# Patient Record
Sex: MALE | Race: WHITE | NOT HISPANIC OR LATINO | URBAN - METROPOLITAN AREA
[De-identification: names, ages, dates, MRNs, and addresses within clinical notes are randomized per-mention and may not be internally consistent; named-entity substitution may affect disease eponyms.]

---

## 2017-03-21 ENCOUNTER — EMERGENCY (EMERGENCY)
Facility: HOSPITAL | Age: 82
LOS: 1 days | Discharge: HOME | End: 2017-03-21
Attending: EMERGENCY MEDICINE | Admitting: EMERGENCY MEDICINE
Payer: MEDICARE

## 2017-03-21 VITALS
HEART RATE: 60 BPM | TEMPERATURE: 98 F | DIASTOLIC BLOOD PRESSURE: 68 MMHG | SYSTOLIC BLOOD PRESSURE: 134 MMHG | OXYGEN SATURATION: 98 % | RESPIRATION RATE: 16 BRPM

## 2017-03-21 VITALS
DIASTOLIC BLOOD PRESSURE: 72 MMHG | SYSTOLIC BLOOD PRESSURE: 140 MMHG | HEIGHT: 71 IN | RESPIRATION RATE: 16 BRPM | TEMPERATURE: 99 F | WEIGHT: 214.95 LBS | OXYGEN SATURATION: 96 % | HEART RATE: 61 BPM

## 2017-03-21 DIAGNOSIS — S91.311A LACERATION WITHOUT FOREIGN BODY, RIGHT FOOT, INITIAL ENCOUNTER: ICD-10-CM

## 2017-03-21 PROCEDURE — 12001 RPR S/N/AX/GEN/TRNK 2.5CM/<: CPT

## 2017-03-21 PROCEDURE — 99283 EMERGENCY DEPT VISIT LOW MDM: CPT | Mod: 25

## 2017-03-21 PROCEDURE — 90715 TDAP VACCINE 7 YRS/> IM: CPT

## 2017-03-21 PROCEDURE — 90471 IMMUNIZATION ADMIN: CPT

## 2017-03-21 PROCEDURE — 99283 EMERGENCY DEPT VISIT LOW MDM: CPT

## 2017-03-21 RX ORDER — TETANUS TOXOID, REDUCED DIPHTHERIA TOXOID AND ACELLULAR PERTUSSIS VACCINE, ADSORBED 5; 2.5; 8; 8; 2.5 [IU]/.5ML; [IU]/.5ML; UG/.5ML; UG/.5ML; UG/.5ML
0.5 SUSPENSION INTRAMUSCULAR ONCE
Qty: 0 | Refills: 0 | Status: COMPLETED | OUTPATIENT
Start: 2017-03-21 | End: 2017-03-21

## 2017-03-21 RX ADMIN — TETANUS TOXOID, REDUCED DIPHTHERIA TOXOID AND ACELLULAR PERTUSSIS VACCINE, ADSORBED 0.5 MILLILITER(S): 5; 2.5; 8; 8; 2.5 SUSPENSION INTRAMUSCULAR at 13:40

## 2017-03-21 NOTE — ED PROVIDER NOTE - MEDICAL DECISION MAKING DETAILS
Foot lac after stepping on shower door track. Plan suture and dress. Suture removal in 7 days. Tdap given.

## 2017-03-21 NOTE — ED PROVIDER NOTE - NS ED MD SCRIBE ATTENDING SCRIBE SECTIONS
VITAL SIGNS( Pullset)/PAST MEDICAL/SURGICAL/SOCIAL HISTORY/REVIEW OF SYSTEMS/DISPOSITION/PHYSICAL EXAM/HISTORY OF PRESENT ILLNESS

## 2017-03-21 NOTE — ED PROVIDER NOTE - DETAILS:
Fer Ramirez MD - The scribe's documentation has been prepared under my direction and personally reviewed by me in its entirety. I confirm that the note above accurately reflects all work, treatment, procedures, and medical decision making performed by me.

## 2017-03-21 NOTE — ED PROVIDER NOTE - OBJECTIVE STATEMENT
84 y/o M pt presents to the ED c/o laceration on right foot s/p slip and fall in shower. Pt states he was stepping out of the shower and his foot got caught on the shower. Pt denies LOC, nausea, vomiting, or dizziness. Per wife, pt is not on blood thinners and took no aspirin PTA. Unknown last Tetanus. No further injuries at this time. No further complaints at this time. 84 y/o M pt presents to the ED by ambulance c/o laceration on right foot s/p slip and fall in shower. Pt states he was stepping out of the shower and his foot got caught on the shower. Per wife, pt is not on blood thinners and took no aspirin. Unknown last Tetanus. Pt denies LOC, nausea, vomiting, or dizziness. No other injuries. No further complaints at this time. 84 y/o M pt presents to the ED by ambulance c/o laceration on right foot s/p slip and fall in shower. Pt states he was stepping out of the shower and his foot got caught on the shower. Per wife, pt is not on blood thinners and took no aspirin. Unknown last Tetanus. Pt denies head injury, LOC, neck or back pain, nausea, vomiting, visual disturbance or dizziness. No other injuries. No further complaints at this time.

## 2017-03-29 ENCOUNTER — EMERGENCY (EMERGENCY)
Facility: HOSPITAL | Age: 82
LOS: 1 days | End: 2017-03-29
Attending: EMERGENCY MEDICINE | Admitting: INTERNAL MEDICINE
Payer: MEDICARE

## 2017-03-29 VITALS
OXYGEN SATURATION: 98 % | WEIGHT: 214.95 LBS | HEART RATE: 60 BPM | DIASTOLIC BLOOD PRESSURE: 84 MMHG | RESPIRATION RATE: 18 BRPM | HEIGHT: 71 IN | TEMPERATURE: 98 F | SYSTOLIC BLOOD PRESSURE: 137 MMHG

## 2017-03-29 PROCEDURE — G0463: CPT

## 2017-03-29 RX ADMIN — Medication 300 MILLIGRAM(S): at 17:22

## 2017-03-29 NOTE — ED ADULT NURSE NOTE - DISCHARGE TEACHING
Patient d/c home with instruction given to wife. Aide and wife instructed on wound cleaning. Basin and Kristy provided to patient.  Verbalized understanding of discharge. VSS.

## 2017-03-29 NOTE — ED PROVIDER NOTE - OBJECTIVE STATEMENT
84 y/o M pt presents to the ED to have sutures removed. As per pt, he had sutures placed in his second toe of right foot, where he had a laceration from stepping on his shower door. Pt is also c/o redness and swelling to the sutured area. Denies fever. No further complaints at this time. 84 y/o M pt presents to the ED to have sutures removed. As per pt, he had sutures placed in his 2nd, 3rd, and 4th toes of right foot, where he had a laceration from stepping on his shower door. Pt is also c/o redness and swelling to the sutured area. Denies fever. No further complaints at this time.

## 2017-03-29 NOTE — ED ADULT NURSE NOTE - OBJECTIVE STATEMENT
Patient here for suture removal of  Right 2,3 and 4th toe. Mild erythema and swelling noted to 2nd right toe with some mild swelling also noted to foot.

## 2017-03-29 NOTE — ED PROCEDURE NOTE - PROCEDURE ADDITIONAL DETAILS
Sutures taken from the 2rd, 3rd, and 4th toes of the right foot. Mild erythema and swelling in the 2nd toe. Likely infection. Will give antibiotics.

## 2017-03-29 NOTE — ED PROVIDER NOTE - NS ED MD SCRIBE ATTENDING SCRIBE SECTIONS
HISTORY OF PRESENT ILLNESS/DISPOSITION/REVIEW OF SYSTEMS/PAST MEDICAL/SURGICAL/SOCIAL HISTORY/VITAL SIGNS( Pullset)/PHYSICAL EXAM

## 2017-03-29 NOTE — ED PROVIDER NOTE - MEDICAL DECISION MAKING DETAILS
Comes for suture removal 1 week after 3 toes sutured. Redness and swelling of 2nd toe cw wound infection. Plan remove sutures. Bacitracin dressing. Rx Clindamycin. wound check in 2 days.

## 2017-03-29 NOTE — ED PROVIDER NOTE - SKIN, MLM
Sutures in the 2nd, 3rd, and 4th toes of right foot. Mild erythema and swelling of 2nd toe extending into the foot.

## 2018-04-14 ENCOUNTER — INPATIENT (INPATIENT)
Facility: HOSPITAL | Age: 83
LOS: 9 days | Discharge: OTHER INSTITUTION | DRG: 689 | End: 2018-04-24
Attending: INTERNAL MEDICINE | Admitting: INTERNAL MEDICINE
Payer: MEDICARE

## 2018-04-14 VITALS
DIASTOLIC BLOOD PRESSURE: 90 MMHG | OXYGEN SATURATION: 97 % | TEMPERATURE: 97 F | RESPIRATION RATE: 18 BRPM | WEIGHT: 214.95 LBS | SYSTOLIC BLOOD PRESSURE: 162 MMHG | HEART RATE: 60 BPM

## 2018-04-14 DIAGNOSIS — M25.562 PAIN IN LEFT KNEE: ICD-10-CM

## 2018-04-14 DIAGNOSIS — N39.0 URINARY TRACT INFECTION, SITE NOT SPECIFIED: ICD-10-CM

## 2018-04-14 DIAGNOSIS — R26.81 UNSTEADINESS ON FEET: ICD-10-CM

## 2018-04-14 DIAGNOSIS — N17.9 ACUTE KIDNEY FAILURE, UNSPECIFIED: ICD-10-CM

## 2018-04-14 DIAGNOSIS — I10 ESSENTIAL (PRIMARY) HYPERTENSION: ICD-10-CM

## 2018-04-14 LAB
ANION GAP SERPL CALC-SCNC: 11 MMOL/L — SIGNIFICANT CHANGE UP (ref 5–17)
APPEARANCE UR: ABNORMAL
BACTERIA # UR AUTO: ABNORMAL /HPF
BASOPHILS # BLD AUTO: 0 K/UL — SIGNIFICANT CHANGE UP (ref 0–0.2)
BASOPHILS NFR BLD AUTO: 0.6 % — SIGNIFICANT CHANGE UP (ref 0–2)
BILIRUB UR-MCNC: NEGATIVE — SIGNIFICANT CHANGE UP
BUN SERPL-MCNC: 21 MG/DL — SIGNIFICANT CHANGE UP (ref 7–23)
CALCIUM SERPL-MCNC: 9.7 MG/DL — SIGNIFICANT CHANGE UP (ref 8.4–10.5)
CHLORIDE SERPL-SCNC: 108 MMOL/L — SIGNIFICANT CHANGE UP (ref 96–108)
CO2 SERPL-SCNC: 24 MMOL/L — SIGNIFICANT CHANGE UP (ref 22–31)
COLOR SPEC: YELLOW — SIGNIFICANT CHANGE UP
CREAT SERPL-MCNC: 1.53 MG/DL — HIGH (ref 0.5–1.3)
DIFF PNL FLD: ABNORMAL
EOSINOPHIL # BLD AUTO: 0.1 K/UL — SIGNIFICANT CHANGE UP (ref 0–0.5)
EOSINOPHIL NFR BLD AUTO: 2 % — SIGNIFICANT CHANGE UP (ref 0–6)
EPI CELLS # UR: SIGNIFICANT CHANGE UP /HPF
GLUCOSE SERPL-MCNC: 83 MG/DL — SIGNIFICANT CHANGE UP (ref 70–99)
GLUCOSE UR QL: NEGATIVE — SIGNIFICANT CHANGE UP
HCT VFR BLD CALC: 36.6 % — LOW (ref 39–50)
HGB BLD-MCNC: 12.2 G/DL — LOW (ref 13–17)
KETONES UR-MCNC: NEGATIVE — SIGNIFICANT CHANGE UP
LEUKOCYTE ESTERASE UR-ACNC: ABNORMAL
LYMPHOCYTES # BLD AUTO: 1.9 K/UL — SIGNIFICANT CHANGE UP (ref 1–3.3)
LYMPHOCYTES # BLD AUTO: 27.3 % — SIGNIFICANT CHANGE UP (ref 13–44)
MCHC RBC-ENTMCNC: 30.3 PG — SIGNIFICANT CHANGE UP (ref 27–34)
MCHC RBC-ENTMCNC: 33.3 GM/DL — SIGNIFICANT CHANGE UP (ref 32–36)
MCV RBC AUTO: 90.9 FL — SIGNIFICANT CHANGE UP (ref 80–100)
MONOCYTES # BLD AUTO: 0.4 K/UL — SIGNIFICANT CHANGE UP (ref 0–0.9)
MONOCYTES NFR BLD AUTO: 5.5 % — SIGNIFICANT CHANGE UP (ref 2–14)
NEUTROPHILS # BLD AUTO: 4.4 K/UL — SIGNIFICANT CHANGE UP (ref 1.8–7.4)
NEUTROPHILS NFR BLD AUTO: 64.6 % — SIGNIFICANT CHANGE UP (ref 43–77)
NITRITE UR-MCNC: POSITIVE
PH UR: 6 — SIGNIFICANT CHANGE UP (ref 5–8)
PLATELET # BLD AUTO: 150 K/UL — SIGNIFICANT CHANGE UP (ref 150–400)
POTASSIUM SERPL-MCNC: 4.7 MMOL/L — SIGNIFICANT CHANGE UP (ref 3.5–5.3)
POTASSIUM SERPL-SCNC: 4.7 MMOL/L — SIGNIFICANT CHANGE UP (ref 3.5–5.3)
PROT UR-MCNC: 30 MG/DL
RBC # BLD: 4.03 M/UL — LOW (ref 4.2–5.8)
RBC # FLD: 15.9 % — HIGH (ref 10.3–14.5)
RBC CASTS # UR COMP ASSIST: ABNORMAL /HPF (ref 0–2)
SODIUM SERPL-SCNC: 143 MMOL/L — SIGNIFICANT CHANGE UP (ref 135–145)
SP GR SPEC: 1.01 — SIGNIFICANT CHANGE UP (ref 1.01–1.02)
UROBILINOGEN FLD QL: NEGATIVE — SIGNIFICANT CHANGE UP
WBC # BLD: 6.9 K/UL — SIGNIFICANT CHANGE UP (ref 3.8–10.5)
WBC # FLD AUTO: 6.9 K/UL — SIGNIFICANT CHANGE UP (ref 3.8–10.5)
WBC UR QL: >50 /HPF (ref 0–5)

## 2018-04-14 PROCEDURE — 93010 ELECTROCARDIOGRAM REPORT: CPT

## 2018-04-14 PROCEDURE — 73522 X-RAY EXAM HIPS BI 3-4 VIEWS: CPT | Mod: 26

## 2018-04-14 PROCEDURE — 73560 X-RAY EXAM OF KNEE 1 OR 2: CPT | Mod: 26,LT

## 2018-04-14 PROCEDURE — 71045 X-RAY EXAM CHEST 1 VIEW: CPT | Mod: 26

## 2018-04-14 PROCEDURE — 99285 EMERGENCY DEPT VISIT HI MDM: CPT | Mod: 25

## 2018-04-14 RX ORDER — DOCUSATE SODIUM 100 MG
100 CAPSULE ORAL
Qty: 0 | Refills: 0 | Status: DISCONTINUED | OUTPATIENT
Start: 2018-04-14 | End: 2018-04-24

## 2018-04-14 RX ORDER — CEFTRIAXONE 500 MG/1
1 INJECTION, POWDER, FOR SOLUTION INTRAMUSCULAR; INTRAVENOUS ONCE
Qty: 0 | Refills: 0 | Status: COMPLETED | OUTPATIENT
Start: 2018-04-14 | End: 2018-04-14

## 2018-04-14 RX ORDER — HYDROCORTISONE 1 %
1 OINTMENT (GRAM) TOPICAL DAILY
Qty: 0 | Refills: 0 | Status: DISCONTINUED | OUTPATIENT
Start: 2018-04-14 | End: 2018-04-24

## 2018-04-14 RX ORDER — CEFTRIAXONE 500 MG/1
1 INJECTION, POWDER, FOR SOLUTION INTRAMUSCULAR; INTRAVENOUS EVERY 24 HOURS
Qty: 0 | Refills: 0 | Status: DISCONTINUED | OUTPATIENT
Start: 2018-04-14 | End: 2018-04-14

## 2018-04-14 RX ORDER — CEFTRIAXONE 500 MG/1
1 INJECTION, POWDER, FOR SOLUTION INTRAMUSCULAR; INTRAVENOUS EVERY 24 HOURS
Qty: 0 | Refills: 0 | Status: DISCONTINUED | OUTPATIENT
Start: 2018-04-14 | End: 2018-04-16

## 2018-04-14 RX ORDER — LATANOPROST 0.05 MG/ML
1 SOLUTION/ DROPS OPHTHALMIC; TOPICAL AT BEDTIME
Qty: 0 | Refills: 0 | Status: DISCONTINUED | OUTPATIENT
Start: 2018-04-14 | End: 2018-04-24

## 2018-04-14 RX ORDER — CEFTRIAXONE 500 MG/1
1 INJECTION, POWDER, FOR SOLUTION INTRAMUSCULAR; INTRAVENOUS ONCE
Qty: 0 | Refills: 0 | Status: DISCONTINUED | OUTPATIENT
Start: 2018-04-14 | End: 2018-04-14

## 2018-04-14 RX ORDER — ACETAMINOPHEN 500 MG
650 TABLET ORAL EVERY 6 HOURS
Qty: 0 | Refills: 0 | Status: DISCONTINUED | OUTPATIENT
Start: 2018-04-14 | End: 2018-04-24

## 2018-04-14 RX ORDER — SODIUM CHLORIDE 9 MG/ML
1000 INJECTION INTRAMUSCULAR; INTRAVENOUS; SUBCUTANEOUS
Qty: 0 | Refills: 0 | Status: DISCONTINUED | OUTPATIENT
Start: 2018-04-14 | End: 2018-04-17

## 2018-04-14 RX ORDER — ENOXAPARIN SODIUM 100 MG/ML
40 INJECTION SUBCUTANEOUS DAILY
Qty: 0 | Refills: 0 | Status: DISCONTINUED | OUTPATIENT
Start: 2018-04-14 | End: 2018-04-24

## 2018-04-14 RX ORDER — HYDRALAZINE HCL 50 MG
10 TABLET ORAL EVERY 8 HOURS
Qty: 0 | Refills: 0 | Status: DISCONTINUED | OUTPATIENT
Start: 2018-04-14 | End: 2018-04-15

## 2018-04-14 RX ADMIN — Medication 10 MILLIGRAM(S): at 23:08

## 2018-04-14 RX ADMIN — CEFTRIAXONE 100 GRAM(S): 500 INJECTION, POWDER, FOR SOLUTION INTRAMUSCULAR; INTRAVENOUS at 19:15

## 2018-04-14 RX ADMIN — Medication 100 MILLIGRAM(S): at 23:08

## 2018-04-14 RX ADMIN — SODIUM CHLORIDE 60 MILLILITER(S): 9 INJECTION INTRAMUSCULAR; INTRAVENOUS; SUBCUTANEOUS at 23:08

## 2018-04-14 RX ADMIN — SODIUM CHLORIDE 60 MILLILITER(S): 9 INJECTION INTRAMUSCULAR; INTRAVENOUS; SUBCUTANEOUS at 19:15

## 2018-04-14 NOTE — PATIENT PROFILE ADULT. - VISION (WITH CORRECTIVE LENSES IF THE PATIENT USUALLY WEARS THEM):
Partially impaired: cannot see medication labels or newsprint, but can see obstacles in path, and the surrounding layout; can count fingers at arm's length/R eye blindness

## 2018-04-14 NOTE — ED PROVIDER NOTE - ATTENDING CONTRIBUTION TO CARE
I have examined and evaluated this patient with the above resident or PA, and agree with the documented clinical history, exam and plan.   Briefly: 85yo M, h/o CAD, CHF, PPM, prior CVA presenting with being unable to ambulate since twisting left knee 3 days ago, no swelling, no redness.  Usually uses walker, but has been unable to walk with walker as usual.      Left knee is not swollen, no overlying redness, no warmth; no instability.  No palpable effusion.  Lungs CTABL, Cardiac nrl s1s2, abd soft nt/nd.    Labs obtained, notable for UA c/w infection.  To be admitted for further management, kyra given inability to walk.    Knee xray as documented above.

## 2018-04-14 NOTE — ED PROVIDER NOTE - OBJECTIVE STATEMENT
83 y/o M w/ hx of pacemaker and stroke p/w generalized weakness x 1-2 wks and inability to ambulate since twisting his knee 3 days ago. Did not fall or hit head. Denies CP, SOB, cough, fever, chills, n/v/d.   PMHx: Pacemaker, Stroke  primary care physician: Kwabena Gamino

## 2018-04-14 NOTE — CONSULT NOTE ADULT - SUBJECTIVE AND OBJECTIVE BOX
CHIEF COMPLAINT:Patient is a 84y old  Male who presents with a chief complaint of weakness and difficulty ambulating X 1-2 weeks (14 Apr 2018 19:07)      HISTORY OF PRESENT ILLNESS:HPI:  85 yo M from home with PMHx CVA, PPM, Psoriasis, who p/w c/o 1-2 weeks progressive b/l lower extremity weakness, unstable gait, malaise, and lethargy.  In addition, pt s/p twisting motion of left knee with resulting surrounding swelling and pain.   Denies trauma or direct blow to leg. (14 Apr 2018 19:07)      PAST MEDICAL & SURGICAL HISTORY:  CVA,   PPM,   Psoriasis,        MEDICATIONS:  enoxaparin Injectable 40 milliGRAM(s) SubCutaneous daily  hydrALAZINE 10 milliGRAM(s) Oral every 8 hours    cefTRIAXone   IVPB 1 Gram(s) IV Intermittent every 24 hours      acetaminophen   Tablet. 650 milliGRAM(s) Oral every 6 hours PRN    bisacodyl 5 milliGRAM(s) Oral every 12 hours PRN  docusate sodium 100 milliGRAM(s) Oral two times a day      hydrocortisone 2.5% Ointment 1 Application(s) Topical daily  latanoprost 0.005% Ophthalmic Solution 1 Drop(s) Left EYE at bedtime  sodium chloride 0.9%. 1000 milliLiter(s) IV Continuous <Continuous>      FAMILY HISTORY:  no premature CAD    Non-contributory    SOCIAL HISTORY:    not an active smoker    Allergies    No Known Allergies    Intolerances    	     Review of Systems:  Review of Systems: REVIEW OF SYSTEMS:  CONSTITUTIONAL: (+) Malaise  EYES: No acute change in vision.  ENT:  No difficulty hearing, tinnitus, vertigo  NECK: No pain or stiffness  RESPIRATORY: No cough, wheezing, hemoptysis, or shortness of breath  CARDIOVASCULAR: No chest pain, palpitations, syncope, or leg swelling  GASTROINTESTINAL: No abdominal pain, N/V, hematemesis, diarrhea, melena, or hematochezia.  GENITOURINARY: No dysuria, frequency, hematuria  NEUROLOGICAL: unstable gait  SKIN: No rashes or lesions   LYMPH NODES: No enlarged glands  ENDOCRINE: No heat or cold intolerance  MUSCULOSKELETAL: See HPI   PSYCHIATRIC: No suicidal or homicidal ideations  HEME/LYMPH: No easy bruising or bleeding  ALLERGY AND IMMUNOLOGIC: No hives or eczema	      All other ROS negative    PHYSICAL EXAM:  T(C): 36.4 (04-14-18 @ 18:45), Max: 36.4 (04-14-18 @ 18:45)  HR: 59 (04-14-18 @ 19:19) (59 - 60)  BP: 172/88 (04-14-18 @ 19:19) (162/90 - 172/97)  RR: 18 (04-14-18 @ 19:19) (18 - 18)  SpO2: 97% (04-14-18 @ 19:19) (96% - 97%)  Wt(kg): --  I&O's Summary      Appearance: Normal	  HEENT:   Normal oral mucosa, EOMI	  Lymphatic: No lymphadenopathy  Cardiovascular: Normal S1 S2, No JVD, No murmurs, No edema  Respiratory: Lungs clear to auscultation	  Psychiatry: Alert, Mood & affect appropriate  Gastrointestinal:  Soft, Non-tender, + BS	  Skin: No rashes, No ecchymoses, No cyanosis	  Neurologic: Non-focal  Extremities:  No clubbing, cyanosis or edema  Vascular: Peripheral pulses palpable 2+ bilaterally  	    	  	  CARDIAC MARKERS:                                  12.2   6.9   )-----------( 150      ( 14 Apr 2018 17:29 )             36.6     04-14    143  |  108  |  21  ----------------------------<  83  4.7   |  24  |  1.53<H>    Ca    9.7      14 Apr 2018 17:29      HgA1c:   TSH:       Radiology:  cxr - no consolidation    Assessment and Plan:   Assessment:  · Assessment		  85 yo M from home with PMHx CVA, PPM, Psoriasis, who p/w c/o 1-2 weeks progressive b/l lower extremity weakness, unstable gait, malaise, and lethargy.  In addition, pt s/p twisting motion of left knee with resulting surrounding swelling and pain.         Problem/Plan - 1:  ·  Problem: Uncontrolled hypertension.  Plan: agree with hydralazine and uptitrate for goal BP of 140/90  monitor vitals      Problem/Plan - 2:  ·  Problem: ARF (acute renal failure).  Plan: concern for intrinsic vs post renal  tx underlying uti  c/w ivf  f/u renal u/s.     Problem/Plan - 3:  ·  Problem: UTI (urinary tract infection).  Plan: rocephin  f/u ucx.       Problem/Plan - 4:  ·  Problem: Unstable gait.  Plan: tx underlying uti   optimize BP  EP consult for PPM interrogation     Problem/Plan - 5:  ·  Problem: Knee pain, left.  Plan: f/u mri left knee.           Arpit Brown DO [facility]  [facility]  [phone]

## 2018-04-14 NOTE — H&P ADULT - NSHPLABSRESULTS_GEN_ALL_CORE
LABS:                        12.2   6.9   )-----------( 150      ( 2018 17:29 )             36.6     04-14    143  |  108  |  21  ----------------------------<  83  4.7   |  24  |  1.53<H>    Ca    9.7      2018 17:29            Urinalysis Basic - ( 2018 18:02 )    Color: Yellow / Appearance: SL Turbid / S.015 / pH: x  Gluc: x / Ketone: Negative  / Bili: Negative / Urobili: Negative   Blood: x / Protein: 30 mg/dL / Nitrite: Positive   Leuk Esterase: Large / RBC: 10-25 /HPF / WBC >50 /HPF   Sq Epi: x / Non Sq Epi: OCC /HPF / Bacteria: Few /HPF      CAPILLARY BLOOD GLUCOSE            RADIOLOGY & ADDITIONAL STUDIES:  < from: Xray Knee 1 or 2 Views, Left (18 @ 17:34) >    EXAM:  KNEE; 1 OR 2 VIEWS - LEFT                            PROCEDURE DATE:  2018      ******PRELIMINARY REPORT******    ******PRELIMINARY REPORT******              INTERPRETATION:  Linear lucency through the lateral patella is 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 Correlate clinically with point tenderness.   Otherwise, no acute fracture.    < end of copied text >

## 2018-04-14 NOTE — H&P ADULT - NSHPPHYSICALEXAM_GEN_ALL_CORE
Vital Signs Last 24 Hrs  T(C): 36.4 (14 Apr 2018 18:45), Max: 36.4 (14 Apr 2018 18:45)  T(F): 97.5 (14 Apr 2018 18:45), Max: 97.5 (14 Apr 2018 18:45)  HR: 59 (14 Apr 2018 19:19) (59 - 60)  BP: 172/88 (14 Apr 2018 19:19) (162/90 - 172/97)  BP(mean): --  RR: 18 (14 Apr 2018 19:19) (18 - 18)  SpO2: 97% (14 Apr 2018 19:19) (96% - 97%)    Constitutional: WD, WN, NAD  HEENT: NC, AT  Neck:  Supple. No JVD, bruits or thyromegaly  Respiratory:  CTA b/l. No rhonchi, or rales. Not using accessory muscles of respiration.   Cardiovascular:  RRR. No R/G. 2+ radial pulses b/l.   Gastrointestinal: Soft, obese, NT, ND, normoactive bowel sounds.  No organomegaly, rigidity, or RT.  Extremities: psoriatic changes b/l l.e., WWP without cyanosis, clubbing   Neurological:  Alert and awake.  Crude sensation intact.  strength 5/5 l.e.

## 2018-04-14 NOTE — ED PROVIDER NOTE - PHYSICAL EXAMINATION
Gen: NAD  Eyes:  sclerae white, no icterus  ENT: Moist mucous membranes. No exudates  Neck: supple, no LAD, mass or goiter, trachea midline  CV: RRR. Audible S1 and S2. No murmurs, rubs, gallops, S3, nor S4  Pulm: Clear to auscultation bilaterally. No wheezes, rales, or rhonchi  Abd: BS+, nondistended, No tenderness to palpation  Musculoskeletal:  L. knee mildly TTP, not markedly swollen, skin intact, not warm or w/ signs of infxn  Skin: no lesions or scars noted  Psych: mood good, affect full range and congruent with mood.  Neurologic: AAOx3

## 2018-04-14 NOTE — H&P ADULT - NSHPREVIEWOFSYSTEMS_GEN_ALL_CORE
REVIEW OF SYSTEMS:  CONSTITUTIONAL: (+) Malaise  EYES: No acute change in vision.  ENT:  No difficulty hearing, tinnitus, vertigo  NECK: No pain or stiffness  RESPIRATORY: No cough, wheezing, hemoptysis, or shortness of breath  CARDIOVASCULAR: No chest pain, palpitations, syncope, or leg swelling  GASTROINTESTINAL: No abdominal pain, N/V, hematemesis, diarrhea, melena, or hematochezia.  GENITOURINARY: No dysuria, frequency, hematuria  NEUROLOGICAL: No headaches, acute loss of strength, numbness, or tremors  SKIN: No rashes or lesions   LYMPH NODES: No enlarged glands  ENDOCRINE: No heat or cold intolerance  MUSCULOSKELETAL: No arthralgia, myalgia   PSYCHIATRIC: No suicidal or homicidal ideations  HEME/LYMPH: No easy bruising or bleeding   ALLERGY AND IMMUNOLOGIC: No hives or eczema

## 2018-04-14 NOTE — ED ADULT NURSE NOTE - OBJECTIVE STATEMENT
83 y/o male a+ox3, pmhx pacemaker, CVA, coming from home c/o bilateral leg weakness x 3 days. Pt reports progressively worsening leg weakness bilaterally; states he is no longer able to walk with his walker or get out of bed without assistance; pt denies recent falls or injuries. Pt denies any pain to legs bilaterally, denies dizziness, lightheadedness, difficulty breathing, chest pain or discomfort, headache, abdominal pain, n/v/d, fever or chills, use of blood thinners, recent falls or trauma. VS documented, IV established, labs obtained. Pt left in position of comfort, will reassess

## 2018-04-14 NOTE — H&P ADULT - HISTORY OF PRESENT ILLNESS
83 yo M from home with PMHx CVA, PPM, Psoriasis, who p/w c/o 1-2 weeks progressive b/l lower extremity weakness, unstable gait, malaise, and lethargy.  In addition, pt s/p twisting motion of left knee with resulting surrounding swelling and pain.   Denies trauma or direct blow to leg.

## 2018-04-14 NOTE — CONSULT NOTE ADULT - SUBJECTIVE AND OBJECTIVE BOX
Patient is a 84y old  Male who presents with a chief complaint of weakness and difficulty ambulating X 1-2 weeks (2018 19:07)        REVIEW OF SYSTEMS: Total of twelve systems have been reviewed with patient and found to be negative unless mentioned in HPI            PAST MEDICAL & SURGICAL HISTORY:      SOCIAL HISTORY  Alcohol:  Tobacco:  Illicit substance use:          FAMILY HISTORY: Non contributory to the present illness        ALLERGIES: NKDA          T(C): 36.4 (18 @ 18:45), Max: 36.4 (18 @ 18:45)  HR: 59 (18 @ 19:19) (59 - 60)  BP: 172/88 (18 @ 19:19) (162/90 - 172/97)  RR: 18 (18 @ 19:19) (18 - 18)  SpO2: 97% (18 @ 19:19) (96% - 97%)  Wt(kg): --  I&O's Summary          PHYSICAL EXAM:  GENERAL: N  CVS:  RESP:  GI:  EXT:  CNS:            LABS:                        12.2   6.9   )-----------( 150      ( 2018 17:29 )             36.6     04-14    143  |  108  |  21  ----------------------------<  83  4.7   |  24  |  1.53<H>    Ca    9.7      2018 17:29        Urinalysis Basic - ( 2018 18:02 )    Color: Yellow / Appearance: SL Turbid / S.015 / pH: x  Gluc: x / Ketone: Negative  / Bili: Negative / Urobili: Negative   Blood: x / Protein: 30 mg/dL / Nitrite: Positive   Leuk Esterase: Large / RBC: 10-25 /HPF / WBC >50 /HPF   Sq Epi: x / Non Sq Epi: OCC /HPF / Bacteria: Few /HPF            MEDICATIONS  (STANDING):  cefTRIAXone   IVPB 1 Gram(s) IV Intermittent every 24 hours  docusate sodium 100 milliGRAM(s) Oral two times a day  enoxaparin Injectable 40 milliGRAM(s) SubCutaneous daily  hydrALAZINE 10 milliGRAM(s) Oral every 8 hours  hydrocortisone 2.5% Ointment 1 Application(s) Topical daily  latanoprost 0.005% Ophthalmic Solution 1 Drop(s) Left EYE at bedtime  sodium chloride 0.9%. 1000 milliLiter(s) (60 mL/Hr) IV Continuous <Continuous>    MEDICATIONS  (PRN):  acetaminophen   Tablet. 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 Correlate clinically with point tenderness.   Otherwise, no acute fracture.      < end of copied text >      < from: Xray Chest 1 View AP/PA (18 @ 17:32) >  No Urgent/Emergent findings.    Follow up official report.      < end of copied text > Patient is a 84y old  Male who presents with a chief complaint of weakness and difficulty ambulating X 1-2 weeks (2018 19:07), progressive b/l lower extremity weakness, unstable gait, malaise, and lethargy. He has no fever or chills but found to have positive Urine analysis. He has started on Ceftriaxone, cultures Pending. The ID consult requested  to assist with further evaluation and antibiotic management.           REVIEW OF SYSTEMS: Total of twelve systems have been reviewed with patient and found to be negative unless mentioned in HPI            PAST MEDICAL & SURGICAL HISTORY:    Psoriasis  HTN        SOCIAL HISTORY  Alcohol: Does not drink  Tobacco: Does not smoke  Illicit substance use: None          FAMILY HISTORY: Non contributory to the present illness        ALLERGIES: NKDA          T(C): 36.4 (18 @ 18:45), Max: 36.4 (18 @ 18:45)  HR: 59 (18 @ 19:19) (59 - 60)  BP: 172/88 (18 @ 19:19) (162/90 - 172/97)  RR: 18 (18 @ 19:19) (18 - 18)  SpO2: 97% (18 @ 19:19) (96% - 97%)  Wt(kg): --  I&O's Summary          PHYSICAL EXAM:  GENERAL: Not in distress  CVS: s1 and s2 present  RESP: Air entry B/L  GI: Abdomen soft and nontender  EXT: No pedal edema  CNS: Awake and alert              LABS:                        12.2   6.9   )-----------( 150      ( 2018 17:29 )             36.6     -    143  |  108  |  21  ----------------------------<  83  4.7   |  24  |  1.53<H>    Ca    9.7      2018 17:29        Urinalysis Basic - ( 2018 18:02 )    Color: Yellow / Appearance: SL Turbid / S.015 / pH: x  Gluc: x / Ketone: Negative  / Bili: Negative / Urobili: Negative   Blood: x / Protein: 30 mg/dL / Nitrite: Positive   Leuk Esterase: Large / RBC: 10-25 /HPF / WBC >50 /HPF   Sq Epi: x / Non Sq Epi: OCC /HPF / Bacteria: Few /HPF            MEDICATIONS  (STANDING):  cefTRIAXone   IVPB 1 Gram(s) IV Intermittent every 24 hours  docusate sodium 100 milliGRAM(s) Oral two times a day  enoxaparin Injectable 40 milliGRAM(s) SubCutaneous daily  hydrALAZINE 10 milliGRAM(s) Oral every 8 hours  hydrocortisone 2.5% Ointment 1 Application(s) Topical daily  latanoprost 0.005% Ophthalmic Solution 1 Drop(s) Left EYE at bedtime  sodium chloride 0.9%. 1000 milliLiter(s) (60 mL/Hr) IV Continuous <Continuous>    MEDICATIONS  (PRN):  acetaminophen   Tablet. 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 Correlate clinically with point tenderness.   Otherwise, no acute fracture.      < end of copied text >      < from: Xray Chest 1 View AP/PA (18 @ 17:32) >  No Urgent/Emergent findings.    Follow up official report.      < end of copied text >

## 2018-04-14 NOTE — ED PROVIDER NOTE - PROGRESS NOTE DETAILS
xray obtained of left knee.  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  -Fabrice

## 2018-04-14 NOTE — H&P ADULT - ASSESSMENT
83 yo M from home with PMHx CVA, PPM, Psoriasis, who p/w c/o 1-2 weeks progressive b/l lower extremity weakness, unstable gait, malaise, and lethargy.  In addition, pt s/p twisting motion of left knee with resulting surrounding swelling and pain.

## 2018-04-15 DIAGNOSIS — N18.3 CHRONIC KIDNEY DISEASE, STAGE 3 (MODERATE): ICD-10-CM

## 2018-04-15 DIAGNOSIS — N17.9 ACUTE KIDNEY FAILURE, UNSPECIFIED: ICD-10-CM

## 2018-04-15 DIAGNOSIS — N39.0 URINARY TRACT INFECTION, SITE NOT SPECIFIED: ICD-10-CM

## 2018-04-15 DIAGNOSIS — I12.9 HYPERTENSIVE CHRONIC KIDNEY DISEASE WITH STAGE 1 THROUGH STAGE 4 CHRONIC KIDNEY DISEASE, OR UNSPECIFIED CHRONIC KIDNEY DISEASE: ICD-10-CM

## 2018-04-15 LAB
ALBUMIN SERPL ELPH-MCNC: 3.6 G/DL — SIGNIFICANT CHANGE UP (ref 3.3–5)
ALP SERPL-CCNC: 116 U/L — SIGNIFICANT CHANGE UP (ref 40–120)
ALT FLD-CCNC: 14 U/L RC — SIGNIFICANT CHANGE UP (ref 10–45)
ANION GAP SERPL CALC-SCNC: 12 MMOL/L — SIGNIFICANT CHANGE UP (ref 5–17)
AST SERPL-CCNC: 21 U/L — SIGNIFICANT CHANGE UP (ref 10–40)
BILIRUB DIRECT SERPL-MCNC: <0.1 MG/DL — SIGNIFICANT CHANGE UP (ref 0–0.2)
BILIRUB INDIRECT FLD-MCNC: >0.2 MG/DL — SIGNIFICANT CHANGE UP (ref 0.2–1)
BILIRUB SERPL-MCNC: 0.3 MG/DL — SIGNIFICANT CHANGE UP (ref 0.2–1.2)
BUN SERPL-MCNC: 20 MG/DL — SIGNIFICANT CHANGE UP (ref 7–23)
CALCIUM SERPL-MCNC: 9.6 MG/DL — SIGNIFICANT CHANGE UP (ref 8.4–10.5)
CHLORIDE SERPL-SCNC: 109 MMOL/L — HIGH (ref 96–108)
CHOLEST SERPL-MCNC: 174 MG/DL — SIGNIFICANT CHANGE UP (ref 10–199)
CO2 SERPL-SCNC: 23 MMOL/L — SIGNIFICANT CHANGE UP (ref 22–31)
CREAT SERPL-MCNC: 1.43 MG/DL — HIGH (ref 0.5–1.3)
GLUCOSE SERPL-MCNC: 72 MG/DL — SIGNIFICANT CHANGE UP (ref 70–99)
HBA1C BLD-MCNC: 4.9 % — SIGNIFICANT CHANGE UP (ref 4–5.6)
HCT VFR BLD CALC: 35.6 % — LOW (ref 39–50)
HDLC SERPL-MCNC: 46 MG/DL — SIGNIFICANT CHANGE UP (ref 40–125)
HGB BLD-MCNC: 11.9 G/DL — LOW (ref 13–17)
LIPID PNL WITH DIRECT LDL SERPL: 103 MG/DL — SIGNIFICANT CHANGE UP
MAGNESIUM SERPL-MCNC: 2 MG/DL — SIGNIFICANT CHANGE UP (ref 1.6–2.6)
MCHC RBC-ENTMCNC: 30.1 PG — SIGNIFICANT CHANGE UP (ref 27–34)
MCHC RBC-ENTMCNC: 33.3 GM/DL — SIGNIFICANT CHANGE UP (ref 32–36)
MCV RBC AUTO: 90.5 FL — SIGNIFICANT CHANGE UP (ref 80–100)
PHOSPHATE SERPL-MCNC: 3.2 MG/DL — SIGNIFICANT CHANGE UP (ref 2.5–4.5)
PLATELET # BLD AUTO: 137 K/UL — LOW (ref 150–400)
POTASSIUM SERPL-MCNC: 4.6 MMOL/L — SIGNIFICANT CHANGE UP (ref 3.5–5.3)
POTASSIUM SERPL-SCNC: 4.6 MMOL/L — SIGNIFICANT CHANGE UP (ref 3.5–5.3)
PROT SERPL-MCNC: 7.6 G/DL — SIGNIFICANT CHANGE UP (ref 6–8.3)
RBC # BLD: 3.94 M/UL — LOW (ref 4.2–5.8)
RBC # FLD: 15.9 % — HIGH (ref 10.3–14.5)
SODIUM SERPL-SCNC: 144 MMOL/L — SIGNIFICANT CHANGE UP (ref 135–145)
TOTAL CHOLESTEROL/HDL RATIO MEASUREMENT: 3.8 RATIO — SIGNIFICANT CHANGE UP (ref 3.4–9.6)
TRIGL SERPL-MCNC: 126 MG/DL — SIGNIFICANT CHANGE UP (ref 10–149)
VIT B12 SERPL-MCNC: 1181 PG/ML — SIGNIFICANT CHANGE UP (ref 232–1245)
WBC # BLD: 6.2 K/UL — SIGNIFICANT CHANGE UP (ref 3.8–10.5)
WBC # FLD AUTO: 6.2 K/UL — SIGNIFICANT CHANGE UP (ref 3.8–10.5)

## 2018-04-15 RX ORDER — LANOLIN ALCOHOL/MO/W.PET/CERES
1 CREAM (GRAM) TOPICAL AT BEDTIME
Qty: 0 | Refills: 0 | Status: DISCONTINUED | OUTPATIENT
Start: 2018-04-15 | End: 2018-04-15

## 2018-04-15 RX ORDER — AMLODIPINE BESYLATE 2.5 MG/1
5 TABLET ORAL DAILY
Qty: 0 | Refills: 0 | Status: DISCONTINUED | OUTPATIENT
Start: 2018-04-15 | End: 2018-04-24

## 2018-04-15 RX ORDER — LANOLIN ALCOHOL/MO/W.PET/CERES
1 CREAM (GRAM) TOPICAL AT BEDTIME
Qty: 0 | Refills: 0 | Status: COMPLETED | OUTPATIENT
Start: 2018-04-15 | End: 2018-04-15

## 2018-04-15 RX ORDER — HYDRALAZINE HCL 50 MG
25 TABLET ORAL EVERY 8 HOURS
Qty: 0 | Refills: 0 | Status: DISCONTINUED | OUTPATIENT
Start: 2018-04-16 | End: 2018-04-20

## 2018-04-15 RX ORDER — AMLODIPINE BESYLATE 2.5 MG/1
5 TABLET ORAL ONCE
Qty: 0 | Refills: 0 | Status: COMPLETED | OUTPATIENT
Start: 2018-04-15 | End: 2018-04-15

## 2018-04-15 RX ADMIN — Medication 100 MILLIGRAM(S): at 07:05

## 2018-04-15 RX ADMIN — Medication 1 APPLICATION(S): at 20:41

## 2018-04-15 RX ADMIN — AMLODIPINE BESYLATE 5 MILLIGRAM(S): 2.5 TABLET ORAL at 12:39

## 2018-04-15 RX ADMIN — CEFTRIAXONE 100 GRAM(S): 500 INJECTION, POWDER, FOR SOLUTION INTRAMUSCULAR; INTRAVENOUS at 19:39

## 2018-04-15 RX ADMIN — ENOXAPARIN SODIUM 40 MILLIGRAM(S): 100 INJECTION SUBCUTANEOUS at 12:39

## 2018-04-15 RX ADMIN — Medication 1 MILLIGRAM(S): at 22:27

## 2018-04-15 RX ADMIN — Medication 10 MILLIGRAM(S): at 07:05

## 2018-04-15 RX ADMIN — Medication 650 MILLIGRAM(S): at 21:12

## 2018-04-15 RX ADMIN — Medication 650 MILLIGRAM(S): at 20:42

## 2018-04-15 RX ADMIN — AMLODIPINE BESYLATE 5 MILLIGRAM(S): 2.5 TABLET ORAL at 14:51

## 2018-04-15 RX ADMIN — LATANOPROST 1 DROP(S): 0.05 SOLUTION/ DROPS OPHTHALMIC; TOPICAL at 20:41

## 2018-04-15 NOTE — CONSULT NOTE ADULT - SUBJECTIVE AND OBJECTIVE BOX
[facility] Neurological [facility] [place]([facility]), [facility]        Patient is a 84y old  Male who presents with a chief complaint of weakness and difficulty ambulating X 1-2 weeks (2018 19:07)      HPI:  83 yo M from home with PMHx CVA, PPM, Psoriasis, who p/w c/o 1-2 weeks progressive b/l lower extremity weakness, unstable gait, malaise, and lethargy.  In addition, pt s/p twisting motion of left knee with resulting surrounding swelling and pain.   Denies trauma or direct blow to leg. (2018 19:07)           *****PAST MEDICAL / Surgical  HISTORY:  PAST MEDICAL & SURGICAL HISTORY:           *****FAMILY HISTORY:  FAMILY HISTORY:           *****SOCIAL HISTORY:  Alcohol: None  Smoking: None         *****ALLERGIES:   Allergies    No Known Allergies    Intolerances             *****MEDICATIONS: current medication reviewed and documented.   MEDICATIONS  (STANDING):  amLODIPine   Tablet 5 milliGRAM(s) Oral daily  cefTRIAXone   IVPB 1 Gram(s) IV Intermittent every 24 hours  docusate sodium 100 milliGRAM(s) Oral two times a day  enoxaparin Injectable 40 milliGRAM(s) SubCutaneous daily  hydrocortisone 2.5% Ointment 1 Application(s) Topical daily  latanoprost 0.005% Ophthalmic Solution 1 Drop(s) Left EYE at bedtime  sodium chloride 0.9%. 1000 milliLiter(s) (60 mL/Hr) IV Continuous <Continuous>    MEDICATIONS  (PRN):  acetaminophen   Tablet. 650 milliGRAM(s) Oral every 6 hours PRN Mild Pain (1 - 3)  bisacodyl 5 milliGRAM(s) Oral every 12 hours PRN Constipation           *****REVIEW OF SYSTEM:  GEN: no fever, no chills, no pain  RESP: no SOB, no cough, no sputum  CVS: no chest pain, no palpitations, no edema  GI: no abdominal pain, no nausea, no vomiting, no constipation, no diarrhea  : no dysurea, no frequency, no hematurea  Neuro: no headache, no dizziness  PSYCH: no anxiety, no depression  Derm : no itching, no rash         *****VITAL SIGNS:  T(C): 36.3 (04-15-18 @ 06:40), Max: 36.6 (04-15-18 @ 00:51)  HR: 80 (04-15-18 @ 06:40) (59 - 80)  BP: 161/84 (04-15-18 @ 06:40) (161/84 - 174/96)  RR: 18 (04-15-18 @ 06:40) (18 - 18)  SpO2: 94% (04-15-18 @ 06:40) (94% - 99%)  Wt(kg): --     @ 07:01  -  04-15 @ 07:00  --------------------------------------------------------  IN: 1080 mL / OUT: 0 mL / NET: 1080 mL            >>> <<<         *****LAB AND IMAGIN.9   6.2   )-----------( 137      ( 15 Apr 2018 07:13 )             35.6               -15    144  |  109<H>  |  20  ----------------------------<  72  4.6   |  23  |  1.43<H>    Ca    9.6      15 Apr 2018 07:11  Phos  3.2     -15  Mg     2.0     04-15    TPro  7.6  /  Alb  3.6  /  TBili  0.3  /  DBili  <0.1  /  AST  21  /  ALT  14  /  AlkPhos  116  -15                            Urinalysis Basic - ( 2018 18:02 )    Color: Yellow / Appearance: SL Turbid / S.015 / pH: x  Gluc: x / Ketone: Negative  / Bili: Negative / Urobili: Negative   Blood: x / Protein: 30 mg/dL / Nitrite: Positive   Leuk Esterase: Large / RBC: 10-25 /HPF / WBC >50 /HPF   Sq Epi: x / Non Sq Epi: OCC /HPF / Bacteria: Few /HPF        [All pertinent recent Imaging reports reviewed]         *****A S S E S S M E N T   A N D   P L A N :            80 minutes spent on the total encounter;  more than 50 % of the visit was spent on counseling  and or coordinating care by the attending physician.    Thank you for allowing me to participate in the care of this lauren patient. Please do not hesitate to call me if you have any questions.   ___________________________  Will follow with you.  Thank you,  Mary Draper MD  Diplomate of the American Board of Neurology and Psychiatry.  Diplomate of the American Board of Vascular Neurology.   [facility] ([facility]), [facility]   Ph: [phone]      This and subsequent notes were partially created using voice recognition software and will  inherently be subject to errors including those of syntax and sound alike substitutions which may escape proofreading. In such instances original meaning may be extrapolated by contextual derivation. [facility]rological [facility]        Patient is a 84y old  Male who presents with a chief complaint of weakness and difficulty ambulating X 1-2 weeks (2018 19:07)      HPI:  85 yo M from home with PMHx CVA, PPM, Psoriasis, who p/w c/o 1-2 weeks progressive b/l lower extremity weakness, unstable gait, malaise, and lethargy.  In addition, pt s/p twisting motion of left knee with resulting surrounding swelling and pain.   Denies trauma or direct blow to leg.  He does report having had unsteady gait after his cva, he generally walks with a walker after that. He reports that he developed significant weakness which limits his ability to walk.     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   MEDICATIONS  (STANDING):  amLODIPine   Tablet 5 milliGRAM(s) Oral daily  cefTRIAXone   IVPB 1 Gram(s) IV Intermittent every 24 hours  docusate sodium 100 milliGRAM(s) Oral two times a day  enoxaparin Injectable 40 milliGRAM(s) SubCutaneous daily  hydrocortisone 2.5% Ointment 1 Application(s) Topical daily  latanoprost 0.005% Ophthalmic Solution 1 Drop(s) Left EYE at bedtime  sodium chloride 0.9%. 1000 milliLiter(s) (60 mL/Hr) IV Continuous <Continuous>    MEDICATIONS  (PRN):  acetaminophen   Tablet. 650 milliGRAM(s) Oral every 6 hours PRN Mild Pain (1 - 3)  bisacodyl 5 milliGRAM(s) Oral every 12 hours PRN Constipation           *****REVIEW OF SYSTEM:  GEN: no fever, no chills, no pain  RESP: no SOB, no cough, no sputum  CVS: no chest pain, no palpitations, no edema  GI: no abdominal pain, no nausea, no vomiting, no constipation, no diarrhea  : no dysurea, no frequency, no hematurea  Neuro: no headache, no dizziness  PSYCH: no anxiety, no depression  Derm : no itching, no rash         *****VITAL SIGNS:  T(C): 36.3 (04-15-18 @ 06:40), Max: 36.6 (04-15-18 @ 00:51)  HR: 80 (04-15-18 @ 06:40) (59 - 80)  BP: 161/84 (04-15-18 @ 06:40) (161/84 - 174/96)  RR: 18 (04-15-18 @ 06:40) (18 - 18)  SpO2: 94% (04-15-18 @ 06:40) (94% - 99%)  Wt(kg): --     @ 07:01  -  04-15 @ 07:00  --------------------------------------------------------  IN: 1080 mL / OUT: 0 mL / NET: 1080 mL                 *****PHYSICAL EXAM:   alert oriented x 3 attention comprehension are fair.  Able to name, repeat.   EOmi fundi not visualized   no nystagmus VFF to confrontation  Tongue is midline  Palate elevates symmetrically   Moving all ext except left lower ext due to pain/swelling of the left knee.   pt has trouble bending his knee due to the swelling. Also appears to have some ecchymosis of the left knee.     Gait not assessed.             >>> <<<         *****LAB AND IMAGIN.9   6.2   )-----------( 137      ( 15 Apr 2018 07:13 )             35.6               -15    144  |  109<H>  |  20  ----------------------------<  72  4.6   |  23  |  1.43<H>    Ca    9.6      15 Apr 2018 07:11  Phos  3.2     -15  Mg     2.0     -15    TPro  7.6  /  Alb  3.6  /  TBili  0.3  /  DBili  <0.1  /  AST  21  /  ALT  14  /  AlkPhos  116  04-15                            Urinalysis Basic - ( 2018 18:02 )    Color: Yellow / Appearance: SL Turbid / S.015 / pH: x  Gluc: x / Ketone: Negative  / Bili: Negative / Urobili: Negative   Blood: x / Protein: 30 mg/dL / Nitrite: Positive   Leuk Esterase: Large / RBC: 10-25 /HPF / WBC >50 /HPF   Sq Epi: x / Non Sq Epi: OCC /HPF / Bacteria: Few /HPF    < from: Xray Knee 1 or 2 Views, Left (18 @ 17:34) >  Linear lucency through the lateral patella may be related to a bipartite   patella versus nondisplaced fracture. Correlate for site of point   tenderness. Correlate with pending knee MRI.     There may be a left knee joint effusion, however, evaluation is limited   byobliquity on the lateral view.    Otherwise, no acute fracture.  Medial knee joint space narrowing.    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 Lower lumbar   degenerative disc disease and facet arthrosis present. Mild pubic   symphysis arthrosis is present. There is no fracture identified.          < end of copied text >        [All pertinent recent Imaging reports reviewed]         *****A S S E S S M E N T   A N D   P L A N :    85 yo M from home with PMHx CVA, PPM, Psoriasis, who p/w c/o 1-2 weeks progressive b/l lower extremity weakness, unstable gait, malaise, and lethargy.  In addition, pt s/p twisting motion of left knee with resulting surrounding swelling and pain.      Xray of the knee, as above, unclear if there is a fracture, mri of knee pending.   possible left knee sprain vs. inflammatory process  consider ortho consult/rheum consult.  Still with localized swelling of the left knee and limited range of motion of the knee.   Exam is limited due to pain, but does demonstrate some resistance to motion.  Elevated, rest, ice and avoid weight bearing for now until further imaging.  Consider nsaids for pain relief.    Elevated bp likely due to pain.  will continue to monitor closely.     ON dvt prophylaxis with lovenox.          80 minutes spent on the total encounter;  more than 50 % of the visit was spent on counseling  and or coordinating care by the attending physician.    Thank you for allowing me to participate in the care of this lauren patient. Please do not hesitate to call me if you have any questions.   ___________________________  Will follow with you.  Thank you,  Mary Draper MD  Diplomate of the American Board of Neurology and Psychiatry.  Diplomate of the American Board of Vascular Neurology.   [facility] ([place]), [facility]   Ph: [phone]      This and subsequent notes were partially created using voice recognition software and will  inherently be subject to errors including those of syntax and sound alike substitutions which may escape proofreading. In such instances original meaning may be extrapolated by contextual derivation.

## 2018-04-15 NOTE — CONSULT NOTE ADULT - PROBLEM SELECTOR RECOMMENDATION 2
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 Will contact PMD to determine baseline renal function if no improvement with IVF. Monitor electrolytes.

## 2018-04-15 NOTE — CONSULT NOTE ADULT - PROBLEM SELECTOR RECOMMENDATION 9
Patient with likely MELISSA in setting of infection likely hemodynamically mediated. Scr improving with IVF. Continue with IVF for now. Given history of stones and prostate CA, agree with checking renal US. Avoid nephrotoxins.

## 2018-04-15 NOTE — PROGRESS NOTE ADULT - SUBJECTIVE AND OBJECTIVE BOX
Patient seen and examined at bedside  No new acute events noted overnight  Case discussed with medical team    HPI:  85 yo M from home with PMHx CVA, PPM, Psoriasis, who p/w c/o 1-2 weeks progressive b/l lower extremity weakness, unstable gait, malaise, and lethargy.  In addition, pt s/p twisting motion of left knee with resulting surrounding swelling and pain.   Denies trauma or direct blow to leg. (2018 19:07)      PAST MEDICAL & SURGICAL HISTORY:      No Known Allergies       MEDICATIONS  (STANDING):  amLODIPine   Tablet 5 milliGRAM(s) Oral daily  cefTRIAXone   IVPB 1 Gram(s) IV Intermittent every 24 hours  docusate sodium 100 milliGRAM(s) Oral two times a day  enoxaparin Injectable 40 milliGRAM(s) SubCutaneous daily  hydrocortisone 2.5% Ointment 1 Application(s) Topical daily  latanoprost 0.005% Ophthalmic Solution 1 Drop(s) Left EYE at bedtime  sodium chloride 0.9%. 1000 milliLiter(s) (60 mL/Hr) IV Continuous <Continuous>    MEDICATIONS  (PRN):  acetaminophen   Tablet. 650 milliGRAM(s) Oral every 6 hours PRN Mild Pain (1 - 3)  bisacodyl 5 milliGRAM(s) Oral every 12 hours PRN Constipation      REVIEW OF SYSTEMS:  CONSTITUTIONAL: (+) malaise.   EYES: No acute change in vision   ENT:  No tinnitus  NECK: No stiffness  RESPIRATORY: No hemoptysis  CARDIOVASCULAR: No chest pain, palpitations, syncope  GASTROINTESTINAL: No hematemesis, diarrhea, melena, or hematochezia.  GENITOURINARY: No hematuria  NEUROLOGICAL: No headaches  LYMPH Nodes: No enlarged glands  ENDOCRINE: No heat or cold intolerance	    T(C): 36.3 (04-15-18 @ 06:40), Max: 36.6 (04-15-18 @ 00:51)  HR: 80 (04-15-18 @ 06:40) (59 - 80)  BP: 161/84 (04-15-18 @ 06:40) (161/84 - 174/96)  RR: 18 (04-15-18 @ 06:40) (18 - 18)  SpO2: 94% (04-15-18 @ 06:40) (94% - 99%)    PHYSICAL EXAMINATION:   Constitutional: WD, NAD  HEENT: NC, AT  Neck:  Supple  Respiratory:  Adequate airflow b/l. Not using accessory muscles of respiration.  Cardiovascular:  S1 & S2 intact, no R/G, 2+ radial pulses b/l  Gastrointestinal: Soft, NT, ND, normoactive b.s., no organomegaly/RT/rigidity  Extremities: WWP  Neurological:  Alert and awake. Crude sensation intact.     Labs and imaging reviewed    LABS:                        11.9   6.2   )-----------( 137      ( 15 Apr 2018 07:13 )             35.6     -15    144  |  109<H>  |  20  ----------------------------<  72  4.6   |  23  |  1.43<H>    Ca    9.6      15 Apr 2018 07:11  Phos  3.2     04-15  Mg     2.0     04-15    TPro  7.6  /  Alb  3.6  /  TBili  0.3  /  DBili  <0.1  /  AST  21  /  ALT  14  /  AlkPhos  116  04-15          Urinalysis Basic - ( 2018 18:02 )    Color: Yellow / Appearance: SL Turbid / S.015 / pH: x  Gluc: x / Ketone: Negative  / Bili: Negative / Urobili: Negative   Blood: x / Protein: 30 mg/dL / Nitrite: Positive   Leuk Esterase: Large / RBC: 10-25 /HPF / WBC >50 /HPF   Sq Epi: x / Non Sq Epi: OCC /HPF / Bacteria: Few /HPF      CAPILLARY BLOOD GLUCOSE            LIVER FUNCTIONS - ( 15 Apr 2018 07:11 )  Alb: 3.6 g/dL / Pro: 7.6 g/dL / ALK PHOS: 116 U/L / ALT: 14 U/L RC / AST: 21 U/L / GGT: x               RADIOLOGY & ADDITIONAL STUDIES:

## 2018-04-15 NOTE — PROGRESS NOTE ADULT - SUBJECTIVE AND OBJECTIVE BOX
Patient is a 84y old  Male who presents with a chief complaint of weakness and difficulty ambulating X 1-2 weeks (14 Apr 2018 19:07)      INTERVAL HISTORY: hypertensive    	  MEDICATIONS:  amLODIPine   Tablet 5 milliGRAM(s) Oral daily        PHYSICAL EXAM:  T(C): 36.3 (04-15-18 @ 21:19), Max: 36.6 (04-15-18 @ 00:51)  HR: 60 (04-15-18 @ 21:19) (59 - 80)  BP: 157/88 (04-15-18 @ 21:19) (157/88 - 178/98)  RR: 18 (04-15-18 @ 21:19) (17 - 18)  SpO2: 97% (04-15-18 @ 21:19) (94% - 97%)  Wt(kg): --  I&O's Summary    14 Apr 2018 07:01  -  15 Apr 2018 07:00  --------------------------------------------------------  IN: 1080 mL / OUT: 0 mL / NET: 1080 mL    15 Apr 2018 07:01  -  15 Apr 2018 22:51  --------------------------------------------------------  IN: 240 mL / OUT: 0 mL / NET: 240 mL          Appearance: Normal	  HEENT:    PERRL, EOMI	  Cardiovascular:  S1 S2, No JVD  Respiratory: Lungs clear to auscultation	  Psychiatry: Alert  Gastrointestinal:  Soft, Non-tender, + BS	  Skin: No rashes, No cyanosis  Extremities:  No edema of LE                                11.9   6.2   )-----------( 137      ( 15 Apr 2018 07:13 )             35.6     04-15    144  |  109<H>  |  20  ----------------------------<  72  4.6   |  23  |  1.43<H>    Ca    9.6      15 Apr 2018 07:11  Phos  3.2     04-15  Mg     2.0     04-15    TPro  7.6  /  Alb  3.6  /  TBili  0.3  /  DBili  <0.1  /  AST  21  /  ALT  14  /  AlkPhos  116  04-15        Labs, imaging and telemetry personally reviewed      ASSESSMENT/PLAN: 	  Assessment and Plan:   Assessment:  · Assessment		  85 yo M from home with PMHx CVA, PPM, Psoriasis, who p/w c/o 1-2 weeks progressive b/l lower extremity weakness, unstable gait, malaise, and lethargy.  In addition, pt s/p twisting motion of left knee with resulting surrounding swelling and pain.         Problem/Plan - 1:  ·  Problem: Uncontrolled hypertension.  Plan: Retsart hydralazine and uptitrate for goal BP of 140/90  Norvasc 5mg added but takes 3-5 days to achieve efficacy  monitor vitals      Problem/Plan - 2:  ·  Problem: ARF (acute renal failure).  Plan: concern for intrinsic vs post renal  tx underlying uti  c/w ivf   renal consult noted     Problem/Plan - 3:  ·  Problem: UTI (urinary tract infection).  Plan: rocephin  f/u ucx.       Problem/Plan - 4:  ·  Problem: Unstable gait.  Plan: tx underlying uti   optimize BP  EP consult for PPM interrogation               Arpit Brown DO [facility]  Cardiovascular Medicine  [phone]

## 2018-04-15 NOTE — CONSULT NOTE ADULT - SUBJECTIVE AND OBJECTIVE BOX
[facility] NEPHROLOGY- CONSULTATION NOTE    84 year old male with history of below presents with UTI. Nephrology consulted for elevated Scr.    Patient denies any prior history of kidney disease and states he follows with his PMD regularly with most recent labs from 2 months ago where he was not told of any abnormalities. Patient denies any h/o HTN or DM. Patient denies any GI symptoms PTA or diuretic, ACE-I or ARB use at home. Patient denies any NSAID use. Patient admits to history of nephrolithiasis (last 20 years ago - unknown stone type) and prostate CA s/p XRT (no surgery).    Patient started on IVF on admission with Scr improved to 1.43 this morning.    REVIEW OF SYSTEMS:  Gen: no changes in weight  HEENT: no rhinorrhea  Neck: no sore throat  Cards: no chest pain  Resp: no dyspnea  GI: no nausea or vomiting or diarrhea  : no dysuria or hematuria  Vascular: no LE edema  Derm: no rashes  Neuro: no numbness/tingling    No Known Allergies      Home Medications Reviewed  Hospital Medications:   MEDICATIONS  (STANDING):  cefTRIAXone   IVPB 1 Gram(s) IV Intermittent every 24 hours  docusate sodium 100 milliGRAM(s) Oral two times a day  enoxaparin Injectable 40 milliGRAM(s) SubCutaneous daily  hydrALAZINE 10 milliGRAM(s) Oral every 8 hours  hydrocortisone 2.5% Ointment 1 Application(s) Topical daily  latanoprost 0.005% Ophthalmic Solution 1 Drop(s) Left EYE at bedtime  sodium chloride 0.9%. 1000 milliLiter(s) (60 mL/Hr) IV Continuous <Continuous>      PAST MEDICAL & SURGICAL HISTORY:      FAMILY HISTORY:      SOCIAL HISTORY:  Denies toxic substance use     VITALS:  T(F): 97.4 (04-15-18 @ 06:40), Max: 97.8 (04-15-18 @ 00:51)  HR: 80 (04-15-18 @ 06:40)  BP: 161/84 (04-15-18 @ 06:40)  RR: 18 (04-15-18 @ 06:40)  SpO2: 94% (04-15-18 @ 06:40)  Wt(kg): --     @ 07:01  -  04-15 @ 07:00  --------------------------------------------------------  IN: 1080 mL / OUT: 0 mL / NET: 1080 mL      Height (cm): 180.34 ( @ 22:30)  Weight (kg): 97.5 ( @ 22:30)  BMI (kg/m2): 30 ( @ 22:30)  BSA (m2): 2.17 ( @ 22:30)    PHYSICAL EXAM:  Gen: NAD, calm  HEENT: MMM  Neck: no JVD  Cards: RRR, +S1/S2, no M/G/R  Resp: CTA B/L  GI: soft, NT/ND, NABS  : no CVA tenderness  Vascular: no LE edema B/L  Derm: no rashes  Neuro: non-focal    LABS:  04-15    144  |  109<H>  |  20  ----------------------------<  72  4.6   |  23  |  1.43<H>    Ca    9.6      15 Apr 2018 07:11  Phos  3.2     04-15  Mg     2.0     04-15    TPro  7.6  /  Alb  3.6  /  TBili  0.3  /  DBili  <0.1  /  AST  21  /  ALT  14  /  AlkPhos  116  04-15    Creatinine Trend: 1.43 <--, 1.53 <--                        11.9   6.2   )-----------( 137      ( 15 Apr 2018 07:13 )             35.6     Urine Studies:  Urinalysis Basic - ( 2018 18:02 )    Color: Yellow / Appearance: SL Turbid / S.015 / pH:   Gluc:  / Ketone: Negative  / Bili: Negative / Urobili: Negative   Blood:  / Protein: 30 mg/dL / Nitrite: Positive   Leuk Esterase: Large / RBC: 10-25 /HPF / WBC >50 /HPF   Sq Epi:  / Non Sq Epi: OCC /HPF / Bacteria: Few /HPF          RADIOLOGY & ADDITIONAL STUDIES:  < from: Xray Chest 1 View AP/PA (18 @ 17:32) >    INTERPRETATION:  No Urgent/Emergent findings.    Follow up official report.    < end of copied text >

## 2018-04-15 NOTE — CONSULT NOTE ADULT - ATTENDING COMMENTS
[facility] NEPHROLOGY  Zacarias Orellana M.D.  Pa Burgess D.O.  Yessica Valencia M.D.  Gavi Kern, MSN, ANP-C    Telephone: [phone]  Facsimile: [phone]    [address] [place] [address]

## 2018-04-16 DIAGNOSIS — R32 UNSPECIFIED URINARY INCONTINENCE: ICD-10-CM

## 2018-04-16 LAB
ANION GAP SERPL CALC-SCNC: 13 MMOL/L — SIGNIFICANT CHANGE UP (ref 5–17)
BUN SERPL-MCNC: 21 MG/DL — SIGNIFICANT CHANGE UP (ref 7–23)
CALCIUM SERPL-MCNC: 9.2 MG/DL — SIGNIFICANT CHANGE UP (ref 8.4–10.5)
CHLORIDE SERPL-SCNC: 109 MMOL/L — HIGH (ref 96–108)
CO2 SERPL-SCNC: 21 MMOL/L — LOW (ref 22–31)
CREAT SERPL-MCNC: 1.53 MG/DL — HIGH (ref 0.5–1.3)
ERYTHROCYTE [SEDIMENTATION RATE] IN BLOOD: 94 MM/HR — HIGH (ref 0–20)
GLUCOSE SERPL-MCNC: 80 MG/DL — SIGNIFICANT CHANGE UP (ref 70–99)
HCT VFR BLD CALC: 35 % — LOW (ref 39–50)
HGB BLD-MCNC: 11.6 G/DL — LOW (ref 13–17)
MCHC RBC-ENTMCNC: 30.2 PG — SIGNIFICANT CHANGE UP (ref 27–34)
MCHC RBC-ENTMCNC: 33.2 GM/DL — SIGNIFICANT CHANGE UP (ref 32–36)
MCV RBC AUTO: 91 FL — SIGNIFICANT CHANGE UP (ref 80–100)
PLATELET # BLD AUTO: 131 K/UL — LOW (ref 150–400)
POTASSIUM SERPL-MCNC: 4.8 MMOL/L — SIGNIFICANT CHANGE UP (ref 3.5–5.3)
POTASSIUM SERPL-SCNC: 4.8 MMOL/L — SIGNIFICANT CHANGE UP (ref 3.5–5.3)
RBC # BLD: 3.85 M/UL — LOW (ref 4.2–5.8)
RBC # FLD: 15.9 % — HIGH (ref 10.3–14.5)
SODIUM SERPL-SCNC: 143 MMOL/L — SIGNIFICANT CHANGE UP (ref 135–145)
WBC # BLD: 6.8 K/UL — SIGNIFICANT CHANGE UP (ref 3.8–10.5)
WBC # FLD AUTO: 6.8 K/UL — SIGNIFICANT CHANGE UP (ref 3.8–10.5)

## 2018-04-16 RX ORDER — OXYBUTYNIN CHLORIDE 5 MG
5 TABLET ORAL
Qty: 0 | Refills: 0 | Status: DISCONTINUED | OUTPATIENT
Start: 2018-04-16 | End: 2018-04-21

## 2018-04-16 RX ORDER — VANCOMYCIN HCL 1 G
1000 VIAL (EA) INTRAVENOUS ONCE
Qty: 0 | Refills: 0 | Status: COMPLETED | OUTPATIENT
Start: 2018-04-16 | End: 2018-04-16

## 2018-04-16 RX ORDER — LANOLIN ALCOHOL/MO/W.PET/CERES
1 CREAM (GRAM) TOPICAL ONCE
Qty: 0 | Refills: 0 | Status: COMPLETED | OUTPATIENT
Start: 2018-04-16 | End: 2018-04-16

## 2018-04-16 RX ORDER — VANCOMYCIN HCL 1 G
750 VIAL (EA) INTRAVENOUS EVERY 12 HOURS
Qty: 0 | Refills: 0 | Status: DISCONTINUED | OUTPATIENT
Start: 2018-04-16 | End: 2018-04-17

## 2018-04-16 RX ADMIN — ENOXAPARIN SODIUM 40 MILLIGRAM(S): 100 INJECTION SUBCUTANEOUS at 11:11

## 2018-04-16 RX ADMIN — Medication 1 MILLIGRAM(S): at 23:29

## 2018-04-16 RX ADMIN — Medication 5 MILLIGRAM(S): at 18:01

## 2018-04-16 RX ADMIN — Medication 5 MILLIGRAM(S): at 11:11

## 2018-04-16 RX ADMIN — CEFTRIAXONE 100 GRAM(S): 500 INJECTION, POWDER, FOR SOLUTION INTRAMUSCULAR; INTRAVENOUS at 18:01

## 2018-04-16 RX ADMIN — Medication 25 MILLIGRAM(S): at 14:31

## 2018-04-16 RX ADMIN — Medication 25 MILLIGRAM(S): at 22:40

## 2018-04-16 RX ADMIN — Medication 250 MILLIGRAM(S): at 22:40

## 2018-04-16 RX ADMIN — Medication 25 MILLIGRAM(S): at 05:00

## 2018-04-16 RX ADMIN — LATANOPROST 1 DROP(S): 0.05 SOLUTION/ DROPS OPHTHALMIC; TOPICAL at 22:40

## 2018-04-16 RX ADMIN — AMLODIPINE BESYLATE 5 MILLIGRAM(S): 2.5 TABLET ORAL at 05:00

## 2018-04-16 NOTE — PHYSICAL THERAPY INITIAL EVALUATION ADULT - BALANCE TRAINING, PT EVAL
GOAL: Pt will increase static/ dynamic standing balance to Fair+ with RW to improve safety with functional activities in 4 weeks.

## 2018-04-16 NOTE — PHYSICAL THERAPY INITIAL EVALUATION ADULT - ADDITIONAL COMMENTS
Pt lives in private home with spouse, 2STE +2 HRs. Pt has a HHA 9:30-5:30pm, assists to get him out of bed, bathing, dressing. Pt was ambulatory with RW, HHA was always "nearby" per pt.

## 2018-04-16 NOTE — PHYSICAL THERAPY INITIAL EVALUATION ADULT - PERTINENT HX OF CURRENT PROBLEM, REHAB EVAL
84 y.o. M, PMH pacemaker and CVA, p/w progressive LE weakness and lethargy x1-2 weeks and inability to ambulate since twisting his knee 3 days ago. XRay L knee 4/14: Linear lucency through the lateral patella may be related to a bipartite patella versus nondisplaced fracture.  Correlate for site of point tenderness. Correlate with pending knee MRI. here may be a L knee joint effusion, however, evaluation limited by obliquity on the lateral view. SEE BELOW:

## 2018-04-16 NOTE — PHYSICAL THERAPY INITIAL EVALUATION ADULT - ACTIVE RANGE OF MOTION EXAMINATION, REHAB EVAL
no Active ROM deficits were identified/R shoulder flexion limited to ~110 degrees 2/2 pain, passive WFL

## 2018-04-16 NOTE — PROGRESS NOTE ADULT - SUBJECTIVE AND OBJECTIVE BOX
Patient is a 84y old  Male who presents with a chief complaint of weakness and difficulty ambulating X 1-2 weeks (14 Apr 2018 19:07)      INTERVAL HISTORY: no events    	  MEDICATIONS:  amLODIPine   Tablet 5 milliGRAM(s) Oral daily  hydrALAZINE 25 milliGRAM(s) Oral every 8 hours        PHYSICAL EXAM:  T(C): 36.5 (04-16-18 @ 17:20), Max: 36.8 (04-16-18 @ 14:04)  HR: 60 (04-16-18 @ 17:20) (60 - 62)  BP: 156/93 (04-16-18 @ 17:20) (127/75 - 156/93)  RR: 17 (04-16-18 @ 17:20) (17 - 18)  SpO2: 95% (04-16-18 @ 17:20) (94% - 97%)  Wt(kg): --  I&O's Summary    15 Apr 2018 07:01  -  16 Apr 2018 07:00  --------------------------------------------------------  IN: 480 mL / OUT: 400 mL / NET: 80 mL    16 Apr 2018 07:01  -  16 Apr 2018 22:18  --------------------------------------------------------  IN: 660 mL / OUT: 0 mL / NET: 660 mL          Appearance: Normal	  HEENT:    PERRL, EOMI	  Cardiovascular:  S1 S2, No JVD  Respiratory: Lungs clear to auscultation	  Psychiatry: Alert  Gastrointestinal:  Soft, Non-tender, + BS	  Skin: No rashes, No cyanosis  Extremities:  No edema of LE                                11.6   6.8   )-----------( 131      ( 16 Apr 2018 05:31 )             35.0     04-16    143  |  109<H>  |  21  ----------------------------<  80  4.8   |  21<L>  |  1.53<H>    Ca    9.2      16 Apr 2018 05:30  Phos  3.2     04-15  Mg     2.0     04-15    TPro  7.6  /  Alb  3.6  /  TBili  0.3  /  DBili  <0.1  /  AST  21  /  ALT  14  /  AlkPhos  116  04-15        Labs, imaging and telemetry personally reviewed      ASSESSMENT/PLAN: 	  Assessment and Plan:   Assessment:  · Assessment		  83 yo M from home with PMHx CVA, PPM, Psoriasis, who p/w c/o 1-2 weeks progressive b/l lower extremity weakness, unstable gait, malaise, and lethargy.  In addition, pt s/p twisting motion of left knee with resulting surrounding swelling and pain.         Problem/Plan - 1:  ·  Problem: Uncontrolled hypertension.  Plan: agree with hydralazine and uptitrate for goal BP of 140/90, better controlled  monitor vitals      Problem/Plan - 2:  ·  Problem: ARF (acute renal failure).  Plan: concern for intrinsic vs post renal  tx underlying uti  c/w ivf  f/u renal u/s.     Problem/Plan - 3:  ·  Problem: UTI (urinary tract infection).  Plan: rocephin  f/u ucx.       Problem/Plan - 4:  ·  Problem: Unstable gait.  Plan: tx underlying uti   optimize BP  EP consult for PPM interrogation     Problem/Plan - 5:  ·  Problem: Knee pain, left.  Plan: f/u mri left knee.             Arpit Brown DO [facility]  Cardiovascular Medicine  [phone]

## 2018-04-16 NOTE — PROGRESS NOTE ADULT - SUBJECTIVE AND OBJECTIVE BOX
Patient seen and examined at bedside  c/o bothersome urinary incontinence - chronic  Case discussed with medical team    HPI:  85 yo M from home with PMHx CVA, PPM, Psoriasis, who p/w c/o 1-2 weeks progressive b/l lower extremity weakness, unstable gait, malaise, and lethargy.  In addition, pt s/p twisting motion of left knee with resulting surrounding swelling and pain.   Denies trauma or direct blow to leg. (2018 19:07)      PAST MEDICAL & SURGICAL HISTORY:      No Known Allergies       MEDICATIONS  (STANDING):  amLODIPine   Tablet 5 milliGRAM(s) Oral daily  cefTRIAXone   IVPB 1 Gram(s) IV Intermittent every 24 hours  docusate sodium 100 milliGRAM(s) Oral two times a day  enoxaparin Injectable 40 milliGRAM(s) SubCutaneous daily  hydrALAZINE 25 milliGRAM(s) Oral every 8 hours  hydrocortisone 2.5% Ointment 1 Application(s) Topical daily  latanoprost 0.005% Ophthalmic Solution 1 Drop(s) Left EYE at bedtime  sodium chloride 0.9%. 1000 milliLiter(s) (60 mL/Hr) IV Continuous <Continuous>    MEDICATIONS  (PRN):  acetaminophen   Tablet. 650 milliGRAM(s) Oral every 6 hours PRN Mild Pain (1 - 3)  bisacodyl 5 milliGRAM(s) Oral every 12 hours PRN Constipation      REVIEW OF SYSTEMS:  CONSTITUTIONAL: (+) malaise.   EYES: No acute change in vision   ENT:  No tinnitus  NECK: No stiffness  RESPIRATORY: No hemoptysis  CARDIOVASCULAR: No chest pain, palpitations, syncope  GASTROINTESTINAL: No hematemesis, diarrhea, melena, or hematochezia.  GENITOURINARY: urinary incontinence. No hematuria  NEUROLOGICAL: No headaches  LYMPH Nodes: No enlarged glands  ENDOCRINE: No heat or cold intolerance	    T(C): 36.4 (18 @ 08:31), Max: 36.6 (04-15-18 @ 13:45)  HR: 62 (18 @ 10:02) (59 - 72)  BP: 138/86 (18 @ 10:02) (127/75 - 178/98)  RR: 18 (18 @ 08:31) (17 - 18)  SpO2: 95% (18 @ 10:02) (94% - 97%)    PHYSICAL EXAMINATION:   Constitutional: WD, NAD  HEENT: NC, AT  Neck:  Supple  Respiratory:  Adequate airflow b/l. Not using accessory muscles of respiration.  Cardiovascular:  S1 & S2 intact, no R/G, 2+ radial pulses b/l  Gastrointestinal: Soft, NT, ND, normoactive b.s., no organomegaly/RT/rigidity  Extremities: WWP  Neurological:  Alert and awake.  No acute focal motor deficits. Crude sensation intact.     Labs and imaging reviewed    LABS:                        11.6   6.8   )-----------( 131      ( 2018 05:31 )             35.0     04-16    143  |  109<H>  |  21  ----------------------------<  80  4.8   |  21<L>  |  1.53<H>    Ca    9.2      2018 05:30  Phos  3.2     04-15  Mg     2.0     04-15    TPro  7.6  /  Alb  3.6  /  TBili  0.3  /  DBili  <0.1  /  AST  21  /  ALT  14  /  AlkPhos  116  04-15          Urinalysis Basic - ( 2018 18:02 )    Color: Yellow / Appearance: SL Turbid / S.015 / pH: x  Gluc: x / Ketone: Negative  / Bili: Negative / Urobili: Negative   Blood: x / Protein: 30 mg/dL / Nitrite: Positive   Leuk Esterase: Large / RBC: 10-25 /HPF / WBC >50 /HPF   Sq Epi: x / Non Sq Epi: OCC /HPF / Bacteria: Few /HPF      CAPILLARY BLOOD GLUCOSE            LIVER FUNCTIONS - ( 15 Apr 2018 07:11 )  Alb: 3.6 g/dL / Pro: 7.6 g/dL / ALK PHOS: 116 U/L / ALT: 14 U/L RC / AST: 21 U/L / GGT: x               RADIOLOGY & ADDITIONAL STUDIES:

## 2018-04-16 NOTE — PHYSICAL THERAPY INITIAL EVALUATION ADULT - PRECAUTIONS/LIMITATIONS, REHAB EVAL
CONT: Otherwise, no acute fracture.Medial knee joint space narrowing. B/l hip XRay 4/14: There is mild bilateral hip osteoarthrosis. Lower lumbar degenerative disc disease and facet arthrosis present. Mild pubic symphysis arthrosis is present. There is no fracture identified.

## 2018-04-16 NOTE — PROGRESS NOTE ADULT - SUBJECTIVE AND OBJECTIVE BOX
[facility]        - Patient seen and examined.  - Today, patient is without complaints.         *****MEDICATIONS: Current medication reviewed and documented.    MEDICATIONS  (STANDING):  amLODIPine   Tablet 5 milliGRAM(s) Oral daily  docusate sodium 100 milliGRAM(s) Oral two times a day  enoxaparin Injectable 40 milliGRAM(s) SubCutaneous daily  hydrALAZINE 25 milliGRAM(s) Oral every 8 hours  hydrocortisone 2.5% Ointment 1 Application(s) Topical daily  latanoprost 0.005% Ophthalmic Solution 1 Drop(s) Left EYE at bedtime  oxybutynin 5 milliGRAM(s) Oral two times a day  sodium chloride 0.9%. 1000 milliLiter(s) (60 mL/Hr) IV Continuous <Continuous>  vancomycin  IVPB 750 milliGRAM(s) IV Intermittent every 12 hours    MEDICATIONS  (PRN):  acetaminophen   Tablet. 650 milliGRAM(s) Oral every 6 hours PRN Mild Pain (1 - 3)  bisacodyl 5 milliGRAM(s) Oral every 12 hours PRN Constipation           ***** REVIEW OF SYSTEM:  GEN: no fever, no chills, no pain  RESP: no SOB, no cough, no sputum  CVS: no chest pain, no palpitations, no edema  GI: no abdominal pain, no nausea, no vomiting, no constipation, no diarrhea  : no dysurea, no frequency  NEURO: no headache, no diziness  PSYCH: no depression, not anxious  Derm : no itching, no rash         ***** VITAL SIGNS:  T(F): 97.8 (18 @ 22:12), Max: 98.2 (18 @ 14:04)  HR: 60 (18 @ 22:12) (60 - 62)  BP: 140/82 (18 @ 22:12) (127/75 - 156/93)  RR: 17 (18 @ 22:12) (17 - 18)  SpO2: 96% (18 @ 22:12) (94% - 97%)  Wt(kg): --  ,   I&O's Summary    15 Apr 2018 07:01  -  2018 07:00  --------------------------------------------------------  IN: 480 mL / OUT: 400 mL / NET: 80 mL    2018 07:01  -  2018 23:04  --------------------------------------------------------  IN: 840 mL / OUT: 0 mL / NET: 840 mL             *****PHYSICAL EXAM:   alert oriented x 3 attention comprehension are fair.  Able to name, repeat.   EOmi fundi not visualized   no nystagmus VFF to confrontation  Tongue is midline  Palate elevates symmetrically   Moving all 4 ext spontaneously no drift appreciated  straight leg raising has improved.   Gait not assessed.            *****LAB AND IMAGIN.6   6.8   )-----------( 131      ( 2018 05:31 )             35.0               04-16    143  |  109<H>  |  21  ----------------------------<  80  4.8   |  21<L>  |  1.53<H>    Ca    9.2      2018 05:30  Phos  3.2     04-15  Mg     2.0     -15    TPro  7.6  /  Alb  3.6  /  TBili  0.3  /  DBili  <0.1  /  AST  21  /  ALT  14  /  AlkPhos  116  -15                         [All pertinent recent Imaging/Reports reviewed]           *****A S S E S S M E N T   A N D   P L A N :      85 yo M from home with PMHx CVA, PPM, Psoriasis, who p/w c/o 1-2 weeks progressive b/l lower extremity weakness, unstable gait, malaise, and lethargy.  In addition, pt s/p twisting motion of left knee with resulting surrounding swelling and pain.      Xray of the knee, as above, unclear if there is a fracture, mri of knee pending.   possible left knee sprain vs. inflammatory process     Still mild localized swelling of the left knee and limited range of motion of the knee.   Exam is limited due to pain, but improved rom ,  Elevated, rest, ice and avoid weight bearing for now until further imaging.  good pain relief.    Elevated bp likely due to pain.  will continue to monitor closely.     ON dvt prophylaxis with lovenox.    will sign off no further recs at this time.   Thank you for allowing me to participate in the care of this patient. Please do not hesitate to call me if you have any  questions.        ________________  Mary Draper MD  [facility]rological [facility] ([facility]  [phone]     30 minutes spent on total encounter; more than 50 % of the visit was  spent counseling and or  coordinating care by the attending physician.   At the present time, [facility] does not  provide outpatient followup, best to call the your insurance to find a participating provider.  This was explained to you at the time of the visit. Alternatively, if your insurance allows it, you can follow up with a neurologist  Dr. Alexander Santiago [phone].

## 2018-04-16 NOTE — PROGRESS NOTE ADULT - SUBJECTIVE AND OBJECTIVE BOX
[facility] NEPHROLOGY- PROGRESS NOTE    84 year old male with history of nephrolithiasis presents with UTI. Nephrology consulted for elevated Scr.    REVIEW OF SYSTEMS:  Gen: no changes in weight  Cards: no chest pain  Resp: no dyspnea  GI: no nausea or vomiting or diarrhea  Vascular: no LE edema    No Known Allergies      Hospital Medications: Medications reviewed    VITALS:  T(F): 97.6 (18 @ 08:31), Max: 97.9 (04-15-18 @ 13:45)  HR: 62 (18 @ 10:02)  BP: 138/86 (18 @ 10:02)  RR: 18 (18 @ 08:31)  SpO2: 95% (18 @ 10:02)  Wt(kg): --  Height (cm): 180.34 ( @ 22:30)  Weight (kg): 97.5 ( 22:30)  BMI (kg/m2): 30 ( 22:30)  BSA (m2): 2.17 ( 22:30)    04-15 @ 07:01  -   @ 07:00  --------------------------------------------------------  IN: 480 mL / OUT: 400 mL / NET: 80 mL     @ 07:01  -   @ 13:08  --------------------------------------------------------  IN: 300 mL / OUT: 0 mL / NET: 300 mL        PHYSICAL EXAM:    Gen: NAD, calm  Cards: RRR, +S1/S2, no M/G/R  Resp: CTA B/L  GI: soft, NT/ND, NABS  Vascular: no LE edema B/L    LABS:      143  |  109<H>  |  21  ----------------------------<  80  4.8   |  21<L>  |  1.53<H>    Ca    9.2      2018 05:30  Phos  3.2     04-15  Mg     2.0     04-15    TPro  7.6  /  Alb  3.6  /  TBili  0.3  /  DBili  <0.1  /  AST  21  /  ALT  14  /  AlkPhos  116  04-15    Creatinine Trend: 1.53 <--, 1.43 <--, 1.53 <--                        11.6   6.8   )-----------( 131      ( 2018 05:31 )             35.0     Urine Studies:  Urinalysis Basic - ( 2018 18:02 )    Color: Yellow / Appearance: SL Turbid / S.015 / pH:   Gluc:  / Ketone: Negative  / Bili: Negative / Urobili: Negative   Blood:  / Protein: 30 mg/dL / Nitrite: Positive   Leuk Esterase: Large / RBC: 10-25 /HPF / WBC >50 /HPF   Sq Epi:  / Non Sq Epi: OCC /HPF / Bacteria: Few /HPF

## 2018-04-17 DIAGNOSIS — N39.0 URINARY TRACT INFECTION, SITE NOT SPECIFIED: ICD-10-CM

## 2018-04-17 LAB
-  AMPICILLIN/SULBACTAM: SIGNIFICANT CHANGE UP
-  CEFAZOLIN: SIGNIFICANT CHANGE UP
-  CIPROFLOXACIN: SIGNIFICANT CHANGE UP
-  GENTAMICIN: SIGNIFICANT CHANGE UP
-  LEVOFLOXACIN: SIGNIFICANT CHANGE UP
-  NITROFURANTOIN: SIGNIFICANT CHANGE UP
-  OXACILLIN: SIGNIFICANT CHANGE UP
-  PENICILLIN: SIGNIFICANT CHANGE UP
-  RIFAMPIN: SIGNIFICANT CHANGE UP
-  TETRACYCLINE: SIGNIFICANT CHANGE UP
-  TRIMETHOPRIM/SULFAMETHOXAZOLE: SIGNIFICANT CHANGE UP
-  VANCOMYCIN: SIGNIFICANT CHANGE UP
ANION GAP SERPL CALC-SCNC: 15 MMOL/L — SIGNIFICANT CHANGE UP (ref 5–17)
BUN SERPL-MCNC: 21 MG/DL — SIGNIFICANT CHANGE UP (ref 7–23)
CALCIUM SERPL-MCNC: 9.3 MG/DL — SIGNIFICANT CHANGE UP (ref 8.4–10.5)
CHLORIDE SERPL-SCNC: 105 MMOL/L — SIGNIFICANT CHANGE UP (ref 96–108)
CO2 SERPL-SCNC: 22 MMOL/L — SIGNIFICANT CHANGE UP (ref 22–31)
CREAT SERPL-MCNC: 1.53 MG/DL — HIGH (ref 0.5–1.3)
CULTURE RESULTS: SIGNIFICANT CHANGE UP
GLUCOSE SERPL-MCNC: 94 MG/DL — SIGNIFICANT CHANGE UP (ref 70–99)
HCT VFR BLD CALC: 32.6 % — LOW (ref 39–50)
HGB BLD-MCNC: 11.1 G/DL — LOW (ref 13–17)
MCHC RBC-ENTMCNC: 30.7 PG — SIGNIFICANT CHANGE UP (ref 27–34)
MCHC RBC-ENTMCNC: 34.1 GM/DL — SIGNIFICANT CHANGE UP (ref 32–36)
MCV RBC AUTO: 90 FL — SIGNIFICANT CHANGE UP (ref 80–100)
METHOD TYPE: SIGNIFICANT CHANGE UP
ORGANISM # SPEC MICROSCOPIC CNT: SIGNIFICANT CHANGE UP
ORGANISM # SPEC MICROSCOPIC CNT: SIGNIFICANT CHANGE UP
PLATELET # BLD AUTO: 133 K/UL — LOW (ref 150–400)
POTASSIUM SERPL-MCNC: 4.2 MMOL/L — SIGNIFICANT CHANGE UP (ref 3.5–5.3)
POTASSIUM SERPL-SCNC: 4.2 MMOL/L — SIGNIFICANT CHANGE UP (ref 3.5–5.3)
RBC # BLD: 3.62 M/UL — LOW (ref 4.2–5.8)
RBC # FLD: 15.9 % — HIGH (ref 10.3–14.5)
SODIUM SERPL-SCNC: 142 MMOL/L — SIGNIFICANT CHANGE UP (ref 135–145)
SPECIMEN SOURCE: SIGNIFICANT CHANGE UP
WBC # BLD: 7.4 K/UL — SIGNIFICANT CHANGE UP (ref 3.8–10.5)
WBC # FLD AUTO: 7.4 K/UL — SIGNIFICANT CHANGE UP (ref 3.8–10.5)

## 2018-04-17 PROCEDURE — 76770 US EXAM ABDO BACK WALL COMP: CPT | Mod: 26

## 2018-04-17 RX ORDER — CEFAZOLIN SODIUM 1 G
1000 VIAL (EA) INJECTION ONCE
Qty: 0 | Refills: 0 | Status: COMPLETED | OUTPATIENT
Start: 2018-04-17 | End: 2018-04-17

## 2018-04-17 RX ORDER — LANOLIN ALCOHOL/MO/W.PET/CERES
3 CREAM (GRAM) TOPICAL ONCE
Qty: 0 | Refills: 0 | Status: COMPLETED | OUTPATIENT
Start: 2018-04-17 | End: 2018-04-17

## 2018-04-17 RX ORDER — CEFAZOLIN SODIUM 1 G
VIAL (EA) INJECTION
Qty: 0 | Refills: 0 | Status: DISCONTINUED | OUTPATIENT
Start: 2018-04-17 | End: 2018-04-24

## 2018-04-17 RX ORDER — CEFAZOLIN SODIUM 1 G
1000 VIAL (EA) INJECTION EVERY 8 HOURS
Qty: 0 | Refills: 0 | Status: DISCONTINUED | OUTPATIENT
Start: 2018-04-18 | End: 2018-04-24

## 2018-04-17 RX ORDER — THIAMINE MONONITRATE (VIT B1) 100 MG
500 TABLET ORAL ONCE
Qty: 0 | Refills: 0 | Status: COMPLETED | OUTPATIENT
Start: 2018-04-17 | End: 2018-04-18

## 2018-04-17 RX ADMIN — Medication 25 MILLIGRAM(S): at 14:44

## 2018-04-17 RX ADMIN — Medication 25 MILLIGRAM(S): at 05:41

## 2018-04-17 RX ADMIN — Medication 5 MILLIGRAM(S): at 18:20

## 2018-04-17 RX ADMIN — Medication 100 MILLIGRAM(S): at 22:21

## 2018-04-17 RX ADMIN — Medication 150 MILLIGRAM(S): at 10:23

## 2018-04-17 RX ADMIN — Medication 25 MILLIGRAM(S): at 22:20

## 2018-04-17 RX ADMIN — AMLODIPINE BESYLATE 5 MILLIGRAM(S): 2.5 TABLET ORAL at 05:41

## 2018-04-17 RX ADMIN — ENOXAPARIN SODIUM 40 MILLIGRAM(S): 100 INJECTION SUBCUTANEOUS at 11:30

## 2018-04-17 RX ADMIN — Medication 5 MILLIGRAM(S): at 05:41

## 2018-04-17 RX ADMIN — LATANOPROST 1 DROP(S): 0.05 SOLUTION/ DROPS OPHTHALMIC; TOPICAL at 22:20

## 2018-04-17 NOTE — PROGRESS NOTE ADULT - SUBJECTIVE AND OBJECTIVE BOX
Patient is seen and examined at the bed side, is afebrile. He is doing better. The urine culture grew MSSA. The Renal US shows Moderate right hydroureteronephrosis.        REVIEW OF SYSTEMS: All other review systems are negative        Vital Signs Last 24 Hrs  T(C): 36.5 (2018 18:51), Max: 36.7 (2018 10:29)  T(F): 97.7 (2018 18:51), Max: 98 (2018 10:29)  HR: 66 (2018 18:51) (59 - 66)  BP: 138/77 (2018 18:51) (112/71 - 140/82)  BP(mean): --  RR: 18 (2018 18:51) (17 - 18)  SpO2: 96% (2018 18:51) (94% - 96%)        ALLERGIES: NKDA          PHYSICAL EXAM:  GENERAL: Not in distress  CVS: s1 and s2 present  RESP: Air entry B/L  GI: Abdomen soft and nontender  EXT: No pedal edema  CNS: Awake and alert  SKIN: psoriatic lesions              LABS:                        11.1   7.4   )-----------( 133      ( 2018 06:33 )             32.6                           11.6   6.8   )-----------( 131      ( 2018 05:31 )             35.0             04-17    142  |  105  |  21  ----------------------------<  94  4.2   |  22  |  1.53<H>    Ca    9.3      2018 06:33      04-16    143  |  109<H>  |  21  ----------------------------<  80  4.8   |  21<L>  |  1.53<H>    Ca    9.2      2018 05:30  Phos  3.2     04-15  Mg     2.0     04-15    TPro  7.6  /  Alb  3.6  /  TBili  0.3  /  DBili  <0.1  /  AST  21  /  ALT  14  /  AlkPhos  116  04-15      Urinalysis Basic - ( 2018 18:02 )    Color: Yellow / Appearance: SL Turbid / S.015 / pH: x  Gluc: x / Ketone: Negative  / Bili: Negative / Urobili: Negative   Blood: x / Protein: 30 mg/dL / Nitrite: Positive   Leuk Esterase: Large / RBC: 10-25 /HPF / WBC >50 /HPF   Sq Epi: x / Non Sq Epi: OCC /HPF / Bacteria: Few /HPF          MEDICATIONS  (STANDING):  amLODIPine   Tablet 5 milliGRAM(s) Oral daily  docusate sodium 100 milliGRAM(s) Oral two times a day  enoxaparin Injectable 40 milliGRAM(s) SubCutaneous daily  hydrALAZINE 25 milliGRAM(s) Oral every 8 hours  hydrocortisone 2.5% Ointment 1 Application(s) Topical daily  latanoprost 0.005% Ophthalmic Solution 1 Drop(s) Left EYE at bedtime  oxybutynin 5 milliGRAM(s) Oral two times a day  thiamine IVPB 500 milliGRAM(s) IV Intermittent once  vancomycin  IVPB 750 milliGRAM(s) IV Intermittent every 12 hours    MEDICATIONS  (PRN):  acetaminophen   Tablet. 650 milliGRAM(s) Oral every 6 hours PRN Mild Pain (1 - 3)  bisacodyl 5 milliGRAM(s) Oral every 12 hours PRN Constipation                RADIOLOGY & ADDITIONAL TESTS:      < from: US Kidney and Bladder (18 @ 17:05) >    Moderate right hydroureteronephrosis to the level of the bladder. The   right ureter appears to insert on a bladder diverticulum. Right renal   cortical thinning is suggestive of chronic hydronephrosis.     Urinary bladder trabeculations and diverticula with debris.      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 Correlate clinically with point tenderness.   Otherwise, no acute fracture.      < end of copied text >      < from: Xray Chest 1 View AP/PA (18 @ 17:32) >  No Urgent/Emergent findings.    Follow up official report.      < end of copied text >          MICROBIOLOGY DATA:    Culture - Urine (18 @ 20:29)    -  Ampicillin/Sulbactam: S <=8/4    -  Cefazolin: S <=4    -  Ciprofloxacin: R >2    -  Gentamicin: S <=1    -  Levofloxacin: R >4    -  Nitrofurantoin: S <=32    -  Oxacillin: S <=0.25    -  Penicillin: R 8    -  RIF- Rifampin: S <=1    -  Tetra/Doxy: S <=1    -  Trimethoprim/Sulfamethoxazole: S <=0.5/9.5    -  Vancomycin: S 2    Specimen Source: .Urine Catheterized    Culture Results:   >100,000 CFU/ml Coag Negative Staphylococcus  >100,000 CFU/ml Staphylococcus aureus    Organism Identification: Staphylococcus aureus    Organism: Staphylococcus aureus    Method Type: KALYAN        Culture - Blood (04.15.18 @ 05:13)    Specimen Source: .Blood Blood-Peripheral    Culture Results:   No growth to date.    Culture - Blood (04.15.18 @ 05:13)    Specimen Source: .Blood Blood-Peripheral    Culture Results:   No growth to date.

## 2018-04-17 NOTE — PROGRESS NOTE ADULT - SUBJECTIVE AND OBJECTIVE BOX
[facility] NEPHROLOGY- PROGRESS NOTE    84 year old male with history of nephrolithiasis presents with UTI. Nephrology consulted for elevated Scr.    REVIEW OF SYSTEMS:  Gen: no changes in weight  Cards: no chest pain  Resp: no dyspnea  GI: no nausea or vomiting or diarrhea  Vascular: no LE edema    No Known Allergies      Hospital Medications: Medications reviewed      VITALS:  T(F): 98 (04-17-18 @ 10:29), Max: 98.2 (04-16-18 @ 14:04)  HR: 60 (04-17-18 @ 10:29)  BP: 135/77 (04-17-18 @ 10:29)  RR: 17 (04-17-18 @ 10:29)  SpO2: 94% (04-17-18 @ 10:29)  Wt(kg): --    04-16 @ 07:01  -  04-17 @ 07:00  --------------------------------------------------------  IN: 960 mL / OUT: 0 mL / NET: 960 mL    04-17 @ 07:01  -  04-17 @ 12:49  --------------------------------------------------------  IN: 470 mL / OUT: 0 mL / NET: 470 mL      PHYSICAL EXAM:    Gen: NAD, calm  Cards: RRR, +S1/S2, no M/G/R  Resp: CTA B/L  GI: soft, NT/ND, NABS  Vascular: no LE edema B/L      LABS:  04-17    142  |  105  |  21  ----------------------------<  94  4.2   |  22  |  1.53<H>    Ca    9.3      17 Apr 2018 06:33      Creatinine Trend: 1.53 <--, 1.53 <--, 1.43 <--, 1.53 <--                        11.1   7.4   )-----------( 133      ( 17 Apr 2018 06:33 )             32.6

## 2018-04-17 NOTE — CHART NOTE - NSCHARTNOTEFT_GEN_A_CORE
pt requesting ice cream. currently on a DASH diet. he is being treated for hypertensive emergency, therefore low sodium diet is indicated. discussed liberalizing diet with NP to low sodium and removing low fat so that pt may receive ice cream.

## 2018-04-17 NOTE — PROGRESS NOTE ADULT - SUBJECTIVE AND OBJECTIVE BOX
Patient seen and examined at bedside  No acute events noted overnight  Case discussed with medical team    HPI:  85 yo M from home with PMHx CVA, PPM, Psoriasis, who p/w c/o 1-2 weeks progressive b/l lower extremity weakness, unstable gait, malaise, and lethargy.  In addition, pt s/p twisting motion of left knee with resulting surrounding swelling and pain.   Denies trauma or direct blow to leg. (14 Apr 2018 19:07)      PAST MEDICAL & SURGICAL HISTORY:      No Known Allergies       MEDICATIONS  (STANDING):  amLODIPine   Tablet 5 milliGRAM(s) Oral daily  docusate sodium 100 milliGRAM(s) Oral two times a day  enoxaparin Injectable 40 milliGRAM(s) SubCutaneous daily  hydrALAZINE 25 milliGRAM(s) Oral every 8 hours  hydrocortisone 2.5% Ointment 1 Application(s) Topical daily  latanoprost 0.005% Ophthalmic Solution 1 Drop(s) Left EYE at bedtime  oxybutynin 5 milliGRAM(s) Oral two times a day  sodium chloride 0.9%. 1000 milliLiter(s) (60 mL/Hr) IV Continuous <Continuous>  vancomycin  IVPB 750 milliGRAM(s) IV Intermittent every 12 hours    MEDICATIONS  (PRN):  acetaminophen   Tablet. 650 milliGRAM(s) Oral every 6 hours PRN Mild Pain (1 - 3)  bisacodyl 5 milliGRAM(s) Oral every 12 hours PRN Constipation      REVIEW OF SYSTEMS:  CONSTITUTIONAL: (+) malaise.   EYES: No acute change in vision   ENT:  No tinnitus  NECK: No stiffness  RESPIRATORY: No hemoptysis  CARDIOVASCULAR: No chest pain, palpitations, syncope  GASTROINTESTINAL: No hematemesis, diarrhea, melena, or hematochezia.  GENITOURINARY: No hematuria  NEUROLOGICAL: No headaches  LYMPH Nodes: No enlarged glands  ENDOCRINE: No heat or cold intolerance	    T(C): 36.4 (04-17-18 @ 05:41), Max: 36.8 (04-16-18 @ 14:04)  HR: 59 (04-17-18 @ 05:41) (59 - 62)  BP: 139/76 (04-17-18 @ 05:41) (134/75 - 156/93)  RR: 18 (04-17-18 @ 05:41) (17 - 18)  SpO2: 95% (04-17-18 @ 05:41) (94% - 96%)    PHYSICAL EXAMINATION:   Constitutional: WD, NAD  HEENT: NC, AT  Neck:  Supple  Respiratory:  Adequate airflow b/l. Not using accessory muscles of respiration.  Cardiovascular:  S1 & S2 intact, no R/G, 2+ radial pulses b/l  Gastrointestinal: Soft, NT, ND, normoactive b.s., no organomegaly/RT/rigidity  Extremities: WWP  Neurological:  Alert and awake.  No acute focal motor deficits. Crude sensation intact.     Labs and imaging reviewed    LABS:                        11.1   7.4   )-----------( 133      ( 17 Apr 2018 06:33 )             32.6     04-17    142  |  105  |  21  ----------------------------<  94  4.2   |  22  |  1.53<H>    Ca    9.3      17 Apr 2018 06:33              CAPILLARY BLOOD GLUCOSE                  RADIOLOGY & ADDITIONAL STUDIES:

## 2018-04-17 NOTE — PROGRESS NOTE ADULT - SUBJECTIVE AND OBJECTIVE BOX
Patient is a 84y old  Male who presents with a chief complaint of weakness and difficulty ambulating X 1-2 weeks (14 Apr 2018 19:07)      INTERVAL HISTORY:  none  	  MEDICATIONS:  amLODIPine   Tablet 5 milliGRAM(s) Oral daily  hydrALAZINE 25 milliGRAM(s) Oral every 8 hours        PHYSICAL EXAM:  T(C): 36.6 (04-17-18 @ 22:11), Max: 36.7 (04-17-18 @ 10:29)  HR: 57 (04-17-18 @ 22:11) (57 - 66)  BP: 146/83 (04-17-18 @ 22:11) (112/71 - 146/83)  RR: 18 (04-17-18 @ 22:11) (17 - 18)  SpO2: 95% (04-17-18 @ 22:11) (94% - 96%)  Wt(kg): --  I&O's Summary    16 Apr 2018 07:01  -  17 Apr 2018 07:00  --------------------------------------------------------  IN: 960 mL / OUT: 0 mL / NET: 960 mL    17 Apr 2018 07:01  -  17 Apr 2018 22:16  --------------------------------------------------------  IN: 1010 mL / OUT: 0 mL / NET: 1010 mL          Appearance: Normal	  HEENT:    PERRL, EOMI	  Cardiovascular:  S1 S2, No JVD  Respiratory: Lungs clear to auscultation	  Psychiatry: Alert  Gastrointestinal:  Soft, Non-tender, + BS	  Skin: No rashes, No cyanosis  Extremities:  No edema of LE                                11.1   7.4   )-----------( 133      ( 17 Apr 2018 06:33 )             32.6     04-17    142  |  105  |  21  ----------------------------<  94  4.2   |  22  |  1.53<H>    Ca    9.3      17 Apr 2018 06:33          Labs, imaging and telemetry personally reviewed      Assessment and Plan:   Assessment:  · Assessment		  83 yo M from home with PMHx CVA, PPM, Psoriasis, who p/w c/o 1-2 weeks progressive b/l lower extremity weakness, unstable gait, malaise, and lethargy.  In addition, pt s/p twisting motion of left knee with resulting surrounding swelling and pain.         Problem/Plan - 1:  ·  Problem: Uncontrolled hypertension.  Plan: agree with hydralazine and uptitrate for goal BP of 140/90, better controlled  monitor vitals      Problem/Plan - 2:  ·  Problem: ARF (acute renal failure).  Plan: concern for intrinsic vs post renal  tx underlying uti  Cr stable     Problem/Plan - 3:  ·  Problem: UTI (urinary tract infection).  Plan: rocephin      Problem/Plan - 4:  ·  Problem: Unstable gait.  Plan: tx underlying uti   optimize BP  EP consult for PPM interrogation             Arpit Brown DO [facility]  Cardiovascular Medicine  [phone]

## 2018-04-18 DIAGNOSIS — N39.490 OVERFLOW INCONTINENCE: ICD-10-CM

## 2018-04-18 DIAGNOSIS — N13.30 UNSPECIFIED HYDRONEPHROSIS: ICD-10-CM

## 2018-04-18 DIAGNOSIS — H91.90 UNSPECIFIED HEARING LOSS, UNSPECIFIED EAR: ICD-10-CM

## 2018-04-18 RX ORDER — LANOLIN ALCOHOL/MO/W.PET/CERES
3 CREAM (GRAM) TOPICAL ONCE
Qty: 0 | Refills: 0 | Status: COMPLETED | OUTPATIENT
Start: 2018-04-18 | End: 2018-04-18

## 2018-04-18 RX ADMIN — LATANOPROST 1 DROP(S): 0.05 SOLUTION/ DROPS OPHTHALMIC; TOPICAL at 21:59

## 2018-04-18 RX ADMIN — Medication 5 MILLIGRAM(S): at 17:06

## 2018-04-18 RX ADMIN — Medication 5 MILLIGRAM(S): at 05:51

## 2018-04-18 RX ADMIN — Medication 3 MILLIGRAM(S): at 00:08

## 2018-04-18 RX ADMIN — Medication 3 MILLIGRAM(S): at 23:59

## 2018-04-18 RX ADMIN — Medication 100 MILLIGRAM(S): at 21:59

## 2018-04-18 RX ADMIN — Medication 1 APPLICATION(S): at 12:36

## 2018-04-18 RX ADMIN — Medication 100 MILLIGRAM(S): at 05:51

## 2018-04-18 RX ADMIN — ENOXAPARIN SODIUM 40 MILLIGRAM(S): 100 INJECTION SUBCUTANEOUS at 12:36

## 2018-04-18 RX ADMIN — Medication 100 MILLIGRAM(S): at 14:03

## 2018-04-18 RX ADMIN — AMLODIPINE BESYLATE 5 MILLIGRAM(S): 2.5 TABLET ORAL at 05:51

## 2018-04-18 RX ADMIN — Medication 25 MILLIGRAM(S): at 21:59

## 2018-04-18 RX ADMIN — Medication 25 MILLIGRAM(S): at 05:51

## 2018-04-18 RX ADMIN — Medication 105 MILLIGRAM(S): at 00:10

## 2018-04-18 RX ADMIN — Medication 25 MILLIGRAM(S): at 14:02

## 2018-04-18 RX ADMIN — Medication 100 MILLIGRAM(S): at 17:05

## 2018-04-18 NOTE — CONSULT NOTE ADULT - ASSESSMENT
84 year old male with history of nephrolithiasis presents with UTI. Nephrology consulted for elevated Scr.
Patient is a 84y old  Male who presents with a chief complaint of weakness and difficulty ambulating X 1-2 weeks (14 Apr 2018 19:07), progressive b/l lower extremity weakness, unstable gait, malaise, and lethargy. He has no fever or chills but found to have positive Urine analysis. He has started on Ceftriaxone, cultures Pending. The ID consult requested  to assist with further evaluation and antibiotic management.       # UTI    Would recommend:    1. Follow up Blood cultures   2. Follow up Urine culture  3. Continue Ceftriaxone until cultures are finalized    d/w Patient and Nursing staff    will follow the patient with you and make further recommendation based on the clinical course and Lab results  Thank you for the opportunity to participate in Mr. Juarez's care
patient with prostate cancer  with new vs old right hydro and cre 1.5  co incontinence  check psa  can manage  as outpatient    monitor pvr

## 2018-04-18 NOTE — PROGRESS NOTE ADULT - SUBJECTIVE AND OBJECTIVE BOX
Patient is a 84y old  Male who presents with a chief complaint of weakness and difficulty ambulating X 1-2 weeks (14 Apr 2018 19:07)      INTERVAL HISTORY: feels ok  	  MEDICATIONS:  amLODIPine   Tablet 5 milliGRAM(s) Oral daily  hydrALAZINE 25 milliGRAM(s) Oral every 8 hours        PHYSICAL EXAM:  T(C): 36.7 (04-18-18 @ 17:06), Max: 36.7 (04-18-18 @ 17:06)  HR: 62 (04-18-18 @ 21:35) (60 - 64)  BP: 154/89 (04-18-18 @ 21:35) (127/83 - 154/89)  RR: 18 (04-18-18 @ 21:35) (18 - 18)  SpO2: 95% (04-18-18 @ 21:35) (95% - 97%)  Wt(kg): --  I&O's Summary    17 Apr 2018 07:01  -  18 Apr 2018 07:00  --------------------------------------------------------  IN: 1450 mL / OUT: 0 mL / NET: 1450 mL    18 Apr 2018 07:01  -  18 Apr 2018 22:31  --------------------------------------------------------  IN: 760 mL / OUT: 0 mL / NET: 760 mL          Appearance: Normal	  HEENT:    PERRL, EOMI	  Cardiovascular:  S1 S2, No JVD  Respiratory: Lungs clear to auscultation	  Psychiatry: Alert  Gastrointestinal:  Soft, Non-tender, + BS	  Skin: No rashes, No cyanosis  Extremities:  No edema of LE                                11.1   7.4   )-----------( 133      ( 17 Apr 2018 06:33 )             32.6     04-17    142  |  105  |  21  ----------------------------<  94  4.2   |  22  |  1.53<H>    Ca    9.3      17 Apr 2018 06:33          Labs, imaging and telemetry personally reviewed      Assessment and Plan:   Assessment:  · Assessment		  85 yo M from home with PMHx CVA, PPM, Psoriasis, who p/w c/o 1-2 weeks progressive b/l lower extremity weakness, unstable gait, malaise, and lethargy.  In addition, pt s/p twisting motion of left knee with resulting surrounding swelling and pain.         Problem/Plan - 1:  ·  Problem: Uncontrolled hypertension.  Plan: agree with hydralazine and uptitrate for goal BP of 140/90, better controlled  monitor vitals      Problem/Plan - 2:  ·  Problem: ARF (acute renal failure).  Plan: concern for intrinsic vs post renal  tx underlying uti  Cr stable     Problem/Plan - 3:  ·  Problem: UTI (urinary tract infection).  Plan: rocephin      Problem/Plan - 4:  ·  Problem: Unstable gait.  Plan: tx underlying uti   optimize BP  EP consult for PPM interrogation  - Biotronik device        Arpit Borwn DO [facility]  Cardiovascular Medicine  [phone]

## 2018-04-18 NOTE — CONSULT NOTE ADULT - SUBJECTIVE AND OBJECTIVE BOX
HPI  patient is followed by Dr Gu for management of prostate cancer now admitted with weakness co incontinence.  he denies flank or abdominal pain and no flank pain but us with right hydro and ? bladder diverticulum.  He is a 85 yo M from home with PMHx CVA, PPM, Psoriasis, who p/w c/o 1-2 weeks progressive b/l lower extremity weakness, unstable gait, malaise, and lethargy.  In addition, pt s/p twisting motion of left knee with resulting surrounding swelling and pain.   Denies trauma or direct blow to leg.        PAST MEDICAL & SURGICAL HISTORY:      MEDICATIONS  (STANDING):  amLODIPine   Tablet 5 milliGRAM(s) Oral daily  ceFAZolin   IVPB 1000 milliGRAM(s) IV Intermittent every 8 hours  ceFAZolin   IVPB      docusate sodium 100 milliGRAM(s) Oral two times a day  enoxaparin Injectable 40 milliGRAM(s) SubCutaneous daily  hydrALAZINE 25 milliGRAM(s) Oral every 8 hours  hydrocortisone 2.5% Ointment 1 Application(s) Topical daily  latanoprost 0.005% Ophthalmic Solution 1 Drop(s) Left EYE at bedtime  oxybutynin 5 milliGRAM(s) Oral two times a day    MEDICATIONS  (PRN):  acetaminophen   Tablet. 650 milliGRAM(s) Oral every 6 hours PRN Mild Pain (1 - 3)  bisacodyl 5 milliGRAM(s) Oral every 12 hours PRN Constipation      FAMILY HISTORY:  na       Allergies    No Known Allergies    SOCIAL HISTORY:  no tobacco etoh    REVIEW OF SYSTEMS: gen weakness  heent neg chest neg neck neg  abd neg  gu per hpi   neuro neg endo neg all neg cor neg skin neg psych neg    Physical Exam  Vital signs  T(C): 36.6 (04-18-18 @ 13:45), Max: 36.6 (04-17-18 @ 22:11)  HR: 64 (04-18-18 @ 13:45)  BP: 127/83 (04-18-18 @ 13:45)  SpO2: 96% (04-18-18 @ 13:45)  Wt(kg): --    Gen:  WDWN nad   heent ncat neck neck   chest clear cor reg  abd soft nd nt no cvat  no winter  gu irreg no cvat  neuor non focal    LABS:      04-17 @ 06:33    WBC 7.4   / Hct 32.6  / SCr 1.53     04-17    142  |  105  |  21  ----------------------------<  94  4.2   |  22  |  1.53<H>    Ca    9.3      17 Apr 2018 06:33

## 2018-04-18 NOTE — PROGRESS NOTE ADULT - SUBJECTIVE AND OBJECTIVE BOX
Patient is seen and examined at the bed side, is afebrile. He is tolerating Cefazolin.  The Urology recommendation  noted.          REVIEW OF SYSTEMS: All other review systems are negative          Vital Signs Last 24 Hrs  T(C): 36.7 (2018 17:06), Max: 36.7 (2018 17:06)  T(F): 98 (2018 17:06), Max: 98 (2018 17:06)  HR: 62 (2018 21:35) (57 - 64)  BP: 154/89 (2018 21:35) (127/83 - 154/89)  BP(mean): --  RR: 18 (2018 21:35) (18 - 18)  SpO2: 95% (2018 21:35) (95% - 97%)          ALLERGIES: NKDA          PHYSICAL EXAM:  GENERAL: Not in distress  CVS: s1 and s2 present  RESP: Air entry B/L  GI: Abdomen soft and nontender  EXT: No pedal edema  CNS: Awake and alert  SKIN: psoriatic lesions              LABS: No new Labs                        11.1   7.4   )-----------( 133      ( 2018 06:33 )             32.6                          11.6   6.8   )-----------( 131      ( 2018 05:31 )             35.0           -17    142  |  105  |  21  ----------------------------<  94  4.2   |  22  |  1.53<H>    Ca    9.3      2018 06:33      04-17    142  |  105  |  21  ----------------------------<  94  4.2   |  22  |  1.53<H>    Ca    9.3      2018 06:33        TPro  7.6  /  Alb  3.6  /  TBili  0.3  /  DBili  <0.1  /  AST  21  /  ALT  14  /  AlkPhos  116  04-15      Urinalysis Basic - ( 2018 18:02 )    Color: Yellow / Appearance: SL Turbid / S.015 / pH: x  Gluc: x / Ketone: Negative  / Bili: Negative / Urobili: Negative   Blood: x / Protein: 30 mg/dL / Nitrite: Positive   Leuk Esterase: Large / RBC: 10-25 /HPF / WBC >50 /HPF   Sq Epi: x / Non Sq Epi: OCC /HPF / Bacteria: Few /HPF        MEDICATIONS  (STANDING):  amLODIPine   Tablet 5 milliGRAM(s) Oral daily  ceFAZolin   IVPB 1000 milliGRAM(s) IV Intermittent every 8 hours  ceFAZolin   IVPB      docusate sodium 100 milliGRAM(s) Oral two times a day  enoxaparin Injectable 40 milliGRAM(s) SubCutaneous daily  hydrALAZINE 25 milliGRAM(s) Oral every 8 hours  hydrocortisone 2.5% Ointment 1 Application(s) Topical daily  latanoprost 0.005% Ophthalmic Solution 1 Drop(s) Left EYE at bedtime  oxybutynin 5 milliGRAM(s) Oral two times a day    MEDICATIONS  (PRN):  acetaminophen   Tablet. 650 milliGRAM(s) Oral every 6 hours PRN Mild Pain (1 - 3)  bisacodyl 5 milliGRAM(s) Oral every 12 hours PRN Constipation                RADIOLOGY & ADDITIONAL TESTS:      < from: US Kidney and Bladder (18 @ 17:05) >    Moderate right hydroureteronephrosis to the level of the bladder. The   right ureter appears to insert on a bladder diverticulum. Right renal   cortical thinning is suggestive of chronic hydronephrosis.     Urinary bladder trabeculations and diverticula with debris.      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 Correlate clinically with point tenderness.   Otherwise, no acute fracture.      < end of copied text >      < from: Xray Chest 1 View AP/PA (18 @ 17:32) >  No Urgent/Emergent findings.    Follow up official report.      < end of copied text >          MICROBIOLOGY DATA:    Culture - Urine (18 @ 20:29)    -  Ampicillin/Sulbactam: S <=8/4    -  Cefazolin: S <=4    -  Ciprofloxacin: R >2    -  Gentamicin: S <=1    -  Levofloxacin: R >4    -  Nitrofurantoin: S <=32    -  Oxacillin: S <=0.25    -  Penicillin: R 8    -  RIF- Rifampin: S <=1    -  Tetra/Doxy: S <=1    -  Trimethoprim/Sulfamethoxazole: S <=0.5/9.5    -  Vancomycin: S 2    Specimen Source: .Urine Catheterized    Culture Results:   >100,000 CFU/ml Coag Negative Staphylococcus  >100,000 CFU/ml Staphylococcus aureus    Organism Identification: Staphylococcus aureus    Organism: Staphylococcus aureus    Method Type: KALYAN        Culture - Blood (04.15.18 @ 05:13)    Specimen Source: .Blood Blood-Peripheral    Culture Results:   No growth to date.    Culture - Blood (04.15.18 @ 05:13)    Specimen Source: .Blood Blood-Peripheral    Culture Results:   No growth to date.

## 2018-04-18 NOTE — PROGRESS NOTE ADULT - SUBJECTIVE AND OBJECTIVE BOX
Patient seen and examined at bedside  No acute events noted overnight  Case discussed with medical team    HPI:  83 yo M from home with PMHx CVA, PPM, Psoriasis, who p/w c/o 1-2 weeks progressive b/l lower extremity weakness, unstable gait, malaise, and lethargy.  In addition, pt s/p twisting motion of left knee with resulting surrounding swelling and pain.   Denies trauma or direct blow to leg. (14 Apr 2018 19:07)      PAST MEDICAL & SURGICAL HISTORY:      No Known Allergies       MEDICATIONS  (STANDING):  amLODIPine   Tablet 5 milliGRAM(s) Oral daily  ceFAZolin   IVPB 1000 milliGRAM(s) IV Intermittent every 8 hours  ceFAZolin   IVPB      docusate sodium 100 milliGRAM(s) Oral two times a day  enoxaparin Injectable 40 milliGRAM(s) SubCutaneous daily  hydrALAZINE 25 milliGRAM(s) Oral every 8 hours  hydrocortisone 2.5% Ointment 1 Application(s) Topical daily  latanoprost 0.005% Ophthalmic Solution 1 Drop(s) Left EYE at bedtime  oxybutynin 5 milliGRAM(s) Oral two times a day    MEDICATIONS  (PRN):  acetaminophen   Tablet. 650 milliGRAM(s) Oral every 6 hours PRN Mild Pain (1 - 3)  bisacodyl 5 milliGRAM(s) Oral every 12 hours PRN Constipation      REVIEW OF SYSTEMS:  CONSTITUTIONAL: (+) malaise.   EYES: No acute change in vision   ENT:  No tinnitus  NECK: No stiffness  RESPIRATORY: No hemoptysis  CARDIOVASCULAR: No chest pain, palpitations, syncope  GASTROINTESTINAL: No hematemesis, diarrhea, melena, or hematochezia.  GENITOURINARY: No hematuria  NEUROLOGICAL: No headaches  LYMPH Nodes: No enlarged glands  ENDOCRINE: No heat or cold intolerance	    T(C): 36.3 (04-18-18 @ 05:49), Max: 36.7 (04-17-18 @ 10:29)  HR: 60 (04-18-18 @ 05:49) (57 - 66)  BP: 147/89 (04-18-18 @ 05:49) (112/71 - 147/89)  RR: 18 (04-18-18 @ 05:49) (17 - 18)  SpO2: 97% (04-18-18 @ 05:49) (94% - 97%)    PHYSICAL EXAMINATION:   Constitutional: WD, NAD  HEENT: NC, AT  Neck:  Supple  Respiratory:  Adequate airflow b/l. Not using accessory muscles of respiration.  Cardiovascular:  S1 & S2 intact, no R/G, 2+ radial pulses b/l  Gastrointestinal: Soft, NT, ND, normoactive b.s., no organomegaly/RT/rigidity  Extremities: WWP  Neurological:  Alert and awake.  No acute focal motor deficits. Crude sensation intact.     Labs and imaging reviewed    LABS:                        11.1   7.4   )-----------( 133      ( 17 Apr 2018 06:33 )             32.6     04-17    142  |  105  |  21  ----------------------------<  94  4.2   |  22  |  1.53<H>    Ca    9.3      17 Apr 2018 06:33              CAPILLARY BLOOD GLUCOSE                  RADIOLOGY & ADDITIONAL STUDIES:

## 2018-04-18 NOTE — PROGRESS NOTE ADULT - SUBJECTIVE AND OBJECTIVE BOX
[facility] NEPHROLOGY- PROGRESS NOTE    84 year old male with history of nephrolithiasis presents with UTI. Nephrology consulted for elevated Scr.    REVIEW OF SYSTEMS:  Gen: no changes in weight  Cards: no chest pain  Resp: no dyspnea  GI: no nausea or vomiting or diarrhea  Vascular: no LE edema    No Known Allergies      Hospital Medications: Medications reviewed      VITALS:  T(F): 97.9 (04-18-18 @ 10:18), Max: 98 (04-17-18 @ 13:24)  HR: 60 (04-18-18 @ 10:18)  BP: 133/74 (04-18-18 @ 10:18)  RR: 18 (04-18-18 @ 10:18)  SpO2: 97% (04-18-18 @ 10:18)  Wt(kg): --    04-17 @ 07:01  -  04-18 @ 07:00  --------------------------------------------------------  IN: 1450 mL / OUT: 0 mL / NET: 1450 mL    04-18 @ 07:01  -  04-18 @ 11:55  --------------------------------------------------------  IN: 240 mL / OUT: 0 mL / NET: 240 mL        PHYSICAL EXAM:    Gen: NAD, calm  Cards: RRR, +S1/S2, no M/G/R  Resp: CTA B/L  GI: soft, NT/ND, NABS  Vascular: no LE edema B/L      LABS:  04-17    142  |  105  |  21  ----------------------------<  94  4.2   |  22  |  1.53<H>    Ca    9.3      17 Apr 2018 06:33      Creatinine Trend: 1.53 <--, 1.53 <--, 1.43 <--, 1.53 <--                        11.1   7.4   )-----------( 133      ( 17 Apr 2018 06:33 )             32.6         < from: US Kidney and Bladder (04.17.18 @ 17:05) >  IMPRESSION:     Moderate right hydroureteronephrosis to the level of the bladder. The   right ureter appears to insert on a bladder diverticulum. Right renal   cortical thinning is suggestive of chronic hydronephrosis.     Urinary bladder trabeculations and diverticula with debris.    < end of copied text >

## 2018-04-18 NOTE — PROCEDURE NOTE - ADDITIONAL PROCEDURE DETAILS
Indication for interrogation: c/o generalized weakness  Measured data WNL. Sensing and pacing thresholds WNL. Lead impedence stable.   Pt is  not PM dependent  No stored data review  Changes made: none  Normal  pacemaker function

## 2018-04-19 ENCOUNTER — TRANSCRIPTION ENCOUNTER (OUTPATIENT)
Age: 83
End: 2018-04-19

## 2018-04-19 LAB
ANION GAP SERPL CALC-SCNC: 13 MMOL/L — SIGNIFICANT CHANGE UP (ref 5–17)
BUN SERPL-MCNC: 27 MG/DL — HIGH (ref 7–23)
CALCIUM SERPL-MCNC: 9.6 MG/DL — SIGNIFICANT CHANGE UP (ref 8.4–10.5)
CHLORIDE SERPL-SCNC: 104 MMOL/L — SIGNIFICANT CHANGE UP (ref 96–108)
CO2 SERPL-SCNC: 23 MMOL/L — SIGNIFICANT CHANGE UP (ref 22–31)
CREAT SERPL-MCNC: 1.41 MG/DL — HIGH (ref 0.5–1.3)
GLUCOSE SERPL-MCNC: 84 MG/DL — SIGNIFICANT CHANGE UP (ref 70–99)
HCT VFR BLD CALC: 34.6 % — LOW (ref 39–50)
HGB BLD-MCNC: 11.6 G/DL — LOW (ref 13–17)
MCHC RBC-ENTMCNC: 30.7 PG — SIGNIFICANT CHANGE UP (ref 27–34)
MCHC RBC-ENTMCNC: 33.6 GM/DL — SIGNIFICANT CHANGE UP (ref 32–36)
MCV RBC AUTO: 91.3 FL — SIGNIFICANT CHANGE UP (ref 80–100)
PLATELET # BLD AUTO: 141 K/UL — LOW (ref 150–400)
POTASSIUM SERPL-MCNC: 3.9 MMOL/L — SIGNIFICANT CHANGE UP (ref 3.5–5.3)
POTASSIUM SERPL-SCNC: 3.9 MMOL/L — SIGNIFICANT CHANGE UP (ref 3.5–5.3)
PSA FLD-MCNC: 0.29 NG/ML — SIGNIFICANT CHANGE UP (ref 0–4)
RBC # BLD: 3.79 M/UL — LOW (ref 4.2–5.8)
RBC # FLD: 15.8 % — HIGH (ref 10.3–14.5)
SODIUM SERPL-SCNC: 140 MMOL/L — SIGNIFICANT CHANGE UP (ref 135–145)
WBC # BLD: 5.1 K/UL — SIGNIFICANT CHANGE UP (ref 3.8–10.5)
WBC # FLD AUTO: 5.1 K/UL — SIGNIFICANT CHANGE UP (ref 3.8–10.5)

## 2018-04-19 RX ORDER — NYSTATIN CREAM 100000 [USP'U]/G
1 CREAM TOPICAL THREE TIMES A DAY
Qty: 0 | Refills: 0 | Status: DISCONTINUED | OUTPATIENT
Start: 2018-04-19 | End: 2018-04-24

## 2018-04-19 RX ORDER — SENNA PLUS 8.6 MG/1
2 TABLET ORAL AT BEDTIME
Qty: 0 | Refills: 0 | Status: DISCONTINUED | OUTPATIENT
Start: 2018-04-19 | End: 2018-04-24

## 2018-04-19 RX ADMIN — Medication 100 MILLIGRAM(S): at 06:07

## 2018-04-19 RX ADMIN — Medication 1 APPLICATION(S): at 11:50

## 2018-04-19 RX ADMIN — Medication 5 MILLIGRAM(S): at 06:07

## 2018-04-19 RX ADMIN — NYSTATIN CREAM 1 APPLICATION(S): 100000 CREAM TOPICAL at 11:24

## 2018-04-19 RX ADMIN — LATANOPROST 1 DROP(S): 0.05 SOLUTION/ DROPS OPHTHALMIC; TOPICAL at 22:40

## 2018-04-19 RX ADMIN — Medication 5 MILLIGRAM(S): at 18:03

## 2018-04-19 RX ADMIN — NYSTATIN CREAM 1 APPLICATION(S): 100000 CREAM TOPICAL at 22:40

## 2018-04-19 RX ADMIN — Medication 100 MILLIGRAM(S): at 22:40

## 2018-04-19 RX ADMIN — SENNA PLUS 2 TABLET(S): 8.6 TABLET ORAL at 22:40

## 2018-04-19 RX ADMIN — ENOXAPARIN SODIUM 40 MILLIGRAM(S): 100 INJECTION SUBCUTANEOUS at 11:49

## 2018-04-19 RX ADMIN — Medication 25 MILLIGRAM(S): at 06:07

## 2018-04-19 RX ADMIN — Medication 25 MILLIGRAM(S): at 14:14

## 2018-04-19 RX ADMIN — AMLODIPINE BESYLATE 5 MILLIGRAM(S): 2.5 TABLET ORAL at 06:07

## 2018-04-19 RX ADMIN — Medication 25 MILLIGRAM(S): at 22:40

## 2018-04-19 RX ADMIN — Medication 100 MILLIGRAM(S): at 14:16

## 2018-04-19 NOTE — PROGRESS NOTE ADULT - SUBJECTIVE AND OBJECTIVE BOX
Patient seen and examined at bedside  No acute events noted overnight  Case discussed with medical team    HPI:  83 yo M from home with PMHx CVA, PPM, Psoriasis, who p/w c/o 1-2 weeks progressive b/l lower extremity weakness, unstable gait, malaise, and lethargy.  In addition, pt s/p twisting motion of left knee with resulting surrounding swelling and pain.   Denies trauma or direct blow to leg. (14 Apr 2018 19:07)      PAST MEDICAL & SURGICAL HISTORY:      No Known Allergies       MEDICATIONS  (STANDING):  amLODIPine   Tablet 5 milliGRAM(s) Oral daily  ceFAZolin   IVPB 1000 milliGRAM(s) IV Intermittent every 8 hours  ceFAZolin   IVPB      docusate sodium 100 milliGRAM(s) Oral two times a day  enoxaparin Injectable 40 milliGRAM(s) SubCutaneous daily  hydrALAZINE 25 milliGRAM(s) Oral every 8 hours  hydrocortisone 2.5% Ointment 1 Application(s) Topical daily  latanoprost 0.005% Ophthalmic Solution 1 Drop(s) Left EYE at bedtime  oxybutynin 5 milliGRAM(s) Oral two times a day    MEDICATIONS  (PRN):  acetaminophen   Tablet. 650 milliGRAM(s) Oral every 6 hours PRN Mild Pain (1 - 3)  bisacodyl 5 milliGRAM(s) Oral every 12 hours PRN Constipation      REVIEW OF SYSTEMS:  CONSTITUTIONAL: (+) malaise.   EYES: No acute change in vision   ENT:  No tinnitus  NECK: No stiffness  RESPIRATORY: No hemoptysis  CARDIOVASCULAR: No chest pain, palpitations, syncope  GASTROINTESTINAL: No hematemesis, diarrhea, melena, or hematochezia.  GENITOURINARY: No hematuria  NEUROLOGICAL: No headaches  LYMPH Nodes: No enlarged glands  ENDOCRINE: No heat or cold intolerance	    T(C): 36.7 (04-18-18 @ 17:06), Max: 36.7 (04-18-18 @ 17:06)  HR: 75 (04-19-18 @ 06:03) (60 - 75)  BP: 157/95 (04-19-18 @ 06:03) (127/83 - 157/95)  RR: 18 (04-19-18 @ 06:03) (18 - 18)  SpO2: 100% (04-19-18 @ 06:03) (95% - 100%)    PHYSICAL EXAMINATION:   Constitutional: WD, NAD  HEENT: NC, AT  Neck:  Supple  Respiratory:  Adequate airflow b/l. Not using accessory muscles of respiration.  Cardiovascular:  S1 & S2 intact, no R/G, 2+ radial pulses b/l  Gastrointestinal: Soft, NT, ND, normoactive b.s., no organomegaly/RT/rigidity  Extremities: WWP  Neurological:  Alert and awake.  No acute focal motor deficits. Crude sensation intact.     Labs and imaging reviewed    LABS:                        11.6   5.1   )-----------( 141      ( 19 Apr 2018 06:53 )             34.6     04-19    140  |  104  |  27<H>  ----------------------------<  84  3.9   |  23  |  1.41<H>    Ca    9.6      19 Apr 2018 06:53              CAPILLARY BLOOD GLUCOSE                  RADIOLOGY & ADDITIONAL STUDIES:

## 2018-04-19 NOTE — DISCHARGE NOTE ADULT - VISION (WITH CORRECTIVE LENSES IF THE PATIENT USUALLY WEARS THEM):
R eye blindness/Partially impaired: cannot see medication labels or newsprint, but can see obstacles in path, and the surrounding layout; can count fingers at arm's length

## 2018-04-19 NOTE — DISCHARGE NOTE ADULT - PATIENT PORTAL LINK FT
You can access the [facility] Patient Portal, offered by [facility], by registering with the following website: [email or web address]

## 2018-04-19 NOTE — PROGRESS NOTE ADULT - SUBJECTIVE AND OBJECTIVE BOX
Patient is seen and examined at the bed side, is afebrile. He is c/o being constipated, no bowel movements in last 3 days.          REVIEW OF SYSTEMS: All other review systems are negative          Vital Signs Last 24 Hrs  T(C): 36.7 (2018 16:28), Max: 36.9 (2018 10:36)  T(F): 98 (2018 16:28), Max: 98.5 (2018 13:31)  HR: 62 (2018 21:38) (60 - 75)  BP: 132/75 (2018 21:38) (116/71 - 157/95)  BP(mean): --  RR: 17 (2018 21:38) (17 - 18)  SpO2: 98% (2018 21:38) (97% - 100%)          ALLERGIES: NKDA          PHYSICAL EXAM:  GENERAL: Not in distress  CVS: s1 and s2 present  RESP: Air entry B/L  GI: Abdomen nondistended  EXT: No pedal edema  CNS: Awake and alert  SKIN: psoriatic lesions              LABS:                                   11.6   5.1   )-----------( 141      ( 2018 06:53 )             34.6                11.1   7.4   )-----------( 133      ( 2018 06:33 )             32.6                   04-19    140  |  104  |  27<H>  ----------------------------<  84  3.9   |  23  |  1.41<H>    Ca    9.6      2018 06:53      04-17    142  |  105  |  21  ----------------------------<  94  4.2   |  22  |  1.53<H>    Ca    9.3      2018 06:33      TPro  7.6  /  Alb  3.6  /  TBili  0.3  /  DBili  <0.1  /  AST  21  /  ALT  14  /  AlkPhos  116  04-15      Urinalysis Basic - ( 2018 18:02 )    Color: Yellow / Appearance: SL Turbid / S.015 / pH: x  Gluc: x / Ketone: Negative  / Bili: Negative / Urobili: Negative   Blood: x / Protein: 30 mg/dL / Nitrite: Positive   Leuk Esterase: Large / RBC: 10-25 /HPF / WBC >50 /HPF   Sq Epi: x / Non Sq Epi: OCC /HPF / Bacteria: Few /HPF          MEDICATIONS  (STANDING):  amLODIPine   Tablet 5 milliGRAM(s) Oral daily  ceFAZolin   IVPB 1000 milliGRAM(s) IV Intermittent every 8 hours  ceFAZolin   IVPB      docusate sodium 100 milliGRAM(s) Oral two times a day  enoxaparin Injectable 40 milliGRAM(s) SubCutaneous daily  hydrALAZINE 25 milliGRAM(s) Oral every 8 hours  hydrocortisone 2.5% Ointment 1 Application(s) Topical daily  latanoprost 0.005% Ophthalmic Solution 1 Drop(s) Left EYE at bedtime  nystatin Powder 1 Application(s) Topical three times a day  oxybutynin 5 milliGRAM(s) Oral two times a day  senna 2 Tablet(s) Oral at bedtime  sodium biphosphate Rectal Enema 1 Enema Rectal once    MEDICATIONS  (PRN):  acetaminophen   Tablet. 650 milliGRAM(s) Oral every 6 hours PRN Mild Pain (1 - 3)  bisacodyl 5 milliGRAM(s) Oral every 12 hours PRN Constipation                RADIOLOGY & ADDITIONAL TESTS:      < from: US Kidney and Bladder (18 @ 17:05) >    Moderate right hydroureteronephrosis to the level of the bladder. The   right ureter appears to insert on a bladder diverticulum. Right renal   cortical thinning is suggestive of chronic hydronephrosis.     Urinary bladder trabeculations and diverticula with debris.      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 Correlate clinically with point tenderness.   Otherwise, no acute fracture.      < end of copied text >      < from: Xray Chest 1 View AP/PA (18 @ 17:32) >  No Urgent/Emergent findings.    Follow up official report.      < end of copied text >          MICROBIOLOGY DATA:    Culture - Urine (18 @ 20:29)    -  Ampicillin/Sulbactam: S <=8/4    -  Cefazolin: S <=4    -  Ciprofloxacin: R >2    -  Gentamicin: S <=1    -  Levofloxacin: R >4    -  Nitrofurantoin: S <=32    -  Oxacillin: S <=0.25    -  Penicillin: R 8    -  RIF- Rifampin: S <=1    -  Tetra/Doxy: S <=1    -  Trimethoprim/Sulfamethoxazole: S <=0.5/9.5    -  Vancomycin: S 2    Specimen Source: .Urine Catheterized    Culture Results:   >100,000 CFU/ml Coag Negative Staphylococcus  >100,000 CFU/ml Staphylococcus aureus    Organism Identification: Staphylococcus aureus    Organism: Staphylococcus aureus    Method Type: KALYAN        Culture - Blood (04.15.18 @ 05:13)    Specimen Source: .Blood Blood-Peripheral    Culture Results:   No growth to date.    Culture - Blood (04.15.18 @ 05:13)    Specimen Source: .Blood Blood-Peripheral    Culture Results:   No growth to date.

## 2018-04-19 NOTE — DISCHARGE NOTE ADULT - CARE PROVIDER_API CALL
Daniel Reed), Medicine  [address] [place] [address]  Phone: [phone]  Fax: [phone] Daniel Reed), Medicine  [address] [place] [address]  Phone: [phone]  Fax: [phone]    Goldberg, Gary D (MD), Urology  [address] [place] [address]  Phone: [phone]  Fax: [phone]    Pa Burgess (DO), Internal Medicine  [address] [place] [address]  Phone: [phone]  Fax: [phone]

## 2018-04-19 NOTE — DISCHARGE NOTE ADULT - SECONDARY DIAGNOSIS.
Hydronephrosis of right kidney Unstable gait CKD (chronic kidney disease), stage III HTN (hypertension)

## 2018-04-19 NOTE — DISCHARGE NOTE ADULT - MEDICATION SUMMARY - MEDICATIONS TO STOP TAKING
I will STOP taking the medications listed below when I get home from the hospital:    Myrbetriq 50 mg oral tablet, extended release  -- 1 tab(s) by mouth once a day    blue Emu  -- Apply on skin to affected area 2 times a day, As Needed

## 2018-04-19 NOTE — DISCHARGE NOTE ADULT - CARE PROVIDERS DIRECT ADDRESSES
,DirectAddress_Unknown ,DirectAddress_Unknown,[email or web address],DirectAddress_Unknown

## 2018-04-19 NOTE — DISCHARGE NOTE ADULT - HOSPITAL COURSE
85 yo M from home with PMHx CVA, PPM, Psoriasis, who p/w c/o 1-2 weeks progressive b/l lower extremity weakness, unstable gait, malaise, and lethargy.  In addition, pt s/p twisting motion of left knee with resulting surrounding swelling and pain.   Denies trauma or direct blow to leg. (14 Apr 2018 19:07)   Problem/Plan - 1:  ·  Problem: Complicated UTI (urinary tract infection).  Plan: abx po til 5/2/18  d/c.      Problem/Plan - 2:  ·  Problem: Hydronephrosis, right.  Plan: urology recs appreciated  tx uti, outpt f/u.      Problem/Plan - 3:  ·  Problem: Uncontrolled hypertension.  Plan: improved  c/w hydralazine and norvasc  .      Problem/Plan - 4:  ·  Problem: Unstable gait.  Plan: improved.      Problem/Plan - 5:  ·  Problem: Urinary incontinence.  Plan: tolerodine.      Problem/Plan - 6:  Problem: Hard of hearing. Plan: audiologist outpt. 85 yo M from home with PMHx CVA, PPM, Psoriasis, who p/w c/o 1-2 weeks progressive b/l lower extremity weakness, unstable gait, malaise, and lethargy.  In addition, pt s/p twisting motion of left knee with resulting surrounding swelling and pain.   Denies trauma or direct blow to leg. (14 Apr 2018 19:07).  Treated with iv ABs, blood and urine culture done and repeat urine culture is pending. Seen by card, nephro, urol, ID, neurol, EP, PT. He is waiting for repeat urine culture report , if negative will be transferred   to rehab with po AB to complete the course.   Problem/Plan - 1:  ·  Problem: Complicated UTI (urinary tract infection).  Plan: abx po til 5/2/18  d/c.      Problem/Plan - 2:  ·  Problem: Hydronephrosis, right.  Plan: urology recs appreciated  tx uti, outpt f/u.      Problem/Plan - 3:  ·  Problem: Uncontrolled hypertension.  Plan: improved  c/w hydralazine and norvasc  .      Problem/Plan - 4:  ·  Problem: Unstable gait.  Plan: improved.      Problem/Plan - 5:  ·  Problem: Urinary incontinence.  Plan: tolerodine.      Problem/Plan - 6:  Problem: Hard of hearing. Plan: audiologist outpt.

## 2018-04-19 NOTE — DISCHARGE NOTE ADULT - CARE PLAN
Principal Discharge DX:	Complicated UTI (urinary tract infection)  Goal:	improved  Assessment and plan of treatment:	You had a bladder &/or kidney infection  Call your doctor with pain or burning with urination, frequent urination, feeling to urinate suddenly, blood in urine, fever, back pain, nausea or vomiting  You need to take and finish your antibiotic as prescribed so that the infection does not reoccur  Please continue antibiotics as prescribed  Secondary Diagnosis:	Hydronephrosis of right kidney  Assessment and plan of treatment:	follow up with Dr. Goldberg  Secondary Diagnosis:	Unstable gait  Assessment and plan of treatment:	Physical therapy and fall precautions Principal Discharge DX:	Complicated UTI (urinary tract infection)  Goal:	improved  Assessment and plan of treatment:	You had a bladder &/or kidney infection  Call your doctor with pain or burning with urination, frequent urination, feeling to urinate suddenly, blood in urine, fever, back pain, nausea or vomiting  You need to take and finish your antibiotic as prescribed so that the infection does not reoccur  Please continue antibiotics as prescribed  Secondary Diagnosis:	Hydronephrosis of right kidney  Assessment and plan of treatment:	follow up with Dr. Goldberg for possible stent placement  Secondary Diagnosis:	Unstable gait  Assessment and plan of treatment:	Physical therapy and fall precautions, cont PT in the Rehab  Secondary Diagnosis:	CKD (chronic kidney disease), stage III  Goal:	stable  Assessment and plan of treatment:	avoid nephrotoxins, monitor creatinine, follow up with nephrologist  Secondary Diagnosis:	HTN (hypertension)  Goal:	controlled  Assessment and plan of treatment:	cont hydralazine, norvasc, ACEI held

## 2018-04-19 NOTE — DISCHARGE NOTE ADULT - MEDICATION SUMMARY - MEDICATIONS TO TAKE
I will START or STAY ON the medications listed below when I get home from the hospital:    acetaminophen 325 mg oral tablet  -- 2 tab(s) by mouth every 6 hours, As needed, Mild Pain (1 - 3)  -- Indication: For pain    amLODIPine 5 mg oral tablet  -- 1 tab(s) by mouth once a day  -- Indication: For Hypertension    nystatin 100,000 units/g topical powder  -- 1 application on skin 3 times a day  -- Indication: For dermatitis    hydrocortisone 2.5% topical ointment  -- Apply on skin to affected area , As Needed  -- Indication: For dermatitis    Zantac 150 oral tablet  -- 1 tab(s) by mouth , As Needed  -- Indication: For dyspepsia    docusate sodium 100 mg oral capsule  -- 1 cap(s) by mouth , As Needed  -- Indication: For Constipation    bisacodyl 5 mg oral delayed release tablet  -- 1 tab(s) by mouth every 12 hours, As needed, Constipation  -- Indication: For Constipation    senna oral tablet  -- 2 tab(s) by mouth once a day (at bedtime)  -- Indication: For Constipation    Lumigan 0.01% ophthalmic solution  -- 1 drop(s) in the left eye once a day (in the evening)  -- Indication: For glaucoma    dicloxacillin 500 mg oral capsule  -- 1 cap(s) by mouth every 6 hours   -- Finish all this medication unless otherwise directed by prescriber.  Take medication on an empty stomach 1 hour before or 2 to 3 hours after a meal unless otherwise directed by your doctor.    -- Indication: For Urinary tract infection

## 2018-04-19 NOTE — DISCHARGE NOTE ADULT - PLAN OF CARE
improved You had a bladder &/or kidney infection  Call your doctor with pain or burning with urination, frequent urination, feeling to urinate suddenly, blood in urine, fever, back pain, nausea or vomiting  You need to take and finish your antibiotic as prescribed so that the infection does not reoccur  Please continue antibiotics as prescribed follow up with Dr. Goldberg Physical therapy and fall precautions follow up with Dr. Goldberg for possible stent placement Physical therapy and fall precautions, cont PT in the Rehab stable avoid nephrotoxins, monitor creatinine, follow up with nephrologist controlled cont hydralazine, norvasc, ACEI held cont hydralazine, Norvasc, ACEI held

## 2018-04-20 DIAGNOSIS — T68.XXXA HYPOTHERMIA, INITIAL ENCOUNTER: ICD-10-CM

## 2018-04-20 LAB
ANION GAP SERPL CALC-SCNC: 11 MMOL/L — SIGNIFICANT CHANGE UP (ref 5–17)
APPEARANCE UR: ABNORMAL
BILIRUB UR-MCNC: NEGATIVE — SIGNIFICANT CHANGE UP
BUN SERPL-MCNC: 26 MG/DL — HIGH (ref 7–23)
CALCIUM SERPL-MCNC: 9.7 MG/DL — SIGNIFICANT CHANGE UP (ref 8.4–10.5)
CHLORIDE SERPL-SCNC: 107 MMOL/L — SIGNIFICANT CHANGE UP (ref 96–108)
CO2 SERPL-SCNC: 24 MMOL/L — SIGNIFICANT CHANGE UP (ref 22–31)
COLOR SPEC: YELLOW — SIGNIFICANT CHANGE UP
CREAT SERPL-MCNC: 1.43 MG/DL — HIGH (ref 0.5–1.3)
CRP SERPL-MCNC: 2.8 MG/DL — HIGH (ref 0–0.4)
CULTURE RESULTS: SIGNIFICANT CHANGE UP
CULTURE RESULTS: SIGNIFICANT CHANGE UP
DIFF PNL FLD: ABNORMAL
ERYTHROCYTE [SEDIMENTATION RATE] IN BLOOD: 108 MM/HR — HIGH (ref 0–20)
GLUCOSE SERPL-MCNC: 81 MG/DL — SIGNIFICANT CHANGE UP (ref 70–99)
GLUCOSE UR QL: NEGATIVE — SIGNIFICANT CHANGE UP
HCT VFR BLD CALC: 32.2 % — LOW (ref 39–50)
HGB BLD-MCNC: 11 G/DL — LOW (ref 13–17)
KETONES UR-MCNC: NEGATIVE — SIGNIFICANT CHANGE UP
LACTATE SERPL-SCNC: 0.6 MMOL/L — LOW (ref 0.7–2)
LEUKOCYTE ESTERASE UR-ACNC: ABNORMAL
MAGNESIUM SERPL-MCNC: 2 MG/DL — SIGNIFICANT CHANGE UP (ref 1.6–2.6)
MCHC RBC-ENTMCNC: 31 PG — SIGNIFICANT CHANGE UP (ref 27–34)
MCHC RBC-ENTMCNC: 34.3 GM/DL — SIGNIFICANT CHANGE UP (ref 32–36)
MCV RBC AUTO: 90.5 FL — SIGNIFICANT CHANGE UP (ref 80–100)
NITRITE UR-MCNC: NEGATIVE — SIGNIFICANT CHANGE UP
PH UR: 6 — SIGNIFICANT CHANGE UP (ref 5–8)
PHOSPHATE SERPL-MCNC: 3.4 MG/DL — SIGNIFICANT CHANGE UP (ref 2.5–4.5)
PLATELET # BLD AUTO: 144 K/UL — LOW (ref 150–400)
POTASSIUM SERPL-MCNC: 4.2 MMOL/L — SIGNIFICANT CHANGE UP (ref 3.5–5.3)
POTASSIUM SERPL-SCNC: 4.2 MMOL/L — SIGNIFICANT CHANGE UP (ref 3.5–5.3)
PROT UR-MCNC: SIGNIFICANT CHANGE UP
RBC # BLD: 3.56 M/UL — LOW (ref 4.2–5.8)
RBC # FLD: 15.6 % — HIGH (ref 10.3–14.5)
SODIUM SERPL-SCNC: 142 MMOL/L — SIGNIFICANT CHANGE UP (ref 135–145)
SP GR SPEC: 1.01 — SIGNIFICANT CHANGE UP (ref 1.01–1.02)
SPECIMEN SOURCE: SIGNIFICANT CHANGE UP
SPECIMEN SOURCE: SIGNIFICANT CHANGE UP
UROBILINOGEN FLD QL: NEGATIVE — SIGNIFICANT CHANGE UP
WBC # BLD: 4.6 K/UL — SIGNIFICANT CHANGE UP (ref 3.8–10.5)
WBC # FLD AUTO: 4.6 K/UL — SIGNIFICANT CHANGE UP (ref 3.8–10.5)

## 2018-04-20 PROCEDURE — 71045 X-RAY EXAM CHEST 1 VIEW: CPT | Mod: 26

## 2018-04-20 RX ORDER — LANOLIN ALCOHOL/MO/W.PET/CERES
3 CREAM (GRAM) TOPICAL ONCE
Qty: 0 | Refills: 0 | Status: COMPLETED | OUTPATIENT
Start: 2018-04-20 | End: 2018-04-20

## 2018-04-20 RX ADMIN — Medication 1 APPLICATION(S): at 12:59

## 2018-04-20 RX ADMIN — Medication 5 MILLIGRAM(S): at 18:20

## 2018-04-20 RX ADMIN — Medication 100 MILLIGRAM(S): at 21:12

## 2018-04-20 RX ADMIN — ENOXAPARIN SODIUM 40 MILLIGRAM(S): 100 INJECTION SUBCUTANEOUS at 12:59

## 2018-04-20 RX ADMIN — Medication 100 MILLIGRAM(S): at 14:20

## 2018-04-20 RX ADMIN — NYSTATIN CREAM 1 APPLICATION(S): 100000 CREAM TOPICAL at 14:20

## 2018-04-20 RX ADMIN — Medication 100 MILLIGRAM(S): at 06:35

## 2018-04-20 RX ADMIN — AMLODIPINE BESYLATE 5 MILLIGRAM(S): 2.5 TABLET ORAL at 06:35

## 2018-04-20 RX ADMIN — SENNA PLUS 2 TABLET(S): 8.6 TABLET ORAL at 21:12

## 2018-04-20 RX ADMIN — Medication 100 MILLIGRAM(S): at 18:20

## 2018-04-20 RX ADMIN — Medication 5 MILLIGRAM(S): at 06:35

## 2018-04-20 RX ADMIN — Medication 1 ENEMA: at 06:35

## 2018-04-20 RX ADMIN — NYSTATIN CREAM 1 APPLICATION(S): 100000 CREAM TOPICAL at 21:12

## 2018-04-20 RX ADMIN — LATANOPROST 1 DROP(S): 0.05 SOLUTION/ DROPS OPHTHALMIC; TOPICAL at 21:12

## 2018-04-20 RX ADMIN — Medication 3 MILLIGRAM(S): at 00:18

## 2018-04-20 RX ADMIN — NYSTATIN CREAM 1 APPLICATION(S): 100000 CREAM TOPICAL at 06:36

## 2018-04-20 RX ADMIN — Medication 25 MILLIGRAM(S): at 06:35

## 2018-04-20 NOTE — PROGRESS NOTE ADULT - SUBJECTIVE AND OBJECTIVE BOX
Patient is seen and examined at the bed side, is normothermic now, was hypothermic earlier.  He still c/o being constipated, had a BM last night  after a fleet enema.          REVIEW OF SYSTEMS: All other review systems are negative        Vital Signs Last 24 Hrs  T(C): 36.8 (2018 20:57), Max: 36.9 (2018 16:06)  T(F): 98.3 (2018 20:57), Max: 98.5 (2018 16:06)  HR: 60 (2018 20:57) (58 - 60)  BP: 131/77 (2018 20:57) (102/61 - 160/90)  BP(mean): --  RR: 16 (2018 20:57) (16 - 18)  SpO2: 98% (2018 20:57) (93% - 98%)        ALLERGIES: NKDA          PHYSICAL EXAM:  GENERAL: Not in distress  CVS: s1 and s2 present  RESP: Air entry B/L  GI: Abdomen nondistended  EXT: No pedal edema  CNS: Awake and alert  SKIN: psoriatic lesions              LABS:                         11.0   4.6   )-----------( 144      ( 2018 09:54 )             32.2                                     11.6   5.1   )-----------( 141      ( 2018 06:53 )             34.6                   04-20    142  |  107  |  26<H>  ----------------------------<  81  4.2   |  24  |  1.43<H>    Ca    9.7      2018 09:54  Phos  3.4     04-20  Mg     2.0     04-20      04-19    140  |  104  |  27<H>  ----------------------------<  84  3.9   |  23  |  1.41<H>    Ca    9.6      2018 06:53        TPro  7.6  /  Alb  3.6  /  TBili  0.3  /  DBili  <0.1  /  AST  21  /  ALT  14  /  AlkPhos  116  04-15      Urinalysis Basic - ( 2018 18:02 )    Color: Yellow / Appearance: SL Turbid / S.015 / pH: x  Gluc: x / Ketone: Negative  / Bili: Negative / Urobili: Negative   Blood: x / Protein: 30 mg/dL / Nitrite: Positive   Leuk Esterase: Large / RBC: 10-25 /HPF / WBC >50 /HPF   Sq Epi: x / Non Sq Epi: OCC /HPF / Bacteria: Few /HPF          MEDICATIONS  (STANDING):  amLODIPine   Tablet 5 milliGRAM(s) Oral daily  ceFAZolin   IVPB 1000 milliGRAM(s) IV Intermittent every 8 hours  ceFAZolin   IVPB      docusate sodium 100 milliGRAM(s) Oral two times a day  enoxaparin Injectable 40 milliGRAM(s) SubCutaneous daily  hydrocortisone 2.5% Ointment 1 Application(s) Topical daily  latanoprost 0.005% Ophthalmic Solution 1 Drop(s) Left EYE at bedtime  nystatin Powder 1 Application(s) Topical three times a day  oxybutynin 5 milliGRAM(s) Oral two times a day  senna 2 Tablet(s) Oral at bedtime    MEDICATIONS  (PRN):  acetaminophen   Tablet. 650 milliGRAM(s) Oral every 6 hours PRN Mild Pain (1 - 3)  bisacodyl 5 milliGRAM(s) Oral every 12 hours PRN Constipation              RADIOLOGY & ADDITIONAL TESTS:      18: US Kidney and Bladder (18 @ 17:05) Moderate right hydroureteronephrosis to the level of the bladder. The right ureter appears to insert on a bladder diverticulum. Right renal cortical thinning is suggestive of chronic hydronephrosis.   Urinary bladder trabeculations and diverticula with debris.        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 Correlate clinically with point tenderness.  Otherwise, no acute fracture.        18: Xray Chest 1 View AP/PA (18 @ 17:32) >No Urgent/Emergent findings.    Follow up official report.      < end of copied text >          MICROBIOLOGY DATA:    Culture - Urine (18 @ 20:29)    -  Ampicillin/Sulbactam: S <=8/4    -  Cefazolin: S <=4    -  Ciprofloxacin: R >2    -  Gentamicin: S <=1    -  Levofloxacin: R >4    -  Nitrofurantoin: S <=32    -  Oxacillin: S <=0.25    -  Penicillin: R 8    -  RIF- Rifampin: S <=1    -  Tetra/Doxy: S <=1    -  Trimethoprim/Sulfamethoxazole: S <=0.5/9.5    -  Vancomycin: S 2    Specimen Source: .Urine Catheterized    Culture Results:   >100,000 CFU/ml Coag Negative Staphylococcus  >100,000 CFU/ml Staphylococcus aureus    Organism Identification: Staphylococcus aureus    Organism: Staphylococcus aureus    Method Type: KALYAN        Culture - Blood (04.15.18 @ 05:13)    Specimen Source: .Blood Blood-Peripheral    Culture Results:   No growth to date.    Culture - Blood (04.15.18 @ 05:13)    Specimen Source: .Blood Blood-Peripheral    Culture Results:   No growth to date.

## 2018-04-20 NOTE — PROGRESS NOTE ADULT - SUBJECTIVE AND OBJECTIVE BOX
[facility] NEPHROLOGY- PROGRESS NOTE    84 year old male with history of nephrolithiasis presents with UTI. Nephrology consulted for elevated Scr.    Patient hypothermic this morning.    REVIEW OF SYSTEMS:  Gen: no changes in weight  Cards: no chest pain  Resp: no dyspnea  GI: no nausea or vomiting or diarrhea  Vascular: no LE edema    No Known Allergies      Hospital Medications: Medications reviewed      VITALS:  T(F): 97.8 (04-20-18 @ 13:32), Max: 98 (04-19-18 @ 16:28)  HR: 60 (04-20-18 @ 13:32)  BP: 102/61 (04-20-18 @ 13:32)  RR: 18 (04-20-18 @ 13:32)  SpO2: 96% (04-20-18 @ 13:32)  Wt(kg): --    04-19 @ 07:01  -  04-20 @ 07:00  --------------------------------------------------------  IN: 1520 mL / OUT: 0 mL / NET: 1520 mL    04-20 @ 07:01  -  04-20 @ 15:55  --------------------------------------------------------  IN: 500 mL / OUT: 0 mL / NET: 500 mL        PHYSICAL EXAM:    Gen: NAD, calm  Cards: RRR, +S1/S2, no M/G/R  Resp: CTA B/L  GI: soft, NT/ND, NABS  Vascular: no LE edema B/L      LABS:  04-20    142  |  107  |  26<H>  ----------------------------<  81  4.2   |  24  |  1.43<H>    Ca    9.7      20 Apr 2018 09:54  Phos  3.4     04-20  Mg     2.0     04-20      Creatinine Trend: 1.43 <--, 1.41 <--, 1.53 <--, 1.53 <--, 1.43 <--, 1.53 <--                        11.0   4.6   )-----------( 144      ( 20 Apr 2018 09:54 )             32.2

## 2018-04-20 NOTE — PROGRESS NOTE ADULT - SUBJECTIVE AND OBJECTIVE BOX
Patient seen and examined at bedside  hypothermic in am  Case discussed with medical team    HPI:  85 yo M from home with PMHx CVA, PPM, Psoriasis, who p/w c/o 1-2 weeks progressive b/l lower extremity weakness, unstable gait, malaise, and lethargy.  In addition, pt s/p twisting motion of left knee with resulting surrounding swelling and pain.   Denies trauma or direct blow to leg. (14 Apr 2018 19:07)      PAST MEDICAL & SURGICAL HISTORY:      No Known Allergies       MEDICATIONS  (STANDING):  amLODIPine   Tablet 5 milliGRAM(s) Oral daily  ceFAZolin   IVPB 1000 milliGRAM(s) IV Intermittent every 8 hours  ceFAZolin   IVPB      docusate sodium 100 milliGRAM(s) Oral two times a day  enoxaparin Injectable 40 milliGRAM(s) SubCutaneous daily  hydrALAZINE 25 milliGRAM(s) Oral every 8 hours  hydrocortisone 2.5% Ointment 1 Application(s) Topical daily  latanoprost 0.005% Ophthalmic Solution 1 Drop(s) Left EYE at bedtime  nystatin Powder 1 Application(s) Topical three times a day  oxybutynin 5 milliGRAM(s) Oral two times a day  senna 2 Tablet(s) Oral at bedtime    MEDICATIONS  (PRN):  acetaminophen   Tablet. 650 milliGRAM(s) Oral every 6 hours PRN Mild Pain (1 - 3)  bisacodyl 5 milliGRAM(s) Oral every 12 hours PRN Constipation      REVIEW OF SYSTEMS:  CONSTITUTIONAL: (+) malaise.   EYES: No acute change in vision   ENT:  No tinnitus  NECK: No stiffness  RESPIRATORY: No hemoptysis  CARDIOVASCULAR: No chest pain, palpitations, syncope  GASTROINTESTINAL: No hematemesis, diarrhea, melena, or hematochezia.  GENITOURINARY: No hematuria  NEUROLOGICAL: No headaches  LYMPH Nodes: No enlarged glands  ENDOCRINE: No heat or cold intolerance	    T(C): 34.1 (04-20-18 @ 07:08), Max: 36.9 (04-19-18 @ 10:36)  HR: 58 (04-20-18 @ 04:44) (58 - 72)  BP: 146/92 (04-20-18 @ 04:44) (116/71 - 160/90)  RR: 17 (04-20-18 @ 04:44) (17 - 18)  SpO2: 93% (04-20-18 @ 04:44) (93% - 99%)    PHYSICAL EXAMINATION:   Constitutional: WD, NAD  HEENT: NC, AT  Neck:  Supple  Respiratory:  Adequate airflow b/l. Not using accessory muscles of respiration.  Cardiovascular:  S1 & S2 intact, no R/G, 2+ radial pulses b/l  Gastrointestinal: Soft, NT, ND, normoactive b.s., no organomegaly/RT/rigidity  Extremities: WWP  Neurological:  Alert and awake.  No acute focal motor deficits. Crude sensation intact.     Labs and imaging reviewed    LABS:                        11.6   5.1   )-----------( 141      ( 19 Apr 2018 06:53 )             34.6     04-19    140  |  104  |  27<H>  ----------------------------<  84  3.9   |  23  |  1.41<H>    Ca    9.6      19 Apr 2018 06:53              CAPILLARY BLOOD GLUCOSE                  RADIOLOGY & ADDITIONAL STUDIES:

## 2018-04-21 LAB
ANION GAP SERPL CALC-SCNC: 11 MMOL/L — SIGNIFICANT CHANGE UP (ref 5–17)
BUN SERPL-MCNC: 28 MG/DL — HIGH (ref 7–23)
CALCIUM SERPL-MCNC: 9.7 MG/DL — SIGNIFICANT CHANGE UP (ref 8.4–10.5)
CHLORIDE SERPL-SCNC: 107 MMOL/L — SIGNIFICANT CHANGE UP (ref 96–108)
CO2 SERPL-SCNC: 25 MMOL/L — SIGNIFICANT CHANGE UP (ref 22–31)
CREAT SERPL-MCNC: 1.59 MG/DL — HIGH (ref 0.5–1.3)
GLUCOSE SERPL-MCNC: 104 MG/DL — HIGH (ref 70–99)
HCT VFR BLD CALC: 31.1 % — LOW (ref 39–50)
HGB BLD-MCNC: 10.5 G/DL — LOW (ref 13–17)
MCHC RBC-ENTMCNC: 30.5 PG — SIGNIFICANT CHANGE UP (ref 27–34)
MCHC RBC-ENTMCNC: 33.7 GM/DL — SIGNIFICANT CHANGE UP (ref 32–36)
MCV RBC AUTO: 90.6 FL — SIGNIFICANT CHANGE UP (ref 80–100)
PLATELET # BLD AUTO: 172 K/UL — SIGNIFICANT CHANGE UP (ref 150–400)
POTASSIUM SERPL-MCNC: 4.1 MMOL/L — SIGNIFICANT CHANGE UP (ref 3.5–5.3)
POTASSIUM SERPL-SCNC: 4.1 MMOL/L — SIGNIFICANT CHANGE UP (ref 3.5–5.3)
RBC # BLD: 3.44 M/UL — LOW (ref 4.2–5.8)
RBC # FLD: 15.6 % — HIGH (ref 10.3–14.5)
SODIUM SERPL-SCNC: 143 MMOL/L — SIGNIFICANT CHANGE UP (ref 135–145)
WBC # BLD: 7.3 K/UL — SIGNIFICANT CHANGE UP (ref 3.8–10.5)
WBC # FLD AUTO: 7.3 K/UL — SIGNIFICANT CHANGE UP (ref 3.8–10.5)

## 2018-04-21 RX ORDER — PANTOPRAZOLE SODIUM 20 MG/1
40 TABLET, DELAYED RELEASE ORAL
Qty: 0 | Refills: 0 | Status: DISCONTINUED | OUTPATIENT
Start: 2018-04-21 | End: 2018-04-24

## 2018-04-21 RX ORDER — LANOLIN ALCOHOL/MO/W.PET/CERES
3 CREAM (GRAM) TOPICAL ONCE
Qty: 0 | Refills: 0 | Status: COMPLETED | OUTPATIENT
Start: 2018-04-21 | End: 2018-04-21

## 2018-04-21 RX ADMIN — ENOXAPARIN SODIUM 40 MILLIGRAM(S): 100 INJECTION SUBCUTANEOUS at 11:23

## 2018-04-21 RX ADMIN — Medication 100 MILLIGRAM(S): at 21:04

## 2018-04-21 RX ADMIN — NYSTATIN CREAM 1 APPLICATION(S): 100000 CREAM TOPICAL at 06:35

## 2018-04-21 RX ADMIN — LATANOPROST 1 DROP(S): 0.05 SOLUTION/ DROPS OPHTHALMIC; TOPICAL at 21:04

## 2018-04-21 RX ADMIN — AMLODIPINE BESYLATE 5 MILLIGRAM(S): 2.5 TABLET ORAL at 06:35

## 2018-04-21 RX ADMIN — PANTOPRAZOLE SODIUM 40 MILLIGRAM(S): 20 TABLET, DELAYED RELEASE ORAL at 23:43

## 2018-04-21 RX ADMIN — Medication 5 MILLIGRAM(S): at 06:35

## 2018-04-21 RX ADMIN — NYSTATIN CREAM 1 APPLICATION(S): 100000 CREAM TOPICAL at 21:05

## 2018-04-21 RX ADMIN — Medication 100 MILLIGRAM(S): at 06:35

## 2018-04-21 RX ADMIN — Medication 100 MILLIGRAM(S): at 15:39

## 2018-04-21 RX ADMIN — NYSTATIN CREAM 1 APPLICATION(S): 100000 CREAM TOPICAL at 15:39

## 2018-04-21 RX ADMIN — Medication 3 MILLIGRAM(S): at 23:42

## 2018-04-21 NOTE — PROGRESS NOTE ADULT - SUBJECTIVE AND OBJECTIVE BOX
Patient is seen and examined at the bed side, is normothermic.  He had a BM today and feels much better. The WBC count stay normal.           REVIEW OF SYSTEMS: All other review systems are negative          Vital Signs Last 24 Hrs  T(C): 36.5 (2018 20:56), Max: 36.9 (2018 06:33)  T(F): 97.7 (2018 20:56), Max: 98.4 (2018 06:33)  HR: 60 (2018 20:56) (60 - 61)  BP: 153/- (2018 20:56) (118/72 - 153/-)  BP(mean): 89 (2018 20:56) (89 - 89)  RR: 18 (2018 20:56) (16 - 18)  SpO2: 96% (2018 20:56) (93% - 98%)          ALLERGIES: NKDA          PHYSICAL EXAM:  GENERAL: Not in distress  CVS: s1 and s2 present  RESP: Air entry B/L  GI: Abdomen nondistended  EXT: No pedal edema  CNS: Awake and alert  SKIN: psoriatic lesions              LABS:                         10.5   7.3   )-----------( 172      ( 2018 09:39 )             31.1                           11.0   4.6   )-----------( 144      ( 2018 09:54 )             32.2                           04-21    143  |  107  |  28<H>  ----------------------------<  104<H>  4.1   |  25  |  1.59<H>    Ca    9.7      2018 09:39  Phos  3.4     04-20  Mg     2.0     04-20      04-20    142  |  107  |  26<H>  ----------------------------<  81  4.2   |  24  |  1.43<H>    Ca    9.7      2018 09:54  Phos  3.4     04-20  Mg     2.0     04-20      04-19    140  |  104  |  27<H>  ----------------------------<  84  3.9   |  23  |  1.41<H>    Ca    9.6      2018 06:53        TPro  7.6  /  Alb  3.6  /  TBili  0.3  /  DBili  <0.1  /  AST  21  /  ALT  14  /  AlkPhos  116  04-15      Urinalysis Basic - ( 2018 18:02 )    Color: Yellow / Appearance: SL Turbid / S.015 / pH: x  Gluc: x / Ketone: Negative  / Bili: Negative / Urobili: Negative   Blood: x / Protein: 30 mg/dL / Nitrite: Positive   Leuk Esterase: Large / RBC: 10-25 /HPF / WBC >50 /HPF   Sq Epi: x / Non Sq Epi: OCC /HPF / Bacteria: Few /HPF            MEDICATIONS  (STANDING):  amLODIPine   Tablet 5 milliGRAM(s) Oral daily  ceFAZolin   IVPB 1000 milliGRAM(s) IV Intermittent every 8 hours  ceFAZolin   IVPB      docusate sodium 100 milliGRAM(s) Oral two times a day  enoxaparin Injectable 40 milliGRAM(s) SubCutaneous daily  hydrocortisone 2.5% Ointment 1 Application(s) Topical daily  latanoprost 0.005% Ophthalmic Solution 1 Drop(s) Left EYE at bedtime  nystatin Powder 1 Application(s) Topical three times a day  senna 2 Tablet(s) Oral at bedtime    MEDICATIONS  (PRN):  acetaminophen   Tablet. 650 milliGRAM(s) Oral every 6 hours PRN Mild Pain (1 - 3)  bisacodyl 5 milliGRAM(s) Oral every 12 hours PRN Constipation          RADIOLOGY & ADDITIONAL TESTS:      18: US Kidney and Bladder (18 @ 17:05) Moderate right hydroureteronephrosis to the level of the bladder. The right ureter appears to insert on a bladder diverticulum. Right renal cortical thinning is suggestive of chronic hydronephrosis.   Urinary bladder trabeculations and diverticula with debris.        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 Correlate clinically with point tenderness.  Otherwise, no acute fracture.        18: Xray Chest 1 View AP/PA (18 @ 17:32) >No Urgent/Emergent findings.    Follow up official report.      < end of copied text >          MICROBIOLOGY DATA:      Urine Microscopic-Add On (NC) (18 @ 15:49)    Red Blood Cell - Urine: 2-5 /HPF    White Blood Cell - Urine: >50 /HPF    Comment - Urine: FEW WBC CLUMPS PRESENT      Culture - Urine (18 @ 20:29)    -  Ampicillin/Sulbactam: S <=8/4    -  Cefazolin: S <=4    -  Ciprofloxacin: R >2    -  Gentamicin: S <=1    -  Levofloxacin: R >4    -  Nitrofurantoin: S <=32    -  Oxacillin: S <=0.25    -  Penicillin: R 8    -  RIF- Rifampin: S <=1    -  Tetra/Doxy: S <=1    -  Trimethoprim/Sulfamethoxazole: S <=0.5/9.5    -  Vancomycin: S 2    Specimen Source: .Urine Catheterized    Culture Results:   >100,000 CFU/ml Coag Negative Staphylococcus  >100,000 CFU/ml Staphylococcus aureus    Organism Identification: Staphylococcus aureus    Organism: Staphylococcus aureus    Method Type: KALYAN        Culture - Blood (04.15.18 @ 05:13)    Specimen Source: .Blood Blood-Peripheral    Culture Results:   No growth to date.    Culture - Blood (04.15.18 @ 05:13)    Specimen Source: .Blood Blood-Peripheral    Culture Results:   No growth to date.

## 2018-04-21 NOTE — PROGRESS NOTE ADULT - SUBJECTIVE AND OBJECTIVE BOX
[facility] NEPHROLOGY- PROGRESS NOTE, Follow up     Patient is a 84y Male with history of nephrolithiasis presents with UTI. Nephrology consulted for elevated Scr.  Allergy:  No Known Allergies    Hospital Medications:   MEDICATIONS  (STANDING):  amLODIPine   Tablet 5 milliGRAM(s) Oral daily  ceFAZolin   IVPB 1000 milliGRAM(s) IV Intermittent every 8 hours  ceFAZolin   IVPB      docusate sodium 100 milliGRAM(s) Oral two times a day  enoxaparin Injectable 40 milliGRAM(s) SubCutaneous daily  hydrocortisone 2.5% Ointment 1 Application(s) Topical daily  latanoprost 0.005% Ophthalmic Solution 1 Drop(s) Left EYE at bedtime  nystatin Powder 1 Application(s) Topical three times a day  senna 2 Tablet(s) Oral at bedtime    REVIEW OF SYSTEMS:  Gen: no changes in weight  Cards: no chest pain  Resp: no dyspnea  GI: no nausea or vomiting or diarrhea  Vascular: no LE edema    VITALS:  T(F): 97.4 (18 @ 13:13), Max: 98.5 (18 @ 16:06)  HR: 60 (18 @ 13:13)  BP: 135/69 (18 @ 13:13)  RR: 17 (18 @ 13:13)  SpO2: 93% (18 @ 13:13)  Wt(kg): --     @ 07:01  -   @ 07:00  --------------------------------------------------------  IN: 1080 mL / OUT: 0 mL / NET: 1080 mL     @ 07:01  -   @ 14:46  --------------------------------------------------------  IN: 500 mL / OUT: 0 mL / NET: 500 mL        PHYSICAL EXAM:    Gen: NAD, calm  Cards: RRR, +S1/S2, no M/G/R  Resp: CTA B/L  GI: soft, NT/ND, NABS  Vascular: no LE edema B/L    LABS:      143  |  107  |  28<H>  ----------------------------<  104<H>  4.1   |  25  |  1.59<H>    Ca    9.7      2018 09:39  Mg     2.0     04-20      Creatinine Trend: 1.59 <--, 1.43 <--, 1.41 <--, 1.53 <--, 1.53 <--, 1.43 <--, 1.53 <--                        10.5   7.3   )-----------( 172      ( 2018 09:39 )             31.1     Urine Studies:  Urinalysis Basic - ( 2018 15:49 )    Color: Yellow / Appearance: SL Turbid / S.013 / pH:   Gluc:  / Ketone: Negative  / Bili: Negative / Urobili: Negative   Blood:  / Protein: Trace / Nitrite: Negative   Leuk Esterase: Large / RBC: 2-5 /HPF / WBC >50 /HPF   Sq Epi:  / Non Sq Epi:  / Bacteria:         RADIOLOGY & ADDITIONAL STUDIES: [facility] NEPHROLOGY- PROGRESS NOTE, Follow up     Patient is a 84y Male with history of nephrolithiasis presents with UTI. Nephrology consulted for elevated Scr.      Allergy:  No Known Allergies    Hospital Medications:   MEDICATIONS  (STANDING):  amLODIPine   Tablet 5 milliGRAM(s) Oral daily  ceFAZolin   IVPB 1000 milliGRAM(s) IV Intermittent every 8 hours  ceFAZolin   IVPB      docusate sodium 100 milliGRAM(s) Oral two times a day  enoxaparin Injectable 40 milliGRAM(s) SubCutaneous daily  hydrocortisone 2.5% Ointment 1 Application(s) Topical daily  latanoprost 0.005% Ophthalmic Solution 1 Drop(s) Left EYE at bedtime  nystatin Powder 1 Application(s) Topical three times a day  senna 2 Tablet(s) Oral at bedtime    REVIEW OF SYSTEMS:  Gen: no changes in weight  Cards: no chest pain  Resp: no dyspnea  GI: no nausea or vomiting or diarrhea  Vascular: no LE edema    VITALS:  T(F): 97.4 (18 @ 13:13), Max: 98.5 (18 @ 16:06)  HR: 60 (18 @ 13:13)  BP: 135/69 (18 @ 13:13)  RR: 17 (18 @ 13:13)  SpO2: 93% (18 @ 13:13)  Wt(kg): --     @ 07:01  -   @ 07:00  --------------------------------------------------------  IN: 1080 mL / OUT: 0 mL / NET: 1080 mL     @ 07:01  -   @ 14:46  --------------------------------------------------------  IN: 500 mL / OUT: 0 mL / NET: 500 mL        PHYSICAL EXAM:    Gen: NAD, calm  Cards: RRR, +S1/S2, no M/G/R  Resp: CTA B/L  GI: soft, NT/ND, NABS  Vascular: no LE edema B/L    LABS:      143  |  107  |  28<H>  ----------------------------<  104<H>  4.1   |  25  |  1.59<H>    Ca    9.7      2018 09:39  Mg     2.0     04-20      Creatinine Trend: 1.59 <--, 1.43 <--, 1.41 <--, 1.53 <--, 1.53 <--, 1.43 <--, 1.53 <--                        10.5   7.3   )-----------( 172      ( 2018 09:39 )             31.1     Urine Studies:  Urinalysis Basic - ( 2018 15:49 )    Color: Yellow / Appearance: SL Turbid / S.013 / pH:   Gluc:  / Ketone: Negative  / Bili: Negative / Urobili: Negative   Blood:  / Protein: Trace / Nitrite: Negative   Leuk Esterase: Large / RBC: 2-5 /HPF / WBC >50 /HPF   Sq Epi:  / Non Sq Epi:  / Bacteria:         RADIOLOGY & ADDITIONAL STUDIES:

## 2018-04-21 NOTE — PROGRESS NOTE ADULT - SUBJECTIVE AND OBJECTIVE BOX
Patient seen and examined at bedside  No acute events noted overnight  Case discussed with medical team    HPI:  85 yo M from home with PMHx CVA, PPM, Psoriasis, who p/w c/o 1-2 weeks progressive b/l lower extremity weakness, unstable gait, malaise, and lethargy.  In addition, pt s/p twisting motion of left knee with resulting surrounding swelling and pain.   Denies trauma or direct blow to leg. (2018 19:07)      PAST MEDICAL & SURGICAL HISTORY:      No Known Allergies       MEDICATIONS  (STANDING):  amLODIPine   Tablet 5 milliGRAM(s) Oral daily  ceFAZolin   IVPB 1000 milliGRAM(s) IV Intermittent every 8 hours  ceFAZolin   IVPB      docusate sodium 100 milliGRAM(s) Oral two times a day  enoxaparin Injectable 40 milliGRAM(s) SubCutaneous daily  hydrocortisone 2.5% Ointment 1 Application(s) Topical daily  latanoprost 0.005% Ophthalmic Solution 1 Drop(s) Left EYE at bedtime  nystatin Powder 1 Application(s) Topical three times a day  oxybutynin 5 milliGRAM(s) Oral two times a day  senna 2 Tablet(s) Oral at bedtime    MEDICATIONS  (PRN):  acetaminophen   Tablet. 650 milliGRAM(s) Oral every 6 hours PRN Mild Pain (1 - 3)  bisacodyl 5 milliGRAM(s) Oral every 12 hours PRN Constipation      REVIEW OF SYSTEMS:  CONSTITUTIONAL: (+) malaise.   EYES: No acute change in vision   ENT:  No tinnitus  NECK: No stiffness  RESPIRATORY: No hemoptysis  CARDIOVASCULAR: No chest pain, palpitations, syncope  GASTROINTESTINAL: No hematemesis, diarrhea, melena, or hematochezia.  GENITOURINARY: No hematuria  NEUROLOGICAL: No headaches  LYMPH Nodes: No enlarged glands  ENDOCRINE: No heat or cold intolerance	    T(C): 36.9 (18 @ 06:33), Max: 36.9 (18 @ 16:06)  HR: 60 (18 @ 06:33) (60 - 61)  BP: 146/85 (18 @ 06:33) (102/61 - 146/85)  RR: 16 (18 @ 06:33) (16 - 18)  SpO2: 94% (18 @ 06:33) (94% - 98%)    PHYSICAL EXAMINATION:   Constitutional: WD, NAD  HEENT: NC, AT  Neck:  Supple  Respiratory:  Adequate airflow b/l. Not using accessory muscles of respiration.  Cardiovascular:  S1 & S2 intact, no R/G, 2+ radial pulses b/l  Gastrointestinal: Soft, NT, ND, normoactive b.s., no organomegaly/RT/rigidity  Extremities: WWP  Neurological:  Alert and awake.  No acute focal motor deficits. Crude sensation intact.     Labs and imaging reviewed    LABS:                        11.0   4.6   )-----------( 144      ( 2018 09:54 )             32.2     04-20    142  |  107  |  26<H>  ----------------------------<  81  4.2   |  24  |  1.43<H>    Ca    9.7      2018 09:54  Phos  3.4     04-20  Mg     2.0     04-20            Urinalysis Basic - ( 2018 15:49 )    Color: Yellow / Appearance: SL Turbid / S.013 / pH: x  Gluc: x / Ketone: Negative  / Bili: Negative / Urobili: Negative   Blood: x / Protein: Trace / Nitrite: Negative   Leuk Esterase: Large / RBC: 2-5 /HPF / WBC >50 /HPF   Sq Epi: x / Non Sq Epi: x / Bacteria: x      CAPILLARY BLOOD GLUCOSE                  RADIOLOGY & ADDITIONAL STUDIES:

## 2018-04-22 DIAGNOSIS — E66.9 OBESITY, UNSPECIFIED: ICD-10-CM

## 2018-04-22 LAB
ANION GAP SERPL CALC-SCNC: 13 MMOL/L — SIGNIFICANT CHANGE UP (ref 5–17)
BUN SERPL-MCNC: 29 MG/DL — HIGH (ref 7–23)
CALCIUM SERPL-MCNC: 9.6 MG/DL — SIGNIFICANT CHANGE UP (ref 8.4–10.5)
CHLORIDE SERPL-SCNC: 104 MMOL/L — SIGNIFICANT CHANGE UP (ref 96–108)
CO2 SERPL-SCNC: 24 MMOL/L — SIGNIFICANT CHANGE UP (ref 22–31)
CREAT SERPL-MCNC: 1.47 MG/DL — HIGH (ref 0.5–1.3)
GLUCOSE SERPL-MCNC: 79 MG/DL — SIGNIFICANT CHANGE UP (ref 70–99)
HCT VFR BLD CALC: 34.1 % — LOW (ref 39–50)
HGB BLD-MCNC: 11.5 G/DL — LOW (ref 13–17)
MCHC RBC-ENTMCNC: 30.6 PG — SIGNIFICANT CHANGE UP (ref 27–34)
MCHC RBC-ENTMCNC: 33.7 GM/DL — SIGNIFICANT CHANGE UP (ref 32–36)
MCV RBC AUTO: 90.6 FL — SIGNIFICANT CHANGE UP (ref 80–100)
PLATELET # BLD AUTO: 177 K/UL — SIGNIFICANT CHANGE UP (ref 150–400)
POTASSIUM SERPL-MCNC: 4.3 MMOL/L — SIGNIFICANT CHANGE UP (ref 3.5–5.3)
POTASSIUM SERPL-SCNC: 4.3 MMOL/L — SIGNIFICANT CHANGE UP (ref 3.5–5.3)
RBC # BLD: 3.77 M/UL — LOW (ref 4.2–5.8)
RBC # FLD: 15.4 % — HIGH (ref 10.3–14.5)
SODIUM SERPL-SCNC: 141 MMOL/L — SIGNIFICANT CHANGE UP (ref 135–145)
WBC # BLD: 7.8 K/UL — SIGNIFICANT CHANGE UP (ref 3.8–10.5)
WBC # FLD AUTO: 7.8 K/UL — SIGNIFICANT CHANGE UP (ref 3.8–10.5)

## 2018-04-22 RX ORDER — HYDRALAZINE HCL 50 MG
25 TABLET ORAL ONCE
Qty: 0 | Refills: 0 | Status: COMPLETED | OUTPATIENT
Start: 2018-04-22 | End: 2018-04-22

## 2018-04-22 RX ORDER — LANOLIN ALCOHOL/MO/W.PET/CERES
3 CREAM (GRAM) TOPICAL ONCE
Qty: 0 | Refills: 0 | Status: COMPLETED | OUTPATIENT
Start: 2018-04-22 | End: 2018-04-22

## 2018-04-22 RX ADMIN — Medication 3 MILLIGRAM(S): at 22:38

## 2018-04-22 RX ADMIN — Medication 25 MILLIGRAM(S): at 00:28

## 2018-04-22 RX ADMIN — AMLODIPINE BESYLATE 5 MILLIGRAM(S): 2.5 TABLET ORAL at 06:02

## 2018-04-22 RX ADMIN — Medication 100 MILLIGRAM(S): at 21:54

## 2018-04-22 RX ADMIN — Medication 100 MILLIGRAM(S): at 17:31

## 2018-04-22 RX ADMIN — NYSTATIN CREAM 1 APPLICATION(S): 100000 CREAM TOPICAL at 12:50

## 2018-04-22 RX ADMIN — NYSTATIN CREAM 1 APPLICATION(S): 100000 CREAM TOPICAL at 06:02

## 2018-04-22 RX ADMIN — Medication 1 APPLICATION(S): at 12:50

## 2018-04-22 RX ADMIN — Medication 650 MILLIGRAM(S): at 06:02

## 2018-04-22 RX ADMIN — Medication 100 MILLIGRAM(S): at 06:02

## 2018-04-22 RX ADMIN — Medication 100 MILLIGRAM(S): at 12:49

## 2018-04-22 RX ADMIN — LATANOPROST 1 DROP(S): 0.05 SOLUTION/ DROPS OPHTHALMIC; TOPICAL at 21:55

## 2018-04-22 RX ADMIN — NYSTATIN CREAM 1 APPLICATION(S): 100000 CREAM TOPICAL at 21:55

## 2018-04-22 RX ADMIN — Medication 650 MILLIGRAM(S): at 06:32

## 2018-04-22 RX ADMIN — ENOXAPARIN SODIUM 40 MILLIGRAM(S): 100 INJECTION SUBCUTANEOUS at 12:50

## 2018-04-22 RX ADMIN — PANTOPRAZOLE SODIUM 40 MILLIGRAM(S): 20 TABLET, DELAYED RELEASE ORAL at 06:02

## 2018-04-22 NOTE — PROVIDER CONTACT NOTE (OTHER) - ASSESSMENT
Pt is incontinent of urine and was educated that we need a urine sample. Pt refused straight cath and using urinal, pt states he does not know when he needs to go and it is too difficult

## 2018-04-22 NOTE — PROGRESS NOTE ADULT - SUBJECTIVE AND OBJECTIVE BOX
Patient is seen and examined at the bed side, remains Normothermic.  He has no new complaints. The repeat urine culture is pending. The Urology follow up noted regarding right hydronephrosis.           REVIEW OF SYSTEMS: All other review systems are negative          Vital Signs Last 24 Hrs  T(C): 36.4 (2018 17:13), Max: 36.8 (2018 00:20)  T(F): 97.5 (2018 17:13), Max: 98.2 (2018 00:20)  HR: 60 (2018 17:13) (59 - 61)  BP: 150/84 (2018 17:13) (122/73 - 162/89)  BP(mean): 89 (2018 20:56) (89 - 89)  RR: 18 (2018 17:13) (18 - 20)  SpO2: 96% (2018 17:13) (96% - 99%)          ALLERGIES: NKDA          PHYSICAL EXAM:  GENERAL: Not in distress  CVS: s1 and s2 present  RESP: Air entry B/L  GI: Abdomen nondistended  EXT: No pedal edema  CNS: Awake and alert  SKIN: psoriatic lesions              LABS:                         11.5   7.8   )-----------( 177      ( 2018 07:04 )             34.1                           10.5   7.3   )-----------( 172      ( 2018 09:39 )             31.1                               04-22    141  |  104  |  29<H>  ----------------------------<  79  4.3   |  24  |  1.47<H>    Ca    9.6      2018 07:04             04-21    143  |  107  |  28<H>  ----------------------------<  104<H>  4.1   |  25  |  1.59<H>    Ca    9.7      2018 09:39  Phos  3.4     04-20  Mg     2.0     04-20          TPro  7.6  /  Alb  3.6  /  TBili  0.3  /  DBili  <0.1  /  AST  21  /  ALT  14  /  AlkPhos  116  04-15      Urinalysis Basic - ( 2018 18:02 )    Color: Yellow / Appearance: SL Turbid / S.015 / pH: x  Gluc: x / Ketone: Negative  / Bili: Negative / Urobili: Negative   Blood: x / Protein: 30 mg/dL / Nitrite: Positive   Leuk Esterase: Large / RBC: 10-25 /HPF / WBC >50 /HPF   Sq Epi: x / Non Sq Epi: OCC /HPF / Bacteria: Few /HPF          MEDICATIONS  (STANDING):  amLODIPine   Tablet 5 milliGRAM(s) Oral daily  ceFAZolin   IVPB 1000 milliGRAM(s) IV Intermittent every 8 hours  ceFAZolin   IVPB      docusate sodium 100 milliGRAM(s) Oral two times a day  enoxaparin Injectable 40 milliGRAM(s) SubCutaneous daily  hydrocortisone 2.5% Ointment 1 Application(s) Topical daily  latanoprost 0.005% Ophthalmic Solution 1 Drop(s) Left EYE at bedtime  nystatin Powder 1 Application(s) Topical three times a day  pantoprazole    Tablet 40 milliGRAM(s) Oral before breakfast  senna 2 Tablet(s) Oral at bedtime    MEDICATIONS  (PRN):  acetaminophen   Tablet. 650 milliGRAM(s) Oral every 6 hours PRN Mild Pain (1 - 3)  bisacodyl 5 milliGRAM(s) Oral every 12 hours PRN Constipation            RADIOLOGY & ADDITIONAL TESTS:      18: US Kidney and Bladder (18 @ 17:05) Moderate right hydroureteronephrosis to the level of the bladder. The right ureter appears to insert on a bladder diverticulum. Right renal cortical thinning is suggestive of chronic hydronephrosis.   Urinary bladder trabeculations and diverticula with debris.        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 Correlate clinically with point tenderness.  Otherwise, no acute fracture.        18: Xray Chest 1 View AP/PA (18 @ 17:32) >No Urgent/Emergent findings.  Follow up official report.            MICROBIOLOGY DATA:          Urine Microscopic-Add On (NC) (18 @ 15:49)    Red Blood Cell - Urine: 2-5 /HPF    White Blood Cell - Urine: >50 /HPF    Comment - Urine: FEW WBC CLUMPS PRESENT      Culture - Urine (18 @ 20:29)    -  Ampicillin/Sulbactam: S <=8/4    -  Cefazolin: S <=4    -  Ciprofloxacin: R >2    -  Gentamicin: S <=1    -  Levofloxacin: R >4    -  Nitrofurantoin: S <=32    -  Oxacillin: S <=0.25    -  Penicillin: R 8    -  RIF- Rifampin: S <=1    -  Tetra/Doxy: S <=1    -  Trimethoprim/Sulfamethoxazole: S <=0.5/9.5    -  Vancomycin: S 2    Specimen Source: .Urine Catheterized    Culture Results:   >100,000 CFU/ml Coag Negative Staphylococcus  >100,000 CFU/ml Staphylococcus aureus    Organism Identification: Staphylococcus aureus    Organism: Staphylococcus aureus    Method Type: KALYAN        Culture - Blood (04.15.18 @ 05:13)    Specimen Source: .Blood Blood-Peripheral    Culture Results:   No growth to date.    Culture - Blood (04.15.18 @ 05:13)    Specimen Source: .Blood Blood-Peripheral    Culture Results:   No growth to date.

## 2018-04-22 NOTE — PROVIDER CONTACT NOTE (OTHER) - ACTION/TREATMENT ORDERED:
CLIVE Porter made aware. Continue to monitor
Administer hydralazine and reassess BP 1 hr after administration
Will order warming blanket. Apply extra blankets and heat packs until warming blanket.

## 2018-04-22 NOTE — PROGRESS NOTE ADULT - SUBJECTIVE AND OBJECTIVE BOX
[facility] NEPHROLOGY- PROGRESS NOTE, Follow up     Patient is a 84y Male with history of nephrolithiasis presents with UTI. Nephrology consulted for elevated Scr.  Allergy:  No Known Allergies    Hospital Medications:   MEDICATIONS  (STANDING):  amLODIPine   Tablet 5 milliGRAM(s) Oral daily  ceFAZolin   IVPB 1000 milliGRAM(s) IV Intermittent every 8 hours  ceFAZolin   IVPB      docusate sodium 100 milliGRAM(s) Oral two times a day  enoxaparin Injectable 40 milliGRAM(s) SubCutaneous daily  hydrocortisone 2.5% Ointment 1 Application(s) Topical daily  latanoprost 0.005% Ophthalmic Solution 1 Drop(s) Left EYE at bedtime  nystatin Powder 1 Application(s) Topical three times a day  pantoprazole    Tablet 40 milliGRAM(s) Oral before breakfast  senna 2 Tablet(s) Oral at bedtime    REVIEW OF SYSTEMS:P  Gen: no changes in weight  Cards: no chest pain  Resp: no dyspnea  GI: no nausea or vomiting or diarrhea   + hestitancy with urination   Vascular: no LE edema    VITALS:  T(F): 97.4 (18 @ 09:22), Max: 98.2 (18 @ 00:20)  HR: 60 (18 @ 09:22)  BP: 122/73 (18 @ 09:22)  RR: 18 (18 @ 09:22)  SpO2: 98% (18 @ 09:22)  Wt(kg): --     @ 07:  -   @ 07:00  --------------------------------------------------------  IN: 1150 mL / OUT: 0 mL / NET: 1150 mL     @ 07:01  -   @ 13:24  --------------------------------------------------------  IN: 260 mL / OUT: 0 mL / NET: 260 mL        PHYSICAL EXAM:  Gen: NAD, calm  Cards: RRR, +S1/S2, no M/G/R  Resp: CTA B/L  GI: soft, NT/ND, NABS  Vascular: no LE edema B/L    LABS:      141  |  104  |  29<H>  ----------------------------<  79  4.3   |  24  |  1.47<H>    Ca    9.6      2018 07:04      Creatinine Trend: 1.47 <--, 1.59 <--, 1.43 <--, 1.41 <--, 1.53 <--, 1.53 <--                        11.5   7.8   )-----------( 177      ( 2018 07:04 )             34.1     Urine Studies:  Urinalysis Basic - ( 2018 15:49 )    Color: Yellow / Appearance: SL Turbid / S.013 / pH:   Gluc:  / Ketone: Negative  / Bili: Negative / Urobili: Negative   Blood:  / Protein: Trace / Nitrite: Negative   Leuk Esterase: Large / RBC: 2-5 /HPF / WBC >50 /HPF   Sq Epi:  / Non Sq Epi:  / Bacteria:         RADIOLOGY & ADDITIONAL STUDIES:

## 2018-04-22 NOTE — PROGRESS NOTE ADULT - SUBJECTIVE AND OBJECTIVE BOX
Patient seen and examined at bedside  No acute events noted overnight  Case discussed with medical team    HPI:  83 yo M from home with PMHx CVA, PPM, Psoriasis, who p/w c/o 1-2 weeks progressive b/l lower extremity weakness, unstable gait, malaise, and lethargy.  In addition, pt s/p twisting motion of left knee with resulting surrounding swelling and pain.   Denies trauma or direct blow to leg. (2018 19:07)      PAST MEDICAL & SURGICAL HISTORY:      No Known Allergies       MEDICATIONS  (STANDING):  amLODIPine   Tablet 5 milliGRAM(s) Oral daily  ceFAZolin   IVPB 1000 milliGRAM(s) IV Intermittent every 8 hours  ceFAZolin   IVPB      docusate sodium 100 milliGRAM(s) Oral two times a day  enoxaparin Injectable 40 milliGRAM(s) SubCutaneous daily  hydrocortisone 2.5% Ointment 1 Application(s) Topical daily  latanoprost 0.005% Ophthalmic Solution 1 Drop(s) Left EYE at bedtime  nystatin Powder 1 Application(s) Topical three times a day  pantoprazole    Tablet 40 milliGRAM(s) Oral before breakfast  senna 2 Tablet(s) Oral at bedtime    MEDICATIONS  (PRN):  acetaminophen   Tablet. 650 milliGRAM(s) Oral every 6 hours PRN Mild Pain (1 - 3)  bisacodyl 5 milliGRAM(s) Oral every 12 hours PRN Constipation      REVIEW OF SYSTEMS:  CONSTITUTIONAL: (+) malaise.   EYES: No acute change in vision   ENT:  No tinnitus  NECK: No stiffness  RESPIRATORY: No hemoptysis  CARDIOVASCULAR: No chest pain, palpitations, syncope  GASTROINTESTINAL: No hematemesis, diarrhea, melena, or hematochezia.  GENITOURINARY: No hematuria  NEUROLOGICAL: No headaches  LYMPH Nodes: No enlarged glands  ENDOCRINE: No heat or cold intolerance	    T(C): 36.3 (18 @ 09:22), Max: 36.8 (18 @ 00:20)  HR: 60 (18 @ 09:22) (59 - 61)  BP: 122/73 (18 @ 09:22) (122/73 - 162/89)  RR: 18 (18 @ 09:22) (17 - 18)  SpO2: 98% (18 @ 09:22) (93% - 99%)    PHYSICAL EXAMINATION:   Constitutional: WD, NAD  HEENT: NC, AT  Neck:  Supple  Respiratory:  Adequate airflow b/l. Not using accessory muscles of respiration.  Cardiovascular:  S1 & S2 intact, no R/G, 2+ radial pulses b/l  Gastrointestinal: Soft, NT, ND, normoactive b.s., no organomegaly/RT/rigidity  Extremities: WWP  Neurological:  Alert and awake.  No acute focal motor deficits. Crude sensation intact.     Labs and imaging reviewed    LABS:                        11.5   7.8   )-----------( 177      ( 2018 07:04 )             34.1     04-    141  |  104  |  29<H>  ----------------------------<  79  4.3   |  24  |  1.47<H>    Ca    9.6      2018 07:04            Urinalysis Basic - ( 2018 15:49 )    Color: Yellow / Appearance: SL Turbid / S.013 / pH: x  Gluc: x / Ketone: Negative  / Bili: Negative / Urobili: Negative   Blood: x / Protein: Trace / Nitrite: Negative   Leuk Esterase: Large / RBC: 2-5 /HPF / WBC >50 /HPF   Sq Epi: x / Non Sq Epi: x / Bacteria: x      CAPILLARY BLOOD GLUCOSE                  RADIOLOGY & ADDITIONAL STUDIES:

## 2018-04-23 LAB
ANION GAP SERPL CALC-SCNC: 11 MMOL/L — SIGNIFICANT CHANGE UP (ref 5–17)
BUN SERPL-MCNC: 28 MG/DL — HIGH (ref 7–23)
CALCIUM SERPL-MCNC: 9.7 MG/DL — SIGNIFICANT CHANGE UP (ref 8.4–10.5)
CHLORIDE SERPL-SCNC: 102 MMOL/L — SIGNIFICANT CHANGE UP (ref 96–108)
CO2 SERPL-SCNC: 27 MMOL/L — SIGNIFICANT CHANGE UP (ref 22–31)
CREAT SERPL-MCNC: 1.5 MG/DL — HIGH (ref 0.5–1.3)
CULTURE RESULTS: NO GROWTH — SIGNIFICANT CHANGE UP
GLUCOSE SERPL-MCNC: 79 MG/DL — SIGNIFICANT CHANGE UP (ref 70–99)
HCT VFR BLD CALC: 33 % — LOW (ref 39–50)
HGB BLD-MCNC: 11.1 G/DL — LOW (ref 13–17)
MCHC RBC-ENTMCNC: 30.7 PG — SIGNIFICANT CHANGE UP (ref 27–34)
MCHC RBC-ENTMCNC: 33.6 GM/DL — SIGNIFICANT CHANGE UP (ref 32–36)
MCV RBC AUTO: 91.3 FL — SIGNIFICANT CHANGE UP (ref 80–100)
PLATELET # BLD AUTO: 190 K/UL — SIGNIFICANT CHANGE UP (ref 150–400)
POTASSIUM SERPL-MCNC: 4.5 MMOL/L — SIGNIFICANT CHANGE UP (ref 3.5–5.3)
POTASSIUM SERPL-SCNC: 4.5 MMOL/L — SIGNIFICANT CHANGE UP (ref 3.5–5.3)
RBC # BLD: 3.61 M/UL — LOW (ref 4.2–5.8)
RBC # FLD: 14.9 % — HIGH (ref 10.3–14.5)
SODIUM SERPL-SCNC: 140 MMOL/L — SIGNIFICANT CHANGE UP (ref 135–145)
SPECIMEN SOURCE: SIGNIFICANT CHANGE UP
WBC # BLD: 7 K/UL — SIGNIFICANT CHANGE UP (ref 3.8–10.5)
WBC # FLD AUTO: 7 K/UL — SIGNIFICANT CHANGE UP (ref 3.8–10.5)

## 2018-04-23 RX ORDER — LANOLIN ALCOHOL/MO/W.PET/CERES
3 CREAM (GRAM) TOPICAL ONCE
Qty: 0 | Refills: 0 | Status: COMPLETED | OUTPATIENT
Start: 2018-04-23 | End: 2018-04-23

## 2018-04-23 RX ADMIN — AMLODIPINE BESYLATE 5 MILLIGRAM(S): 2.5 TABLET ORAL at 06:27

## 2018-04-23 RX ADMIN — NYSTATIN CREAM 1 APPLICATION(S): 100000 CREAM TOPICAL at 06:27

## 2018-04-23 RX ADMIN — ENOXAPARIN SODIUM 40 MILLIGRAM(S): 100 INJECTION SUBCUTANEOUS at 12:37

## 2018-04-23 RX ADMIN — Medication 100 MILLIGRAM(S): at 17:11

## 2018-04-23 RX ADMIN — SENNA PLUS 2 TABLET(S): 8.6 TABLET ORAL at 22:33

## 2018-04-23 RX ADMIN — Medication 100 MILLIGRAM(S): at 06:27

## 2018-04-23 RX ADMIN — Medication 1 APPLICATION(S): at 12:37

## 2018-04-23 RX ADMIN — LATANOPROST 1 DROP(S): 0.05 SOLUTION/ DROPS OPHTHALMIC; TOPICAL at 22:33

## 2018-04-23 RX ADMIN — NYSTATIN CREAM 1 APPLICATION(S): 100000 CREAM TOPICAL at 12:38

## 2018-04-23 RX ADMIN — NYSTATIN CREAM 1 APPLICATION(S): 100000 CREAM TOPICAL at 22:33

## 2018-04-23 RX ADMIN — Medication 100 MILLIGRAM(S): at 12:37

## 2018-04-23 RX ADMIN — Medication 100 MILLIGRAM(S): at 22:33

## 2018-04-23 RX ADMIN — Medication 3 MILLIGRAM(S): at 22:33

## 2018-04-23 RX ADMIN — PANTOPRAZOLE SODIUM 40 MILLIGRAM(S): 20 TABLET, DELAYED RELEASE ORAL at 06:27

## 2018-04-23 NOTE — DIETITIAN INITIAL EVALUATION ADULT. - OTHER INFO
Patient seen for routine LOS assessment.  Patient found resting in bed.  Wrapped in blankets and comfortable except for knee pain.  Reports a good appetite and intake.  Aware of low salt diet and follows low salt at home.  Reports that wife cooks fresh foods and home made soups.  No use of canned and processed foods.  Weight stable at 215 pounds.    LIPID levels WDL and A1c low at 44.9.  Patient encouraged to eat regular and consistent meals for optimal glucose levels.  complains of some constipation but no other GI issues.  Well nourished and nutritionally stable.

## 2018-04-23 NOTE — DIETITIAN INITIAL EVALUATION ADULT. - ENERGY NEEDS
HT 71 inches,  pounds,  pounds, BMI 30.   pounds  Dxd with UTI, ARF, HTN, weakness, malaise and lethargy.  BUN and Creat mostly stable  Skin intact.  General good muscle in older man.

## 2018-04-23 NOTE — PROGRESS NOTE ADULT - ATTENDING COMMENTS
[facility] NEPHROLOGY  Zacarias Orellana M.D.  Pa Burgess D.O.  Yessica Valencia M.D.  Gavi Kern, MSN, ANP-C    Telephone: [phone]  Facsimile: [phone]    [address] [place] [address]
[facility] NEPHROLOGY  Zacarias Orellana M.D.  Pa Burgess D.O.  Yessica Valencia M.D.  Gavi Kern, MSN, ANP-C    Telephone: [phone]  Facsimile: [phone]    [address] [place] [address]
[facility] NEPHROLOGY  Zacarias Orellana M.D.  Pa Burgess D.O.  Yessica Valencia M.D.  Gavi Kern, MSN, ANP-C    Telephone: [phone]  Facsimile: [phone]    [address] [place] [address]
[facility] NEPHROLOGY  Zacarias Orellana M.D.  Pa Burgess D.O.  Yessica Valencia M.D.  Gavi Kern, MSN, ANP-C    Telephone: [phone]  Facsimile: [phone]    [address] [place] [address]
[facility] NEPHROLOGY  Zacarias Orellana M.D.  Pa Burgess D.O.  Yessica Valencia M.D.  Gavi Kern, MSN, ANP-C    Telephone: [phone]  Facsimile: [phone]    [address] [place] [address]
[facility] NEPHROLOGY  Zacarias Orellana M.D.  Pa Burgess D.O.  Yessica Valencia M.D.  Gavi Kern, MSN, ANP-C    Telephone: [phone]  Facsimile: [phone]    [address] [place] [address]
[facility] NEPHROLOGY  Zacarias Orellana M.D.  Pa Burgess D.O.  Yessica Valencia M.D.  Gavi Kern, MSN, ANP-C    Telephone: [phone]  Facsimile: [phone]    [address] [place] [address]

## 2018-04-23 NOTE — PROGRESS NOTE ADULT - SUBJECTIVE AND OBJECTIVE BOX
[facility] NEPHROLOGY- PROGRESS NOTE    84 year old male with history of nephrolithiasis presents with UTI. Nephrology consulted for elevated Scr.      REVIEW OF SYSTEMS:  Gen: no changes in weight  Cards: no chest pain  Resp: no dyspnea  GI: no nausea or vomiting or diarrhea  Vascular: no LE edema    No Known Allergies      Hospital Medications: Medications reviewed      VITALS:  T(F): 97.5 (04-23-18 @ 11:25), Max: 98 (04-23-18 @ 01:35)  HR: 59 (04-23-18 @ 11:25)  BP: 125/73 (04-23-18 @ 11:25)  RR: 17 (04-23-18 @ 11:25)  SpO2: 96% (04-23-18 @ 11:25)  Wt(kg): --    04-22 @ 07:01  -  04-23 @ 07:00  --------------------------------------------------------  IN: 1180 mL / OUT: 100 mL / NET: 1080 mL    04-23 @ 07:01  -  04-23 @ 12:29  --------------------------------------------------------  IN: 340 mL / OUT: 0 mL / NET: 340 mL        PHYSICAL EXAM:    Gen: NAD, calm  Cards: RRR, +S1/S2, no M/G/R  Resp: CTA B/L  GI: soft, NT/ND, NABS  Vascular: no LE edema B/L      LABS:  04-23    140  |  102  |  28<H>  ----------------------------<  79  4.5   |  27  |  1.50<H>    Ca    9.7      23 Apr 2018 06:39      Creatinine Trend: 1.50 <--, 1.47 <--, 1.59 <--, 1.43 <--, 1.41 <--, 1.53 <--                        11.1   7.0   )-----------( 190      ( 23 Apr 2018 06:39 )             33.0

## 2018-04-23 NOTE — PROGRESS NOTE ADULT - SUBJECTIVE AND OBJECTIVE BOX
Patient is seen and examined at the bed side, remains afebrile.  He is c/o being constipated again. The repeat urine culture from 18 is negative.        REVIEW OF SYSTEMS: All other review systems are negative          Vital Signs Last 24 Hrs  T(C): 36.4 (2018 13:11), Max: 36.7 (2018 01:35)  T(F): 97.5 (2018 13:11), Max: 98 (2018 01:35)  HR: 59 (2018 13:11) (58 - 60)  BP: 143/84 (2018 13:11) (125/73 - 143/84)  BP(mean): --  RR: 17 (2018 13:11) (16 - 17)  SpO2: 96% (2018 13:11) (96% - 97%)          ALLERGIES: NKDA          PHYSICAL EXAM:  GENERAL: Not in distress  CVS: s1 and s2 present  RESP: Air entry B/L  GI: Abdomen nondistended  EXT: No pedal edema  CNS: Awake and alert  SKIN: psoriatic lesions              LABS:                         11.1   7.0   )-----------( 190      ( 2018 06:39 )             33.0                           11.5   7.8   )-----------( 177      ( 2018 07:04 )             34.1                    -    140  |  102  |  28<H>  ----------------------------<  79  4.5   |  27  |  1.50<H>    Ca    9.7      2018 06:39      04-22    141  |  104  |  29<H>  ----------------------------<  79  4.3   |  24  |  1.47<H>    Ca    9.6      2018 07:04              TPro  7.6  /  Alb  3.6  /  TBili  0.3  /  DBili  <0.1  /  AST  21  /  ALT  14  /  AlkPhos  116  04-15      Urinalysis Basic - ( 2018 18:02 )    Color: Yellow / Appearance: SL Turbid / S.015 / pH: x  Gluc: x / Ketone: Negative  / Bili: Negative / Urobili: Negative   Blood: x / Protein: 30 mg/dL / Nitrite: Positive   Leuk Esterase: Large / RBC: 10-25 /HPF / WBC >50 /HPF   Sq Epi: x / Non Sq Epi: OCC /HPF / Bacteria: Few /HPF          MEDICATIONS  (STANDING):  amLODIPine   Tablet 5 milliGRAM(s) Oral daily  ceFAZolin   IVPB 1000 milliGRAM(s) IV Intermittent every 8 hours  ceFAZolin   IVPB      docusate sodium 100 milliGRAM(s) Oral two times a day  enoxaparin Injectable 40 milliGRAM(s) SubCutaneous daily  hydrocortisone 2.5% Ointment 1 Application(s) Topical daily  latanoprost 0.005% Ophthalmic Solution 1 Drop(s) Left EYE at bedtime  nystatin Powder 1 Application(s) Topical three times a day  pantoprazole    Tablet 40 milliGRAM(s) Oral before breakfast  senna 2 Tablet(s) Oral at bedtime    MEDICATIONS  (PRN):  acetaminophen   Tablet. 650 milliGRAM(s) Oral every 6 hours PRN Mild Pain (1 - 3)  bisacodyl 5 milliGRAM(s) Oral every 12 hours PRN Constipation            RADIOLOGY & ADDITIONAL TESTS:      18: US Kidney and Bladder (18 @ 17:05) Moderate right hydroureteronephrosis to the level of the bladder. The right ureter appears to insert on a bladder diverticulum. Right renal cortical thinning is suggestive of chronic hydronephrosis.   Urinary bladder trabeculations and diverticula with debris.        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 Correlate clinically with point tenderness.  Otherwise, no acute fracture.        18: Xray Chest 1 View AP/PA (18 @ 17:32) >No Urgent/Emergent findings.  Follow up official report.            MICROBIOLOGY DATA:    Culture - Urine (18 @ 18:18)    Specimen Source: .Urine Clean Catch (Midstream)    Culture Results:   No growth        Urine Microscopic-Add On (NC) (18 @ 15:49)    Red Blood Cell - Urine: 2-5 /HPF    White Blood Cell - Urine: >50 /HPF    Comment - Urine: FEW WBC CLUMPS PRESENT      Culture - Urine (18 @ 20:29)    -  Ampicillin/Sulbactam: S <=8/4    -  Cefazolin: S <=4    -  Ciprofloxacin: R >2    -  Gentamicin: S <=1    -  Levofloxacin: R >4    -  Nitrofurantoin: S <=32    -  Oxacillin: S <=0.25    -  Penicillin: R 8    -  RIF- Rifampin: S <=1    -  Tetra/Doxy: S <=1    -  Trimethoprim/Sulfamethoxazole: S <=0.5/9.5    -  Vancomycin: S 2    Specimen Source: .Urine Catheterized    Culture Results:   >100,000 CFU/ml Coag Negative Staphylococcus  >100,000 CFU/ml Staphylococcus aureus    Organism Identification: Staphylococcus aureus    Organism: Staphylococcus aureus    Method Type: KALYAN        Culture - Blood (04.15.18 @ 05:13)    Specimen Source: .Blood Blood-Peripheral    Culture Results:   No growth to date.    Culture - Blood (04.15.18 @ 05:13)    Specimen Source: .Blood Blood-Peripheral    Culture Results:   No growth to date.

## 2018-04-23 NOTE — PROGRESS NOTE ADULT - SUBJECTIVE AND OBJECTIVE BOX
Patient is a 84y old  Male who presents with a chief complaint of weakness and difficulty ambulating X 1-2 weeks (19 Apr 2018 18:46)      INTERVAL HISTORY: feels ok    	  MEDICATIONS:  amLODIPine   Tablet 5 milliGRAM(s) Oral daily        PHYSICAL EXAM:  T(C): 36.7 (04-23-18 @ 17:26), Max: 36.7 (04-23-18 @ 01:35)  HR: 62 (04-23-18 @ 17:26) (58 - 62)  BP: 138/76 (04-23-18 @ 17:26) (125/73 - 143/84)  RR: 16 (04-23-18 @ 17:26) (16 - 17)  SpO2: 95% (04-23-18 @ 17:26) (95% - 97%)  Wt(kg): --  I&O's Summary    22 Apr 2018 07:01  -  23 Apr 2018 07:00  --------------------------------------------------------  IN: 1180 mL / OUT: 100 mL / NET: 1080 mL    23 Apr 2018 07:01  -  23 Apr 2018 22:04  --------------------------------------------------------  IN: 1390 mL / OUT: 0 mL / NET: 1390 mL          Appearance: Normal	  HEENT:    PERRL, EOMI	  Cardiovascular:  S1 S2, No JVD  Respiratory: Lungs clear to auscultation	  Psychiatry: Alert  Gastrointestinal:  Soft, Non-tender, + BS	  Skin: No rashes, No cyanosis  Extremities:  No edema of LE                                11.1   7.0   )-----------( 190      ( 23 Apr 2018 06:39 )             33.0     04-23    140  |  102  |  28<H>  ----------------------------<  79  4.5   |  27  |  1.50<H>    Ca    9.7      23 Apr 2018 06:39          Labs, imaging and telemetry personally reviewed      ASSESSMENT/PLAN: 	  Assessment and Plan:   Assessment:  · Assessment		  85 yo M from home with PMHx CVA, PPM, Psoriasis, who p/w c/o 1-2 weeks progressive b/l lower extremity weakness, unstable gait, malaise, and lethargy.  In addition, pt s/p twisting motion of left knee with resulting surrounding swelling and pain.         Problem/Plan - 1:  ·  Problem: Uncontrolled hypertension.  Plan: Well controlled now      Problem/Plan - 2:  ·  Problem: ARF (acute renal failure).  Plan: concern for intrinsic vs post renal  tx underlying uti  Cr stable     Problem/Plan - 3:  ·  Problem: UTI (urinary tract infection).  Plan: improved      Problem/Plan - 4:  ·  Problem: Unstable gait.  Plan: tx underlying uti   KEO placement  EP  PPM interrogation with no events      Arpit Brown DO [facility]  Cardiovascular Medicine  [phone]

## 2018-04-23 NOTE — PROGRESS NOTE ADULT - SUBJECTIVE AND OBJECTIVE BOX
Patient seen and examined at bedside  No acute events noted overnight  Case discussed with medical team    HPI:  83 yo M from home with PMHx CVA, PPM, Psoriasis, who p/w c/o 1-2 weeks progressive b/l lower extremity weakness, unstable gait, malaise, and lethargy.  In addition, pt s/p twisting motion of left knee with resulting surrounding swelling and pain.   Denies trauma or direct blow to leg. (14 Apr 2018 19:07)      PAST MEDICAL & SURGICAL HISTORY:      No Known Allergies       MEDICATIONS  (STANDING):  amLODIPine   Tablet 5 milliGRAM(s) Oral daily  ceFAZolin   IVPB 1000 milliGRAM(s) IV Intermittent every 8 hours  ceFAZolin   IVPB      docusate sodium 100 milliGRAM(s) Oral two times a day  enoxaparin Injectable 40 milliGRAM(s) SubCutaneous daily  hydrocortisone 2.5% Ointment 1 Application(s) Topical daily  latanoprost 0.005% Ophthalmic Solution 1 Drop(s) Left EYE at bedtime  nystatin Powder 1 Application(s) Topical three times a day  pantoprazole    Tablet 40 milliGRAM(s) Oral before breakfast  senna 2 Tablet(s) Oral at bedtime    MEDICATIONS  (PRN):  acetaminophen   Tablet. 650 milliGRAM(s) Oral every 6 hours PRN Mild Pain (1 - 3)  bisacodyl 5 milliGRAM(s) Oral every 12 hours PRN Constipation      REVIEW OF SYSTEMS:  CONSTITUTIONAL: (+) malaise.   EYES: No acute change in vision   ENT:  No tinnitus  NECK: No stiffness  RESPIRATORY: No hemoptysis  CARDIOVASCULAR: No chest pain, palpitations, syncope  GASTROINTESTINAL: No hematemesis, diarrhea, melena, or hematochezia.  GENITOURINARY: No hematuria  NEUROLOGICAL: No headaches  LYMPH Nodes: No enlarged glands  ENDOCRINE: No heat or cold intolerance	    T(C): 36.5 (04-23-18 @ 05:51), Max: 36.7 (04-23-18 @ 01:35)  HR: 58 (04-23-18 @ 05:51) (58 - 60)  BP: 142/85 (04-23-18 @ 05:51) (122/73 - 150/84)  RR: 17 (04-23-18 @ 05:51) (16 - 20)  SpO2: 96% (04-23-18 @ 05:51) (96% - 98%)    PHYSICAL EXAMINATION:   Constitutional: WD, NAD  HEENT: NC, AT  Neck:  Supple  Respiratory:  Adequate airflow b/l. Not using accessory muscles of respiration.  Cardiovascular:  S1 & S2 intact, no R/G, 2+ radial pulses b/l  Gastrointestinal: Soft, NT, ND, normoactive b.s., no organomegaly/RT/rigidity  Extremities: WWP  Neurological:  Alert and awake.  No acute focal motor deficits. Crude sensation intact.     Labs and imaging reviewed    LABS:                        11.1   7.0   )-----------( 190      ( 23 Apr 2018 06:39 )             33.0     04-23    140  |  102  |  28<H>  ----------------------------<  79  4.5   |  27  |  1.50<H>    Ca    9.7      23 Apr 2018 06:39              CAPILLARY BLOOD GLUCOSE                  RADIOLOGY & ADDITIONAL STUDIES:

## 2018-04-24 VITALS
RESPIRATION RATE: 17 BRPM | OXYGEN SATURATION: 100 % | SYSTOLIC BLOOD PRESSURE: 122 MMHG | TEMPERATURE: 98 F | DIASTOLIC BLOOD PRESSURE: 76 MMHG

## 2018-04-24 LAB
ANION GAP SERPL CALC-SCNC: 13 MMOL/L — SIGNIFICANT CHANGE UP (ref 5–17)
BUN SERPL-MCNC: 30 MG/DL — HIGH (ref 7–23)
CALCIUM SERPL-MCNC: 9.7 MG/DL — SIGNIFICANT CHANGE UP (ref 8.4–10.5)
CHLORIDE SERPL-SCNC: 101 MMOL/L — SIGNIFICANT CHANGE UP (ref 96–108)
CO2 SERPL-SCNC: 25 MMOL/L — SIGNIFICANT CHANGE UP (ref 22–31)
CREAT SERPL-MCNC: 1.4 MG/DL — HIGH (ref 0.5–1.3)
GLUCOSE SERPL-MCNC: 95 MG/DL — SIGNIFICANT CHANGE UP (ref 70–99)
HCT VFR BLD CALC: 32.3 % — LOW (ref 39–50)
HGB BLD-MCNC: 10.9 G/DL — LOW (ref 13–17)
MCHC RBC-ENTMCNC: 30.2 PG — SIGNIFICANT CHANGE UP (ref 27–34)
MCHC RBC-ENTMCNC: 33.6 GM/DL — SIGNIFICANT CHANGE UP (ref 32–36)
MCV RBC AUTO: 90.1 FL — SIGNIFICANT CHANGE UP (ref 80–100)
PLATELET # BLD AUTO: 192 K/UL — SIGNIFICANT CHANGE UP (ref 150–400)
POTASSIUM SERPL-MCNC: 3.8 MMOL/L — SIGNIFICANT CHANGE UP (ref 3.5–5.3)
POTASSIUM SERPL-SCNC: 3.8 MMOL/L — SIGNIFICANT CHANGE UP (ref 3.5–5.3)
RBC # BLD: 3.59 M/UL — LOW (ref 4.2–5.8)
RBC # FLD: 15.1 % — HIGH (ref 10.3–14.5)
SODIUM SERPL-SCNC: 139 MMOL/L — SIGNIFICANT CHANGE UP (ref 135–145)
WBC # BLD: 5.5 K/UL — SIGNIFICANT CHANGE UP (ref 3.8–10.5)
WBC # FLD AUTO: 5.5 K/UL — SIGNIFICANT CHANGE UP (ref 3.8–10.5)

## 2018-04-24 PROCEDURE — 80061 LIPID PANEL: CPT

## 2018-04-24 PROCEDURE — G0103: CPT

## 2018-04-24 PROCEDURE — 93005 ELECTROCARDIOGRAM TRACING: CPT

## 2018-04-24 PROCEDURE — 85027 COMPLETE CBC AUTOMATED: CPT

## 2018-04-24 PROCEDURE — 83036 HEMOGLOBIN GLYCOSYLATED A1C: CPT

## 2018-04-24 PROCEDURE — 80076 HEPATIC FUNCTION PANEL: CPT

## 2018-04-24 PROCEDURE — 97116 GAIT TRAINING THERAPY: CPT

## 2018-04-24 PROCEDURE — 73560 X-RAY EXAM OF KNEE 1 OR 2: CPT

## 2018-04-24 PROCEDURE — 76770 US EXAM ABDO BACK WALL COMP: CPT

## 2018-04-24 PROCEDURE — 97112 NEUROMUSCULAR REEDUCATION: CPT

## 2018-04-24 PROCEDURE — 83605 ASSAY OF LACTIC ACID: CPT

## 2018-04-24 PROCEDURE — 99285 EMERGENCY DEPT VISIT HI MDM: CPT

## 2018-04-24 PROCEDURE — 73522 X-RAY EXAM HIPS BI 3-4 VIEWS: CPT

## 2018-04-24 PROCEDURE — 80048 BASIC METABOLIC PNL TOTAL CA: CPT

## 2018-04-24 PROCEDURE — 87086 URINE CULTURE/COLONY COUNT: CPT

## 2018-04-24 PROCEDURE — 81001 URINALYSIS AUTO W/SCOPE: CPT

## 2018-04-24 PROCEDURE — 85652 RBC SED RATE AUTOMATED: CPT

## 2018-04-24 PROCEDURE — 97530 THERAPEUTIC ACTIVITIES: CPT

## 2018-04-24 PROCEDURE — 71045 X-RAY EXAM CHEST 1 VIEW: CPT

## 2018-04-24 PROCEDURE — 87186 SC STD MICRODIL/AGAR DIL: CPT

## 2018-04-24 PROCEDURE — 97162 PT EVAL MOD COMPLEX 30 MIN: CPT

## 2018-04-24 PROCEDURE — 82607 VITAMIN B-12: CPT

## 2018-04-24 PROCEDURE — 83735 ASSAY OF MAGNESIUM: CPT

## 2018-04-24 PROCEDURE — 87040 BLOOD CULTURE FOR BACTERIA: CPT

## 2018-04-24 PROCEDURE — 86140 C-REACTIVE PROTEIN: CPT

## 2018-04-24 PROCEDURE — 84100 ASSAY OF PHOSPHORUS: CPT

## 2018-04-24 RX ORDER — POLYETHYLENE GLYCOL 3350 17 G/17G
17 POWDER, FOR SOLUTION ORAL ONCE
Qty: 0 | Refills: 0 | Status: COMPLETED | OUTPATIENT
Start: 2018-04-24 | End: 2018-04-24

## 2018-04-24 RX ORDER — SENNA PLUS 8.6 MG/1
2 TABLET ORAL
Qty: 0 | Refills: 0 | COMMUNITY
Start: 2018-04-24

## 2018-04-24 RX ORDER — NYSTATIN CREAM 100000 [USP'U]/G
1 CREAM TOPICAL
Qty: 0 | Refills: 0 | COMMUNITY
Start: 2018-04-24

## 2018-04-24 RX ORDER — ACETAMINOPHEN 500 MG
2 TABLET ORAL
Qty: 0 | Refills: 0 | COMMUNITY
Start: 2018-04-24

## 2018-04-24 RX ORDER — AMLODIPINE BESYLATE 2.5 MG/1
1 TABLET ORAL
Qty: 0 | Refills: 0 | COMMUNITY
Start: 2018-04-24

## 2018-04-24 RX ADMIN — AMLODIPINE BESYLATE 5 MILLIGRAM(S): 2.5 TABLET ORAL at 05:31

## 2018-04-24 RX ADMIN — POLYETHYLENE GLYCOL 3350 17 GRAM(S): 17 POWDER, FOR SOLUTION ORAL at 04:26

## 2018-04-24 RX ADMIN — NYSTATIN CREAM 1 APPLICATION(S): 100000 CREAM TOPICAL at 05:30

## 2018-04-24 RX ADMIN — ENOXAPARIN SODIUM 40 MILLIGRAM(S): 100 INJECTION SUBCUTANEOUS at 12:47

## 2018-04-24 RX ADMIN — Medication 1 ENEMA: at 10:14

## 2018-04-24 RX ADMIN — Medication 1 APPLICATION(S): at 12:46

## 2018-04-24 RX ADMIN — Medication 100 MILLIGRAM(S): at 12:46

## 2018-04-24 RX ADMIN — Medication 100 MILLIGRAM(S): at 05:31

## 2018-04-24 RX ADMIN — NYSTATIN CREAM 1 APPLICATION(S): 100000 CREAM TOPICAL at 12:47

## 2018-04-24 RX ADMIN — Medication 100 MILLIGRAM(S): at 17:01

## 2018-04-24 NOTE — PROGRESS NOTE ADULT - PROVIDER SPECIALTY LIST ADULT
Cardiology
Infectious Disease
Internal Medicine
Nephrology
Neurology
Urology
Infectious Disease
Infectious Disease
Nephrology
Internal Medicine
Nephrology
Internal Medicine

## 2018-04-24 NOTE — PROGRESS NOTE ADULT - PROBLEM SELECTOR PROBLEM 2
UTI (urinary tract infection)
Hydronephrosis of right kidney
Hypertensive kidney disease with chronic kidney disease, stage 1 through stage 4 or unspecified 
ARF (acute renal failure)
ARF (acute renal failure)
Complicated UTI (urinary tract infection)
Hydronephrosis, right
Hypertensive kidney disease with chronic kidney disease, stage 1 through stage 4 or unspecified 
Complicated UTI (urinary tract infection)

## 2018-04-24 NOTE — PROGRESS NOTE ADULT - SUBJECTIVE AND OBJECTIVE BOX
Patient seen and examined at bedside  No acute events noted overnight  Case discussed with medical team    HPI:  83 yo M from home with PMHx CVA, PPM, Psoriasis, who p/w c/o 1-2 weeks progressive b/l lower extremity weakness, unstable gait, malaise, and lethargy.  In addition, pt s/p twisting motion of left knee with resulting surrounding swelling and pain.   Denies trauma or direct blow to leg. (14 Apr 2018 19:07)      PAST MEDICAL & SURGICAL HISTORY:      No Known Allergies       MEDICATIONS  (STANDING):  amLODIPine   Tablet 5 milliGRAM(s) Oral daily  ceFAZolin   IVPB 1000 milliGRAM(s) IV Intermittent every 8 hours  ceFAZolin   IVPB      docusate sodium 100 milliGRAM(s) Oral two times a day  enoxaparin Injectable 40 milliGRAM(s) SubCutaneous daily  hydrocortisone 2.5% Ointment 1 Application(s) Topical daily  latanoprost 0.005% Ophthalmic Solution 1 Drop(s) Left EYE at bedtime  nystatin Powder 1 Application(s) Topical three times a day  pantoprazole    Tablet 40 milliGRAM(s) Oral before breakfast  senna 2 Tablet(s) Oral at bedtime    MEDICATIONS  (PRN):  acetaminophen   Tablet. 650 milliGRAM(s) Oral every 6 hours PRN Mild Pain (1 - 3)  bisacodyl 5 milliGRAM(s) Oral every 12 hours PRN Constipation      REVIEW OF SYSTEMS:  CONSTITUTIONAL: (+) malaise.   EYES: No acute change in vision   ENT:  No tinnitus  NECK: No stiffness  RESPIRATORY: No hemoptysis  CARDIOVASCULAR: No chest pain, palpitations, syncope  GASTROINTESTINAL: No hematemesis, diarrhea, melena, or hematochezia.  GENITOURINARY: No hematuria  NEUROLOGICAL: No headaches  LYMPH Nodes: No enlarged glands  ENDOCRINE: No heat or cold intolerance	    T(C): 36.1 (04-24-18 @ 10:59), Max: 37.2 (04-24-18 @ 00:48)  HR: 59 (04-24-18 @ 10:59) (59 - 62)  BP: 127/82 (04-24-18 @ 10:59) (127/82 - 151/75)  RR: 17 (04-24-18 @ 10:59) (16 - 18)  SpO2: 95% (04-24-18 @ 10:59) (95% - 98%)    PHYSICAL EXAMINATION:   Constitutional: WD, NAD  HEENT: NC, AT  Neck:  Supple  Respiratory:  Adequate airflow b/l. Not using accessory muscles of respiration.  Cardiovascular:  S1 & S2 intact, no R/G, 2+ radial pulses b/l  Gastrointestinal: Soft, NT, ND, normoactive b.s., no organomegaly/RT/rigidity  Extremities: WWP  Neurological:  Alert and awake.  No acute focal motor deficits. Crude sensation intact.     Labs and imaging reviewed    LABS:                        10.9   5.5   )-----------( 192      ( 24 Apr 2018 06:06 )             32.3     04-24    139  |  101  |  30<H>  ----------------------------<  95  3.8   |  25  |  1.40<H>    Ca    9.7      24 Apr 2018 06:06              CAPILLARY BLOOD GLUCOSE                  RADIOLOGY & ADDITIONAL STUDIES:

## 2018-04-24 NOTE — PROGRESS NOTE ADULT - PROBLEM SELECTOR PLAN 1
f/u mri left knee  -> Obtain Ortho Consult
Patient with history of CKD-3 (baseline Scr 1.4 as per labs from PMD on 10/2017). Renal function near baseline. Defer inpatient serological work up. Avoid nephrotoxins. Monitor electrolytes.
->Awaiting repeat UCx results  ID and Urology recs appreciated  abx po til 5/2/18  ->d/c planning after getting UCx results (?4/24/18 to [facility])-f/u case management
Patient with history of CKD-3 (baseline Scr 1.4 as per labs from PMD on 10/2017). Renal function near baseline. Defer inpatient serological work up. Avoid nephrotoxins. Monitor electrolytes.
Patient with history of CKD-3 (baseline Scr 1.4 as per labs from PMD on 10/2017). Renal function near baseline. Defer inpatient serological work up. Given history of stones and prostate CA, agree with checking renal US. Avoid nephrotoxins. Monitor electrolytes.
abx  transition to oral abx upon d/c
abx po til 5/2/18  d/c
abx po til 5/2/18  pt is medically optimized for safe d/c to [facility]
improved temp, w/u (+) abnormal u/a concerning for persistent uti, previous ucx (+) mssa, on appropriate abx, cxr clear, moderate hydroureteronephrosis on right side  ->F/u Urology for Inpt w/u including ?stent  f/u cultures
recent abnormal u/a  ->F/u UCx  ID and Urology recs appreciated  abx po til 5/2/18  d/c planning
rocephin  f/u ucx
vancomycin ivpb
Patient with history of CKD-3 (baseline Scr 1.4 as per labs from PMD on 10/2017). Renal function near baseline. Given history of stones and prostate CA, agree with checking renal US. Avoid nephrotoxins. Monitor electrolytes.
f/u labs, cultures, cxr, lactate, inflammatory markers  pt denies acute symptoms  warming blanket  monitor vitals and clinical status closely

## 2018-04-24 NOTE — PROGRESS NOTE ADULT - PROBLEM SELECTOR PROBLEM 3
Hydronephrosis, right
ARF (acute renal failure)
Overflow incontinence of urine
Urinary tract infection with hematuria, site unspecified
Hydronephrosis, right
Uncontrolled hypertension
Urinary tract infection with hematuria, site unspecified

## 2018-04-24 NOTE — PROGRESS NOTE ADULT - PROBLEM SELECTOR PROBLEM 5
Unstable gait
Unstable gait
Knee pain, left
Unstable gait
Urinary incontinence

## 2018-04-24 NOTE — PROGRESS NOTE ADULT - PROBLEM SELECTOR PROBLEM 6
Urinary incontinence
Urinary incontinence
Hard of hearing
Hard of hearing
Obesity (BMI 30-39.9)
Urinary incontinence

## 2018-04-24 NOTE — PROGRESS NOTE ADULT - ASSESSMENT
Patient is a 84y old  Male who presents with a chief complaint of weakness and difficulty ambulating X 1-2 weeks (14 Apr 2018 19:07), progressive b/l lower extremity weakness, unstable gait, malaise, and lethargy. He has no fever or chills but found to have positive Urine analysis. He has started on Ceftriaxone, cultures Pending. The ID consult requested  to assist with further evaluation and antibiotic management.       # UTI - Staph. aureus - MSSA    # Moderate right hydroureteronephrosis.      Would recommend:    1. Continue Bowel regimen  2. Continue  cefazolin since its MSSA  3.  May change to oral Dicloxacillin  500 mg Q 6hours  on discharge to continue until 5/2/18  4. OOB to chair  5. Monitor Temp. and c/w supportive care and follow up culture    d/w Patient and Nursing staff    will follow the patient with you
84 year old male with history of nephrolithiasis presents with UTI. Nephrology consulted for elevated Scr.
Patient is a 84y old  Male who presents with a chief complaint of weakness and difficulty ambulating X 1-2 weeks (14 Apr 2018 19:07), progressive b/l lower extremity weakness, unstable gait, malaise, and lethargy. He has no fever or chills but found to have positive Urine analysis. He has started on Ceftriaxone, cultures Pending. The ID consult requested  to assist with further evaluation and antibiotic management.       # UTI - Staph. aureus - MSSA    # Moderate right hydroureteronephrosis.      Would recommend:  1. Continue  cefazolin since its MSSA  2.  May change to oral Dicloxacillin  500 mg Q 6hours  on discharge to continue until 5/2/18  3. OOB to chair    d/w Patient     will follow the patient with you
83 yo M from home with PMHx CVA, PPM, Psoriasis, who p/w c/o 1-2 weeks progressive b/l lower extremity weakness, unstable gait, malaise, and lethargy.  In addition, pt s/p twisting motion of left knee with resulting surrounding swelling and pain.
84 year old male with history of nephrolithiasis presents with UTI. Nephrology consulted for elevated Scr.
85 yo M from home with PMHx CVA, PPM, Psoriasis, who p/w c/o 1-2 weeks progressive b/l lower extremity weakness, unstable gait, malaise, and lethargy.  In addition, pt s/p twisting motion of left knee with resulting surrounding swelling and pain.
Patient is a 84y old  Male who presents with a chief complaint of weakness and difficulty ambulating X 1-2 weeks (14 Apr 2018 19:07), progressive b/l lower extremity weakness, unstable gait, malaise, and lethargy. He has no fever or chills but found to have positive Urine analysis. He has started on Ceftriaxone, cultures Pending. The ID consult requested  to assist with further evaluation and antibiotic management.       # Complicated UTI - Staph. aureus - MSSA  # Moderate right hydroureteronephrosis.      Would recommend:    1. Continue Bowel regimen  2. Continue  cefazolin inpatient   3.  May change to oral Dicloxacillin  500 mg Q 6hours  on discharge to continue until 5/2/18 and follow up with Urology as a outpatient  4. OOB to chair      d/w Patient and Nursing staff    will follow the patient with you
Patient is a 84y old  Male who presents with a chief complaint of weakness and difficulty ambulating X 1-2 weeks (14 Apr 2018 19:07), progressive b/l lower extremity weakness, unstable gait, malaise, and lethargy. He has no fever or chills but found to have positive Urine analysis. He has started on Ceftriaxone, cultures Pending. The ID consult requested  to assist with further evaluation and antibiotic management.       # Complicated UTI - Staph. aureus - MSSA  # Moderate right hydroureteronephrosis.      Would recommend:    1. Follow up repeat  Urine culture since repeat UA from 4/20/18 is positive  2. Continue Bowel regimen  3. Continue  cefazolin inpatient   4.  May change to oral Dicloxacillin  500 mg Q 6hours  on discharge to continue until 5/2/18 if repeat Urine cx is negative   5. OOB to chair      d/w Patient and Nursing staff    will follow the patient with you
Patient is a 84y old  Male who presents with a chief complaint of weakness and difficulty ambulating X 1-2 weeks (14 Apr 2018 19:07), progressive b/l lower extremity weakness, unstable gait, malaise, and lethargy. He has no fever or chills but found to have positive Urine analysis. He has started on Ceftriaxone, cultures Pending. The ID consult requested  to assist with further evaluation and antibiotic management.       # UTI - Staph. aureus    Would recommend:    1. Add Vancomycin until sensitivity of Staph aureus is available  2. Follow up final Urine culture  3. Discontinue Ceftriaxone   4. OOB to chair    d/w Patient and Nursing staff    will follow the patient with you
Patient is a 84y old  Male who presents with a chief complaint of weakness and difficulty ambulating X 1-2 weeks (14 Apr 2018 19:07), progressive b/l lower extremity weakness, unstable gait, malaise, and lethargy. He has no fever or chills but found to have positive Urine analysis. He has started on Ceftriaxone, cultures Pending. The ID consult requested  to assist with further evaluation and antibiotic management.       # UTI - Staph. aureus - MSSA    # Moderate right hydroureteronephrosis.      Would recommend:    1. Fleet Enema  X 1 dose  2. Continue  cefazolin since its MSSA  3.  May change to oral Dicloxacillin  500 mg Q 6hours  on discharge to continue until 5/2/18  4. OOB to chair    d/w Patient and Nursing staff    will follow the patient with you
Patient is a 84y old  Male who presents with a chief complaint of weakness and difficulty ambulating X 1-2 weeks (14 Apr 2018 19:07), progressive b/l lower extremity weakness, unstable gait, malaise, and lethargy. He has no fever or chills but found to have positive Urine analysis. He has started on Ceftriaxone, cultures Pending. The ID consult requested  to assist with further evaluation and antibiotic management.       # UTI - Staph. aureus - MSSA    # Moderate right hydroureteronephrosis.      Would recommend:    1. Obtain Urine culture since repeat UA from 4/20/18 is positive  2. Continue Bowel regimen  3. Continue  cefazolin since its MSSA  4.  May change to oral Dicloxacillin  500 mg Q 6hours  on discharge to continue until 5/2/18  5. OOB to chair  6. Monitor Temp. and c/w supportive care and follow up culture    d/w Patient and Nursing staff    will follow the patient with you
Patient is a 84y old  Male who presents with a chief complaint of weakness and difficulty ambulating X 1-2 weeks (14 Apr 2018 19:07), progressive b/l lower extremity weakness, unstable gait, malaise, and lethargy. He has no fever or chills but found to have positive Urine analysis. He has started on Ceftriaxone, cultures Pending. The ID consult requested  to assist with further evaluation and antibiotic management.       # UTI - Staph. aureus - MSSA    # Moderate right hydroureteronephrosis.      Would recommend:  1. Change antibiotic to cefazolin since its MSSA  2. Urology evaluation for Moderate right hydroureteronephrosis.  3. May change to oral Dicloxacillin on discharge for 2 weeks, starting  from the resolution of hydronephrosis  4. OOB to chair    d/w Patient     will follow the patient with you
psa 0.5 stable  ne hydro assx    outpatient gu management and fu re ? stent vs observation  incontinence stable
right hydro  check pvr with us    cap with incontinence  no inpatient gu management   fu with Dr Gu re possible stent/ nt  gu fu prn
stable with cap and incontinence  outpatient gu management
stable with resolved uti  stable cap with psa 0.5 and assymptomatic right hydro    for outpatient fu and management of hydro with Dr Gu
84 year old male with history of nephrolithiasis presents with UTI. Nephrology consulted for elevated Scr.
85 yo M from home with PMHx CVA, PPM, Psoriasis, who p/w c/o 1-2 weeks progressive b/l lower extremity weakness, unstable gait, malaise, and lethargy.  In addition, pt s/p twisting motion of left knee with resulting surrounding swelling and pain.
83 yo M from home with PMHx CVA, PPM, Psoriasis, who p/w c/o 1-2 weeks progressive b/l lower extremity weakness, unstable gait, malaise, and lethargy.  In addition, pt s/p twisting motion of left knee with resulting surrounding swelling and pain.

## 2018-04-24 NOTE — PROGRESS NOTE ADULT - PROBLEM SELECTOR PROBLEM 1
CKD (chronic kidney disease), stage III
Complicated UTI (urinary tract infection)
Hypothermia
Overflow incontinence of urine
UTI (urinary tract infection)
CKD (chronic kidney disease), stage III
Hypothermia
Knee pain, left

## 2018-04-24 NOTE — PROGRESS NOTE ADULT - PROBLEM SELECTOR PROBLEM 4
Uncontrolled hypertension
Uncontrolled hypertension
Hydronephrosis of right kidney
Uncontrolled hypertension
Unstable gait

## 2018-04-24 NOTE — PROGRESS NOTE ADULT - PROBLEM SELECTOR PLAN 5
improved
tx underlying uti   optimize BP  optimize electrolytes, constipation  f/u hip/pelvis xray  PT  fall precautions
f/u mri left knee
improved
stable  outpt urology f/u
tolerodine
tolerodine
tolerodine  outpt urology f/u

## 2018-04-24 NOTE — PROGRESS NOTE ADULT - PROBLEM SELECTOR PLAN 4
improved  c/w hydralazine and norvasc  monitor vitals, adjust rx prn
improving with adjustment in Rx  c/w hydralazine and norvasc  monitor vitals, adjust rx prn
As per urology.
Likely chronic as per US report for which no intervention would be required however would consult urology for further recommendations.
improved
improved  c/w hydralazine and norvasc  monitor vitals, adjust rx prn
improving, c/w tx underlying uti   optimize electrolytes, constipation  fall precautions
improving, c/w tx underlying uti   optimize electrolytes, constipation  fall precautions
tx underlying uti   optimize BP  optimize electrolytes, constipation  f/u hip/pelvis xray  PT  fall precautions

## 2018-04-24 NOTE — PROGRESS NOTE ADULT - PROBLEM SELECTOR PLAN 2
abx po til 5/2/18
rocephin  ucx (+) gram positive cocci, -> f/u sensitivities and adjust abx accordingly
BP acceptable. Continue off Hydralazine. Monitor BP.
BP acceptable. Continue off Hydralazine. Monitor BP.
BP controlled. Continue with current medications and low sodium diet. Monitor BP.
BP controlled. Continue with current medications and low sodium diet. Monitor BP.
BP improving. Continue with current medications and low sodium diet. Monitor BP.
BP low this morning in setting of sepsis. Discontinue hydralazine. Monitor BP.
abx po til 5/2/18
c/w tx underlying uti
concern for intrinsic vs post renal  tx underlying uti  c/w ivf  f/u renal u/s
urology consult  tx uti
urology recs appreciated  ->F/u PVR on US  ->Outpt urology f/u for additional management/intervention which may possibly include stent
urology recs appreciated  -F/u PVR on US  -Outpt urology f/u for additional management/intervention which may possibly include stent
urology recs appreciated  -Outpt urology f/u for additional management/intervention which may possibly include stent
urology recs appreciated  tx uti, outpt f/u
BP improving. Continue with current medications and low sodium diet. Monitor BP.

## 2018-04-24 NOTE — PROGRESS NOTE ADULT - PROBLEM SELECTOR PLAN 3
On CTX. As per ID.
urology on case  tx uti
c/w tx underlying uti  f/u renal u/s
On IV abx as per ID.
On vancomycin. As per ID.
f/u urology, hypothermia resolved, ?stent  tx uti
improved  c/w hydralazine and norvasc  monitor vitals, adjust rx prn
improving  c/w hydralazine and norvasc  monitor vitals, adjust rx prn
monitor vitals  adjust rx prn

## 2018-04-25 LAB
CULTURE RESULTS: SIGNIFICANT CHANGE UP
CULTURE RESULTS: SIGNIFICANT CHANGE UP
SPECIMEN SOURCE: SIGNIFICANT CHANGE UP
SPECIMEN SOURCE: SIGNIFICANT CHANGE UP

## 2018-04-25 RX ORDER — DICLOXACILLIN SODIUM 500 MG/1
1 CAPSULE ORAL
Qty: 32 | Refills: 0 | OUTPATIENT
Start: 2018-04-25 | End: 2018-05-02

## 2018-04-25 RX ORDER — MIRABEGRON 50 MG/1
1 TABLET, EXTENDED RELEASE ORAL
Qty: 0 | Refills: 0 | COMMUNITY

## 2018-05-06 ENCOUNTER — INPATIENT (INPATIENT)
Facility: HOSPITAL | Age: 83
LOS: 14 days | Discharge: NURSING, REHAB OR LONG-TERM CARE | DRG: 659 | End: 2018-05-21
Attending: INTERNAL MEDICINE | Admitting: INTERNAL MEDICINE
Payer: MEDICARE

## 2018-05-06 VITALS
OXYGEN SATURATION: 96 % | RESPIRATION RATE: 18 BRPM | TEMPERATURE: 98 F | SYSTOLIC BLOOD PRESSURE: 119 MMHG | HEART RATE: 61 BPM | DIASTOLIC BLOOD PRESSURE: 78 MMHG

## 2018-05-06 DIAGNOSIS — R53.1 WEAKNESS: ICD-10-CM

## 2018-05-06 DIAGNOSIS — N39.0 URINARY TRACT INFECTION, SITE NOT SPECIFIED: ICD-10-CM

## 2018-05-06 DIAGNOSIS — L40.9 PSORIASIS, UNSPECIFIED: ICD-10-CM

## 2018-05-06 DIAGNOSIS — Z29.9 ENCOUNTER FOR PROPHYLACTIC MEASURES, UNSPECIFIED: ICD-10-CM

## 2018-05-06 DIAGNOSIS — K21.9 GASTRO-ESOPHAGEAL REFLUX DISEASE WITHOUT ESOPHAGITIS: ICD-10-CM

## 2018-05-06 DIAGNOSIS — N18.9 CHRONIC KIDNEY DISEASE, UNSPECIFIED: ICD-10-CM

## 2018-05-06 DIAGNOSIS — E78.5 HYPERLIPIDEMIA, UNSPECIFIED: ICD-10-CM

## 2018-05-06 LAB
ALBUMIN SERPL ELPH-MCNC: 3.8 G/DL — SIGNIFICANT CHANGE UP (ref 3.3–5)
ALP SERPL-CCNC: 84 U/L — SIGNIFICANT CHANGE UP (ref 40–120)
ALT FLD-CCNC: 8 U/L — LOW (ref 10–45)
ANION GAP SERPL CALC-SCNC: 13 MMOL/L — SIGNIFICANT CHANGE UP (ref 5–17)
APPEARANCE UR: ABNORMAL
APTT BLD: 39.9 SEC — HIGH (ref 27.5–37.4)
AST SERPL-CCNC: 23 U/L — SIGNIFICANT CHANGE UP (ref 10–40)
BACTERIA # UR AUTO: ABNORMAL /HPF
BASOPHILS # BLD AUTO: 0.1 K/UL — SIGNIFICANT CHANGE UP (ref 0–0.2)
BASOPHILS NFR BLD AUTO: 0.7 % — SIGNIFICANT CHANGE UP (ref 0–2)
BILIRUB SERPL-MCNC: 0.3 MG/DL — SIGNIFICANT CHANGE UP (ref 0.2–1.2)
BILIRUB UR-MCNC: NEGATIVE — SIGNIFICANT CHANGE UP
BUN SERPL-MCNC: 30 MG/DL — HIGH (ref 7–23)
CALCIUM SERPL-MCNC: 9.7 MG/DL — SIGNIFICANT CHANGE UP (ref 8.4–10.5)
CHLORIDE SERPL-SCNC: 105 MMOL/L — SIGNIFICANT CHANGE UP (ref 96–108)
CO2 SERPL-SCNC: 23 MMOL/L — SIGNIFICANT CHANGE UP (ref 22–31)
COLOR SPEC: YELLOW — SIGNIFICANT CHANGE UP
CREAT SERPL-MCNC: 1.55 MG/DL — HIGH (ref 0.5–1.3)
DIFF PNL FLD: ABNORMAL
EOSINOPHIL # BLD AUTO: 0.3 K/UL — SIGNIFICANT CHANGE UP (ref 0–0.5)
EOSINOPHIL NFR BLD AUTO: 3.3 % — SIGNIFICANT CHANGE UP (ref 0–6)
EPI CELLS # UR: SIGNIFICANT CHANGE UP /HPF
GLUCOSE SERPL-MCNC: 88 MG/DL — SIGNIFICANT CHANGE UP (ref 70–99)
GLUCOSE UR QL: NEGATIVE — SIGNIFICANT CHANGE UP
HCT VFR BLD CALC: 34.9 % — LOW (ref 39–50)
HGB BLD-MCNC: 12 G/DL — LOW (ref 13–17)
HYALINE CASTS # UR AUTO: ABNORMAL
INR BLD: 1.24 RATIO — HIGH (ref 0.88–1.16)
KETONES UR-MCNC: NEGATIVE — SIGNIFICANT CHANGE UP
LEUKOCYTE ESTERASE UR-ACNC: ABNORMAL
LYMPHOCYTES # BLD AUTO: 2.6 K/UL — SIGNIFICANT CHANGE UP (ref 1–3.3)
LYMPHOCYTES # BLD AUTO: 32.6 % — SIGNIFICANT CHANGE UP (ref 13–44)
MAGNESIUM SERPL-MCNC: 2.1 MG/DL — SIGNIFICANT CHANGE UP (ref 1.6–2.6)
MCHC RBC-ENTMCNC: 31.2 PG — SIGNIFICANT CHANGE UP (ref 27–34)
MCHC RBC-ENTMCNC: 34.5 GM/DL — SIGNIFICANT CHANGE UP (ref 32–36)
MCV RBC AUTO: 90.6 FL — SIGNIFICANT CHANGE UP (ref 80–100)
MONOCYTES # BLD AUTO: 0.6 K/UL — SIGNIFICANT CHANGE UP (ref 0–0.9)
MONOCYTES NFR BLD AUTO: 7.6 % — SIGNIFICANT CHANGE UP (ref 2–14)
NEUTROPHILS # BLD AUTO: 4.4 K/UL — SIGNIFICANT CHANGE UP (ref 1.8–7.4)
NEUTROPHILS NFR BLD AUTO: 55.9 % — SIGNIFICANT CHANGE UP (ref 43–77)
NITRITE UR-MCNC: NEGATIVE — SIGNIFICANT CHANGE UP
PH UR: 5.5 — SIGNIFICANT CHANGE UP (ref 5–8)
PHOSPHATE SERPL-MCNC: 3.4 MG/DL — SIGNIFICANT CHANGE UP (ref 2.5–4.5)
PLATELET # BLD AUTO: 262 K/UL — SIGNIFICANT CHANGE UP (ref 150–400)
POTASSIUM SERPL-MCNC: 4.6 MMOL/L — SIGNIFICANT CHANGE UP (ref 3.5–5.3)
POTASSIUM SERPL-SCNC: 4.6 MMOL/L — SIGNIFICANT CHANGE UP (ref 3.5–5.3)
PROT SERPL-MCNC: 8.1 G/DL — SIGNIFICANT CHANGE UP (ref 6–8.3)
PROT UR-MCNC: SIGNIFICANT CHANGE UP
PROTHROM AB SERPL-ACNC: 13.6 SEC — HIGH (ref 9.8–12.7)
RBC # BLD: 3.86 M/UL — LOW (ref 4.2–5.8)
RBC # FLD: 14 % — SIGNIFICANT CHANGE UP (ref 10.3–14.5)
RBC CASTS # UR COMP ASSIST: >50 /HPF (ref 0–2)
SODIUM SERPL-SCNC: 141 MMOL/L — SIGNIFICANT CHANGE UP (ref 135–145)
SP GR SPEC: 1.02 — SIGNIFICANT CHANGE UP (ref 1.01–1.02)
TROPONIN T SERPL-MCNC: <0.01 NG/ML — SIGNIFICANT CHANGE UP (ref 0–0.06)
UROBILINOGEN FLD QL: NEGATIVE — SIGNIFICANT CHANGE UP
WBC # BLD: 7.9 K/UL — SIGNIFICANT CHANGE UP (ref 3.8–10.5)
WBC # FLD AUTO: 7.9 K/UL — SIGNIFICANT CHANGE UP (ref 3.8–10.5)
WBC UR QL: >50 /HPF (ref 0–5)

## 2018-05-06 PROCEDURE — 99285 EMERGENCY DEPT VISIT HI MDM: CPT | Mod: GC

## 2018-05-06 PROCEDURE — 71045 X-RAY EXAM CHEST 1 VIEW: CPT | Mod: 26

## 2018-05-06 PROCEDURE — 99223 1ST HOSP IP/OBS HIGH 75: CPT

## 2018-05-06 RX ORDER — ACETAMINOPHEN 500 MG
650 TABLET ORAL EVERY 6 HOURS
Qty: 0 | Refills: 0 | Status: DISCONTINUED | OUTPATIENT
Start: 2018-05-06 | End: 2018-05-15

## 2018-05-06 RX ORDER — SODIUM CHLORIDE 9 MG/ML
1000 INJECTION INTRAMUSCULAR; INTRAVENOUS; SUBCUTANEOUS ONCE
Qty: 0 | Refills: 0 | Status: COMPLETED | OUTPATIENT
Start: 2018-05-06 | End: 2018-05-06

## 2018-05-06 RX ORDER — HYDROCORTISONE 1 %
1 OINTMENT (GRAM) TOPICAL DAILY
Qty: 0 | Refills: 0 | Status: DISCONTINUED | OUTPATIENT
Start: 2018-05-06 | End: 2018-05-15

## 2018-05-06 RX ORDER — PREGABALIN 225 MG/1
1000 CAPSULE ORAL DAILY
Qty: 0 | Refills: 0 | Status: DISCONTINUED | OUTPATIENT
Start: 2018-05-06 | End: 2018-05-15

## 2018-05-06 RX ORDER — NYSTATIN CREAM 100000 [USP'U]/G
1 CREAM TOPICAL THREE TIMES A DAY
Qty: 0 | Refills: 0 | Status: DISCONTINUED | OUTPATIENT
Start: 2018-05-06 | End: 2018-05-15

## 2018-05-06 RX ORDER — CEFTRIAXONE 500 MG/1
1 INJECTION, POWDER, FOR SOLUTION INTRAMUSCULAR; INTRAVENOUS ONCE
Qty: 0 | Refills: 0 | Status: DISCONTINUED | OUTPATIENT
Start: 2018-05-06 | End: 2018-05-06

## 2018-05-06 RX ORDER — CEFEPIME 1 G/1
1000 INJECTION, POWDER, FOR SOLUTION INTRAMUSCULAR; INTRAVENOUS ONCE
Qty: 0 | Refills: 0 | Status: COMPLETED | OUTPATIENT
Start: 2018-05-06 | End: 2018-05-06

## 2018-05-06 RX ORDER — AMLODIPINE BESYLATE 2.5 MG/1
5 TABLET ORAL DAILY
Qty: 0 | Refills: 0 | Status: DISCONTINUED | OUTPATIENT
Start: 2018-05-06 | End: 2018-05-07

## 2018-05-06 RX ORDER — CEFTRIAXONE 500 MG/1
1 INJECTION, POWDER, FOR SOLUTION INTRAMUSCULAR; INTRAVENOUS ONCE
Qty: 0 | Refills: 0 | Status: COMPLETED | OUTPATIENT
Start: 2018-05-06 | End: 2018-05-06

## 2018-05-06 RX ORDER — LATANOPROST 0.05 MG/ML
1 SOLUTION/ DROPS OPHTHALMIC; TOPICAL AT BEDTIME
Qty: 0 | Refills: 0 | Status: DISCONTINUED | OUTPATIENT
Start: 2018-05-06 | End: 2018-05-15

## 2018-05-06 RX ORDER — ENOXAPARIN SODIUM 100 MG/ML
40 INJECTION SUBCUTANEOUS EVERY 24 HOURS
Qty: 0 | Refills: 0 | Status: DISCONTINUED | OUTPATIENT
Start: 2018-05-06 | End: 2018-05-15

## 2018-05-06 RX ORDER — SIMVASTATIN 20 MG/1
20 TABLET, FILM COATED ORAL AT BEDTIME
Qty: 0 | Refills: 0 | Status: DISCONTINUED | OUTPATIENT
Start: 2018-05-06 | End: 2018-05-15

## 2018-05-06 RX ORDER — SENNA PLUS 8.6 MG/1
2 TABLET ORAL AT BEDTIME
Qty: 0 | Refills: 0 | Status: DISCONTINUED | OUTPATIENT
Start: 2018-05-06 | End: 2018-05-15

## 2018-05-06 RX ORDER — CEFEPIME 1 G/1
INJECTION, POWDER, FOR SOLUTION INTRAMUSCULAR; INTRAVENOUS
Qty: 0 | Refills: 0 | Status: DISCONTINUED | OUTPATIENT
Start: 2018-05-06 | End: 2018-05-15

## 2018-05-06 RX ORDER — FAMOTIDINE 10 MG/ML
20 INJECTION INTRAVENOUS DAILY
Qty: 0 | Refills: 0 | Status: DISCONTINUED | OUTPATIENT
Start: 2018-05-06 | End: 2018-05-15

## 2018-05-06 RX ORDER — LANOLIN ALCOHOL/MO/W.PET/CERES
3 CREAM (GRAM) TOPICAL AT BEDTIME
Qty: 0 | Refills: 0 | Status: DISCONTINUED | OUTPATIENT
Start: 2018-05-06 | End: 2018-05-15

## 2018-05-06 RX ORDER — DOCUSATE SODIUM 100 MG
100 CAPSULE ORAL DAILY
Qty: 0 | Refills: 0 | Status: DISCONTINUED | OUTPATIENT
Start: 2018-05-06 | End: 2018-05-15

## 2018-05-06 RX ORDER — CHOLECALCIFEROL (VITAMIN D3) 125 MCG
1000 CAPSULE ORAL DAILY
Qty: 0 | Refills: 0 | Status: DISCONTINUED | OUTPATIENT
Start: 2018-05-06 | End: 2018-05-15

## 2018-05-06 RX ORDER — DOXAZOSIN MESYLATE 4 MG
4 TABLET ORAL AT BEDTIME
Qty: 0 | Refills: 0 | Status: DISCONTINUED | OUTPATIENT
Start: 2018-05-06 | End: 2018-05-15

## 2018-05-06 RX ORDER — CEFEPIME 1 G/1
1000 INJECTION, POWDER, FOR SOLUTION INTRAMUSCULAR; INTRAVENOUS EVERY 12 HOURS
Qty: 0 | Refills: 0 | Status: DISCONTINUED | OUTPATIENT
Start: 2018-05-07 | End: 2018-05-15

## 2018-05-06 RX ADMIN — Medication 3 MILLIGRAM(S): at 23:30

## 2018-05-06 RX ADMIN — NYSTATIN CREAM 1 APPLICATION(S): 100000 CREAM TOPICAL at 22:09

## 2018-05-06 RX ADMIN — SIMVASTATIN 20 MILLIGRAM(S): 20 TABLET, FILM COATED ORAL at 22:08

## 2018-05-06 RX ADMIN — CEFTRIAXONE 100 GRAM(S): 500 INJECTION, POWDER, FOR SOLUTION INTRAMUSCULAR; INTRAVENOUS at 17:26

## 2018-05-06 RX ADMIN — Medication 4 MILLIGRAM(S): at 22:08

## 2018-05-06 RX ADMIN — LATANOPROST 1 DROP(S): 0.05 SOLUTION/ DROPS OPHTHALMIC; TOPICAL at 22:08

## 2018-05-06 RX ADMIN — CEFEPIME 100 MILLIGRAM(S): 1 INJECTION, POWDER, FOR SOLUTION INTRAMUSCULAR; INTRAVENOUS at 22:09

## 2018-05-06 RX ADMIN — SENNA PLUS 2 TABLET(S): 8.6 TABLET ORAL at 22:08

## 2018-05-06 RX ADMIN — SODIUM CHLORIDE 500 MILLILITER(S): 9 INJECTION INTRAMUSCULAR; INTRAVENOUS; SUBCUTANEOUS at 14:20

## 2018-05-06 NOTE — ED ADULT NURSE NOTE - OBJECTIVE STATEMENT
83 yo male brought to ED by EMS from [facility] for weakness. Patient states that he has been in rehab x2 weeks following a hospital admission for UTI. Patient reports that he was dissatisfied with the care at [facility] because he wasn't getting as many physical therapy sessions as he had intended and the aide staff was inattentive. He states that he feels he is becoming weaker. He denies new chest pain, SOB, n/v/d, abdominal pain, or urinary symptoms. He reports incontinence at baseline. He denies straight catheterization for urine

## 2018-05-06 NOTE — CONSULT NOTE ADULT - SUBJECTIVE AND OBJECTIVE BOX
Patient is a 84y old  Male who presents with a chief complaint of weakness (06 May 2018 18:20)      INTERVAL HPI/OVERNIGHT EVENTS:  T(C): 36.4 (18 @ 20:57), Max: 36.8 (18 @ 13:23)  HR: 60 (18 @ 20:57) (60 - 61)  BP: 140/82 (18 @ 20:57) (119/78 - 140/86)  RR: 16 (18 @ 20:57) (16 - 18)  SpO2: 98% (18 @ 20:57) (96% - 98%)  Wt(kg): --  I&O's Summary      PAST MEDICAL & SURGICAL HISTORY:  Psoriasis  Prostate cancer  GERD (gastroesophageal reflux disease)  Dyslipidemia  No significant past surgical history      SOCIAL HISTORY  Alcohol:  Tobacco:  Illicit substance use:      FAMILY HISTORY:      LABS:                        12.0   7.9   )-----------( 262      ( 06 May 2018 14:47 )             34.9     -    141  |  105  |  30<H>  ----------------------------<  88  4.6   |  23  |  1.55<H>    Ca    9.7      06 May 2018 14:47  Phos  3.4     05-  Mg     2.1     -    TPro  8.1  /  Alb  3.8  /  TBili  0.3  /  DBili  x   /  AST  23  /  ALT  8<L>  /  AlkPhos  84  05-    PT/INR - ( 06 May 2018 14:47 )   PT: 13.6 sec;   INR: 1.24 ratio         PTT - ( 06 May 2018 14:47 )  PTT:39.9 sec  Urinalysis Basic - ( 06 May 2018 16:09 )    Color: Yellow / Appearance: SL Turbid / S.016 / pH: x  Gluc: x / Ketone: Negative  / Bili: Negative / Urobili: Negative   Blood: x / Protein: Trace / Nitrite: Negative   Leuk Esterase: Large / RBC: >50 /HPF / WBC >50 /HPF   Sq Epi: x / Non Sq Epi: OCC /HPF / Bacteria: Few /HPF      CAPILLARY BLOOD GLUCOSE            Urinalysis Basic - ( 06 May 2018 16:09 )    Color: Yellow / Appearance: SL Turbid / S.016 / pH: x  Gluc: x / Ketone: Negative  / Bili: Negative / Urobili: Negative   Blood: x / Protein: Trace / Nitrite: Negative   Leuk Esterase: Large / RBC: >50 /HPF / WBC >50 /HPF   Sq Epi: x / Non Sq Epi: OCC /HPF / Bacteria: Few /HPF        MEDICATIONS  (STANDING):  amLODIPine   Tablet 5 milliGRAM(s) Oral daily  cholecalciferol 1000 Unit(s) Oral daily  cyanocobalamin 1000 MICROGram(s) Oral daily  doxazosin 4 milliGRAM(s) Oral at bedtime  enoxaparin Injectable 40 milliGRAM(s) SubCutaneous every 24 hours  famotidine    Tablet 20 milliGRAM(s) Oral daily  latanoprost 0.005% Ophthalmic Solution 1 Drop(s) Left EYE at bedtime  nystatin Powder 1 Application(s) Topical three times a day  senna 2 Tablet(s) Oral at bedtime  simvastatin 20 milliGRAM(s) Oral at bedtime    MEDICATIONS  (PRN):  acetaminophen   Tablet. 650 milliGRAM(s) Oral every 6 hours PRN Moderate Pain (4 - 6)  bisacodyl 5 milliGRAM(s) Oral every 12 hours PRN Constipation  docusate sodium 100 milliGRAM(s) Oral daily PRN Constipation  hydrocortisone 2.5% Ointment 1 Application(s) Topical daily PRN Rash      REVIEW OF SYSTEMS:  CONSTITUTIONAL: No fever, weight loss, or fatigue  EYES: No eye pain, visual disturbances, or discharge  ENMT:  No difficulty hearing, tinnitus, vertigo; No sinus or throat pain  NECK: No pain or stiffness  RESPIRATORY: No cough, wheezing, chills or hemoptysis; No shortness of breath  CARDIOVASCULAR: No chest pain, palpitations, dizziness, or leg swelling  GASTROINTESTINAL: No abdominal or epigastric pain. No nausea, vomiting, or hematemesis; No diarrhea or constipation. No melena or hematochezia.  GENITOURINARY: No dysuria, frequency, hematuria, or incontinence  NEUROLOGICAL: No headaches, memory loss, loss of strength, numbness, or tremors  SKIN: No itching, burning, rashes, or lesions   LYMPH NODES: No enlarged glands  ENDOCRINE: No heat or cold intolerance; No hair loss  MUSCULOSKELETAL: No joint pain or swelling; No muscle, back, or extremity pain  PSYCHIATRIC: No depression, anxiety, mood swings, or difficulty sleeping  HEME/LYMPH: No easy bruising, or bleeding gums  ALLERY AND IMMUNOLOGIC: No hives or eczema    RADIOLOGY & ADDITIONAL TESTS:    PHYSICAL EXAM:  GENERAL: NAD, well-groomed, well-developed  HEAD:  Atraumatic, Normocephalic  EYES: EOMI, PERRLA, conjunctiva and sclera clear  ENMT: No tonsillar erythema, exudates, or enlargement; Moist mucous membranes, Good dentition, No lesions  NECK: Supple, No JVD, Normal thyroid  NERVOUS SYSTEM:  Alert & Oriented X3, Good concentration; Motor Strength 5/5 B/L upper and lower extremities; DTRs 2+ intact and symmetric  CHEST/LUNG: Clear to percussion bilaterally; No rales, rhonchi, wheezing, or rubs  HEART: Regular rate and rhythm; No murmurs, rubs, or gallops  ABDOMEN: Soft, Nontender, Nondistended; Bowel sounds present  EXTREMITIES:  2+ Peripheral Pulses, No clubbing, cyanosis, or edema  LYMPH: No lymphadenopathy noted  SKIN: No rashes or lesions    Care Discussed with Consultants/Other Providers [ ] YES  [ ] NO Patient is a 84y old  Male who was recently discharged after treated  for MSSA UTI and hydronephrosis, and now presenting from rehab to the ER for evaluation of weakness (06 May 2018 18:20) and difficulty ambulating. He attributes his present weakness to this lack of working with PT. He has no fevers/chills, and on admission found to have positive Urine analysis and dose of Ceftriaxone is given, cultures pending. The ID consult requested to assist with further evaluation and antibiotic  management.         REVIEW OF SYSTEMS: Total of twelve systems have been reviewed with patient and found to be negative unless mentioned in HPI        PAST MEDICAL & SURGICAL HISTORY:  Psoriasis  Prostate cancer  GERD (gastroesophageal reflux disease)  Dyslipidemia  No significant past surgical history            SOCIAL HISTORY  Alcohol: Does not drink  Tobacco: Does not smoke  Illicit substance use: None          FAMILY HISTORY: Non contributory to the present illness        ALLERGIES: NKDA        T(C): 36.4 (18 @ 20:57), Max: 36.8 (18 @ 13:23)  HR: 60 (18 @ 20:57) (60 - 61)  BP: 140/82 (18 @ 20:57) (119/78 - 140/86)  RR: 16 (18 @ 20:57) (16 - 18)  SpO2: 98% (18 @ 20:57) (96% - 98%)  Wt(kg): --  I&O's Summary          PHYSICAL EXAM:  GENERAL: Not in acute distress  CVS: s1 and s2 present  RESP: Air entry B/L  GI: abdomen soft and nontender  EXT: No pedal edema  CNS: Awake and alert  SKIN: psoriasis lesions        LABS:                        12.0   7.9   )-----------( 262      ( 06 May 2018 14:47 )             34.9           -    141  |  105  |  30<H>  ----------------------------<  88  4.6   |  23  |  1.55<H>    Ca    9.7      06 May 2018 14:47  Phos  3.4     05-  Mg     2.1     -    TPro  8.1  /  Alb  3.8  /  TBili  0.3  /  DBili  x   /  AST  23  /  ALT  8<L>  /  AlkPhos  84  05-06    PT/INR - ( 06 May 2018 14:47 )   PT: 13.6 sec;   INR: 1.24 ratio         PTT - ( 06 May 2018 14:47 )  PTT:39.9 sec  Urinalysis Basic - ( 06 May 2018 16:09 )    Color: Yellow / Appearance: SL Turbid / S.016 / pH: x  Gluc: x / Ketone: Negative  / Bili: Negative / Urobili: Negative   Blood: x / Protein: Trace / Nitrite: Negative   Leuk Esterase: Large / RBC: >50 /HPF / WBC >50 /HPF   Sq Epi: x / Non Sq Epi: OCC /HPF / Bacteria: Few /HPF            MEDICATIONS  (STANDING):  amLODIPine   Tablet 5 milliGRAM(s) Oral daily  cholecalciferol 1000 Unit(s) Oral daily  cyanocobalamin 1000 MICROGram(s) Oral daily  doxazosin 4 milliGRAM(s) Oral at bedtime  enoxaparin Injectable 40 milliGRAM(s) SubCutaneous every 24 hours  famotidine    Tablet 20 milliGRAM(s) Oral daily  latanoprost 0.005% Ophthalmic Solution 1 Drop(s) Left EYE at bedtime  nystatin Powder 1 Application(s) Topical three times a day  senna 2 Tablet(s) Oral at bedtime  simvastatin 20 milliGRAM(s) Oral at bedtime    MEDICATIONS  (PRN):  acetaminophen   Tablet. 650 milliGRAM(s) Oral every 6 hours PRN Moderate Pain (4 - 6)  bisacodyl 5 milliGRAM(s) Oral every 12 hours PRN Constipation  docusate sodium 100 milliGRAM(s) Oral daily PRN Constipation  hydrocortisone 2.5% Ointment 1 Application(s) Topical daily PRN Rash          RADIOLOGY & ADDITIONAL TESTS:    < from: Xray Chest 1 View- PORTABLE-Urgent (18 @ 15:07) >      INTERPRETATION:  no urgent/emergent findings.    < end of copied text >      < from: US Kidney and Bladder (18 @ 17:05) >    Moderate right hydroureteronephrosis to the level of the bladder. The   right ureter appears to insert on a bladder diverticulum. Right renal   cortical thinning is suggestive of chronic hydronephrosis.     Urinary bladder trabeculations and diverticula with debris.      < end of copied text >

## 2018-05-06 NOTE — ED PROVIDER NOTE - OBJECTIVE STATEMENT
Dick Suarez MD (resident): 84 M w/ Hx of HLD, recent UTI s/p admission, who was BIBEMS from [facility] rehab for generalized weakness for last 2 weeks. no atbx at rehab. pt has been unable to perform activities w/ PT and OT. No fall, no trauma. No F/C.

## 2018-05-06 NOTE — H&P ADULT - NSHPSOCIALHISTORY_GEN_ALL_CORE
nonsmoker, nondrinker, no drugs- came from rehab, but originally from home where he lives with his wife

## 2018-05-06 NOTE — H&P ADULT - PROBLEM SELECTOR PLAN 1
suspect developing functional quadriplegia from ongoing debility and lack of PT at rehab vs. doubt dehydration or UTI vs. 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

## 2018-05-06 NOTE — H&P ADULT - NSHPLABSRESULTS_GEN_ALL_CORE
.  LABS:                         12.0   7.9   )-----------( 262      ( 06 May 2018 14:47 )             34.9     05-06    141  |  105  |  30<H>  ----------------------------<  88  4.6   |  23  |  1.55<H>    Ca    9.7      06 May 2018 14:47  Phos  3.4     -  Mg     2.1     -    TPro  8.1  /  Alb  3.8  /  TBili  0.3  /  DBili  x   /  AST  23  /  ALT  8<L>  /  AlkPhos  84  -    PT/INR - ( 06 May 2018 14:47 )   PT: 13.6 sec;   INR: 1.24 ratio         PTT - ( 06 May 2018 14:47 )  PTT:39.9 sec  Urinalysis Basic - ( 06 May 2018 16:09 )    Color: Yellow / Appearance: SL Turbid / S.016 / pH: x  Gluc: x / Ketone: Negative  / Bili: Negative / Urobili: Negative   Blood: x / Protein: Trace / Nitrite: Negative   Leuk Esterase: Large / RBC: >50 /HPF / WBC >50 /HPF   Sq Epi: x / Non Sq Epi: OCC /HPF / Bacteria: Few /HPF            EKG personally reviewed: ruben mace at 60bpm  CXR personally reviewed: clear lungs

## 2018-05-06 NOTE — H&P ADULT - HISTORY OF PRESENT ILLNESS
84m hx tia, s/p PPM, psoriasis, presenting from rehab with weakness, difficulty ambulating. Pt's complaints cluster primarily around poor care at rehab and lack of actual PT time, and pt attributes his present weakness to this lack of working with PT. Reports completing a course of abx yesterday for a UTI. Reports no fevers/chills, no new aches/pains, no cp/sob, no cough/HA. Would like to go to a different rehab if possible.    In ED: 97.9, 61, 119/78, 18, 96RA. Given ceftriaxone (1700), NS 1 L bolux x 1. 84m hx tia, s/p PPM, psoriasis, CKD, presenting from rehab with weakness, difficulty ambulating. Pt's complaints cluster primarily around poor care at rehab and lack of actual PT time, and pt attributes his present weakness to this lack of working with PT. Reports completing a course of abx yesterday for a UTI. Reports no fevers/chills, no new aches/pains, no cp/sob, no cough/HA. Would like to go to a different rehab if possible.    In ED: 97.9, 61, 119/78, 18, 96RA. Given ceftriaxone (1700), NS 1 L bolux x 1.

## 2018-05-06 NOTE — H&P ADULT - PROBLEM SELECTOR PLAN 2
positive UA however pt just finished abx course yesterday and has no symptoms, no white count, no fever  - will observe off antibiotics Pt at his baseline Cr 1.5- continue to monitor

## 2018-05-06 NOTE — ED PROVIDER NOTE - NS ED ROS FT
No fever, no chills, no change in vision, no change in hearing, no chest pain, no shortness of breath, no abdominal pain, no vomiting, no dysuria, no muscle pain, no rashes, + generalized weakness, no focal weakness, no loss of consciousness. ~ Dick Suarze MD

## 2018-05-06 NOTE — ED PROVIDER NOTE - MEDICAL DECISION MAKING DETAILS
Dick Suarez MD (resident): generalized weakness, recent UTI. will get labs, EKG and CXR. No signs of sepsis. no trauma, no change in mental status. Dick Suarez MD (resident): generalized weakness, recent UTI. will get labs, EKG and CXR. No signs of sepsis. no trauma, no change in mental status.    DESHAUN Veronica MD: Pt is an 84 M w/ Hx of HLD, recent UTI s/p admission, who was BIBEMS from [facility] rehab for generalized weakness for last 2 weeks and being unhappy with his care at [facility]. Pt has been unable to perform activities w/ PT and OT. No fall, no trauma. No F/C. Denies n/v/d, abd pain, urinary sx, CP, SOB, HA, dizziness, falls. DDx: uti, pna, deconditioning, dehydration, metabolic d/o. Plan: basic labs, cxr, likely admission

## 2018-05-06 NOTE — ED PROVIDER NOTE - PHYSICAL EXAMINATION
Physical Exam: elderly M who is in NAD, AAOx3, NCAT, MMM, neck is supple, PERRL, CTAB, normal rate and regular rhythm, abdomen is soft and NTND, No deformity of extremities, No rashes, CN grossly intact, No focal motor or sensory deficits. ~ Dick Suarez MD

## 2018-05-06 NOTE — H&P ADULT - PROBLEM SELECTOR PLAN 3
continue topical hydrocortisone as needed positive UA however pt just finished abx course yesterday and has no symptoms, no white count, no fever  - will observe off antibiotics

## 2018-05-06 NOTE — ED PROVIDER NOTE - NS ED ATTENDING STATEMENT MOD
NEUROSURGICAL POST-OPERATIVE PROGRESS NOTE    DATE OF SERVICE:  03/05/2018      ATTENDING PHYSICIAN:  Adelso Hernandez MD    SUBJECTIVE:    INTERIM HISTORY:    This is a very pleasant 52 y.o. y.o. male, who is status 6 weeks right L5-S1 microdiscectomy. Really satisfied with his outcome. Resolution of right leg pain. Minimal low back pain. Bowel movements are more regular. Has been complaint with his activity restrictions.      Low Back Pain Scale  R Low Back-Pain Score: 2  R Low Back-Pain Intensity: Pain killers give moderate relief from pain  R Low Back-Pain Score: It is painful to look after myself and I am slow and careful  Low Back-Lifting: I can only lift very light weights   Low Back-Walking: Pain does not prevent me from walking any distance   Low Back-Sitting: Pain prevents me from sitting more than 30 minutes   Low Back-Standing: I have some pain on standing but it does not increase with time   Low Back-Sleeping: I have pain in bed but it does not prevent me from sleeping well   Low Back-Social Life: Pain has restricted my social life and I do not go out very often   Low Back-Traveling: I have extra pain while traveling but it does not compel me to seek alternate forms of travel   Low Back-Changing Degree of Pain: My pain fluctuates but is definitely getting better         OBJECTIVE:    PHYSICAL EXAMINATION:     Neurosurgery Physical Exam    Ortho Exam    Neurologic Exam     Motor Exam     Strength   Strength 5/5 throughout.       Wound has healed.    DIAGNOSTIC DATA:    na    ASSESMENT:    This is a 52 y.o. male who is s/p 6 weeks s/p right L5-S1 microdiscectomy. Excellent outcome.    PLAN:    Continue activity restriction and light duty at work.  Lumbar PT  Reassessment in 6 weeks.           Adelso Hernandez MD  Pager: [phone]       I have personally seen and examined this patient.  I have fully participated in the care of this patient. I have reviewed all pertinent clinical information, including history, physical exam, plan and the Resident’s note and agree except as noted.

## 2018-05-06 NOTE — ED PROVIDER NOTE - TOBACCO USE
PT WITHOUT IV ACCESS. UNABLE TO OBTAINIV ACCESS. RN TO COME ESTABLISH ACCESS
WITH ULTRASOUND. VANCO INFUSION ON [facility]. Unknown if ever smoked

## 2018-05-07 DIAGNOSIS — N39.0 URINARY TRACT INFECTION, SITE NOT SPECIFIED: ICD-10-CM

## 2018-05-07 DIAGNOSIS — N13.30 UNSPECIFIED HYDRONEPHROSIS: ICD-10-CM

## 2018-05-07 DIAGNOSIS — N30.00 ACUTE CYSTITIS WITHOUT HEMATURIA: ICD-10-CM

## 2018-05-07 DIAGNOSIS — N18.3 CHRONIC KIDNEY DISEASE, STAGE 3 (MODERATE): ICD-10-CM

## 2018-05-07 DIAGNOSIS — N13.39 OTHER HYDRONEPHROSIS: ICD-10-CM

## 2018-05-07 DIAGNOSIS — R53.1 WEAKNESS: ICD-10-CM

## 2018-05-07 DIAGNOSIS — I12.9 HYPERTENSIVE CHRONIC KIDNEY DISEASE WITH STAGE 1 THROUGH STAGE 4 CHRONIC KIDNEY DISEASE, OR UNSPECIFIED CHRONIC KIDNEY DISEASE: ICD-10-CM

## 2018-05-07 LAB
ANION GAP SERPL CALC-SCNC: 12 MMOL/L — SIGNIFICANT CHANGE UP (ref 5–17)
BASOPHILS # BLD AUTO: 0.02 K/UL — SIGNIFICANT CHANGE UP (ref 0–0.2)
BASOPHILS NFR BLD AUTO: 0.4 % — SIGNIFICANT CHANGE UP (ref 0–2)
BUN SERPL-MCNC: 28 MG/DL — HIGH (ref 7–23)
CALCIUM SERPL-MCNC: 9.4 MG/DL — SIGNIFICANT CHANGE UP (ref 8.4–10.5)
CHLORIDE SERPL-SCNC: 108 MMOL/L — SIGNIFICANT CHANGE UP (ref 96–108)
CO2 SERPL-SCNC: 22 MMOL/L — SIGNIFICANT CHANGE UP (ref 22–31)
CREAT SERPL-MCNC: 1.4 MG/DL — HIGH (ref 0.5–1.3)
CULTURE RESULTS: NO GROWTH — SIGNIFICANT CHANGE UP
EOSINOPHIL # BLD AUTO: 0.23 K/UL — SIGNIFICANT CHANGE UP (ref 0–0.5)
EOSINOPHIL NFR BLD AUTO: 4.3 % — SIGNIFICANT CHANGE UP (ref 0–6)
GLUCOSE SERPL-MCNC: 88 MG/DL — SIGNIFICANT CHANGE UP (ref 70–99)
HCT VFR BLD CALC: 33 % — LOW (ref 39–50)
HGB BLD-MCNC: 10.5 G/DL — LOW (ref 13–17)
IMM GRANULOCYTES NFR BLD AUTO: 0 % — SIGNIFICANT CHANGE UP (ref 0–1.5)
LYMPHOCYTES # BLD AUTO: 2.17 K/UL — SIGNIFICANT CHANGE UP (ref 1–3.3)
LYMPHOCYTES # BLD AUTO: 40.1 % — SIGNIFICANT CHANGE UP (ref 13–44)
MCHC RBC-ENTMCNC: 29.1 PG — SIGNIFICANT CHANGE UP (ref 27–34)
MCHC RBC-ENTMCNC: 31.8 GM/DL — LOW (ref 32–36)
MCV RBC AUTO: 91.4 FL — SIGNIFICANT CHANGE UP (ref 80–100)
MONOCYTES # BLD AUTO: 0.5 K/UL — SIGNIFICANT CHANGE UP (ref 0–0.9)
MONOCYTES NFR BLD AUTO: 9.2 % — SIGNIFICANT CHANGE UP (ref 2–14)
NEUTROPHILS # BLD AUTO: 2.49 K/UL — SIGNIFICANT CHANGE UP (ref 1.8–7.4)
NEUTROPHILS NFR BLD AUTO: 46 % — SIGNIFICANT CHANGE UP (ref 43–77)
PLATELET # BLD AUTO: 228 K/UL — SIGNIFICANT CHANGE UP (ref 150–400)
POTASSIUM SERPL-MCNC: 4.1 MMOL/L — SIGNIFICANT CHANGE UP (ref 3.5–5.3)
POTASSIUM SERPL-SCNC: 4.1 MMOL/L — SIGNIFICANT CHANGE UP (ref 3.5–5.3)
RBC # BLD: 3.61 M/UL — LOW (ref 4.2–5.8)
RBC # FLD: 15.1 % — HIGH (ref 10.3–14.5)
SODIUM SERPL-SCNC: 142 MMOL/L — SIGNIFICANT CHANGE UP (ref 135–145)
SPECIMEN SOURCE: SIGNIFICANT CHANGE UP
WBC # BLD: 5.41 K/UL — SIGNIFICANT CHANGE UP (ref 3.8–10.5)
WBC # FLD AUTO: 5.41 K/UL — SIGNIFICANT CHANGE UP (ref 3.8–10.5)

## 2018-05-07 PROCEDURE — 76770 US EXAM ABDO BACK WALL COMP: CPT | Mod: 26

## 2018-05-07 RX ORDER — AMLODIPINE BESYLATE 2.5 MG/1
2.5 TABLET ORAL DAILY
Qty: 0 | Refills: 0 | Status: DISCONTINUED | OUTPATIENT
Start: 2018-05-08 | End: 2018-05-08

## 2018-05-07 RX ADMIN — SIMVASTATIN 20 MILLIGRAM(S): 20 TABLET, FILM COATED ORAL at 23:17

## 2018-05-07 RX ADMIN — ENOXAPARIN SODIUM 40 MILLIGRAM(S): 100 INJECTION SUBCUTANEOUS at 06:06

## 2018-05-07 RX ADMIN — NYSTATIN CREAM 1 APPLICATION(S): 100000 CREAM TOPICAL at 15:00

## 2018-05-07 RX ADMIN — NYSTATIN CREAM 1 APPLICATION(S): 100000 CREAM TOPICAL at 06:07

## 2018-05-07 RX ADMIN — PREGABALIN 1000 MICROGRAM(S): 225 CAPSULE ORAL at 12:50

## 2018-05-07 RX ADMIN — AMLODIPINE BESYLATE 5 MILLIGRAM(S): 2.5 TABLET ORAL at 06:07

## 2018-05-07 RX ADMIN — Medication 4 MILLIGRAM(S): at 23:17

## 2018-05-07 RX ADMIN — Medication 1000 UNIT(S): at 12:50

## 2018-05-07 RX ADMIN — Medication 3 MILLIGRAM(S): at 23:17

## 2018-05-07 RX ADMIN — NYSTATIN CREAM 1 APPLICATION(S): 100000 CREAM TOPICAL at 23:18

## 2018-05-07 RX ADMIN — LATANOPROST 1 DROP(S): 0.05 SOLUTION/ DROPS OPHTHALMIC; TOPICAL at 23:18

## 2018-05-07 RX ADMIN — CEFEPIME 100 MILLIGRAM(S): 1 INJECTION, POWDER, FOR SOLUTION INTRAMUSCULAR; INTRAVENOUS at 17:45

## 2018-05-07 RX ADMIN — FAMOTIDINE 20 MILLIGRAM(S): 10 INJECTION INTRAVENOUS at 12:50

## 2018-05-07 RX ADMIN — CEFEPIME 100 MILLIGRAM(S): 1 INJECTION, POWDER, FOR SOLUTION INTRAMUSCULAR; INTRAVENOUS at 06:07

## 2018-05-07 NOTE — PROGRESS NOTE ADULT - ASSESSMENT
Patient is a 84y old  Male who was recently discharged after treated  for MSSA UTI and hydronephrosis, and now presenting from rehab to the ER for evaluation of weakness (06 May 2018 18:20) and difficulty ambulating. He attributes his present weakness to this lack of working with PT. He has no fevers/chills, and on admission found to have positive Urine analysis and dose of Ceftriaxone is given, cultures pending. The ID consult requested to assist with further evaluation and antibiotic  management.     # Complicated UTI  # Moderate Right Hydronephrosis    Would recommend:  1. Urology evaluation  2. Follow up Urine and blood cultures  3. Continue Cefepime until cultures are finalized  4. Aspiration precaution    d/w patient    will follow the patient with you Patient is a 84y old  Male who was recently discharged after treated  for MSSA UTI and hydronephrosis, and now presenting from rehab to the ER for evaluation of weakness (06 May 2018 18:20) and difficulty ambulating. He attributes his present weakness to this lack of working with PT. He has no fevers/chills, and on admission found to have positive Urine analysis and dose of Ceftriaxone is given, cultures pending. The ID consult requested to assist with further evaluation and antibiotic  management.     # Complicated UTI  # Moderate Right Hydronephrosis    Would recommend:  1. Urology evaluation for Right Hydronephrosis  2. Follow up Urine and blood cultures  3. Continue Cefepime until cultures are finalized  4. Aspiration precaution    d/w patient    will follow the patient with you

## 2018-05-07 NOTE — PROGRESS NOTE ADULT - ASSESSMENT
84m hx tia, s/p PPM, psoriasis, CKD 3, hydroureteronephrosis, presenting from rehab with weakness, difficulty ambulating, poor oral intake.

## 2018-05-07 NOTE — PROGRESS NOTE ADULT - SUBJECTIVE AND OBJECTIVE BOX
Patient is seen and examined at the bed side, is afebrile. He is doing okay, the cultures are still pending. The renal US shows Moderate right Hydronephrosis.          REVIEW OF SYSTEMS: All other review systems are negative        Vital Signs Last 24 Hrs  T(C): 36.6 (07 May 2018 20:33), Max: 36.6 (07 May 2018 20:33)  T(F): 97.9 (07 May 2018 20:33), Max: 97.9 (07 May 2018 20:33)  HR: 60 (07 May 2018 20:33) (60 - 60)  BP: 105/68 (07 May 2018 20:33) (90/58 - 118/75)  BP(mean): --  RR: 18 (07 May 2018 20:33) (18 - 18)  SpO2: 95% (07 May 2018 20:33) (95% - 99%)        ALLERGIES: NKDA          PHYSICAL EXAM:  GENERAL: Not in acute distress  CVS: s1 and s2 present  RESP: Air entry B/L  GI: abdomen soft and nontender  EXT: No pedal edema  CNS: Awake and alert  SKIN: psoriasis lesions          LABS:                                   10.5   5.41  )-----------( 228      ( 07 May 2018 07:36 )             33.0                12.0   7.9   )-----------( 262      ( 06 May 2018 14:47 )             34.9         05    142  |  108  |  28<H>  ----------------------------<  88  4.1   |  22  |  1.40<H>    Ca    9.4      07 May 2018 06:44  Phos  3.4     05-06  Mg     2.1     05-06    TPro  8.1  /  Alb  3.8  /  TBili  0.3  /  DBili  x   /  AST  23  /  ALT  8<L>  /  AlkPhos  84  --    141  |  105  |  30<H>  ----------------------------<  88  4.6   |  23  |  1.55<H>    Ca    9.7      06 May 2018 14:47  Phos  3.4     05-06  Mg     2.1     05-06    TPro  8.1  /  Alb  3.8  /  TBili  0.3  /  DBili  x   /  AST  23  /  ALT  8<L>  /  AlkPhos  84      PT/INR - ( 06 May 2018 14:47 )   PT: 13.6 sec;   INR: 1.24 ratio         PTT - ( 06 May 2018 14:47 )  PTT:39.9 sec  Urinalysis Basic - ( 06 May 2018 16:09 )    Color: Yellow / Appearance: SL Turbid / S.016 / pH: x  Gluc: x / Ketone: Negative  / Bili: Negative / Urobili: Negative   Blood: x / Protein: Trace / Nitrite: Negative   Leuk Esterase: Large / RBC: >50 /HPF / WBC >50 /HPF   Sq Epi: x / Non Sq Epi: OCC /HPF / Bacteria: Few /HPF      MEDICATIONS  (STANDING):  cefepime   IVPB 1000 milliGRAM(s) IV Intermittent every 12 hours  cefepime   IVPB      cholecalciferol 1000 Unit(s) Oral daily  cyanocobalamin 1000 MICROGram(s) Oral daily  doxazosin 4 milliGRAM(s) Oral at bedtime  enoxaparin Injectable 40 milliGRAM(s) SubCutaneous every 24 hours  famotidine    Tablet 20 milliGRAM(s) Oral daily  latanoprost 0.005% Ophthalmic Solution 1 Drop(s) Left EYE at bedtime  melatonin 3 milliGRAM(s) Oral at bedtime  nystatin Powder 1 Application(s) Topical three times a day  senna 2 Tablet(s) Oral at bedtime  simvastatin 20 milliGRAM(s) Oral at bedtime    MEDICATIONS  (PRN):  acetaminophen   Tablet. 650 milliGRAM(s) Oral every 6 hours PRN Moderate Pain (4 - 6)  bisacodyl 5 milliGRAM(s) Oral every   12 hours PRN Constipation  docusate sodium 100 milliGRAM(s) Oral daily PRN Constipation  hydrocortisone 2.5% Ointment 1 Application(s) Topical daily PRN Rash        RADIOLOGY & ADDITIONAL TESTS:    18: US Kidney and Bladder (18 @ 16:42) Again seen is moderate hydroureteronephrosis as described above. Fine debris within the distal dilated right ureter is indicative of infection.   No left-sided hydronephrosis. Urinary bladder wall small contains fine debris also indicative of  infection. Thickening of the bladder wall may also reflect infection.  Thickening/trabeculation and bladder diverticulum suggests some degree of chronic outlet obstruction.      18: Xray Chest 1 View- PORTABLE-Urgent (18 @ 15:07) INTERPRETATION:  no urgent/emergent findings.      18: US Kidney and Bladder (18 @ 17:05) Moderate right hydroureteronephrosis to the level of the bladder. The right ureter appears to insert on a bladder diverticulum. Right renal   cortical thinning is suggestive of chronic hydronephrosis. Urinary bladder trabeculations and diverticula with debris.        MICROBIOLOGY DATA:    Urinalysis + Microscopic Examination (18 @ 16:09)    Urine Appearance: SL Turbid    Urobilinogen: Negative    Leukocyte Esterase Concentration: Large    Nitrite: Negative    Specific Gravity: 1.016    Protein, Urine: Trace    pH Urine: 5.5    Ketone - Urine: Negative    Bilirubin: Negative    Color: Yellow    Glucose Qualitative, Urine: Negative    Blood, Urine: Moderate    Red Blood Cell - Urine: >50 /HPF    White Blood Cell - Urine: >50 /HPF    Epithelial Cells: OCC /HPF    Hyaline Casts: 2-5    Bacteria: Few /HPF Patient is seen and examined at the bed side, is afebrile. He is feeling little better, the cultures are still pending. The renal US shows Moderate right Hydronephrosis.          REVIEW OF SYSTEMS: All other review systems are negative        Vital Signs Last 24 Hrs  T(C): 36.6 (07 May 2018 20:33), Max: 36.6 (07 May 2018 20:33)  T(F): 97.9 (07 May 2018 20:33), Max: 97.9 (07 May 2018 20:33)  HR: 60 (07 May 2018 20:33) (60 - 60)  BP: 105/68 (07 May 2018 20:33) (90/58 - 118/75)  BP(mean): --  RR: 18 (07 May 2018 20:33) (18 - 18)  SpO2: 95% (07 May 2018 20:33) (95% - 99%)        ALLERGIES: NKDA          PHYSICAL EXAM:  GENERAL: Not in acute distress  CVS: s1 and s2 present  RESP: Air entry B/L  GI: abdomen soft and nontender  EXT: No pedal edema  CNS: Awake and alert  SKIN: psoriasis lesions          LABS:                                   10.5   5.41  )-----------( 228      ( 07 May 2018 07:36 )             33.0                12.0   7.9   )-----------( 262      ( 06 May 2018 14:47 )             34.9         05    142  |  108  |  28<H>  ----------------------------<  88  4.1   |  22  |  1.40<H>    Ca    9.4      07 May 2018 06:44  Phos  3.4     05-06  Mg     2.1     05-06    TPro  8.1  /  Alb  3.8  /  TBili  0.3  /  DBili  x   /  AST  23  /  ALT  8<L>  /  AlkPhos  84  --    141  |  105  |  30<H>  ----------------------------<  88  4.6   |  23  |  1.55<H>    Ca    9.7      06 May 2018 14:47  Phos  3.4     05-06  Mg     2.1     05-06    TPro  8.1  /  Alb  3.8  /  TBili  0.3  /  DBili  x   /  AST  23  /  ALT  8<L>  /  AlkPhos  84      PT/INR - ( 06 May 2018 14:47 )   PT: 13.6 sec;   INR: 1.24 ratio         PTT - ( 06 May 2018 14:47 )  PTT:39.9 sec  Urinalysis Basic - ( 06 May 2018 16:09 )    Color: Yellow / Appearance: SL Turbid / S.016 / pH: x  Gluc: x / Ketone: Negative  / Bili: Negative / Urobili: Negative   Blood: x / Protein: Trace / Nitrite: Negative   Leuk Esterase: Large / RBC: >50 /HPF / WBC >50 /HPF   Sq Epi: x / Non Sq Epi: OCC /HPF / Bacteria: Few /HPF      MEDICATIONS  (STANDING):  cefepime   IVPB 1000 milliGRAM(s) IV Intermittent every 12 hours  cefepime   IVPB      cholecalciferol 1000 Unit(s) Oral daily  cyanocobalamin 1000 MICROGram(s) Oral daily  doxazosin 4 milliGRAM(s) Oral at bedtime  enoxaparin Injectable 40 milliGRAM(s) SubCutaneous every 24 hours  famotidine    Tablet 20 milliGRAM(s) Oral daily  latanoprost 0.005% Ophthalmic Solution 1 Drop(s) Left EYE at bedtime  melatonin 3 milliGRAM(s) Oral at bedtime  nystatin Powder 1 Application(s) Topical three times a day  senna 2 Tablet(s) Oral at bedtime  simvastatin 20 milliGRAM(s) Oral at bedtime    MEDICATIONS  (PRN):  acetaminophen   Tablet. 650 milliGRAM(s) Oral every 6 hours PRN Moderate Pain (4 - 6)  bisacodyl 5 milliGRAM(s) Oral every   12 hours PRN Constipation  docusate sodium 100 milliGRAM(s) Oral daily PRN Constipation  hydrocortisone 2.5% Ointment 1 Application(s) Topical daily PRN Rash        RADIOLOGY & ADDITIONAL TESTS:    18: US Kidney and Bladder (18 @ 16:42) Again seen is moderate hydroureteronephrosis as described above. Fine debris within the distal dilated right ureter is indicative of infection.   No left-sided hydronephrosis. Urinary bladder wall small contains fine debris also indicative of  infection. Thickening of the bladder wall may also reflect infection.  Thickening/trabeculation and bladder diverticulum suggests some degree of chronic outlet obstruction.      18: Xray Chest 1 View- PORTABLE-Urgent (18 @ 15:07) INTERPRETATION:  no urgent/emergent findings.      18: US Kidney and Bladder (18 @ 17:05) Moderate right hydroureteronephrosis to the level of the bladder. The right ureter appears to insert on a bladder diverticulum. Right renal   cortical thinning is suggestive of chronic hydronephrosis. Urinary bladder trabeculations and diverticula with debris.        MICROBIOLOGY DATA:    Urinalysis + Microscopic Examination (18 @ 16:09)    Urine Appearance: SL Turbid    Urobilinogen: Negative    Leukocyte Esterase Concentration: Large    Nitrite: Negative    Specific Gravity: 1.016    Protein, Urine: Trace    pH Urine: 5.5    Ketone - Urine: Negative    Bilirubin: Negative    Color: Yellow    Glucose Qualitative, Urine: Negative    Blood, Urine: Moderate    Red Blood Cell - Urine: >50 /HPF    White Blood Cell - Urine: >50 /HPF    Epithelial Cells: OCC /HPF    Hyaline Casts: 2-5    Bacteria: Few /HPF

## 2018-05-07 NOTE — CONSULT NOTE ADULT - SUBJECTIVE AND OBJECTIVE BOX
[facility] NEPHROLOGY- CONSULTATION NOTE    Patient is a 84y Male with recurrent UTIs with chronic R hydronephrosis who presented to the hospital with another UTI.  Pt is at [facility]) and unable to go to urology office for further intervention.  He feels ill now, with general malaise, some dysuria, and mild suprapubic pain.    PAST MEDICAL & SURGICAL HISTORY:  Psoriasis  Prostate cancer  GERD (gastroesophageal reflux disease)  Dyslipidemia  No significant past surgical history    No Known Allergies    Home Medications Reviewed  Hospital Medications:   MEDICATIONS  (STANDING):  cefepime   IVPB 1000 milliGRAM(s) IV Intermittent every 12 hours  cefepime   IVPB      cholecalciferol 1000 Unit(s) Oral daily  cyanocobalamin 1000 MICROGram(s) Oral daily  doxazosin 4 milliGRAM(s) Oral at bedtime  enoxaparin Injectable 40 milliGRAM(s) SubCutaneous every 24 hours  famotidine    Tablet 20 milliGRAM(s) Oral daily  latanoprost 0.005% Ophthalmic Solution 1 Drop(s) Left EYE at bedtime  melatonin 3 milliGRAM(s) Oral at bedtime  nystatin Powder 1 Application(s) Topical three times a day  senna 2 Tablet(s) Oral at bedtime  simvastatin 20 milliGRAM(s) Oral at bedtime    SOCIAL HISTORY:  Denies ETOh,Smoking,   FAMILY HISTORY:  No pertinent family history in first degree relatives    REVIEW OF SYSTEMS:  CONSTITUTIONAL: + malaise, + weakness, +- fevers, no chills  EYES/ENT: No visual changes;  No vertigo or throat pain   NECK: No pain or stiffness  RESPIRATORY: No cough, wheezing, hemoptysis; No shortness of breath  CARDIOVASCULAR: No chest pain or palpitations.  GASTROINTESTINAL: No abdominal or epigastric pain. No nausea, vomiting, or hematemesis; No diarrhea or constipation. No melena or hematochezia.  GENITOURINARY: + dysuria, +suprapubic pain, +frequency, +urinary urgency, no hematuria  NEUROLOGICAL: No numbness or weakness  SKIN: No itching, burning, rashes, or lesions   VASCULAR: No bilateral lower extremity edema.   All other review of systems is negative unless indicated above.    VITALS:  T(F): 97.5 (18 @ 08:31), Max: 98.3 (18 @ 13:23)  HR: 60 (05-07-18 @ 08:31)  BP: 90/58 (18 @ 08:31)  RR: 18 (18 @ 08:31)  SpO2: 99% (18 @ 08:31)  Wt(kg): --     @ 07:01  -   @ 12:00  --------------------------------------------------------  IN: 240 mL / OUT: 0 mL / NET: 240 mL        PHYSICAL EXAM:  Constitutional: NAD  HEENT: anicteric sclera, oropharynx clear, MMM  Neck: No JVD  Respiratory: CTAB, no wheezes, rales or rhonchi  Cardiovascular: S1, S2, RRR  Gastrointestinal: BS+, soft, NT/ND  Extremities: No cyanosis or clubbing. No peripheral edema  Neurological: A/O x 3, no focal deficits  Psychiatric: Normal mood, normal affect  : No CVA tenderness. No hansen.   Skin: No rashes  Vascular Access:    LABS:      142  |  108  |  28<H>  ----------------------------<  88  4.1   |  22  |  1.40<H>    Ca    9.4      07 May 2018 06:44  Phos  3.4     05-  Mg     2.1     -    TPro  8.1  /  Alb  3.8  /  TBili  0.3  /  DBili      /  AST  23  /  ALT  8<L>  /  AlkPhos  84  05-    Creatinine Trend: 1.40 <--, 1.55 <--, 1.54 <--, 1.56 <--                        10.5   5.41  )-----------( 228      ( 07 May 2018 07:36 )             33.0     Urine Studies:  Urinalysis Basic - ( 06 May 2018 16:09 )    Color: Yellow / Appearance: SL Turbid / S.016 / pH:   Gluc:  / Ketone: Negative  / Bili: Negative / Urobili: Negative   Blood:  / Protein: Trace / Nitrite: Negative   Leuk Esterase: Large / RBC: >50 /HPF / WBC >50 /HPF   Sq Epi:  / Non Sq Epi: OCC /HPF / Bacteria: Few /HPF        RADIOLOGY & ADDITIONAL STUDIES:      < from: US Kidney and Bladder (18 @ 17:05) >    EXAM:  US KIDNEYS AND BLADDER                            PROCEDURE DATE:  2018            INTERPRETATION:  CLINICAL INFORMATION: Acute renal failure. Urinary tract   infection.    COMPARISON: None available.    TECHNIQUE: Sonography of the kidneys and bladder.     FINDINGS:    Right kidney:  10.5 cm. Increased cortical echogenicity and cortical   thinning. Moderate hydroureteronephrosis to the level of the bladder. The   ureter appears to insert on a right bladder diverticulum. No renal mass   or calculi.    Left kidney:  13.4 cm. Increased cortical echogenicity. Lower pole simple   cyst measures up to 1.8 cm. No hydronephrosis or calculi.    Urinary bladder: Urinary bladder wall is trabeculated and contains   diverticula. There is debris within the urinary bladder. The prevoid   bladder volume is 305 cc. Patient was unable to void.    IMPRESSION:     Moderate right hydroureteronephrosis to the level of the bladder. The   right ureter appears to insert on a bladder diverticulum. Right renal   cortical thinning is suggestive of chronic hydronephrosis.     Urinary bladder trabeculations and diverticula with debris.                    ALFRED FERNANDEZ M.D., RADIOLOGY FELLOW  This document has been electronically signed.  PRAVEENA JAUREGUI M.D., ATTENDING RADIOLOGIST  This document has been electronically signed. 2018  5:24PM                < end of copied text >

## 2018-05-07 NOTE — CONSULT NOTE ADULT - PROBLEM SELECTOR RECOMMENDATION 9
Abx per primary team  Would recommend urologic intervention this admission as this is a recurrent problem.

## 2018-05-07 NOTE — PROGRESS NOTE ADULT - SUBJECTIVE AND OBJECTIVE BOX
Patient seen and examined at bedside  No new acute events noted  Case discussed with medical team    HPI:  84m hx tia, s/p PPM, psoriasis, CKD 3, hydroureteronephrosis, presenting from rehab with weakness, difficulty ambulating, poor oral intake.       PAST MEDICAL & SURGICAL HISTORY:  Psoriasis  Prostate cancer  GERD (gastroesophageal reflux disease)  Dyslipidemia  No significant past surgical history      No Known Allergies       MEDICATIONS  (STANDING):  amLODIPine   Tablet 5 milliGRAM(s) Oral daily  cefepime   IVPB 1000 milliGRAM(s) IV Intermittent every 12 hours  cefepime   IVPB      cholecalciferol 1000 Unit(s) Oral daily  cyanocobalamin 1000 MICROGram(s) Oral daily  doxazosin 4 milliGRAM(s) Oral at bedtime  enoxaparin Injectable 40 milliGRAM(s) SubCutaneous every 24 hours  famotidine    Tablet 20 milliGRAM(s) Oral daily  latanoprost 0.005% Ophthalmic Solution 1 Drop(s) Left EYE at bedtime  melatonin 3 milliGRAM(s) Oral at bedtime  nystatin Powder 1 Application(s) Topical three times a day  senna 2 Tablet(s) Oral at bedtime  simvastatin 20 milliGRAM(s) Oral at bedtime    MEDICATIONS  (PRN):  acetaminophen   Tablet. 650 milliGRAM(s) Oral every 6 hours PRN Moderate Pain (4 - 6)  bisacodyl 5 milliGRAM(s) Oral every 12 hours PRN Constipation  docusate sodium 100 milliGRAM(s) Oral daily PRN Constipation  hydrocortisone 2.5% Ointment 1 Application(s) Topical daily PRN Rash      REVIEW OF SYSTEMS:  CONSTITUTIONAL: (+) malaise. weakness.  EYES: No acute change in vision   ENT:  No tinnitus  NECK: No stiffness  RESPIRATORY: No hemoptysis  CARDIOVASCULAR: No chest pain, palpitations, syncope  GASTROINTESTINAL: No hematemesis, diarrhea, melena, or hematochezia.  GENITOURINARY: No hematuria  NEUROLOGICAL: No headaches  LYMPH Nodes: No enlarged glands  ENDOCRINE: No heat or cold intolerance	    T(C): 36.4 (18 @ 08:31), Max: 36.8 (18 @ 13:23)  HR: 60 (18 @ 08:31) (60 - 61)  BP: 90/58 (18 @ 08:31) (90/58 - 140/86)  RR: 18 (18 @ 08:31) (16 - 18)  SpO2: 99% (18 @ 08:31) (96% - 99%)    PHYSICAL EXAMINATION:   Constitutional: WD, NAD  HEENT: NC, AT  Neck:  Supple  Respiratory:  Adequate airflow b/l. Not using accessory muscles of respiration.  Cardiovascular:  S1 & S2 intact, no R/G, 2+ radial pulses b/l  Gastrointestinal: Soft, NT, ND, normoactive b.s., no organomegaly/RT/rigidity  Extremities: WWP  Neurological:  Alert and awake.  No acute focal motor deficits. Crude sensation intact.     Labs and imaging reviewed    LABS:                        10.5   5.41  )-----------( 228      ( 07 May 2018 07:36 )             33.0     05-    142  |  108  |  28<H>  ----------------------------<  88  4.1   |  22  |  1.40<H>    Ca    9.4      07 May 2018 06:44  Phos  3.4     05-06  Mg     2.1     05-06    TPro  8.1  /  Alb  3.8  /  TBili  0.3  /  DBili  x   /  AST  23  /  ALT  8<L>  /  AlkPhos  84  05-06    CARDIAC MARKERS ( 06 May 2018 14:47 )  x     / <0.01 ng/mL / x     / x     / x          PT/INR - ( 06 May 2018 14:47 )   PT: 13.6 sec;   INR: 1.24 ratio         PTT - ( 06 May 2018 14:47 )  PTT:39.9 sec  Urinalysis Basic - ( 06 May 2018 16:09 )    Color: Yellow / Appearance: SL Turbid / S.016 / pH: x  Gluc: x / Ketone: Negative  / Bili: Negative / Urobili: Negative   Blood: x / Protein: Trace / Nitrite: Negative   Leuk Esterase: Large / RBC: >50 /HPF / WBC >50 /HPF   Sq Epi: x / Non Sq Epi: OCC /HPF / Bacteria: Few /HPF      CAPILLARY BLOOD GLUCOSE            LIVER FUNCTIONS - ( 06 May 2018 14:47 )  Alb: 3.8 g/dL / Pro: 8.1 g/dL / ALK PHOS: 84 U/L / ALT: 8 U/L / AST: 23 U/L / GGT: x               RADIOLOGY & ADDITIONAL STUDIES:

## 2018-05-08 LAB
ANION GAP SERPL CALC-SCNC: 13 MMOL/L — SIGNIFICANT CHANGE UP (ref 5–17)
BUN SERPL-MCNC: 28 MG/DL — HIGH (ref 7–23)
CALCIUM SERPL-MCNC: 9.3 MG/DL — SIGNIFICANT CHANGE UP (ref 8.4–10.5)
CHLORIDE SERPL-SCNC: 110 MMOL/L — HIGH (ref 96–108)
CO2 SERPL-SCNC: 21 MMOL/L — LOW (ref 22–31)
CREAT SERPL-MCNC: 1.6 MG/DL — HIGH (ref 0.5–1.3)
GLUCOSE SERPL-MCNC: 77 MG/DL — SIGNIFICANT CHANGE UP (ref 70–99)
HCT VFR BLD CALC: 31.3 % — LOW (ref 39–50)
HGB BLD-MCNC: 10.2 G/DL — LOW (ref 13–17)
MAGNESIUM SERPL-MCNC: 2 MG/DL — SIGNIFICANT CHANGE UP (ref 1.6–2.6)
MCHC RBC-ENTMCNC: 28.8 PG — SIGNIFICANT CHANGE UP (ref 27–34)
MCHC RBC-ENTMCNC: 32.6 GM/DL — SIGNIFICANT CHANGE UP (ref 32–36)
MCV RBC AUTO: 88.4 FL — SIGNIFICANT CHANGE UP (ref 80–100)
PHOSPHATE SERPL-MCNC: 3.5 MG/DL — SIGNIFICANT CHANGE UP (ref 2.5–4.5)
PLATELET # BLD AUTO: 224 K/UL — SIGNIFICANT CHANGE UP (ref 150–400)
POTASSIUM SERPL-MCNC: 4.4 MMOL/L — SIGNIFICANT CHANGE UP (ref 3.5–5.3)
POTASSIUM SERPL-SCNC: 4.4 MMOL/L — SIGNIFICANT CHANGE UP (ref 3.5–5.3)
RBC # BLD: 3.54 M/UL — LOW (ref 4.2–5.8)
RBC # FLD: 15.4 % — HIGH (ref 10.3–14.5)
SODIUM SERPL-SCNC: 144 MMOL/L — SIGNIFICANT CHANGE UP (ref 135–145)
WBC # BLD: 6.39 K/UL — SIGNIFICANT CHANGE UP (ref 3.8–10.5)
WBC # FLD AUTO: 6.39 K/UL — SIGNIFICANT CHANGE UP (ref 3.8–10.5)

## 2018-05-08 RX ADMIN — FAMOTIDINE 20 MILLIGRAM(S): 10 INJECTION INTRAVENOUS at 12:09

## 2018-05-08 RX ADMIN — AMLODIPINE BESYLATE 2.5 MILLIGRAM(S): 2.5 TABLET ORAL at 06:32

## 2018-05-08 RX ADMIN — Medication 1000 UNIT(S): at 12:09

## 2018-05-08 RX ADMIN — CEFEPIME 100 MILLIGRAM(S): 1 INJECTION, POWDER, FOR SOLUTION INTRAMUSCULAR; INTRAVENOUS at 06:24

## 2018-05-08 RX ADMIN — ENOXAPARIN SODIUM 40 MILLIGRAM(S): 100 INJECTION SUBCUTANEOUS at 06:24

## 2018-05-08 RX ADMIN — LATANOPROST 1 DROP(S): 0.05 SOLUTION/ DROPS OPHTHALMIC; TOPICAL at 21:56

## 2018-05-08 RX ADMIN — NYSTATIN CREAM 1 APPLICATION(S): 100000 CREAM TOPICAL at 21:56

## 2018-05-08 RX ADMIN — NYSTATIN CREAM 1 APPLICATION(S): 100000 CREAM TOPICAL at 15:30

## 2018-05-08 RX ADMIN — SIMVASTATIN 20 MILLIGRAM(S): 20 TABLET, FILM COATED ORAL at 21:55

## 2018-05-08 RX ADMIN — PREGABALIN 1000 MICROGRAM(S): 225 CAPSULE ORAL at 12:09

## 2018-05-08 RX ADMIN — Medication 4 MILLIGRAM(S): at 21:55

## 2018-05-08 RX ADMIN — CEFEPIME 100 MILLIGRAM(S): 1 INJECTION, POWDER, FOR SOLUTION INTRAMUSCULAR; INTRAVENOUS at 17:32

## 2018-05-08 RX ADMIN — NYSTATIN CREAM 1 APPLICATION(S): 100000 CREAM TOPICAL at 06:24

## 2018-05-08 RX ADMIN — Medication 3 MILLIGRAM(S): at 21:55

## 2018-05-08 NOTE — PHYSICAL THERAPY INITIAL EVALUATION ADULT - IMPAIRMENTS FOUND, PT EVAL
muscle strength/posture/poor safety awareness/aerobic capacity/endurance/gait, locomotion, and balance

## 2018-05-08 NOTE — PHYSICAL THERAPY INITIAL EVALUATION ADULT - LIVES WITH, PROFILE
as per pt, pt lives with spouse in 1 level PH, 2 steps to enter B HR, no steps inside. PTA pt was Independent ambulating around his home with a RW, had a home held aide [phone] daily to help with ADL's. pt owns a RW.

## 2018-05-08 NOTE — PROGRESS NOTE ADULT - SUBJECTIVE AND OBJECTIVE BOX
Patient seen and examined at bedside  Case discussed with medical team    HPI:  84m hx tia, s/p PPM, psoriasis, CKD, presenting from rehab with weakness, difficulty ambulating. Pt's complaints cluster primarily around poor care at rehab and lack of actual PT time, and pt attributes his present weakness to this lack of working with PT. Reports completing a course of abx yesterday for a UTI. Reports no fevers/chills, no new aches/pains, no cp/sob, no cough/HA. Would like to go to a different rehab if possible.    PAST MEDICAL & SURGICAL HISTORY:  Psoriasis  Prostate cancer  GERD (gastroesophageal reflux disease)  Dyslipidemia  No significant past surgical history      No Known Allergies       MEDICATIONS  (STANDING):  cefepime   IVPB 1000 milliGRAM(s) IV Intermittent every 12 hours  cefepime   IVPB      cholecalciferol 1000 Unit(s) Oral daily  cyanocobalamin 1000 MICROGram(s) Oral daily  doxazosin 4 milliGRAM(s) Oral at bedtime  enoxaparin Injectable 40 milliGRAM(s) SubCutaneous every 24 hours  famotidine    Tablet 20 milliGRAM(s) Oral daily  latanoprost 0.005% Ophthalmic Solution 1 Drop(s) Left EYE at bedtime  melatonin 3 milliGRAM(s) Oral at bedtime  nystatin Powder 1 Application(s) Topical three times a day  senna 2 Tablet(s) Oral at bedtime  simvastatin 20 milliGRAM(s) Oral at bedtime    MEDICATIONS  (PRN):  acetaminophen   Tablet. 650 milliGRAM(s) Oral every 6 hours PRN Moderate Pain (4 - 6)  bisacodyl 5 milliGRAM(s) Oral every 12 hours PRN Constipation  docusate sodium 100 milliGRAM(s) Oral daily PRN Constipation  hydrocortisone 2.5% Ointment 1 Application(s) Topical daily PRN Rash      REVIEW OF SYSTEMS:  CONSTITUTIONAL: (+) malaise.   EYES: No acute change in vision   ENT:  No tinnitus  NECK: No stiffness  RESPIRATORY: No hemoptysis  CARDIOVASCULAR: No chest pain, palpitations, syncope  GASTROINTESTINAL: No hematemesis, diarrhea, melena, or hematochezia.  GENITOURINARY: No hematuria  NEUROLOGICAL: No headaches  LYMPH Nodes: No enlarged glands  ENDOCRINE: No heat or cold intolerance	    T(C): 36.3 (18 @ 07:35), Max: 36.6 (18 @ 20:33)  HR: 60 (18 @ 07:35) (60 - 60)  BP: 115/72 (18 @ 07:35) (105/68 - 115/72)  RR: 18 (18 @ 07:35) (18 - 18)  SpO2: 96% (18 @ 07:35) (92% - 96%)    PHYSICAL EXAMINATION:   Constitutional: WD, NAD  HEENT: NC, AT  Neck:  Supple  Respiratory:  Adequate airflow b/l. Not using accessory muscles of respiration.  Cardiovascular:  S1 & S2 intact, no R/G, 2+ radial pulses b/l  Gastrointestinal: Soft, NT, ND, normoactive b.s., no organomegaly/RT/rigidity  Extremities: WWP  Neurological:  Alert and awake.  No acute focal motor deficits. Crude sensation intact.     Labs and imaging reviewed    LABS:                        10.2   6.39  )-----------( 224      ( 08 May 2018 07:37 )             31.3     05-    144  |  110<H>  |  28<H>  ----------------------------<  77  4.4   |  21<L>  |  1.60<H>    Ca    9.3      08 May 2018 06:03  Phos  3.5     05-  Mg     2.0     -    TPro  8.1  /  Alb  3.8  /  TBili  0.3  /  DBili  x   /  AST  23  /  ALT  8<L>  /  AlkPhos  84  05-06    CARDIAC MARKERS ( 06 May 2018 14:47 )  x     / <0.01 ng/mL / x     / x     / x          PT/INR - ( 06 May 2018 14:47 )   PT: 13.6 sec;   INR: 1.24 ratio         PTT - ( 06 May 2018 14:47 )  PTT:39.9 sec  Urinalysis Basic - ( 06 May 2018 16:09 )    Color: Yellow / Appearance: SL Turbid / S.016 / pH: x  Gluc: x / Ketone: Negative  / Bili: Negative / Urobili: Negative   Blood: x / Protein: Trace / Nitrite: Negative   Leuk Esterase: Large / RBC: >50 /HPF / WBC >50 /HPF   Sq Epi: x / Non Sq Epi: OCC /HPF / Bacteria: Few /HPF      CAPILLARY BLOOD GLUCOSE            LIVER FUNCTIONS - ( 06 May 2018 14:47 )  Alb: 3.8 g/dL / Pro: 8.1 g/dL / ALK PHOS: 84 U/L / ALT: 8 U/L / AST: 23 U/L / GGT: x               RADIOLOGY & ADDITIONAL STUDIES:

## 2018-05-08 NOTE — PHYSICAL THERAPY INITIAL EVALUATION ADULT - GAIT TRAINING, PT EVAL
GOAL: Pt will ambulate 50ft with Hussein in 4 weeks GOAL: Pt will ambulate 25ft with ModA with RW in 4 weeks

## 2018-05-08 NOTE — CONSULT NOTE ADULT - SUBJECTIVE AND OBJECTIVE BOX
EP ATTENDING      HISTORY OF PRESENT ILLNESS: He is a 83 y/o male PMH persistent AF who had a 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 His PPM interrogation here in April 2018 showed excellent PPM function with about 3.5 years remaining on PG longevity. He was admitted last month with coag neg staph UTI. Thankfully his blood cultures were negative. He is now readmitted with weakness. He denies palpitations, syncope, nor angina. He remains off A/C due to his advanced age and advanced dementia.    PMH: as above, persistent AF with severe underlying bradycardia, CKD, hyperlipidemia    PSHx: Medtronic single chamber PPM    MEDICATIONS  (STANDING):  cefepime   IVPB 1000 milliGRAM(s) IV Intermittent every 12 hours  cefepime   IVPB      cholecalciferol 1000 Unit(s) Oral daily  cyanocobalamin 1000 MICROGram(s) Oral daily  doxazosin 4 milliGRAM(s) Oral at bedtime  enoxaparin Injectable 40 milliGRAM(s) SubCutaneous every 24 hours  famotidine    Tablet 20 milliGRAM(s) Oral daily  latanoprost 0.005% Ophthalmic Solution 1 Drop(s) Left EYE at bedtime  melatonin 3 milliGRAM(s) Oral at bedtime  nystatin Powder 1 Application(s) Topical three times a day  senna 2 Tablet(s) Oral at bedtime  simvastatin 20 milliGRAM(s) Oral at bedtime    No Known Allergies    FAMILY HISTORY:  No pertinent family history in first degree relatives    Non-contributary for premature coronary disease or sudden cardiac death    SOCIAL HISTORY:    [x ] Non-smoker  [ ] Smoker  [ ] Alcohol      REVIEW OF SYSTEMS:  [ ]chest pain  [  ]shortness of breath  [  ]palpitations  [  ]syncope  [ ]near syncope [ ]upper extremity weakness   [ ] lower extremity weakness  [  ]diplopia  [  ]altered mental status   [  ]fevers  [ ]chills [ ]nausea  [ ]vomitting  [  ]dysphagia    [ ]abdominal pain  [ ]melena  [ ]BRBPR    [  ]epistaxis  [  ]rash    [ ]lower extremity edema        [x ] All others negative	  [ ] Unable to obtain    PHYSICAL EXAM:  T(C): 36.3 (05-08-18 @ 07:35), Max: 36.6 (05-07-18 @ 20:33)  HR: 60 (05-08-18 @ 07:35) (60 - 60)  BP: 115/72 (05-08-18 @ 07:35) (105/68 - 115/72)  RR: 18 (05-08-18 @ 07:35) (18 - 18)  SpO2: 96% (05-08-18 @ 07:35) (92% - 96%)  Wt(kg): --    no JVD  RRR, no murmurs  CTAB  soft nt/nd  no c/c/e    TELEMETRY: 	  none  ECG:  	AF, VVI pacing at 60 bpm    Echo: n/a  NST: n/a  Cath: n/a  	  	  LABS:	 	                          10.2   6.39  )-----------( 224      ( 08 May 2018 07:37 )             31.3     05-08    144  |  110<H>  |  28<H>  ----------------------------<  77  4.4   |  21<L>  |  1.60<H>    Ca    9.3      08 May 2018 06:03  Phos  3.5     05-08  Mg     2.0     05-08    TPro  8.1  /  Alb  3.8  /  TBili  0.3  /  DBili  x   /  AST  23  /  ALT  8<L>  /  AlkPhos  84  05-06    proBNP:   Lipid Profile:   HgA1c:   TSH:     A/P) He is a 83 y/o male PMH persistent AF who had a 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 His PPM interrogation here in April 2018 showed excellent PPM function with about 3.5 years remaining on PG longevity. He was admitted last month with coag neg staph UTI. Thankfully his blood cultures were negative. He is now readmitted with weakness. He denies palpitations, syncope, nor angina. He remains off A/C due to his advanced age and advanced dementia.    -no need for repeat PPM interrogation  -as long as BCx remain negative and there is no suspicion for endocarditis then no further inpatient EP workup needed  -f/u with his electrophysiologist Dr. Jose Stapleton after discharge for routine PPM care  -poor candidate for systemic anticoagulation given advanced age and advanced dementia      Nikki Duarte M.D., [facility]  Cardiac Electrophysiology  [facility]  [address] [place] [address], [address] [place] [address]  [email or web address]    office [phone]  pager [phone]

## 2018-05-08 NOTE — PROGRESS NOTE ADULT - ASSESSMENT
84 year old male with history of nephrolithiasis, chronic R hydronephrosis, recurrent UTIs presents with recurrent UTI. Nephrology consulted for elevated Scr.  Chronic R hydronephrosis.  CKD3.  HTN with BP controlled.

## 2018-05-08 NOTE — PROGRESS NOTE ADULT - ASSESSMENT
Patient is a 84y old  Male who was recently discharged after treated  for MSSA UTI and hydronephrosis, and now presenting from rehab to the ER for evaluation of weakness (06 May 2018 18:20) and difficulty ambulating. He attributes his present weakness to this lack of working with PT. He has no fevers/chills, and on admission found to have positive Urine analysis and dose of Ceftriaxone is given, cultures pending. The ID consult requested to assist with further evaluation and antibiotic  management.     # Complicated UTI  # Moderate Right Hydronephrosis    Would recommend:  1. Urology evaluation for Right Hydronephrosis  2. Follow up Urine and blood cultures  3. Continue Cefepime until cultures are finalized  4. Aspiration precaution    d/w patient    will follow the patient with you Patient is a 84y old  Male who was recently discharged after treated  for MSSA UTI and hydronephrosis, and now presenting from rehab to the ER for evaluation of weakness (06 May 2018 18:20) and difficulty ambulating. He attributes his present weakness to this lack of working with PT. He has no fevers/chills, and on admission found to have positive Urine analysis and dose of Ceftriaxone is given, cultures pending. The ID consult requested to assist with further evaluation and antibiotic  management.     # Complicated UTI  - cultures are negative  # Moderate Right Hydronephrosis    Would recommend:  1. Urology evaluation for Right Hydronephrosis still pending  2. Continue Cefepime   3. Hold bowel regimen  4. Aspiration precaution    d/w patient and Nursing staff    will follow the patient with you

## 2018-05-08 NOTE — PHYSICAL THERAPY INITIAL EVALUATION ADULT - IMPAIRMENTS CONTRIBUTING IMPAIRED BED MOBILITY, REHAB EVAL
impaired postural control/decreased strength/impaired sensory feedback/impaired balance/narrow base of support/pain

## 2018-05-08 NOTE — PHYSICAL THERAPY INITIAL EVALUATION ADULT - STRENGTHENING, PT EVAL
GOAL: Pt will improve strength to at least a 4/5 in BLE/BUE in 4 weeks in order to to improve safety with functional mobility and ADL's

## 2018-05-08 NOTE — PROGRESS NOTE ADULT - SUBJECTIVE AND OBJECTIVE BOX
Patient is seen and examined at the bed side, is afebrile. He is feeling little better, the cultures are still pending. The renal US shows Moderate right Hydronephrosis.          REVIEW OF SYSTEMS: All other review systems are negative        Vital Signs Last 24 Hrs  T(C): 36.9 (08 May 2018 16:16), Max: 36.9 (08 May 2018 16:16)  T(F): 98.4 (08 May 2018 16:16), Max: 98.4 (08 May 2018 16:16)  HR: 59 (08 May 2018 16:16) (59 - 60)  BP: 100/63 (08 May 2018 16:16) (100/63 - 115/72)  BP(mean): --  RR: 18 (08 May 2018 16:16) (18 - 18)  SpO2: 97% (08 May 2018 16:16) (92% - 97%)          ALLERGIES: NKDA          PHYSICAL EXAM:  GENERAL: Not in acute distress  CVS: s1 and s2 present  RESP: Air entry B/L  GI: abdomen soft and nontender  EXT: No pedal edema  CNS: Awake and alert  SKIN: psoriasis lesions          LABS:                        10.2   6.39  )-----------( 224      ( 08 May 2018 07:37 )             31.3                                    10.5   5.41  )-----------( 228      ( 07 May 2018 07:36 )             33.0           05-08    144  |  110<H>  |  28<H>  ----------------------------<  77  4.4   |  21<L>  |  1.60<H>    Ca    9.3      08 May 2018 06:03  Phos  3.5     05-08  Mg     2.0     05-08      05-07    142  |  108  |  28<H>  ----------------------------<  88  4.1   |  22  |  1.40<H>    Ca    9.4      07 May 2018 06:44  Phos  3.4     05-06  Mg     2.1     05-06    TPro  8.1  /  Alb  3.8  /  TBili  0.3  /  DBili  x   /  AST  23  /  ALT  8<L>  /  AlkPhos  84  05-06        PTT - ( 06 May 2018 14:47 )  PTT:39.9 sec  Urinalysis Basic - ( 06 May 2018 16:09 )    Color: Yellow / Appearance: SL Turbid / S.016 / pH: x  Gluc: x / Ketone: Negative  / Bili: Negative / Urobili: Negative   Blood: x / Protein: Trace / Nitrite: Negative   Leuk Esterase: Large / RBC: >50 /HPF / WBC >50 /HPF   Sq Epi: x / Non Sq Epi: OCC /HPF / Bacteria: Few /HPF          MEDICATIONS  (STANDING):  cefepime   IVPB 1000 milliGRAM(s) IV Intermittent every 12 hours  cefepime   IVPB      cholecalciferol 1000 Unit(s) Oral daily  cyanocobalamin 1000 MICROGram(s) Oral daily  doxazosin 4 milliGRAM(s) Oral at bedtime  enoxaparin Injectable 40 milliGRAM(s) SubCutaneous every 24 hours  famotidine    Tablet 20 milliGRAM(s) Oral daily  latanoprost 0.005% Ophthalmic Solution 1 Drop(s) Left EYE at bedtime  melatonin 3 milliGRAM(s) Oral at bedtime  nystatin Powder 1 Application(s) Topical three times a day  senna 2 Tablet(s) Oral at bedtime  simvastatin 20 milliGRAM(s) Oral at bedtime    MEDICATIONS  (PRN):  acetaminophen   Tablet. 650 milliGRAM(s) Oral every 6 hours PRN Moderate Pain (4 - 6)  bisacodyl 5 milliGRAM(s) Oral every 12 hours PRN Constipation  docusate sodium 100 milliGRAM(s) Oral daily PRN Constipation  hydrocortisone 2.5% Ointment 1 Application(s) Topical daily PRN Rash        RADIOLOGY & ADDITIONAL TESTS:    18: US Kidney and Bladder (18 @ 16:42) Again seen is moderate hydroureteronephrosis as described above. Fine debris within the distal dilated right ureter is indicative of infection.   No left-sided hydronephrosis. Urinary bladder wall small contains fine debris also indicative of  infection. Thickening of the bladder wall may also reflect infection.  Thickening/trabeculation and bladder diverticulum suggests some degree of chronic outlet obstruction.      18: Xray Chest 1 View- PORTABLE-Urgent (18 @ 15:07) INTERPRETATION:  no urgent/emergent findings.      18: US Kidney and Bladder (18 @ 17:05) Moderate right hydroureteronephrosis to the level of the bladder. The right ureter appears to insert on a bladder diverticulum. Right renal   cortical thinning is suggestive of chronic hydronephrosis. Urinary bladder trabeculations and diverticula with debris.        MICROBIOLOGY DATA:    Culture - Blood (18 @ 00:47)    Specimen Source: .Blood Blood-Peripheral    Culture Results:   No growth to date.    Culture - Blood (18 @ 00:47)    Specimen Source: .Blood Blood-Peripheral    Culture Results:   No growth to date.    Culture - Urine (18 @ 21:36)    Specimen Source: .Urine Clean Catch (Midstream)    Culture Results:   No growth        Urinalysis + Microscopic Examination (18 @ 16:09)    Urine Appearance: SL Turbid    Urobilinogen: Negative    Leukocyte Esterase Concentration: Large    Nitrite: Negative    Specific Gravity: 1.016    Protein, Urine: Trace    pH Urine: 5.5    Ketone - Urine: Negative    Bilirubin: Negative    Color: Yellow    Glucose Qualitative, Urine: Negative    Blood, Urine: Moderate    Red Blood Cell - Urine: >50 /HPF    White Blood Cell - Urine: >50 /HPF    Epithelial Cells: OCC /HPF    Hyaline Casts: 2-5    Bacteria: Few /HPF Patient is seen and examined at the bed side, is afebrile. He is feeling much better today after few episodes of BM.           REVIEW OF SYSTEMS: All other review systems are negative        Vital Signs Last 24 Hrs  T(C): 36.9 (08 May 2018 16:16), Max: 36.9 (08 May 2018 16:16)  T(F): 98.4 (08 May 2018 16:16), Max: 98.4 (08 May 2018 16:16)  HR: 59 (08 May 2018 16:16) (59 - 60)  BP: 100/63 (08 May 2018 16:16) (100/63 - 115/72)  BP(mean): --  RR: 18 (08 May 2018 16:16) (18 - 18)  SpO2: 97% (08 May 2018 16:16) (92% - 97%)          ALLERGIES: NKDA          PHYSICAL EXAM:  GENERAL: Not in acute distress  CVS: s1 and s2 present  RESP: Air entry B/L  GI: abdomen soft and nontender  EXT: No pedal edema  CNS: Awake and alert  SKIN: psoriasis lesions          LABS:                        10.2   6.39  )-----------( 224      ( 08 May 2018 07:37 )             31.3                                    10.5   5.41  )-----------( 228      ( 07 May 2018 07:36 )             33.0           05-08    144  |  110<H>  |  28<H>  ----------------------------<  77  4.4   |  21<L>  |  1.60<H>    Ca    9.3      08 May 2018 06:03  Phos  3.5     05-08  Mg     2.0     05-08      05-07    142  |  108  |  28<H>  ----------------------------<  88  4.1   |  22  |  1.40<H>    Ca    9.4      07 May 2018 06:44  Phos  3.4     05-06  Mg     2.1     05-06    TPro  8.1  /  Alb  3.8  /  TBili  0.3  /  DBili  x   /  AST  23  /  ALT  8<L>  /  AlkPhos  84  05-06        PTT - ( 06 May 2018 14:47 )  PTT:39.9 sec  Urinalysis Basic - ( 06 May 2018 16:09 )    Color: Yellow / Appearance: SL Turbid / S.016 / pH: x  Gluc: x / Ketone: Negative  / Bili: Negative / Urobili: Negative   Blood: x / Protein: Trace / Nitrite: Negative   Leuk Esterase: Large / RBC: >50 /HPF / WBC >50 /HPF   Sq Epi: x / Non Sq Epi: OCC /HPF / Bacteria: Few /HPF          MEDICATIONS  (STANDING):  cefepime   IVPB 1000 milliGRAM(s) IV Intermittent every 12 hours  cefepime   IVPB      cholecalciferol 1000 Unit(s) Oral daily  cyanocobalamin 1000 MICROGram(s) Oral daily  doxazosin 4 milliGRAM(s) Oral at bedtime  enoxaparin Injectable 40 milliGRAM(s) SubCutaneous every 24 hours  famotidine    Tablet 20 milliGRAM(s) Oral daily  latanoprost 0.005% Ophthalmic Solution 1 Drop(s) Left EYE at bedtime  melatonin 3 milliGRAM(s) Oral at bedtime  nystatin Powder 1 Application(s) Topical three times a day  senna 2 Tablet(s) Oral at bedtime  simvastatin 20 milliGRAM(s) Oral at bedtime    MEDICATIONS  (PRN):  acetaminophen   Tablet. 650 milliGRAM(s) Oral every 6 hours PRN Moderate Pain (4 - 6)  bisacodyl 5 milliGRAM(s) Oral every 12 hours PRN Constipation  docusate sodium 100 milliGRAM(s) Oral daily PRN Constipation  hydrocortisone 2.5% Ointment 1 Application(s) Topical daily PRN Rash        RADIOLOGY & ADDITIONAL TESTS:    18: US Kidney and Bladder (18 @ 16:42) Again seen is moderate hydroureteronephrosis as described above. Fine debris within the distal dilated right ureter is indicative of infection.   No left-sided hydronephrosis. Urinary bladder wall small contains fine debris also indicative of  infection. Thickening of the bladder wall may also reflect infection.  Thickening/trabeculation and bladder diverticulum suggests some degree of chronic outlet obstruction.      18: Xray Chest 1 View- PORTABLE-Urgent (18 @ 15:07) INTERPRETATION:  no urgent/emergent findings.      18: US Kidney and Bladder (18 @ 17:05) Moderate right hydroureteronephrosis to the level of the bladder. The right ureter appears to insert on a bladder diverticulum. Right renal   cortical thinning is suggestive of chronic hydronephrosis. Urinary bladder trabeculations and diverticula with debris.        MICROBIOLOGY DATA:    Culture - Blood (18 @ 00:47)    Specimen Source: .Blood Blood-Peripheral    Culture Results:   No growth to date.    Culture - Blood (18 @ 00:47)    Specimen Source: .Blood Blood-Peripheral    Culture Results:   No growth to date.    Culture - Urine (18 @ 21:36)    Specimen Source: .Urine Clean Catch (Midstream)    Culture Results:   No growth        Urinalysis + Microscopic Examination (18 @ 16:09)    Urine Appearance: SL Turbid    Urobilinogen: Negative    Leukocyte Esterase Concentration: Large    Nitrite: Negative    Specific Gravity: 1.016    Protein, Urine: Trace    pH Urine: 5.5    Ketone - Urine: Negative    Bilirubin: Negative    Color: Yellow    Glucose Qualitative, Urine: Negative    Blood, Urine: Moderate    Red Blood Cell - Urine: >50 /HPF    White Blood Cell - Urine: >50 /HPF    Epithelial Cells: OCC /HPF    Hyaline Casts: 2-5    Bacteria: Few /HPF

## 2018-05-08 NOTE — PHYSICAL THERAPY INITIAL EVALUATION ADULT - TRANSFER TRAINING, PT EVAL
GOAL: Pt will complete transfers with ModA with a RW in 4 weeks GOAL: Pt will complete transfers with MaxA with a RW in 4 weeks

## 2018-05-08 NOTE — PROGRESS NOTE ADULT - SUBJECTIVE AND OBJECTIVE BOX
[facility] NEPHROLOGY- CONSULTATION NOTE    Patient is a 84y Male with recurrent UTIs with chronic R hydronephrosis who presented to the hospital with another UTI.  Pt is at [facility] ([facility]) and unable to go to urology office for further intervention.  He feels ill now, with general malaise, some dysuria, and mild suprapubic pain.    Pt feels okay today    PAST MEDICAL & SURGICAL HISTORY:  Psoriasis  Prostate cancer  GERD (gastroesophageal reflux disease)  Dyslipidemia  No significant past surgical history    No Known Allergies    Home Medications Reviewed  Hospital Medications:   MEDICATIONS  (STANDING):  cefepime   IVPB 1000 milliGRAM(s) IV Intermittent every 12 hours  cefepime   IVPB      cholecalciferol 1000 Unit(s) Oral daily  cyanocobalamin 1000 MICROGram(s) Oral daily  doxazosin 4 milliGRAM(s) Oral at bedtime  enoxaparin Injectable 40 milliGRAM(s) SubCutaneous every 24 hours  famotidine    Tablet 20 milliGRAM(s) Oral daily  latanoprost 0.005% Ophthalmic Solution 1 Drop(s) Left EYE at bedtime  melatonin 3 milliGRAM(s) Oral at bedtime  nystatin Powder 1 Application(s) Topical three times a day  senna 2 Tablet(s) Oral at bedtime  simvastatin 20 milliGRAM(s) Oral at bedtime    SOCIAL HISTORY:  Denies ETOh,Smoking,   FAMILY HISTORY:  No pertinent family history in first degree relatives    REVIEW OF SYSTEMS:  CONSTITUTIONAL: + malaise, + weakness, +- fevers, no chills  EYES/ENT: No visual changes;  No vertigo or throat pain   NECK: No pain or stiffness  RESPIRATORY: No cough, wheezing, hemoptysis; No shortness of breath  CARDIOVASCULAR: No chest pain or palpitations.  GASTROINTESTINAL: No abdominal or epigastric pain. No nausea, vomiting, or hematemesis; No diarrhea or constipation. No melena or hematochezia.  GENITOURINARY: + dysuria, +suprapubic pain, +frequency, +urinary urgency, no hematuria  NEUROLOGICAL: No numbness or weakness  SKIN: No itching, burning, rashes, or lesions   VASCULAR: No bilateral lower extremity edema.   All other review of systems is negative unless indicated above.    VITALS:  T(F): 97.3 (18 @ 07:35), Max: 97.9 (18 @ 20:33)  HR: 60 (18 @ 10:46)  BP: 111/71 (18 @ 10:46)  RR: 18 (18 @ 07:35)  SpO2: 95% (18 @ 10:46)  Wt(kg): --     @ 07:01  -   @ 07:00  --------------------------------------------------------  IN: 290 mL / OUT: 2 mL / NET: 288 mL        PHYSICAL EXAM:  Constitutional: NAD  HEENT: anicteric sclera, oropharynx clear, MMM  Neck: No JVD  Respiratory: CTAB, no wheezes, rales or rhonchi  Cardiovascular: S1, S2, RRR  Gastrointestinal: BS+, soft, NT/ND  Extremities: No cyanosis or clubbing. No peripheral edema  Neurological: A/O x 3, no focal deficits  Psychiatric: Normal mood, normal affect  : No CVA tenderness. No hansen.   Skin: No rashes  Vascular Access:    LABS:      144  |  110<H>  |  28<H>  ----------------------------<  77  4.4   |  21<L>  |  1.60<H>    Ca    9.3      08 May 2018 06:03  Phos  3.5       Mg     2.0           Creatinine Trend: 1.60 <--, 1.40 <--, 1.55 <--, 1.54 <--                        10.2   6.39  )-----------( 224      ( 08 May 2018 07:37 )             31.3     Urine Studies:  Urinalysis Basic - ( 06 May 2018 16:09 )    Color: Yellow / Appearance: SL Turbid / S.016 / pH:   Gluc:  / Ketone: Negative  / Bili: Negative / Urobili: Negative   Blood:  / Protein: Trace / Nitrite: Negative   Leuk Esterase: Large / RBC: >50 /HPF / WBC >50 /HPF   Sq Epi:  / Non Sq Epi: OCC /HPF / Bacteria: Few /HPF                    < end of copied text >

## 2018-05-08 NOTE — PHYSICAL THERAPY INITIAL EVALUATION ADULT - IMPAIRED TRANSFERS: SIT/STAND, REHAB EVAL
impaired postural control/decreased strength/impaired balance/impaired sensory feedback/decreased sensation

## 2018-05-08 NOTE — PHYSICAL THERAPY INITIAL EVALUATION ADULT - PERTINENT HX OF CURRENT PROBLEM, REHAB EVAL
pt is a 84yoM hx tia, s/p PPM, psoriasis, CKD, presenting from rehab with weakness, difficulty ambulating. Pt's complaints cluster primarily around poor care at rehab and lack of actual PT time, and pt attributes his present weakness to this lack of working with PT. Reports completing a course of abx yesterday for a UTI

## 2018-05-08 NOTE — PHYSICAL THERAPY INITIAL EVALUATION ADULT - BALANCE TRAINING, PT EVAL
GOAL: pt will improve sitting static/dynamic balance to good minus, standing static dynamic balance to fair in 4 weeks in order to improve safety with functional mobility and ADL's

## 2018-05-08 NOTE — PHYSICAL THERAPY INITIAL EVALUATION ADULT - PRECAUTIONS/LIMITATIONS, REHAB EVAL
fall precautions/He has no fevers/chills, and on admission found to have positive Urine analysis and dose of Ceftriaxone is given, cultures pending. Would like to go to a different rehab if possible. Kidney US 5/7 Again seen is moderate hydroureteronephrosis as described above. Fine debris within the distal dilated right ureter is indicative of infection. No left-sided hydronephrosis. Urinary bladder wall small contains fine debris also indicative of infection. Thickening of the bladder wall may also reflect infection. Thickening/trabeculation and bladder diverticulum suggests some degree of chronic outlet obstruction. Possible PPM interrogation pending.

## 2018-05-08 NOTE — PHYSICAL THERAPY INITIAL EVALUATION ADULT - TRANSFER SAFETY CONCERNS NOTED: SIT/STAND, REHAB EVAL
decreased balance during turns/decreased proprioception/losing balance/decreased sequencing ability/decreased step length/decreased weight-shifting ability

## 2018-05-09 DIAGNOSIS — C61 MALIGNANT NEOPLASM OF PROSTATE: ICD-10-CM

## 2018-05-09 LAB
ANION GAP SERPL CALC-SCNC: 13 MMOL/L — SIGNIFICANT CHANGE UP (ref 5–17)
BUN SERPL-MCNC: 26 MG/DL — HIGH (ref 7–23)
CALCIUM SERPL-MCNC: 9.2 MG/DL — SIGNIFICANT CHANGE UP (ref 8.4–10.5)
CHLORIDE SERPL-SCNC: 107 MMOL/L — SIGNIFICANT CHANGE UP (ref 96–108)
CO2 SERPL-SCNC: 22 MMOL/L — SIGNIFICANT CHANGE UP (ref 22–31)
CREAT SERPL-MCNC: 1.5 MG/DL — HIGH (ref 0.5–1.3)
GLUCOSE SERPL-MCNC: 140 MG/DL — HIGH (ref 70–99)
POTASSIUM SERPL-MCNC: 4.2 MMOL/L — SIGNIFICANT CHANGE UP (ref 3.5–5.3)
POTASSIUM SERPL-SCNC: 4.2 MMOL/L — SIGNIFICANT CHANGE UP (ref 3.5–5.3)
SODIUM SERPL-SCNC: 142 MMOL/L — SIGNIFICANT CHANGE UP (ref 135–145)

## 2018-05-09 RX ADMIN — Medication 100 MILLIGRAM(S): at 21:55

## 2018-05-09 RX ADMIN — NYSTATIN CREAM 1 APPLICATION(S): 100000 CREAM TOPICAL at 14:04

## 2018-05-09 RX ADMIN — SIMVASTATIN 20 MILLIGRAM(S): 20 TABLET, FILM COATED ORAL at 21:47

## 2018-05-09 RX ADMIN — NYSTATIN CREAM 1 APPLICATION(S): 100000 CREAM TOPICAL at 21:47

## 2018-05-09 RX ADMIN — CEFEPIME 100 MILLIGRAM(S): 1 INJECTION, POWDER, FOR SOLUTION INTRAMUSCULAR; INTRAVENOUS at 17:13

## 2018-05-09 RX ADMIN — PREGABALIN 1000 MICROGRAM(S): 225 CAPSULE ORAL at 11:50

## 2018-05-09 RX ADMIN — Medication 1000 UNIT(S): at 11:50

## 2018-05-09 RX ADMIN — CEFEPIME 100 MILLIGRAM(S): 1 INJECTION, POWDER, FOR SOLUTION INTRAMUSCULAR; INTRAVENOUS at 05:53

## 2018-05-09 RX ADMIN — FAMOTIDINE 20 MILLIGRAM(S): 10 INJECTION INTRAVENOUS at 11:50

## 2018-05-09 RX ADMIN — NYSTATIN CREAM 1 APPLICATION(S): 100000 CREAM TOPICAL at 05:55

## 2018-05-09 RX ADMIN — LATANOPROST 1 DROP(S): 0.05 SOLUTION/ DROPS OPHTHALMIC; TOPICAL at 21:49

## 2018-05-09 RX ADMIN — ENOXAPARIN SODIUM 40 MILLIGRAM(S): 100 INJECTION SUBCUTANEOUS at 05:53

## 2018-05-09 RX ADMIN — Medication 3 MILLIGRAM(S): at 21:47

## 2018-05-09 RX ADMIN — Medication 4 MILLIGRAM(S): at 21:47

## 2018-05-09 NOTE — PROGRESS NOTE ADULT - SUBJECTIVE AND OBJECTIVE BOX
Patient seen and examined at bedside  No new acute events noted overnight  Case discussed with medical team    HPI:  84m hx tia, s/p PPM, psoriasis, CKD, presenting from rehab with weakness, difficulty ambulating. Pt's complaints cluster primarily around poor care at rehab and lack of actual PT time, and pt attributes his present weakness to this lack of working with PT. Reports completing a course of abx yesterday for a UTI. Reports no fevers/chills, no new aches/pains, no cp/sob, no cough/HA. Would like to go to a different rehab if possible.    In ED: 97.9, 61, 119/78, 18, 96RA. Given ceftriaxone (1700), NS 1 L bolux x 1. (06 May 2018 18:20)      PAST MEDICAL & SURGICAL HISTORY:  Psoriasis  Prostate cancer  GERD (gastroesophageal reflux disease)  Dyslipidemia  No significant past surgical history      No Known Allergies       MEDICATIONS  (STANDING):  cefepime   IVPB 1000 milliGRAM(s) IV Intermittent every 12 hours  cefepime   IVPB      cholecalciferol 1000 Unit(s) Oral daily  cyanocobalamin 1000 MICROGram(s) Oral daily  doxazosin 4 milliGRAM(s) Oral at bedtime  enoxaparin Injectable 40 milliGRAM(s) SubCutaneous every 24 hours  famotidine    Tablet 20 milliGRAM(s) Oral daily  latanoprost 0.005% Ophthalmic Solution 1 Drop(s) Left EYE at bedtime  melatonin 3 milliGRAM(s) Oral at bedtime  nystatin Powder 1 Application(s) Topical three times a day  senna 2 Tablet(s) Oral at bedtime  simvastatin 20 milliGRAM(s) Oral at bedtime    MEDICATIONS  (PRN):  acetaminophen   Tablet. 650 milliGRAM(s) Oral every 6 hours PRN Moderate Pain (4 - 6)  bisacodyl 5 milliGRAM(s) Oral every 12 hours PRN Constipation  docusate sodium 100 milliGRAM(s) Oral daily PRN Constipation  hydrocortisone 2.5% Ointment 1 Application(s) Topical daily PRN Rash      REVIEW OF SYSTEMS:  CONSTITUTIONAL: (+) malaise.   EYES: No acute change in vision   ENT:  No tinnitus  NECK: No stiffness  RESPIRATORY: No hemoptysis  CARDIOVASCULAR: No chest pain, palpitations, syncope  GASTROINTESTINAL: No hematemesis, diarrhea, melena, or hematochezia.  GENITOURINARY: No hematuria  NEUROLOGICAL: generalized weakness. No headaches  LYMPH Nodes: No enlarged glands  ENDOCRINE: No heat or cold intolerance	    T(C): 36.6 (05-09-18 @ 08:40), Max: 36.9 (05-08-18 @ 16:16)  HR: 60 (05-09-18 @ 08:40) (59 - 60)  BP: 115/71 (05-09-18 @ 08:40) (100/63 - 116/70)  RR: 18 (05-09-18 @ 08:40) (18 - 18)  SpO2: 99% (05-09-18 @ 08:40) (95% - 99%)    PHYSICAL EXAMINATION:   Constitutional: WD, NAD  HEENT: NC, AT  Neck:  Supple  Respiratory:  Adequate airflow b/l. Not using accessory muscles of respiration.  Cardiovascular:  S1 & S2 intact, no R/G, 2+ radial pulses b/l  Gastrointestinal: Soft, NT, ND, normoactive b.s., no organomegaly/RT/rigidity  Extremities: WWP  Neurological:  deconditioned with generalized weakness. Alert and awake.  No acute focal motor deficits. Crude sensation intact.     Labs and imaging reviewed    LABS:                        10.2   6.39  )-----------( 224      ( 08 May 2018 07:37 )             31.3     05-08    144  |  110<H>  |  28<H>  ----------------------------<  77  4.4   |  21<L>  |  1.60<H>    Ca    9.3      08 May 2018 06:03  Phos  3.5     05-08  Mg     2.0     05-08              CAPILLARY BLOOD GLUCOSE                  RADIOLOGY & ADDITIONAL STUDIES:

## 2018-05-09 NOTE — CONSULT NOTE ADULT - CONSULT REASON
Elevated creatinine, hydronephrosis
bradycardia
persistent, single chamber PPM follow up
right hydro prostate cancer ?uti
Complicated UTI

## 2018-05-09 NOTE — CONSULT NOTE ADULT - ASSESSMENT
84 year old male with history of nephrolithiasis, chronic R hydronephrosis, recurrent UTIs presents with recurrent UTI. Nephrology consulted for elevated Scr.  Chronic R hydronephrosis.  CKD3.  HTN with BP controlled.
patient with recurrent prostate cancer and new right hydro with ?uti but no signs of sepsis    consider nt for ? obstruction / infection   may require cysto when culture is negative  repeat psa
Patient is a 84y old  Male who was recently discharged after treated  for MSSA UTI and hydronephrosis, and now presenting from rehab to the ER for evaluation of weakness (06 May 2018 18:20) and difficulty ambulating. He attributes his present weakness to this lack of working with PT. He has no fevers/chills, and on admission found to have positive Urine analysis and dose of Ceftriaxone is given, cultures pending. The ID consult requested to assist with further evaluation and antibiotic  management.     # Complicated UTI  # Recent Right Hydronephrosis    Would recommend:  1. Follow up Urine and blood cultures  2. Start on Cefepime until work up is done  3. Obtain Renal sonogram  4. Aspiration precaution    d/w patient    will follow the patient with you and make further recommendation based on the clinical course and Lab results  Thank you for the opportunity to participate in Mr. GUERRA's care

## 2018-05-09 NOTE — CONSULT NOTE ADULT - SUBJECTIVE AND OBJECTIVE BOX
HPI  Patient with history of recurrent prostate cancer managed by Dr Gu recently admitted andnoted to have new onset right hydro and worsening incontinence  He is a 84y old  Male who was recently discharged after treated  for MSSA UTI and hydronephrosis, and now presenting from rehab to the ER for evaluation of weakness (06 May 2018 18:20) and difficulty ambulating. He attributes his present weakness to this lack of working with PT. He has no fevers/chills, and on admission found to have positive Urine analysis and dose of Ceftriaxone is given, cultures pending. he denies flank or abdominal pain and no n/v He has persistent urinary incontincence    PAST MEDICAL & SURGICAL HISTORY:  Psoriasis  Prostate cancer  GERD (gastroesophageal reflux disease)  Dyslipidemia  No significant past surgical history      MEDICATIONS  (STANDING):  cefepime   IVPB 1000 milliGRAM(s) IV Intermittent every 12 hours  cefepime   IVPB      cholecalciferol 1000 Unit(s) Oral daily  cyanocobalamin 1000 MICROGram(s) Oral daily  doxazosin 4 milliGRAM(s) Oral at bedtime  enoxaparin Injectable 40 milliGRAM(s) SubCutaneous every 24 hours  famotidine    Tablet 20 milliGRAM(s) Oral daily  latanoprost 0.005% Ophthalmic Solution 1 Drop(s) Left EYE at bedtime  melatonin 3 milliGRAM(s) Oral at bedtime  nystatin Powder 1 Application(s) Topical three times a day  senna 2 Tablet(s) Oral at bedtime  simvastatin 20 milliGRAM(s) Oral at bedtime    MEDICATIONS  (PRN):  acetaminophen   Tablet. 650 milliGRAM(s) Oral every 6 hours PRN Moderate Pain (4 - 6)  bisacodyl 5 milliGRAM(s) Oral every 12 hours PRN Constipation  docusate sodium 100 milliGRAM(s) Oral daily PRN Constipation  hydrocortisone 2.5% Ointment 1 Application(s) Topical daily PRN Rash      FAMILY HISTORY:  No pertinent family history in first degree relatives      Allergies    No Known Allergies    SOCIAL HISTORY: no tobacco etoh    REVIEW OF SYSTEMS: gen fever chills  heent neg neck neg  pulm neg cv neg  gi neg  gu per hpi  msk neg neuro neg  skin neg psych neg all neg endo neg    Physical Exam  Vital signs  T(C): 36.6 (05-09-18 @ 08:40), Max: 36.9 (05-08-18 @ 16:16)  HR: 60 (05-09-18 @ 08:40)  BP: 115/71 (05-09-18 @ 08:40)  SpO2: 99% (05-09-18 @ 08:40)  Wt(kg): --  Gen: wlderly male nad   heent ncat neck symm supp  cor reg chest clear  abd soft nd nt  no cvat no winter guarding   gu noo cvat 1+ rt changes  ext no c.c.e    LABS:      05-08 @ 07:37    WBC 6.39  / Hct 31.3  / SCr --       05-08 @ 06:03    WBC --    / Hct --    / SCr 1.60     05-08    144  |  110<H>  |  28<H>  ----------------------------<  77  4.4   |  21<L>  |  1.60<H>    Ca    9.3      08 May 2018 06:03  Phos  3.5     05-08  Mg     2.0     05-08        RADIOLOGY:  moderate right hydro

## 2018-05-09 NOTE — PROGRESS NOTE ADULT - SUBJECTIVE AND OBJECTIVE BOX
EP ATTENDING    no tele    no palpitations, no syncope, no angina    acetaminophen   Tablet. 650 milliGRAM(s) Oral every 6 hours PRN  bisacodyl 5 milliGRAM(s) Oral every 12 hours PRN  cefepime   IVPB 1000 milliGRAM(s) IV Intermittent every 12 hours  cefepime   IVPB      cholecalciferol 1000 Unit(s) Oral daily  cyanocobalamin 1000 MICROGram(s) Oral daily  docusate sodium 100 milliGRAM(s) Oral daily PRN  doxazosin 4 milliGRAM(s) Oral at bedtime  enoxaparin Injectable 40 milliGRAM(s) SubCutaneous every 24 hours  famotidine    Tablet 20 milliGRAM(s) Oral daily  hydrocortisone 2.5% Ointment 1 Application(s) Topical daily PRN  latanoprost 0.005% Ophthalmic Solution 1 Drop(s) Left EYE at bedtime  melatonin 3 milliGRAM(s) Oral at bedtime  nystatin Powder 1 Application(s) Topical three times a day  senna 2 Tablet(s) Oral at bedtime  simvastatin 20 milliGRAM(s) Oral at bedtime                            10.2   6.39  )-----------( 224      ( 08 May 2018 07:37 )             31.3       05-08    144  |  110<H>  |  28<H>  ----------------------------<  77  4.4   |  21<L>  |  1.60<H>    Ca    9.3      08 May 2018 06:03  Phos  3.5     05-08  Mg     2.0     05-08        CARDIAC MARKERS ( 06 May 2018 14:47 )  x     / <0.01 ng/mL / x     / x     / x            T(C): 36.6 (05-09-18 @ 00:08), Max: 36.9 (05-08-18 @ 16:16)  HR: 60 (05-09-18 @ 00:08) (59 - 60)  BP: 116/70 (05-09-18 @ 00:08) (100/63 - 116/70)  RR: 18 (05-09-18 @ 00:08) (18 - 18)  SpO2: 95% (05-09-18 @ 00:08) (95% - 97%)  Wt(kg): --    no JVD  RRR, no murmurs  CTAB  soft nt/nd  no c/c/e      A/P) He is a 83 y/o male PMH persistent AF who had a 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 His PPM interrogation here in April 2018 showed excellent PPM function with about 3.5 years remaining on PG longevity. He was admitted last month with coag neg staph UTI. Thankfully his blood cultures were and continue to be negative. He is now readmitted with weakness. He denies palpitations, syncope, nor angina. He remains off A/C due to his advanced age and advanced dementia.    -no need for repeat PPM interrogation  -as long as BCx remain negative and there is no suspicion for endocarditis then no further inpatient EP workup needed  -f/u with his electrophysiologist Dr. Jose Stapleton after discharge for routine PPM care  -poor candidate for systemic anticoagulation given advanced age and advanced dementia  -no further inpatient EP workup needed      Nikki Duarte M.D., [facility]  Cardiac Electrophysiology  [facility]  [address] [place] [address], [address] [place] [address]  [email or web address]    office [phone]  pager [phone]

## 2018-05-09 NOTE — PROGRESS NOTE ADULT - SUBJECTIVE AND OBJECTIVE BOX
[facility] NEPHROLOGY- PROGRESS NOTE    Patient is a 84y Male with recurrent UTIs with chronic R hydronephrosis who presented to the hospital with another UTI.  Pt is at [facility] ([facility]) and unable to go to urology office for further intervention.  Pt c/o general malaise, some dysuria, and mild suprapubic pain.    Pt feels okay today    PAST MEDICAL & SURGICAL HISTORY:  Psoriasis  Prostate cancer  GERD (gastroesophageal reflux disease)  Dyslipidemia  No significant past surgical history    No Known Allergies    Home Medications Reviewed  Hospital Medications: Reviewed      REVIEW OF SYSTEMS:  CONSTITUTIONAL: + malaise, + weakness, No fevers or chills  EYES/ENT: No visual changes;  No vertigo or throat pain   NECK: No pain or stiffness  RESPIRATORY: No cough, wheezing, hemoptysis; No shortness of breath  CARDIOVASCULAR: No chest pain or palpitations.  GASTROINTESTINAL: No abdominal or epigastric pain. No nausea, vomiting, or hematemesis; No diarrhea or constipation. No melena or hematochezia.  GENITOURINARY: + dysuria, +suprapubic pain, +frequency, +urinary urgency, no hematuria  NEUROLOGICAL: No numbness or weakness  SKIN: No itching, burning, rashes, or lesions   VASCULAR: No bilateral lower extremity edema.   All other review of systems is negative unless indicated above.    VITALS:  T(F): 97.9 (18 @ 08:40), Max: 98.4 (18 @ 16:16)  HR: 60 (18 @ 08:40)  BP: 115/71 (18 @ 08:40)  RR: 18 (18 @ 08:40)  SpO2: 99% (18 @ 08:40)  Wt(kg): --        PHYSICAL EXAM:  Constitutional: NAD  HEENT: anicteric sclera, oropharynx clear, MMM  Neck: No JVD  Respiratory: CTAB, no wheezes, rales or rhonchi  Cardiovascular: S1, S2, RRR  Gastrointestinal: BS+, soft, NT/ND  Extremities: No cyanosis or clubbing. No peripheral edema  Neurological: A/O x 3, no focal deficits  Psychiatric: Normal mood, normal affect  : No hansen.       LABS: No labs     144  |  110<H>  |  28<H>  ----------------------------<  77  4.4   |  21<L>  |  1.60<H>    Ca    9.3      08 May 2018 06:03  Phos  3.5     05-08  Mg     2.0     05-08      Creatinine Trend: 1.60 <--, 1.40 <--, 1.55 <--, 1.54 <--                        10.2   6.39  )-----------( 224      ( 08 May 2018 07:37 )             31.3     Urine Studies:  Urinalysis Basic - ( 06 May 2018 16:09 )    Color: Yellow / Appearance: SL Turbid / S.016 / pH:   Gluc:  / Ketone: Negative  / Bili: Negative / Urobili: Negative   Blood:  / Protein: Trace / Nitrite: Negative   Leuk Esterase: Large / RBC: >50 /HPF / WBC >50 /HPF   Sq Epi:  / Non Sq Epi: OCC /HPF / Bacteria: Few /HPF

## 2018-05-09 NOTE — CONSULT NOTE ADULT - SUBJECTIVE AND OBJECTIVE BOX
[facility]        Patient is a 84y old  Male who presents with a chief complaint of weakness (06 May 2018 18:20)      HPI:  84m hx tia, s/p PPM, psoriasis, CKD, presenting from rehab with weakness, difficulty ambulating. Pt's complaints cluster primarily around poor care at rehab and lack of actual PT time, and pt attributes his present weakness to this lack of working with PT. Reports completing a course of abx yesterday for a UTI. Reports no fevers/chills, no new aches/pains, no cp/sob, no cough/HA. Would like to go to a different rehab if possible.    In ED: 97.9, 61, 119/78, 18, 96RA. Given ceftriaxone (1700), NS 1 L bolux x 1. (06 May 2018 18:20)           *****PAST MEDICAL / Surgical  HISTORY:  PAST MEDICAL & SURGICAL HISTORY:  Psoriasis  Prostate cancer  GERD (gastroesophageal reflux disease)  Dyslipidemia  No significant past surgical history           *****FAMILY HISTORY:  FAMILY HISTORY:  No pertinent family history in first degree relatives           *****SOCIAL HISTORY:  Alcohol: None  Smoking: None         *****ALLERGIES:   Allergies    No Known Allergies    Intolerances             *****MEDICATIONS: current medication reviewed and documented.   MEDICATIONS  (STANDING):  cefepime   IVPB 1000 milliGRAM(s) IV Intermittent every 12 hours  cefepime   IVPB      cholecalciferol 1000 Unit(s) Oral daily  cyanocobalamin 1000 MICROGram(s) Oral daily  doxazosin 4 milliGRAM(s) Oral at bedtime  enoxaparin Injectable 40 milliGRAM(s) SubCutaneous every 24 hours  famotidine    Tablet 20 milliGRAM(s) Oral daily  latanoprost 0.005% Ophthalmic Solution 1 Drop(s) Left EYE at bedtime  melatonin 3 milliGRAM(s) Oral at bedtime  nystatin Powder 1 Application(s) Topical three times a day  senna 2 Tablet(s) Oral at bedtime  simvastatin 20 milliGRAM(s) Oral at bedtime    MEDICATIONS  (PRN):  acetaminophen   Tablet. 650 milliGRAM(s) Oral every 6 hours PRN Moderate Pain (4 - 6)  bisacodyl 5 milliGRAM(s) Oral every 12 hours PRN Constipation  docusate sodium 100 milliGRAM(s) Oral daily PRN Constipation  hydrocortisone 2.5% Ointment 1 Application(s) Topical daily PRN Rash           *****REVIEW OF SYSTEM:  GEN: no fever, no chills, no pain  RESP: no SOB, no cough, no sputum  CVS: no chest pain, no palpitations, no edema  GI: no abdominal pain, no nausea, no vomiting, no constipation, no diarrhea  : no dysurea, no frequency, no hematurea  Neuro: no headache, no dizziness  PSYCH: no anxiety, no depression  Derm : no itching, no rash         *****VITAL SIGNS:  T(C): 36.6 (05-09-18 @ 08:40), Max: 36.9 (05-08-18 @ 16:16)  HR: 60 (05-09-18 @ 08:40) (59 - 60)  BP: 115/71 (05-09-18 @ 08:40) (100/63 - 116/70)  RR: 18 (05-09-18 @ 08:40) (18 - 18)  SpO2: 99% (05-09-18 @ 08:40) (95% - 99%)  Wt(kg): --           *****PHYSICAL EXAM:   Alert oriented x 3   Attention comprehension are fair. Able to name, repeat, read without any difficulty.   Able to follow 3 step commands.     EOMI fundi not visualized,  VFF to confrontration  No facial asymmetry   Tongue is midline   Palate elevates symmetrically   Moving all 4 ext symmetrically no pronator drift. difficult exam as pt is not very cooperative.     Gait :    B/L down going toes        >>> <<<         *****LAB AND IMAGING:                          10.2   6.39  )-----------( 224      ( 08 May 2018 07:37 )             31.3               05-08    144  |  110<H>  |  28<H>  ----------------------------<  77  4.4   |  21<L>  |  1.60<H>    Ca    9.3      08 May 2018 06:03  Phos  3.5     05-08  Mg     2.0     05-08      Alanine Aminotransferase (ALT/SGPT): 8 U/L (05.06.18 @ 14:47)    Aspartate Aminotransferase (AST/SGOT): 23 U/L (05.06.18 @ 14:47)                            Direct LDL: 103: LDL Cholesterol --- Interpretive Comment (for adults 18 and over)  Optimal LDL Level may vary based on clinical situation  Below 70                  Ideal for people at very high risk of heart  disease  Below 100                Ideal for people at risk of heart disease  100 - 129                   [place]  130 - 159                   Borderline high  160 - 189                   High  190 and Above          Very high mg/dL (04.15.18 @ 09:18)      HDL Cholesterol, Serum: 46 mg/dL (04.15.18 @ 09:18)      [All pertinent recent Imaging reports reviewed]         *****A S S E S S M E N T   A N D   P L A N :  84m hx tia, s/p PPM, psoriasis, CKD, presenting from rehab with weakness, difficulty ambulating. Pt's complaints cluster primarily around poor care at rehab and lack of actual PT time, and pt attributes his present weakness to this lack of working with PT. Reports completing a course of abx yesterday for a UTI. Reports no fevers/chills, no new aches/pains, no cp/sob, no cough/HA. Would like to go to a different rehab if possible.    no focality on exam.   proximal weakness, likely due to disuse atrophy   check cpk, given statin ? statin related myopathy without abn lfts less likely.   lipid profile wnl.  PT consult for gait and balance training.               80 minutes spent on the total encounter;  more than 50 % of the visit was spent on counseling  and or coordinating care by the attending physician.    Thank you for allowing me to participate in the care of this lauren patient. Please do not hesitate to call me if you have any questions.   ___________________________  Will follow with you.  Thank you,  Mary Draper MD  Diplomate of the American Board of Neurology and Psychiatry.  Diplomate of the American Board of Vascular Neurology.   [facility]), [facility]   Ph: [phone]      This and subsequent notes were partially created using voice recognition software and will  inherently be subject to errors including those of syntax and sound alike substitutions which may escape proofreading. In such instances original meaning may be extrapolated by contextual derivation.

## 2018-05-09 NOTE — PROGRESS NOTE ADULT - ASSESSMENT
Patient is a 84y old  Male who was recently discharged after treated  for MSSA UTI and hydronephrosis, and now presenting from rehab to the ER for evaluation of weakness (06 May 2018 18:20) and difficulty ambulating. He attributes his present weakness to this lack of working with PT. He has no fevers/chills, and on admission found to have positive Urine analysis and dose of Ceftriaxone is given, cultures pending. The ID consult requested to assist with further evaluation and antibiotic  management.     # Complicated UTI  - cultures are negative  # Moderate Right Hydronephrosis    Would recommend:  1. Agree with Cystoscopy as per Urology   2. Continue Cefepime   3. Hold bowel regimen  4. Aspiration precaution    d/w patient and Nursing staff    will follow the patient with you Patient is a 84y old  Male who was recently discharged after treated  for MSSA UTI and hydronephrosis, and now presenting from rehab to the ER for evaluation of weakness (06 May 2018 18:20) and difficulty ambulating. He attributes his present weakness to this lack of working with PT. He has no fevers/chills, and on admission found to have positive Urine analysis and dose of Ceftriaxone is given, cultures pending. The ID consult requested to assist with further evaluation and antibiotic  management.     # Complicated UTI  - cultures are negative  # Moderate Right Hydronephrosis    Would recommend:  1. Agree with Cystoscopy as per Urology   2. Continue Cefepime   3. Aspiration precaution    d/w patient and Nursing staff    will follow the patient with you

## 2018-05-09 NOTE — CONSULT NOTE ADULT - SUBJECTIVE AND OBJECTIVE BOX
HISTORY OF PRESENT ILLNESS: He is a 85 y/o male PMH persistent AF who had a 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 His PPM interrogation here in April 2018 showed excellent PPM function with about 3.5 years remaining on PG longevity. He was admitted last month with coag neg staph UTI. Thankfully his blood cultures were negative. He is now readmitted with weakness. He denies palpitations, syncope, nor angina. He remains off A/C due to his advanced age and advanced dementia.    PAST MEDICAL & SURGICAL HISTORY:  Psoriasis  Prostate cancer  GERD (gastroesophageal reflux disease)  Dyslipidemia  No significant past surgical history    	    MEDICATIONS:  doxazosin 4 milliGRAM(s) Oral at bedtime  enoxaparin Injectable 40 milliGRAM(s) SubCutaneous every 24 hours  cefepime   IVPB 1000 milliGRAM(s) IV Intermittent every 12 hours  cefepime   IVPB      acetaminophen   Tablet. 650 milliGRAM(s) Oral every 6 hours PRN  melatonin 3 milliGRAM(s) Oral at bedtime  bisacodyl 5 milliGRAM(s) Oral every 12 hours PRN  docusate sodium 100 milliGRAM(s) Oral daily PRN  famotidine    Tablet 20 milliGRAM(s) Oral daily  senna 2 Tablet(s) Oral at bedtime  simvastatin 20 milliGRAM(s) Oral at bedtime  cholecalciferol 1000 Unit(s) Oral daily  cyanocobalamin 1000 MICROGram(s) Oral daily  hydrocortisone 2.5% Ointment 1 Application(s) Topical daily PRN  latanoprost 0.005% Ophthalmic Solution 1 Drop(s) Left EYE at bedtime  nystatin Powder 1 Application(s) Topical three times a day      Allergies  No Known Allergies      FAMILY HISTORY:  No pertinent family history in first degree relatives      SOCIAL HISTORY:    [+ ] Non-smoker  [ ] Smoker  [ -] Alcohol      REVIEW OF SYSTEMS:  all others negative     PHYSICAL EXAM:  T(C): 36.6 (05-09-18 @ 08:40), Max: 36.9 (05-08-18 @ 16:16)  HR: 60 (05-09-18 @ 08:40) (59 - 60)  BP: 115/71 (05-09-18 @ 08:40) (100/63 - 116/70)  RR: 18 (05-09-18 @ 08:40) (18 - 18)  SpO2: 99% (05-09-18 @ 08:40) (95% - 99%)  Wt(kg): --  I&O's Summary      Appearance: Normal	  HEENT:   Normal oral mucosa, PERRL, EOMI	  Lymphatic: No lymphadenopathy  Cardiovascular: Normal S1 S2, No JVD, No murmurs, No edema  Respiratory: Lungs clear to auscultation	  Psychiatry: A & O x 3, Mood & affect appropriate  Gastrointestinal:  Soft, Non-tender, + BS	  Skin: No rashes, No ecchymoses, No cyanosis	  Neurologic: Non-focal  Extremities: Normal range of motion, No clubbing, cyanosis or edema  Vascular: Peripheral pulses palpable 2+ bilaterally    TELEMETRY:  n/a  	    ECG:   	AF, VVI pacing at 60 bpm    RADIOLOGY:   < from: Xray Chest 1 View- PORTABLE-Urgent (05.06.18 @ 15:07) >    IMPRESSION:   Clear lungs.    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 His PPM interrogation here in April 2018 showed excellent PPM function with about 3.5 years remaining on PG longevity. He was admitted last month with coag neg staph UTI. Thankfully his blood cultures were negative. He is now readmitted with weakness. He denies palpitations, syncope, nor angina. He remains off A/C due to his advanced age and advanced dementia.    -- no need for repeat PPM interrogation  -- as long as BCx remain negative and there is no suspicion for endocarditis then no need for further cardiac testing  -- HD stable no evidence CHF  -- urology noted - patient may need cystoscopy when urine cx negative - could check baseline TTE to assess LV function   -- care per primary team     Elaine Mckeon [facility]

## 2018-05-09 NOTE — PROGRESS NOTE ADULT - SUBJECTIVE AND OBJECTIVE BOX
Patient is seen and examined at the bed side, is afebrile. He is feeling much better. The Urology recommendation noted.          REVIEW OF SYSTEMS: All other review systems are negative        Vital Signs Last 24 Hrs  T(C): 36.4 (09 May 2018 16:19), Max: 36.6 (09 May 2018 00:08)  T(F): 97.6 (09 May 2018 16:19), Max: 97.9 (09 May 2018 00:08)  HR: 60 (09 May 2018 16:19) (60 - 60)  BP: 124/73 (09 May 2018 16:19) (115/71 - 124/73)  BP(mean): --  RR: 18 (09 May 2018 16:19) (18 - 18)  SpO2: 97% (09 May 2018 16:19) (95% - 99%)        ALLERGIES: NKDA          PHYSICAL EXAM:  GENERAL: Not in acute distress  CVS: s1 and s2 present  RESP: Air entry B/L  GI: abdomen soft and nontender  EXT: No pedal edema  CNS: Awake and alert  SKIN: psoriasis lesions          LABS:                        10.2   6.39  )-----------( 224      ( 08 May 2018 07:37 )             31.3                           10.2   6.39  )-----------( 224      ( 08 May 2018 07:37 )             31.3               142  |  107  |  26<H>  ----------------------------<  140<H>  4.2   |  22  |  1.50<H>    Ca    9.2      09 May 2018 12:35  Phos  3.5     05-08  Mg     2.0     05-08      05-08    144  |  110<H>  |  28<H>  ----------------------------<  77  4.4   |  21<L>  |  1.60<H>    Ca    9.3      08 May 2018 06:03  Phos  3.5     05-08  Mg     2.0     05-08        TPro  8.1  /  Alb  3.8  /  TBili  0.3  /  DBili  x   /  AST  23  /  ALT  8<L>  /  AlkPhos  84  05-06        PTT - ( 06 May 2018 14:47 )  PTT:39.9 sec  Urinalysis Basic - ( 06 May 2018 16:09 )    Color: Yellow / Appearance: SL Turbid / S.016 / pH: x  Gluc: x / Ketone: Negative  / Bili: Negative / Urobili: Negative   Blood: x / Protein: Trace / Nitrite: Negative   Leuk Esterase: Large / RBC: >50 /HPF / WBC >50 /HPF   Sq Epi: x / Non Sq Epi: OCC /HPF / Bacteria: Few /HPF      MEDICATIONS  (STANDING):  cefepime   IVPB 1000 milliGRAM(s) IV Intermittent every 12 hours  cefepime   IVPB      cholecalciferol 1000 Unit(s) Oral daily  cyanocobalamin 1000 MICROGram(s) Oral daily  doxazosin 4 milliGRAM(s) Oral at bedtime  enoxaparin Injectable 40 milliGRAM(s) SubCutaneous every 24 hours  famotidine    Tablet 20 milliGRAM(s) Oral daily  latanoprost 0.005% Ophthalmic Solution 1 Drop(s) Left EYE at bedtime  melatonin 3 milliGRAM(s) Oral at bedtime  nystatin Powder 1 Application(s) Topical three times a day  senna 2 Tablet(s) Oral at bedtime  simvastatin 20 milliGRAM(s) Oral at bedtime    MEDICATIONS  (PRN):  acetaminophen   Tablet. 650 milliGRAM(s) Oral every 6 hours PRN Moderate Pain (4 - 6)  bisacodyl 5 milliGRAM(s) Oral every 12 hours PRN Constipation  docusate sodium 100 milliGRAM(s) Oral daily PRN Constipation  hydrocortisone 2.5% Ointment 1 Application(s) Topical daily PRN Rash        RADIOLOGY & ADDITIONAL TESTS:    18: US Kidney and Bladder (18 @ 16:42) Again seen is moderate hydroureteronephrosis as described above. Fine debris within the distal dilated right ureter is indicative of infection.   No left-sided hydronephrosis. Urinary bladder wall small contains fine debris also indicative of  infection. Thickening of the bladder wall may also reflect infection.  Thickening/trabeculation and bladder diverticulum suggests some degree of chronic outlet obstruction.      18: Xray Chest 1 View- PORTABLE-Urgent (18 @ 15:07) INTERPRETATION:  no urgent/emergent findings.      18: US Kidney and Bladder (18 @ 17:05) Moderate right hydroureteronephrosis to the level of the bladder. The right ureter appears to insert on a bladder diverticulum. Right renal   cortical thinning is suggestive of chronic hydronephrosis. Urinary bladder trabeculations and diverticula with debris.        MICROBIOLOGY DATA:    Culture - Blood (18 @ 00:47)    Specimen Source: .Blood Blood-Peripheral    Culture Results:   No growth to date.    Culture - Blood (18 @ 00:47)    Specimen Source: .Blood Blood-Peripheral    Culture Results:   No growth to date.    Culture - Urine (18 @ 21:36)    Specimen Source: .Urine Clean Catch (Midstream)    Culture Results:   No growth        Urinalysis + Microscopic Examination (18 @ 16:09)    Urine Appearance: SL Turbid    Urobilinogen: Negative    Leukocyte Esterase Concentration: Large    Nitrite: Negative    Specific Gravity: 1.016    Protein, Urine: Trace    pH Urine: 5.5    Ketone - Urine: Negative    Bilirubin: Negative    Color: Yellow    Glucose Qualitative, Urine: Negative    Blood, Urine: Moderate    Red Blood Cell - Urine: >50 /HPF    White Blood Cell - Urine: >50 /HPF    Epithelial Cells: OCC /HPF    Hyaline Casts: 2-5    Bacteria: Few /HPF Patient is seen and examined at the bed side, is afebrile. He is feeling better. The Urology recommendation noted.          REVIEW OF SYSTEMS: All other review systems are negative        Vital Signs Last 24 Hrs  T(C): 36.4 (09 May 2018 16:19), Max: 36.6 (09 May 2018 00:08)  T(F): 97.6 (09 May 2018 16:19), Max: 97.9 (09 May 2018 00:08)  HR: 60 (09 May 2018 16:19) (60 - 60)  BP: 124/73 (09 May 2018 16:19) (115/71 - 124/73)  BP(mean): --  RR: 18 (09 May 2018 16:19) (18 - 18)  SpO2: 97% (09 May 2018 16:19) (95% - 99%)        ALLERGIES: NKDA        PHYSICAL EXAM:  GENERAL: Not in acute distress  CVS: s1 and s2 present  RESP: Air entry B/L  GI: abdomen soft and nontender  EXT: No pedal edema  CNS: Awake and alert  SKIN: psoriasis lesions          LABS:                        10.2   6.39  )-----------( 224      ( 08 May 2018 07:37 )             31.3                  05-    142  |  107  |  26<H>  ----------------------------<  140<H>  4.2   |  22  |  1.50<H>    Ca    9.2      09 May 2018 12:35  Phos  3.5     05-08  Mg     2.0     05-08      05-08    144  |  110<H>  |  28<H>  ----------------------------<  77  4.4   |  21<L>  |  1.60<H>    Ca    9.3      08 May 2018 06:03  Phos  3.5     05-08  Mg     2.0     05-08        TPro  8.1  /  Alb  3.8  /  TBili  0.3  /  DBili  x   /  AST  23  /  ALT  8<L>  /  AlkPhos  84  05-06        PTT - ( 06 May 2018 14:47 )  PTT:39.9 sec  Urinalysis Basic - ( 06 May 2018 16:09 )    Color: Yellow / Appearance: SL Turbid / S.016 / pH: x  Gluc: x / Ketone: Negative  / Bili: Negative / Urobili: Negative   Blood: x / Protein: Trace / Nitrite: Negative   Leuk Esterase: Large / RBC: >50 /HPF / WBC >50 /HPF   Sq Epi: x / Non Sq Epi: OCC /HPF / Bacteria: Few /HPF      MEDICATIONS  (STANDING):  cefepime   IVPB 1000 milliGRAM(s) IV Intermittent every 12 hours  cefepime   IVPB      cholecalciferol 1000 Unit(s) Oral daily  cyanocobalamin 1000 MICROGram(s) Oral daily  doxazosin 4 milliGRAM(s) Oral at bedtime  enoxaparin Injectable 40 milliGRAM(s) SubCutaneous every 24 hours  famotidine    Tablet 20 milliGRAM(s) Oral daily  latanoprost 0.005% Ophthalmic Solution 1 Drop(s) Left EYE at bedtime  melatonin 3 milliGRAM(s) Oral at bedtime  nystatin Powder 1 Application(s) Topical three times a day  senna 2 Tablet(s) Oral at bedtime  simvastatin 20 milliGRAM(s) Oral at bedtime    MEDICATIONS  (PRN):  acetaminophen   Tablet. 650 milliGRAM(s) Oral every 6 hours PRN Moderate Pain (4 - 6)  bisacodyl 5 milliGRAM(s) Oral every 12 hours PRN Constipation  docusate sodium 100 milliGRAM(s) Oral daily PRN Constipation  hydrocortisone 2.5% Ointment 1 Application(s) Topical daily PRN Rash        RADIOLOGY & ADDITIONAL TESTS:    18: US Kidney and Bladder (18 @ 16:42) Again seen is moderate hydroureteronephrosis as described above. Fine debris within the distal dilated right ureter is indicative of infection.   No left-sided hydronephrosis. Urinary bladder wall small contains fine debris also indicative of  infection. Thickening of the bladder wall may also reflect infection.  Thickening/trabeculation and bladder diverticulum suggests some degree of chronic outlet obstruction.      18: Xray Chest 1 View- PORTABLE-Urgent (18 @ 15:07) INTERPRETATION:  no urgent/emergent findings.      18: US Kidney and Bladder (18 @ 17:05) Moderate right hydroureteronephrosis to the level of the bladder. The right ureter appears to insert on a bladder diverticulum. Right renal   cortical thinning is suggestive of chronic hydronephrosis. Urinary bladder trabeculations and diverticula with debris.        MICROBIOLOGY DATA:    Culture - Blood (18 @ 00:47)    Specimen Source: .Blood Blood-Peripheral    Culture Results:   No growth to date.    Culture - Blood (18 @ 00:47)    Specimen Source: .Blood Blood-Peripheral    Culture Results:   No growth to date.    Culture - Urine (18 @ 21:36)    Specimen Source: .Urine Clean Catch (Midstream)    Culture Results:   No growth        Urinalysis + Microscopic Examination (18 @ 16:09)    Urine Appearance: SL Turbid    Urobilinogen: Negative    Leukocyte Esterase Concentration: Large    Nitrite: Negative    Specific Gravity: 1.016    Protein, Urine: Trace    pH Urine: 5.5    Ketone - Urine: Negative    Bilirubin: Negative    Color: Yellow    Glucose Qualitative, Urine: Negative    Blood, Urine: Moderate    Red Blood Cell - Urine: >50 /HPF    White Blood Cell - Urine: >50 /HPF    Epithelial Cells: OCC /HPF    Hyaline Casts: 2-5    Bacteria: Few /HPF

## 2018-05-09 NOTE — CONSULT NOTE ADULT - ATTENDING COMMENTS
Patient seen and examined.  Agree with above.   -no clinical heart failure or anginal symptoms  -further cardiac workup can be performed as outpatient    Eduardo Stuart MD
[facility] NEPHROLOGY  Zacarias Orellana M.D.  Pa Burgess D.O.  Yessica Valencia M.D.  Gavi Kern, MSN, ANP-C    Telephone: [phone]  Facsimile: [phone]    [address] [place] [address]

## 2018-05-10 DIAGNOSIS — N32.0 BLADDER-NECK OBSTRUCTION: ICD-10-CM

## 2018-05-10 LAB
ANION GAP SERPL CALC-SCNC: 12 MMOL/L — SIGNIFICANT CHANGE UP (ref 5–17)
BUN SERPL-MCNC: 23 MG/DL — SIGNIFICANT CHANGE UP (ref 7–23)
CALCIUM SERPL-MCNC: 9.4 MG/DL — SIGNIFICANT CHANGE UP (ref 8.4–10.5)
CHLORIDE SERPL-SCNC: 107 MMOL/L — SIGNIFICANT CHANGE UP (ref 96–108)
CK SERPL-CCNC: 28 U/L — LOW (ref 30–200)
CO2 SERPL-SCNC: 22 MMOL/L — SIGNIFICANT CHANGE UP (ref 22–31)
CREAT SERPL-MCNC: 1.48 MG/DL — HIGH (ref 0.5–1.3)
GLUCOSE SERPL-MCNC: 79 MG/DL — SIGNIFICANT CHANGE UP (ref 70–99)
POTASSIUM SERPL-MCNC: 4.2 MMOL/L — SIGNIFICANT CHANGE UP (ref 3.5–5.3)
POTASSIUM SERPL-SCNC: 4.2 MMOL/L — SIGNIFICANT CHANGE UP (ref 3.5–5.3)
PSA FLD-MCNC: 0.35 NG/ML — SIGNIFICANT CHANGE UP (ref 0–4)
SODIUM SERPL-SCNC: 141 MMOL/L — SIGNIFICANT CHANGE UP (ref 135–145)

## 2018-05-10 RX ADMIN — CEFEPIME 100 MILLIGRAM(S): 1 INJECTION, POWDER, FOR SOLUTION INTRAMUSCULAR; INTRAVENOUS at 18:14

## 2018-05-10 RX ADMIN — ENOXAPARIN SODIUM 40 MILLIGRAM(S): 100 INJECTION SUBCUTANEOUS at 05:35

## 2018-05-10 RX ADMIN — NYSTATIN CREAM 1 APPLICATION(S): 100000 CREAM TOPICAL at 14:01

## 2018-05-10 RX ADMIN — PREGABALIN 1000 MICROGRAM(S): 225 CAPSULE ORAL at 12:27

## 2018-05-10 RX ADMIN — CEFEPIME 100 MILLIGRAM(S): 1 INJECTION, POWDER, FOR SOLUTION INTRAMUSCULAR; INTRAVENOUS at 05:35

## 2018-05-10 RX ADMIN — NYSTATIN CREAM 1 APPLICATION(S): 100000 CREAM TOPICAL at 05:35

## 2018-05-10 RX ADMIN — Medication 1000 UNIT(S): at 12:27

## 2018-05-10 RX ADMIN — LATANOPROST 1 DROP(S): 0.05 SOLUTION/ DROPS OPHTHALMIC; TOPICAL at 22:52

## 2018-05-10 RX ADMIN — NYSTATIN CREAM 1 APPLICATION(S): 100000 CREAM TOPICAL at 22:52

## 2018-05-10 RX ADMIN — Medication 4 MILLIGRAM(S): at 22:52

## 2018-05-10 RX ADMIN — SENNA PLUS 2 TABLET(S): 8.6 TABLET ORAL at 22:52

## 2018-05-10 RX ADMIN — SIMVASTATIN 20 MILLIGRAM(S): 20 TABLET, FILM COATED ORAL at 22:52

## 2018-05-10 RX ADMIN — FAMOTIDINE 20 MILLIGRAM(S): 10 INJECTION INTRAVENOUS at 12:28

## 2018-05-10 NOTE — PROGRESS NOTE ADULT - SUBJECTIVE AND OBJECTIVE BOX
Patient denies CP, SOB Review of systems otherwise (-)    MEDICATIONS  (STANDING):  cefepime   IVPB 1000 milliGRAM(s) IV Intermittent every 12 hours  cholecalciferol 1000 Unit(s) Oral daily  cyanocobalamin 1000 MICROGram(s) Oral daily  doxazosin 4 milliGRAM(s) Oral at bedtime  enoxaparin Injectable 40 milliGRAM(s) SubCutaneous every 24 hours  famotidine    Tablet 20 milliGRAM(s) Oral daily  latanoprost 0.005% Ophthalmic Solution 1 Drop(s) Left EYE at bedtime  melatonin 3 milliGRAM(s) Oral at bedtime  nystatin Powder 1 Application(s) Topical three times a day  senna 2 Tablet(s) Oral at bedtime  simvastatin 20 milliGRAM(s) Oral at bedtime    MEDICATIONS  (PRN):  acetaminophen   Tablet. 650 milliGRAM(s) Oral every 6 hours PRN Moderate Pain (4 - 6)  bisacodyl 5 milliGRAM(s) Oral every 12 hours PRN Constipation  docusate sodium 100 milliGRAM(s) Oral daily PRN Constipation  hydrocortisone 2.5% Ointment 1 Application(s) Topical daily PRN Rash      LABS:  Hemoglobin: 10.2 g/dL (05-08 @ 07:37)  Hemoglobin: 10.5 g/dL (05-07 @ 07:36)  Hemoglobin: 12.0 g/dL (05-06 @ 14:47)    05-10    141  |  107  |  23  ----------------------------<  79  4.2   |  22  |  1.48<H>    Ca    9.4      10 May 2018 06:15      Creatinine Trend: 1.48<--, 1.50<--, 1.60<--, 1.40<--, 1.55<--, 1.54<--     CARDIAC MARKERS ( 10 May 2018 06:15 )  x     / x     / 28 U/L / x     / x            PHYSICAL EXAM  Vital Signs Last 24 Hrs  T(C): 36.8 (10 May 2018 08:50), Max: 36.8 (10 May 2018 08:50)  T(F): 98.2 (10 May 2018 08:50), Max: 98.2 (10 May 2018 08:50)  HR: 60 (10 May 2018 08:50) (60 - 60)  BP: 114/70 (10 May 2018 08:50) (114/70 - 124/73)  BP(mean): --  RR: 18 (10 May 2018 08:50) (18 - 18)  SpO2: 95% (10 May 2018 08:50) (95% - 97%)    Gen: Appears well in NAD  HEENT:  (-)icterus (-)pallor  CV: N S1 S2 1/6 ANA (+)2 Pulses B/l  Resp:  Clear to ausculatation B/L, normal effort  GI: (+) BS Soft, NT, ND  Lymph:  (-)Edema, (-)obvious lymphadenopathy  Skin: Warm to touch, Normal turgor  Psych: Appropriate mood and affect    Culture - Blood (05.07.18 @ 00:47)    Specimen Source: .Blood Blood-Peripheral    Culture Results:   No growth to date.      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 His PPM interrogation here in April 2018 showed excellent PPM function with about 3.5 years remaining on PG longevity. He was admitted last month with coag neg staph UTI. Thankfully his blood cultures were and continue to be negative. He is now readmitted with weakness. He denies palpitations, syncope, nor angina. He remains off A/C due to his advanced age and advanced dementia.      -f/u with his electrophysiologist Dr. Jose Stapleton after discharge for routine PPM care  - not in clinical CHF  - cont Abx per med  - possible cysto as outpatient     Dick Ba MD, FACC

## 2018-05-10 NOTE — PROGRESS NOTE ADULT - SUBJECTIVE AND OBJECTIVE BOX
Patient seen and examined at bedside  No acute events noted overnight  Case discussed with medical team    HPI:  84m hx tia, s/p PPM, psoriasis, CKD, presenting from rehab with weakness, difficulty ambulating. Pt's complaints cluster primarily around poor care at rehab and lack of actual PT time, and pt attributes his present weakness to this lack of working with PT. Reports completing a course of abx yesterday for a UTI. Reports no fevers/chills, no new aches/pains, no cp/sob, no cough/HA. Would like to go to a different rehab if possible.    PAST MEDICAL & SURGICAL HISTORY:  Psoriasis  Prostate cancer  GERD (gastroesophageal reflux disease)  Dyslipidemia  No significant past surgical history      No Known Allergies       MEDICATIONS  (STANDING):  cefepime   IVPB 1000 milliGRAM(s) IV Intermittent every 12 hours  cefepime   IVPB      cholecalciferol 1000 Unit(s) Oral daily  cyanocobalamin 1000 MICROGram(s) Oral daily  doxazosin 4 milliGRAM(s) Oral at bedtime  enoxaparin Injectable 40 milliGRAM(s) SubCutaneous every 24 hours  famotidine    Tablet 20 milliGRAM(s) Oral daily  latanoprost 0.005% Ophthalmic Solution 1 Drop(s) Left EYE at bedtime  melatonin 3 milliGRAM(s) Oral at bedtime  nystatin Powder 1 Application(s) Topical three times a day  senna 2 Tablet(s) Oral at bedtime  simvastatin 20 milliGRAM(s) Oral at bedtime    MEDICATIONS  (PRN):  acetaminophen   Tablet. 650 milliGRAM(s) Oral every 6 hours PRN Moderate Pain (4 - 6)  bisacodyl 5 milliGRAM(s) Oral every 12 hours PRN Constipation  docusate sodium 100 milliGRAM(s) Oral daily PRN Constipation  hydrocortisone 2.5% Ointment 1 Application(s) Topical daily PRN Rash      REVIEW OF SYSTEMS:  CONSTITUTIONAL: (+) generalized weakness, improving malaise.   EYES: No acute change in vision   ENT:  No tinnitus  NECK: No stiffness  RESPIRATORY: No hemoptysis  CARDIOVASCULAR: No chest pain, palpitations, syncope  GASTROINTESTINAL: No hematemesis, diarrhea, melena, or hematochezia.  GENITOURINARY: No hematuria  NEUROLOGICAL: unstable gait. No headaches  LYMPH Nodes: No enlarged glands  ENDOCRINE: No heat or cold intolerance	    T(C): 36.8 (05-10-18 @ 08:50), Max: 36.8 (05-10-18 @ 08:50)  HR: 60 (05-10-18 @ 08:50) (60 - 60)  BP: 114/70 (05-10-18 @ 08:50) (114/70 - 124/73)  RR: 18 (05-10-18 @ 08:50) (18 - 18)  SpO2: 95% (05-10-18 @ 08:50) (95% - 97%)    PHYSICAL EXAMINATION:   Constitutional: WD, NAD  HEENT: NC, AT  Neck:  Supple  Respiratory:  Adequate airflow b/l. Not using accessory muscles of respiration.  Cardiovascular:  S1 & S2 intact, no R/G, 2+ radial pulses b/l  Gastrointestinal: Soft, NT, ND, normoactive b.s., no organomegaly/RT/rigidity  Extremities: distal chronic venous stasis changes lower extremities. WWP  Neurological:  Alert and awake.  No acute focal motor deficits. Crude sensation intact.     Labs and imaging reviewed    LABS:    05-10    141  |  107  |  23  ----------------------------<  79  4.2   |  22  |  1.48<H>    Ca    9.4      10 May 2018 06:15      CARDIAC MARKERS ( 10 May 2018 06:15 )  x     / x     / 28 U/L / x     / x              CAPILLARY BLOOD GLUCOSE                  RADIOLOGY & ADDITIONAL STUDIES:    < from: US Kidney and Bladder (05.07.18 @ 16:42) >  IMPRESSION:     Again seen is moderate hydroureteronephrosis as described above. Fine   debris within the distal dilated right ureter is indicative of infection.   No left-sided hydronephrosis.    Urinary bladder wall small contains fine debris also indicative of   infection. Thickening of the bladder wall may also reflect infection.    Thickening/trabeculation and bladder diverticulum suggests some degree of   chronic outlet obstruction.    < end of copied text >

## 2018-05-10 NOTE — OCCUPATIONAL THERAPY INITIAL EVALUATION ADULT - ADL RETRAINING, OT EVAL
Goal: Pt will complete grooming while standing at sink independently in 4 weeks. Pt will complete toileting and LB dressing with Dominic in 4 weeks.

## 2018-05-10 NOTE — PROGRESS NOTE ADULT - SUBJECTIVE AND OBJECTIVE BOX
EP     no tele    no palpitations, no syncope, no angina        MEDICATIONS  (STANDING):  cefepime   IVPB 1000 milliGRAM(s) IV Intermittent every 12 hours  cholecalciferol 1000 Unit(s) Oral daily  cyanocobalamin 1000 MICROGram(s) Oral daily  doxazosin 4 milliGRAM(s) Oral at bedtime  enoxaparin Injectable 40 milliGRAM(s) SubCutaneous every 24 hours  famotidine    Tablet 20 milliGRAM(s) Oral daily  latanoprost 0.005% Ophthalmic Solution 1 Drop(s) Left EYE at bedtime  melatonin 3 milliGRAM(s) Oral at bedtime  nystatin Powder 1 Application(s) Topical three times a day  senna 2 Tablet(s) Oral at bedtime  simvastatin 20 milliGRAM(s) Oral at bedtime    MEDICATIONS  (PRN):  acetaminophen   Tablet. 650 milliGRAM(s) Oral every 6 hours PRN Moderate Pain (4 - 6)  bisacodyl 5 milliGRAM(s) Oral every 12 hours PRN Constipation  docusate sodium 100 milliGRAM(s) Oral daily PRN Constipation  hydrocortisone 2.5% Ointment 1 Application(s) Topical daily PRN Rash      LABS:  Hemoglobin: 10.2 g/dL (05-08 @ 07:37)  Hemoglobin: 10.5 g/dL (05-07 @ 07:36)  Hemoglobin: 12.0 g/dL (05-06 @ 14:47)    05-10    141  |  107  |  23  ----------------------------<  79  4.2   |  22  |  1.48<H>    Ca    9.4      10 May 2018 06:15      Creatinine Trend: 1.48<--, 1.50<--, 1.60<--, 1.40<--, 1.55<--, 1.54<--     CARDIAC MARKERS ( 10 May 2018 06:15 )  x     / x     / 28 U/L / x     / x            PHYSICAL EXAM  Vital Signs Last 24 Hrs  T(C): 36.8 (10 May 2018 08:50), Max: 36.8 (10 May 2018 08:50)  T(F): 98.2 (10 May 2018 08:50), Max: 98.2 (10 May 2018 08:50)  HR: 60 (10 May 2018 08:50) (60 - 60)  BP: 114/70 (10 May 2018 08:50) (114/70 - 124/73)  BP(mean): --  RR: 18 (10 May 2018 08:50) (18 - 18)  SpO2: 95% (10 May 2018 08:50) (95% - 97%)    no JVD  RRR, no murmurs  CTAB  soft nt/nd  no c/c/e    Culture - Blood (05.07.18 @ 00:47)    Specimen Source: .Blood Blood-Peripheral    Culture Results:   No growth to date.      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 His PPM interrogation here in April 2018 showed excellent PPM function with about 3.5 years remaining on PG longevity. He was admitted last month with coag neg staph UTI. Thankfully his blood cultures were and continue to be negative. He is now readmitted with weakness. He denies palpitations, syncope, nor angina. He remains off A/C due to his advanced age and advanced dementia.    -no need for repeat PPM interrogation  -as long as BCx remain negative and there is no suspicion for endocarditis then no further inpatient EP workup needed  -f/u with his electrophysiologist Dr. Jose Stapleton after discharge for routine PPM care  -poor candidate for systemic anticoagulation given advanced age and advanced dementia  -no further inpatient EP workup needed      Elaine Mckeon RPA-JOHNIE

## 2018-05-10 NOTE — PROGRESS NOTE ADULT - ASSESSMENT
Patient is a 84y old  Male who was recently discharged after treated  for MSSA UTI and hydronephrosis, and now presenting from rehab to the ER for evaluation of weakness (06 May 2018 18:20) and difficulty ambulating. He attributes his present weakness to this lack of working with PT. He has no fevers/chills, and on admission found to have positive Urine analysis and dose of Ceftriaxone is given, cultures pending. The ID consult requested to assist with further evaluation and antibiotic  management.     # Complicated UTI  - cultures are negative  # Moderate Right Hydronephrosis    Would recommend:  1. Agree with Cystoscopy as per Urology   2. Continue Cefepime   3. Aspiration precaution    d/w patient and Nursing staff    will follow the patient with you Patient is a 84y old  Male who was recently discharged after treated  for MSSA UTI and hydronephrosis, and now presenting from rehab to the ER for evaluation of weakness (06 May 2018 18:20) and difficulty ambulating. He attributes his present weakness to this lack of working with PT. He has no fevers/chills, and on admission found to have positive Urine analysis and dose of Ceftriaxone is given, cultures pending. The ID consult requested to assist with further evaluation and antibiotic  management.     # Complicated UTI  - cultures are negative  # Moderate Right Hydronephrosis    Would recommend:  1. PT  2. Cystoscopy not scheduled yet  3. Continue Cefepime for now  4. Aspiration precaution    d/w patient and Nursing staff    will follow the patient with you

## 2018-05-10 NOTE — OCCUPATIONAL THERAPY INITIAL EVALUATION ADULT - LIVES WITH, PROFILE
spouse/Pt lives with his wife in a private home, 2 steps to enter with b/l handrail. Pt has a HHA 9:30-5:30 who assists with ADLs, was independent with functional mobility using a RW.

## 2018-05-10 NOTE — OCCUPATIONAL THERAPY INITIAL EVALUATION ADULT - PERTINENT HX OF CURRENT PROBLEM, REHAB EVAL
84m hx tia, s/p PPM, psoriasis, CKD, presenting from rehab with weakness, difficulty ambulating. Pt's complaints cluster primarily around poor care at rehab & lack of actual PT time, pt attributes his weakness to lack of working with PT. Reports completing a course of abx yesterday for a UTI. Would like to go to a different rehab if possible.

## 2018-05-10 NOTE — PROGRESS NOTE ADULT - SUBJECTIVE AND OBJECTIVE BOX
Pt interviewed and examined. Full note to follow. [facility] NEPHROLOGY- PROGRESS NOTE    Patient is a 84y Male with recurrent UTIs with chronic R hydronephrosis who presented to the hospital with another UTI.  Pt is at [facility] ([facility] [place]) and unable to go to urology office for further intervention.  Pt c/o general malaise, some dysuria, and mild suprapubic pain.    Pt reports less pain today.    No Known Allergies    Home Medications Reviewed  Hospital Medications: Reviewed      REVIEW OF SYSTEMS:  CONSTITUTIONAL: + malaise, + weakness, No fevers or chills  EYES/ENT: No visual changes;  No vertigo or throat pain   NECK: No pain or stiffness  RESPIRATORY: No cough, wheezing, hemoptysis; No shortness of breath  CARDIOVASCULAR: No chest pain or palpitations.  GASTROINTESTINAL: No abdominal or epigastric pain. No nausea, vomiting, or hematemesis; No diarrhea or constipation. No melena or hematochezia.  GENITOURINARY: + dysuria, +suprapubic pain, +frequency, +urinary urgency, no hematuria  NEUROLOGICAL: No numbness or weakness  SKIN: No itching, burning, rashes, or lesions   VASCULAR: No bilateral lower extremity edema.   All other review of systems is negative unless indicated above.    VITALS: /70 HR60 RR18    PHYSICAL EXAM:  Constitutional: NAD  HEENT: anicteric sclera, oropharynx clear, MMM  Neck: No JVD  Respiratory: CTAB, no wheezes, rales or rhonchi  Cardiovascular: S1, S2, RRR  Gastrointestinal: BS+, soft, NT/ND  Extremities: No cyanosis or clubbing. No peripheral edema  Neurological: A/O x 3, no focal deficits  Psychiatric: Normal mood, normal affect    LABS:  05-10    141  |  107  |  23  ----------------------------<  79  4.2   |  22  |  1.48<H>    Ca    9.4      10 May 2018 06:15      Creatinine Trend: 1.48 <--, 1.50 <--, 1.60 <--, 1.40 <--, 1.55 <--, 1.54 <--    Urine Studies:  Urinalysis Basic - ( 06 May 2018 16:09 )    Color: Yellow / Appearance: SL Turbid / S.016 / pH:   Gluc:  / Ketone: Negative  / Bili: Negative / Urobili: Negative   Blood:  / Protein: Trace / Nitrite: Negative   Leuk Esterase: Large / RBC: >50 /HPF / WBC >50 /HPF   Sq Epi:  / Non Sq Epi: OCC /HPF / Bacteria: Few /HPF

## 2018-05-10 NOTE — OCCUPATIONAL THERAPY INITIAL EVALUATION ADULT - BALANCE TRAINING, PT EVAL
Goal: Pt will demonstrate G dynamic sitting balance, F+ dynamic standing balance in 4 weeks to increase safety/independence for ADL completion and functional mobility.

## 2018-05-10 NOTE — PROGRESS NOTE ADULT - SUBJECTIVE AND OBJECTIVE BOX
Patient is seen and examined at the bed side, is afebrile. He is feeling better. The Urology recommendation noted.          REVIEW OF SYSTEMS: All other review systems are negative        Vital Signs Last 24 Hrs  T(C): 36.6 (10 May 2018 16:21), Max: 36.8 (10 May 2018 08:50)  T(F): 97.9 (10 May 2018 16:21), Max: 98.2 (10 May 2018 08:50)  HR: 60 (10 May 2018 16:21) (60 - 60)  BP: 132/84 (10 May 2018 16:21) (114/70 - 132/84)  BP(mean): --  RR: 18 (10 May 2018 16:21) (18 - 18)  SpO2: 99% (10 May 2018 16:21) (95% - 99%)        ALLERGIES: NKDA        PHYSICAL EXAM:  GENERAL: Not in acute distress  CVS: s1 and s2 present  RESP: Air entry B/L  GI: abdomen soft and nontender  EXT: No pedal edema  CNS: Awake and alert  SKIN: psoriasis lesions          LABS:                          10.2   6.39  )-----------( 224      ( 08 May 2018 07:37 )             31.3                05-10    141  |  107  |  23  ----------------------------<  79  4.2   |  22  |  1.48<H>    Ca    9.4      10 May 2018 06:15      05-09    142  |  107  |  26<H>  ----------------------------<  140<H>  4.2   |  22  |  1.50<H>    Ca    9.2      09 May 2018 12:35  Phos  3.5     05-08  Mg     2.0     05-08      TPro  8.1  /  Alb  3.8  /  TBili  0.3  /  DBili  x   /  AST  23  /  ALT  8<L>  /  AlkPhos  84  05-06        PTT - ( 06 May 2018 14:47 )  PTT:39.9 sec  Urinalysis Basic - ( 06 May 2018 16:09 )    Color: Yellow / Appearance: SL Turbid / S.016 / pH: x  Gluc: x / Ketone: Negative  / Bili: Negative / Urobili: Negative   Blood: x / Protein: Trace / Nitrite: Negative   Leuk Esterase: Large / RBC: >50 /HPF / WBC >50 /HPF   Sq Epi: x / Non Sq Epi: OCC /HPF / Bacteria: Few /HPF        MEDICATIONS  (STANDING):  cefepime   IVPB 1000 milliGRAM(s) IV Intermittent every 12 hours  cefepime   IVPB      cholecalciferol 1000 Unit(s) Oral daily  cyanocobalamin 1000 MICROGram(s) Oral daily  doxazosin 4 milliGRAM(s) Oral at bedtime  enoxaparin Injectable 40 milliGRAM(s) SubCutaneous every 24 hours  famotidine    Tablet 20 milliGRAM(s) Oral daily  latanoprost 0.005% Ophthalmic Solution 1 Drop(s) Left EYE at bedtime  melatonin 3 milliGRAM(s) Oral at bedtime  nystatin Powder 1 Application(s) Topical three times a day  senna 2 Tablet(s) Oral at bedtime  simvastatin 20 milliGRAM(s) Oral at bedtime    MEDICATIONS  (PRN):  acetaminophen   Tablet. 650 milliGRAM(s) Oral every 6 hours PRN Moderate Pain (4 - 6)  bisacodyl 5 milliGRAM(s) Oral every 12 hours PRN Constipation  docusate sodium 100 milliGRAM(s) Oral daily PRN Constipation  hydrocortisone 2.5% Ointment 1 Application(s) Topical daily PRN Rash          RADIOLOGY & ADDITIONAL TESTS:    18: US Kidney and Bladder (18 @ 16:42) Again seen is moderate hydroureteronephrosis as described above. Fine debris within the distal dilated right ureter is indicative of infection.   No left-sided hydronephrosis. Urinary bladder wall small contains fine debris also indicative of  infection. Thickening of the bladder wall may also reflect infection.  Thickening/trabeculation and bladder diverticulum suggests some degree of chronic outlet obstruction.      18: Xray Chest 1 View- PORTABLE-Urgent (18 @ 15:07) INTERPRETATION:  no urgent/emergent findings.      18: US Kidney and Bladder (18 @ 17:05) Moderate right hydroureteronephrosis to the level of the bladder. The right ureter appears to insert on a bladder diverticulum. Right renal   cortical thinning is suggestive of chronic hydronephrosis. Urinary bladder trabeculations and diverticula with debris.        MICROBIOLOGY DATA:    Culture - Blood (18 @ 00:47)    Specimen Source: .Blood Blood-Peripheral    Culture Results:   No growth to date.    Culture - Blood (18 @ 00:47)    Specimen Source: .Blood Blood-Peripheral    Culture Results:   No growth to date.    Culture - Urine (18 @ 21:36)    Specimen Source: .Urine Clean Catch (Midstream)    Culture Results:   No growth        Urinalysis + Microscopic Examination (18 @ 16:09)    Urine Appearance: SL Turbid    Urobilinogen: Negative    Leukocyte Esterase Concentration: Large    Nitrite: Negative    Specific Gravity: 1.016    Protein, Urine: Trace    pH Urine: 5.5    Ketone - Urine: Negative    Bilirubin: Negative    Color: Yellow    Glucose Qualitative, Urine: Negative    Blood, Urine: Moderate    Red Blood Cell - Urine: >50 /HPF    White Blood Cell - Urine: >50 /HPF    Epithelial Cells: OCC /HPF    Hyaline Casts: 2-5    Bacteria: Few /HPF Patient is seen and examined at the bed side, is afebrile. He is feeling tired today.          REVIEW OF SYSTEMS: All other review systems are negative        Vital Signs Last 24 Hrs  T(C): 36.6 (10 May 2018 16:21), Max: 36.8 (10 May 2018 08:50)  T(F): 97.9 (10 May 2018 16:21), Max: 98.2 (10 May 2018 08:50)  HR: 60 (10 May 2018 16:21) (60 - 60)  BP: 132/84 (10 May 2018 16:21) (114/70 - 132/84)  BP(mean): --  RR: 18 (10 May 2018 16:21) (18 - 18)  SpO2: 99% (10 May 2018 16:21) (95% - 99%)        ALLERGIES: NKDA        PHYSICAL EXAM:  GENERAL: Not in acute distress  CVS: s1 and s2 present  RESP: Air entry B/L  GI: abdomen soft and nontender  EXT: No pedal edema  CNS: Awake and alert  SKIN: psoriasis lesions          LABS:                          10.2   6.39  )-----------( 224      ( 08 May 2018 07:37 )             31.3                05-10    141  |  107  |  23  ----------------------------<  79  4.2   |  22  |  1.48<H>    Ca    9.4      10 May 2018 06:15      05-09    142  |  107  |  26<H>  ----------------------------<  140<H>  4.2   |  22  |  1.50<H>    Ca    9.2      09 May 2018 12:35  Phos  3.5     05-08  Mg     2.0     05-08      TPro  8.1  /  Alb  3.8  /  TBili  0.3  /  DBili  x   /  AST  23  /  ALT  8<L>  /  AlkPhos  84  05-06        PTT - ( 06 May 2018 14:47 )  PTT:39.9 sec  Urinalysis Basic - ( 06 May 2018 16:09 )    Color: Yellow / Appearance: SL Turbid / S.016 / pH: x  Gluc: x / Ketone: Negative  / Bili: Negative / Urobili: Negative   Blood: x / Protein: Trace / Nitrite: Negative   Leuk Esterase: Large / RBC: >50 /HPF / WBC >50 /HPF   Sq Epi: x / Non Sq Epi: OCC /HPF / Bacteria: Few /HPF        MEDICATIONS  (STANDING):  cefepime   IVPB 1000 milliGRAM(s) IV Intermittent every 12 hours  cefepime   IVPB      cholecalciferol 1000 Unit(s) Oral daily  cyanocobalamin 1000 MICROGram(s) Oral daily  doxazosin 4 milliGRAM(s) Oral at bedtime  enoxaparin Injectable 40 milliGRAM(s) SubCutaneous every 24 hours  famotidine    Tablet 20 milliGRAM(s) Oral daily  latanoprost 0.005% Ophthalmic Solution 1 Drop(s) Left EYE at bedtime  melatonin 3 milliGRAM(s) Oral at bedtime  nystatin Powder 1 Application(s) Topical three times a day  senna 2 Tablet(s) Oral at bedtime  simvastatin 20 milliGRAM(s) Oral at bedtime    MEDICATIONS  (PRN):  acetaminophen   Tablet. 650 milliGRAM(s) Oral every 6 hours PRN Moderate Pain (4 - 6)  bisacodyl 5 milliGRAM(s) Oral every 12 hours PRN Constipation  docusate sodium 100 milliGRAM(s) Oral daily PRN Constipation  hydrocortisone 2.5% Ointment 1 Application(s) Topical daily PRN Rash          RADIOLOGY & ADDITIONAL TESTS:    18: US Kidney and Bladder (18 @ 16:42) Again seen is moderate hydroureteronephrosis as described above. Fine debris within the distal dilated right ureter is indicative of infection.   No left-sided hydronephrosis. Urinary bladder wall small contains fine debris also indicative of  infection. Thickening of the bladder wall may also reflect infection.  Thickening/trabeculation and bladder diverticulum suggests some degree of chronic outlet obstruction.      18: Xray Chest 1 View- PORTABLE-Urgent (18 @ 15:07) INTERPRETATION:  no urgent/emergent findings.      18: US Kidney and Bladder (18 @ 17:05) Moderate right hydroureteronephrosis to the level of the bladder. The right ureter appears to insert on a bladder diverticulum. Right renal   cortical thinning is suggestive of chronic hydronephrosis. Urinary bladder trabeculations and diverticula with debris.        MICROBIOLOGY DATA:    Culture - Blood (18 @ 00:47)    Specimen Source: .Blood Blood-Peripheral    Culture Results:   No growth to date.    Culture - Blood (18 @ 00:47)    Specimen Source: .Blood Blood-Peripheral    Culture Results:   No growth to date.    Culture - Urine (18 @ 21:36)    Specimen Source: .Urine Clean Catch (Midstream)    Culture Results:   No growth        Urinalysis + Microscopic Examination (18 @ 16:09)    Urine Appearance: SL Turbid    Urobilinogen: Negative    Leukocyte Esterase Concentration: Large    Nitrite: Negative    Specific Gravity: 1.016    Protein, Urine: Trace    pH Urine: 5.5    Ketone - Urine: Negative    Bilirubin: Negative    Color: Yellow    Glucose Qualitative, Urine: Negative    Blood, Urine: Moderate    Red Blood Cell - Urine: >50 /HPF    White Blood Cell - Urine: >50 /HPF    Epithelial Cells: OCC /HPF    Hyaline Casts: 2-5    Bacteria: Few /HPF

## 2018-05-10 NOTE — OCCUPATIONAL THERAPY INITIAL EVALUATION ADULT - PLANNED THERAPY INTERVENTIONS, OT EVAL
transfer training/strengthening/ADL retraining/balance training/bed mobility training/motor coordination training

## 2018-05-10 NOTE — PROGRESS NOTE ADULT - SUBJECTIVE AND OBJECTIVE BOX
[facility]        - Patient seen and examined.  - Today, patient is without complaints.         *****MEDICATIONS: Current medication reviewed and documented.    MEDICATIONS  (STANDING):  cefepime   IVPB 1000 milliGRAM(s) IV Intermittent every 12 hours  cefepime   IVPB      cholecalciferol 1000 Unit(s) Oral daily  cyanocobalamin 1000 MICROGram(s) Oral daily  doxazosin 4 milliGRAM(s) Oral at bedtime  enoxaparin Injectable 40 milliGRAM(s) SubCutaneous every 24 hours  famotidine    Tablet 20 milliGRAM(s) Oral daily  latanoprost 0.005% Ophthalmic Solution 1 Drop(s) Left EYE at bedtime  melatonin 3 milliGRAM(s) Oral at bedtime  nystatin Powder 1 Application(s) Topical three times a day  senna 2 Tablet(s) Oral at bedtime  simvastatin 20 milliGRAM(s) Oral at bedtime    MEDICATIONS  (PRN):  acetaminophen   Tablet. 650 milliGRAM(s) Oral every 6 hours PRN Moderate Pain (4 - 6)  bisacodyl 5 milliGRAM(s) Oral every 12 hours PRN Constipation  docusate sodium 100 milliGRAM(s) Oral daily PRN Constipation  hydrocortisone 2.5% Ointment 1 Application(s) Topical daily PRN Rash           ***** REVIEW OF SYSTEM:  GEN: no fever, no chills, no pain  RESP: no SOB, no cough, no sputum  CVS: no chest pain, no palpitations, no edema  GI: no abdominal pain, no nausea, no vomiting, no constipation, no diarrhea  : no dysurea, no frequency  NEURO: no headache, no diziness  PSYCH: no depression, not anxious  Derm : no itching, no rash         ***** VITAL SIGNS:  T(F): 98.2 (05-10-18 @ 08:50), Max: 98.2 (05-10-18 @ 08:50)  HR: 60 (05-10-18 @ 08:50) (60 - 60)  BP: 114/70 (05-10-18 @ 08:50) (114/70 - 124/73)  RR: 18 (05-10-18 @ 08:50) (18 - 18)  SpO2: 95% (05-10-18 @ 08:50) (95% - 97%)  Wt(kg): --  ,   I&O's Summary           *****PHYSICAL EXAM: Alert oriented x 3   Attention comprehension are fair. Able to name, repeat, read without any difficulty.   Able to follow 3 step commands.     EOMI fundi not visualized,  VFF to confrontration  No facial asymmetry   Tongue is midline   Palate elevates symmetrically   Moving all 4 ext symmetrically no pronator drift. difficult exam as pt is not very cooperative.     Gait :    B/L down going toes          *****LAB AND IMAGIN-10    141  |  107  |  23  ----------------------------<  79  4.2   |  22  |  1.48<H>    Ca    9.4      10 May 2018 06:15             CARDIAC MARKERS ( 10 May 2018 06:15 )  x     / x     / 28 U/L / x     / x                Creatinine, Serum: 1.48 mg/dL (05.10.18 @ 06:15)    Creatine Kinase, Serum: 28 U/L (05.10.18 @ 06:15)        [All pertinent recent Imaging/Reports reviewed]           *****A S S E S S M E N T   A N D   P L A N :    84m hx tia, s/p PPM, psoriasis, CKD, presenting from rehab with weakness, difficulty ambulating. Pt's complaints cluster primarily around poor care at rehab and lack of actual PT time, and pt attributes his present weakness to this lack of working with PT. Reports completing a course of abx yesterday for a UTI. Reports no fevers/chills, no new aches/pains, no cp/sob, no cough/HA. Would like to go to a different rehab if possible.    no focality on exam.   proximal weakness, likely due to disuse atrophy     PT consult for gait and balance training.     continue supportive care.       Thank you for allowing me to participate in the care of this patient. Please do not hesitate to call me if you have any  questions.        ________________  Mary Draper MD  [facility] ([facility]  [phone]     30 minutes spent on total encounter; more than 50 % of the visit was  spent counseling and or  coordinating care by the attending physician.   At the present time, [facility] does not  provide outpatient followup, best to call the your insurance to find a participating provider.  This was explained to you at the time of the visit. Alternatively, if your insurance allows it, you can follow up with a neurologist  Dr. Alexander Santiago([place]) [phone] or Dr. Moises Van ( [place]) [phone]

## 2018-05-10 NOTE — OCCUPATIONAL THERAPY INITIAL EVALUATION ADULT - DIAGNOSIS, OT EVAL
Pt with impaired strength, balance, coordination, endurance, fear of falling impacting pt's ability to complete ADLs, functional mobility.

## 2018-05-10 NOTE — PROGRESS NOTE ADULT - PROBLEM SELECTOR PLAN 1
Blader Outlet Obstruction, Moderate Right Hydroureteronephrosis  UCx (-)  ->F/u Urology For Intervention (cystoscopy,...)

## 2018-05-11 LAB
ANION GAP SERPL CALC-SCNC: 12 MMOL/L — SIGNIFICANT CHANGE UP (ref 5–17)
BUN SERPL-MCNC: 22 MG/DL — SIGNIFICANT CHANGE UP (ref 7–23)
CALCIUM SERPL-MCNC: 9.8 MG/DL — SIGNIFICANT CHANGE UP (ref 8.4–10.5)
CHLORIDE SERPL-SCNC: 108 MMOL/L — SIGNIFICANT CHANGE UP (ref 96–108)
CO2 SERPL-SCNC: 22 MMOL/L — SIGNIFICANT CHANGE UP (ref 22–31)
CREAT SERPL-MCNC: 1.52 MG/DL — HIGH (ref 0.5–1.3)
GLUCOSE SERPL-MCNC: 73 MG/DL — SIGNIFICANT CHANGE UP (ref 70–99)
HCT VFR BLD CALC: 33.1 % — LOW (ref 39–50)
HGB BLD-MCNC: 10.5 G/DL — LOW (ref 13–17)
MCHC RBC-ENTMCNC: 28.6 PG — SIGNIFICANT CHANGE UP (ref 27–34)
MCHC RBC-ENTMCNC: 31.7 GM/DL — LOW (ref 32–36)
MCV RBC AUTO: 90.2 FL — SIGNIFICANT CHANGE UP (ref 80–100)
PLATELET # BLD AUTO: 204 K/UL — SIGNIFICANT CHANGE UP (ref 150–400)
POTASSIUM SERPL-MCNC: 4.4 MMOL/L — SIGNIFICANT CHANGE UP (ref 3.5–5.3)
POTASSIUM SERPL-SCNC: 4.4 MMOL/L — SIGNIFICANT CHANGE UP (ref 3.5–5.3)
RBC # BLD: 3.67 M/UL — LOW (ref 4.2–5.8)
RBC # FLD: 15 % — HIGH (ref 10.3–14.5)
SODIUM SERPL-SCNC: 142 MMOL/L — SIGNIFICANT CHANGE UP (ref 135–145)
WBC # BLD: 6.46 K/UL — SIGNIFICANT CHANGE UP (ref 3.8–10.5)
WBC # FLD AUTO: 6.46 K/UL — SIGNIFICANT CHANGE UP (ref 3.8–10.5)

## 2018-05-11 RX ORDER — POLYETHYLENE GLYCOL 3350 17 G/17G
17 POWDER, FOR SOLUTION ORAL DAILY
Qty: 0 | Refills: 0 | Status: DISCONTINUED | OUTPATIENT
Start: 2018-05-11 | End: 2018-05-15

## 2018-05-11 RX ADMIN — NYSTATIN CREAM 1 APPLICATION(S): 100000 CREAM TOPICAL at 05:36

## 2018-05-11 RX ADMIN — CEFEPIME 100 MILLIGRAM(S): 1 INJECTION, POWDER, FOR SOLUTION INTRAMUSCULAR; INTRAVENOUS at 05:36

## 2018-05-11 RX ADMIN — NYSTATIN CREAM 1 APPLICATION(S): 100000 CREAM TOPICAL at 14:05

## 2018-05-11 RX ADMIN — Medication 4 MILLIGRAM(S): at 23:07

## 2018-05-11 RX ADMIN — ENOXAPARIN SODIUM 40 MILLIGRAM(S): 100 INJECTION SUBCUTANEOUS at 05:36

## 2018-05-11 RX ADMIN — Medication 10 MILLIGRAM(S): at 14:37

## 2018-05-11 RX ADMIN — Medication 100 MILLIGRAM(S): at 23:07

## 2018-05-11 RX ADMIN — CEFEPIME 100 MILLIGRAM(S): 1 INJECTION, POWDER, FOR SOLUTION INTRAMUSCULAR; INTRAVENOUS at 19:44

## 2018-05-11 RX ADMIN — SENNA PLUS 2 TABLET(S): 8.6 TABLET ORAL at 23:06

## 2018-05-11 RX ADMIN — LATANOPROST 1 DROP(S): 0.05 SOLUTION/ DROPS OPHTHALMIC; TOPICAL at 23:07

## 2018-05-11 RX ADMIN — POLYETHYLENE GLYCOL 3350 17 GRAM(S): 17 POWDER, FOR SOLUTION ORAL at 10:00

## 2018-05-11 RX ADMIN — Medication 3 MILLIGRAM(S): at 23:08

## 2018-05-11 RX ADMIN — SIMVASTATIN 20 MILLIGRAM(S): 20 TABLET, FILM COATED ORAL at 23:08

## 2018-05-11 NOTE — PROGRESS NOTE ADULT - SUBJECTIVE AND OBJECTIVE BOX
Patient denies CP, SOB Review of systems otherwise (-)      MEDICATIONS  (STANDING):  cefepime   IVPB 1000 milliGRAM(s) IV Intermittent every 12 hours  cefepime   IVPB      cholecalciferol 1000 Unit(s) Oral daily  cyanocobalamin 1000 MICROGram(s) Oral daily  doxazosin 4 milliGRAM(s) Oral at bedtime  enoxaparin Injectable 40 milliGRAM(s) SubCutaneous every 24 hours  famotidine    Tablet 20 milliGRAM(s) Oral daily  latanoprost 0.005% Ophthalmic Solution 1 Drop(s) Left EYE at bedtime  melatonin 3 milliGRAM(s) Oral at bedtime  nystatin Powder 1 Application(s) Topical three times a day  senna 2 Tablet(s) Oral at bedtime  simvastatin 20 milliGRAM(s) Oral at bedtime    MEDICATIONS  (PRN):  acetaminophen   Tablet. 650 milliGRAM(s) Oral every 6 hours PRN Moderate Pain (4 - 6)  bisacodyl 5 milliGRAM(s) Oral every 12 hours PRN Constipation  docusate sodium 100 milliGRAM(s) Oral daily PRN Constipation  hydrocortisone 2.5% Ointment 1 Application(s) Topical daily PRN Rash  polyethylene glycol 3350 17 Gram(s) Oral daily PRN Constipation      LABS:                        10.5   6.46  )-----------( 204      ( 11 May 2018 07:57 )             33.1     Hemoglobin: 10.5 g/dL (05-11 @ 07:57)  Hemoglobin: 10.2 g/dL (05-08 @ 07:37)  Hemoglobin: 10.5 g/dL (05-07 @ 07:36)  Hemoglobin: 12.0 g/dL (05-06 @ 14:47)    05-11    142  |  108  |  22  ----------------------------<  73  4.4   |  22  |  1.52<H>    Ca    9.8      11 May 2018 06:41      Creatinine Trend: 1.52<--, 1.48<--, 1.50<--, 1.60<--, 1.40<--, 1.55<--     CARDIAC MARKERS ( 10 May 2018 06:15 )  x     / x     / 28 U/L / x     / x            PHYSICAL EXAM  Vital Signs Last 24 Hrs  T(C): 36.6 (11 May 2018 07:48), Max: 37.1 (11 May 2018 00:17)  T(F): 97.8 (11 May 2018 07:48), Max: 98.7 (11 May 2018 00:17)  HR: 60 (11 May 2018 07:48) (60 - 60)  BP: 122/77 (11 May 2018 07:48) (122/77 - 138/79)  BP(mean): --  RR: 18 (11 May 2018 07:48) (18 - 18)  SpO2: 96% (11 May 2018 07:48) (96% - 99%)    Gen: Appears well in NAD  HEENT:  (-)icterus (-)pallor  CV: N S1 S2 1/6 ANA (+)2 Pulses B/l  Resp:  Clear to ausculatation B/L, normal effort  GI: (+) BS Soft, NT, ND  Lymph:  (-)Edema, (-)obvious lymphadenopathy  Skin: Warm to touch, Normal turgor  Psych: Appropriate mood and affect    Culture - Blood (05.07.18 @ 00:47)    Specimen Source: .Blood Blood-Peripheral    Culture Results:   No growth to date.      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 His PPM interrogation here in April 2018 showed excellent PPM function with about 3.5 years remaining on PG longevity. He was admitted last month with coag neg staph UTI. Thankfully his blood cultures were and continue to be negative. He is now readmitted with weakness. He denies palpitations, syncope, nor angina. He remains off A/C due to his advanced age and advanced dementia.      -f/u with his electrophysiologist Dr. Jose Stapleton after discharge for routine PPM care  - not in clinical CHF  - abx per med  - possible cysto as outpatient   - no need for inpatient cardiac work up  - dc planning to [facility] per primary team     Elaine Mckeon RPA-C

## 2018-05-11 NOTE — PROGRESS NOTE ADULT - ASSESSMENT
Patient is a 84y old  Male who was recently discharged after treated  for MSSA UTI and hydronephrosis, and now presenting from rehab to the ER for evaluation of weakness (06 May 2018 18:20) and difficulty ambulating. He attributes his present weakness to this lack of working with PT. He has no fevers/chills, and on admission found to have positive Urine analysis and dose of Ceftriaxone is given, cultures pending. The ID consult requested to assist with further evaluation and antibiotic  management.     # Complicated UTI  - cultures are negative  # Moderate Right Hydronephrosis    Would recommend:  1. Continue Cefepime X 10 days  2. OOB to chair/PT  3. Monitor  kidney function  4. Aspiration precaution    d/w patient and Nursing staff    will follow the patient with you

## 2018-05-11 NOTE — PROGRESS NOTE ADULT - SUBJECTIVE AND OBJECTIVE BOX
Patient is seen and examined at the bed side, is afebrile. He is feeling better. The Urology follow up noted regarding no further inpatient work up.          REVIEW OF SYSTEMS: All other review systems are negative        Vital Signs Last 24 Hrs  T(C): 36.7 (11 May 2018 16:23), Max: 37.1 (11 May 2018 00:17)  T(F): 98.1 (11 May 2018 16:23), Max: 98.7 (11 May 2018 00:17)  HR: 60 (11 May 2018 16:23) (60 - 60)  BP: 126/77 (11 May 2018 16:23) (122/77 - 138/79)  BP(mean): --  RR: 18 (11 May 2018 16:23) (18 - 18)  SpO2: 98% (11 May 2018 16:23) (96% - 99%)        ALLERGIES: NKDA        PHYSICAL EXAM:  GENERAL: Not in acute distress  CVS: s1 and s2 present  RESP: Air entry B/L  GI: abdomen soft and nontender  EXT: No pedal edema  CNS: Awake and alert  SKIN: psoriasis lesions          LABS:                        10.5   6.46  )-----------( 204      ( 11 May 2018 07:57 )             33.1                             10.2   6.39  )-----------( 224      ( 08 May 2018 07:37 )             31.3                    05-11    142  |  108  |  22  ----------------------------<  73  4.4   |  22  |  1.52<H>    Ca    9.8      11 May 2018 06:41      05-10    141  |  107  |  23  ----------------------------<  79  4.2   |  22  |  1.48<H>    Ca    9.4      10 May 2018 06:15      TPro  8.1  /  Alb  3.8  /  TBili  0.3  /  DBili  x   /  AST  23  /  ALT  8<L>  /  AlkPhos  84  05-06        PTT - ( 06 May 2018 14:47 )  PTT:39.9 sec  Urinalysis Basic - ( 06 May 2018 16:09 )    Color: Yellow / Appearance: SL Turbid / S.016 / pH: x  Gluc: x / Ketone: Negative  / Bili: Negative / Urobili: Negative   Blood: x / Protein: Trace / Nitrite: Negative   Leuk Esterase: Large / RBC: >50 /HPF / WBC >50 /HPF   Sq Epi: x / Non Sq Epi: OCC /HPF / Bacteria: Few /HPF          MEDICATIONS  (STANDING):  cefepime   IVPB 1000 milliGRAM(s) IV Intermittent every 12 hours  cefepime   IVPB      cholecalciferol 1000 Unit(s) Oral daily  cyanocobalamin 1000 MICROGram(s) Oral daily  doxazosin 4 milliGRAM(s) Oral at bedtime  enoxaparin Injectable 40 milliGRAM(s) SubCutaneous every 24 hours  famotidine    Tablet 20 milliGRAM(s) Oral daily  latanoprost 0.005% Ophthalmic Solution 1 Drop(s) Left EYE at bedtime  melatonin 3 milliGRAM(s) Oral at bedtime  nystatin Powder 1 Application(s) Topical three times a day  senna 2 Tablet(s) Oral at bedtime  simvastatin 20 milliGRAM(s) Oral at bedtime    MEDICATIONS  (PRN):  acetaminophen   Tablet. 650 milliGRAM(s) Oral every 6 hours PRN Moderate Pain (4 - 6)  bisacodyl Suppository 10 milliGRAM(s) Rectal daily PRN Constipation  docusate sodium 100 milliGRAM(s) Oral daily PRN Constipation  hydrocortisone 2.5% Ointment 1 Application(s) Topical daily PRN Rash  polyethylene glycol 3350 17 Gram(s) Oral daily PRN Constipation          RADIOLOGY & ADDITIONAL TESTS:    18: US Kidney and Bladder (18 @ 16:42) Again seen is moderate hydroureteronephrosis as described above. Fine debris within the distal dilated right ureter is indicative of infection.   No left-sided hydronephrosis. Urinary bladder wall small contains fine debris also indicative of  infection. Thickening of the bladder wall may also reflect infection.  Thickening/trabeculation and bladder diverticulum suggests some degree of chronic outlet obstruction.      18: Xray Chest 1 View- PORTABLE-Urgent (18 @ 15:07) INTERPRETATION:  no urgent/emergent findings.      18: US Kidney and Bladder (18 @ 17:05) Moderate right hydroureteronephrosis to the level of the bladder. The right ureter appears to insert on a bladder diverticulum. Right renal   cortical thinning is suggestive of chronic hydronephrosis. Urinary bladder trabeculations and diverticula with debris.        MICROBIOLOGY DATA:    Culture - Blood (18 @ 00:47)    Specimen Source: .Blood Blood-Peripheral    Culture Results:   No growth to date.    Culture - Blood (18 @ 00:47)    Specimen Source: .Blood Blood-Peripheral    Culture Results:   No growth to date.    Culture - Urine (18 @ 21:36)    Specimen Source: .Urine Clean Catch (Midstream)    Culture Results:   No growth        Urinalysis + Microscopic Examination (18 @ 16:09)    Urine Appearance: SL Turbid    Urobilinogen: Negative    Leukocyte Esterase Concentration: Large    Nitrite: Negative    Specific Gravity: 1.016    Protein, Urine: Trace    pH Urine: 5.5    Ketone - Urine: Negative    Bilirubin: Negative    Color: Yellow    Glucose Qualitative, Urine: Negative    Blood, Urine: Moderate    Red Blood Cell - Urine: >50 /HPF    White Blood Cell - Urine: >50 /HPF    Epithelial Cells: OCC /HPF    Hyaline Casts: 2-5    Bacteria: Few /HPF

## 2018-05-11 NOTE — PROGRESS NOTE ADULT - SUBJECTIVE AND OBJECTIVE BOX
Patient seen and examined at bedside  No new acute events noted overnight  Case discussed with medical team    HPI:  84m hx tia, s/p PPM, psoriasis, CKD, presenting from rehab with weakness, difficulty ambulating. Pt's complaints cluster primarily around poor care at rehab and lack of actual PT time, and pt attributes his present weakness to this lack of working with PT. Reports completing a course of abx yesterday for a UTI. Reports no fevers/chills, no new aches/pains, no cp/sob, no cough/HA. Would like to go to a different rehab if possible.    In ED: 97.9, 61, 119/78, 18, 96RA. Given ceftriaxone (1700), NS 1 L bolux x 1. (06 May 2018 18:20)      PAST MEDICAL & SURGICAL HISTORY:  Psoriasis  Prostate cancer  GERD (gastroesophageal reflux disease)  Dyslipidemia  No significant past surgical history      No Known Allergies       MEDICATIONS  (STANDING):  cefepime   IVPB 1000 milliGRAM(s) IV Intermittent every 12 hours  cefepime   IVPB      cholecalciferol 1000 Unit(s) Oral daily  cyanocobalamin 1000 MICROGram(s) Oral daily  doxazosin 4 milliGRAM(s) Oral at bedtime  enoxaparin Injectable 40 milliGRAM(s) SubCutaneous every 24 hours  famotidine    Tablet 20 milliGRAM(s) Oral daily  latanoprost 0.005% Ophthalmic Solution 1 Drop(s) Left EYE at bedtime  melatonin 3 milliGRAM(s) Oral at bedtime  nystatin Powder 1 Application(s) Topical three times a day  senna 2 Tablet(s) Oral at bedtime  simvastatin 20 milliGRAM(s) Oral at bedtime    MEDICATIONS  (PRN):  acetaminophen   Tablet. 650 milliGRAM(s) Oral every 6 hours PRN Moderate Pain (4 - 6)  bisacodyl 5 milliGRAM(s) Oral every 12 hours PRN Constipation  docusate sodium 100 milliGRAM(s) Oral daily PRN Constipation  hydrocortisone 2.5% Ointment 1 Application(s) Topical daily PRN Rash      REVIEW OF SYSTEMS:  CONSTITUTIONAL: (+) malaise.   EYES: No acute change in vision   ENT:  No tinnitus  NECK: No stiffness  RESPIRATORY: No hemoptysis  CARDIOVASCULAR: No chest pain, palpitations, syncope  GASTROINTESTINAL: No hematemesis, diarrhea, melena, or hematochezia.  GENITOURINARY: No hematuria  NEUROLOGICAL: No headaches  LYMPH Nodes: No enlarged glands  ENDOCRINE: No heat or cold intolerance	    T(C): 36.6 (05-11-18 @ 07:48), Max: 37.1 (05-11-18 @ 00:17)  HR: 60 (05-11-18 @ 07:48) (60 - 60)  BP: 122/77 (05-11-18 @ 07:48) (122/77 - 138/79)  RR: 18 (05-11-18 @ 07:48) (18 - 18)  SpO2: 96% (05-11-18 @ 07:48) (96% - 99%)    PHYSICAL EXAMINATION:   Constitutional: WD, NAD  HEENT: NC, AT  Neck:  Supple  Respiratory:  Adequate airflow b/l. Not using accessory muscles of respiration.  Cardiovascular:  S1 & S2 intact, no R/G, 2+ radial pulses b/l  Gastrointestinal: Soft, NT, ND, normoactive b.s., no organomegaly/RT/rigidity  Extremities: WWP  Neurological:  Alert and awake.  No acute focal motor deficits. Crude sensation intact.     Labs and imaging reviewed    LABS:                        10.5   6.46  )-----------( 204      ( 11 May 2018 07:57 )             33.1     05-11    142  |  108  |  22  ----------------------------<  73  4.4   |  22  |  1.52<H>    Ca    9.8      11 May 2018 06:41      CARDIAC MARKERS ( 10 May 2018 06:15 )  x     / x     / 28 U/L / x     / x              CAPILLARY BLOOD GLUCOSE                  RADIOLOGY & ADDITIONAL STUDIES:

## 2018-05-11 NOTE — PROGRESS NOTE ADULT - SUBJECTIVE AND OBJECTIVE BOX
EP ATTENDING    no tele    no palpitations, no syncope, no angina    acetaminophen   Tablet. 650 milliGRAM(s) Oral every 6 hours PRN  bisacodyl 5 milliGRAM(s) Oral every 12 hours PRN  cefepime   IVPB 1000 milliGRAM(s) IV Intermittent every 12 hours  cefepime   IVPB      cholecalciferol 1000 Unit(s) Oral daily  cyanocobalamin 1000 MICROGram(s) Oral daily  docusate sodium 100 milliGRAM(s) Oral daily PRN  doxazosin 4 milliGRAM(s) Oral at bedtime  enoxaparin Injectable 40 milliGRAM(s) SubCutaneous every 24 hours  famotidine    Tablet 20 milliGRAM(s) Oral daily  hydrocortisone 2.5% Ointment 1 Application(s) Topical daily PRN  latanoprost 0.005% Ophthalmic Solution 1 Drop(s) Left EYE at bedtime  melatonin 3 milliGRAM(s) Oral at bedtime  nystatin Powder 1 Application(s) Topical three times a day  polyethylene glycol 3350 17 Gram(s) Oral daily PRN  senna 2 Tablet(s) Oral at bedtime  simvastatin 20 milliGRAM(s) Oral at bedtime                            10.5   6.46  )-----------( 204      ( 11 May 2018 07:57 )             33.1       05-11    142  |  108  |  22  ----------------------------<  73  4.4   |  22  |  1.52<H>    Ca    9.8      11 May 2018 06:41        CARDIAC MARKERS ( 10 May 2018 06:15 )  x     / x     / 28 U/L / x     / x            T(C): 36.6 (05-11-18 @ 07:48), Max: 37.1 (05-11-18 @ 00:17)  HR: 60 (05-11-18 @ 07:48) (60 - 60)  BP: 122/77 (05-11-18 @ 07:48) (122/77 - 138/79)  RR: 18 (05-11-18 @ 07:48) (18 - 18)  SpO2: 96% (05-11-18 @ 07:48) (96% - 99%)  Wt(kg): --    no JVD  RRR, no murmurs  CTAB  soft nt/nd  no c/c/e    serial Blood cultures all negative    A/P) He is a 85 y/o male PMH persistent AF who had a 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 His PPM interrogation here in April 2018 showed excellent PPM function with about 3.5 years remaining on PG longevity. He was admitted last month with coag neg staph UTI. Thankfully his blood cultures were and continue to be negative. He is now readmitted with weakness. He denies palpitations, syncope, nor angina. He remains off A/C due to his advanced age and advanced dementia.    -no need for repeat PPM interrogation  -as long as BCx remain negative and there is no suspicion for endocarditis then no further inpatient EP workup needed  -f/u with his electrophysiologist Dr. Jose Stapleton after discharge for routine PPM care  -poor candidate for systemic anticoagulation given advanced age and advanced dementia  -no further inpatient EP workup needed      Nikki Duarte M.D., [facility]  Cardiac Electrophysiology  [facility]  [address] [place] [address]  [email or web address]    office [phone]  pager [phone]

## 2018-05-11 NOTE — PROGRESS NOTE ADULT - SUBJECTIVE AND OBJECTIVE BOX
[facility] NEPHROLOGY- PROGRESS NOTE    Patient is a 84y Male with recurrent UTIs with chronic R hydronephrosis who presented to the hospital with another UTI.  Pt is at [facility] ([facility]) and unable to go to urology office for further intervention.  Pt c/o general malaise, some dysuria, and mild suprapubic pain.      No Known Allergies    Home Medications Reviewed  Hospital Medications: Reviewed      REVIEW OF SYSTEMS:  CONSTITUTIONAL: + malaise, + weakness, No fevers or chills  RESPIRATORY:  No shortness of breath  CARDIOVASCULAR: No chest pain or palpitations.  GASTROINTESTINAL: No abdominal or epigastric pain. No nausea, vomiting, or diarrhea   GENITOURINARY: + dysuria, +suprapubic pain, +frequency, +urinary urgency, no hematuria- improving   VASCULAR: No bilateral lower extremity edema.   All other review of systems is negative unless indicated above.    VITALS:  T(F): 97.8 (18 @ 07:48), Max: 98.7 (18 @ 00:17)  HR: 60 (18 @ 07:48)  BP: 122/77 (18 @ 07:48)  RR: 18 (18 @ 07:48)  SpO2: 96% (18 @ 07:48)  Wt(kg): --    05-10 @ 07:01  -   @ 07:00  --------------------------------------------------------  IN: 0 mL / OUT: 0 mL / NET: 0 mL      PHYSICAL EXAM:  Constitutional: NAD  HEENT: anicteric sclera,   Neck: No JVD  Respiratory: CTAB, no wheezes, rales or rhonchi  Cardiovascular: S1, S2, RRR  Gastrointestinal: BS+, soft, NT/ND  Extremities: No peripheral edema    LABS:      142  |  108  |  22  ----------------------------<  73  4.4   |  22  |  1.52<H>    Ca    9.8      11 May 2018 06:41      Creatinine Trend: 1.52 <--, 1.48 <--, 1.50 <--, 1.60 <--, 1.40 <--, 1.55 <--                        10.5   6.46  )-----------( 204      ( 11 May 2018 07:57 )             33.1     Urine Studies:  Urinalysis Basic - ( 06 May 2018 16:09 )    Color: Yellow / Appearance: SL Turbid / S.016 / pH:   Gluc:  / Ketone: Negative  / Bili: Negative / Urobili: Negative   Blood:  / Protein: Trace / Nitrite: Negative   Leuk Esterase: Large / RBC: >50 /HPF / WBC >50 /HPF   Sq Epi:  / Non Sq Epi: OCC /HPF / Bacteria: Few /HPF

## 2018-05-12 LAB
ANION GAP SERPL CALC-SCNC: 12 MMOL/L — SIGNIFICANT CHANGE UP (ref 5–17)
BUN SERPL-MCNC: 25 MG/DL — HIGH (ref 7–23)
CALCIUM SERPL-MCNC: 9.8 MG/DL — SIGNIFICANT CHANGE UP (ref 8.4–10.5)
CHLORIDE SERPL-SCNC: 107 MMOL/L — SIGNIFICANT CHANGE UP (ref 96–108)
CO2 SERPL-SCNC: 22 MMOL/L — SIGNIFICANT CHANGE UP (ref 22–31)
CREAT SERPL-MCNC: 1.5 MG/DL — HIGH (ref 0.5–1.3)
CULTURE RESULTS: SIGNIFICANT CHANGE UP
CULTURE RESULTS: SIGNIFICANT CHANGE UP
GLUCOSE SERPL-MCNC: 71 MG/DL — SIGNIFICANT CHANGE UP (ref 70–99)
HCT VFR BLD CALC: 33.7 % — LOW (ref 39–50)
HGB BLD-MCNC: 10.9 G/DL — LOW (ref 13–17)
MCHC RBC-ENTMCNC: 29.1 PG — SIGNIFICANT CHANGE UP (ref 27–34)
MCHC RBC-ENTMCNC: 32.3 GM/DL — SIGNIFICANT CHANGE UP (ref 32–36)
MCV RBC AUTO: 90.1 FL — SIGNIFICANT CHANGE UP (ref 80–100)
PLATELET # BLD AUTO: 203 K/UL — SIGNIFICANT CHANGE UP (ref 150–400)
POTASSIUM SERPL-MCNC: 4.9 MMOL/L — SIGNIFICANT CHANGE UP (ref 3.5–5.3)
POTASSIUM SERPL-SCNC: 4.9 MMOL/L — SIGNIFICANT CHANGE UP (ref 3.5–5.3)
RBC # BLD: 3.74 M/UL — LOW (ref 4.2–5.8)
RBC # FLD: 14.8 % — HIGH (ref 10.3–14.5)
SODIUM SERPL-SCNC: 141 MMOL/L — SIGNIFICANT CHANGE UP (ref 135–145)
SPECIMEN SOURCE: SIGNIFICANT CHANGE UP
SPECIMEN SOURCE: SIGNIFICANT CHANGE UP
WBC # BLD: 5.91 K/UL — SIGNIFICANT CHANGE UP (ref 3.8–10.5)
WBC # FLD AUTO: 5.91 K/UL — SIGNIFICANT CHANGE UP (ref 3.8–10.5)

## 2018-05-12 RX ADMIN — CEFEPIME 100 MILLIGRAM(S): 1 INJECTION, POWDER, FOR SOLUTION INTRAMUSCULAR; INTRAVENOUS at 06:34

## 2018-05-12 RX ADMIN — NYSTATIN CREAM 1 APPLICATION(S): 100000 CREAM TOPICAL at 06:33

## 2018-05-12 RX ADMIN — Medication 1000 UNIT(S): at 12:01

## 2018-05-12 RX ADMIN — CEFEPIME 100 MILLIGRAM(S): 1 INJECTION, POWDER, FOR SOLUTION INTRAMUSCULAR; INTRAVENOUS at 17:42

## 2018-05-12 RX ADMIN — ENOXAPARIN SODIUM 40 MILLIGRAM(S): 100 INJECTION SUBCUTANEOUS at 06:34

## 2018-05-12 RX ADMIN — SIMVASTATIN 20 MILLIGRAM(S): 20 TABLET, FILM COATED ORAL at 21:33

## 2018-05-12 RX ADMIN — Medication 3 MILLIGRAM(S): at 21:31

## 2018-05-12 RX ADMIN — LATANOPROST 1 DROP(S): 0.05 SOLUTION/ DROPS OPHTHALMIC; TOPICAL at 21:31

## 2018-05-12 RX ADMIN — NYSTATIN CREAM 1 APPLICATION(S): 100000 CREAM TOPICAL at 11:16

## 2018-05-12 RX ADMIN — NYSTATIN CREAM 1 APPLICATION(S): 100000 CREAM TOPICAL at 21:38

## 2018-05-12 RX ADMIN — FAMOTIDINE 20 MILLIGRAM(S): 10 INJECTION INTRAVENOUS at 12:01

## 2018-05-12 RX ADMIN — Medication 4 MILLIGRAM(S): at 21:31

## 2018-05-12 RX ADMIN — SENNA PLUS 2 TABLET(S): 8.6 TABLET ORAL at 21:33

## 2018-05-12 NOTE — PROGRESS NOTE ADULT - SUBJECTIVE AND OBJECTIVE BOX
[facility] NEPHROLOGY- PROGRESS NOTE    Patient is a 84y Male with recurrent UTIs with chronic R hydronephrosis who presented to the hospital with another UTI.  Pt is at [facility] ([facility]) and unable to go to urology office for further intervention.      No Known Allergies    Home Medications Reviewed  Hospital Medications: Reviewed      REVIEW OF SYSTEMS:  CONSTITUTIONAL: + malaise, + weakness, No fevers or chills  RESPIRATORY:  No shortness of breath  CARDIOVASCULAR: No chest pain or palpitations.  GASTROINTESTINAL: No abdominal or epigastric pain. No nausea, vomiting, or diarrhea   GENITOURINARY: + dysuria, + suprapubic pain, + frequency, + urinary urgency, no hematuria  VASCULAR: No bilateral lower extremity edema.   All other review of systems is negative unless indicated above.      VITALS:  T(F): 97.5 (18 @ 06:11), Max: 98.1 (18 @ 16:23)  HR: 60 (18 @ 06:11)  BP: 130/79 (18 @ 06:11)  RR: 18 (18 @ 06:11)  SpO2: 98% (18 @ 06:11)  Wt(kg): --     @ 07:01  -   @ 07:00  --------------------------------------------------------  IN: 0 mL / OUT: 1 mL / NET: -1 mL      PHYSICAL EXAM:  Constitutional: NAD  Respiratory: CTAB, no wheezes, rales or rhonchi  Cardiovascular: S1, S2, RRR  Gastrointestinal: BS+, soft, NT/ND  Extremities: No peripheral edema      LABS:      141  |  107  |  25<H>  ----------------------------<  71  4.9   |  22  |  1.50<H>    Ca    9.8      12 May 2018 07:10      Creatinine Trend: 1.50 <--, 1.52 <--, 1.48 <--, 1.50 <--, 1.60 <--, 1.40 <--, 1.55 <--                        10.5   6.46  )-----------( 204      ( 11 May 2018 07:57 )             33.1     Urine Studies:  Urinalysis Basic - ( 06 May 2018 16:09 )    Color: Yellow / Appearance: SL Turbid / S.016 / pH:   Gluc:  / Ketone: Negative  / Bili: Negative / Urobili: Negative   Blood:  / Protein: Trace / Nitrite: Negative   Leuk Esterase: Large / RBC: >50 /HPF / WBC >50 /HPF   Sq Epi:  / Non Sq Epi: OCC /HPF / Bacteria: Few /HPF

## 2018-05-12 NOTE — PROGRESS NOTE ADULT - SUBJECTIVE AND OBJECTIVE BOX
pt seen and examined, no complaints on exam.     no events overnight       acetaminophen   Tablet. 650 milliGRAM(s) Oral every 6 hours PRN  bisacodyl Suppository 10 milliGRAM(s) Rectal daily PRN  cefepime   IVPB 1000 milliGRAM(s) IV Intermittent every 12 hours  cefepime   IVPB      cholecalciferol 1000 Unit(s) Oral daily  cyanocobalamin 1000 MICROGram(s) Oral daily  docusate sodium 100 milliGRAM(s) Oral daily PRN  doxazosin 4 milliGRAM(s) Oral at bedtime  enoxaparin Injectable 40 milliGRAM(s) SubCutaneous every 24 hours  famotidine    Tablet 20 milliGRAM(s) Oral daily  hydrocortisone 2.5% Ointment 1 Application(s) Topical daily PRN  latanoprost 0.005% Ophthalmic Solution 1 Drop(s) Left EYE at bedtime  melatonin 3 milliGRAM(s) Oral at bedtime  nystatin Powder 1 Application(s) Topical three times a day  polyethylene glycol 3350 17 Gram(s) Oral daily PRN  senna 2 Tablet(s) Oral at bedtime  simvastatin 20 milliGRAM(s) Oral at bedtime                            10.5   6.46  )-----------( 204      ( 11 May 2018 07:57 )             33.1       Hemoglobin: 10.5 g/dL (05-11 @ 07:57)  Hemoglobin: 10.2 g/dL (05-08 @ 07:37)  Hemoglobin: 10.5 g/dL (05-07 @ 07:36)      05-11    142  |  108  |  22  ----------------------------<  73  4.4   |  22  |  1.52<H>    Ca    9.8      11 May 2018 06:41      Creatinine Trend: 1.52<--, 1.48<--, 1.50<--, 1.60<--, 1.40<--, 1.55<--    COAGS:     CARDIAC MARKERS ( 10 May 2018 06:15 )  x     / x     / 28 U/L / x     / x            T(C): 36.4 (05-12-18 @ 06:11), Max: 36.7 (05-11-18 @ 16:23)  HR: 60 (05-12-18 @ 06:11) (60 - 61)  BP: 130/79 (05-12-18 @ 06:11) (106/64 - 132/80)  RR: 18 (05-12-18 @ 06:11) (16 - 18)  SpO2: 98% (05-12-18 @ 06:11) (95% - 98%)  Wt(kg): --    I&O's Summary    10 May 2018 07:01  -  11 May 2018 07:00  --------------------------------------------------------  IN: 0 mL / OUT: 0 mL / NET: 0 mL    11 May 2018 07:01  -  12 May 2018 06:17  --------------------------------------------------------  IN: 0 mL / OUT: 1 mL / NET: -1 mL      Gen: Appears well in NAD  HEENT:  (-)icterus (-)pallor  CV: N S1 S2 1/6 ANA (+)2 Pulses B/l  Resp:  Clear to ausculatation B/L, normal effort  GI: (+) BS Soft, NT, ND  Lymph:  (-)Edema, (-)obvious lymphadenopathy  Skin: Warm to touch, Normal turgor  Psych: Appropriate mood and affect    Culture - Blood (05.07.18 @ 00:47)    Specimen Source: .Blood Blood-Peripheral    Culture Results:   No growth to date.      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 His PPM interrogation here in April 2018 showed excellent PPM function with about 3.5 years remaining on PG longevity. He was admitted last month with coag neg staph UTI. Thankfully his blood cultures were and continue to be negative. He is now readmitted with weakness. He denies palpitations, syncope, nor angina. He remains off A/C due to his advanced age and advanced dementia.      -f/u with his electrophysiologist Dr. Jose Stapleton after discharge for routine PPM care  - not in clinical CHF  - abx per med  - possible cysto as outpatient   - no need for inpatient cardiac work up  - dc planning to [facility] per primary team   D/W Dr Ba

## 2018-05-12 NOTE — PROGRESS NOTE ADULT - PROBLEM SELECTOR PLAN 1
->Scheduled for Cystoscopy/Intervention Tuesday 5/15/18  Blader Outlet Obstruction, Moderate Right Hydroureteronephrosis  UCx (-)

## 2018-05-12 NOTE — PROGRESS NOTE ADULT - SUBJECTIVE AND OBJECTIVE BOX
EP ATTENDING    no tele    no palpitations, no syncope, no angina    acetaminophen   Tablet. 650 milliGRAM(s) Oral every 6 hours PRN  bisacodyl Suppository 10 milliGRAM(s) Rectal daily PRN  cefepime   IVPB 1000 milliGRAM(s) IV Intermittent every 12 hours  cefepime   IVPB      cholecalciferol 1000 Unit(s) Oral daily  cyanocobalamin 1000 MICROGram(s) Oral daily  docusate sodium 100 milliGRAM(s) Oral daily PRN  doxazosin 4 milliGRAM(s) Oral at bedtime  enoxaparin Injectable 40 milliGRAM(s) SubCutaneous every 24 hours  famotidine    Tablet 20 milliGRAM(s) Oral daily  hydrocortisone 2.5% Ointment 1 Application(s) Topical daily PRN  latanoprost 0.005% Ophthalmic Solution 1 Drop(s) Left EYE at bedtime  melatonin 3 milliGRAM(s) Oral at bedtime  nystatin Powder 1 Application(s) Topical three times a day  polyethylene glycol 3350 17 Gram(s) Oral daily PRN  senna 2 Tablet(s) Oral at bedtime  simvastatin 20 milliGRAM(s) Oral at bedtime                            10.9   5.91  )-----------( 203      ( 12 May 2018 09:05 )             33.7       05-12    141  |  107  |  25<H>  ----------------------------<  71  4.9   |  22  |  1.50<H>    Ca    9.8      12 May 2018 07:10        CARDIAC MARKERS ( 10 May 2018 06:15 )  x     / x     / 28 U/L / x     / x            T(C): 36.3 (05-12-18 @ 09:46), Max: 36.7 (05-11-18 @ 16:23)  HR: 60 (05-12-18 @ 09:46) (60 - 61)  BP: 127/77 (05-12-18 @ 09:46) (106/64 - 132/80)  RR: 18 (05-12-18 @ 09:46) (16 - 18)  SpO2: 98% (05-12-18 @ 09:46) (95% - 98%)  Wt(kg): --    no JVD  RRR, no murmurs  CTAB  soft nt/nd  no c/c/e    serial Blood cultures all negative    A/P) He is a 85 y/o male PMH persistent AF who had a 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 His PPM interrogation here in April 2018 showed excellent PPM function with about 3.5 years remaining on PG longevity. He was admitted last month with coag neg staph UTI. Thankfully his blood cultures were and continue to be negative. He is now readmitted with weakness. He denies palpitations, syncope, nor angina. He remains off A/C due to his advanced age and advanced dementia.    -no need for repeat PPM interrogation  -as long as BCx remain negative and there is no suspicion for endocarditis then no further inpatient EP workup needed  -f/u with his electrophysiologist Dr. Jose Stapleton after discharge for routine PPM care  -poor candidate for systemic anticoagulation given advanced age and advanced dementia  -no further inpatient EP workup needed      Nikki Duarte M.D., [facility]  Cardiac Electrophysiology  [facility]  [address] [place] [address]  [email or web address]    office [phone]  pager [phone]

## 2018-05-12 NOTE — PROGRESS NOTE ADULT - SUBJECTIVE AND OBJECTIVE BOX
Patient seen and examined at bedside  No acute events noted overnight  Case discussed with medical team    HPI:  84m hx tia, s/p PPM, psoriasis, CKD, presenting from rehab with weakness, difficulty ambulating. Pt's complaints cluster primarily around poor care at rehab and lack of actual PT time, and pt attributes his present weakness to this lack of working with PT. Reports completing a course of abx yesterday for a UTI. Reports no fevers/chills, no new aches/pains, no cp/sob, no cough/HA. Would like to go to a different rehab if possible.    In ED: 97.9, 61, 119/78, 18, 96RA. Given ceftriaxone (1700), NS 1 L bolux x 1. (06 May 2018 18:20)      PAST MEDICAL & SURGICAL HISTORY:  Psoriasis  Prostate cancer  GERD (gastroesophageal reflux disease)  Dyslipidemia  No significant past surgical history      No Known Allergies       MEDICATIONS  (STANDING):  cefepime   IVPB 1000 milliGRAM(s) IV Intermittent every 12 hours  cefepime   IVPB      cholecalciferol 1000 Unit(s) Oral daily  cyanocobalamin 1000 MICROGram(s) Oral daily  doxazosin 4 milliGRAM(s) Oral at bedtime  enoxaparin Injectable 40 milliGRAM(s) SubCutaneous every 24 hours  famotidine    Tablet 20 milliGRAM(s) Oral daily  latanoprost 0.005% Ophthalmic Solution 1 Drop(s) Left EYE at bedtime  melatonin 3 milliGRAM(s) Oral at bedtime  nystatin Powder 1 Application(s) Topical three times a day  senna 2 Tablet(s) Oral at bedtime  simvastatin 20 milliGRAM(s) Oral at bedtime    MEDICATIONS  (PRN):  acetaminophen   Tablet. 650 milliGRAM(s) Oral every 6 hours PRN Moderate Pain (4 - 6)  bisacodyl Suppository 10 milliGRAM(s) Rectal daily PRN Constipation  docusate sodium 100 milliGRAM(s) Oral daily PRN Constipation  hydrocortisone 2.5% Ointment 1 Application(s) Topical daily PRN Rash  polyethylene glycol 3350 17 Gram(s) Oral daily PRN Constipation      REVIEW OF SYSTEMS:  CONSTITUTIONAL: (+) malaise.   EYES: No acute change in vision   ENT:  No tinnitus  NECK: No stiffness  RESPIRATORY: No hemoptysis  CARDIOVASCULAR: No chest pain, palpitations, syncope  GASTROINTESTINAL: No hematemesis, diarrhea, melena, or hematochezia.  GENITOURINARY: No hematuria  NEUROLOGICAL: No headaches  LYMPH Nodes: No enlarged glands  ENDOCRINE: No heat or cold intolerance	    T(C): 36.4 (05-12-18 @ 14:17), Max: 36.7 (05-12-18 @ 01:02)  HR: 59 (05-12-18 @ 14:17) (59 - 61)  BP: 123/77 (05-12-18 @ 14:17) (106/64 - 132/80)  RR: 18 (05-12-18 @ 14:17) (16 - 18)  SpO2: 98% (05-12-18 @ 14:17) (95% - 98%)    PHYSICAL EXAMINATION:   Constitutional: WD, NAD  HEENT: NC, AT  Neck:  Supple  Respiratory:  Adequate airflow b/l. Not using accessory muscles of respiration.  Cardiovascular:  S1 & S2 intact, no R/G, 2+ radial pulses b/l  Gastrointestinal: Soft, NT, ND, normoactive b.s., no organomegaly/RT/rigidity  Extremities: WWP  Neurological:  Alert and awake. Crude sensation intact.     Labs and imaging reviewed    LABS:                        10.9   5.91  )-----------( 203      ( 12 May 2018 09:05 )             33.7     05-12    141  |  107  |  25<H>  ----------------------------<  71  4.9   |  22  |  1.50<H>    Ca    9.8      12 May 2018 07:10              CAPILLARY BLOOD GLUCOSE                  RADIOLOGY & ADDITIONAL STUDIES:

## 2018-05-13 LAB
ANION GAP SERPL CALC-SCNC: 15 MMOL/L — SIGNIFICANT CHANGE UP (ref 5–17)
BUN SERPL-MCNC: 24 MG/DL — HIGH (ref 7–23)
CALCIUM SERPL-MCNC: 9.5 MG/DL — SIGNIFICANT CHANGE UP (ref 8.4–10.5)
CHLORIDE SERPL-SCNC: 105 MMOL/L — SIGNIFICANT CHANGE UP (ref 96–108)
CO2 SERPL-SCNC: 22 MMOL/L — SIGNIFICANT CHANGE UP (ref 22–31)
CREAT SERPL-MCNC: 1.38 MG/DL — HIGH (ref 0.5–1.3)
GLUCOSE SERPL-MCNC: 80 MG/DL — SIGNIFICANT CHANGE UP (ref 70–99)
HCT VFR BLD CALC: 34.9 % — LOW (ref 39–50)
HGB BLD-MCNC: 11.2 G/DL — LOW (ref 13–17)
MCHC RBC-ENTMCNC: 28.7 PG — SIGNIFICANT CHANGE UP (ref 27–34)
MCHC RBC-ENTMCNC: 32.1 GM/DL — SIGNIFICANT CHANGE UP (ref 32–36)
MCV RBC AUTO: 89.5 FL — SIGNIFICANT CHANGE UP (ref 80–100)
PLATELET # BLD AUTO: 206 K/UL — SIGNIFICANT CHANGE UP (ref 150–400)
POTASSIUM SERPL-MCNC: 4.7 MMOL/L — SIGNIFICANT CHANGE UP (ref 3.5–5.3)
POTASSIUM SERPL-SCNC: 4.7 MMOL/L — SIGNIFICANT CHANGE UP (ref 3.5–5.3)
RBC # BLD: 3.9 M/UL — LOW (ref 4.2–5.8)
RBC # FLD: 14.5 % — SIGNIFICANT CHANGE UP (ref 10.3–14.5)
SODIUM SERPL-SCNC: 142 MMOL/L — SIGNIFICANT CHANGE UP (ref 135–145)
WBC # BLD: 5.93 K/UL — SIGNIFICANT CHANGE UP (ref 3.8–10.5)
WBC # FLD AUTO: 5.93 K/UL — SIGNIFICANT CHANGE UP (ref 3.8–10.5)

## 2018-05-13 RX ADMIN — ENOXAPARIN SODIUM 40 MILLIGRAM(S): 100 INJECTION SUBCUTANEOUS at 05:28

## 2018-05-13 RX ADMIN — NYSTATIN CREAM 1 APPLICATION(S): 100000 CREAM TOPICAL at 19:24

## 2018-05-13 RX ADMIN — Medication 1000 UNIT(S): at 16:53

## 2018-05-13 RX ADMIN — Medication 4 MILLIGRAM(S): at 22:56

## 2018-05-13 RX ADMIN — CEFEPIME 100 MILLIGRAM(S): 1 INJECTION, POWDER, FOR SOLUTION INTRAMUSCULAR; INTRAVENOUS at 05:28

## 2018-05-13 RX ADMIN — NYSTATIN CREAM 1 APPLICATION(S): 100000 CREAM TOPICAL at 22:55

## 2018-05-13 RX ADMIN — Medication 100 MILLIGRAM(S): at 20:26

## 2018-05-13 RX ADMIN — FAMOTIDINE 20 MILLIGRAM(S): 10 INJECTION INTRAVENOUS at 16:52

## 2018-05-13 RX ADMIN — Medication 10 MILLIGRAM(S): at 20:26

## 2018-05-13 RX ADMIN — SIMVASTATIN 20 MILLIGRAM(S): 20 TABLET, FILM COATED ORAL at 22:56

## 2018-05-13 RX ADMIN — PREGABALIN 1000 MICROGRAM(S): 225 CAPSULE ORAL at 16:51

## 2018-05-13 RX ADMIN — CEFEPIME 100 MILLIGRAM(S): 1 INJECTION, POWDER, FOR SOLUTION INTRAMUSCULAR; INTRAVENOUS at 16:51

## 2018-05-13 RX ADMIN — LATANOPROST 1 DROP(S): 0.05 SOLUTION/ DROPS OPHTHALMIC; TOPICAL at 22:55

## 2018-05-13 RX ADMIN — NYSTATIN CREAM 1 APPLICATION(S): 100000 CREAM TOPICAL at 05:42

## 2018-05-13 RX ADMIN — Medication 3 MILLIGRAM(S): at 22:56

## 2018-05-13 RX ADMIN — SENNA PLUS 2 TABLET(S): 8.6 TABLET ORAL at 23:00

## 2018-05-13 NOTE — PROGRESS NOTE ADULT - SUBJECTIVE AND OBJECTIVE BOX
pt seen and examined, no complaints on exam.    no events overnight.  no chest pain or sob on exam    acetaminophen   Tablet. 650 milliGRAM(s) Oral every 6 hours PRN  bisacodyl Suppository 10 milliGRAM(s) Rectal daily PRN  cefepime   IVPB 1000 milliGRAM(s) IV Intermittent every 12 hours  cefepime   IVPB      cholecalciferol 1000 Unit(s) Oral daily  cyanocobalamin 1000 MICROGram(s) Oral daily  docusate sodium 100 milliGRAM(s) Oral daily PRN  doxazosin 4 milliGRAM(s) Oral at bedtime  enoxaparin Injectable 40 milliGRAM(s) SubCutaneous every 24 hours  famotidine    Tablet 20 milliGRAM(s) Oral daily  hydrocortisone 2.5% Ointment 1 Application(s) Topical daily PRN  latanoprost 0.005% Ophthalmic Solution 1 Drop(s) Left EYE at bedtime  melatonin 3 milliGRAM(s) Oral at bedtime  nystatin Powder 1 Application(s) Topical three times a day  polyethylene glycol 3350 17 Gram(s) Oral daily PRN  senna 2 Tablet(s) Oral at bedtime  simvastatin 20 milliGRAM(s) Oral at bedtime                            10.9   5.91  )-----------( 203      ( 12 May 2018 09:05 )             33.7       Hemoglobin: 10.9 g/dL (05-12 @ 09:05)  Hemoglobin: 10.5 g/dL (05-11 @ 07:57)  Hemoglobin: 10.2 g/dL (05-08 @ 07:37)      05-12    141  |  107  |  25<H>  ----------------------------<  71  4.9   |  22  |  1.50<H>    Ca    9.8      12 May 2018 07:10      Creatinine Trend: 1.50<--, 1.52<--, 1.48<--, 1.50<--, 1.60<--, 1.40<--    COAGS:           T(C): 36.4 (05-13-18 @ 05:00), Max: 36.7 (05-12-18 @ 22:45)  HR: 60 (05-13-18 @ 05:00) (58 - 60)  BP: 126/71 (05-13-18 @ 05:00) (123/77 - 166/96)  RR: 18 (05-13-18 @ 05:00) (16 - 18)  SpO2: 96% (05-13-18 @ 05:00) (96% - 98%)  Wt(kg): --    I&O's Summary    11 May 2018 07:01  -  12 May 2018 07:00  --------------------------------------------------------  IN: 0 mL / OUT: 1 mL / NET: -1 mL    12 May 2018 07:01  -  13 May 2018 06:51  --------------------------------------------------------  IN: 880 mL / OUT: 3 mL / NET: 877 mL        Gen: Appears well in NAD  HEENT:  (-)icterus (-)pallor  CV: N S1 S2 1/6 ANA (+)2 Pulses B/l  Resp:  Clear to ausculatation B/L, normal effort  GI: (+) BS Soft, NT, ND  Lymph:  (-)Edema, (-)obvious lymphadenopathy  Skin: Warm to touch, Normal turgor  Psych: Appropriate mood and affect    Culture - Blood (05.07.18 @ 00:47)    Specimen Source: .Blood Blood-Peripheral    Culture Results:   No growth to date.      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 His PPM interrogation here in April 2018 showed excellent PPM function with about 3.5 years remaining on PG longevity. He was admitted last month with coag neg staph UTI. Thankfully his blood cultures were and continue to be negative. He is now readmitted with weakness. He denies palpitations, syncope, nor angina. He remains off A/C due to his advanced age and advanced dementia.      -f/u with his electrophysiologist Dr. Jose Stapleton after discharge for routine PPM care  - not in clinical CHF  - abx per med  - urology follow up - cysto 5/15 cystoscopy   - no need for inpatient cardiac work up  - dc planning to [facility] per primary team   D/W Dr Ba

## 2018-05-13 NOTE — PROGRESS NOTE ADULT - SUBJECTIVE AND OBJECTIVE BOX
EP ATTENDING    no tele    no palpitations, no syncope, no angina    acetaminophen   Tablet. 650 milliGRAM(s) Oral every 6 hours PRN  bisacodyl Suppository 10 milliGRAM(s) Rectal daily PRN  cefepime   IVPB 1000 milliGRAM(s) IV Intermittent every 12 hours  cefepime   IVPB      cholecalciferol 1000 Unit(s) Oral daily  cyanocobalamin 1000 MICROGram(s) Oral daily  docusate sodium 100 milliGRAM(s) Oral daily PRN  doxazosin 4 milliGRAM(s) Oral at bedtime  enoxaparin Injectable 40 milliGRAM(s) SubCutaneous every 24 hours  famotidine    Tablet 20 milliGRAM(s) Oral daily  hydrocortisone 2.5% Ointment 1 Application(s) Topical daily PRN  latanoprost 0.005% Ophthalmic Solution 1 Drop(s) Left EYE at bedtime  melatonin 3 milliGRAM(s) Oral at bedtime  nystatin Powder 1 Application(s) Topical three times a day  polyethylene glycol 3350 17 Gram(s) Oral daily PRN  senna 2 Tablet(s) Oral at bedtime  simvastatin 20 milliGRAM(s) Oral at bedtime                            11.2   5.93  )-----------( 206      ( 13 May 2018 09:20 )             34.9       05-13    142  |  105  |  24<H>  ----------------------------<  80  4.7   |  22  |  1.38<H>    Ca    9.5      13 May 2018 07:29              T(C): 36.3 (05-13-18 @ 09:43), Max: 36.7 (05-12-18 @ 22:45)  HR: 59 (05-13-18 @ 09:43) (58 - 60)  BP: 117/75 (05-13-18 @ 09:43) (117/75 - 166/96)  RR: 18 (05-13-18 @ 09:43) (16 - 18)  SpO2: 98% (05-13-18 @ 09:43) (96% - 98%)  Wt(kg): --    no JVD  RRR, no murmurs  CTAB  soft nt/nd  no c/c/e    serial Blood cultures all negative    A/P) He is a 85 y/o male PM persistent AF who had a 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 His PPM interrogation here in April 2018 showed excellent PPM function with about 3.5 years remaining on PG longevity. He was admitted last month with coag erma mccall UTI. Thankfully his blood cultures were and continue to be negative. He is now readmitted with weakness. He denies palpitations, syncope, nor angina. He remains off A/C due to his advanced age and advanced dementia.    -no need for repeat PPM interrogation  -as long as BCx remain negative and there is no suspicion for endocarditis then no further inpatient EP workup needed  -f/u with his electrophysiologist Dr. Jose Stapleton after discharge for routine PPM care  -poor candidate for systemic anticoagulation given advanced age and advanced dementia  -no further inpatient EP workup needed      Nikki Duarte M.D., [facility]  Cardiac Electrophysiology  [facility]  [address] [place] [address]  [email or web address]    office [phone]  pager [phone]

## 2018-05-13 NOTE — PROGRESS NOTE ADULT - SUBJECTIVE AND OBJECTIVE BOX
[facility] NEPHROLOGY- PROGRESS NOTE    Patient is a 84y Male with recurrent UTIs with chronic R hydronephrosis who presented to the hospital with another UTI.  Pt is at [facility] ([facility]) and unable to go to urology office for further intervention.      No Known Allergies    Home Medications Reviewed  Hospital Medications: Reviewed      REVIEW OF SYSTEMS:    CONSTITUTIONAL: + malaise, + weakness, No fevers or chills  RESPIRATORY:  No shortness of breath  CARDIOVASCULAR: No chest pain or palpitations.  GASTROINTESTINAL: No abdominal or epigastric pain. No nausea, vomiting, or diarrhea   VASCULAR: No bilateral lower extremity edema.   All other review of systems is negative unless indicated above.      VITALS:  T(F): 97.4 (05-13-18 @ 09:43), Max: 98 (05-12-18 @ 22:45)  HR: 59 (05-13-18 @ 09:43)  BP: 117/75 (05-13-18 @ 09:43)  RR: 18 (05-13-18 @ 09:43)  SpO2: 98% (05-13-18 @ 09:43)  Wt(kg): --    05-12 @ 07:01  -  05-13 @ 07:00  --------------------------------------------------------  IN: 930 mL / OUT: 3 mL / NET: 927 mL    05-13 @ 07:01  -  05-13 @ 09:48  --------------------------------------------------------  IN: 120 mL / OUT: 1 mL / NET: 119 mL      PHYSICAL EXAM:  Constitutional: NAD  Respiratory: CTAB, no wheezes, rales or rhonchi  Cardiovascular: S1, S2, RRR  Gastrointestinal: BS+, soft, NT/ND  Extremities: No peripheral edema      LABS:  05-13    142  |  105  |  24<H>  ----------------------------<  80  4.7   |  22  |  1.38<H>    Ca    9.5      13 May 2018 07:29      Creatinine Trend: 1.38 <--, 1.50 <--, 1.52 <--, 1.48 <--, 1.50 <--, 1.60 <--, 1.40 <--, 1.55 <--                        11.2   5.93  )-----------( 206      ( 13 May 2018 09:20 )             34.9

## 2018-05-13 NOTE — PROGRESS NOTE ADULT - SUBJECTIVE AND OBJECTIVE BOX
Patient seen and examined at bedside  c/o generalized weakness   Case discussed with medical team    HPI:  84m hx tia, s/p PPM, psoriasis, CKD, presenting from rehab with weakness, difficulty ambulating. Pt's complaints cluster primarily around poor care at rehab and lack of actual PT time, and pt attributes his present weakness to this lack of working with PT. Reports completing a course of abx yesterday for a UTI. Reports no fevers/chills, no new aches/pains, no cp/sob, no cough/HA. Would like to go to a different rehab if possible.    In ED: 97.9, 61, 119/78, 18, 96RA. Given ceftriaxone (1700), NS 1 L bolux x 1. (06 May 2018 18:20)      PAST MEDICAL & SURGICAL HISTORY:  Psoriasis  Prostate cancer  GERD (gastroesophageal reflux disease)  Dyslipidemia  No significant past surgical history      No Known Allergies       MEDICATIONS  (STANDING):  cefepime   IVPB 1000 milliGRAM(s) IV Intermittent every 12 hours  cefepime   IVPB      cholecalciferol 1000 Unit(s) Oral daily  cyanocobalamin 1000 MICROGram(s) Oral daily  doxazosin 4 milliGRAM(s) Oral at bedtime  enoxaparin Injectable 40 milliGRAM(s) SubCutaneous every 24 hours  famotidine    Tablet 20 milliGRAM(s) Oral daily  latanoprost 0.005% Ophthalmic Solution 1 Drop(s) Left EYE at bedtime  melatonin 3 milliGRAM(s) Oral at bedtime  nystatin Powder 1 Application(s) Topical three times a day  senna 2 Tablet(s) Oral at bedtime  simvastatin 20 milliGRAM(s) Oral at bedtime    MEDICATIONS  (PRN):  acetaminophen   Tablet. 650 milliGRAM(s) Oral every 6 hours PRN Moderate Pain (4 - 6)  bisacodyl Suppository 10 milliGRAM(s) Rectal daily PRN Constipation  docusate sodium 100 milliGRAM(s) Oral daily PRN Constipation  hydrocortisone 2.5% Ointment 1 Application(s) Topical daily PRN Rash  polyethylene glycol 3350 17 Gram(s) Oral daily PRN Constipation      REVIEW OF SYSTEMS:  CONSTITUTIONAL: (+) malaise.   EYES: No acute change in vision   ENT:  No tinnitus  NECK: No stiffness  RESPIRATORY: No hemoptysis  CARDIOVASCULAR: No chest pain, palpitations, syncope  GASTROINTESTINAL: No hematemesis, diarrhea, melena, or hematochezia.  GENITOURINARY: No hematuria  NEUROLOGICAL: No headaches  LYMPH Nodes: No enlarged glands  ENDOCRINE: No heat or cold intolerance	    T(C): 36.3 (05-13-18 @ 09:43), Max: 36.7 (05-12-18 @ 22:45)  HR: 59 (05-13-18 @ 09:43) (58 - 60)  BP: 117/75 (05-13-18 @ 09:43) (117/75 - 166/96)  RR: 18 (05-13-18 @ 09:43) (16 - 18)  SpO2: 98% (05-13-18 @ 09:43) (96% - 98%)    PHYSICAL EXAMINATION:   Constitutional: WD, NAD  HEENT: NC, AT  Neck:  Supple  Respiratory:  Adequate airflow b/l. Not using accessory muscles of respiration.  Cardiovascular:  S1 & S2 intact, no R/G, 2+ radial pulses b/l  Gastrointestinal: Soft, NT, ND, normoactive b.s., no organomegaly/RT/rigidity  Extremities: WWP  Neurological:  Alert and awake. weakness. Crude sensation intact.     Labs and imaging reviewed    LABS:                        11.2   5.93  )-----------( 206      ( 13 May 2018 09:20 )             34.9     05-13    142  |  105  |  24<H>  ----------------------------<  80  4.7   |  22  |  1.38<H>    Ca    9.5      13 May 2018 07:29              CAPILLARY BLOOD GLUCOSE                  RADIOLOGY & ADDITIONAL STUDIES:

## 2018-05-14 ENCOUNTER — TRANSCRIPTION ENCOUNTER (OUTPATIENT)
Age: 83
End: 2018-05-14

## 2018-05-14 LAB
ANION GAP SERPL CALC-SCNC: 13 MMOL/L — SIGNIFICANT CHANGE UP (ref 5–17)
BUN SERPL-MCNC: 24 MG/DL — HIGH (ref 7–23)
CALCIUM SERPL-MCNC: 9.6 MG/DL — SIGNIFICANT CHANGE UP (ref 8.4–10.5)
CHLORIDE SERPL-SCNC: 104 MMOL/L — SIGNIFICANT CHANGE UP (ref 96–108)
CO2 SERPL-SCNC: 23 MMOL/L — SIGNIFICANT CHANGE UP (ref 22–31)
CREAT SERPL-MCNC: 1.34 MG/DL — HIGH (ref 0.5–1.3)
GLUCOSE SERPL-MCNC: 76 MG/DL — SIGNIFICANT CHANGE UP (ref 70–99)
HCT VFR BLD CALC: 32.7 % — LOW (ref 39–50)
HGB BLD-MCNC: 10.8 G/DL — LOW (ref 13–17)
MCHC RBC-ENTMCNC: 28.7 PG — SIGNIFICANT CHANGE UP (ref 27–34)
MCHC RBC-ENTMCNC: 33 GM/DL — SIGNIFICANT CHANGE UP (ref 32–36)
MCV RBC AUTO: 87 FL — SIGNIFICANT CHANGE UP (ref 80–100)
PLATELET # BLD AUTO: 209 K/UL — SIGNIFICANT CHANGE UP (ref 150–400)
POTASSIUM SERPL-MCNC: 4.7 MMOL/L — SIGNIFICANT CHANGE UP (ref 3.5–5.3)
POTASSIUM SERPL-SCNC: 4.7 MMOL/L — SIGNIFICANT CHANGE UP (ref 3.5–5.3)
RBC # BLD: 3.76 M/UL — LOW (ref 4.2–5.8)
RBC # FLD: 14.5 % — SIGNIFICANT CHANGE UP (ref 10.3–14.5)
SODIUM SERPL-SCNC: 140 MMOL/L — SIGNIFICANT CHANGE UP (ref 135–145)
WBC # BLD: 6.1 K/UL — SIGNIFICANT CHANGE UP (ref 3.8–10.5)
WBC # FLD AUTO: 6.1 K/UL — SIGNIFICANT CHANGE UP (ref 3.8–10.5)

## 2018-05-14 RX ORDER — SODIUM CHLORIDE 9 MG/ML
1000 INJECTION, SOLUTION INTRAVENOUS
Qty: 0 | Refills: 0 | Status: DISCONTINUED | OUTPATIENT
Start: 2018-05-14 | End: 2018-05-15

## 2018-05-14 RX ADMIN — Medication 3 MILLIGRAM(S): at 21:51

## 2018-05-14 RX ADMIN — CEFEPIME 100 MILLIGRAM(S): 1 INJECTION, POWDER, FOR SOLUTION INTRAMUSCULAR; INTRAVENOUS at 17:42

## 2018-05-14 RX ADMIN — LATANOPROST 1 DROP(S): 0.05 SOLUTION/ DROPS OPHTHALMIC; TOPICAL at 21:52

## 2018-05-14 RX ADMIN — SENNA PLUS 2 TABLET(S): 8.6 TABLET ORAL at 21:51

## 2018-05-14 RX ADMIN — NYSTATIN CREAM 1 APPLICATION(S): 100000 CREAM TOPICAL at 13:00

## 2018-05-14 RX ADMIN — FAMOTIDINE 20 MILLIGRAM(S): 10 INJECTION INTRAVENOUS at 11:52

## 2018-05-14 RX ADMIN — NYSTATIN CREAM 1 APPLICATION(S): 100000 CREAM TOPICAL at 23:11

## 2018-05-14 RX ADMIN — CEFEPIME 100 MILLIGRAM(S): 1 INJECTION, POWDER, FOR SOLUTION INTRAMUSCULAR; INTRAVENOUS at 06:04

## 2018-05-14 RX ADMIN — SIMVASTATIN 20 MILLIGRAM(S): 20 TABLET, FILM COATED ORAL at 21:52

## 2018-05-14 RX ADMIN — ENOXAPARIN SODIUM 40 MILLIGRAM(S): 100 INJECTION SUBCUTANEOUS at 06:02

## 2018-05-14 RX ADMIN — NYSTATIN CREAM 1 APPLICATION(S): 100000 CREAM TOPICAL at 06:02

## 2018-05-14 RX ADMIN — Medication 4 MILLIGRAM(S): at 21:51

## 2018-05-14 NOTE — PROGRESS NOTE ADULT - SUBJECTIVE AND OBJECTIVE BOX
Patient is seen and examined at the bed side, is afebrile. He is feeling better. The Urology follow up noted regarding no further inpatient work up.          REVIEW OF SYSTEMS: All other review systems are negative        Vital Signs Last 24 Hrs  T(C): 36.9 (14 May 2018 21:14), Max: 36.9 (14 May 2018 17:41)  T(F): 98.5 (14 May 2018 21:14), Max: 98.5 (14 May 2018 17:41)  HR: 70 (14 May 2018 21:14) (61 - 72)  BP: 128/77 (14 May 2018 21:14) (120/75 - 149/78)  BP(mean): --  RR: 19 (14 May 2018 21:14) (18 - 19)  SpO2: 96% (14 May 2018 21:14) (93% - 96%)        ALLERGIES: NKDA        PHYSICAL EXAM:  GENERAL: Not in acute distress  CVS: s1 and s2 present  RESP: Air entry B/L  GI: abdomen soft and nontender  EXT: No pedal edema  CNS: Awake and alert  SKIN: psoriasis lesions          LABS:                        10.8   6.10  )-----------( 209      ( 14 May 2018 09:45 )             32.7                           10.5   6.46  )-----------( 204      ( 11 May 2018 07:57 )             33.1           14    140  |  104  |  24<H>  ----------------------------<  76  4.7   |  23  |  1.34<H>    Ca    9.6      14 May 2018 07:14      05-11    142  |  108  |  22  ----------------------------<  73  4.4   |  22  |  1.52<H>    Ca    9.8      11 May 2018 06:41      05-10    141  |  107  |  23  ----------------------------<  79  4.2   |  22  |  1.48<H>    Ca    9.4      10 May 2018 06:15      TPro  8.1  /  Alb  3.8  /  TBili  0.3  /  DBili  x   /  AST  23  /  ALT  8<L>  /  AlkPhos  84  -        PTT - ( 06 May 2018 14:47 )  PTT:39.9 sec  Urinalysis Basic - ( 06 May 2018 16:09 )    Color: Yellow / Appearance: SL Turbid / S.016 / pH: x  Gluc: x / Ketone: Negative  / Bili: Negative / Urobili: Negative   Blood: x / Protein: Trace / Nitrite: Negative   Leuk Esterase: Large / RBC: >50 /HPF / WBC >50 /HPF   Sq Epi: x / Non Sq Epi: OCC /HPF / Bacteria: Few /HPF          MEDICATIONS  (STANDING):  cefepime   IVPB 1000 milliGRAM(s) IV Intermittent every 12 hours  cefepime   IVPB      cholecalciferol 1000 Unit(s) Oral daily  cyanocobalamin 1000 MICROGram(s) Oral daily  doxazosin 4 milliGRAM(s) Oral at bedtime  enoxaparin Injectable 40 milliGRAM(s) SubCutaneous every 24 hours  famotidine    Tablet 20 milliGRAM(s) Oral daily  latanoprost 0.005% Ophthalmic Solution 1 Drop(s) Left EYE at bedtime  melatonin 3 milliGRAM(s) Oral at bedtime  nystatin Powder 1 Application(s) Topical three times a day  senna 2 Tablet(s) Oral at bedtime  simvastatin 20 milliGRAM(s) Oral at bedtime    MEDICATIONS  (PRN):  acetaminophen   Tablet. 650 milliGRAM(s) Oral every 6 hours PRN Moderate Pain (4 - 6)  bisacodyl Suppository 10 milliGRAM(s) Rectal daily PRN Constipation  docusate sodium 100 milliGRAM(s) Oral daily PRN Constipation  hydrocortisone 2.5% Ointment 1 Application(s) Topical daily PRN Rash  polyethylene glycol 3350 17 Gram(s) Oral daily PRN Constipation          RADIOLOGY & ADDITIONAL TESTS:    18: US Kidney and Bladder (18 @ 16:42) Again seen is moderate hydroureteronephrosis as described above. Fine debris within the distal dilated right ureter is indicative of infection.   No left-sided hydronephrosis. Urinary bladder wall small contains fine debris also indicative of  infection. Thickening of the bladder wall may also reflect infection.  Thickening/trabeculation and bladder diverticulum suggests some degree of chronic outlet obstruction.      18: Xray Chest 1 View- PORTABLE-Urgent (18 @ 15:07) INTERPRETATION:  no urgent/emergent findings.      18: US Kidney and Bladder (18 @ 17:05) Moderate right hydroureteronephrosis to the level of the bladder. The right ureter appears to insert on a bladder diverticulum. Right renal   cortical thinning is suggestive of chronic hydronephrosis. Urinary bladder trabeculations and diverticula with debris.        MICROBIOLOGY DATA:    Culture - Blood (18 @ 00:47)    Specimen Source: .Blood Blood-Peripheral    Culture Results:   No growth to date.    Culture - Blood (18 @ 00:47)    Specimen Source: .Blood Blood-Peripheral    Culture Results:   No growth to date.    Culture - Urine (18 @ 21:36)    Specimen Source: .Urine Clean Catch (Midstream)    Culture Results:   No growth        Urinalysis + Microscopic Examination (18 @ 16:09)    Urine Appearance: SL Turbid    Urobilinogen: Negative    Leukocyte Esterase Concentration: Large    Nitrite: Negative    Specific Gravity: 1.016    Protein, Urine: Trace    pH Urine: 5.5    Ketone - Urine: Negative    Bilirubin: Negative    Color: Yellow    Glucose Qualitative, Urine: Negative    Blood, Urine: Moderate    Red Blood Cell - Urine: >50 /HPF    White Blood Cell - Urine: >50 /HPF    Epithelial Cells: OCC /HPF    Hyaline Casts: 2-5    Bacteria: Few /HPF Patient is seen and examined at the bed side, is afebrile. He is feeling better and scheduled for Cystoscopy with stent placement.          REVIEW OF SYSTEMS: All other review systems are negative        Vital Signs Last 24 Hrs  T(C): 36.9 (14 May 2018 21:14), Max: 36.9 (14 May 2018 17:41)  T(F): 98.5 (14 May 2018 21:14), Max: 98.5 (14 May 2018 17:41)  HR: 70 (14 May 2018 21:14) (61 - 72)  BP: 128/77 (14 May 2018 21:14) (120/75 - 149/78)  BP(mean): --  RR: 19 (14 May 2018 21:14) (18 - 19)  SpO2: 96% (14 May 2018 21:14) (93% - 96%)        ALLERGIES: NKDA        PHYSICAL EXAM:  GENERAL: Not in acute distress  CVS: s1 and s2 present  RESP: Air entry B/L  GI: abdomen soft and nontender  EXT: No pedal edema  CNS: Awake and alert  SKIN: psoriasis lesions          LABS:                        10.8   6.10  )-----------( 209      ( 14 May 2018 09:45 )             32.7                           10.5   6.46  )-----------( 204      ( 11 May 2018 07:57 )             33.1           14    140  |  104  |  24<H>  ----------------------------<  76  4.7   |  23  |  1.34<H>    Ca    9.6      14 May 2018 07:14      05-11    142  |  108  |  22  ----------------------------<  73  4.4   |  22  |  1.52<H>    Ca    9.8      11 May 2018 06:41      05-10    141  |  107  |  23  ----------------------------<  79  4.2   |  22  |  1.48<H>    Ca    9.4      10 May 2018 06:15      TPro  8.1  /  Alb  3.8  /  TBili  0.3  /  DBili  x   /  AST  23  /  ALT  8<L>  /  AlkPhos  84          PTT - ( 06 May 2018 14:47 )  PTT:39.9 sec  Urinalysis Basic - ( 06 May 2018 16:09 )    Color: Yellow / Appearance: SL Turbid / S.016 / pH: x  Gluc: x / Ketone: Negative  / Bili: Negative / Urobili: Negative   Blood: x / Protein: Trace / Nitrite: Negative   Leuk Esterase: Large / RBC: >50 /HPF / WBC >50 /HPF   Sq Epi: x / Non Sq Epi: OCC /HPF / Bacteria: Few /HPF          MEDICATIONS  (STANDING):  cefepime   IVPB 1000 milliGRAM(s) IV Intermittent every 12 hours  cefepime   IVPB      cholecalciferol 1000 Unit(s) Oral daily  cyanocobalamin 1000 MICROGram(s) Oral daily  doxazosin 4 milliGRAM(s) Oral at bedtime  enoxaparin Injectable 40 milliGRAM(s) SubCutaneous every 24 hours  famotidine    Tablet 20 milliGRAM(s) Oral daily  latanoprost 0.005% Ophthalmic Solution 1 Drop(s) Left EYE at bedtime  melatonin 3 milliGRAM(s) Oral at bedtime  nystatin Powder 1 Application(s) Topical three times a day  senna 2 Tablet(s) Oral at bedtime  simvastatin 20 milliGRAM(s) Oral at bedtime    MEDICATIONS  (PRN):  acetaminophen   Tablet. 650 milliGRAM(s) Oral every 6 hours PRN Moderate Pain (4 - 6)  bisacodyl Suppository 10 milliGRAM(s) Rectal daily PRN Constipation  docusate sodium 100 milliGRAM(s) Oral daily PRN Constipation  hydrocortisone 2.5% Ointment 1 Application(s) Topical daily PRN Rash  polyethylene glycol 3350 17 Gram(s) Oral daily PRN Constipation          RADIOLOGY & ADDITIONAL TESTS:    18: US Kidney and Bladder (18 @ 16:42) Again seen is moderate hydroureteronephrosis as described above. Fine debris within the distal dilated right ureter is indicative of infection.   No left-sided hydronephrosis. Urinary bladder wall small contains fine debris also indicative of  infection. Thickening of the bladder wall may also reflect infection.  Thickening/trabeculation and bladder diverticulum suggests some degree of chronic outlet obstruction.      18: Xray Chest 1 View- PORTABLE-Urgent (18 @ 15:07) INTERPRETATION:  no urgent/emergent findings.      18: US Kidney and Bladder (18 @ 17:05) Moderate right hydroureteronephrosis to the level of the bladder. The right ureter appears to insert on a bladder diverticulum. Right renal   cortical thinning is suggestive of chronic hydronephrosis. Urinary bladder trabeculations and diverticula with debris.        MICROBIOLOGY DATA:    Culture - Blood (18 @ 00:47)    Specimen Source: .Blood Blood-Peripheral    Culture Results:   No growth to date.    Culture - Blood (18 @ 00:47)    Specimen Source: .Blood Blood-Peripheral    Culture Results:   No growth to date.    Culture - Urine (18 @ 21:36)    Specimen Source: .Urine Clean Catch (Midstream)    Culture Results:   No growth        Urinalysis + Microscopic Examination (18 @ 16:09)    Urine Appearance: SL Turbid    Urobilinogen: Negative    Leukocyte Esterase Concentration: Large    Nitrite: Negative    Specific Gravity: 1.016    Protein, Urine: Trace    pH Urine: 5.5    Ketone - Urine: Negative    Bilirubin: Negative    Color: Yellow    Glucose Qualitative, Urine: Negative    Blood, Urine: Moderate    Red Blood Cell - Urine: >50 /HPF    White Blood Cell - Urine: >50 /HPF    Epithelial Cells: OCC /HPF    Hyaline Casts: 2-5    Bacteria: Few /HPF

## 2018-05-14 NOTE — PROGRESS NOTE ADULT - SUBJECTIVE AND OBJECTIVE BOX
pt seen and examined, no complaints on exam.     no chest pain or sob on exam    acetaminophen   Tablet. 650 milliGRAM(s) Oral every 6 hours PRN  bisacodyl Suppository 10 milliGRAM(s) Rectal daily PRN  cefepime   IVPB 1000 milliGRAM(s) IV Intermittent every 12 hours  cefepime   IVPB      cholecalciferol 1000 Unit(s) Oral daily  cyanocobalamin 1000 MICROGram(s) Oral daily  docusate sodium 100 milliGRAM(s) Oral daily PRN  doxazosin 4 milliGRAM(s) Oral at bedtime  enoxaparin Injectable 40 milliGRAM(s) SubCutaneous every 24 hours  famotidine    Tablet 20 milliGRAM(s) Oral daily  hydrocortisone 2.5% Ointment 1 Application(s) Topical daily PRN  latanoprost 0.005% Ophthalmic Solution 1 Drop(s) Left EYE at bedtime  melatonin 3 milliGRAM(s) Oral at bedtime  nystatin Powder 1 Application(s) Topical three times a day  polyethylene glycol 3350 17 Gram(s) Oral daily PRN  senna 2 Tablet(s) Oral at bedtime  simvastatin 20 milliGRAM(s) Oral at bedtime                            11.2   5.93  )-----------( 206      ( 13 May 2018 09:20 )             34.9       Hemoglobin: 11.2 g/dL (05-13 @ 09:20)  Hemoglobin: 10.9 g/dL (05-12 @ 09:05)  Hemoglobin: 10.5 g/dL (05-11 @ 07:57)      05-14    140  |  104  |  24<H>  ----------------------------<  76  4.7   |  23  |  1.34<H>    Ca    9.6      14 May 2018 07:14      Creatinine Trend: 1.34<--, 1.38<--, 1.50<--, 1.52<--, 1.48<--, 1.50<--    COAGS:           T(C): 36.4 (05-14-18 @ 05:23), Max: 36.7 (05-13-18 @ 13:40)  HR: 61 (05-14-18 @ 05:23) (59 - 61)  BP: 146/87 (05-14-18 @ 05:23) (116/83 - 146/87)  RR: 18 (05-14-18 @ 05:23) (18 - 18)  SpO2: 93% (05-14-18 @ 05:23) (93% - 99%)  Wt(kg): --    I&O's Summary    13 May 2018 07:01  -  14 May 2018 07:00  --------------------------------------------------------  IN: 600 mL / OUT: 1 mL / NET: 599 mL        Gen: Appears well in NAD  HEENT:  (-)icterus (-)pallor  CV: N S1 S2 1/6 ANA (+)2 Pulses B/l  Resp:  Clear to ausculatation B/L, normal effort  GI: (+) BS Soft, NT, ND  Lymph:  (-)Edema, (-)obvious lymphadenopathy  Skin: Warm to touch, Normal turgor  Psych: Appropriate mood and affect    Culture - Blood (05.07.18 @ 00:47)    Specimen Source: .Blood Blood-Peripheral    Culture Results:   No growth to date.      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 His PPM interrogation here in April 2018 showed excellent PPM function with about 3.5 years remaining on PG longevity. He was admitted last month with coag neg staph UTI. Thankfully his blood cultures were and continue to be negative. He is now readmitted with weakness. He denies palpitations, syncope, nor angina. He remains off A/C due to his advanced age and advanced dementia.      -f/u with his electrophysiologist Dr. Jose Stapleton after discharge for routine PPM care  - not in clinical CHF  - abx per med  - urology follow up - cysto 5/15 cystoscopy - npo p mn   - no need for inpatient cardiac work up  - dc planning to [facility] per primary team   D/W Dr Ba

## 2018-05-14 NOTE — PROGRESS NOTE ADULT - SUBJECTIVE AND OBJECTIVE BOX
[facility] NEPHROLOGY- PROGRESS NOTE    84y Male with history of HTN presents with UTI. Nephrology consulted for elevated Scr.    REVIEW OF SYSTEMS:  Gen: no changes in weight  Cards: no chest pain  Resp: no dyspnea  GI: no nausea or vomiting or diarrhea  Vascular: no LE edema    No Known Allergies      Hospital Medications: Medications reviewed    VITALS:  T(F): 98.3 (05-14-18 @ 12:48), Max: 98.3 (05-14-18 @ 10:25)  HR: 72 (05-14-18 @ 12:48)  BP: 138/86 (05-14-18 @ 12:48)  RR: 18 (05-14-18 @ 10:25)  SpO2: 94% (05-14-18 @ 12:48)  Wt(kg): --  Height (cm): 180.34 (05-10 @ 08:50), 180.34 (04-14 @ 22:30)  Weight (kg): 96 (05-10 @ 08:50), 97.5 (04-14 @ 22:30)  BMI (kg/m2): 29.5 (05-10 @ 08:50), 30 (04-14 @ 22:30)  BSA (m2): 2.16 (05-10 @ 08:50), 2.17 (04-14 @ 22:30)    05-13 @ 07:01  -  05-14 @ 07:00  --------------------------------------------------------  IN: 600 mL / OUT: 1 mL / NET: 599 mL    05-14 @ 07:01  -  05-14 @ 14:45  --------------------------------------------------------  IN: 340 mL / OUT: 0 mL / NET: 340 mL        PHYSICAL EXAM:    Gen: NAD, calm  Cards: RRR, +S1/S2, no M/G/R  Resp: CTA B/L  GI: soft, NT/ND, NABS  Vascular: no LE edema B/L    LABS:  05-14    140  |  104  |  24<H>  ----------------------------<  76  4.7   |  23  |  1.34<H>    Ca    9.6      14 May 2018 07:14      Creatinine Trend: 1.34 <--, 1.38 <--, 1.50 <--, 1.52 <--, 1.48 <--, 1.50 <--, 1.60 <--                        10.8   6.10  )-----------( 209      ( 14 May 2018 09:45 )             32.7

## 2018-05-14 NOTE — PROGRESS NOTE ADULT - ASSESSMENT
Patient is a 84y old  Male who was recently discharged after treated  for MSSA UTI and hydronephrosis, and now presenting from rehab to the ER for evaluation of weakness (06 May 2018 18:20) and difficulty ambulating. He attributes his present weakness to this lack of working with PT. He has no fevers/chills, and on admission found to have positive Urine analysis and dose of Ceftriaxone is given, cultures pending. The ID consult requested to assist with further evaluation and antibiotic  management.     # Complicated UTI  - cultures are negative  # Moderate Right Hydronephrosis    Would recommend:  1. Continue Cefepime X 10 days  2. OOB to chair/PT  3. Monitor  kidney function  4. Aspiration precaution    d/w patient and Nursing staff    will follow the patient with you Patient is a 84y old  Male who was recently discharged after treated  for MSSA UTI and hydronephrosis, and now presenting from rehab to the ER for evaluation of weakness (06 May 2018 18:20) and difficulty ambulating. He attributes his present weakness to this lack of working with PT. He has no fevers/chills, and on admission found to have positive Urine analysis and dose of Ceftriaxone is given, cultures pending. The ID consult requested to assist with further evaluation and antibiotic  management.     # Complicated UTI  - cultures are negative  # Moderate Right Hydronephrosis    Would recommend:  1. Agree with Cystoscopy and stent placement  2. Continue Cefepime  3. Monitor  kidney function  4. Aspiration precaution    d/w patient and Nursing staff    will follow the patient with you

## 2018-05-14 NOTE — PROGRESS NOTE ADULT - PROBLEM SELECTOR PLAN 1
->Scheduled for Cystoscopy/Stent Tuesday 5/15/18  ->npo after midnight with ivf, then d/c planning to [facility]  Blader Outlet Obstruction, Moderate Right Hydroureteronephrosis  UCx (-)

## 2018-05-14 NOTE — PROGRESS NOTE ADULT - SUBJECTIVE AND OBJECTIVE BOX
EP ATTENDING    no tele    no palpitations, no syncope, no angina    acetaminophen   Tablet. 650 milliGRAM(s) Oral every 6 hours PRN  bisacodyl Suppository 10 milliGRAM(s) Rectal daily PRN  cefepime   IVPB 1000 milliGRAM(s) IV Intermittent every 12 hours  cefepime   IVPB      cholecalciferol 1000 Unit(s) Oral daily  cyanocobalamin 1000 MICROGram(s) Oral daily  docusate sodium 100 milliGRAM(s) Oral daily PRN  doxazosin 4 milliGRAM(s) Oral at bedtime  enoxaparin Injectable 40 milliGRAM(s) SubCutaneous every 24 hours  famotidine    Tablet 20 milliGRAM(s) Oral daily  hydrocortisone 2.5% Ointment 1 Application(s) Topical daily PRN  latanoprost 0.005% Ophthalmic Solution 1 Drop(s) Left EYE at bedtime  melatonin 3 milliGRAM(s) Oral at bedtime  nystatin Powder 1 Application(s) Topical three times a day  polyethylene glycol 3350 17 Gram(s) Oral daily PRN  senna 2 Tablet(s) Oral at bedtime  simvastatin 20 milliGRAM(s) Oral at bedtime                            10.8   6.10  )-----------( 209      ( 14 May 2018 09:45 )             32.7       05-14    140  |  104  |  24<H>  ----------------------------<  76  4.7   |  23  |  1.34<H>    Ca    9.6      14 May 2018 07:14        T(C): 36.8 (05-14-18 @ 10:25), Max: 36.8 (05-14-18 @ 10:25)  HR: 67 (05-14-18 @ 10:25) (60 - 67)  BP: 149/78 (05-14-18 @ 10:25) (116/83 - 149/78)  RR: 18 (05-14-18 @ 10:25) (18 - 18)  SpO2: 94% (05-14-18 @ 10:25) (93% - 99%)  Wt(kg): --    no JVD  RRR, no murmurs  CTAB  soft nt/nd  no c/c/e    serial Blood cultures all negative    A/P) He is a 83 y/o male PMH persistent AF who had a 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 His PPM interrogation here in April 2018 showed excellent PPM function with about 3.5 years remaining on PG longevity. He was admitted last month with coag erma mccall UTI. Thankfully his blood cultures were and continue to be negative. He is now readmitted with weakness. He denies palpitations, syncope, nor angina. He remains off A/C due to his advanced age and advanced dementia.    -no need for repeat PPM interrogation  -as long as BCx remain negative and there is no suspicion for endocarditis then no further inpatient EP workup needed  -f/u with his electrophysiologist Dr. Jose Stapleton after discharge for routine PPM care  -poor candidate for systemic anticoagulation given advanced age and advanced dementia  -no further inpatient EP workup needed      Nikki Duarte M.D., [facility]  Cardiac Electrophysiology  [facility]  [address] [place] [address]  [email or web address]    office [phone]  pager [phone]

## 2018-05-14 NOTE — PROGRESS NOTE ADULT - SUBJECTIVE AND OBJECTIVE BOX
Patient seen and examined at bedside  persistent weakness, otherwise no acute events noted overnight  Case discussed with medical team    HPI:  84m hx tia, s/p PPM, psoriasis, CKD, presenting from rehab with weakness, difficulty ambulating. Pt's complaints cluster primarily around poor care at rehab and lack of actual PT time, and pt attributes his present weakness to this lack of working with PT. Reports completing a course of abx yesterday for a UTI. Reports no fevers/chills, no new aches/pains, no cp/sob, no cough/HA. Would like to go to a different rehab if possible.    In ED: 97.9, 61, 119/78, 18, 96RA. Given ceftriaxone (1700), NS 1 L bolux x 1. (06 May 2018 18:20)      PAST MEDICAL & SURGICAL HISTORY:  Psoriasis  Prostate cancer  GERD (gastroesophageal reflux disease)  Dyslipidemia  No significant past surgical history      No Known Allergies       MEDICATIONS  (STANDING):  cefepime   IVPB 1000 milliGRAM(s) IV Intermittent every 12 hours  cefepime   IVPB      cholecalciferol 1000 Unit(s) Oral daily  cyanocobalamin 1000 MICROGram(s) Oral daily  dextrose 5% + sodium chloride 0.9%. 1000 milliLiter(s) (60 mL/Hr) IV Continuous <Continuous>  doxazosin 4 milliGRAM(s) Oral at bedtime  enoxaparin Injectable 40 milliGRAM(s) SubCutaneous every 24 hours  famotidine    Tablet 20 milliGRAM(s) Oral daily  latanoprost 0.005% Ophthalmic Solution 1 Drop(s) Left EYE at bedtime  melatonin 3 milliGRAM(s) Oral at bedtime  nystatin Powder 1 Application(s) Topical three times a day  senna 2 Tablet(s) Oral at bedtime  simvastatin 20 milliGRAM(s) Oral at bedtime    MEDICATIONS  (PRN):  acetaminophen   Tablet. 650 milliGRAM(s) Oral every 6 hours PRN Moderate Pain (4 - 6)  bisacodyl Suppository 10 milliGRAM(s) Rectal daily PRN Constipation  docusate sodium 100 milliGRAM(s) Oral daily PRN Constipation  hydrocortisone 2.5% Ointment 1 Application(s) Topical daily PRN Rash  polyethylene glycol 3350 17 Gram(s) Oral daily PRN Constipation      REVIEW OF SYSTEMS:  CONSTITUTIONAL: (+) malaise.   EYES: No acute change in vision   ENT:  No tinnitus  NECK: No stiffness  RESPIRATORY: No hemoptysis  CARDIOVASCULAR: No chest pain, palpitations, syncope  GASTROINTESTINAL: No hematemesis, diarrhea, melena, or hematochezia.  GENITOURINARY: No hematuria  NEUROLOGICAL: No headaches  LYMPH Nodes: No enlarged glands  ENDOCRINE: No heat or cold intolerance	    T(C): 36.8 (05-14-18 @ 12:48), Max: 36.8 (05-14-18 @ 10:25)  HR: 72 (05-14-18 @ 12:48) (60 - 72)  BP: 138/86 (05-14-18 @ 12:48) (116/83 - 149/78)  RR: 18 (05-14-18 @ 10:25) (18 - 18)  SpO2: 94% (05-14-18 @ 12:48) (93% - 99%)    PHYSICAL EXAMINATION:   Constitutional: WD, NAD  HEENT: NC, AT  Neck:  Supple  Respiratory:  Adequate airflow b/l. Not using accessory muscles of respiration.  Cardiovascular:  S1 & S2 intact, no R/G, 2+ radial pulses b/l  Gastrointestinal: Soft, NT, ND, normoactive b.s., no organomegaly/RT/rigidity  Extremities: WWP  Neurological:  Alert and awake.  No acute focal motor deficits. Crude sensation intact.     Labs and imaging reviewed    LABS:                        10.8   6.10  )-----------( 209      ( 14 May 2018 09:45 )             32.7     05-14    140  |  104  |  24<H>  ----------------------------<  76  4.7   |  23  |  1.34<H>    Ca    9.6      14 May 2018 07:14              CAPILLARY BLOOD GLUCOSE                  RADIOLOGY & ADDITIONAL STUDIES:

## 2018-05-15 LAB
ANION GAP SERPL CALC-SCNC: 13 MMOL/L — SIGNIFICANT CHANGE UP (ref 5–17)
BUN SERPL-MCNC: 26 MG/DL — HIGH (ref 7–23)
CALCIUM SERPL-MCNC: 9.7 MG/DL — SIGNIFICANT CHANGE UP (ref 8.4–10.5)
CHLORIDE SERPL-SCNC: 105 MMOL/L — SIGNIFICANT CHANGE UP (ref 96–108)
CO2 SERPL-SCNC: 23 MMOL/L — SIGNIFICANT CHANGE UP (ref 22–31)
CREAT SERPL-MCNC: 1.36 MG/DL — HIGH (ref 0.5–1.3)
GLUCOSE SERPL-MCNC: 83 MG/DL — SIGNIFICANT CHANGE UP (ref 70–99)
HCT VFR BLD CALC: 33 % — LOW (ref 39–50)
HGB BLD-MCNC: 10.6 G/DL — LOW (ref 13–17)
MCHC RBC-ENTMCNC: 28.3 PG — SIGNIFICANT CHANGE UP (ref 27–34)
MCHC RBC-ENTMCNC: 32.1 GM/DL — SIGNIFICANT CHANGE UP (ref 32–36)
MCV RBC AUTO: 88.2 FL — SIGNIFICANT CHANGE UP (ref 80–100)
PLATELET # BLD AUTO: 192 K/UL — SIGNIFICANT CHANGE UP (ref 150–400)
POTASSIUM SERPL-MCNC: 4.2 MMOL/L — SIGNIFICANT CHANGE UP (ref 3.5–5.3)
POTASSIUM SERPL-SCNC: 4.2 MMOL/L — SIGNIFICANT CHANGE UP (ref 3.5–5.3)
RBC # BLD: 3.74 M/UL — LOW (ref 4.2–5.8)
RBC # FLD: 14.3 % — SIGNIFICANT CHANGE UP (ref 10.3–14.5)
SODIUM SERPL-SCNC: 141 MMOL/L — SIGNIFICANT CHANGE UP (ref 135–145)
WBC # BLD: 5.77 K/UL — SIGNIFICANT CHANGE UP (ref 3.8–10.5)
WBC # FLD AUTO: 5.77 K/UL — SIGNIFICANT CHANGE UP (ref 3.8–10.5)

## 2018-05-15 PROCEDURE — 71045 X-RAY EXAM CHEST 1 VIEW: CPT | Mod: 26

## 2018-05-15 PROCEDURE — 93010 ELECTROCARDIOGRAM REPORT: CPT | Mod: 76

## 2018-05-15 RX ORDER — PREGABALIN 225 MG/1
1000 CAPSULE ORAL DAILY
Qty: 0 | Refills: 0 | Status: DISCONTINUED | OUTPATIENT
Start: 2018-05-15 | End: 2018-05-21

## 2018-05-15 RX ORDER — ENOXAPARIN SODIUM 100 MG/ML
40 INJECTION SUBCUTANEOUS EVERY 24 HOURS
Qty: 0 | Refills: 0 | Status: DISCONTINUED | OUTPATIENT
Start: 2018-05-15 | End: 2018-05-21

## 2018-05-15 RX ORDER — HYDROMORPHONE HYDROCHLORIDE 2 MG/ML
0.25 INJECTION INTRAMUSCULAR; INTRAVENOUS; SUBCUTANEOUS
Qty: 0 | Refills: 0 | Status: DISCONTINUED | OUTPATIENT
Start: 2018-05-15 | End: 2018-05-15

## 2018-05-15 RX ORDER — SENNA PLUS 8.6 MG/1
2 TABLET ORAL AT BEDTIME
Qty: 0 | Refills: 0 | Status: DISCONTINUED | OUTPATIENT
Start: 2018-05-15 | End: 2018-05-21

## 2018-05-15 RX ORDER — HYDROCORTISONE 1 %
1 OINTMENT (GRAM) TOPICAL DAILY
Qty: 0 | Refills: 0 | Status: DISCONTINUED | OUTPATIENT
Start: 2018-05-15 | End: 2018-05-21

## 2018-05-15 RX ORDER — ACETAMINOPHEN 500 MG
650 TABLET ORAL EVERY 6 HOURS
Qty: 0 | Refills: 0 | Status: DISCONTINUED | OUTPATIENT
Start: 2018-05-15 | End: 2018-05-21

## 2018-05-15 RX ORDER — DOCUSATE SODIUM 100 MG
100 CAPSULE ORAL DAILY
Qty: 0 | Refills: 0 | Status: DISCONTINUED | OUTPATIENT
Start: 2018-05-15 | End: 2018-05-21

## 2018-05-15 RX ORDER — POLYETHYLENE GLYCOL 3350 17 G/17G
17 POWDER, FOR SOLUTION ORAL DAILY
Qty: 0 | Refills: 0 | Status: DISCONTINUED | OUTPATIENT
Start: 2018-05-15 | End: 2018-05-21

## 2018-05-15 RX ORDER — CEFEPIME 1 G/1
1000 INJECTION, POWDER, FOR SOLUTION INTRAMUSCULAR; INTRAVENOUS EVERY 12 HOURS
Qty: 0 | Refills: 0 | Status: DISCONTINUED | OUTPATIENT
Start: 2018-05-15 | End: 2018-05-21

## 2018-05-15 RX ORDER — SIMVASTATIN 20 MG/1
20 TABLET, FILM COATED ORAL AT BEDTIME
Qty: 0 | Refills: 0 | Status: DISCONTINUED | OUTPATIENT
Start: 2018-05-15 | End: 2018-05-21

## 2018-05-15 RX ORDER — ONDANSETRON 8 MG/1
4 TABLET, FILM COATED ORAL ONCE
Qty: 0 | Refills: 0 | Status: DISCONTINUED | OUTPATIENT
Start: 2018-05-15 | End: 2018-05-15

## 2018-05-15 RX ORDER — FAMOTIDINE 10 MG/ML
20 INJECTION INTRAVENOUS DAILY
Qty: 0 | Refills: 0 | Status: DISCONTINUED | OUTPATIENT
Start: 2018-05-15 | End: 2018-05-21

## 2018-05-15 RX ORDER — NYSTATIN CREAM 100000 [USP'U]/G
1 CREAM TOPICAL THREE TIMES A DAY
Qty: 0 | Refills: 0 | Status: DISCONTINUED | OUTPATIENT
Start: 2018-05-15 | End: 2018-05-21

## 2018-05-15 RX ORDER — LANOLIN ALCOHOL/MO/W.PET/CERES
3 CREAM (GRAM) TOPICAL AT BEDTIME
Qty: 0 | Refills: 0 | Status: DISCONTINUED | OUTPATIENT
Start: 2018-05-15 | End: 2018-05-21

## 2018-05-15 RX ORDER — LATANOPROST 0.05 MG/ML
1 SOLUTION/ DROPS OPHTHALMIC; TOPICAL AT BEDTIME
Qty: 0 | Refills: 0 | Status: DISCONTINUED | OUTPATIENT
Start: 2018-05-15 | End: 2018-05-21

## 2018-05-15 RX ORDER — CHOLECALCIFEROL (VITAMIN D3) 125 MCG
1000 CAPSULE ORAL DAILY
Qty: 0 | Refills: 0 | Status: DISCONTINUED | OUTPATIENT
Start: 2018-05-15 | End: 2018-05-21

## 2018-05-15 RX ORDER — DOXAZOSIN MESYLATE 4 MG
4 TABLET ORAL AT BEDTIME
Qty: 0 | Refills: 0 | Status: DISCONTINUED | OUTPATIENT
Start: 2018-05-15 | End: 2018-05-21

## 2018-05-15 RX ADMIN — ENOXAPARIN SODIUM 40 MILLIGRAM(S): 100 INJECTION SUBCUTANEOUS at 13:22

## 2018-05-15 RX ADMIN — Medication 4 MILLIGRAM(S): at 22:11

## 2018-05-15 RX ADMIN — NYSTATIN CREAM 1 APPLICATION(S): 100000 CREAM TOPICAL at 05:59

## 2018-05-15 RX ADMIN — CEFEPIME 100 MILLIGRAM(S): 1 INJECTION, POWDER, FOR SOLUTION INTRAMUSCULAR; INTRAVENOUS at 05:59

## 2018-05-15 RX ADMIN — Medication 3 MILLIGRAM(S): at 22:11

## 2018-05-15 RX ADMIN — SENNA PLUS 2 TABLET(S): 8.6 TABLET ORAL at 22:11

## 2018-05-15 RX ADMIN — SIMVASTATIN 20 MILLIGRAM(S): 20 TABLET, FILM COATED ORAL at 22:11

## 2018-05-15 RX ADMIN — LATANOPROST 1 DROP(S): 0.05 SOLUTION/ DROPS OPHTHALMIC; TOPICAL at 22:11

## 2018-05-15 RX ADMIN — NYSTATIN CREAM 1 APPLICATION(S): 100000 CREAM TOPICAL at 22:11

## 2018-05-15 NOTE — PROGRESS NOTE ADULT - SUBJECTIVE AND OBJECTIVE BOX
Urology Preop Note    Patient is a 84y old  Male who presents with a chief complaint of weakness (06 May 2018 18:20)      Diagnosis: right hydronephrosis  Procedure: cystoscopy, right ureteral stent placement  Surgeon: Dr Gary Goldberg                              10.8   6.10  )-----------( 209      ( 14 May 2018 09:45 )             32.7       05-14    140  |  104  |  24<H>  ----------------------------<  76  4.7   |  23  |  1.34<H>    Ca    9.6      14 May 2018 07:14                Urine Culture: negative (5/6/18)    Chest X-ray: pending but ordered    EKG: pending but ordered    Orders: NPO/IVF    Consent: pending

## 2018-05-15 NOTE — PROGRESS NOTE ADULT - SUBJECTIVE AND OBJECTIVE BOX
Patient seen and examined at bedside  s/p cystoscopy today, 6 x 24 cm jj stent, 16 fr hansen   persistent weakness, otherwise no acute events noted       HPI:  84m hx tia, s/p PPM, psoriasis, CKD, presenting from rehab with weakness, difficulty ambulating. Pt's complaints cluster primarily around poor care at rehab and lack of actual PT time, and pt attributes his present weakness to this lack of working with PT. Reports completing a course of abx yesterday for a UTI. Reports no fevers/chills, no new aches/pains, no cp/sob, no cough/HA. Would like to go to a different rehab if possible.    PAST MEDICAL & SURGICAL HISTORY:  Psoriasis  Prostate cancer  GERD (gastroesophageal reflux disease)  Dyslipidemia  No significant past surgical history      No Known Allergies       MEDICATIONS  (STANDING):  cefepime   IVPB 1000 milliGRAM(s) IV Intermittent every 12 hours  cefepime   IVPB      cholecalciferol 1000 Unit(s) Oral daily  cyanocobalamin 1000 MICROGram(s) Oral daily  dextrose 5% + sodium chloride 0.9%. 1000 milliLiter(s) (60 mL/Hr) IV Continuous <Continuous>  doxazosin 4 milliGRAM(s) Oral at bedtime  enoxaparin Injectable 40 milliGRAM(s) SubCutaneous every 24 hours  famotidine    Tablet 20 milliGRAM(s) Oral daily  latanoprost 0.005% Ophthalmic Solution 1 Drop(s) Left EYE at bedtime  melatonin 3 milliGRAM(s) Oral at bedtime  nystatin Powder 1 Application(s) Topical three times a day  senna 2 Tablet(s) Oral at bedtime  simvastatin 20 milliGRAM(s) Oral at bedtime    MEDICATIONS  (PRN):  acetaminophen   Tablet. 650 milliGRAM(s) Oral every 6 hours PRN Moderate Pain (4 - 6)  bisacodyl Suppository 10 milliGRAM(s) Rectal daily PRN Constipation  docusate sodium 100 milliGRAM(s) Oral daily PRN Constipation  hydrocortisone 2.5% Ointment 1 Application(s) Topical daily PRN Rash  polyethylene glycol 3350 17 Gram(s) Oral daily PRN Constipation      REVIEW OF SYSTEMS: as above and...  CONSTITUTIONAL: (+) malaise. weakness  EYES: No acute change in vision   ENT:  No tinnitus  NECK: No stiffness  RESPIRATORY: No hemoptysis  CARDIOVASCULAR: No chest pain, palpitations, syncope  GASTROINTESTINAL: No hematemesis, diarrhea, melena, or hematochezia.  GENITOURINARY: hansen intact  NEUROLOGICAL: No headaches  LYMPH Nodes: No enlarged glands  ENDOCRINE: No heat or cold intolerance	    Vital Signs Last 24 Hrs  T(C): 36.3 (15 May 2018 14:15), Max: 36.9 (14 May 2018 17:41)  T(F): 97.3 (15 May 2018 14:15), Max: 98.5 (14 May 2018 17:41)  HR: 60 (15 May 2018 14:15) (60 - 70)  BP: 112/75 (15 May 2018 14:15) (110/73 - 154/84)  BP(mean): --  RR: 18 (15 May 2018 14:15) (16 - 19)  SpO2: 98% (15 May 2018 14:15) (95% - 100%)    PHYSICAL EXAMINATION:   Constitutional: WD, NAD  HEENT: NC, AT  Neck:  Supple  Respiratory:  Adequate airflow b/l. Not using accessory muscles of respiration.  Cardiovascular:  S1 & S2 intact, no R/G, 2+ radial pulses b/l  Gastrointestinal: Soft, NT, ND, normoactive b.s., no organomegaly/RT/rigidity  : hansen intact with gross hematuria  Extremities: WWP  Neurological:  Alert and awake.  No acute focal motor deficits. Crude sensation intact.     Labs and imaging reviewed    Complete Blood Count in AM (05.15.18 @ 11:19)    WBC Count: 5.77 K/uL    RBC Count: 3.74 M/uL    Hemoglobin: 10.6 g/dL    Hematocrit: 33.0 %    Mean Cell Volume: 88.2 fl    Mean Cell Hemoglobin: 28.3 pg    Mean Cell Hemoglobin Conc: 32.1 gm/dL    Red Cell Distrib Width: 14.3 %    Platelet Count - Automated: 192 K/uL    Basic Metabolic Panel in AM (05.15.18 @ 07:57)    Sodium, Serum: 141 mmol/L    Potassium, Serum: 4.2 mmol/L    Chloride, Serum: 105 mmol/L    Carbon Dioxide, Serum: 23 mmol/L    Anion Gap, Serum: 13 mmol/L    Blood Urea Nitrogen, Serum: 26 mg/dL    Creatinine, Serum: 1.36 mg/dL    Glucose, Serum: 83 mg/dL    Calcium, Total Serum: 9.7 mg/dL    eGFR if Non : 47: Interpretative comment  The units for eGFR are ml/min/1.73m2 (normalized body surface area). The  eGFR is calculated from a serum creatinine using the CKD-EPI equation.  Other variables required for calculation are race, age and sex. Among  patients with chronic kidney disease (CKD), the eGFR is useful in  determining the stage of disease according to KDOQI CKD classification.  All eGFR results are reported numerically with the following  interpretation.          GFR                    With                 Without     (ml/min/1.73 m2)    Kidney Damage       Kidney Damage        >= 90                    Stage 1                     Normal        60-89                    Stage 2                     Decreased GFR        30-59     Stage 3                     Stage 3        15-29                    Stage 4                     Stage 4        < 15                      Stage 5                     Stage 5  Each stage of CKD assumes that the associated GFR level has been in  effect for at least 3 months. Determination of stages one and two (with  eGFR > 59 ml/min/m2) requires estimation of kidney damage for at least 3  months as defined by structural or functional abnormalities.  Limitations: All estimates of GFR will be less accurate for patients at  extremes of muscle mass (including but not limited to frail elderly,  critically ill, or cancer patients), those with unusual diets, and those  with conditions associated with reduced secretion or extrarenal  elimination of creatinine. The eGFR equation is not recommended for use  in patients with unstable creatinine levels. mL/min/1.73M2    eGFR if African American: 55 mL/min/1.73M2

## 2018-05-15 NOTE — BRIEF OPERATIVE NOTE - PROCEDURE
<<-----Click on this checkbox to enter Procedure Ureteral stent placement, intraoperatively (not at tx)  05/15/2018    Active  CHARLOTTE

## 2018-05-15 NOTE — PROGRESS NOTE ADULT - SUBJECTIVE AND OBJECTIVE BOX
Patient is seen and examined at the bed side, is afebrile. He is feeling better and scheduled for Cystoscopy with stent placement.          REVIEW OF SYSTEMS: All other review systems are negative        Vital Signs Last 24 Hrs  T(C): 36.3 (15 May 2018 21:55), Max: 36.7 (15 May 2018 09:20)  T(F): 97.3 (15 May 2018 21:55), Max: 98.1 (15 May 2018 09:20)  HR: 60 (15 May 2018 21:55) (60 - 60)  BP: 118/76 (15 May 2018 21:55) (110/73 - 154/84)  BP(mean): --  RR: 18 (15 May 2018 21:55) (16 - 18)  SpO2: 97% (15 May 2018 21:55) (95% - 100%)      ALLERGIES: NKDA        PHYSICAL EXAM:  GENERAL: Not in acute distress  CVS: s1 and s2 present  RESP: Air entry B/L  GI: abdomen soft and nontender  EXT: No pedal edema  CNS: Awake and alert  SKIN: psoriasis lesions          LABS:                        10.6   5.77  )-----------( 192      ( 15 May 2018 11:19 )             33.0                           10.8   6.10  )-----------( 209      ( 14 May 2018 09:45 )             32.7           15    141  |  105  |  26<H>  ----------------------------<  83  4.2   |  23  |  1.36<H>    Ca    9.7      15 May 2018 07:57      05-14    140  |  104  |  24<H>  ----------------------------<  76  4.7   |  23  |  1.34<H>    Ca    9.6      14 May 2018 07:14      05-11    142  |  108  |  22  ----------------------------<  73  4.4   |  22  |  1.52<H>    Ca    9.8      11 May 2018 06:4    TPro  8.1  /  Alb  3.8  /  TBili  0.3  /  DBili  x   /  AST  23  /  ALT  8<L>  /  AlkPhos  84  -        PTT - ( 06 May 2018 14:47 )  PTT:39.9 sec  Urinalysis Basic - ( 06 May 2018 16:09 )    Color: Yellow / Appearance: SL Turbid / S.016 / pH: x  Gluc: x / Ketone: Negative  / Bili: Negative / Urobili: Negative   Blood: x / Protein: Trace / Nitrite: Negative   Leuk Esterase: Large / RBC: >50 /HPF / WBC >50 /HPF   Sq Epi: x / Non Sq Epi: OCC /HPF / Bacteria: Few /HPF        MEDICATIONS  (STANDING):  cefepime   IVPB 1000 milliGRAM(s) IV Intermittent every 12 hours  cholecalciferol 1000 Unit(s) Oral daily  cyanocobalamin 1000 MICROGram(s) Oral daily  doxazosin 4 milliGRAM(s) Oral at bedtime  enoxaparin Injectable 40 milliGRAM(s) SubCutaneous every 24 hours  famotidine    Tablet 20 milliGRAM(s) Oral daily  latanoprost 0.005% Ophthalmic Solution 1 Drop(s) Left EYE at bedtime  melatonin 3 milliGRAM(s) Oral at bedtime  nystatin Powder 1 Application(s) Topical three times a day  senna 2 Tablet(s) Oral at bedtime  simvastatin 20 milliGRAM(s) Oral at bedtime    MEDICATIONS  (PRN):  acetaminophen   Tablet. 650 milliGRAM(s) Oral every 6 hours PRN Moderate Pain (4 - 6)  bisacodyl Suppository 10 milliGRAM(s) Rectal daily PRN Constipation  docusate sodium 100 milliGRAM(s) Oral daily PRN Constipation  hydrocortisone 2.5% Ointment 1 Application(s) Topical daily PRN Rash  polyethylene glycol 3350 17 Gram(s) Oral daily PRN Constipation        RADIOLOGY & ADDITIONAL TESTS:    18: US Kidney and Bladder (18 @ 16:42) Again seen is moderate hydroureteronephrosis as described above. Fine debris within the distal dilated right ureter is indicative of infection.   No left-sided hydronephrosis. Urinary bladder wall small contains fine debris also indicative of  infection. Thickening of the bladder wall may also reflect infection.  Thickening/trabeculation and bladder diverticulum suggests some degree of chronic outlet obstruction.      18: Xray Chest 1 View- PORTABLE-Urgent (05.06.18 @ 15:07) INTERPRETATION:  no urgent/emergent findings.      18: US Kidney and Bladder (18 @ 17:05) Moderate right hydroureteronephrosis to the level of the bladder. The right ureter appears to insert on a bladder diverticulum. Right renal   cortical thinning is suggestive of chronic hydronephrosis. Urinary bladder trabeculations and diverticula with debris.        MICROBIOLOGY DATA:    Culture - Blood (18 @ 00:47)    Specimen Source: .Blood Blood-Peripheral    Culture Results:   No growth to date.    Culture - Blood (18 @ 00:47)    Specimen Source: .Blood Blood-Peripheral    Culture Results:   No growth to date.    Culture - Urine (18 @ 21:36)    Specimen Source: .Urine Clean Catch (Midstream)    Culture Results:   No growth        Urinalysis + Microscopic Examination (18 @ 16:09)    Urine Appearance: SL Turbid    Urobilinogen: Negative    Leukocyte Esterase Concentration: Large    Nitrite: Negative    Specific Gravity: 1.016    Protein, Urine: Trace    pH Urine: 5.5    Ketone - Urine: Negative    Bilirubin: Negative    Color: Yellow    Glucose Qualitative, Urine: Negative    Blood, Urine: Moderate    Red Blood Cell - Urine: >50 /HPF    White Blood Cell - Urine: >50 /HPF    Epithelial Cells: OCC /HPF    Hyaline Casts: 2-5    Bacteria: Few /HPF Patient is seen and examined at the bed side, is afebrile. He is s/p Cystoscopy with stent placement today, tolerated the procedure well. No post-op fever. The urine is blood tinged.          REVIEW OF SYSTEMS: All other review systems are negative        Vital Signs Last 24 Hrs  T(C): 36.3 (15 May 2018 21:55), Max: 36.7 (15 May 2018 09:20)  T(F): 97.3 (15 May 2018 21:55), Max: 98.1 (15 May 2018 09:20)  HR: 60 (15 May 2018 21:55) (60 - 60)  BP: 118/76 (15 May 2018 21:55) (110/73 - 154/84)  BP(mean): --  RR: 18 (15 May 2018 21:55) (16 - 18)  SpO2: 97% (15 May 2018 21:55) (95% - 100%)          ALLERGIES: NKDA        PHYSICAL EXAM:  GENERAL: Not in acute distress  CVS: s1 and s2 present  RESP: Air entry B/L  GI: abdomen soft and nontender  : hansen draining blood tinged urine  EXT: No pedal edema  CNS: Awake and alert  SKIN: psoriasis lesions          LABS:                        10.6   5.77  )-----------( 192      ( 15 May 2018 11:19 )             33.0                           10.8   6.10  )-----------( 209      ( 14 May 2018 09:45 )             32.7           0515    141  |  105  |  26<H>  ----------------------------<  83  4.2   |  23  |  1.36<H>    Ca    9.7      15 May 2018 07:57      05-14    140  |  104  |  24<H>  ----------------------------<  76  4.7   |  23  |  1.34<H>    Ca    9.6      14 May 2018 07:14      05-11    142  |  108  |  22  ----------------------------<  73  4.4   |  22  |  1.52<H>    Ca    9.8      11 May 2018 06:4    TPro  8.1  /  Alb  3.8  /  TBili  0.3  /  DBili  x   /  AST  23  /  ALT  8<L>  /  AlkPhos  84  05-06        PTT - ( 06 May 2018 14:47 )  PTT:39.9 sec  Urinalysis Basic - ( 06 May 2018 16:09 )    Color: Yellow / Appearance: SL Turbid / S.016 / pH: x  Gluc: x / Ketone: Negative  / Bili: Negative / Urobili: Negative   Blood: x / Protein: Trace / Nitrite: Negative   Leuk Esterase: Large / RBC: >50 /HPF / WBC >50 /HPF   Sq Epi: x / Non Sq Epi: OCC /HPF / Bacteria: Few /HPF        MEDICATIONS  (STANDING):  cefepime   IVPB 1000 milliGRAM(s) IV Intermittent every 12 hours  cholecalciferol 1000 Unit(s) Oral daily  cyanocobalamin 1000 MICROGram(s) Oral daily  doxazosin 4 milliGRAM(s) Oral at bedtime  enoxaparin Injectable 40 milliGRAM(s) SubCutaneous every 24 hours  famotidine    Tablet 20 milliGRAM(s) Oral daily  latanoprost 0.005% Ophthalmic Solution 1 Drop(s) Left EYE at bedtime  melatonin 3 milliGRAM(s) Oral at bedtime  nystatin Powder 1 Application(s) Topical three times a day  senna 2 Tablet(s) Oral at bedtime  simvastatin 20 milliGRAM(s) Oral at bedtime    MEDICATIONS  (PRN):  acetaminophen   Tablet. 650 milliGRAM(s) Oral every 6 hours PRN Moderate Pain (4 - 6)  bisacodyl Suppository 10 milliGRAM(s) Rectal daily PRN Constipation  docusate sodium 100 milliGRAM(s) Oral daily PRN Constipation  hydrocortisone 2.5% Ointment 1 Application(s) Topical daily PRN Rash  polyethylene glycol 3350 17 Gram(s) Oral daily PRN Constipation        RADIOLOGY & ADDITIONAL TESTS:    18: US Kidney and Bladder (18 @ 16:42) Again seen is moderate hydroureteronephrosis as described above. Fine debris within the distal dilated right ureter is indicative of infection.   No left-sided hydronephrosis. Urinary bladder wall small contains fine debris also indicative of  infection. Thickening of the bladder wall may also reflect infection.  Thickening/trabeculation and bladder diverticulum suggests some degree of chronic outlet obstruction.      18: Xray Chest 1 View- PORTABLE-Urgent (18 @ 15:07) INTERPRETATION:  no urgent/emergent findings.      18: US Kidney and Bladder (18 @ 17:05) Moderate right hydroureteronephrosis to the level of the bladder. The right ureter appears to insert on a bladder diverticulum. Right renal   cortical thinning is suggestive of chronic hydronephrosis. Urinary bladder trabeculations and diverticula with debris.        MICROBIOLOGY DATA:    Culture - Blood (18 @ 00:47)    Specimen Source: .Blood Blood-Peripheral    Culture Results:   No growth to date.    Culture - Blood (18 @ 00:47)    Specimen Source: .Blood Blood-Peripheral    Culture Results:   No growth to date.    Culture - Urine (18 @ 21:36)    Specimen Source: .Urine Clean Catch (Midstream)    Culture Results:   No growth        Urinalysis + Microscopic Examination (18 @ 16:09)    Urine Appearance: SL Turbid    Urobilinogen: Negative    Leukocyte Esterase Concentration: Large    Nitrite: Negative    Specific Gravity: 1.016    Protein, Urine: Trace    pH Urine: 5.5    Ketone - Urine: Negative    Bilirubin: Negative    Color: Yellow    Glucose Qualitative, Urine: Negative    Blood, Urine: Moderate    Red Blood Cell - Urine: >50 /HPF    White Blood Cell - Urine: >50 /HPF    Epithelial Cells: OCC /HPF    Hyaline Casts: 2-5    Bacteria: Few /HPF

## 2018-05-15 NOTE — DIETITIAN INITIAL EVALUATION ADULT. - PROBLEM SELECTOR PLAN 3
positive UA however pt just finished abx course yesterday and has no symptoms, no white count, no fever  - will observe off antibiotics

## 2018-05-15 NOTE — PROGRESS NOTE ADULT - SUBJECTIVE AND OBJECTIVE BOX
pt seen and examined,  no events overnight  no complaints.     acetaminophen   Tablet. 650 milliGRAM(s) Oral every 6 hours PRN  bisacodyl Suppository 10 milliGRAM(s) Rectal daily PRN  cefepime   IVPB 1000 milliGRAM(s) IV Intermittent every 12 hours  cefepime   IVPB      cholecalciferol 1000 Unit(s) Oral daily  cyanocobalamin 1000 MICROGram(s) Oral daily  dextrose 5% + sodium chloride 0.9%. 1000 milliLiter(s) IV Continuous <Continuous>  docusate sodium 100 milliGRAM(s) Oral daily PRN  doxazosin 4 milliGRAM(s) Oral at bedtime  enoxaparin Injectable 40 milliGRAM(s) SubCutaneous every 24 hours  famotidine    Tablet 20 milliGRAM(s) Oral daily  hydrocortisone 2.5% Ointment 1 Application(s) Topical daily PRN  latanoprost 0.005% Ophthalmic Solution 1 Drop(s) Left EYE at bedtime  melatonin 3 milliGRAM(s) Oral at bedtime  nystatin Powder 1 Application(s) Topical three times a day  polyethylene glycol 3350 17 Gram(s) Oral daily PRN  senna 2 Tablet(s) Oral at bedtime  simvastatin 20 milliGRAM(s) Oral at bedtime                            10.8   6.10  )-----------( 209      ( 14 May 2018 09:45 )             32.7       Hemoglobin: 10.8 g/dL (05-14 @ 09:45)  Hemoglobin: 11.2 g/dL (05-13 @ 09:20)  Hemoglobin: 10.9 g/dL (05-12 @ 09:05)  Hemoglobin: 10.5 g/dL (05-11 @ 07:57)      05-14    140  |  104  |  24<H>  ----------------------------<  76  4.7   |  23  |  1.34<H>    Ca    9.6      14 May 2018 07:14      Creatinine Trend: 1.34<--, 1.38<--, 1.50<--, 1.52<--, 1.48<--, 1.50<--    COAGS:           T(C): 36.4 (05-15-18 @ 01:30), Max: 36.9 (05-14-18 @ 17:41)  HR: 60 (05-15-18 @ 01:30) (60 - 72)  BP: 136/73 (05-15-18 @ 01:30) (120/75 - 149/78)  RR: 18 (05-15-18 @ 01:30) (18 - 19)  SpO2: 97% (05-15-18 @ 01:30) (93% - 97%)  Wt(kg): --    I&O's Summary    13 May 2018 07:01  -  14 May 2018 07:00  --------------------------------------------------------  IN: 600 mL / OUT: 1 mL / NET: 599 mL    14 May 2018 07:01  -  15 May 2018 05:16  --------------------------------------------------------  IN: 870 mL / OUT: 0 mL / NET: 870 mL        Gen: Appears well in NAD  HEENT:  (-)icterus (-)pallor  CV: N S1 S2 1/6 ANA (+)2 Pulses B/l  Resp:  Clear to ausculatation B/L, normal effort  GI: (+) BS Soft, NT, ND  Lymph:  (-)Edema, (-)obvious lymphadenopathy  Skin: Warm to touch, Normal turgor  Psych: Appropriate mood and affect    Culture - Blood (05.07.18 @ 00:47)    Specimen Source: .Blood Blood-Peripheral    Culture Results:   No growth to date.      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 His PPM interrogation here in April 2018 showed excellent PPM function with about 3.5 years remaining on PG longevity. He was admitted last month with coag neg staph UTI. Thankfully his blood cultures were and continue to be negative. He is now readmitted with weakness. He denies palpitations, syncope, nor angina. He remains off A/C due to his advanced age and advanced dementia.      -f/u with his electrophysiologist Dr. Jose Stapleton after discharge for routine PPM care  - not in clinical CHF  - abx per med  - urology follow up - cystoscopy TODAY    - no need for inpatient cardiac work up  - dc planning to [facility] per primary team   D/W Dr Ba

## 2018-05-15 NOTE — PROGRESS NOTE ADULT - SUBJECTIVE AND OBJECTIVE BOX
[facility] NEPHROLOGY- PROGRESS NOTE    84y Male with history of HTN presents with UTI. Nephrology consulted for elevated Scr.    REVIEW OF SYSTEMS:  Gen: no changes in weight  Cards: no chest pain  Resp: no dyspnea  GI: no nausea or vomiting or diarrhea  Vascular: no LE edema    No Known Allergies      Hospital Medications: Medications reviewed      VITALS:  T(F): 95 (05-15-18 @ 13:09), Max: 98.5 (05-14-18 @ 17:41)  HR: 60 (05-15-18 @ 13:09)  BP: 110/73 (05-15-18 @ 13:09)  RR: 16 (05-15-18 @ 13:09)  SpO2: 95% (05-15-18 @ 13:09)  Wt(kg): --    05-14 @ 07:01  -  05-15 @ 07:00  --------------------------------------------------------  IN: 1340 mL / OUT: 0 mL / NET: 1340 mL    05-15 @ 07:01  -  05-15 @ 13:20  --------------------------------------------------------  IN: 0 mL / OUT: 100 mL / NET: -100 mL      PHYSICAL EXAM:    Gen: NAD, calm  Cards: RRR, +S1/S2, no M/G/R  Resp: CTA B/L  GI: soft, NT/ND, NABS  Vascular: no LE edema B/L      LABS:  05-15    141  |  105  |  26<H>  ----------------------------<  83  4.2   |  23  |  1.36<H>    Ca    9.7      15 May 2018 07:57      Creatinine Trend: 1.36 <--, 1.34 <--, 1.38 <--, 1.50 <--, 1.52 <--, 1.48 <--, 1.50 <--                        10.6   5.77  )-----------( 192      ( 15 May 2018 11:19 )             33.0

## 2018-05-15 NOTE — DIETITIAN INITIAL EVALUATION ADULT. - ENERGY NEEDS
Ht: 71 Wt: 211.5 pounds BMI: 29.5 kg/m2 IBW:  172 pounds(+/-10%) %%  +1 generalized edema. No pressure ulcers documented.

## 2018-05-15 NOTE — DIETITIAN INITIAL EVALUATION ADULT. - ORAL INTAKE PTA
good/Pt reports good appetite and intake PTA. Pt typically consumes small frequent meals. Pt takes vitamin D3, vitamin C and B-complex PTA. NKFA.

## 2018-05-15 NOTE — PROGRESS NOTE ADULT - ASSESSMENT
Patient is a 84y old  Male who was recently discharged after treated  for MSSA UTI and hydronephrosis, and now presenting from rehab to the ER for evaluation of weakness (06 May 2018 18:20) and difficulty ambulating. He attributes his present weakness to this lack of working with PT. He has no fevers/chills, and on admission found to have positive Urine analysis and dose of Ceftriaxone is given, cultures pending. The ID consult requested to assist with further evaluation and antibiotic  management.     # Complicated UTI  - cultures are negative  # Moderate Right Hydronephrosis    Would recommend:  1. Agree with Cystoscopy and stent placement  2. Continue Cefepime  3. Monitor  kidney function  4. Aspiration precaution    d/w patient and Nursing staff Patient is a 84y old  Male who was recently discharged after treated  for MSSA UTI and hydronephrosis, and now presenting from rehab to the ER for evaluation of weakness (06 May 2018 18:20) and difficulty ambulating. He attributes his present weakness to this lack of working with PT. He has no fevers/chills, and on admission found to have positive Urine analysis and dose of Ceftriaxone is given, cultures pending. The ID consult requested to assist with further evaluation and antibiotic  management.     # Complicated UTI  - cultures are negative  # Moderate Right Hydronephrosis-   Cystoscopy with stent placement    Would recommend:  1. Monitor for post-op fever    2. Continue Cefepime inpatient and may change to oral Augmentin  875 mg q 12hours on discharge to continue until 5/22/18  3. Monitor  kidney function  4. Aspiration precaution    d/w patient and Nursing staff

## 2018-05-15 NOTE — PROGRESS NOTE ADULT - PROBLEM SELECTOR PLAN 1
s/p Cystoscopy with stent placed  hansen intact with mild gross hematuria   tolerating diet    care per urology  abx per ID   case management for d/c to [facility]

## 2018-05-16 DIAGNOSIS — R31.0 GROSS HEMATURIA: ICD-10-CM

## 2018-05-16 LAB
ANION GAP SERPL CALC-SCNC: 13 MMOL/L — SIGNIFICANT CHANGE UP (ref 5–17)
BUN SERPL-MCNC: 26 MG/DL — HIGH (ref 7–23)
CALCIUM SERPL-MCNC: 9.5 MG/DL — SIGNIFICANT CHANGE UP (ref 8.4–10.5)
CHLORIDE SERPL-SCNC: 107 MMOL/L — SIGNIFICANT CHANGE UP (ref 96–108)
CO2 SERPL-SCNC: 21 MMOL/L — LOW (ref 22–31)
CREAT SERPL-MCNC: 1.28 MG/DL — SIGNIFICANT CHANGE UP (ref 0.5–1.3)
GLUCOSE SERPL-MCNC: 84 MG/DL — SIGNIFICANT CHANGE UP (ref 70–99)
HCT VFR BLD CALC: 33.3 % — LOW (ref 39–50)
HGB BLD-MCNC: 10.9 G/DL — LOW (ref 13–17)
MCHC RBC-ENTMCNC: 29 PG — SIGNIFICANT CHANGE UP (ref 27–34)
MCHC RBC-ENTMCNC: 32.7 GM/DL — SIGNIFICANT CHANGE UP (ref 32–36)
MCV RBC AUTO: 88.6 FL — SIGNIFICANT CHANGE UP (ref 80–100)
PLATELET # BLD AUTO: 170 K/UL — SIGNIFICANT CHANGE UP (ref 150–400)
POTASSIUM SERPL-MCNC: 4.3 MMOL/L — SIGNIFICANT CHANGE UP (ref 3.5–5.3)
POTASSIUM SERPL-SCNC: 4.3 MMOL/L — SIGNIFICANT CHANGE UP (ref 3.5–5.3)
RBC # BLD: 3.76 M/UL — LOW (ref 4.2–5.8)
RBC # FLD: 14.1 % — SIGNIFICANT CHANGE UP (ref 10.3–14.5)
SODIUM SERPL-SCNC: 141 MMOL/L — SIGNIFICANT CHANGE UP (ref 135–145)
WBC # BLD: 5.79 K/UL — SIGNIFICANT CHANGE UP (ref 3.8–10.5)
WBC # FLD AUTO: 5.79 K/UL — SIGNIFICANT CHANGE UP (ref 3.8–10.5)

## 2018-05-16 RX ADMIN — ENOXAPARIN SODIUM 40 MILLIGRAM(S): 100 INJECTION SUBCUTANEOUS at 13:50

## 2018-05-16 RX ADMIN — SIMVASTATIN 20 MILLIGRAM(S): 20 TABLET, FILM COATED ORAL at 21:28

## 2018-05-16 RX ADMIN — NYSTATIN CREAM 1 APPLICATION(S): 100000 CREAM TOPICAL at 05:02

## 2018-05-16 RX ADMIN — PREGABALIN 1000 MICROGRAM(S): 225 CAPSULE ORAL at 13:50

## 2018-05-16 RX ADMIN — LATANOPROST 1 DROP(S): 0.05 SOLUTION/ DROPS OPHTHALMIC; TOPICAL at 21:28

## 2018-05-16 RX ADMIN — NYSTATIN CREAM 1 APPLICATION(S): 100000 CREAM TOPICAL at 17:47

## 2018-05-16 RX ADMIN — Medication 4 MILLIGRAM(S): at 21:28

## 2018-05-16 RX ADMIN — CEFEPIME 100 MILLIGRAM(S): 1 INJECTION, POWDER, FOR SOLUTION INTRAMUSCULAR; INTRAVENOUS at 18:01

## 2018-05-16 RX ADMIN — Medication 1000 UNIT(S): at 13:50

## 2018-05-16 RX ADMIN — FAMOTIDINE 20 MILLIGRAM(S): 10 INJECTION INTRAVENOUS at 13:50

## 2018-05-16 RX ADMIN — CEFEPIME 100 MILLIGRAM(S): 1 INJECTION, POWDER, FOR SOLUTION INTRAMUSCULAR; INTRAVENOUS at 05:02

## 2018-05-16 RX ADMIN — NYSTATIN CREAM 1 APPLICATION(S): 100000 CREAM TOPICAL at 21:29

## 2018-05-16 RX ADMIN — SENNA PLUS 2 TABLET(S): 8.6 TABLET ORAL at 21:28

## 2018-05-16 NOTE — PROGRESS NOTE ADULT - SUBJECTIVE AND OBJECTIVE BOX
EP ATTENDING    no tele    no palpitations, no syncope, no angina    acetaminophen   Tablet. 650 milliGRAM(s) Oral every 6 hours PRN  bisacodyl Suppository 10 milliGRAM(s) Rectal daily PRN  cefepime   IVPB 1000 milliGRAM(s) IV Intermittent every 12 hours  cholecalciferol 1000 Unit(s) Oral daily  cyanocobalamin 1000 MICROGram(s) Oral daily  docusate sodium 100 milliGRAM(s) Oral daily PRN  doxazosin 4 milliGRAM(s) Oral at bedtime  enoxaparin Injectable 40 milliGRAM(s) SubCutaneous every 24 hours  famotidine    Tablet 20 milliGRAM(s) Oral daily  hydrocortisone 2.5% Ointment 1 Application(s) Topical daily PRN  latanoprost 0.005% Ophthalmic Solution 1 Drop(s) Left EYE at bedtime  melatonin 3 milliGRAM(s) Oral at bedtime  nystatin Powder 1 Application(s) Topical three times a day  polyethylene glycol 3350 17 Gram(s) Oral daily PRN  senna 2 Tablet(s) Oral at bedtime  simvastatin 20 milliGRAM(s) Oral at bedtime                            10.9   5.79  )-----------( 170      ( 16 May 2018 07:48 )             33.3       05-16    141  |  107  |  26<H>  ----------------------------<  84  4.3   |  21<L>  |  1.28    Ca    9.5      16 May 2018 07:09        T(C): 36.3 (05-16-18 @ 12:59), Max: 36.4 (05-16-18 @ 00:30)  HR: 59 (05-16-18 @ 12:59) (59 - 60)  BP: 133/84 (05-16-18 @ 12:59) (110/73 - 137/87)  RR: 18 (05-16-18 @ 12:59) (16 - 18)  SpO2: 98% (05-16-18 @ 12:59) (95% - 98%)  Wt(kg): --    no JVD  RRR, no murmurs  CTAB  soft nt/nd  no c/c/e    serial Blood cultures all negative    A/P) He is a 85 y/o male PMH persistent AF who had a 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 His PPM interrogation here in April 2018 showed excellent PPM function with about 3.5 years remaining on PG longevity. He was admitted last month with coag neg cal UTI. Thankfully his blood cultures were and continue to be negative. He is now readmitted with weakness. He denies palpitations, syncope, nor angina. He remains off A/C due to his advanced age and advanced dementia.    -no need for repeat PPM interrogation  -as long as BCx remain negative and there is no suspicion for endocarditis then no further inpatient EP workup needed  -f/u with his electrophysiologist Dr. Jose Stapleton after discharge for routine PPM care  -poor candidate for systemic anticoagulation given advanced age and advanced dementia  -no further inpatient EP workup needed      Nikki Duarte M.D., [facility]  Cardiac Electrophysiology  [facility]  [address] [place] [address]  [email or web address]    office [phone]  pager [phone]

## 2018-05-16 NOTE — PROGRESS NOTE ADULT - SUBJECTIVE AND OBJECTIVE BOX
[facility] NEPHROLOGY- PROGRESS NOTE    84y Male with history of HTN presents with UTI. Nephrology consulted for elevated Scr.    REVIEW OF SYSTEMS:  Gen: no changes in weight  Cards: no chest pain  Resp: no dyspnea  GI: no nausea or vomiting or diarrhea  Vascular: no LE edema    No Known Allergies      Hospital Medications: Medications reviewed      VITALS:  T(F): 97.2 (05-16-18 @ 09:14), Max: 97.5 (05-16-18 @ 00:30)  HR: 60 (05-16-18 @ 09:14)  BP: 131/80 (05-16-18 @ 09:14)  RR: 18 (05-16-18 @ 09:14)  SpO2: 98% (05-16-18 @ 09:14)  Wt(kg): --    05-15 @ 07:01  -  05-16 @ 07:00  --------------------------------------------------------  IN: 650 mL / OUT: 700 mL / NET: -50 mL    05-16 @ 07:01  -  05-16 @ 11:13  --------------------------------------------------------  IN: 140 mL / OUT: 250 mL / NET: -110 mL      PHYSICAL EXAM:    Gen: NAD, calm  Cards: RRR, +S1/S2, no M/G/R  Resp: CTA B/L  GI: soft, NT/ND, NABS  : + hansen with dark urine  Vascular: no LE edema B/L      LABS:  05-16    141  |  107  |  26<H>  ----------------------------<  84  4.3   |  21<L>  |  1.28    Ca    9.5      16 May 2018 07:09      Creatinine Trend: 1.28 <--, 1.36 <--, 1.34 <--, 1.38 <--, 1.50 <--, 1.52 <--, 1.48 <--, 1.50 <--                        10.9   5.79  )-----------( 170      ( 16 May 2018 07:48 )             33.3

## 2018-05-16 NOTE — PROGRESS NOTE ADULT - SUBJECTIVE AND OBJECTIVE BOX
Patient seen and examined at bedside  No acute events/complaints noted overnight  Case discussed with medical team    HPI:  84m hx tia, s/p PPM, psoriasis, CKD, presenting from rehab with weakness, difficulty ambulating. Pt's complaints cluster primarily around poor care at rehab and lack of actual PT time, and pt attributes his present weakness to this lack of working with PT. Reports completing a course of abx yesterday for a UTI. Reports no fevers/chills, no new aches/pains, no cp/sob, no cough/HA. Would like to go to a different rehab if possible.    PAST MEDICAL & SURGICAL HISTORY:  Psoriasis  Prostate cancer  GERD (gastroesophageal reflux disease)  Dyslipidemia  No significant past surgical history      No Known Allergies       MEDICATIONS  (STANDING):  cefepime   IVPB 1000 milliGRAM(s) IV Intermittent every 12 hours  cholecalciferol 1000 Unit(s) Oral daily  cyanocobalamin 1000 MICROGram(s) Oral daily  doxazosin 4 milliGRAM(s) Oral at bedtime  enoxaparin Injectable 40 milliGRAM(s) SubCutaneous every 24 hours  famotidine    Tablet 20 milliGRAM(s) Oral daily  latanoprost 0.005% Ophthalmic Solution 1 Drop(s) Left EYE at bedtime  melatonin 3 milliGRAM(s) Oral at bedtime  nystatin Powder 1 Application(s) Topical three times a day  senna 2 Tablet(s) Oral at bedtime  simvastatin 20 milliGRAM(s) Oral at bedtime    MEDICATIONS  (PRN):  acetaminophen   Tablet. 650 milliGRAM(s) Oral every 6 hours PRN Moderate Pain (4 - 6)  bisacodyl Suppository 10 milliGRAM(s) Rectal daily PRN Constipation  docusate sodium 100 milliGRAM(s) Oral daily PRN Constipation  hydrocortisone 2.5% Ointment 1 Application(s) Topical daily PRN Rash  polyethylene glycol 3350 17 Gram(s) Oral daily PRN Constipation      REVIEW OF SYSTEMS:  CONSTITUTIONAL: no fevers  EYES: No acute change in vision   ENT:  No tinnitus  NECK: No stiffness  RESPIRATORY: No hemoptysis  CARDIOVASCULAR: No chest pain, palpitations, syncope  GASTROINTESTINAL: No hematemesis, diarrhea, melena, or hematochezia.  GENITOURINARY: No hematuria  NEUROLOGICAL: unstable gait. No headaches  LYMPH Nodes: No enlarged glands  ENDOCRINE: No heat or cold intolerance	    T(C): 36.3 (05-16-18 @ 12:59), Max: 36.4 (05-16-18 @ 00:30)  HR: 59 (05-16-18 @ 12:59) (59 - 60)  BP: 133/84 (05-16-18 @ 12:59) (110/73 - 137/87)  RR: 18 (05-16-18 @ 12:59) (16 - 18)  SpO2: 98% (05-16-18 @ 12:59) (95% - 98%)    PHYSICAL EXAMINATION:   Constitutional: WD, NAD  HEENT: NC, AT  Neck:  Supple  Respiratory:  Adequate airflow b/l. Not using accessory muscles of respiration.  Cardiovascular:  S1 & S2 intact, no R/G, 2+ radial pulses b/l  Gastrointestinal: Soft, NT, ND, normoactive b.s., no organomegaly/RT/rigidity  Extremities: stable distal venous stasis changes b/l extremities, unstable gait. WWP  Neurological:  Alert and awake.  No acute focal motor deficits. Crude sensation intact.     Labs and imaging reviewed    LABS:                        10.9   5.79  )-----------( 170      ( 16 May 2018 07:48 )             33.3     05-16    141  |  107  |  26<H>  ----------------------------<  84  4.3   |  21<L>  |  1.28    Ca    9.5      16 May 2018 07:09              CAPILLARY BLOOD GLUCOSE                  RADIOLOGY & ADDITIONAL STUDIES:

## 2018-05-16 NOTE — PROGRESS NOTE ADULT - PROBLEM SELECTOR PLAN 1
->Case management for d/c to [facility] asap  hansen d/c with tov   outpatient  management of stent ->Case management for d/c to [facility] asap  hansen d/c with tov   cefepime inpatient, switch to oral augment 875 bid to 5/22/18 when discharged  outpatient  management of stent

## 2018-05-16 NOTE — PROGRESS NOTE ADULT - SUBJECTIVE AND OBJECTIVE BOX
S: No CP, SOB, Palps    MEDICATIONS  (STANDING):  cefepime   IVPB 1000 milliGRAM(s) IV Intermittent every 12 hours  cholecalciferol 1000 Unit(s) Oral daily  cyanocobalamin 1000 MICROGram(s) Oral daily  doxazosin 4 milliGRAM(s) Oral at bedtime  enoxaparin Injectable 40 milliGRAM(s) SubCutaneous every 24 hours  famotidine    Tablet 20 milliGRAM(s) Oral daily  latanoprost 0.005% Ophthalmic Solution 1 Drop(s) Left EYE at bedtime  melatonin 3 milliGRAM(s) Oral at bedtime  nystatin Powder 1 Application(s) Topical three times a day  senna 2 Tablet(s) Oral at bedtime  simvastatin 20 milliGRAM(s) Oral at bedtime    MEDICATIONS  (PRN):  acetaminophen   Tablet. 650 milliGRAM(s) Oral every 6 hours PRN Moderate Pain (4 - 6)  bisacodyl Suppository 10 milliGRAM(s) Rectal daily PRN Constipation  docusate sodium 100 milliGRAM(s) Oral daily PRN Constipation  hydrocortisone 2.5% Ointment 1 Application(s) Topical daily PRN Rash  polyethylene glycol 3350 17 Gram(s) Oral daily PRN Constipation    LABS:                        10.9   5.79  )-----------( 170      ( 16 May 2018 07:48 )             33.3     141  |  107  |  26<H>  ----------------------------<  84  4.3   |  21<L>  |  1.28    Ca    9.5      16 May 2018 07:09    Creatinine Trend: 1.28<--, 1.36<--, 1.34<--, 1.38<--, 1.50<--, 1.52<--     PHYSICAL EXAM  Vital Signs Last 24 Hrs  T(C): 36.3 (16 May 2018 12:59), Max: 36.4 (16 May 2018 00:30)  T(F): 97.4 (16 May 2018 12:59), Max: 97.5 (16 May 2018 00:30)  HR: 59 (16 May 2018 12:59) (59 - 60)  BP: 133/84 (16 May 2018 12:59) (112/75 - 137/87)  RR: 18 (16 May 2018 12:59) (18 - 18)  SpO2: 98% (16 May 2018 12:59) (97% - 98%)      S1S2 RRR  CTA B/L  SOFT ND NT +BS  NO C/C/E B/L LE      A/P) He is a 83 y/o male PMH persistent AF who had a 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 His PPM interrogation here in April 2018 showed excellent PPM function with about 3.5 years remaining on PG longevity. He was admitted last month with coag neg staph UTI. Thankfully his blood cultures were and continue to be negative. He is now readmitted with weakness. He denies palpitations, syncope, nor angina. He remains off A/C due to his advanced age and advanced dementia.      - f/u with his electrophysiologist Dr. Jose Stapleton after discharge for routine PPM care  - not in clinical CHF  - abx per med  - s/p cysto and R ureteral stent placement 5/15  - no need for inpatient cardiac work up  - dc planning to [facility] per primary team     Erika Cano PA-C

## 2018-05-17 ENCOUNTER — TRANSCRIPTION ENCOUNTER (OUTPATIENT)
Age: 83
End: 2018-05-17

## 2018-05-17 DIAGNOSIS — R26.81 UNSTEADINESS ON FEET: ICD-10-CM

## 2018-05-17 DIAGNOSIS — R29.898 OTHER SYMPTOMS AND SIGNS INVOLVING THE MUSCULOSKELETAL SYSTEM: ICD-10-CM

## 2018-05-17 LAB
ANION GAP SERPL CALC-SCNC: 10 MMOL/L — SIGNIFICANT CHANGE UP (ref 5–17)
BUN SERPL-MCNC: 25 MG/DL — HIGH (ref 7–23)
CALCIUM SERPL-MCNC: 9.5 MG/DL — SIGNIFICANT CHANGE UP (ref 8.4–10.5)
CHLORIDE SERPL-SCNC: 108 MMOL/L — SIGNIFICANT CHANGE UP (ref 96–108)
CO2 SERPL-SCNC: 23 MMOL/L — SIGNIFICANT CHANGE UP (ref 22–31)
CREAT SERPL-MCNC: 1.32 MG/DL — HIGH (ref 0.5–1.3)
GLUCOSE SERPL-MCNC: 76 MG/DL — SIGNIFICANT CHANGE UP (ref 70–99)
HCT VFR BLD CALC: 33.2 % — LOW (ref 39–50)
HGB BLD-MCNC: 10.6 G/DL — LOW (ref 13–17)
MCHC RBC-ENTMCNC: 28.4 PG — SIGNIFICANT CHANGE UP (ref 27–34)
MCHC RBC-ENTMCNC: 31.9 GM/DL — LOW (ref 32–36)
MCV RBC AUTO: 89 FL — SIGNIFICANT CHANGE UP (ref 80–100)
PLATELET # BLD AUTO: 171 K/UL — SIGNIFICANT CHANGE UP (ref 150–400)
POTASSIUM SERPL-MCNC: 4.4 MMOL/L — SIGNIFICANT CHANGE UP (ref 3.5–5.3)
POTASSIUM SERPL-SCNC: 4.4 MMOL/L — SIGNIFICANT CHANGE UP (ref 3.5–5.3)
RBC # BLD: 3.73 M/UL — LOW (ref 4.2–5.8)
RBC # FLD: 14.2 % — SIGNIFICANT CHANGE UP (ref 10.3–14.5)
SODIUM SERPL-SCNC: 141 MMOL/L — SIGNIFICANT CHANGE UP (ref 135–145)
WBC # BLD: 5.56 K/UL — SIGNIFICANT CHANGE UP (ref 3.8–10.5)
WBC # FLD AUTO: 5.56 K/UL — SIGNIFICANT CHANGE UP (ref 3.8–10.5)

## 2018-05-17 RX ADMIN — FAMOTIDINE 20 MILLIGRAM(S): 10 INJECTION INTRAVENOUS at 14:12

## 2018-05-17 RX ADMIN — LATANOPROST 1 DROP(S): 0.05 SOLUTION/ DROPS OPHTHALMIC; TOPICAL at 21:26

## 2018-05-17 RX ADMIN — SIMVASTATIN 20 MILLIGRAM(S): 20 TABLET, FILM COATED ORAL at 21:26

## 2018-05-17 RX ADMIN — CEFEPIME 100 MILLIGRAM(S): 1 INJECTION, POWDER, FOR SOLUTION INTRAMUSCULAR; INTRAVENOUS at 17:49

## 2018-05-17 RX ADMIN — NYSTATIN CREAM 1 APPLICATION(S): 100000 CREAM TOPICAL at 05:03

## 2018-05-17 RX ADMIN — Medication 1000 UNIT(S): at 14:12

## 2018-05-17 RX ADMIN — CEFEPIME 100 MILLIGRAM(S): 1 INJECTION, POWDER, FOR SOLUTION INTRAMUSCULAR; INTRAVENOUS at 05:03

## 2018-05-17 RX ADMIN — Medication 4 MILLIGRAM(S): at 21:26

## 2018-05-17 RX ADMIN — NYSTATIN CREAM 1 APPLICATION(S): 100000 CREAM TOPICAL at 14:12

## 2018-05-17 RX ADMIN — ENOXAPARIN SODIUM 40 MILLIGRAM(S): 100 INJECTION SUBCUTANEOUS at 14:11

## 2018-05-17 RX ADMIN — SENNA PLUS 2 TABLET(S): 8.6 TABLET ORAL at 21:26

## 2018-05-17 RX ADMIN — NYSTATIN CREAM 1 APPLICATION(S): 100000 CREAM TOPICAL at 21:27

## 2018-05-17 RX ADMIN — PREGABALIN 1000 MICROGRAM(S): 225 CAPSULE ORAL at 14:11

## 2018-05-17 NOTE — PROGRESS NOTE ADULT - SUBJECTIVE AND OBJECTIVE BOX
Patient is seen and examined at the bed side, is afebrile. He is s/p removal of hansen catheter and voiding okay. He is happy that he had PT today and was able to walk with walker.        REVIEW OF SYSTEMS: All other review systems are negative        Vital Signs Last 24 Hrs  T(C): 36.4 (17 May 2018 18:45), Max: 36.4 (17 May 2018 18:45)  T(F): 97.5 (17 May 2018 18:45), Max: 97.5 (17 May 2018 18:45)  HR: 62 (17 May 2018 18:45) (60 - 62)  BP: 143/76 (17 May 2018 18:45) (123/77 - 143/76)  BP(mean): --  RR: 18 (17 May 2018 18:45) (18 - 18)  SpO2: 99% (17 May 2018 18:45) (97% - 100%)        ALLERGIES: NKDA        PHYSICAL EXAM:  GENERAL: Not in acute distress  CVS: s1 and s2 present  RESP: Air entry B/L  GI: abdomen soft and nontender  : hansen draining blood tinged urine  EXT: No pedal edema  CNS: Awake and alert  SKIN: psoriasis lesions          LABS:                         10.6   5.56  )-----------( 171      ( 17 May 2018 07:50 )             33.2                           10.6   5.77  )-----------( 192      ( 15 May 2018 11:19 )             33.0               141  |  108  |  25<H>  ----------------------------<  76  4.4   |  23  |  1.32<H>    Ca    9.5      17 May 2018 07:00      05-15    141  |  105  |  26<H>  ----------------------------<  83  4.2   |  23  |  1.36<H>    Ca    9.7      15 May 2018 07:57      TPro  8.1  /  Alb  3.8  /  TBili  0.3  /  DBili  x   /  AST  23  /  ALT  8<L>  /  AlkPhos  84  05-06        PTT - ( 06 May 2018 14:47 )  PTT:39.9 sec  Urinalysis Basic - ( 06 May 2018 16:09 )    Color: Yellow / Appearance: SL Turbid / S.016 / pH: x  Gluc: x / Ketone: Negative  / Bili: Negative / Urobili: Negative   Blood: x / Protein: Trace / Nitrite: Negative   Leuk Esterase: Large / RBC: >50 /HPF / WBC >50 /HPF   Sq Epi: x / Non Sq Epi: OCC /HPF / Bacteria: Few /HPF          MEDICATIONS  (STANDING):  cefepime   IVPB 1000 milliGRAM(s) IV Intermittent every 12 hours  cholecalciferol 1000 Unit(s) Oral daily  cyanocobalamin 1000 MICROGram(s) Oral daily  doxazosin 4 milliGRAM(s) Oral at bedtime  enoxaparin Injectable 40 milliGRAM(s) SubCutaneous every 24 hours  famotidine    Tablet 20 milliGRAM(s) Oral daily  latanoprost 0.005% Ophthalmic Solution 1 Drop(s) Left EYE at bedtime  melatonin 3 milliGRAM(s) Oral at bedtime  nystatin Powder 1 Application(s) Topical three times a day  senna 2 Tablet(s) Oral at bedtime  simvastatin 20 milliGRAM(s) Oral at bedtime    MEDICATIONS  (PRN):  acetaminophen   Tablet. 650 milliGRAM(s) Oral every 6 hours PRN Moderate Pain (4 - 6)  bisacodyl Suppository 10 milliGRAM(s) Rectal daily PRN Constipation  docusate sodium 100 milliGRAM(s) Oral daily PRN Constipation  hydrocortisone 2.5% Ointment 1 Application(s) Topical daily PRN Rash  polyethylene glycol 3350 17 Gram(s) Oral daily PRN Constipation          RADIOLOGY & ADDITIONAL TESTS:    18: US Kidney and Bladder (18 @ 16:42) Again seen is moderate hydroureteronephrosis as described above. Fine debris within the distal dilated right ureter is indicative of infection.   No left-sided hydronephrosis. Urinary bladder wall small contains fine debris also indicative of  infection. Thickening of the bladder wall may also reflect infection.  Thickening/trabeculation and bladder diverticulum suggests some degree of chronic outlet obstruction.      18: Xray Chest 1 View- PORTABLE-Urgent (18 @ 15:07) INTERPRETATION:  no urgent/emergent findings.      18: US Kidney and Bladder (18 @ 17:05) Moderate right hydroureteronephrosis to the level of the bladder. The right ureter appears to insert on a bladder diverticulum. Right renal   cortical thinning is suggestive of chronic hydronephrosis. Urinary bladder trabeculations and diverticula with debris.        MICROBIOLOGY DATA:    Culture - Blood (18 @ 00:47)    Specimen Source: .Blood Blood-Peripheral    Culture Results:   No growth to date.    Culture - Blood (18 @ 00:47)    Specimen Source: .Blood Blood-Peripheral    Culture Results:   No growth to date.    Culture - Urine (18 @ 21:36)    Specimen Source: .Urine Clean Catch (Midstream)    Culture Results:   No growth        Urinalysis + Microscopic Examination (18 @ 16:09)    Urine Appearance: SL Turbid    Urobilinogen: Negative    Leukocyte Esterase Concentration: Large    Nitrite: Negative    Specific Gravity: 1.016    Protein, Urine: Trace    pH Urine: 5.5    Ketone - Urine: Negative    Bilirubin: Negative    Color: Yellow    Glucose Qualitative, Urine: Negative    Blood, Urine: Moderate    Red Blood Cell - Urine: >50 /HPF    White Blood Cell - Urine: >50 /HPF    Epithelial Cells: OCC /HPF    Hyaline Casts: 2-5    Bacteria: Few /HPF

## 2018-05-17 NOTE — DISCHARGE NOTE ADULT - MEDICATION SUMMARY - MEDICATIONS TO CHANGE
I will SWITCH the dose or number of times a day I take the medications listed below when I get home from the hospital:    Vitamin D3 2000 intl units oral tablet  -- 1 tab(s) by mouth once a day

## 2018-05-17 NOTE — DISCHARGE NOTE ADULT - MEDICATION SUMMARY - MEDICATIONS TO STOP TAKING
I will STOP taking the medications listed below when I get home from the hospital:    Zantac 150 oral tablet  -- 1 tab(s) by mouth , As Needed    Lumigan 0.01% ophthalmic solution  -- 1 drop(s) in the left eye once a day (in the evening)    amLODIPine 5 mg oral tablet  -- 1 tab(s) by mouth once a day

## 2018-05-17 NOTE — DISCHARGE NOTE ADULT - PLAN OF CARE
-s/p Cefepime  -Begin Augmentin 875 mg BID until 5/22/18  -Follow up with outpatient  management of stent   -return if worsening dysuria, fever, chills, nausea/vomiting resolved -s/p Nix removal   -Follow up with outpatient  for Stent Care/Management improve strength -to be discharged with Physical Therapy at [facility]  -OOB to chair  -ambulate with assistance -Stable  -Monitor BP, continue with Cardura 4 mg PO at bedtime  -Monitor kidney function by your primary care physician -stable  -continue with Simvastatin 20 mg PO at bedtime -resolved  -S/P R ureteral stent  -Outpatient Urology follow up   -Follow up outpatient  -resolved  -follow up with Outpatient  Stable

## 2018-05-17 NOTE — PROGRESS NOTE ADULT - SUBJECTIVE AND OBJECTIVE BOX
Patient denies CP, SOB Review of systems otherwise (-)    acetaminophen   Tablet. 650 milliGRAM(s) Oral every 6 hours PRN  bisacodyl Suppository 10 milliGRAM(s) Rectal daily PRN  cefepime   IVPB 1000 milliGRAM(s) IV Intermittent every 12 hours  cholecalciferol 1000 Unit(s) Oral daily  cyanocobalamin 1000 MICROGram(s) Oral daily  docusate sodium 100 milliGRAM(s) Oral daily PRN  doxazosin 4 milliGRAM(s) Oral at bedtime  enoxaparin Injectable 40 milliGRAM(s) SubCutaneous every 24 hours  famotidine    Tablet 20 milliGRAM(s) Oral daily  hydrocortisone 2.5% Ointment 1 Application(s) Topical daily PRN  latanoprost 0.005% Ophthalmic Solution 1 Drop(s) Left EYE at bedtime  melatonin 3 milliGRAM(s) Oral at bedtime  nystatin Powder 1 Application(s) Topical three times a day  polyethylene glycol 3350 17 Gram(s) Oral daily PRN  senna 2 Tablet(s) Oral at bedtime  simvastatin 20 milliGRAM(s) Oral at bedtime                            10.6   5.56  )-----------( 171      ( 17 May 2018 07:50 )             33.2       Hemoglobin: 10.6 g/dL (05-17 @ 07:50)  Hemoglobin: 10.9 g/dL (05-16 @ 07:48)  Hemoglobin: 10.6 g/dL (05-15 @ 11:19)  Hemoglobin: 10.8 g/dL (05-14 @ 09:45)  Hemoglobin: 11.2 g/dL (05-13 @ 09:20)      05-17    141  |  108  |  25<H>  ----------------------------<  76  4.4   |  23  |  1.32<H>    Ca    9.5      17 May 2018 07:00      Creatinine Trend: 1.32<--, 1.28<--, 1.36<--, 1.34<--, 1.38<--, 1.50<--    COAGS:           T(C): 36.3 (05-17-18 @ 14:10), Max: 36.7 (05-16-18 @ 22:14)  HR: 60 (05-17-18 @ 14:10) (60 - 61)  BP: 123/77 (05-17-18 @ 14:10) (123/77 - 158/87)  RR: 18 (05-17-18 @ 14:10) (18 - 18)  SpO2: 99% (05-17-18 @ 14:10) (97% - 100%)  Wt(kg): --    I&O's Summary    16 May 2018 07:01  -  17 May 2018 07:00  --------------------------------------------------------  IN: 550 mL / OUT: 250 mL / NET: 300 mL    17 May 2018 07:01  -  17 May 2018 18:22  --------------------------------------------------------  IN: 420 mL / OUT: 0 mL / NET: 420 mL      S1S2 RRR  CTA B/L  SOFT ND NT +BS  NO C/C/E B/L LE      A/P) He is a 83 y/o male PMH persistent AF who had a 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 His PPM interrogation here in April 2018 showed excellent PPM function with about 3.5 years remaining on PG longevity. He was admitted last month with coag neg staph UTI. Thankfully his blood cultures were and continue to be negative. He is now readmitted with weakness. He denies palpitations, syncope, nor angina. He remains off A/C due to his advanced age and advanced dementia.      - f/u with his electrophysiologist Dr. Jose Stapleton after discharge for routine PPM care  - not in clinical CHF  - abx per med  - s/p cysto and R ureteral stent placement 5/15  - no need for inpatient cardiac work up  - dc planning to [facility] per primary team     Dick Ba MD, FACC

## 2018-05-17 NOTE — PROGRESS NOTE ADULT - SUBJECTIVE AND OBJECTIVE BOX
[facility]        - Patient seen and examined.  - Today, patient is without complaints.         *****MEDICATIONS: Current medication reviewed and documented.    MEDICATIONS  (STANDING):  cefepime   IVPB 1000 milliGRAM(s) IV Intermittent every 12 hours  cholecalciferol 1000 Unit(s) Oral daily  cyanocobalamin 1000 MICROGram(s) Oral daily  doxazosin 4 milliGRAM(s) Oral at bedtime  enoxaparin Injectable 40 milliGRAM(s) SubCutaneous every 24 hours  famotidine    Tablet 20 milliGRAM(s) Oral daily  latanoprost 0.005% Ophthalmic Solution 1 Drop(s) Left EYE at bedtime  melatonin 3 milliGRAM(s) Oral at bedtime  nystatin Powder 1 Application(s) Topical three times a day  senna 2 Tablet(s) Oral at bedtime  simvastatin 20 milliGRAM(s) Oral at bedtime    MEDICATIONS  (PRN):  acetaminophen   Tablet. 650 milliGRAM(s) Oral every 6 hours PRN Moderate Pain (4 - 6)  bisacodyl Suppository 10 milliGRAM(s) Rectal daily PRN Constipation  docusate sodium 100 milliGRAM(s) Oral daily PRN Constipation  hydrocortisone 2.5% Ointment 1 Application(s) Topical daily PRN Rash  polyethylene glycol 3350 17 Gram(s) Oral daily PRN Constipation           ***** REVIEW OF SYSTEM:  GEN: no fever, no chills, no pain  RESP: no SOB, no cough, no sputum  CVS: no chest pain, no palpitations, no edema  GI: no abdominal pain, no nausea, no vomiting, no constipation, no diarrhea  : no dysurea, no frequency  NEURO: no headache, no diziness  PSYCH: no depression, not anxious  Derm : no itching, no rash         ***** VITAL SIGNS:  T(F): 97.2 (05-17-18 @ 09:54), Max: 98.1 (05-16-18 @ 22:14)  HR: 60 (05-17-18 @ 09:54) (59 - 61)  BP: 141/88 (05-17-18 @ 09:54) (133/84 - 158/87)  RR: 18 (05-17-18 @ 09:54) (18 - 18)  SpO2: 100% (05-17-18 @ 09:54) (97% - 100%)  Wt(kg): --  ,   I&O's Summary    16 May 2018 07:01  -  17 May 2018 07:00  --------------------------------------------------------  IN: 550 mL / OUT: 250 mL / NET: 300 mL             *****PHYSICAL EXAM: Alert oriented x 3   Attention comprehension are fair. Able to name, repeat, read without any difficulty.   Able to follow 3 step commands.     EOMI fundi not visualized,  VFF to confrontration  No facial asymmetry   Tongue is midline   Palate elevates symmetrically   Moving all 4 ext symmetrically no pronator drift. difficult exam as pt is not very cooperative.     Gait :    B/L down going toes            *****LAB AND IMAGING:                        10.6   5.56  )-----------( 171      ( 17 May 2018 07:50 )             33.2               05-17    141  |  108  |  25<H>  ----------------------------<  76  4.4   |  23  |  1.32<H>    Ca    9.5      17 May 2018 07:00                           [All pertinent recent Imaging/Reports reviewed]           *****A S S E S S M E N T   A N D   P L A N :  84m hx tia, s/p PPM, psoriasis, CKD, presenting from rehab with weakness, difficulty ambulating. Pt's complaints cluster primarily around poor care at rehab and lack of actual PT time, and pt attributes his present weakness to this lack of working with PT. Reports completing a course of abx yesterday for a UTI. Reports no fevers/chills, no new aches/pains, no cp/sob, no cough/HA. Would like to go to a different rehab if possible.    no focality on exam.   proximal weakness, likely due to disuse atrophy     PT consult for gait and balance training.   oob to chair as tolerated   continue supportive care.           Thank you for allowing me to participate in the care of this patient. Please do not hesitate to call me if you have any  questions.        ________________  Mary Draper MD  [facility] [place] ([facility]  [phone]     30 minutes spent on total encounter; more than 50 % of the visit was  spent counseling and or  coordinating care by the attending physician.   At the present time, [facility] does not  provide outpatient followup, best to call the your insurance to find a participating provider.  This was explained to you at the time of the visit. Alternatively, if your insurance allows it, you can follow up with a neurologist  Dr. Alexander Santiago([place]) [phone] or Dr. Moises Van ( [place]) [phone]

## 2018-05-17 NOTE — DISCHARGE NOTE ADULT - SECONDARY DIAGNOSIS.
Bladder outlet obstruction Weakness CKD (chronic kidney disease) stage 3, GFR 30-59 ml/min Dyslipidemia Hydroureteronephrosis Gross hematuria

## 2018-05-17 NOTE — DISCHARGE NOTE ADULT - MEDICATION SUMMARY - MEDICATIONS TO TAKE
I will START or STAY ON the medications listed below when I get home from the hospital:    acetaminophen 325 mg oral tablet  -- 2 tab(s) by mouth every 6 hours, As needed, Moderate Pain (4 - 6)  -- Indication: For Analgesia    doxazosin 4 mg oral tablet  -- 1 tab(s) by mouth once a day (at bedtime)  -- Indication: For Bladder outlet obstruction    simvastatin 20 mg oral tablet  -- 1 tab(s) by mouth once a day (at bedtime)  -- Indication: For Dyslipidemia    hydrocortisone 2.5% topical ointment  -- 1 application on skin once a day, As needed, Rash  -- Indication: For Dermatological agent    nystatin 100,000 units/g topical powder  -- 1 application on skin 3 times a day  -- Indication: For Topical agent    famotidine 20 mg oral tablet  -- 1 tab(s) by mouth once a day  -- Indication: For GERD (gastroesophageal reflux disease)    senna oral tablet  -- 2 tab(s) by mouth once a day (at bedtime)  -- Indication: For Constipation    bisacodyl 10 mg rectal suppository  -- 1 suppository(ies) rectally once a day, As needed, Constipation  -- Indication: For Constipation    docusate sodium 100 mg oral capsule  -- 1 cap(s) by mouth once a day, As needed, Constipation  -- Indication: For Constipation    polyethylene glycol 3350 oral powder for reconstitution  -- 17 gram(s) by mouth once a day, As needed, Constipation  -- Indication: For Constipation    melatonin 3 mg oral tablet  -- 1 tab(s) by mouth once a day (at bedtime)  -- Indication: For Sleeping     latanoprost 0.005% ophthalmic solution  -- 1 drop(s) to each affected eye once a day (at bedtime)  -- Indication: For Ophthalmic drops    Augmentin 875 mg-125 mg oral tablet  -- 1 tab(s) by mouth every 12 hours    Until 5/22/18  -- Indication: For Acute cystitis without hematuria    cyanocobalamin 1000 mcg oral tablet  -- 1 tab(s) by mouth once a day  -- Indication: For Viatmins supplement    cholecalciferol oral tablet  -- 1000 unit(s) by mouth once a day  -- Indication: For Vitamin supplement

## 2018-05-17 NOTE — DISCHARGE NOTE ADULT - HOSPITAL COURSE
84 year old male with PMHx of tia, s/p PPM, HTN, psoriasis and CKD presented from rehab with weakness and difficulty ambulating. Patient's initial complaint was focused on poor care at his current rehab and lack of PT time, to which he attributed his weakness to. Patient had a positive UA at time of admission, and was treated with Cefepime. CXR was clear and blood cultures were negative for infectious pathology. Ultrasound was performed which found moderate hydroureteronephrosis of R kidney, no left-sided hydronephrosis. Cystoscopy was performed on May 15th, with placement of Right 6x24 cc jj stent, 16 fr hansen. Patient instructed to follow up with outpatient  for stent management and to be switched to oral Augmentin 875 mg BID until 5/22/18 upon discharge. Seen by Physical Therapy and recommended for [facility] for continuation of Rehabilitation and Strengthening.  Pt observed and noted to be clinically improving. Pt deemed 84 year old male with PMHx of tia, s/p PPM, HTN, psoriasis and CKD presented from rehab with weakness and difficulty ambulating. Patient's initial complaint was focused on poor care at his current rehab and lack of PT time, to which he attributed his weakness to. Patient had a positive UA at time of admission, and was treated with Cefepime. CXR was clear and blood cultures were negative for infectious pathology. Ultrasound was performed which found moderate hydroureteronephrosis of R kidney, no left-sided hydronephrosis. Cystoscopy was performed on May 15th, with placement of Right 6x24 cc jj stent, 16 fr hansen. Patient instructed to follow up with outpatient  for stent management and to be switched to oral Augmentin 875 mg BID until 5/22/18 upon discharge. Seen by Physical Therapy and recommended for [facility] for continuation of Rehabilitation and Strengthening.  Pt observed and noted to be clinically improving. Pt deemed safe for dishcarge 84 year old male with PMHx of tia, s/p PPM, HTN, psoriasis and CKD presented from rehab with weakness and difficulty ambulating. Patient's initial complaint was focused on poor care at his current rehab and lack of PT time, to which he attributed his weakness to. Patient had a positive UA at time of admission, and was treated with Cefepime. CXR was clear and blood cultures were negative for infectious pathology. Ultrasound was performed which found moderate hydroureteronephrosis of R kidney, no left-sided hydronephrosis. Cystoscopy was performed on May 15th, with placement of Right 6x24 cc jj stent, 16 fr hansen. Patient instructed to follow up with outpatient  for stent management and to be switched to oral Augmentin 875 mg BID until 5/22/18 upon discharge. Seen by Physical Therapy and recommended for [facility] for continuation of Rehabilitation and Strengthening.  Pt observed and noted to be clinically improving. Pt deemed safe for discharge     Pt stable for discharge to [facility] with PCP, urologist, and electrophysiology

## 2018-05-17 NOTE — PROGRESS NOTE ADULT - SUBJECTIVE AND OBJECTIVE BOX
[facility] NEPHROLOGY- PROGRESS NOTE    84y Male with history of HTN presents with UTI. Nephrology consulted for elevated Scr.    REVIEW OF SYSTEMS:  Gen: no changes in weight  Cards: no chest pain  Resp: no dyspnea  GI: no nausea or vomiting or diarrhea  Vascular: no LE edema    No Known Allergies      Hospital Medications: Medications reviewed      VITALS:  T(F): 97.3 (05-17-18 @ 14:10), Max: 98.1 (05-16-18 @ 22:14)  HR: 60 (05-17-18 @ 14:10)  BP: 123/77 (05-17-18 @ 14:10)  RR: 18 (05-17-18 @ 14:10)  SpO2: 99% (05-17-18 @ 14:10)  Wt(kg): --    05-16 @ 07:01  -  05-17 @ 07:00  --------------------------------------------------------  IN: 550 mL / OUT: 250 mL / NET: 300 mL    05-17 @ 07:01  -  05-17 @ 15:34  --------------------------------------------------------  IN: 420 mL / OUT: 0 mL / NET: 420 mL      PHYSICAL EXAM:    Gen: NAD, calm  Cards: RRR, +S1/S2, no M/G/R  Resp: CTA B/L  GI: soft, NT/ND, NABS  : hansen removed  Vascular: no LE edema B/L      LABS:  05-17    141  |  108  |  25<H>  ----------------------------<  76  4.4   |  23  |  1.32<H>    Ca    9.5      17 May 2018 07:00      Creatinine Trend: 1.32 <--, 1.28 <--, 1.36 <--, 1.34 <--, 1.38 <--, 1.50 <--, 1.52 <--                        10.6   5.56  )-----------( 171      ( 17 May 2018 07:50 )             33.2

## 2018-05-17 NOTE — DISCHARGE NOTE ADULT - CARE PLAN
Principal Discharge DX:	Complicated UTI (urinary tract infection)  Assessment and plan of treatment:	-s/p Cefepime  -Begin Augmentin 875 mg BID until 5/22/18  -Follow up with outpatient  management of stent   -return if worsening dysuria, fever, chills, nausea/vomiting  Secondary Diagnosis:	Bladder outlet obstruction  Goal:	resolved  Assessment and plan of treatment:	-s/p Nix removal   -Follow up with outpatient  for Stent Care/Management  Secondary Diagnosis:	Weakness  Goal:	improve strength  Assessment and plan of treatment:	-to be discharged with Physical Therapy at [facility]  -OOB to chair  -ambulate with assistance  Secondary Diagnosis:	CKD (chronic kidney disease) stage 3, GFR 30-59 ml/min  Assessment and plan of treatment:	-Stable  -Monitor BP, continue with Cardura 4 mg PO at bedtime  -Monitor kidney function by your primary care physician  Secondary Diagnosis:	Dyslipidemia  Assessment and plan of treatment:	-stable  -continue with Simvastatin 20 mg PO at bedtime  Secondary Diagnosis:	Hydroureteronephrosis  Assessment and plan of treatment:	-resolved  -S/P R ureteral stent  -Outpatient Urology follow up   -Follow up outpatient   Secondary Diagnosis:	Gross hematuria  Assessment and plan of treatment:	-resolved  -follow up with Outpatient  Principal Discharge DX:	Complicated UTI (urinary tract infection)  Goal:	Stable  Assessment and plan of treatment:	-s/p Cefepime  -Begin Augmentin 875 mg BID until 5/22/18  -Follow up with outpatient  management of stent   -return if worsening dysuria, fever, chills, nausea/vomiting  Secondary Diagnosis:	Bladder outlet obstruction  Goal:	resolved  Assessment and plan of treatment:	-s/p Nix removal   -Follow up with outpatient  for Stent Care/Management  Secondary Diagnosis:	Weakness  Goal:	improve strength  Assessment and plan of treatment:	-to be discharged with Physical Therapy at [facility]  -OOB to chair  -ambulate with assistance  Secondary Diagnosis:	CKD (chronic kidney disease) stage 3, GFR 30-59 ml/min  Assessment and plan of treatment:	-Stable  -Monitor BP, continue with Cardura 4 mg PO at bedtime  -Monitor kidney function by your primary care physician  Secondary Diagnosis:	Dyslipidemia  Assessment and plan of treatment:	-stable  -continue with Simvastatin 20 mg PO at bedtime  Secondary Diagnosis:	Hydroureteronephrosis  Assessment and plan of treatment:	-resolved  -S/P R ureteral stent  -Outpatient Urology follow up   -Follow up outpatient   Secondary Diagnosis:	Gross hematuria  Assessment and plan of treatment:	-resolved  -follow up with Outpatient

## 2018-05-17 NOTE — PROGRESS NOTE ADULT - ASSESSMENT
Patient is a 84y old  Male who was recently discharged after treated  for MSSA UTI and hydronephrosis, and now presenting from rehab to the ER for evaluation of weakness (06 May 2018 18:20) and difficulty ambulating. He attributes his present weakness to this lack of working with PT. He has no fevers/chills, and on admission found to have positive Urine analysis and dose of Ceftriaxone is given, cultures pending. The ID consult requested to assist with further evaluation and antibiotic  management.     # Complicated UTI  - cultures are negative  # Moderate Right Hydronephrosis-   Cystoscopy with stent placement    Would recommend:  1. Continue Cefepime inpatient and may change to oral Augmentin  875 mg q 12hours on discharge to continue until 5/22/18  2. OOB to chair/PT  3. Aspiration precaution    d/w patient and Nursing staff

## 2018-05-17 NOTE — PROGRESS NOTE ADULT - SUBJECTIVE AND OBJECTIVE BOX
EP ATTENDING    no tele    no palpitations, no syncope, no angina      MEDICATIONS  (STANDING):  cefepime   IVPB 1000 milliGRAM(s) IV Intermittent every 12 hours  cholecalciferol 1000 Unit(s) Oral daily  cyanocobalamin 1000 MICROGram(s) Oral daily  doxazosin 4 milliGRAM(s) Oral at bedtime  enoxaparin Injectable 40 milliGRAM(s) SubCutaneous every 24 hours  famotidine    Tablet 20 milliGRAM(s) Oral daily  latanoprost 0.005% Ophthalmic Solution 1 Drop(s) Left EYE at bedtime  melatonin 3 milliGRAM(s) Oral at bedtime  nystatin Powder 1 Application(s) Topical three times a day  senna 2 Tablet(s) Oral at bedtime  simvastatin 20 milliGRAM(s) Oral at bedtime    MEDICATIONS  (PRN):  acetaminophen   Tablet. 650 milliGRAM(s) Oral every 6 hours PRN Moderate Pain (4 - 6)  bisacodyl Suppository 10 milliGRAM(s) Rectal daily PRN Constipation  docusate sodium 100 milliGRAM(s) Oral daily PRN Constipation  hydrocortisone 2.5% Ointment 1 Application(s) Topical daily PRN Rash  polyethylene glycol 3350 17 Gram(s) Oral daily PRN Constipation    LABS:                        10.6   5.56  )-----------( 171      ( 17 May 2018 07:50 )             33.2     141  |  108  |  25<H>  ----------------------------<  76  4.4   |  23  |  1.32<H>    Ca    9.5      17 May 2018 07:00    Creatinine Trend: 1.32<--, 1.28<--, 1.36<--, 1.34<--, 1.38<--, 1.50<--     PHYSICAL EXAM  Vital Signs Last 24 Hrs  T(C): 36.2 (17 May 2018 09:54), Max: 36.7 (16 May 2018 22:14)  T(F): 97.2 (17 May 2018 09:54), Max: 98.1 (16 May 2018 22:14)  HR: 60 (17 May 2018 09:54) (60 - 61)  BP: 141/88 (17 May 2018 09:54) (140/84 - 158/87)-  RR: 18 (17 May 2018 09:54) (18 - 18)  SpO2: 100% (17 May 2018 09:54) (97% - 100%)    no JVD  RRR, no murmurs  CTAB  soft nt/nd  no c/c/e    serial Blood cultures all negative    A/P) He is a 85 y/o male PMH persistent AF who had a 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 His PPM interrogation here in April 2018 showed excellent PPM function with about 3.5 years remaining on PG longevity. He was admitted last month with coag neg staph UTI. Thankfully his blood cultures were and continue to be negative. He is now readmitted with weakness. He denies palpitations, syncope, nor angina. He remains off A/C due to his advanced age and advanced dementia.    -no need for repeat PPM interrogation  -as long as BCx remain negative and there is no suspicion for endocarditis then no further inpatient EP workup needed  -f/u with his electrophysiologist Dr. Jose Stapleton after discharge for routine PPM care  -poor candidate for systemic anticoagulation given advanced age and advanced dementia  -no further inpatient EP workup needed    Erika Cano PA-C

## 2018-05-17 NOTE — PROGRESS NOTE ADULT - PROBLEM SELECTOR PLAN 1
cefepime inpatient, switch to oral augment 875 bid to 5/22/18 when discharged  outpatient  management of stent

## 2018-05-17 NOTE — DISCHARGE NOTE ADULT - PATIENT PORTAL LINK FT
You can access the [facility], offered by [facility], by registering with the following website: [email or web address]

## 2018-05-17 NOTE — DISCHARGE NOTE ADULT - ADDITIONAL INSTRUCTIONS
Follow up with your PCP within 1 week  -Follow up with outpatient  doctor for management of stent   -Follow up with electrophysiologist Dr. Jose Stapleton after discharge for routine PPM care  -Return if worsening pain with urination, inability to void, fever, chills, myalgia, flank pain or worsening weakness and malaise  -Follow with your Primary care Physician after Rehab discharge to review your hospital course  -complete antibiotic until finished on 5/22/18   -Bring all discharge documents and prescriptions with you at the time of your appointments   -You may return to the Emergency department for any worsening or return of your symptoms

## 2018-05-17 NOTE — DISCHARGE NOTE ADULT - CARE PROVIDER_API CALL
Jose Stapleton (MD), Cardiac Electrophysiology; Cardiovascular Disease  [address]  [place] [address]  Phone: [phone]  Fax: [phone]    Yared Gu), Urology  [address] [facility] [address] [place] [id number]  Phone: [phone]  Fax: [phone]    Kwabena Gamino), Internal Medicine  [address] [facility] [address]   [address]  [place] [address]  Phone: [phone]  Fax: [phone]

## 2018-05-18 LAB
ANION GAP SERPL CALC-SCNC: 12 MMOL/L — SIGNIFICANT CHANGE UP (ref 5–17)
BUN SERPL-MCNC: 24 MG/DL — HIGH (ref 7–23)
CALCIUM SERPL-MCNC: 9.6 MG/DL — SIGNIFICANT CHANGE UP (ref 8.4–10.5)
CHLORIDE SERPL-SCNC: 107 MMOL/L — SIGNIFICANT CHANGE UP (ref 96–108)
CO2 SERPL-SCNC: 22 MMOL/L — SIGNIFICANT CHANGE UP (ref 22–31)
CREAT SERPL-MCNC: 1.37 MG/DL — HIGH (ref 0.5–1.3)
GLUCOSE SERPL-MCNC: 77 MG/DL — SIGNIFICANT CHANGE UP (ref 70–99)
HCT VFR BLD CALC: 31.1 % — LOW (ref 39–50)
HGB BLD-MCNC: 10.2 G/DL — LOW (ref 13–17)
MCHC RBC-ENTMCNC: 28.8 PG — SIGNIFICANT CHANGE UP (ref 27–34)
MCHC RBC-ENTMCNC: 32.8 GM/DL — SIGNIFICANT CHANGE UP (ref 32–36)
MCV RBC AUTO: 87.9 FL — SIGNIFICANT CHANGE UP (ref 80–100)
PLATELET # BLD AUTO: 164 K/UL — SIGNIFICANT CHANGE UP (ref 150–400)
POTASSIUM SERPL-MCNC: 4.3 MMOL/L — SIGNIFICANT CHANGE UP (ref 3.5–5.3)
POTASSIUM SERPL-SCNC: 4.3 MMOL/L — SIGNIFICANT CHANGE UP (ref 3.5–5.3)
RBC # BLD: 3.54 M/UL — LOW (ref 4.2–5.8)
RBC # FLD: 14.5 % — SIGNIFICANT CHANGE UP (ref 10.3–14.5)
SODIUM SERPL-SCNC: 141 MMOL/L — SIGNIFICANT CHANGE UP (ref 135–145)
WBC # BLD: 5.26 K/UL — SIGNIFICANT CHANGE UP (ref 3.8–10.5)
WBC # FLD AUTO: 5.26 K/UL — SIGNIFICANT CHANGE UP (ref 3.8–10.5)

## 2018-05-18 RX ADMIN — CEFEPIME 100 MILLIGRAM(S): 1 INJECTION, POWDER, FOR SOLUTION INTRAMUSCULAR; INTRAVENOUS at 18:26

## 2018-05-18 RX ADMIN — Medication 1000 UNIT(S): at 13:35

## 2018-05-18 RX ADMIN — ENOXAPARIN SODIUM 40 MILLIGRAM(S): 100 INJECTION SUBCUTANEOUS at 13:35

## 2018-05-18 RX ADMIN — PREGABALIN 1000 MICROGRAM(S): 225 CAPSULE ORAL at 13:35

## 2018-05-18 RX ADMIN — NYSTATIN CREAM 1 APPLICATION(S): 100000 CREAM TOPICAL at 21:01

## 2018-05-18 RX ADMIN — CEFEPIME 100 MILLIGRAM(S): 1 INJECTION, POWDER, FOR SOLUTION INTRAMUSCULAR; INTRAVENOUS at 05:12

## 2018-05-18 RX ADMIN — SIMVASTATIN 20 MILLIGRAM(S): 20 TABLET, FILM COATED ORAL at 21:00

## 2018-05-18 RX ADMIN — Medication 3 MILLIGRAM(S): at 00:00

## 2018-05-18 RX ADMIN — SENNA PLUS 2 TABLET(S): 8.6 TABLET ORAL at 21:00

## 2018-05-18 RX ADMIN — NYSTATIN CREAM 1 APPLICATION(S): 100000 CREAM TOPICAL at 05:12

## 2018-05-18 RX ADMIN — NYSTATIN CREAM 1 APPLICATION(S): 100000 CREAM TOPICAL at 13:35

## 2018-05-18 RX ADMIN — LATANOPROST 1 DROP(S): 0.05 SOLUTION/ DROPS OPHTHALMIC; TOPICAL at 21:00

## 2018-05-18 RX ADMIN — Medication 4 MILLIGRAM(S): at 21:00

## 2018-05-18 NOTE — PROGRESS NOTE ADULT - SUBJECTIVE AND OBJECTIVE BOX
Patient denies CP, SOB Review of systems otherwise (-)    acetaminophen   Tablet. 650 milliGRAM(s) Oral every 6 hours PRN  bisacodyl Suppository 10 milliGRAM(s) Rectal daily PRN  cefepime   IVPB 1000 milliGRAM(s) IV Intermittent every 12 hours  cholecalciferol 1000 Unit(s) Oral daily  cyanocobalamin 1000 MICROGram(s) Oral daily  docusate sodium 100 milliGRAM(s) Oral daily PRN  doxazosin 4 milliGRAM(s) Oral at bedtime  enoxaparin Injectable 40 milliGRAM(s) SubCutaneous every 24 hours  famotidine    Tablet 20 milliGRAM(s) Oral daily  hydrocortisone 2.5% Ointment 1 Application(s) Topical daily PRN  latanoprost 0.005% Ophthalmic Solution 1 Drop(s) Left EYE at bedtime  melatonin 3 milliGRAM(s) Oral at bedtime  nystatin Powder 1 Application(s) Topical three times a day  polyethylene glycol 3350 17 Gram(s) Oral daily PRN  senna 2 Tablet(s) Oral at bedtime  simvastatin 20 milliGRAM(s) Oral at bedtime                            10.2   5.26  )-----------( 164      ( 18 May 2018 09:58 )             31.1       Hemoglobin: 10.2 g/dL (05-18 @ 09:58)  Hemoglobin: 10.6 g/dL (05-17 @ 07:50)  Hemoglobin: 10.9 g/dL (05-16 @ 07:48)  Hemoglobin: 10.6 g/dL (05-15 @ 11:19)  Hemoglobin: 10.8 g/dL (05-14 @ 09:45)      05-18    141  |  107  |  24<H>  ----------------------------<  77  4.3   |  22  |  1.37<H>    Ca    9.6      18 May 2018 07:49      Creatinine Trend: 1.37<--, 1.32<--, 1.28<--, 1.36<--, 1.34<--, 1.38<--    COAGS:           T(C): 36.3 (05-18-18 @ 09:05), Max: 36.4 (05-17-18 @ 18:45)  HR: 60 (05-18-18 @ 09:05) (59 - 68)  BP: 135/82 (05-18-18 @ 09:05) (119/79 - 143/76)  RR: 18 (05-18-18 @ 09:05) (18 - 18)  SpO2: 99% (05-18-18 @ 09:05) (95% - 99%)  Wt(kg): --    I&O's Summary    17 May 2018 07:01  -  18 May 2018 07:00  --------------------------------------------------------  IN: 1050 mL / OUT: 0 mL / NET: 1050 mL    18 May 2018 07:01  -  18 May 2018 12:49  --------------------------------------------------------  IN: 360 mL / OUT: 0 mL / NET: 360 mL        S1S2 RRR  CTA B/L  SOFT ND NT +BS  NO C/C/E B/L LE      A/P) He is a 85 y/o male PMH persistent AF who had a 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 His PPM interrogation here in April 2018 showed excellent PPM function with about 3.5 years remaining on PG longevity. He was admitted last month with coag neg staph UTI. Thankfully his blood cultures were and continue to be negative. He is now readmitted with weakness. He denies palpitations, syncope, nor angina. He remains off A/C due to his advanced age and advanced dementia.      - f/u with his electrophysiologist Dr. Jose Stapleton after discharge for routine PPM care  - not in clinical CHF  - abx per med  - Off AC for advanced age, dementia and fall risk  - s/p cysto and R ureteral stent placement 5/15  - no need for inpatient cardiac work up  - dc planning to [facility] per primary team     Dick Ba MD, FACC

## 2018-05-18 NOTE — PROGRESS NOTE ADULT - PROBLEM SELECTOR PLAN 1
-pt is medically optimized for safe d/c to [facility], f/u case management   -cefepime inpatient, switch to oral augmentin 875 bid to 5/22/18 upon discharge  -outpatient  management of stent

## 2018-05-18 NOTE — PROGRESS NOTE ADULT - SUBJECTIVE AND OBJECTIVE BOX
Patient seen and examined at bedside  No acute events noted overnight  Case discussed with medical team    HPI:  84m hx tia, s/p PPM, psoriasis, CKD, presenting from rehab with weakness, difficulty ambulating. Pt's complaints cluster primarily around poor care at rehab and lack of actual PT time, and pt attributes his present weakness to this lack of working with PT. Reports completing a course of abx yesterday for a UTI. Reports no fevers/chills, no new aches/pains, no cp/sob, no cough/HA. Would like to go to a different rehab if possible.    In ED: 97.9, 61, 119/78, 18, 96RA. Given ceftriaxone (1700), NS 1 L bolux x 1. (06 May 2018 18:20)      PAST MEDICAL & SURGICAL HISTORY:  Psoriasis  Prostate cancer  GERD (gastroesophageal reflux disease)  Dyslipidemia  No significant past surgical history      No Known Allergies       MEDICATIONS  (STANDING):  cefepime   IVPB 1000 milliGRAM(s) IV Intermittent every 12 hours  cholecalciferol 1000 Unit(s) Oral daily  cyanocobalamin 1000 MICROGram(s) Oral daily  doxazosin 4 milliGRAM(s) Oral at bedtime  enoxaparin Injectable 40 milliGRAM(s) SubCutaneous every 24 hours  famotidine    Tablet 20 milliGRAM(s) Oral daily  latanoprost 0.005% Ophthalmic Solution 1 Drop(s) Left EYE at bedtime  melatonin 3 milliGRAM(s) Oral at bedtime  nystatin Powder 1 Application(s) Topical three times a day  senna 2 Tablet(s) Oral at bedtime  simvastatin 20 milliGRAM(s) Oral at bedtime    MEDICATIONS  (PRN):  acetaminophen   Tablet. 650 milliGRAM(s) Oral every 6 hours PRN Moderate Pain (4 - 6)  bisacodyl Suppository 10 milliGRAM(s) Rectal daily PRN Constipation  docusate sodium 100 milliGRAM(s) Oral daily PRN Constipation  hydrocortisone 2.5% Ointment 1 Application(s) Topical daily PRN Rash  polyethylene glycol 3350 17 Gram(s) Oral daily PRN Constipation      REVIEW OF SYSTEMS:  CONSTITUTIONAL: (+) malaise.   EYES: No acute change in vision   ENT:  No tinnitus  NECK: No stiffness  RESPIRATORY: No hemoptysis  CARDIOVASCULAR: No chest pain, palpitations, syncope  GASTROINTESTINAL: No hematemesis, diarrhea, melena, or hematochezia.  GENITOURINARY: No hematuria  NEUROLOGICAL: No headaches  LYMPH Nodes: No enlarged glands  ENDOCRINE: No heat or cold intolerance	    T(C): 36.3 (05-18-18 @ 09:05), Max: 36.4 (05-17-18 @ 18:45)  HR: 60 (05-18-18 @ 09:05) (59 - 68)  BP: 135/82 (05-18-18 @ 09:05) (119/79 - 143/76)  RR: 18 (05-18-18 @ 09:05) (18 - 18)  SpO2: 99% (05-18-18 @ 09:05) (95% - 99%)    PHYSICAL EXAMINATION:   Constitutional: WD, NAD  HEENT: NC, AT  Neck:  Supple  Respiratory:  Adequate airflow b/l. Not using accessory muscles of respiration.  Cardiovascular:  S1 & S2 intact, no R/G, 2+ radial pulses b/l  Gastrointestinal: Soft, NT, ND, normoactive b.s., no organomegaly/RT/rigidity  Extremities: WWP  Neurological:  Alert and awake.  No acute focal motor deficits. Crude sensation intact.     Labs and imaging reviewed

## 2018-05-18 NOTE — PROGRESS NOTE ADULT - SUBJECTIVE AND OBJECTIVE BOX
no palpitations, no syncope, no angina    MEDICATIONS  (STANDING):  cefepime   IVPB 1000 milliGRAM(s) IV Intermittent every 12 hours  cholecalciferol 1000 Unit(s) Oral daily  cyanocobalamin 1000 MICROGram(s) Oral daily  doxazosin 4 milliGRAM(s) Oral at bedtime  enoxaparin Injectable 40 milliGRAM(s) SubCutaneous every 24 hours  famotidine    Tablet 20 milliGRAM(s) Oral daily  latanoprost 0.005% Ophthalmic Solution 1 Drop(s) Left EYE at bedtime  melatonin 3 milliGRAM(s) Oral at bedtime  nystatin Powder 1 Application(s) Topical three times a day  senna 2 Tablet(s) Oral at bedtime  simvastatin 20 milliGRAM(s) Oral at bedtime    MEDICATIONS  (PRN):  acetaminophen   Tablet. 650 milliGRAM(s) Oral every 6 hours PRN Moderate Pain (4 - 6)  bisacodyl Suppository 10 milliGRAM(s) Rectal daily PRN Constipation  docusate sodium 100 milliGRAM(s) Oral daily PRN Constipation  hydrocortisone 2.5% Ointment 1 Application(s) Topical daily PRN Rash  polyethylene glycol 3350 17 Gram(s) Oral daily PRN Constipation      LABS:                        10.2   5.26  )-----------( 164      ( 18 May 2018 09:58 )             31.1     141  |  107  |  24<H>  ----------------------------<  77  4.3   |  22  |  1.37<H>    Ca    9.6      18 May 2018 07:49    Creatinine Trend: 1.37<--, 1.32<--, 1.28<--, 1.36<--, 1.34<--, 1.38<--     PHYSICAL EXAM  Vital Signs Last 24 Hrs  T(C): 36.3 (18 May 2018 09:05), Max: 36.4 (17 May 2018 18:45)  T(F): 97.4 (18 May 2018 09:05), Max: 97.5 (17 May 2018 18:45)  HR: 60 (18 May 2018 09:05) (59 - 68)  BP: 135/82 (18 May 2018 09:05) (119/79 - 143/76)  RR: 18 (18 May 2018 09:05) (18 - 18)  SpO2: 99% (18 May 2018 09:05) (95% - 99%)    no JVD  RRR, no murmurs  CTAB  soft nt/nd  no c/c/e    No tele    serial Blood cultures all negative    A/P) He is a 85 y/o male PMH persistent AF who had a 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 His PPM interrogation here in April 2018 showed excellent PPM function with about 3.5 years remaining on PG longevity. He was admitted last month with coag neg staph UTI. Thankfully his blood cultures were and continue to be negative. He is now readmitted with weakness. He denies palpitations, syncope, nor angina. He remains off A/C due to his advanced age and advanced dementia.    -no need for repeat PPM interrogation  -as long as BCx remain negative and there is no suspicion for endocarditis then no further inpatient EP workup needed  -f/u with his electrophysiologist Dr. Jose Stapleton after discharge for routine PPM care  -poor candidate for systemic anticoagulation given advanced age and advanced dementia  -no further inpatient EP workup needed  -DC planning to [facility]

## 2018-05-19 LAB
ANION GAP SERPL CALC-SCNC: 10 MMOL/L — SIGNIFICANT CHANGE UP (ref 5–17)
BUN SERPL-MCNC: 25 MG/DL — HIGH (ref 7–23)
CALCIUM SERPL-MCNC: 9.5 MG/DL — SIGNIFICANT CHANGE UP (ref 8.4–10.5)
CHLORIDE SERPL-SCNC: 106 MMOL/L — SIGNIFICANT CHANGE UP (ref 96–108)
CO2 SERPL-SCNC: 21 MMOL/L — LOW (ref 22–31)
CREAT SERPL-MCNC: 1.39 MG/DL — HIGH (ref 0.5–1.3)
GLUCOSE SERPL-MCNC: 79 MG/DL — SIGNIFICANT CHANGE UP (ref 70–99)
HCT VFR BLD CALC: 32 % — LOW (ref 39–50)
HGB BLD-MCNC: 10.5 G/DL — LOW (ref 13–17)
MCHC RBC-ENTMCNC: 28.5 PG — SIGNIFICANT CHANGE UP (ref 27–34)
MCHC RBC-ENTMCNC: 32.8 GM/DL — SIGNIFICANT CHANGE UP (ref 32–36)
MCV RBC AUTO: 87 FL — SIGNIFICANT CHANGE UP (ref 80–100)
PLATELET # BLD AUTO: 161 K/UL — SIGNIFICANT CHANGE UP (ref 150–400)
POTASSIUM SERPL-MCNC: 4.5 MMOL/L — SIGNIFICANT CHANGE UP (ref 3.5–5.3)
POTASSIUM SERPL-SCNC: 4.5 MMOL/L — SIGNIFICANT CHANGE UP (ref 3.5–5.3)
RBC # BLD: 3.68 M/UL — LOW (ref 4.2–5.8)
RBC # FLD: 14.3 % — SIGNIFICANT CHANGE UP (ref 10.3–14.5)
SODIUM SERPL-SCNC: 137 MMOL/L — SIGNIFICANT CHANGE UP (ref 135–145)
WBC # BLD: 5.8 K/UL — SIGNIFICANT CHANGE UP (ref 3.8–10.5)
WBC # FLD AUTO: 5.8 K/UL — SIGNIFICANT CHANGE UP (ref 3.8–10.5)

## 2018-05-19 RX ADMIN — SIMVASTATIN 20 MILLIGRAM(S): 20 TABLET, FILM COATED ORAL at 22:21

## 2018-05-19 RX ADMIN — ENOXAPARIN SODIUM 40 MILLIGRAM(S): 100 INJECTION SUBCUTANEOUS at 12:46

## 2018-05-19 RX ADMIN — NYSTATIN CREAM 1 APPLICATION(S): 100000 CREAM TOPICAL at 05:39

## 2018-05-19 RX ADMIN — Medication 4 MILLIGRAM(S): at 22:21

## 2018-05-19 RX ADMIN — CEFEPIME 100 MILLIGRAM(S): 1 INJECTION, POWDER, FOR SOLUTION INTRAMUSCULAR; INTRAVENOUS at 05:39

## 2018-05-19 RX ADMIN — FAMOTIDINE 20 MILLIGRAM(S): 10 INJECTION INTRAVENOUS at 08:31

## 2018-05-19 RX ADMIN — CEFEPIME 100 MILLIGRAM(S): 1 INJECTION, POWDER, FOR SOLUTION INTRAMUSCULAR; INTRAVENOUS at 18:04

## 2018-05-19 RX ADMIN — LATANOPROST 1 DROP(S): 0.05 SOLUTION/ DROPS OPHTHALMIC; TOPICAL at 22:21

## 2018-05-19 RX ADMIN — PREGABALIN 1000 MICROGRAM(S): 225 CAPSULE ORAL at 08:31

## 2018-05-19 RX ADMIN — Medication 100 MILLIGRAM(S): at 08:31

## 2018-05-19 RX ADMIN — NYSTATIN CREAM 1 APPLICATION(S): 100000 CREAM TOPICAL at 18:04

## 2018-05-19 RX ADMIN — Medication 3 MILLIGRAM(S): at 00:21

## 2018-05-19 RX ADMIN — Medication 1000 UNIT(S): at 08:31

## 2018-05-19 RX ADMIN — NYSTATIN CREAM 1 APPLICATION(S): 100000 CREAM TOPICAL at 22:21

## 2018-05-19 NOTE — PROGRESS NOTE ADULT - PROBLEM SELECTOR PLAN 1
-cefepime inpatient, switch to oral augmentin 875 bid to 5/22/18 upon discharge  -pt is medically optimized for safe d/c to [facility], f/u case management   -outpatient  management of stent

## 2018-05-19 NOTE — PROGRESS NOTE ADULT - ASSESSMENT
Patient is a 84y old  Male who was recently discharged after treated  for MSSA UTI and hydronephrosis, and now presenting from rehab to the ER for evaluation of weakness (06 May 2018 18:20) and difficulty ambulating. He attributes his present weakness to this lack of working with PT. He has no fevers/chills, and on admission found to have positive Urine analysis and dose of Ceftriaxone is given, cultures pending. The ID consult requested to assist with further evaluation and antibiotic  management.     # Complicated UTI  - cultures are negative  # Moderate Right Hydronephrosis-   Cystoscopy with stent placement    Would recommend:  1. Continue Cefepime inpatient and may change to oral Augmentin  875 mg q 12hours on discharge to continue until 5/22/18  2. OOB to chair/PT  3. Aspiration precaution    d/w patient and Nursing staff Patient is a 84y old  Male who was recently discharged after treated  for MSSA UTI and hydronephrosis, and now presenting from rehab to the ER for evaluation of weakness (06 May 2018 18:20) and difficulty ambulating. He attributes his present weakness to this lack of working with PT. He has no fevers/chills, and on admission found to have positive Urine analysis and dose of Ceftriaxone is given, cultures pending. The ID consult requested to assist with further evaluation and antibiotic  management.     # Complicated UTI  - cultures are negative  # Moderate Right Hydronephrosis-   Cystoscopy with stent placement    Would recommend:  1. OOB to chair/PT  2. Continue Cefepime inpatient and may change to oral Augmentin  875 mg q 12hours on discharge to continue until 5/22/18  3. Aspiration precaution    d/w patient and Nursing staff

## 2018-05-19 NOTE — PROGRESS NOTE ADULT - SUBJECTIVE AND OBJECTIVE BOX
Patient is seen and examined at the bed side, is afebrile. He is s/p removal of hansen catheter and voiding okay. He is happy that he had PT today and was able to walk with walker.        REVIEW OF SYSTEMS: All other review systems are negative        Vital Signs Last 24 Hrs  T(C): 36.8 (19 May 2018 17:03), Max: 36.8 (19 May 2018 14:13)  T(F): 98.2 (19 May 2018 17:03), Max: 98.3 (19 May 2018 14:13)  HR: 66 (19 May 2018 17:03) (60 - 66)  BP: 130/78 (19 May 2018 17:03) (107/69 - 158/99)  BP(mean): --  RR: 18 (19 May 2018 17:03) (18 - 18)  SpO2: 95% (19 May 2018 17:03) (94% - 99%)      ALLERGIES: NKDA        PHYSICAL EXAM:  GENERAL: Not in acute distress  CVS: s1 and s2 present  RESP: Air entry B/L  GI: abdomen soft and nontender  : hansen draining blood tinged urine  EXT: No pedal edema  CNS: Awake and alert  SKIN: psoriasis lesions          LABS:                         10.5   5.80  )-----------( 161      ( 19 May 2018 08:46 )             32.0                10.6   5.56  )-----------( 171      ( 17 May 2018 07:50 )             33.2                    05    137  |  106  |  25<H>  ----------------------------<  79  4.5   |  21<L>  |  1.39<H>    Ca    9.5      19 May 2018 06:37      05-17    141  |  108  |  25<H>  ----------------------------<  76  4.4   |  23  |  1.32<H>    Ca    9.5      17 May 2018 07:00    TPro  8.1  /  Alb  3.8  /  TBili  0.3  /  DBili  x   /  AST  23  /  ALT  8<L>  /  AlkPhos  84  05-06        PTT - ( 06 May 2018 14:47 )  PTT:39.9 sec  Urinalysis Basic - ( 06 May 2018 16:09 )    Color: Yellow / Appearance: SL Turbid / S.016 / pH: x  Gluc: x / Ketone: Negative  / Bili: Negative / Urobili: Negative   Blood: x / Protein: Trace / Nitrite: Negative   Leuk Esterase: Large / RBC: >50 /HPF / WBC >50 /HPF   Sq Epi: x / Non Sq Epi: OCC /HPF / Bacteria: Few /HPF          MEDICATIONS  (STANDING):  cefepime   IVPB 1000 milliGRAM(s) IV Intermittent every 12 hours  cholecalciferol 1000 Unit(s) Oral daily  cyanocobalamin 1000 MICROGram(s) Oral daily  doxazosin 4 milliGRAM(s) Oral at bedtime  enoxaparin Injectable 40 milliGRAM(s) SubCutaneous every 24 hours  famotidine    Tablet 20 milliGRAM(s) Oral daily  latanoprost 0.005% Ophthalmic Solution 1 Drop(s) Left EYE at bedtime  melatonin 3 milliGRAM(s) Oral at bedtime  nystatin Powder 1 Application(s) Topical three times a day  senna 2 Tablet(s) Oral at bedtime  simvastatin 20 milliGRAM(s) Oral at bedtime    MEDICATIONS  (PRN):  acetaminophen   Tablet. 650 milliGRAM(s) Oral every 6 hours PRN Moderate Pain (4 - 6)  bisacodyl Suppository 10 milliGRAM(s) Rectal daily PRN Constipation  docusate sodium 100 milliGRAM(s) Oral daily PRN Constipation  hydrocortisone 2.5% Ointment 1 Application(s) Topical daily PRN Rash  polyethylene glycol 3350 17 Gram(s) Oral daily PRN Constipation          RADIOLOGY & ADDITIONAL TESTS:    18: US Kidney and Bladder (18 @ 16:42) Again seen is moderate hydroureteronephrosis as described above. Fine debris within the distal dilated right ureter is indicative of infection.   No left-sided hydronephrosis. Urinary bladder wall small contains fine debris also indicative of  infection. Thickening of the bladder wall may also reflect infection.  Thickening/trabeculation and bladder diverticulum suggests some degree of chronic outlet obstruction.      18: Xray Chest 1 View- PORTABLE-Urgent (18 @ 15:07) INTERPRETATION:  no urgent/emergent findings.      18: US Kidney and Bladder (18 @ 17:05) Moderate right hydroureteronephrosis to the level of the bladder. The right ureter appears to insert on a bladder diverticulum. Right renal   cortical thinning is suggestive of chronic hydronephrosis. Urinary bladder trabeculations and diverticula with debris.        MICROBIOLOGY DATA:    Culture - Blood (18 @ 00:47)    Specimen Source: .Blood Blood-Peripheral    Culture Results:   No growth to date.    Culture - Blood (18 @ 00:47)    Specimen Source: .Blood Blood-Peripheral    Culture Results:   No growth to date.    Culture - Urine (18 @ 21:36)    Specimen Source: .Urine Clean Catch (Midstream)    Culture Results:   No growth        Urinalysis + Microscopic Examination (18 @ 16:09)    Urine Appearance: SL Turbid    Urobilinogen: Negative    Leukocyte Esterase Concentration: Large    Nitrite: Negative    Specific Gravity: 1.016    Protein, Urine: Trace    pH Urine: 5.5    Ketone - Urine: Negative    Bilirubin: Negative    Color: Yellow    Glucose Qualitative, Urine: Negative    Blood, Urine: Moderate    Red Blood Cell - Urine: >50 /HPF    White Blood Cell - Urine: >50 /HPF    Epithelial Cells: OCC /HPF    Hyaline Casts: 2-5    Bacteria: Few /HPF Patient is seen and examined at the bed side, is afebrile. He is doing better and no new complaints.         REVIEW OF SYSTEMS: All other review systems are negative        Vital Signs Last 24 Hrs  T(C): 36.8 (19 May 2018 17:03), Max: 36.8 (19 May 2018 14:13)  T(F): 98.2 (19 May 2018 17:03), Max: 98.3 (19 May 2018 14:13)  HR: 66 (19 May 2018 17:03) (60 - 66)  BP: 130/78 (19 May 2018 17:03) (107/69 - 158/99)  BP(mean): --  RR: 18 (19 May 2018 17:03) (18 - 18)  SpO2: 95% (19 May 2018 17:03) (94% - 99%)        ALLERGIES: NKDA        PHYSICAL EXAM:  GENERAL: Not in acute distress  CVS: s1 and s2 present  RESP: Air entry B/L  GI: abdomen soft and nontender  EXT: No pedal edema  CNS: Awake and alert  SKIN: psoriasis lesions          LABS:                         10.5   5.80  )-----------( 161      ( 19 May 2018 08:46 )             32.0                10.6   5.56  )-----------( 171      ( 17 May 2018 07:50 )             33.2                        137  |  106  |  25<H>  ----------------------------<  79  4.5   |  21<L>  |  1.39<H>    Ca    9.5      19 May 2018 06:37      05-17    141  |  108  |  25<H>  ----------------------------<  76  4.4   |  23  |  1.32<H>    Ca    9.5      17 May 2018 07:00    TPro  8.1  /  Alb  3.8  /  TBili  0.3  /  DBili  x   /  AST  23  /  ALT  8<L>  /  AlkPhos  84  05-06        PTT - ( 06 May 2018 14:47 )  PTT:39.9 sec  Urinalysis Basic - ( 06 May 2018 16:09 )    Color: Yellow / Appearance: SL Turbid / S.016 / pH: x  Gluc: x / Ketone: Negative  / Bili: Negative / Urobili: Negative   Blood: x / Protein: Trace / Nitrite: Negative   Leuk Esterase: Large / RBC: >50 /HPF / WBC >50 /HPF   Sq Epi: x / Non Sq Epi: OCC /HPF / Bacteria: Few /HPF          MEDICATIONS  (STANDING):  cefepime   IVPB 1000 milliGRAM(s) IV Intermittent every 12 hours  cholecalciferol 1000 Unit(s) Oral daily  cyanocobalamin 1000 MICROGram(s) Oral daily  doxazosin 4 milliGRAM(s) Oral at bedtime  enoxaparin Injectable 40 milliGRAM(s) SubCutaneous every 24 hours  famotidine    Tablet 20 milliGRAM(s) Oral daily  latanoprost 0.005% Ophthalmic Solution 1 Drop(s) Left EYE at bedtime  melatonin 3 milliGRAM(s) Oral at bedtime  nystatin Powder 1 Application(s) Topical three times a day  senna 2 Tablet(s) Oral at bedtime  simvastatin 20 milliGRAM(s) Oral at bedtime    MEDICATIONS  (PRN):  acetaminophen   Tablet. 650 milliGRAM(s) Oral every 6 hours PRN Moderate Pain (4 - 6)  bisacodyl Suppository 10 milliGRAM(s) Rectal daily PRN Constipation  docusate sodium 100 milliGRAM(s) Oral daily PRN Constipation  hydrocortisone 2.5% Ointment 1 Application(s) Topical daily PRN Rash  polyethylene glycol 3350 17 Gram(s) Oral daily PRN Constipation          RADIOLOGY & ADDITIONAL TESTS:    18: US Kidney and Bladder (18 @ 16:42) Again seen is moderate hydroureteronephrosis as described above. Fine debris within the distal dilated right ureter is indicative of infection.   No left-sided hydronephrosis. Urinary bladder wall small contains fine debris also indicative of  infection. Thickening of the bladder wall may also reflect infection.  Thickening/trabeculation and bladder diverticulum suggests some degree of chronic outlet obstruction.      18: Xray Chest 1 View- PORTABLE-Urgent (18 @ 15:07) INTERPRETATION:  no urgent/emergent findings.      18: US Kidney and Bladder (18 @ 17:05) Moderate right hydroureteronephrosis to the level of the bladder. The right ureter appears to insert on a bladder diverticulum. Right renal   cortical thinning is suggestive of chronic hydronephrosis. Urinary bladder trabeculations and diverticula with debris.        MICROBIOLOGY DATA:    Culture - Blood (18 @ 00:47)    Specimen Source: .Blood Blood-Peripheral    Culture Results:   No growth to date.    Culture - Blood (18 @ 00:47)    Specimen Source: .Blood Blood-Peripheral    Culture Results:   No growth to date.    Culture - Urine (18 @ 21:36)    Specimen Source: .Urine Clean Catch (Midstream)    Culture Results:   No growth        Urinalysis + Microscopic Examination (18 @ 16:09)    Urine Appearance: SL Turbid    Urobilinogen: Negative    Leukocyte Esterase Concentration: Large    Nitrite: Negative    Specific Gravity: 1.016    Protein, Urine: Trace    pH Urine: 5.5    Ketone - Urine: Negative    Bilirubin: Negative    Color: Yellow    Glucose Qualitative, Urine: Negative    Blood, Urine: Moderate    Red Blood Cell - Urine: >50 /HPF    White Blood Cell - Urine: >50 /HPF    Epithelial Cells: OCC /HPF    Hyaline Casts: 2-5    Bacteria: Few /HPF

## 2018-05-19 NOTE — PROGRESS NOTE ADULT - SUBJECTIVE AND OBJECTIVE BOX
Patient seen and examined at bedside  no new acute events noted  Case discussed with medical team    HPI:  84m hx tia, s/p PPM, psoriasis, CKD, presenting from rehab with weakness, difficulty ambulating. Pt's complaints cluster primarily around poor care at rehab and lack of actual PT time, and pt attributes his present weakness to this lack of working with PT. Reports completing a course of abx yesterday for a UTI. Reports no fevers/chills, no new aches/pains, no cp/sob, no cough/HA. Would like to go to a different rehab if possible.    PAST MEDICAL & SURGICAL HISTORY:  Psoriasis  Prostate cancer  GERD (gastroesophageal reflux disease)  Dyslipidemia  No significant past surgical history      No Known Allergies       MEDICATIONS  (STANDING):  cefepime   IVPB 1000 milliGRAM(s) IV Intermittent every 12 hours  cholecalciferol 1000 Unit(s) Oral daily  cyanocobalamin 1000 MICROGram(s) Oral daily  doxazosin 4 milliGRAM(s) Oral at bedtime  enoxaparin Injectable 40 milliGRAM(s) SubCutaneous every 24 hours  famotidine    Tablet 20 milliGRAM(s) Oral daily  latanoprost 0.005% Ophthalmic Solution 1 Drop(s) Left EYE at bedtime  melatonin 3 milliGRAM(s) Oral at bedtime  nystatin Powder 1 Application(s) Topical three times a day  senna 2 Tablet(s) Oral at bedtime  simvastatin 20 milliGRAM(s) Oral at bedtime    MEDICATIONS  (PRN):  acetaminophen   Tablet. 650 milliGRAM(s) Oral every 6 hours PRN Moderate Pain (4 - 6)  bisacodyl Suppository 10 milliGRAM(s) Rectal daily PRN Constipation  docusate sodium 100 milliGRAM(s) Oral daily PRN Constipation  hydrocortisone 2.5% Ointment 1 Application(s) Topical daily PRN Rash  polyethylene glycol 3350 17 Gram(s) Oral daily PRN Constipation      REVIEW OF SYSTEMS: limited 2/2 sleep, obtained from charts and medical staff and colleagues  CONSTITUTIONAL: no fevers  RESPIRATORY: No hemoptysis  CARDIOVASCULAR: No chest pain  GASTROINTESTINAL: No hematemesis, diarrhea, melena, or hematochezia.  GENITOURINARY: No hematuria  NEUROLOGICAL: persistent unstable gait and generalized weakness    T(C): 36.4 (05-19-18 @ 09:30), Max: 36.4 (05-18-18 @ 17:41)  HR: 61 (05-19-18 @ 09:30) (60 - 61)  BP: 138/87 (05-19-18 @ 09:30) (107/69 - 158/99)  RR: 18 (05-19-18 @ 09:30) (18 - 18)  SpO2: 94% (05-19-18 @ 09:30) (94% - 99%)    PHYSICAL EXAMINATION:   Constitutional: sleeping in NAD  HEENT: poor dentition. NC, AT  Neck:  Supple  Respiratory:  Adequate airflow b/l. Not using accessory muscles of respiration.  Cardiovascular:  S1 & S2 intact, no R/G, 2+ radial pulses b/l  Gastrointestinal: Soft, ND, normoactive b.s., no organomegaly/RT/rigidity  Extremities: chronic distal venous stasis changes of lower extremities. [facility]    Labs and imaging reviewed    LABS:                        10.5   5.80  )-----------( 161      ( 19 May 2018 08:46 )             32.0     05-19    137  |  106  |  25<H>  ----------------------------<  79  4.5   |  21<L>  |  1.39<H>    Ca    9.5      19 May 2018 06:37              CAPILLARY BLOOD GLUCOSE                  RADIOLOGY & ADDITIONAL STUDIES:

## 2018-05-19 NOTE — PROGRESS NOTE ADULT - SUBJECTIVE AND OBJECTIVE BOX
pt seen and examined, no complaints on exam.   no events overnight     acetaminophen   Tablet. 650 milliGRAM(s) Oral every 6 hours PRN  bisacodyl Suppository 10 milliGRAM(s) Rectal daily PRN  cefepime   IVPB 1000 milliGRAM(s) IV Intermittent every 12 hours  cholecalciferol 1000 Unit(s) Oral daily  cyanocobalamin 1000 MICROGram(s) Oral daily  docusate sodium 100 milliGRAM(s) Oral daily PRN  doxazosin 4 milliGRAM(s) Oral at bedtime  enoxaparin Injectable 40 milliGRAM(s) SubCutaneous every 24 hours  famotidine    Tablet 20 milliGRAM(s) Oral daily  hydrocortisone 2.5% Ointment 1 Application(s) Topical daily PRN  latanoprost 0.005% Ophthalmic Solution 1 Drop(s) Left EYE at bedtime  melatonin 3 milliGRAM(s) Oral at bedtime  nystatin Powder 1 Application(s) Topical three times a day  polyethylene glycol 3350 17 Gram(s) Oral daily PRN  senna 2 Tablet(s) Oral at bedtime  simvastatin 20 milliGRAM(s) Oral at bedtime                            10.2   5.26  )-----------( 164      ( 18 May 2018 09:58 )             31.1       Hemoglobin: 10.2 g/dL (05-18 @ 09:58)  Hemoglobin: 10.6 g/dL (05-17 @ 07:50)  Hemoglobin: 10.9 g/dL (05-16 @ 07:48)  Hemoglobin: 10.6 g/dL (05-15 @ 11:19)  Hemoglobin: 10.8 g/dL (05-14 @ 09:45)      05-18    141  |  107  |  24<H>  ----------------------------<  77  4.3   |  22  |  1.37<H>    Ca    9.6      18 May 2018 07:49      Creatinine Trend: 1.37<--, 1.32<--, 1.28<--, 1.36<--, 1.34<--, 1.38<--    COAGS:           T(C): 36.4 (05-19-18 @ 00:02), Max: 36.4 (05-18-18 @ 17:41)  HR: 60 (05-19-18 @ 00:02) (60 - 61)  BP: 155/89 (05-19-18 @ 00:02) (107/69 - 155/89)  RR: 18 (05-19-18 @ 00:02) (18 - 18)  SpO2: 96% (05-19-18 @ 00:02) (94% - 99%)  Wt(kg): --    I&O's Summary    17 May 2018 07:01  -  18 May 2018 07:00  --------------------------------------------------------  IN: 1050 mL / OUT: 0 mL / NET: 1050 mL    18 May 2018 07:01  -  19 May 2018 04:10  --------------------------------------------------------  IN: 890 mL / OUT: 0 mL / NET: 890 mL        no JVD  RRR, no murmurs  CTAB  soft nt/nd  no c/c/e    No tele    serial Blood cultures all negative    A/P) He is a 83 y/o male PMH persistent AF who had a 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 His PPM interrogation here in April 2018 showed excellent PPM function with about 3.5 years remaining on PG longevity. He was admitted last month with coag neg staph UTI. Thankfully his blood cultures were and continue to be negative. He is now readmitted with weakness. He denies palpitations, syncope, nor angina. He remains off A/C due to his advanced age and advanced dementia.    -no need for repeat PPM interrogation  - negative blood culture to date ,   -f/u with his electrophysiologist Dr. Jose Stapleton after discharge for routine PPM care  -poor candidate for systemic anticoagulation given advanced age and advanced dementia  -no further inpatient EP workup needed  -DC planning to [facility]  D/W Dr Duarte

## 2018-05-19 NOTE — PROGRESS NOTE ADULT - SUBJECTIVE AND OBJECTIVE BOX
[facility] NEPHROLOGY- PROGRESS NOTE    84y Male with history of HTN presents with UTI. Nephrology consulted for elevated Scr.    REVIEW OF SYSTEMS:  Gen: no changes in weight  Cards: no chest pain  Resp: no dyspnea  GI: no nausea or vomiting or diarrhea. +constipated  Vascular: no LE edema    No Known Allergies      Hospital Medications: Medications reviewed    VITALS:  T(F): 97.5 (05-19-18 @ 09:30), Max: 97.6 (05-18-18 @ 21:45)  HR: 61 (05-19-18 @ 09:30)  BP: 138/87 (05-19-18 @ 09:30)  RR: 18 (05-19-18 @ 09:30)  SpO2: 94% (05-19-18 @ 09:30)  Wt(kg): --    05-18 @ 07:01  -  05-19 @ 07:00  --------------------------------------------------------  IN: 890 mL / OUT: 0 mL / NET: 890 mL    05-19 @ 07:01  -  05-19 @ 12:49  --------------------------------------------------------  IN: 240 mL / OUT: 0 mL / NET: 240 mL      PHYSICAL EXAM:  Gen: NAD, calm  Cards: RRR, +S1/S2, no M/G/R  Resp: CTA B/L  GI: soft, NT/ND, NABS  Vascular: no LE edema B/L    LABS:  05-19    137  |  106  |  25<H>  ----------------------------<  79  4.5   |  21<L>  |  1.39<H>    Ca    9.5      19 May 2018 06:37      Creatinine Trend: 1.39 <--, 1.37 <--, 1.32 <--, 1.28 <--, 1.36 <--, 1.34 <--, 1.38 <--                        10.5   5.80  )-----------( 161      ( 19 May 2018 08:46 )             32.0     Urine Studies:

## 2018-05-19 NOTE — PROGRESS NOTE ADULT - SUBJECTIVE AND OBJECTIVE BOX
S: no CP or SOB Review of systems otherwise (-)      acetaminophen   Tablet. 650 milliGRAM(s) Oral every 6 hours PRN  bisacodyl Suppository 10 milliGRAM(s) Rectal daily PRN  cefepime   IVPB 1000 milliGRAM(s) IV Intermittent every 12 hours  cholecalciferol 1000 Unit(s) Oral daily  cyanocobalamin 1000 MICROGram(s) Oral daily  docusate sodium 100 milliGRAM(s) Oral daily PRN  doxazosin 4 milliGRAM(s) Oral at bedtime  enoxaparin Injectable 40 milliGRAM(s) SubCutaneous every 24 hours  famotidine    Tablet 20 milliGRAM(s) Oral daily  hydrocortisone 2.5% Ointment 1 Application(s) Topical daily PRN  latanoprost 0.005% Ophthalmic Solution 1 Drop(s) Left EYE at bedtime  melatonin 3 milliGRAM(s) Oral at bedtime  nystatin Powder 1 Application(s) Topical three times a day  polyethylene glycol 3350 17 Gram(s) Oral daily PRN  senna 2 Tablet(s) Oral at bedtime  simvastatin 20 milliGRAM(s) Oral at bedtime                            10.5   5.80  )-----------( 161      ( 19 May 2018 08:46 )             32.0       05-19    137  |  106  |  25<H>  ----------------------------<  79  4.5   |  21<L>  |  1.39<H>    Ca    9.5      19 May 2018 06:37              T(C): 36.8 (05-19-18 @ 14:13), Max: 36.8 (05-19-18 @ 14:13)  HR: 64 (05-19-18 @ 14:13) (60 - 64)  BP: 107/69 (05-19-18 @ 14:13) (107/69 - 158/99)  RR: 18 (05-19-18 @ 14:13) (18 - 18)  SpO2: 94% (05-19-18 @ 14:13) (94% - 99%)  Wt(kg): --    I&O's Summary    18 May 2018 07:01  -  19 May 2018 07:00  --------------------------------------------------------  IN: 890 mL / OUT: 0 mL / NET: 890 mL    19 May 2018 07:01  -  19 May 2018 14:25  --------------------------------------------------------  IN: 440 mL / OUT: 0 mL / NET: 440 mL            S1S2 RRR  CTA B/L  SOFT ND NT +BS  NO C/C/E B/L LE      A/P) He is a 85 y/o male PMH persistent AF who had a 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 His PPM interrogation here in April 2018 showed excellent PPM function with about 3.5 years remaining on PG longevity. He was admitted last month with coag neg staph UTI. Thankfully his blood cultures were and continue to be negative. He is now readmitted with weakness. He denies palpitations, syncope, nor angina. He remains off A/C due to his advanced age and advanced dementia.    - no clinical heart failure or anginal symptoms   - Off AC for advanced age, dementia and fall risk  - s/p cysto and R ureteral stent placement 5/15  - no further inpatient cardiac workup needed at this time.   - dc planning to [facility] per primary team       Eduardo Stuart MD

## 2018-05-20 LAB
ANION GAP SERPL CALC-SCNC: 10 MMOL/L — SIGNIFICANT CHANGE UP (ref 5–17)
BUN SERPL-MCNC: 23 MG/DL — SIGNIFICANT CHANGE UP (ref 7–23)
CALCIUM SERPL-MCNC: 9.6 MG/DL — SIGNIFICANT CHANGE UP (ref 8.4–10.5)
CHLORIDE SERPL-SCNC: 107 MMOL/L — SIGNIFICANT CHANGE UP (ref 96–108)
CO2 SERPL-SCNC: 22 MMOL/L — SIGNIFICANT CHANGE UP (ref 22–31)
CREAT SERPL-MCNC: 1.36 MG/DL — HIGH (ref 0.5–1.3)
GLUCOSE SERPL-MCNC: 88 MG/DL — SIGNIFICANT CHANGE UP (ref 70–99)
HCT VFR BLD CALC: 33.4 % — LOW (ref 39–50)
HGB BLD-MCNC: 11.1 G/DL — LOW (ref 13–17)
MCHC RBC-ENTMCNC: 29.1 PG — SIGNIFICANT CHANGE UP (ref 27–34)
MCHC RBC-ENTMCNC: 33.2 GM/DL — SIGNIFICANT CHANGE UP (ref 32–36)
MCV RBC AUTO: 87.7 FL — SIGNIFICANT CHANGE UP (ref 80–100)
PLATELET # BLD AUTO: 151 K/UL — SIGNIFICANT CHANGE UP (ref 150–400)
POTASSIUM SERPL-MCNC: 4.4 MMOL/L — SIGNIFICANT CHANGE UP (ref 3.5–5.3)
POTASSIUM SERPL-SCNC: 4.4 MMOL/L — SIGNIFICANT CHANGE UP (ref 3.5–5.3)
RBC # BLD: 3.81 M/UL — LOW (ref 4.2–5.8)
RBC # FLD: 14.2 % — SIGNIFICANT CHANGE UP (ref 10.3–14.5)
SODIUM SERPL-SCNC: 140 MMOL/L — SIGNIFICANT CHANGE UP (ref 135–145)
WBC # BLD: 6.03 K/UL — SIGNIFICANT CHANGE UP (ref 3.8–10.5)
WBC # FLD AUTO: 6.03 K/UL — SIGNIFICANT CHANGE UP (ref 3.8–10.5)

## 2018-05-20 RX ADMIN — CEFEPIME 100 MILLIGRAM(S): 1 INJECTION, POWDER, FOR SOLUTION INTRAMUSCULAR; INTRAVENOUS at 18:12

## 2018-05-20 RX ADMIN — SIMVASTATIN 20 MILLIGRAM(S): 20 TABLET, FILM COATED ORAL at 21:27

## 2018-05-20 RX ADMIN — Medication 4 MILLIGRAM(S): at 21:27

## 2018-05-20 RX ADMIN — LATANOPROST 1 DROP(S): 0.05 SOLUTION/ DROPS OPHTHALMIC; TOPICAL at 21:24

## 2018-05-20 RX ADMIN — NYSTATIN CREAM 1 APPLICATION(S): 100000 CREAM TOPICAL at 14:23

## 2018-05-20 RX ADMIN — ENOXAPARIN SODIUM 40 MILLIGRAM(S): 100 INJECTION SUBCUTANEOUS at 14:23

## 2018-05-20 RX ADMIN — NYSTATIN CREAM 1 APPLICATION(S): 100000 CREAM TOPICAL at 05:04

## 2018-05-20 RX ADMIN — NYSTATIN CREAM 1 APPLICATION(S): 100000 CREAM TOPICAL at 21:27

## 2018-05-20 RX ADMIN — Medication 3 MILLIGRAM(S): at 22:48

## 2018-05-20 RX ADMIN — CEFEPIME 100 MILLIGRAM(S): 1 INJECTION, POWDER, FOR SOLUTION INTRAMUSCULAR; INTRAVENOUS at 05:04

## 2018-05-20 NOTE — PROGRESS NOTE ADULT - ASSESSMENT
Patient is a 84y old  Male who was recently discharged after treated  for MSSA UTI and hydronephrosis, and now presenting from rehab to the ER for evaluation of weakness (06 May 2018 18:20) and difficulty ambulating. He attributes his present weakness to this lack of working with PT. He has no fevers/chills, and on admission found to have positive Urine analysis and dose of Ceftriaxone is given, cultures pending. The ID consult requested to assist with further evaluation and antibiotic  management.     # Complicated UTI  - cultures are negative  # Moderate Right Hydronephrosis-   Cystoscopy with stent placement    Would recommend:  1. OOB to chair/PT  2. Continue Cefepime inpatient and may change to oral Augmentin  875 mg q 12hours on discharge to continue until 5/22/18  3. Aspiration precaution    d/w patient and Nursing staff

## 2018-05-20 NOTE — PROGRESS NOTE ADULT - SUBJECTIVE AND OBJECTIVE BOX
pt seen and examined, no complaints on exam.     ROS neg , no events overnight    acetaminophen   Tablet. 650 milliGRAM(s) Oral every 6 hours PRN  bisacodyl Suppository 10 milliGRAM(s) Rectal daily PRN  cefepime   IVPB 1000 milliGRAM(s) IV Intermittent every 12 hours  cholecalciferol 1000 Unit(s) Oral daily  cyanocobalamin 1000 MICROGram(s) Oral daily  docusate sodium 100 milliGRAM(s) Oral daily PRN  doxazosin 4 milliGRAM(s) Oral at bedtime  enoxaparin Injectable 40 milliGRAM(s) SubCutaneous every 24 hours  famotidine    Tablet 20 milliGRAM(s) Oral daily  hydrocortisone 2.5% Ointment 1 Application(s) Topical daily PRN  latanoprost 0.005% Ophthalmic Solution 1 Drop(s) Left EYE at bedtime  melatonin 3 milliGRAM(s) Oral at bedtime  nystatin Powder 1 Application(s) Topical three times a day  polyethylene glycol 3350 17 Gram(s) Oral daily PRN  senna 2 Tablet(s) Oral at bedtime  simvastatin 20 milliGRAM(s) Oral at bedtime                            10.5   5.80  )-----------( 161      ( 19 May 2018 08:46 )             32.0       Hemoglobin: 10.5 g/dL (05-19 @ 08:46)  Hemoglobin: 10.2 g/dL (05-18 @ 09:58)  Hemoglobin: 10.6 g/dL (05-17 @ 07:50)  Hemoglobin: 10.9 g/dL (05-16 @ 07:48)  Hemoglobin: 10.6 g/dL (05-15 @ 11:19)      05-19    137  |  106  |  25<H>  ----------------------------<  79  4.5   |  21<L>  |  1.39<H>    Ca    9.5      19 May 2018 06:37      Creatinine Trend: 1.39<--, 1.37<--, 1.32<--, 1.28<--, 1.36<--, 1.34<--    COAGS:           T(C): 36.6 (05-20-18 @ 00:22), Max: 36.8 (05-19-18 @ 14:13)  HR: 59 (05-20-18 @ 00:22) (59 - 66)  BP: 145/80 (05-20-18 @ 00:22) (107/69 - 145/80)  RR: 18 (05-20-18 @ 00:22) (18 - 18)  SpO2: 97% (05-20-18 @ 00:22) (94% - 97%)  Wt(kg): --    I&O's Summary    18 May 2018 07:01  -  19 May 2018 07:00  --------------------------------------------------------  IN: 890 mL / OUT: 0 mL / NET: 890 mL    19 May 2018 07:01  -  20 May 2018 06:16  --------------------------------------------------------  IN: 760 mL / OUT: 0 mL / NET: 760 mL            no JVD  RRR, no murmurs  CTAB  soft nt/nd  no c/c/e    No tele    serial Blood cultures all negative    A/P) He is a 85 y/o male PMH persistent AF who had a 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 His PPM interrogation here in April 2018 showed excellent PPM function with about 3.5 years remaining on PG longevity. He was admitted last month with coag neg staph UTI. Thankfully his blood cultures were and continue to be negative. He is now readmitted with weakness. He denies palpitations, syncope, nor angina. He remains off A/C due to his advanced age and advanced dementia.    -no need for repeat PPM interrogation  - Off AC for advanced age, dementia and fall risk  -f/u with his electrophysiologist Dr. Jose Stapleton after discharge for routine PPM care  -poor candidate for systemic anticoagulation given advanced age and advanced dementia  -no further inpatient EP workup needed  -DC planning to [facility]  D/W Dr Duarte

## 2018-05-20 NOTE — PROGRESS NOTE ADULT - SUBJECTIVE AND OBJECTIVE BOX
[facility] NEPHROLOGY- PROGRESS NOTE    84y Male with history of HTN presents with UTI. Nephrology consulted for elevated Scr.    REVIEW OF SYSTEMS:  Gen: no changes in weight  Cards: no chest pain  Resp: no dyspnea  GI: no nausea or vomiting or diarrhea.  Vascular: no LE edema    No Known Allergies      Hospital Medications: Medications reviewed    VITALS:  T(F): 97.3 (05-20-18 @ 13:00), Max: 98.3 (05-19-18 @ 20:57)  HR: 60 (05-20-18 @ 13:00)  BP: 163/93 (05-20-18 @ 13:00)  RR: 18 (05-20-18 @ 13:00)  SpO2: 96% (05-20-18 @ 13:00)  Wt(kg): --    05-19 @ 07:01  -  05-20 @ 07:00  --------------------------------------------------------  IN: 810 mL / OUT: 1 mL / NET: 809 mL    05-20 @ 07:01  -  05-20 @ 14:59  --------------------------------------------------------  IN: 340 mL / OUT: 0 mL / NET: 340 mL      PHYSICAL EXAM:  Gen: NAD, calm  Cards: RRR, +S1/S2, no M/G/R  Resp: CTA B/L  GI: soft, NT/ND, NABS  Vascular: no LE edema B/L    LABS:  05-20    140  |  107  |  23  ----------------------------<  88  4.4   |  22  |  1.36<H>    Ca    9.6      20 May 2018 07:07      Creatinine Trend: 1.36 <--, 1.39 <--, 1.37 <--, 1.32 <--, 1.28 <--, 1.36 <--, 1.34 <--                        11.1   6.03  )-----------( 151      ( 20 May 2018 07:49 )             33.4     Urine Studies:

## 2018-05-20 NOTE — PROGRESS NOTE ADULT - SUBJECTIVE AND OBJECTIVE BOX
S: no CP or SOB Review of systems otherwise (-)        acetaminophen   Tablet. 650 milliGRAM(s) Oral every 6 hours PRN  bisacodyl Suppository 10 milliGRAM(s) Rectal daily PRN  cefepime   IVPB 1000 milliGRAM(s) IV Intermittent every 12 hours  cholecalciferol 1000 Unit(s) Oral daily  cyanocobalamin 1000 MICROGram(s) Oral daily  docusate sodium 100 milliGRAM(s) Oral daily PRN  doxazosin 4 milliGRAM(s) Oral at bedtime  enoxaparin Injectable 40 milliGRAM(s) SubCutaneous every 24 hours  famotidine    Tablet 20 milliGRAM(s) Oral daily  hydrocortisone 2.5% Ointment 1 Application(s) Topical daily PRN  latanoprost 0.005% Ophthalmic Solution 1 Drop(s) Left EYE at bedtime  melatonin 3 milliGRAM(s) Oral at bedtime  nystatin Powder 1 Application(s) Topical three times a day  polyethylene glycol 3350 17 Gram(s) Oral daily PRN  senna 2 Tablet(s) Oral at bedtime  simvastatin 20 milliGRAM(s) Oral at bedtime                            11.1   6.03  )-----------( 151      ( 20 May 2018 07:49 )             33.4       05-20    140  |  107  |  23  ----------------------------<  88  4.4   |  22  |  1.36<H>    Ca    9.6      20 May 2018 07:07              T(C): 36.4 (05-20-18 @ 09:58), Max: 36.8 (05-19-18 @ 14:13)  HR: 60 (05-20-18 @ 09:58) (58 - 66)  BP: 121/78 (05-20-18 @ 09:58) (107/69 - 149/85)  RR: 18 (05-20-18 @ 09:58) (18 - 18)  SpO2: 98% (05-20-18 @ 09:58) (94% - 98%)  Wt(kg): --    I&O's Summary    19 May 2018 07:01  -  20 May 2018 07:00  --------------------------------------------------------  IN: 810 mL / OUT: 1 mL / NET: 809 mL    20 May 2018 07:01  -  20 May 2018 12:42  --------------------------------------------------------  IN: 180 mL / OUT: 0 mL / NET: 180 mL            S1S2 RRR  CTA B/L  SOFT ND NT +BS  NO C/C/E B/L LE      A/P) He is a 85 y/o male PMH persistent AF who had a 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 His PPM interrogation here in April 2018 showed excellent PPM function with about 3.5 years remaining on PG longevity. He was admitted last month with coag neg staph UTI. Thankfully his blood cultures were and continue to be negative. He is now readmitted with weakness. He denies palpitations, syncope, nor angina. He remains off A/C due to his advanced age and advanced dementia.    - no clinical heart failure  - Off AC for advanced age, dementia, fall risk   - no further inpatient cardiac workup needed at this time.   - dc planning per primary team       Eduardo Stuart MD

## 2018-05-20 NOTE — PROGRESS NOTE ADULT - SUBJECTIVE AND OBJECTIVE BOX
Patient is seen and examined at the bed side, is afebrile. He is doing better and no new complaints.         REVIEW OF SYSTEMS: All other review systems are negative        Vital Signs Last 24 Hrs  T(C): 36.9 (20 May 2018 17:02), Max: 36.9 (20 May 2018 17:02)  T(F): 98.5 (20 May 2018 17:02), Max: 98.5 (20 May 2018 17:02)  HR: 60 (20 May 2018 17:02) (58 - 62)  BP: 130/80 (20 May 2018 17:02) (121/78 - 163/93)  BP(mean): --  RR: 18 (20 May 2018 17:02) (18 - 18)  SpO2: 99% (20 May 2018 17:02) (94% - 99%)        ALLERGIES: NKDA        PHYSICAL EXAM:  GENERAL: Not in acute distress  CVS: s1 and s2 present  RESP: Air entry B/L  GI: abdomen soft and nontender  EXT: No pedal edema  CNS: Awake and alert  SKIN: psoriasis lesions          LABS:                         11.1   6.03  )-----------( 151      ( 20 May 2018 07:49 )             33.4                           10.5   5.80  )-----------( 161      ( 19 May 2018 08:46 )             32.0                    05-20    140  |  107  |  23  ----------------------------<  88  4.4   |  22  |  1.36<H>    Ca    9.6      20 May 2018 07:07      05-19    137  |  106  |  25<H>  ----------------------------<  79  4.5   |  21<L>  |  1.39<H>    Ca    9.5      19 May 2018 06:37    TPro  8.1  /  Alb  3.8  /  TBili  0.3  /  DBili  x   /  AST  23  /  ALT  8<L>  /  AlkPhos  84  05-06        PTT - ( 06 May 2018 14:47 )  PTT:39.9 sec  Urinalysis Basic - ( 06 May 2018 16:09 )    Color: Yellow / Appearance: SL Turbid / S.016 / pH: x  Gluc: x / Ketone: Negative  / Bili: Negative / Urobili: Negative   Blood: x / Protein: Trace / Nitrite: Negative   Leuk Esterase: Large / RBC: >50 /HPF / WBC >50 /HPF   Sq Epi: x / Non Sq Epi: OCC /HPF / Bacteria: Few /HPF            MEDICATIONS  (STANDING):  cefepime   IVPB 1000 milliGRAM(s) IV Intermittent every 12 hours  cholecalciferol 1000 Unit(s) Oral daily  cyanocobalamin 1000 MICROGram(s) Oral daily  doxazosin 4 milliGRAM(s) Oral at bedtime  enoxaparin Injectable 40 milliGRAM(s) SubCutaneous every 24 hours  famotidine    Tablet 20 milliGRAM(s) Oral daily  latanoprost 0.005% Ophthalmic Solution 1 Drop(s) Left EYE at bedtime  melatonin 3 milliGRAM(s) Oral at bedtime  nystatin Powder 1 Application(s) Topical three times a day  senna 2 Tablet(s) Oral at bedtime  simvastatin 20 milliGRAM(s) Oral at bedtime    MEDICATIONS  (PRN):  acetaminophen   Tablet. 650 milliGRAM(s) Oral every 6 hours PRN Moderate Pain (4 - 6)  bisacodyl Suppository 10 milliGRAM(s) Rectal daily PRN Constipation  docusate sodium 100 milliGRAM(s) Oral daily PRN Constipation  hydrocortisone 2.5% Ointment 1 Application(s) Topical daily PRN Rash  polyethylene glycol 3350 17 Gram(s) Oral daily PRN Constipation        RADIOLOGY & ADDITIONAL TESTS:    5/15/18: Xray Chest 1 View- PORTABLE-Urgent (05.15.18 @ 06:19) 1.  Single lead pacemaker in place.  2.  The lungs are clear.      18: US Kidney and Bladder (18 @ 16:42) Again seen is moderate hydroureteronephrosis as described above. Fine debris within the distal dilated right ureter is indicative of infection.   No left-sided hydronephrosis. Urinary bladder wall small contains fine debris also indicative of  infection. Thickening of the bladder wall may also reflect infection.  Thickening/trabeculation and bladder diverticulum suggests some degree of chronic outlet obstruction.      18: Xray Chest 1 View- PORTABLE-Urgent (18 @ 15:07) INTERPRETATION:  no urgent/emergent findings.      18: US Kidney and Bladder (18 @ 17:05) Moderate right hydroureteronephrosis to the level of the bladder. The right ureter appears to insert on a bladder diverticulum. Right renal   cortical thinning is suggestive of chronic hydronephrosis. Urinary bladder trabeculations and diverticula with debris.        MICROBIOLOGY DATA:      Culture - Blood (18 @ 00:47)    Specimen Source: .Blood Blood-Peripheral    Culture Results:   No growth to date.    Culture - Blood (18 @ 00:47)    Specimen Source: .Blood Blood-Peripheral    Culture Results:   No growth to date.    Culture - Urine (18 @ 21:36)    Specimen Source: .Urine Clean Catch (Midstream)    Culture Results:   No growth        Urinalysis + Microscopic Examination (18 @ 16:09)    Urine Appearance: SL Turbid    Urobilinogen: Negative    Leukocyte Esterase Concentration: Large    Nitrite: Negative    Specific Gravity: 1.016    Protein, Urine: Trace    pH Urine: 5.5    Ketone - Urine: Negative    Bilirubin: Negative    Color: Yellow    Glucose Qualitative, Urine: Negative    Blood, Urine: Moderate    Red Blood Cell - Urine: >50 /HPF    White Blood Cell - Urine: >50 /HPF    Epithelial Cells: OCC /HPF    Hyaline Casts: 2-5    Bacteria: Few /HPF Patient is seen and examined at the bed side, is afebrile. He is doing better, having normal bowel movements.         REVIEW OF SYSTEMS: All other review systems are negative        Vital Signs Last 24 Hrs  T(C): 36.9 (20 May 2018 17:02), Max: 36.9 (20 May 2018 17:02)  T(F): 98.5 (20 May 2018 17:02), Max: 98.5 (20 May 2018 17:02)  HR: 60 (20 May 2018 17:02) (58 - 62)  BP: 130/80 (20 May 2018 17:02) (121/78 - 163/93)  BP(mean): --  RR: 18 (20 May 2018 17:02) (18 - 18)  SpO2: 99% (20 May 2018 17:02) (94% - 99%)        ALLERGIES: NKDA        PHYSICAL EXAM:  GENERAL: Not in acute distress  CVS: s1 and s2 present  RESP: Air entry B/L  GI: abdomen soft and nontender  EXT: No pedal edema  CNS: Awake and alert  SKIN: psoriasis lesions          LABS:                         11.1   6.03  )-----------( 151      ( 20 May 2018 07:49 )             33.4                           10.5   5.80  )-----------( 161      ( 19 May 2018 08:46 )             32.0                    05-20    140  |  107  |  23  ----------------------------<  88  4.4   |  22  |  1.36<H>    Ca    9.6      20 May 2018 07:07      05-19    137  |  106  |  25<H>  ----------------------------<  79  4.5   |  21<L>  |  1.39<H>    Ca    9.5      19 May 2018 06:37    TPro  8.1  /  Alb  3.8  /  TBili  0.3  /  DBili  x   /  AST  23  /  ALT  8<L>  /  AlkPhos  84  05-06        PTT - ( 06 May 2018 14:47 )  PTT:39.9 sec  Urinalysis Basic - ( 06 May 2018 16:09 )    Color: Yellow / Appearance: SL Turbid / S.016 / pH: x  Gluc: x / Ketone: Negative  / Bili: Negative / Urobili: Negative   Blood: x / Protein: Trace / Nitrite: Negative   Leuk Esterase: Large / RBC: >50 /HPF / WBC >50 /HPF   Sq Epi: x / Non Sq Epi: OCC /HPF / Bacteria: Few /HPF            MEDICATIONS  (STANDING):  cefepime   IVPB 1000 milliGRAM(s) IV Intermittent every 12 hours  cholecalciferol 1000 Unit(s) Oral daily  cyanocobalamin 1000 MICROGram(s) Oral daily  doxazosin 4 milliGRAM(s) Oral at bedtime  enoxaparin Injectable 40 milliGRAM(s) SubCutaneous every 24 hours  famotidine    Tablet 20 milliGRAM(s) Oral daily  latanoprost 0.005% Ophthalmic Solution 1 Drop(s) Left EYE at bedtime  melatonin 3 milliGRAM(s) Oral at bedtime  nystatin Powder 1 Application(s) Topical three times a day  senna 2 Tablet(s) Oral at bedtime  simvastatin 20 milliGRAM(s) Oral at bedtime    MEDICATIONS  (PRN):  acetaminophen   Tablet. 650 milliGRAM(s) Oral every 6 hours PRN Moderate Pain (4 - 6)  bisacodyl Suppository 10 milliGRAM(s) Rectal daily PRN Constipation  docusate sodium 100 milliGRAM(s) Oral daily PRN Constipation  hydrocortisone 2.5% Ointment 1 Application(s) Topical daily PRN Rash  polyethylene glycol 3350 17 Gram(s) Oral daily PRN Constipation            RADIOLOGY & ADDITIONAL TESTS:    5/15/18: Xray Chest 1 View- PORTABLE-Urgent (05.15.18 @ 06:19) 1.  Single lead pacemaker in place.  2.  The lungs are clear.      18: US Kidney and Bladder (18 @ 16:42) Again seen is moderate hydroureteronephrosis as described above. Fine debris within the distal dilated right ureter is indicative of infection.   No left-sided hydronephrosis. Urinary bladder wall small contains fine debris also indicative of  infection. Thickening of the bladder wall may also reflect infection.  Thickening/trabeculation and bladder diverticulum suggests some degree of chronic outlet obstruction.      18: Xray Chest 1 View- PORTABLE-Urgent (18 @ 15:07) INTERPRETATION:  no urgent/emergent findings.      18: US Kidney and Bladder (18 @ 17:05) Moderate right hydroureteronephrosis to the level of the bladder. The right ureter appears to insert on a bladder diverticulum. Right renal   cortical thinning is suggestive of chronic hydronephrosis. Urinary bladder trabeculations and diverticula with debris.        MICROBIOLOGY DATA:      Culture - Blood (18 @ 00:47)    Specimen Source: .Blood Blood-Peripheral    Culture Results:   No growth to date.    Culture - Blood (18 @ 00:47)    Specimen Source: .Blood Blood-Peripheral    Culture Results:   No growth to date.    Culture - Urine (18 @ 21:36)    Specimen Source: .Urine Clean Catch (Midstream)    Culture Results:   No growth        Urinalysis + Microscopic Examination (18 @ 16:09)    Urine Appearance: SL Turbid    Urobilinogen: Negative    Leukocyte Esterase Concentration: Large    Nitrite: Negative    Specific Gravity: 1.016    Protein, Urine: Trace    pH Urine: 5.5    Ketone - Urine: Negative    Bilirubin: Negative    Color: Yellow    Glucose Qualitative, Urine: Negative    Blood, Urine: Moderate    Red Blood Cell - Urine: >50 /HPF    White Blood Cell - Urine: >50 /HPF    Epithelial Cells: OCC /HPF    Hyaline Casts: 2-5    Bacteria: Few /HPF

## 2018-05-21 VITALS
TEMPERATURE: 98 F | RESPIRATION RATE: 18 BRPM | DIASTOLIC BLOOD PRESSURE: 86 MMHG | OXYGEN SATURATION: 97 % | SYSTOLIC BLOOD PRESSURE: 149 MMHG | HEART RATE: 62 BPM

## 2018-05-21 LAB
ANION GAP SERPL CALC-SCNC: 10 MMOL/L — SIGNIFICANT CHANGE UP (ref 5–17)
BUN SERPL-MCNC: 23 MG/DL — SIGNIFICANT CHANGE UP (ref 7–23)
CALCIUM SERPL-MCNC: 9.5 MG/DL — SIGNIFICANT CHANGE UP (ref 8.4–10.5)
CHLORIDE SERPL-SCNC: 107 MMOL/L — SIGNIFICANT CHANGE UP (ref 96–108)
CO2 SERPL-SCNC: 23 MMOL/L — SIGNIFICANT CHANGE UP (ref 22–31)
CREAT SERPL-MCNC: 1.33 MG/DL — HIGH (ref 0.5–1.3)
GLUCOSE SERPL-MCNC: 75 MG/DL — SIGNIFICANT CHANGE UP (ref 70–99)
HCT VFR BLD CALC: 31.7 % — LOW (ref 39–50)
HGB BLD-MCNC: 10.6 G/DL — LOW (ref 13–17)
MCHC RBC-ENTMCNC: 29.4 PG — SIGNIFICANT CHANGE UP (ref 27–34)
MCHC RBC-ENTMCNC: 33.4 GM/DL — SIGNIFICANT CHANGE UP (ref 32–36)
MCV RBC AUTO: 87.8 FL — SIGNIFICANT CHANGE UP (ref 80–100)
PLATELET # BLD AUTO: 152 K/UL — SIGNIFICANT CHANGE UP (ref 150–400)
POTASSIUM SERPL-MCNC: 4.6 MMOL/L — SIGNIFICANT CHANGE UP (ref 3.5–5.3)
POTASSIUM SERPL-SCNC: 4.6 MMOL/L — SIGNIFICANT CHANGE UP (ref 3.5–5.3)
RBC # BLD: 3.61 M/UL — LOW (ref 4.2–5.8)
RBC # FLD: 14.2 % — SIGNIFICANT CHANGE UP (ref 10.3–14.5)
SODIUM SERPL-SCNC: 140 MMOL/L — SIGNIFICANT CHANGE UP (ref 135–145)
WBC # BLD: 5.59 K/UL — SIGNIFICANT CHANGE UP (ref 3.8–10.5)
WBC # FLD AUTO: 5.59 K/UL — SIGNIFICANT CHANGE UP (ref 3.8–10.5)

## 2018-05-21 PROCEDURE — C2617: CPT

## 2018-05-21 PROCEDURE — 97165 OT EVAL LOW COMPLEX 30 MIN: CPT

## 2018-05-21 PROCEDURE — 97116 GAIT TRAINING THERAPY: CPT

## 2018-05-21 PROCEDURE — 85610 PROTHROMBIN TIME: CPT

## 2018-05-21 PROCEDURE — 80053 COMPREHEN METABOLIC PANEL: CPT

## 2018-05-21 PROCEDURE — C1769: CPT

## 2018-05-21 PROCEDURE — 80048 BASIC METABOLIC PNL TOTAL CA: CPT

## 2018-05-21 PROCEDURE — G0103: CPT

## 2018-05-21 PROCEDURE — 83735 ASSAY OF MAGNESIUM: CPT

## 2018-05-21 PROCEDURE — 76000 FLUOROSCOPY <1 HR PHYS/QHP: CPT

## 2018-05-21 PROCEDURE — 85730 THROMBOPLASTIN TIME PARTIAL: CPT

## 2018-05-21 PROCEDURE — 87086 URINE CULTURE/COLONY COUNT: CPT

## 2018-05-21 PROCEDURE — 97161 PT EVAL LOW COMPLEX 20 MIN: CPT

## 2018-05-21 PROCEDURE — 99285 EMERGENCY DEPT VISIT HI MDM: CPT | Mod: 25

## 2018-05-21 PROCEDURE — 85027 COMPLETE CBC AUTOMATED: CPT

## 2018-05-21 PROCEDURE — 87040 BLOOD CULTURE FOR BACTERIA: CPT

## 2018-05-21 PROCEDURE — 82550 ASSAY OF CK (CPK): CPT

## 2018-05-21 PROCEDURE — 97110 THERAPEUTIC EXERCISES: CPT

## 2018-05-21 PROCEDURE — C1758: CPT

## 2018-05-21 PROCEDURE — 97530 THERAPEUTIC ACTIVITIES: CPT

## 2018-05-21 PROCEDURE — 84100 ASSAY OF PHOSPHORUS: CPT

## 2018-05-21 PROCEDURE — 81001 URINALYSIS AUTO W/SCOPE: CPT

## 2018-05-21 PROCEDURE — 96374 THER/PROPH/DIAG INJ IV PUSH: CPT

## 2018-05-21 PROCEDURE — 93005 ELECTROCARDIOGRAM TRACING: CPT

## 2018-05-21 PROCEDURE — 84484 ASSAY OF TROPONIN QUANT: CPT

## 2018-05-21 PROCEDURE — 76770 US EXAM ABDO BACK WALL COMP: CPT

## 2018-05-21 PROCEDURE — 71045 X-RAY EXAM CHEST 1 VIEW: CPT

## 2018-05-21 RX ORDER — CHOLECALCIFEROL (VITAMIN D3) 125 MCG
1000 CAPSULE ORAL
Qty: 0 | Refills: 0 | COMMUNITY
Start: 2018-05-21

## 2018-05-21 RX ORDER — LANOLIN ALCOHOL/MO/W.PET/CERES
1 CREAM (GRAM) TOPICAL
Qty: 0 | Refills: 0 | COMMUNITY
Start: 2018-05-21

## 2018-05-21 RX ORDER — PREGABALIN 225 MG/1
1 CAPSULE ORAL
Qty: 0 | Refills: 0 | COMMUNITY

## 2018-05-21 RX ORDER — SIMVASTATIN 20 MG/1
1 TABLET, FILM COATED ORAL
Qty: 0 | Refills: 0 | COMMUNITY
Start: 2018-05-21

## 2018-05-21 RX ORDER — ACETAMINOPHEN 500 MG
2 TABLET ORAL
Qty: 0 | Refills: 0 | COMMUNITY
Start: 2018-05-21

## 2018-05-21 RX ORDER — PREGABALIN 225 MG/1
1 CAPSULE ORAL
Qty: 0 | Refills: 0 | COMMUNITY
Start: 2018-05-21

## 2018-05-21 RX ORDER — FAMOTIDINE 10 MG/ML
1 INJECTION INTRAVENOUS
Qty: 0 | Refills: 0 | COMMUNITY
Start: 2018-05-21

## 2018-05-21 RX ORDER — BIMATOPROST 0.3 MG/ML
1 SOLUTION/ DROPS OPHTHALMIC
Qty: 0 | Refills: 0 | COMMUNITY

## 2018-05-21 RX ORDER — DOXAZOSIN MESYLATE 4 MG
1 TABLET ORAL
Qty: 0 | Refills: 0 | COMMUNITY
Start: 2018-05-21

## 2018-05-21 RX ORDER — POLYETHYLENE GLYCOL 3350 17 G/17G
17 POWDER, FOR SOLUTION ORAL
Qty: 0 | Refills: 0 | DISCHARGE
Start: 2018-05-21

## 2018-05-21 RX ORDER — NYSTATIN CREAM 100000 [USP'U]/G
1 CREAM TOPICAL
Qty: 0 | Refills: 0 | COMMUNITY
Start: 2018-05-21

## 2018-05-21 RX ORDER — DOCUSATE SODIUM 100 MG
1 CAPSULE ORAL
Qty: 0 | Refills: 0 | COMMUNITY

## 2018-05-21 RX ORDER — HYDROCORTISONE 1 %
1 OINTMENT (GRAM) TOPICAL
Qty: 0 | Refills: 0 | COMMUNITY

## 2018-05-21 RX ORDER — HYDROCORTISONE 1 %
1 OINTMENT (GRAM) TOPICAL
Qty: 0 | Refills: 0 | COMMUNITY
Start: 2018-05-21

## 2018-05-21 RX ORDER — CHOLECALCIFEROL (VITAMIN D3) 125 MCG
1 CAPSULE ORAL
Qty: 0 | Refills: 0 | COMMUNITY

## 2018-05-21 RX ORDER — RANITIDINE HYDROCHLORIDE 150 MG/1
1 TABLET, FILM COATED ORAL
Qty: 0 | Refills: 0 | COMMUNITY

## 2018-05-21 RX ORDER — SENNA PLUS 8.6 MG/1
2 TABLET ORAL
Qty: 0 | Refills: 0 | COMMUNITY
Start: 2018-05-21

## 2018-05-21 RX ORDER — LATANOPROST 0.05 MG/ML
1 SOLUTION/ DROPS OPHTHALMIC; TOPICAL
Qty: 0 | Refills: 0 | COMMUNITY
Start: 2018-05-21

## 2018-05-21 RX ORDER — DOCUSATE SODIUM 100 MG
1 CAPSULE ORAL
Qty: 0 | Refills: 0 | COMMUNITY
Start: 2018-05-21

## 2018-05-21 RX ADMIN — ENOXAPARIN SODIUM 40 MILLIGRAM(S): 100 INJECTION SUBCUTANEOUS at 13:17

## 2018-05-21 RX ADMIN — NYSTATIN CREAM 1 APPLICATION(S): 100000 CREAM TOPICAL at 05:16

## 2018-05-21 RX ADMIN — CEFEPIME 100 MILLIGRAM(S): 1 INJECTION, POWDER, FOR SOLUTION INTRAMUSCULAR; INTRAVENOUS at 05:16

## 2018-05-21 RX ADMIN — NYSTATIN CREAM 1 APPLICATION(S): 100000 CREAM TOPICAL at 13:17

## 2018-05-21 RX ADMIN — Medication 1000 UNIT(S): at 11:53

## 2018-05-21 RX ADMIN — PREGABALIN 1000 MICROGRAM(S): 225 CAPSULE ORAL at 11:53

## 2018-05-21 RX ADMIN — FAMOTIDINE 20 MILLIGRAM(S): 10 INJECTION INTRAVENOUS at 11:53

## 2018-05-21 NOTE — PROGRESS NOTE ADULT - ATTENDING COMMENTS
Agree with above  No further inpatient cardiac workup needed at this time.
Agree with above  No further inpatient cardiac workup needed at this time.     Eduardo Stuart MD
Agree with above  No further inpatient cardiac workup needed at this time.     Eduardo Stuart MD
Agree with above assessment and plan as outlined above.    - possible outpatient cysto  - D/C per arminda Ba MD, FACC
Agree with above.   -tolerated procedure well in terms of cv perspective  -no further cardiac workup needed at this time    Eduardo Stuart MD
EP ATTENDING    Agree with above. No further inpatient EP workup needed.
Patient seen and examined.  Agree with above.   No further inpatient cardiac workup needed at this time.     Eduardo Stuart MD
Patient seen and examined, agree with above assessment and plan as transcribed above.    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
[facility] NEPHROLOGY  Zacarias Orellana M.D.  Pa Burgess D.O.  Yessica Valencia M.D.  Gavi Kern, MSN, ANP-C    Telephone: [phone]  Facsimile: [phone]    [address] [place] [address]
[facility] NEPHROLOGY  Zacarias Orellana M.D.  Pa Burgess D.O.  Yessica Valencia M.D.  Gavi Kern, MSN, ANP-C    Telephone: [phone]  Facsimile: [phone]    [address] [place] [address]
[facility] NEPHROLOGY  Zacarias Orellana M.D.  Pa Burgess D.O.  Yessica Valencia M.D.  Gavi Kern, MSN, ANP-C    Telephone: [phone]  Facsimile: [phone]    [address] [place] [address]
[facility] NEPHROLOGY  Zacarias Orellana M.D.  Pa Burgess D.O.  Yessica Valencia M.D.  Gavi Kern, MSN, ANP-C    Telephone: [phone]  Facsimile: [phone]    [address] [place] [address]
[facility] NEPHROLOGY  Zacarias Orellana M.D.  Pa Burgess D.O.  Yessica Valencia M.D.  Gavi Kern, MSN, ANP-C    Telephone: [phone]  Facsimile: [phone]    [address] [place] [address]
[facility] NEPHROLOGY  Zacarias Orellana M.D.  Pa Burgess D.O.  Yessica Valencia M.D.  Gavi Kern, MSN, ANP-C    Telephone: [phone]  Facsimile: [phone]    [address] [place] [address]
[facility] NEPHROLOGY  Zacarias Orellana M.D.  Pa Burgess D.O.  Yessica Valencia M.D.  Gavi Kern, MSN, ANP-C    Telephone: [phone]  Facsimile: [phone]    [address] [place] [address]
[facility] NEPHROLOGY  Zacarias Orlelana M.D.  Pa Burgess D.O.  Yessica Valencia M.D.  Gavi Kern, MSN, ANP-C    Telephone: [phone]  Facsimile: [phone]    [address] [place] [address]
[facility] NEPHROLOGY  Zacarias Orellana M.D.  Pa Burgess D.O.  Yessica Valencia M.D.  Gavi Kern, MSN, ANP-C    Telephone: [phone]  Facsimile: [phone]    [address] [place] [address]
[facility] NEPHROLOGY  Zacarias Orellana M.D.  Pa Burgess D.O.  Yessica Valencia M.D.  Gavi Kern, MSN, ANP-C    Telephone: [phone]  Facsimile: [phone]    [address] [place] [address]
[facility] NEPHROLOGY  Zacarias Orellana M.D.  Pa Burgess D.O.  Yessica Valencia M.D.  Gavi Kern, MSN, ANP-C    Telephone: [phone]  Facsimile: [phone]    [address] [place] [address]
[facility] NEPHROLOGY  Zacarias Orellana M.D.  Pa Burgess D.O.  Yessica Valencia M.D.  Gavi Kern, MSN, ANP-C    Telephone: [phone]  Facsimile: [phone]    [address] [place] [address]
[facility] NEPHROLOGY  Zacarias Orellana M.D.  Pa Burgess D.O.  Yessica Valencia M.D.  Gavi Kern, MSN, ANP-C    Telephone: [phone]  Facsimile: [phone]    [address] [place] [address]
[facility] NEPHROLOGY  Zacarias Orellana M.D.  Pa Burgess D.O.  Yessica Valencia M.D.  Gavi Kern, MSN, ANP-C    Telephone: [phone]  Facsimile: [phone]    [address] [place] [address]

## 2018-05-21 NOTE — PROGRESS NOTE ADULT - PROBLEM SELECTOR PLAN 2
severe deconditioning  stable lower back pain  *Requires PT  oob to chair and ambulate with assistance  case management for d/c to [facility]
Plan for urological intervention on Tuesday. Follow up urology.
->F/u Urology consult for inpatient intervention (?stent) as pt has recurrent UTI's  hx of hydroureteronephrosis, repeat u/s, monitor u/o
Would recommend urologic intervention this admission as this is a recurrent problem.
Would recommend urologic intervention this admission as this is a recurrent problem. Urology following- recc inpt rather than outpt cystoscopy.
->F/u Urology consult for inpatient intervention (?stent) as pt has recurrent UTI's  hx of hydroureteronephrosis
->F/u Urology for intervention, cystoscopy
Intervention by urology Tuesday 5/15/18
Plan for urological intervention on Tuesday. Follow up urology.
S/P R ureteral stent. Follow up urology.
S/P R ureteral stent. For TOV today. Follow up urology.
Would recommend urologic intervention this admission as this is a recurrent problem. Follow up urology.
Would recommend urologic intervention this admission as this is a recurrent problem. Urology following.
as above
improved
outpatient gu fu
severe deconditioning  stable lower back pain  requires daily PT  oob to chair  ambulate with assistance  case management for d/c to [facility]
Would recommend urologic intervention this admission as this is a recurrent problem. Urology following and further evaluation and intervenion is planned.
stable, around baseline  avoid nephrotoxic rx  renally dose rx prn   hx of hydroureteronephrosis, consider repeat u/s, monitor u/o
Intervention by urology Tuesday 5/15/18
severe deconditioning  stable lower back pain  requires daily PT  oob to chair  ambulate with assistance  case management for d/c to [facility]
->F/u Urology for intervention, cystoscopy
severe deconditioning  Requires PT/OT  oob to chair and ambulate with assistance
Intervention by urology Tuesday 5/15/18

## 2018-05-21 NOTE — PROGRESS NOTE ADULT - PROBLEM SELECTOR PLAN 6
c/w statin

## 2018-05-21 NOTE — PROGRESS NOTE ADULT - PROBLEM SELECTOR PLAN 5
stable
stable  treat underlying outlet obstruction and hydro
stable  treat underlying outlet obstruction and hydro
stable
hx of hydroureteronephrosis, consider repeat u/s, monitor u/o
stable  treat underlying outlet obstruction and hydro
stable
c/w statin
deconditioned state, greatest weakness of lower extremities b/l  OOB to Chair and Ambulate w/ Assistance  case management for arranging eventual safe d/c to [facility]
stable
stable
stable  treat underlying outlet obstruction and hydro

## 2018-05-21 NOTE — PROGRESS NOTE ADULT - SUBJECTIVE AND OBJECTIVE BOX
EP ATTENDING    no tele    no palpitations, no syncope, no angina    acetaminophen   Tablet. 650 milliGRAM(s) Oral every 6 hours PRN  bisacodyl Suppository 10 milliGRAM(s) Rectal daily PRN  cefepime   IVPB 1000 milliGRAM(s) IV Intermittent every 12 hours  cholecalciferol 1000 Unit(s) Oral daily  cyanocobalamin 1000 MICROGram(s) Oral daily  docusate sodium 100 milliGRAM(s) Oral daily PRN  doxazosin 4 milliGRAM(s) Oral at bedtime  enoxaparin Injectable 40 milliGRAM(s) SubCutaneous every 24 hours  famotidine    Tablet 20 milliGRAM(s) Oral daily  hydrocortisone 2.5% Ointment 1 Application(s) Topical daily PRN  latanoprost 0.005% Ophthalmic Solution 1 Drop(s) Left EYE at bedtime  melatonin 3 milliGRAM(s) Oral at bedtime  nystatin Powder 1 Application(s) Topical three times a day  polyethylene glycol 3350 17 Gram(s) Oral daily PRN  senna 2 Tablet(s) Oral at bedtime  simvastatin 20 milliGRAM(s) Oral at bedtime                            11.1   6.03  )-----------( 151      ( 20 May 2018 07:49 )             33.4       05-21    140  |  107  |  23  ----------------------------<  75  4.6   |  23  |  1.33<H>    Ca    9.5      21 May 2018 07:22        T(C): 36.4 (05-21-18 @ 06:08), Max: 36.9 (05-20-18 @ 17:02)  HR: 60 (05-21-18 @ 06:08) (57 - 60)  BP: 144/85 (05-21-18 @ 06:08) (121/78 - 163/93)  RR: 18 (05-21-18 @ 06:08) (18 - 18)  SpO2: 95% (05-21-18 @ 06:08) (95% - 99%)  Wt(kg): --    no JVD  RRR, no murmurs  CTAB  soft nt/nd  no c/c/e    serial Blood cultures all negative    A/P) He is a 83 y/o male PMH persistent AF who had a 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 His PPM interrogation here in April 2018 showed excellent PPM function with about 3.5 years remaining on PG longevity. He was admitted last month with coag erma mccall UTI. Thankfully his blood cultures were and continue to be negative. He is now readmitted with weakness. He denies palpitations, syncope, nor angina. He remains off A/C due to his advanced age and advanced dementia.    -no need for repeat PPM interrogation  -as long as BCx remain negative and there is no suspicion for endocarditis then no further inpatient EP workup needed  -f/u with his electrophysiologist Dr. Jose Stapleton after discharge for routine PPM care  -poor candidate for systemic anticoagulation given advanced age and advanced dementia  -no further inpatient EP workup needed      Nikki Duarte M.D., [facility]  Cardiac Electrophysiology  [facility]  [address] [place] [address]  [email or web address]    office [phone]  pager [phone]

## 2018-05-21 NOTE — CHART NOTE - NSCHARTNOTEFT_GEN_A_CORE
spoke to patient's son Gurpreet on the phone  all questions/concerns appropriately answered and addressed

## 2018-05-21 NOTE — PROGRESS NOTE ADULT - SUBJECTIVE AND OBJECTIVE BOX
[facility] NEPHROLOGY- PROGRESS NOTE    84y Male with history of HTN presents with UTI. Nephrology consulted for elevated Scr.    REVIEW OF SYSTEMS:  Gen: no changes in weight  Cards: no chest pain  Resp: no dyspnea  GI: no nausea or vomiting or diarrhea.  Vascular: no LE edema    No Known Allergies      Hospital Medications: Medications reviewed      VITALS:  T(F): 97.4 (05-21-18 @ 09:25), Max: 98.5 (05-20-18 @ 17:02)  HR: 60 (05-21-18 @ 09:25)  BP: 148/84 (05-21-18 @ 09:25)  RR: 18 (05-21-18 @ 09:25)  SpO2: 97% (05-21-18 @ 09:25)  Wt(kg): --    05-20 @ 07:01  -  05-21 @ 07:00  --------------------------------------------------------  IN: 680 mL / OUT: 0 mL / NET: 680 mL    05-21 @ 07:01  -  05-21 @ 12:33  --------------------------------------------------------  IN: 340 mL / OUT: 0 mL / NET: 340 mL      PHYSICAL EXAM:  Gen: NAD, calm  Cards: RRR, +S1/S2, no M/G/R  Resp: CTA B/L  GI: soft, NT/ND, NABS  Vascular: no LE edema B/L      LABS:  05-21    140  |  107  |  23  ----------------------------<  75  4.6   |  23  |  1.33<H>    Ca    9.5      21 May 2018 07:22      Creatinine Trend: 1.33 <--, 1.36 <--, 1.39 <--, 1.37 <--, 1.32 <--, 1.28 <--, 1.36 <--                        10.6   5.59  )-----------( 152      ( 21 May 2018 09:47 )             31.7

## 2018-05-21 NOTE — PROGRESS NOTE ADULT - PROBLEM SELECTOR PROBLEM 4
Generalized weakness
Unstable gait
Benign hypertensive CKD, stage 1-4 or unspecified chronic kidney disease
Generalized weakness
Acute cystitis without hematuria
Benign hypertensive CKD, stage 1-4 or unspecified chronic kidney disease
CKD (chronic kidney disease) stage 3, GFR 30-59 ml/min
CKD (chronic kidney disease) stage 3, GFR 30-59 ml/min
Generalized weakness
Unstable gait
Benign hypertensive CKD, stage 1-4 or unspecified chronic kidney disease
Dyslipidemia
Generalized weakness
Unstable gait
Generalized weakness
Unstable gait

## 2018-05-21 NOTE — PROGRESS NOTE ADULT - PROBLEM SELECTOR PROBLEM 1
Acute cystitis without hematuria
Acute cystitis without hematuria
Complicated UTI (urinary tract infection)
Acute cystitis without hematuria
Bladder outlet obstruction
CKD (chronic kidney disease) stage 3, GFR 30-59 ml/min
Complicated UTI (urinary tract infection)
Hydroureteronephrosis
Acute cystitis without hematuria
Complicated UTI (urinary tract infection)
Acute cystitis without hematuria
Complicated UTI (urinary tract infection)
Complicated UTI (urinary tract infection)

## 2018-05-21 NOTE — PROGRESS NOTE ADULT - PROBLEM SELECTOR PROBLEM 3
Bladder outlet obstruction
Generalized weakness
CKD (chronic kidney disease) stage 3, GFR 30-59 ml/min
Hydroureteronephrosis
Hydroureteronephrosis
Other hydronephrosis
Other hydronephrosis
CKD (chronic kidney disease) stage 3, GFR 30-59 ml/min
Benign hypertensive CKD, stage 1-4 or unspecified chronic kidney disease
Bladder outlet obstruction
CKD (chronic kidney disease) stage 3, GFR 30-59 ml/min
Generalized weakness
Generalized weakness
CKD (chronic kidney disease) stage 3, GFR 30-59 ml/min
Generalized weakness
Bladder outlet obstruction
Generalized weakness
Bladder outlet obstruction
Generalized weakness

## 2018-05-21 NOTE — PROGRESS NOTE ADULT - SUBJECTIVE AND OBJECTIVE BOX
pt seen and examined,   ROS neg , no events overnight    acetaminophen   Tablet. 650 milliGRAM(s) Oral every 6 hours PRN  bisacodyl Suppository 10 milliGRAM(s) Rectal daily PRN  cefepime   IVPB 1000 milliGRAM(s) IV Intermittent every 12 hours  cholecalciferol 1000 Unit(s) Oral daily  cyanocobalamin 1000 MICROGram(s) Oral daily  docusate sodium 100 milliGRAM(s) Oral daily PRN  doxazosin 4 milliGRAM(s) Oral at bedtime  enoxaparin Injectable 40 milliGRAM(s) SubCutaneous every 24 hours  famotidine    Tablet 20 milliGRAM(s) Oral daily  hydrocortisone 2.5% Ointment 1 Application(s) Topical daily PRN  latanoprost 0.005% Ophthalmic Solution 1 Drop(s) Left EYE at bedtime  melatonin 3 milliGRAM(s) Oral at bedtime  nystatin Powder 1 Application(s) Topical three times a day  polyethylene glycol 3350 17 Gram(s) Oral daily PRN  senna 2 Tablet(s) Oral at bedtime  simvastatin 20 milliGRAM(s) Oral at bedtime                            11.1   6.03  )-----------( 151      ( 20 May 2018 07:49 )             33.4       Hemoglobin: 11.1 g/dL (05-20 @ 07:49)  Hemoglobin: 10.5 g/dL (05-19 @ 08:46)  Hemoglobin: 10.2 g/dL (05-18 @ 09:58)  Hemoglobin: 10.6 g/dL (05-17 @ 07:50)  Hemoglobin: 10.9 g/dL (05-16 @ 07:48)      05-20    140  |  107  |  23  ----------------------------<  88  4.4   |  22  |  1.36<H>    Ca    9.6      20 May 2018 07:07      Creatinine Trend: 1.36<--, 1.39<--, 1.37<--, 1.32<--, 1.28<--, 1.36<--    COAGS:           T(C): 36.7 (05-21-18 @ 00:42), Max: 36.9 (05-20-18 @ 17:02)  HR: 57 (05-21-18 @ 00:42) (57 - 60)  BP: 141/82 (05-21-18 @ 00:42) (121/78 - 163/93)  RR: 18 (05-21-18 @ 00:42) (18 - 18)  SpO2: 99% (05-21-18 @ 00:42) (96% - 99%)  Wt(kg): --    I&O's Summary    19 May 2018 07:01  -  20 May 2018 07:00  --------------------------------------------------------  IN: 810 mL / OUT: 1 mL / NET: 809 mL    20 May 2018 07:01  -  21 May 2018 05:56  --------------------------------------------------------  IN: 630 mL / OUT: 0 mL / NET: 630 mL        no JVD  RRR, no murmurs  CTAB  soft nt/nd  no c/c/e    No tele    serial Blood cultures all negative    A/P) He is a 85 y/o male PMH persistent AF who had a 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 His PPM interrogation here in April 2018 showed excellent PPM function with about 3.5 years remaining on PG longevity. He was admitted last month with coag neg staph UTI. Thankfully his blood cultures were and continue to be negative. He is now readmitted with weakness. He denies palpitations, syncope, nor angina. He remains off A/C due to his advanced age and advanced dementia.    -no need for repeat PPM interrogation  - Off AC for advanced age, dementia and fall risk  -f/u with his electrophysiologist Dr. Jose Stapleton after discharge for routine PPM care  -poor candidate for systemic anticoagulation given advanced age and advanced dementia  -no further inpatient EP workup needed  -DC planning to [facility]  D/W Dr Duarte pt seen and examined,   ROS neg , no events overnight    acetaminophen   Tablet. 650 milliGRAM(s) Oral every 6 hours PRN  bisacodyl Suppository 10 milliGRAM(s) Rectal daily PRN  cefepime   IVPB 1000 milliGRAM(s) IV Intermittent every 12 hours  cholecalciferol 1000 Unit(s) Oral daily  cyanocobalamin 1000 MICROGram(s) Oral daily  docusate sodium 100 milliGRAM(s) Oral daily PRN  doxazosin 4 milliGRAM(s) Oral at bedtime  enoxaparin Injectable 40 milliGRAM(s) SubCutaneous every 24 hours  famotidine    Tablet 20 milliGRAM(s) Oral daily  hydrocortisone 2.5% Ointment 1 Application(s) Topical daily PRN  latanoprost 0.005% Ophthalmic Solution 1 Drop(s) Left EYE at bedtime  melatonin 3 milliGRAM(s) Oral at bedtime  nystatin Powder 1 Application(s) Topical three times a day  polyethylene glycol 3350 17 Gram(s) Oral daily PRN  senna 2 Tablet(s) Oral at bedtime  simvastatin 20 milliGRAM(s) Oral at bedtime                            11.1   6.03  )-----------( 151      ( 20 May 2018 07:49 )             33.4       Hemoglobin: 11.1 g/dL (05-20 @ 07:49)  Hemoglobin: 10.5 g/dL (05-19 @ 08:46)  Hemoglobin: 10.2 g/dL (05-18 @ 09:58)  Hemoglobin: 10.6 g/dL (05-17 @ 07:50)  Hemoglobin: 10.9 g/dL (05-16 @ 07:48)      05-20    140  |  107  |  23  ----------------------------<  88  4.4   |  22  |  1.36<H>    Ca    9.6      20 May 2018 07:07      Creatinine Trend: 1.36<--, 1.39<--, 1.37<--, 1.32<--, 1.28<--, 1.36<--    COAGS:           T(C): 36.7 (05-21-18 @ 00:42), Max: 36.9 (05-20-18 @ 17:02)  HR: 57 (05-21-18 @ 00:42) (57 - 60)  BP: 141/82 (05-21-18 @ 00:42) (121/78 - 163/93)  RR: 18 (05-21-18 @ 00:42) (18 - 18)  SpO2: 99% (05-21-18 @ 00:42) (96% - 99%)  Wt(kg): --    I&O's Summary    19 May 2018 07:01  -  20 May 2018 07:00  --------------------------------------------------------  IN: 810 mL / OUT: 1 mL / NET: 809 mL    20 May 2018 07:01  -  21 May 2018 05:56  --------------------------------------------------------  IN: 630 mL / OUT: 0 mL / NET: 630 mL        no JVD  RRR, no murmurs  CTAB  soft nt/nd  no c/c/e    No tele    serial Blood cultures all negative    A/P) He is a 83 y/o male PMH persistent AF who had a 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 His PPM interrogation here in April 2018 showed excellent PPM function with about 3.5 years remaining on PG longevity. He was admitted last month with coag neg staph UTI. Thankfully his blood cultures were and continue to be negative. He is now readmitted with weakness. He denies palpitations, syncope, nor angina. He remains off A/C due to his advanced age and advanced dementia.    -no need for repeat PPM interrogation  - Off AC for advanced age, dementia and fall risk  -f/u with his electrophysiologist Dr. Jose Stapleton after discharge for routine PPM care  -poor candidate for systemic anticoagulation given advanced age and advanced dementia  -no further inpatient EP workup needed  -DC planning to [facility]  D/W Dr Ba

## 2018-05-21 NOTE — PROGRESS NOTE ADULT - PROBLEM SELECTOR PROBLEM 5
Hydroureteronephrosis
CKD (chronic kidney disease) stage 3, GFR 30-59 ml/min
CKD (chronic kidney disease) stage 3, GFR 30-59 ml/min
Dyslipidemia
CKD (chronic kidney disease) stage 3, GFR 30-59 ml/min
Dyslipidemia
Dyslipidemia
Generalized weakness
CKD (chronic kidney disease) stage 3, GFR 30-59 ml/min

## 2018-05-21 NOTE — PROGRESS NOTE ADULT - PROBLEM SELECTOR PROBLEM 2
Other hydronephrosis
Hydroureteronephrosis
Other hydronephrosis
Other hydronephrosis
Prostate cancer
Deconditioned low back
Gross hematuria
Gross hematuria
Hydroureteronephrosis
Other hydronephrosis
CKD (chronic kidney disease) stage 3, GFR 30-59 ml/min
Deconditioned low back
Hydroureteronephrosis
Deconditioned low back
Hydroureteronephrosis
Hydroureteronephrosis
Deconditioned low back

## 2018-05-21 NOTE — PROGRESS NOTE ADULT - SUBJECTIVE AND OBJECTIVE BOX
Patient seen and examined at bedside. Spoke to pt's son on the phone in AM.   c/o persistent generalized weakness and inability to ambulate w/o assistance   Case discussed with medical team    HPI:  84m hx tia, s/p PPM, psoriasis, CKD, presenting from rehab with weakness, difficulty ambulating. Pt's complaints cluster primarily around poor care at rehab and lack of actual PT time, and pt attributes his present weakness to this lack of working with PT. Reports completing a course of abx yesterday for a UTI. Reports no fevers/chills, no new aches/pains, no cp/sob, no cough/HA. Would like to go to a different rehab if possible.    In ED: 97.9, 61, 119/78, 18, 96RA. Given ceftriaxone (1700), NS 1 L bolux x 1. (06 May 2018 18:20)      PAST MEDICAL & SURGICAL HISTORY:  Psoriasis  Prostate cancer  GERD (gastroesophageal reflux disease)  Dyslipidemia  No significant past surgical history      No Known Allergies       MEDICATIONS  (STANDING):  cefepime   IVPB 1000 milliGRAM(s) IV Intermittent every 12 hours  cholecalciferol 1000 Unit(s) Oral daily  cyanocobalamin 1000 MICROGram(s) Oral daily  doxazosin 4 milliGRAM(s) Oral at bedtime  enoxaparin Injectable 40 milliGRAM(s) SubCutaneous every 24 hours  famotidine    Tablet 20 milliGRAM(s) Oral daily  latanoprost 0.005% Ophthalmic Solution 1 Drop(s) Left EYE at bedtime  melatonin 3 milliGRAM(s) Oral at bedtime  nystatin Powder 1 Application(s) Topical three times a day  senna 2 Tablet(s) Oral at bedtime  simvastatin 20 milliGRAM(s) Oral at bedtime    MEDICATIONS  (PRN):  acetaminophen   Tablet. 650 milliGRAM(s) Oral every 6 hours PRN Moderate Pain (4 - 6)  bisacodyl Suppository 10 milliGRAM(s) Rectal daily PRN Constipation  docusate sodium 100 milliGRAM(s) Oral daily PRN Constipation  hydrocortisone 2.5% Ointment 1 Application(s) Topical daily PRN Rash  polyethylene glycol 3350 17 Gram(s) Oral daily PRN Constipation      REVIEW OF SYSTEMS:  CONSTITUTIONAL: (+) malaise. generalized weakness.  EYES: No acute change in vision   ENT:  No tinnitus  NECK: No stiffness  RESPIRATORY: No hemoptysis  CARDIOVASCULAR: No chest pain, palpitations, syncope  GASTROINTESTINAL: No hematemesis, diarrhea, melena, or hematochezia.  GENITOURINARY: No hematuria  NEUROLOGICAL: No headaches  LYMPH Nodes: No enlarged glands  ENDOCRINE: No heat or cold intolerance	    T(C): 36.3 (05-21-18 @ 09:25), Max: 36.9 (05-20-18 @ 17:02)  HR: 60 (05-21-18 @ 09:25) (57 - 60)  BP: 148/84 (05-21-18 @ 09:25) (130/80 - 163/93)  RR: 18 (05-21-18 @ 09:25) (18 - 18)  SpO2: 97% (05-21-18 @ 09:25) (95% - 99%)    PHYSICAL EXAMINATION:   Constitutional: WD, NAD  HEENT: NC, AT  Neck:  Supple  Respiratory:  Adequate airflow b/l. Not using accessory muscles of respiration.  Cardiovascular:  S1 & S2 intact, no R/G, 2+ radial pulses b/l  Gastrointestinal: Soft, NT, ND, normoactive b.s., no organomegaly/RT/rigidity  Extremities: stable distal venous stasis changes. WWP  Neurological:  severe generalized weakness causing unstable gait. Alert and awake.  No acute focal motor deficits. Crude sensation intact.     Labs and imaging reviewed    LABS:                        10.6   5.59  )-----------( 152      ( 21 May 2018 09:47 )             31.7     05-21    140  |  107  |  23  ----------------------------<  75  4.6   |  23  |  1.33<H>    Ca    9.5      21 May 2018 07:22              CAPILLARY BLOOD GLUCOSE                  RADIOLOGY & ADDITIONAL STUDIES:

## 2018-05-21 NOTE — PROGRESS NOTE ADULT - PROBLEM SELECTOR PLAN 1
-cefepime inpatient, switch to oral augmentin 875 bid til 5/22/18 upon discharge  -pt is medically optimized for safe d/c to [facility], pending prior authorization at rehab - f/u case management   -outpatient  management of stent

## 2018-05-21 NOTE — PROGRESS NOTE ADULT - SUBJECTIVE AND OBJECTIVE BOX
[facility]        - Patient seen and examined.  - Today, patient is without complaints. pt reports hes going to rehab today.          *****MEDICATIONS: Current medication reviewed and documented.    MEDICATIONS  (STANDING):  cefepime   IVPB 1000 milliGRAM(s) IV Intermittent every 12 hours  cholecalciferol 1000 Unit(s) Oral daily  cyanocobalamin 1000 MICROGram(s) Oral daily  doxazosin 4 milliGRAM(s) Oral at bedtime  enoxaparin Injectable 40 milliGRAM(s) SubCutaneous every 24 hours  famotidine    Tablet 20 milliGRAM(s) Oral daily  latanoprost 0.005% Ophthalmic Solution 1 Drop(s) Left EYE at bedtime  melatonin 3 milliGRAM(s) Oral at bedtime  nystatin Powder 1 Application(s) Topical three times a day  senna 2 Tablet(s) Oral at bedtime  simvastatin 20 milliGRAM(s) Oral at bedtime    MEDICATIONS  (PRN):  acetaminophen   Tablet. 650 milliGRAM(s) Oral every 6 hours PRN Moderate Pain (4 - 6)  bisacodyl Suppository 10 milliGRAM(s) Rectal daily PRN Constipation  docusate sodium 100 milliGRAM(s) Oral daily PRN Constipation  hydrocortisone 2.5% Ointment 1 Application(s) Topical daily PRN Rash  polyethylene glycol 3350 17 Gram(s) Oral daily PRN Constipation           ***** REVIEW OF SYSTEM:  GEN: no fever, no chills, no pain  RESP: no SOB, no cough, no sputum  CVS: no chest pain, no palpitations, no edema  GI: no abdominal pain, no nausea, no vomiting, no constipation, no diarrhea  : no dysurea, no frequency  NEURO: no headache, no diziness  PSYCH: no depression, not anxious  Derm : no itching, no rash         ***** VITAL SIGNS:  T(F): 97.4 (05-21-18 @ 09:25), Max: 98.5 (05-20-18 @ 17:02)  HR: 60 (05-21-18 @ 09:25) (57 - 60)  BP: 148/84 (05-21-18 @ 09:25) (130/80 - 163/93)  RR: 18 (05-21-18 @ 09:25) (18 - 18)  SpO2: 97% (05-21-18 @ 09:25) (95% - 99%)  Wt(kg): --  ,   I&O's Summary    20 May 2018 07:01  -  21 May 2018 07:00  --------------------------------------------------------  IN: 680 mL / OUT: 0 mL / NET: 680 mL    21 May 2018 07:01  -  21 May 2018 12:57  --------------------------------------------------------  IN: 340 mL / OUT: 0 mL / NET: 340 mL             *****PHYSICAL EXAM:  Alert oriented x 3   Attention comprehension are fair. Able to name, repeat, read without any difficulty.   Able to follow 3 step commands.     EOMI fundi not visualized,  VFF to confrontration  No facial asymmetry   Tongue is midline   Palate elevates symmetrically   Moving all 4 ext symmetrically no pronator drift. difficult exam as pt is not very cooperative.     Gait :    B/L down going toes              *****LAB AND IMAGING:                        10.6   5.59  )-----------( 152      ( 21 May 2018 09:47 )             31.7               05-21    140  |  107  |  23  ----------------------------<  75  4.6   |  23  |  1.33<H>    Ca    9.5      21 May 2018 07:22                           [All pertinent recent Imaging/Reports reviewed]           *****A S S E S S M E N T   A N D   P L A N :  84m hx tia, s/p PPM, psoriasis, CKD, presenting from rehab with weakness, difficulty ambulating. Pt's complaints cluster primarily around poor care at rehab and lack of actual PT time, and pt attributes his present weakness to this lack of working with PT. Reports completing a course of abx yesterday for a UTI. Reports no fevers/chills, no new aches/pains, no cp/sob, no cough/HA. Would like to go to a different rehab if possible.    no focality on exam.   proximal weakness, likely due to disuse atrophy     PT consult for gait and balance training.   oob to chair as tolerated   continue supportive care.             Thank you for allowing me to participate in the care of this patient. Please do not hesitate to call me if you have any  questions.        ________________  Mary Draper MD  [facility] ([facility]  [phone]     30 minutes spent on total encounter; more than 50 % of the visit was  spent counseling and or  coordinating care by the attending physician.   At the present time, [facility] does not  provide outpatient followup, best to call the your insurance to find a participating provider.  This was explained to you at the time of the visit. Alternatively, if your insurance allows it, you can follow up with a neurologist  Dr. Alexander Santiago([place]) [phone] or Dr. Moises Van ( [place]) [phone]

## 2018-05-21 NOTE — PROGRESS NOTE ADULT - PROBLEM SELECTOR PLAN 4
OOB to Chair and Ambulate w/ Assistance  -Case management for arranging safe d/c to [facility]
as above
BP controlled. Continue with cardura. Monitor BP
Was hypotensive on admission.  BP improved off antihypertensives.
Was hypotensive on admission.  BP improved off antihypertensives. Continue to monitor off anti-hypertensive medications. Monitor BP
deconditioning   physical therapy   case management for arranging eventual safe d/c to a different [facility]
Abx per primary team. Urology and ID following.
BP controlled. Continue with cardura. Monitor BP
OOB to Chair and Ambulate w/ Assistance  -Case management for arranging safe d/c to [facility]
OOB to Chair and Ambulate w/ Assistance  -Case management for safe d/c to [facility]
Was hypotensive on admission.  BP improved off antihypertensives. Continue to monitor off anti-hypertensive medications. Monitor BP
Was hypotensive on admission.  BP improved off antihypertensives. Ok to continue with cardura as needed by urology. Monitor BP
Was hypotensive on admission.  BP improved off antihypertensives. Ok to continue with cardura as needed by urology. Monitor BP
as above
deconditioned state, greatest weakness of lower extremities b/l  ->OOB to Chair and Ambulate w/ Assistance  -?cannot get mri 2/2 ppm  physical therapy   case management for arranging eventual safe d/c to a different [facility]
stable
stable  treat underlying outlet obstruction and hydro
Was hypotensive on admission.  BP improved off antihypertensives. Continue to monitor off anti-hypertensive medications. Monitor BP
c/w statin
OOB to Chair and Ambulate w/ Assistance  -Case management for arranging safe d/c to [facility] post cysto/stent
as above
OOB to Chair and Ambulate w/ Assistance  ->Case management for arranging safe d/c to [facility]
as above

## 2018-05-21 NOTE — PROGRESS NOTE ADULT - PROBLEM SELECTOR PLAN 3
as above
2/2 severe deconditioning  as above
Renal function at baseline. Avoid nephrotoxins.
Avoid nephrotoxins/ RCA/ NSAIDs.   Monitor BMP.
Renal function at baseline. Avoid nephrotoxins/ RCA/ NSAIDs.   Monitor BMP.
stable, around baseline  avoid nephrotoxic rx  renally dose rx prn   hx of hydroureteronephrosis, consider repeat u/s, monitor u/o
2/2 severe deconditioning  as above
Avoid nephrotoxins/ RCA/ NSAIDs.   Monitor BMP.
BP controlled. Continue with cardura. Change diet to low sodium. Monitor BP.
BP controlled. Continue with cardura. Monitor BP.
BP controlled. Continue with cardura. Would change diet to low sodium. Monitor BP.
BP improved. Continue with cardura. Recc to low sodium diet.  Monitor BP.
OOB to Chair and Ambulate w/ Assistance  ->Case management for safe d/c to [facility]
Renal function at baseline. Avoid nephrotoxins.
Renal function at baseline. Avoid nephrotoxins/ RCA/ NSAIDs.   Monitor BMP.
Renal function at baseline. Avoid nephrotoxins/ RCA/ NSAIDs.   Monitor BMP.
as above
stable, around baseline  avoid nephrotoxic rx  renally dose rx prn   hx of hydroureteronephrosis, consider repeat u/s, monitor u/o
Avoid nephrotoxins/ RCA/ NSAIDs.   Monitor BMP.
deconditioning   physical therapy   case management for arranging eventual safe d/c to a different [facility]
2/2 severe deconditioning  as above
as above
2/2 severe deconditioning  as above
as above

## 2018-05-21 NOTE — PROGRESS NOTE ADULT - PROBLEM SELECTOR PROBLEM 6
Dyslipidemia

## 2018-05-23 ENCOUNTER — APPOINTMENT (OUTPATIENT)
Dept: SURGERY | Facility: ASSISTED LIVING FACILITY | Age: 83
End: 2018-05-23
Payer: MEDICARE

## 2018-05-23 PROCEDURE — 99304 1ST NF CARE SF/LOW MDM 25: CPT

## 2018-05-30 ENCOUNTER — APPOINTMENT (OUTPATIENT)
Dept: SURGERY | Facility: ASSISTED LIVING FACILITY | Age: 83
End: 2018-05-30
Payer: MEDICARE

## 2018-05-30 PROCEDURE — 99307 SBSQ NF CARE SF MDM 10: CPT

## 2018-06-06 ENCOUNTER — APPOINTMENT (OUTPATIENT)
Dept: SURGERY | Facility: ASSISTED LIVING FACILITY | Age: 83
End: 2018-06-06
Payer: MEDICARE

## 2018-06-06 PROCEDURE — 99307 SBSQ NF CARE SF MDM 10: CPT

## 2018-06-22 NOTE — ED ADULT TRIAGE NOTE - BMI (KG/M2)
Post-Anesthesia Evaluation and Assessment    Patient: Kennedi Brown MRN: [id number]  SSN: [id number]    YOB: 1955  Age: 58 y.o. Sex: male       Cardiovascular Function/Vital Signs  Visit Vitals    /60    Pulse 80    Temp 36.9 °C (98.4 °F)    Resp (P) 16    Ht 5' 8\" (1.727 m)    Wt 101.5 kg (223 lb 11.2 oz)    SpO2 99%    BMI 34.01 kg/m2       Patient is status post general anesthesia for Procedure(s):  INCISION AND DRAINAGE OF LUMBAR WOUND. Nausea/Vomiting: None    Postoperative hydration reviewed and adequate. Pain:  Pain Scale 1: (P) Numeric (0 - 10) (06/22/18 1630)  Pain Intensity 1: (P) 0 (06/22/18 1630)   Managed    Neurological Status:   Neuro (WDL): Exceptions to WDL (Hx of CVA with residual right sided weakness) (06/22/18 1139)   At baseline    Mental Status and Level of Consciousness: Arousable    Pulmonary Status:   O2 Device: (P) Room air (06/22/18 1651)   Adequate oxygenation and airway patent    Complications related to anesthesia: None    Post-anesthesia assessment completed.  No concerns    Signed By: Maxx Plata MD     June 22, 2018 30

## 2018-07-02 ENCOUNTER — INPATIENT (INPATIENT)
Facility: HOSPITAL | Age: 83
LOS: 16 days | Discharge: NURSING, REHAB OR LONG-TERM CARE | DRG: 682 | End: 2018-07-19
Attending: HOSPITALIST | Admitting: STUDENT IN AN ORGANIZED HEALTH CARE EDUCATION/TRAINING PROGRAM
Payer: MEDICARE

## 2018-07-02 ENCOUNTER — TRANSCRIPTION ENCOUNTER (OUTPATIENT)
Age: 83
End: 2018-07-02

## 2018-07-02 VITALS
WEIGHT: 220.02 LBS | SYSTOLIC BLOOD PRESSURE: 117 MMHG | DIASTOLIC BLOOD PRESSURE: 76 MMHG | HEIGHT: 70 IN | RESPIRATION RATE: 18 BRPM | OXYGEN SATURATION: 96 % | TEMPERATURE: 98 F

## 2018-07-02 DIAGNOSIS — R13.10 DYSPHAGIA, UNSPECIFIED: ICD-10-CM

## 2018-07-02 LAB
ALBUMIN SERPL ELPH-MCNC: 3.2 G/DL — LOW (ref 3.3–5)
ALP SERPL-CCNC: 111 U/L — SIGNIFICANT CHANGE UP (ref 40–120)
ALT FLD-CCNC: 31 U/L DA — SIGNIFICANT CHANGE UP (ref 10–45)
ANION GAP SERPL CALC-SCNC: 9 MMOL/L — SIGNIFICANT CHANGE UP (ref 5–17)
ANION GAP SERPL CALC-SCNC: 9 MMOL/L — SIGNIFICANT CHANGE UP (ref 5–17)
AST SERPL-CCNC: 30 U/L — SIGNIFICANT CHANGE UP (ref 10–40)
BASOPHILS # BLD AUTO: 0 K/UL — SIGNIFICANT CHANGE UP (ref 0–0.2)
BASOPHILS NFR BLD AUTO: 0.4 % — SIGNIFICANT CHANGE UP (ref 0–2)
BILIRUB SERPL-MCNC: 0.1 MG/DL — LOW (ref 0.2–1.2)
BUN SERPL-MCNC: 38 MG/DL — HIGH (ref 7–23)
BUN SERPL-MCNC: 40 MG/DL — HIGH (ref 7–23)
CALCIUM SERPL-MCNC: 9.1 MG/DL — SIGNIFICANT CHANGE UP (ref 8.4–10.5)
CALCIUM SERPL-MCNC: 9.1 MG/DL — SIGNIFICANT CHANGE UP (ref 8.4–10.5)
CHLORIDE SERPL-SCNC: 117 MMOL/L — HIGH (ref 96–108)
CHLORIDE SERPL-SCNC: 117 MMOL/L — HIGH (ref 96–108)
CO2 SERPL-SCNC: 18 MMOL/L — LOW (ref 22–31)
CO2 SERPL-SCNC: 18 MMOL/L — LOW (ref 22–31)
CREAT SERPL-MCNC: 1.37 MG/DL — HIGH (ref 0.5–1.3)
CREAT SERPL-MCNC: 1.4 MG/DL — HIGH (ref 0.5–1.3)
EOSINOPHIL # BLD AUTO: 0 K/UL — SIGNIFICANT CHANGE UP (ref 0–0.5)
EOSINOPHIL NFR BLD AUTO: 0.7 % — SIGNIFICANT CHANGE UP (ref 0–6)
GLUCOSE SERPL-MCNC: 75 MG/DL — SIGNIFICANT CHANGE UP (ref 70–99)
GLUCOSE SERPL-MCNC: 76 MG/DL — SIGNIFICANT CHANGE UP (ref 70–99)
HCT VFR BLD CALC: 30.4 % — LOW (ref 39–50)
HGB BLD-MCNC: 10.4 G/DL — LOW (ref 13–17)
LYMPHOCYTES # BLD AUTO: 1.4 K/UL — SIGNIFICANT CHANGE UP (ref 1–3.3)
LYMPHOCYTES # BLD AUTO: 20.1 % — SIGNIFICANT CHANGE UP (ref 13–44)
MCHC RBC-ENTMCNC: 30.4 PG — SIGNIFICANT CHANGE UP (ref 27–34)
MCHC RBC-ENTMCNC: 34.2 GM/DL — SIGNIFICANT CHANGE UP (ref 32–36)
MCV RBC AUTO: 88.8 FL — SIGNIFICANT CHANGE UP (ref 80–100)
MONOCYTES # BLD AUTO: 0.3 K/UL — SIGNIFICANT CHANGE UP (ref 0–0.9)
MONOCYTES NFR BLD AUTO: 4.6 % — SIGNIFICANT CHANGE UP (ref 1–9)
NEUTROPHILS # BLD AUTO: 5.1 K/UL — SIGNIFICANT CHANGE UP (ref 1.8–7.4)
NEUTROPHILS NFR BLD AUTO: 74.3 % — SIGNIFICANT CHANGE UP (ref 43–77)
NT-PROBNP SERPL-SCNC: 1324 PG/ML — HIGH (ref 0–300)
PLATELET # BLD AUTO: 83 K/UL — LOW (ref 150–400)
POTASSIUM SERPL-MCNC: 6.1 MMOL/L — HIGH (ref 3.5–5.3)
POTASSIUM SERPL-MCNC: 6.2 MMOL/L — CRITICAL HIGH (ref 3.5–5.3)
POTASSIUM SERPL-SCNC: 6.1 MMOL/L — HIGH (ref 3.5–5.3)
POTASSIUM SERPL-SCNC: 6.2 MMOL/L — CRITICAL HIGH (ref 3.5–5.3)
PROT SERPL-MCNC: 7.6 G/DL — SIGNIFICANT CHANGE UP (ref 6–8.3)
RBC # BLD: 3.42 M/UL — LOW (ref 4.2–5.8)
RBC # FLD: 16.6 % — HIGH (ref 10.3–14.5)
SODIUM SERPL-SCNC: 144 MMOL/L — SIGNIFICANT CHANGE UP (ref 135–145)
SODIUM SERPL-SCNC: 144 MMOL/L — SIGNIFICANT CHANGE UP (ref 135–145)
WBC # BLD: 6.8 K/UL — SIGNIFICANT CHANGE UP (ref 3.8–10.5)
WBC # FLD AUTO: 6.8 K/UL — SIGNIFICANT CHANGE UP (ref 3.8–10.5)

## 2018-07-02 PROCEDURE — 71250 CT THORAX DX C-: CPT | Mod: 26

## 2018-07-02 PROCEDURE — 93010 ELECTROCARDIOGRAM REPORT: CPT

## 2018-07-02 PROCEDURE — 70490 CT SOFT TISSUE NECK W/O DYE: CPT | Mod: 26

## 2018-07-02 PROCEDURE — 99223 1ST HOSP IP/OBS HIGH 75: CPT

## 2018-07-02 PROCEDURE — 99285 EMERGENCY DEPT VISIT HI MDM: CPT

## 2018-07-02 PROCEDURE — 71046 X-RAY EXAM CHEST 2 VIEWS: CPT | Mod: 26

## 2018-07-02 RX ORDER — LATANOPROST 0.05 MG/ML
1 SOLUTION/ DROPS OPHTHALMIC; TOPICAL AT BEDTIME
Qty: 0 | Refills: 0 | Status: DISCONTINUED | OUTPATIENT
Start: 2018-07-02 | End: 2018-07-19

## 2018-07-02 RX ORDER — SODIUM CHLORIDE 9 MG/ML
1000 INJECTION INTRAMUSCULAR; INTRAVENOUS; SUBCUTANEOUS
Qty: 0 | Refills: 0 | Status: DISCONTINUED | OUTPATIENT
Start: 2018-07-02 | End: 2018-07-03

## 2018-07-02 RX ORDER — FUROSEMIDE 40 MG
20 TABLET ORAL DAILY
Qty: 0 | Refills: 0 | Status: DISCONTINUED | OUTPATIENT
Start: 2018-07-02 | End: 2018-07-03

## 2018-07-02 RX ORDER — SODIUM CHLORIDE 9 MG/ML
3 INJECTION INTRAMUSCULAR; INTRAVENOUS; SUBCUTANEOUS ONCE
Qty: 0 | Refills: 0 | Status: COMPLETED | OUTPATIENT
Start: 2018-07-02 | End: 2018-07-02

## 2018-07-02 RX ORDER — INSULIN HUMAN 100 [IU]/ML
10 INJECTION, SOLUTION SUBCUTANEOUS ONCE
Qty: 0 | Refills: 0 | Status: COMPLETED | OUTPATIENT
Start: 2018-07-02 | End: 2018-07-02

## 2018-07-02 RX ORDER — DOCUSATE SODIUM 100 MG
100 CAPSULE ORAL DAILY
Qty: 0 | Refills: 0 | Status: DISCONTINUED | OUTPATIENT
Start: 2018-07-02 | End: 2018-07-03

## 2018-07-02 RX ORDER — CALCIUM GLUCONATE 100 MG/ML
1 VIAL (ML) INTRAVENOUS ONCE
Qty: 0 | Refills: 0 | Status: COMPLETED | OUTPATIENT
Start: 2018-07-02 | End: 2018-07-02

## 2018-07-02 RX ORDER — SIMVASTATIN 20 MG/1
20 TABLET, FILM COATED ORAL AT BEDTIME
Qty: 0 | Refills: 0 | Status: DISCONTINUED | OUTPATIENT
Start: 2018-07-02 | End: 2018-07-03

## 2018-07-02 RX ORDER — DEXTROSE 50 % IN WATER 50 %
50 SYRINGE (ML) INTRAVENOUS ONCE
Qty: 0 | Refills: 0 | Status: COMPLETED | OUTPATIENT
Start: 2018-07-02 | End: 2018-07-02

## 2018-07-02 RX ORDER — FAMOTIDINE 10 MG/ML
20 INJECTION INTRAVENOUS DAILY
Qty: 0 | Refills: 0 | Status: DISCONTINUED | OUTPATIENT
Start: 2018-07-02 | End: 2018-07-03

## 2018-07-02 RX ORDER — SODIUM CHLORIDE 9 MG/ML
1000 INJECTION INTRAMUSCULAR; INTRAVENOUS; SUBCUTANEOUS ONCE
Qty: 0 | Refills: 0 | Status: DISCONTINUED | OUTPATIENT
Start: 2018-07-02 | End: 2018-07-02

## 2018-07-02 RX ORDER — SCOPALAMINE 1 MG/3D
1 PATCH, EXTENDED RELEASE TRANSDERMAL
Qty: 0 | Refills: 0 | Status: DISCONTINUED | OUTPATIENT
Start: 2018-07-02 | End: 2018-07-03

## 2018-07-02 RX ORDER — HEPARIN SODIUM 5000 [USP'U]/ML
5000 INJECTION INTRAVENOUS; SUBCUTANEOUS EVERY 8 HOURS
Qty: 0 | Refills: 0 | Status: DISCONTINUED | OUTPATIENT
Start: 2018-07-02 | End: 2018-07-07

## 2018-07-02 RX ORDER — DOXAZOSIN MESYLATE 4 MG
4 TABLET ORAL AT BEDTIME
Qty: 0 | Refills: 0 | Status: DISCONTINUED | OUTPATIENT
Start: 2018-07-02 | End: 2018-07-03

## 2018-07-02 RX ORDER — SODIUM CHLORIDE 9 MG/ML
500 INJECTION INTRAMUSCULAR; INTRAVENOUS; SUBCUTANEOUS ONCE
Qty: 0 | Refills: 0 | Status: COMPLETED | OUTPATIENT
Start: 2018-07-02 | End: 2018-07-02

## 2018-07-02 RX ORDER — POLYETHYLENE GLYCOL 3350 17 G/17G
17 POWDER, FOR SOLUTION ORAL DAILY
Qty: 0 | Refills: 0 | Status: DISCONTINUED | OUTPATIENT
Start: 2018-07-02 | End: 2018-07-03

## 2018-07-02 RX ORDER — SENNA PLUS 8.6 MG/1
2 TABLET ORAL AT BEDTIME
Qty: 0 | Refills: 0 | Status: DISCONTINUED | OUTPATIENT
Start: 2018-07-02 | End: 2018-07-03

## 2018-07-02 RX ADMIN — SODIUM CHLORIDE 125 MILLILITER(S): 9 INJECTION INTRAMUSCULAR; INTRAVENOUS; SUBCUTANEOUS at 14:22

## 2018-07-02 RX ADMIN — SODIUM CHLORIDE 3 MILLILITER(S): 9 INJECTION INTRAMUSCULAR; INTRAVENOUS; SUBCUTANEOUS at 14:22

## 2018-07-02 RX ADMIN — Medication 200 GRAM(S): at 15:53

## 2018-07-02 RX ADMIN — Medication 50 MILLILITER(S): at 18:32

## 2018-07-02 RX ADMIN — INSULIN HUMAN 10 UNIT(S): 100 INJECTION, SOLUTION SUBCUTANEOUS at 18:29

## 2018-07-02 RX ADMIN — SODIUM CHLORIDE 2000 MILLILITER(S): 9 INJECTION INTRAMUSCULAR; INTRAVENOUS; SUBCUTANEOUS at 15:53

## 2018-07-02 NOTE — H&P ADULT - NSHPPHYSICALEXAM_GEN_ALL_CORE
T(C): 36.7 (07-02-18 @ 13:16), Max: 36.7 (07-02-18 @ 13:16)  HR: 90 (07-02-18 @ 16:15) (90 - 90)  BP: 115/78 (07-02-18 @ 16:15) (115/78 - 117/76)  RR: 18 (07-02-18 @ 16:15) (18 - 18)  SpO2: 96% (07-02-18 @ 16:15) (96% - 96%)    PHYSICAL EXAM:  GENERAL: NAD, well-groomed, well-developed  HEAD:  Atraumatic, Normocephalic  EYES: EOMI, PERRLA, conjunctiva and sclera clear  ENMT: Moist mucous membranes, Good dentition  NECK: Supple, No JVD  NERVOUS SYSTEM:  A/O x3, Good concentration; CN 2-12 intact, No focal deficits  CHEST/LUNG: Clear to auscultation bilaterally; No rales, rhonchi, wheezing, or rubs  HEART: Regular rate and rhythm; S1/S2, No murmurs, rubs, or gallops  ABDOMEN: Soft, Nontender, Nondistended; Bowel sounds present  VASCULAR: Normal pulses, Normal capillary refill  EXTREMITIES:  2+ Peripheral Pulses, No clubbing, cyanosis, or edema  SKIN: Warm, Intact  PSYCH: Normal mood and affect T(C): 36.7 (07-02-18 @ 13:16), Max: 36.7 (07-02-18 @ 13:16)  HR: 90 (07-02-18 @ 16:15) (90 - 90)  BP: 115/78 (07-02-18 @ 16:15) (115/78 - 117/76)  RR: 18 (07-02-18 @ 16:15) (18 - 18)  SpO2: 96% (07-02-18 @ 16:15) (96% - 96%)    PHYSICAL EXAM:  GENERAL: A little disheveled, overall weak, coughing  HEAD:  Atraumatic, Normocephalic  EYES: EOMI, PERRLA, conjunctiva and sclera clear  ENMT: Moist mucous membranes, Good dentition  NECK: Supple, No JVD  NERVOUS SYSTEM:  A/O x3, Good concentration; CN 2-12 intact, No focal deficits but strength is decreased overall  CHEST/LUNG: Clear to auscultation bilaterally; No rales, rhonchi, wheezing, or rubs  HEART: Regular rate and rhythm; S1/S2, No murmurs, rubs, or gallops  ABDOMEN: Soft, Nontender, Nondistended; Bowel sounds present  VASCULAR: Normal pulses, Normal capillary refill  EXTREMITIES:  2+ Peripheral Pulses, No clubbing, cyanosis, or edema  SKIN: Warm, Intact  PSYCH: Normal mood and affect

## 2018-07-02 NOTE — ED PROVIDER NOTE - PROGRESS NOTE DETAILS
d/w dr jenkins, will f/u up with pt, d/w wife recommend f/u with ent and pulmonary as per wife pt eats pureed food for past several months pt not able to tolerate po, chokes on water  pureed food trouble swallowig  d/w dr sosa will admit for hyperkalemia and swallow eval

## 2018-07-02 NOTE — ED PROVIDER NOTE - OBJECTIVE STATEMENT
Pt with 7-10 day hx gurgling in throat, states cannot get rid of phlegm. No cough. No fever no chills. No pain. Pt does have generalized weakness for which he is in rehab.

## 2018-07-02 NOTE — ED PROVIDER NOTE - PHYSICAL EXAMINATION
Gen:  alert, awake, no acute distress  Head:  atraumatic, normocephalic  HEENT: PERRLA, EOMI, normal nose, normal oropharynx; no fb in throat, no pooling of secretions; trying to clear throat  CV:  rrr, nl S1, S2, no m/r/g  Pulm:  lungs CTA b/l  Abd: s/nt/nd, +BS  MSK:  moving all extremities, no back midline ttp, no stepoffs, no cva TTP  Neuro:  grossly intact, no focal deficits  Skin:  clear, dry, intact  Psych: AOx3, normal affect, no apparent risk to self or others

## 2018-07-02 NOTE — H&P ADULT - NSHPREVIEWOFSYSTEMS_GEN_ALL_CORE
REVIEW OF SYSTEMS:  CONSTITUTIONAL: No fever, weight loss, or fatigue  EYES: No eye pain, visual disturbances, or discharge  ENMT:  No difficulty hearing, tinnitus, vertigo; No sinus or throat pain; Positive difficulty swallowing  NECK: No neck pain or neck stiffness  RESPIRATORY: Positive cough, no wheezing, chills or hemoptysis; No shortness of breath  CARDIOVASCULAR: No chest pain, palpitations, dizziness, or leg swelling  GASTROINTESTINAL: No abdominal pain, No nausea, vomiting, or hematemesis; No diarrhea or constipation  GENITOURINARY: No dysuria, frequency, hematuria, or incontinence  NEUROLOGICAL: No headaches, memory loss, loss of strength, numbness, or tremors  SKIN: No itching, burning, rashes, or lesions   ENDOCRINE: No heat or cold intolerance; No hair loss  MUSCULOSKELETAL: No joint pain or swelling; No muscle, back, or extremity pain  PSYCHIATRIC: No depression, anxiety, mood swings, or difficulty sleeping  HEME/LYMPH: No easy bruising or bleeding  ALLERY AND IMMUNOLOGIC: No hives or eczema    All other ROS reviewed and negative except as otherwise stated REVIEW OF SYSTEMS:  CONSTITUTIONAL: No fever, weight loss, or fatigue  EYES: No eye pain, visual disturbances, or discharge  ENMT:  No difficulty hearing, tinnitus, vertigo; Positive difficulty swallowing, feels some blockage in throat  NECK: No neck pain or neck stiffness  RESPIRATORY: Positive cough, no wheezing, chills or hemoptysis; No shortness of breath  CARDIOVASCULAR: No chest pain, palpitations, dizziness, or leg swelling  GASTROINTESTINAL: No abdominal pain, No nausea, vomiting, or hematemesis; No diarrhea or constipation  GENITOURINARY: No dysuria, frequency, hematuria, or incontinence  NEUROLOGICAL: No headaches, memory loss, loss of strength, numbness, or tremors  SKIN: No itching, burning, rashes, or lesions   ENDOCRINE: No heat or cold intolerance; No hair loss  MUSCULOSKELETAL: No joint pain or swelling; No muscle, back, or extremity pain  PSYCHIATRIC: No depression, anxiety, mood swings, or difficulty sleeping  HEME/LYMPH: No easy bruising or bleeding  ALLERY AND IMMUNOLOGIC: No hives or eczema    All other ROS reviewed and negative except as otherwise stated

## 2018-07-02 NOTE — H&P ADULT - ASSESSMENT
#Dysphagia/ Cough: unclear etiology, per ER MD she tried for 4 hours to get pt to swallow thinks it is dysphagia, needs Speech and Swallow evaluation, states that in the past pt had a feeding tube, will get echo, proBNP #Dysphagia/ Cough: unclear etiology, per ER MD she tried for 4 hours to get pt to swallow thinks it is dysphagia, needs Speech and Swallow evaluation, GI evaluation, states that in the past pt had a feeding tube, will get echo, proBNP    #Hyperkalemia: given insulin and D50 in ER, f/u BMP in AM, no acute issues    #CKD III: monitor Cr    #Updated wife at bedside

## 2018-07-02 NOTE — H&P ADULT - NSHPLABSRESULTS_GEN_ALL_CORE
10.4   6.8   )-----------( 83       ( 02 Jul 2018 14:20 )             30.4       07-02    144  |  117<H>  |  38<H>  ----------------------------<  75  6.2<HH>   |  18<L>  |  1.37<H>    Ca    9.1      02 Jul 2018 17:20    TPro  7.6  /  Alb  3.2<L>  /  TBili  0.1<L>  /  DBili  x   /  AST  30  /  ALT  31  /  AlkPhos  111  07-02        EXAM:  XR CHEST PA LAT 2V      PROCEDURE DATE:  07/02/2018        INTERPRETATION:  Frontal and lateral chest on July 2, 2018 at 2:02 PM.   Patient has cough.    2 lateral views obtained.    Heart is borderline enlarged. The aorta is dilated and uncoiled.    Left-sided pacemaker is noted.    The lung fields and pleural surfaces are unremarkable.    The chest is similar to May 15 of this year.    IMPRESSION: Borderline heart enlargement and pacemaker again noted.

## 2018-07-02 NOTE — H&P ADULT - HISTORY OF PRESENT ILLNESS
Pt is a 84 y o M sent from [facility] for cough, unable to swallow, wife requested evaluation. Pt is a 84 y o M sent from [facility] for cough, unable to swallow, wife requested evaluation. Wife states that pt has had feeding tubes in the past and then advanced to pureed diet but now is unable to swallow, pt states it gets stuck in throat, thinks he needs a scope, was seen by GI in [place]. Denies f/c, has some nausea.

## 2018-07-02 NOTE — ED ADULT NURSE NOTE - OBJECTIVE STATEMENT
As per [facility], pt. requesting pt. be transferred for evaluation. AS per wife, states pt has increased mucus production.

## 2018-07-02 NOTE — ED PROVIDER NOTE - MEDICAL DECISION MAKING DETAILS
83 yo male states unable to clear phlegm from throat. No fever nochills. States same few years ago, treated with atropine. will check bloodwork, xray for possible infection

## 2018-07-02 NOTE — ED PROVIDER NOTE - NS ED ROS FT
Except as otherwise indicated in HPI:  CONSTITUTIONAL: Neg  HEENT: no sore throat, +phlegm in throat  CV: neg  Resp: neg  GI: Neg  : Neg  MSK: Neg  SKIN: Neg  NEURO: Neg  PSYCHIATRIC: Neg  Heme/Onc: Neg

## 2018-07-03 ENCOUNTER — RESULT REVIEW (OUTPATIENT)
Age: 83
End: 2018-07-03

## 2018-07-03 DIAGNOSIS — K21.9 GASTRO-ESOPHAGEAL REFLUX DISEASE WITHOUT ESOPHAGITIS: ICD-10-CM

## 2018-07-03 DIAGNOSIS — N18.3 CHRONIC KIDNEY DISEASE, STAGE 3 (MODERATE): ICD-10-CM

## 2018-07-03 DIAGNOSIS — R13.10 DYSPHAGIA, UNSPECIFIED: ICD-10-CM

## 2018-07-03 DIAGNOSIS — E87.5 HYPERKALEMIA: ICD-10-CM

## 2018-07-03 DIAGNOSIS — E78.5 HYPERLIPIDEMIA, UNSPECIFIED: ICD-10-CM

## 2018-07-03 LAB
ACANTHOCYTES BLD QL SMEAR: SLIGHT — SIGNIFICANT CHANGE UP
ALBUMIN SERPL ELPH-MCNC: 2.9 G/DL — LOW (ref 3.3–5)
ALBUMIN SERPL ELPH-MCNC: 2.9 G/DL — LOW (ref 3.3–5)
ALP SERPL-CCNC: 108 U/L — SIGNIFICANT CHANGE UP (ref 40–120)
ALP SERPL-CCNC: 113 U/L — SIGNIFICANT CHANGE UP (ref 40–120)
ALT FLD-CCNC: 26 U/L DA — SIGNIFICANT CHANGE UP (ref 10–45)
ALT FLD-CCNC: 29 U/L DA — SIGNIFICANT CHANGE UP (ref 10–45)
ANION GAP SERPL CALC-SCNC: 10 MMOL/L — SIGNIFICANT CHANGE UP (ref 5–17)
ANISOCYTOSIS BLD QL: SLIGHT — SIGNIFICANT CHANGE UP
AST SERPL-CCNC: 24 U/L — SIGNIFICANT CHANGE UP (ref 10–40)
AST SERPL-CCNC: 35 U/L — SIGNIFICANT CHANGE UP (ref 10–40)
BASOPHILS # BLD AUTO: 0 K/UL — SIGNIFICANT CHANGE UP (ref 0–0.2)
BASOPHILS NFR BLD AUTO: 0.5 % — SIGNIFICANT CHANGE UP (ref 0–2)
BILIRUB SERPL-MCNC: 0.3 MG/DL — SIGNIFICANT CHANGE UP (ref 0.2–1.2)
BILIRUB SERPL-MCNC: 0.3 MG/DL — SIGNIFICANT CHANGE UP (ref 0.2–1.2)
BUN SERPL-MCNC: 39 MG/DL — HIGH (ref 7–23)
BUN SERPL-MCNC: 40 MG/DL — HIGH (ref 7–23)
BUN SERPL-MCNC: 40 MG/DL — HIGH (ref 7–23)
CALCIUM SERPL-MCNC: 8.7 MG/DL — SIGNIFICANT CHANGE UP (ref 8.4–10.5)
CALCIUM SERPL-MCNC: 8.7 MG/DL — SIGNIFICANT CHANGE UP (ref 8.4–10.5)
CALCIUM SERPL-MCNC: 9 MG/DL — SIGNIFICANT CHANGE UP (ref 8.4–10.5)
CHLORIDE SERPL-SCNC: 116 MMOL/L — HIGH (ref 96–108)
CHLORIDE SERPL-SCNC: 116 MMOL/L — HIGH (ref 96–108)
CHLORIDE SERPL-SCNC: 117 MMOL/L — HIGH (ref 96–108)
CO2 BLDA-SCNC: 16 MMOL/L — LOW (ref 21–29)
CO2 SERPL-SCNC: 15 MMOL/L — LOW (ref 22–31)
CO2 SERPL-SCNC: 17 MMOL/L — LOW (ref 22–31)
CO2 SERPL-SCNC: 18 MMOL/L — LOW (ref 22–31)
CREAT SERPL-MCNC: 1.35 MG/DL — HIGH (ref 0.5–1.3)
CREAT SERPL-MCNC: 1.36 MG/DL — HIGH (ref 0.5–1.3)
CREAT SERPL-MCNC: 1.42 MG/DL — HIGH (ref 0.5–1.3)
DACRYOCYTES BLD QL SMEAR: SLIGHT — SIGNIFICANT CHANGE UP
EOSINOPHIL # BLD AUTO: 0 K/UL — SIGNIFICANT CHANGE UP (ref 0–0.5)
EOSINOPHIL NFR BLD AUTO: 0.9 % — SIGNIFICANT CHANGE UP (ref 0–6)
GAS PNL BLDA: SIGNIFICANT CHANGE UP
GLUCOSE BLDC GLUCOMTR-MCNC: 68 MG/DL — LOW (ref 70–99)
GLUCOSE BLDC GLUCOMTR-MCNC: 69 MG/DL — LOW (ref 70–99)
GLUCOSE BLDC GLUCOMTR-MCNC: 73 MG/DL — SIGNIFICANT CHANGE UP (ref 70–99)
GLUCOSE BLDC GLUCOMTR-MCNC: 80 MG/DL — SIGNIFICANT CHANGE UP (ref 70–99)
GLUCOSE SERPL-MCNC: 61 MG/DL — LOW (ref 70–99)
GLUCOSE SERPL-MCNC: 67 MG/DL — LOW (ref 70–99)
GLUCOSE SERPL-MCNC: 70 MG/DL — SIGNIFICANT CHANGE UP (ref 70–99)
HCT VFR BLD CALC: 28.1 % — LOW (ref 39–50)
HCT VFR BLD CALC: 28.7 % — LOW (ref 39–50)
HGB BLD-MCNC: 10 G/DL — LOW (ref 13–17)
HGB BLD-MCNC: 9.6 G/DL — LOW (ref 13–17)
HOROWITZ INDEX BLDA+IHG-RTO: SIGNIFICANT CHANGE UP
HYPOCHROMIA BLD QL: SLIGHT — SIGNIFICANT CHANGE UP
INR BLD: 1.2 RATIO — HIGH (ref 0.88–1.16)
LACTATE SERPL-SCNC: 0.8 MMOL/L — SIGNIFICANT CHANGE UP (ref 0.7–2)
LG PLATELETS BLD QL AUTO: SLIGHT — SIGNIFICANT CHANGE UP
LYMPHOCYTES # BLD AUTO: 1.1 K/UL — SIGNIFICANT CHANGE UP (ref 1–3.3)
LYMPHOCYTES # BLD AUTO: 20.4 % — SIGNIFICANT CHANGE UP (ref 13–44)
MAGNESIUM SERPL-MCNC: 1.8 MG/DL — SIGNIFICANT CHANGE UP (ref 1.6–2.6)
MANUAL SMEAR VERIFICATION: SIGNIFICANT CHANGE UP
MCHC RBC-ENTMCNC: 30 PG — SIGNIFICANT CHANGE UP (ref 27–34)
MCHC RBC-ENTMCNC: 31.7 PG — SIGNIFICANT CHANGE UP (ref 27–34)
MCHC RBC-ENTMCNC: 33.4 GM/DL — SIGNIFICANT CHANGE UP (ref 32–36)
MCHC RBC-ENTMCNC: 35.5 GM/DL — SIGNIFICANT CHANGE UP (ref 32–36)
MCV RBC AUTO: 89.2 FL — SIGNIFICANT CHANGE UP (ref 80–100)
MCV RBC AUTO: 89.7 FL — SIGNIFICANT CHANGE UP (ref 80–100)
MONOCYTES # BLD AUTO: 0.3 K/UL — SIGNIFICANT CHANGE UP (ref 0–0.9)
MONOCYTES NFR BLD AUTO: 5.6 % — SIGNIFICANT CHANGE UP (ref 1–9)
NEUTROPHILS # BLD AUTO: 3.9 K/UL — SIGNIFICANT CHANGE UP (ref 1.8–7.4)
NEUTROPHILS NFR BLD AUTO: 72.6 % — SIGNIFICANT CHANGE UP (ref 43–77)
OVALOCYTES BLD QL SMEAR: SLIGHT — SIGNIFICANT CHANGE UP
PCO2 BLDA: 37 MMHG — SIGNIFICANT CHANGE UP (ref 32–46)
PH BLDA: 7.22 — LOW (ref 7.35–7.45)
PLAT MORPH BLD: NORMAL — SIGNIFICANT CHANGE UP
PLATELET # BLD AUTO: 73 K/UL — LOW (ref 150–400)
PLATELET # BLD AUTO: 80 K/UL — LOW (ref 150–400)
PO2 BLDA: 143 MMHG — HIGH (ref 74–108)
POIKILOCYTOSIS BLD QL AUTO: SLIGHT — SIGNIFICANT CHANGE UP
POTASSIUM SERPL-MCNC: 5.9 MMOL/L — HIGH (ref 3.5–5.3)
POTASSIUM SERPL-MCNC: 6.2 MMOL/L — CRITICAL HIGH (ref 3.5–5.3)
POTASSIUM SERPL-MCNC: 6.8 MMOL/L — CRITICAL HIGH (ref 3.5–5.3)
POTASSIUM SERPL-SCNC: 5.9 MMOL/L — HIGH (ref 3.5–5.3)
POTASSIUM SERPL-SCNC: 6.2 MMOL/L — CRITICAL HIGH (ref 3.5–5.3)
POTASSIUM SERPL-SCNC: 6.8 MMOL/L — CRITICAL HIGH (ref 3.5–5.3)
PROT SERPL-MCNC: 7 G/DL — SIGNIFICANT CHANGE UP (ref 6–8.3)
PROT SERPL-MCNC: 7.4 G/DL — SIGNIFICANT CHANGE UP (ref 6–8.3)
PROTHROM AB SERPL-ACNC: 13.4 SEC — HIGH (ref 9.8–12.7)
RBC # BLD: 3.15 M/UL — LOW (ref 4.2–5.8)
RBC # BLD: 3.2 M/UL — LOW (ref 4.2–5.8)
RBC # FLD: 16.5 % — HIGH (ref 10.3–14.5)
RBC # FLD: 17 % — HIGH (ref 10.3–14.5)
RBC BLD AUTO: ABNORMAL
SAO2 % BLDA: 99 % — HIGH (ref 92–96)
SODIUM SERPL-SCNC: 141 MMOL/L — SIGNIFICANT CHANGE UP (ref 135–145)
SODIUM SERPL-SCNC: 143 MMOL/L — SIGNIFICANT CHANGE UP (ref 135–145)
SODIUM SERPL-SCNC: 145 MMOL/L — SIGNIFICANT CHANGE UP (ref 135–145)
WBC # BLD: 5.4 K/UL — SIGNIFICANT CHANGE UP (ref 3.8–10.5)
WBC # BLD: 7.1 K/UL — SIGNIFICANT CHANGE UP (ref 3.8–10.5)
WBC # FLD AUTO: 5.4 K/UL — SIGNIFICANT CHANGE UP (ref 3.8–10.5)
WBC # FLD AUTO: 7.1 K/UL — SIGNIFICANT CHANGE UP (ref 3.8–10.5)

## 2018-07-03 PROCEDURE — 88305 TISSUE EXAM BY PATHOLOGIST: CPT | Mod: 26

## 2018-07-03 PROCEDURE — 88312 SPECIAL STAINS GROUP 1: CPT | Mod: 26

## 2018-07-03 PROCEDURE — 71045 X-RAY EXAM CHEST 1 VIEW: CPT | Mod: 26

## 2018-07-03 PROCEDURE — 99233 SBSQ HOSP IP/OBS HIGH 50: CPT

## 2018-07-03 PROCEDURE — 93306 TTE W/DOPPLER COMPLETE: CPT | Mod: 26

## 2018-07-03 RX ORDER — DEXTROSE 50 % IN WATER 50 %
25 SYRINGE (ML) INTRAVENOUS ONCE
Qty: 0 | Refills: 0 | Status: DISCONTINUED | OUTPATIENT
Start: 2018-07-03 | End: 2018-07-05

## 2018-07-03 RX ORDER — SODIUM CHLORIDE 9 MG/ML
1000 INJECTION, SOLUTION INTRAVENOUS
Qty: 0 | Refills: 0 | Status: DISCONTINUED | OUTPATIENT
Start: 2018-07-03 | End: 2018-07-04

## 2018-07-03 RX ORDER — CALCIUM GLUCONATE 100 MG/ML
2 VIAL (ML) INTRAVENOUS ONCE
Qty: 0 | Refills: 0 | Status: COMPLETED | OUTPATIENT
Start: 2018-07-03 | End: 2018-07-03

## 2018-07-03 RX ORDER — DEXTROSE 50 % IN WATER 50 %
12.5 SYRINGE (ML) INTRAVENOUS ONCE
Qty: 0 | Refills: 0 | Status: DISCONTINUED | OUTPATIENT
Start: 2018-07-03 | End: 2018-07-05

## 2018-07-03 RX ORDER — PIPERACILLIN AND TAZOBACTAM 4; .5 G/20ML; G/20ML
3.38 INJECTION, POWDER, LYOPHILIZED, FOR SOLUTION INTRAVENOUS EVERY 8 HOURS
Qty: 0 | Refills: 0 | Status: DISCONTINUED | OUTPATIENT
Start: 2018-07-03 | End: 2018-07-04

## 2018-07-03 RX ORDER — MORPHINE SULFATE 50 MG/1
2 CAPSULE, EXTENDED RELEASE ORAL EVERY 4 HOURS
Qty: 0 | Refills: 0 | Status: DISCONTINUED | OUTPATIENT
Start: 2018-07-03 | End: 2018-07-03

## 2018-07-03 RX ORDER — DEXTROSE 50 % IN WATER 50 %
15 SYRINGE (ML) INTRAVENOUS ONCE
Qty: 0 | Refills: 0 | Status: DISCONTINUED | OUTPATIENT
Start: 2018-07-03 | End: 2018-07-05

## 2018-07-03 RX ORDER — ROBINUL 0.2 MG/ML
1 INJECTION INTRAMUSCULAR; INTRAVENOUS
Qty: 0 | Refills: 0 | Status: DISCONTINUED | OUTPATIENT
Start: 2018-07-03 | End: 2018-07-03

## 2018-07-03 RX ORDER — INSULIN HUMAN 100 [IU]/ML
10 INJECTION, SOLUTION SUBCUTANEOUS ONCE
Qty: 0 | Refills: 0 | Status: COMPLETED | OUTPATIENT
Start: 2018-07-03 | End: 2018-07-03

## 2018-07-03 RX ORDER — DEXTROSE 50 % IN WATER 50 %
50 SYRINGE (ML) INTRAVENOUS ONCE
Qty: 0 | Refills: 0 | Status: COMPLETED | OUTPATIENT
Start: 2018-07-03 | End: 2018-07-03

## 2018-07-03 RX ORDER — PROPOFOL 10 MG/ML
10 INJECTION, EMULSION INTRAVENOUS
Qty: 1000 | Refills: 0 | Status: DISCONTINUED | OUTPATIENT
Start: 2018-07-03 | End: 2018-07-05

## 2018-07-03 RX ORDER — SODIUM CHLORIDE 9 MG/ML
1000 INJECTION, SOLUTION INTRAVENOUS
Qty: 0 | Refills: 0 | Status: DISCONTINUED | OUTPATIENT
Start: 2018-07-03 | End: 2018-07-05

## 2018-07-03 RX ORDER — CHLORHEXIDINE GLUCONATE 213 G/1000ML
15 SOLUTION TOPICAL
Qty: 0 | Refills: 0 | Status: DISCONTINUED | OUTPATIENT
Start: 2018-07-03 | End: 2018-07-19

## 2018-07-03 RX ORDER — SODIUM CHLORIDE 9 MG/ML
1000 INJECTION, SOLUTION INTRAVENOUS
Qty: 0 | Refills: 0 | Status: DISCONTINUED | OUTPATIENT
Start: 2018-07-03 | End: 2018-07-03

## 2018-07-03 RX ORDER — PANTOPRAZOLE SODIUM 20 MG/1
40 TABLET, DELAYED RELEASE ORAL DAILY
Qty: 0 | Refills: 0 | Status: DISCONTINUED | OUTPATIENT
Start: 2018-07-03 | End: 2018-07-06

## 2018-07-03 RX ORDER — GLUCAGON INJECTION, SOLUTION 0.5 MG/.1ML
1 INJECTION, SOLUTION SUBCUTANEOUS ONCE
Qty: 0 | Refills: 0 | Status: DISCONTINUED | OUTPATIENT
Start: 2018-07-03 | End: 2018-07-05

## 2018-07-03 RX ORDER — ALBUTEROL 90 UG/1
2.5 AEROSOL, METERED ORAL ONCE
Qty: 0 | Refills: 0 | Status: COMPLETED | OUTPATIENT
Start: 2018-07-03 | End: 2018-07-03

## 2018-07-03 RX ADMIN — PIPERACILLIN AND TAZOBACTAM 25 GRAM(S): 4; .5 INJECTION, POWDER, LYOPHILIZED, FOR SOLUTION INTRAVENOUS at 23:33

## 2018-07-03 RX ADMIN — PANTOPRAZOLE SODIUM 40 MILLIGRAM(S): 20 TABLET, DELAYED RELEASE ORAL at 22:41

## 2018-07-03 RX ADMIN — PROPOFOL 5.99 MICROGRAM(S)/KG/MIN: 10 INJECTION, EMULSION INTRAVENOUS at 20:00

## 2018-07-03 RX ADMIN — LATANOPROST 1 DROP(S): 0.05 SOLUTION/ DROPS OPHTHALMIC; TOPICAL at 22:42

## 2018-07-03 RX ADMIN — ALBUTEROL 2.5 MILLIGRAM(S): 90 AEROSOL, METERED ORAL at 22:43

## 2018-07-03 RX ADMIN — SENNA PLUS 2 TABLET(S): 8.6 TABLET ORAL at 00:50

## 2018-07-03 RX ADMIN — HEPARIN SODIUM 5000 UNIT(S): 5000 INJECTION INTRAVENOUS; SUBCUTANEOUS at 07:09

## 2018-07-03 RX ADMIN — HEPARIN SODIUM 5000 UNIT(S): 5000 INJECTION INTRAVENOUS; SUBCUTANEOUS at 13:21

## 2018-07-03 RX ADMIN — Medication 200 GRAM(S): at 22:38

## 2018-07-03 RX ADMIN — Medication 20 MILLIGRAM(S): at 07:10

## 2018-07-03 RX ADMIN — SIMVASTATIN 20 MILLIGRAM(S): 20 TABLET, FILM COATED ORAL at 00:52

## 2018-07-03 RX ADMIN — Medication 50 MILLILITER(S): at 23:45

## 2018-07-03 RX ADMIN — CHLORHEXIDINE GLUCONATE 15 MILLILITER(S): 213 SOLUTION TOPICAL at 23:33

## 2018-07-03 RX ADMIN — HEPARIN SODIUM 5000 UNIT(S): 5000 INJECTION INTRAVENOUS; SUBCUTANEOUS at 22:41

## 2018-07-03 RX ADMIN — Medication 4 MILLIGRAM(S): at 01:00

## 2018-07-03 RX ADMIN — FAMOTIDINE 20 MILLIGRAM(S): 10 INJECTION INTRAVENOUS at 12:06

## 2018-07-03 RX ADMIN — LATANOPROST 1 DROP(S): 0.05 SOLUTION/ DROPS OPHTHALMIC; TOPICAL at 01:00

## 2018-07-03 RX ADMIN — INSULIN HUMAN 10 UNIT(S): 100 INJECTION, SOLUTION SUBCUTANEOUS at 23:44

## 2018-07-03 RX ADMIN — SCOPALAMINE 1 PATCH: 1 PATCH, EXTENDED RELEASE TRANSDERMAL at 12:07

## 2018-07-03 RX ADMIN — HEPARIN SODIUM 5000 UNIT(S): 5000 INJECTION INTRAVENOUS; SUBCUTANEOUS at 00:49

## 2018-07-03 NOTE — CONSULT NOTE ADULT - ASSESSMENT
84 yo male pmhx TIA, PPM, CKD stage 3, psoriasis, DLD with recent hospitalization at [facility] for UTI, right hydronephrosis for which he had a ureteral stent placed admitted with hyperkalemia, MELISSA and dysphagia. Case discussed with eICU attending.    NEURO: Sedated with propofol.   CV: BP stable. Metabolic acidosis, repeating ABG.  Lactate negative, likely hyperchloremic.  See Renal Plan.   RESP: Acute hypoxic respiratory failure due to aspiration AC/VC, received patient with tidal volume of 600, changed to 450 cc tidal volume with 6cc/kg lung protective tidal volume strategy.  Weaned FiO2 from 50% to 30% with sPO2 98%.  Wean FiO2 for spO2 >92%.  Keep HOB >30 degrees to prevent aspiration.  Peridex for VAP ppx, Pantoprazole for stress ulcer ppx, SCD for mechanical DVT ppx, heparin for chemical VTE ppx.      RENAL: MELISSA on CKD avoid nephrotoxic medications, renally dose medications, trend urine output, BUN/Cr, electrolytes, replace electrolytes as needed.  Hyperkalemia 6.2, insulin, Dextrose 50, calcium gluconate, albuterol.  Will trend.  Nix in place. Hyperchloremia on D5 1/2NS, trend.    GI: NPO, EGD note pending.   ENDO: No active issues.  ID: Concern for aspiration during EGD today, remains intubated, blood, urine, sputum cultures sent.  Zosyn started.    HEME: Thrombocytopenia platelets 73, started DVT ppx.  Will monitor.   DISPO: Full Code    Critical Care time: 60 minutes assessing presenting problems of acute illness that poses high probability of life threatening deterioration or end organ damage/dysfunction.  Medical decision making including Initiating plan of care, reviewing data, reviewing radiology, discussing with multidisciplinary team, non inclusive of procedures, discussing goals of care with patient/family.

## 2018-07-03 NOTE — PROGRESS NOTE ADULT - ASSESSMENT
#Dysphagia/ Cough: unclear etiology, per ER MD she tried for 4 hours to get pt to swallow thinks it is dysphagia, needs Speech and Swallow evaluation, GI evaluation, states that in the past pt had a feeding tube, will get echo, proBNP    #Hyperkalemia: given insulin and D50 in ER, f/u BMP in AM, no acute issues    #CKD III: monitor Cr    #Updated wife at bedside Dysphagia  GI consult  Speech consult    Treat Hyperkalemia

## 2018-07-03 NOTE — CONSULT NOTE ADULT - SUBJECTIVE AND OBJECTIVE BOX
Patient is a Patient is a 85y old  Male who presents with a chief complaint of Cough, unable to swallow (02 Jul 2018 18:17)      BRIEF HOSPITAL COURSE:   Patient is an 84 yo male pmhx TIA, PPM, CKD stage 3, psoriasis, DLD with recent hospitalization at [facility] for UTI, right hydronephrosis for which he had a ureteral stent placed    Events last 24 hours: ***    PAST MEDICAL & SURGICAL HISTORY:  Psoriasis  Prostate cancer  GERD (gastroesophageal reflux disease)  Dyslipidemia  No significant past surgical history    Allergies    No Known Allergies    Intolerances      FAMILY HISTORY:  No pertinent family history in first degree relatives      Social History:     Review of Systems:  CONSTITUTIONAL: No fever, chills, or fatigue  EYES: No eye pain, visual disturbances, or discharge  ENMT:  No difficulty hearing, tinnitus, vertigo; No sinus or throat pain  NECK: No pain or stiffness  RESPIRATORY: No cough, wheezing, chills or hemoptysis; No shortness of breath  CARDIOVASCULAR: No chest pain, palpitations, dizziness, or leg swelling  GASTROINTESTINAL: No abdominal or epigastric pain. No nausea, vomiting, or hematemesis; No diarrhea or constipation. No melena or hematochezia.  GENITOURINARY: No dysuria, frequency, hematuria, or incontinence  NEUROLOGICAL: No headaches, memory loss, loss of strength, numbness, or tremors  SKIN: No itching, burning, rashes, or lesions   MUSCULOSKELETAL: No joint pain or swelling; No muscle, back, or extremity pain  PSYCHIATRIC: No depression, anxiety, mood swings, or difficulty sleeping      Vitals During Exam:   HR:   BP:   RR:  sPO2:     Physical Examination:    General: No acute distress.      HEENT: Pupils equal, reactive to light.  Symmetric.    PULM: Clear to auscultation bilaterally, no significant sputum production    CVS: Regular rate and rhythm, no murmurs, rubs, or gallops    ABD: Soft, nondistended, nontender, normoactive bowel sounds, no masses    EXT: No edema, nontender    SKIN: Warm and well perfused, no rashes noted.    NEURO: Alert, oriented, interactive, nonfocal      Medications:        morphine  - Injectable 2 milliGRAM(s) IV Push every 4 hours PRN  propofol Infusion 10 MICROgram(s)/kG/Min IV Continuous <Continuous>      heparin  Injectable 5000 Unit(s) SubCutaneous every 8 hours    bisacodyl Suppository 10 milliGRAM(s) Rectal daily PRN  pantoprazole  Injectable 40 milliGRAM(s) IV Push daily      dextrose 40% Gel 15 Gram(s) Oral once PRN  dextrose 50% Injectable 12.5 Gram(s) IV Push once  dextrose 50% Injectable 25 Gram(s) IV Push once  dextrose 50% Injectable 25 Gram(s) IV Push once  glucagon  Injectable 1 milliGRAM(s) IntraMuscular once PRN    dextrose 5% + sodium chloride 0.45%. 1000 milliLiter(s) IV Continuous <Continuous>  dextrose 5%. 1000 milliLiter(s) IV Continuous <Continuous>      latanoprost 0.005% Ophthalmic Solution 1 Drop(s) Both EYES at bedtime            ICU Vital Signs Last 24 Hrs  T(C): 33.9 (03 Jul 2018 09:43), Max: 33.9 (03 Jul 2018 09:43)  T(F): 93.1 (03 Jul 2018 09:43), Max: 93.1 (03 Jul 2018 09:43)  HR: 60 (03 Jul 2018 20:00) (60 - 97)  BP: 140/50 (03 Jul 2018 20:00) (114/67 - 140/50)  BP(mean): 73 (03 Jul 2018 20:00) (73 - 80)  ABP: --  ABP(mean): --  RR: 17 (03 Jul 2018 20:00) (12 - 17)  SpO2: 100% (03 Jul 2018 20:00) (96% - 100%)    Vital Signs Last 24 Hrs  T(C): 33.9 (03 Jul 2018 09:43), Max: 33.9 (03 Jul 2018 09:43)  T(F): 93.1 (03 Jul 2018 09:43), Max: 93.1 (03 Jul 2018 09:43)  HR: 60 (03 Jul 2018 20:00) (60 - 97)  BP: 140/50 (03 Jul 2018 20:00) (114/67 - 140/50)  BP(mean): 73 (03 Jul 2018 20:00) (73 - 80)  RR: 17 (03 Jul 2018 20:00) (12 - 17)  SpO2: 100% (03 Jul 2018 20:00) (96% - 100%)    ABG - ( 03 Jul 2018 18:37 )  pH, Arterial: 7.22  pH, Blood: x     /  pCO2: 37    /  pO2: 143   / HCO3: x     / Base Excess: x     /  SaO2: 99                  I&O's Detail    03 Jul 2018 07:01  -  03 Jul 2018 21:17  --------------------------------------------------------  IN:  Total IN: 0 mL    OUT:    Voided: 2 mL  Total OUT: 2 mL    Total NET: -2 mL            LABS:                        10.0   7.1   )-----------( 73       ( 03 Jul 2018 07:15 )             28.1     07-03    141  |  116<H>  |  40<H>  ----------------------------<  67<L>  6.8<HH>   |  15<L>  |  1.36<H>    Ca    8.7      03 Jul 2018 07:15  Mg     1.8     07-03    TPro  7.4  /  Alb  2.9<L>  /  TBili  0.3  /  DBili  x   /  AST  35  /  ALT  29  /  AlkPhos  113  07-03          CAPILLARY BLOOD GLUCOSE      POCT Blood Glucose.: 73 mg/dL (03 Jul 2018 18:49)    PT/INR - ( 03 Jul 2018 20:45 )   PT: 13.4 sec;   INR: 1.20 ratio             CULTURES:        RADIOLOGY: ***      SUPPLEMENTAL O2:   LINES:  BRITTANY:   LINOx:   CONTACT: Patient is a 85y old  Male who presents with a chief complaint of Cough, unable to swallow (02 Jul 2018 18:17)      BRIEF HOSPITAL COURSE:   Patient is an 84 yo male pmhx TIA, PPM, CKD stage 3, psoriasis, DLD with recent hospitalization at [facility] for UTI, right hydronephrosis for which he had a ureteral stent placed presented to ED from [facility] for medical evaluation as per request by patient's wife.  Patient admitted with hyperkalemia, MELISSA and dysphagia.  Patient went for EGD today     Events last 24 hours: ***    PAST MEDICAL & SURGICAL HISTORY:  Psoriasis  Prostate cancer  GERD (gastroesophageal reflux disease)  Dyslipidemia  No significant past surgical history    Allergies    No Known Allergies    Intolerances      FAMILY HISTORY:  No pertinent family history in first degree relatives      Social History:     Review of Systems:  CONSTITUTIONAL: No fever, chills, or fatigue  EYES: No eye pain, visual disturbances, or discharge  ENMT:  No difficulty hearing, tinnitus, vertigo; No sinus or throat pain  NECK: No pain or stiffness  RESPIRATORY: No cough, wheezing, chills or hemoptysis; No shortness of breath  CARDIOVASCULAR: No chest pain, palpitations, dizziness, or leg swelling  GASTROINTESTINAL: No abdominal or epigastric pain. No nausea, vomiting, or hematemesis; No diarrhea or constipation. No melena or hematochezia.  GENITOURINARY: No dysuria, frequency, hematuria, or incontinence  NEUROLOGICAL: No headaches, memory loss, loss of strength, numbness, or tremors  SKIN: No itching, burning, rashes, or lesions   MUSCULOSKELETAL: No joint pain or swelling; No muscle, back, or extremity pain  PSYCHIATRIC: No depression, anxiety, mood swings, or difficulty sleeping      Vitals During Exam:   HR:   BP:   RR:  sPO2:     Physical Examination:    General: No acute distress.      HEENT: Pupils equal, reactive to light.  Symmetric.    PULM: Clear to auscultation bilaterally, no significant sputum production    CVS: Regular rate and rhythm, no murmurs, rubs, or gallops    ABD: Soft, nondistended, nontender, normoactive bowel sounds, no masses    EXT: No edema, nontender    SKIN: Warm and well perfused, no rashes noted.    NEURO: Alert, oriented, interactive, nonfocal      Medications:        morphine  - Injectable 2 milliGRAM(s) IV Push every 4 hours PRN  propofol Infusion 10 MICROgram(s)/kG/Min IV Continuous <Continuous>      heparin  Injectable 5000 Unit(s) SubCutaneous every 8 hours    bisacodyl Suppository 10 milliGRAM(s) Rectal daily PRN  pantoprazole  Injectable 40 milliGRAM(s) IV Push daily      dextrose 40% Gel 15 Gram(s) Oral once PRN  dextrose 50% Injectable 12.5 Gram(s) IV Push once  dextrose 50% Injectable 25 Gram(s) IV Push once  dextrose 50% Injectable 25 Gram(s) IV Push once  glucagon  Injectable 1 milliGRAM(s) IntraMuscular once PRN    dextrose 5% + sodium chloride 0.45%. 1000 milliLiter(s) IV Continuous <Continuous>  dextrose 5%. 1000 milliLiter(s) IV Continuous <Continuous>      latanoprost 0.005% Ophthalmic Solution 1 Drop(s) Both EYES at bedtime            ICU Vital Signs Last 24 Hrs  T(C): 33.9 (03 Jul 2018 09:43), Max: 33.9 (03 Jul 2018 09:43)  T(F): 93.1 (03 Jul 2018 09:43), Max: 93.1 (03 Jul 2018 09:43)  HR: 60 (03 Jul 2018 20:00) (60 - 97)  BP: 140/50 (03 Jul 2018 20:00) (114/67 - 140/50)  BP(mean): 73 (03 Jul 2018 20:00) (73 - 80)  ABP: --  ABP(mean): --  RR: 17 (03 Jul 2018 20:00) (12 - 17)  SpO2: 100% (03 Jul 2018 20:00) (96% - 100%)    Vital Signs Last 24 Hrs  T(C): 33.9 (03 Jul 2018 09:43), Max: 33.9 (03 Jul 2018 09:43)  T(F): 93.1 (03 Jul 2018 09:43), Max: 93.1 (03 Jul 2018 09:43)  HR: 60 (03 Jul 2018 20:00) (60 - 97)  BP: 140/50 (03 Jul 2018 20:00) (114/67 - 140/50)  BP(mean): 73 (03 Jul 2018 20:00) (73 - 80)  RR: 17 (03 Jul 2018 20:00) (12 - 17)  SpO2: 100% (03 Jul 2018 20:00) (96% - 100%)    ABG - ( 03 Jul 2018 18:37 )  pH, Arterial: 7.22  pH, Blood: x     /  pCO2: 37    /  pO2: 143   / HCO3: x     / Base Excess: x     /  SaO2: 99                  I&O's Detail    03 Jul 2018 07:01  -  03 Jul 2018 21:17  --------------------------------------------------------  IN:  Total IN: 0 mL    OUT:    Voided: 2 mL  Total OUT: 2 mL    Total NET: -2 mL            LABS:                        10.0   7.1   )-----------( 73       ( 03 Jul 2018 07:15 )             28.1     07-03    141  |  116<H>  |  40<H>  ----------------------------<  67<L>  6.8<HH>   |  15<L>  |  1.36<H>    Ca    8.7      03 Jul 2018 07:15  Mg     1.8     07-03    TPro  7.4  /  Alb  2.9<L>  /  TBili  0.3  /  DBili  x   /  AST  35  /  ALT  29  /  AlkPhos  113  07-03          CAPILLARY BLOOD GLUCOSE      POCT Blood Glucose.: 73 mg/dL (03 Jul 2018 18:49)    PT/INR - ( 03 Jul 2018 20:45 )   PT: 13.4 sec;   INR: 1.20 ratio             CULTURES:        RADIOLOGY: ***      SUPPLEMENTAL O2:   LINES:  BRITTANY:   Jay:   CONTACT: Patient is a 85y old  Male who presents with a chief complaint of Cough, unable to swallow (02 Jul 2018 18:17)      BRIEF HOSPITAL COURSE:   Patient is an 84 yo male pmhx TIA, PPM, CKD stage 3, psoriasis, DLD with recent hospitalization at [facility] for UTI, right hydronephrosis for which he had a ureteral stent placed [facility] as per request by patient's wife.  Patient admitted with hyperkalemia, MELISSA and dysphagia.     Events last 24 hours:   Patient went for EGD today remained intubated s/p procedure.  ABG ph 7.22, pCO2 37, pO2 143, HCO3 15, serum pCO2 17.        PAST MEDICAL & SURGICAL HISTORY:  Psoriasis  Prostate cancer  GERD (gastroesophageal reflux disease)  Dyslipidemia  No significant past surgical history    Allergies  No Known Allergies      FAMILY HISTORY:  No pertinent family history in first degree relatives      Social History:   Patient from rehab.     Review of Systems:  Unable to obtain due to patient being intubated and sedated.       Vitals During Exam:   HR: 60  BP: 140/50 mmHg  RR:15  sPO2: 98% on 50% FiO2, Peep 5      Physical Examination:    General: Patient resting in bed comfortably, sedated with propofol    HEENT: NC/AT, Pupils equal, reactive to light.  Symmetric.    PULM: Symmetrical thorax expansion upon respiration.  Coarse to auscultation bilaterally, moderate thick yellowish brown sputum production upon ET tube suctioning and oral suctioning.     CVS: Paced rhythm, no murmurs, rubs, or gallops appreciated.     ABD: Soft, nondistended, nontender, normoactive bowel sounds, no masses appreciated.     EXT: Nonpitting edema, nontender, chronic venous stasis changes of bilateral lower extremities.  DP pulses symmetrical.     SKIN: Warm and well perfused. See EXT exam.     NEURO: Sedated with propofol.       Medications:  morphine  - Injectable 2 milliGRAM(s) IV Push every 4 hours PRN  propofol Infusion 10 MICROgram(s)/kG/Min IV Continuous <Continuous>  heparin  Injectable 5000 Unit(s) SubCutaneous every 8 hours  bisacodyl Suppository 10 milliGRAM(s) Rectal daily PRN  pantoprazole  Injectable 40 milliGRAM(s) IV Push daily  dextrose 40% Gel 15 Gram(s) Oral once PRN  dextrose 50% Injectable 12.5 Gram(s) IV Push once  dextrose 50% Injectable 25 Gram(s) IV Push once  dextrose 50% Injectable 25 Gram(s) IV Push once  glucagon  Injectable 1 milliGRAM(s) IntraMuscular once PRN  dextrose 5% + sodium chloride 0.45%. 1000 milliLiter(s) IV Continuous <Continuous>  dextrose 5%. 1000 milliLiter(s) IV Continuous <Continuous>  latanoprost 0.005% Ophthalmic Solution 1 Drop(s) Both EYES at bedtime      ICU Vital Signs Last 24 Hrs  T(C): 33.9 (03 Jul 2018 09:43), Max: 33.9 (03 Jul 2018 09:43)  T(F): 93.1 (03 Jul 2018 09:43), Max: 93.1 (03 Jul 2018 09:43)  HR: 60 (03 Jul 2018 20:00) (60 - 97)  BP: 140/50 (03 Jul 2018 20:00) (114/67 - 140/50)  BP(mean): 73 (03 Jul 2018 20:00) (73 - 80)  ABP: --  ABP(mean): --  RR: 17 (03 Jul 2018 20:00) (12 - 17)  SpO2: 100% (03 Jul 2018 20:00) (96% - 100%)    Vital Signs Last 24 Hrs  T(C): 33.9 (03 Jul 2018 09:43), Max: 33.9 (03 Jul 2018 09:43)  T(F): 93.1 (03 Jul 2018 09:43), Max: 93.1 (03 Jul 2018 09:43)  HR: 60 (03 Jul 2018 20:00) (60 - 97)  BP: 140/50 (03 Jul 2018 20:00) (114/67 - 140/50)  BP(mean): 73 (03 Jul 2018 20:00) (73 - 80)  RR: 17 (03 Jul 2018 20:00) (12 - 17)  SpO2: 100% (03 Jul 2018 20:00) (96% - 100%)    ABG - ( 03 Jul 2018 18:37 )  pH, Arterial: 7.22  pH, Blood: x     /  pCO2: 37    /  pO2: 143   / HCO3: x     / Base Excess: x     /  SaO2: 99          I&O's Detail    03 Jul 2018 07:01  -  03 Jul 2018 21:17  --------------------------------------------------------  IN:  Total IN: 0 mL    OUT:    Voided: 2 mL  Total OUT: 2 mL  Total NET: -2 mL      LABS:                        10.0   7.1   )-----------( 73       ( 03 Jul 2018 07:15 )             28.1     07-03    141  |  116<H>  |  40<H>  ----------------------------<  67<L>  6.8<HH>   |  15<L>  |  1.36<H>    Ca    8.7      03 Jul 2018 07:15  Mg     1.8     07-03    TPro  7.4  /  Alb  2.9<L>  /  TBili  0.3  /  DBili  x   /  AST  35  /  ALT  29  /  AlkPhos  113  07-03      CAPILLARY BLOOD GLUCOSE  POCT Blood Glucose.: 73 mg/dL (03 Jul 2018 18:49)      PT/INR - ( 03 Jul 2018 20:45 )   PT: 13.4 sec;   INR: 1.20 ratio        CULTURES: Blood, urine and sputum cultures ordered.        RADIOLOGY:   < from: Xray Chest 1 View- PORTABLE-Urgent (07.03.18 @ 19:08) >  INTERPRETATION:  AP supine chest on July 3, 2018 at 6:51 PM. Patient was   intubated.    Poor inspiration crowds the chest. The heart is magnified by technique.   Left-sidedpacemaker again noted.    Present examination there is a poorly marginated density in the left mid   lateral lung field measuring approximately 2 cm. This appears essentially   new since July 2 which would speak for acute process.    Also noted is endotracheal tube in place.    IMPRESSION: Left midlung field density is seen to relatively new   suggesting acute process. Intubation.    < end of copied text >    < from: CT Chest No Cont (07.02.18 @ 19:20) >  INTERPRETATION:  CLINICAL INFORMATION: Cough    COMPARISON: 4/3/2016    PROCEDURE:   CT of the Chest was performed without intravenous contrast.  Sagittal and coronal reformats were performed.  MIP reformats were performed.    FINDINGS:    CHEST:     LUNGS AND LARGE AIRWAYS: Patent central airways.  There is minimal   bibasilar bronchiectasis and atelectasis. There are small nodular   opacities in the right upper lobe.  PLEURA: No pleural effusion.  VESSELS: Marked atherosclerotic changes of the aorta and branching   vessels.  HEART: Heart size is normal. Pacer lead within the right ventricle.   Coronary artery calcifications and stent is present. No pericardial   effusion.  MEDIASTINUM AND ARTIS: No lymphadenopathy.  CHEST WALL AND LOWER NECK: 2.2 cm peripherally calcified left thyroid   lobe nodule.  VISUALIZED UPPER ABDOMEN: Kidneys are atrophic. Questionable partially   visualized hydronephrosis versus parapelvic cyst in the upper pole of the   right kidney Small layering gallstones.  BONES: Degenerative changes of the spine.    IMPRESSION:     Small nodular opacities in the right upper lobe which may be infectious   in etiology. Consider 3 month follow-up chest CT to assureresolution.    Small bibasilar atelectasis.    Additional findings as above.    < end of copied text >    < from: CT Neck Soft Tissue No Cont (07.02.18 @ 19:19) >  INTERPRETATION:  CLINICAL STATEMENT: Cough    TECHNIQUE: CT of the neck was performed without IV contrast.     COMPARISON: None.    FINDINGS:  The airway is patent. Nonspecific small droplets of soft tissue air noted   predominantly in the right infrahilar temporal fossa, right parasellar   region, at the torcula of the venous sinuses. These could be iatrogenic   in origin within the the venous systems noted    There is no abnormally enlarged lymph nodes by CT size criteria.     The parotid, submandibular glands are within normal limits.    2.4 cm left thyroid nodule with peripheral calcification noted.    Nonspecific mild groundglass opacities noted. A sending aorta dilated  measuring 4.1 cm in width. Cardiac device noted    Degenerative changes noted in the cervical spine.    IMPRESSION:  Patent airway    < end of copied text >      SUPPLEMENTAL O2:  AC/VC  LINES: Peripheral  SOTO: Y  PPx: Heparin, Peridex, SCD, Pantoprazole  CONTACT: N

## 2018-07-03 NOTE — CHART NOTE - NSCHARTNOTEFT_GEN_A_CORE
Labs ordered for about 1800 entered by lab as 0715.  Spoke to lab, unable to be re-recorded for appropriate time until they speak with a manager.

## 2018-07-03 NOTE — PROGRESS NOTE ADULT - SUBJECTIVE AND OBJECTIVE BOX
Patient is a 85y old  Male who presents with a chief complaint of Cough, unable to swallow, decreased PO intake.      Patient seen and examined at bedside.    ALLERGIES:  No Known Allergies    MEDICATIONS  (STANDING):  doxazosin 4 milliGRAM(s) Oral at bedtime  famotidine    Tablet 20 milliGRAM(s) Oral daily  furosemide    Tablet 20 milliGRAM(s) Oral daily  heparin  Injectable 5000 Unit(s) SubCutaneous every 8 hours  latanoprost 0.005% Ophthalmic Solution 1 Drop(s) Both EYES at bedtime  scopolamine   Patch 1 Patch Transdermal every 3 days  senna 2 Tablet(s) Oral at bedtime  simvastatin 20 milliGRAM(s) Oral at bedtime  sodium chloride 0.9%. 1000 milliLiter(s) (125 mL/Hr) IV Continuous <Continuous>    MEDICATIONS  (PRN):  bisacodyl Suppository 10 milliGRAM(s) Rectal daily PRN Constipation  docusate sodium 100 milliGRAM(s) Oral daily PRN Constipation  polyethylene glycol 3350 17 Gram(s) Oral daily PRN Constipation    Vital Signs Last 24 Hrs  T(F): 98 (02 Jul 2018 13:16), Max: 98 (02 Jul 2018 13:16)  HR: 60 (03 Jul 2018 01:47) (60 - 90)  BP: 122/84 (03 Jul 2018 01:47) (115/78 - 122/84)  RR: 12 (03 Jul 2018 01:47) (12 - 18)  SpO2: 96% (03 Jul 2018 01:47) (96% - 96%)  I&O's Summary    PHYSICAL EXAM:  General: NAD, A/O x 3  ENT: MMM  Neck: Supple, No JVD  Lungs: Clear to auscultation bilaterally  Cardio: RRR, S1/S2, No murmurs  Abdomen: Soft, Nontender, Nondistended; Bowel sounds present  Extremities: No calf tenderness, No pitting edema    LABS:                        9.6    5.4   )-----------( x        ( 03 Jul 2018 07:00 )             28.7     07-03    145  |  117  |  39  ----------------------------<  61  5.9   |  18  |  1.35    Ca    9.0      03 Jul 2018 07:00    TPro  7.6  /  Alb  3.2  /  TBili  0.1  /  DBili  x   /  AST  30  /  ALT  31  /  AlkPhos  111  07-02    eGFR if Non African American: 48 mL/min/1.73M2 (07-03-18 @ 07:00)  eGFR if : 55 mL/min/1.73M2 (07-03-18 @ 07:00)    04-15 Chol 174 mg/dL  mg/dL HDL 46 mg/dL Trig 126 mg/dL  CAPILLARY BLOOD GLUCOSE    04-15 [id number] 4.9    RADIOLOGY & ADDITIONAL TESTS:    Care Discussed with Consultants/Other Providers: YES

## 2018-07-04 DIAGNOSIS — E87.2 ACIDOSIS: ICD-10-CM

## 2018-07-04 DIAGNOSIS — J96.01 ACUTE RESPIRATORY FAILURE WITH HYPOXIA: ICD-10-CM

## 2018-07-04 LAB
ALBUMIN SERPL ELPH-MCNC: 2.5 G/DL — LOW (ref 3.3–5)
ALP SERPL-CCNC: 96 U/L — SIGNIFICANT CHANGE UP (ref 40–120)
ALT FLD-CCNC: 22 U/L DA — SIGNIFICANT CHANGE UP (ref 10–45)
ANION GAP SERPL CALC-SCNC: 8 MMOL/L — SIGNIFICANT CHANGE UP (ref 5–17)
ANION GAP SERPL CALC-SCNC: 8 MMOL/L — SIGNIFICANT CHANGE UP (ref 5–17)
ANION GAP SERPL CALC-SCNC: 9 MMOL/L — SIGNIFICANT CHANGE UP (ref 5–17)
AST SERPL-CCNC: 21 U/L — SIGNIFICANT CHANGE UP (ref 10–40)
BASOPHILS # BLD AUTO: 0 K/UL — SIGNIFICANT CHANGE UP (ref 0–0.2)
BASOPHILS NFR BLD AUTO: 0.6 % — SIGNIFICANT CHANGE UP (ref 0–2)
BILIRUB SERPL-MCNC: 0.3 MG/DL — SIGNIFICANT CHANGE UP (ref 0.2–1.2)
BLOOD GAS COMMENTS ARTERIAL: SIGNIFICANT CHANGE UP
BUN SERPL-MCNC: 35 MG/DL — HIGH (ref 7–23)
BUN SERPL-MCNC: 37 MG/DL — HIGH (ref 7–23)
BUN SERPL-MCNC: 38 MG/DL — HIGH (ref 7–23)
CALCIUM SERPL-MCNC: 8.5 MG/DL — SIGNIFICANT CHANGE UP (ref 8.4–10.5)
CALCIUM SERPL-MCNC: 8.8 MG/DL — SIGNIFICANT CHANGE UP (ref 8.4–10.5)
CALCIUM SERPL-MCNC: 8.8 MG/DL — SIGNIFICANT CHANGE UP (ref 8.4–10.5)
CHLORIDE SERPL-SCNC: 117 MMOL/L — HIGH (ref 96–108)
CHLORIDE SERPL-SCNC: 118 MMOL/L — HIGH (ref 96–108)
CHLORIDE SERPL-SCNC: 119 MMOL/L — HIGH (ref 96–108)
CO2 BLDA-SCNC: 17 MMOL/L — LOW (ref 21–29)
CO2 BLDA-SCNC: 17 MMOL/L — LOW (ref 21–29)
CO2 SERPL-SCNC: 19 MMOL/L — LOW (ref 22–31)
CO2 SERPL-SCNC: 19 MMOL/L — LOW (ref 22–31)
CO2 SERPL-SCNC: 20 MMOL/L — LOW (ref 22–31)
CREAT SERPL-MCNC: 1.73 MG/DL — HIGH (ref 0.5–1.3)
CREAT SERPL-MCNC: 1.88 MG/DL — HIGH (ref 0.5–1.3)
CREAT SERPL-MCNC: 1.97 MG/DL — HIGH (ref 0.5–1.3)
EOSINOPHIL # BLD AUTO: 0 K/UL — SIGNIFICANT CHANGE UP (ref 0–0.5)
EOSINOPHIL NFR BLD AUTO: 0.2 % — SIGNIFICANT CHANGE UP (ref 0–6)
GAS PNL BLDA: SIGNIFICANT CHANGE UP
GAS PNL BLDA: SIGNIFICANT CHANGE UP
GLUCOSE SERPL-MCNC: 65 MG/DL — LOW (ref 70–99)
GLUCOSE SERPL-MCNC: 87 MG/DL — SIGNIFICANT CHANGE UP (ref 70–99)
GLUCOSE SERPL-MCNC: 97 MG/DL — SIGNIFICANT CHANGE UP (ref 70–99)
GRAM STN FLD: SIGNIFICANT CHANGE UP
HCT VFR BLD CALC: 26.5 % — LOW (ref 39–50)
HGB BLD-MCNC: 9.1 G/DL — LOW (ref 13–17)
HOROWITZ INDEX BLDA+IHG-RTO: SIGNIFICANT CHANGE UP
HOROWITZ INDEX BLDA+IHG-RTO: SIGNIFICANT CHANGE UP
LYMPHOCYTES # BLD AUTO: 0.8 K/UL — LOW (ref 1–3.3)
LYMPHOCYTES # BLD AUTO: 13.1 % — SIGNIFICANT CHANGE UP (ref 13–44)
MAGNESIUM SERPL-MCNC: 1.7 MG/DL — SIGNIFICANT CHANGE UP (ref 1.6–2.6)
MAGNESIUM SERPL-MCNC: 1.7 MG/DL — SIGNIFICANT CHANGE UP (ref 1.6–2.6)
MCHC RBC-ENTMCNC: 30.6 PG — SIGNIFICANT CHANGE UP (ref 27–34)
MCHC RBC-ENTMCNC: 34.3 GM/DL — SIGNIFICANT CHANGE UP (ref 32–36)
MCV RBC AUTO: 89.2 FL — SIGNIFICANT CHANGE UP (ref 80–100)
MONOCYTES # BLD AUTO: 0.5 K/UL — SIGNIFICANT CHANGE UP (ref 0–0.9)
MONOCYTES NFR BLD AUTO: 8.1 % — SIGNIFICANT CHANGE UP (ref 1–9)
NEUTROPHILS # BLD AUTO: 4.5 K/UL — SIGNIFICANT CHANGE UP (ref 1.8–7.4)
NEUTROPHILS NFR BLD AUTO: 78 % — HIGH (ref 43–77)
PCO2 BLDA: 32 MMHG — SIGNIFICANT CHANGE UP (ref 32–46)
PCO2 BLDA: 38 MMHG — SIGNIFICANT CHANGE UP (ref 32–46)
PH BLDA: 7.24 — LOW (ref 7.35–7.45)
PH BLDA: 7.33 — LOW (ref 7.35–7.45)
PHOSPHATE SERPL-MCNC: 2.8 MG/DL — SIGNIFICANT CHANGE UP (ref 2.5–4.5)
PLATELET # BLD AUTO: 77 K/UL — LOW (ref 150–400)
PO2 BLDA: 102 MMHG — SIGNIFICANT CHANGE UP (ref 74–108)
PO2 BLDA: 69 MMHG — LOW (ref 74–108)
POTASSIUM SERPL-MCNC: 5.1 MMOL/L — SIGNIFICANT CHANGE UP (ref 3.5–5.3)
POTASSIUM SERPL-MCNC: 6.1 MMOL/L — HIGH (ref 3.5–5.3)
POTASSIUM SERPL-MCNC: 6.2 MMOL/L — CRITICAL HIGH (ref 3.5–5.3)
POTASSIUM SERPL-SCNC: 5.1 MMOL/L — SIGNIFICANT CHANGE UP (ref 3.5–5.3)
POTASSIUM SERPL-SCNC: 6.1 MMOL/L — HIGH (ref 3.5–5.3)
POTASSIUM SERPL-SCNC: 6.2 MMOL/L — CRITICAL HIGH (ref 3.5–5.3)
PROT SERPL-MCNC: 6.5 G/DL — SIGNIFICANT CHANGE UP (ref 6–8.3)
RBC # BLD: 2.97 M/UL — LOW (ref 4.2–5.8)
RBC # FLD: 16 % — HIGH (ref 10.3–14.5)
SAO2 % BLDA: 92 % — SIGNIFICANT CHANGE UP (ref 92–96)
SAO2 % BLDA: 98 % — HIGH (ref 92–96)
SODIUM SERPL-SCNC: 145 MMOL/L — SIGNIFICANT CHANGE UP (ref 135–145)
SODIUM SERPL-SCNC: 146 MMOL/L — HIGH (ref 135–145)
SODIUM SERPL-SCNC: 146 MMOL/L — HIGH (ref 135–145)
SPECIMEN SOURCE: SIGNIFICANT CHANGE UP
WBC # BLD: 5.8 K/UL — SIGNIFICANT CHANGE UP (ref 3.8–10.5)
WBC # FLD AUTO: 5.8 K/UL — SIGNIFICANT CHANGE UP (ref 3.8–10.5)

## 2018-07-04 PROCEDURE — 71045 X-RAY EXAM CHEST 1 VIEW: CPT | Mod: 26

## 2018-07-04 RX ORDER — SODIUM POLYSTYRENE SULFONATE 4.1 MEQ/G
30 POWDER, FOR SUSPENSION ORAL ONCE
Qty: 0 | Refills: 0 | Status: COMPLETED | OUTPATIENT
Start: 2018-07-04 | End: 2018-07-04

## 2018-07-04 RX ORDER — SODIUM CHLORIDE 9 MG/ML
1000 INJECTION, SOLUTION INTRAVENOUS
Qty: 0 | Refills: 0 | Status: DISCONTINUED | OUTPATIENT
Start: 2018-07-04 | End: 2018-07-05

## 2018-07-04 RX ORDER — MAGNESIUM SULFATE 500 MG/ML
1 VIAL (ML) INJECTION ONCE
Qty: 0 | Refills: 0 | Status: COMPLETED | OUTPATIENT
Start: 2018-07-04 | End: 2018-07-05

## 2018-07-04 RX ORDER — PIPERACILLIN AND TAZOBACTAM 4; .5 G/20ML; G/20ML
3.38 INJECTION, POWDER, LYOPHILIZED, FOR SOLUTION INTRAVENOUS EVERY 12 HOURS
Qty: 0 | Refills: 0 | Status: DISCONTINUED | OUTPATIENT
Start: 2018-07-04 | End: 2018-07-05

## 2018-07-04 RX ORDER — ROBINUL 0.2 MG/ML
1 INJECTION INTRAMUSCULAR; INTRAVENOUS
Qty: 0 | Refills: 0 | Status: DISCONTINUED | OUTPATIENT
Start: 2018-07-04 | End: 2018-07-09

## 2018-07-04 RX ORDER — IPRATROPIUM BROMIDE 0.2 MG/ML
1 SOLUTION, NON-ORAL INHALATION EVERY 6 HOURS
Qty: 0 | Refills: 0 | Status: DISCONTINUED | OUTPATIENT
Start: 2018-07-04 | End: 2018-07-05

## 2018-07-04 RX ORDER — SODIUM BICARBONATE 1 MEQ/ML
50 SYRINGE (ML) INTRAVENOUS ONCE
Qty: 0 | Refills: 0 | Status: COMPLETED | OUTPATIENT
Start: 2018-07-04 | End: 2018-07-04

## 2018-07-04 RX ORDER — SODIUM CHLORIDE 9 MG/ML
1000 INJECTION, SOLUTION INTRAVENOUS ONCE
Qty: 0 | Refills: 0 | Status: COMPLETED | OUTPATIENT
Start: 2018-07-04 | End: 2018-07-04

## 2018-07-04 RX ORDER — ALBUTEROL 90 UG/1
2 AEROSOL, METERED ORAL EVERY 6 HOURS
Qty: 0 | Refills: 0 | Status: DISCONTINUED | OUTPATIENT
Start: 2018-07-04 | End: 2018-07-05

## 2018-07-04 RX ORDER — IPRATROPIUM/ALBUTEROL SULFATE 18-103MCG
3 AEROSOL WITH ADAPTER (GRAM) INHALATION EVERY 6 HOURS
Qty: 0 | Refills: 0 | Status: DISCONTINUED | OUTPATIENT
Start: 2018-07-04 | End: 2018-07-04

## 2018-07-04 RX ORDER — SODIUM CHLORIDE 9 MG/ML
500 INJECTION, SOLUTION INTRAVENOUS ONCE
Qty: 0 | Refills: 0 | Status: COMPLETED | OUTPATIENT
Start: 2018-07-04 | End: 2018-07-04

## 2018-07-04 RX ADMIN — ALBUTEROL 2 PUFF(S): 90 AEROSOL, METERED ORAL at 22:25

## 2018-07-04 RX ADMIN — SODIUM POLYSTYRENE SULFONATE 30 GRAM(S): 4.1 POWDER, FOR SUSPENSION ORAL at 07:39

## 2018-07-04 RX ADMIN — CHLORHEXIDINE GLUCONATE 15 MILLILITER(S): 213 SOLUTION TOPICAL at 05:19

## 2018-07-04 RX ADMIN — LATANOPROST 1 DROP(S): 0.05 SOLUTION/ DROPS OPHTHALMIC; TOPICAL at 21:39

## 2018-07-04 RX ADMIN — SODIUM CHLORIDE 75 MILLILITER(S): 9 INJECTION, SOLUTION INTRAVENOUS at 07:40

## 2018-07-04 RX ADMIN — PIPERACILLIN AND TAZOBACTAM 25 GRAM(S): 4; .5 INJECTION, POWDER, LYOPHILIZED, FOR SOLUTION INTRAVENOUS at 14:23

## 2018-07-04 RX ADMIN — Medication 50 MILLIEQUIVALENT(S): at 02:22

## 2018-07-04 RX ADMIN — HEPARIN SODIUM 5000 UNIT(S): 5000 INJECTION INTRAVENOUS; SUBCUTANEOUS at 05:19

## 2018-07-04 RX ADMIN — PIPERACILLIN AND TAZOBACTAM 25 GRAM(S): 4; .5 INJECTION, POWDER, LYOPHILIZED, FOR SOLUTION INTRAVENOUS at 05:19

## 2018-07-04 RX ADMIN — ALBUTEROL 2 PUFF(S): 90 AEROSOL, METERED ORAL at 16:16

## 2018-07-04 RX ADMIN — SODIUM CHLORIDE 75 MILLILITER(S): 9 INJECTION, SOLUTION INTRAVENOUS at 19:37

## 2018-07-04 RX ADMIN — PANTOPRAZOLE SODIUM 40 MILLIGRAM(S): 20 TABLET, DELAYED RELEASE ORAL at 12:40

## 2018-07-04 RX ADMIN — CHLORHEXIDINE GLUCONATE 15 MILLILITER(S): 213 SOLUTION TOPICAL at 17:16

## 2018-07-04 RX ADMIN — ROBINUL 1 MILLIGRAM(S): 0.2 INJECTION INTRAMUSCULAR; INTRAVENOUS at 17:16

## 2018-07-04 RX ADMIN — SODIUM POLYSTYRENE SULFONATE 30 GRAM(S): 4.1 POWDER, FOR SUSPENSION ORAL at 14:38

## 2018-07-04 RX ADMIN — SODIUM CHLORIDE 2000 MILLILITER(S): 9 INJECTION, SOLUTION INTRAVENOUS at 06:00

## 2018-07-04 RX ADMIN — SODIUM CHLORIDE 2000 MILLILITER(S): 9 INJECTION, SOLUTION INTRAVENOUS at 01:30

## 2018-07-04 RX ADMIN — Medication 1 PUFF(S): at 16:16

## 2018-07-04 RX ADMIN — HEPARIN SODIUM 5000 UNIT(S): 5000 INJECTION INTRAVENOUS; SUBCUTANEOUS at 14:22

## 2018-07-04 RX ADMIN — HEPARIN SODIUM 5000 UNIT(S): 5000 INJECTION INTRAVENOUS; SUBCUTANEOUS at 21:36

## 2018-07-04 RX ADMIN — PROPOFOL 5.99 MICROGRAM(S)/KG/MIN: 10 INJECTION, EMULSION INTRAVENOUS at 19:33

## 2018-07-04 RX ADMIN — Medication 1 PUFF(S): at 22:25

## 2018-07-04 NOTE — PROVIDER CONTACT NOTE (EICU) - BACKGROUND
86yo admitted with dysphagia s/p EGD complicated by concern for aspiration event.  Pt left intubated post procedure, in ICU for vent management.   Labs with persistent acidosis and

## 2018-07-04 NOTE — PROGRESS NOTE ADULT - ASSESSMENT
84 yo male pmhx TIA, PPM, CKD stage 3, psoriasis, DLD with recent hospitalization at [facility] for UTI, right hydronephrosis for which he had a ureteral stent placed admitted with hyperkalemia, MELISSA and dysphagia. Case discussed with eICU attending.    NEURO: Sedated with propofol.   CV: BP stable. Metabolic acidosis, repeating ABG.  Lactate negative, likely hyperchloremic.  See Renal Plan.   RESP: Acute hypoxic respiratory failure due to aspiration AC/VC, received patient with tidal volume of 600, changed to 450 cc tidal volume with 6cc/kg lung protective tidal volume strategy.  Wean FiO2 for spO2 >92%.  Keep HOB >30 degrees to prevent aspiration.  Peridex for VAP ppx, Pantoprazole for stress ulcer ppx, SCD for mechanical DVT ppx, heparin for chemical VTE ppx.      RENAL: MELISSA on CKD avoid nephrotoxic medications, renally dose medications, trend urine output, BUN/Cr, electrolytes, replace electrolytes as needed.  Will trend K.  Nix in place. Hyperchloremia on D5 1/2NS, trend.    GI: start feeds, unlikely to extubate today  ENDO: No active issues.  ID: ? aspiration, cont zosyn for now   HEME: Thrombocytopenia platelets 73, started DVT ppx.  Will monitor.   DISPO: Full Code    Critical Care time: 60 minutes assessing presenting problems of acute illness that poses high probability of life threatening deterioration or end organ damage/dysfunction.  Medical decision making including Initiating plan of care, reviewing data, reviewing radiology, discussing with multidisciplinary team, non inclusive of procedures, discussing goals of care with patient/family.

## 2018-07-04 NOTE — CONSULT NOTE ADULT - SUBJECTIVE AND OBJECTIVE BOX
Patient is a 85y old  Male admitted on 7/2/18 from [facility] with complaint of cough and dysphagia. Was intubated due to upper airways compromise.  Renal consult was called for MELISSA on CKD and hyperkalemia. He had recent right ureteral stent placed for hydronephrosis. Baseline Creatinine around 1.4. Chest CT scan on 7/2 appears to suggest bilateral hydronephrosis with right ureteral stent in Place. Nix was placed yesterday. Reported only 150 cc urine upon Nix insertion.    PAST MEDICAL & SURGICAL HISTORY:  Psoriasis  Prostate cancer  GERD (gastroesophageal reflux disease)  Dyslipidemia  No significant past surgical history      No Known Allergies    MEDICATIONS  (STANDING):  ALBUTerol    90 MICROgram(s) HFA Inhaler 2 Puff(s) Inhalation every 6 hours  chlorhexidine 0.12% Liquid 15 milliLiter(s) Swish and Spit two times a day  dextrose 5%. 1000 milliLiter(s) (50 mL/Hr) IV Continuous <Continuous>  dextrose 5%. 1000 milliLiter(s) (75 mL/Hr) IV Continuous <Continuous>  dextrose 50% Injectable 12.5 Gram(s) IV Push once  dextrose 50% Injectable 25 Gram(s) IV Push once  dextrose 50% Injectable 25 Gram(s) IV Push once  glycopyrrolate 1 milliGRAM(s) Oral two times a day  heparin  Injectable 5000 Unit(s) SubCutaneous every 8 hours  ipratropium 17 MICROgram(s) HFA Inhaler 1 Puff(s) Inhalation every 6 hours  latanoprost 0.005% Ophthalmic Solution 1 Drop(s) Both EYES at bedtime  pantoprazole  Injectable 40 milliGRAM(s) IV Push daily  piperacillin/tazobactam IVPB. 3.375 Gram(s) IV Intermittent every 8 hours  propofol Infusion 10 MICROgram(s)/kG/Min (5.988 mL/Hr) IV Continuous <Continuous>    MEDICATIONS  (PRN):  bisacodyl Suppository 10 milliGRAM(s) Rectal daily PRN Constipation  dextrose 40% Gel 15 Gram(s) Oral once PRN Blood Glucose LESS THAN 70 milliGRAM(s)/deciliter  glucagon  Injectable 1 milliGRAM(s) IntraMuscular once PRN Glucose LESS THAN 70 milligrams/deciliter  morphine  - Injectable 2 milliGRAM(s) IV Push every 4 hours PRN Mild Pain (1 - 3)    I&O's Detail    03 Jul 2018 07:01  -  04 Jul 2018 07:00  --------------------------------------------------------  IN:    dextrose 5% + sodium chloride 0.45%.: 825 mL    propofol Infusion: 36 mL    Solution: 200 mL  Total IN: 1061 mL    OUT:    Indwelling Catheter - Urethral: 510 mL    Voided: 2 mL  Total OUT: 512 mL    Total NET: 549 mL      04 Jul 2018 07:01  -  04 Jul 2018 17:22  --------------------------------------------------------  IN:    dextrose 5%.: 750 mL    propofol Infusion: 29 mL    Solution: 150 mL  Total IN: 929 mL    OUT:    Indwelling Catheter - Urethral: 535 mL  Total OUT: 535 mL    Total NET: 394 mL        Vital Signs Last 24 Hrs  T(C): 37.3 (04 Jul 2018 13:00), Max: 38.7 (04 Jul 2018 08:00)  T(F): 99.2 (04 Jul 2018 13:00), Max: 101.6 (04 Jul 2018 08:00)  HR: 60 (04 Jul 2018 17:00) (60 - 62)  BP: 110/57 (04 Jul 2018 17:00) (83/42 - 140/50)  BP(mean): 69 (04 Jul 2018 17:00) (51 - 85)  RR: 15 (04 Jul 2018 17:00) (14 - 20)  SpO2: 97% (04 Jul 2018 17:00) (95% - 100%)    PHYSICAL EXAM:  General: NAD, sedated, responsive  No JVD  Respiratory: b/l air entry, clear  Cardiovascular: S1 S2 reg  Gastrointestinal: soft, not tender, BS present  Extremities:  mild anasarca    Mode: AC/ CMV (Assist Control/ Continuous Mandatory Ventilation)  RR (machine): 10  TV (machine): 450  FiO2: 30  PEEP: 5  PIP: 21    CAPILLARY BLOOD GLUCOSE      POCT Blood Glucose.: 73 mg/dL (03 Jul 2018 18:49)  POCT Blood Glucose.: 69 mg/dL (03 Jul 2018 18:48)    07-04    145  |  118<H>  |  37<H>  ----------------------------<  87  6.2<HH>   |  19<L>  |  1.88<H>    Ca    8.5      04 Jul 2018 12:45  Phos  2.8     07-04  Mg     1.7     07-04    TPro  6.5  /  Alb  2.5<L>  /  TBili  0.3  /  DBili  x   /  AST  21  /  ALT  22  /  AlkPhos  96  07-04    Potassium, Serum: 6.2 mmol/L (07-04 @ 12:45)  Blood Urea Nitrogen, Serum: 37 mg/dL (07-04 @ 12:45)  Calcium, Total Serum: 8.5 mg/dL (07-04 @ 12:45)  Hemoglobin: 9.1 g/dL (07-04 @ 04:40)  Hematocrit: 26.5 % (07-04 @ 04:40)  Potassium, Serum: 6.1 mmol/L (07-04 @ 04:40)  Blood Urea Nitrogen, Serum: 38 mg/dL (07-04 @ 04:40)  Calcium, Total Serum: 8.8 mg/dL (07-04 @ 04:40)  Potassium, Serum: 6.2 mmol/L (07-03 @ 20:45)  Blood Urea Nitrogen, Serum: 40 mg/dL (07-03 @ 20:45)  Calcium, Total Serum: 8.7 mg/dL (07-03 @ 20:45)  Potassium, Serum: 6.8 mmol/L (07-03 @ 07:15)  Blood Urea Nitrogen, Serum: 40 mg/dL (07-03 @ 07:15)  Calcium, Total Serum: 8.7 mg/dL (07-03 @ 07:15)  Hemoglobin: 10.0 g/dL (07-03 @ 07:15)  Hematocrit: 28.1 % (07-03 @ 07:15)  Potassium, Serum: 5.9 mmol/L (07-03 @ 07:00)  Blood Urea Nitrogen, Serum: 39 mg/dL (07-03 @ 07:00)  Calcium, Total Serum: 9.0 mg/dL (07-03 @ 07:00)  Hemoglobin: 9.6 g/dL (07-03 @ 07:00)  Hematocrit: 28.7 % (07-03 @ 07:00)  Potassium, Serum: 6.2 mmol/L (07-02 @ 17:20)  Blood Urea Nitrogen, Serum: 38 mg/dL (07-02 @ 17:20)  Calcium, Total Serum: 9.1 mg/dL (07-02 @ 17:20)  Potassium, Serum: 6.1 mmol/L (07-02 @ 14:20)  Blood Urea Nitrogen, Serum: 40 mg/dL (07-02 @ 14:20)  Calcium, Total Serum: 9.1 mg/dL (07-02 @ 14:20)  Hemoglobin: 10.4 g/dL (07-02 @ 14:20)  Hematocrit: 30.4 % (07-02 @ 14:20)      Creatinine, Serum: 1.88 (07-04 @ 12:45)  Creatinine, Serum: 1.73 (07-04 @ 04:40)  Creatinine, Serum: 1.42 (07-03 @ 20:45)  Creatinine, Serum: 1.36 (07-03 @ 07:15)  Creatinine, Serum: 1.35 (07-03 @ 07:00)  Creatinine, Serum: 1.37 (07-02 @ 17:20)  Creatinine, Serum: 1.40 (07-02 @ 14:20)    CBC Full  -  ( 04 Jul 2018 04:40 )  WBC Count : 5.8 K/uL  Hemoglobin : 9.1 g/dL  Hematocrit : 26.5 %  Platelet Count - Automated : 77 K/uL  Mean Cell Volume : 89.2 fl  Mean Cell Hemoglobin : 30.6 pg  Mean Cell Hemoglobin Concentration : 34.3 gm/dL  Auto Neutrophil # : 4.5 K/uL  Auto Lymphocyte # : 0.8 K/uL  Auto Monocyte # : 0.5 K/uL  Auto Eosinophil # : 0.0 K/uL  Auto Basophil # : 0.0 K/uL  Auto Neutrophil % : 78.0 %  Auto Lymphocyte % : 13.1 %  Auto Monocyte % : 8.1 %  Auto Eosinophil % : 0.2 %  Auto Basophil % : 0.6 %      ABG - ( 04 Jul 2018 09:00 )  pH, Arterial: 7.33  pH, Blood: x     /  pCO2: 32    /  pO2: 102   / HCO3: x     / Base Excess: x     /  SaO2: 98

## 2018-07-04 NOTE — CONSULT NOTE ADULT - ASSESSMENT
MELISSA on CKD. Nonoligouric. History of prostate CA. May have bilateral ureteral obstruction with hyperkalemia.  Mild metabolic acidosis. Agree with Kayexalate for hyperkalemia. Obtain bedside renal ultrasound to confirm bilateral hydronephrosis.  F/u cultures, continue antibiotics, dose all meds for eGFR of 30cc/min. Lower Zosyn to q 12 hours.  Avoid hypotension and nephrotoxins.  Suggest urology evaluation.  Will follow.  Thank you.

## 2018-07-04 NOTE — PROGRESS NOTE ADULT - SUBJECTIVE AND OBJECTIVE BOX
Follow-up ICU Progress Note    Events noted. Patient's pH improved.     Medications:  MEDICATIONS  (STANDING):  chlorhexidine 0.12% Liquid 15 milliLiter(s) Swish and Spit two times a day  dextrose 5%. 1000 milliLiter(s) (50 mL/Hr) IV Continuous <Continuous>  dextrose 5%. 1000 milliLiter(s) (75 mL/Hr) IV Continuous <Continuous>  dextrose 50% Injectable 12.5 Gram(s) IV Push once  dextrose 50% Injectable 25 Gram(s) IV Push once  dextrose 50% Injectable 25 Gram(s) IV Push once  heparin  Injectable 5000 Unit(s) SubCutaneous every 8 hours  latanoprost 0.005% Ophthalmic Solution 1 Drop(s) Both EYES at bedtime  pantoprazole  Injectable 40 milliGRAM(s) IV Push daily  piperacillin/tazobactam IVPB. 3.375 Gram(s) IV Intermittent every 8 hours  propofol Infusion 10 MICROgram(s)/kG/Min (5.988 mL/Hr) IV Continuous <Continuous>    MEDICATIONS  (PRN):  bisacodyl Suppository 10 milliGRAM(s) Rectal daily PRN Constipation  dextrose 40% Gel 15 Gram(s) Oral once PRN Blood Glucose LESS THAN 70 milliGRAM(s)/deciliter  glucagon  Injectable 1 milliGRAM(s) IntraMuscular once PRN Glucose LESS THAN 70 milligrams/deciliter  morphine  - Injectable 2 milliGRAM(s) IV Push every 4 hours PRN Mild Pain (1 - 3)    Mode: AC/ CMV (Assist Control/ Continuous Mandatory Ventilation)  RR (machine): 10  TV (machine): 450  FiO2: 30  PEEP: 5  PIP: 19      Vital Signs Last 24 Hrs  T(C): 38.7 (04 Jul 2018 08:00), Max: 38.7 (04 Jul 2018 08:00)  T(F): 101.6 (04 Jul 2018 08:00), Max: 101.6 (04 Jul 2018 08:00)  HR: 60 (04 Jul 2018 09:00) (60 - 62)  BP: 102/50 (04 Jul 2018 09:00) (83/42 - 140/50)  BP(mean): 60 (04 Jul 2018 09:00) (51 - 80)  RR: 19 (04 Jul 2018 09:00) (14 - 20)  SpO2: 98% (04 Jul 2018 09:00) (97% - 100%)    ABG - ( 04 Jul 2018 09:00 )  pH, Arterial: 7.33  pH, Blood: x     /  pCO2: 32    /  pO2: 102   / HCO3: x     / Base Excess: x     /  SaO2: 98        07-03 @ 07:01  -  07-04 @ 07:00  --------------------------------------------------------  IN: 1061 mL / OUT: 512 mL / NET: 549 mL    LABS:                        9.1    5.8   )-----------( 77       ( 04 Jul 2018 04:40 )             26.5     07-04    146<H>  |  119<H>  |  38<H>  ----------------------------<  65<L>  6.1<H>   |  19<L>  |  1.73<H>    Ca    8.8      04 Jul 2018 04:40  Phos  2.8     07-04  Mg     1.7     07-04    TPro  6.5  /  Alb  2.5<L>  /  TBili  0.3  /  DBili  x   /  AST  21  /  ALT  22  /  AlkPhos  96  07-04    CAPILLARY BLOOD GLUCOSE      POCT Blood Glucose.: 73 mg/dL (03 Jul 2018 18:49)    PT/INR - ( 03 Jul 2018 20:45 )   PT: 13.4 sec;   INR: 1.20 ratio      Serum Pro-Brain Natriuretic Peptide: 1324 pg/mL (07-02-18 @ 17:20)    CULTURES: (if applicable)    Physical Examination:  PULM: Decreased BS at bases  CVS: S1, S2 heard    RADIOLOGY REVIEWED  CXR:     CT chest:    TTE:

## 2018-07-04 NOTE — PROVIDER CONTACT NOTE (EICU) - RECOMMENDATIONS
would give bicarb 1-2 amps  consider changing IVF to minimize hyperchloremia if acidosis persists consider Bicarb drip   follow hyperkalemia  vent weaning in AM

## 2018-07-04 NOTE — PROGRESS NOTE ADULT - SUBJECTIVE AND OBJECTIVE BOX
Patient is a 85y old  Male who presents with a chief complaint of Cough, unable to swallow (02 Jul 2018 18:17)    PAST MEDICAL & SURGICAL HISTORY:  Psoriasis  Prostate cancer  GERD (gastroesophageal reflux disease)  Dyslipidemia  No significant past surgical history    LICHA GUERRA   85y    Male    BRIEF HOSPITAL COURSE:     85M hx  TIA, PPM, CKD stage 3, psoriasis, DLD with recent hospitalization at [facility] for UTI, right hydronephrosis for which he had a ureteral stent placed [facility] request by patient's wife.  Patient admitted with hyperkalemia, MELISSA and dysphagia.     Events last 24 hours:   Patient went for EGD yesterday remained intubated s/p procedure.  as there was concern for aspiration event  ABG ph 7.22, pCO2 37, pO2 143, HCO3 15, serum pCO2 17.        Review of Systems:   UAT vented                  Allergies    No Known Allergies    Intolerances    ICU Vital Signs Last 24 Hrs  T(C): 36.4 (04 Jul 2018 21:00), Max: 38.7 (04 Jul 2018 08:00)  T(F): 97.6 (04 Jul 2018 21:00), Max: 101.6 (04 Jul 2018 08:00)  HR: 60 (04 Jul 2018 23:00) (60 - 62)  BP: 120/68 (04 Jul 2018 23:00) (83/42 - 123/61)  BP(mean): 80 (04 Jul 2018 23:00) (51 - 90)  ABP: --  ABP(mean): --  RR: 13 (04 Jul 2018 23:00) (11 - 20)  SpO2: 100% (04 Jul 2018 23:00) (95% - 100%)    Physical Examination:    General:  sedate on vent     HEENT: no JVD    PULM: bilateral BS     CVS:  rrr     ABD: soft    EXT: trace edema     SKIN:  warm    Neuro: sedate but arousable     ABG - ( 04 Jul 2018 09:00 )  pH, Arterial: 7.33  pH, Blood: x     /  pCO2: 32    /  pO2: 102   / HCO3: x     / Base Excess: x     /  SaO2: 98          Mode: AC/ CMV (Assist Control/ Continuous Mandatory Ventilation)  RR (machine): 10  TV (machine): 450  FiO2: 30  PEEP: 5  PIP: 20    Mode: AC/ CMV (Assist Control/ Continuous Mandatory Ventilation), RR (machine): 10, TV (machine): 450, FiO2: 30, PEEP: 5, PIP: 20  LABS:                        9.1    5.8   )-----------( 77       ( 04 Jul 2018 04:40 )             26.5     07-04    146<H>  |  117<H>  |  35<H>  ----------------------------<  97  5.1   |  20<L>  |  1.97<H>    Ca    8.8      04 Jul 2018 20:52  Phos  2.8     07-04  Mg     1.7     07-04    TPro  6.5  /  Alb  2.5<L>  /  TBili  0.3  /  DBili  x   /  AST  21  /  ALT  22  /  AlkPhos  96  07        PT/INR - ( 03 Jul 2018 20:45 )   PT: 13.4 sec;   INR: 1.20 ratio        Medications:  MEDICATIONS  (STANDING):  ALBUTerol    90 MICROgram(s) HFA Inhaler 2 Puff(s) Inhalation every 6 hours  chlorhexidine 0.12% Liquid 15 milliLiter(s) Swish and Spit two times a day  dextrose 5%. 1000 milliLiter(s) (50 mL/Hr) IV Continuous <Continuous>  dextrose 5%. 1000 milliLiter(s) (75 mL/Hr) IV Continuous <Continuous>  dextrose 50% Injectable 12.5 Gram(s) IV Push once  dextrose 50% Injectable 25 Gram(s) IV Push once  dextrose 50% Injectable 25 Gram(s) IV Push once  glycopyrrolate 1 milliGRAM(s) Oral two times a day  heparin  Injectable 5000 Unit(s) SubCutaneous every 8 hours  ipratropium 17 MICROgram(s) HFA Inhaler 1 Puff(s) Inhalation every 6 hours  latanoprost 0.005% Ophthalmic Solution 1 Drop(s) Both EYES at bedtime  pantoprazole  Injectable 40 milliGRAM(s) IV Push daily  piperacillin/tazobactam IVPB. 3.375 Gram(s) IV Intermittent every 12 hours  propofol Infusion 10 MICROgram(s)/kG/Min (5.988 mL/Hr) IV Continuous <Continuous>    MEDICATIONS  (PRN):  bisacodyl Suppository 10 milliGRAM(s) Rectal daily PRN Constipation  dextrose 40% Gel 15 Gram(s) Oral once PRN Blood Glucose LESS THAN 70 milliGRAM(s)/deciliter  glucagon  Injectable 1 milliGRAM(s) IntraMuscular once PRN Glucose LESS THAN 70 milligrams/deciliter  morphine  - Injectable 2 milliGRAM(s) IV Push every 4 hours PRN Mild Pain (1 - 3)        07-03 @ 07:01  -  07-04 @ 07:00  --------------------------------------------------------  IN: 1061 mL / OUT: 512 mL / NET: 549 mL    07-04 @ 07:01  -  07-04 @ 23:26  --------------------------------------------------------  IN: 1411 mL / OUT: 1050 mL / NET: 361 mL        RADIOLOGY/IMAGING/ECHO  < from: Xray Chest 1 View- PORTABLE-Routine (07.04.18 @ 14:17) >  Overlying chin obscures lung apices.    Left cardiac device. No new consolidation in the visualized lungs.    No pleural effusion.    Heart size cannot be accurately assessed in this projection, but appear   enlarged.      < from: CT Chest No Cont (07.02.18 @ 19:20) >  IMPRESSION:     Small nodular opacities in the right upper lobe which may be infectious   in etiology. Consider 3 month follow-up chest CT to assureresolution.    Small bibasilar atelectasis.    Additional findings as above.      Assessment/Plan:      85M  post EGD for eval of dysphagia.  ? of aspiration during EGD intubated was hypoxemic now improved.  Left intubated due to worsening renal fx MELISSA on CKD with hyperkalemia and NAGMA also now improved       Neuro Propofol to coordinate with vent   Cor   HD stable  Pulm  SBT as tolerated now that acidosis nearly resolved with CPAP SBT   GI   PPI   Renal   D5w for hypernatremia/hyperchloremai   Non oliguric MELISSA awaiting imaging of kidneys replace Mg  Heme/DVT   hep sq  thrombocytopenia stable  ID  zosyn for aspiration PNA  Endo  glucose Ok.   Lines/tubes PIV        CRITICAL CARE TIME SPENT: 33 minutes assessing presenting problems of acute illness, which pose high probability of life threatening deterioration or end organ damage/dysfunction, as well as medical decision making including initiating plan of care, reviewing data, reviewing radiologic exams, discussing with multidisciplinary team,  discussing goals of care with patient/family, and writing this note.  Non-inclusive of procedures performed, Patient is a 85y old  Male who presents with a chief complaint of Cough, unable to swallow (02 Jul 2018 18:17)    PAST MEDICAL & SURGICAL HISTORY:  Psoriasis  Prostate cancer  GERD (gastroesophageal reflux disease)  Dyslipidemia  No significant past surgical history    LICHA GUERRA   85y    Male    BRIEF HOSPITAL COURSE:     85M hx  TIA, PPM, CKD stage 3, psoriasis, DLD with recent hospitalization at [facility] for UTI, right hydronephrosis for which he had a ureteral stent placed [facility] request by patient's wife.  Patient admitted with hyperkalemia, MELISSA and dysphagia.     Events last 24 hours:   Patient went for EGD yesterday remained intubated s/p procedure.  as there was concern for aspiration event  ABG ph 7.22, pCO2 37, pO2 143, HCO3 15, serum pCO2 17.    Improved      Review of Systems:   UAT vented                  Allergies    No Known Allergies    Intolerances    ICU Vital Signs Last 24 Hrs  T(C): 36.4 (04 Jul 2018 21:00), Max: 38.7 (04 Jul 2018 08:00)  T(F): 97.6 (04 Jul 2018 21:00), Max: 101.6 (04 Jul 2018 08:00)  HR: 60 (04 Jul 2018 23:00) (60 - 62)  BP: 120/68 (04 Jul 2018 23:00) (83/42 - 123/61)  BP(mean): 80 (04 Jul 2018 23:00) (51 - 90)  ABP: --  ABP(mean): --  RR: 13 (04 Jul 2018 23:00) (11 - 20)  SpO2: 100% (04 Jul 2018 23:00) (95% - 100%)    Physical Examination:    General:  sedate on vent     HEENT: no JVD    PULM: bilateral BS     CVS:  rrr     ABD: soft    EXT: trace edema     SKIN:  warm    Neuro: sedate but arousable     ABG - ( 04 Jul 2018 09:00 )  pH, Arterial: 7.33  pH, Blood: x     /  pCO2: 32    /  pO2: 102   / HCO3: x     / Base Excess: x     /  SaO2: 98          Mode: AC/ CMV (Assist Control/ Continuous Mandatory Ventilation)  RR (machine): 10  TV (machine): 450  FiO2: 30  PEEP: 5  PIP: 20    Mode: AC/ CMV (Assist Control/ Continuous Mandatory Ventilation), RR (machine): 10, TV (machine): 450, FiO2: 30, PEEP: 5, PIP: 20  LABS:                        9.1    5.8   )-----------( 77       ( 04 Jul 2018 04:40 )             26.5     07-04    146<H>  |  117<H>  |  35<H>  ----------------------------<  97  5.1   |  20<L>  |  1.97<H>    Ca    8.8      04 Jul 2018 20:52  Phos  2.8     07-04  Mg     1.7     07-04    TPro  6.5  /  Alb  2.5<L>  /  TBili  0.3  /  DBili  x   /  AST  21  /  ALT  22  /  AlkPhos  96  07        PT/INR - ( 03 Jul 2018 20:45 )   PT: 13.4 sec;   INR: 1.20 ratio        Medications:  MEDICATIONS  (STANDING):  ALBUTerol    90 MICROgram(s) HFA Inhaler 2 Puff(s) Inhalation every 6 hours  chlorhexidine 0.12% Liquid 15 milliLiter(s) Swish and Spit two times a day  dextrose 5%. 1000 milliLiter(s) (50 mL/Hr) IV Continuous <Continuous>  dextrose 5%. 1000 milliLiter(s) (75 mL/Hr) IV Continuous <Continuous>  dextrose 50% Injectable 12.5 Gram(s) IV Push once  dextrose 50% Injectable 25 Gram(s) IV Push once  dextrose 50% Injectable 25 Gram(s) IV Push once  glycopyrrolate 1 milliGRAM(s) Oral two times a day  heparin  Injectable 5000 Unit(s) SubCutaneous every 8 hours  ipratropium 17 MICROgram(s) HFA Inhaler 1 Puff(s) Inhalation every 6 hours  latanoprost 0.005% Ophthalmic Solution 1 Drop(s) Both EYES at bedtime  pantoprazole  Injectable 40 milliGRAM(s) IV Push daily  piperacillin/tazobactam IVPB. 3.375 Gram(s) IV Intermittent every 12 hours  propofol Infusion 10 MICROgram(s)/kG/Min (5.988 mL/Hr) IV Continuous <Continuous>    MEDICATIONS  (PRN):  bisacodyl Suppository 10 milliGRAM(s) Rectal daily PRN Constipation  dextrose 40% Gel 15 Gram(s) Oral once PRN Blood Glucose LESS THAN 70 milliGRAM(s)/deciliter  glucagon  Injectable 1 milliGRAM(s) IntraMuscular once PRN Glucose LESS THAN 70 milligrams/deciliter  morphine  - Injectable 2 milliGRAM(s) IV Push every 4 hours PRN Mild Pain (1 - 3)        07-03 @ 07:01  -  07-04 @ 07:00  --------------------------------------------------------  IN: 1061 mL / OUT: 512 mL / NET: 549 mL    07-04 @ 07:01  -  07-04 @ 23:26  --------------------------------------------------------  IN: 1411 mL / OUT: 1050 mL / NET: 361 mL        RADIOLOGY/IMAGING/ECHO  < from: Xray Chest 1 View- PORTABLE-Routine (07.04.18 @ 14:17) >  Overlying chin obscures lung apices.    Left cardiac device. No new consolidation in the visualized lungs.    No pleural effusion.    Heart size cannot be accurately assessed in this projection, but appear   enlarged.      < from: CT Chest No Cont (07.02.18 @ 19:20) >  IMPRESSION:     Small nodular opacities in the right upper lobe which may be infectious   in etiology. Consider 3 month follow-up chest CT to assureresolution.    Small bibasilar atelectasis.    Additional findings as above.      Assessment/Plan:      85M  post EGD for eval of dysphagia.  ? of aspiration during EGD intubated was hypoxemic now improved.  Left intubated due to worsening renal fx MELISSA on CKD with hyperkalemia and NAGMA also now improved       Neuro Propofol to coordinate with vent   Cor   HD stable  Pulm  SBT as tolerated now that acidosis nearly resolved with CPAP SBT   GI   PPI   Renal   D5w for hypernatremia/hyperchloremai   Non oliguric MELISSA awaiting imaging of kidneys replace Mg  hyperkalemia improved  Heme/DVT   hep sq  thrombocytopenia stable  ID  zosyn for aspiration PNA  Endo  glucose Ok.   Lines/tubes PIV        CRITICAL CARE TIME SPENT: 33 minutes assessing presenting problems of acute illness, which pose high probability of life threatening deterioration or end organ damage/dysfunction, as well as medical decision making including initiating plan of care, reviewing data, reviewing radiologic exams, discussing with multidisciplinary team,  discussing goals of care with patient/family, and writing this note.  Non-inclusive of procedures performed,

## 2018-07-05 ENCOUNTER — RESULT REVIEW (OUTPATIENT)
Age: 83
End: 2018-07-05

## 2018-07-05 DIAGNOSIS — Z29.9 ENCOUNTER FOR PROPHYLACTIC MEASURES, UNSPECIFIED: ICD-10-CM

## 2018-07-05 DIAGNOSIS — R13.10 DYSPHAGIA, UNSPECIFIED: ICD-10-CM

## 2018-07-05 LAB
ANION GAP SERPL CALC-SCNC: 4 MMOL/L — LOW (ref 5–17)
BUN SERPL-MCNC: 31 MG/DL — HIGH (ref 7–23)
CALCIUM SERPL-MCNC: 8.6 MG/DL — SIGNIFICANT CHANGE UP (ref 8.4–10.5)
CHLORIDE SERPL-SCNC: 115 MMOL/L — HIGH (ref 96–108)
CO2 SERPL-SCNC: 21 MMOL/L — LOW (ref 22–31)
CREAT SERPL-MCNC: 1.89 MG/DL — HIGH (ref 0.5–1.3)
CULTURE RESULTS: NO GROWTH — SIGNIFICANT CHANGE UP
GLUCOSE BLDC GLUCOMTR-MCNC: 101 MG/DL — HIGH (ref 70–99)
GLUCOSE BLDC GLUCOMTR-MCNC: 99 MG/DL — SIGNIFICANT CHANGE UP (ref 70–99)
GLUCOSE SERPL-MCNC: 100 MG/DL — HIGH (ref 70–99)
HCT VFR BLD CALC: 26.5 % — LOW (ref 39–50)
HGB BLD-MCNC: 9 G/DL — LOW (ref 13–17)
MCHC RBC-ENTMCNC: 30.3 PG — SIGNIFICANT CHANGE UP (ref 27–34)
MCHC RBC-ENTMCNC: 33.9 GM/DL — SIGNIFICANT CHANGE UP (ref 32–36)
MCV RBC AUTO: 89.2 FL — SIGNIFICANT CHANGE UP (ref 80–100)
PLATELET # BLD AUTO: 69 K/UL — LOW (ref 150–400)
POTASSIUM SERPL-MCNC: 4.4 MMOL/L — SIGNIFICANT CHANGE UP (ref 3.5–5.3)
POTASSIUM SERPL-SCNC: 4.4 MMOL/L — SIGNIFICANT CHANGE UP (ref 3.5–5.3)
RBC # BLD: 2.97 M/UL — LOW (ref 4.2–5.8)
RBC # FLD: 16.3 % — HIGH (ref 10.3–14.5)
SODIUM SERPL-SCNC: 140 MMOL/L — SIGNIFICANT CHANGE UP (ref 135–145)
SPECIMEN SOURCE: SIGNIFICANT CHANGE UP
WBC # BLD: 5.3 K/UL — SIGNIFICANT CHANGE UP (ref 3.8–10.5)
WBC # FLD AUTO: 5.3 K/UL — SIGNIFICANT CHANGE UP (ref 3.8–10.5)

## 2018-07-05 PROCEDURE — 88104 CYTOPATH FL NONGYN SMEARS: CPT | Mod: 26

## 2018-07-05 PROCEDURE — 76775 US EXAM ABDO BACK WALL LIM: CPT | Mod: 26

## 2018-07-05 PROCEDURE — 88305 TISSUE EXAM BY PATHOLOGIST: CPT | Mod: 26

## 2018-07-05 RX ORDER — IPRATROPIUM/ALBUTEROL SULFATE 18-103MCG
3 AEROSOL WITH ADAPTER (GRAM) INHALATION EVERY 6 HOURS
Qty: 0 | Refills: 0 | Status: DISCONTINUED | OUTPATIENT
Start: 2018-07-05 | End: 2018-07-19

## 2018-07-05 RX ADMIN — Medication 100 GRAM(S): at 00:27

## 2018-07-05 RX ADMIN — CHLORHEXIDINE GLUCONATE 15 MILLILITER(S): 213 SOLUTION TOPICAL at 17:31

## 2018-07-05 RX ADMIN — Medication 3 MILLILITER(S): at 15:54

## 2018-07-05 RX ADMIN — HEPARIN SODIUM 5000 UNIT(S): 5000 INJECTION INTRAVENOUS; SUBCUTANEOUS at 05:24

## 2018-07-05 RX ADMIN — ROBINUL 1 MILLIGRAM(S): 0.2 INJECTION INTRAMUSCULAR; INTRAVENOUS at 05:24

## 2018-07-05 RX ADMIN — LATANOPROST 1 DROP(S): 0.05 SOLUTION/ DROPS OPHTHALMIC; TOPICAL at 22:37

## 2018-07-05 RX ADMIN — Medication 1 PUFF(S): at 04:14

## 2018-07-05 RX ADMIN — CHLORHEXIDINE GLUCONATE 15 MILLILITER(S): 213 SOLUTION TOPICAL at 05:24

## 2018-07-05 RX ADMIN — PANTOPRAZOLE SODIUM 40 MILLIGRAM(S): 20 TABLET, DELAYED RELEASE ORAL at 11:45

## 2018-07-05 RX ADMIN — PIPERACILLIN AND TAZOBACTAM 25 GRAM(S): 4; .5 INJECTION, POWDER, LYOPHILIZED, FOR SOLUTION INTRAVENOUS at 05:24

## 2018-07-05 RX ADMIN — Medication 3 MILLILITER(S): at 21:46

## 2018-07-05 RX ADMIN — ALBUTEROL 2 PUFF(S): 90 AEROSOL, METERED ORAL at 04:14

## 2018-07-05 RX ADMIN — HEPARIN SODIUM 5000 UNIT(S): 5000 INJECTION INTRAVENOUS; SUBCUTANEOUS at 22:35

## 2018-07-05 RX ADMIN — ROBINUL 1 MILLIGRAM(S): 0.2 INJECTION INTRAMUSCULAR; INTRAVENOUS at 17:31

## 2018-07-05 RX ADMIN — HEPARIN SODIUM 5000 UNIT(S): 5000 INJECTION INTRAVENOUS; SUBCUTANEOUS at 13:42

## 2018-07-05 NOTE — PROGRESS NOTE ADULT - SUBJECTIVE AND OBJECTIVE BOX
Patient extubated now.  Has nasogastric tube, receiving Nepro continuous feeds at 30 cc/hr which he is tolerated.  Communicates; denies any acute complaints at this time.  No abdominal pain.    MEDICATIONS  (STANDING):  ALBUTerol/ipratropium for Nebulization 3 milliLiter(s) Nebulizer every 6 hours  chlorhexidine 0.12% Liquid 15 milliLiter(s) Swish and Spit two times a day  glycopyrrolate 1 milliGRAM(s) Oral two times a day  heparin  Injectable 5000 Unit(s) SubCutaneous every 8 hours  latanoprost 0.005% Ophthalmic Solution 1 Drop(s) Both EYES at bedtime  pantoprazole  Injectable 40 milliGRAM(s) IV Push daily    MEDICATIONS  (PRN):  bisacodyl Suppository 10 milliGRAM(s) Rectal daily PRN Constipation  morphine  - Injectable 2 milliGRAM(s) IV Push every 4 hours PRN Mild Pain (1 - 3)    No Known Allergies    PHYSICAL EXAM:   Vital Signs:  Vital Signs Last 24 Hrs  T(C): 37.3 (2018 09:00), Max: 37.3 (2018 13:00)  T(F): 99.1 (2018 09:00), Max: 99.2 (2018 13:00)  HR: 60 (2018 11:00) (60 - 60)  BP: 131/70 (2018 11:00) (101/52 - 143/83)  BP(mean): 83 (2018 11:00) (64 - 97)  RR: 14 (2018 11:) (11 - 19)  SpO2: 100% (2018 11:00) (95% - 100%)  Daily     Daily Weight in k.5 (2018 05:00)I&O's Summary    2018 07:01  -  2018 07:00  --------------------------------------------------------  IN: 2200 mL / OUT: 1515 mL / NET: 685 mL    2018 07:01  -  2018 12:14  --------------------------------------------------------  IN: 185 mL / OUT: 385 mL / NET: -200 mL        GENERAL:  Appears stated age, no distress  HEENT:  NC/AT,  conjunctivae clear and pink, no thyromegaly, nodules, adenopathy, no JVD, sclera -anicteric +nasogastric tube  CHEST:  Full & symmetric excursion, no increased effort, breath sounds clear  HEART:  Regular rhythm, S1, S2, no murmur/rub/S3/S4, no abdominal bruit, no edema  ABDOMEN:  Soft, non-tender, non-distended, normoactive bowel sounds,  no masses ,no hepato-splenomegaly, no signs of chronic liver disease  EXTEREMITIES:  no cyanosis,clubbing or edema  SKIN:  No rash/erythema/ecchymoses/petechiae/wounds/abscess/warm/dry  NEURO:  Alert, oriented, no asterixis, no tremor, no encephalopathy      LABS:                        9.0    5.3   )-----------( 69       ( 2018 05:30 )             26.5     07-05    140  |  115<H>  |  31<H>  ----------------------------<  100<H>  4.4   |  21<L>  |  1.89<H>    Ca    8.6      2018 05:30  Phos  2.8     07-04  Mg     1.7     07-04    TPro  6.5  /  Alb  2.5<L>  /  TBili  0.3  /  DBili  x   /  AST  21  /  ALT  22  /  AlkPhos  96  07-04    PT/INR - ( 2018 20:45 )   PT: 13.4 sec;   INR: 1.20 ratio

## 2018-07-05 NOTE — PROGRESS NOTE ADULT - PROBLEM SELECTOR PLAN 1
1. Recommend Speech and Swallow evaluation prior to placing on oral feeds   2. Follow-up biopsies/brushings from EGD

## 2018-07-05 NOTE — PROGRESS NOTE ADULT - ASSESSMENT
84 yo male pmhx TIA, PPM, CKD stage 3, psoriasis, DLD with recent hospitalization at [facility] for UTI, right hydronephrosis for which he had a ureteral stent placed admitted with hyperkalemia, MELISSA and dysphagia. Intubated s/p EGD- unable to extubate.     NEURO: off propofol  CV: BP stable. Metabolic acidosis, improved  Lactate negative, likely hyperchloremic.  See Renal Plan.   RESP: s/p extubation.    RENAL: MELISSA on CKD avoid nephrotoxic medications, renally dose medications, trend urine output, BUN/Cr, electrolytes, replace electrolytes as needed.  Will trend K.  Nix in place. Nephrology/ consults   GI: start feeds  ENDO: No active issues.  ID: d/c zosyn  HEME: Thrombocytopenia   DISPO: Full Code    Critical Care time: 60 minutes assessing presenting problems of acute illness that poses high probability of life threatening deterioration or end organ damage/dysfunction.  Medical decision making including Initiating plan of care, reviewing data, reviewing radiology, discussing with multidisciplinary team, non inclusive of procedures, discussing goals of care with patient/family.

## 2018-07-05 NOTE — PROGRESS NOTE ADULT - SUBJECTIVE AND OBJECTIVE BOX
85y  Male  No Known Allergies    CC: Patient is a 85y old  Male who presented with a chief complaint of Cough and difficulty swallowing     HPI:   Pt is a 84 y o M sent from [facility] for cough, unable to swallow, wife requested evaluation. Wife states that pt has had feeding tubes in the past and then advanced to pureed diet but had been having difficulty swallowing. He was admitted for evaluation of dysphagia and MELISSA on CKD with hyperkalemia  He Underwent EGD on Tuesday no esophageal obstruction, ulcerations or mass lesions noted and GI suspects oropharyngeal dysphagia. He was left intubated post procedure due to concern for aspiration but extubated today and tonight no showing signs of aspiration of secretions will monitor over night.          PAST MEDICAL & SURGICAL HISTORY:  Psoriasis  Prostate cancer  GERD (gastroesophageal reflux disease)  Dyslipidemia  No significant past surgical history    FAMILY HISTORY:  No pertinent family history in first degree relatives      Vitals   Vital Signs Last 24 Hrs  T(C): 36.8 (05 Jul 2018 17:00), Max: 37.3 (05 Jul 2018 09:00)  T(F): 98.2 (05 Jul 2018 17:00), Max: 99.1 (05 Jul 2018 09:00)  HR: 60 (05 Jul 2018 20:00) (60 - 60)  BP: 122/69 (05 Jul 2018 20:00) (104/86 - 143/83)  BP(mean): 83 (05 Jul 2018 20:00) (74 - 97)  RR: 9 (05 Jul 2018 20:00) (9 - 17)  SpO2: 100% (05 Jul 2018 20:00) (96% - 100%)  ABG - ( 04 Jul 2018 09:00 )  pH, Arterial: 7.33  pH, Blood: x     /  pCO2: 32    /  pO2: 102   / HCO3: x     / Base Excess: x     /  SaO2: 98                I&O's Summary    04 Jul 2018 07:01  -  05 Jul 2018 07:00  --------------------------------------------------------  IN: 2200 mL / OUT: 1515 mL / NET: 685 mL    05 Jul 2018 07:01  -  05 Jul 2018 21:02  --------------------------------------------------------  IN: 595 mL / OUT: 1020 mL / NET: -425 mL        LABS  07-05    140  |  115<H>  |  31<H>  ----------------------------<  100<H>  4.4   |  21<L>  |  1.89<H>    Ca    8.6      05 Jul 2018 05:30  Phos  2.8     07-04  Mg     1.7     07-04    TPro  6.5  /  Alb  2.5<L>  /  TBili  0.3  /  DBili  x   /  AST  21  /  ALT  22  /  AlkPhos  96  07-04                          9.0    5.3   )-----------( 69       ( 05 Jul 2018 05:30 )             26.5           LIVER FUNCTIONS - ( 04 Jul 2018 04:40 )  Alb: 2.5 g/dL / Pro: 6.5 g/dL / ALK PHOS: 96 U/L / ALT: 22 U/L DA / AST: 21 U/L / GGT: x           CAPILLARY BLOOD GLUCOSE      POCT Blood Glucose.: 101 mg/dL (05 Jul 2018 06:36)        VENT SETTINGS   Mode: Spontaneous/ CPAP (Continuous Positive Airway Pressure)  RR (machine): 33  TV (machine): 569  PEEP: 5  PS: 5      Meds  MEDICATIONS  (STANDING):  ALBUTerol/ipratropium for Nebulization 3 milliLiter(s) Nebulizer every 6 hours  chlorhexidine 0.12% Liquid 15 milliLiter(s) Swish and Spit two times a day  glycopyrrolate 1 milliGRAM(s) Oral two times a day  heparin  Injectable 5000 Unit(s) SubCutaneous every 8 hours  latanoprost 0.005% Ophthalmic Solution 1 Drop(s) Both EYES at bedtime  pantoprazole  Injectable 40 milliGRAM(s) IV Push daily      REVIEW OF SYSTEMS:    CONSTITUTIONAL: No fever, weight loss, or fatigue  EYES: No eye pain, visual disturbances, or discharge  ENMT:  No difficulty hearing, tinnitus, vertigo; No sinus or throat pain  NECK: No pain or stiffness  BREASTS: No pain, masses, or nipple discharge  RESPIRATORY: No cough, wheezing, chills or hemoptysis; No shortness of breath  CARDIOVASCULAR: No chest pain, palpitations, dizziness, or leg swelling  GASTROINTESTINAL: No abdominal or epigastric pain. No nausea, vomiting, or hematemesis; No diarrhea or constipation. No melena or hematochezia.  GENITOURINARY: No dysuria, frequency, hematuria, or incontinence  NEUROLOGICAL: No headaches, memory loss, loss of strength, numbness, or tremors  SKIN: No itching, burning, rashes, or lesions   LYMPH NODES: No enlarged glands  ENDOCRINE: No heat or cold intolerance; No hair loss  MUSCULOSKELETAL: No joint pain or swelling; No muscle, back, or extremity pain  PSYCHIATRIC: No depression, anxiety, mood swings, or difficulty sleeping  HEME/LYMPH: No easy bruising, or bleeding gums  ALLERY AND IMMUNOLOGIC: No hives or eczema      Physicial Exam:     Constitutional: NAD, well-groomed, well-developed  HEENT: PERRLA, EOMI, no drainage or redness  Neck: No bruits; no thyromegaly or nodules,  No JVD  Back: Normal spine flexure, No CVA tenderness, No deformity or limitation of movement  Respiratory: Breath Sounds equal & clear to percussion & auscultation, no accessory muscle use  Cardiovascular: Regular rate & rhythm, normal S1, S2; no murmurs, gallops or rubs; no S3, S4  Gastrointestinal: Soft, non-tender, non distended no hepatosplenomegaly, normal bowel sounds  Extremities: No peripheral edema, No cyanosis, clubbing   Vascular: Equal and normal pulses: 2+ peripheral pulses throughout  Neurological: GCS:    A&O x 3; no sensory, motor  deficits, normal reflexes  Psychiatric: Normal mood, normal affect  Musculoskeletal: No joint pain, swelling or deformity; no limitation of movement  Skin: No rashes

## 2018-07-05 NOTE — PROGRESS NOTE ADULT - ASSESSMENT
85 year old male admitted with MELISSA on CKD , hyperkalemia resolved and dysphagia now oropharyngeal dysphagia post EGD    Time spent: 30 mins assessing presenting problems of acute illness that poses high probability  end organ damage/dysfunction.  Medical decision making inculding plan of daily care, reviewing data, reviewing radiology, discussing with multidisciplinary team, non inclusive of procedures, discussing goals of care with patient/family

## 2018-07-05 NOTE — PROGRESS NOTE ADULT - PROBLEM SELECTOR PLAN 1
- Speech and Swallow evaluation follow up    - Follow up biopsies EGD, appreciate GI   - monitor for airway compromise / oral secretions and aspiration

## 2018-07-05 NOTE — PROGRESS NOTE ADULT - ASSESSMENT
Elderly male admitted with dysphagia for last several weeks.  Underwent EGD on Tuesday that did not show any esophageal obstruction, ulcerations or mass lesions.  Suspect oropharyngeal dysphagia.

## 2018-07-05 NOTE — PROGRESS NOTE ADULT - SUBJECTIVE AND OBJECTIVE BOX
Seen in ICU    Vital Signs Last 24 Hrs  T(C): 36.8 (07-05-18 @ 17:00), Max: 37.3 (07-05-18 @ 09:00)  T(F): 98.2 (07-05-18 @ 17:00), Max: 99.1 (07-05-18 @ 09:00)  HR: 60 (07-05-18 @ 18:00) (60 - 60)  BP: 109/64 (07-05-18 @ 18:00) (104/86 - 143/83)  BP(mean): 74 (07-05-18 @ 18:00) (72 - 97)  RR: 11 (07-05-18 @ 18:00) (11 - 19)  SpO2: 99% (07-05-18 @ 18:00) (96% - 100%)    PHYSICAL EXAM:    Respiratory: b/l air entry, clear  Cardiovascular: S1 S2 reg  Gastrointestinal: soft, not tender, BS present  Extremities:  mild anasarca    ALBUTerol/ipratropium for Nebulization 3 milliLiter(s) Nebulizer every 6 hours  bisacodyl Suppository 10 milliGRAM(s) Rectal daily PRN  chlorhexidine 0.12% Liquid 15 milliLiter(s) Swish and Spit two times a day  glycopyrrolate 1 milliGRAM(s) Oral two times a day  heparin  Injectable 5000 Unit(s) SubCutaneous every 8 hours  latanoprost 0.005% Ophthalmic Solution 1 Drop(s) Both EYES at bedtime  morphine  - Injectable 2 milliGRAM(s) IV Push every 4 hours PRN  pantoprazole  Injectable 40 milliGRAM(s) IV Push daily                        9.0    5.3   )-----------( 69       ( 05 Jul 2018 05:30 )             26.5     05 Jul 2018 05:30    140    |  115    |  31     ----------------------------<  100    4.4     |  21     |  1.89     Ca    8.6        05 Jul 2018 05:30  Phos  2.8       04 Jul 2018 04:40  Mg     1.7       04 Jul 2018 20:52    TPro  6.5    /  Alb  2.5    /  TBili  0.3    /  DBili  x      /  AST  21     /  ALT  22     /  AlkPhos  96     04 Jul 2018 04:40    LIVER FUNCTIONS - ( 04 Jul 2018 04:40 )  Alb: 2.5 g/dL / Pro: 6.5 g/dL / ALK PHOS: 96 U/L / ALT: 22 U/L DA / AST: 21 U/L / GGT: x           PT/INR - ( 03 Jul 2018 20:45 )   PT: 13.4 sec;   INR: 1.20 ratio        Assessment and Recommendation:   		  MELISSA on CKD III  Good UO  History of prostate CA  Await renal SONO results, r/o hydro  Avoid hypotension and nephrotoxins  BMP in am  Will f/u

## 2018-07-05 NOTE — PROGRESS NOTE ADULT - SUBJECTIVE AND OBJECTIVE BOX
Follow-up ICU Progress Note    Just extubated. No major events overnight.     Medications:  MEDICATIONS  (STANDING):  ALBUTerol/ipratropium for Nebulization 3 milliLiter(s) Nebulizer every 6 hours  chlorhexidine 0.12% Liquid 15 milliLiter(s) Swish and Spit two times a day  glycopyrrolate 1 milliGRAM(s) Oral two times a day  heparin  Injectable 5000 Unit(s) SubCutaneous every 8 hours  ipratropium 17 MICROgram(s) HFA Inhaler 1 Puff(s) Inhalation every 6 hours  latanoprost 0.005% Ophthalmic Solution 1 Drop(s) Both EYES at bedtime  pantoprazole  Injectable 40 milliGRAM(s) IV Push daily    MEDICATIONS  (PRN):  bisacodyl Suppository 10 milliGRAM(s) Rectal daily PRN Constipation  morphine  - Injectable 2 milliGRAM(s) IV Push every 4 hours PRN Mild Pain (1 - 3)      Mode: Spontaneous/ CPAP (Continuous Positive Airway Pressure)  RR (machine): 33  TV (machine): 569  PEEP: 5  PS: 5    Vital Signs Last 24 Hrs  T(C): 37.3 (05 Jul 2018 09:00), Max: 37.6 (04 Jul 2018 10:00)  T(F): 99.1 (05 Jul 2018 09:00), Max: 99.6 (04 Jul 2018 10:00)  HR: 60 (05 Jul 2018 09:00) (60 - 60)  BP: 139/81 (05 Jul 2018 09:00) (100/81 - 143/83)  BP(mean): 93 (05 Jul 2018 09:00) (62 - 97)  RR: 17 (05 Jul 2018 09:00) (11 - 19)  SpO2: 98% (05 Jul 2018 09:00) (95% - 100%)    ABG - ( 04 Jul 2018 09:00 )  pH, Arterial: 7.33  pH, Blood: x     /  pCO2: 32    /  pO2: 102   / HCO3: x     / Base Excess: x     /  SaO2: 98        07-04 @ 07:01  -  07-05 @ 07:00  --------------------------------------------------------  IN: 2200 mL / OUT: 1515 mL / NET: 685 mL    LABS:                        9.0    5.3   )-----------( 69       ( 05 Jul 2018 05:30 )             26.5     07-05    140  |  115<H>  |  31<H>  ----------------------------<  100<H>  4.4   |  21<L>  |  1.89<H>    Ca    8.6      05 Jul 2018 05:30  Phos  2.8     07-04  Mg     1.7     07-04    TPro  6.5  /  Alb  2.5<L>  /  TBili  0.3  /  DBili  x   /  AST  21  /  ALT  22  /  AlkPhos  96  07-04    CAPILLARY BLOOD GLUCOSE      POCT Blood Glucose.: 101 mg/dL (05 Jul 2018 06:36)    PT/INR - ( 03 Jul 2018 20:45 )   PT: 13.4 sec;   INR: 1.20 ratio      Serum Pro-Brain Natriuretic Peptide: 1324 pg/mL (07-02-18 @ 17:20)    CULTURES: (if applicable)    Culture - Blood (collected 07-03-18 @ 23:20)  Source: .Blood Blood-Peripheral  Preliminary Report (07-05-18 @ 09:01):    No growth to date.    Culture - Blood (collected 07-03-18 @ 23:20)  Source: .Blood Blood-Peripheral  Preliminary Report (07-05-18 @ 09:01):    No growth to date.    Physical Examination:  PULM: Clear to auscultation bilaterally, no significant sputum production  CVS: S1, S2 heard    RADIOLOGY REVIEWED  CXR:     CT chest:    TTE:

## 2018-07-05 NOTE — PROGRESS NOTE ADULT - PROBLEM SELECTOR PLAN 2
MELISSA on CKD   Hold nephrotoxic meds  -Continue hydration  -Trend urine output  -Follow up BUN/Creatinine  -follow up electrolytes

## 2018-07-05 NOTE — PROGRESS NOTE ADULT - PROBLEM SELECTOR PLAN 4
continue heparin sub Q for DVT  continue Protonix 40 Q day for GI  continue morphine for pain  aspiration precautions with frequent Yankauer on hand and HOB at 30 degrees

## 2018-07-06 DIAGNOSIS — N13.5 CROSSING VESSEL AND STRICTURE OF URETER WITHOUT HYDRONEPHROSIS: ICD-10-CM

## 2018-07-06 DIAGNOSIS — C61 MALIGNANT NEOPLASM OF PROSTATE: ICD-10-CM

## 2018-07-06 DIAGNOSIS — N39.0 URINARY TRACT INFECTION, SITE NOT SPECIFIED: ICD-10-CM

## 2018-07-06 LAB
ANION GAP SERPL CALC-SCNC: 9 MMOL/L — SIGNIFICANT CHANGE UP (ref 5–17)
BUN SERPL-MCNC: 30 MG/DL — HIGH (ref 7–23)
CALCIUM SERPL-MCNC: 9.1 MG/DL — SIGNIFICANT CHANGE UP (ref 8.4–10.5)
CHLORIDE SERPL-SCNC: 116 MMOL/L — HIGH (ref 96–108)
CO2 SERPL-SCNC: 21 MMOL/L — LOW (ref 22–31)
CREAT SERPL-MCNC: 1.78 MG/DL — HIGH (ref 0.5–1.3)
CULTURE RESULTS: SIGNIFICANT CHANGE UP
GLUCOSE SERPL-MCNC: 100 MG/DL — HIGH (ref 70–99)
HCT VFR BLD CALC: 28.2 % — LOW (ref 39–50)
HGB BLD-MCNC: 9.1 G/DL — LOW (ref 13–17)
MAGNESIUM SERPL-MCNC: 2 MG/DL — SIGNIFICANT CHANGE UP (ref 1.6–2.6)
MCHC RBC-ENTMCNC: 29.8 PG — SIGNIFICANT CHANGE UP (ref 27–34)
MCHC RBC-ENTMCNC: 32.4 GM/DL — SIGNIFICANT CHANGE UP (ref 32–36)
MCV RBC AUTO: 91.8 FL — SIGNIFICANT CHANGE UP (ref 80–100)
PHOSPHATE SERPL-MCNC: 4.3 MG/DL — SIGNIFICANT CHANGE UP (ref 2.5–4.5)
PLATELET # BLD AUTO: 82 K/UL — LOW (ref 150–400)
POTASSIUM SERPL-MCNC: 4 MMOL/L — SIGNIFICANT CHANGE UP (ref 3.5–5.3)
POTASSIUM SERPL-SCNC: 4 MMOL/L — SIGNIFICANT CHANGE UP (ref 3.5–5.3)
RBC # BLD: 3.07 M/UL — LOW (ref 4.2–5.8)
RBC # FLD: 16.1 % — HIGH (ref 10.3–14.5)
SODIUM SERPL-SCNC: 146 MMOL/L — HIGH (ref 135–145)
SPECIMEN SOURCE: SIGNIFICANT CHANGE UP
WBC # BLD: 6.7 K/UL — SIGNIFICANT CHANGE UP (ref 3.8–10.5)
WBC # FLD AUTO: 6.7 K/UL — SIGNIFICANT CHANGE UP (ref 3.8–10.5)

## 2018-07-06 RX ORDER — FAMOTIDINE 10 MG/ML
20 INJECTION INTRAVENOUS DAILY
Qty: 0 | Refills: 0 | Status: DISCONTINUED | OUTPATIENT
Start: 2018-07-06 | End: 2018-07-06

## 2018-07-06 RX ORDER — FAMOTIDINE 10 MG/ML
20 INJECTION INTRAVENOUS DAILY
Qty: 0 | Refills: 0 | Status: DISCONTINUED | OUTPATIENT
Start: 2018-07-06 | End: 2018-07-19

## 2018-07-06 RX ADMIN — CHLORHEXIDINE GLUCONATE 15 MILLILITER(S): 213 SOLUTION TOPICAL at 06:31

## 2018-07-06 RX ADMIN — FAMOTIDINE 20 MILLIGRAM(S): 10 INJECTION INTRAVENOUS at 11:39

## 2018-07-06 RX ADMIN — Medication 3 MILLILITER(S): at 09:36

## 2018-07-06 RX ADMIN — HEPARIN SODIUM 5000 UNIT(S): 5000 INJECTION INTRAVENOUS; SUBCUTANEOUS at 14:57

## 2018-07-06 RX ADMIN — HEPARIN SODIUM 5000 UNIT(S): 5000 INJECTION INTRAVENOUS; SUBCUTANEOUS at 07:31

## 2018-07-06 RX ADMIN — ROBINUL 1 MILLIGRAM(S): 0.2 INJECTION INTRAMUSCULAR; INTRAVENOUS at 17:05

## 2018-07-06 RX ADMIN — CHLORHEXIDINE GLUCONATE 15 MILLILITER(S): 213 SOLUTION TOPICAL at 17:05

## 2018-07-06 RX ADMIN — Medication 3 MILLILITER(S): at 04:20

## 2018-07-06 RX ADMIN — ROBINUL 1 MILLIGRAM(S): 0.2 INJECTION INTRAMUSCULAR; INTRAVENOUS at 06:31

## 2018-07-06 RX ADMIN — LATANOPROST 1 DROP(S): 0.05 SOLUTION/ DROPS OPHTHALMIC; TOPICAL at 21:54

## 2018-07-06 RX ADMIN — Medication 3 MILLILITER(S): at 21:17

## 2018-07-06 RX ADMIN — HEPARIN SODIUM 5000 UNIT(S): 5000 INJECTION INTRAVENOUS; SUBCUTANEOUS at 21:54

## 2018-07-06 RX ADMIN — Medication 3 MILLILITER(S): at 16:06

## 2018-07-06 NOTE — PROGRESS NOTE ADULT - ASSESSMENT
86 yo male pmhx TIA, PPM, CKD stage 3, psoriasis, DLD with recent hospitalization at [facility] for UTI, right hydronephrosis for which he had a ureteral stent placed admitted with hyperkalemia, MELISSA and dysphagia. Intubated s/p EGD- unable to extubate.     NEURO: no active issues  CV: BP stable. Metabolic acidosis, improved  Lactate negative, likely hyperchloremic.  See Renal Plan.   RESP: s/p extubation.    RENAL: MELISSA on CKD avoid nephrotoxic medications, renally dose medications, trend urine output, BUN/Cr, electrolytes, replace electrolytes as needed.  Will trend K.  d/c hansen. Nephrology/ consults called  GI: cont feeds, f/u speech  ENDO: No active issues.  ID: d/c zosyn  HEME: Thrombocytopenia   DISPO: Full Code

## 2018-07-06 NOTE — PROGRESS NOTE ADULT - SUBJECTIVE AND OBJECTIVE BOX
Follow-up ICU Progress Note    Extubated yesterday morning. Now on RA.     Medications:  MEDICATIONS  (STANDING):  ALBUTerol/ipratropium for Nebulization 3 milliLiter(s) Nebulizer every 6 hours  chlorhexidine 0.12% Liquid 15 milliLiter(s) Swish and Spit two times a day  glycopyrrolate 1 milliGRAM(s) Oral two times a day  heparin  Injectable 5000 Unit(s) SubCutaneous every 8 hours  latanoprost 0.005% Ophthalmic Solution 1 Drop(s) Both EYES at bedtime  pantoprazole  Injectable 40 milliGRAM(s) IV Push daily    MEDICATIONS  (PRN):  bisacodyl Suppository 10 milliGRAM(s) Rectal daily PRN Constipation  morphine  - Injectable 2 milliGRAM(s) IV Push every 4 hours PRN Mild Pain (1 - 3)    Vital Signs Last 24 Hrs  T(C): 36.3 (06 Jul 2018 07:00), Max: 37.1 (05 Jul 2018 13:00)  T(F): 97.4 (06 Jul 2018 07:00), Max: 98.8 (05 Jul 2018 13:00)  HR: 59 (06 Jul 2018 07:00) (59 - 61)  BP: 94/50 (06 Jul 2018 07:00) (92/48 - 142/76)  BP(mean): 60 (06 Jul 2018 07:00) (58 - 97)  RR: 15 (06 Jul 2018 07:00) (9 - 17)  SpO2: 96% (06 Jul 2018 07:00) (96% - 100%)    07-05 @ 07:01  -  07-06 @ 07:00  --------------------------------------------------------  IN: 1145 mL / OUT: 1680 mL / NET: -535 mL    LABS:                        9.1    6.7   )-----------( 82       ( 06 Jul 2018 05:30 )             28.2     07-06    146<H>  |  116<H>  |  30<H>  ----------------------------<  100<H>  4.0   |  21<L>  |  1.78<H>    Ca    9.1      06 Jul 2018 05:30  Phos  4.3     07-06  Mg     2.0     07-06    CAPILLARY BLOOD GLUCOSE      POCT Blood Glucose.: 101 mg/dL (05 Jul 2018 06:36)    CULTURES: (if applicable)    Culture - Blood (collected 07-03-18 @ 23:20)  Source: .Blood Blood-Peripheral  Preliminary Report (07-05-18 @ 09:01):    No growth to date.    Culture - Blood (collected 07-03-18 @ 23:20)  Source: .Blood Blood-Peripheral  Preliminary Report (07-05-18 @ 09:01):    No growth to date.        Culture - Urine (collected 07-03-18 @ 21:20)  Source: .Urine Clean Catch (Midstream)  Final Report (07-05-18 @ 11:43):    No growth    Physical Examination:  PULM: coarse BS bilaterally  CVS: S1, S2 heard    RADIOLOGY REVIEWED  CXR:     CT chest:    TTE:

## 2018-07-06 NOTE — SWALLOW BEDSIDE ASSESSMENT ADULT - ORAL PHASE
Delayed oral transit time/Decreased anterior-posterior movement of the bolus suspected premature posterior spillage Decreased anterior-posterior movement of the bolus/Delayed oral transit time/suspected premature posterior spillage

## 2018-07-06 NOTE — CONSULT NOTE ADULT - SUBJECTIVE AND OBJECTIVE BOX
Patient is a 85y old  Male who presents with a chief complaint of Cough, unable to swallow (02 Jul 2018 18:17)      HPI:  Pt is a 84 y o M sent from [facility] for cough, unable to swallow, wife requested evaluation. Wife states that pt has had feeding tubes in the past and then advanced to pureed diet but now is unable to swallow, pt states it gets stuck in throat, thinks he needs a scope, was seen by GI in [place]. Denies f/c, has some nausea. (02 Jul 2018 18:17)    history of prostate cancer, followed by Dr. Yared Gu. recent hospitalization at [facility]. seen by Dr. GAry Goldberg for R hydronephrosis with history of recurrent UTIs. ureteral stent placed      PAST MEDICAL & SURGICAL HISTORY:  Psoriasis  Prostate cancer  GERD (gastroesophageal reflux disease)  Dyslipidemia  No significant past surgical history      REVIEW OF SYSTEMS:    CONSTITUTIONAL:  no fevers or chills  HEENT: No visual changes  ENDO: No sweating  NECK: No pain or stiffness  MUSCULOSKELETAL: No back pain, no joint pain  RESPIRATORY: No shortness of breath  CARDIOVASCULAR: No chest pain  GASTROINTESTINAL: No abdominal or epigastric pain. No nausea, vomiting,  No diarrhea or constipation.   NEUROLOGICAL: No mental status changes  PSYCH: No depression, no mood changes  SKIN: No itching      MEDICATIONS  (STANDING):  ALBUTerol/ipratropium for Nebulization 3 milliLiter(s) Nebulizer every 6 hours  chlorhexidine 0.12% Liquid 15 milliLiter(s) Swish and Spit two times a day  famotidine    Tablet 20 milliGRAM(s) Oral daily  glycopyrrolate 1 milliGRAM(s) Oral two times a day  heparin  Injectable 5000 Unit(s) SubCutaneous every 8 hours  latanoprost 0.005% Ophthalmic Solution 1 Drop(s) Both EYES at bedtime    MEDICATIONS  (PRN):  bisacodyl Suppository 10 milliGRAM(s) Rectal daily PRN Constipation  morphine  - Injectable 2 milliGRAM(s) IV Push every 4 hours PRN Mild Pain (1 - 3)      Allergies    No Known Allergies    Intolerances        SOCIAL HISTORY: No illicit drug use    FAMILY HISTORY:  No pertinent family history in first degree relatives      Vital Signs Last 24 Hrs  T(C): 36.4 (06 Jul 2018 11:00), Max: 36.8 (05 Jul 2018 17:00)  T(F): 97.6 (06 Jul 2018 11:00), Max: 98.3 (06 Jul 2018 01:00)  HR: 60 (06 Jul 2018 12:00) (59 - 61)  BP: 112/64 (06 Jul 2018 12:00) (92/48 - 142/76)  BP(mean): 75 (06 Jul 2018 12:00) (52 - 97)  RR: 15 (06 Jul 2018 12:00) (9 - 17)  SpO2: 97% (06 Jul 2018 12:00) (94% - 100%)        PHYSICAL EXAM:    Constitutional: NAD, well-developed  HEENT: EOMI  Neck: no pain  Back: No CVA tenderness  Respiratory: No accessory respiratory muscle use  Abd: Soft, NT/ND  no organomegally  no hernia  : penis/testes benign     Psychiatric: Normal mood, normal affect  Skin: No rashes    I&O's Detail    04 Jul 2018 07:01  -  05 Jul 2018 07:00  --------------------------------------------------------  IN:    dextrose 5%.: 1800 mL    propofol Infusion: 100 mL    Solution: 100 mL    Solution: 200 mL  Total IN: 2200 mL    OUT:    Indwelling Catheter - Urethral: 1515 mL  Total OUT: 1515 mL    Total NET: 685 mL      05 Jul 2018 07:01  -  06 Jul 2018 07:00  --------------------------------------------------------  IN:    dextrose 5%.: 75 mL    Nepro: 930 mL    Oral Fluid: 90 mL    Solution: 50 mL  Total IN: 1145 mL    OUT:    Indwelling Catheter - Urethral: 1680 mL  Total OUT: 1680 mL    Total NET: -535 mL      06 Jul 2018 07:01  -  06 Jul 2018 13:38  --------------------------------------------------------  IN:    Nepro: 150 mL  Total IN: 150 mL    OUT:    Indwelling Catheter - Urethral: 170 mL  Total OUT: 170 mL    Total NET: -20 mL          LABS:                        9.1    6.7   )-----------( 82       ( 06 Jul 2018 05:30 )             28.2     07-06    146<H>  |  116<H>  |  30<H>  ----------------------------<  100<H>  4.0   |  21<L>  |  1.78<H>    Ca    9.1      06 Jul 2018 05:30  Phos  4.3     07-06  Mg     2.0     07-06

## 2018-07-06 NOTE — CHART NOTE - NSCHARTNOTEFT_GEN_A_CORE
NUTRITION FOLLOW UP    SOURCE; Medical Record, RN    DIET: NPO, Nepro @ 50 cc/hr x24hrs    Patient is currently receiving Nepro @ 50cc/hr via NGT tolerating well as per nursing. Current tube feeding is providing 2160 calories, 117 gms protein.    CURRENT WEIGHT: 227.9/103.4 - generalized edema noted , 3.6kg weight gain since admission.    PERTINENT MEDS:   Pertinent Medications: MEDICATIONS  (STANDING):  ALBUTerol/ipratropium for Nebulization 3 milliLiter(s) Nebulizer every 6 hours  chlorhexidine 0.12% Liquid 15 milliLiter(s) Swish and Spit two times a day  glycopyrrolate 1 milliGRAM(s) Oral two times a day  heparin  Injectable 5000 Unit(s) SubCutaneous every 8 hours  latanoprost 0.005% Ophthalmic Solution 1 Drop(s) Both EYES at bedtime  pantoprazole  Injectable 40 milliGRAM(s) IV Push daily    MEDICATIONS  (PRN):  bisacodyl Suppository 10 milliGRAM(s) Rectal daily PRN Constipation  morphine  - Injectable 2 milliGRAM(s) IV Push every 4 hours PRN Mild Pain (1 - 3)      PERTINENT LABS:  Date: 06 Jul 2018 05:30  Hemoglobin 9.1(L)    Hematocrit 28.2 (L)    Date: 07-06  Sodium 146<H>  Potassium 4.0  Glucose Serum 100<H>  BUN 30<H>      Creatinine1.78(H)    ACCUCHECK  POCT Blood Glucose.: 101 mg/dL (05 Jul 2018 06:36)  POCT Blood Glucose.: 99 mg/dL (05 Jul 2018 00:07)      SKIN: intact    ESTIMATED NEEDS:   [X] no change since previous assessment  :     PREVIOUS NUTRITION DIAGNOSIS: addressed    NUTRITION DIAGNOSIS is   [X] on going, RD will follow as per nutrition department protocol.        MONITORING AND EVALUATION:   Patient noted for speech eval to access appropriateness of po diet.     [X] weights [X] follow up per protocol    NUTRITION RECOMMENDATIONS: If patient unable to tolerate po diet may suggest changing tube feeds to bolus feeds of Nepro 240 cc 4x day which will provide 1920 calories , 78 gms protein. Will follow clinical course.

## 2018-07-06 NOTE — SWALLOW BEDSIDE ASSESSMENT ADULT - SWALLOW EVAL: RECOMMENDED FEEDING/EATING TECHNIQUES
allow for swallow between intakes/small sips/bites/crush medication (when feasible)/position upright (90 degrees)/maintain upright posture during/after eating for 30 mins

## 2018-07-06 NOTE — SWALLOW BEDSIDE ASSESSMENT ADULT - PHARYNGEAL PHASE
suspected, mild/Delayed pharyngeal swallow Delayed pharyngeal swallow/Decreased laryngeal elevation/Overt coughing following oral intake Wet vocal quality post oral intake/Delayed pharyngeal swallow

## 2018-07-06 NOTE — PROGRESS NOTE ADULT - SUBJECTIVE AND OBJECTIVE BOX
Denies complaints    Vital Signs Last 24 Hrs  T(C): 36.3 (07-06-18 @ 17:09), Max: 36.8 (07-06-18 @ 01:00)  T(F): 97.4 (07-06-18 @ 17:09), Max: 98.3 (07-06-18 @ 01:00)  HR: 60 (07-06-18 @ 17:09) (59 - 61)  BP: 114/57 (07-06-18 @ 17:09) (92/48 - 129/87)  BP(mean): 72 (07-06-18 @ 14:00) (52 - 97)  RR: 14 (07-06-18 @ 17:09) (9 - 17)  SpO2: 95% (07-06-18 @ 17:09) (94% - 100%)    Respiratory: b/l air entry, clear  Cardiovascular: S1 S2 reg  Gastrointestinal: soft, not tender, BS present  Extremities: tr edema    ALBUTerol/ipratropium for Nebulization 3 milliLiter(s) Nebulizer every 6 hours  bisacodyl Suppository 10 milliGRAM(s) Rectal daily PRN  chlorhexidine 0.12% Liquid 15 milliLiter(s) Swish and Spit two times a day  famotidine    Tablet 20 milliGRAM(s) Oral daily  glycopyrrolate 1 milliGRAM(s) Oral two times a day  heparin  Injectable 5000 Unit(s) SubCutaneous every 8 hours  latanoprost 0.005% Ophthalmic Solution 1 Drop(s) Both EYES at bedtime  morphine  - Injectable 2 milliGRAM(s) IV Push every 4 hours PRN                        9.1    6.7   )-----------( 82       ( 06 Jul 2018 05:30 )             28.2     06 Jul 2018 05:30    146    |  116    |  30     ----------------------------<  100    4.0     |  21     |  1.78     Ca    9.1        06 Jul 2018 05:30  Phos  4.3       06 Jul 2018 05:30  Mg     2.0       06 Jul 2018 05:30      Assessment and Recommendation:   		  MELISSA on CKD III  Improved  Good UO  History of prostate CA  TOV per   Avoid hypotension and nephrotoxins  BMP in am  Will f/u

## 2018-07-07 DIAGNOSIS — R13.12 DYSPHAGIA, OROPHARYNGEAL PHASE: ICD-10-CM

## 2018-07-07 DIAGNOSIS — E87.0 HYPEROSMOLALITY AND HYPERNATREMIA: ICD-10-CM

## 2018-07-07 DIAGNOSIS — D69.6 THROMBOCYTOPENIA, UNSPECIFIED: ICD-10-CM

## 2018-07-07 LAB
ANION GAP SERPL CALC-SCNC: 11 MMOL/L — SIGNIFICANT CHANGE UP (ref 5–17)
BUN SERPL-MCNC: 25 MG/DL — HIGH (ref 7–23)
CALCIUM SERPL-MCNC: 8.9 MG/DL — SIGNIFICANT CHANGE UP (ref 8.4–10.5)
CHLORIDE SERPL-SCNC: 116 MMOL/L — HIGH (ref 96–108)
CO2 SERPL-SCNC: 23 MMOL/L — SIGNIFICANT CHANGE UP (ref 22–31)
CREAT SERPL-MCNC: 1.55 MG/DL — HIGH (ref 0.5–1.3)
GLUCOSE SERPL-MCNC: 90 MG/DL — SIGNIFICANT CHANGE UP (ref 70–99)
HCT VFR BLD CALC: 27.4 % — LOW (ref 39–50)
HGB BLD-MCNC: 9.1 G/DL — LOW (ref 13–17)
MCHC RBC-ENTMCNC: 30.5 PG — SIGNIFICANT CHANGE UP (ref 27–34)
MCHC RBC-ENTMCNC: 33.1 GM/DL — SIGNIFICANT CHANGE UP (ref 32–36)
MCV RBC AUTO: 92 FL — SIGNIFICANT CHANGE UP (ref 80–100)
NON-GYNECOLOGICAL CYTOLOGY STUDY: SIGNIFICANT CHANGE UP
NON-GYNECOLOGICAL CYTOLOGY STUDY: SIGNIFICANT CHANGE UP
PLATELET # BLD AUTO: 80 K/UL — LOW (ref 150–400)
POTASSIUM SERPL-MCNC: 4.3 MMOL/L — SIGNIFICANT CHANGE UP (ref 3.5–5.3)
POTASSIUM SERPL-SCNC: 4.3 MMOL/L — SIGNIFICANT CHANGE UP (ref 3.5–5.3)
RBC # BLD: 2.98 M/UL — LOW (ref 4.2–5.8)
RBC # FLD: 16.4 % — HIGH (ref 10.3–14.5)
SODIUM SERPL-SCNC: 150 MMOL/L — HIGH (ref 135–145)
WBC # BLD: 6.4 K/UL — SIGNIFICANT CHANGE UP (ref 3.8–10.5)
WBC # FLD AUTO: 6.4 K/UL — SIGNIFICANT CHANGE UP (ref 3.8–10.5)

## 2018-07-07 PROCEDURE — 99233 SBSQ HOSP IP/OBS HIGH 50: CPT

## 2018-07-07 RX ORDER — SODIUM CHLORIDE 9 MG/ML
1000 INJECTION, SOLUTION INTRAVENOUS
Qty: 0 | Refills: 0 | Status: DISCONTINUED | OUTPATIENT
Start: 2018-07-07 | End: 2018-07-09

## 2018-07-07 RX ADMIN — FAMOTIDINE 20 MILLIGRAM(S): 10 INJECTION INTRAVENOUS at 13:54

## 2018-07-07 RX ADMIN — HEPARIN SODIUM 5000 UNIT(S): 5000 INJECTION INTRAVENOUS; SUBCUTANEOUS at 05:22

## 2018-07-07 RX ADMIN — SODIUM CHLORIDE 50 MILLILITER(S): 9 INJECTION, SOLUTION INTRAVENOUS at 15:06

## 2018-07-07 RX ADMIN — Medication 3 MILLILITER(S): at 04:07

## 2018-07-07 RX ADMIN — CHLORHEXIDINE GLUCONATE 15 MILLILITER(S): 213 SOLUTION TOPICAL at 18:55

## 2018-07-07 RX ADMIN — ROBINUL 1 MILLIGRAM(S): 0.2 INJECTION INTRAMUSCULAR; INTRAVENOUS at 05:21

## 2018-07-07 RX ADMIN — Medication 3 MILLILITER(S): at 10:34

## 2018-07-07 RX ADMIN — ROBINUL 1 MILLIGRAM(S): 0.2 INJECTION INTRAMUSCULAR; INTRAVENOUS at 19:10

## 2018-07-07 RX ADMIN — LATANOPROST 1 DROP(S): 0.05 SOLUTION/ DROPS OPHTHALMIC; TOPICAL at 21:26

## 2018-07-07 RX ADMIN — Medication 3 MILLILITER(S): at 16:16

## 2018-07-07 RX ADMIN — Medication 3 MILLILITER(S): at 21:30

## 2018-07-07 RX ADMIN — CHLORHEXIDINE GLUCONATE 15 MILLILITER(S): 213 SOLUTION TOPICAL at 05:21

## 2018-07-07 NOTE — PROGRESS NOTE ADULT - ASSESSMENT
84 yo male pmhx TIA, PPM, CKD stage 3, psoriasis, DLD with recent hospitalization at [facility] for UTI, right hydronephrosis for which he had a ureteral stent placed admitted with hyperkalemia, MELISSA and dysphagia. Intubated s/p EGD- unable to extubate. Now extubated.     NEURO: no active issues  CV: BP stable. Metabolic acidosis, improved  Lactate negative, likely hyperchloremic.  See Renal Plan.   RESP: s/p extubation.    RENAL: MELISSA on CKD avoid nephrotoxic medications, renally dose medications, trend urine output, BUN/Cr, electrolytes, replace electrolytes as needed.  Will trend K.  d/c hansen. Nephrology/ consults called  GI: cont feeds, f/u speech  ENDO: No active issues.  ID: d/c zosyn  HEME: Thrombocytopenia   DISPO: Full Code

## 2018-07-07 NOTE — PROGRESS NOTE ADULT - SUBJECTIVE AND OBJECTIVE BOX
Denies complaints    Vital Signs Last 24 Hrs  T(C): 36.7 (07-07-18 @ 09:14), Max: 36.7 (07-07-18 @ 09:14)  T(F): 98 (07-07-18 @ 09:14), Max: 98 (07-07-18 @ 09:14)  HR: 60 (07-07-18 @ 09:14) (60 - 60)  BP: 137/75 (07-07-18 @ 09:14) (112/54 - 141/77)  BP(mean): 72 (07-06-18 @ 14:00) (72 - 72)  RR: 15 (07-07-18 @ 09:14) (12 - 15)  SpO2: 99% (07-07-18 @ 09:14) (94% - 100%)    Respiratory: decr BS b/l bases  Cardiovascular: S1 S2   Gastrointestinal: soft, not tender, BS present  Extremities: tr edema    ALBUTerol/ipratropium for Nebulization 3 milliLiter(s) Nebulizer every 6 hours  bisacodyl Suppository 10 milliGRAM(s) Rectal daily PRN  chlorhexidine 0.12% Liquid 15 milliLiter(s) Swish and Spit two times a day  dextrose 5%. 1000 milliLiter(s) IV Continuous <Continuous>  famotidine    Tablet 20 milliGRAM(s) Oral daily  glycopyrrolate 1 milliGRAM(s) Oral two times a day  latanoprost 0.005% Ophthalmic Solution 1 Drop(s) Both EYES at bedtime  morphine  - Injectable 2 milliGRAM(s) IV Push every 4 hours PRN                        9.1    6.4   )-----------( 80       ( 07 Jul 2018 06:54 )             27.4     07 Jul 2018 06:54    150    |  116    |  25     ----------------------------<  90     4.3     |  23     |  1.55     Ca    8.9        07 Jul 2018 06:54  Phos  4.3       06 Jul 2018 05:30  Mg     2.0       06 Jul 2018 05:30      Assessment and Recommendation:   		  MELISSA on CKD III  Improving  Nix d/c-d, f/u PVR  Avoid hypotension and nephrotoxins  On no fluid dysphagia diet, Sp and Sw f/u  Hypernatremia  Will give gentle D5W  BMP in am  D/w family at bedside  Will f/u

## 2018-07-07 NOTE — PROGRESS NOTE ADULT - SUBJECTIVE AND OBJECTIVE BOX
Patient is a 85y old  Male who presents with a chief complaint of Cough, unable to swallow (02 Jul 2018 18:17)    HPI:  Pt is a 84 y o M sent from [facility] for cough, unable to swallow, wife requested evaluation. Wife states that pt has had feeding tubes in the past and then advanced to pureed diet but now is unable to swallow, pt states it gets stuck in throat, thinks he needs a scope, was seen by GI in [place]. Denies f/c, has some nausea. (02 Jul 2018 18:17)    histroy of ca prostate s/p radiation in distant past. May had R ureteral stent placed for hydronephrosis. hansen removed and incontinent.    Interval Events:  Patient seen and examined at bedside.    MEDICATIONS:  MEDICATIONS  (STANDING):  ALBUTerol/ipratropium for Nebulization 3 milliLiter(s) Nebulizer every 6 hours  chlorhexidine 0.12% Liquid 15 milliLiter(s) Swish and Spit two times a day  famotidine    Tablet 20 milliGRAM(s) Oral daily  glycopyrrolate 1 milliGRAM(s) Oral two times a day  heparin  Injectable 5000 Unit(s) SubCutaneous every 8 hours  latanoprost 0.005% Ophthalmic Solution 1 Drop(s) Both EYES at bedtime    MEDICATIONS  (PRN):  bisacodyl Suppository 10 milliGRAM(s) Rectal daily PRN Constipation  morphine  - Injectable 2 milliGRAM(s) IV Push every 4 hours PRN Mild Pain (1 - 3)      Allergies    No Known Allergies    Intolerances        Vital Signs Last 24 Hrs  T(C): 36.4 (07 Jul 2018 05:13), Max: 36.4 (06 Jul 2018 11:00)  T(F): 97.5 (07 Jul 2018 05:13), Max: 97.6 (06 Jul 2018 11:00)  HR: 60 (07 Jul 2018 05:13) (60 - 60)  BP: 141/77 (07 Jul 2018 05:13) (93/38 - 141/77)  BP(mean): 72 (06 Jul 2018 14:00) (52 - 75)  RR: 15 (07 Jul 2018 05:13) (12 - 16)  SpO2: 100% (07 Jul 2018 05:13) (94% - 100%)      I&O's Detail    05 Jul 2018 07:01  -  06 Jul 2018 07:00  --------------------------------------------------------  IN:    dextrose 5%.: 75 mL    Nepro: 930 mL    Oral Fluid: 90 mL    Solution: 50 mL  Total IN: 1145 mL    OUT:    Indwelling Catheter - Urethral: 1680 mL  Total OUT: 1680 mL    Total NET: -535 mL      06 Jul 2018 07:01  -  07 Jul 2018 07:00  --------------------------------------------------------  IN:    Nepro: 150 mL  Total IN: 150 mL    OUT:    Indwelling Catheter - Urethral: 170 mL    Stool: 1 mL  Total OUT: 171 mL    Total NET: -21 mL              LABS:      CBC Full  -  ( 07 Jul 2018 06:54 )  WBC Count : 6.4 K/uL  Hemoglobin : 9.1 g/dL  Hematocrit : 27.4 %  Platelet Count - Automated : 80 K/uL  Mean Cell Volume : 92.0 fl  Mean Cell Hemoglobin : 30.5 pg  Mean Cell Hemoglobin Concentration : 33.1 gm/dL  Auto Neutrophil # : x  Auto Lymphocyte # : x  Auto Monocyte # : x  Auto Eosinophil # : x  Auto Basophil # : x  Auto Neutrophil % : x  Auto Lymphocyte % : x  Auto Monocyte % : x  Auto Eosinophil % : x  Auto Basophil % : x    07-07    150<H>  |  116<H>  |  25<H>  ----------------------------<  90  4.3   |  23  |  1.55<H>    Ca    8.9      07 Jul 2018 06:54  Phos  4.3     07-06  Mg     2.0     07-06                    Physical Exam    Constitutional: awake, confused    Abdomen: soft, NT/ND no bladder distention    Genitourinary: penis/testes benign

## 2018-07-07 NOTE — PROGRESS NOTE ADULT - PROBLEM SELECTOR PLAN 1
egd did not show any esophageal obstruction, ulcerations or mass lesions  f/u biopsy  presumed oropharyngeal dysphagia  GI following

## 2018-07-07 NOTE — PHYSICAL THERAPY INITIAL EVALUATION ADULT - CRITERIA FOR SKILLED THERAPEUTIC INTERVENTIONS
therapy frequency/anticipated discharge recommendation/predicted duration of therapy intervention/anticipated equipment needs at discharge/rehab potential/impairments found

## 2018-07-07 NOTE — PROGRESS NOTE ADULT - SUBJECTIVE AND OBJECTIVE BOX
Patient is a 85y old  Male who presents with a chief complaint of Cough, unable to swallow (02 Jul 2018 18:17)          Patient seen and examined at bedside.    ALLERGIES:  No Known Allergies        Vital Signs Last 24 Hrs  T(F): 97.5 (07 Jul 2018 05:13), Max: 97.6 (06 Jul 2018 11:00)  HR: 60 (07 Jul 2018 05:13) (60 - 60)  BP: 141/77 (07 Jul 2018 05:13) (93/38 - 141/77)  RR: 15 (07 Jul 2018 05:13) (12 - 16)  SpO2: 100% (07 Jul 2018 05:13) (94% - 100%)  I&O's Summary    06 Jul 2018 07:01  -  07 Jul 2018 07:00  --------------------------------------------------------  IN: 150 mL / OUT: 171 mL / NET: -21 mL      MEDICATIONS:  ALBUTerol/ipratropium for Nebulization 3 milliLiter(s) Nebulizer every 6 hours  bisacodyl Suppository 10 milliGRAM(s) Rectal daily PRN  chlorhexidine 0.12% Liquid 15 milliLiter(s) Swish and Spit two times a day  famotidine    Tablet 20 milliGRAM(s) Oral daily  glycopyrrolate 1 milliGRAM(s) Oral two times a day  heparin  Injectable 5000 Unit(s) SubCutaneous every 8 hours  latanoprost 0.005% Ophthalmic Solution 1 Drop(s) Both EYES at bedtime  morphine  - Injectable 2 milliGRAM(s) IV Push every 4 hours PRN      PHYSICAL EXAM:  General: NAD, A/O x 3  ENT: MMM  Neck: Supple, No JVD  Lungs: Clear to auscultation bilaterally  Cardio: RRR, S1/S2, No murmurs  Abdomen: Soft, Nontender, Nondistended; Bowel sounds present  Extremities: No cyanosis, No edema    LABS:                        9.1    6.4   )-----------( 80       ( 07 Jul 2018 06:54 )             27.4     07-07    150  |  116  |  25  ----------------------------<  90  4.3   |  23  |  1.55    Ca    8.9      07 Jul 2018 06:54  Phos  4.3     07-06  Mg     2.0     07-06      eGFR if Non African American: 40 mL/min/1.73M2 (07-07-18 @ 06:54)  eGFR if African American: 47 mL/min/1.73M2 (07-07-18 @ 06:54)            04-15 Chol 174 mg/dL  mg/dL HDL 46 mg/dL Trig 126 mg/dL      ABG - ( 04 Jul 2018 09:00 )  pH, Arterial: 7.33  pH, Blood: x     /  pCO2: 32    /  pO2: 102   / HCO3: x     / Base Excess: x     /  SaO2: 98                CAPILLARY BLOOD GLUCOSE        04-15 [id number] 4.9        Culture - Blood (collected 03 Jul 2018 23:20)  Source: .Blood Blood-Peripheral  Preliminary Report (05 Jul 2018 09:01):    No growth to date.    Culture - Blood (collected 03 Jul 2018 23:20)  Source: .Blood Blood-Peripheral  Preliminary Report (05 Jul 2018 09:01):    No growth to date.    Culture - Sputum (collected 03 Jul 2018 21:20)  Source: .Sputum Sputum - trap  Gram Stain (04 Jul 2018 11:52):    Numerous polymorphonuclear leukocytes per low power field    Rare Squamous epithelial cells per low power field    Rare Gram Negative Rods per oil power field    Rare Gram positive cocci in pairs per oil power field  Final Report (06 Jul 2018 08:12):    Normal Respiratory Renetta present    Culture - Urine (collected 03 Jul 2018 21:20)  Source: .Urine Clean Catch (Midstream)  Final Report (05 Jul 2018 11:43):    No growth        RADIOLOGY & ADDITIONAL TESTS:    Care Discussed with Consultants/Other Providers: Patient is a 85y old  Male who presents with a chief complaint of Cough, unable to swallow (02 Jul 2018 18:17)          Patient seen and examined at bedside.  no events overnight    ALLERGIES:  No Known Allergies        Vital Signs Last 24 Hrs  T(F): 97.5 (07 Jul 2018 05:13), Max: 97.6 (06 Jul 2018 11:00)  HR: 60 (07 Jul 2018 05:13) (60 - 60)  BP: 141/77 (07 Jul 2018 05:13) (93/38 - 141/77)  RR: 15 (07 Jul 2018 05:13) (12 - 16)  SpO2: 100% (07 Jul 2018 05:13) (94% - 100%)  I&O's Summary    06 Jul 2018 07:01  -  07 Jul 2018 07:00  --------------------------------------------------------  IN: 150 mL / OUT: 171 mL / NET: -21 mL      MEDICATIONS:  ALBUTerol/ipratropium for Nebulization 3 milliLiter(s) Nebulizer every 6 hours  bisacodyl Suppository 10 milliGRAM(s) Rectal daily PRN  chlorhexidine 0.12% Liquid 15 milliLiter(s) Swish and Spit two times a day  famotidine    Tablet 20 milliGRAM(s) Oral daily  glycopyrrolate 1 milliGRAM(s) Oral two times a day  heparin  Injectable 5000 Unit(s) SubCutaneous every 8 hours  latanoprost 0.005% Ophthalmic Solution 1 Drop(s) Both EYES at bedtime  morphine  - Injectable 2 milliGRAM(s) IV Push every 4 hours PRN      PHYSICAL EXAM:  General: NAD, A/O x 2-3 eldery   ENT: MMM  Neck: Supple, No JVD  Lungs: decreased breath sounds b/l   Cardio: RRR, S1/S2, No murmurs  Abdomen: Soft, obese, Nontender, mildly distended; Bowel sounds present  Extremities: venous changes, +1 edema     LABS:                        9.1    6.4   )-----------( 80       ( 07 Jul 2018 06:54 )             27.4     07-07    150  |  116  |  25  ----------------------------<  90  4.3   |  23  |  1.55    Ca    8.9      07 Jul 2018 06:54  Phos  4.3     07-06  Mg     2.0     07-06      eGFR if Non African American: 40 mL/min/1.73M2 (07-07-18 @ 06:54)  eGFR if African American: 47 mL/min/1.73M2 (07-07-18 @ 06:54)            04-15 Chol 174 mg/dL  mg/dL HDL 46 mg/dL Trig 126 mg/dL      ABG - ( 04 Jul 2018 09:00 )  pH, Arterial: 7.33  pH, Blood: x     /  pCO2: 32    /  pO2: 102   / HCO3: x     / Base Excess: x     /  SaO2: 98                CAPILLARY BLOOD GLUCOSE        04-15 [id number] 4.9        Culture - Blood (collected 03 Jul 2018 23:20)  Source: .Blood Blood-Peripheral  Preliminary Report (05 Jul 2018 09:01):    No growth to date.    Culture - Blood (collected 03 Jul 2018 23:20)  Source: .Blood Blood-Peripheral  Preliminary Report (05 Jul 2018 09:01):    No growth to date.    Culture - Sputum (collected 03 Jul 2018 21:20)  Source: .Sputum Sputum - trap  Gram Stain (04 Jul 2018 11:52):    Numerous polymorphonuclear leukocytes per low power field    Rare Squamous epithelial cells per low power field    Rare Gram Negative Rods per oil power field    Rare Gram positive cocci in pairs per oil power field  Final Report (06 Jul 2018 08:12):    Normal Respiratory Renetta present    Culture - Urine (collected 03 Jul 2018 21:20)  Source: .Urine Clean Catch (Midstream)  Final Report (05 Jul 2018 11:43):    No growth        RADIOLOGY & ADDITIONAL TESTS:    Care Discussed with Consultants/Other Providers:

## 2018-07-07 NOTE — PROGRESS NOTE ADULT - ASSESSMENT
85 male with TIA, PPM, CKD3, Psoriasis, DLD with recent hospitalization at [facility] for UTI right hydronephrosis s/p ureteral stent admitted with hyperkalemia, brown and dysphagia requiring intubation s/p egd now extubated.

## 2018-07-07 NOTE — PROGRESS NOTE ADULT - SUBJECTIVE AND OBJECTIVE BOX
Follow-up ICU Progress Note    No new events overnight.  Denies SOB/CP.     Medications:  MEDICATIONS  (STANDING):  ALBUTerol/ipratropium for Nebulization 3 milliLiter(s) Nebulizer every 6 hours  chlorhexidine 0.12% Liquid 15 milliLiter(s) Swish and Spit two times a day  famotidine    Tablet 20 milliGRAM(s) Oral daily  glycopyrrolate 1 milliGRAM(s) Oral two times a day  heparin  Injectable 5000 Unit(s) SubCutaneous every 8 hours  latanoprost 0.005% Ophthalmic Solution 1 Drop(s) Both EYES at bedtime    MEDICATIONS  (PRN):  bisacodyl Suppository 10 milliGRAM(s) Rectal daily PRN Constipation  morphine  - Injectable 2 milliGRAM(s) IV Push every 4 hours PRN Mild Pain (1 - 3)    Vital Signs Last 24 Hrs  T(C): 36.4 (07 Jul 2018 05:13), Max: 36.4 (06 Jul 2018 11:00)  T(F): 97.5 (07 Jul 2018 05:13), Max: 97.6 (06 Jul 2018 11:00)  HR: 60 (07 Jul 2018 05:13) (60 - 60)  BP: 141/77 (07 Jul 2018 05:13) (93/38 - 141/77)  BP(mean): 72 (06 Jul 2018 14:00) (52 - 75)  RR: 15 (07 Jul 2018 05:13) (12 - 16)  SpO2: 100% (07 Jul 2018 05:13) (94% - 100%)    07-06 @ 07:01  -  07-07 @ 07:00  --------------------------------------------------------  IN: 150 mL / OUT: 171 mL / NET: -21 mL    LABS:                        9.1    6.4   )-----------( 80       ( 07 Jul 2018 06:54 )             27.4     07-07    150<H>  |  116<H>  |  25<H>  ----------------------------<  90  4.3   |  23  |  1.55<H>    Ca    8.9      07 Jul 2018 06:54  Phos  4.3     07-06  Mg     2.0     07-06    CAPILLARY BLOOD GLUCOSE    CULTURES: (if applicable)    Culture - Blood (collected 07-03-18 @ 23:20)  Source: .Blood Blood-Peripheral  Preliminary Report (07-05-18 @ 09:01):    No growth to date.    Culture - Blood (collected 07-03-18 @ 23:20)  Source: .Blood Blood-Peripheral  Preliminary Report (07-05-18 @ 09:01):    No growth to date.    Culture - Urine (collected 07-03-18 @ 21:20)  Source: .Urine Clean Catch (Midstream)  Final Report (07-05-18 @ 11:43):    No growth    Physical Examination:  PULM: coarse BS at bases  CVS: S1, S2 heard    RADIOLOGY REVIEWED  CXR:     CT chest:    TTE:

## 2018-07-08 DIAGNOSIS — D63.8 ANEMIA IN OTHER CHRONIC DISEASES CLASSIFIED ELSEWHERE: ICD-10-CM

## 2018-07-08 LAB
ANION GAP SERPL CALC-SCNC: 10 MMOL/L — SIGNIFICANT CHANGE UP (ref 5–17)
BUN SERPL-MCNC: 23 MG/DL — SIGNIFICANT CHANGE UP (ref 7–23)
CALCIUM SERPL-MCNC: 9.1 MG/DL — SIGNIFICANT CHANGE UP (ref 8.4–10.5)
CHLORIDE SERPL-SCNC: 115 MMOL/L — HIGH (ref 96–108)
CO2 SERPL-SCNC: 22 MMOL/L — SIGNIFICANT CHANGE UP (ref 22–31)
CREAT SERPL-MCNC: 1.39 MG/DL — HIGH (ref 0.5–1.3)
GLUCOSE SERPL-MCNC: 89 MG/DL — SIGNIFICANT CHANGE UP (ref 70–99)
HCT VFR BLD CALC: 27.9 % — LOW (ref 39–50)
HGB BLD-MCNC: 8.8 G/DL — LOW (ref 13–17)
MCHC RBC-ENTMCNC: 29.2 PG — SIGNIFICANT CHANGE UP (ref 27–34)
MCHC RBC-ENTMCNC: 31.7 GM/DL — LOW (ref 32–36)
MCV RBC AUTO: 92 FL — SIGNIFICANT CHANGE UP (ref 80–100)
PF4 HEPARIN CMPLX AB SER-ACNC: NEGATIVE — SIGNIFICANT CHANGE UP
PF4 HEPARIN CMPLX AB SERPL QL IA: 0.29 ABS — SIGNIFICANT CHANGE UP
PLATELET # BLD AUTO: 84 K/UL — LOW (ref 150–400)
POTASSIUM SERPL-MCNC: 4.4 MMOL/L — SIGNIFICANT CHANGE UP (ref 3.5–5.3)
POTASSIUM SERPL-SCNC: 4.4 MMOL/L — SIGNIFICANT CHANGE UP (ref 3.5–5.3)
RBC # BLD: 3.03 M/UL — LOW (ref 4.2–5.8)
RBC # FLD: 16.8 % — HIGH (ref 10.3–14.5)
SODIUM SERPL-SCNC: 147 MMOL/L — HIGH (ref 135–145)
WBC # BLD: 6.2 K/UL — SIGNIFICANT CHANGE UP (ref 3.8–10.5)
WBC # FLD AUTO: 6.2 K/UL — SIGNIFICANT CHANGE UP (ref 3.8–10.5)

## 2018-07-08 PROCEDURE — 99233 SBSQ HOSP IP/OBS HIGH 50: CPT

## 2018-07-08 RX ADMIN — ROBINUL 1 MILLIGRAM(S): 0.2 INJECTION INTRAMUSCULAR; INTRAVENOUS at 17:41

## 2018-07-08 RX ADMIN — Medication 3 MILLILITER(S): at 21:41

## 2018-07-08 RX ADMIN — Medication 3 MILLILITER(S): at 10:00

## 2018-07-08 RX ADMIN — CHLORHEXIDINE GLUCONATE 15 MILLILITER(S): 213 SOLUTION TOPICAL at 17:41

## 2018-07-08 RX ADMIN — LATANOPROST 1 DROP(S): 0.05 SOLUTION/ DROPS OPHTHALMIC; TOPICAL at 22:21

## 2018-07-08 RX ADMIN — Medication 3 MILLILITER(S): at 05:09

## 2018-07-08 RX ADMIN — ROBINUL 1 MILLIGRAM(S): 0.2 INJECTION INTRAMUSCULAR; INTRAVENOUS at 05:54

## 2018-07-08 RX ADMIN — CHLORHEXIDINE GLUCONATE 15 MILLILITER(S): 213 SOLUTION TOPICAL at 05:54

## 2018-07-08 RX ADMIN — Medication 3 MILLILITER(S): at 16:11

## 2018-07-08 NOTE — PROGRESS NOTE ADULT - ASSESSMENT
86 yo male pmhx TIA, PPM, CKD stage 3, psoriasis, DLD with recent hospitalization at [facility] for UTI, right hydronephrosis for which he had a ureteral stent placed admitted with hyperkalemia, MELISSA and dysphagia. Intubated s/p EGD- unable to extubate. Now extubated.     NEURO: no active issues  CV: BP stable. Metabolic acidosis, improved  Lactate negative, likely hyperchloremic.  See Renal Plan.   RESP: s/p extubation.    RENAL: MELISSA on CKD avoid nephrotoxic medications, renally dose medications, trend urine output, BUN/Cr, electrolytes, replace electrolytes as needed.  Will trend K.  d/c hansen. Nephrology/ consults called  GI: cont feeds, f/u speech  ENDO: No active issues.  ID: d/c zosyn  HEME: Thrombocytopenia   DISPO: Full Code

## 2018-07-08 NOTE — PROGRESS NOTE ADULT - SUBJECTIVE AND OBJECTIVE BOX
Patient is a 85y old  Male who presents with a chief complaint of Cough, unable to swallow (02 Jul 2018 18:17)    HPI:  Pt is a 84 y o M sent from [facility] for cough, unable to swallow, wife requested evaluation. Wife states that pt has had feeding tubes in the past and then advanced to pureed diet but now is unable to swallow, pt states it gets stuck in throat, thinks he needs a scope, was seen by GI in [place]. Denies f/c, has some nausea. (02 Jul 2018 18:17)    history of ca prostate s/p radiation. s/p R ureteral stent May 2018 for hydronephrosis and recurrent UTIS    Interval Events:  Patient seen and examined at bedside.    MEDICATIONS:  MEDICATIONS  (STANDING):  ALBUTerol/ipratropium for Nebulization 3 milliLiter(s) Nebulizer every 6 hours  chlorhexidine 0.12% Liquid 15 milliLiter(s) Swish and Spit two times a day  dextrose 5%. 1000 milliLiter(s) (50 mL/Hr) IV Continuous <Continuous>  famotidine    Tablet 20 milliGRAM(s) Oral daily  glycopyrrolate 1 milliGRAM(s) Oral two times a day  latanoprost 0.005% Ophthalmic Solution 1 Drop(s) Both EYES at bedtime    MEDICATIONS  (PRN):  bisacodyl Suppository 10 milliGRAM(s) Rectal daily PRN Constipation  morphine  - Injectable 2 milliGRAM(s) IV Push every 4 hours PRN Mild Pain (1 - 3)      Allergies    No Known Allergies    Intolerances        Vital Signs Last 24 Hrs  T(C): 36.7 (08 Jul 2018 08:19), Max: 36.8 (07 Jul 2018 16:48)  T(F): 98 (08 Jul 2018 08:19), Max: 98.3 (07 Jul 2018 21:19)  HR: 60 (08 Jul 2018 08:19) (60 - 62)  BP: 136/73 (08 Jul 2018 08:19) (124/73 - 141/86)  BP(mean): --  RR: 15 (08 Jul 2018 08:19) (15 - 16)  SpO2: 98% (08 Jul 2018 10:31) (97% - 100%)      I&O's Detail    06 Jul 2018 07:01  -  07 Jul 2018 07:00  --------------------------------------------------------  IN:    Nepro: 150 mL  Total IN: 150 mL    OUT:    Indwelling Catheter - Urethral: 170 mL    Stool: 1 mL  Total OUT: 171 mL    Total NET: -21 mL      07 Jul 2018 07:01  -  08 Jul 2018 07:00  --------------------------------------------------------  IN:  Total IN: 0 mL    OUT:    Stool: 1 mL  Total OUT: 1 mL    Total NET: -1 mL      08 Jul 2018 07:01  -  08 Jul 2018 15:04  --------------------------------------------------------  IN:    Oral Fluid: 340 mL  Total IN: 340 mL    OUT:    Stool: 1 mL  Total OUT: 1 mL    Total NET: 339 mL              LABS:      CBC Full  -  ( 08 Jul 2018 06:09 )  WBC Count : 6.2 K/uL  Hemoglobin : 8.8 g/dL  Hematocrit : 27.9 %  Platelet Count - Automated : 84 K/uL  Mean Cell Volume : 92.0 fl  Mean Cell Hemoglobin : 29.2 pg  Mean Cell Hemoglobin Concentration : 31.7 gm/dL  Auto Neutrophil # : x  Auto Lymphocyte # : x  Auto Monocyte # : x  Auto Eosinophil # : x  Auto Basophil # : x  Auto Neutrophil % : x  Auto Lymphocyte % : x  Auto Monocyte % : x  Auto Eosinophil % : x  Auto Basophil % : x    07-08    147<H>  |  115<H>  |  23  ----------------------------<  89  4.4   |  22  |  1.39<H>    Ca    9.1      08 Jul 2018 06:09                    Physical Exam    Constitutional: alert, NAD    Abdomen: obese. soft, NT/ND    Genitourinary: penis/testes OK, no bladder distention

## 2018-07-08 NOTE — PROGRESS NOTE ADULT - SUBJECTIVE AND OBJECTIVE BOX
Follow-up ICU Progress Note    No new events overnight.  Appears comfortable.     Medications:  MEDICATIONS  (STANDING):  ALBUTerol/ipratropium for Nebulization 3 milliLiter(s) Nebulizer every 6 hours  chlorhexidine 0.12% Liquid 15 milliLiter(s) Swish and Spit two times a day  dextrose 5%. 1000 milliLiter(s) (50 mL/Hr) IV Continuous <Continuous>  famotidine    Tablet 20 milliGRAM(s) Oral daily  glycopyrrolate 1 milliGRAM(s) Oral two times a day  latanoprost 0.005% Ophthalmic Solution 1 Drop(s) Both EYES at bedtime    MEDICATIONS  (PRN):  bisacodyl Suppository 10 milliGRAM(s) Rectal daily PRN Constipation  morphine  - Injectable 2 milliGRAM(s) IV Push every 4 hours PRN Mild Pain (1 - 3)    Vital Signs Last 24 Hrs  T(C): 36.7 (08 Jul 2018 08:19), Max: 36.8 (07 Jul 2018 16:48)  T(F): 98 (08 Jul 2018 08:19), Max: 98.3 (07 Jul 2018 21:19)  HR: 60 (08 Jul 2018 08:19) (59 - 62)  BP: 136/73 (08 Jul 2018 08:19) (124/73 - 141/86)  BP(mean): --  RR: 15 (08 Jul 2018 08:19) (15 - 16)  SpO2: 98% (08 Jul 2018 10:31) (97% - 100%)    07-07 @ 07:01  -  07-08 @ 07:00  --------------------------------------------------------  IN: 0 mL / OUT: 1 mL / NET: -1 mL    LABS:                        8.8    6.2   )-----------( 84       ( 08 Jul 2018 06:09 )             27.9     07-08    147<H>  |  115<H>  |  23  ----------------------------<  89  4.4   |  22  |  1.39<H>    Ca    9.1      08 Jul 2018 06:09    CAPILLARY BLOOD GLUCOSE    CULTURES: (if applicable)    Culture - Blood (collected 07-03-18 @ 23:20)  Source: .Blood Blood-Peripheral  Preliminary Report (07-05-18 @ 09:01):    No growth to date.    Culture - Blood (collected 07-03-18 @ 23:20)  Source: .Blood Blood-Peripheral  Preliminary Report (07-05-18 @ 09:01):    No growth to date.    Culture - Urine (collected 07-03-18 @ 21:20)  Source: .Urine Clean Catch (Midstream)  Final Report (07-05-18 @ 11:43):    No growth    Physical Examination:  PULM: Clear to auscultation bilaterally, no significant sputum production  CVS: S1, S2 heard    RADIOLOGY REVIEWED  CXR:     CT chest:    TTE:

## 2018-07-08 NOTE — PROGRESS NOTE ADULT - SUBJECTIVE AND OBJECTIVE BOX
LICHA GUERRA  85y  Male    Patient is a 85y old  Male who presents with a chief complaint of Cough, unable to swallow (02 Jul 2018 18:17)    Pt is comfortable , denies complaints  family at bedside           PAST MEDICAL & SURGICAL HISTORY:  Psoriasis  Prostate cancer  GERD (gastroesophageal reflux disease)  Dyslipidemia  No significant past surgical history        MedsMEDICATIONS  (STANDING):  ALBUTerol/ipratropium for Nebulization 3 milliLiter(s) Nebulizer every 6 hours  chlorhexidine 0.12% Liquid 15 milliLiter(s) Swish and Spit two times a day  dextrose 5%. 1000 milliLiter(s) (50 mL/Hr) IV Continuous <Continuous>  famotidine    Tablet 20 milliGRAM(s) Oral daily  glycopyrrolate 1 milliGRAM(s) Oral two times a day  latanoprost 0.005% Ophthalmic Solution 1 Drop(s) Both EYES at bedtime    MEDICATIONS  (PRN):  bisacodyl Suppository 10 milliGRAM(s) Rectal daily PRN Constipation  morphine  - Injectable 2 milliGRAM(s) IV Push every 4 hours PRN Mild Pain (1 - 3)      Vital Signs Last 24 Hrs  T(C): 36.7 (08 Jul 2018 08:19), Max: 36.8 (07 Jul 2018 16:48)  T(F): 98 (08 Jul 2018 08:19), Max: 98.3 (07 Jul 2018 21:19)  HR: 60 (08 Jul 2018 08:19) (59 - 62)  BP: 136/73 (08 Jul 2018 08:19) (124/73 - 141/86)  BP(mean): --  RR: 15 (08 Jul 2018 08:19) (15 - 16)  SpO2: 98% (08 Jul 2018 10:31) (97% - 100%)    PHYSICAL EXAM:  GENERAL: NAD, eldery   HEAD:  NC/AT  EYES: EOMI, PERRLA, conjunctiva and sclera clear  NERVOUS SYSTEM:  Alert & Oriented X3  CHEST/LUNG: decreased breath sounds b/l  HEART: S1S2, RRR   ABDOMEN: Soft, obese, non-tender, non-distended, + bowel sounds  EXTREMITIES:  +1 edema, venous changes  SKIN: skin tinting     LABS:                          8.8    6.2   )-----------( 84       ( 08 Jul 2018 06:09 )             27.9       07-08    147<H>  |  115<H>  |  23  ----------------------------<  89  4.4   |  22  |  1.39<H>    Ca    9.1      08 Jul 2018 06:09                                  Culture - Blood (collected 07-03-18 @ 23:20)  Source: .Blood Blood-Peripheral  Preliminary Report (07-05-18 @ 09:01):    No growth to date.    Culture - Blood (collected 07-03-18 @ 23:20)  Source: .Blood Blood-Peripheral  Preliminary Report (07-05-18 @ 09:01):    No growth to date.        Culture - Urine (collected 07-03-18 @ 21:20)  Source: .Urine Clean Catch (Midstream)  Final Report (07-05-18 @ 11:43):    No growth        RADIOLOGY & ADDITIONAL TESTS:    Imaging Personally Reviewed:  [ ] YES  [ ] NO      HEALTH ISSUES - PROBLEM Dx:  Hypernatremia: Hypernatremia  Thrombocytopenia: Thrombocytopenia  Oropharyngeal dysphagia: Oropharyngeal dysphagia  UTI (urinary tract infection): UTI (urinary tract infection)  Ureteral obstruction, right: Ureteral obstruction, right  Prostate cancer: Prostate cancer  Prophylactic measure: Prophylactic measure  Dysphagia: Dysphagia  Metabolic acidosis: Metabolic acidosis  Acute respiratory failure with hypoxia: Acute respiratory failure with hypoxia  Gastroesophageal reflux disease, esophagitis presence not specified: Gastroesophageal reflux disease, esophagitis presence not specified  Dyslipidemia: Dyslipidemia  Stage 3 chronic kidney disease: Stage 3 chronic kidney disease  Hyperkalemia: Hyperkalemia  Dysphagia, unspecified type: Dysphagia, unspecified type          Care Discussed with Consultants/Other Providers [ x] YES  [ ] NO

## 2018-07-08 NOTE — PROGRESS NOTE ADULT - ASSESSMENT
85 male with TIA, PPM, CKD3, Psoriasis, DLD with recent hospitalization at [facility] for UTI right hydronephrosis s/p ureteral stent admitted with hyperkalemia, brown and dysphagia requiring intubation s/p egd now extubated

## 2018-07-08 NOTE — PROGRESS NOTE ADULT - PROBLEM SELECTOR PLAN 1
egd did not show any esophageal obstruction, ulcerations or mass lesions  f/u biopsy   presumed oropharyngeal dysphagia  GI following  will get re-eval from speech pathology    spoke with daughter- she wants acute rehab to evaluate pt, pt not likely a candidate given no acute dx and cannot handle 3 hrs of therapy  Pt eval to be done  OOB->chair

## 2018-07-08 NOTE — PROGRESS NOTE ADULT - SUBJECTIVE AND OBJECTIVE BOX
No distress    Vital Signs Last 24 Hrs  T(C): 36.6 (07-08-18 @ 20:56), Max: 36.7 (07-08-18 @ 08:19)  T(F): 97.9 (07-08-18 @ 20:56), Max: 98 (07-08-18 @ 08:19)  HR: 60 (07-08-18 @ 20:56) (60 - 60)  BP: 118/56 (07-08-18 @ 20:56) (118/56 - 140/78)  RR: 16 (07-08-18 @ 20:56) (15 - 17)  SpO2: 100% (07-08-18 @ 20:56) (97% - 100%)    Respiratory: decr BS b/l bases  Cardiovascular: S1 S2   Gastrointestinal: soft, not tender, BS present  Extremities: tr edema    ALBUTerol/ipratropium for Nebulization 3 milliLiter(s) Nebulizer every 6 hours  bisacodyl Suppository 10 milliGRAM(s) Rectal daily PRN  chlorhexidine 0.12% Liquid 15 milliLiter(s) Swish and Spit two times a day  dextrose 5%. 1000 milliLiter(s) IV Continuous <Continuous>  famotidine    Tablet 20 milliGRAM(s) Oral daily  glycopyrrolate 1 milliGRAM(s) Oral two times a day  latanoprost 0.005% Ophthalmic Solution 1 Drop(s) Both EYES at bedtime  morphine  - Injectable 2 milliGRAM(s) IV Push every 4 hours PRN                     8.8    6.2   )-----------( 84       ( 08 Jul 2018 06:09 )             27.9     08 Jul 2018 06:09    147    |  115    |  23     ----------------------------<  89     4.4     |  22     |  1.39     Ca    9.1        08 Jul 2018 06:09    Assessment and Recommendation:   		  MELISSA on CKD III  Improved  Nix d/c-d, f/u PVR  Avoid hypotension and nephrotoxins  On no fluid dysphagia diet, Sp and Sw to f/u  Hypernatremia  Continue gentle D5W  BMP in am

## 2018-07-09 LAB
ANION GAP SERPL CALC-SCNC: 7 MMOL/L — SIGNIFICANT CHANGE UP (ref 5–17)
BUN SERPL-MCNC: 20 MG/DL — SIGNIFICANT CHANGE UP (ref 7–23)
CALCIUM SERPL-MCNC: 8.2 MG/DL — LOW (ref 8.4–10.5)
CHLORIDE SERPL-SCNC: 99 MMOL/L — SIGNIFICANT CHANGE UP (ref 96–108)
CO2 SERPL-SCNC: 24 MMOL/L — SIGNIFICANT CHANGE UP (ref 22–31)
CREAT SERPL-MCNC: 1.32 MG/DL — HIGH (ref 0.5–1.3)
CULTURE RESULTS: SIGNIFICANT CHANGE UP
CULTURE RESULTS: SIGNIFICANT CHANGE UP
GLUCOSE BLDC GLUCOMTR-MCNC: 81 MG/DL — SIGNIFICANT CHANGE UP (ref 70–99)
GLUCOSE BLDC GLUCOMTR-MCNC: 90 MG/DL — SIGNIFICANT CHANGE UP (ref 70–99)
GLUCOSE SERPL-MCNC: 492 MG/DL — CRITICAL HIGH (ref 70–99)
HCT VFR BLD CALC: 26.8 % — LOW (ref 39–50)
HGB BLD-MCNC: 8.3 G/DL — LOW (ref 13–17)
MCHC RBC-ENTMCNC: 29 PG — SIGNIFICANT CHANGE UP (ref 27–34)
MCHC RBC-ENTMCNC: 31.1 GM/DL — LOW (ref 32–36)
MCV RBC AUTO: 93.1 FL — SIGNIFICANT CHANGE UP (ref 80–100)
PLATELET # BLD AUTO: 100 K/UL — LOW (ref 150–400)
POTASSIUM SERPL-MCNC: 3.6 MMOL/L — SIGNIFICANT CHANGE UP (ref 3.5–5.3)
POTASSIUM SERPL-SCNC: 3.6 MMOL/L — SIGNIFICANT CHANGE UP (ref 3.5–5.3)
RBC # BLD: 2.87 M/UL — LOW (ref 4.2–5.8)
RBC # FLD: 16.6 % — HIGH (ref 10.3–14.5)
SODIUM SERPL-SCNC: 130 MMOL/L — LOW (ref 135–145)
SPECIMEN SOURCE: SIGNIFICANT CHANGE UP
SPECIMEN SOURCE: SIGNIFICANT CHANGE UP
WBC # BLD: 5.7 K/UL — SIGNIFICANT CHANGE UP (ref 3.8–10.5)
WBC # FLD AUTO: 5.7 K/UL — SIGNIFICANT CHANGE UP (ref 3.8–10.5)

## 2018-07-09 PROCEDURE — 99222 1ST HOSP IP/OBS MODERATE 55: CPT

## 2018-07-09 PROCEDURE — 99233 SBSQ HOSP IP/OBS HIGH 50: CPT

## 2018-07-09 RX ORDER — ROBINUL 0.2 MG/ML
1 INJECTION INTRAMUSCULAR; INTRAVENOUS
Qty: 0 | Refills: 0 | Status: DISCONTINUED | OUTPATIENT
Start: 2018-07-09 | End: 2018-07-18

## 2018-07-09 RX ADMIN — FAMOTIDINE 20 MILLIGRAM(S): 10 INJECTION INTRAVENOUS at 11:45

## 2018-07-09 RX ADMIN — ROBINUL 1 MILLIGRAM(S): 0.2 INJECTION INTRAMUSCULAR; INTRAVENOUS at 05:49

## 2018-07-09 RX ADMIN — ROBINUL 1 MILLIGRAM(S): 0.2 INJECTION INTRAMUSCULAR; INTRAVENOUS at 17:40

## 2018-07-09 RX ADMIN — CHLORHEXIDINE GLUCONATE 15 MILLILITER(S): 213 SOLUTION TOPICAL at 05:49

## 2018-07-09 RX ADMIN — SODIUM CHLORIDE 50 MILLILITER(S): 9 INJECTION, SOLUTION INTRAVENOUS at 05:50

## 2018-07-09 RX ADMIN — LATANOPROST 1 DROP(S): 0.05 SOLUTION/ DROPS OPHTHALMIC; TOPICAL at 21:43

## 2018-07-09 RX ADMIN — Medication 3 MILLILITER(S): at 16:07

## 2018-07-09 RX ADMIN — Medication 3 MILLILITER(S): at 10:15

## 2018-07-09 RX ADMIN — Medication 3 MILLILITER(S): at 03:50

## 2018-07-09 RX ADMIN — Medication 3 MILLILITER(S): at 22:17

## 2018-07-09 RX ADMIN — CHLORHEXIDINE GLUCONATE 15 MILLILITER(S): 213 SOLUTION TOPICAL at 17:40

## 2018-07-09 NOTE — CONSULT NOTE ADULT - ATTENDING COMMENTS
as above
Agree with above.  Pt is an 85 male with debility with multiple comorbidities with recent hospitalization at [facility] for UTI right hydronephrosis s/p ureteral stent admitted with hyperkalemia, brown and dysphagia requiring intubation s/p egd now extubated with functional decline: with gait and ADL dysfunction   Recommend continue PT, ST  Dysphagia diet-Puree no liquids.  Recommend IVF for hydration  OT pending  DVT ppx-Mechanical with SCDs. Heparin on hold due to HIT  Continue medical management  Recommend KEO at this time

## 2018-07-09 NOTE — CONSULT NOTE ADULT - ASSESSMENT
85 male with TIA, PPM, CKD3, Psoriasis, DLD with recent hospitalization at [facility] for UTI right hydronephrosis s/p ureteral stent admitted with hyperkalemia, brown and dysphagia requiring intubation s/p egd now extubated with functional decline. 85 male with TIA, PPM, CKD3, Psoriasis, DLD with recent hospitalization at [facility] for UTI right hydronephrosis s/p ureteral stent admitted with hyperkalemia, brown and dysphagia requiring intubation s/p egd now extubated with functional decline: gait and ADL dysfunction and dysphagia.       Recommend: Continue medical management of dysphagia, continue ST.   Pt had refused PT the other day but now willing to work with PT, add OT.   Pt will not be able to participate in 3 hours of therapy a day, 5 days a week. Pt would be best served by a transfer to a [facility] facility, once medically stable, to continue rehab services consisting of PT, OT and ST. Pt agrees with plan.

## 2018-07-09 NOTE — CONSULT NOTE ADULT - SUBJECTIVE AND OBJECTIVE BOX
This is a 85y old  Male who presented to [facility]'s ER from [facility] (s/p hospitalization for UTI and hydroureternephrosis of right kidney with stent placement) with a chief complaint of cough and inability to swallow. Pt was seen by GI and scheduled for an EGD. Following procedure, pt could not be extubated and was transferred to CCU for vent management on 7/3. Pt was extubated successfully on 7/5. EGD did not show any obstructions, ulcerations or mass lesions. Pt was seen by ST and recommended to start pureed diet but no liquids.     Physical therapist went to evaluate patient on 7/7. Pt refused participate in bed mobility or transferring out of the bed to a chair.       PAST MEDICAL & SURGICAL HISTORY:  Psoriasis  Prostate cancer  GERD (gastroesophageal reflux disease)  Dyslipidemia  No significant past surgical history    Social Hx:     Allergies: No Known Allergies      MEDICATIONS  (STANDING):  ALBUTerol/ipratropium for Nebulization 3 milliLiter(s) Nebulizer every 6 hours  chlorhexidine 0.12% Liquid 15 milliLiter(s) Swish and Spit two times a day  famotidine    Tablet 20 milliGRAM(s) Oral daily  glycopyrrolate 1 milliGRAM(s) Oral two times a day  latanoprost 0.005% Ophthalmic Solution 1 Drop(s) Both EYES at bedtime    MEDICATIONS  (PRN):  bisacodyl Suppository 10 milliGRAM(s) Rectal daily PRN Constipation  morphine  - Injectable 2 milliGRAM(s) IV Push every 4 hours PRN Mild Pain (1 - 3)    Vital Signs Last 24 Hrs  T(C): 36.3 (09 Jul 2018 09:30), Max: 36.6 (08 Jul 2018 20:56)  T(F): 97.3 (09 Jul 2018 09:30), Max: 97.9 (08 Jul 2018 20:56)  HR: 55 (09 Jul 2018 10:15) (55 - 68)  BP: 132/71 (09 Jul 2018 09:30) (118/56 - 132/71)  RR: 16 (09 Jul 2018 09:30) (16 - 17)  SpO2: 95% (09 Jul 2018 10:15) (95% - 100%)    PHYSICAL EXAM:  GENERAL: NAD, lethargic   HEAD:  NC/AT  CHEST/LUNG:  decreased breath sounds b/l   HEART: S1S2, RRR   ABDOMEN: Soft, obese, non-tender, non-distended, + bowel sounds  EXTREMITIES:  2+ Peripheral Pulses, No clubbing, cyanosis, or edema  SKIN: multiple lesions, skin tinting     LABS:                          8.3    5.7   )-----------( 100      ( 09 Jul 2018 07:10 )             26.8       07-09    130<L>  |  99  |  20  ----------------------------<  492<HH>  3.6   |  24  |  1.32<H>    Ca    8.2<L>      09 Jul 2018 07:10 This is a 85y old  Male who presented to [facility]'s ER from [facility] (s/p hospitalization for UTI and hydroureternephrosis of right kidney with stent placement) with a chief complaint of cough and inability to swallow. Pt was seen by GI and scheduled for an EGD. Following procedure, pt could not be extubated and was transferred to CCU for vent management on 7/3. Pt was extubated successfully on 7/5. EGD did not show any obstructions, ulcerations or mass lesions. Pt was seen by ST and recommended to start pureed diet but no liquids.     Physical therapist went to evaluate patient on 7/7. Pt refused participate in bed mobility or transferring out of the bed to a chair.       PAST MEDICAL & SURGICAL HISTORY:  Psoriasis  Prostate cancer  GERD (gastroesophageal reflux disease)  Dyslipidemia  No significant past surgical history    Social Hx: Pt lives in one story home with wife. Uses RW for ambulation. Has aide during the week for approx 8 hours daily to assist with ADLs. Enters the home through the garage-2 stairs with rails on either side.     Allergies: No Known Allergies      MEDICATIONS  (STANDING):  ALBUTerol/ipratropium for Nebulization 3 milliLiter(s) Nebulizer every 6 hours  chlorhexidine 0.12% Liquid 15 milliLiter(s) Swish and Spit two times a day  famotidine    Tablet 20 milliGRAM(s) Oral daily  glycopyrrolate 1 milliGRAM(s) Oral two times a day  latanoprost 0.005% Ophthalmic Solution 1 Drop(s) Both EYES at bedtime    MEDICATIONS  (PRN):  bisacodyl Suppository 10 milliGRAM(s) Rectal daily PRN Constipation  morphine  - Injectable 2 milliGRAM(s) IV Push every 4 hours PRN Mild Pain (1 - 3)    Vital Signs Last 24 Hrs  T(C): 36.3 (09 Jul 2018 09:30), Max: 36.6 (08 Jul 2018 20:56)  T(F): 97.3 (09 Jul 2018 09:30), Max: 97.9 (08 Jul 2018 20:56)  HR: 55 (09 Jul 2018 10:15) (55 - 68)  BP: 132/71 (09 Jul 2018 09:30) (118/56 - 132/71)  RR: 16 (09 Jul 2018 09:30) (16 - 17)  SpO2: 95% (09 Jul 2018 10:15) (95% - 100%)    PHYSICAL EXAM:  GENERAL: NAD, AAOx4  HEAD:  NC/AT  CHEST/LUNG:  decreased breath sounds b/l   HEART: S1S2, RRR   ABDOMEN: Soft, obese, non-tender, non-distended, + bowel sounds  EXTREMITIES:  2+ Peripheral Pulses, No clubbing, cyanosis, trace edema bilat lower extremities   NEUROMUSCULAR: 1+ reflexes bilaterally BR/biceps/triceps. 0+ reflexes bilat patellars. 4+/5 bilat shoulder strengths, 5/5 bilat elbow, wrist and hand strengths. 4+/5 bilat hip flexors, 5/5 bilat knee and ankle strengths. Sensation to LT intact bilaterally upper and lower extremities.     LABS:                          8.3    5.7   )-----------( 100      ( 09 Jul 2018 07:10 )             26.8       07-09    130<L>  |  99  |  20  ----------------------------<  492<HH>  3.6   |  24  |  1.32<H>    Ca    8.2<L>      09 Jul 2018 07:10 This is a 85y old  Male who presented to [facility]'s ER from [facility] (s/p hospitalization for UTI and hydroureternephrosis of right kidney with stent placement) with a chief complaint of cough and inability to swallow. Pt was seen by GI and scheduled for an EGD. Following procedure, pt could not be extubated and was transferred to CCU for vent management on 7/3. Pt was extubated successfully on 7/5. EGD did not show any obstructions, ulcerations or mass lesions. Pt was seen by ST and recommended to start pureed diet but no liquids.     Physical therapist went to evaluate patient on 7/7. Pt refused participate in bed mobility or transferring out of the bed to a chair.   PM&R consulted to help with disposition planning.     PAST MEDICAL & SURGICAL HISTORY:  Psoriasis  Prostate cancer  GERD (gastroesophageal reflux disease)  Dyslipidemia  No significant past surgical history    Social Hx: Pt lives in one story home with wife. Uses RW for ambulation. Has aide during the week for approx 8 hours daily to assist with ADLs. Enters the home through the garage-2 stairs with rails on either side.     Allergies: No Known Allergies      MEDICATIONS  (STANDING):  ALBUTerol/ipratropium for Nebulization 3 milliLiter(s) Nebulizer every 6 hours  chlorhexidine 0.12% Liquid 15 milliLiter(s) Swish and Spit two times a day  famotidine    Tablet 20 milliGRAM(s) Oral daily  glycopyrrolate 1 milliGRAM(s) Oral two times a day  latanoprost 0.005% Ophthalmic Solution 1 Drop(s) Both EYES at bedtime    MEDICATIONS  (PRN):  bisacodyl Suppository 10 milliGRAM(s) Rectal daily PRN Constipation  morphine  - Injectable 2 milliGRAM(s) IV Push every 4 hours PRN Mild Pain (1 - 3)    Vital Signs Last 24 Hrs  T(C): 36.3 (09 Jul 2018 09:30), Max: 36.6 (08 Jul 2018 20:56)  T(F): 97.3 (09 Jul 2018 09:30), Max: 97.9 (08 Jul 2018 20:56)  HR: 55 (09 Jul 2018 10:15) (55 - 68)  BP: 132/71 (09 Jul 2018 09:30) (118/56 - 132/71)  RR: 16 (09 Jul 2018 09:30) (16 - 17)  SpO2: 95% (09 Jul 2018 10:15) (95% - 100%)    PHYSICAL EXAM:  GENERAL: NAD, AAOx4  HEAD:  NC/AT  CHEST/LUNG:  decreased breath sounds b/l   HEART: S1S2, RRR   ABDOMEN: Soft, obese, non-tender, non-distended, + bowel sounds  EXTREMITIES:  2+ Peripheral Pulses, No clubbing, cyanosis, trace edema bilat lower extremities   NEUROMUSCULAR: 1+ reflexes bilaterally BR/biceps/triceps. 0+ reflexes bilat patellars. 4+/5 bilat shoulder strengths, 5/5 bilat elbow, wrist and hand strengths. 4+/5 bilat hip flexors, 5/5 bilat knee and ankle strengths. Sensation to LT intact bilaterally upper and lower extremities.     LABS:                          8.3    5.7   )-----------( 100      ( 09 Jul 2018 07:10 )             26.8       07-09    130<L>  |  99  |  20  ----------------------------<  492<HH>  3.6   |  24  |  1.32<H>    Ca    8.2<L>      09 Jul 2018 07:10

## 2018-07-09 NOTE — CHART NOTE - NSCHARTNOTEFT_GEN_A_CORE
NUTRITION FOLLOW UP    SOURCE: Patient [X)     RN [x ]    DIET: Pureed, no liquids    PATIENT REPORTS  no GI distress, Patient s/p speech reevaluation, Diet unchanged remains pureed with no liquids. Patient reports  not "too good" of a appetite.    PO INTAKE:  appears to vary from 40 -100% as per I&O, spoke with patient regarding receiving Po nutrition supplement(Ensure Pudding ) patient is not receptive . Encouraged increase po intake. Obtained food preferences.      CURRENT WEIGHT: (7/9) 250.2/113.5kg generalized edema noted .? admission weight secondary to 20kg weight change noted.    PERTINENT MEDS:   Pertinent Medications: MEDICATIONS  (STANDING):  ALBUTerol/ipratropium for Nebulization 3 milliLiter(s) Nebulizer every 6 hours  chlorhexidine 0.12% Liquid 15 milliLiter(s) Swish and Spit two times a day  dextrose 5%. 1000 milliLiter(s) (50 mL/Hr) IV Continuous <Continuous>  famotidine    Tablet 20 milliGRAM(s) Oral daily  glycopyrrolate 1 milliGRAM(s) Oral two times a day  latanoprost 0.005% Ophthalmic Solution 1 Drop(s) Both EYES at bedtime    MEDICATIONS  (PRN):  bisacodyl Suppository 10 milliGRAM(s) Rectal daily PRN Constipation  morphine  - Injectable 2 milliGRAM(s) IV Push every 4 hours PRN Mild Pain (1 - 3)      PERTINENT LABS:  Date: 09 Jul 2018 07:10  Hemoglobin 8.3(L)    Hematocrit 26.8 (L)    Date: 07-09  Sodium 130<L>  Potassium 3.6  Glucose Serum 492<HH>  BUN 20  Creatinine 2.80(H)- CKD noted stage 3    ACCUCHECK  POCT Blood Glucose.: 90 mg/dL (09 Jul 2018 08:12)  POCT Blood Glucose.: 81 mg/dL (09 Jul 2018 08:10)      SKIN: intact    ESTIMATED NEEDS:   [X] no change since previous assessment    PREVIOUS NUTRITION DIAGNOSIS: addressed    NUTRITION DIAGNOSIS is   [X] ongoing, RD will follow as per nutrition department protocol.    MONITORING AND EVALUATION:   Current diet order is appropriate as per speech.    [X] PO intake [X] Tolerance to diet prescription [X] weights [X] follow up per nutrition  protocol,

## 2018-07-09 NOTE — PROGRESS NOTE ADULT - SUBJECTIVE AND OBJECTIVE BOX
LICHA GUERRA  85y  Male    Patient is a 85y old  Male who presents with a chief complaint of Cough, unable to swallow (02 Jul 2018 18:17)    Pt seems more lethargic today  no acute events overnight      PAST MEDICAL & SURGICAL HISTORY:  Psoriasis  Prostate cancer  GERD (gastroesophageal reflux disease)  Dyslipidemia  No significant past surgical history        MedsMEDICATIONS  (STANDING):  ALBUTerol/ipratropium for Nebulization 3 milliLiter(s) Nebulizer every 6 hours  chlorhexidine 0.12% Liquid 15 milliLiter(s) Swish and Spit two times a day  famotidine    Tablet 20 milliGRAM(s) Oral daily  glycopyrrolate 1 milliGRAM(s) Oral two times a day  latanoprost 0.005% Ophthalmic Solution 1 Drop(s) Both EYES at bedtime    MEDICATIONS  (PRN):  bisacodyl Suppository 10 milliGRAM(s) Rectal daily PRN Constipation  morphine  - Injectable 2 milliGRAM(s) IV Push every 4 hours PRN Mild Pain (1 - 3)      Vital Signs Last 24 Hrs  T(C): 36.3 (09 Jul 2018 09:30), Max: 36.6 (08 Jul 2018 20:56)  T(F): 97.3 (09 Jul 2018 09:30), Max: 97.9 (08 Jul 2018 20:56)  HR: 55 (09 Jul 2018 10:15) (55 - 68)  BP: 132/71 (09 Jul 2018 09:30) (118/56 - 132/71)  BP(mean): --  RR: 16 (09 Jul 2018 09:30) (16 - 17)  SpO2: 95% (09 Jul 2018 10:15) (95% - 100%)    PHYSICAL EXAM:  GENERAL: NAD, lethargic   HEAD:  NC/AT  CHEST/LUNG:  decreased breath sounds b/l   HEART: S1S2, RRR   ABDOMEN: Soft, obese, non-tender, non-distended, + bowel sounds  EXTREMITIES:  2+ Peripheral Pulses, No clubbing, cyanosis, or edema  SKIN: multiple lesions, skin tinting     LABS:                          8.3    5.7   )-----------( 100      ( 09 Jul 2018 07:10 )             26.8       07-09    130<L>  |  99  |  20  ----------------------------<  492<HH>  3.6   |  24  |  1.32<H>    Ca    8.2<L>      09 Jul 2018 07:10                                      RADIOLOGY & ADDITIONAL TESTS:    Imaging Personally Reviewed:  [ ] YES  [ ] NO      HEALTH ISSUES - PROBLEM Dx:  Anemia in other chronic diseases classified elsewhere: Anemia in other chronic diseases classified elsewhere  Hypernatremia: Hypernatremia  Thrombocytopenia: Thrombocytopenia  Oropharyngeal dysphagia: Oropharyngeal dysphagia  UTI (urinary tract infection): UTI (urinary tract infection)  Ureteral obstruction, right: Ureteral obstruction, right  Prostate cancer: Prostate cancer  Prophylactic measure: Prophylactic measure  Dysphagia: Dysphagia  Metabolic acidosis: Metabolic acidosis  Acute respiratory failure with hypoxia: Acute respiratory failure with hypoxia  Gastroesophageal reflux disease, esophagitis presence not specified: Gastroesophageal reflux disease, esophagitis presence not specified  Dyslipidemia: Dyslipidemia  Stage 3 chronic kidney disease: Stage 3 chronic kidney disease  Hyperkalemia: Hyperkalemia  Dysphagia, unspecified type: Dysphagia, unspecified type          Care Discussed with Consultants/Other Providers [ x] YES  [ ] NO

## 2018-07-09 NOTE — PROGRESS NOTE ADULT - ASSESSMENT
85 male with TIA, PPM, CKD3, Psoriasis, DLD with recent hospitalization at [facility] for UTI right hydronephrosis s/p ureteral stent admitted with hyperkalemia, brown and dysphagia requiring intubation s/p egd now extubated

## 2018-07-09 NOTE — PROGRESS NOTE ADULT - SUBJECTIVE AND OBJECTIVE BOX
No distress    Vital Signs Last 24 Hrs  T(C): 36.3 (07-09-18 @ 09:30), Max: 36.6 (07-08-18 @ 20:56)  T(F): 97.3 (07-09-18 @ 09:30), Max: 97.9 (07-08-18 @ 20:56)  HR: 60 (07-09-18 @ 16:10) (55 - 68)  BP: 136/61 (07-09-18 @ 16:10) (118/56 - 136/61)  RR: 15 (07-09-18 @ 16:10) (15 - 16)  SpO2: 97% (07-09-18 @ 16:10) (95% - 100%)    Respiratory: decr BS b/l bases  Cardiovascular: S1 S2   Gastrointestinal: soft, not tender, BS present  Extremities: tr edema    ALBUTerol/ipratropium for Nebulization 3 milliLiter(s) Nebulizer every 6 hours  bisacodyl Suppository 10 milliGRAM(s) Rectal daily PRN  chlorhexidine 0.12% Liquid 15 milliLiter(s) Swish and Spit two times a day  famotidine    Tablet 20 milliGRAM(s) Oral daily  glycopyrrolate 1 milliGRAM(s) Oral two times a day  latanoprost 0.005% Ophthalmic Solution 1 Drop(s) Both EYES at bedtime  morphine  - Injectable 2 milliGRAM(s) IV Push every 4 hours PRN                        8.3    5.7   )-----------( 100      ( 09 Jul 2018 07:10 )             26.8     09 Jul 2018 07:10    130    |  99     |  20     ----------------------------<  492    3.6     |  24     |  1.32     Ca    8.2        09 Jul 2018 07:10      Assessment and Recommendation:   		  MELISSA on CKD III  Improved  Nix d/c-d, f/u PVR  Avoid hypotension and nephrotoxins  On no fluid dysphagia diet, Sp and Sw to f/u  Glu control  BMP in am

## 2018-07-09 NOTE — PROGRESS NOTE ADULT - PROBLEM SELECTOR PLAN 1
egd did not show any esophageal obstruction, ulcerations or mass lesions  f/u biopsy   presumed oropharyngeal dysphagia, speech and swallow evaluated again today, no change in diet  pt refuses ensure pudding which was suggested by nutrition   GI following  will get re-eval from speech pathology    spoke with daughter- she wants acute rehab to evaluate pt, pt not likely a candidate given no acute dx and cannot handle 3 hrs of therapy  Pt eval to be done  OOB->chair

## 2018-07-10 DIAGNOSIS — N13.30 UNSPECIFIED HYDRONEPHROSIS: ICD-10-CM

## 2018-07-10 LAB
ANION GAP SERPL CALC-SCNC: 9 MMOL/L — SIGNIFICANT CHANGE UP (ref 5–17)
BUN SERPL-MCNC: 22 MG/DL — SIGNIFICANT CHANGE UP (ref 7–23)
CALCIUM SERPL-MCNC: 9.1 MG/DL — SIGNIFICANT CHANGE UP (ref 8.4–10.5)
CHLORIDE SERPL-SCNC: 110 MMOL/L — HIGH (ref 96–108)
CO2 SERPL-SCNC: 24 MMOL/L — SIGNIFICANT CHANGE UP (ref 22–31)
CREAT SERPL-MCNC: 1.28 MG/DL — SIGNIFICANT CHANGE UP (ref 0.5–1.3)
GLUCOSE SERPL-MCNC: 56 MG/DL — LOW (ref 70–99)
HBA1C BLD-MCNC: 4.6 % — SIGNIFICANT CHANGE UP (ref 4–5.6)
HCT VFR BLD CALC: 28.1 % — LOW (ref 39–50)
HGB BLD-MCNC: 8.2 G/DL — LOW (ref 13–17)
MCHC RBC-ENTMCNC: 27.3 PG — SIGNIFICANT CHANGE UP (ref 27–34)
MCHC RBC-ENTMCNC: 29.3 GM/DL — LOW (ref 32–36)
MCV RBC AUTO: 92.9 FL — SIGNIFICANT CHANGE UP (ref 80–100)
PLATELET # BLD AUTO: 101 K/UL — LOW (ref 150–400)
POTASSIUM SERPL-MCNC: 4.7 MMOL/L — SIGNIFICANT CHANGE UP (ref 3.5–5.3)
POTASSIUM SERPL-SCNC: 4.7 MMOL/L — SIGNIFICANT CHANGE UP (ref 3.5–5.3)
RBC # BLD: 3.02 M/UL — LOW (ref 4.2–5.8)
RBC # FLD: 16.8 % — HIGH (ref 10.3–14.5)
SODIUM SERPL-SCNC: 143 MMOL/L — SIGNIFICANT CHANGE UP (ref 135–145)
WBC # BLD: 6.4 K/UL — SIGNIFICANT CHANGE UP (ref 3.8–10.5)
WBC # FLD AUTO: 6.4 K/UL — SIGNIFICANT CHANGE UP (ref 3.8–10.5)

## 2018-07-10 PROCEDURE — 99233 SBSQ HOSP IP/OBS HIGH 50: CPT

## 2018-07-10 PROCEDURE — 71045 X-RAY EXAM CHEST 1 VIEW: CPT | Mod: 26

## 2018-07-10 RX ORDER — ATROPINE SULFATE 1 %
2 DROPS OPHTHALMIC (EYE)
Qty: 0 | Refills: 0 | Status: DISCONTINUED | OUTPATIENT
Start: 2018-07-10 | End: 2018-07-18

## 2018-07-10 RX ADMIN — ROBINUL 1 MILLIGRAM(S): 0.2 INJECTION INTRAMUSCULAR; INTRAVENOUS at 17:19

## 2018-07-10 RX ADMIN — Medication 3 MILLILITER(S): at 22:17

## 2018-07-10 RX ADMIN — Medication 3 MILLILITER(S): at 04:05

## 2018-07-10 RX ADMIN — CHLORHEXIDINE GLUCONATE 15 MILLILITER(S): 213 SOLUTION TOPICAL at 05:22

## 2018-07-10 RX ADMIN — FAMOTIDINE 20 MILLIGRAM(S): 10 INJECTION INTRAVENOUS at 12:23

## 2018-07-10 RX ADMIN — Medication 3 MILLILITER(S): at 15:43

## 2018-07-10 RX ADMIN — LATANOPROST 1 DROP(S): 0.05 SOLUTION/ DROPS OPHTHALMIC; TOPICAL at 21:39

## 2018-07-10 RX ADMIN — ROBINUL 1 MILLIGRAM(S): 0.2 INJECTION INTRAMUSCULAR; INTRAVENOUS at 05:22

## 2018-07-10 RX ADMIN — Medication 2 DROP(S): at 20:26

## 2018-07-10 RX ADMIN — Medication 2 DROP(S): at 15:34

## 2018-07-10 RX ADMIN — CHLORHEXIDINE GLUCONATE 15 MILLILITER(S): 213 SOLUTION TOPICAL at 17:19

## 2018-07-10 RX ADMIN — Medication 2 DROP(S): at 23:21

## 2018-07-10 RX ADMIN — Medication 3 MILLILITER(S): at 10:04

## 2018-07-10 NOTE — PROGRESS NOTE ADULT - PROBLEM SELECTOR PLAN 9
now pt is hyponatremia  monitor bmp closely   would not give fluids now
now pt is hyponatremia  monitor bmp closely   would not give fluids now
renal follow up  resolving

## 2018-07-10 NOTE — PROGRESS NOTE ADULT - ASSESSMENT
85 male with TIA, PPM, CKD3, Psoriasis, DLD with recent hospitalization at [facility] for UTI right hydronephrosis s/p ureteral stent admitted with hyperkalemia, brown and dysphagia requiring intubation s/p egd now extubated

## 2018-07-10 NOTE — PROGRESS NOTE ADULT - PROBLEM SELECTOR PLAN 1
egd did not show any esophageal obstruction, ulcerations or mass lesions  f/u biopsy results   , no change in diet  pt refuses ensure pudding which was suggested by nutrition   GI following  OOB->chair    lengthy discussion with daughter melia regarding possible placement of PEG given pt is not eating enough, nutrition/hydration status is poor , she will think about it and decide.    will call dr mejia is she decided to have it done egd did not show any esophageal obstruction, ulcerations or mass lesions  f/u biopsy results   , no change in diet  pt refuses ensure pudding which was suggested by nutrition   GI following  OOB->chair    lengthy discussion with daughter melia regarding possible placement of PEG given pt is not eating enough, nutrition/hydration status is poor , she will think about it and decide.    will call dr mejia is she decided to have it done    addendum- daughter agrees for PEG, will call dr mejia

## 2018-07-10 NOTE — PROGRESS NOTE ADULT - SUBJECTIVE AND OBJECTIVE BOX
No distress    Vital Signs Last 24 Hrs  T(C): 36.3 (07-10-18 @ 16:00), Max: 36.3 (07-10-18 @ 09:23)  T(F): 97.3 (07-10-18 @ 16:00), Max: 97.3 (07-10-18 @ 09:23)  HR: 84 (07-10-18 @ 16:00) (60 - 84)  BP: 114/74 (07-10-18 @ 16:00) (114/74 - 152/81)  RR: 16 (07-10-18 @ 16:00) (15 - 16)  SpO2: 98% (07-10-18 @ 16:00) (95% - 100%)    Respiratory: decr BS b/l bases  Cardiovascular: S1 S2   Gastrointestinal: soft, not tender, BS present  Extremities: tr edema    ALBUTerol/ipratropium for Nebulization 3 milliLiter(s) Nebulizer every 6 hours  atropine 1% Ophthalmic Solution for SubLingual Use 2 Drop(s) SubLingual four times a day  bisacodyl Suppository 10 milliGRAM(s) Rectal daily PRN  chlorhexidine 0.12% Liquid 15 milliLiter(s) Swish and Spit two times a day  famotidine    Tablet 20 milliGRAM(s) Oral daily  glycopyrrolate 1 milliGRAM(s) Oral two times a day  latanoprost 0.005% Ophthalmic Solution 1 Drop(s) Both EYES at bedtime  morphine  - Injectable 2 milliGRAM(s) IV Push every 4 hours PRN                        8.2    6.4   )-----------( 101      ( 10 Jul 2018 06:26 )             28.1     10 Jul 2018 06:26    143    |  110    |  22     ----------------------------<  56     4.7     |  24     |  1.28     Ca    9.1        10 Jul 2018 06:26      Assessment and Recommendation:   		  MELISSA on CKD III  Improved  Nix d/c-d, voiding  Avoid hypotension and nephrotoxins  On no fluid dysphagia diet, Sp and Sw to f/u  BMP in am

## 2018-07-10 NOTE — PROGRESS NOTE ADULT - SUBJECTIVE AND OBJECTIVE BOX
Patient is a 85y old  Male who presents with a chief complaint of Cough, unable to swallow (02 Jul 2018 18:17)    HPI:  Pt is a 84 y o M sent from [facility] for cough, unable to swallow, wife requested evaluation. Wife states that pt has had feeding tubes in the past and then advanced to pureed diet but now is unable to swallow, pt states it gets stuck in throat, thinks he needs a scope, was seen by GI in [place]. Denies f/c, has some nausea. (02 Jul 2018 18:17)    asked to see regarding ureteral stent. placed in May for hydronephrosis and recurrent UTIs    Interval Events:  Patient seen and examined at bedside.    MEDICATIONS:  MEDICATIONS  (STANDING):  ALBUTerol/ipratropium for Nebulization 3 milliLiter(s) Nebulizer every 6 hours  atropine 1% Ophthalmic Solution for SubLingual Use 2 Drop(s) SubLingual four times a day  chlorhexidine 0.12% Liquid 15 milliLiter(s) Swish and Spit two times a day  famotidine    Tablet 20 milliGRAM(s) Oral daily  glycopyrrolate 1 milliGRAM(s) Oral two times a day  latanoprost 0.005% Ophthalmic Solution 1 Drop(s) Both EYES at bedtime    MEDICATIONS  (PRN):  bisacodyl Suppository 10 milliGRAM(s) Rectal daily PRN Constipation  morphine  - Injectable 2 milliGRAM(s) IV Push every 4 hours PRN Mild Pain (1 - 3)      Allergies    No Known Allergies    Intolerances        Vital Signs Last 24 Hrs  T(C): 36.3 (10 Jul 2018 09:23), Max: 36.3 (10 Jul 2018 09:23)  T(F): 97.3 (10 Jul 2018 09:23), Max: 97.3 (10 Jul 2018 09:23)  HR: 60 (10 Jul 2018 10:04) (60 - 69)  BP: 152/81 (10 Jul 2018 09:23) (136/61 - 152/81)  BP(mean): --  RR: 16 (10 Jul 2018 09:23) (15 - 16)  SpO2: 96% (10 Jul 2018 10:04) (95% - 100%)      I&O's Detail    08 Jul 2018 07:01  -  09 Jul 2018 07:00  --------------------------------------------------------  IN:    dextrose 5%.: 750 mL    Oral Fluid: 340 mL  Total IN: 1090 mL    OUT:    Stool: 2 mL  Total OUT: 2 mL    Total NET: 1088 mL      09 Jul 2018 07:01  -  10 Jul 2018 07:00  --------------------------------------------------------  IN:    Oral Fluid: 150 mL  Total IN: 150 mL    OUT:    Voided: 2 mL  Total OUT: 2 mL    Total NET: 148 mL              LABS:      CBC Full  -  ( 10 Jul 2018 06:26 )  WBC Count : 6.4 K/uL  Hemoglobin : 8.2 g/dL  Hematocrit : 28.1 %  Platelet Count - Automated : 101 K/uL  Mean Cell Volume : 92.9 fl  Mean Cell Hemoglobin : 27.3 pg  Mean Cell Hemoglobin Concentration : 29.3 gm/dL  Auto Neutrophil # : x  Auto Lymphocyte # : x  Auto Monocyte # : x  Auto Eosinophil # : x  Auto Basophil # : x  Auto Neutrophil % : x  Auto Lymphocyte % : x  Auto Monocyte % : x  Auto Eosinophil % : x  Auto Basophil % : x    07-10    143  |  110<H>  |  22  ----------------------------<  56<L>  4.7   |  24  |  1.28    Ca    9.1      10 Jul 2018 06:26                    Physical Exam    Constitutional: alert NAD    Abdomen: obese, soft, NO HSM    Genitourinary: penis/testes normal

## 2018-07-10 NOTE — PROGRESS NOTE ADULT - PROBLEM SELECTOR PLAN 2
HIT antibodies negative  d/c'ed heparin for now, IPC's OOB--->chair   platelets going back up, monitor closely

## 2018-07-10 NOTE — PROGRESS NOTE ADULT - SUBJECTIVE AND OBJECTIVE BOX
LICHA GUERRA  85y  Male    Patient is a 85y old  Male who presents with a chief complaint of Cough, unable to swallow (02 Jul 2018 18:17)    Pt more lethargic today   didn't eat breakfast this morning      PAST MEDICAL & SURGICAL HISTORY:  Psoriasis  Prostate cancer  GERD (gastroesophageal reflux disease)  Dyslipidemia  No significant past surgical history        MedsMEDICATIONS  (STANDING):  ALBUTerol/ipratropium for Nebulization 3 milliLiter(s) Nebulizer every 6 hours  chlorhexidine 0.12% Liquid 15 milliLiter(s) Swish and Spit two times a day  famotidine    Tablet 20 milliGRAM(s) Oral daily  glycopyrrolate 1 milliGRAM(s) Oral two times a day  latanoprost 0.005% Ophthalmic Solution 1 Drop(s) Both EYES at bedtime    MEDICATIONS  (PRN):  bisacodyl Suppository 10 milliGRAM(s) Rectal daily PRN Constipation  morphine  - Injectable 2 milliGRAM(s) IV Push every 4 hours PRN Mild Pain (1 - 3)      Vital Signs Last 24 Hrs  T(C): 36.3 (10 Jul 2018 09:23), Max: 36.3 (10 Jul 2018 09:23)  T(F): 97.3 (10 Jul 2018 09:23), Max: 97.3 (10 Jul 2018 09:23)  HR: 60 (10 Jul 2018 10:04) (60 - 69)  BP: 152/81 (10 Jul 2018 09:23) (136/61 - 152/81)  BP(mean): --  RR: 16 (10 Jul 2018 09:23) (15 - 16)  SpO2: 96% (10 Jul 2018 10:04) (95% - 100%)    PHYSICAL EXAM:  GENERAL: NAD, lethergic  HEAD:  NC/AT  NERVOUS SYSTEM:  Alert & Oriented X2-3  CHEST/LUNG: decreased breath sounds b/l  HEART: S1S2, RRR   ABDOMEN: Soft, obese,  non-tender, non-distended, + bowel sounds  EXTREMITIES:  2+ Peripheral Pulses, No clubbing, cyanosis, +1 edema, vascular changes b/l      LABS:                          8.2    6.4   )-----------( 101      ( 10 Jul 2018 06:26 )             28.1       07-10    143  |  110<H>  |  22  ----------------------------<  56<L>  4.7   |  24  |  1.28    Ca    9.1      10 Jul 2018 06:26                                      RADIOLOGY & ADDITIONAL TESTS:    Imaging Personally Reviewed:  [ ] YES  [ ] NO      HEALTH ISSUES - PROBLEM Dx:  Anemia in other chronic diseases classified elsewhere: Anemia in other chronic diseases classified elsewhere  Hypernatremia: Hypernatremia  Thrombocytopenia: Thrombocytopenia  Oropharyngeal dysphagia: Oropharyngeal dysphagia  UTI (urinary tract infection): UTI (urinary tract infection)  Ureteral obstruction, right: Ureteral obstruction, right  Prostate cancer: Prostate cancer  Prophylactic measure: Prophylactic measure  Dysphagia: Dysphagia  Metabolic acidosis: Metabolic acidosis  Acute respiratory failure with hypoxia: Acute respiratory failure with hypoxia  Gastroesophageal reflux disease, esophagitis presence not specified: Gastroesophageal reflux disease, esophagitis presence not specified  Dyslipidemia: Dyslipidemia  Stage 3 chronic kidney disease: Stage 3 chronic kidney disease  Hyperkalemia: Hyperkalemia  Dysphagia, unspecified type: Dysphagia, unspecified type          Care Discussed with Consultants/Other Providers [ x] YES  [ ] NO

## 2018-07-11 ENCOUNTER — TRANSCRIPTION ENCOUNTER (OUTPATIENT)
Age: 83
End: 2018-07-11

## 2018-07-11 DIAGNOSIS — B37.81 CANDIDAL ESOPHAGITIS: ICD-10-CM

## 2018-07-11 LAB
ANION GAP SERPL CALC-SCNC: 13 MMOL/L — SIGNIFICANT CHANGE UP (ref 5–17)
APPEARANCE UR: CLEAR — SIGNIFICANT CHANGE UP
BILIRUB UR-MCNC: NEGATIVE — SIGNIFICANT CHANGE UP
BUN SERPL-MCNC: 28 MG/DL — HIGH (ref 7–23)
CALCIUM SERPL-MCNC: 9.4 MG/DL — SIGNIFICANT CHANGE UP (ref 8.4–10.5)
CHLORIDE SERPL-SCNC: 111 MMOL/L — HIGH (ref 96–108)
CO2 BLDA-SCNC: 20 MMOL/L — LOW (ref 22–30)
CO2 SERPL-SCNC: 21 MMOL/L — LOW (ref 22–31)
COLOR SPEC: YELLOW — SIGNIFICANT CHANGE UP
CREAT SERPL-MCNC: 1.51 MG/DL — HIGH (ref 0.5–1.3)
DIFF PNL FLD: ABNORMAL
GAS PNL BLDA: SIGNIFICANT CHANGE UP
GLUCOSE SERPL-MCNC: 53 MG/DL — LOW (ref 70–99)
GLUCOSE UR QL: NEGATIVE — SIGNIFICANT CHANGE UP
GRAM STN FLD: SIGNIFICANT CHANGE UP
HCT VFR BLD CALC: 27.9 % — LOW (ref 39–50)
HCT VFR BLD CALC: 28.6 % — LOW (ref 39–50)
HGB BLD-MCNC: 9 G/DL — LOW (ref 13–17)
HGB BLD-MCNC: 9.2 G/DL — LOW (ref 13–17)
HOROWITZ INDEX BLDA+IHG-RTO: SIGNIFICANT CHANGE UP
KETONES UR-MCNC: NEGATIVE — SIGNIFICANT CHANGE UP
LACTATE SERPL-SCNC: 0.8 MMOL/L — SIGNIFICANT CHANGE UP (ref 0.7–2)
LEUKOCYTE ESTERASE UR-ACNC: ABNORMAL
MAGNESIUM SERPL-MCNC: 2 MG/DL — SIGNIFICANT CHANGE UP (ref 1.6–2.6)
MCHC RBC-ENTMCNC: 29.9 PG — SIGNIFICANT CHANGE UP (ref 27–34)
MCHC RBC-ENTMCNC: 30 PG — SIGNIFICANT CHANGE UP (ref 27–34)
MCHC RBC-ENTMCNC: 32.2 GM/DL — SIGNIFICANT CHANGE UP (ref 32–36)
MCHC RBC-ENTMCNC: 32.4 GM/DL — SIGNIFICANT CHANGE UP (ref 32–36)
MCV RBC AUTO: 92.5 FL — SIGNIFICANT CHANGE UP (ref 80–100)
MCV RBC AUTO: 92.8 FL — SIGNIFICANT CHANGE UP (ref 80–100)
NITRITE UR-MCNC: NEGATIVE — SIGNIFICANT CHANGE UP
PCO2 BLDA: 41 MMHG — SIGNIFICANT CHANGE UP (ref 32–46)
PH BLDA: 7.28 — LOW (ref 7.35–7.45)
PH UR: 5 — SIGNIFICANT CHANGE UP (ref 5–8)
PHOSPHATE SERPL-MCNC: 4.1 MG/DL — SIGNIFICANT CHANGE UP (ref 2.5–4.5)
PLATELET # BLD AUTO: 131 K/UL — LOW (ref 150–400)
PLATELET # BLD AUTO: 145 K/UL — LOW (ref 150–400)
PO2 BLDA: 121 MMHG — HIGH (ref 74–108)
POTASSIUM SERPL-MCNC: 4.9 MMOL/L — SIGNIFICANT CHANGE UP (ref 3.5–5.3)
POTASSIUM SERPL-SCNC: 4.9 MMOL/L — SIGNIFICANT CHANGE UP (ref 3.5–5.3)
PROT UR-MCNC: 30 MG/DL
RBC # BLD: 3.01 M/UL — LOW (ref 4.2–5.8)
RBC # BLD: 3.08 M/UL — LOW (ref 4.2–5.8)
RBC # FLD: 16.8 % — HIGH (ref 10.3–14.5)
RBC # FLD: 16.9 % — HIGH (ref 10.3–14.5)
SAO2 % BLDA: 98 % — HIGH (ref 92–96)
SODIUM SERPL-SCNC: 145 MMOL/L — SIGNIFICANT CHANGE UP (ref 135–145)
SP GR SPEC: 1.02 — SIGNIFICANT CHANGE UP (ref 1.01–1.02)
SPECIMEN SOURCE: SIGNIFICANT CHANGE UP
SURGICAL PATHOLOGY STUDY: SIGNIFICANT CHANGE UP
UROBILINOGEN FLD QL: NEGATIVE — SIGNIFICANT CHANGE UP
WBC # BLD: 12.4 K/UL — HIGH (ref 3.8–10.5)
WBC # BLD: 13.8 K/UL — HIGH (ref 3.8–10.5)
WBC # FLD AUTO: 12.4 K/UL — HIGH (ref 3.8–10.5)
WBC # FLD AUTO: 13.8 K/UL — HIGH (ref 3.8–10.5)

## 2018-07-11 PROCEDURE — 71045 X-RAY EXAM CHEST 1 VIEW: CPT | Mod: 26,76

## 2018-07-11 PROCEDURE — 99223 1ST HOSP IP/OBS HIGH 75: CPT

## 2018-07-11 RX ORDER — PIPERACILLIN AND TAZOBACTAM 4; .5 G/20ML; G/20ML
3.38 INJECTION, POWDER, LYOPHILIZED, FOR SOLUTION INTRAVENOUS EVERY 8 HOURS
Qty: 0 | Refills: 0 | Status: DISCONTINUED | OUTPATIENT
Start: 2018-07-11 | End: 2018-07-15

## 2018-07-11 RX ORDER — SODIUM CHLORIDE 9 MG/ML
1000 INJECTION, SOLUTION INTRAVENOUS
Qty: 0 | Refills: 0 | Status: DISCONTINUED | OUTPATIENT
Start: 2018-07-11 | End: 2018-07-11

## 2018-07-11 RX ORDER — IPRATROPIUM/ALBUTEROL SULFATE 18-103MCG
3 AEROSOL WITH ADAPTER (GRAM) INHALATION EVERY 6 HOURS
Qty: 0 | Refills: 0 | Status: DISCONTINUED | OUTPATIENT
Start: 2018-07-11 | End: 2018-07-11

## 2018-07-11 RX ORDER — VANCOMYCIN HCL 1 G
1000 VIAL (EA) INTRAVENOUS ONCE
Qty: 0 | Refills: 0 | Status: COMPLETED | OUTPATIENT
Start: 2018-07-11 | End: 2018-07-11

## 2018-07-11 RX ORDER — HEPARIN SODIUM 5000 [USP'U]/ML
5000 INJECTION INTRAVENOUS; SUBCUTANEOUS EVERY 8 HOURS
Qty: 0 | Refills: 0 | Status: DISCONTINUED | OUTPATIENT
Start: 2018-07-11 | End: 2018-07-19

## 2018-07-11 RX ORDER — LANOLIN ALCOHOL/MO/W.PET/CERES
3 CREAM (GRAM) TOPICAL AT BEDTIME
Qty: 0 | Refills: 0 | Status: COMPLETED | OUTPATIENT
Start: 2018-07-11 | End: 2018-07-11

## 2018-07-11 RX ORDER — SODIUM CHLORIDE 9 MG/ML
1000 INJECTION, SOLUTION INTRAVENOUS
Qty: 0 | Refills: 0 | Status: DISCONTINUED | OUTPATIENT
Start: 2018-07-11 | End: 2018-07-13

## 2018-07-11 RX ADMIN — Medication 2 DROP(S): at 12:10

## 2018-07-11 RX ADMIN — Medication 2 DROP(S): at 18:52

## 2018-07-11 RX ADMIN — Medication 3 MILLILITER(S): at 22:29

## 2018-07-11 RX ADMIN — Medication 3 MILLIGRAM(S): at 01:24

## 2018-07-11 RX ADMIN — Medication 250 MILLIGRAM(S): at 09:01

## 2018-07-11 RX ADMIN — PIPERACILLIN AND TAZOBACTAM 25 GRAM(S): 4; .5 INJECTION, POWDER, LYOPHILIZED, FOR SOLUTION INTRAVENOUS at 14:52

## 2018-07-11 RX ADMIN — PIPERACILLIN AND TAZOBACTAM 25 GRAM(S): 4; .5 INJECTION, POWDER, LYOPHILIZED, FOR SOLUTION INTRAVENOUS at 07:03

## 2018-07-11 RX ADMIN — Medication 3 MILLILITER(S): at 09:17

## 2018-07-11 RX ADMIN — LATANOPROST 1 DROP(S): 0.05 SOLUTION/ DROPS OPHTHALMIC; TOPICAL at 21:27

## 2018-07-11 RX ADMIN — Medication 2 DROP(S): at 05:21

## 2018-07-11 RX ADMIN — SODIUM CHLORIDE 75 MILLILITER(S): 9 INJECTION, SOLUTION INTRAVENOUS at 20:24

## 2018-07-11 RX ADMIN — Medication 3 MILLILITER(S): at 03:34

## 2018-07-11 RX ADMIN — HEPARIN SODIUM 5000 UNIT(S): 5000 INJECTION INTRAVENOUS; SUBCUTANEOUS at 21:27

## 2018-07-11 RX ADMIN — PIPERACILLIN AND TAZOBACTAM 25 GRAM(S): 4; .5 INJECTION, POWDER, LYOPHILIZED, FOR SOLUTION INTRAVENOUS at 21:26

## 2018-07-11 RX ADMIN — Medication 3 MILLILITER(S): at 15:31

## 2018-07-11 RX ADMIN — CHLORHEXIDINE GLUCONATE 15 MILLILITER(S): 213 SOLUTION TOPICAL at 18:52

## 2018-07-11 RX ADMIN — CHLORHEXIDINE GLUCONATE 15 MILLILITER(S): 213 SOLUTION TOPICAL at 05:22

## 2018-07-11 RX ADMIN — HEPARIN SODIUM 5000 UNIT(S): 5000 INJECTION INTRAVENOUS; SUBCUTANEOUS at 14:52

## 2018-07-11 NOTE — PROGRESS NOTE ADULT - SUBJECTIVE AND OBJECTIVE BOX
Follow-up Critical Care Progress Note  Chief Complaint : Dysphagia      patient transfered to ICU this am for increased secretions  pt with cough,+ phlem and requiring frequent suctioning  case d/w Family - DTR and wife bedside        informed them of risk of intubation if hypoxia and distress develop  at this time resp status grossly stable, with frequent suctioning    Allergies :No Known Allergies      PAST MEDICAL & SURGICAL HISTORY:  Psoriasis  Prostate cancer  GERD (gastroesophageal reflux disease)  Dyslipidemia  No significant past surgical history      Medications:  MEDICATIONS  (STANDING):  ALBUTerol/ipratropium for Nebulization 3 milliLiter(s) Nebulizer every 6 hours  atropine 1% Ophthalmic Solution for SubLingual Use 2 Drop(s) SubLingual four times a day  chlorhexidine 0.12% Liquid 15 milliLiter(s) Swish and Spit two times a day  famotidine    Tablet 20 milliGRAM(s) Oral daily  glycopyrrolate 1 milliGRAM(s) Oral two times a day  heparin  Injectable 5000 Unit(s) SubCutaneous every 8 hours  latanoprost 0.005% Ophthalmic Solution 1 Drop(s) Both EYES at bedtime  piperacillin/tazobactam IVPB. 3.375 Gram(s) IV Intermittent every 8 hours    MEDICATIONS  (PRN):  bisacodyl Suppository 10 milliGRAM(s) Rectal daily PRN Constipation      LABS:                        9.2    13.8  )-----------( 145      ( 2018 08:40 )             28.6     -    145  |  111<H>  |  28<H>  ----------------------------<  53<L>  4.9   |  21<L>  |  1.51<H>    Ca    9.4      2018 07:30  Phos  4.1       Mg     2.0           HIT ab Negative  @ 15:55  HIT ab EIA 0.292  --            Urinalysis Basic - ( 2018 10:06 )    Color: Yellow / Appearance: Clear / S.020 / pH: x  Gluc: x / Ketone: Negative  / Bili: Negative / Urobili: Negative   Blood: x / Protein: 30 mg/dL / Nitrite: Negative   Leuk Esterase: Small / RBC: x / WBC x   Sq Epi: x / Non Sq Epi: x / Bacteria: x              CULTURES: (if applicable)    Culture - Blood (collected 18 @ 23:20)  Source: .Blood Blood-Peripheral  Final Report (18 @ 09:00):    No growth at 5 days.    Culture - Blood (collected 18 @ 23:20)  Source: .Blood Blood-Peripheral  Final Report (18 @ 09:00):    No growth at 5 days.    Culture - Sputum (collected 18 @ 21:20)  Source: .Sputum Sputum - trap  Gram Stain (18 @ 11:52):    Numerous polymorphonuclear leukocytes per low power field    Rare Squamous epithelial cells per low power field    Rare Gram Negative Rods per oil power field    Rare Gram positive cocci in pairs per oil power field  Final Report (18 @ 08:12):    Normal Respiratory Renetta present    Culture - Urine (collected 18 @ 21:20)  Source: .Urine Clean Catch (Midstream)  Final Report (18 @ 11:43):    No growth          ABG - ( 2018 05:21 )  pH, Arterial: 7.28  pH, Blood: x     /  pCO2: 41    /  pO2: 121   / HCO3: x     / Base Excess: x     /  SaO2: 98                CAPILLARY BLOOD GLUCOSE          RADIOLOGY  CXR:  new Right Uppler lobe infiltrate    echo   1. Left ventricular ejection fraction, by visual estimation, is 60%.   2. Normal global left ventricular systolic function.   3. Spectral Doppler shows restrictive pattern of left ventricular myocardial filling (Grade III diastolic dysfunction).   4. There is moderate concentric left ventricular hypertrophy.   5. Moderately enlarged right ventricle.   6. Mild mitral annular calcification.   7. Thickening and calcification of the anterior and posterior mitral valve leaflets.   8. Moderate-severe tricuspid regurgitation.   9. Mild aortic regurgitation.  10. Moderate aortic valve stenosis.  11. Dilatation of the aortic root.  12. Estimated pulmonary artery systolic pressure is 56.5 mmHg assuming a   right atrial pressure of 10 mmHg, which is consistent with moderate pulmonary hypertension.  13. LA volume Index is 59.0 ml/m² ml/m2.  14. The aortic valve mean gradient is 8.4 mmHg consistent with normally opening aortic valve.  15. The right ventricular size is moderately enlarged.    VITALS:  T(C): 36.3 (18 @ 08:00), Max: 36.4 (07-10-18 @ 20:19)  T(F): 97.4 (18 @ 08:00), Max: 97.6 (18 @ 06:00)  HR: 61 (18 @ 10:00) (60 - 84)  BP: 92/50 (18 @ 10:00) (92/50 - 114/74)  BP(mean): 61 (18 @ 10:00) (61 - 74)  ABP: --  ABP(mean): --  RR: 19 (18 @ 10:00) (16 - 27)  SpO2: 96% (18 @ 10:00) (94% - 100%)  CVP(mm Hg): --  CVP(cm H2O): --    Ins and Outs     07-10-18 @ 07:01  -  18 @ 07:00  --------------------------------------------------------  IN: 316 mL / OUT: 1 mL / NET: 315 mL        Physical Examination:  GENERAL:               Alert, Oriented, No acute distress.    HEENT:                   increased oral and tracheal secretions, + gargling of secretions, nares inflamed,   PULM:                    Bilateral air entry, transmitted upper airway sounds, + significant sputum production, No Rales, + Rhonchi, No Wheezing  CVS:                       S1, S2,  + Murmurs  ABD:                        Soft, nondistended, nontender, normoactive bowel sounds,   EXT:                       No edema, nontender, No Cyanosis or Clubbing   Vascular:                Warm Extremities, Normal Capillary refill, Normal Distal Pulses  NEURO:                  Alert,  follows commands  PSYC:                      Calm, + Insight.

## 2018-07-11 NOTE — PROGRESS NOTE ADULT - SUBJECTIVE AND OBJECTIVE BOX
Patient seen earlier today.  Transferred to ICU for monitoring of airway.  He is seen resting comfortably in bed.  Increased risk of intubation due to airway secretions.  He has aspiration pneumonia.  Unable to tolerate oral feeds (including puree).    MEDICATIONS  (STANDING):  ALBUTerol/ipratropium for Nebulization 3 milliLiter(s) Nebulizer every 6 hours  atropine 1% Ophthalmic Solution for SubLingual Use 2 Drop(s) SubLingual four times a day  chlorhexidine 0.12% Liquid 15 milliLiter(s) Swish and Spit two times a day  dextrose 5% + sodium chloride 0.9%. 1000 milliLiter(s) (75 mL/Hr) IV Continuous <Continuous>  famotidine    Tablet 20 milliGRAM(s) Oral daily  glycopyrrolate 1 milliGRAM(s) Oral two times a day  heparin  Injectable 5000 Unit(s) SubCutaneous every 8 hours  latanoprost 0.005% Ophthalmic Solution 1 Drop(s) Both EYES at bedtime  piperacillin/tazobactam IVPB. 3.375 Gram(s) IV Intermittent every 8 hours    MEDICATIONS  (PRN):  bisacodyl Suppository 10 milliGRAM(s) Rectal daily PRN Constipation    No Known Allergies      PHYSICAL EXAM:   Vital Signs:  Vital Signs Last 24 Hrs  T(C): 36.3 (2018 08:00), Max: 36.4 (2018 06:00)  T(F): 97.4 (2018 08:00), Max: 97.6 (2018 06:00)  HR: 60 (2018 18:00) (60 - 78)  BP: 83/39 (2018 18:00) (71/38 - 109/58)  BP(mean): 49 (2018 18:00) (46 - 74)  RR: 25 (2018 18:00) (19 - 29)  SpO2: 94% (2018 18:00) (93% - 100%)  Daily     Daily Weight in k.2 (2018 06:00)I&O's Summary    10 Jul 2018 07:01  -  2018 07:00  --------------------------------------------------------  IN: 341 mL / OUT: 1 mL / NET: 340 mL    2018 07:01  -  2018 20:50  --------------------------------------------------------  IN: 325 mL / OUT: 0 mL / NET: 325 mL        GENERAL:  Appears stated age,  no distress at present.  HEENT:  NC/AT,  conjunctivae clear and pink, no thyromegaly, nodules, adenopathy, no JVD, sclera -anicteric  CHEST:  Full & symmetric excursion, no increased effort, breath sounds clear  HEART:  Regular rhythm, S1, S2, no murmur/rub/S3/S4, no abdominal bruit, no edema  ABDOMEN:  Soft, non-tender, non-distended, normoactive bowel sounds,  no masses ,no hepato-splenomegaly, no signs of chronic liver disease  EXTEREMITIES:  no cyanosis,clubbing or edema  SKIN:  No rash/erythema/ecchymoses/petechiae/wounds/abscess/warm/dry  NEURO:  Alert, oriented, no asterixis, no tremor, no encephalopathy      LABS:                        9.2    13.8  )-----------( 145      ( 2018 08:40 )             28.6     0711    145  |  111<H>  |  28<H>  ----------------------------<  53<L>  4.9   |  21<L>  |  1.51<H>    Ca    9.4      2018 07:30  Phos  4.1       Mg     2.0

## 2018-07-11 NOTE — PROGRESS NOTE ADULT - ASSESSMENT
85 year old male with h/o cva and dysphagia and h/o PEG, PPM, CKD, Psoriasis, recent hospitalization ofr UTI and right hydronephrosis who p/w dysphagia and ? Nee for PEG on 7/2/18  S/P EGD which showed no mechanical obstructive causes of dysphagia, but required to be intubated post procedure and was monitored on floor.  Extubated and transfered to medical floor  on 7/11/18 patient developed severe increase in secretions unable to manage airway and broguht to ICU for likely asp pna and pending respiratory failure. due to severe upper airway secretions.      Assessment  Pending respiratory failure due to upper airway obstruction due to airway secretions.   Dysphagia  Aspiration PNA  MELISSA on CKD cr baseline 1.2 -1.4  improving    Plan  Frequent suctioning.  Want to do CT head, but due to secretions at this time, high risk for transport eval for acute on chronic CVA.  monitor airway  continue atropine and robinol  check sputum culture  d/w GI - plan for peg in am, informed him high risk of requiring intubation  restart DVT ppx    Family Updated: 	Dtr/Wife	                                 Date:  7/11  Sedation & Analgesia:	on hold  Diet/Nutrition:		NPO pending peg  GI PPx:			PPI    DVT Ppx:		hep sq  	RISK                                                          Points  	[ ] Previous VTE                                           	3  	[ ] Thrombophilia                                        	2  	[ ] Lower limb paralysis                              	2   	[ ] Current Cancer                                       	2   	[ x] Immobilization > 24 hrs                        	1  	[ x] ICU/CCU stay > 24 hours                       	1  	[ x] Age > 60                                                   	1  		Total:[ 3]      Activity:		       Bedrest, active rom          Head of Bed:               35-45 deg  Glycemic Control:        Hypoglycemia noted, D5 NS    Lines:  CENTRAL LINE: 	[ ] YES [x ] NO	                    LOCATION:   	                       DATE INSERTED:   	                    REMOVE:  [ ] YES [ ] NO    A-LINE:  	                [ ] YES [ x] NO                      LOCATION:   	                       DATE INSERTED: 		            REMOVE:  [ ] YES [ ] NO    SOTO: 		        [ ] YES [x ] NO  		                                       DATE INSERTED:		            REMOVE:  [ ] YES [ ] NO        Disposition:  ICu monitoring    Goals of Care: full code, dw family if desire to less aggressive care.

## 2018-07-11 NOTE — CONSULT NOTE ADULT - ASSESSMENT
85 year old male with h/o cva and dysphagia and h/o PEG, PPM, CKD, Psoriasis, recent hospitalization ofr UTI and right hydronephrosis who p/w dysphagia and ? Nee for PEG on 7/2/18  S/P EGD which showed no mechanical obstructive causes of dysphagia, but required to be intubated post procedure and was monitored on floor.  Extubated and transfered to medical floor  on 7/11/18 patient developed severe increase in secretions unable to manage airway and broguht to ICU for likely asp pna and pending respiratory failure. due to severe upper airway secretions.      Assessment  Pending respiratory failure due to upper airway obstruction due to airway secretions.   Dysphagia  Aspiration PNA  MELISSA on CKD cr baseline 1.2 -1.4  improving  GOC reviewed with patient and family.  At the current time patient is not able to paticipate in conversation.  Family thinks that he would like a trial of intubation.  if stable would consider ct of head to look for cause of progressive neuro dysfunction over the last 4 weeks    Plan  Frequent suctioning.  Want to do CT head, but due to secretions at this time, high risk for transport eval for acute on chronic CVA.  monitor airway  continue atropine and robinol  check sputum culture  d/w GI - plan for peg in am, informed him high risk of requiring intubation  restart DVT ppx    Family Updated: 	Dtr/Wife	                                 Date:  7/11  Sedation & Analgesia:	on hold  Diet/Nutrition:		NPO pending peg  GI PPx:			PPI    DVT Ppx:		hep sq  	RISK                                                          Points  	[ ] Previous VTE                                           	3  	[ ] Thrombophilia                                        	2  	[ ] Lower limb paralysis                              	2   	[ ] Current Cancer                                       	2   	[ x] Immobilization > 24 hrs                        	1  	[ x] ICU/CCU stay > 24 hours                       	1  	[ x] Age > 60                                                   	1  		Total:[ 3]      Activity:		       Bedrest, active rom          Head of Bed:               35-45 deg  Glycemic Control:        Hypoglycemia noted, D5 NS    Lines:  CENTRAL LINE: 	[ ] YES [x ] NO	                    LOCATION:   	                       DATE INSERTED:   	                    REMOVE:  [ ] YES [ ] NO    A-LINE:  	                [ ] YES [ x] NO                      LOCATION:   	                       DATE INSERTED: 		            REMOVE:  [ ] YES [ ] NO    SOTO: 		        [ ] YES [x ] NO  		                                       DATE INSERTED:		            REMOVE:  [ ] YES [ ] NO        Disposition:  ICu monitoring    Goals of Care: full code, dw family if desire to less aggressive care.

## 2018-07-11 NOTE — PROGRESS NOTE ADULT - SUBJECTIVE AND OBJECTIVE BOX
Pt is an 84yr old man with PMHx including permanent pacemaker, TIA, CKD, recent UTI with right hydro s/p ureteral stent. Pt presented to the ER on 7/2/18 for evaluation of dysphagia and was admitted for MELISSA with hyperkalemia and dysphagia evaluation with EDG, after which the patient did not tolerate extubation and was transferred to the ICU for vent management. Pt course has been significant for hyperkalemia, concern for aspiration pneumonia. Pt extubated 7/5 with transfer to tele 7/8 with plan for PEG. Called to bedside 7/11 ~0430 for copious secretions.    Upon assessment pt in bed coughing up light brown/tan secretions, being suctioned by bedside RN and respiratory therapist. Lungs with crackles in all lung fields anteriorly, R>L, s1,s2, o2 sat 100%. Unable to review systems with pt as speech incoherent (baseline per report).    --Transfer pt to ICU stat    Case discussed with eICU Dr. Porter, will aggressively suction, maintain upright with aspiration precautions, maintain NPO, insert NGT to low wall suction with low threshold to intubate for airway protection. Attempt to call wife x2, no answer. Called daughter Oz and confirmed pt is full code.     Review of stat chest xray (my read): Pt is an 84yr old man with PMHx including permanent pacemaker, TIA, CKD, recent UTI with right hydro s/p ureteral stent. Pt presented to the ER on 7/2/18 for evaluation of dysphagia and was admitted for MELISSA with hyperkalemia and dysphagia evaluation with EDG, after which the patient did not tolerate extubation and was transferred to the ICU for vent management. Pt course has been significant for hyperkalemia, concern for aspiration pneumonia. Pt extubated 7/5 with transfer to tele 7/8 with plan for PEG. Called to bedside 7/11 ~0430 for copious secretions.    Upon assessment pt in bed coughing up light brown/tan secretions, being suctioned by bedside RN and respiratory therapist. Lungs with crackles in all lung fields anteriorly, R>L, s1,s2, o2 sat 100%. Unable to review systems with pt as speech incoherent (baseline per report).    --Transfer pt to ICU stat    Case discussed with eICU Dr. Porter, will aggressively suction, maintain upright with aspiration precautions, maintain NPO, insert NGT to low wall suction with low threshold to intubate for airway protection. Attempt to call wife x2, no answer. Called daughter Amelie and confirmed pt is full code.     Review of stat chest xray (my read): Possible right midlung pneumonia.    --Reculture, start Zosyn, one time dose of Vanc, redose as clinically warranted. Pt is an 84yr old man with PMHx including permanent pacemaker, TIA, CKD, recent UTI with right hydro s/p ureteral stent. Pt presented to the ER on 7/2/18 for evaluation of dysphagia and was admitted for MELISSA with hyperkalemia and dysphagia evaluation with EDG, after which the patient did not tolerate extubation and was transferred to the ICU for vent management. Pt course has been significant for hyperkalemia, concern for aspiration pneumonia. Pt extubated 7/5 with transfer to tele 7/8 with plan for PEG. Called to bedside 7/11 ~0430 for copious secretions.    Upon assessment pt in bed coughing up light brown/tan secretions, being suctioned by bedside RN and respiratory therapist. Lungs with crackles in all lung fields anteriorly, R>L, s1,s2, o2 sat 100%. Unable to review systems with pt as speech incoherent (baseline per report) though pt able to express basic needs.     --Transfer pt to ICU stat    Case discussed with eICU Dr. Porter, will aggressively suction, maintain upright with aspiration precautions, maintain NPO, insert NGT to low wall suction with low threshold to intubate for airway protection. Attempt to call wife x2, no answer. Called daughter Amelie and confirmed pt is full code.     Review of stat chest xray (my read): Possible right midlung pneumonia.    --Reculture, start Zosyn, one time dose of Vanc, redose as clinically warranted.

## 2018-07-11 NOTE — PROGRESS NOTE ADULT - ASSESSMENT
Elderly male with multiple medical problems now with aspiration pneumonia, dysphagia.  Oral feeds are not safe in this patient.

## 2018-07-11 NOTE — PROGRESS NOTE ADULT - PROBLEM SELECTOR PLAN 1
1. PEG placement is tentatively scheduled tomorrow at 10:30am.  He is at risk for intubation during the procedure.  2. Spoke with patient's daughter (Amelie Davis) and discussed the procedure and risks.  The patient's wife will be available by phone tomorrow to provide consent (cell [phone]).  All of her questions and concerns have been addressed and answered.

## 2018-07-11 NOTE — PROGRESS NOTE ADULT - SUBJECTIVE AND OBJECTIVE BOX
Events noted, in ICU, suspect aspiration, hypotensive    Vital Signs Last 24 Hrs  T(C): 36.3 (18 @ 08:00), Max: 36.4 (18 @ 06:00)  T(F): 97.4 (18 @ 08:00), Max: 97.6 (18 @ 06:00)  HR: 60 (18 @ 18:00) (60 - 78)  BP: 83/39 (18 @ 18:00) (71/38 - 109/58)  BP(mean): 49 (18 @ 18:00) (46 - 74)  RR: 25 (18 @ 18:00) (19 - 29)  SpO2: 94% (18 @ 18:00) (93% - 100%)    Respiratory: decr BS b/l bases  Cardiovascular: S1 S2   Gastrointestinal: soft, not tender, BS present  Extremities: tr edema    ALBUTerol/ipratropium for Nebulization 3 milliLiter(s) Nebulizer every 6 hours  atropine 1% Ophthalmic Solution for SubLingual Use 2 Drop(s) SubLingual four times a day  bisacodyl Suppository 10 milliGRAM(s) Rectal daily PRN  chlorhexidine 0.12% Liquid 15 milliLiter(s) Swish and Spit two times a day  dextrose 5% + sodium chloride 0.9%. 1000 milliLiter(s) IV Continuous <Continuous>  famotidine    Tablet 20 milliGRAM(s) Oral daily  glycopyrrolate 1 milliGRAM(s) Oral two times a day  heparin  Injectable 5000 Unit(s) SubCutaneous every 8 hours  latanoprost 0.005% Ophthalmic Solution 1 Drop(s) Both EYES at bedtime  piperacillin/tazobactam IVPB. 3.375 Gram(s) IV Intermittent every 8 hours                        9.2    13.8  )-----------( 145      ( 2018 08:40 )             28.6     2018 07:30    145    |  111    |  28     ----------------------------<  53     4.9     |  21     |  1.51     Ca    9.4        2018 07:30  Phos  4.1       2018 08:15  Mg     2.0       2018 08:15    Urinalysis Basic - ( 2018 10:06 )    Color: Yellow / Appearance: Clear / S.020 / pH: x  Gluc: x / Ketone: Negative  / Bili: Negative / Urobili: Negative   Blood: x / Protein: 30 mg/dL / Nitrite: Negative   Leuk Esterase: Small / RBC: 5-10 /HPF / WBC 0-2 /HPF   Sq Epi: x / Non Sq Epi: Neg.-Few / Bacteria: Many /HPF      Assessment and Recommendation:   		  MELISSA on CKD III  Check PVR (hx of retention)  Avoid hypotension and nephrotoxins  IVF, f/u BP, resp status  CBC, BMP in am  Overall prognosis is poor

## 2018-07-11 NOTE — CHART NOTE - NSCHARTNOTEFT_GEN_A_CORE
NUTRITION FOLLOW UP    SOURCE: Patient Spouse, Daughter, MD , Medical Record    DIET: NPO,  Patient noted NPO related to transfer to CCU due to increased secretions. Patient noted with dysphagia and PEG in past. Speech eval noted patient unable to tolerate PO liquids , only pureed foods. Spoke with wife regarding diet/peg hx.    CURRENT WEIGHT:  - 223.1/101.2kg, patient with a stable weight since his admission.   Skin is intact. (-) edema noted.    PERTINENT MEDS:   Pertinent Medications: MEDICATIONS  (STANDING):  ALBUTerol/ipratropium for Nebulization 3 milliLiter(s) Nebulizer every 6 hours  atropine 1% Ophthalmic Solution for SubLingual Use 2 Drop(s) SubLingual four times a day  chlorhexidine 0.12% Liquid 15 milliLiter(s) Swish and Spit two times a day  famotidine    Tablet 20 milliGRAM(s) Oral daily  glycopyrrolate 1 milliGRAM(s) Oral two times a day  heparin  Injectable 5000 Unit(s) SubCutaneous every 8 hours  latanoprost 0.005% Ophthalmic Solution 1 Drop(s) Both EYES at bedtime  piperacillin/tazobactam IVPB. 3.375 Gram(s) IV Intermittent every 8 hours    MEDICATIONS  (PRN):  bisacodyl Suppository 10 milliGRAM(s) Rectal daily PRN Constipation      PERTINENT LABS:  Date: 11 Jul 2018 08:40  Hemoglobin 9.2(L)    Hematocrit 28.6 (L)    Date: 07-11  Sodium 145  Potassium 4.9  Glucose Serum 53<L>  BUN 28<H)  Creatinine 1.51      ESTIMATED NEEDS:   [X] no change since previous assessment    PREVIOUS NUTRITION DIAGNOSIS: addressed    NUTRITION DIAGNOSIS is   [X] ongoing, dyshagia, RD will follow as per nutrition department protocol.    MONITORING AND EVALUATION: Will monitor NPO status, labs await PEG Placement  l    NUTRITION RECOMMENDATIONS: suggest 2 Jimi Hn @65cc/hr x 18hrs which would provide 2340 calories, 97grms protein. Will  follow clinical course.

## 2018-07-12 DIAGNOSIS — J69.0 PNEUMONITIS DUE TO INHALATION OF FOOD AND VOMIT: ICD-10-CM

## 2018-07-12 DIAGNOSIS — N17.9 ACUTE KIDNEY FAILURE, UNSPECIFIED: ICD-10-CM

## 2018-07-12 LAB
ANION GAP SERPL CALC-SCNC: 13 MMOL/L — SIGNIFICANT CHANGE UP (ref 5–17)
BUN SERPL-MCNC: 35 MG/DL — HIGH (ref 7–23)
CALCIUM SERPL-MCNC: 8.7 MG/DL — SIGNIFICANT CHANGE UP (ref 8.4–10.5)
CHLORIDE SERPL-SCNC: 110 MMOL/L — HIGH (ref 96–108)
CO2 SERPL-SCNC: 21 MMOL/L — LOW (ref 22–31)
CREAT SERPL-MCNC: 2.17 MG/DL — HIGH (ref 0.5–1.3)
GLUCOSE BLDC GLUCOMTR-MCNC: 102 MG/DL — HIGH (ref 70–99)
GLUCOSE SERPL-MCNC: 82 MG/DL — SIGNIFICANT CHANGE UP (ref 70–99)
HCT VFR BLD CALC: 25.7 % — LOW (ref 39–50)
HGB BLD-MCNC: 8.5 G/DL — LOW (ref 13–17)
MAGNESIUM SERPL-MCNC: 2.1 MG/DL — SIGNIFICANT CHANGE UP (ref 1.6–2.6)
MCHC RBC-ENTMCNC: 30.5 PG — SIGNIFICANT CHANGE UP (ref 27–34)
MCHC RBC-ENTMCNC: 33.1 GM/DL — SIGNIFICANT CHANGE UP (ref 32–36)
MCV RBC AUTO: 92.1 FL — SIGNIFICANT CHANGE UP (ref 80–100)
PHOSPHATE SERPL-MCNC: 4 MG/DL — SIGNIFICANT CHANGE UP (ref 2.5–4.5)
PLATELET # BLD AUTO: 112 K/UL — LOW (ref 150–400)
POTASSIUM SERPL-MCNC: 4 MMOL/L — SIGNIFICANT CHANGE UP (ref 3.5–5.3)
POTASSIUM SERPL-SCNC: 4 MMOL/L — SIGNIFICANT CHANGE UP (ref 3.5–5.3)
RBC # BLD: 2.79 M/UL — LOW (ref 4.2–5.8)
RBC # FLD: 16.5 % — HIGH (ref 10.3–14.5)
SODIUM SERPL-SCNC: 144 MMOL/L — SIGNIFICANT CHANGE UP (ref 135–145)
WBC # BLD: 12.8 K/UL — HIGH (ref 3.8–10.5)
WBC # FLD AUTO: 12.8 K/UL — HIGH (ref 3.8–10.5)

## 2018-07-12 PROCEDURE — 70450 CT HEAD/BRAIN W/O DYE: CPT | Mod: 26

## 2018-07-12 PROCEDURE — 99221 1ST HOSP IP/OBS SF/LOW 40: CPT | Mod: 57,25

## 2018-07-12 PROCEDURE — 99233 SBSQ HOSP IP/OBS HIGH 50: CPT

## 2018-07-12 PROCEDURE — 71045 X-RAY EXAM CHEST 1 VIEW: CPT | Mod: 26

## 2018-07-12 PROCEDURE — 43246 EGD PLACE GASTROSTOMY TUBE: CPT | Mod: 62

## 2018-07-12 RX ORDER — SODIUM CHLORIDE 9 MG/ML
500 INJECTION INTRAMUSCULAR; INTRAVENOUS; SUBCUTANEOUS
Qty: 0 | Refills: 0 | Status: DISCONTINUED | OUTPATIENT
Start: 2018-07-12 | End: 2018-07-12

## 2018-07-12 RX ADMIN — CHLORHEXIDINE GLUCONATE 15 MILLILITER(S): 213 SOLUTION TOPICAL at 05:41

## 2018-07-12 RX ADMIN — HEPARIN SODIUM 5000 UNIT(S): 5000 INJECTION INTRAVENOUS; SUBCUTANEOUS at 05:41

## 2018-07-12 RX ADMIN — HEPARIN SODIUM 5000 UNIT(S): 5000 INJECTION INTRAVENOUS; SUBCUTANEOUS at 14:53

## 2018-07-12 RX ADMIN — Medication 2 DROP(S): at 17:38

## 2018-07-12 RX ADMIN — PIPERACILLIN AND TAZOBACTAM 25 GRAM(S): 4; .5 INJECTION, POWDER, LYOPHILIZED, FOR SOLUTION INTRAVENOUS at 21:49

## 2018-07-12 RX ADMIN — Medication 2 DROP(S): at 12:43

## 2018-07-12 RX ADMIN — SODIUM CHLORIDE 100 MILLILITER(S): 9 INJECTION, SOLUTION INTRAVENOUS at 22:47

## 2018-07-12 RX ADMIN — FAMOTIDINE 20 MILLIGRAM(S): 10 INJECTION INTRAVENOUS at 12:44

## 2018-07-12 RX ADMIN — Medication 2 DROP(S): at 05:41

## 2018-07-12 RX ADMIN — ROBINUL 1 MILLIGRAM(S): 0.2 INJECTION INTRAMUSCULAR; INTRAVENOUS at 17:38

## 2018-07-12 RX ADMIN — LATANOPROST 1 DROP(S): 0.05 SOLUTION/ DROPS OPHTHALMIC; TOPICAL at 21:49

## 2018-07-12 RX ADMIN — Medication 3 MILLILITER(S): at 09:07

## 2018-07-12 RX ADMIN — Medication 3 MILLILITER(S): at 20:44

## 2018-07-12 RX ADMIN — CHLORHEXIDINE GLUCONATE 15 MILLILITER(S): 213 SOLUTION TOPICAL at 17:38

## 2018-07-12 RX ADMIN — SODIUM CHLORIDE 100 MILLILITER(S): 9 INJECTION, SOLUTION INTRAVENOUS at 12:45

## 2018-07-12 RX ADMIN — PIPERACILLIN AND TAZOBACTAM 25 GRAM(S): 4; .5 INJECTION, POWDER, LYOPHILIZED, FOR SOLUTION INTRAVENOUS at 14:52

## 2018-07-12 RX ADMIN — Medication 3 MILLILITER(S): at 03:44

## 2018-07-12 RX ADMIN — HEPARIN SODIUM 5000 UNIT(S): 5000 INJECTION INTRAVENOUS; SUBCUTANEOUS at 21:48

## 2018-07-12 RX ADMIN — Medication 2 DROP(S): at 00:11

## 2018-07-12 RX ADMIN — Medication 3 MILLILITER(S): at 15:02

## 2018-07-12 RX ADMIN — PIPERACILLIN AND TAZOBACTAM 25 GRAM(S): 4; .5 INJECTION, POWDER, LYOPHILIZED, FOR SOLUTION INTRAVENOUS at 05:42

## 2018-07-12 RX ADMIN — SODIUM CHLORIDE 500 MILLILITER(S): 9 INJECTION INTRAMUSCULAR; INTRAVENOUS; SUBCUTANEOUS at 09:49

## 2018-07-12 RX ADMIN — Medication 3 MILLILITER(S): at 20:52

## 2018-07-12 NOTE — PROGRESS NOTE ADULT - SUBJECTIVE AND OBJECTIVE BOX
Patient is a 85y old  Male who presents with a chief complaint of Cough, unable to swallow (2018 18:17)    HPI:  Pt is a 84 y o M sent from [facility] for cough, unable to swallow, wife requested evaluation. Wife states that pt has had feeding tubes in the past and then advanced to pureed diet but now is unable to swallow, pt states it gets stuck in throat, thinks he needs a scope, was seen by GI in [place]. Denies f/c, has some nausea. (2018 18:17)    history of ca prostate and recurrent UTIs. R ureteral stent placed in May for R hydronephrosis    Interval Events:  Patient seen and examined at bedside.    MEDICATIONS:  MEDICATIONS  (STANDING):  ALBUTerol/ipratropium for Nebulization 3 milliLiter(s) Nebulizer every 6 hours  atropine 1% Ophthalmic Solution for SubLingual Use 2 Drop(s) SubLingual four times a day  chlorhexidine 0.12% Liquid 15 milliLiter(s) Swish and Spit two times a day  dextrose 5% + sodium chloride 0.9%. 1000 milliLiter(s) (75 mL/Hr) IV Continuous <Continuous>  famotidine    Tablet 20 milliGRAM(s) Oral daily  glycopyrrolate 1 milliGRAM(s) Oral two times a day  heparin  Injectable 5000 Unit(s) SubCutaneous every 8 hours  latanoprost 0.005% Ophthalmic Solution 1 Drop(s) Both EYES at bedtime  piperacillin/tazobactam IVPB. 3.375 Gram(s) IV Intermittent every 8 hours    MEDICATIONS  (PRN):  bisacodyl Suppository 10 milliGRAM(s) Rectal daily PRN Constipation      Allergies    No Known Allergies    Intolerances        Vital Signs Last 24 Hrs  T(C): 35.8 (2018 05:00), Max: 36.4 (2018 22:00)  T(F): 96.5 (2018 05:00), Max: 97.5 (2018 22:00)  HR: 60 (2018 08:00) (60 - 93)  BP: 96/51 (2018 08:00) (71/38 - 109/58)  BP(mean): 62 (2018 08:00) (46 - 79)  RR: 19 (2018 08:00) (13 - 29)  SpO2: 98% (2018 08:00) (93% - 100%)      I&O's Detail    10 Jul 2018 07:01  -  2018 07:00  --------------------------------------------------------  IN:    Oral Fluid: 316 mL    Solution: 25 mL  Total IN: 341 mL    OUT:    Stool: 1 mL  Total OUT: 1 mL    Total NET: 340 mL      2018 07:01  -  2018 07:00  --------------------------------------------------------  IN:    dextrose 5% + sodium chloride 0.9%.: 900 mL    Solution: 250 mL    Solution: 225 mL  Total IN: 1375 mL    OUT:  Total OUT: 0 mL    Total NET: 1375 mL              LABS:  ABG - ( 2018 05:21 )  pH, Arterial: 7.28  pH, Blood: x     /  pCO2: 41    /  pO2: 121   / HCO3: x     / Base Excess: x     /  SaO2: 98                  CBC Full  -  ( 2018 05:00 )  WBC Count : 12.8 K/uL  Hemoglobin : 8.5 g/dL  Hematocrit : 25.7 %  Platelet Count - Automated : 112 K/uL  Mean Cell Volume : 92.1 fl  Mean Cell Hemoglobin : 30.5 pg  Mean Cell Hemoglobin Concentration : 33.1 gm/dL  Auto Neutrophil # : x  Auto Lymphocyte # : x  Auto Monocyte # : x  Auto Eosinophil # : x  Auto Basophil # : x  Auto Neutrophil % : x  Auto Lymphocyte % : x  Auto Monocyte % : x  Auto Eosinophil % : x  Auto Basophil % : x        144  |  110<H>  |  35<H>  ----------------------------<  82  4.0   |  21<L>  |  2.17<H>    Ca    8.7      2018 05:00  Phos  4.0     07-12  Mg     2.1     07-12          Urinalysis Basic - ( 2018 10:06 )    Color: Yellow / Appearance: Clear / S.020 / pH: x  Gluc: x / Ketone: Negative  / Bili: Negative / Urobili: Negative   Blood: x / Protein: 30 mg/dL / Nitrite: Negative   Leuk Esterase: Small / RBC: 5-10 /HPF / WBC 0-2 /HPF   Sq Epi: x / Non Sq Epi: Neg.-Few / Bacteria: Many /HPF              Physical Exam    Constitutional: alert NAD    Abdomen: obese soft, NT/ND    Genitourinary: penis/testes benign no bladder distention

## 2018-07-12 NOTE — PROGRESS NOTE ADULT - SUBJECTIVE AND OBJECTIVE BOX
Follow-up Critical Care Progress Note  Chief Complaint : Dysphagia        No new events overnight.  Denies SOB/CP.       Allergies :No Known Allergies      PAST MEDICAL & SURGICAL HISTORY:  Psoriasis  Prostate cancer  GERD (gastroesophageal reflux disease)  Dyslipidemia  No significant past surgical history      Medications:  MEDICATIONS  (STANDING):  ALBUTerol/ipratropium for Nebulization 3 milliLiter(s) Nebulizer every 6 hours  atropine 1% Ophthalmic Solution for SubLingual Use 2 Drop(s) SubLingual four times a day  chlorhexidine 0.12% Liquid 15 milliLiter(s) Swish and Spit two times a day  dextrose 5% + sodium chloride 0.9%. 1000 milliLiter(s) (75 mL/Hr) IV Continuous <Continuous>  famotidine    Tablet 20 milliGRAM(s) Oral daily  glycopyrrolate 1 milliGRAM(s) Oral two times a day  heparin  Injectable 5000 Unit(s) SubCutaneous every 8 hours  latanoprost 0.005% Ophthalmic Solution 1 Drop(s) Both EYES at bedtime  piperacillin/tazobactam IVPB. 3.375 Gram(s) IV Intermittent every 8 hours    MEDICATIONS  (PRN):  bisacodyl Suppository 10 milliGRAM(s) Rectal daily PRN Constipation      LABS:                        8.5    12.8  )-----------( 112      ( 2018 05:00 )             25.7     07-12    144  |  110<H>  |  35<H>  ----------------------------<  82  4.0   |  21<L>  |  2.17<H>    Ca    8.7      2018 05:00  Phos  4.0     -12  Mg     2.1     07-12      HIT ab Negative  @ 15:55  HIT ab EIA 0.292  --            Urinalysis Basic - ( 2018 10:06 )    Color: Yellow / Appearance: Clear / S.020 / pH: x  Gluc: x / Ketone: Negative  / Bili: Negative / Urobili: Negative   Blood: x / Protein: 30 mg/dL / Nitrite: Negative   Leuk Esterase: Small / RBC: 5-10 /HPF / WBC 0-2 /HPF   Sq Epi: x / Non Sq Epi: Neg.-Few / Bacteria: Many /HPF              CULTURES: (if applicable)    Culture - Sputum (collected 18 @ 09:15)  Source: .Sputum Sputum  Gram Stain (18 @ 14:46):    No squamous epithelial cells per low power field    Numerous polymorphonuclear leukocytes per low power field    Numerous Gram positive cocci in pairs per oil power field    Culture - Blood (collected 18 @ 23:20)  Source: .Blood Blood-Peripheral  Final Report (18 @ 09:00):    No growth at 5 days.    Culture - Blood (collected 18 @ 23:20)  Source: .Blood Blood-Peripheral  Final Report (18 @ 09:00):    No growth at 5 days.    Culture - Sputum (collected 18 @ 21:20)  Source: .Sputum Sputum - trap  Gram Stain (18 @ 11:52):    Numerous polymorphonuclear leukocytes per low power field    Rare Squamous epithelial cells per low power field    Rare Gram Negative Rods per oil power field    Rare Gram positive cocci in pairs per oil power field  Final Report (18 @ 08:12):    Normal Respiratory Renetta present    Culture - Urine (collected 18 @ 21:20)  Source: .Urine Clean Catch (Midstream)  Final Report (18 @ 11:43):    No growth          ABG - ( 2018 05:21 )  pH, Arterial: 7.28  pH, Blood: x     /  pCO2: 41    /  pO2: 121   / HCO3: x     / Base Excess: x     /  SaO2: 98                CAPILLARY BLOOD GLUCOSE          RADIOLOGY  CXR:      CT:    ECHO:      VITALS:  T(C): 35.8 (18 @ 05:00), Max: 36.4 (18 @ 22:00)  T(F): 96.5 (18 @ 05:00), Max: 97.5 (18 @ 22:00)  HR: 60 (18 @ 08:00) (60 - 93)  BP: 96/51 (18 @ 08:00) (71/38 - 109/58)  BP(mean): 62 (18 @ 08:00) (46 - 79)  ABP: --  ABP(mean): --  RR: 19 (18 @ 08:00) (13 - 29)  SpO2: 98% (18 @ 08:00) (93% - 100%)  CVP(mm Hg): --  CVP(cm H2O): --    Ins and Outs     18 @ 07:01  -  18 @ 07:00  --------------------------------------------------------  IN: 1375 mL / OUT: 0 mL / NET: 1375 mL Follow-up Critical Care Progress Note  Chief Complaint : Dysphagia    pt secretions improving  appears to be more dried  pt unable to provide history or ROS      Allergies :No Known Allergies      PAST MEDICAL & SURGICAL HISTORY:  Psoriasis  Prostate cancer  GERD (gastroesophageal reflux disease)  Dyslipidemia  No significant past surgical history      Medications:  MEDICATIONS  (STANDING):  ALBUTerol/ipratropium for Nebulization 3 milliLiter(s) Nebulizer every 6 hours  atropine 1% Ophthalmic Solution for SubLingual Use 2 Drop(s) SubLingual four times a day  chlorhexidine 0.12% Liquid 15 milliLiter(s) Swish and Spit two times a day  dextrose 5% + sodium chloride 0.9%. 1000 milliLiter(s) (75 mL/Hr) IV Continuous <Continuous>  famotidine    Tablet 20 milliGRAM(s) Oral daily  glycopyrrolate 1 milliGRAM(s) Oral two times a day  heparin  Injectable 5000 Unit(s) SubCutaneous every 8 hours  latanoprost 0.005% Ophthalmic Solution 1 Drop(s) Both EYES at bedtime  piperacillin/tazobactam IVPB. 3.375 Gram(s) IV Intermittent every 8 hours    MEDICATIONS  (PRN):  bisacodyl Suppository 10 milliGRAM(s) Rectal daily PRN Constipation      LABS:                        8.5    12.8  )-----------( 112      ( 2018 05:00 )             25.7     07-12    144  |  110<H>  |  35<H>  ----------------------------<  82  4.0   |  21<L>  |  2.17<H>    Ca    8.7      2018 05:00  Phos  4.0     -12  Mg     2.1     07-12      HIT ab Negative  @ 15:55  HIT ab EIA 0.292  --            Urinalysis Basic - ( 2018 10:06 )    Color: Yellow / Appearance: Clear / S.020 / pH: x  Gluc: x / Ketone: Negative  / Bili: Negative / Urobili: Negative   Blood: x / Protein: 30 mg/dL / Nitrite: Negative   Leuk Esterase: Small / RBC: 5-10 /HPF / WBC 0-2 /HPF   Sq Epi: x / Non Sq Epi: Neg.-Few / Bacteria: Many /HPF              CULTURES: (if applicable)    Culture - Sputum (collected 18 @ 09:15)  Source: .Sputum Sputum  Gram Stain (18 @ 14:46):    No squamous epithelial cells per low power field    Numerous polymorphonuclear leukocytes per low power field    Numerous Gram positive cocci in pairs per oil power field    Culture - Blood (collected 18 @ 23:20)  Source: .Blood Blood-Peripheral  Final Report (18 @ 09:00):    No growth at 5 days.    Culture - Blood (collected 18 @ 23:20)  Source: .Blood Blood-Peripheral  Final Report (18 @ 09:00):    No growth at 5 days.    Culture - Sputum (collected 18 @ 21:20)  Source: .Sputum Sputum - trap  Gram Stain (18 @ 11:52):    Numerous polymorphonuclear leukocytes per low power field    Rare Squamous epithelial cells per low power field    Rare Gram Negative Rods per oil power field    Rare Gram positive cocci in pairs per oil power field  Final Report (18 @ 08:12):    Normal Respiratory Renetta present    Culture - Urine (collected 18 @ 21:20)  Source: .Urine Clean Catch (Midstream)  Final Report (18 @ 11:43):    No growth          ABG - ( 2018 05:21 )  pH, Arterial: 7.28  pH, Blood: x     /  pCO2: 41    /  pO2: 121   / HCO3: x     / Base Excess: x     /  SaO2: 98                CAPILLARY BLOOD GLUCOSE    VITALS:  T(C): 35.8 (18 @ 05:00), Max: 36.4 (18 @ 22:00)  T(F): 96.5 (18 @ 05:00), Max: 97.5 (18 @ 22:00)  HR: 60 (18 @ 08:00) (60 - 93)  BP: 96/51 (18 @ 08:00) (71/38 - 109/58)  BP(mean): 62 (18 @ 08:00) (46 - 79)  ABP: --  ABP(mean): --  RR: 19 (18 @ 08:00) (13 - 29)  SpO2: 98% (07-12-18 @ 08:00) (93% - 100%)  CVP(mm Hg): --  CVP(cm H2O): --    Ins and Outs     18 @ 07:01  -  18 @ 07:00  --------------------------------------------------------  IN: 1375 mL / OUT: 0 mL / NET: 1375 mL

## 2018-07-12 NOTE — PROGRESS NOTE ADULT - PROBLEM SELECTOR PLAN 1
continue broad spectrum coverage ( Zosyn) for MDR organisms including staph aureus and gram negatives.   Follow up sputum culture continue broad spectrum coverage ( Zosyn) for MDR organisms including staph aureus and gram negatives.   Follow up sputum culture  continue atropine and robinol  frequent suctioning   continue airway management monitoring

## 2018-07-12 NOTE — PROGRESS NOTE ADULT - ASSESSMENT
85 year old male with Dysphagia ,Aspiration PNA ,MELISSA on CKD  ( baseline 1.2 -1.4 ) and concern for respiratory failure due to airway secretions and inability to manage.     Time spent: 30 mins assessing presenting problems of acute illness that poses high probability  end organ damage/dysfunction.  Medical decision making inculding plan of daily care, reviewing data, reviewing radiology, discussing with multidisciplinary team, non inclusive of procedures, discussing goals of care with patient/family

## 2018-07-12 NOTE — PROGRESS NOTE ADULT - SUBJECTIVE AND OBJECTIVE BOX
85y  Male  No Known Allergies    Patient is a 85y old  Male who presents with a chief complaint of Cough, unable to swallow     HPI:  Pt is a 84 y o M sent from [facility] for cough, unable to swallow he was admitted for dysphagia . He was scoped by GI which showed no mechanical obstructive causes of dysphagia, but required to be intubated post procedure. He was extubated and transferred to medical floor but on 18 developed severe increase in secretions unable to manage airway , was brought to ICU for likely asp pna and pending respiratory failure. due to severe upper airway secretions but last 24 hrs has stabilized not requiring intubation.          PAST MEDICAL & SURGICAL HISTORY:  Psoriasis  Prostate cancer  GERD (gastroesophageal reflux disease)  Dyslipidemia  No significant past surgical history    FAMILY HISTORY:  No pertinent family history in first degree relatives      Vitals   Vital Signs Last 24 Hrs  T(C): 36.3 (2018 17:00), Max: 36.4 (2018 22:00)  T(F): 97.3 (2018 17:00), Max: 97.6 (2018 09:39)  HR: 60 (2018 19:00) (60 - 93)  BP: 112/63 (2018 19:00) (89/46 - 113/55)  BP(mean): 73 (2018 19:00) (55 - 79)  RR: 18 (2018 19:00) (14 - 23)  SpO2: 97% (2018 19:00) (96% - 100%)  ABG - ( 2018 05:21 )  pH, Arterial: 7.28  pH, Blood: x     /  pCO2: 41    /  pO2: 121   / HCO3: x     / Base Excess: x     /  SaO2: 98                I&O's Summary    2018 07:  -  2018 07:00  --------------------------------------------------------  IN: 1375 mL / OUT: 0 mL / NET: 1375 mL    2018 07:  -  2018 20:07  --------------------------------------------------------  IN: 1475 mL / OUT: 0 mL / NET: 1475 mL        LABS      144  |  110<H>  |  35<H>  ----------------------------<  82  4.0   |  21<L>  |  2.17<H>    Ca    8.7      2018 05:00  Phos  4.0       Mg     2.1                                 8.5    12.8  )-----------( 112      ( 2018 05:00 )             25.7             CAPILLARY BLOOD GLUCOSE      POCT Blood Glucose.: 102 mg/dL (2018 10:42)    Urinalysis Basic - ( 2018 10:06 )    Color: Yellow / Appearance: Clear / S.020 / pH: x  Gluc: x / Ketone: Negative  / Bili: Negative / Urobili: Negative   Blood: x / Protein: 30 mg/dL / Nitrite: Negative   Leuk Esterase: Small / RBC: 5-10 /HPF / WBC 0-2 /HPF   Sq Epi: x / Non Sq Epi: Neg.-Few / Bacteria: Many /HPF        VENT SETTINGS       Meds  MEDICATIONS  (STANDING):  ALBUTerol/ipratropium for Nebulization 3 milliLiter(s) Nebulizer every 6 hours  atropine 1% Ophthalmic Solution for SubLingual Use 2 Drop(s) SubLingual four times a day  chlorhexidine 0.12% Liquid 15 milliLiter(s) Swish and Spit two times a day  dextrose 5% + sodium chloride 0.9%. 1000 milliLiter(s) (100 mL/Hr) IV Continuous <Continuous>  famotidine    Tablet 20 milliGRAM(s) Oral daily  glycopyrrolate 1 milliGRAM(s) Oral two times a day  heparin  Injectable 5000 Unit(s) SubCutaneous every 8 hours  latanoprost 0.005% Ophthalmic Solution 1 Drop(s) Both EYES at bedtime  piperacillin/tazobactam IVPB. 3.375 Gram(s) IV Intermittent every 8 hours      REVIEW OF SYSTEMS:    CONSTITUTIONAL: No fever, weight loss, or fatigue  EYES: No eye pain, visual disturbances, or discharge  ENMT:  No difficulty hearing, tinnitus, vertigo; No sinus or throat pain  NECK: No pain or stiffness  BREASTS: No pain, masses, or nipple discharge  RESPIRATORY: No cough, wheezing, chills or hemoptysis; No shortness of breath  CARDIOVASCULAR: No chest pain, palpitations, dizziness, or leg swelling  GASTROINTESTINAL: No abdominal or epigastric pain. No nausea, vomiting, or hematemesis; No diarrhea or constipation. No melena or hematochezia.  GENITOURINARY: No dysuria, frequency, hematuria, or incontinence  NEUROLOGICAL: No headaches, memory loss, loss of strength, numbness, or tremors  SKIN: No itching, burning, rashes, or lesions   LYMPH NODES: No enlarged glands  ENDOCRINE: No heat or cold intolerance; No hair loss  MUSCULOSKELETAL: No joint pain or swelling; No muscle, back, or extremity pain  PSYCHIATRIC: No depression, anxiety, mood swings, or difficulty sleeping  HEME/LYMPH: No easy bruising, or bleeding gums  ALLERY AND IMMUNOLOGIC: No hives or eczema      Physicial Exam:     Constitutional: NAD, well-groomed, well-developed  HEENT: PERRLA, EOMI, no drainage or redness  Neck: No bruits; no thyromegaly or nodules,  No JVD  Back: Normal spine flexure, No CVA tenderness, No deformity or limitation of movement  Respiratory: Breath Sounds equal & clear to percussion & auscultation, no accessory muscle use  Cardiovascular: Regular rate & rhythm, normal S1, S2; no murmurs, gallops or rubs; no S3, S4  Gastrointestinal: Soft, non-tender, non distended no hepatosplenomegaly, normal bowel sounds  Extremities: No peripheral edema, No cyanosis, clubbing   Vascular: Equal and normal pulses: 2+ peripheral pulses throughout  Neurological: GCS:    A&O x 3; no sensory, motor  deficits, normal reflexes  Psychiatric: Normal mood, normal affect  Musculoskeletal: No joint pain, swelling or deformity; no limitation of movement  Skin: No rashes 85y  Male  No Known Allergies    CC: Patient is a 85y old  Male who presents with a chief complaint of Cough, unable to swallow     HPI:  Pt is a 84 year old male with PMHx of CVA  PEG, PPM, CKD, Psoriasis, recent hospitalization for UTI and right hydronephrosis who was sent from [facility] for cough, unable to swallow he was admitted for dysphagia . He was scoped by GI which showed no mechanical obstructive causes of dysphagia, but required to be intubated post procedure. He was extubated and transferred to medical floor but on 18 developed severe increase in secretions unable to manage airway , was brought to ICU for likely asp pna and pending respiratory failure. due to severe upper airway secretions with high risk of aspiration pna requiring intubation for airway protection.      PAST MEDICAL & SURGICAL HISTORY:  Psoriasis  Prostate cancer  GERD (gastroesophageal reflux disease)  Dyslipidemia  No significant past surgical history    FAMILY HISTORY:  No pertinent family history in first degree relatives    Vitals   Vital Signs Last 24 Hrs  T(C): 36.3 (2018 17:00), Max: 36.4 (2018 22:00)  T(F): 97.3 (2018 17:00), Max: 97.6 (2018 09:39)  HR: 60 (2018 19:00) (60 - 93)  BP: 112/63 (2018 19:00) (89/46 - 113/55)  BP(mean): 73 (2018 19:00) (55 - 79)  RR: 18 (2018 19:00) (14 - 23)  SpO2: 97% (2018 19:00) (96% - 100%)  ABG - ( 2018 05:21 )  pH, Arterial: 7.28  pH, Blood: x     /  pCO2: 41    /  pO2: 121   / HCO3: x     / Base Excess: x     /  SaO2: 98          I&O's Summary    2018 07:  -  2018 07:00  --------------------------------------------------------  IN: 1375 mL / OUT: 0 mL / NET: 1375 mL    2018 07:01  -  2018 20:07  --------------------------------------------------------  IN: 1475 mL / OUT: 0 mL / NET: 1475 mL    LABS      144  |  110<H>  |  35<H>  ----------------------------<  82  4.0   |  21<L>  |  2.17<H>    Ca    8.7      2018 05:00  Phos  4.0       Mg     2.1                             8.5    12.8  )-----------( 112      ( 2018 05:00 )             25.7     CAPILLARY BLOOD GLUCOSE  POCT Blood Glucose.: 102 mg/dL (2018 10:42)    Urinalysis Basic - ( 2018 10:06 )    Color: Yellow / Appearance: Clear / S.020 / pH: x  Gluc: x / Ketone: Negative  / Bili: Negative / Urobili: Negative   Blood: x / Protein: 30 mg/dL / Nitrite: Negative   Leuk Esterase: Small / RBC: 5-10 /HPF / WBC 0-2 /HPF   Sq Epi: x / Non Sq Epi: Neg.-Few / Bacteria: Many /HPF      Meds  MEDICATIONS  (STANDING):  ALBUTerol/ipratropium for Nebulization 3 milliLiter(s) Nebulizer every 6 hours  atropine 1% Ophthalmic Solution for SubLingual Use 2 Drop(s) SubLingual four times a day  chlorhexidine 0.12% Liquid 15 milliLiter(s) Swish and Spit two times a day  dextrose 5% + sodium chloride 0.9%. 1000 milliLiter(s) (100 mL/Hr) IV Continuous <Continuous>  famotidine    Tablet 20 milliGRAM(s) Oral daily  glycopyrrolate 1 milliGRAM(s) Oral two times a day  heparin  Injectable 5000 Unit(s) SubCutaneous every 8 hours  latanoprost 0.005% Ophthalmic Solution 1 Drop(s) Both EYES at bedtime  piperacillin/tazobactam IVPB. 3.375 Gram(s) IV Intermittent every 8 hours      REVIEW OF SYSTEMS:    CONSTITUTIONAL: No fever, weight loss, or fatigue  EYES: No eye pain, visual disturbances, or discharge  ENMT:  No difficulty hearing, tinnitus, vertigo; No sinus or throat pain  NECK: No pain or stiffness  BREASTS: No pain, masses, or nipple discharge  RESPIRATORY: No cough, wheezing, chills or hemoptysis; No shortness of breath  CARDIOVASCULAR: No chest pain, palpitations, dizziness, or leg swelling  GASTROINTESTINAL: No abdominal or epigastric pain. No nausea, vomiting, or hematemesis; No diarrhea or constipation. No melena or hematochezia.  GENITOURINARY: No dysuria, frequency, hematuria, or incontinence  NEUROLOGICAL: No headaches, memory loss, loss of strength, numbness, or tremors  SKIN: No itching, burning, rashes, or lesions   LYMPH NODES: No enlarged glands  ENDOCRINE: No heat or cold intolerance; No hair loss  MUSCULOSKELETAL: No joint pain or swelling; No muscle, back, or extremity pain  PSYCHIATRIC: No depression, anxiety, mood swings, or difficulty sleeping  HEME/LYMPH: No easy bruising, or bleeding gums  ALLERY AND IMMUNOLOGIC: No hives or eczema      Physicial Exam:     Constitutional: NAD, well-groomed, well-developed  HEENT: PERRLA, EOMI, no drainage or redness  Neck: No bruits; no thyromegaly or nodules,  No JVD  Back: Normal spine flexure, No CVA tenderness, No deformity or limitation of movement  Respiratory: Breath Sounds equal & clear to percussion & auscultation, no accessory muscle use  Cardiovascular: Regular rate & rhythm, normal S1, S2; no murmurs, gallops or rubs; no S3, S4  Gastrointestinal: Soft, non-tender, non distended no hepatosplenomegaly, normal bowel sounds  Extremities: No peripheral edema, No cyanosis, clubbing   Vascular: Equal and normal pulses: 2+ peripheral pulses throughout  Neurological: GCS:    A&O x 3; no sensory, motor  deficits, normal reflexes  Psychiatric: Normal mood, normal affect  Musculoskeletal: No joint pain, swelling or deformity; no limitation of movement  Skin: No rashes

## 2018-07-12 NOTE — PROGRESS NOTE ADULT - SUBJECTIVE AND OBJECTIVE BOX
seen in ICU, feels better    Vital Signs Last 24 Hrs  T(C): 36.4 (18 @ 13:00), Max: 36.4 (18 @ 22:00)  T(F): 97.5 (18 @ 13:00), Max: 97.6 (18 @ 09:39)  HR: 60 (18 @ 15:00) (60 - 93)  BP: 100/61 (18 @ 15:00) (71/38 - 113/55)  BP(mean): 69 (18 @ 15:00) (46 - 79)  RR: 16 (18 @ 15:00) (13 - 25)  SpO2: 100% (18 @ 15:00) (94% - 100%)    Respiratory: decr BS b/l bases  Cardiovascular: S1, S2   Gastrointestinal: soft, not tender, BS present  Extremities: tr edema    ALBUTerol/ipratropium for Nebulization 3 milliLiter(s) Nebulizer every 6 hours  atropine 1% Ophthalmic Solution for SubLingual Use 2 Drop(s) SubLingual four times a day  bisacodyl Suppository 10 milliGRAM(s) Rectal daily PRN  chlorhexidine 0.12% Liquid 15 milliLiter(s) Swish and Spit two times a day  dextrose 5% + sodium chloride 0.9%. 1000 milliLiter(s) IV Continuous <Continuous>  famotidine    Tablet 20 milliGRAM(s) Oral daily  glycopyrrolate 1 milliGRAM(s) Oral two times a day  heparin  Injectable 5000 Unit(s) SubCutaneous every 8 hours  latanoprost 0.005% Ophthalmic Solution 1 Drop(s) Both EYES at bedtime  piperacillin/tazobactam IVPB. 3.375 Gram(s) IV Intermittent every 8 hours                        8.5    12.8  )-----------( 112      ( 2018 05:00 )             25.7     2018 05:00    144    |  110    |  35     ----------------------------<  82     4.0     |  21     |  2.17     Ca    8.7        2018 05:00  Phos  4.0       2018 05:00  Mg     2.1       2018 05:00    Urinalysis Basic - ( 2018 10:06 )    Color: Yellow / Appearance: Clear / S.020 / pH: x  Gluc: x / Ketone: Negative  / Bili: Negative / Urobili: Negative   Blood: x / Protein: 30 mg/dL / Nitrite: Negative   Leuk Esterase: Small / RBC: 5-10 /HPF / WBC 0-2 /HPF   Sq Epi: x / Non Sq Epi: Neg.-Few / Bacteria: Many /HPF      Assessment and Recommendation:   		  Asp PNA, recurrent  NPO, PEG feeds  Hemodynamic MELISSA on CKD III  Check PVR (hx of retention)  Avoid hypotension and nephrotoxins  CBC, BMP in am  D/w family at bedside

## 2018-07-12 NOTE — PROGRESS NOTE ADULT - PROBLEM SELECTOR PLAN 2
acute on chronic kidney baseline cr 1.2 -1.4   Hold nephrotoxic meds  Continue aggressive hydration  Trend urine output  Follow up BUN/Creatinine  follow up electrolytes

## 2018-07-12 NOTE — PROGRESS NOTE ADULT - SUBJECTIVE AND OBJECTIVE BOX
Follow-up Critical Care Progress Note  Chief Complaint : Dysphagia    pt secretions improving  appears to be more dried  s/p peg placement.  Pt tolerated procedure      Allergies :No Known Allergies      PAST MEDICAL & SURGICAL HISTORY:  Psoriasis  Prostate cancer  GERD (gastroesophageal reflux disease)  Dyslipidemia  No significant past surgical history      Medications:  MEDICATIONS  (STANDING):  ALBUTerol/ipratropium for Nebulization 3 milliLiter(s) Nebulizer every 6 hours  atropine 1% Ophthalmic Solution for SubLingual Use 2 Drop(s) SubLingual four times a day  chlorhexidine 0.12% Liquid 15 milliLiter(s) Swish and Spit two times a day  dextrose 5% + sodium chloride 0.9%. 1000 milliLiter(s) (75 mL/Hr) IV Continuous <Continuous>  famotidine    Tablet 20 milliGRAM(s) Oral daily  glycopyrrolate 1 milliGRAM(s) Oral two times a day  heparin  Injectable 5000 Unit(s) SubCutaneous every 8 hours  latanoprost 0.005% Ophthalmic Solution 1 Drop(s) Both EYES at bedtime  piperacillin/tazobactam IVPB. 3.375 Gram(s) IV Intermittent every 8 hours    MEDICATIONS  (PRN):  bisacodyl Suppository 10 milliGRAM(s) Rectal daily PRN Constipation      LABS:                        8.5    12.8  )-----------( 112      ( 2018 05:00 )             25.7     07-12    144  |  110<H>  |  35<H>  ----------------------------<  82  4.0   |  21<L>  |  2.17<H>    Ca    8.7      2018 05:00  Phos  4.0     -12  Mg     2.1     07-12      HIT ab Negative  @ 15:55  HIT ab EIA 0.292  --            Urinalysis Basic - ( 2018 10:06 )    Color: Yellow / Appearance: Clear / S.020 / pH: x  Gluc: x / Ketone: Negative  / Bili: Negative / Urobili: Negative   Blood: x / Protein: 30 mg/dL / Nitrite: Negative   Leuk Esterase: Small / RBC: 5-10 /HPF / WBC 0-2 /HPF   Sq Epi: x / Non Sq Epi: Neg.-Few / Bacteria: Many /HPF              CULTURES: (if applicable)    Culture - Sputum (collected 18 @ 09:15)  Source: .Sputum Sputum  Gram Stain (18 @ 14:46):    No squamous epithelial cells per low power field    Numerous polymorphonuclear leukocytes per low power field    Numerous Gram positive cocci in pairs per oil power field    Culture - Blood (collected 18 @ 23:20)  Source: .Blood Blood-Peripheral  Final Report (18 @ 09:00):    No growth at 5 days.    Culture - Blood (collected 18 @ 23:20)  Source: .Blood Blood-Peripheral  Final Report (18 @ 09:00):    No growth at 5 days.    Culture - Sputum (collected 18 @ 21:20)  Source: .Sputum Sputum - trap  Gram Stain (18 @ 11:52):    Numerous polymorphonuclear leukocytes per low power field    Rare Squamous epithelial cells per low power field    Rare Gram Negative Rods per oil power field    Rare Gram positive cocci in pairs per oil power field  Final Report (18 @ 08:12):    Normal Respiratory Renetta present    Culture - Urine (collected 18 @ 21:20)  Source: .Urine Clean Catch (Midstream)  Final Report (18 @ 11:43):    No growth          ABG - ( 2018 05:21 )  pH, Arterial: 7.28  pH, Blood: x     /  pCO2: 41    /  pO2: 121   / HCO3: x     / Base Excess: x     /  SaO2: 98                CAPILLARY BLOOD GLUCOSE    VITALS:  T(C): 35.8 (18 @ 05:00), Max: 36.4 (18 @ 22:00)  T(F): 96.5 (18 @ 05:00), Max: 97.5 (18 @ 22:00)  HR: 60 (18 @ 08:00) (60 - 93)  BP: 96/51 (18 @ 08:00) (71/38 - 109/58)  BP(mean): 62 (18 @ 08:00) (46 - 79)  ABP: --  ABP(mean): --  RR: 19 (18 @ 08:00) (13 - 29)  SpO2: 98% (18 @ 08:00) (93% - 100%)  CVP(mm Hg): --  CVP(cm H2O): --    Ins and Outs     18 @ 07:01  -  18 @ 07:00  --------------------------------------------------------  IN: 1375 mL / OUT: 0 mL / NET: 1375 mL

## 2018-07-12 NOTE — PROGRESS NOTE ADULT - ASSESSMENT
Physical Examination:  GENERAL:               Alert, Oriented, No acute distress.    HEENT:                   increased oral and tracheal secretions, + gargling of secretions, nares inflamed,   PULM:                    Bilateral air entry, transmitted upper airway sounds, + significant sputum production, No Rales, + Rhonchi, No Wheezing  CVS:                       S1, S2,  + Murmurs  ABD:                        Soft, nondistended, nontender, normoactive bowel sounds,   EXT:                       No edema, nontender, No Cyanosis or Clubbing   Vascular:                Warm Extremities, Normal Capillary refill, Normal Distal Pulses  NEURO:                  Alert,  follows commands  PSYC:                      Calm, + Insight.      85 year old male with h/o cva and dysphagia and h/o PEG, PPM, CKD, Psoriasis, recent hospitalization ofr UTI and right hydronephrosis who p/w dysphagia and ? Nee for PEG on 7/2/18  S/P EGD which showed no mechanical obstructive causes of dysphagia, but required to be intubated post procedure and was monitored on floor.  Extubated and transfered to medical floor  on 7/11/18 patient developed severe increase in secretions unable to manage airway and broguht to ICU for likely asp pna and pending respiratory failure. due to severe upper airway secretions.      Assessment  Pending respiratory failure due to upper airway obstruction due to airway secretions.   Dysphagia  Aspiration PNA  MELISSA on CKD cr baseline 1.2 -1.4  improving    Plan  Frequent suctioning.  Want to do CT head, but due to secretions at this time, high risk for transport eval for acute on chronic CVA.  monitor airway  continue atropine and robinol  check sputum culture  d/w GI - plan for peg in am, informed him high risk of requiring intubation  restart DVT ppx    Family Updated: 	Dtr/Wife	                                 Date:  7/11  Sedation & Analgesia:	on hold  Diet/Nutrition:		NPO pending peg  GI PPx:			PPI    DVT Ppx:		hep sq  	RISK                                                          Points  	[ ] Previous VTE                                           	3  	[ ] Thrombophilia                                        	2  	[ ] Lower limb paralysis                              	2   	[ ] Current Cancer                                       	2   	[ x] Immobilization > 24 hrs                        	1  	[ x] ICU/CCU stay > 24 hours                       	1  	[ x] Age > 60                                                   	1  		Total:[ 3]      Activity:		       Bedrest, active rom          Head of Bed:               35-45 deg  Glycemic Control:        Hypoglycemia noted, D5 NS    Lines:  CENTRAL LINE: 	[ ] YES [x ] NO	                    LOCATION:   	                       DATE INSERTED:   	                    REMOVE:  [ ] YES [ ] NO    A-LINE:  	                [ ] YES [ x] NO                      LOCATION:   	                       DATE INSERTED: 		            REMOVE:  [ ] YES [ ] NO    SOTO: 		        [ ] YES [x ] NO  		                                       DATE INSERTED:		            REMOVE:  [ ] YES [ ] NO        Disposition:  ICu monitoring    Goals of Care: full code, dw family if desire to less aggressive care. Physical Examination:  GENERAL:               Alert, verbal , No acute distress.    HEENT:                  mild/mod oral and tracheal secretions, no gross  gargling of secretions,   PULM:                    Bilateral air entry, transmitted upper airway sounds, No Rales, mild Rhonchi, No Wheezing  CVS:                       S1, S2,  + Murmurs  ABD:                        Soft, nondistended, nontender, normoactive bowel sounds,   EXT:                       No edema, nontender, No Cyanosis or Clubbing   Vascular:                Warm Extremities, Normal Capillary refill, Normal Distal Pulses  NEURO:                  Alert,  follows commands  PSYC:                      Calm, + Insight.      85 year old male with h/o cva and dysphagia and h/o PEG, PPM, CKD, Psoriasis, recent hospitalization for UTI and right hydronephrosis who p/w dysphagia and ? Nee for PEG on 7/2/18  S/P EGD which showed no mechanical obstructive causes of dysphagia, but required to be intubated post procedure and was monitored on floor.  Extubated and transferred to medical floor  on 7/11/18 patient developed severe increase in secretions unable to manage airway and brought to ICU for likely asp pna and pending respiratory failure. due to severe upper airway secretions.      Assessment  Pending respiratory failure due to upper airway obstruction due to airway secretions.   Dysphagia Pending peg  Aspiration PNA  MELISSA on CKD cr baseline 1.2 -1.4  improving     Plan  Frequent suctioning.  IVF   CT head after EGD and if secretions allow today  monitor airway - pulm toilet, chest PT  continue atropine and robinol  F/u sputum culture  EGD today  restart DVT ppx    Family Updated: 	Dtr/Wife	                                 Date:  7/11  Sedation & Analgesia:	on hold  Diet/Nutrition:		NPO pending peg  GI PPx:			PPI    DVT Ppx:		hep sq  	RISK                                                          Points  	[ ] Previous VTE                                           	3  	[ ] Thrombophilia                                        	2  	[ ] Lower limb paralysis                              	2   	[ ] Current Cancer                                       	2   	[ x] Immobilization > 24 hrs                        	1  	[ x] ICU/CCU stay > 24 hours                       	1  	[ x] Age > 60                                                   	1  		Total:[ 3]      Activity:		       Bedrest, active rom          Head of Bed:               35-45 deg  Glycemic Control:        Hypoglycemia noted, D5 NS    Lines:  CENTRAL LINE: 	[ ] YES [x ] NO	                    LOCATION:   	                       DATE INSERTED:   	                    REMOVE:  [ ] YES [ ] NO    A-LINE:  	                [ ] YES [ x] NO                      LOCATION:   	                       DATE INSERTED: 		            REMOVE:  [ ] YES [ ] NO    SOTO: 		        [ ] YES [x ] NO  		                                       DATE INSERTED:		            REMOVE:  [ ] YES [ ] NO        Disposition:  ICu monitoring    Goals of Care: full code

## 2018-07-12 NOTE — PROGRESS NOTE ADULT - ASSESSMENT
Physical Examination:  GENERAL:               Alert, verbal , No acute distress.    HEENT:           minimal oral and tracheal secretions, no gross  gargling of secretions,   PULM:                    Bilateral air entry, transmitted upper airway sounds, No Rales, mild Rhonchi, No Wheezing  CVS:                       S1, S2,  + Murmurs  ABD:                        Soft, nondistended, nontender, normoactive bowel sounds,   EXT:                       No edema, nontender, No Cyanosis or Clubbing   Vascular:                Warm Extremities, Normal Capillary refill, Normal Distal Pulses  NEURO:                  Alert,  follows commands  PSYC:                      Calm, + Insight.      85 year old male with h/o cva and dysphagia and h/o PEG, PPM, CKD, Psoriasis, recent hospitalization for UTI and right hydronephrosis who p/w dysphagia and ? Nee for PEG on 7/2/18  S/P EGD which showed no mechanical obstructive causes of dysphagia, but required to be intubated post procedure and was monitored on floor.  Extubated and transferred to medical floor  on 7/11/18 patient developed severe increase in secretions unable to manage airway and brought to ICU for likely asp pna and pending respiratory failure. due to severe upper airway secretions.      Assessment  Pending respiratory failure due to upper airway obstruction due to airway secretions.   Dysphagia Pending peg  Aspiration PNA  MELISSA on CKD cr baseline 1.2 -1.4  improving     Plan  Frequent suctioning.  IVF   CT head after EGD and if secretions allow today  monitor airway - pulm toilet, chest PT  continue atropine and robinol  F/u sputum culture  EGD today  restart DVT ppx        Goals of Care: full code .  reviewed with pts son and wife yesterday.  They are aware of poor prognosis if pt deteriorates and needs cpr, but still wants full cpr

## 2018-07-13 DIAGNOSIS — J98.8 OTHER SPECIFIED RESPIRATORY DISORDERS: ICD-10-CM

## 2018-07-13 LAB
-  AMIKACIN: SIGNIFICANT CHANGE UP
-  AMOXICILLIN/CLAVULANIC ACID: SIGNIFICANT CHANGE UP
-  AMPICILLIN/SULBACTAM: SIGNIFICANT CHANGE UP
-  AMPICILLIN: SIGNIFICANT CHANGE UP
-  AZTREONAM: SIGNIFICANT CHANGE UP
-  CEFAZOLIN: SIGNIFICANT CHANGE UP
-  CEFEPIME: SIGNIFICANT CHANGE UP
-  CEFOXITIN: SIGNIFICANT CHANGE UP
-  CEFTRIAXONE: SIGNIFICANT CHANGE UP
-  CIPROFLOXACIN: SIGNIFICANT CHANGE UP
-  ERTAPENEM: SIGNIFICANT CHANGE UP
-  GENTAMICIN: SIGNIFICANT CHANGE UP
-  IMIPENEM: SIGNIFICANT CHANGE UP
-  LEVOFLOXACIN: SIGNIFICANT CHANGE UP
-  MEROPENEM: SIGNIFICANT CHANGE UP
-  PIPERACILLIN/TAZOBACTAM: SIGNIFICANT CHANGE UP
-  TOBRAMYCIN: SIGNIFICANT CHANGE UP
-  TRIMETHOPRIM/SULFAMETHOXAZOLE: SIGNIFICANT CHANGE UP
ANION GAP SERPL CALC-SCNC: 9 MMOL/L — SIGNIFICANT CHANGE UP (ref 5–17)
BUN SERPL-MCNC: 37 MG/DL — HIGH (ref 7–23)
CALCIUM SERPL-MCNC: 8.6 MG/DL — SIGNIFICANT CHANGE UP (ref 8.4–10.5)
CHLORIDE SERPL-SCNC: 114 MMOL/L — HIGH (ref 96–108)
CO2 SERPL-SCNC: 24 MMOL/L — SIGNIFICANT CHANGE UP (ref 22–31)
CREAT SERPL-MCNC: 2.01 MG/DL — HIGH (ref 0.5–1.3)
GLUCOSE SERPL-MCNC: 130 MG/DL — HIGH (ref 70–99)
HCT VFR BLD CALC: 24.4 % — LOW (ref 39–50)
HGB BLD-MCNC: 7.8 G/DL — LOW (ref 13–17)
MAGNESIUM SERPL-MCNC: 1.9 MG/DL — SIGNIFICANT CHANGE UP (ref 1.6–2.6)
MCHC RBC-ENTMCNC: 29.7 PG — SIGNIFICANT CHANGE UP (ref 27–34)
MCHC RBC-ENTMCNC: 32.2 GM/DL — SIGNIFICANT CHANGE UP (ref 32–36)
MCV RBC AUTO: 92.3 FL — SIGNIFICANT CHANGE UP (ref 80–100)
METHOD TYPE: SIGNIFICANT CHANGE UP
PHOSPHATE SERPL-MCNC: 3.3 MG/DL — SIGNIFICANT CHANGE UP (ref 2.5–4.5)
PLATELET # BLD AUTO: 128 K/UL — LOW (ref 150–400)
POTASSIUM SERPL-MCNC: 3.8 MMOL/L — SIGNIFICANT CHANGE UP (ref 3.5–5.3)
POTASSIUM SERPL-SCNC: 3.8 MMOL/L — SIGNIFICANT CHANGE UP (ref 3.5–5.3)
RBC # BLD: 2.64 M/UL — LOW (ref 4.2–5.8)
RBC # FLD: 16.4 % — HIGH (ref 10.3–14.5)
SODIUM SERPL-SCNC: 147 MMOL/L — HIGH (ref 135–145)
WBC # BLD: 9.3 K/UL — SIGNIFICANT CHANGE UP (ref 3.8–10.5)
WBC # FLD AUTO: 9.3 K/UL — SIGNIFICANT CHANGE UP (ref 3.8–10.5)

## 2018-07-13 PROCEDURE — 99233 SBSQ HOSP IP/OBS HIGH 50: CPT

## 2018-07-13 PROCEDURE — 71045 X-RAY EXAM CHEST 1 VIEW: CPT | Mod: 26

## 2018-07-13 RX ORDER — FLUCONAZOLE 150 MG/1
200 TABLET ORAL ONCE
Qty: 0 | Refills: 0 | Status: COMPLETED | OUTPATIENT
Start: 2018-07-13 | End: 2018-07-13

## 2018-07-13 RX ORDER — LANOLIN ALCOHOL/MO/W.PET/CERES
3 CREAM (GRAM) TOPICAL ONCE
Qty: 0 | Refills: 0 | Status: COMPLETED | OUTPATIENT
Start: 2018-07-13 | End: 2018-07-13

## 2018-07-13 RX ORDER — DOCUSATE SODIUM 100 MG
100 CAPSULE ORAL DAILY
Qty: 0 | Refills: 0 | Status: DISCONTINUED | OUTPATIENT
Start: 2018-07-13 | End: 2018-07-15

## 2018-07-13 RX ORDER — LANOLIN ALCOHOL/MO/W.PET/CERES
5 CREAM (GRAM) TOPICAL AT BEDTIME
Qty: 0 | Refills: 0 | Status: DISCONTINUED | OUTPATIENT
Start: 2018-07-13 | End: 2018-07-13

## 2018-07-13 RX ORDER — POLYETHYLENE GLYCOL 3350 17 G/17G
17 POWDER, FOR SOLUTION ORAL ONCE
Qty: 0 | Refills: 0 | Status: COMPLETED | OUTPATIENT
Start: 2018-07-13 | End: 2018-07-13

## 2018-07-13 RX ORDER — LANOLIN ALCOHOL/MO/W.PET/CERES
5 CREAM (GRAM) TOPICAL ONCE
Qty: 0 | Refills: 0 | Status: DISCONTINUED | OUTPATIENT
Start: 2018-07-13 | End: 2018-07-13

## 2018-07-13 RX ORDER — ACETAMINOPHEN 500 MG
650 TABLET ORAL EVERY 6 HOURS
Qty: 0 | Refills: 0 | Status: DISCONTINUED | OUTPATIENT
Start: 2018-07-13 | End: 2018-07-13

## 2018-07-13 RX ORDER — ACETAMINOPHEN 500 MG
650 TABLET ORAL EVERY 6 HOURS
Qty: 0 | Refills: 0 | Status: DISCONTINUED | OUTPATIENT
Start: 2018-07-13 | End: 2018-07-19

## 2018-07-13 RX ORDER — LANOLIN ALCOHOL/MO/W.PET/CERES
3 CREAM (GRAM) TOPICAL AT BEDTIME
Qty: 0 | Refills: 0 | Status: DISCONTINUED | OUTPATIENT
Start: 2018-07-13 | End: 2018-07-19

## 2018-07-13 RX ORDER — FLUCONAZOLE 150 MG/1
100 TABLET ORAL DAILY
Qty: 0 | Refills: 0 | Status: DISCONTINUED | OUTPATIENT
Start: 2018-07-14 | End: 2018-07-19

## 2018-07-13 RX ADMIN — Medication 3 MILLIGRAM(S): at 21:46

## 2018-07-13 RX ADMIN — ROBINUL 1 MILLIGRAM(S): 0.2 INJECTION INTRAMUSCULAR; INTRAVENOUS at 18:06

## 2018-07-13 RX ADMIN — Medication 3 MILLIGRAM(S): at 02:21

## 2018-07-13 RX ADMIN — Medication 2 DROP(S): at 05:48

## 2018-07-13 RX ADMIN — Medication 3 MILLILITER(S): at 21:35

## 2018-07-13 RX ADMIN — ROBINUL 1 MILLIGRAM(S): 0.2 INJECTION INTRAMUSCULAR; INTRAVENOUS at 05:49

## 2018-07-13 RX ADMIN — POLYETHYLENE GLYCOL 3350 17 GRAM(S): 17 POWDER, FOR SOLUTION ORAL at 06:05

## 2018-07-13 RX ADMIN — Medication 650 MILLIGRAM(S): at 18:06

## 2018-07-13 RX ADMIN — Medication 650 MILLIGRAM(S): at 11:52

## 2018-07-13 RX ADMIN — Medication 100 MILLIGRAM(S): at 06:06

## 2018-07-13 RX ADMIN — CHLORHEXIDINE GLUCONATE 15 MILLILITER(S): 213 SOLUTION TOPICAL at 05:48

## 2018-07-13 RX ADMIN — HEPARIN SODIUM 5000 UNIT(S): 5000 INJECTION INTRAVENOUS; SUBCUTANEOUS at 14:04

## 2018-07-13 RX ADMIN — Medication 650 MILLIGRAM(S): at 12:50

## 2018-07-13 RX ADMIN — Medication 2 DROP(S): at 11:52

## 2018-07-13 RX ADMIN — Medication 2 DROP(S): at 18:06

## 2018-07-13 RX ADMIN — Medication 3 MILLILITER(S): at 10:01

## 2018-07-13 RX ADMIN — FAMOTIDINE 20 MILLIGRAM(S): 10 INJECTION INTRAVENOUS at 11:53

## 2018-07-13 RX ADMIN — Medication 2 DROP(S): at 23:58

## 2018-07-13 RX ADMIN — HEPARIN SODIUM 5000 UNIT(S): 5000 INJECTION INTRAVENOUS; SUBCUTANEOUS at 05:48

## 2018-07-13 RX ADMIN — PIPERACILLIN AND TAZOBACTAM 25 GRAM(S): 4; .5 INJECTION, POWDER, LYOPHILIZED, FOR SOLUTION INTRAVENOUS at 05:49

## 2018-07-13 RX ADMIN — FLUCONAZOLE 200 MILLIGRAM(S): 150 TABLET ORAL at 16:44

## 2018-07-13 RX ADMIN — Medication 3 MILLILITER(S): at 03:51

## 2018-07-13 RX ADMIN — PIPERACILLIN AND TAZOBACTAM 25 GRAM(S): 4; .5 INJECTION, POWDER, LYOPHILIZED, FOR SOLUTION INTRAVENOUS at 21:48

## 2018-07-13 RX ADMIN — Medication 2 DROP(S): at 00:12

## 2018-07-13 RX ADMIN — CHLORHEXIDINE GLUCONATE 15 MILLILITER(S): 213 SOLUTION TOPICAL at 18:06

## 2018-07-13 RX ADMIN — Medication 10 MILLIGRAM(S): at 08:26

## 2018-07-13 RX ADMIN — HEPARIN SODIUM 5000 UNIT(S): 5000 INJECTION INTRAVENOUS; SUBCUTANEOUS at 21:45

## 2018-07-13 RX ADMIN — Medication 3 MILLILITER(S): at 15:09

## 2018-07-13 RX ADMIN — Medication 650 MILLIGRAM(S): at 19:27

## 2018-07-13 RX ADMIN — PIPERACILLIN AND TAZOBACTAM 25 GRAM(S): 4; .5 INJECTION, POWDER, LYOPHILIZED, FOR SOLUTION INTRAVENOUS at 15:53

## 2018-07-13 RX ADMIN — LATANOPROST 1 DROP(S): 0.05 SOLUTION/ DROPS OPHTHALMIC; TOPICAL at 21:47

## 2018-07-13 NOTE — PROGRESS NOTE ADULT - SUBJECTIVE AND OBJECTIVE BOX
Patient is a 85y old  Male who presents with a chief complaint of Cough, unable to swallow (2018 18:17)    HPI:  Pt is a 84 y o M sent from [facility] for cough, unable to swallow, wife requested evaluation. Wife states that pt has had feeding tubes in the past and then advanced to pureed diet but now is unable to swallow, pt states it gets stuck in throat, thinks he needs a scope, was seen by GI in [place]. Denies f/c, has some nausea. (2018 18:17)    history of prostate cancer s/p radiation. recurrent UTIs, underwent right ureteral stent placement May 2018 for right hydronephrosis    Interval Events:  Patient seen and examined at bedside.    MEDICATIONS:  MEDICATIONS  (STANDING):  ALBUTerol/ipratropium for Nebulization 3 milliLiter(s) Nebulizer every 6 hours  atropine 1% Ophthalmic Solution for SubLingual Use 2 Drop(s) SubLingual four times a day  chlorhexidine 0.12% Liquid 15 milliLiter(s) Swish and Spit two times a day  dextrose 5% + sodium chloride 0.9%. 1000 milliLiter(s) (100 mL/Hr) IV Continuous <Continuous>  docusate sodium 100 milliGRAM(s) Oral daily  famotidine    Tablet 20 milliGRAM(s) Oral daily  glycopyrrolate 1 milliGRAM(s) Oral two times a day  heparin  Injectable 5000 Unit(s) SubCutaneous every 8 hours  latanoprost 0.005% Ophthalmic Solution 1 Drop(s) Both EYES at bedtime  melatonin 3 milliGRAM(s) Oral at bedtime  piperacillin/tazobactam IVPB. 3.375 Gram(s) IV Intermittent every 8 hours    MEDICATIONS  (PRN):  acetaminophen  Suppository. 650 milliGRAM(s) Rectal every 6 hours PRN Moderate Pain (4 - 6)  bisacodyl Suppository 10 milliGRAM(s) Rectal daily PRN Constipation      Allergies    No Known Allergies    Intolerances        Vital Signs Last 24 Hrs  T(C): 36.3 (2018 05:00), Max: 36.4 (2018 13:00)  T(F): 97.3 (2018 05:00), Max: 97.5 (2018 13:00)  HR: 60 (2018 08:00) (55 - 67)  BP: 96/52 (2018 08:00) (90/50 - 126/67)  BP(mean): 59 (2018 08:00) (59 - 81)  RR: 18 (2018 08:00) (12 - 21)  SpO2: 97% (2018 08:00) (96% - 100%)      I&O's Detail    2018 07:01  -  2018 07:00  --------------------------------------------------------  IN:    dextrose 5% + sodium chloride 0.9%.: 900 mL    Solution: 225 mL    Solution: 250 mL  Total IN: 1375 mL    OUT:  Total OUT: 0 mL    Total NET: 1375 mL      2018 07:01  -  2018 07:00  --------------------------------------------------------  IN:    0.9% NaCl: 500 mL    dextrose 5% + sodium chloride 0.9%.: 1975 mL    Oral Fluid: 120 mL    Solution: 225 mL  Total IN: 2820 mL    OUT:  Total OUT: 0 mL    Total NET: 2820 mL              LABS:      CBC Full  -  ( 2018 06:15 )  WBC Count : 9.3 K/uL  Hemoglobin : 7.8 g/dL  Hematocrit : 24.4 %  Platelet Count - Automated : 128 K/uL  Mean Cell Volume : 92.3 fl  Mean Cell Hemoglobin : 29.7 pg  Mean Cell Hemoglobin Concentration : 32.2 gm/dL  Auto Neutrophil # : x  Auto Lymphocyte # : x  Auto Monocyte # : x  Auto Eosinophil # : x  Auto Basophil # : x  Auto Neutrophil % : x  Auto Lymphocyte % : x  Auto Monocyte % : x  Auto Eosinophil % : x  Auto Basophil % : x        147<H>  |  114<H>  |  37<H>  ----------------------------<  130<H>  3.8   |  24  |  2.01<H>    Ca    8.6      2018 06:15  Phos  3.3     07-13  Mg     1.9     07-13          Urinalysis Basic - ( 2018 10:06 )    Color: Yellow / Appearance: Clear / S.020 / pH: x  Gluc: x / Ketone: Negative  / Bili: Negative / Urobili: Negative   Blood: x / Protein: 30 mg/dL / Nitrite: Negative   Leuk Esterase: Small / RBC: 5-10 /HPF / WBC 0-2 /HPF   Sq Epi: x / Non Sq Epi: Neg.-Few / Bacteria: Many /HPF              Physical Exam    Constitutional: alert c/o lower abdominal discomfort    Abdomen: soft, mild SP tenderness, no rebound    Genitourinary: penis/testes benign

## 2018-07-13 NOTE — PROGRESS NOTE ADULT - SUBJECTIVE AND OBJECTIVE BOX
Follow-up Critical Care Progress Note  Chief Complaint : Dysphagia    pt alert, verbal, confused intermittently   continues to have oral secretions  refusing suctioning now         Allergies :No Known Allergies      PAST MEDICAL & SURGICAL HISTORY:  Psoriasis  Prostate cancer  GERD (gastroesophageal reflux disease)  Dyslipidemia  No significant past surgical history      Medications:  MEDICATIONS  (STANDING):  ALBUTerol/ipratropium for Nebulization 3 milliLiter(s) Nebulizer every 6 hours  atropine 1% Ophthalmic Solution for SubLingual Use 2 Drop(s) SubLingual four times a day  chlorhexidine 0.12% Liquid 15 milliLiter(s) Swish and Spit two times a day  docusate sodium 100 milliGRAM(s) Oral daily  famotidine    Tablet 20 milliGRAM(s) Oral daily  glycopyrrolate 1 milliGRAM(s) Oral two times a day  heparin  Injectable 5000 Unit(s) SubCutaneous every 8 hours  latanoprost 0.005% Ophthalmic Solution 1 Drop(s) Both EYES at bedtime  melatonin 3 milliGRAM(s) Oral at bedtime  piperacillin/tazobactam IVPB. 3.375 Gram(s) IV Intermittent every 8 hours    MEDICATIONS  (PRN):  acetaminophen  Suppository. 650 milliGRAM(s) Rectal every 6 hours PRN Moderate Pain (4 - 6)  bisacodyl Suppository 10 milliGRAM(s) Rectal daily PRN Constipation      LABS:                        7.8    9.3   )-----------( 128      ( 2018 06:15 )             24.4     07-13    147<H>  |  114<H>  |  37<H>  ----------------------------<  130<H>  3.8   |  24  |  2.01<H>    Ca    8.6      2018 06:15  Phos  3.3     07-13  Mg     1.9     07-13      HIT ab Negative 07-07 @ 15:55  HIT ab EIA 0.292    Urinalysis Basic - ( 2018 10:06 )    Color: Yellow / Appearance: Clear / S.020 / pH: x  Gluc: x / Ketone: Negative  / Bili: Negative / Urobili: Negative   Blood: x / Protein: 30 mg/dL / Nitrite: Negative   Leuk Esterase: Small / RBC: 5-10 /HPF / WBC 0-2 /HPF   Sq Epi: x / Non Sq Epi: Neg.-Few / Bacteria: Many /HPF              CULTURES: (if applicable)    Culture - Sputum (collected 18 @ 09:15)  Source: .Sputum Sputum  Gram Stain (18 @ 14:46):    No squamous epithelial cells per low power field    Numerous polymorphonuclear leukocytes per low power field    Numerous Gram positive cocci in pairs per oil power field  Preliminary Report (18 @ 10:39):    Moderate Klebsiella pneumoniae Susceptibility to follow.    Numerous Streptococcus agalactiae (Group B) isolated    Group B streptococci are susceptible to ampicillin,    penicillin and cefazolin, but may be resistant to    erythromycin and clindamycin.    Recommendations for intrapartum prophylaxis for Group B    streptococci are penicillin or ampicillin.    Culture - Blood (collected 18 @ 08:40)  Source: .Blood Blood  Preliminary Report (18 @ 13:01):    No growth to date.    Culture - Blood (collected 18 @ 08:15)  Source: .Blood Blood-Peripheral  Preliminary Report (18 @ 12:01):    No growth to date.    Culture - Blood (collected 18 @ 23:20)  Source: .Blood Blood-Peripheral  Final Report (18 @ 09:00):    No growth at 5 days.    Culture - Blood (collected 18 @ 23:20)  Source: .Blood Blood-Peripheral  Final Report (18 @ 09:00):    No growth at 5 days.    Culture - Sputum (collected 18 @ 21:20)  Source: .Sputum Sputum - trap  Gram Stain (18 @ 11:52):    Numerous polymorphonuclear leukocytes per low power field    Rare Squamous epithelial cells per low power field    Rare Gram Negative Rods per oil power field    Rare Gram positive cocci in pairs per oil power field  Final Report (18 @ 08:12):    Normal Respiratory Renetta present    Culture - Urine (collected 18 @ 21:20)  Source: .Urine Clean Catch (Midstream)  Final Report (18 @ 11:43):    No growth            CAPILLARY BLOOD GLUCOSE      POCT Blood Glucose.: 102 mg/dL (2018 10:42)      RADIOLOGY  CXR:      CT:    ECHO:      VITALS:  T(C): 36.3 (18 @ 05:00), Max: 36.4 (18 @ 13:00)  T(F): 97.3 (18 @ 05:00), Max: 97.5 (18 @ 13:00)  HR: 60 (18 @ 08:00) (55 - 67)  BP: 96/52 (18 @ 08:00) (90/50 - 126/67)  BP(mean): 59 (18 @ 08:00) (59 - 81)  ABP: --  ABP(mean): --  RR: 18 (18 @ 08:00) (12 - 21)  SpO2: 97% (18 @ 08:00) (96% - 100%)  CVP(mm Hg): --  CVP(cm H2O): --    Ins and Outs     18 @ 07:01  -  18 @ 07:00  --------------------------------------------------------  IN: 2820 mL / OUT: 0 mL / NET: 2820 mL                    Physical Examination:  GENERAL:               Alert, Oriented, No acute distress.    HEENT:                    Pupils equal, reactive to light.  Symmetric. No JVD, Moist MM  PULM:                     Bilateral air entry, Clear to auscultation bilaterally, no significant sputum production, No Rales, No Rhonchi, No Wheezing  CVS:                         S1, S2,  No Murmurs  ABD:                        Soft, nondistended, nontender, normoactive bowel sounds,   EXT:                         No edema, nontender, No Cyanosis or Clubbing   Vascular:                Warm Extremities, Normal Capillary refill, Normal Distal Pulses  SKIN:                       Warm and well perfused, no rashes noted.   NEURO:                  Alert, oriented, interactive, nonfocal, follows commands  PSYC:                      Calm, + Insight. Follow-up Critical Care Progress Note  Chief Complaint : Dysphagia    pt alert, verbal, confused intermittently   continues to have oral secretions  refusing suctioning now         Allergies :No Known Allergies      PAST MEDICAL & SURGICAL HISTORY:  Psoriasis  Prostate cancer  GERD (gastroesophageal reflux disease)  Dyslipidemia  No significant past surgical history      Medications:  MEDICATIONS  (STANDING):  ALBUTerol/ipratropium for Nebulization 3 milliLiter(s) Nebulizer every 6 hours  atropine 1% Ophthalmic Solution for SubLingual Use 2 Drop(s) SubLingual four times a day  chlorhexidine 0.12% Liquid 15 milliLiter(s) Swish and Spit two times a day  docusate sodium 100 milliGRAM(s) Oral daily  famotidine    Tablet 20 milliGRAM(s) Oral daily  glycopyrrolate 1 milliGRAM(s) Oral two times a day  heparin  Injectable 5000 Unit(s) SubCutaneous every 8 hours  latanoprost 0.005% Ophthalmic Solution 1 Drop(s) Both EYES at bedtime  melatonin 3 milliGRAM(s) Oral at bedtime  piperacillin/tazobactam IVPB. 3.375 Gram(s) IV Intermittent every 8 hours    MEDICATIONS  (PRN):  acetaminophen  Suppository. 650 milliGRAM(s) Rectal every 6 hours PRN Moderate Pain (4 - 6)  bisacodyl Suppository 10 milliGRAM(s) Rectal daily PRN Constipation      LABS:                        7.8    9.3   )-----------( 128      ( 2018 06:15 )             24.4     -    147<H>  |  114<H>  |  37<H>  ----------------------------<  130<H>  3.8   |  24  |  2.01<H>    Ca    8.6      2018 06:15  Phos  3.3     -  Mg     1.9     18 @ 06:15  BUN/CR -  37<H> / 2.01<H>  18 @ 05:00  BUN/CR -  35<H> / 2.17<H>  18 @ 07:30  BUN/CR -  28<H> / 1.51<H>    HIT ab Negative  @ 15:55  HIT ab EIA 0.292    Urinalysis Basic - ( 2018 10:06 )    Color: Yellow / Appearance: Clear / S.020 / pH: x  Gluc: x / Ketone: Negative  / Bili: Negative / Urobili: Negative   Blood: x / Protein: 30 mg/dL / Nitrite: Negative   Leuk Esterase: Small / RBC: 5-10 /HPF / WBC 0-2 /HPF   Sq Epi: x / Non Sq Epi: Neg.-Few / Bacteria: Many /HPF              CULTURES: (if applicable)    Culture - Sputum (collected 18 @ 09:15)  Source: .Sputum Sputum  Gram Stain (18 @ 14:46):    No squamous epithelial cells per low power field    Numerous polymorphonuclear leukocytes per low power field    Numerous Gram positive cocci in pairs per oil power field  Preliminary Report (18 @ 10:39):    Moderate Klebsiella pneumoniae Susceptibility to follow.    Numerous Streptococcus agalactiae (Group B) isolated    Group B streptococci are susceptible to ampicillin,    penicillin and cefazolin, but may be resistant to    erythromycin and clindamycin.    Recommendations for intrapartum prophylaxis for Group B    streptococci are penicillin or ampicillin.    Culture - Blood (collected 18 @ 08:40)  Source: .Blood Blood  Preliminary Report (18 @ 13:01):    No growth to date.    Culture - Blood (collected 18 @ 08:15)  Source: .Blood Blood-Peripheral  Preliminary Report (18 @ 12:01):    No growth to date.    Culture - Blood (collected 18 @ 23:20)  Source: .Blood Blood-Peripheral  Final Report (18 @ 09:00):    No growth at 5 days.    Culture - Blood (collected 18 @ 23:20)  Source: .Blood Blood-Peripheral  Final Report (18 @ 09:00):    No growth at 5 days.    Culture - Sputum (collected 18 @ 21:20)  Source: .Sputum Sputum - trap  Gram Stain (18 @ 11:52):    Numerous polymorphonuclear leukocytes per low power field    Rare Squamous epithelial cells per low power field    Rare Gram Negative Rods per oil power field    Rare Gram positive cocci in pairs per oil power field  Final Report (18 @ 08:12):    Normal Respiratory Renetta present    Culture - Urine (collected 18 @ 21:20)  Source: .Urine Clean Catch (Midstream)  Final Report (18 @ 11:43):    No growth            CAPILLARY BLOOD GLUCOSE      POCT Blood Glucose.: 102 mg/dL (2018 10:42)      RADIOLOGY  CXR:   cxr - No significant interval change with persistence of right-sided infiltrates. .    < from: CT Head No Cont (18 @ 16:34) >  IMPRESSION:       No parenchymal contusion, hemorrhage or extra-axial collection.    Chronic small vessel ischemic changes.    If symptoms persist, and additional imaging evaluation is needed,   follow-up MRI is suggested.    < end of copied text >    VITALS:  T(C): 36.3 (18 @ 05:00), Max: 36.4 (18 @ 13:00)  T(F): 97.3 (18 @ 05:00), Max: 97.5 (18 13:00)  HR: 60 (18 @ 08:00) (55 - 67)  BP: 96/52 (18 @ 08:00) (90/50 - 126/67)  BP(mean): 59 (18 @ 08:00) (59 - 81)  ABP: --  ABP(mean): --  RR: 18 (18 @ 08:00) (12 - 21)  SpO2: 97% (18 @ 08:00) (96% - 100%)  CVP(mm Hg): --  CVP(cm H2O): --    Ins and Outs     18 @ 07:01  -  18 @ 07:00  --------------------------------------------------------  IN: 2820 mL / OUT: 0 mL / NET: 2820 mL

## 2018-07-13 NOTE — PROGRESS NOTE ADULT - PROBLEM SELECTOR PLAN 3
s/p radiation with OVI -- will check bladder scan for incontinence with lower abdominal discomfort ...?consipation

## 2018-07-13 NOTE — PROGRESS NOTE ADULT - PROBLEM SELECTOR PLAN 1
1. Continue gastrostomy tube feedings to goal as recommended by nutrition  2. Water flushes as per protocol

## 2018-07-13 NOTE — PROGRESS NOTE ADULT - PROBLEM SELECTOR PLAN 3
MELISSA on CKD. Likely secondary to dehydration. Will give free water through PEG as patient has a 2.5L free water deficit. Trend BUN/Cr. Monitor lytes- stable. Monitor UOP. Avoid nephrotoxic agents.

## 2018-07-13 NOTE — PROGRESS NOTE ADULT - SUBJECTIVE AND OBJECTIVE BOX
NEPHROLOGY PROGRESS NOTE    Seen in ICU    EXAM:  T(F): 97.6 (07-13-18 @ 09:00)  HR: 60 (07-13-18 @ 13:00)  BP: 96/55 (07-13-18 @ 13:00)  RR: 17 (07-13-18 @ 13:00)  SpO2: 94% (07-13-18 @ 13:00)    Lungs - decr BS  bilaterally  Heart - S1S2  Abd - soft, NT,  ND, + BS  Extr - sm peripheral edema         LABS                             7.8    9.3   )-----------( 128      ( 13 Jul 2018 06:15 )             24.4          07-13    147<H>  |  114<H>  |  37<H>  ----------------------------<  130<H>  3.8   |  24  |  2.01<H>    Ca    8.6      13 Jul 2018 06:15  Phos  3.3     07-13  Mg     1.9     07-13    acetaminophen   Tablet. 650 milliGRAM(s) Oral every 6 hours PRN  ALBUTerol/ipratropium for Nebulization 3 milliLiter(s) Nebulizer every 6 hours  atropine 1% Ophthalmic Solution for SubLingual Use 2 Drop(s) SubLingual four times a day  bisacodyl Suppository 10 milliGRAM(s) Rectal daily PRN  chlorhexidine 0.12% Liquid 15 milliLiter(s) Swish and Spit two times a day  docusate sodium 100 milliGRAM(s) Oral daily  famotidine    Tablet 20 milliGRAM(s) Oral daily  fluconAZOLE   Tablet 200 milliGRAM(s) Oral once  glycopyrrolate 1 milliGRAM(s) Oral two times a day  heparin  Injectable 5000 Unit(s) SubCutaneous every 8 hours  latanoprost 0.005% Ophthalmic Solution 1 Drop(s) Both EYES at bedtime  melatonin 3 milliGRAM(s) Oral at bedtime  piperacillin/tazobactam IVPB. 3.375 Gram(s) IV Intermittent every 8 hours       A/P:    Asp PNA, recurrent  NPO, PEG feeds  Hemodynamic MELISSA on CKD III, improving  Check PVR (hx of retention), st cath as needed  Avoid hypotension and nephrotoxins  CBC, BMP in am

## 2018-07-13 NOTE — PROGRESS NOTE ADULT - SUBJECTIVE AND OBJECTIVE BOX
Patient appears comfortable.  No incidents after PEG placement yesterday.  Gastrostomy tube feeds with Nepro @ 30cc/hr started and is tolerating.    MEDICATIONS  (STANDING):  ALBUTerol/ipratropium for Nebulization 3 milliLiter(s) Nebulizer every 6 hours  atropine 1% Ophthalmic Solution for SubLingual Use 2 Drop(s) SubLingual four times a day  chlorhexidine 0.12% Liquid 15 milliLiter(s) Swish and Spit two times a day  docusate sodium 100 milliGRAM(s) Oral daily  famotidine    Tablet 20 milliGRAM(s) Oral daily  glycopyrrolate 1 milliGRAM(s) Oral two times a day  heparin  Injectable 5000 Unit(s) SubCutaneous every 8 hours  latanoprost 0.005% Ophthalmic Solution 1 Drop(s) Both EYES at bedtime  melatonin 3 milliGRAM(s) Oral at bedtime  piperacillin/tazobactam IVPB. 3.375 Gram(s) IV Intermittent every 8 hours    MEDICATIONS  (PRN):  acetaminophen   Tablet. 650 milliGRAM(s) Oral every 6 hours PRN Mild Pain (1 - 3)  bisacodyl Suppository 10 milliGRAM(s) Rectal daily PRN Constipation    No Known Allergies    PHYSICAL EXAM:   Vital Signs:  Vital Signs Last 24 Hrs  T(C): 36.4 (2018 09:00), Max: 36.4 (2018 09:00)  T(F): 97.6 (2018 09:00), Max: 97.6 (2018 09:00)  HR: 60 (:00) (55 - 67)  BP: 96/55 (:00) (90/50 - 126/67)  BP(mean): 65 (:00) (59 - 81)  RR: 17 (2018 13:00) (12 - 21)  SpO2: 94% (:00) (94% - 100%)  Daily     Daily Weight in k.9 (2018 05:00)I&O's Summary    2018 07:01  -  2018 07:00  --------------------------------------------------------  IN: 2845 mL / OUT: 0 mL / NET: 2845 mL    2018 07:01  -  2018 14:49  --------------------------------------------------------  IN: 110 mL / OUT: 0 mL / NET: 110 mL        GENERAL:  Appears stated age, well-groomed, well-nourished, no distress  HEENT:  NC/AT,  conjunctivae clear and pink, no thyromegaly, nodules, adenopathy, no JVD, sclera -anicteric  CHEST:  Full & symmetric excursion, no increased effort, breath sounds clear  HEART:  Regular rhythm, S1, S2, no murmur/rub/S3/S4, no abdominal bruit, no edema  ABDOMEN:  Soft, non-tender, non-distended, normoactive bowel sounds,  no masses ,no hepato-splenomegaly, no signs of chronic liver disease  EXTEREMITIES:  no cyanosis,clubbing or edema  SKIN:  No rash/erythema/ecchymoses/petechiae/wounds/abscess/warm/dry  NEURO:  Alert, oriented, no asterixis, no tremor, no encephalopathy      LABS:                        7.8    9.3   )-----------( 128      ( 2018 06:15 )             24.4     07-13    147<H>  |  114<H>  |  37<H>  ----------------------------<  130<H>  3.8   |  24  |  2.01<H>    Ca    8.6      2018 06:15  Phos  3.3     07-13  Mg     1.9     07-13

## 2018-07-13 NOTE — PROGRESS NOTE ADULT - ASSESSMENT
Physical Examination:  GENERAL:               Alert, verbal , No acute distress.    HEENT:           minimal oral and tracheal secretions, no gross  gargling of secretions,   PULM:                    Bilateral air entry, transmitted upper airway sounds, No Rales, mild Rhonchi, No Wheezing  CVS:                       S1, S2,  + Murmurs  ABD:                        Soft, nondistended, nontender, normoactive bowel sounds,   EXT:                       No edema, nontender, No Cyanosis or Clubbing   Vascular:                Warm Extremities, Normal Capillary refill, Normal Distal Pulses  NEURO:                  Alert,  follows commands  PSYC:                      Calm, + Insight.      85 year old male with h/o cva and dysphagia and h/o PEG, PPM, CKD, Psoriasis, recent hospitalization for UTI and right hydronephrosis who p/w dysphagia and ? Nee for PEG on 7/2/18  S/P EGD which showed no mechanical obstructive causes of dysphagia, but required to be intubated post procedure and was monitored on floor.  Extubated and transferred to medical floor  on 7/11/18 patient developed severe increase in secretions unable to manage airway and brought to ICU for likely asp pna and pending respiratory failure. due to severe upper airway secretions.      Assessment  Pending respiratory failure due to upper airway obstruction due to airway secretions.   Dysphagia Pending peg  Aspiration PNA  MELISSA on CKD cr baseline 1.2 -1.4  improving     Plan  Frequent suctioning.  IVF   CT head after EGD and if secretions allow today  monitor airway - pulm toilet, chest PT  continue atropine and robinol  F/u sputum culture  EGD today  restart DVT ppx        Goals of Care: full code .  reviewed with pts son and wife yesterday.  They are aware of poor prognosis if pt deteriorates and needs cpr, but still wants full cpr. No further changes

## 2018-07-13 NOTE — PROGRESS NOTE ADULT - ASSESSMENT
86 y/o M with a h/o CVA (s/p PEG and removal of PEG), PPM, CKD, Psoriasis, with airway compromise secondary to uncontrolled secretions, aspiration pneumonia, MELISSA on CKD.

## 2018-07-13 NOTE — PROGRESS NOTE ADULT - PROBLEM SELECTOR PLAN 2
Continue antibiotic therapy with Zosyn. Sputum culture has grown klebsiella pneumoniae. Chest PT. Aspiration precautions.

## 2018-07-13 NOTE — CHART NOTE - NSCHARTNOTEFT_GEN_A_CORE
NUTRITION FOLLOW UP    SOURCE: RN & medical record    DIET: NPO    Patient s/p PEG placement , currently NPO. Tube feeds to be started thius morning Suggest Nepro @ 30cc/hr advance to 65cc/hhr x 18hrs which will provide 2106 calories, 95 gms protein.     CURRENT WEIGHT:  -(7/13) 229/103.9kg no edema noted. Skin is intact.  No BM noted since PTA ,colace noted . PA/RN aware    PERTINENT MEDS:   Pertinent Medications: MEDICATIONS  (STANDING):  ALBUTerol/ipratropium for Nebulization 3 milliLiter(s) Nebulizer every 6 hours  atropine 1% Ophthalmic Solution for SubLingual Use 2 Drop(s) SubLingual four times a day  chlorhexidine 0.12% Liquid 15 milliLiter(s) Swish and Spit two times a day  dextrose 5% + sodium chloride 0.9%. 1000 milliLiter(s) (100 mL/Hr) IV Continuous <Continuous>  docusate sodium 100 milliGRAM(s) Oral daily  famotidine    Tablet 20 milliGRAM(s) Oral daily  glycopyrrolate 1 milliGRAM(s) Oral two times a day  heparin  Injectable 5000 Unit(s) SubCutaneous every 8 hours  latanoprost 0.005% Ophthalmic Solution 1 Drop(s) Both EYES at bedtime  melatonin 3 milliGRAM(s) Oral at bedtime  piperacillin/tazobactam IVPB. 3.375 Gram(s) IV Intermittent every 8 hours    MEDICATIONS  (PRN):  bisacodyl Suppository 10 milliGRAM(s) Rectal daily PRN Constipation      PERTINENT LABS:  Date: 13 Jul 2018 06:15  Hemoglobin 7.8    Hematocrit 24.4     Date: 07-13  Sodium 147<H>  Potassium 3.8  Glucose Serum 130<H>  BUN 37<H>      Creatinine    ACCUCHECK  POCT Blood Glucose.: 102 mg/dL (12 Jul 2018 10:42)      SKIN: surgical wound healing    ESTIMATED NEEDS:   [X] no change since previous assessment  [ ] recalculated:     PREVIOUS NUTRITION DIAGNOSIS: addressed    NUTRITION DIAGNOSIS is   [X] resolved, RD will follow as per nutrition department protocol.    NEW NUTRITION DIAGNOSIS: [X] not applicable    MONITORING AND EVALUATION:   Current diet order is appropriate and is well tolerated, but will monitor for any changes that may be needed    [X] PO intake [X] Tolerance to diet prescription [X] weights [X] follow up per protocol    NUTRITION RECOMMENDATIONS: NUTRITION FOLLOW UP    SOURCE: RN & medical record    DIET: NPO    Patient s/p PEG placement , currently NPO. Tube feeds to be started this morning Suggest Nepro @ 30cc/hr advance to 65cc/hhr x 18hrs which will provide 2106 calories, 95 gms protein which will meet patients assessed nutritional needs. Labs noted(7/13), renal consult reviewed. Nepro formula provided secondary to same. Suggest providing free fluid Na(H).     CURRENT WEIGHT:  -(7/13) 229/103.9kg no edema noted. Skin is intact.  No BM noted since PTA ,colace noted . PA/RN aware    PERTINENT MEDS:   Pertinent Medications: MEDICATIONS  (STANDING):  ALBUTerol/ipratropium for Nebulization 3 milliLiter(s) Nebulizer every 6 hours  atropine 1% Ophthalmic Solution for SubLingual Use 2 Drop(s) SubLingual four times a day  chlorhexidine 0.12% Liquid 15 milliLiter(s) Swish and Spit two times a day  dextrose 5% + sodium chloride 0.9%. 1000 milliLiter(s) (100 mL/Hr) IV Continuous <Continuous>  docusate sodium 100 milliGRAM(s) Oral daily  famotidine    Tablet 20 milliGRAM(s) Oral daily  glycopyrrolate 1 milliGRAM(s) Oral two times a day  heparin  Injectable 5000 Unit(s) SubCutaneous every 8 hours  latanoprost 0.005% Ophthalmic Solution 1 Drop(s) Both EYES at bedtime  melatonin 3 milliGRAM(s) Oral at bedtime  piperacillin/tazobactam IVPB. 3.375 Gram(s) IV Intermittent every 8 hours    MEDICATIONS  (PRN):  bisacodyl Suppository 10 milliGRAM(s) Rectal daily PRN Constipation      PERTINENT LABS:  Date: 13 Jul 2018 06:15  Hemoglobin 7.8 (L)   Hematocrit 24.4(L)     Date: 07-13  Sodium 147<H>  Potassium 3.8  Glucose Serum 130<H>  BUN 37<H>  Creatinine 2.01(H)    ACCUCHECK  POCT Blood Glucose.: 102 mg/dL (12 Jul 2018 10:42)          ESTIMATED NEEDS:   [X] no change since previous assessment      PREVIOUS NUTRITION DIAGNOSIS: addressed    NUTRITION DIAGNOSIS is   [X] on going, RD will follow as per nutrition department protocol.    MONITORING AND EVALUATION:   Will monitor tolerance to PEG feeds, labs weights .Will foolw clinical course

## 2018-07-13 NOTE — PROGRESS NOTE ADULT - PROBLEM SELECTOR PLAN 1
Appears to have improved somewhat with decreased frequency of suctioning. Continue robinul and atropine, consider scopolamine patch for the long term. Supplemental O2, titrating to maintain SaO2 > 92%. PEG inserted yesterday. Ultimately, his prognosis is poor and he is at high risk for recurrent aspiration events and respiratory failure. Palliative care is on board and patient remains a full code.

## 2018-07-13 NOTE — PROGRESS NOTE ADULT - SUBJECTIVE AND OBJECTIVE BOX
Patient is a 85y old  Male who presents with a chief complaint of Cough, unable to swallow (02 Jul 2018 18:17)      BRIEF HOSPITAL COURSE: 84 y/o M with a h/o CVA (s/p PEG and removal of PEG), PPM, CKD, Psoriasis, recent hospitalization for UTI and right hydronephrosis who was sent from [facility] and admitted on 7/2 for dysphagia. He was scoped by GI which showed no mechanical obstructive causes of dysphagia, but required to be intubated post procedure. He was extubated and transferred to medical floor but on 7/11/18 and subsequently developed severe increase in secretions and was unable to manage airway. Transferred to ICU for likely aspiration pneumonia and impending respiratory failure.     Events last 24 hours: Patient sitting up in bed, appears comfortable. Incomprehensible speech. Audible rattling secretions. Without work of breathing. Hemodynamically stable. Afebrile. PEG placed yesterday.    PAST MEDICAL & SURGICAL HISTORY:  Psoriasis  Prostate cancer  GERD (gastroesophageal reflux disease)  Dyslipidemia  No significant past surgical history      Review of Systems: Unable to obtain secondary to mental status.        Medications:  piperacillin/tazobactam IVPB. 3.375 Gram(s) IV Intermittent every 8 hours  ALBUTerol/ipratropium for Nebulization 3 milliLiter(s) Nebulizer every 6 hours  acetaminophen   Tablet. 650 milliGRAM(s) Oral every 6 hours PRN  melatonin 3 milliGRAM(s) Oral at bedtime  heparin  Injectable 5000 Unit(s) SubCutaneous every 8 hours  bisacodyl Suppository 10 milliGRAM(s) Rectal daily PRN  docusate sodium 100 milliGRAM(s) Oral daily  famotidine    Tablet 20 milliGRAM(s) Oral daily  glycopyrrolate 1 milliGRAM(s) Oral two times a day  atropine 1% Ophthalmic Solution for SubLingual Use 2 Drop(s) SubLingual four times a day  chlorhexidine 0.12% Liquid 15 milliLiter(s) Swish and Spit two times a day  latanoprost 0.005% Ophthalmic Solution 1 Drop(s) Both EYES at bedtime        ICU Vital Signs Last 24 Hrs  T(C): 36.6 (13 Jul 2018 22:00), Max: 36.7 (13 Jul 2018 13:00)  T(F): 97.8 (13 Jul 2018 22:00), Max: 98 (13 Jul 2018 13:00)  HR: 69 (13 Jul 2018 22:00) (55 - 69)  BP: 114/59 (13 Jul 2018 22:00) (96/52 - 129/72)  BP(mean): 73 (13 Jul 2018 22:00) (59 - 86)  ABP: --  ABP(mean): --  RR: 13 (13 Jul 2018 22:00) (11 - 23)  SpO2: 100% (13 Jul 2018 22:00) (93% - 100%)          I&O's Detail    12 Jul 2018 07:01  -  13 Jul 2018 07:00  --------------------------------------------------------  IN:    0.9% NaCl: 500 mL    dextrose 5% + sodium chloride 0.9%: 2075 mL    Oral Fluid: 120 mL    Solution: 250 mL  Total IN: 2945 mL    OUT:  Total OUT: 0 mL    Total NET: 2945 mL      13 Jul 2018 07:01  -  13 Jul 2018 23:17  --------------------------------------------------------  IN:    dextrose 5% + sodium chloride 0.9%: 100 mL    Nepro: 450 mL    Oral Fluid: 50 mL    Solution: 200 mL  Total IN: 800 mL    OUT:  Total OUT: 0 mL    Total NET: 800 mL            LABS:                        7.8    9.3   )-----------( 128      ( 13 Jul 2018 06:15 )             24.4     07-13    147<H>  |  114<H>  |  37<H>  ----------------------------<  130<H>  3.8   |  24  |  2.01<H>    Ca    8.6      13 Jul 2018 06:15  Phos  3.3     07-13  Mg     1.9     07-13            CAPILLARY BLOOD GLUCOSE      POCT Blood Glucose.: 102 mg/dL (12 Jul 2018 10:42)        CULTURES:  Culture Results:   Moderate Klebsiella pneumoniae  Numerous Streptococcus agalactiae (Group B) isolated  Group B streptococci are susceptible to ampicillin,  penicillin and cefazolin, but may be resistant to  erythromycin and clindamycin.  Recommendations for intrapartum prophylaxis for Group B  streptococci are penicillin or ampicillin.  Moderate Staphylococcus aureus Susceptibility to follow. (07-11-18 @ 09:15)  Culture Results:   No growth to date. (07-11-18 @ 08:40)  Culture Results:   No growth to date. (07-11-18 @ 08:15)      Physical Examination:    General: No acute distress.  Alert, confused    HEENT: Pupils equal, reactive to light.  Symmetric.    PULM: coarse breath sounds bilaterally with referred sounds from upper airways, (+) significant sputum production    CVS: Regular rate, paced rhythm, no murmurs, rubs, or gallops    ABD: Soft, nondistended, nontender, normoactive bowel sounds, no masses    EXT: No edema, nontender    SKIN: Warm and well perfused, no rashes noted.    NEURO: confused, incomprehensible speech, moves all extremities spontaneously      RADIOLOGY:     < from: Xray Chest 1 View- PORTABLE-Routine (07.13.18 @ 05:59) >  FINDINGS: Heart size appears within normal limits. Note is made of an   indwelling pacemaker. No hilar or superior mediastinal abnormalities are   identified. Patchy airspace opacities within the right mid and right   lower lung fields remain unchanged. No acute left-sided infiltrate is   noted. There is no evidence for pleural effusion or pneumothorax. No   mediastinal shift is noted. No acute osseous fractures are demonstrated.    IMPRESSION: No significant interval change with persistence of   right-sided infiltrates.      < from: CT Head No Cont (07.12.18 @ 16:34) >  FINDINGS:       There is prominence of the ventricles and cortical sulci. Midline   structures are intact. There are patchy hypodensities in periventricular   distribution consistent with chronic small vessel ischemic changes. There   is no CT evidence of acute territorial infarction.  There is no   hemorrhage, contusion or extraaxial collection. There is no acute   hydrocephalus. The visualized posterior fossa structures are intact.   There are scattered intracranial arterial calcification.  The visualized   paranasal sinuses are clear.    IMPRESSION:       No parenchymal contusion, hemorrhage or extra-axial collection.    Chronic small vessel ischemic changes.

## 2018-07-13 NOTE — PROGRESS NOTE ADULT - ASSESSMENT
Physical Examination:  GENERAL:               Alert, verbal , No acute distress.    HEENT:                  mod oral and tracheal secretions, no gross  gargling of secretions,   PULM:                    Bilateral air entry, transmitted upper airway sounds, No Rales, mild Rhonchi, No Wheezing  CVS:                       S1, S2,  + Murmurs  ABD:                        Soft, nondistended, nontender, normoactive bowel sounds,   EXT:                       No edema, nontender, No Cyanosis or Clubbing   Vascular:                Warm Extremities, Normal Capillary refill, Normal Distal Pulses  NEURO:                  Alert,  follows commands  PSYC:                      Calm, + Insight.      85 year old male with h/o cva and dysphagia and h/o PEG, PPM, CKD, Psoriasis, recent hospitalization for UTI and right hydronephrosis who p/w dysphagia and ? Nee for PEG on 7/2/18  S/P EGD which showed no mechanical obstructive causes of dysphagia, but required to be intubated post procedure and was monitored on floor.  Extubated and transferred to medical floor  on 7/11/18 patient developed severe increase in secretions unable to manage airway and brought to ICU for likely asp pna and pending respiratory failure. due to severe upper airway secretions.      Assessment  upper airway secretions, improving.  Dysphagia S/P PEG 7/12  Aspiration PNA  MELISSA on CKD cr baseline 1.2 -1.4  improving     Plan  Frequent suctioning. robinol and atropine, pulm toilet, chest PT  Stool softners  IVF  to off as tube feeding starting  CT head no acute findings        Family Updated: 	Dtr/Wife	                                 Date:  7/11  Sedation & Analgesia:	on hold  Diet/Nutrition:		NPO pending peg  GI PPx:			PPI    DVT Ppx:		hep sq  	RISK                                                          Points  	[ ] Previous VTE                                           	3  	[ ] Thrombophilia                                        	2  	[ ] Lower limb paralysis                              	2   	[ ] Current Cancer                                       	2   	[ x] Immobilization > 24 hrs                        	1  	[ x] ICU/CCU stay > 24 hours                       	1  	[ x] Age > 60                                                   	1  		Total:[ 3]      Activity:		       Bedrest, active rom          Head of Bed:               35-45 deg  Glycemic Control:        Hypoglycemia noted, D5 NS    Lines:  CENTRAL LINE: 	[ ] YES [x ] NO	                    LOCATION:   	                       DATE INSERTED:   	                    REMOVE:  [ ] YES [ ] NO    A-LINE:  	                [ ] YES [ x] NO                      LOCATION:   	                       DATE INSERTED: 		            REMOVE:  [ ] YES [ ] NO    SOTO: 		        [ ] YES [x ] NO  		                                       DATE INSERTED:		            REMOVE:  [ ] YES [ ] NO        Disposition:  possible transfer to medical floor if tolerating tube feeds.     Goals of Care: full code

## 2018-07-14 LAB
ANION GAP SERPL CALC-SCNC: 14 MMOL/L — SIGNIFICANT CHANGE UP (ref 5–17)
BUN SERPL-MCNC: 38 MG/DL — HIGH (ref 7–23)
CALCIUM SERPL-MCNC: 9 MG/DL — SIGNIFICANT CHANGE UP (ref 8.4–10.5)
CHLORIDE SERPL-SCNC: 113 MMOL/L — HIGH (ref 96–108)
CO2 SERPL-SCNC: 21 MMOL/L — LOW (ref 22–31)
CREAT SERPL-MCNC: 2.13 MG/DL — HIGH (ref 0.5–1.3)
GLUCOSE SERPL-MCNC: 112 MG/DL — HIGH (ref 70–99)
HCT VFR BLD CALC: 24.9 % — LOW (ref 39–50)
HGB BLD-MCNC: 7.9 G/DL — LOW (ref 13–17)
MAGNESIUM SERPL-MCNC: 2.3 MG/DL — SIGNIFICANT CHANGE UP (ref 1.6–2.6)
MCHC RBC-ENTMCNC: 29.4 PG — SIGNIFICANT CHANGE UP (ref 27–34)
MCHC RBC-ENTMCNC: 31.8 GM/DL — LOW (ref 32–36)
MCV RBC AUTO: 92.5 FL — SIGNIFICANT CHANGE UP (ref 80–100)
PHOSPHATE SERPL-MCNC: 3.8 MG/DL — SIGNIFICANT CHANGE UP (ref 2.5–4.5)
PLATELET # BLD AUTO: 145 K/UL — LOW (ref 150–400)
POTASSIUM SERPL-MCNC: 3.9 MMOL/L — SIGNIFICANT CHANGE UP (ref 3.5–5.3)
POTASSIUM SERPL-SCNC: 3.9 MMOL/L — SIGNIFICANT CHANGE UP (ref 3.5–5.3)
RBC # BLD: 2.7 M/UL — LOW (ref 4.2–5.8)
RBC # FLD: 16.9 % — HIGH (ref 10.3–14.5)
SODIUM SERPL-SCNC: 148 MMOL/L — HIGH (ref 135–145)
SODIUM SERPL-SCNC: 148 MMOL/L — HIGH (ref 135–145)
WBC # BLD: 6.7 K/UL — SIGNIFICANT CHANGE UP (ref 3.8–10.5)
WBC # FLD AUTO: 6.7 K/UL — SIGNIFICANT CHANGE UP (ref 3.8–10.5)

## 2018-07-14 PROCEDURE — 71045 X-RAY EXAM CHEST 1 VIEW: CPT | Mod: 26

## 2018-07-14 RX ORDER — ALBUTEROL 90 UG/1
2.5 AEROSOL, METERED ORAL EVERY 6 HOURS
Qty: 0 | Refills: 0 | Status: DISCONTINUED | OUTPATIENT
Start: 2018-07-14 | End: 2018-07-14

## 2018-07-14 RX ORDER — SODIUM CHLORIDE 9 MG/ML
1000 INJECTION, SOLUTION INTRAVENOUS
Qty: 0 | Refills: 0 | Status: DISCONTINUED | OUTPATIENT
Start: 2018-07-14 | End: 2018-07-15

## 2018-07-14 RX ORDER — ACETYLCYSTEINE 200 MG/ML
3 VIAL (ML) MISCELLANEOUS EVERY 6 HOURS
Qty: 0 | Refills: 0 | Status: DISCONTINUED | OUTPATIENT
Start: 2018-07-14 | End: 2018-07-17

## 2018-07-14 RX ORDER — ACETYLCYSTEINE 200 MG/ML
10 VIAL (ML) MISCELLANEOUS EVERY 6 HOURS
Qty: 0 | Refills: 0 | Status: DISCONTINUED | OUTPATIENT
Start: 2018-07-14 | End: 2018-07-14

## 2018-07-14 RX ADMIN — HEPARIN SODIUM 5000 UNIT(S): 5000 INJECTION INTRAVENOUS; SUBCUTANEOUS at 21:32

## 2018-07-14 RX ADMIN — Medication 3 MILLIGRAM(S): at 21:32

## 2018-07-14 RX ADMIN — Medication 650 MILLIGRAM(S): at 17:27

## 2018-07-14 RX ADMIN — LATANOPROST 1 DROP(S): 0.05 SOLUTION/ DROPS OPHTHALMIC; TOPICAL at 21:33

## 2018-07-14 RX ADMIN — HEPARIN SODIUM 5000 UNIT(S): 5000 INJECTION INTRAVENOUS; SUBCUTANEOUS at 05:57

## 2018-07-14 RX ADMIN — Medication 3 MILLILITER(S): at 10:03

## 2018-07-14 RX ADMIN — ROBINUL 1 MILLIGRAM(S): 0.2 INJECTION INTRAMUSCULAR; INTRAVENOUS at 05:58

## 2018-07-14 RX ADMIN — Medication 3 MILLILITER(S): at 22:17

## 2018-07-14 RX ADMIN — SODIUM CHLORIDE 75 MILLILITER(S): 9 INJECTION, SOLUTION INTRAVENOUS at 11:02

## 2018-07-14 RX ADMIN — CHLORHEXIDINE GLUCONATE 15 MILLILITER(S): 213 SOLUTION TOPICAL at 05:57

## 2018-07-14 RX ADMIN — PIPERACILLIN AND TAZOBACTAM 25 GRAM(S): 4; .5 INJECTION, POWDER, LYOPHILIZED, FOR SOLUTION INTRAVENOUS at 05:57

## 2018-07-14 RX ADMIN — Medication 650 MILLIGRAM(S): at 16:06

## 2018-07-14 RX ADMIN — HEPARIN SODIUM 5000 UNIT(S): 5000 INJECTION INTRAVENOUS; SUBCUTANEOUS at 15:42

## 2018-07-14 RX ADMIN — PIPERACILLIN AND TAZOBACTAM 25 GRAM(S): 4; .5 INJECTION, POWDER, LYOPHILIZED, FOR SOLUTION INTRAVENOUS at 21:32

## 2018-07-14 RX ADMIN — Medication 2 DROP(S): at 05:57

## 2018-07-14 RX ADMIN — FLUCONAZOLE 100 MILLIGRAM(S): 150 TABLET ORAL at 15:43

## 2018-07-14 RX ADMIN — Medication 2 DROP(S): at 17:22

## 2018-07-14 RX ADMIN — Medication 650 MILLIGRAM(S): at 10:04

## 2018-07-14 RX ADMIN — Medication 650 MILLIGRAM(S): at 08:47

## 2018-07-14 RX ADMIN — Medication 3 MILLILITER(S): at 04:34

## 2018-07-14 RX ADMIN — ROBINUL 1 MILLIGRAM(S): 0.2 INJECTION INTRAMUSCULAR; INTRAVENOUS at 17:22

## 2018-07-14 RX ADMIN — Medication 3 MILLILITER(S): at 22:19

## 2018-07-14 RX ADMIN — Medication 2 DROP(S): at 13:38

## 2018-07-14 RX ADMIN — CHLORHEXIDINE GLUCONATE 15 MILLILITER(S): 213 SOLUTION TOPICAL at 18:41

## 2018-07-14 RX ADMIN — Medication 100 MILLIGRAM(S): at 13:38

## 2018-07-14 RX ADMIN — FAMOTIDINE 20 MILLIGRAM(S): 10 INJECTION INTRAVENOUS at 13:38

## 2018-07-14 RX ADMIN — PIPERACILLIN AND TAZOBACTAM 25 GRAM(S): 4; .5 INJECTION, POWDER, LYOPHILIZED, FOR SOLUTION INTRAVENOUS at 15:43

## 2018-07-14 NOTE — PROGRESS NOTE ADULT - SUBJECTIVE AND OBJECTIVE BOX
Follow-up Critical Care Progress Note  Chief Complaint : Dysphagia    patients resp grossly unchanged since yesterday  continues to gargle secretions  however patient refusing suctioning      Allergies :No Known Allergies      PAST MEDICAL & SURGICAL HISTORY:  Psoriasis  Prostate cancer  GERD (gastroesophageal reflux disease)  Dyslipidemia  No significant past surgical history      Medications:  MEDICATIONS  (STANDING):  acetylcysteine 10% Inhalation 10 milliLiter(s) Inhalation every 6 hours  ALBUTerol/ipratropium for Nebulization 3 milliLiter(s) Nebulizer every 6 hours  atropine 1% Ophthalmic Solution for SubLingual Use 2 Drop(s) SubLingual four times a day  chlorhexidine 0.12% Liquid 15 milliLiter(s) Swish and Spit two times a day  docusate sodium 100 milliGRAM(s) Oral daily  famotidine    Tablet 20 milliGRAM(s) Oral daily  fluconAZOLE   Tablet 100 milliGRAM(s) Oral daily  glycopyrrolate 1 milliGRAM(s) Oral two times a day  heparin  Injectable 5000 Unit(s) SubCutaneous every 8 hours  latanoprost 0.005% Ophthalmic Solution 1 Drop(s) Both EYES at bedtime  melatonin 3 milliGRAM(s) Oral at bedtime  piperacillin/tazobactam IVPB. 3.375 Gram(s) IV Intermittent every 8 hours    MEDICATIONS  (PRN):  acetaminophen   Tablet. 650 milliGRAM(s) Oral every 6 hours PRN Mild Pain (1 - 3)  bisacodyl Suppository 10 milliGRAM(s) Rectal daily PRN Constipation      LABS:                        7.9    6.7   )-----------( 145      ( 14 Jul 2018 05:30 )             24.9     07-14    148<H>  |  113<H>  |  38<H>  ----------------------------<  112<H>  3.9   |  21<L>  |  2.13<H>    Ca    9.0      14 Jul 2018 05:30  Phos  3.8     07-14  Mg     2.3     07-14      HIT ab Negative 07-07 @ 15:55  HIT ab EIA 0.292  --                      CULTURES: (if applicable)    Culture - Sputum (collected 07-11-18 @ 09:15)  Source: .Sputum Sputum  Gram Stain (07-11-18 @ 14:46):    No squamous epithelial cells per low power field    Numerous polymorphonuclear leukocytes per low power field    Numerous Gram positive cocci in pairs per oil power field  Preliminary Report (07-13-18 @ 10:35):    Moderate Klebsiella pneumoniae    Numerous Streptococcus agalactiae (Group B) isolated    Group B streptococci are susceptible to ampicillin,    penicillin and cefazolin, but may be resistant to    erythromycin and clindamycin.    Recommendations for intrapartum prophylaxis for Group B    streptococci are penicillin or ampicillin.    Moderate Staphylococcus aureus Susceptibility to follow.  Organism: Klebsiella pneumoniae (07-13-18 @ 10:36)  Organism: Klebsiella pneumoniae (07-13-18 @ 10:36)      -  Amikacin: S <=8      -  Amoxicillin/Clavulanic Acid: S <=8/4      -  Ampicillin: R >16      -  Ampicillin/Sulbactam: S 8/4      -  Aztreonam: S <=4      -  Cefazolin: S <=2      -  Cefepime: S <=2      -  Cefoxitin: S <=4      -  Ceftriaxone: S <=1      -  Ciprofloxacin: S <=0.5      -  Ertapenem: S <=0.5      -  Gentamicin: S <=1      -  Imipenem: S <=1      -  Levofloxacin: S <=1      -  Meropenem: S <=1      -  Piperacillin/Tazobactam: S <=8      -  Tobramycin: S <=2      -  Trimethoprim/Sulfamethoxazole: S <=0.5/9.5      Method Type: KALYAN    Culture - Blood (collected 07-11-18 @ 08:40)  Source: .Blood Blood  Preliminary Report (07-12-18 @ 13:01):    No growth to date.    Culture - Blood (collected 07-11-18 @ 08:15)  Source: .Blood Blood-Peripheral  Preliminary Report (07-12-18 @ 12:01):    No growth to date.    Culture - Blood (collected 07-03-18 @ 23:20)  Source: .Blood Blood-Peripheral  Final Report (07-09-18 @ 09:00):    No growth at 5 days.    Culture - Blood (collected 07-03-18 @ 23:20)  Source: .Blood Blood-Peripheral  Final Report (07-09-18 @ 09:00):    No growth at 5 days.    Culture - Sputum (collected 07-03-18 @ 21:20)  Source: .Sputum Sputum - trap  Gram Stain (07-04-18 @ 11:52):    Numerous polymorphonuclear leukocytes per low power field    Rare Squamous epithelial cells per low power field    Rare Gram Negative Rods per oil power field    Rare Gram positive cocci in pairs per oil power field  Final Report (07-06-18 @ 08:12):    Normal Respiratory Renetta present    Culture - Urine (collected 07-03-18 @ 21:20)  Source: .Urine Clean Catch (Midstream)  Final Report (07-05-18 @ 11:43):    No growth            CAPILLARY BLOOD GLUCOSE      POCT Blood Glucose.: 102 mg/dL (12 Jul 2018 10:42)      RADIOLOGY  CXR:  < from: Xray Chest 1 View- PORTABLE-Routine (07.14.18 @ 08:34) >  IMPRESSION: Increasing right lung parenchymal infiltrates, with the   suggestion for new left lower lung field infiltrate.    < end of copied text >      VITALS:  T(C): 37.1 (07-14-18 @ 05:00), Max: 37.1 (07-14-18 @ 05:00)  T(F): 98.8 (07-14-18 @ 05:00), Max: 98.8 (07-14-18 @ 05:00)  HR: 55 (07-14-18 @ 09:00) (55 - 69)  BP: 131/78 (07-14-18 @ 09:00) (96/55 - 131/78)  BP(mean): 92 (07-14-18 @ 09:00) (65 - 92)  ABP: --  ABP(mean): --  RR: 26 (07-14-18 @ 09:00) (11 - 26)  SpO2: 97% (07-14-18 @ 09:00) (93% - 100%)  CVP(mm Hg): --  CVP(cm H2O): --    Ins and Outs     07-13-18 @ 07:01  -  07-14-18 @ 07:00  --------------------------------------------------------  IN: 1500 mL / OUT: 0 mL / NET: 1500 mL    07-14-18 @ 07:01  -  07-14-18 @ 09:44  --------------------------------------------------------  IN: 75 mL / OUT: 0 mL / NET: 75 mL

## 2018-07-14 NOTE — PROGRESS NOTE ADULT - ASSESSMENT
Physical Examination:  GENERAL:               Alert, verbal , No acute distress.    HEENT:                  mild oral and tracheal secretions, mild gross  gargling of secretions,   PULM:                    Bilateral air entry, transmitted upper airway sounds, No Rales, mild Right sided Rhonchi, No Wheezing  CVS:                       S1, S2,  + Murmur  ABD:                        Soft, nondistended, nontender, normoactive bowel sounds,   EXT:                       No edema, nontender, No Cyanosis or Clubbing   Vascular:                Warm Extremities, Normal Capillary refill, Normal Distal Pulses  NEURO:                  Alert,  follows commands  PSYC:                      Calm, + Insight.      85 year old male with h/o cva and dysphagia and h/o PEG, PPM, CKD, Psoriasis, recent hospitalization for UTI and right hydronephrosis who p/w dysphagia and ? Nee for PEG on 7/2/18  S/P EGD which showed no mechanical obstructive causes of dysphagia, but required to be intubated post procedure and was monitored on floor.  Extubated and transferred to medical floor  on 7/11/18 patient developed severe increase in secretions unable to manage airway and brought to ICU for likely asp pna and pending respiratory failure. due to severe upper airway secretions.      Assessment  upper airway secretions, improving. but patient refusing sucitoning  Dysphagia S/P PEG 7/12 tolerating diet  Aspiration PNA - Kleb in sputum  MELISSA on CKD cr baseline 1.2 -1.4  worsening now with increasing Hypernatremia    Plan  Frequent suctioning. robinol and atropine, pulm toilet, chest PT  will add mucomyst and nebs  Add 1/2 ns for 24 hrs  Stool softners  CT head no acute findings        Family Updated: 	Dtr/Wife	                                 Date:  7/11  Sedation & Analgesia:	on hold  Diet/Nutrition:		tube feeding  GI PPx:			PPI    DVT Ppx:		hep sq  	RISK                                                          Points  	[ ] Previous VTE                                           	3  	[ ] Thrombophilia                                        	2  	[ ] Lower limb paralysis                              	2   	[ ] Current Cancer                                       	2   	[ x] Immobilization > 24 hrs                        	1  	[ x] ICU/CCU stay > 24 hours                       	1  	[ x] Age > 60                                                   	1  		Total:[ 3]      Activity:		       Bedrest, active rom          Head of Bed:               35-45 deg  Glycemic Control:        n/a    Lines:  PIV      Disposition:  possible transfer to medical floor if tolerating tube feeds.     Goals of Care: full code

## 2018-07-15 LAB
-  AMPICILLIN/SULBACTAM: SIGNIFICANT CHANGE UP
-  CEFAZOLIN: SIGNIFICANT CHANGE UP
-  CLINDAMYCIN: SIGNIFICANT CHANGE UP
-  ERYTHROMYCIN: SIGNIFICANT CHANGE UP
-  GENTAMICIN: SIGNIFICANT CHANGE UP
-  OXACILLIN: SIGNIFICANT CHANGE UP
-  PENICILLIN: SIGNIFICANT CHANGE UP
-  RIFAMPIN: SIGNIFICANT CHANGE UP
-  TETRACYCLINE: SIGNIFICANT CHANGE UP
-  TRIMETHOPRIM/SULFAMETHOXAZOLE: SIGNIFICANT CHANGE UP
-  VANCOMYCIN: SIGNIFICANT CHANGE UP
ANION GAP SERPL CALC-SCNC: 11 MMOL/L — SIGNIFICANT CHANGE UP (ref 5–17)
APTT BLD: 47.8 SEC — HIGH (ref 27.5–37.4)
BUN SERPL-MCNC: 39 MG/DL — HIGH (ref 7–23)
CALCIUM SERPL-MCNC: 8.7 MG/DL — SIGNIFICANT CHANGE UP (ref 8.4–10.5)
CHLORIDE SERPL-SCNC: 113 MMOL/L — HIGH (ref 96–108)
CO2 SERPL-SCNC: 24 MMOL/L — SIGNIFICANT CHANGE UP (ref 22–31)
CREAT SERPL-MCNC: 1.96 MG/DL — HIGH (ref 0.5–1.3)
CULTURE RESULTS: SIGNIFICANT CHANGE UP
GLUCOSE SERPL-MCNC: 88 MG/DL — SIGNIFICANT CHANGE UP (ref 70–99)
HCT VFR BLD CALC: 21.2 % — LOW (ref 39–50)
HCT VFR BLD CALC: 24 % — LOW (ref 39–50)
HGB BLD-MCNC: 6.6 G/DL — CRITICAL LOW (ref 13–17)
HGB BLD-MCNC: 7.5 G/DL — LOW (ref 13–17)
INR BLD: 1.81 RATIO — HIGH (ref 0.88–1.16)
MAGNESIUM SERPL-MCNC: 2.2 MG/DL — SIGNIFICANT CHANGE UP (ref 1.6–2.6)
MCHC RBC-ENTMCNC: 28.8 PG — SIGNIFICANT CHANGE UP (ref 27–34)
MCHC RBC-ENTMCNC: 28.8 PG — SIGNIFICANT CHANGE UP (ref 27–34)
MCHC RBC-ENTMCNC: 31 GM/DL — LOW (ref 32–36)
MCHC RBC-ENTMCNC: 31.1 GM/DL — LOW (ref 32–36)
MCV RBC AUTO: 92.5 FL — SIGNIFICANT CHANGE UP (ref 80–100)
MCV RBC AUTO: 93 FL — SIGNIFICANT CHANGE UP (ref 80–100)
METHOD TYPE: SIGNIFICANT CHANGE UP
ORGANISM # SPEC MICROSCOPIC CNT: SIGNIFICANT CHANGE UP
PHOSPHATE SERPL-MCNC: 3.7 MG/DL — SIGNIFICANT CHANGE UP (ref 2.5–4.5)
PLATELET # BLD AUTO: 154 K/UL — SIGNIFICANT CHANGE UP (ref 150–400)
PLATELET # BLD AUTO: 155 K/UL — SIGNIFICANT CHANGE UP (ref 150–400)
POTASSIUM SERPL-MCNC: 3.9 MMOL/L — SIGNIFICANT CHANGE UP (ref 3.5–5.3)
POTASSIUM SERPL-SCNC: 3.9 MMOL/L — SIGNIFICANT CHANGE UP (ref 3.5–5.3)
PROTHROM AB SERPL-ACNC: 20.3 SEC — HIGH (ref 9.8–12.7)
RBC # BLD: 2.28 M/UL — LOW (ref 4.2–5.8)
RBC # BLD: 2.6 M/UL — LOW (ref 4.2–5.8)
RBC # FLD: 16.6 % — HIGH (ref 10.3–14.5)
RBC # FLD: 16.7 % — HIGH (ref 10.3–14.5)
SODIUM SERPL-SCNC: 148 MMOL/L — HIGH (ref 135–145)
SPECIMEN SOURCE: SIGNIFICANT CHANGE UP
WBC # BLD: 6.7 K/UL — SIGNIFICANT CHANGE UP (ref 3.8–10.5)
WBC # BLD: 6.9 K/UL — SIGNIFICANT CHANGE UP (ref 3.8–10.5)
WBC # FLD AUTO: 6.7 K/UL — SIGNIFICANT CHANGE UP (ref 3.8–10.5)
WBC # FLD AUTO: 6.9 K/UL — SIGNIFICANT CHANGE UP (ref 3.8–10.5)

## 2018-07-15 PROCEDURE — 93971 EXTREMITY STUDY: CPT | Mod: 26,LT

## 2018-07-15 PROCEDURE — 71045 X-RAY EXAM CHEST 1 VIEW: CPT | Mod: 26

## 2018-07-15 RX ORDER — DOCUSATE SODIUM 100 MG
100 CAPSULE ORAL THREE TIMES A DAY
Qty: 0 | Refills: 0 | Status: DISCONTINUED | OUTPATIENT
Start: 2018-07-15 | End: 2018-07-15

## 2018-07-15 RX ORDER — CEFEPIME 1 G/1
1000 INJECTION, POWDER, FOR SOLUTION INTRAMUSCULAR; INTRAVENOUS DAILY
Qty: 0 | Refills: 0 | Status: DISCONTINUED | OUTPATIENT
Start: 2018-07-16 | End: 2018-07-17

## 2018-07-15 RX ORDER — CEFEPIME 1 G/1
INJECTION, POWDER, FOR SOLUTION INTRAMUSCULAR; INTRAVENOUS
Qty: 0 | Refills: 0 | Status: DISCONTINUED | OUTPATIENT
Start: 2018-07-15 | End: 2018-07-17

## 2018-07-15 RX ORDER — POLYETHYLENE GLYCOL 3350 17 G/17G
17 POWDER, FOR SOLUTION ORAL DAILY
Qty: 0 | Refills: 0 | Status: DISCONTINUED | OUTPATIENT
Start: 2018-07-15 | End: 2018-07-19

## 2018-07-15 RX ORDER — CEFEPIME 1 G/1
1000 INJECTION, POWDER, FOR SOLUTION INTRAMUSCULAR; INTRAVENOUS ONCE
Qty: 0 | Refills: 0 | Status: COMPLETED | OUTPATIENT
Start: 2018-07-15 | End: 2018-07-15

## 2018-07-15 RX ORDER — MAGNESIUM HYDROXIDE 400 MG/1
30 TABLET, CHEWABLE ORAL ONCE
Qty: 0 | Refills: 0 | Status: COMPLETED | OUTPATIENT
Start: 2018-07-15 | End: 2018-07-15

## 2018-07-15 RX ORDER — SODIUM CHLORIDE 9 MG/ML
1000 INJECTION, SOLUTION INTRAVENOUS
Qty: 0 | Refills: 0 | Status: DISCONTINUED | OUTPATIENT
Start: 2018-07-15 | End: 2018-07-16

## 2018-07-15 RX ORDER — SENNA PLUS 8.6 MG/1
2 TABLET ORAL AT BEDTIME
Qty: 0 | Refills: 0 | Status: DISCONTINUED | OUTPATIENT
Start: 2018-07-15 | End: 2018-07-19

## 2018-07-15 RX ADMIN — HEPARIN SODIUM 5000 UNIT(S): 5000 INJECTION INTRAVENOUS; SUBCUTANEOUS at 21:44

## 2018-07-15 RX ADMIN — Medication 2 DROP(S): at 17:58

## 2018-07-15 RX ADMIN — Medication 3 MILLILITER(S): at 16:50

## 2018-07-15 RX ADMIN — Medication 3 MILLILITER(S): at 04:47

## 2018-07-15 RX ADMIN — ROBINUL 1 MILLIGRAM(S): 0.2 INJECTION INTRAMUSCULAR; INTRAVENOUS at 17:58

## 2018-07-15 RX ADMIN — PIPERACILLIN AND TAZOBACTAM 25 GRAM(S): 4; .5 INJECTION, POWDER, LYOPHILIZED, FOR SOLUTION INTRAVENOUS at 05:55

## 2018-07-15 RX ADMIN — Medication 3 MILLILITER(S): at 09:43

## 2018-07-15 RX ADMIN — ROBINUL 1 MILLIGRAM(S): 0.2 INJECTION INTRAMUSCULAR; INTRAVENOUS at 05:56

## 2018-07-15 RX ADMIN — CEFEPIME 100 MILLIGRAM(S): 1 INJECTION, POWDER, FOR SOLUTION INTRAMUSCULAR; INTRAVENOUS at 11:23

## 2018-07-15 RX ADMIN — CHLORHEXIDINE GLUCONATE 15 MILLILITER(S): 213 SOLUTION TOPICAL at 05:58

## 2018-07-15 RX ADMIN — Medication 3 MILLILITER(S): at 22:02

## 2018-07-15 RX ADMIN — POLYETHYLENE GLYCOL 3350 17 GRAM(S): 17 POWDER, FOR SOLUTION ORAL at 13:07

## 2018-07-15 RX ADMIN — CHLORHEXIDINE GLUCONATE 15 MILLILITER(S): 213 SOLUTION TOPICAL at 17:58

## 2018-07-15 RX ADMIN — FLUCONAZOLE 100 MILLIGRAM(S): 150 TABLET ORAL at 11:22

## 2018-07-15 RX ADMIN — Medication 2 DROP(S): at 23:03

## 2018-07-15 RX ADMIN — HEPARIN SODIUM 5000 UNIT(S): 5000 INJECTION INTRAVENOUS; SUBCUTANEOUS at 13:07

## 2018-07-15 RX ADMIN — Medication 3 MILLIGRAM(S): at 21:44

## 2018-07-15 RX ADMIN — FAMOTIDINE 20 MILLIGRAM(S): 10 INJECTION INTRAVENOUS at 11:23

## 2018-07-15 RX ADMIN — Medication 2 DROP(S): at 11:23

## 2018-07-15 RX ADMIN — Medication 3 MILLILITER(S): at 22:00

## 2018-07-15 RX ADMIN — Medication 100 MILLIGRAM(S): at 13:08

## 2018-07-15 RX ADMIN — SODIUM CHLORIDE 50 MILLILITER(S): 9 INJECTION, SOLUTION INTRAVENOUS at 09:17

## 2018-07-15 RX ADMIN — LATANOPROST 1 DROP(S): 0.05 SOLUTION/ DROPS OPHTHALMIC; TOPICAL at 21:45

## 2018-07-15 RX ADMIN — Medication 110 MILLIGRAM(S): at 11:23

## 2018-07-15 RX ADMIN — SENNA PLUS 2 TABLET(S): 8.6 TABLET ORAL at 21:44

## 2018-07-15 RX ADMIN — Medication 2 DROP(S): at 00:32

## 2018-07-15 RX ADMIN — MAGNESIUM HYDROXIDE 30 MILLILITER(S): 400 TABLET, CHEWABLE ORAL at 21:42

## 2018-07-15 RX ADMIN — HEPARIN SODIUM 5000 UNIT(S): 5000 INJECTION INTRAVENOUS; SUBCUTANEOUS at 05:56

## 2018-07-15 RX ADMIN — Medication 2 DROP(S): at 05:57

## 2018-07-15 NOTE — PROGRESS NOTE ADULT - ASSESSMENT
Physical Examination:  GENERAL:               Alert, verbal , No acute distress.    HEENT:                  mod  oral and tracheal secretions, mild gross  gargling of secretions, dry MM  PULM:                    Bilateral air entry, transmitted upper airway sounds, No Rales, mild Right sided Rhonchi, No Wheezing  CVS:                       S1, S2,  + Murmur  ABD:                        Soft, nondistended, nontender, normoactive bowel sounds,  + PEG  EXT:                       LUE edema, nontender, No Cyanosis or Clubbing   Vascular:                Warm Extremities, Normal Capillary refill, Normal Distal Pulses  NEURO:                  Alert,  follows commands  PSYC:                      Calm, + Insight with episodes of agitation      85 year old male with h/o cva and dysphagia and h/o PEG, PPM, CKD, Psoriasis, recent hospitalization for UTI and right hydronephrosis who p/w dysphagia and ? Nee for PEG on 7/2/18  S/P EGD which showed no mechanical obstructive causes of dysphagia, but required to be intubated post procedure and was monitored on floor.  Extubated and transferred to medical floor  on 7/11/18 patient developed severe increase in secretions unable to manage airway and brought to ICU for likely asp pna and pending respiratory failure. due to severe upper airway secretions.      Assessment  upper airway secretions, improving. but patient refusing suctioning  Anemia  - slow downtrending h/h with out source of active gib.  Dysphagia S/P PEG 7/12 tolerating diet  Aspiration PNA - Kleb  and staph in sputum  MELISSA on CKD cr baseline 1.2 -1.4  worsening now with increasing Hypernatremia    Plan  Frequent suctioning. robinol and atropine, pulm toilet, chest PT  Continue mucomyst, Nebs, humidified o2  Noted continued hpernatremia will change 1/2 ns to d5  will broaden abx to cefepime and doxy  d/c   Stool softners - will add enema and increase as patient continues to be constipatid      Family Updated: 	Dtr/Wife	                                 Date:  7/11    attempt to call family today -  [phone] [employer] not accepting phone call error                                             - [phone] no pone picks up   Sedation & Analgesia:	on hold  Diet/Nutrition:		tube feeding  GI PPx:			PPI    DVT Ppx:		hep sq  	RISK                                                          Points  	[ ] Previous VTE                                           	3  	[ ] Thrombophilia                                        	2  	[ ] Lower limb paralysis                              	2   	[ ] Current Cancer                                       	2   	[ x] Immobilization > 24 hrs                        	1  	[ x] ICU/CCU stay > 24 hours                       	1  	[ x] Age > 60                                                   	1  		Total:[ 3]      Activity:		       OOB to [facility]         Head of Bed:               35-45 deg  Glycemic Control:        n/a    Lines:  PIV      Disposition:  continue ICU care.  Goals of Care: full code

## 2018-07-15 NOTE — CONSULT NOTE ADULT - CONSULT REQUESTED DATE/TIME
03-Jul-2018 16:00
03-Jul-2018 20:42
04-Jul-2018 17:22
06-Jul-2018 13:38
09-Jul-2018 15:08
11-Jul-2018 11:39
15-Jul-2018 13:23

## 2018-07-15 NOTE — CONSULT NOTE ADULT - CONSULT REASON
Dysphagia
Goals of Care
MELISSA, hyperkalemia
Respiratory Failure
debility
ureteral stent
aspiration pneumonia

## 2018-07-15 NOTE — PROVIDER CONTACT NOTE (CRITICAL VALUE NOTIFICATION) - ACTION/TREATMENT ORDERED:
No orders at this time.
Calcium Gluconate, Insulin, & D50 to be given.
nurse chong notified and PA and nurse will communicate

## 2018-07-15 NOTE — PROGRESS NOTE ADULT - SUBJECTIVE AND OBJECTIVE BOX
Follow-up Critical Care Progress Note  Chief Complaint : Dysphagia    pt noted to have low h/h this am, repeat stable  no cp, palp,n/v  patient refusing to go out of bed, refusing suctioning        Allergies :No Known Allergies      PAST MEDICAL & SURGICAL HISTORY:  Psoriasis  Prostate cancer  GERD (gastroesophageal reflux disease)  Dyslipidemia  No significant past surgical history      Medications:  MEDICATIONS  (STANDING):  acetylcysteine 10% Inhalation 3 milliLiter(s) Inhalation every 6 hours  ALBUTerol/ipratropium for Nebulization 3 milliLiter(s) Nebulizer every 6 hours  atropine 1% Ophthalmic Solution for SubLingual Use 2 Drop(s) SubLingual four times a day  chlorhexidine 0.12% Liquid 15 milliLiter(s) Swish and Spit two times a day  dextrose 5%. 1000 milliLiter(s) (50 mL/Hr) IV Continuous <Continuous>  docusate sodium 100 milliGRAM(s) Oral daily  famotidine    Tablet 20 milliGRAM(s) Oral daily  fluconAZOLE   Tablet 100 milliGRAM(s) Oral daily  glycopyrrolate 1 milliGRAM(s) Oral two times a day  heparin  Injectable 5000 Unit(s) SubCutaneous every 8 hours  latanoprost 0.005% Ophthalmic Solution 1 Drop(s) Both EYES at bedtime  melatonin 3 milliGRAM(s) Oral at bedtime    MEDICATIONS  (PRN):  acetaminophen   Tablet. 650 milliGRAM(s) Oral every 6 hours PRN Mild Pain (1 - 3)  bisacodyl Suppository 10 milliGRAM(s) Rectal daily PRN Constipation      LABS:                        7.5    6.7   )-----------( 154      ( 15 Jul 2018 09:00 )             24.0     07-15-18 @ 09:00   -  7.5<L> / 24.0<L>  07-15-18 @ 05:45   -  6.6<LL> / 21.2<L> suspect lab error  07-14-18 @ 05:30   -  7.9<L> / 24.9<L>  07-13-18 @ 06:15   -  7.8<L> / 24.4<L>    07-15    148<H>  |  113<H>  |  39<H>  ----------------------------<  88  3.9   |  24  |  1.96<H>    Ca    8.7      15 Jul 2018 05:45  Phos  3.7     07-15  Mg     2.2     07-15              PT/INR - ( 15 Jul 2018 05:45 )   PT: 20.3 sec;   INR: 1.81 ratio         PTT - ( 15 Jul 2018 05:45 )  PTT:47.8 sec            CULTURES: (if applicable)    Culture - Sputum (collected 07-11-18 @ 09:15)  Source: .Sputum Sputum  Gram Stain (07-11-18 @ 14:46):    No squamous epithelial cells per low power field    Numerous polymorphonuclear leukocytes per low power field    Numerous Gram positive cocci in pairs per oil power field  Final Report (07-15-18 @ 09:40):    Moderate Klebsiella pneumoniae    Numerous Streptococcus agalactiae (Group B) isolated    Group B streptococci are susceptible to ampicillin,    penicillin and cefazolin, but may be resistant to    erythromycin and clindamycin.    Recommendations for intrapartum prophylaxis for Group B    streptococci are penicillin or ampicillin.    Moderate Staphylococcus aureus    Normal Respiratory Renetta absent  Organism: Klebsiella pneumoniae  Staphylococcus aureus (07-15-18 @ 09:40)  Organism: Staphylococcus aureus (07-15-18 @ 09:40)      -  Ampicillin/Sulbactam: S <=8/4      -  Cefazolin: S <=4      -  Clindamycin: S 0.5      -  Erythromycin: R >4      -  Gentamicin: S <=1      -  Oxacillin: S 1      -  Penicillin: R >8      -  RIF- Rifampin: S <=1      -  Tetra/Doxy: S <=1      -  Trimethoprim/Sulfamethoxazole: S <=0.5/9.5      -  Vancomycin: S 2      Method Type: KALYAN  Organism: Klebsiella pneumoniae (07-15-18 @ 09:40)      -  Amikacin: S <=8      -  Amoxicillin/Clavulanic Acid: S <=8/4      -  Ampicillin: R >16      -  Ampicillin/Sulbactam: S 8/4      -  Aztreonam: S <=4      -  Cefazolin: S <=2      -  Cefepime: S <=2      -  Cefoxitin: S <=4      -  Ceftriaxone: S <=1      -  Ciprofloxacin: S <=0.5      -  Ertapenem: S <=0.5      -  Gentamicin: S <=1      -  Imipenem: S <=1      -  Levofloxacin: S <=1      -  Meropenem: S <=1      -  Piperacillin/Tazobactam: S <=8      -  Tobramycin: S <=2      -  Trimethoprim/Sulfamethoxazole: S <=0.5/9.5      Method Type: KALYAN    Culture - Blood (collected 07-11-18 @ 08:40)  Source: .Blood Blood  Preliminary Report (07-12-18 @ 13:01):    No growth to date.    Culture - Blood (collected 07-11-18 @ 08:15)  Source: .Blood Blood-Peripheral  Preliminary Report (07-12-18 @ 12:01):    No growth to date.    Culture - Blood (collected 07-03-18 @ 23:20)  Source: .Blood Blood-Peripheral  Final Report (07-09-18 @ 09:00):    No growth at 5 days.    Culture - Blood (collected 07-03-18 @ 23:20)  Source: .Blood Blood-Peripheral  Final Report (07-09-18 @ 09:00):    No growth at 5 days.    Culture - Sputum (collected 07-03-18 @ 21:20)  Source: .Sputum Sputum - trap  Gram Stain (07-04-18 @ 11:52):    Numerous polymorphonuclear leukocytes per low power field    Rare Squamous epithelial cells per low power field    Rare Gram Negative Rods per oil power field    Rare Gram positive cocci in pairs per oil power field  Final Report (07-06-18 @ 08:12):    Normal Respiratory Renetta present    Culture - Urine (collected 07-03-18 @ 21:20)  Source: .Urine Clean Catch (Midstream)  Final Report (07-05-18 @ 11:43):    No growth            CAPILLARY BLOOD GLUCOSE          RADIOLOGY  CXR:  < from: Xray Chest 1 View- PORTABLE-Routine (07.15.18 @ 07:21) >  IMPRESSION: Bilateral patchy airspace opacities, slightly increased   compared to 7/14/2018.    < end of copied text >        VITALS:  T(C): 36.3 (07-15-18 @ 05:00), Max: 36.4 (07-14-18 @ 21:00)  T(F): 97.4 (07-15-18 @ 05:00), Max: 97.6 (07-14-18 @ 21:00)  HR: 62 (07-15-18 @ 06:00) (59 - 72)  BP: 126/65 (07-15-18 @ 06:00) (97/68 - 140/67)  BP(mean): 81 (07-15-18 @ 06:00) (72 - 94)  ABP: --  ABP(mean): --  RR: 18 (07-15-18 @ 06:00) (12 - 27)  SpO2: 92% (07-15-18 @ 06:00) (71% - 99%)  CVP(mm Hg): --  CVP(cm H2O): --    Ins and Outs     07-14-18 @ 07:01  -  07-15-18 @ 07:00  --------------------------------------------------------  IN: 2960 mL / OUT: 0 mL / NET: 2960 mL

## 2018-07-15 NOTE — CONSULT NOTE ADULT - SUBJECTIVE AND OBJECTIVE BOX
HPI:   Patient is a 85y male with dysphagia admitted for dysphagia n July 2. He , had egd and no obstruction just reflux and candida esophogitis. He had the egd about 2 weeks ago and required intubation around the procedure due to concern for aspiration. He got a week of zosyn. He got extubated after a couple of days. He had a peg placed about 5 days ago and on 7/11 zosyn was commenced for excessive secretions and he was moved to the icu. He still has lots of secretions and pooling in his throat. Antibiotics changed to cefepime and doxycycline today due to hypernatremia and concern that zosyn did not cover the mssa in the sputum. We are called. Patient wants to be left alone. He reports that his entire body hurts.     REVIEW OF SYSTEMS:  All other review of systems negative (Comprehensive ROS)    PAST MEDICAL & SURGICAL HISTORY:  Psoriasis  Prostate cancer  GERD (gastroesophageal reflux disease)  Dyslipidemia  No significant past surgical history      Allergies    No Known Allergies    Intolerances        Antimicrobials Day #  :5 total abx  cefepime   IVPB      doxycycline IVPB 100 milliGRAM(s) IV Intermittent every 12 hours  doxycycline IVPB      fluconAZOLE   Tablet 100 milliGRAM(s) Oral daily   Day 3/14    Other Medications:  acetaminophen   Tablet. 650 milliGRAM(s) Oral every 6 hours PRN  acetylcysteine 10% Inhalation 3 milliLiter(s) Inhalation every 6 hours  ALBUTerol/ipratropium for Nebulization 3 milliLiter(s) Nebulizer every 6 hours  atropine 1% Ophthalmic Solution for SubLingual Use 2 Drop(s) SubLingual four times a day  bisacodyl Suppository 10 milliGRAM(s) Rectal daily PRN  chlorhexidine 0.12% Liquid 15 milliLiter(s) Swish and Spit two times a day  dextrose 5%. 1000 milliLiter(s) IV Continuous <Continuous>  docusate sodium 100 milliGRAM(s) Oral three times a day  famotidine    Tablet 20 milliGRAM(s) Oral daily  glycopyrrolate 1 milliGRAM(s) Oral two times a day  heparin  Injectable 5000 Unit(s) SubCutaneous every 8 hours  latanoprost 0.005% Ophthalmic Solution 1 Drop(s) Both EYES at bedtime  melatonin 3 milliGRAM(s) Oral at bedtime  polyethylene glycol 3350 17 Gram(s) Oral daily  senna 2 Tablet(s) Oral at bedtime      FAMILY HISTORY:  No pertinent family history in first degree relatives      SOCIAL HISTORY:  Smoking: [ ]Yes [x ]No  ETOH: [ ]Yes [x ]No  Drug Use: [ ]Yes [ x]No   [x ] Single[ ]    T(F): 97.4 (07-15-18 @ 05:00), Max: 97.6 (07-14-18 @ 21:00)  HR: 60 (07-15-18 @ 12:00)  BP: 117/57 (07-15-18 @ 12:00)  RR: 16 (07-15-18 @ 12:00)  SpO2: 99% (07-15-18 @ 12:00)  Wt(kg): --    PHYSICAL EXAM:  General: alert,very gurgly and nearly unintelligible speech  Eyes:  anicteric, no conjunctival injection, no discharge  Oropharynx: no lesions or injection 	  Neck: supple, without adenopathy  Lungs: upper airway rhonchi to auscultation  Heart: regular rate and rhythm; no murmur, rubs or gallops  Abdomen: soft, obesely distended, nontender, without mass or organomegaly  Skin: no lesions  Extremities: no clubbing, cyanosis, . arm with edema  Neurologic: alert, oriented, moves all extremities    LAB RESULTS:                        7.5    6.7   )-----------( 154      ( 15 Jul 2018 09:00 )             24.0     07-15    148<H>  |  113<H>  |  39<H>  ----------------------------<  88  3.9   |  24  |  1.96<H>    Ca    8.7      15 Jul 2018 05:45  Phos  3.7     07-15  Mg     2.2     07-15            MICROBIOLOGY:  RECENT CULTURES:  07-11 @ 09:15 .Sputum Sputum Klebsiella pneumoniae  Staphylococcus aureus    Moderate Klebsiella pneumoniae  Numerous Streptococcus agalactiae (Group B) isolated  Group B streptococci are susceptible to ampicillin,  penicillin and cefazolin, but may be resistant to  erythromycin and clindamycin.  Recommendations for intrapartum prophylaxis for Group B  streptococci are penicillin or ampicillin.  Moderate Staphylococcus aureus  Normal Respiratory Renetta absent    No squamous epithelial cells per low power field  Numerous polymorphonuclear leukocytes per low power field  Numerous Gram positive cocci in pairs per oil power field    07-11 @ 08:40 .Blood Blood     No growth to date.      07-11 @ 08:15 .Blood Blood-Peripheral     No growth to date.            RADIOLOGY REVIEWED:  < from: Xray Chest 1 View- PORTABLE-Routine (07.15.18 @ 07:21) >  EXAM:  XR CHEST PORTABLE ROUTINE 1V      PROCEDURE DATE:  07/15/2018        INTERPRETATION:  Single AP view of the chest.    CLINICAL INFORMATION: Dysphasia, pulmonary secretions    FINDINGS:  The study is limited by suboptimal inspiration and portable   technique, however there are bilateral patchy airspace opacities. Heart   size appears enlarged. There is a single lead left-sided cardiac pacing   device in place. Degenerative changes of the thoracic spine.    IMPRESSION: Bilateral patchy airspace opacities, slightly increased   compared to 7/14/2018.    < end of copied text >      < from: US Duplex Venous Upper Ext Ltd, Left (07.15.18 @ 11:00) >  IMPRESSION:     No evidence of left upper extremity deep venous thrombosis.      < end of copied text >      Impression:  Elderly man with dysphagia, s/p egd with reflux and candida esophogitis found, got treated for aspiration pneumonia about 2 weeks ago and now on day 5 of a second round of antibiotics for aspiration pneumonia. Unfortunately, even with peg , given his neurologic status and reflux issues, he will remain at ongoing risk for aspiration pneumonia. Prolonging antibiotics will not have any added benefit in the long term. He is not septic at present. Zosyn and cefepime both provide good mssa treatment and atypical is not at all likely so no need for doxycycline which can give esophogitis too.   Recommendations:  would limit cefepime to no more than another 48 hours  can stop doxycycline  to finish 21 days of fluconazole  continue to try setting realistic goals of care with patient and family

## 2018-07-16 DIAGNOSIS — K59.00 CONSTIPATION, UNSPECIFIED: ICD-10-CM

## 2018-07-16 LAB
ANION GAP SERPL CALC-SCNC: 9 MMOL/L — SIGNIFICANT CHANGE UP (ref 5–17)
BUN SERPL-MCNC: 41 MG/DL — HIGH (ref 7–23)
CALCIUM SERPL-MCNC: 8.9 MG/DL — SIGNIFICANT CHANGE UP (ref 8.4–10.5)
CHLORIDE SERPL-SCNC: 110 MMOL/L — HIGH (ref 96–108)
CO2 SERPL-SCNC: 25 MMOL/L — SIGNIFICANT CHANGE UP (ref 22–31)
CREAT SERPL-MCNC: 1.67 MG/DL — HIGH (ref 0.5–1.3)
CULTURE RESULTS: SIGNIFICANT CHANGE UP
CULTURE RESULTS: SIGNIFICANT CHANGE UP
GLUCOSE SERPL-MCNC: 113 MG/DL — HIGH (ref 70–99)
HCT VFR BLD CALC: 22.7 % — LOW (ref 39–50)
HGB BLD-MCNC: 7.1 G/DL — LOW (ref 13–17)
MAGNESIUM SERPL-MCNC: 2.3 MG/DL — SIGNIFICANT CHANGE UP (ref 1.6–2.6)
MCHC RBC-ENTMCNC: 29.1 PG — SIGNIFICANT CHANGE UP (ref 27–34)
MCHC RBC-ENTMCNC: 31.5 GM/DL — LOW (ref 32–36)
MCV RBC AUTO: 92.4 FL — SIGNIFICANT CHANGE UP (ref 80–100)
OB PNL STL: NEGATIVE — SIGNIFICANT CHANGE UP
PHOSPHATE SERPL-MCNC: 3.1 MG/DL — SIGNIFICANT CHANGE UP (ref 2.5–4.5)
PLATELET # BLD AUTO: 149 K/UL — LOW (ref 150–400)
POTASSIUM SERPL-MCNC: 4 MMOL/L — SIGNIFICANT CHANGE UP (ref 3.5–5.3)
POTASSIUM SERPL-SCNC: 4 MMOL/L — SIGNIFICANT CHANGE UP (ref 3.5–5.3)
RBC # BLD: 2.45 M/UL — LOW (ref 4.2–5.8)
RBC # FLD: 16.4 % — HIGH (ref 10.3–14.5)
SODIUM SERPL-SCNC: 144 MMOL/L — SIGNIFICANT CHANGE UP (ref 135–145)
SPECIMEN SOURCE: SIGNIFICANT CHANGE UP
SPECIMEN SOURCE: SIGNIFICANT CHANGE UP
WBC # BLD: 5.8 K/UL — SIGNIFICANT CHANGE UP (ref 3.8–10.5)
WBC # FLD AUTO: 5.8 K/UL — SIGNIFICANT CHANGE UP (ref 3.8–10.5)

## 2018-07-16 PROCEDURE — 71045 X-RAY EXAM CHEST 1 VIEW: CPT | Mod: 26

## 2018-07-16 PROCEDURE — 99232 SBSQ HOSP IP/OBS MODERATE 35: CPT

## 2018-07-16 RX ORDER — LINACLOTIDE 145 UG/1
145 CAPSULE, GELATIN COATED ORAL
Qty: 0 | Refills: 0 | Status: DISCONTINUED | OUTPATIENT
Start: 2018-07-16 | End: 2018-07-19

## 2018-07-16 RX ADMIN — Medication 2 DROP(S): at 12:34

## 2018-07-16 RX ADMIN — SODIUM CHLORIDE 50 MILLILITER(S): 9 INJECTION, SOLUTION INTRAVENOUS at 06:22

## 2018-07-16 RX ADMIN — SENNA PLUS 2 TABLET(S): 8.6 TABLET ORAL at 21:43

## 2018-07-16 RX ADMIN — Medication 3 MILLILITER(S): at 10:34

## 2018-07-16 RX ADMIN — POLYETHYLENE GLYCOL 3350 17 GRAM(S): 17 POWDER, FOR SOLUTION ORAL at 12:35

## 2018-07-16 RX ADMIN — Medication 650 MILLIGRAM(S): at 03:20

## 2018-07-16 RX ADMIN — LATANOPROST 1 DROP(S): 0.05 SOLUTION/ DROPS OPHTHALMIC; TOPICAL at 21:43

## 2018-07-16 RX ADMIN — HEPARIN SODIUM 5000 UNIT(S): 5000 INJECTION INTRAVENOUS; SUBCUTANEOUS at 06:05

## 2018-07-16 RX ADMIN — Medication 3 MILLILITER(S): at 04:28

## 2018-07-16 RX ADMIN — ROBINUL 1 MILLIGRAM(S): 0.2 INJECTION INTRAMUSCULAR; INTRAVENOUS at 06:05

## 2018-07-16 RX ADMIN — Medication 2 DROP(S): at 18:30

## 2018-07-16 RX ADMIN — HEPARIN SODIUM 5000 UNIT(S): 5000 INJECTION INTRAVENOUS; SUBCUTANEOUS at 21:42

## 2018-07-16 RX ADMIN — Medication 3 MILLILITER(S): at 16:28

## 2018-07-16 RX ADMIN — Medication 2 DROP(S): at 06:05

## 2018-07-16 RX ADMIN — Medication 650 MILLIGRAM(S): at 02:23

## 2018-07-16 RX ADMIN — Medication 3 MILLILITER(S): at 22:47

## 2018-07-16 RX ADMIN — Medication 3 MILLIGRAM(S): at 21:43

## 2018-07-16 RX ADMIN — CHLORHEXIDINE GLUCONATE 15 MILLILITER(S): 213 SOLUTION TOPICAL at 06:07

## 2018-07-16 RX ADMIN — CEFEPIME 100 MILLIGRAM(S): 1 INJECTION, POWDER, FOR SOLUTION INTRAMUSCULAR; INTRAVENOUS at 12:35

## 2018-07-16 RX ADMIN — CHLORHEXIDINE GLUCONATE 15 MILLILITER(S): 213 SOLUTION TOPICAL at 18:30

## 2018-07-16 RX ADMIN — HEPARIN SODIUM 5000 UNIT(S): 5000 INJECTION INTRAVENOUS; SUBCUTANEOUS at 12:34

## 2018-07-16 RX ADMIN — FAMOTIDINE 20 MILLIGRAM(S): 10 INJECTION INTRAVENOUS at 12:34

## 2018-07-16 RX ADMIN — FLUCONAZOLE 100 MILLIGRAM(S): 150 TABLET ORAL at 12:34

## 2018-07-16 RX ADMIN — Medication 3 MILLILITER(S): at 04:27

## 2018-07-16 RX ADMIN — ROBINUL 1 MILLIGRAM(S): 0.2 INJECTION INTRAMUSCULAR; INTRAVENOUS at 18:30

## 2018-07-16 NOTE — PROGRESS NOTE ADULT - SUBJECTIVE AND OBJECTIVE BOX
Resting    Vital Signs Last 24 Hrs  T(C): 36.4 (07-16-18 @ 20:37), Max: 36.9 (07-16-18 @ 09:00)  T(F): 97.5 (07-16-18 @ 20:37), Max: 98.5 (07-16-18 @ 14:00)  HR: 60 (07-16-18 @ 20:37) (20 - 65)  BP: 132/66 (07-16-18 @ 20:37) (107/82 - 137/70)  BP(mean): 99 (07-16-18 @ 17:00) (71 - 107)  RR: 16 (07-16-18 @ 20:37) (10 - 21)  SpO2: 98% (07-16-18 @ 20:37) (94% - 100%)    Lungs - decr BS  bilaterally  Heart - S1S2  Abd - soft, NT,  ND, + BS  Extr - sm peripheral edema                        7.1    5.8   )-----------( 149      ( 16 Jul 2018 06:05 )             22.7     16 Jul 2018 06:05    144    |  110    |  41     ----------------------------<  113    4.0     |  25     |  1.67     Ca    8.9        16 Jul 2018 06:05  Phos  3.1       16 Jul 2018 06:05  Mg     2.3       16 Jul 2018 06:05    acetaminophen   Tablet. 650 milliGRAM(s) Oral every 6 hours PRN  acetylcysteine 10% Inhalation 3 milliLiter(s) Inhalation every 6 hours  ALBUTerol/ipratropium for Nebulization 3 milliLiter(s) Nebulizer every 6 hours  atropine 1% Ophthalmic Solution for SubLingual Use 2 Drop(s) SubLingual four times a day  bisacodyl Suppository 10 milliGRAM(s) Rectal daily PRN  cefepime   IVPB      cefepime   IVPB 1000 milliGRAM(s) IV Intermittent daily  chlorhexidine 0.12% Liquid 15 milliLiter(s) Swish and Spit two times a day  famotidine    Tablet 20 milliGRAM(s) Oral daily  fluconAZOLE   Tablet 100 milliGRAM(s) Oral daily  glycopyrrolate 1 milliGRAM(s) Oral two times a day  heparin  Injectable 5000 Unit(s) SubCutaneous every 8 hours  latanoprost 0.005% Ophthalmic Solution 1 Drop(s) Both EYES at bedtime  linaclotide 145 MICROGram(s) Oral before breakfast  melatonin 3 milliGRAM(s) Oral at bedtime  polyethylene glycol 3350 17 Gram(s) Oral daily  senna 2 Tablet(s) Oral at bedtime    A/P:    Asp PNA, recurrent  NPO, PEG feeds  Hemodynamic MELISSA on CKD III, improving  Avoid hypotension and nephrotoxins  F/u CBC, BMP

## 2018-07-16 NOTE — PROGRESS NOTE ADULT - SUBJECTIVE AND OBJECTIVE BOX
Chief Complaint : Dysphagia      patient more alert today  secretions now able to be suctioned as patient allows    DTR bedside today, d/w Her current situation   reviewed cxr, reviewed labs  reviewed plan  informed her that her cell phone not accepting phone calls from hospital and she said will take care of it. she was not aware of it till today.     pt more alert now and willing to allow suctioning, airway has remained stable x 4 days.    Allergies :No Known Allergies      PAST MEDICAL & SURGICAL HISTORY:  Psoriasis  GERD (gastroesophageal reflux disease)  Dyslipidemia  No significant past surgical history      Medications:  MEDICATIONS  (STANDING):  acetylcysteine 10% Inhalation 3 milliLiter(s) Inhalation every 6 hours  ALBUTerol/ipratropium for Nebulization 3 milliLiter(s) Nebulizer every 6 hours  atropine 1% Ophthalmic Solution for SubLingual Use 2 Drop(s) SubLingual four times a day  cefepime   IVPB      cefepime   IVPB 1000 milliGRAM(s) IV Intermittent daily  chlorhexidine 0.12% Liquid 15 milliLiter(s) Swish and Spit two times a day  famotidine    Tablet 20 milliGRAM(s) Oral daily  fluconAZOLE   Tablet 100 milliGRAM(s) Oral daily  glycopyrrolate 1 milliGRAM(s) Oral two times a day  heparin  Injectable 5000 Unit(s) SubCutaneous every 8 hours  latanoprost 0.005% Ophthalmic Solution 1 Drop(s) Both EYES at bedtime  linaclotide 145 MICROGram(s) Oral before breakfast  melatonin 3 milliGRAM(s) Oral at bedtime  polyethylene glycol 3350 17 Gram(s) Oral daily  senna 2 Tablet(s) Oral at bedtime    MEDICATIONS  (PRN):  acetaminophen   Tablet. 650 milliGRAM(s) Oral every 6 hours PRN Mild Pain (1 - 3)  bisacodyl Suppository 10 milliGRAM(s) Rectal daily PRN Constipation      LABS:                        7.1    5.8   )-----------( 149      ( 16 Jul 2018 06:05 )             22.7     07-16    144  |  110<H>  |  41<H>  ----------------------------<  113<H>  4.0   |  25  |  1.67<H>    Ca    8.9      16 Jul 2018 06:05  Phos  3.1     07-16  Mg     2.3     07-16              PT/INR - ( 15 Jul 2018 05:45 )   PT: 20.3 sec;   INR: 1.81 ratio         PTT - ( 15 Jul 2018 05:45 )  PTT:47.8 sec            CULTURES: (if applicable)    Culture - Sputum (collected 07-11-18 @ 09:15)  Source: .Sputum Sputum  Gram Stain (07-11-18 @ 14:46):    No squamous epithelial cells per low power field    Numerous polymorphonuclear leukocytes per low power field    Numerous Gram positive cocci in pairs per oil power field  Final Report (07-15-18 @ 09:40):    Moderate Klebsiella pneumoniae    Numerous Streptococcus agalactiae (Group B) isolated    Group B streptococci are susceptible to ampicillin,    penicillin and cefazolin, but may be resistant to    erythromycin and clindamycin.    Recommendations for intrapartum prophylaxis for Group B    streptococci are penicillin or ampicillin.    Moderate Staphylococcus aureus    Normal Respiratory Renetta absent  Organism: Klebsiella pneumoniae  Staphylococcus aureus (07-15-18 @ 09:40)  Organism: Staphylococcus aureus (07-15-18 @ 09:40)      -  Ampicillin/Sulbactam: S <=8/4      -  Cefazolin: S <=4      -  Clindamycin: S 0.5      -  Erythromycin: R >4      -  Gentamicin: S <=1      -  Oxacillin: S 1      -  Penicillin: R >8      -  RIF- Rifampin: S <=1      -  Tetra/Doxy: S <=1      -  Trimethoprim/Sulfamethoxazole: S <=0.5/9.5      -  Vancomycin: S 2      Method Type: KALYAN  Organism: Klebsiella pneumoniae (07-15-18 @ 09:40)      -  Amikacin: S <=8      -  Amoxicillin/Clavulanic Acid: S <=8/4      -  Ampicillin: R >16      -  Ampicillin/Sulbactam: S 8/4      -  Aztreonam: S <=4      -  Cefazolin: S <=2      -  Cefepime: S <=2      -  Cefoxitin: S <=4      -  Ceftriaxone: S <=1      -  Ciprofloxacin: S <=0.5      -  Ertapenem: S <=0.5      -  Gentamicin: S <=1      -  Imipenem: S <=1      -  Levofloxacin: S <=1      -  Meropenem: S <=1      -  Piperacillin/Tazobactam: S <=8      -  Tobramycin: S <=2      -  Trimethoprim/Sulfamethoxazole: S <=0.5/9.5      Method Type: KALYAN    Culture - Blood (collected 07-11-18 @ 08:40)  Source: .Blood Blood  Preliminary Report (07-12-18 @ 13:01):    No growth to date.    Culture - Blood (collected 07-11-18 @ 08:15)  Source: .Blood Blood-Peripheral  Preliminary Report (07-12-18 @ 12:01):    No growth to date.    Culture - Blood (collected 07-03-18 @ 23:20)  Source: .Blood Blood-Peripheral  Final Report (07-09-18 @ 09:00):    No growth at 5 days.    Culture - Blood (collected 07-03-18 @ 23:20)  Source: .Blood Blood-Peripheral  Final Report (07-09-18 @ 09:00):    No growth at 5 days.    Culture - Sputum (collected 07-03-18 @ 21:20)  Source: .Sputum Sputum - trap  Gram Stain (07-04-18 @ 11:52):    Numerous polymorphonuclear leukocytes per low power field    Rare Squamous epithelial cells per low power field    Rare Gram Negative Rods per oil power field    Rare Gram positive cocci in pairs per oil power field  Final Report (07-06-18 @ 08:12):    Normal Respiratory Renetta present    Culture - Urine (collected 07-03-18 @ 21:20)  Source: .Urine Clean Catch (Midstream)  Final Report (07-05-18 @ 11:43):    No growth            CAPILLARY BLOOD GLUCOSE          RADIOLOGY  CXR:  < from: Xray Chest 1 View- PORTABLE-Routine (07.16.18 @ 06:28) >  IMPRESSION: Scattered infiltrates showing some increase.    < end of copied text >      VITALS:  T(C): 36.9 (07-16-18 @ 09:00), Max: 36.9 (07-16-18 @ 09:00)  T(F): 98.4 (07-16-18 @ 09:00), Max: 98.4 (07-16-18 @ 09:00)  HR: 60 (07-16-18 @ 10:34) (59 - 65)  BP: 126/64 (07-16-18 @ 09:00) (111/56 - 137/70)  BP(mean): 79 (07-16-18 @ 09:00) (69 - 113)  ABP: --  ABP(mean): --  RR: 17 (07-16-18 @ 09:00) (14 - 21)  SpO2: 97% (07-16-18 @ 10:34) (96% - 100%)  CVP(mm Hg): --  CVP(cm H2O): --    Ins and Outs     07-15-18 @ 07:01  -  07-16-18 @ 07:00  --------------------------------------------------------  IN: 3295 mL / OUT: 0 mL / NET: 3295 mL    07-16-18 @ 07:01  -  07-16-18 @ 11:59  --------------------------------------------------------  IN: 155 mL / OUT: 0 mL / NET: 155 mL        Assessment and Plan:   · Assessment		  Physical Examination:  GENERAL:               Alert, verbal ,oriented x 2 No acute distress.    HEENT:                  mild oral and tracheal secretions, mild gross  gargling of secretions resolve when suctioned now, dry MM  PULM:                    Bilateral air entry, transmitted upper airway sounds, No Rales, mild Right and mild left scattered Rhonchi, No Wheezing  CVS:                       S1, S2,  + Murmur  ABD:                        Soft, distended, nontender, normoactive bowel sounds,  + PEG   EXT:                       LUE improving edema, nontender, No Cyanosis or Clubbing   Vascular:                Warm Extremities, Normal Capillary refill, Normal Distal Pulses  NEURO:                  Alert,  follows commands  PSYC:                      Calm, + Insight with episodes of agitation      85 year old male with h/o cva and dysphagia and h/o PEG, PPM, CKD, Psoriasis, recent hospitalization for UTI and right hydronephrosis who p/w dysphagia and ? Nee for PEG on 7/2/18  S/P EGD which showed no mechanical obstructive causes of dysphagia, but required to be intubated post procedure and was monitored on floor.  Extubated and transferred to medical floor  on 7/11/18 patient developed severe increase in secretions unable to manage airway and brought to ICU for likely asp pna and pending respiratory failure. due to severe upper airway secretions.      Assessment  upper airway secretions, improving. no refusing suctioning  Anemia  - slow downtrending h/h with out source of active gib.  Dysphagia S/P PEG 7/12 tolerating diet  Aspiration PNA - Kleb  and staph in sputum  MELISSA on CKD cr baseline 1.2 -1.4  improving and resolved hypernatremia   Plan  Frequent suctioning. robinol and atropine, pulm toilet, chest PT  Continue Mucomyst Nebs, humidified o2  hypernatremia resolved d/c IVF  abx as per ID  Continue laxatives, pt agree to enema today

## 2018-07-16 NOTE — PROGRESS NOTE ADULT - SUBJECTIVE AND OBJECTIVE BOX
Patient is a 85y old  Male who presents with a chief complaint of Cough, unable to swallow (02 Jul 2018 18:17)    HPI:  Pt is a 84 y o M sent from [facility] for cough, unable to swallow, wife requested evaluation. Wife states that pt has had feeding tubes in the past and then advanced to pureed diet but now is unable to swallow, pt states it gets stuck in throat, thinks he needs a scope, was seen by GI in [place]. Denies f/c, has some nausea. (02 Jul 2018 18:17)    history of ca prostate s/p radiation. had right ureteral stent placed in May for hydronephrosis and recurrent UTIs.    Interval Events:  Patient seen and examined at bedside.    MEDICATIONS:  MEDICATIONS  (STANDING):  acetylcysteine 10% Inhalation 3 milliLiter(s) Inhalation every 6 hours  ALBUTerol/ipratropium for Nebulization 3 milliLiter(s) Nebulizer every 6 hours  atropine 1% Ophthalmic Solution for SubLingual Use 2 Drop(s) SubLingual four times a day  cefepime   IVPB      cefepime   IVPB 1000 milliGRAM(s) IV Intermittent daily  chlorhexidine 0.12% Liquid 15 milliLiter(s) Swish and Spit two times a day  famotidine    Tablet 20 milliGRAM(s) Oral daily  fluconAZOLE   Tablet 100 milliGRAM(s) Oral daily  glycopyrrolate 1 milliGRAM(s) Oral two times a day  heparin  Injectable 5000 Unit(s) SubCutaneous every 8 hours  latanoprost 0.005% Ophthalmic Solution 1 Drop(s) Both EYES at bedtime  melatonin 3 milliGRAM(s) Oral at bedtime  polyethylene glycol 3350 17 Gram(s) Oral daily  senna 2 Tablet(s) Oral at bedtime    MEDICATIONS  (PRN):  acetaminophen   Tablet. 650 milliGRAM(s) Oral every 6 hours PRN Mild Pain (1 - 3)  bisacodyl Suppository 10 milliGRAM(s) Rectal daily PRN Constipation      Allergies    No Known Allergies    Intolerances        Vital Signs Last 24 Hrs  T(C): 36.6 (16 Jul 2018 06:00), Max: 36.8 (15 Jul 2018 16:00)  T(F): 97.8 (16 Jul 2018 06:00), Max: 98.2 (15 Jul 2018 16:00)  HR: 60 (16 Jul 2018 08:00) (59 - 65)  BP: 117/73 (16 Jul 2018 08:00) (111/56 - 137/70)  BP(mean): 83 (16 Jul 2018 08:00) (69 - 113)  RR: 17 (16 Jul 2018 08:00) (14 - 22)  SpO2: 100% (16 Jul 2018 08:00) (95% - 100%)      I&O's Detail    14 Jul 2018 07:01  -  15 Jul 2018 07:00  --------------------------------------------------------  IN:    Free Water: 250 mL    Nepro: 1165 mL    sodium chloride 0.45%: 1535 mL    Solution: 150 mL  Total IN: 3100 mL    OUT:  Total OUT: 0 mL    Total NET: 3100 mL      15 Jul 2018 07:01  -  16 Jul 2018 07:00  --------------------------------------------------------  IN:    dextrose 5%.: 1150 mL    Free Water: 750 mL    Nepro: 1105 mL    sodium chloride 0.45%: 75 mL    Solution: 100 mL    Solution: 50 mL  Total IN: 3230 mL    OUT:  Total OUT: 0 mL    Total NET: 3230 mL              LABS:      CBC Full  -  ( 16 Jul 2018 06:05 )  WBC Count : 5.8 K/uL  Hemoglobin : 7.1 g/dL  Hematocrit : 22.7 %  Platelet Count - Automated : 149 K/uL  Mean Cell Volume : 92.4 fl  Mean Cell Hemoglobin : 29.1 pg  Mean Cell Hemoglobin Concentration : 31.5 gm/dL  Auto Neutrophil # : x  Auto Lymphocyte # : x  Auto Monocyte # : x  Auto Eosinophil # : x  Auto Basophil # : x  Auto Neutrophil % : x  Auto Lymphocyte % : x  Auto Monocyte % : x  Auto Eosinophil % : x  Auto Basophil % : x    07-16    144  |  110<H>  |  41<H>  ----------------------------<  113<H>  4.0   |  25  |  1.67<H>    Ca    8.9      16 Jul 2018 06:05  Phos  3.1     07-16  Mg     2.3     07-16      PT/INR - ( 15 Jul 2018 05:45 )   PT: 20.3 sec;   INR: 1.81 ratio         PTT - ( 15 Jul 2018 05:45 )  PTT:47.8 sec              Physical Exam    Constitutional: alert NAD    Abdomen: obese, soft, NT/ND no bladder distention    Genitourinary: penis/testes benign

## 2018-07-16 NOTE — PROGRESS NOTE ADULT - ASSESSMENT
Physical Examination:  GENERAL:               Alert, verbal ,oriented x 2 No acute distress.    HEENT:                  mild oral and tracheal secretions, mild gross  gargling of secretions resolve when suctioned now, dry MM  PULM:                    Bilateral air entry, transmitted upper airway sounds, No Rales, mild Right and mild left scattered Rhonchi, No Wheezing  CVS:                       S1, S2,  + Murmur  ABD:                        Soft, distended, nontender, normoactive bowel sounds,  + PEG   EXT:                       LUE improving edema, nontender, No Cyanosis or Clubbing   Vascular:                Warm Extremities, Normal Capillary refill, Normal Distal Pulses  NEURO:                  Alert,  follows commands  PSYC:                      Calm, + Insight with episodes of agitation      85 year old male with h/o cva and dysphagia and h/o PEG, PPM, CKD, Psoriasis, recent hospitalization for UTI and right hydronephrosis who p/w dysphagia and ? Nee for PEG on 7/2/18  S/P EGD which showed no mechanical obstructive causes of dysphagia, but required to be intubated post procedure and was monitored on floor.  Extubated and transferred to medical floor  on 7/11/18 patient developed severe increase in secretions unable to manage airway and brought to ICU for likely asp pna and pending respiratory failure. due to severe upper airway secretions.      Assessment  upper airway secretions, improving. no refusing suctioning  Anemia  - slow downtrending h/h with out source of active gib.  Dysphagia S/P PEG 7/12 tolerating diet  Aspiration PNA - Kleb  and staph in sputum  MELISSA on CKD cr baseline 1.2 -1.4  improving and resolved hypernatremia   Plan  Frequent suctioning. robinol and atropine, pulm toilet, chest PT  Continue Mucomyst Nebs, humidified o2  hypernatremia resolved d/c IVF  abx as per ID  Continue laxatives, pt agree to enema today    Family Updated: 	Dtr- melia	                                 Date:  7/16   DTR Home  [phone] Cell  [phone] & Analgesia:	on hold  Diet/Nutrition:		tube feeding  GI PPx:			PPI    DVT Ppx:		hep sq  	RISK                                                          Points  	[ ] Previous VTE                                           	3  	[ ] Thrombophilia                                        	2  	[ ] Lower limb paralysis                              	2   	[ ] Current Cancer                                       	2   	[ x] Immobilization > 24 hrs                        	1  	[ x] ICU/CCU stay > 24 hours                       	1  	[ x] Age > 60                                                   	1  		Total:[ 3]      Activity:		       OOB to mita         Head of Bed:               35-45 deg  Glycemic Control:        n/a    Lines:  PIV      Disposition:  continue ICU care.  Goals of Care: full code Physical Examination:  GENERAL:               Alert, verbal ,oriented x 2 No acute distress.    HEENT:                  mild oral and tracheal secretions, mild gross  gargling of secretions resolve when suctioned now, dry MM  PULM:                    Bilateral air entry, transmitted upper airway sounds, No Rales, mild Right and mild left scattered Rhonchi, No Wheezing  CVS:                       S1, S2,  + Murmur  ABD:                        Soft, distended, nontender, normoactive bowel sounds,  + PEG   EXT:                       LUE improving edema, nontender, No Cyanosis or Clubbing   Vascular:                Warm Extremities, Normal Capillary refill, Normal Distal Pulses  NEURO:                  Alert,  follows commands  PSYC:                      Calm, + Insight with episodes of agitation      85 year old male with h/o cva and dysphagia and h/o PEG, PPM, CKD, Psoriasis, recent hospitalization for UTI and right hydronephrosis who p/w dysphagia and ? Nee for PEG on 7/2/18  S/P EGD which showed no mechanical obstructive causes of dysphagia, but required to be intubated post procedure and was monitored on floor.  Extubated and transferred to medical floor  on 7/11/18 patient developed severe increase in secretions unable to manage airway and brought to ICU for likely asp pna and pending respiratory failure. due to severe upper airway secretions.      Assessment  upper airway secretions, improving. no refusing suctioning  Anemia  - slow downtrending h/h with out source of active gib.  Dysphagia S/P PEG 7/12 tolerating diet  Aspiration PNA - Kleb  and staph in sputum  MELISSA on CKD cr baseline 1.2 -1.4  improving and resolved hypernatremia   Plan  Frequent suctioning. robinol and atropine, pulm toilet, chest PT  Continue Mucomyst Nebs, humidified o2  hypernatremia resolved d/c IVF  abx as per ID  Continue laxatives, pt agree to enema today    Family Updated: 	Dtr- melia	                                 Date:  7/16   DTR Home  [phone] Cell  [phone] & Analgesia:	on hold  Diet/Nutrition:		tube feeding  GI PPx:			PPI    DVT Ppx:		hep sq  	RISK                                                          Points  	[ ] Previous VTE                                           	3  	[ ] Thrombophilia                                        	2  	[ ] Lower limb paralysis                              	2   	[ ] Current Cancer                                       	2   	[ x] Immobilization > 24 hrs                        	1  	[ x] ICU/CCU stay > 24 hours                       	1  	[ x] Age > 60                                                   	1  		Total:[ 3]      Activity:		       OOB to mita         Head of Bed:               35-45 deg  Glycemic Control:        n/a    Lines:  PIV      Disposition:  transfer to tele case d/w Dr. us who will accept pt upon transfer  Goals of Care: full code

## 2018-07-16 NOTE — PROGRESS NOTE ADULT - SUBJECTIVE AND OBJECTIVE BOX
Spoke with pt re: overdue labs. Pt states he will stop in at [facility] lab station to have them drawn within the next couple weeks.    Patient seen and examined at bedside  c/o severe generalized weakness with inability to get out of bed on his own    HPI:  84m hx tia, s/p PPM, psoriasis, CKD, presenting from rehab with weakness, difficulty ambulating. Pt's complaints cluster primarily around poor care at rehab and lack of actual PT time, and pt attributes his present weakness to this lack of working with PT. Reports completing a course of abx yesterday for a UTI. Reports no fevers/chills, no new aches/pains, no cp/sob, no cough/HA. Would like to go to a different rehab if possible.    PAST MEDICAL & SURGICAL HISTORY:  Psoriasis  Prostate cancer  GERD (gastroesophageal reflux disease)  Dyslipidemia  No significant past surgical history      No Known Allergies       MEDICATIONS  (STANDING):  cefepime   IVPB 1000 milliGRAM(s) IV Intermittent every 12 hours  cholecalciferol 1000 Unit(s) Oral daily  cyanocobalamin 1000 MICROGram(s) Oral daily  doxazosin 4 milliGRAM(s) Oral at bedtime  enoxaparin Injectable 40 milliGRAM(s) SubCutaneous every 24 hours  famotidine    Tablet 20 milliGRAM(s) Oral daily  latanoprost 0.005% Ophthalmic Solution 1 Drop(s) Left EYE at bedtime  melatonin 3 milliGRAM(s) Oral at bedtime  nystatin Powder 1 Application(s) Topical three times a day  senna 2 Tablet(s) Oral at bedtime  simvastatin 20 milliGRAM(s) Oral at bedtime    MEDICATIONS  (PRN):  acetaminophen   Tablet. 650 milliGRAM(s) Oral every 6 hours PRN Moderate Pain (4 - 6)  bisacodyl Suppository 10 milliGRAM(s) Rectal daily PRN Constipation  docusate sodium 100 milliGRAM(s) Oral daily PRN Constipation  hydrocortisone 2.5% Ointment 1 Application(s) Topical daily PRN Rash  polyethylene glycol 3350 17 Gram(s) Oral daily PRN Constipation      REVIEW OF SYSTEMS: as above and...  CONSTITUTIONAL: (+) malaise.   EYES: No acute change in vision   ENT:  No tinnitus  NECK: No stiffness  RESPIRATORY: No hemoptysis  CARDIOVASCULAR: No chest pain, palpitations, syncope  GASTROINTESTINAL: No hematemesis, diarrhea, melena, or hematochezia.  GENITOURINARY: No hematuria  NEUROLOGICAL: No headaches  LYMPH Nodes: No enlarged glands  ENDOCRINE: No heat or cold intolerance	    T(C): 36.2 (05-17-18 @ 09:54), Max: 36.7 (05-16-18 @ 22:14)  HR: 60 (05-17-18 @ 09:54) (60 - 61)  BP: 141/88 (05-17-18 @ 09:54) (140/84 - 158/87)  RR: 18 (05-17-18 @ 09:54) (18 - 18)  SpO2: 100% (05-17-18 @ 09:54) (97% - 100%)    PHYSICAL EXAMINATION:   Constitutional: WD, NAD  HEENT: NC, AT  Neck:  Supple  Respiratory:  Adequate airflow b/l. Not using accessory muscles of respiration.  Cardiovascular:  S1 & S2 intact, no R/G, 2+ radial pulses b/l  Gastrointestinal: Soft, NT, ND, normoactive b.s., no organomegaly/RT/rigidity  Extremities: severe weakness. WWP  Neurological:  unstable gait a/w severe generalized weakness. Alert and awake.  No acute focal motor deficits. Crude sensation intact.     Labs and imaging reviewed    LABS:                        10.6   5.56  )-----------( 171      ( 17 May 2018 07:50 )             33.2     05-17    141  |  108  |  25<H>  ----------------------------<  76  4.4   |  23  |  1.32<H>    Ca    9.5      17 May 2018 07:00              CAPILLARY BLOOD GLUCOSE                  RADIOLOGY & ADDITIONAL STUDIES:

## 2018-07-16 NOTE — PROGRESS NOTE ADULT - ASSESSMENT
Elderly male admitted with weakness, now s/p PEG placement last week; candida esophagitis appears improved today.

## 2018-07-16 NOTE — PROGRESS NOTE ADULT - PROBLEM SELECTOR PLAN 3
1. Patient on multiple agents.  Despite this he is still now having bowel movements.  Will begin Linzess 145 mcg qAM; spoke with Pharmacy.

## 2018-07-16 NOTE — PROGRESS NOTE ADULT - SUBJECTIVE AND OBJECTIVE BOX
Patient appears more awake and conversant this morning.  Breathing better without oropharyngeal secretions.  Tolerating gastrostomy tube feeds, but has not had a bowel movement.    MEDICATIONS  (STANDING):  acetylcysteine 10% Inhalation 3 milliLiter(s) Inhalation every 6 hours  ALBUTerol/ipratropium for Nebulization 3 milliLiter(s) Nebulizer every 6 hours  atropine 1% Ophthalmic Solution for SubLingual Use 2 Drop(s) SubLingual four times a day  cefepime   IVPB      cefepime   IVPB 1000 milliGRAM(s) IV Intermittent daily  chlorhexidine 0.12% Liquid 15 milliLiter(s) Swish and Spit two times a day  famotidine    Tablet 20 milliGRAM(s) Oral daily  fluconAZOLE   Tablet 100 milliGRAM(s) Oral daily  glycopyrrolate 1 milliGRAM(s) Oral two times a day  heparin  Injectable 5000 Unit(s) SubCutaneous every 8 hours  latanoprost 0.005% Ophthalmic Solution 1 Drop(s) Both EYES at bedtime  melatonin 3 milliGRAM(s) Oral at bedtime  polyethylene glycol 3350 17 Gram(s) Oral daily  senna 2 Tablet(s) Oral at bedtime    MEDICATIONS  (PRN):  acetaminophen   Tablet. 650 milliGRAM(s) Oral every 6 hours PRN Mild Pain (1 - 3)  bisacodyl Suppository 10 milliGRAM(s) Rectal daily PRN Constipation      No Known Allergies      PHYSICAL EXAM:   Vital Signs:  Vital Signs Last 24 Hrs  T(C): 36.9 (2018 09:00), Max: 36.9 (2018 09:00)  T(F): 98.4 (2018 09:00), Max: 98.4 (2018 09:00)  HR: 60 (2018 09:00) (59 - 65)  BP: 126/64 (2018 09:00) (111/56 - 137/70)  BP(mean): 79 (2018 09:00) (69 - 113)  RR: 17 (2018 09:00) (14 - 22)  SpO2: 99% (2018 09:00) (96% - 100%)  Daily     Daily Weight in k.3 (2018 06:00)I&O's Summary    15 Jul 2018 07:01  -  2018 07:00  --------------------------------------------------------  IN: 3295 mL / OUT: 0 mL / NET: 3295 mL    2018 07:01  -  2018 09:55  --------------------------------------------------------  IN: 155 mL / OUT: 0 mL / NET: 155 mL        GENERAL:  Appears stated age, no distress  HEENT:  NC/AT,  conjunctivae clear and pink, no thyromegaly, nodules, adenopathy, no JVD, sclera -anicteric  CHEST:  Full & symmetric excursion, no increased effort, breath sounds clear  HEART:  Regular rhythm, S1, S2, no murmur/rub/S3/S4, no abdominal bruit, no edema  ABDOMEN:  Soft, non-tender, non-distended, normoactive bowel sounds,  no masses ,no hepato-splenomegaly, no signs of chronic liver disease  EXTEREMITIES:  no cyanosis,clubbing or edema  SKIN:  No rash/erythema/ecchymoses/petechiae/wounds/abscess/warm/dry  NEURO:  Alert, oriented, no asterixis, no tremor, no encephalopathy      LABS:                        7.1    5.8   )-----------( 149      ( 2018 06:05 )             22.7     07-16    144  |  110<H>  |  41<H>  ----------------------------<  113<H>  4.0   |  25  |  1.67<H>    Ca    8.9      2018 06:05  Phos  3.1     07-16  Mg     2.3     07-16      PT/INR - ( 15 Jul 2018 05:45 )   PT: 20.3 sec;   INR: 1.81 ratio         PTT - ( 15 Jul 2018 05:45 )  PTT:47.8 sec

## 2018-07-16 NOTE — PROGRESS NOTE ADULT - SUBJECTIVE AND OBJECTIVE BOX
Follow-up Critical Care Progress Note  Chief Complaint : Dysphagia      patient more alert today  secretions now able to be suctioned as patient allows    DTR bedside today, d/w Her current situation   reviewed cxr, reviewed labs  reviewed plan  informed her that her cell phone not accepting phone calls from hospital and she said will take care of it. she was not aware of it till today.     pt more alert now and willing to allow suctioning, airway has remained stable x 4 days.    Allergies :No Known Allergies      PAST MEDICAL & SURGICAL HISTORY:  Psoriasis  GERD (gastroesophageal reflux disease)  Dyslipidemia  No significant past surgical history      Medications:  MEDICATIONS  (STANDING):  acetylcysteine 10% Inhalation 3 milliLiter(s) Inhalation every 6 hours  ALBUTerol/ipratropium for Nebulization 3 milliLiter(s) Nebulizer every 6 hours  atropine 1% Ophthalmic Solution for SubLingual Use 2 Drop(s) SubLingual four times a day  cefepime   IVPB      cefepime   IVPB 1000 milliGRAM(s) IV Intermittent daily  chlorhexidine 0.12% Liquid 15 milliLiter(s) Swish and Spit two times a day  famotidine    Tablet 20 milliGRAM(s) Oral daily  fluconAZOLE   Tablet 100 milliGRAM(s) Oral daily  glycopyrrolate 1 milliGRAM(s) Oral two times a day  heparin  Injectable 5000 Unit(s) SubCutaneous every 8 hours  latanoprost 0.005% Ophthalmic Solution 1 Drop(s) Both EYES at bedtime  linaclotide 145 MICROGram(s) Oral before breakfast  melatonin 3 milliGRAM(s) Oral at bedtime  polyethylene glycol 3350 17 Gram(s) Oral daily  senna 2 Tablet(s) Oral at bedtime    MEDICATIONS  (PRN):  acetaminophen   Tablet. 650 milliGRAM(s) Oral every 6 hours PRN Mild Pain (1 - 3)  bisacodyl Suppository 10 milliGRAM(s) Rectal daily PRN Constipation      LABS:                        7.1    5.8   )-----------( 149      ( 16 Jul 2018 06:05 )             22.7     07-16    144  |  110<H>  |  41<H>  ----------------------------<  113<H>  4.0   |  25  |  1.67<H>    Ca    8.9      16 Jul 2018 06:05  Phos  3.1     07-16  Mg     2.3     07-16              PT/INR - ( 15 Jul 2018 05:45 )   PT: 20.3 sec;   INR: 1.81 ratio         PTT - ( 15 Jul 2018 05:45 )  PTT:47.8 sec            CULTURES: (if applicable)    Culture - Sputum (collected 07-11-18 @ 09:15)  Source: .Sputum Sputum  Gram Stain (07-11-18 @ 14:46):    No squamous epithelial cells per low power field    Numerous polymorphonuclear leukocytes per low power field    Numerous Gram positive cocci in pairs per oil power field  Final Report (07-15-18 @ 09:40):    Moderate Klebsiella pneumoniae    Numerous Streptococcus agalactiae (Group B) isolated    Group B streptococci are susceptible to ampicillin,    penicillin and cefazolin, but may be resistant to    erythromycin and clindamycin.    Recommendations for intrapartum prophylaxis for Group B    streptococci are penicillin or ampicillin.    Moderate Staphylococcus aureus    Normal Respiratory Renetta absent  Organism: Klebsiella pneumoniae  Staphylococcus aureus (07-15-18 @ 09:40)  Organism: Staphylococcus aureus (07-15-18 @ 09:40)      -  Ampicillin/Sulbactam: S <=8/4      -  Cefazolin: S <=4      -  Clindamycin: S 0.5      -  Erythromycin: R >4      -  Gentamicin: S <=1      -  Oxacillin: S 1      -  Penicillin: R >8      -  RIF- Rifampin: S <=1      -  Tetra/Doxy: S <=1      -  Trimethoprim/Sulfamethoxazole: S <=0.5/9.5      -  Vancomycin: S 2      Method Type: KALYAN  Organism: Klebsiella pneumoniae (07-15-18 @ 09:40)      -  Amikacin: S <=8      -  Amoxicillin/Clavulanic Acid: S <=8/4      -  Ampicillin: R >16      -  Ampicillin/Sulbactam: S 8/4      -  Aztreonam: S <=4      -  Cefazolin: S <=2      -  Cefepime: S <=2      -  Cefoxitin: S <=4      -  Ceftriaxone: S <=1      -  Ciprofloxacin: S <=0.5      -  Ertapenem: S <=0.5      -  Gentamicin: S <=1      -  Imipenem: S <=1      -  Levofloxacin: S <=1      -  Meropenem: S <=1      -  Piperacillin/Tazobactam: S <=8      -  Tobramycin: S <=2      -  Trimethoprim/Sulfamethoxazole: S <=0.5/9.5      Method Type: KALYAN    Culture - Blood (collected 07-11-18 @ 08:40)  Source: .Blood Blood  Preliminary Report (07-12-18 @ 13:01):    No growth to date.    Culture - Blood (collected 07-11-18 @ 08:15)  Source: .Blood Blood-Peripheral  Preliminary Report (07-12-18 @ 12:01):    No growth to date.    Culture - Blood (collected 07-03-18 @ 23:20)  Source: .Blood Blood-Peripheral  Final Report (07-09-18 @ 09:00):    No growth at 5 days.    Culture - Blood (collected 07-03-18 @ 23:20)  Source: .Blood Blood-Peripheral  Final Report (07-09-18 @ 09:00):    No growth at 5 days.    Culture - Sputum (collected 07-03-18 @ 21:20)  Source: .Sputum Sputum - trap  Gram Stain (07-04-18 @ 11:52):    Numerous polymorphonuclear leukocytes per low power field    Rare Squamous epithelial cells per low power field    Rare Gram Negative Rods per oil power field    Rare Gram positive cocci in pairs per oil power field  Final Report (07-06-18 @ 08:12):    Normal Respiratory Renetta present    Culture - Urine (collected 07-03-18 @ 21:20)  Source: .Urine Clean Catch (Midstream)  Final Report (07-05-18 @ 11:43):    No growth            CAPILLARY BLOOD GLUCOSE          RADIOLOGY  CXR:  < from: Xray Chest 1 View- PORTABLE-Routine (07.16.18 @ 06:28) >  IMPRESSION: Scattered infiltrates showing some increase.    < end of copied text >      VITALS:  T(C): 36.9 (07-16-18 @ 09:00), Max: 36.9 (07-16-18 @ 09:00)  T(F): 98.4 (07-16-18 @ 09:00), Max: 98.4 (07-16-18 @ 09:00)  HR: 60 (07-16-18 @ 10:34) (59 - 65)  BP: 126/64 (07-16-18 @ 09:00) (111/56 - 137/70)  BP(mean): 79 (07-16-18 @ 09:00) (69 - 113)  ABP: --  ABP(mean): --  RR: 17 (07-16-18 @ 09:00) (14 - 21)  SpO2: 97% (07-16-18 @ 10:34) (96% - 100%)  CVP(mm Hg): --  CVP(cm H2O): --    Ins and Outs     07-15-18 @ 07:01  -  07-16-18 @ 07:00  --------------------------------------------------------  IN: 3295 mL / OUT: 0 mL / NET: 3295 mL    07-16-18 @ 07:01  -  07-16-18 @ 11:59  --------------------------------------------------------  IN: 155 mL / OUT: 0 mL / NET: 155 mL

## 2018-07-16 NOTE — CHART NOTE - NSCHARTNOTEFT_GEN_A_CORE
Nutrition follow up    Hospital course as per chart: Pt 86 y/o M with PMH: psoriasis, prostate cancer, GERD, HLD, admitted with cough, unable to swallow, hydronephrosis, UTI - resolved, MELISSA on CKD - improving, noted gastro consult pending for dysphagia.    Pt reports tolerating feedings. Pt denies nausea or vomiting. Reports constipation with last BM 4-5 days ago - noted pt on bowel regimen as per chart. Denies having questions about diet or feeding tube. As per RN, pt tolerating feedings well currently at goal. Noted EN provision x 24 hrs as per flow sheets: 1105 ml vs goal of  1170 ml.     Source: Patient [x]    Family [ ]     other [x]; Medical record; RN    Diet: NPO with tube feedings      Enteral /Parenteral Nutrition: Nepro via PEG at 30 ml/hr to goal of 65 ml/hr x 18 hours to provide 1170 ml, 2106 kcal and 95 g protein (21 kcal/kg and 0.9 g/kg protein based on dosing weight 99.8kg).    Current Weight: (07/02) 99.8 kg -> (07/03) 104.8 kg -> (07/09) 113.5 kg -> (07/12) 101.9 kg -> (07/16) 108.3 kg -?accuracy of weight fluctuations likely due to fluid shifts.   % Weight Change: n/a    Pertinent Medications: MEDICATIONS  (STANDING):  acetylcysteine 10% Inhalation 3 milliLiter(s) Inhalation every 6 hours  ALBUTerol/ipratropium for Nebulization 3 milliLiter(s) Nebulizer every 6 hours  atropine 1% Ophthalmic Solution for SubLingual Use 2 Drop(s) SubLingual four times a day  cefepime   IVPB      cefepime   IVPB 1000 milliGRAM(s) IV Intermittent daily  chlorhexidine 0.12% Liquid 15 milliLiter(s) Swish and Spit two times a day  famotidine    Tablet 20 milliGRAM(s) Oral daily  fluconAZOLE   Tablet 100 milliGRAM(s) Oral daily  glycopyrrolate 1 milliGRAM(s) Oral two times a day  heparin  Injectable 5000 Unit(s) SubCutaneous every 8 hours  latanoprost 0.005% Ophthalmic Solution 1 Drop(s) Both EYES at bedtime  melatonin 3 milliGRAM(s) Oral at bedtime  polyethylene glycol 3350 17 Gram(s) Oral daily  senna 2 Tablet(s) Oral at bedtime    MEDICATIONS  (PRN):  acetaminophen   Tablet. 650 milliGRAM(s) Oral every 6 hours PRN Mild Pain (1 - 3)  bisacodyl Suppository 10 milliGRAM(s) Rectal daily PRN Constipation    Pertinent Labs: (07/16) Glu 113 mg/dL<H> Cr  1.67 mg/dL<H> BUN 41 mg/dL<H> GFR 37  (07/10) [id number] 4.6 %    Skin: no noted pressure injuries as per documentation.   Noted +1 generalized edema as per flow sheets.     Estimated Needs:   [x] no change since previous assessment  [ ] recalculated:     Previous Nutrition Diagnosis:     [x] Swallowing difficulty     Nutrition Diagnosis is [x] ongoing  - being addressed with tube feedings as above.      New Nutrition Diagnosis: [x] not applicable    Interventions:     1. Recommend continue current EN provision as above.   2. Continue to obtain current weight to identify changes if any.     Monitoring and Evaluation:     [ ] PO intake [x] Tolerance to diet prescription [x] weights [x] follow up per protocol    [x] other: EN provision; gastro recommendations     RD remains available.   Shira Lamas Dietitian

## 2018-07-17 DIAGNOSIS — R09.3 ABNORMAL SPUTUM: ICD-10-CM

## 2018-07-17 DIAGNOSIS — J69.0 PNEUMONITIS DUE TO INHALATION OF FOOD AND VOMIT: ICD-10-CM

## 2018-07-17 PROBLEM — C61 MALIGNANT NEOPLASM OF PROSTATE: Chronic | Status: INACTIVE | Noted: 2017-03-21 | Resolved: 2018-07-16

## 2018-07-17 LAB
ANION GAP SERPL CALC-SCNC: 9 MMOL/L — SIGNIFICANT CHANGE UP (ref 5–17)
BUN SERPL-MCNC: 38 MG/DL — HIGH (ref 7–23)
CALCIUM SERPL-MCNC: 8.7 MG/DL — SIGNIFICANT CHANGE UP (ref 8.4–10.5)
CHLORIDE SERPL-SCNC: 110 MMOL/L — HIGH (ref 96–108)
CO2 SERPL-SCNC: 26 MMOL/L — SIGNIFICANT CHANGE UP (ref 22–31)
CREAT SERPL-MCNC: 1.56 MG/DL — HIGH (ref 0.5–1.3)
GLUCOSE SERPL-MCNC: 90 MG/DL — SIGNIFICANT CHANGE UP (ref 70–99)
HCT VFR BLD CALC: 23.5 % — LOW (ref 39–50)
HGB BLD-MCNC: 7.5 G/DL — LOW (ref 13–17)
MAGNESIUM SERPL-MCNC: 2.4 MG/DL — SIGNIFICANT CHANGE UP (ref 1.6–2.6)
MCHC RBC-ENTMCNC: 29.6 PG — SIGNIFICANT CHANGE UP (ref 27–34)
MCHC RBC-ENTMCNC: 31.8 GM/DL — LOW (ref 32–36)
MCV RBC AUTO: 93.1 FL — SIGNIFICANT CHANGE UP (ref 80–100)
PHOSPHATE SERPL-MCNC: 2.7 MG/DL — SIGNIFICANT CHANGE UP (ref 2.5–4.5)
PLATELET # BLD AUTO: 166 K/UL — SIGNIFICANT CHANGE UP (ref 150–400)
POTASSIUM SERPL-MCNC: 3.9 MMOL/L — SIGNIFICANT CHANGE UP (ref 3.5–5.3)
POTASSIUM SERPL-SCNC: 3.9 MMOL/L — SIGNIFICANT CHANGE UP (ref 3.5–5.3)
RBC # BLD: 2.52 M/UL — LOW (ref 4.2–5.8)
RBC # FLD: 16.7 % — HIGH (ref 10.3–14.5)
SODIUM SERPL-SCNC: 145 MMOL/L — SIGNIFICANT CHANGE UP (ref 135–145)
WBC # BLD: 6.3 K/UL — SIGNIFICANT CHANGE UP (ref 3.8–10.5)
WBC # FLD AUTO: 6.3 K/UL — SIGNIFICANT CHANGE UP (ref 3.8–10.5)

## 2018-07-17 PROCEDURE — 99233 SBSQ HOSP IP/OBS HIGH 50: CPT | Mod: GC

## 2018-07-17 RX ORDER — ACETYLCYSTEINE 200 MG/ML
2 VIAL (ML) MISCELLANEOUS EVERY 6 HOURS
Qty: 0 | Refills: 0 | Status: DISCONTINUED | OUTPATIENT
Start: 2018-07-17 | End: 2018-07-18

## 2018-07-17 RX ADMIN — Medication 3 MILLILITER(S): at 04:50

## 2018-07-17 RX ADMIN — HEPARIN SODIUM 5000 UNIT(S): 5000 INJECTION INTRAVENOUS; SUBCUTANEOUS at 15:11

## 2018-07-17 RX ADMIN — Medication 650 MILLIGRAM(S): at 21:58

## 2018-07-17 RX ADMIN — Medication 2 MILLILITER(S): at 15:56

## 2018-07-17 RX ADMIN — Medication 2 DROP(S): at 17:22

## 2018-07-17 RX ADMIN — Medication 3 MILLIGRAM(S): at 21:47

## 2018-07-17 RX ADMIN — CHLORHEXIDINE GLUCONATE 15 MILLILITER(S): 213 SOLUTION TOPICAL at 06:32

## 2018-07-17 RX ADMIN — Medication 650 MILLIGRAM(S): at 23:12

## 2018-07-17 RX ADMIN — LATANOPROST 1 DROP(S): 0.05 SOLUTION/ DROPS OPHTHALMIC; TOPICAL at 21:47

## 2018-07-17 RX ADMIN — POLYETHYLENE GLYCOL 3350 17 GRAM(S): 17 POWDER, FOR SOLUTION ORAL at 12:14

## 2018-07-17 RX ADMIN — SENNA PLUS 2 TABLET(S): 8.6 TABLET ORAL at 21:47

## 2018-07-17 RX ADMIN — Medication 3 MILLILITER(S): at 15:49

## 2018-07-17 RX ADMIN — Medication 3 MILLILITER(S): at 21:35

## 2018-07-17 RX ADMIN — Medication 2 DROP(S): at 12:15

## 2018-07-17 RX ADMIN — CHLORHEXIDINE GLUCONATE 15 MILLILITER(S): 213 SOLUTION TOPICAL at 17:21

## 2018-07-17 RX ADMIN — Medication 2 DROP(S): at 06:32

## 2018-07-17 RX ADMIN — FAMOTIDINE 20 MILLIGRAM(S): 10 INJECTION INTRAVENOUS at 12:14

## 2018-07-17 RX ADMIN — ROBINUL 1 MILLIGRAM(S): 0.2 INJECTION INTRAMUSCULAR; INTRAVENOUS at 17:21

## 2018-07-17 RX ADMIN — Medication 2 DROP(S): at 00:20

## 2018-07-17 RX ADMIN — Medication 2 MILLILITER(S): at 21:35

## 2018-07-17 RX ADMIN — HEPARIN SODIUM 5000 UNIT(S): 5000 INJECTION INTRAVENOUS; SUBCUTANEOUS at 21:47

## 2018-07-17 RX ADMIN — HEPARIN SODIUM 5000 UNIT(S): 5000 INJECTION INTRAVENOUS; SUBCUTANEOUS at 06:32

## 2018-07-17 RX ADMIN — Medication 2 DROP(S): at 23:12

## 2018-07-17 RX ADMIN — FLUCONAZOLE 100 MILLIGRAM(S): 150 TABLET ORAL at 12:14

## 2018-07-17 RX ADMIN — ROBINUL 1 MILLIGRAM(S): 0.2 INJECTION INTRAMUSCULAR; INTRAVENOUS at 06:32

## 2018-07-17 RX ADMIN — CEFEPIME 100 MILLIGRAM(S): 1 INJECTION, POWDER, FOR SOLUTION INTRAMUSCULAR; INTRAVENOUS at 12:14

## 2018-07-17 RX ADMIN — LINACLOTIDE 145 MICROGRAM(S): 145 CAPSULE, GELATIN COATED ORAL at 06:25

## 2018-07-17 RX ADMIN — Medication 3 MILLILITER(S): at 10:07

## 2018-07-17 RX ADMIN — Medication 3 MILLILITER(S): at 10:13

## 2018-07-17 NOTE — PROGRESS NOTE ADULT - PROBLEM SELECTOR PLAN 1
pt. had peg tube placed this hospital stay; continue peg tube feedings  GI followup pt. had peg tube placed this hospital stay; continue peg tube feedings  GI followup. Repeat speech eval.

## 2018-07-17 NOTE — PROGRESS NOTE ADULT - PROBLEM SELECTOR PLAN 2
stable, Pulm f/u, pt. still with upper airway secretions, on Acetylcysteine Mucomyst  Robgeovanny   Consider chest PT   Consider Mucinex

## 2018-07-17 NOTE — PROGRESS NOTE ADULT - ASSESSMENT
85 year old male with h/o cva and dysphagia and h/o PEG, PPM, CKD, Psoriasis, recent hospitalization for UTI and right hydronephrosis who p/w dysphagia, had  PEG on 7/2/18.  S/P EGD which showed no mechanical obstructive causes of dysphagia, but required to be intubated post procedure and was monitored on floor.  Extubated and transferred to medical floor  on 7/11/18 patient developed severe increase in secretions unable to manage airway and brought to ICU for likely asp pna and pending respiratory failure. due to severe upper airway secretions.  Pt. now on telemetry still with upper airway secretions being followed by Pulmonary. 85 year old male with h/o CVA , PPM, CKD, Psoriasis, recent hospitalization for UTI and right hydronephrosis who p/w dysphagia, had  PEG on 7/2/18.  He had an EGD which showed no mechanical obstructive causes of dysphagia, but required to be intubated post procedure and was monitored on floor., he was extubated and transferred to medical floor  on 7/11/18,.  Pt. now on telemetry still with upper airway secretions being followed by Pulmonary.

## 2018-07-17 NOTE — PROGRESS NOTE ADULT - SUBJECTIVE AND OBJECTIVE BOX
Follow-up Critical Care Progress Note  Chief Complaint : Dysphagia      pt more cooperative  following comands  agreeing to suctioning  secretions unchanged from yesterday      Allergies :No Known Allergies      PAST MEDICAL & SURGICAL HISTORY:  Psoriasis  GERD (gastroesophageal reflux disease)  Dyslipidemia  No significant past surgical history      Medications:  MEDICATIONS  (STANDING):  acetylcysteine 10% Inhalation 3 milliLiter(s) Inhalation every 6 hours  ALBUTerol/ipratropium for Nebulization 3 milliLiter(s) Nebulizer every 6 hours  atropine 1% Ophthalmic Solution for SubLingual Use 2 Drop(s) SubLingual four times a day  cefepime   IVPB      cefepime   IVPB 1000 milliGRAM(s) IV Intermittent daily  chlorhexidine 0.12% Liquid 15 milliLiter(s) Swish and Spit two times a day  famotidine    Tablet 20 milliGRAM(s) Oral daily  fluconAZOLE   Tablet 100 milliGRAM(s) Oral daily  glycopyrrolate 1 milliGRAM(s) Oral two times a day  heparin  Injectable 5000 Unit(s) SubCutaneous every 8 hours  latanoprost 0.005% Ophthalmic Solution 1 Drop(s) Both EYES at bedtime  linaclotide 145 MICROGram(s) Oral before breakfast  melatonin 3 milliGRAM(s) Oral at bedtime  polyethylene glycol 3350 17 Gram(s) Oral daily  senna 2 Tablet(s) Oral at bedtime    MEDICATIONS  (PRN):  acetaminophen   Tablet. 650 milliGRAM(s) Oral every 6 hours PRN Mild Pain (1 - 3)  bisacodyl Suppository 10 milliGRAM(s) Rectal daily PRN Constipation      LABS:                        7.5    6.3   )-----------( 166      ( 17 Jul 2018 06:08 )             23.5     07-17    145  |  110<H>  |  38<H>  ----------------------------<  90  3.9   |  26  |  1.56<H>    Ca    8.7      17 Jul 2018 06:08  Phos  2.7     07-17  Mg     2.4     07-17      CULTURES: (if applicable)    Culture - Sputum (collected 07-11-18 @ 09:15)  Source: .Sputum Sputum  Gram Stain (07-11-18 @ 14:46):    No squamous epithelial cells per low power field    Numerous polymorphonuclear leukocytes per low power field    Numerous Gram positive cocci in pairs per oil power field  Final Report (07-15-18 @ 09:40):    Moderate Klebsiella pneumoniae    Numerous Streptococcus agalactiae (Group B) isolated    Group B streptococci are susceptible to ampicillin,    penicillin and cefazolin, but may be resistant to    erythromycin and clindamycin.    Recommendations for intrapartum prophylaxis for Group B    streptococci are penicillin or ampicillin.    Moderate Staphylococcus aureus    Normal Respiratory Renetta absent  Organism: Klebsiella pneumoniae  Staphylococcus aureus (07-15-18 @ 09:40)  Organism: Staphylococcus aureus (07-15-18 @ 09:40)      -  Ampicillin/Sulbactam: S <=8/4      -  Cefazolin: S <=4      -  Clindamycin: S 0.5      -  Erythromycin: R >4      -  Gentamicin: S <=1      -  Oxacillin: S 1      -  Penicillin: R >8      -  RIF- Rifampin: S <=1      -  Tetra/Doxy: S <=1      -  Trimethoprim/Sulfamethoxazole: S <=0.5/9.5      -  Vancomycin: S 2      Method Type: KALYAN  Organism: Klebsiella pneumoniae (07-15-18 @ 09:40)      -  Amikacin: S <=8      -  Amoxicillin/Clavulanic Acid: S <=8/4      -  Ampicillin: R >16      -  Ampicillin/Sulbactam: S 8/4      -  Aztreonam: S <=4      -  Cefazolin: S <=2      -  Cefepime: S <=2      -  Cefoxitin: S <=4      -  Ceftriaxone: S <=1      -  Ciprofloxacin: S <=0.5      -  Ertapenem: S <=0.5      -  Gentamicin: S <=1      -  Imipenem: S <=1      -  Levofloxacin: S <=1      -  Meropenem: S <=1      -  Piperacillin/Tazobactam: S <=8      -  Tobramycin: S <=2      -  Trimethoprim/Sulfamethoxazole: S <=0.5/9.5      Method Type: KALYAN    Culture - Blood (collected 07-11-18 @ 08:40)  Source: .Blood Blood  Final Report (07-16-18 @ 13:00):    No growth at 5 days.    Culture - Blood (collected 07-11-18 @ 08:15)  Source: .Blood Blood-Peripheral  Final Report (07-16-18 @ 12:00):    No growth at 5 days.    Culture - Blood (collected 07-03-18 @ 23:20)  Source: .Blood Blood-Peripheral  Final Report (07-09-18 @ 09:00):    No growth at 5 days.    Culture - Blood (collected 07-03-18 @ 23:20)  Source: .Blood Blood-Peripheral  Final Report (07-09-18 @ 09:00):    No growth at 5 days.    Culture - Sputum (collected 07-03-18 @ 21:20)  Source: .Sputum Sputum - trap  Gram Stain (07-04-18 @ 11:52):    Numerous polymorphonuclear leukocytes per low power field    Rare Squamous epithelial cells per low power field    Rare Gram Negative Rods per oil power field    Rare Gram positive cocci in pairs per oil power field  Final Report (07-06-18 @ 08:12):    Normal Respiratory Renetta present    Culture - Urine (collected 07-03-18 @ 21:20)  Source: .Urine Clean Catch (Midstream)  Final Report (07-05-18 @ 11:43):    No growth            CAPILLARY BLOOD GLUCOSE          VITALS:  T(C): 36.3 (07-17-18 @ 05:08), Max: 36.4 (07-16-18 @ 20:37)  T(F): 97.4 (07-17-18 @ 05:08), Max: 97.5 (07-16-18 @ 20:37)  HR: 60 (07-17-18 @ 05:08) (20 - 61)  BP: 131/- (07-17-18 @ 05:08) (121/86 - 135/87)  BP(mean): 99 (07-16-18 @ 17:00) (93 - 99)  ABP: --  ABP(mean): --  RR: 16 (07-17-18 @ 05:08) (10 - 21)  SpO2: 98% (07-17-18 @ 10:13) (96% - 100%)  CVP(mm Hg): --  CVP(cm H2O): --    Ins and Outs     07-16-18 @ 07:01  -  07-17-18 @ 07:00  --------------------------------------------------------  IN: 2245 mL / OUT: 1 mL / NET: 2244 mL

## 2018-07-17 NOTE — PROGRESS NOTE ADULT - SUBJECTIVE AND OBJECTIVE BOX
CC: f/u for    Patient reports    REVIEW OF SYSTEMS:  All other review of systems negative (Comprehensive ROS)    Antimicrobials Day #  :  cefepime   IVPB    Day 7/7  cefepime   IVPB 1000 milliGRAM(s) IV Intermittent daily  fluconAZOLE   Tablet 100 milliGRAM(s) Oral daily   Day 5/21    Other Medications Reviewed    T(F): 97.3 (07-17-18 @ 16:00), Max: 97.5 (07-16-18 @ 20:37)  HR: 60 (07-17-18 @ 16:00)  BP: 128/76 (07-17-18 @ 16:00)  RR: 16 (07-17-18 @ 16:00)  SpO2: 100% (07-17-18 @ 16:00)  Wt(kg): --    PHYSICAL EXAM:  General: alert, gurgling  Eyes:  anicteric, no conjunctival injection, no discharge  Oropharynx: no lesions or injection 	  Neck: supple, without adenopathy  Lungs: rhonchi to auscultation  Heart: regular rate and rhythm; no murmur, rubs or gallops  Abdomen: soft, nondistended, nontender, without mass or organomegaly, obese, peg  Skin: no lesions  Extremities: no clubbing, cyanosis,/ legs with edema  Neurologic: alert, , moves all extremities    LAB RESULTS:                        7.5    6.3   )-----------( 166      ( 17 Jul 2018 06:08 )             23.5     07-17    145  |  110<H>  |  38<H>  ----------------------------<  90  3.9   |  26  |  1.56<H>    Ca    8.7      17 Jul 2018 06:08  Phos  2.7     07-17  Mg     2.4     07-17          MICROBIOLOGY:  RECENT CULTURES:  Culture - Sputum . (07.11.18 @ 09:15)    -  Ciprofloxacin: S <=0.5    -  Clindamycin: S 0.5    -  Ertapenem: S <=0.5    -  Erythromycin: R >4    -  Aztreonam: S <=4    Gram Stain:   No squamous epithelial cells per low power field  Numerous polymorphonuclear leukocytes per low power field  Numerous Gram positive cocci in pairs per oil power field    -  Amikacin: S <=8    -  Amoxicillin/Clavulanic Acid: S <=8/4    -  Ampicillin: R >16    -  Ampicillin/Sulbactam: S 8/4    -  Ampicillin/Sulbactam: S <=8/4    -  Cefazolin: S <=2    -  Cefazolin: S <=4    -  Cefepime: S <=2    -  Cefoxitin: S <=4    -  Ceftriaxone: S <=1    -  Gentamicin: S <=1    -  Gentamicin: S <=1    -  Imipenem: S <=1    -  Levofloxacin: S <=1    -  Meropenem: S <=1    -  Oxacillin: S 1    -  Penicillin: R >8    -  Piperacillin/Tazobactam: S <=8    -  RIF- Rifampin: S <=1    -  Tetra/Doxy: S <=1    -  Tobramycin: S <=2    -  Trimethoprim/Sulfamethoxazole: S <=0.5/9.5    -  Trimethoprim/Sulfamethoxazole: S <=0.5/9.5    -  Vancomycin: S 2    Specimen Source: .Sputum Sputum    Culture Results:   Moderate Klebsiella pneumoniae  Numerous Streptococcus agalactiae (Group B) isolated  Group B streptococci are susceptible to ampicillin,  penicillin and cefazolin, but may be resistant to  erythromycin and clindamycin.  Recommendations for intrapartum prophylaxis for Group B  streptococci are penicillin or ampicillin.  Moderate Staphylococcus aureus  Normal Respiratory Renetta absent    Organism Identification: Klebsiella pneumoniae  Staphylococcus aureus    Organism: Staphylococcus aureus    Organism: Klebsiella pneumoniae    Method Type: KALYAN    Method Type: KALYAN        RADIOLOGY REVIEWED:    < from: Xray Chest 1 View- PORTABLE-Routine (07.16.18 @ 06:28) >  IMPRESSION: Scattered infiltrates showing some increase.      < end of copied text >    Assessment:  Patient with dysphagia, s/p peg, candida and reflux esophogitis finishing another course of abx for pneumonia  Plan:  stop cefepime  finish diflucan  hospice

## 2018-07-17 NOTE — PROGRESS NOTE ADULT - SUBJECTIVE AND OBJECTIVE BOX
Patient is a 85y old  Male who presents with a chief complaint of Cough, unable to swallow (02 Jul 2018 18:17)    HPI:  Pt is a 84 y o M sent from [facility] for cough, unable to swallow, wife requested evaluation. Wife states that pt has had feeding tubes in the past and then advanced to pureed diet but now is unable to swallow, pt states it gets stuck in throat, thinks he needs a scope, was seen by GI in [place]. Denies f/c, has some nausea. (02 Jul 2018 18:17)    history of incontinence s/p prostate cancer radiation and ureteral stent placement    Interval Events:  Patient seen and examined at bedside.    MEDICATIONS:  MEDICATIONS  (STANDING):  acetylcysteine 10% Inhalation 3 milliLiter(s) Inhalation every 6 hours  ALBUTerol/ipratropium for Nebulization 3 milliLiter(s) Nebulizer every 6 hours  atropine 1% Ophthalmic Solution for SubLingual Use 2 Drop(s) SubLingual four times a day  cefepime   IVPB      cefepime   IVPB 1000 milliGRAM(s) IV Intermittent daily  chlorhexidine 0.12% Liquid 15 milliLiter(s) Swish and Spit two times a day  famotidine    Tablet 20 milliGRAM(s) Oral daily  fluconAZOLE   Tablet 100 milliGRAM(s) Oral daily  glycopyrrolate 1 milliGRAM(s) Oral two times a day  heparin  Injectable 5000 Unit(s) SubCutaneous every 8 hours  latanoprost 0.005% Ophthalmic Solution 1 Drop(s) Both EYES at bedtime  linaclotide 145 MICROGram(s) Oral before breakfast  melatonin 3 milliGRAM(s) Oral at bedtime  polyethylene glycol 3350 17 Gram(s) Oral daily  senna 2 Tablet(s) Oral at bedtime    MEDICATIONS  (PRN):  acetaminophen   Tablet. 650 milliGRAM(s) Oral every 6 hours PRN Mild Pain (1 - 3)  bisacodyl Suppository 10 milliGRAM(s) Rectal daily PRN Constipation      Allergies    No Known Allergies    Intolerances        Vital Signs Last 24 Hrs  T(C): 36.3 (17 Jul 2018 05:08), Max: 36.9 (16 Jul 2018 14:00)  T(F): 97.4 (17 Jul 2018 05:08), Max: 98.5 (16 Jul 2018 14:00)  HR: 60 (17 Jul 2018 05:08) (20 - 61)  BP: 131/- (17 Jul 2018 05:08) (107/82 - 135/87)  BP(mean): 99 (16 Jul 2018 17:00) (81 - 99)  RR: 16 (17 Jul 2018 05:08) (10 - 21)  SpO2: 98% (17 Jul 2018 10:13) (96% - 100%)      I&O's Detail    15 Jul 2018 07:01  -  16 Jul 2018 07:00  --------------------------------------------------------  IN:    dextrose 5%.: 1150 mL    Free Water: 750 mL    Nepro: 1170 mL    sodium chloride 0.45%: 75 mL    Solution: 100 mL    Solution: 50 mL  Total IN: 3295 mL    OUT:  Total OUT: 0 mL    Total NET: 3295 mL      16 Jul 2018 07:01  -  17 Jul 2018 07:00  --------------------------------------------------------  IN:    dextrose 5%.: 25 mL    Free Water: 1000 mL    Nepro: 1170 mL    Solution: 50 mL  Total IN: 2245 mL    OUT:    Stool: 1 mL  Total OUT: 1 mL    Total NET: 2244 mL              LABS:      CBC Full  -  ( 17 Jul 2018 06:08 )  WBC Count : 6.3 K/uL  Hemoglobin : 7.5 g/dL  Hematocrit : 23.5 %  Platelet Count - Automated : 166 K/uL  Mean Cell Volume : 93.1 fl  Mean Cell Hemoglobin : 29.6 pg  Mean Cell Hemoglobin Concentration : 31.8 gm/dL  Auto Neutrophil # : x  Auto Lymphocyte # : x  Auto Monocyte # : x  Auto Eosinophil # : x  Auto Basophil # : x  Auto Neutrophil % : x  Auto Lymphocyte % : x  Auto Monocyte % : x  Auto Eosinophil % : x  Auto Basophil % : x    07-17    145  |  110<H>  |  38<H>  ----------------------------<  90  3.9   |  26  |  1.56<H>    Ca    8.7      17 Jul 2018 06:08  Phos  2.7     07-17  Mg     2.4     07-17                    Physical Exam    Constitutional: lethargic, arousable    Abdomen: obese, soft, no bladder distention    Genitourinary: penis/testes benign

## 2018-07-17 NOTE — PROGRESS NOTE ADULT - ATTENDING COMMENTS
Analgesia/Sedation:  HOB elevation: yes  Nutrition: NGT feeds  DVT prophylaxis: lovenox  Stress Ulcer prophylaxis: protonix  Glycemic Control:  Restraints were deemed necessary to prevent removal of life-sustaining devices [ x  ] YES   [    ]  NO    LINES/TUBES/CATHETERS    Nix       yes                    Indication  CVC      Day                Location                      Indication    Activity bedrest  Disposition ICU monitoring    Care during this interval was provided by me. I have reviewed this patient's   available data, including medical history, events of note, physical examination and test  results, and have overseen the activities of other members of the care team under my direct  supervision (e.g. physician assistants, nurse practitioners).
Analgesia/Sedation: propofol  HOB elevation: yes  Nutrition: NGT feeds  DVT prophylaxis: lovenox  Stress Ulcer prophylaxis: protonix  Glycemic Control:  Restraints were deemed necessary to prevent removal of life-sustaining devices [ x  ] YES   [    ]  NO    LINES/TUBES/CATHETERS    Nix       yes                    Indication  CVC      Day                Location                      Indication    Activity bedrest  Disposition ICU monitoring    Care during this interval was provided by me. I have reviewed this patient's   available data, including medical history, events of note, physical examination and test  results, and have overseen the activities of other members of the care team under my direct  supervision (e.g. physician assistants, nurse practitioners).
Analgesia/Sedation:  HOB elevation: yes  Nutrition: NGT feeds, f/u speech  DVT prophylaxis: lovenox  Stress Ulcer prophylaxis: d/c  Glycemic Control:  Restraints were deemed necessary to prevent removal of life-sustaining devices [  ] YES   [    ]  NO    LINES/TUBES/CATHETERS    Nix       d/c                    Indication  CVC      Day                Location                      Indication    Activity OOB  Disposition full code, transfer 2S    Care during this interval was provided by me. I have reviewed this patient's   available data, including medical history, events of note, physical examination and test  results, and have overseen the activities of other members of the care team under my direct  supervision (e.g. physician assistants, nurse practitioners).
consider 0.5 NS for hypernatremia  f/u speech recs
consider 0.5 NS for hypernatremia  f/u speech recs
Case d/w daughter at bedside. She requests repeat PT and speech evaluations. I told her that my plan was to discharge the patient in 2-3 days once the excessive respiratory secretions have cleared further as the PNA continues to be treated.     Case also d/w Dr. Milner
PT eval

## 2018-07-17 NOTE — PROGRESS NOTE ADULT - SUBJECTIVE AND OBJECTIVE BOX
Patient is a 85y old  Male who presents with a chief complaint of Cough, unable to swallow (02 Jul 2018 18:17)  Pt. overall feels better today.    Patient seen and examined at bedside.    ALLERGIES:  No Known Allergies    MEDICATIONS  (STANDING):  acetylcysteine 20% Inhalation 2 milliLiter(s) Inhalation every 6 hours  ALBUTerol/ipratropium for Nebulization 3 milliLiter(s) Nebulizer every 6 hours  atropine 1% Ophthalmic Solution for SubLingual Use 2 Drop(s) SubLingual four times a day  cefepime   IVPB      cefepime   IVPB 1000 milliGRAM(s) IV Intermittent daily  chlorhexidine 0.12% Liquid 15 milliLiter(s) Swish and Spit two times a day  famotidine    Tablet 20 milliGRAM(s) Oral daily  fluconAZOLE   Tablet 100 milliGRAM(s) Oral daily  glycopyrrolate 1 milliGRAM(s) Oral two times a day  heparin  Injectable 5000 Unit(s) SubCutaneous every 8 hours  latanoprost 0.005% Ophthalmic Solution 1 Drop(s) Both EYES at bedtime  linaclotide 145 MICROGram(s) Oral before breakfast  melatonin 3 milliGRAM(s) Oral at bedtime  polyethylene glycol 3350 17 Gram(s) Oral daily  senna 2 Tablet(s) Oral at bedtime    MEDICATIONS  (PRN):  acetaminophen   Tablet. 650 milliGRAM(s) Oral every 6 hours PRN Mild Pain (1 - 3)  bisacodyl Suppository 10 milliGRAM(s) Rectal daily PRN Constipation    Vital Signs Last 24 Hrs  T(F): 97.4 (17 Jul 2018 05:08), Max: 97.5 (16 Jul 2018 20:37)  HR: 60 (17 Jul 2018 05:08) (20 - 60)  BP: 131/- (17 Jul 2018 05:08) (127/68 - 135/87)  RR: 16 (17 Jul 2018 16:00) (16 - 21)  SpO2: 97% (17 Jul 2018 16:00) (97% - 100%)  I&O's Summary    16 Jul 2018 07:01  -  17 Jul 2018 07:00  --------------------------------------------------------  IN: 2245 mL / OUT: 1 mL / NET: 2244 mL      PHYSICAL EXAM:  General: NAD, A/O x 3.  ENT: MMM  Neck: Supple, No JVD  Lungs: diffusely coarse BS  Cardio: RRR, S1/S2, No murmurs  Abdomen: Soft, obese, +peg tube in place  Extremities: No calf tenderness, + edema, 1+ on lower exts b/l  Neuro - nonfocal  LABS:                        7.5    6.3   )-----------( 166      ( 17 Jul 2018 06:08 )             23.5     07-17    145  |  110  |  38  ----------------------------<  90  3.9   |  26  |  1.56    Ca    8.7      17 Jul 2018 06:08  Phos  2.7     07-17  Mg     2.4     07-17      eGFR if Non African American: 40 mL/min/1.73M2 (07-17-18 @ 06:08)  eGFR if : 46 mL/min/1.73M2 (07-17-18 @ 06:08)    PT/INR - ( 15 Jul 2018 05:45 )   PT: 20.3 sec;   INR: 1.81 ratio         PTT - ( 15 Jul 2018 05:45 )  PTT:47.8 sec    07-10 [id number] 4.6    RADIOLOGY & ADDITIONAL TESTS:  < from: Xray Chest 1 View- PORTABLE-Routine (07.16.18 @ 06:28) >  IMPRESSION: Scattered infiltrates showing some increase.        < end of copied text >    Care Discussed with Consultants/Other Providers: Dr. Otto

## 2018-07-17 NOTE — PROGRESS NOTE ADULT - SUBJECTIVE AND OBJECTIVE BOX
No distress    Vital Signs Last 24 Hrs  T(C): 36.3 (07-17-18 @ 05:08), Max: 36.4 (07-16-18 @ 20:37)  T(F): 97.4 (07-17-18 @ 05:08), Max: 97.5 (07-16-18 @ 20:37)  HR: 60 (07-17-18 @ 05:08) (20 - 61)  BP: 131/- (07-17-18 @ 05:08) (121/86 - 135/87)  BP(mean): 99 (07-16-18 @ 17:00) (85 - 99)  RR: 16 (07-17-18 @ 05:08) (10 - 21)  SpO2: 98% (07-17-18 @ 10:13) (96% - 100%)    Lungs - decr BS  bilaterally  Heart - S1S2  Abd - soft, NT,  ND, + BS  Extr - sm peripheral edema                                7.5    6.3   )-----------( 166      ( 17 Jul 2018 06:08 )             23.5     17 Jul 2018 06:08    145    |  110    |  38     ----------------------------<  90     3.9     |  26     |  1.56     Ca    8.7        17 Jul 2018 06:08  Phos  2.7       17 Jul 2018 06:08  Mg     2.4       17 Jul 2018 06:08    acetaminophen   Tablet. 650 milliGRAM(s) Oral every 6 hours PRN  acetylcysteine 10% Inhalation 3 milliLiter(s) Inhalation every 6 hours  ALBUTerol/ipratropium for Nebulization 3 milliLiter(s) Nebulizer every 6 hours  atropine 1% Ophthalmic Solution for SubLingual Use 2 Drop(s) SubLingual four times a day  bisacodyl Suppository 10 milliGRAM(s) Rectal daily PRN  cefepime   IVPB      cefepime   IVPB 1000 milliGRAM(s) IV Intermittent daily  chlorhexidine 0.12% Liquid 15 milliLiter(s) Swish and Spit two times a day  famotidine    Tablet 20 milliGRAM(s) Oral daily  fluconAZOLE   Tablet 100 milliGRAM(s) Oral daily  glycopyrrolate 1 milliGRAM(s) Oral two times a day  heparin  Injectable 5000 Unit(s) SubCutaneous every 8 hours  latanoprost 0.005% Ophthalmic Solution 1 Drop(s) Both EYES at bedtime  linaclotide 145 MICROGram(s) Oral before breakfast  melatonin 3 milliGRAM(s) Oral at bedtime  polyethylene glycol 3350 17 Gram(s) Oral daily  senna 2 Tablet(s) Oral at bedtime    A/P:    Asp PNA, recurrent  NPO, PEG feeds  Hemodynamic MELISSA on CKD III, improving  Avoid hypotension and nephrotoxins  F/u CBC, BMP

## 2018-07-17 NOTE — PROGRESS NOTE ADULT - ASSESSMENT
Physical Examination:  GENERAL:               Alert, verbal ,oriented x 2 No acute distress.    HEENT:                  mild oral and tracheal secretions, mild gross  gargling of secretions resolve when suctioned now, dry MM  PULM:                    Bilateral air entry, transmitted upper airway sounds, No Rales, mild Right and mild left scattered Rhonchi, No Wheezing  CVS:                       S1, S2,  + Murmur  ABD:                        Soft, distended, nontender, normoactive bowel sounds,  + PEG   EXT:                       LUE improving edema, nontender, No Cyanosis or Clubbing   Vascular:                Warm Extremities, Normal Capillary refill, Normal Distal Pulses  NEURO:                  Alert,  follows commands  PSYC:                      Calm, + Insight with episodes of agitation      85 year old male with h/o cva and dysphagia and h/o PEG, PPM, CKD, Psoriasis, recent hospitalization for UTI and right hydronephrosis who p/w dysphagia and ? Nee for PEG on 7/2/18  S/P EGD which showed no mechanical obstructive causes of dysphagia, but required to be intubated post procedure and was monitored on floor.  Extubated and transferred to medical floor  on 7/11/18 patient developed severe increase in secretions unable to manage airway and brought to ICU for likely asp pna and pending respiratory failure. due to severe upper airway secretions.      Assessment  upper airway secretions, improving. no refusing suctioning  Anemia  - slow downtrending h/h with out source of active gib.  Dysphagia S/P PEG 7/12 tolerating diet  Aspiration PNA - Kleb  and staph in sputum  MELISSA on CKD cr baseline 1.2 -1.4  improving and resolved hypernatremia       Plan  Frequent suctioning. robinol and atropine, pulm toilet, chest PT  Continue Mucomyst Nebs, humidified o2    abx as per ID ? another 24-48 hrs      Family Updated: 	Emi cárdenas	                                 Date:  7/16   D[facility] Home  [phone] Cell  [phone] & Analgesia:	on hold  Diet/Nutrition:		tube feeding  GI PPx:			PPI    DVT Ppx:		hep sq    Activity:		       OOB to chair  Head of Bed:               35-45 deg  Glycemic Control:        n/a    Lines:  PIV      Disposition: continue care on tele, o2 monitoring  Goals of Care: full code

## 2018-07-18 DIAGNOSIS — Z02.9 ENCOUNTER FOR ADMINISTRATIVE EXAMINATIONS, UNSPECIFIED: ICD-10-CM

## 2018-07-18 LAB
ANION GAP SERPL CALC-SCNC: 6 MMOL/L — SIGNIFICANT CHANGE UP (ref 5–17)
BUN SERPL-MCNC: 40 MG/DL — HIGH (ref 7–23)
CALCIUM SERPL-MCNC: 9.2 MG/DL — SIGNIFICANT CHANGE UP (ref 8.4–10.5)
CHLORIDE SERPL-SCNC: 110 MMOL/L — HIGH (ref 96–108)
CO2 SERPL-SCNC: 29 MMOL/L — SIGNIFICANT CHANGE UP (ref 22–31)
CREAT SERPL-MCNC: 1.39 MG/DL — HIGH (ref 0.5–1.3)
GLUCOSE SERPL-MCNC: 85 MG/DL — SIGNIFICANT CHANGE UP (ref 70–99)
HCT VFR BLD CALC: 24 % — LOW (ref 39–50)
HGB BLD-MCNC: 7.7 G/DL — LOW (ref 13–17)
MCHC RBC-ENTMCNC: 29.6 PG — SIGNIFICANT CHANGE UP (ref 27–34)
MCHC RBC-ENTMCNC: 32 GM/DL — SIGNIFICANT CHANGE UP (ref 32–36)
MCV RBC AUTO: 92.5 FL — SIGNIFICANT CHANGE UP (ref 80–100)
PLATELET # BLD AUTO: 149 K/UL — LOW (ref 150–400)
POTASSIUM SERPL-MCNC: 3.9 MMOL/L — SIGNIFICANT CHANGE UP (ref 3.5–5.3)
POTASSIUM SERPL-SCNC: 3.9 MMOL/L — SIGNIFICANT CHANGE UP (ref 3.5–5.3)
RBC # BLD: 2.59 M/UL — LOW (ref 4.2–5.8)
RBC # FLD: 16.9 % — HIGH (ref 10.3–14.5)
SODIUM SERPL-SCNC: 145 MMOL/L — SIGNIFICANT CHANGE UP (ref 135–145)
WBC # BLD: 6.5 K/UL — SIGNIFICANT CHANGE UP (ref 3.8–10.5)
WBC # FLD AUTO: 6.5 K/UL — SIGNIFICANT CHANGE UP (ref 3.8–10.5)

## 2018-07-18 PROCEDURE — 99233 SBSQ HOSP IP/OBS HIGH 50: CPT

## 2018-07-18 PROCEDURE — 99232 SBSQ HOSP IP/OBS MODERATE 35: CPT

## 2018-07-18 RX ORDER — ATROPINE SULFATE 1 %
2 DROPS OPHTHALMIC (EYE) THREE TIMES A DAY
Qty: 0 | Refills: 0 | Status: DISCONTINUED | OUTPATIENT
Start: 2018-07-18 | End: 2018-07-19

## 2018-07-18 RX ORDER — ROBINUL 0.2 MG/ML
1 INJECTION INTRAMUSCULAR; INTRAVENOUS THREE TIMES A DAY
Qty: 0 | Refills: 0 | Status: DISCONTINUED | OUTPATIENT
Start: 2018-07-18 | End: 2018-07-19

## 2018-07-18 RX ADMIN — Medication 2 DROP(S): at 05:35

## 2018-07-18 RX ADMIN — Medication 2 DROP(S): at 22:44

## 2018-07-18 RX ADMIN — Medication 650 MILLIGRAM(S): at 06:53

## 2018-07-18 RX ADMIN — CHLORHEXIDINE GLUCONATE 15 MILLILITER(S): 213 SOLUTION TOPICAL at 17:30

## 2018-07-18 RX ADMIN — Medication 3 MILLIGRAM(S): at 22:44

## 2018-07-18 RX ADMIN — FLUCONAZOLE 100 MILLIGRAM(S): 150 TABLET ORAL at 14:07

## 2018-07-18 RX ADMIN — CHLORHEXIDINE GLUCONATE 15 MILLILITER(S): 213 SOLUTION TOPICAL at 05:35

## 2018-07-18 RX ADMIN — Medication 3 MILLILITER(S): at 15:05

## 2018-07-18 RX ADMIN — ROBINUL 1 MILLIGRAM(S): 0.2 INJECTION INTRAMUSCULAR; INTRAVENOUS at 05:36

## 2018-07-18 RX ADMIN — Medication 650 MILLIGRAM(S): at 05:47

## 2018-07-18 RX ADMIN — Medication 2 DROP(S): at 14:09

## 2018-07-18 RX ADMIN — HEPARIN SODIUM 5000 UNIT(S): 5000 INJECTION INTRAVENOUS; SUBCUTANEOUS at 14:15

## 2018-07-18 RX ADMIN — Medication 3 MILLILITER(S): at 04:35

## 2018-07-18 RX ADMIN — FAMOTIDINE 20 MILLIGRAM(S): 10 INJECTION INTRAVENOUS at 14:07

## 2018-07-18 RX ADMIN — SENNA PLUS 2 TABLET(S): 8.6 TABLET ORAL at 22:44

## 2018-07-18 RX ADMIN — POLYETHYLENE GLYCOL 3350 17 GRAM(S): 17 POWDER, FOR SOLUTION ORAL at 14:11

## 2018-07-18 RX ADMIN — Medication 650 MILLIGRAM(S): at 23:13

## 2018-07-18 RX ADMIN — LINACLOTIDE 145 MICROGRAM(S): 145 CAPSULE, GELATIN COATED ORAL at 05:36

## 2018-07-18 RX ADMIN — HEPARIN SODIUM 5000 UNIT(S): 5000 INJECTION INTRAVENOUS; SUBCUTANEOUS at 22:43

## 2018-07-18 RX ADMIN — ROBINUL 1 MILLIGRAM(S): 0.2 INJECTION INTRAMUSCULAR; INTRAVENOUS at 22:44

## 2018-07-18 RX ADMIN — Medication 3 MILLILITER(S): at 21:40

## 2018-07-18 RX ADMIN — HEPARIN SODIUM 5000 UNIT(S): 5000 INJECTION INTRAVENOUS; SUBCUTANEOUS at 05:37

## 2018-07-18 RX ADMIN — Medication 2 MILLILITER(S): at 04:40

## 2018-07-18 RX ADMIN — LATANOPROST 1 DROP(S): 0.05 SOLUTION/ DROPS OPHTHALMIC; TOPICAL at 22:43

## 2018-07-18 NOTE — PROGRESS NOTE ADULT - PROBLEM SELECTOR PLAN 8
Patient is medically ready for discharge at this point. Will need to have PRN suctioning at the [facility]. I attempted to reach the daughter, Amelie, on the cell phone number listed in [place]. "This [employer] number is not currently accepting calls from this number." As such, unable to reach the daughter. I would like to discharge the patient tomorrow. Patient aware.

## 2018-07-18 NOTE — SWALLOW BEDSIDE ASSESSMENT ADULT - SWALLOW EVAL: DIAGNOSIS
Pt presents with +oropharyngeal dysphagia.  NPO continues to be recommended due to difficulty managing secretions and audible breathing at baseline, and +wet vocal quality and multiple swallows following most conservative textures.
Pt presents with +clinical signs and symptoms of oropharyngeal dysphagia marked by increased oral transit time, suspected premature posterior spillage, suspected pharyngeal swallow delay, and +overt signs/symptoms of penetration/aspiration

## 2018-07-18 NOTE — SWALLOW BEDSIDE ASSESSMENT ADULT - ADDITIONAL RECOMMENDATIONS
ASPIRATION PRECAUTIONS  Head of bed >30 degrees
Strict monitoring of PO tolerance.  Discontinue diet and resume NPO with alternative means of nutrition/hydration if any signs/symptoms of penetration or aspiration are observed.

## 2018-07-18 NOTE — SWALLOW BEDSIDE ASSESSMENT ADULT - SLP GENERAL OBSERVATIONS
Pt received sleeping in bed, arousable with cues, grossly 0x3, +02NC (3.5L), +audible breathing with excess sectretions, pt requesting oral suctioning
Pt A&A, grossly 0x3, +NG tube, Sa02 % throughout assessment on room air

## 2018-07-18 NOTE — PROGRESS NOTE ADULT - PROBLEM SELECTOR PLAN 1
pt. had peg tube placed this hospital stay; continue peg tube feedings  GI followup. Repeat speech eval - still recommending NPO status.

## 2018-07-18 NOTE — PROGRESS NOTE ADULT - ASSESSMENT
Physical Examination:  GENERAL:               Alert, verbal ,oriented x 2 No acute distress.    HEENT:                  mild oral and tracheal secretions, mild gross  gargling of secretions resolve when suctioned now, dry MM  PULM:                    Bilateral air entry, transmitted upper airway sounds, No Rales, mild Right and mild left scattered Rhonchi, No Wheezing  CVS:                       S1, S2,  + Murmur  ABD:                        Soft, distended, nontender, normoactive bowel sounds,  + PEG   EXT:                       LUE improving edema, nontender, No Cyanosis or Clubbing   Vascular:                Warm Extremities, Normal Capillary refill, Normal Distal Pulses  NEURO:                  Alert,  follows commands  PSYC:                      Calm, + Insight with episodes of agitation      85 year old male with h/o cva and dysphagia and h/o PEG, PPM, CKD, Psoriasis, recent hospitalization for UTI and right hydronephrosis who p/w dysphagia and ? Nee for PEG on 7/2/18  S/P EGD which showed no mechanical obstructive causes of dysphagia, but required to be intubated post procedure and was monitored on floor.  Extubated and transferred to medical floor  on 7/11/18 patient developed severe increase in secretions unable to manage airway and brought to ICU for likely asp pna and pending respiratory failure. due to severe upper airway secretions.      Assessment  upper airway secretions, improving.  Anemia  - slow downtrending h/h with out source of active gib.  Dysphagia S/P PEG 7/12 tolerating diet  Aspiration PNA - Kleb  and staph in sputum  MELISSA on CKD cr baseline 1.2 -1.4  improving and resolved hypernatremia       Plan  pulmonary toilet  D/c Mucomyst  increase Rubinol  decrease atropine due to high dry tongue   continue Nebs, humidified o2  finish course of abx as per ID       Family Updated: 	Dtr- melia	                                 Date:  7/16   [facility]  [phone] Cell  [phone]    Diet/Nutrition:		tube feeding  GI PPx:			PPI    DVT Ppx:		hep sq    Activity:		       OOB to chair  Head of Bed:               35-45 deg  Glycemic Control:        n/a    Lines:  PIV      Disposition: continue care on tele, o2 monitoring, start dispo planning if secretions continue to improve.   Goals of Care: full code

## 2018-07-18 NOTE — PROGRESS NOTE ADULT - SUBJECTIVE AND OBJECTIVE BOX
Follow-up Pulmonary Progress Note  Chief Complaint : Dysphagia      Patient secretions improving  less purulent  now white  less frequent  but continues, Dry mouth more evident      Allergies :No Known Allergies      PAST MEDICAL & SURGICAL HISTORY:  Psoriasis  GERD (gastroesophageal reflux disease)  Dyslipidemia  No significant past surgical history      Medications:  MEDICATIONS  (STANDING):  ALBUTerol/ipratropium for Nebulization 3 milliLiter(s) Nebulizer every 6 hours  atropine 1% Ophthalmic Solution for SubLingual Use 2 Drop(s) SubLingual three times a day  chlorhexidine 0.12% Liquid 15 milliLiter(s) Swish and Spit two times a day  famotidine    Tablet 20 milliGRAM(s) Oral daily  fluconAZOLE   Tablet 100 milliGRAM(s) Oral daily  glycopyrrolate 1 milliGRAM(s) Oral three times a day  heparin  Injectable 5000 Unit(s) SubCutaneous every 8 hours  latanoprost 0.005% Ophthalmic Solution 1 Drop(s) Both EYES at bedtime  linaclotide 145 MICROGram(s) Oral before breakfast  melatonin 3 milliGRAM(s) Oral at bedtime  polyethylene glycol 3350 17 Gram(s) Oral daily  senna 2 Tablet(s) Oral at bedtime    MEDICATIONS  (PRN):  acetaminophen   Tablet. 650 milliGRAM(s) Oral every 6 hours PRN Mild Pain (1 - 3)  bisacodyl Suppository 10 milliGRAM(s) Rectal daily PRN Constipation      LABS:                        7.7    6.5   )-----------( 149      ( 18 Jul 2018 05:47 )             24.0     07-18    145  |  110<H>  |  40<H>  ----------------------------<  85  3.9   |  29  |  1.39<H>    Ca    9.2      18 Jul 2018 05:47  Phos  2.7     07-17  Mg     2.4     07-17        CULTURES: (if applicable)    Culture - Sputum (collected 07-11-18 @ 09:15)  Source: .Sputum Sputum  Gram Stain (07-11-18 @ 14:46):    No squamous epithelial cells per low power field    Numerous polymorphonuclear leukocytes per low power field    Numerous Gram positive cocci in pairs per oil power field  Final Report (07-15-18 @ 09:40):    Moderate Klebsiella pneumoniae    Numerous Streptococcus agalactiae (Group B) isolated    Group B streptococci are susceptible to ampicillin,    penicillin and cefazolin, but may be resistant to    erythromycin and clindamycin.    Recommendations for intrapartum prophylaxis for Group B    streptococci are penicillin or ampicillin.    Moderate Staphylococcus aureus    Normal Respiratory Renetta absent  Organism: Klebsiella pneumoniae  Staphylococcus aureus (07-15-18 @ 09:40)  Organism: Staphylococcus aureus (07-15-18 @ 09:40)      -  Ampicillin/Sulbactam: S <=8/4      -  Cefazolin: S <=4      -  Clindamycin: S 0.5      -  Erythromycin: R >4      -  Gentamicin: S <=1      -  Oxacillin: S 1      -  Penicillin: R >8      -  RIF- Rifampin: S <=1      -  Tetra/Doxy: S <=1      -  Trimethoprim/Sulfamethoxazole: S <=0.5/9.5      -  Vancomycin: S 2      Method Type: KALYAN  Organism: Klebsiella pneumoniae (07-15-18 @ 09:40)      -  Amikacin: S <=8      -  Amoxicillin/Clavulanic Acid: S <=8/4      -  Ampicillin: R >16      -  Ampicillin/Sulbactam: S 8/4      -  Aztreonam: S <=4      -  Cefazolin: S <=2      -  Cefepime: S <=2      -  Cefoxitin: S <=4      -  Ceftriaxone: S <=1      -  Ciprofloxacin: S <=0.5      -  Ertapenem: S <=0.5      -  Gentamicin: S <=1      -  Imipenem: S <=1      -  Levofloxacin: S <=1      -  Meropenem: S <=1      -  Piperacillin/Tazobactam: S <=8      -  Tobramycin: S <=2      -  Trimethoprim/Sulfamethoxazole: S <=0.5/9.5      Method Type: KALYAN    Culture - Blood (collected 07-11-18 @ 08:40)  Source: .Blood Blood  Final Report (07-16-18 @ 13:00):    No growth at 5 days.    Culture - Blood (collected 07-11-18 @ 08:15)  Source: .Blood Blood-Peripheral  Final Report (07-16-18 @ 12:00):    No growth at 5 days.            CAPILLARY BLOOD GLUCOSE          VITALS:  T(C): 36.7 (07-18-18 @ 05:25), Max: 36.7 (07-18-18 @ 05:25)  T(F): 98 (07-18-18 @ 05:25), Max: 98 (07-18-18 @ 05:25)  HR: 61 (07-18-18 @ 05:25) (60 - 61)  BP: 104/85 (07-18-18 @ 10:02) (104/85 - 139/66)  BP(mean): --  ABP: --  ABP(mean): --  RR: 15 (07-18-18 @ 10:02) (14 - 16)  SpO2: 96% (07-18-18 @ 10:02) (96% - 100%)  CVP(mm Hg): --  CVP(cm H2O): --    Ins and Outs     07-17-18 @ 07:01  -  07-18-18 @ 07:00  --------------------------------------------------------  IN: 1435 mL / OUT: 0 mL / NET: 1435 mL

## 2018-07-18 NOTE — SWALLOW BEDSIDE ASSESSMENT ADULT - SLP PERTINENT HISTORY OF CURRENT PROBLEM
Patient is a 85y male with dysphagia admitted for dysphagia July 2. He had egd and no obstruction just reflux and candida esophogitis. s/p intubation/extubation. He had a peg placed 7/5 and on 7/11 zosyn was commenced for excessive secretions and he was moved to the icu. He still has lots of secretions and pooling in his throat. Antibiotics changed to cefepime and doxycycline due to hypernatremia and concern that zosyn did not cover the mssa in the sputum.  Palliative following.  WBC 6.5.  Per chest xray 7/16: Heart is magnified by technique. Left-sided pacemaker again noted.  Scattered small infiltrates are again seen mainly in the right lung and these have increased somewhat from July 15.
Pt is a 84 y o M sent from [facility] for cough, unable to swallow, wife requested evaluation. Wife states that pt has had feeding tubes in the past and then advanced to pureed diet but now is unable to swallow, pt states it gets stuck in throat, thinks he needs a scope, was seen by GI in [place]. Denies f/c, has some nausea.  Pt s/p intubation and extubation 7/5. WBC 6.7. Per 7/4 chest xray: Left cardiac device. No new consolidation in the visualized lungs.  No pleural effusion.  Heart size cannot be accurately assessed in this projection, but appears enlarged

## 2018-07-18 NOTE — PROGRESS NOTE ADULT - PROBLEM SELECTOR PLAN 1
pt. had peg tube placed this hospital stay; continue peg tube feedings  GI followup. Repeat speech eval. done  npo recommended

## 2018-07-18 NOTE — PROGRESS NOTE ADULT - SUBJECTIVE AND OBJECTIVE BOX
Progress:   More awake with peg.  Less upper airway congestion    Present Symptoms:   Dyspnea: resolved    Review of Systems: All others negative     MEDICATIONS  (STANDING):  acetylcysteine 20% Inhalation 2 milliLiter(s) Inhalation every 6 hours  ALBUTerol/ipratropium for Nebulization 3 milliLiter(s) Nebulizer every 6 hours  atropine 1% Ophthalmic Solution for SubLingual Use 2 Drop(s) SubLingual four times a day  chlorhexidine 0.12% Liquid 15 milliLiter(s) Swish and Spit two times a day  famotidine    Tablet 20 milliGRAM(s) Oral daily  fluconAZOLE   Tablet 100 milliGRAM(s) Oral daily  glycopyrrolate 1 milliGRAM(s) Oral two times a day  heparin  Injectable 5000 Unit(s) SubCutaneous every 8 hours  latanoprost 0.005% Ophthalmic Solution 1 Drop(s) Both EYES at bedtime  linaclotide 145 MICROGram(s) Oral before breakfast  melatonin 3 milliGRAM(s) Oral at bedtime  polyethylene glycol 3350 17 Gram(s) Oral daily  senna 2 Tablet(s) Oral at bedtime    MEDICATIONS  (PRN):  acetaminophen   Tablet. 650 milliGRAM(s) Oral every 6 hours PRN Mild Pain (1 - 3)  bisacodyl Suppository 10 milliGRAM(s) Rectal daily PRN Constipation      PHYSICAL EXAM:  Vital Signs Last 24 Hrs  T(C): 36.7 (18 Jul 2018 05:25), Max: 36.7 (18 Jul 2018 05:25)  T(F): 98 (18 Jul 2018 05:25), Max: 98 (18 Jul 2018 05:25)  HR: 61 (18 Jul 2018 05:25) (60 - 61)  BP: 104/85 (18 Jul 2018 10:02) (104/85 - 139/66)  BP(mean): --  RR: 15 (18 Jul 2018 10:02) (14 - 16)  SpO2: 96% (18 Jul 2018 10:02) (96% - 100%)  General: alert  oriented x ____      HEENT: normal    Lungs: comfortable   CV: normal    GI: normal    : normal    Musculoskeletal: normal   Skin: normal    Neuro: no deficits   Oral intake ability:  oral feeding  Diet: NPO,     LABS:                          7.7    6.5   )-----------( 149      ( 18 Jul 2018 05:47 )             24.0     07-18    145  |  110<H>  |  40<H>  ----------------------------<  85  3.9   |  29  |  1.39<H>    Ca    9.2      18 Jul 2018 05:47  Phos  2.7     07-17  Mg     2.4     07-17          RADIOLOGY & ADDITIONAL STUDIES:  < from: Xray Chest 1 View- PORTABLE-Routine (07.16.18 @ 06:28) >  EXAM:  XR CHEST PORTABLE ROUTINE 1V      PROCEDURE DATE:  07/16/2018        INTERPRETATION:  AP chest on July 16, 2018 at 5:40 AM. Patient being   followed for infiltration and has abnormal chest sounds. Patient has   dysphagia.    Heart is magnified by technique. Left-sided pacemaker again noted.    Scattered small infiltrates are again seen mainly in the right lung and   these have increased somewhat from July 15.    IMPRESSION: Scattered infiltrates showing some increase.                  REDDY DONNELLY M.D., ATTENDING RADIOLOGIST  This document has been electronically signed. Jul 16 2018 10:05AM        < end of copied text >    ADVANCE DIRECTIVES:  Advanced Care Planning discussion total time spent:  10 minutes

## 2018-07-18 NOTE — PROGRESS NOTE ADULT - ASSESSMENT
85 year old male with h/o CVA , PPM, CKD-3, Psoriasis, recent hospitalization for UTI and right hydronephrosis who p/w dysphagia, had  PEG on 7/2/18, after which he has a prolonged intubation in the ICU, s/p extubation, and now on tele floor with excessive secretions, which have since improved.

## 2018-07-18 NOTE — SWALLOW BEDSIDE ASSESSMENT ADULT - SWALLOW EVAL: RECOMMENDED DIET
NPO with alternative means of nutrition/hydration
PUREE only, NO LIQUIDS IF MD/GI IN AGREEMENT. Alternative means of hydration. Consider supplemental means of nutrition if pt unable to maintain adequate PO intake

## 2018-07-18 NOTE — SWALLOW BEDSIDE ASSESSMENT ADULT - SWALLOW EVAL: SECRETION MANAGEMENT
wet upper airway/breath sounds/problems swallowing secretions/baseline cough/expectorate independently
intermittent productive cough at baseline; gurgly vocal quality which pt was able to clear when cued to cough

## 2018-07-18 NOTE — PROGRESS NOTE ADULT - SUBJECTIVE AND OBJECTIVE BOX
Patient is a 85y old  Male who presents with a chief complaint of Cough, unable to swallow (02 Jul 2018 18:17)      Patient seen and examined at bedside. Patient has some secretions still, but much improved.    ALLERGIES:  No Known Allergies    MEDICATIONS  (STANDING):  acetylcysteine 20% Inhalation 2 milliLiter(s) Inhalation every 6 hours  ALBUTerol/ipratropium for Nebulization 3 milliLiter(s) Nebulizer every 6 hours  atropine 1% Ophthalmic Solution for SubLingual Use 2 Drop(s) SubLingual four times a day  chlorhexidine 0.12% Liquid 15 milliLiter(s) Swish and Spit two times a day  famotidine    Tablet 20 milliGRAM(s) Oral daily  fluconAZOLE   Tablet 100 milliGRAM(s) Oral daily  glycopyrrolate 1 milliGRAM(s) Oral two times a day  heparin  Injectable 5000 Unit(s) SubCutaneous every 8 hours  latanoprost 0.005% Ophthalmic Solution 1 Drop(s) Both EYES at bedtime  linaclotide 145 MICROGram(s) Oral before breakfast  melatonin 3 milliGRAM(s) Oral at bedtime  polyethylene glycol 3350 17 Gram(s) Oral daily  senna 2 Tablet(s) Oral at bedtime    MEDICATIONS  (PRN):  acetaminophen   Tablet. 650 milliGRAM(s) Oral every 6 hours PRN Mild Pain (1 - 3)  bisacodyl Suppository 10 milliGRAM(s) Rectal daily PRN Constipation    Vital Signs Last 24 Hrs  T(F): 98 (18 Jul 2018 05:25), Max: 98 (18 Jul 2018 05:25)  HR: 61 (18 Jul 2018 05:25) (60 - 61)  BP: 104/85 (18 Jul 2018 10:02) (104/85 - 139/66)  RR: 15 (18 Jul 2018 10:02) (14 - 16)  SpO2: 96% (18 Jul 2018 10:02) (96% - 100%)  I&O's Summary    17 Jul 2018 07:01  -  18 Jul 2018 07:00  --------------------------------------------------------  IN: 1435 mL / OUT: 0 mL / NET: 1435 mL      PHYSICAL EXAM:  General: NAD, A/O x 3  Neck: Supple, No JVD  Lungs: Clear to auscultation bilaterally limited anteriorly  Cardio: RRR, S1/S2  Abdomen: Soft, Nontender, Nondistended; Bowel sounds present; obese  Extremities: No calf tenderness, 2+ pitting edema, chronic venous changes    LABS:                        7.7    6.5   )-----------( 149      ( 18 Jul 2018 05:47 )             24.0     07-18    145  |  110  |  40  ----------------------------<  85  3.9   |  29  |  1.39    Ca    9.2      18 Jul 2018 05:47  Phos  2.7     07-17  Mg     2.4     07-17      eGFR if Non African American: 46 mL/min/1.73M2 (07-18-18 @ 05:47)  eGFR if : 53 mL/min/1.73M2 (07-18-18 @ 05:47)    07-10 DfoclpmmmhW0C 4.6        RADIOLOGY & ADDITIONAL TESTS:    Care Discussed with Consultants/Other Providers: Dr. Simmons

## 2018-07-18 NOTE — PROGRESS NOTE ADULT - ASSESSMENT
85 year old male with h/o CVA , PPM, CKD, Psoriasis, recent hospitalization for UTI and right hydronephrosis who p/w dysphagia, had  PEG on 7/2/18.  He had an EGD which showed no mechanical obstructive causes of dysphagia, but required to be intubated post procedure and was monitored on floor., he was extubated and transferred to medical floor  on 7/11/18,.  Pt. now on telemetry still with upper airway secretions being followed by Pulmonary.  Palliative care:   Pt is clinically improved.  Family aware of poor long term prognosis

## 2018-07-19 ENCOUNTER — TRANSCRIPTION ENCOUNTER (OUTPATIENT)
Age: 83
End: 2018-07-19

## 2018-07-19 VITALS
OXYGEN SATURATION: 96 % | HEART RATE: 92 BPM | DIASTOLIC BLOOD PRESSURE: 73 MMHG | RESPIRATION RATE: 15 BRPM | SYSTOLIC BLOOD PRESSURE: 130 MMHG | TEMPERATURE: 99 F

## 2018-07-19 LAB
% ALBUMIN: 42.5 % — SIGNIFICANT CHANGE UP
% ALPHA 1: 9.6 % — SIGNIFICANT CHANGE UP
% ALPHA 2: 14.5 % — SIGNIFICANT CHANGE UP
% BETA: 14.4 % — SIGNIFICANT CHANGE UP
% GAMMA: 19 % — SIGNIFICANT CHANGE UP
ALBUMIN SERPL ELPH-MCNC: 2.6 G/DL — LOW (ref 3.6–5.5)
ALBUMIN/GLOB SERPL ELPH: 0.7 RATIO — SIGNIFICANT CHANGE UP
ALPHA1 GLOB SERPL ELPH-MCNC: 0.6 G/DL — HIGH (ref 0.1–0.4)
ALPHA2 GLOB SERPL ELPH-MCNC: 0.9 G/DL — SIGNIFICANT CHANGE UP (ref 0.5–1)
B-GLOBULIN SERPL ELPH-MCNC: 0.9 G/DL — SIGNIFICANT CHANGE UP (ref 0.5–1)
GAMMA GLOBULIN: 1.2 G/DL — SIGNIFICANT CHANGE UP (ref 0.6–1.6)
INTERPRETATION SERPL IFE-IMP: SIGNIFICANT CHANGE UP
PROT PATTERN SERPL ELPH-IMP: SIGNIFICANT CHANGE UP
PROT SERPL-MCNC: 6.1 G/DL — SIGNIFICANT CHANGE UP (ref 6–8.3)
PROT SERPL-MCNC: 6.1 G/DL — SIGNIFICANT CHANGE UP (ref 6–8.3)

## 2018-07-19 PROCEDURE — 85730 THROMBOPLASTIN TIME PARTIAL: CPT

## 2018-07-19 PROCEDURE — 76775 US EXAM ABDO BACK WALL LIM: CPT

## 2018-07-19 PROCEDURE — 94003 VENT MGMT INPAT SUBQ DAY: CPT

## 2018-07-19 PROCEDURE — 83880 ASSAY OF NATRIURETIC PEPTIDE: CPT

## 2018-07-19 PROCEDURE — 71046 X-RAY EXAM CHEST 2 VIEWS: CPT

## 2018-07-19 PROCEDURE — 88305 TISSUE EXAM BY PATHOLOGIST: CPT

## 2018-07-19 PROCEDURE — 88312 SPECIAL STAINS GROUP 1: CPT

## 2018-07-19 PROCEDURE — 36600 WITHDRAWAL OF ARTERIAL BLOOD: CPT

## 2018-07-19 PROCEDURE — 99239 HOSP IP/OBS DSCHRG MGMT >30: CPT

## 2018-07-19 PROCEDURE — 97161 PT EVAL LOW COMPLEX 20 MIN: CPT

## 2018-07-19 PROCEDURE — 97116 GAIT TRAINING THERAPY: CPT

## 2018-07-19 PROCEDURE — 86334 IMMUNOFIX E-PHORESIS SERUM: CPT

## 2018-07-19 PROCEDURE — 87040 BLOOD CULTURE FOR BACTERIA: CPT

## 2018-07-19 PROCEDURE — 86901 BLOOD TYPING SEROLOGIC RH(D): CPT

## 2018-07-19 PROCEDURE — 85027 COMPLETE CBC AUTOMATED: CPT

## 2018-07-19 PROCEDURE — 94760 N-INVAS EAR/PLS OXIMETRY 1: CPT

## 2018-07-19 PROCEDURE — 85610 PROTHROMBIN TIME: CPT

## 2018-07-19 PROCEDURE — 87086 URINE CULTURE/COLONY COUNT: CPT

## 2018-07-19 PROCEDURE — 86850 RBC ANTIBODY SCREEN: CPT

## 2018-07-19 PROCEDURE — 70450 CT HEAD/BRAIN W/O DYE: CPT

## 2018-07-19 PROCEDURE — 82272 OCCULT BLD FECES 1-3 TESTS: CPT

## 2018-07-19 PROCEDURE — 93306 TTE W/DOPPLER COMPLETE: CPT

## 2018-07-19 PROCEDURE — 94668 MNPJ CHEST WALL SBSQ: CPT

## 2018-07-19 PROCEDURE — 84100 ASSAY OF PHOSPHORUS: CPT

## 2018-07-19 PROCEDURE — 84155 ASSAY OF PROTEIN SERUM: CPT

## 2018-07-19 PROCEDURE — 83036 HEMOGLOBIN GLYCOSYLATED A1C: CPT

## 2018-07-19 PROCEDURE — 84165 PROTEIN E-PHORESIS SERUM: CPT

## 2018-07-19 PROCEDURE — 81001 URINALYSIS AUTO W/SCOPE: CPT

## 2018-07-19 PROCEDURE — 88104 CYTOPATH FL NONGYN SMEARS: CPT

## 2018-07-19 PROCEDURE — 94640 AIRWAY INHALATION TREATMENT: CPT

## 2018-07-19 PROCEDURE — 93971 EXTREMITY STUDY: CPT

## 2018-07-19 PROCEDURE — 97530 THERAPEUTIC ACTIVITIES: CPT

## 2018-07-19 PROCEDURE — 71250 CT THORAX DX C-: CPT

## 2018-07-19 PROCEDURE — 70490 CT SOFT TISSUE NECK W/O DYE: CPT

## 2018-07-19 PROCEDURE — 80048 BASIC METABOLIC PNL TOTAL CA: CPT

## 2018-07-19 PROCEDURE — 86022 PLATELET ANTIBODIES: CPT

## 2018-07-19 PROCEDURE — 31720 CLEARANCE OF AIRWAYS: CPT

## 2018-07-19 PROCEDURE — 92526 ORAL FUNCTION THERAPY: CPT

## 2018-07-19 PROCEDURE — 93005 ELECTROCARDIOGRAM TRACING: CPT

## 2018-07-19 PROCEDURE — 80053 COMPREHEN METABOLIC PANEL: CPT

## 2018-07-19 PROCEDURE — 87070 CULTURE OTHR SPECIMN AEROBIC: CPT

## 2018-07-19 PROCEDURE — 92610 EVALUATE SWALLOWING FUNCTION: CPT

## 2018-07-19 PROCEDURE — 94002 VENT MGMT INPAT INIT DAY: CPT

## 2018-07-19 PROCEDURE — 96374 THER/PROPH/DIAG INJ IV PUSH: CPT

## 2018-07-19 PROCEDURE — 87186 SC STD MICRODIL/AGAR DIL: CPT

## 2018-07-19 PROCEDURE — 82803 BLOOD GASES ANY COMBINATION: CPT

## 2018-07-19 PROCEDURE — 84295 ASSAY OF SERUM SODIUM: CPT

## 2018-07-19 PROCEDURE — 82962 GLUCOSE BLOOD TEST: CPT

## 2018-07-19 PROCEDURE — 83605 ASSAY OF LACTIC ACID: CPT

## 2018-07-19 PROCEDURE — L8699: CPT

## 2018-07-19 PROCEDURE — 83735 ASSAY OF MAGNESIUM: CPT

## 2018-07-19 PROCEDURE — 71045 X-RAY EXAM CHEST 1 VIEW: CPT

## 2018-07-19 PROCEDURE — 99285 EMERGENCY DEPT VISIT HI MDM: CPT | Mod: 25

## 2018-07-19 PROCEDURE — 86900 BLOOD TYPING SEROLOGIC ABO: CPT

## 2018-07-19 RX ORDER — ATROPINE SULFATE 1 %
0 DROPS OPHTHALMIC (EYE)
Qty: 0 | Refills: 0 | COMMUNITY
Start: 2018-07-19

## 2018-07-19 RX ORDER — FLUCONAZOLE 150 MG/1
1 TABLET ORAL
Qty: 0 | Refills: 0 | COMMUNITY
Start: 2018-07-19 | End: 2018-08-02

## 2018-07-19 RX ORDER — IPRATROPIUM/ALBUTEROL SULFATE 18-103MCG
3 AEROSOL WITH ADAPTER (GRAM) INHALATION
Qty: 0 | Refills: 0 | COMMUNITY
Start: 2018-07-19

## 2018-07-19 RX ORDER — FLUCONAZOLE 150 MG/1
1 TABLET ORAL
Qty: 0 | Refills: 0 | COMMUNITY
Start: 2018-07-19

## 2018-07-19 RX ORDER — SCOPALAMINE 1 MG/3D
1 PATCH, EXTENDED RELEASE TRANSDERMAL
Qty: 0 | Refills: 0 | COMMUNITY

## 2018-07-19 RX ORDER — LINACLOTIDE 145 UG/1
1 CAPSULE, GELATIN COATED ORAL
Qty: 0 | Refills: 0 | COMMUNITY
Start: 2018-07-19

## 2018-07-19 RX ORDER — ROBINUL 0.2 MG/ML
1 INJECTION INTRAMUSCULAR; INTRAVENOUS
Qty: 0 | Refills: 0 | COMMUNITY
Start: 2018-07-19

## 2018-07-19 RX ORDER — CHLORHEXIDINE GLUCONATE 213 G/1000ML
15 SOLUTION TOPICAL
Qty: 0 | Refills: 0 | COMMUNITY
Start: 2018-07-19

## 2018-07-19 RX ADMIN — POLYETHYLENE GLYCOL 3350 17 GRAM(S): 17 POWDER, FOR SOLUTION ORAL at 11:46

## 2018-07-19 RX ADMIN — LINACLOTIDE 145 MICROGRAM(S): 145 CAPSULE, GELATIN COATED ORAL at 06:33

## 2018-07-19 RX ADMIN — HEPARIN SODIUM 5000 UNIT(S): 5000 INJECTION INTRAVENOUS; SUBCUTANEOUS at 06:32

## 2018-07-19 RX ADMIN — Medication 3 MILLILITER(S): at 15:17

## 2018-07-19 RX ADMIN — FLUCONAZOLE 100 MILLIGRAM(S): 150 TABLET ORAL at 11:45

## 2018-07-19 RX ADMIN — CHLORHEXIDINE GLUCONATE 15 MILLILITER(S): 213 SOLUTION TOPICAL at 06:33

## 2018-07-19 RX ADMIN — FAMOTIDINE 20 MILLIGRAM(S): 10 INJECTION INTRAVENOUS at 11:45

## 2018-07-19 RX ADMIN — Medication 3 MILLILITER(S): at 09:31

## 2018-07-19 RX ADMIN — Medication 2 DROP(S): at 06:32

## 2018-07-19 RX ADMIN — ROBINUL 1 MILLIGRAM(S): 0.2 INJECTION INTRAMUSCULAR; INTRAVENOUS at 06:33

## 2018-07-19 RX ADMIN — Medication 2 DROP(S): at 14:07

## 2018-07-19 RX ADMIN — HEPARIN SODIUM 5000 UNIT(S): 5000 INJECTION INTRAVENOUS; SUBCUTANEOUS at 14:07

## 2018-07-19 RX ADMIN — Medication 3 MILLILITER(S): at 04:10

## 2018-07-19 RX ADMIN — ROBINUL 1 MILLIGRAM(S): 0.2 INJECTION INTRAMUSCULAR; INTRAVENOUS at 14:03

## 2018-07-19 RX ADMIN — Medication 650 MILLIGRAM(S): at 00:13

## 2018-07-19 NOTE — PROGRESS NOTE ADULT - SUBJECTIVE AND OBJECTIVE BOX
Follow-up Pulmonary Progress Note  Chief Complaint : Dysphagia      pt much improved  speach more recognizable   secretions better managed today.      Allergies :No Known Allergies      PAST MEDICAL & SURGICAL HISTORY:  Psoriasis  GERD (gastroesophageal reflux disease)  Dyslipidemia  No significant past surgical history      Medications:  MEDICATIONS  (STANDING):  ALBUTerol/ipratropium for Nebulization 3 milliLiter(s) Nebulizer every 6 hours  atropine 1% Ophthalmic Solution for SubLingual Use 2 Drop(s) SubLingual three times a day  chlorhexidine 0.12% Liquid 15 milliLiter(s) Swish and Spit two times a day  famotidine    Tablet 20 milliGRAM(s) Oral daily  fluconAZOLE   Tablet 100 milliGRAM(s) Oral daily  glycopyrrolate 1 milliGRAM(s) Oral three times a day  heparin  Injectable 5000 Unit(s) SubCutaneous every 8 hours  latanoprost 0.005% Ophthalmic Solution 1 Drop(s) Both EYES at bedtime  linaclotide 145 MICROGram(s) Oral before breakfast  melatonin 3 milliGRAM(s) Oral at bedtime  polyethylene glycol 3350 17 Gram(s) Oral daily  senna 2 Tablet(s) Oral at bedtime    MEDICATIONS  (PRN):  acetaminophen   Tablet. 650 milliGRAM(s) Oral every 6 hours PRN Mild Pain (1 - 3)  bisacodyl Suppository 10 milliGRAM(s) Rectal daily PRN Constipation      LABS:                        7.7    6.5   )-----------( 149      ( 18 Jul 2018 05:47 )             24.0     07-18    145  |  110<H>  |  40<H>  ----------------------------<  85  3.9   |  29  |  1.39<H>    Ca    9.2      18 Jul 2018 05:47    TPro  6.1  /  Alb  x   /  TBili  x   /  DBili  x   /  AST  x   /  ALT  x   /  AlkPhos  x   07-18                        CULTURES: (if applicable)    Culture - Sputum (collected 07-11-18 @ 09:15)  Source: .Sputum Sputum  Gram Stain (07-11-18 @ 14:46):    No squamous epithelial cells per low power field    Numerous polymorphonuclear leukocytes per low power field    Numerous Gram positive cocci in pairs per oil power field  Final Report (07-15-18 @ 09:40):    Moderate Klebsiella pneumoniae    Numerous Streptococcus agalactiae (Group B) isolated    Group B streptococci are susceptible to ampicillin,    penicillin and cefazolin, but may be resistant to    erythromycin and clindamycin.    Recommendations for intrapartum prophylaxis for Group B    streptococci are penicillin or ampicillin.    Moderate Staphylococcus aureus    Normal Respiratory Renetta absent  Organism: Klebsiella pneumoniae  Staphylococcus aureus (07-15-18 @ 09:40)  Organism: Staphylococcus aureus (07-15-18 @ 09:40)      -  Ampicillin/Sulbactam: S <=8/4      -  Cefazolin: S <=4      -  Clindamycin: S 0.5      -  Erythromycin: R >4      -  Gentamicin: S <=1      -  Oxacillin: S 1      -  Penicillin: R >8      -  RIF- Rifampin: S <=1      -  Tetra/Doxy: S <=1      -  Trimethoprim/Sulfamethoxazole: S <=0.5/9.5      -  Vancomycin: S 2      Method Type: KALYAN  Organism: Klebsiella pneumoniae (07-15-18 @ 09:40)      -  Amikacin: S <=8      -  Amoxicillin/Clavulanic Acid: S <=8/4      -  Ampicillin: R >16      -  Ampicillin/Sulbactam: S 8/4      -  Aztreonam: S <=4      -  Cefazolin: S <=2      -  Cefepime: S <=2      -  Cefoxitin: S <=4      -  Ceftriaxone: S <=1      -  Ciprofloxacin: S <=0.5      -  Ertapenem: S <=0.5      -  Gentamicin: S <=1      -  Imipenem: S <=1      -  Levofloxacin: S <=1      -  Meropenem: S <=1      -  Piperacillin/Tazobactam: S <=8      -  Tobramycin: S <=2      -  Trimethoprim/Sulfamethoxazole: S <=0.5/9.5      Method Type: KALYAN    Culture - Blood (collected 07-11-18 @ 08:40)  Source: .Blood Blood  Final Report (07-16-18 @ 13:00):    No growth at 5 days.    Culture - Blood (collected 07-11-18 @ 08:15)  Source: .Blood Blood-Peripheral  Final Report (07-16-18 @ 12:00):    No growth at 5 days.        VITALS:  T(C): 36.4 (07-19-18 @ 08:18), Max: 37 (07-18-18 @ 22:37)  T(F): 97.5 (07-19-18 @ 08:18), Max: 98.6 (07-18-18 @ 22:37)  HR: 60 (07-19-18 @ 08:18) (60 - 61)  BP: 138/77 (07-19-18 @ 08:18) (131/63 - 138/77)  BP(mean): --  ABP: --  ABP(mean): --  RR: 16 (07-19-18 @ 08:18) (16 - 18)  SpO2: 100% (07-19-18 @ 08:18) (100% - 100%)  CVP(mm Hg): --  CVP(cm H2O): --    Ins and Outs     07-18-18 @ 07:01  -  07-19-18 @ 07:00  --------------------------------------------------------  IN: 630 mL / OUT: 0 mL / NET: 630 mL    07-19-18 @ 07:01  -  07-19-18 @ 15:37  --------------------------------------------------------  IN: 520 mL / OUT: 0 mL / NET: 520 mL

## 2018-07-19 NOTE — PROGRESS NOTE ADULT - PROBLEM SELECTOR PROBLEM 7
Stage 3 chronic kidney disease
Dyslipidemia

## 2018-07-19 NOTE — DISCHARGE NOTE ADULT - CARE PROVIDERS DIRECT ADDRESSES
[email or web address]

## 2018-07-19 NOTE — PROGRESS NOTE ADULT - PROBLEM SELECTOR PROBLEM 8
Dyslipidemia
Discharge planning issues
Discharge planning issues
Dyslipidemia
Dyslipidemia
Hypernatremia

## 2018-07-19 NOTE — PROGRESS NOTE ADULT - ASSESSMENT
Physical Examination:  GENERAL:               Alert, verbal ,oriented x 2 No acute distress.    HEENT:                  minimal oral and tracheal secretions, trace gross  gargling of secretions, moist MM  PULM:                    Bilateral air entry, mild transmitted upper airway sounds, No Rales, scattered Rhonchi, No Wheezing  CVS:                       S1, S2,  + Murmur  ABD:                        Soft, distended, nontender, normoactive bowel sounds,  + PEG   EXT:                       LUE improving edema, nontender, No Cyanosis or Clubbing   Vascular:                Warm Extremities, Normal Capillary refill, Normal Distal Pulses  NEURO:                  Alert,  follows commands  PSYC:                      Calm, + Insight with episodes of agitation      85 year old male with h/o cva and dysphagia and h/o PEG, PPM, CKD, Psoriasis, recent hospitalization for UTI and right hydronephrosis who p/w dysphagia and ? Nee for PEG on 7/2/18  S/P EGD which showed no mechanical obstructive causes of dysphagia, but required to be intubated post procedure and was monitored on floor.  Extubated and transferred to medical floor  on 7/11/18 patient developed severe increase in secretions unable to manage airway and brought to ICU for likely asp pna and pending respiratory failure. due to severe upper airway secretions.      Assessment  upper airway secretions, improving.  Anemia  - slow downtrending h/h with out source of active gib.  Dysphagia S/P PEG 7/12 tolerating diet  Aspiration PNA - Kleb  and staph in sputum  MELISSA on CKD cr baseline 1.2 -1.4  improving and resolved hypernatremia       Plan  pulmonary toilet  continue Rubinol, atropine   continue Nebs, humidified o2  finish course of abx as per ID   d/c planing to place that has suction available     Family Updated: 	Emi cárdenas	                                 Date:  7/16   [facility]  [phone] Cell  [phone]    Diet/Nutrition:		tube feeding  GI PPx:			PPI    DVT Ppx:		hep sq    Activity:		       OOB to chair  Head of Bed:               35-45 deg  Glycemic Control:        n/a    Lines:  PIV      Disposition: continue care on tele, o2 monitoring, start dispo planning if secretions continue to improve.   Goals of Care: full code

## 2018-07-19 NOTE — PROGRESS NOTE ADULT - SUBJECTIVE AND OBJECTIVE BOX
Patient is a 85y old  Male who presents with a chief complaint of Dysphagia (19 Jul 2018 10:22)      Patient seen and examined at bedside. Patient states "I'm feeling better everyday."    ALLERGIES:  No Known Allergies    MEDICATIONS  (STANDING):  ALBUTerol/ipratropium for Nebulization 3 milliLiter(s) Nebulizer every 6 hours  atropine 1% Ophthalmic Solution for SubLingual Use 2 Drop(s) SubLingual three times a day  chlorhexidine 0.12% Liquid 15 milliLiter(s) Swish and Spit two times a day  famotidine    Tablet 20 milliGRAM(s) Oral daily  fluconAZOLE   Tablet 100 milliGRAM(s) Oral daily  glycopyrrolate 1 milliGRAM(s) Oral three times a day  heparin  Injectable 5000 Unit(s) SubCutaneous every 8 hours  latanoprost 0.005% Ophthalmic Solution 1 Drop(s) Both EYES at bedtime  linaclotide 145 MICROGram(s) Oral before breakfast  melatonin 3 milliGRAM(s) Oral at bedtime  polyethylene glycol 3350 17 Gram(s) Oral daily  senna 2 Tablet(s) Oral at bedtime    MEDICATIONS  (PRN):  acetaminophen   Tablet. 650 milliGRAM(s) Oral every 6 hours PRN Mild Pain (1 - 3)  bisacodyl Suppository 10 milliGRAM(s) Rectal daily PRN Constipation    Vital Signs Last 24 Hrs  T(F): 97.5 (19 Jul 2018 08:18), Max: 98.6 (18 Jul 2018 22:37)  HR: 60 (19 Jul 2018 08:18) (60 - 61)  BP: 138/77 (19 Jul 2018 08:18) (131/63 - 138/77)  RR: 16 (19 Jul 2018 08:18) (16 - 18)  SpO2: 100% (19 Jul 2018 08:18) (99% - 100%)  I&O's Summary    18 Jul 2018 07:01  -  19 Jul 2018 07:00  --------------------------------------------------------  IN: 630 mL / OUT: 0 mL / NET: 630 mL    19 Jul 2018 07:01  -  19 Jul 2018 13:07  --------------------------------------------------------  IN: 520 mL / OUT: 0 mL / NET: 520 mL      PHYSICAL EXAM:  General: NAD, A/O x 3  Neck: Supple, No JVD  Lungs: Clear to auscultation bilaterally limited anteriorly  Cardio: RRR, S1/S2  Abdomen: Soft, Nontender, Nondistended; Bowel sounds present; obese  Extremities: No calf tenderness, 2+ pitting edema, chronic venous changes      LABS:                        7.7    6.5   )-----------( 149      ( 18 Jul 2018 05:47 )             24.0     07-18    145  |  110  |  40  ----------------------------<  85  3.9   |  29  |  1.39    Ca    9.2      18 Jul 2018 05:47  Phos  2.7     07-17  Mg     2.4     07-17    TPro  6.1  /  Alb  x   /  TBili  x   /  DBili  x   /  AST  x   /  ALT  x   /  AlkPhos  x   07-18    eGFR if Non African American: 46 mL/min/1.73M2 (07-18-18 @ 05:47)  eGFR if : 53 mL/min/1.73M2 (07-18-18 @ 05:47)

## 2018-07-19 NOTE — DISCHARGE NOTE ADULT - PLAN OF CARE
return to usual breathing resolved Treated with antibiotics resume to usual kidney function resolve continue treatment bowel regimen continue current meds maintain kidney function continue medical management

## 2018-07-19 NOTE — DISCHARGE NOTE ADULT - SECONDARY DIAGNOSIS.
Aspiration pneumonia of left lower lobe, unspecified aspiration pneumonia type MELISSA (acute kidney injury) Candida esophagitis Constipation, acute Dyslipidemia Hyperkalemia

## 2018-07-19 NOTE — PROGRESS NOTE ADULT - PROVIDER SPECIALTY LIST ADULT
Critical Care
Gastroenterology
Hospitalist
Infectious Disease
MICU
MICU
Nephrology
Palliative Care
Pulmonology
Pulmonology
Urology
Critical Care
Gastroenterology
Critical Care
Hospitalist

## 2018-07-19 NOTE — DISCHARGE NOTE ADULT - CARE PROVIDER_API CALL
Kwabena Gamino (MD), Internal Medicine  [address] [facility]   [address] [place] [address]  Phone: [phone]  Fax: [phone] Kwabena Gamino), Internal Medicine  [address] [facility]   [address]  [place] [address]  Phone: [phone]  Fax: [phone]    Enrique Simmons), Critical Care Medicine  [address] [place] [address]  [address] [place] [address]  Phone: [phone]  Fax: [phone]

## 2018-07-19 NOTE — DISCHARGE NOTE ADULT - INSTRUCTIONS
peg tube feedings peg tube feedings  40 cc / hour continuous of NEPRO  Free water flushes: 250 cc every 6 hours

## 2018-07-19 NOTE — DISCHARGE NOTE ADULT - PATIENT PORTAL LINK FT
You can access the [facility] Patient Portal, offered by [facility], by registering with the following website: [email or web address]

## 2018-07-19 NOTE — DISCHARGE NOTE ADULT - CARE PLAN
Principal Discharge DX:	Acute respiratory failure with hypoxia  Goal:	return to usual breathing  Assessment and plan of treatment:	resolved  Secondary Diagnosis:	Aspiration pneumonia of left lower lobe, unspecified aspiration pneumonia type  Assessment and plan of treatment:	Treated with antibiotics  Secondary Diagnosis:	MELISSA (acute kidney injury)  Assessment and plan of treatment:	resume to usual kidney function  Secondary Diagnosis:	Candida esophagitis  Goal:	resolve  Assessment and plan of treatment:	continue treatment  Secondary Diagnosis:	Constipation, acute  Assessment and plan of treatment:	bowel regimen  Secondary Diagnosis:	Dyslipidemia  Assessment and plan of treatment:	continue current meds  Secondary Diagnosis:	Hyperkalemia  Goal:	maintain kidney function  Assessment and plan of treatment:	continue medical management

## 2018-07-19 NOTE — PROGRESS NOTE ADULT - SUBJECTIVE AND OBJECTIVE BOX
Resting    Vital Signs Last 24 Hrs  T(C): 36.4 (07-19-18 @ 08:18), Max: 37 (07-18-18 @ 22:37)  T(F): 97.5 (07-19-18 @ 08:18), Max: 98.6 (07-18-18 @ 22:37)  HR: 60 (07-19-18 @ 08:18) (60 - 61)  BP: 138/77 (07-19-18 @ 08:18) (131/63 - 138/77)  RR: 16 (07-19-18 @ 08:18) (16 - 18)  SpO2: 100% (07-19-18 @ 08:18) (100% - 100%)    Lungs - decr BS  bilaterally  Heart - S1S2  Abd - soft, NT,  ND, + BS  Extr - sm peripheral edema                                        7.7    6.5   )-----------( 149      ( 18 Jul 2018 05:47 )             24.0     18 Jul 2018 05:47    145    |  110    |  40     ----------------------------<  85     3.9     |  29     |  1.39     Ca    9.2        18 Jul 2018 05:47    TPro  6.1    /  Alb  x      /  TBili  x      /  DBili  x      /  AST  x      /  ALT  x      /  AlkPhos  x      18 Jul 2018 05:47    LIVER FUNCTIONS - ( 18 Jul 2018 05:47 )  Alb: x     / Pro: 6.1 g/dL / ALK PHOS: x     / ALT: x     / AST: x     / GGT: x           acetaminophen   Tablet. 650 milliGRAM(s) Oral every 6 hours PRN  ALBUTerol/ipratropium for Nebulization 3 milliLiter(s) Nebulizer every 6 hours  atropine 1% Ophthalmic Solution for SubLingual Use 2 Drop(s) SubLingual three times a day  bisacodyl Suppository 10 milliGRAM(s) Rectal daily PRN  chlorhexidine 0.12% Liquid 15 milliLiter(s) Swish and Spit two times a day  famotidine    Tablet 20 milliGRAM(s) Oral daily  fluconAZOLE   Tablet 100 milliGRAM(s) Oral daily  glycopyrrolate 1 milliGRAM(s) Oral three times a day  heparin  Injectable 5000 Unit(s) SubCutaneous every 8 hours  latanoprost 0.005% Ophthalmic Solution 1 Drop(s) Both EYES at bedtime  linaclotide 145 MICROGram(s) Oral before breakfast  melatonin 3 milliGRAM(s) Oral at bedtime  polyethylene glycol 3350 17 Gram(s) Oral daily  senna 2 Tablet(s) Oral at bedtime    A/P:    S/p asp PNA, recurrent  NPO, PEG feeds  Hemodynamic MELISSA on CKD III, improved  Avoid hypotension and nephrotoxins  D/c planning

## 2018-07-19 NOTE — PROGRESS NOTE ADULT - PROBLEM SELECTOR PROBLEM 1
Acute respiratory failure with hypoxia
Candida esophagitis
Oropharyngeal dysphagia
Acute respiratory failure with hypoxia
Dysphagia
Dysphagia, unspecified type
Hydronephrosis
Oropharyngeal dysphagia
Prostate cancer
Ureteral obstruction, right
Aspiration pneumonia
Dysphagia, unspecified type
Acute respiratory failure with hypoxia
Airway compromise
Oropharyngeal dysphagia
Oropharyngeal dysphagia

## 2018-07-19 NOTE — PROGRESS NOTE ADULT - PROBLEM SELECTOR PLAN 8
Patient is medically ready for discharge at this point. Will need to have PRN suctioning at the [facility].  d/w the [facility], who can accommodate suctioning the patient prn. Patient medically stable for D/C to [facility]. Overall prognosis is guarded. TIME SPENT ON DISCHARGE 33 MINUTES

## 2018-07-19 NOTE — PROGRESS NOTE ADULT - PROBLEM SELECTOR PLAN 7
MELISSA on CKD nephrology is following  resolving   avoid hypotension and nephrotoxins
Now close to baseline Cr
Now close to baseline Cr
chronic stable   cont statin

## 2018-07-19 NOTE — DISCHARGE NOTE ADULT - MEDICATION SUMMARY - MEDICATIONS TO TAKE
I will START or STAY ON the medications listed below when I get home from the hospital:    acetaminophen 325 mg oral tablet  -- 2 tab(s) by mouth every 6 hours, As needed, Moderate Pain (4 - 6)  -- Indication: For Mild pain    doxazosin 4 mg oral tablet  -- 1 tab(s) by mouth once a day (at bedtime)  -- Indication: For Htn    scopolamine  -- 1 milligram(s) by transdermal patch 3 times a week  -- Indication: For Prophylactic measure    fluconazole 100 mg oral tablet  -- 1 tab(s) by mouth once a day  -- Indication: For Candida esophagitis    simvastatin 20 mg oral tablet  -- 1 tab(s) by mouth once a day (at bedtime)  -- Indication: For Hyperlipedemia    chlorhexidine 0.12% mucous membrane liquid  -- 15 milliliter(s) mucous membrane 2 times a day  -- Indication: For Dysphagia    ipratropium-albuterol 0.5 mg-2.5 mg/3 mLinhalation solution  -- 3 milliliter(s) inhaled every 6 hours  -- Indication: For Aspiration pneumonia, unspecified aspiration pneumonia type, unspecified laterality, unspecified part of lung    hydrocortisone 2.5% topical ointment  -- 1 application on skin once a day, As needed, Rash  -- Indication: For raash    nystatin 100,000 units/g topical powder  -- 1 application on skin 3 times a day  -- Indication: For Candida esophagitis    Lasix 20 mg oral tablet  -- 1 tab(s) by mouth once a day  -- Indication: For Edema    glycopyrrolate 1 mg oral tablet  -- 1 tab(s) by mouth 3 times a day  -- Indication: For Gastroesophageal reflux disease, esophagitis presence not specified    linaclotide 145 mcg oral capsule  -- 1 cap(s) by mouth once a day (before a meal)  -- Indication: For Constipation    famotidine 20 mg oral tablet  -- 1 tab(s) by mouth once a day  -- Indication: For Gastroesophageal reflux disease, esophagitis presence not specified    senna oral tablet  -- 2 tab(s) by mouth once a day (at bedtime)  -- Indication: For Stool softener    bisacodyl 10 mg rectal suppository  -- 1 suppository(ies) rectally once a day, As needed, Constipation  -- Indication: For Constipation,     polyethylene glycol 3350 oral powder for reconstitution  -- 17 gram(s) by mouth once a day, As needed, Constipation  -- Indication: For Constipation, acute    K-Dur 10 oral tablet, extended release  -- 1 tab(s) by mouth 2 times a day  -- Indication: For Supplement    melatonin 5 mg oral tablet  -- 1 tab(s) by mouth once a day (at bedtime)  -- Indication: For Sleep aid    atropine 1% ophthalmic solution  -- drop(s) sublingually 3 times a day  -- Indication: For Excessive secritions    latanoprost 0.005% ophthalmic solution  -- 1 drop(s) to each affected eye once a day (at bedtime)  -- Indication: For Eye drops    Augmentin 875 mg-125 mg oral tablet  -- 1 tab(s) by mouth every 12 hours    Until 5/22/18  -- Indication: For Aspiration pneumonia, unspecified aspiration pneumonia type, unspecified laterality, unspecified part of lung    cyanocobalamin 1000 mcg oral tablet  -- 1 tab(s) by mouth once a day  -- Indication: For Supplement    cholecalciferol oral tablet  -- 1000 unit(s) by mouth once a day  -- Indication: For Supplement I will START or STAY ON the medications listed below when I get home from the hospital:    acetaminophen 325 mg oral tablet  -- 2 tab(s) by mouth every 6 hours, As needed, Moderate Pain (4 - 6)  -- Indication: For Mild pain    doxazosin 4 mg oral tablet  -- 1 tab(s) by mouth once a day (at bedtime)  -- Indication: For Htn    fluconazole 100 mg oral tablet  -- 1 tab(s) by mouth once a day  END DATE: AUG 2, 2018  -- Indication: For Thrush    simvastatin 20 mg oral tablet  -- 1 tab(s) by mouth once a day (at bedtime)  -- Indication: For Hyperlipedemia    ipratropium-albuterol 0.5 mg-2.5 mg/3 mLinhalation solution  -- 3 milliliter(s) inhaled every 6 hours  -- Indication: For Aspiration pneumonia, unspecified aspiration pneumonia type, unspecified laterality, unspecified part of lung    hydrocortisone 2.5% topical ointment  -- 1 application on skin once a day, As needed, Rash  -- Indication: For raash    Lasix 20 mg oral tablet  -- 1 tab(s) by mouth once a day  -- Indication: For Edema    glycopyrrolate 1 mg oral tablet  -- 1 tab(s) by mouth 3 times a day  -- Indication: For Gastroesophageal reflux disease, esophagitis presence not specified    linaclotide 145 mcg oral capsule  -- 1 cap(s) by mouth once a day (before a meal)  -- Indication: For Constipation    famotidine 20 mg oral tablet  -- 1 tab(s) by mouth once a day  -- Indication: For Gastroesophageal reflux disease, esophagitis presence not specified    senna oral tablet  -- 2 tab(s) by mouth once a day (at bedtime)  -- Indication: For Stool softener    bisacodyl 10 mg rectal suppository  -- 1 suppository(ies) rectally once a day, As needed, Constipation  -- Indication: For Constipation,     polyethylene glycol 3350 oral powder for reconstitution  -- 17 gram(s) by mouth once a day, As needed, Constipation  -- Indication: For Constipation, acute    K-Dur 10 oral tablet, extended release  -- 1 tab(s) by mouth 2 times a day  -- Indication: For Supplement    melatonin 5 mg oral tablet  -- 1 tab(s) by mouth once a day (at bedtime)  -- Indication: For Sleep aid    atropine 1% ophthalmic solution  -- drop(s) sublingually 3 times a day  -- Indication: For Excessive secritions    latanoprost 0.005% ophthalmic solution  -- 1 drop(s) to each affected eye once a day (at bedtime)  -- Indication: For Eye drops    cyanocobalamin 1000 mcg oral tablet  -- 1 tab(s) by mouth once a day  -- Indication: For Supplement    cholecalciferol oral tablet  -- 1000 unit(s) by mouth once a day  -- Indication: For Supplement

## 2018-07-19 NOTE — DISCHARGE NOTE ADULT - ADDITIONAL INSTRUCTIONS
It is important that you treat your secretions. Continue taking the Mucomyst inhaler, atropine drop (sublingual) and Robinul. PATIENT WILL REQUIRE SUCTIONING AS NEEDED.

## 2018-07-19 NOTE — DISCHARGE NOTE ADULT - MEDICATION SUMMARY - MEDICATIONS TO STOP TAKING
I will STOP taking the medications listed below when I get home from the hospital:    senna oral tablet  -- 2 tab(s) by mouth once a day (at bedtime) I will STOP taking the medications listed below when I get home from the hospital:    senna oral tablet  -- 2 tab(s) by mouth once a day (at bedtime)    Augmentin 875 mg-125 mg oral tablet  -- 1 tab(s) by mouth every 12 hours    Until 5/22/18    scopolamine  -- 1 milligram(s) by transdermal patch 3 times a week

## 2018-07-25 ENCOUNTER — APPOINTMENT (OUTPATIENT)
Dept: SURGERY | Facility: ASSISTED LIVING FACILITY | Age: 83
End: 2018-07-25
Payer: MEDICARE

## 2018-07-25 PROCEDURE — 99304 1ST NF CARE SF/LOW MDM 25: CPT

## 2018-07-26 PROBLEM — L40.9 PSORIASIS, UNSPECIFIED: Chronic | Status: ACTIVE | Noted: 2018-05-06

## 2018-08-01 ENCOUNTER — APPOINTMENT (OUTPATIENT)
Dept: SURGERY | Facility: ASSISTED LIVING FACILITY | Age: 83
End: 2018-08-01
Payer: MEDICARE

## 2018-08-01 PROCEDURE — 99307 SBSQ NF CARE SF MDM 10: CPT

## 2018-08-06 NOTE — ED PROVIDER NOTE - CARE PLAN
Principal Discharge DX:	Dysphagia  Secondary Diagnosis:	Hyperkalemia  Secondary Diagnosis:	Weakness
Family

## 2018-08-10 ENCOUNTER — APPOINTMENT (OUTPATIENT)
Dept: SURGERY | Facility: ASSISTED LIVING FACILITY | Age: 83
End: 2018-08-10
Payer: MEDICARE

## 2018-08-10 PROCEDURE — 99307 SBSQ NF CARE SF MDM 10: CPT

## 2018-08-15 ENCOUNTER — APPOINTMENT (OUTPATIENT)
Dept: SURGERY | Facility: ASSISTED LIVING FACILITY | Age: 83
End: 2018-08-15
Payer: MEDICARE

## 2018-08-15 PROCEDURE — 99307 SBSQ NF CARE SF MDM 10: CPT

## 2018-08-22 ENCOUNTER — APPOINTMENT (OUTPATIENT)
Dept: SURGERY | Facility: ASSISTED LIVING FACILITY | Age: 83
End: 2018-08-22
Payer: MEDICARE

## 2018-08-22 PROCEDURE — 99307 SBSQ NF CARE SF MDM 10: CPT

## 2018-08-31 ENCOUNTER — APPOINTMENT (OUTPATIENT)
Dept: SURGERY | Facility: ASSISTED LIVING FACILITY | Age: 83
End: 2018-08-31
Payer: MEDICARE

## 2018-08-31 PROCEDURE — 99307 SBSQ NF CARE SF MDM 10: CPT

## 2018-09-05 ENCOUNTER — APPOINTMENT (OUTPATIENT)
Dept: SURGERY | Facility: ASSISTED LIVING FACILITY | Age: 83
End: 2018-09-05
Payer: MEDICARE

## 2018-09-05 PROCEDURE — 99307 SBSQ NF CARE SF MDM 10: CPT

## 2018-09-12 ENCOUNTER — INPATIENT (INPATIENT)
Facility: HOSPITAL | Age: 83
LOS: 8 days | Discharge: NURSING, REHAB OR LONG-TERM CARE | DRG: 155 | End: 2018-09-21
Attending: STUDENT IN AN ORGANIZED HEALTH CARE EDUCATION/TRAINING PROGRAM | Admitting: INTERNAL MEDICINE
Payer: MEDICARE

## 2018-09-12 VITALS
OXYGEN SATURATION: 99 % | SYSTOLIC BLOOD PRESSURE: 119 MMHG | DIASTOLIC BLOOD PRESSURE: 76 MMHG | HEART RATE: 60 BPM | TEMPERATURE: 99 F | RESPIRATION RATE: 17 BRPM | WEIGHT: 216.05 LBS

## 2018-09-12 DIAGNOSIS — Z93.1 GASTROSTOMY STATUS: Chronic | ICD-10-CM

## 2018-09-12 DIAGNOSIS — K11.20 SIALOADENITIS, UNSPECIFIED: ICD-10-CM

## 2018-09-12 LAB
ALBUMIN SERPL ELPH-MCNC: 2.8 G/DL — LOW (ref 3.3–5)
ALP SERPL-CCNC: 88 U/L — SIGNIFICANT CHANGE UP (ref 40–120)
ALT FLD-CCNC: 57 U/L DA — HIGH (ref 10–45)
ANION GAP SERPL CALC-SCNC: 7 MMOL/L — SIGNIFICANT CHANGE UP (ref 5–17)
APTT BLD: 34.2 SEC — SIGNIFICANT CHANGE UP (ref 27.5–37.4)
AST SERPL-CCNC: 41 U/L — HIGH (ref 10–40)
BASOPHILS # BLD AUTO: 0.1 K/UL — SIGNIFICANT CHANGE UP (ref 0–0.2)
BASOPHILS NFR BLD AUTO: 0.6 % — SIGNIFICANT CHANGE UP (ref 0–2)
BILIRUB SERPL-MCNC: 0.3 MG/DL — SIGNIFICANT CHANGE UP (ref 0.2–1.2)
BUN SERPL-MCNC: 51 MG/DL — HIGH (ref 7–23)
CALCIUM SERPL-MCNC: 9.7 MG/DL — SIGNIFICANT CHANGE UP (ref 8.4–10.5)
CHLORIDE SERPL-SCNC: 102 MMOL/L — SIGNIFICANT CHANGE UP (ref 96–108)
CO2 SERPL-SCNC: 29 MMOL/L — SIGNIFICANT CHANGE UP (ref 22–31)
CREAT SERPL-MCNC: 1.34 MG/DL — HIGH (ref 0.5–1.3)
EOSINOPHIL # BLD AUTO: 0.2 K/UL — SIGNIFICANT CHANGE UP (ref 0–0.5)
EOSINOPHIL NFR BLD AUTO: 1.4 % — SIGNIFICANT CHANGE UP (ref 0–6)
GLUCOSE SERPL-MCNC: 121 MG/DL — HIGH (ref 70–99)
HCT VFR BLD CALC: 29.5 % — LOW (ref 39–50)
HGB BLD-MCNC: 9.3 G/DL — LOW (ref 13–17)
INR BLD: 1.25 RATIO — HIGH (ref 0.88–1.16)
LACTATE SERPL-SCNC: 1.1 MMOL/L — SIGNIFICANT CHANGE UP (ref 0.7–2)
LYMPHOCYTES # BLD AUTO: 1.8 K/UL — SIGNIFICANT CHANGE UP (ref 1–3.3)
LYMPHOCYTES # BLD AUTO: 15.5 % — SIGNIFICANT CHANGE UP (ref 13–44)
MCHC RBC-ENTMCNC: 27.7 PG — SIGNIFICANT CHANGE UP (ref 27–34)
MCHC RBC-ENTMCNC: 31.6 GM/DL — LOW (ref 32–36)
MCV RBC AUTO: 87.7 FL — SIGNIFICANT CHANGE UP (ref 80–100)
MONOCYTES # BLD AUTO: 1 K/UL — HIGH (ref 0–0.9)
MONOCYTES NFR BLD AUTO: 8.7 % — SIGNIFICANT CHANGE UP (ref 1–9)
NEUTROPHILS # BLD AUTO: 8.4 K/UL — HIGH (ref 1.8–7.4)
NEUTROPHILS NFR BLD AUTO: 73.7 % — SIGNIFICANT CHANGE UP (ref 43–77)
PLATELET # BLD AUTO: 173 K/UL — SIGNIFICANT CHANGE UP (ref 150–400)
POTASSIUM SERPL-MCNC: 4.8 MMOL/L — SIGNIFICANT CHANGE UP (ref 3.5–5.3)
POTASSIUM SERPL-SCNC: 4.8 MMOL/L — SIGNIFICANT CHANGE UP (ref 3.5–5.3)
PROT SERPL-MCNC: 8.5 G/DL — HIGH (ref 6–8.3)
PROTHROM AB SERPL-ACNC: 13.9 SEC — HIGH (ref 9.8–12.7)
RBC # BLD: 3.36 M/UL — LOW (ref 4.2–5.8)
RBC # FLD: 14.8 % — HIGH (ref 10.3–14.5)
SODIUM SERPL-SCNC: 138 MMOL/L — SIGNIFICANT CHANGE UP (ref 135–145)
WBC # BLD: 11.3 K/UL — HIGH (ref 3.8–10.5)
WBC # FLD AUTO: 11.3 K/UL — HIGH (ref 3.8–10.5)

## 2018-09-12 PROCEDURE — 99284 EMERGENCY DEPT VISIT MOD MDM: CPT

## 2018-09-12 PROCEDURE — 71045 X-RAY EXAM CHEST 1 VIEW: CPT | Mod: 26

## 2018-09-12 PROCEDURE — 99223 1ST HOSP IP/OBS HIGH 75: CPT

## 2018-09-12 PROCEDURE — 93010 ELECTROCARDIOGRAM REPORT: CPT

## 2018-09-12 PROCEDURE — 70490 CT SOFT TISSUE NECK W/O DYE: CPT | Mod: 26

## 2018-09-12 RX ORDER — SIMVASTATIN 20 MG/1
20 TABLET, FILM COATED ORAL AT BEDTIME
Qty: 0 | Refills: 0 | Status: DISCONTINUED | OUTPATIENT
Start: 2018-09-12 | End: 2018-09-12

## 2018-09-12 RX ORDER — OXYCODONE AND ACETAMINOPHEN 5; 325 MG/1; MG/1
2 TABLET ORAL EVERY 4 HOURS
Qty: 0 | Refills: 0 | Status: DISCONTINUED | OUTPATIENT
Start: 2018-09-12 | End: 2018-09-19

## 2018-09-12 RX ORDER — FUROSEMIDE 40 MG
20 TABLET ORAL DAILY
Qty: 0 | Refills: 0 | Status: DISCONTINUED | OUTPATIENT
Start: 2018-09-14 | End: 2018-09-21

## 2018-09-12 RX ORDER — LATANOPROST 0.05 MG/ML
1 SOLUTION/ DROPS OPHTHALMIC; TOPICAL AT BEDTIME
Qty: 0 | Refills: 0 | Status: DISCONTINUED | OUTPATIENT
Start: 2018-09-12 | End: 2018-09-21

## 2018-09-12 RX ORDER — FAMOTIDINE 10 MG/ML
20 INJECTION INTRAVENOUS DAILY
Qty: 0 | Refills: 0 | Status: DISCONTINUED | OUTPATIENT
Start: 2018-09-12 | End: 2018-09-21

## 2018-09-12 RX ORDER — ENOXAPARIN SODIUM 100 MG/ML
40 INJECTION SUBCUTANEOUS EVERY 24 HOURS
Qty: 0 | Refills: 0 | Status: DISCONTINUED | OUTPATIENT
Start: 2018-09-12 | End: 2018-09-21

## 2018-09-12 RX ORDER — SODIUM CHLORIDE 9 MG/ML
1000 INJECTION, SOLUTION INTRAVENOUS
Qty: 0 | Refills: 0 | Status: DISCONTINUED | OUTPATIENT
Start: 2018-09-12 | End: 2018-09-21

## 2018-09-12 RX ORDER — ESCITALOPRAM OXALATE 10 MG/1
10 TABLET, FILM COATED ORAL DAILY
Qty: 0 | Refills: 0 | Status: DISCONTINUED | OUTPATIENT
Start: 2018-09-12 | End: 2018-09-21

## 2018-09-12 RX ORDER — POTASSIUM CHLORIDE 20 MEQ
20 PACKET (EA) ORAL DAILY
Qty: 0 | Refills: 0 | Status: DISCONTINUED | OUTPATIENT
Start: 2018-09-14 | End: 2018-09-21

## 2018-09-12 RX ORDER — IPRATROPIUM/ALBUTEROL SULFATE 18-103MCG
3 AEROSOL WITH ADAPTER (GRAM) INHALATION EVERY 6 HOURS
Qty: 0 | Refills: 0 | Status: DISCONTINUED | OUTPATIENT
Start: 2018-09-12 | End: 2018-09-21

## 2018-09-12 RX ORDER — PIPERACILLIN AND TAZOBACTAM 4; .5 G/20ML; G/20ML
3.38 INJECTION, POWDER, LYOPHILIZED, FOR SOLUTION INTRAVENOUS EVERY 8 HOURS
Qty: 0 | Refills: 0 | Status: DISCONTINUED | OUTPATIENT
Start: 2018-09-12 | End: 2018-09-14

## 2018-09-12 RX ORDER — ROBINUL 0.2 MG/ML
1 INJECTION INTRAMUSCULAR; INTRAVENOUS THREE TIMES A DAY
Qty: 0 | Refills: 0 | Status: DISCONTINUED | OUTPATIENT
Start: 2018-09-12 | End: 2018-09-12

## 2018-09-12 RX ORDER — POLYETHYLENE GLYCOL 3350 17 G/17G
17 POWDER, FOR SOLUTION ORAL DAILY
Qty: 0 | Refills: 0 | Status: DISCONTINUED | OUTPATIENT
Start: 2018-09-12 | End: 2018-09-21

## 2018-09-12 RX ORDER — ATORVASTATIN CALCIUM 80 MG/1
10 TABLET, FILM COATED ORAL AT BEDTIME
Qty: 0 | Refills: 0 | Status: DISCONTINUED | OUTPATIENT
Start: 2018-09-12 | End: 2018-09-21

## 2018-09-12 RX ORDER — FERROUS SULFATE 325(65) MG
300 TABLET ORAL DAILY
Qty: 0 | Refills: 0 | Status: DISCONTINUED | OUTPATIENT
Start: 2018-09-12 | End: 2018-09-21

## 2018-09-12 RX ORDER — MORPHINE SULFATE 50 MG/1
2 CAPSULE, EXTENDED RELEASE ORAL ONCE
Qty: 0 | Refills: 0 | Status: DISCONTINUED | OUTPATIENT
Start: 2018-09-12 | End: 2018-09-12

## 2018-09-12 RX ORDER — SENNA PLUS 8.6 MG/1
2 TABLET ORAL AT BEDTIME
Qty: 0 | Refills: 0 | Status: DISCONTINUED | OUTPATIENT
Start: 2018-09-12 | End: 2018-09-21

## 2018-09-12 RX ORDER — PIPERACILLIN AND TAZOBACTAM 4; .5 G/20ML; G/20ML
3.38 INJECTION, POWDER, LYOPHILIZED, FOR SOLUTION INTRAVENOUS ONCE
Qty: 0 | Refills: 0 | Status: COMPLETED | OUTPATIENT
Start: 2018-09-12 | End: 2018-09-12

## 2018-09-12 RX ORDER — LINACLOTIDE 145 UG/1
145 CAPSULE, GELATIN COATED ORAL
Qty: 0 | Refills: 0 | Status: DISCONTINUED | OUTPATIENT
Start: 2018-09-12 | End: 2018-09-21

## 2018-09-12 RX ORDER — ACETAMINOPHEN 500 MG
650 TABLET ORAL EVERY 6 HOURS
Qty: 0 | Refills: 0 | Status: DISCONTINUED | OUTPATIENT
Start: 2018-09-12 | End: 2018-09-21

## 2018-09-12 RX ORDER — VANCOMYCIN HCL 1 G
1000 VIAL (EA) INTRAVENOUS ONCE
Qty: 0 | Refills: 0 | Status: COMPLETED | OUTPATIENT
Start: 2018-09-12 | End: 2018-09-12

## 2018-09-12 RX ORDER — MORPHINE SULFATE 50 MG/1
4 CAPSULE, EXTENDED RELEASE ORAL ONCE
Qty: 0 | Refills: 0 | Status: DISCONTINUED | OUTPATIENT
Start: 2018-09-12 | End: 2018-09-12

## 2018-09-12 RX ORDER — LACTOBACILLUS ACIDOPHILUS 100MM CELL
1 CAPSULE ORAL EVERY 12 HOURS
Qty: 0 | Refills: 0 | Status: DISCONTINUED | OUTPATIENT
Start: 2018-09-12 | End: 2018-09-21

## 2018-09-12 RX ORDER — OXYCODONE AND ACETAMINOPHEN 5; 325 MG/1; MG/1
1 TABLET ORAL ONCE
Qty: 0 | Refills: 0 | Status: DISCONTINUED | OUTPATIENT
Start: 2018-09-12 | End: 2018-09-12

## 2018-09-12 RX ORDER — DOXAZOSIN MESYLATE 4 MG
4 TABLET ORAL AT BEDTIME
Qty: 0 | Refills: 0 | Status: DISCONTINUED | OUTPATIENT
Start: 2018-09-12 | End: 2018-09-21

## 2018-09-12 RX ORDER — LANOLIN ALCOHOL/MO/W.PET/CERES
3 CREAM (GRAM) TOPICAL AT BEDTIME
Qty: 0 | Refills: 0 | Status: DISCONTINUED | OUTPATIENT
Start: 2018-09-12 | End: 2018-09-21

## 2018-09-12 RX ADMIN — Medication 250 MILLIGRAM(S): at 21:35

## 2018-09-12 RX ADMIN — Medication 4 MILLIGRAM(S): at 23:52

## 2018-09-12 RX ADMIN — PIPERACILLIN AND TAZOBACTAM 200 GRAM(S): 4; .5 INJECTION, POWDER, LYOPHILIZED, FOR SOLUTION INTRAVENOUS at 12:01

## 2018-09-12 RX ADMIN — MORPHINE SULFATE 2 MILLIGRAM(S): 50 CAPSULE, EXTENDED RELEASE ORAL at 14:39

## 2018-09-12 RX ADMIN — MORPHINE SULFATE 2 MILLIGRAM(S): 50 CAPSULE, EXTENDED RELEASE ORAL at 15:15

## 2018-09-12 RX ADMIN — PIPERACILLIN AND TAZOBACTAM 3.38 GRAM(S): 4; .5 INJECTION, POWDER, LYOPHILIZED, FOR SOLUTION INTRAVENOUS at 12:31

## 2018-09-12 RX ADMIN — Medication 3 MILLIGRAM(S): at 23:53

## 2018-09-12 RX ADMIN — LATANOPROST 1 DROP(S): 0.05 SOLUTION/ DROPS OPHTHALMIC; TOPICAL at 23:52

## 2018-09-12 RX ADMIN — MORPHINE SULFATE 4 MILLIGRAM(S): 50 CAPSULE, EXTENDED RELEASE ORAL at 13:15

## 2018-09-12 RX ADMIN — SENNA PLUS 2 TABLET(S): 8.6 TABLET ORAL at 23:53

## 2018-09-12 RX ADMIN — OXYCODONE AND ACETAMINOPHEN 2 TABLET(S): 5; 325 TABLET ORAL at 23:54

## 2018-09-12 RX ADMIN — MORPHINE SULFATE 4 MILLIGRAM(S): 50 CAPSULE, EXTENDED RELEASE ORAL at 12:39

## 2018-09-12 NOTE — H&P ADULT - NSHPPHYSICALEXAM_GEN_ALL_CORE
Vital Signs (24 Hrs):  T(C): 37.4 (09-12-18 @ 21:55), Max: 37.4 (09-12-18 @ 21:55)  HR: 58 (09-12-18 @ 21:55) (58 - 66)  BP: 145/76 (09-12-18 @ 21:55) (119/76 - 154/80)  RR: 18 (09-12-18 @ 21:55) (17 - 18)  SpO2: 98% (09-12-18 @ 21:55) (97% - 99%)  Daily Height in cm: 180.34 (12 Sep 2018 21:55)

## 2018-09-12 NOTE — ED PROVIDER NOTE - ATTENDING CONTRIBUTION TO CARE
Pt seen and examined, history obtained.  Pt with increasing swelling, redness and pain to left parotid area.  pt is awake and alert with redness and induration to left parotid.  no dental abscess, no trismus, lungs cba  Pt will admitted for iv antibiotics ct pending

## 2018-09-12 NOTE — ED ADULT NURSE NOTE - OBJECTIVE STATEMENT
The pt presents to ED with left facial swelling that began 36 hours ago as per daughter. The pt has a feeding tube and does not take PO fluids. The pts mouth is dry.

## 2018-09-12 NOTE — H&P ADULT - ASSESSMENT
84 y/o m with h/o HTN, HLD, PPM, CVA, dysphagia, +PEG, [facility] left facial pain 84 y/o m with h/o HTN, HLD, PPM, CVA, dysphagia, +PEG, sent in from [facility] home with c/o left facial pain and swelling, +left parotitis. +thyroid nodule  Acute renal insuff, dehydration.    Admit  IV Antibx- 86 y/o m with h/o HTN, HLD, PPM, CVA, dysphagia, +PEG, [facility] left facial pain and swelling, +left parotitis. +thyroid nodule  Acute renal insuff, dehydration.    Admit  IV Antibx- Zosyn, received 1 dose of Vanco also, pending cutures  IV hydration, Cont tube feeds  Cont Current meds  Hold Lasix tomorrow  Pain meds  Warm compress to left parotid area  FFup labs  GI/DVT Prophylaxis    CAPRINI SCORE [CLOT  [x ] Age= 75 years                                              (3 Points)                          [x ] BMI > 25 Kg/m2                                            (1 Point)                                 Total Score [  4   ]

## 2018-09-12 NOTE — ED ADULT NURSE NOTE - NSIMPLEMENTINTERV_GEN_ALL_ED
Implemented All Fall with Harm Risk Interventions:  [place] to call system. Call bell, personal items and telephone within reach. Instruct patient to call for assistance. Room bathroom lighting operational. Non-slip footwear when patient is off stretcher. Physically safe environment: no spills, clutter or unnecessary equipment. Stretcher in lowest position, wheels locked, appropriate side rails in place. Provide visual cue, wrist band, yellow gown, etc. Monitor gait and stability. Monitor for mental status changes and reorient to person, place, and time. Review medications for side effects contributing to fall risk. Reinforce activity limits and safety measures with patient and family. Provide visual clues: red socks.

## 2018-09-12 NOTE — ED ADULT NURSE REASSESSMENT NOTE - NS ED NURSE REASSESS COMMENT FT1
pt is resting in stretcher with wife at bedside. The pt is waiting for Dr. Collado to put in admission orders. No acute distress at this time. will continue to monitor.
Pt is awaiting admission bed at this time.

## 2018-09-12 NOTE — ED PROVIDER NOTE - MEDICAL DECISION MAKING DETAILS
84 y/o m with h/o PPM, CVA with resulting [facility] significant left sided facial pain and swelling x 3 days worsening likely a parotitis- sepsis work up, CT neck, IV ABX, Admit 84 y/o m with h/o PPM, CVA with resulting PEG [facility] significant left sided facial pain and swelling x 3 days worsening likely a parotitis- sepsis work up, CT neck, IV ABX, Admit  vs. transfer to [facility] once cotnact made with Dr. Powers.

## 2018-09-12 NOTE — ED PROVIDER NOTE - PROGRESS NOTE DETAILS
VIRA Keene- spoke with Gio. CT scan results reviewed. He would like to take a look of scans for himself and will call me back VIRA Keene- left message for KAMERON Powers for call back VIRA Keene- left message again for Gio VIRA Powers called back- no abscess to drain. Admit for IV ABX. Spoke with Dr. Buchanan- accepted to [facility]

## 2018-09-12 NOTE — ED PROVIDER NOTE - OBJECTIVE STATEMENT
Stem ambulatory encounter  ORTHOPEDIC HISTORY AND PHYSICAL    CHIEF COMPLAINT:  Follow-up (right TKA DOS 8/21/17)       SUBJECTIVE:  I saw the patient and have collaborated the history with JESSEE Santos as documented.    Review of systems:    Systems reviewed and negative except as documented in the HPI.    PROBLEM LIST:  Patient Active Problem List   Diagnosis   • Generalized osteoarthrosis, unspecified site   • Fibromyalgia   • Decreased hearing   • HTN (hypertension)   • AMBIKA on CPAP   • Type 2 diabetes mellitus without complication (CMS/HCC)   • Peripheral venous insufficiency   • Varicose veins of both lower extremities with pain   • Status post total right knee replacement        HISTORIES:  Current Outpatient Prescriptions   Medication   • methylPREDNISolone (MEDROL DOSEPAK) 4 MG tablet   • sitaGLIPTIN-metFORMIN (JANUMET)  MG per tablet   • phentermine (ADIPEX-P) 37.5 MG tablet   • metoPROLOL succinate (TOPROL-XL) 25 MG 24 hr tablet   • blood glucose meter   • blood glucose test strip   • blood glucose lancets   • gabapentin (NEURONTIN) 300 MG capsule   • Insulin Pen Needle (B-D U/F PEN NEEDLE) 31G X 5 MM Misc   • liraglutide (VICTOZA) 18 MG/3ML pen-injector   • cyclobenzaprine (FLEXERIL) 10 MG tablet   • aspirin 81 MG tablet   • fluticasone (FLONASE) 50 MCG/ACT nasal spray   • cetirizine (ZYRTEC) 10 MG tablet   • MAGNESIUM OXIDE PO   • vitamin B-12 (CYANOCOBALAMIN) 1000 MCG tablet   • Multiple Vitamins-Minerals (MULTIVITAMIN PO)   • CALCIUM CITRATE-VITAMIN D PO   • Coenzyme Q10 (COQ10) 200 MG CAPS   • CHROMIUM PICOLINATE PO   • Olopatadine HCl 0.2 % ophthalmic solution   • fish oil-omega-3 fatty acids 1000 MG CAPS   • lisinopril (ZESTRIL) 2.5 MG tablet   • lovastatin (MEVACOR) 10 MG tablet   • diclofenac (VOLTAREN) 75 MG EC tablet   • diclofenac (VOLTAREN) 1 % gel     No current facility-administered medications for this visit.      ALLERGIES:   Allergen Reactions   • Morphine Other (See  86 [facility] pain and swelling. Denies fever.  Rectal temp upon arrival 99.1. Comments)     Decreased blood pressure \"it bottomed out by BP\"     • Bupropion Other (See Comments)     Nightmares    • [place]etta Nausea & Vomiting and Other (See Comments)     Gastroesophageal reflux disease        OBJECTIVE:  PHYSICAL EXAM-    Vitals:   Visit Vitals  Temp 97.4 °F (36.3 °C)   Resp 16   Ht 5' 3\" (1.6 m)   Wt 75.6 kg   LMP 03/31/2005   BMI 29.51 kg/m²      Constitutional:  Well-developed, well-nourished female in no acute distress.  Skin: Warm, dry, intact without rash or lesion.  No subcutaneous masses.  HEENT:  Normocephalic.  Hearing intact.  Vision intact.  Psych:  Alert & oriented x 3.  Mood and insight appropriate.  CV:  Pulse is regular.  No significant pitting edema or lymphedema.   Resp:  Respiratory effort within appropriate limits.    Abdomen:  No abnormal distension.  Neuro:  No gross sensory deficits.  Spine:  No gross curvature of spine.  Appropriate mobility without instability.  No gross pelvic obliquity.  Musculoskeletal:  I examined the patient and have collaborated the examination with JESSEE Santos as documented.  Incision c/d/i.  Knee stable.  Non-antalgic gait.    IMAGING STUDIES:     See below.    ASSESSMENT:    1. Status post total right knee replacement    2. Inflammatory reaction due to internal prosthesis of right knee, initial encounter (CMS/[facility])        PLAN:    · I examined the patient's knee. I reviewed x-rays today.  No acute injuries.  Pain is probably some impinging synovial tissue or early bursitis.  I discussed risks and benefits of the treatment options with the patient.  No specific activity restrictions.  Antibiotics before dental work.  Follow up in 1 year.  · Return in about 1 year (around 5/8/2019).    Orders Placed This Encounter   • XR Knee 3 View Right   • diclofenac (VOLTAREN) 1 % gel       Instructions provided as documented in the AVS.    The patient indicated understanding of the diagnosis and agreed with the plan of care.      86 y/o m with PEG h/o sent in from [facility] with c/o left facial pain and swelling. Denies fever, chills, nausea, vomiting, chest pain, shortness of breath.  Rectal temp upon arrival 99.1. 86 y/o m with h/o PPM, CVA with resulting [facility]/o left facial pain and swelling x 3 days. Denies fever, chills, nausea, vomiting, chest pain, shortness of breath. No known prior episodes.  Rectal temp upon arrival 99.1.

## 2018-09-12 NOTE — ED ADULT TRIAGE NOTE - CHIEF COMPLAINT QUOTE
Pt from  Rehab, per family has been c/o pain to left side of face/ear and mouth, noted an abscess to lip for the last 36 hours

## 2018-09-12 NOTE — H&P ADULT - PSH
S/P percutaneous endoscopic gastrostomy (PEG) tube placement Cardiac pacemaker    S/P percutaneous endoscopic gastrostomy (PEG) tube placement

## 2018-09-12 NOTE — H&P ADULT - NSHPLABSRESULTS_GEN_ALL_CORE
Echo 07/2018  Left ventricular ejection fraction, by visual estimation, is 60%.    Normal global left ventricular systolic function.   Spectral Doppler shows restrictive pattern of left ventricular   myocardial filling (Grade III diastolic dysfunction).   There is moderate concentric left ventricular hypertrophy.   Moderately enlarged right ventricle.     Mild mitral annular calcification.   Thickening and calcification of the anterior and posterior mitral   valve leaflets.   Moderate-severe tricuspid regurgitation.   Mild aortic regurgitation.   Moderate aortic valve stenosis.    Dilatation of the aortic root.   Estimated pulmonary artery systolic pressure is 56.5 mmHg assuming a   right atrial pressure of 10 mmHg, which is consistent with moderate   pulmonary hypertension.    LA volume Index is 59.0 ml/m? ml/m2.   The aortic valve mean gradient is 8.4 mmHg consistent with normally   opening aortic valve.    The right ventricular size is moderately enlarged. Cxray; Bilat increased markings    Echo 07/2018  Left ventricular ejection fraction, by visual estimation, is 60%.  Normal global left ventricular systolic function.  Spectral Doppler shows restrictive pattern of left ventricular   myocardial filling (Grade III diastolic dysfunction).  There is moderate concentric left ventricular hypertrophy.  Moderately enlarged right ventricle.  Mild mitral annular calcification.  Thickening and calcification of the anterior and posterior mitral   valve leaflets.  Moderate-severe tricuspid regurgitation. Mild aortic regurgitation. Moderate aortic valve stenosis.  Dilatation of the aortic root. Estimated pulmonary artery systolic pressure is 56.5 mmHg assuming a   right atrial pressure of 10 mmHg, which is consistent with moderate pulmonary hypertension.  LA volume Index is 59.0 ml/m? ml/m2. The aortic valve mean gradient is 8.4 mmHg consistent with normally   opening aortic valve. The right ventricular size is moderately enlarged.

## 2018-09-12 NOTE — H&P ADULT - NEUROLOGICAL DETAILS
responds to verbal commands/sensation intact/deep reflexes intact/responds to pain/alert and oriented x 3

## 2018-09-12 NOTE — ED PROVIDER NOTE - CHPI ED SYMPTOMS NEG
no vomiting/no fever/no change in level of consciousness/no chills/no weakness/no nausea/no numbness

## 2018-09-12 NOTE — H&P ADULT - RS GEN PE MLT RESP DETAILS PC
respirations non-labored/airway patent/decreased breath sounds at bases/no wheezes/breath sounds equal/good air movement

## 2018-09-13 DIAGNOSIS — E78.5 HYPERLIPIDEMIA, UNSPECIFIED: ICD-10-CM

## 2018-09-13 DIAGNOSIS — Z95.0 PRESENCE OF CARDIAC PACEMAKER: Chronic | ICD-10-CM

## 2018-09-13 DIAGNOSIS — N18.9 CHRONIC KIDNEY DISEASE, UNSPECIFIED: ICD-10-CM

## 2018-09-13 DIAGNOSIS — N18.3 CHRONIC KIDNEY DISEASE, STAGE 3 (MODERATE): ICD-10-CM

## 2018-09-13 DIAGNOSIS — K11.20 SIALOADENITIS, UNSPECIFIED: ICD-10-CM

## 2018-09-13 DIAGNOSIS — K21.9 GASTRO-ESOPHAGEAL REFLUX DISEASE WITHOUT ESOPHAGITIS: ICD-10-CM

## 2018-09-13 LAB
ANION GAP SERPL CALC-SCNC: 5 MMOL/L — SIGNIFICANT CHANGE UP (ref 5–17)
BASOPHILS # BLD AUTO: 0.1 K/UL — SIGNIFICANT CHANGE UP (ref 0–0.2)
BASOPHILS NFR BLD AUTO: 0.6 % — SIGNIFICANT CHANGE UP (ref 0–2)
BUN SERPL-MCNC: 50 MG/DL — HIGH (ref 7–23)
CALCIUM SERPL-MCNC: 9.5 MG/DL — SIGNIFICANT CHANGE UP (ref 8.4–10.5)
CHLORIDE SERPL-SCNC: 98 MMOL/L — SIGNIFICANT CHANGE UP (ref 96–108)
CO2 SERPL-SCNC: 31 MMOL/L — SIGNIFICANT CHANGE UP (ref 22–31)
CREAT SERPL-MCNC: 1.66 MG/DL — HIGH (ref 0.5–1.3)
EOSINOPHIL # BLD AUTO: 0.2 K/UL — SIGNIFICANT CHANGE UP (ref 0–0.5)
EOSINOPHIL NFR BLD AUTO: 2 % — SIGNIFICANT CHANGE UP (ref 0–6)
GLUCOSE SERPL-MCNC: 117 MG/DL — HIGH (ref 70–99)
HCT VFR BLD CALC: 27.6 % — LOW (ref 39–50)
HGB BLD-MCNC: 9 G/DL — LOW (ref 13–17)
LYMPHOCYTES # BLD AUTO: 1.7 K/UL — SIGNIFICANT CHANGE UP (ref 1–3.3)
LYMPHOCYTES # BLD AUTO: 18.1 % — SIGNIFICANT CHANGE UP (ref 13–44)
MCHC RBC-ENTMCNC: 28.7 PG — SIGNIFICANT CHANGE UP (ref 27–34)
MCHC RBC-ENTMCNC: 32.6 GM/DL — SIGNIFICANT CHANGE UP (ref 32–36)
MCV RBC AUTO: 88.1 FL — SIGNIFICANT CHANGE UP (ref 80–100)
MONOCYTES # BLD AUTO: 0.8 K/UL — SIGNIFICANT CHANGE UP (ref 0–0.9)
MONOCYTES NFR BLD AUTO: 8.8 % — SIGNIFICANT CHANGE UP (ref 1–9)
NEUTROPHILS # BLD AUTO: 6.8 K/UL — SIGNIFICANT CHANGE UP (ref 1.8–7.4)
NEUTROPHILS NFR BLD AUTO: 70.6 % — SIGNIFICANT CHANGE UP (ref 43–77)
PLATELET # BLD AUTO: 163 K/UL — SIGNIFICANT CHANGE UP (ref 150–400)
POTASSIUM SERPL-MCNC: 4.5 MMOL/L — SIGNIFICANT CHANGE UP (ref 3.5–5.3)
POTASSIUM SERPL-SCNC: 4.5 MMOL/L — SIGNIFICANT CHANGE UP (ref 3.5–5.3)
RBC # BLD: 3.13 M/UL — LOW (ref 4.2–5.8)
RBC # FLD: 15.1 % — HIGH (ref 10.3–14.5)
SODIUM SERPL-SCNC: 134 MMOL/L — LOW (ref 135–145)
WBC # BLD: 9.6 K/UL — SIGNIFICANT CHANGE UP (ref 3.8–10.5)
WBC # FLD AUTO: 9.6 K/UL — SIGNIFICANT CHANGE UP (ref 3.8–10.5)

## 2018-09-13 PROCEDURE — 99233 SBSQ HOSP IP/OBS HIGH 50: CPT

## 2018-09-13 RX ORDER — INFLUENZA VIRUS VACCINE 15; 15; 15; 15 UG/.5ML; UG/.5ML; UG/.5ML; UG/.5ML
0.5 SUSPENSION INTRAMUSCULAR ONCE
Qty: 0 | Refills: 0 | Status: COMPLETED | OUTPATIENT
Start: 2018-09-13 | End: 2018-09-14

## 2018-09-13 RX ADMIN — SENNA PLUS 2 TABLET(S): 8.6 TABLET ORAL at 23:30

## 2018-09-13 RX ADMIN — Medication 3 MILLILITER(S): at 09:06

## 2018-09-13 RX ADMIN — Medication 1 TABLET(S): at 06:06

## 2018-09-13 RX ADMIN — PIPERACILLIN AND TAZOBACTAM 25 GRAM(S): 4; .5 INJECTION, POWDER, LYOPHILIZED, FOR SOLUTION INTRAVENOUS at 23:31

## 2018-09-13 RX ADMIN — ESCITALOPRAM OXALATE 10 MILLIGRAM(S): 10 TABLET, FILM COATED ORAL at 13:34

## 2018-09-13 RX ADMIN — LINACLOTIDE 145 MICROGRAM(S): 145 CAPSULE, GELATIN COATED ORAL at 06:17

## 2018-09-13 RX ADMIN — Medication 4 MILLIGRAM(S): at 23:30

## 2018-09-13 RX ADMIN — Medication 3 MILLILITER(S): at 15:41

## 2018-09-13 RX ADMIN — OXYCODONE AND ACETAMINOPHEN 2 TABLET(S): 5; 325 TABLET ORAL at 00:37

## 2018-09-13 RX ADMIN — OXYCODONE AND ACETAMINOPHEN 2 TABLET(S): 5; 325 TABLET ORAL at 07:25

## 2018-09-13 RX ADMIN — ENOXAPARIN SODIUM 40 MILLIGRAM(S): 100 INJECTION SUBCUTANEOUS at 06:05

## 2018-09-13 RX ADMIN — Medication 3 MILLIGRAM(S): at 23:31

## 2018-09-13 RX ADMIN — ATORVASTATIN CALCIUM 10 MILLIGRAM(S): 80 TABLET, FILM COATED ORAL at 23:30

## 2018-09-13 RX ADMIN — PIPERACILLIN AND TAZOBACTAM 25 GRAM(S): 4; .5 INJECTION, POWDER, LYOPHILIZED, FOR SOLUTION INTRAVENOUS at 13:33

## 2018-09-13 RX ADMIN — OXYCODONE AND ACETAMINOPHEN 2 TABLET(S): 5; 325 TABLET ORAL at 06:25

## 2018-09-13 RX ADMIN — Medication 3 MILLILITER(S): at 03:35

## 2018-09-13 RX ADMIN — OXYCODONE AND ACETAMINOPHEN 2 TABLET(S): 5; 325 TABLET ORAL at 23:31

## 2018-09-13 RX ADMIN — ATORVASTATIN CALCIUM 10 MILLIGRAM(S): 80 TABLET, FILM COATED ORAL at 00:37

## 2018-09-13 RX ADMIN — LATANOPROST 1 DROP(S): 0.05 SOLUTION/ DROPS OPHTHALMIC; TOPICAL at 23:31

## 2018-09-13 RX ADMIN — Medication 300 MILLIGRAM(S): at 13:34

## 2018-09-13 RX ADMIN — Medication 3 MILLILITER(S): at 21:10

## 2018-09-13 RX ADMIN — SODIUM CHLORIDE 50 MILLILITER(S): 9 INJECTION, SOLUTION INTRAVENOUS at 00:18

## 2018-09-13 RX ADMIN — PIPERACILLIN AND TAZOBACTAM 25 GRAM(S): 4; .5 INJECTION, POWDER, LYOPHILIZED, FOR SOLUTION INTRAVENOUS at 06:06

## 2018-09-13 RX ADMIN — Medication 1 TABLET(S): at 18:08

## 2018-09-13 RX ADMIN — FAMOTIDINE 20 MILLIGRAM(S): 10 INJECTION INTRAVENOUS at 13:33

## 2018-09-13 RX ADMIN — PIPERACILLIN AND TAZOBACTAM 25 GRAM(S): 4; .5 INJECTION, POWDER, LYOPHILIZED, FOR SOLUTION INTRAVENOUS at 00:18

## 2018-09-13 NOTE — DIETITIAN INITIAL EVALUATION ADULT. - OTHER INFO
pT. UNABLE TO GIVE INFORMATION. tUBE FEEDS TO START THIS AM VIA PEG. SKIN INTACT. NEGATIVE FOR EDEMA. RN DENIES N/V/DIARRHEA.

## 2018-09-13 NOTE — PROGRESS NOTE ADULT - ASSESSMENT
86 y/o m with h/o HTN, HLD, PPM, CVA, dysphagia, +PEG, sent in from [facility] home with c/o left facial pain and swelling, +left parotitis. +thyroid nodule  Acute renal insuff, dehydration.

## 2018-09-13 NOTE — PROGRESS NOTE ADULT - SUBJECTIVE AND OBJECTIVE BOX
Patient is a 85y old  Male who presents with a chief complaint of facial swelling/pain, left (12 Sep 2018 20:15)      Patient seen and examined at bedside.    ALLERGIES:  No Known Allergies    MEDICATIONS  (STANDING):  ALBUTerol/ipratropium for Nebulization 3 milliLiter(s) Nebulizer every 6 hours  atorvastatin 10 milliGRAM(s) Oral at bedtime  doxazosin 4 milliGRAM(s) Oral at bedtime  enoxaparin Injectable 40 milliGRAM(s) SubCutaneous every 24 hours  escitalopram 10 milliGRAM(s) Oral daily  famotidine    Tablet 20 milliGRAM(s) Oral daily  ferrous    sulfate Liquid 300 milliGRAM(s) Enteral Tube daily  influenza   Vaccine 0.5 milliLiter(s) IntraMuscular once  lactobacillus acidophilus 1 Tablet(s) Oral every 12 hours  latanoprost 0.005% Ophthalmic Solution 1 Drop(s) Left EYE at bedtime  linaclotide 145 MICROGram(s) Oral before breakfast  melatonin 3 milliGRAM(s) Oral at bedtime  piperacillin/tazobactam IVPB. 3.375 Gram(s) IV Intermittent every 8 hours  senna 2 Tablet(s) Oral at bedtime  sodium chloride 0.45%. 1000 milliLiter(s) (50 mL/Hr) IV Continuous <Continuous>    MEDICATIONS  (PRN):  acetaminophen   Tablet .. 650 milliGRAM(s) Oral every 6 hours PRN Temp greater or equal to 38C (100.4F), Mild Pain (1 - 3), Moderate Pain (4 - 6)  bisacodyl Suppository 10 milliGRAM(s) Rectal daily PRN Constipation  oxyCODONE    5 mG/acetaminophen 325 mG 2 Tablet(s) Oral every 4 hours PRN Severe Pain (7 - 10)  polyethylene glycol 3350 17 Gram(s) Oral daily PRN Constipation    Vital Signs Last 24 Hrs  T(F): 98.3 (13 Sep 2018 05:19), Max: 99.3 (12 Sep 2018 21:55)  HR: 60 (13 Sep 2018 05:19) (58 - 66)  BP: 110/58 (13 Sep 2018 05:19) (110/58 - 154/80)  RR: 18 (13 Sep 2018 05:19) (17 - 18)  SpO2: 100% (13 Sep 2018 05:19) (97% - 100%)  I&O's Summary    12 Sep 2018 07:01  -  13 Sep 2018 07:00  --------------------------------------------------------  IN: 600 mL / OUT: 0 mL / NET: 600 mL      BMI (kg/m2): 31.7 (09-13-18 @ 05:19)  PHYSICAL EXAM:  General: NAD, A/O x 3  ENT: MMM, left facial swelling, tender to palpation    Neck: Supple, No JVD  Lungs: Clear to auscultation bilaterally  Cardio: RRR, S1/S2, No murmurs  Abdomen: Soft, Nontender, Nondistended; Bowel sounds present  Extremities: No calf tenderness, No pitting edema    LABS:                        9.0    9.6   )-----------( 163      ( 13 Sep 2018 07:15 )             27.6       09-13    134  |  98  |  50  ----------------------------<  117  4.5   |  31  |  1.66    Ca    9.5      13 Sep 2018 07:15    TPro  8.5  /  Alb  2.8  /  TBili  0.3  /  DBili  x   /  AST  41  /  ALT  57  /  AlkPhos  88  09-12     eGFR if Non African American: 37 mL/min/1.73M2 (09-13-18 @ 07:15)  eGFR if African American: 43 mL/min/1.73M2 (09-13-18 @ 07:15)    PT/INR - ( 12 Sep 2018 12:00 )   PT: 13.9 sec;   INR: 1.25 ratio       PTT - ( 12 Sep 2018 12:00 )  PTT:34.2 sec   Lactate, Blood: 1.1 mmol/L (09-12 @ 12:00)    CAPILLARY BLOOD GLUCOSE    07-10 [id number] 4.6    RADIOLOGY & ADDITIONAL TESTS:    Care Discussed with Consultants/Other Providers: YES Patient is a 85y old  Male who presents with a chief complaint of facial swelling/pain, left (12 Sep 2018 20:15)    c/o left facial pain.      Patient seen and examined at bedside.    ALLERGIES:  No Known Allergies    MEDICATIONS  (STANDING):  ALBUTerol/ipratropium for Nebulization 3 milliLiter(s) Nebulizer every 6 hours  atorvastatin 10 milliGRAM(s) Oral at bedtime  doxazosin 4 milliGRAM(s) Oral at bedtime  enoxaparin Injectable 40 milliGRAM(s) SubCutaneous every 24 hours  escitalopram 10 milliGRAM(s) Oral daily  famotidine    Tablet 20 milliGRAM(s) Oral daily  ferrous    sulfate Liquid 300 milliGRAM(s) Enteral Tube daily  influenza   Vaccine 0.5 milliLiter(s) IntraMuscular once  lactobacillus acidophilus 1 Tablet(s) Oral every 12 hours  latanoprost 0.005% Ophthalmic Solution 1 Drop(s) Left EYE at bedtime  linaclotide 145 MICROGram(s) Oral before breakfast  melatonin 3 milliGRAM(s) Oral at bedtime  piperacillin/tazobactam IVPB. 3.375 Gram(s) IV Intermittent every 8 hours  senna 2 Tablet(s) Oral at bedtime  sodium chloride 0.45%. 1000 milliLiter(s) (50 mL/Hr) IV Continuous <Continuous>    MEDICATIONS  (PRN):  acetaminophen   Tablet .. 650 milliGRAM(s) Oral every 6 hours PRN Temp greater or equal to 38C (100.4F), Mild Pain (1 - 3), Moderate Pain (4 - 6)  bisacodyl Suppository 10 milliGRAM(s) Rectal daily PRN Constipation  oxyCODONE    5 mG/acetaminophen 325 mG 2 Tablet(s) Oral every 4 hours PRN Severe Pain (7 - 10)  polyethylene glycol 3350 17 Gram(s) Oral daily PRN Constipation    Vital Signs Last 24 Hrs  T(F): 98.3 (13 Sep 2018 05:19), Max: 99.3 (12 Sep 2018 21:55)  HR: 60 (13 Sep 2018 05:19) (58 - 66)  BP: 110/58 (13 Sep 2018 05:19) (110/58 - 154/80)  RR: 18 (13 Sep 2018 05:19) (17 - 18)  SpO2: 100% (13 Sep 2018 05:19) (97% - 100%)  I&O's Summary    12 Sep 2018 07:01  -  13 Sep 2018 07:00  --------------------------------------------------------  IN: 600 mL / OUT: 0 mL / NET: 600 mL      BMI (kg/m2): 31.7 (09-13-18 @ 05:19)  PHYSICAL EXAM:  General: NAD, A/O x 3  ENT: MMM, left facial swelling, tender to palpation of left side, indurated    Neck: Supple, No JVD  Lungs: Clear to auscultation bilaterally  Cardio: RRR, S1/S2, No murmurs  Abdomen: Soft, Nontender, Nondistended; Bowel sounds present  Extremities: No calf tenderness, No pitting edema    LABS:                        9.0    9.6   )-----------( 163      ( 13 Sep 2018 07:15 )             27.6       09-13    134  |  98  |  50  ----------------------------<  117  4.5   |  31  |  1.66    Ca    9.5      13 Sep 2018 07:15    TPro  8.5  /  Alb  2.8  /  TBili  0.3  /  DBili  x   /  AST  41  /  ALT  57  /  AlkPhos  88  09-12     eGFR if Non African American: 37 mL/min/1.73M2 (09-13-18 @ 07:15)  eGFR if African American: 43 mL/min/1.73M2 (09-13-18 @ 07:15)    PT/INR - ( 12 Sep 2018 12:00 )   PT: 13.9 sec;   INR: 1.25 ratio       PTT - ( 12 Sep 2018 12:00 )  PTT:34.2 sec   Lactate, Blood: 1.1 mmol/L (09-12 @ 12:00)    CAPILLARY BLOOD GLUCOSE    07-10 [id number] 4.6    RADIOLOGY & ADDITIONAL TESTS:    Care Discussed with Consultants/Other Providers: YES

## 2018-09-14 DIAGNOSIS — E87.1 HYPO-OSMOLALITY AND HYPONATREMIA: ICD-10-CM

## 2018-09-14 DIAGNOSIS — Z93.1 GASTROSTOMY STATUS: ICD-10-CM

## 2018-09-14 DIAGNOSIS — R13.10 DYSPHAGIA, UNSPECIFIED: ICD-10-CM

## 2018-09-14 PROCEDURE — 99233 SBSQ HOSP IP/OBS HIGH 50: CPT

## 2018-09-14 RX ORDER — VANCOMYCIN HCL 1 G
1000 VIAL (EA) INTRAVENOUS EVERY 24 HOURS
Qty: 0 | Refills: 0 | Status: DISCONTINUED | OUTPATIENT
Start: 2018-09-15 | End: 2018-09-18

## 2018-09-14 RX ORDER — VANCOMYCIN HCL 1 G
1000 VIAL (EA) INTRAVENOUS ONCE
Qty: 0 | Refills: 0 | Status: COMPLETED | OUTPATIENT
Start: 2018-09-14 | End: 2018-09-14

## 2018-09-14 RX ORDER — VANCOMYCIN HCL 1 G
VIAL (EA) INTRAVENOUS
Qty: 0 | Refills: 0 | Status: DISCONTINUED | OUTPATIENT
Start: 2018-09-14 | End: 2018-09-18

## 2018-09-14 RX ADMIN — SENNA PLUS 2 TABLET(S): 8.6 TABLET ORAL at 22:53

## 2018-09-14 RX ADMIN — Medication 3 MILLILITER(S): at 03:59

## 2018-09-14 RX ADMIN — OXYCODONE AND ACETAMINOPHEN 2 TABLET(S): 5; 325 TABLET ORAL at 15:44

## 2018-09-14 RX ADMIN — LATANOPROST 1 DROP(S): 0.05 SOLUTION/ DROPS OPHTHALMIC; TOPICAL at 22:54

## 2018-09-14 RX ADMIN — OXYCODONE AND ACETAMINOPHEN 2 TABLET(S): 5; 325 TABLET ORAL at 09:02

## 2018-09-14 RX ADMIN — OXYCODONE AND ACETAMINOPHEN 2 TABLET(S): 5; 325 TABLET ORAL at 00:32

## 2018-09-14 RX ADMIN — Medication 250 MILLIGRAM(S): at 15:25

## 2018-09-14 RX ADMIN — Medication 1 TABLET(S): at 17:24

## 2018-09-14 RX ADMIN — Medication 300 MILLIGRAM(S): at 12:21

## 2018-09-14 RX ADMIN — Medication 4 MILLIGRAM(S): at 22:52

## 2018-09-14 RX ADMIN — LINACLOTIDE 145 MICROGRAM(S): 145 CAPSULE, GELATIN COATED ORAL at 09:02

## 2018-09-14 RX ADMIN — Medication 3 MILLIGRAM(S): at 22:53

## 2018-09-14 RX ADMIN — Medication 1 TABLET(S): at 05:20

## 2018-09-14 RX ADMIN — Medication 20 MILLIEQUIVALENT(S): at 12:21

## 2018-09-14 RX ADMIN — Medication 3 MILLILITER(S): at 21:29

## 2018-09-14 RX ADMIN — FAMOTIDINE 20 MILLIGRAM(S): 10 INJECTION INTRAVENOUS at 12:21

## 2018-09-14 RX ADMIN — ESCITALOPRAM OXALATE 10 MILLIGRAM(S): 10 TABLET, FILM COATED ORAL at 12:21

## 2018-09-14 RX ADMIN — ENOXAPARIN SODIUM 40 MILLIGRAM(S): 100 INJECTION SUBCUTANEOUS at 05:20

## 2018-09-14 RX ADMIN — OXYCODONE AND ACETAMINOPHEN 2 TABLET(S): 5; 325 TABLET ORAL at 22:53

## 2018-09-14 RX ADMIN — ATORVASTATIN CALCIUM 10 MILLIGRAM(S): 80 TABLET, FILM COATED ORAL at 22:52

## 2018-09-14 RX ADMIN — PIPERACILLIN AND TAZOBACTAM 25 GRAM(S): 4; .5 INJECTION, POWDER, LYOPHILIZED, FOR SOLUTION INTRAVENOUS at 05:20

## 2018-09-14 RX ADMIN — OXYCODONE AND ACETAMINOPHEN 2 TABLET(S): 5; 325 TABLET ORAL at 16:51

## 2018-09-14 RX ADMIN — INFLUENZA VIRUS VACCINE 0.5 MILLILITER(S): 15; 15; 15; 15 SUSPENSION INTRAMUSCULAR at 15:46

## 2018-09-14 RX ADMIN — Medication 20 MILLIGRAM(S): at 05:20

## 2018-09-14 RX ADMIN — OXYCODONE AND ACETAMINOPHEN 2 TABLET(S): 5; 325 TABLET ORAL at 09:45

## 2018-09-14 RX ADMIN — OXYCODONE AND ACETAMINOPHEN 2 TABLET(S): 5; 325 TABLET ORAL at 23:54

## 2018-09-14 NOTE — PROGRESS NOTE ADULT - SUBJECTIVE AND OBJECTIVE BOX
Patient is a 85y old  Male who presents with a chief complaint of facial swelling/pain, left (13 Sep 2018 07:46)    Feels okay.  Less tenderness on left face.      Patient seen and examined at bedside.    ALLERGIES:  No Known Allergies    MEDICATIONS  (STANDING):  ALBUTerol/ipratropium for Nebulization 3 milliLiter(s) Nebulizer every 6 hours  atorvastatin 10 milliGRAM(s) Oral at bedtime  doxazosin 4 milliGRAM(s) Oral at bedtime  enoxaparin Injectable 40 milliGRAM(s) SubCutaneous every 24 hours  escitalopram 10 milliGRAM(s) Oral daily  famotidine    Tablet 20 milliGRAM(s) Oral daily  ferrous    sulfate Liquid 300 milliGRAM(s) Enteral Tube daily  furosemide    Tablet 20 milliGRAM(s) Oral daily  influenza   Vaccine 0.5 milliLiter(s) IntraMuscular once  lactobacillus acidophilus 1 Tablet(s) Oral every 12 hours  latanoprost 0.005% Ophthalmic Solution 1 Drop(s) Left EYE at bedtime  linaclotide 145 MICROGram(s) Oral before breakfast  melatonin 3 milliGRAM(s) Oral at bedtime  piperacillin/tazobactam IVPB. 3.375 Gram(s) IV Intermittent every 8 hours  potassium chloride   Solution 20 milliEquivalent(s) Oral daily  senna 2 Tablet(s) Oral at bedtime  sodium chloride 0.45%. 1000 milliLiter(s) (50 mL/Hr) IV Continuous <Continuous>    MEDICATIONS  (PRN):  acetaminophen   Tablet .. 650 milliGRAM(s) Oral every 6 hours PRN Temp greater or equal to 38C (100.4F), Mild Pain (1 - 3), Moderate Pain (4 - 6)  bisacodyl Suppository 10 milliGRAM(s) Rectal daily PRN Constipation  oxyCODONE    5 mG/acetaminophen 325 mG 2 Tablet(s) Oral every 4 hours PRN Severe Pain (7 - 10)  polyethylene glycol 3350 17 Gram(s) Oral daily PRN Constipation    Vital Signs Last 24 Hrs  T(F): 97.8 (14 Sep 2018 06:21), Max: 98.3 (13 Sep 2018 15:49)  HR: 61 (14 Sep 2018 06:21) (59 - 86)  BP: 116/58 (14 Sep 2018 06:21) (116/58 - 137/71)  RR: 18 (14 Sep 2018 06:21) (18 - 18)  SpO2: 98% (14 Sep 2018 06:21) (95% - 100%)  I&O's Summary    BMI (kg/m2): 31.7 (09-13-18 @ 05:19)  PHYSICAL EXAM:  General: NAD, A/O x 3  ENT: MMM, left facial swelling and tenderness  Neck: Supple, No JVD  Lungs: Clear to auscultation bilaterally  Cardio: RRR, S1/S2, No murmurs  Abdomen: Soft, Nontender, Nondistended; Bowel sounds present  Extremities: No calf tenderness, No pitting edema    LABS:                        9.0    9.6   )-----------( 163      ( 13 Sep 2018 07:15 )             27.6       09-13    134  |  98  |  50  ----------------------------<  117  4.5   |  31  |  1.66    Ca    9.5      13 Sep 2018 07:15    TPro  8.5  /  Alb  2.8  /  TBili  0.3  /  DBili  x   /  AST  41  /  ALT  57  /  AlkPhos  88  09-12     eGFR if Non African American: 37 mL/min/1.73M2 (09-13-18 @ 07:15)  eGFR if African American: 43 mL/min/1.73M2 (09-13-18 @ 07:15)    PT/INR - ( 12 Sep 2018 12:00 )   PT: 13.9 sec;   INR: 1.25 ratio       PTT - ( 12 Sep 2018 12:00 )  PTT:34.2 sec   Lactate, Blood: 1.1 mmol/L (09-12 @ 12:00)    CAPILLARY BLOOD GLUCOSE    07-10 [id number] 4.6    Culture - Blood (collected 12 Sep 2018 12:00)  Source: .Blood Blood-Peripheral  Preliminary Report (13 Sep 2018 15:01):    No growth to date.    Culture - Blood (collected 12 Sep 2018 12:00)  Source: .Blood Blood-Peripheral  Preliminary Report (13 Sep 2018 15:01):    No growth to date.    RADIOLOGY & ADDITIONAL TESTS:    Care Discussed with Consultants/Other Providers:

## 2018-09-14 NOTE — PROGRESS NOTE ADULT - ASSESSMENT
86 y/o m with h/o HTN, HLD, PPM, CVA, dysphagia, +PEG, sent in from [facility] home with c/o left facial pain and swelling, +left parotitis. +thyroid nodule  Acute renal insuff, dehydration.

## 2018-09-14 NOTE — SWALLOW BEDSIDE ASSESSMENT ADULT - ADDITIONAL RECOMMENDATIONS
Recommend MBS for objective assessment of swallow function and possible diet upgrade once jaw pain and swelling improve.

## 2018-09-14 NOTE — CONSULT NOTE ADULT - SUBJECTIVE AND OBJECTIVE BOX
HPI:   Patient is a 85y male with dysphagia s/p peg who developed left parotid swelling over the past 5 days hence sent to the hospital yesterday and we are called. Pt has been taking some small amounts of oral food with speech and swallow at the nursing home and reports no pain with swallow. He has no teeth. He has no fever at present but the side of his face over the parotid is very painful to touch.     REVIEW OF SYSTEMS:  All other review of systems negative (Comprehensive ROS)    PAST MEDICAL & SURGICAL HISTORY:  Psoriasis  GERD (gastroesophageal reflux disease)  Dyslipidemia  Cardiac pacemaker  S/P percutaneous endoscopic gastrostomy (PEG) tube placement      Allergies    No Known Allergies    Intolerances        Antimicrobials Day #  :1  vancomycin  IVPB        Other Medications:  acetaminophen   Tablet .. 650 milliGRAM(s) Oral every 6 hours PRN  ALBUTerol/ipratropium for Nebulization 3 milliLiter(s) Nebulizer every 6 hours  atorvastatin 10 milliGRAM(s) Oral at bedtime  bisacodyl Suppository 10 milliGRAM(s) Rectal daily PRN  doxazosin 4 milliGRAM(s) Oral at bedtime  enoxaparin Injectable 40 milliGRAM(s) SubCutaneous every 24 hours  escitalopram 10 milliGRAM(s) Oral daily  famotidine    Tablet 20 milliGRAM(s) Oral daily  ferrous    sulfate Liquid 300 milliGRAM(s) Enteral Tube daily  furosemide    Tablet 20 milliGRAM(s) Oral daily  influenza   Vaccine 0.5 milliLiter(s) IntraMuscular once  lactobacillus acidophilus 1 Tablet(s) Oral every 12 hours  latanoprost 0.005% Ophthalmic Solution 1 Drop(s) Left EYE at bedtime  linaclotide 145 MICROGram(s) Oral before breakfast  melatonin 3 milliGRAM(s) Oral at bedtime  oxyCODONE    5 mG/acetaminophen 325 mG 2 Tablet(s) Oral every 4 hours PRN  polyethylene glycol 3350 17 Gram(s) Oral daily PRN  potassium chloride   Solution 20 milliEquivalent(s) Oral daily  senna 2 Tablet(s) Oral at bedtime  sodium chloride 0.45%. 1000 milliLiter(s) IV Continuous <Continuous>      FAMILY HISTORY:  No pertinent family history in first degree relatives      SOCIAL HISTORY:  Smoking: [ ]Yes [x ]No  ETOH: [ ]Yes [ ]xNo  Drug Use: [ ]Yes x[ ]No   [x ] Single[ ]    T(F): 97.8 (09-14-18 @ 06:21), Max: 98.3 (09-13-18 @ 15:49)  HR: 61 (09-14-18 @ 06:21)  BP: 116/58 (09-14-18 @ 06:21)  RR: 18 (09-14-18 @ 06:21)  SpO2: 98% (09-14-18 @ 06:21)  Wt(kg): --    PHYSICAL EXAM:  General: alert, no acute distress  Eyes:  anicteric, no conjunctival injection, no discharge  Oropharynx: no lesions or injection. I see no purulence from the left stentson duct. red indurated swollen left parotid with redness  and swelling but soft under the left jaw to midline.  No swelling of floor of mouth. No crepitus, no odor in mouth.  	  Neck: supple, without adenopathy  Lungs: course to auscultation  Heart: regular rate and rhythm 2/6 sys m  Abdomen: soft, nondistended, nontender, without mass or organomegaly  Skin: no lesions  Extremities: no clubbing, cyanosis, or edema  Neurologic: alert, oriented, moves all extremities    LAB RESULTS:                        9.0    9.6   )-----------( 163      ( 13 Sep 2018 07:15 )             27.6     09-13    134<L>  |  98  |  50<H>  ----------------------------<  117<H>  4.5   |  31  |  1.66<H>    Ca    9.5      13 Sep 2018 07:15            MICROBIOLOGY:  RECENT CULTURES:  09-12 @ 12:00 .Blood Blood-Peripheral     No growth to date.            RADIOLOGY REVIEWED:  < from: CT Neck Soft Tissue No Cont (09.12.18 @ 13:51) >  IMPRESSION:    1. Limited examination secondary to exclusion of IV contrast.  2. Left-sided parotiditis without obstructing stone.  3. Unchanged sebaceous cyst versus seroma within theleft posterior   subcutaneous tissues of the dorsal neck.  4. Unchanged heterogeneous low-attenuation 3 cm left-sided thyroid nodule   for which neoplasm is not excluded. Recommend further evaluation with   ultrasound exam.      < end of copied text >          Impression:  Patient with dysphagia s/p peg who now has left parotitis. Staph aureus overwhelmingly likely organism. Usually happens when patient is dehydrated and dry mouth.     Recommendations:  Cover with IV vancomycin  Warm compresses  Lemon swabs  Hydrate  ENT evaluation

## 2018-09-14 NOTE — SWALLOW BEDSIDE ASSESSMENT ADULT - COMMENTS
Pt know to this service from previous admits.  Most recent MBS 4/28/16 revealed penetration and aspiration of thin liquids.  Awaiting call back from SLP at [facility] for updated information regarding Pts swallow status and reason for NPO status Pt know to this service from previous admits.  Most recent MBS 4/28/16 revealed penetration and aspiration of thin liquids.  Awaiting call back from SLP at [facility] for updated information regarding Pts swallow status and reason for NPO status.  Pt was scheduled for OP MBS for assessment of swallow function which was cancelled 2/2 hospital admission XRAY 9/12/18-  Small density remains in right lower lung field with an open differential.  Pt know to this service from previous admits.  Most recent MBS 4/28/16 revealed penetration and aspiration of thin liquids.  Awaiting call back from SLP at [facility] for updated information regarding Pts swallow status and reason for NPO status.  Pt was scheduled for OP MBS for assessment of swallow function which was cancelled 2/2 hospital admission

## 2018-09-14 NOTE — SWALLOW BEDSIDE ASSESSMENT ADULT - SLP PERTINENT HISTORY OF CURRENT PROBLEM
84 y/o m with h/o HTN, HLD, PPM, CVA, dysphagia, +PEG, sent in from [facility] home with c/o left facial pain and swelling x 3 days, worsened in the past 24 hours. Denies fever, chills, nausea, vomiting, cough, chest pain, shortness of breath, abdominal pain.  Temp 99.1. 84 y/o m with h/o HTN, HLD, PPM, CVA, dysphagia, +PEG, sent in from [facility] home with c/o left facial pain and swelling x 3 days, worsened in the past 24 hours. Denies fever, chills, nausea, vomiting, cough, chest pain, shortness of breath, abdominal pain.  Temp 99.1. WBC 11.3 found to have +left parotitis. +thyroid nodule

## 2018-09-14 NOTE — SWALLOW BEDSIDE ASSESSMENT ADULT - SWALLOW EVAL: FUNCTIONAL LEVEL AT TIME OF EVAL
Unable to provide PO trials at the bedside given hx of dysphagia requiring PEG tube.  Pt with significant jaw pain No

## 2018-09-15 LAB
ANION GAP SERPL CALC-SCNC: 8 MMOL/L — SIGNIFICANT CHANGE UP (ref 5–17)
BUN SERPL-MCNC: 49 MG/DL — HIGH (ref 7–23)
CALCIUM SERPL-MCNC: 9.4 MG/DL — SIGNIFICANT CHANGE UP (ref 8.4–10.5)
CHLORIDE SERPL-SCNC: 102 MMOL/L — SIGNIFICANT CHANGE UP (ref 96–108)
CO2 SERPL-SCNC: 30 MMOL/L — SIGNIFICANT CHANGE UP (ref 22–31)
CREAT SERPL-MCNC: 1.49 MG/DL — HIGH (ref 0.5–1.3)
GLUCOSE SERPL-MCNC: 117 MG/DL — HIGH (ref 70–99)
HCT VFR BLD CALC: 26.4 % — LOW (ref 39–50)
HGB BLD-MCNC: 8.6 G/DL — LOW (ref 13–17)
MCHC RBC-ENTMCNC: 28.8 PG — SIGNIFICANT CHANGE UP (ref 27–34)
MCHC RBC-ENTMCNC: 32.4 GM/DL — SIGNIFICANT CHANGE UP (ref 32–36)
MCV RBC AUTO: 88.8 FL — SIGNIFICANT CHANGE UP (ref 80–100)
PLATELET # BLD AUTO: 181 K/UL — SIGNIFICANT CHANGE UP (ref 150–400)
POTASSIUM SERPL-MCNC: 4.2 MMOL/L — SIGNIFICANT CHANGE UP (ref 3.5–5.3)
POTASSIUM SERPL-SCNC: 4.2 MMOL/L — SIGNIFICANT CHANGE UP (ref 3.5–5.3)
RBC # BLD: 2.97 M/UL — LOW (ref 4.2–5.8)
RBC # FLD: 15.2 % — HIGH (ref 10.3–14.5)
SODIUM SERPL-SCNC: 140 MMOL/L — SIGNIFICANT CHANGE UP (ref 135–145)
WBC # BLD: 6.9 K/UL — SIGNIFICANT CHANGE UP (ref 3.8–10.5)
WBC # FLD AUTO: 6.9 K/UL — SIGNIFICANT CHANGE UP (ref 3.8–10.5)

## 2018-09-15 PROCEDURE — 99233 SBSQ HOSP IP/OBS HIGH 50: CPT

## 2018-09-15 RX ADMIN — LATANOPROST 1 DROP(S): 0.05 SOLUTION/ DROPS OPHTHALMIC; TOPICAL at 23:00

## 2018-09-15 RX ADMIN — OXYCODONE AND ACETAMINOPHEN 2 TABLET(S): 5; 325 TABLET ORAL at 17:32

## 2018-09-15 RX ADMIN — OXYCODONE AND ACETAMINOPHEN 2 TABLET(S): 5; 325 TABLET ORAL at 18:26

## 2018-09-15 RX ADMIN — Medication 1 TABLET(S): at 17:25

## 2018-09-15 RX ADMIN — Medication 4 MILLIGRAM(S): at 22:58

## 2018-09-15 RX ADMIN — OXYCODONE AND ACETAMINOPHEN 2 TABLET(S): 5; 325 TABLET ORAL at 05:10

## 2018-09-15 RX ADMIN — Medication 300 MILLIGRAM(S): at 13:28

## 2018-09-15 RX ADMIN — OXYCODONE AND ACETAMINOPHEN 2 TABLET(S): 5; 325 TABLET ORAL at 10:51

## 2018-09-15 RX ADMIN — ESCITALOPRAM OXALATE 10 MILLIGRAM(S): 10 TABLET, FILM COATED ORAL at 13:29

## 2018-09-15 RX ADMIN — Medication 3 MILLILITER(S): at 21:32

## 2018-09-15 RX ADMIN — OXYCODONE AND ACETAMINOPHEN 2 TABLET(S): 5; 325 TABLET ORAL at 07:10

## 2018-09-15 RX ADMIN — OXYCODONE AND ACETAMINOPHEN 2 TABLET(S): 5; 325 TABLET ORAL at 22:57

## 2018-09-15 RX ADMIN — Medication 20 MILLIGRAM(S): at 05:10

## 2018-09-15 RX ADMIN — Medication 1 TABLET(S): at 05:10

## 2018-09-15 RX ADMIN — OXYCODONE AND ACETAMINOPHEN 2 TABLET(S): 5; 325 TABLET ORAL at 11:20

## 2018-09-15 RX ADMIN — ATORVASTATIN CALCIUM 10 MILLIGRAM(S): 80 TABLET, FILM COATED ORAL at 22:58

## 2018-09-15 RX ADMIN — Medication 20 MILLIEQUIVALENT(S): at 13:29

## 2018-09-15 RX ADMIN — Medication 3 MILLIGRAM(S): at 22:58

## 2018-09-15 RX ADMIN — ENOXAPARIN SODIUM 40 MILLIGRAM(S): 100 INJECTION SUBCUTANEOUS at 05:10

## 2018-09-15 RX ADMIN — FAMOTIDINE 20 MILLIGRAM(S): 10 INJECTION INTRAVENOUS at 13:28

## 2018-09-15 RX ADMIN — Medication 3 MILLILITER(S): at 09:49

## 2018-09-15 RX ADMIN — SENNA PLUS 2 TABLET(S): 8.6 TABLET ORAL at 22:57

## 2018-09-15 RX ADMIN — LINACLOTIDE 145 MICROGRAM(S): 145 CAPSULE, GELATIN COATED ORAL at 05:10

## 2018-09-15 RX ADMIN — Medication 250 MILLIGRAM(S): at 13:29

## 2018-09-15 NOTE — PHYSICAL THERAPY INITIAL EVALUATION ADULT - PASSIVE RANGE OF MOTION EXAMINATION, REHAB EVAL
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

## 2018-09-15 NOTE — CHART NOTE - NSCHARTNOTEFT_GEN_A_CORE
NUTRITION FOLLOW UP    SOURCE: Medical Chart/ RN    DIET: Nepro @ 60 cc/hr x 24hrs    Patient is tolerating current tube feeding of Nepro @ 60cc/hr . No GI distress noted, incontinent of bowel noted +BM 9/15. Skin is intact as per RN .  Current tube fedds are providing 2592 calories, 116 gms protein     CURRENT WEIGHT: 227.5/103.2kg , 132% IBW , edema noted in face as per flow sheet.    PERTINENT MEDS:   Pertinent Medications: MEDICATIONS  (STANDING):  ALBUTerol/ipratropium for Nebulization 3 milliLiter(s) Nebulizer every 6 hours  atorvastatin 10 milliGRAM(s) Oral at bedtime  doxazosin 4 milliGRAM(s) Oral at bedtime  enoxaparin Injectable 40 milliGRAM(s) SubCutaneous every 24 hours  escitalopram 10 milliGRAM(s) Oral daily  famotidine    Tablet 20 milliGRAM(s) Oral daily  ferrous    sulfate Liquid 300 milliGRAM(s) Enteral Tube daily  furosemide    Tablet 20 milliGRAM(s) Oral daily  lactobacillus acidophilus 1 Tablet(s) Oral every 12 hours  latanoprost 0.005% Ophthalmic Solution 1 Drop(s) Left EYE at bedtime  linaclotide 145 MICROGram(s) Oral before breakfast  melatonin 3 milliGRAM(s) Oral at bedtime  potassium chloride   Solution 20 milliEquivalent(s) Oral daily  senna 2 Tablet(s) Oral at bedtime  sodium chloride 0.45%. 1000 milliLiter(s) (50 mL/Hr) IV Continuous <Continuous>  vancomycin  IVPB      vancomycin  IVPB 1000 milliGRAM(s) IV Intermittent every 24 hours    MEDICATIONS  (PRN):  acetaminophen   Tablet .. 650 milliGRAM(s) Oral every 6 hours PRN Temp greater or equal to 38C (100.4F), Mild Pain (1 - 3), Moderate Pain (4 - 6)  bisacodyl Suppository 10 milliGRAM(s) Rectal daily PRN Constipation  oxyCODONE    5 mG/acetaminophen 325 mG 2 Tablet(s) Oral every 4 hours PRN Severe Pain (7 - 10)  polyethylene glycol 3350 17 Gram(s) Oral daily PRN Constipation      PERTINENT LABS:  Date: 15 Sep 2018 06:15  Hemoglobin 8.6 (L)   Hematocrit 26.4 (L0    Date: 09-15  Sodium 140  Potassium 4.2  Glucose Serum 117<H>  BUN 49<H>  Creatinine 1.49(H)          SKIN: intact    ESTIMATED NEEDS:   [X] no change since previous assessment  [ ] recalculated:     PREVIOUS NUTRITION DIAGNOSIS: addressed    NUTRITION DIAGNOSIS is   [X] on going, RD will follow as per nutrition department protocol.      MONITORING AND EVALUATION:   Will monitor tolerance to tube feedings, labs weights. secondary to current weight status , could consider decreasing rate , will follow.        NUTRITION RECOMMENDATIONS: Continue current tube feedings.

## 2018-09-15 NOTE — PROGRESS NOTE ADULT - SUBJECTIVE AND OBJECTIVE BOX
Patient is a 85y old  Male who presents with a chief complaint of facial swelling/pain, left       Patient seen and examined at bedside. feels much better, no complaints.     ALLERGIES:  No Known Allergies    MEDICATIONS:  acetaminophen   Tablet .. 650 milliGRAM(s) Oral every 6 hours PRN  ALBUTerol/ipratropium for Nebulization 3 milliLiter(s) Nebulizer every 6 hours  atorvastatin 10 milliGRAM(s) Oral at bedtime  bisacodyl Suppository 10 milliGRAM(s) Rectal daily PRN  doxazosin 4 milliGRAM(s) Oral at bedtime  enoxaparin Injectable 40 milliGRAM(s) SubCutaneous every 24 hours  escitalopram 10 milliGRAM(s) Oral daily  famotidine    Tablet 20 milliGRAM(s) Oral daily  ferrous    sulfate Liquid 300 milliGRAM(s) Enteral Tube daily  furosemide    Tablet 20 milliGRAM(s) Oral daily  lactobacillus acidophilus 1 Tablet(s) Oral every 12 hours  latanoprost 0.005% Ophthalmic Solution 1 Drop(s) Left EYE at bedtime  linaclotide 145 MICROGram(s) Oral before breakfast  melatonin 3 milliGRAM(s) Oral at bedtime  oxyCODONE    5 mG/acetaminophen 325 mG 2 Tablet(s) Oral every 4 hours PRN  polyethylene glycol 3350 17 Gram(s) Oral daily PRN  potassium chloride   Solution 20 milliEquivalent(s) Oral daily  senna 2 Tablet(s) Oral at bedtime  sodium chloride 0.45%. 1000 milliLiter(s) IV Continuous <Continuous>  vancomycin  IVPB      vancomycin  IVPB 1000 milliGRAM(s) IV Intermittent every 24 hours    Vital Signs Last 24 Hrs  T(F): 98.3 (15 Sep 2018 05:45), Max: 98.8 (14 Sep 2018 15:38)  HR: 60 (15 Sep 2018 05:45) (53 - 72)  BP: 133/63 (15 Sep 2018 05:45) (130/71 - 136/71)  RR: 18 (15 Sep 2018 05:45) (16 - 18)  SpO2: 98% (15 Sep 2018 09:49) (94% - 99%)  I&O's Summary    14 Sep 2018 07:01  -  15 Sep 2018 07:00  --------------------------------------------------------  IN: 1540 mL / OUT: 0 mL / NET: 1540 mL        PHYSICAL EXAM:  General: NAD, alert, oriented. left facial swelling.   Neck: Supple, No JVD  Lungs: Clear to auscultation bilaterally  Cardio: RRR, S1/S2, No murmurs  Abdomen: Soft, Nontender, Nondistended; Bowel sounds present  Extremities: No clubbing, cyanosis, or edema      LABS:                        8.6    6.9   )-----------( 181      ( 15 Sep 2018 06:15 )             26.4     09-15    140  |  102  |  49  ----------------------------<  117  4.2   |  30  |  1.49    Ca    9.4      15 Sep 2018 06:15    TPro  8.5  /  Alb  2.8  /  TBili  0.3  /  DBili  x   /  AST  41  /  ALT  57  /  AlkPhos  88  09-12    eGFR if Non African American: 42 mL/min/1.73M2 (09-15-18 @ 06:15)  eGFR if African American: 49 mL/min/1.73M2 (09-15-18 @ 06:15)    PT/INR - ( 12 Sep 2018 12:00 )   PT: 13.9 sec;   INR: 1.25 ratio         PTT - ( 12 Sep 2018 12:00 )  PTT:34.2 sec  Lactate, Blood: 1.1 mmol/L (09-12 @ 12:00)    CAPILLARY BLOOD GLUCOSE    07-10 [id number] 4.6    Culture - Blood (collected 12 Sep 2018 12:00)  Source: .Blood Blood-Peripheral  Preliminary Report (13 Sep 2018 15:01):    No growth to date.    Culture - Blood (collected 12 Sep 2018 12:00)  Source: .Blood Blood-Peripheral  Preliminary Report (13 Sep 2018 15:01):    No growth to date.        RADIOLOGY & ADDITIONAL TESTS:    Care Discussed with Consultants/Other Providers:

## 2018-09-15 NOTE — PROGRESS NOTE ADULT - ASSESSMENT
84 y/o m with h/o HTN, HLD, PPM, CVA, dysphagia, +PEG, sent in from [facility] home with c/o left facial pain and swelling, +left parotitis. +thyroid nodule  Acute renal insuff, dehydration.

## 2018-09-15 NOTE — PHYSICAL THERAPY INITIAL EVALUATION ADULT - ADDITIONAL COMMENTS
Pt. lives with wife in private home, enters through garage with 2 DAVONTE with 2 HR, remains on first floor.  Pt. has RW.  Pt. does not drive.  Pt. does not wear glasses.  Prior to admission to short term rehab, pt. had [facility] 5 days a week for assist with ADL's and mobility.

## 2018-09-15 NOTE — PROGRESS NOTE ADULT - SUBJECTIVE AND OBJECTIVE BOX
CC: f/u for left sided parotitis    Patient reports: less facial pain,no fever,less swelling    REVIEW OF SYSTEMS:  All other review of systems negative (Comprehensive ROS)    Antimicrobials Day # day 2  vancomycin  IVPB      vancomycin  IVPB 1000 milliGRAM(s) IV Intermittent every 24 hours    Other Medications Reviewed    T(F): 98.3 (09-15-18 @ 05:45), Max: 98.8 (09-14-18 @ 15:38)  HR: 60 (09-15-18 @ 05:45)  BP: 133/63 (09-15-18 @ 05:45)  RR: 18 (09-15-18 @ 05:45)  SpO2: 94% (09-15-18 @ 05:45)  Wt(kg): --    PHYSICAL EXAM:  General: alert, no acute distress  Eyes:  anicteric, no conjunctival injection, no discharge  Oropharynx: no lesions or injection .Left parotid gland is warm and tender	  Neck: supple, without adenopathy  Lungs: clear to auscultation  Heart: regular rate and rhythm; no murmur, rubs or gallops  Abdomen: soft, nondistended, nontender, without mass or organomegaly, peg in place  Skin: venous stasis changes  Extremities: no clubbing, cyanosis, + edema  Neurologic: alert, oriented, moves all extremities    LAB RESULTS:                        9.0    9.6   )-----------( 163      ( 13 Sep 2018 07:15 )             27.6     09-13    134<L>  |  98  |  50<H>  ----------------------------<  117<H>  4.5   |  31  |  1.66<H>    Ca    9.5      13 Sep 2018 07:15          MICROBIOLOGY:  RECENT CULTURES:  09-12 @ 12:00 .Blood Blood-Peripheral     No growth to date.          RADIOLOGY REVIEWED:  < from: CT Neck Soft Tissue No Cont (09.12.18 @ 13:51) >  IMPRESSION:    1. Limited examination secondary to exclusion of IV contrast.  2. Left-sided parotiditis without obstructing stone.  3. Unchanged sebaceous cyst versus seroma within theleft posterior   subcutaneous tissues of the dorsal neck.  4. Unchanged heterogeneous low-attenuation 3 cm left-sided thyroid nodule   for which neoplasm is not excluded. Recommend further evaluation with   ultrasound exam.

## 2018-09-15 NOTE — PHYSICAL THERAPY INITIAL EVALUATION ADULT - PERTINENT HX OF CURRENT PROBLEM, REHAB EVAL
Pt. admitted from short term rehab ([facility]) due to swelling/left sided facial pain, parotiditis

## 2018-09-15 NOTE — PROGRESS NOTE ADULT - ASSESSMENT
84 yo male with left sided parotitis responding to present measures.  History of PPM and Peg.History of CKD  This is typically a Staph aureus infection.  Continue warm compresses, lemon swabs, and vancomycin  Keep hydrated  Await blood culture data.  When additional improvement is seen we will consider stepdown to oral therapy  This will likely be minocycline or doxycycline to cover both MSSA and MRSA

## 2018-09-16 LAB
ANION GAP SERPL CALC-SCNC: 6 MMOL/L — SIGNIFICANT CHANGE UP (ref 5–17)
BUN SERPL-MCNC: 56 MG/DL — HIGH (ref 7–23)
CALCIUM SERPL-MCNC: 9.4 MG/DL — SIGNIFICANT CHANGE UP (ref 8.4–10.5)
CHLORIDE SERPL-SCNC: 100 MMOL/L — SIGNIFICANT CHANGE UP (ref 96–108)
CO2 SERPL-SCNC: 30 MMOL/L — SIGNIFICANT CHANGE UP (ref 22–31)
CREAT SERPL-MCNC: 1.5 MG/DL — HIGH (ref 0.5–1.3)
GLUCOSE SERPL-MCNC: 127 MG/DL — HIGH (ref 70–99)
HCT VFR BLD CALC: 26.2 % — LOW (ref 39–50)
HGB BLD-MCNC: 8.4 G/DL — LOW (ref 13–17)
MCHC RBC-ENTMCNC: 28.7 PG — SIGNIFICANT CHANGE UP (ref 27–34)
MCHC RBC-ENTMCNC: 32 GM/DL — SIGNIFICANT CHANGE UP (ref 32–36)
MCV RBC AUTO: 89.5 FL — SIGNIFICANT CHANGE UP (ref 80–100)
PLATELET # BLD AUTO: 172 K/UL — SIGNIFICANT CHANGE UP (ref 150–400)
POTASSIUM SERPL-MCNC: 4.3 MMOL/L — SIGNIFICANT CHANGE UP (ref 3.5–5.3)
POTASSIUM SERPL-SCNC: 4.3 MMOL/L — SIGNIFICANT CHANGE UP (ref 3.5–5.3)
RBC # BLD: 2.93 M/UL — LOW (ref 4.2–5.8)
RBC # FLD: 14.9 % — HIGH (ref 10.3–14.5)
SODIUM SERPL-SCNC: 136 MMOL/L — SIGNIFICANT CHANGE UP (ref 135–145)
WBC # BLD: 7.4 K/UL — SIGNIFICANT CHANGE UP (ref 3.8–10.5)
WBC # FLD AUTO: 7.4 K/UL — SIGNIFICANT CHANGE UP (ref 3.8–10.5)

## 2018-09-16 PROCEDURE — 99233 SBSQ HOSP IP/OBS HIGH 50: CPT

## 2018-09-16 RX ADMIN — Medication 250 MILLIGRAM(S): at 13:23

## 2018-09-16 RX ADMIN — Medication 3 MILLILITER(S): at 09:55

## 2018-09-16 RX ADMIN — Medication 3 MILLILITER(S): at 15:49

## 2018-09-16 RX ADMIN — OXYCODONE AND ACETAMINOPHEN 2 TABLET(S): 5; 325 TABLET ORAL at 17:58

## 2018-09-16 RX ADMIN — FAMOTIDINE 20 MILLIGRAM(S): 10 INJECTION INTRAVENOUS at 11:54

## 2018-09-16 RX ADMIN — ESCITALOPRAM OXALATE 10 MILLIGRAM(S): 10 TABLET, FILM COATED ORAL at 11:54

## 2018-09-16 RX ADMIN — Medication 20 MILLIGRAM(S): at 06:13

## 2018-09-16 RX ADMIN — Medication 1 TABLET(S): at 17:56

## 2018-09-16 RX ADMIN — LINACLOTIDE 145 MICROGRAM(S): 145 CAPSULE, GELATIN COATED ORAL at 06:13

## 2018-09-16 RX ADMIN — Medication 300 MILLIGRAM(S): at 11:54

## 2018-09-16 RX ADMIN — ATORVASTATIN CALCIUM 10 MILLIGRAM(S): 80 TABLET, FILM COATED ORAL at 22:44

## 2018-09-16 RX ADMIN — Medication 20 MILLIEQUIVALENT(S): at 11:54

## 2018-09-16 RX ADMIN — OXYCODONE AND ACETAMINOPHEN 2 TABLET(S): 5; 325 TABLET ORAL at 11:54

## 2018-09-16 RX ADMIN — OXYCODONE AND ACETAMINOPHEN 2 TABLET(S): 5; 325 TABLET ORAL at 05:24

## 2018-09-16 RX ADMIN — OXYCODONE AND ACETAMINOPHEN 2 TABLET(S): 5; 325 TABLET ORAL at 12:00

## 2018-09-16 RX ADMIN — OXYCODONE AND ACETAMINOPHEN 2 TABLET(S): 5; 325 TABLET ORAL at 06:32

## 2018-09-16 RX ADMIN — Medication 1 TABLET(S): at 06:13

## 2018-09-16 RX ADMIN — Medication 3 MILLILITER(S): at 04:19

## 2018-09-16 RX ADMIN — Medication 3 MILLIGRAM(S): at 22:45

## 2018-09-16 RX ADMIN — SENNA PLUS 2 TABLET(S): 8.6 TABLET ORAL at 22:45

## 2018-09-16 RX ADMIN — Medication 4 MILLIGRAM(S): at 22:45

## 2018-09-16 RX ADMIN — OXYCODONE AND ACETAMINOPHEN 2 TABLET(S): 5; 325 TABLET ORAL at 07:38

## 2018-09-16 RX ADMIN — OXYCODONE AND ACETAMINOPHEN 2 TABLET(S): 5; 325 TABLET ORAL at 18:57

## 2018-09-16 RX ADMIN — ENOXAPARIN SODIUM 40 MILLIGRAM(S): 100 INJECTION SUBCUTANEOUS at 06:13

## 2018-09-16 RX ADMIN — LATANOPROST 1 DROP(S): 0.05 SOLUTION/ DROPS OPHTHALMIC; TOPICAL at 22:45

## 2018-09-16 RX ADMIN — Medication 3 MILLILITER(S): at 21:12

## 2018-09-16 NOTE — PROGRESS NOTE ADULT - SUBJECTIVE AND OBJECTIVE BOX
Patient is a 85y old  Male who presents with a chief complaint of facial swelling/pain, left       Patient seen and examined at bedside. feels okay but still pain intermittently     ALLERGIES:  No Known Allergies    MEDICATIONS:  acetaminophen   Tablet .. 650 milliGRAM(s) Oral every 6 hours PRN  ALBUTerol/ipratropium for Nebulization 3 milliLiter(s) Nebulizer every 6 hours  atorvastatin 10 milliGRAM(s) Oral at bedtime  bisacodyl Suppository 10 milliGRAM(s) Rectal daily PRN  doxazosin 4 milliGRAM(s) Oral at bedtime  enoxaparin Injectable 40 milliGRAM(s) SubCutaneous every 24 hours  escitalopram 10 milliGRAM(s) Oral daily  famotidine    Tablet 20 milliGRAM(s) Oral daily  ferrous    sulfate Liquid 300 milliGRAM(s) Enteral Tube daily  furosemide    Tablet 20 milliGRAM(s) Oral daily  lactobacillus acidophilus 1 Tablet(s) Oral every 12 hours  latanoprost 0.005% Ophthalmic Solution 1 Drop(s) Left EYE at bedtime  linaclotide 145 MICROGram(s) Oral before breakfast  melatonin 3 milliGRAM(s) Oral at bedtime  oxyCODONE    5 mG/acetaminophen 325 mG 2 Tablet(s) Oral every 4 hours PRN  polyethylene glycol 3350 17 Gram(s) Oral daily PRN  potassium chloride   Solution 20 milliEquivalent(s) Oral daily  senna 2 Tablet(s) Oral at bedtime  sodium chloride 0.45%. 1000 milliLiter(s) IV Continuous <Continuous>  vancomycin  IVPB      vancomycin  IVPB 1000 milliGRAM(s) IV Intermittent every 24 hours    Vital Signs Last 24 Hrs  T(F): 98 (16 Sep 2018 04:46), Max: 98.2 (15 Sep 2018 15:16)  HR: 60 (16 Sep 2018 04:46) (60 - 63)  BP: 129/64 (16 Sep 2018 04:46) (115/74 - 136/60)  RR: 16 (16 Sep 2018 04:46) (14 - 16)  SpO2: 97% (15 Sep 2018 23:05) (95% - 99%)  I&O's Summary    15 Sep 2018 07:01  -  16 Sep 2018 07:00  --------------------------------------------------------  IN: 720 mL / OUT: 0 mL / NET: 720 mL        PHYSICAL EXAM:  General: NAD, alert oriented x 3  left facial swelling, improving   Neck: Supple, No JVD  Lungs: Clear to auscultation bilaterally  Cardio: RRR, S1/S2, No murmurs  Abdomen: Soft, Nontender, Nondistended; Bowel sounds present  Extremities: No clubbing, cyanosis, or edema    LABS:                        8.4    7.4   )-----------( 172      ( 16 Sep 2018 05:49 )             26.2     09-16    136  |  100  |  56  ----------------------------<  127  4.3   |  30  |  1.50    Ca    9.4      16 Sep 2018 05:49      eGFR if Non African American: 42 mL/min/1.73M2 (09-16-18 @ 05:49)  eGFR if : 48 mL/min/1.73M2 (09-16-18 @ 05:49)    CAPILLARY BLOOD GLUCOSE      07-10 [id number] 4.6    Culture - Blood (collected 12 Sep 2018 12:00)  Source: .Blood Blood-Peripheral  Preliminary Report (13 Sep 2018 15:01):    No growth to date.    Culture - Blood (collected 12 Sep 2018 12:00)  Source: .Blood Blood-Peripheral  Preliminary Report (13 Sep 2018 15:01):    No growth to date.    RADIOLOGY & ADDITIONAL TESTS:    Care Discussed with Consultants/Other Providers:

## 2018-09-16 NOTE — PROGRESS NOTE ADULT - ASSESSMENT
86 yo male with left sided parotitis responding to present measures.  History of PPM and Peg.History of CKD  This is typically a Staph aureus infection.  Continue warm compresses, lemon swabs, and vancomycin  Keep hydrated  Blood cultures are negative so far  When additional improvement is seen we will consider stepdown to oral therapy  This will likely be minocycline or doxycycline to cover both MSSA and MRSA, perhaps oral antibiotics on 9/17 to complete a 10 day course

## 2018-09-16 NOTE — PROGRESS NOTE ADULT - ASSESSMENT
84 y/o m with h/o HTN, HLD, PPM, CVA, dysphagia, +PEG, sent in from [facility] home with c/o left facial pain and swelling, +left parotitis. +thyroid nodule  Acute renal insuff, dehydration.

## 2018-09-16 NOTE — PROGRESS NOTE ADULT - SUBJECTIVE AND OBJECTIVE BOX
CC: f/u for left sided parotitis    Patient reports: improvement in left facial pain    REVIEW OF SYSTEMS:  All other review of systems negative (Comprehensive ROS)    Antimicrobials Day #  :day 3  vancomycin  IVPB      vancomycin  IVPB 1000 milliGRAM(s) IV Intermittent every 24 hours    Other Medications Reviewed    T(F): 98 (09-16-18 @ 04:46), Max: 98.2 (09-15-18 @ 15:16)  HR: 60 (09-16-18 @ 04:46)  BP: 129/64 (09-16-18 @ 04:46)  RR: 16 (09-16-18 @ 04:46)  SpO2: 97% (09-15-18 @ 23:05)  Wt(kg): --    PHYSICAL EXAM:  General: alert, no acute distress  Eyes:  anicteric, no conjunctival injection, no discharge  Oropharynx: no lesions or injection , no dentition.Left parotid swelling and erythema is improved	  Neck: supple, without adenopathy  Lungs: clear to auscultation  Heart: regular rate and rhythm; no murmur, rubs or gallops  Abdomen: soft, nondistended, nontender, without mass or organomegaly, peg  Skin: venous stasis changes  Extremities: no clubbing, cyanosis, or edema  Neurologic: alert, oriented, moves all extremities    LAB RESULTS:                        8.6    6.9   )-----------( 181      ( 15 Sep 2018 06:15 )             26.4     09-15    140  |  102  |  49<H>  ----------------------------<  117<H>  4.2   |  30  |  1.49<H>    Ca    9.4      15 Sep 2018 06:15          MICROBIOLOGY:  RECENT CULTURES:  09-12 @ 12:00 .Blood Blood-Peripheral     No growth to date.          RADIOLOGY REVIEWED:  < from: CT Neck Soft Tissue No Cont (09.12.18 @ 13:51) >    IMPRESSION:    1. Limited examination secondary to exclusion of IV contrast.  2. Left-sided parotiditis without obstructing stone.  3. Unchanged sebaceous cyst versus seroma within theleft posterior   subcutaneous tissues of the dorsal neck.  4. Unchanged heterogeneous low-attenuation 3 cm left-sided thyroid nodule   for which neoplasm is not excluded. Recommend further evaluation with   ultrasound exam.    < end of copied text >

## 2018-09-17 LAB
CULTURE RESULTS: SIGNIFICANT CHANGE UP
CULTURE RESULTS: SIGNIFICANT CHANGE UP
SPECIMEN SOURCE: SIGNIFICANT CHANGE UP
SPECIMEN SOURCE: SIGNIFICANT CHANGE UP
VANCOMYCIN TROUGH SERPL-MCNC: 18.6 UG/ML — SIGNIFICANT CHANGE UP (ref 10–20)

## 2018-09-17 PROCEDURE — 99233 SBSQ HOSP IP/OBS HIGH 50: CPT

## 2018-09-17 RX ADMIN — Medication 1 TABLET(S): at 17:14

## 2018-09-17 RX ADMIN — OXYCODONE AND ACETAMINOPHEN 2 TABLET(S): 5; 325 TABLET ORAL at 15:17

## 2018-09-17 RX ADMIN — Medication 250 MILLIGRAM(S): at 15:17

## 2018-09-17 RX ADMIN — LINACLOTIDE 145 MICROGRAM(S): 145 CAPSULE, GELATIN COATED ORAL at 06:03

## 2018-09-17 RX ADMIN — LATANOPROST 1 DROP(S): 0.05 SOLUTION/ DROPS OPHTHALMIC; TOPICAL at 21:42

## 2018-09-17 RX ADMIN — ESCITALOPRAM OXALATE 10 MILLIGRAM(S): 10 TABLET, FILM COATED ORAL at 13:34

## 2018-09-17 RX ADMIN — Medication 3 MILLILITER(S): at 15:33

## 2018-09-17 RX ADMIN — FAMOTIDINE 20 MILLIGRAM(S): 10 INJECTION INTRAVENOUS at 13:34

## 2018-09-17 RX ADMIN — ENOXAPARIN SODIUM 40 MILLIGRAM(S): 100 INJECTION SUBCUTANEOUS at 06:03

## 2018-09-17 RX ADMIN — Medication 3 MILLILITER(S): at 21:01

## 2018-09-17 RX ADMIN — OXYCODONE AND ACETAMINOPHEN 2 TABLET(S): 5; 325 TABLET ORAL at 15:47

## 2018-09-17 RX ADMIN — Medication 300 MILLIGRAM(S): at 13:34

## 2018-09-17 RX ADMIN — Medication 1 TABLET(S): at 06:03

## 2018-09-17 RX ADMIN — Medication 20 MILLIGRAM(S): at 06:02

## 2018-09-17 RX ADMIN — Medication 3 MILLILITER(S): at 04:27

## 2018-09-17 RX ADMIN — Medication 3 MILLILITER(S): at 09:43

## 2018-09-17 RX ADMIN — Medication 20 MILLIEQUIVALENT(S): at 13:34

## 2018-09-17 NOTE — PROGRESS NOTE ADULT - ASSESSMENT
84 y/o m with h/o HTN, HLD, PPM, CVA, dysphagia, +PEG, sent in from [facility] home with c/o left facial pain and swelling, +left parotitis. +thyroid nodule  Acute renal insuff, dehydration.

## 2018-09-17 NOTE — PROGRESS NOTE ADULT - SUBJECTIVE AND OBJECTIVE BOX
Patient is a 85y old  Male who presents with a chief complaint of facial swelling/pain, left (16 Sep 2018 09:06)    Patient irritated this AM, C/o confusion and disorientation states "I think I'm going crazy".   Wants to get out of hospital back to rehab    Patient seen and examined at bedside.    ALLERGIES:  No Known Allergies    MEDICATIONS  (STANDING):  ALBUTerol/ipratropium for Nebulization 3 milliLiter(s) Nebulizer every 6 hours  atorvastatin 10 milliGRAM(s) Oral at bedtime  doxazosin 4 milliGRAM(s) Oral at bedtime  enoxaparin Injectable 40 milliGRAM(s) SubCutaneous every 24 hours  escitalopram 10 milliGRAM(s) Oral daily  famotidine    Tablet 20 milliGRAM(s) Oral daily  ferrous    sulfate Liquid 300 milliGRAM(s) Enteral Tube daily  furosemide    Tablet 20 milliGRAM(s) Oral daily  lactobacillus acidophilus 1 Tablet(s) Oral every 12 hours  latanoprost 0.005% Ophthalmic Solution 1 Drop(s) Left EYE at bedtime  linaclotide 145 MICROGram(s) Oral before breakfast  melatonin 3 milliGRAM(s) Oral at bedtime  potassium chloride   Solution 20 milliEquivalent(s) Oral daily  senna 2 Tablet(s) Oral at bedtime  sodium chloride 0.45%. 1000 milliLiter(s) (50 mL/Hr) IV Continuous <Continuous>  vancomycin  IVPB      vancomycin  IVPB 1000 milliGRAM(s) IV Intermittent every 24 hours    MEDICATIONS  (PRN):  acetaminophen   Tablet .. 650 milliGRAM(s) Oral every 6 hours PRN Temp greater or equal to 38C (100.4F), Mild Pain (1 - 3), Moderate Pain (4 - 6)  bisacodyl Suppository 10 milliGRAM(s) Rectal daily PRN Constipation  oxyCODONE    5 mG/acetaminophen 325 mG 2 Tablet(s) Oral every 4 hours PRN Severe Pain (7 - 10)  polyethylene glycol 3350 17 Gram(s) Oral daily PRN Constipation    Vital Signs Last 24 Hrs  T(F): 97.7 (17 Sep 2018 04:53), Max: 98.4 (16 Sep 2018 16:12)  HR: 64 (17 Sep 2018 04:53) (60 - 64)  BP: 129/69 (17 Sep 2018 04:53) (117/54 - 129/69)  RR: 15 (17 Sep 2018 04:53) (15 - 15)  SpO2: 98% (17 Sep 2018 04:53) (94% - 99%)  I&O's Summary    16 Sep 2018 07:01  -  17 Sep 2018 07:00  --------------------------------------------------------  IN: 720 mL / OUT: 0 mL / NET: 720 mL      BMI (kg/m2): 31.7 (09-13-18 @ 05:19)  PHYSICAL EXAM:  General: NAD, A/O x 2, knows name and place  ENT: MMM, left facial swelling and tenderness still noted, but much less since last seen by me friday   Neck: Supple, No JVD  Lungs: Clear to auscultation bilaterally  Cardio: RRR, S1/S2, No murmurs  Abdomen: Soft, Nontender, Nondistended; Bowel sounds present  Extremities: No calf tenderness, No pitting edema    LABS:                        8.4    7.4   )-----------( 172      ( 16 Sep 2018 05:49 )             26.2       09-16    136  |  100  |  56  ----------------------------<  127  4.3   |  30  |  1.50    Ca    9.4      16 Sep 2018 05:49       eGFR if Non African American: 42 mL/min/1.73M2 (09-16-18 @ 05:49)  eGFR if : 48 mL/min/1.73M2 (09-16-18 @ 05:49)    CAPILLARY BLOOD GLUCOSE    07-10 [id number] 4.6    Culture - Blood (collected 12 Sep 2018 12:00)  Source: .Blood Blood-Peripheral  Preliminary Report (13 Sep 2018 15:01):    No growth to date.    Culture - Blood (collected 12 Sep 2018 12:00)  Source: .Blood Blood-Peripheral  Preliminary Report (13 Sep 2018 15:01):    No growth to date.    RADIOLOGY & ADDITIONAL TESTS:    Care Discussed with Consultants/Other Providers: YES

## 2018-09-17 NOTE — PROGRESS NOTE ADULT - SUBJECTIVE AND OBJECTIVE BOX
CC: f/u for parotitis    Patient reports pain all over    REVIEW OF SYSTEMS:  All other review of systems negative (Comprehensive ROS)    Antimicrobials Day #  :4  vancomycin  IVPB      vancomycin  IVPB 1000 milliGRAM(s) IV Intermittent every 24 hours    Other Medications Reviewed    T(F): 97.9 (09-17-18 @ 12:36), Max: 98.4 (09-16-18 @ 16:12)  HR: 60 (09-17-18 @ 15:34)  BP: 145/86 (09-17-18 @ 12:36)  RR: 16 (09-17-18 @ 12:36)  SpO2: 100% (09-17-18 @ 15:34)  Wt(kg): --    PHYSICAL EXAM:  General: alert, angry asking for pain meds,   Eyes:  anicteric, no conjunctival injection, no discharge  Oropharynx:left  parotid less swollen, less red, less indurated,   Neck: supple, without adenopathy  Lungs: course  to auscultation  Heart: regular rate and rhythm; no murmur, rubs or gallops  Abdomen: soft, nondistended, nontender, without mass or organomegaly  Skin: no lesions  Extremities: no clubbing, cyanosis, or edema  Neurologic: alert, oriented, moves all extremities    LAB RESULTS:                        8.4    7.4   )-----------( 172      ( 16 Sep 2018 05:49 )             26.2     09-16    136  |  100  |  56<H>  ----------------------------<  127<H>  4.3   |  30  |  1.50<H>    Ca    9.4      16 Sep 2018 05:49          MICROBIOLOGY:  RECENT CULTURES:      RADIOLOGY REVIEWED:    < from: CT Neck Soft Tissue No Cont (09.12.18 @ 13:51) >    IMPRESSION:    1. Limited examination secondary to exclusion of IV contrast.  2. Left-sided parotiditis without obstructing stone.  3. Unchanged sebaceous cyst versus seroma within theleft posterior   subcutaneous tissues of the dorsal neck.  4. Unchanged heterogeneous low-attenuation 3 cm left-sided thyroid nodule   for which neoplasm is not excluded. Recommend further evaluation with   ultrasound exam.      < end of copied text >            Assessment:  Patient with much improved left parotitis. Anticipate po abx in next 24 hours with minocycline  Plan:continue hydration, oral care, sialadents  continue vanco for now then tomorrow po minocycline 100mg po bid for 2 more weeks

## 2018-09-18 LAB
ANION GAP SERPL CALC-SCNC: 8 MMOL/L — SIGNIFICANT CHANGE UP (ref 5–17)
BUN SERPL-MCNC: 45 MG/DL — HIGH (ref 7–23)
CALCIUM SERPL-MCNC: 9.7 MG/DL — SIGNIFICANT CHANGE UP (ref 8.4–10.5)
CHLORIDE SERPL-SCNC: 101 MMOL/L — SIGNIFICANT CHANGE UP (ref 96–108)
CO2 SERPL-SCNC: 27 MMOL/L — SIGNIFICANT CHANGE UP (ref 22–31)
CREAT SERPL-MCNC: 1.34 MG/DL — HIGH (ref 0.5–1.3)
GLUCOSE BLDC GLUCOMTR-MCNC: 182 MG/DL — HIGH (ref 70–99)
GLUCOSE SERPL-MCNC: 106 MG/DL — HIGH (ref 70–99)
HCT VFR BLD CALC: 30.1 % — LOW (ref 39–50)
HGB BLD-MCNC: 9.1 G/DL — LOW (ref 13–17)
MCHC RBC-ENTMCNC: 26.8 PG — LOW (ref 27–34)
MCHC RBC-ENTMCNC: 30.3 GM/DL — LOW (ref 32–36)
MCV RBC AUTO: 88.4 FL — SIGNIFICANT CHANGE UP (ref 80–100)
PLATELET # BLD AUTO: 194 K/UL — SIGNIFICANT CHANGE UP (ref 150–400)
POTASSIUM SERPL-MCNC: 4.4 MMOL/L — SIGNIFICANT CHANGE UP (ref 3.5–5.3)
POTASSIUM SERPL-SCNC: 4.4 MMOL/L — SIGNIFICANT CHANGE UP (ref 3.5–5.3)
RBC # BLD: 3.4 M/UL — LOW (ref 4.2–5.8)
RBC # FLD: 15.4 % — HIGH (ref 10.3–14.5)
SODIUM SERPL-SCNC: 136 MMOL/L — SIGNIFICANT CHANGE UP (ref 135–145)
WBC # BLD: 6.4 K/UL — SIGNIFICANT CHANGE UP (ref 3.8–10.5)
WBC # FLD AUTO: 6.4 K/UL — SIGNIFICANT CHANGE UP (ref 3.8–10.5)

## 2018-09-18 PROCEDURE — 74230 X-RAY XM SWLNG FUNCJ C+: CPT | Mod: 26

## 2018-09-18 PROCEDURE — 99233 SBSQ HOSP IP/OBS HIGH 50: CPT

## 2018-09-18 RX ORDER — MINOCYCLINE HYDROCHLORIDE 45 MG/1
100 TABLET, EXTENDED RELEASE ORAL
Qty: 0 | Refills: 0 | Status: DISCONTINUED | OUTPATIENT
Start: 2018-09-18 | End: 2018-09-21

## 2018-09-18 RX ADMIN — Medication 300 MILLIGRAM(S): at 12:56

## 2018-09-18 RX ADMIN — Medication 3 MILLIGRAM(S): at 21:31

## 2018-09-18 RX ADMIN — Medication 4 MILLIGRAM(S): at 21:31

## 2018-09-18 RX ADMIN — FAMOTIDINE 20 MILLIGRAM(S): 10 INJECTION INTRAVENOUS at 12:56

## 2018-09-18 RX ADMIN — Medication 20 MILLIEQUIVALENT(S): at 12:56

## 2018-09-18 RX ADMIN — Medication 3 MILLILITER(S): at 21:14

## 2018-09-18 RX ADMIN — ATORVASTATIN CALCIUM 10 MILLIGRAM(S): 80 TABLET, FILM COATED ORAL at 21:31

## 2018-09-18 RX ADMIN — LATANOPROST 1 DROP(S): 0.05 SOLUTION/ DROPS OPHTHALMIC; TOPICAL at 21:31

## 2018-09-18 RX ADMIN — Medication 3 MILLILITER(S): at 09:23

## 2018-09-18 RX ADMIN — OXYCODONE AND ACETAMINOPHEN 2 TABLET(S): 5; 325 TABLET ORAL at 20:14

## 2018-09-18 RX ADMIN — Medication 1 TABLET(S): at 20:14

## 2018-09-18 RX ADMIN — ESCITALOPRAM OXALATE 10 MILLIGRAM(S): 10 TABLET, FILM COATED ORAL at 12:56

## 2018-09-18 RX ADMIN — OXYCODONE AND ACETAMINOPHEN 2 TABLET(S): 5; 325 TABLET ORAL at 21:15

## 2018-09-18 RX ADMIN — Medication 3 MILLILITER(S): at 15:43

## 2018-09-18 RX ADMIN — Medication 3 MILLILITER(S): at 04:11

## 2018-09-18 RX ADMIN — MINOCYCLINE HYDROCHLORIDE 100 MILLIGRAM(S): 45 TABLET, EXTENDED RELEASE ORAL at 21:36

## 2018-09-18 NOTE — PROGRESS NOTE ADULT - ASSESSMENT
86 y/o m with h/o HTN, HLD, PPM, CVA, dysphagia, +PEG, sent in from [facility] home with c/o left facial pain and swelling, +left parotitis. +thyroid nodule  Acute renal insuff, dehydration.

## 2018-09-18 NOTE — SWALLOW VFSS/MBS ASSESSMENT ADULT - COMMENTS
Patient has been NPO, and was scheduled for outpatient MBS.  Patient was tolerating trials of puree at transferring SNF.

## 2018-09-18 NOTE — PROGRESS NOTE ADULT - SUBJECTIVE AND OBJECTIVE BOX
Patient is a 85y old  Male who presents with a chief complaint of facial swelling/pain, left (17 Sep 2018 15:40)    Feels okay.  Denies chest pain, sob, nausea, vomiting.  Breathing okay.    Patient seen and examined at bedside.    ALLERGIES:  No Known Allergies    MEDICATIONS  (STANDING):  ALBUTerol/ipratropium for Nebulization 3 milliLiter(s) Nebulizer every 6 hours  atorvastatin 10 milliGRAM(s) Oral at bedtime  doxazosin 4 milliGRAM(s) Oral at bedtime  enoxaparin Injectable 40 milliGRAM(s) SubCutaneous every 24 hours  escitalopram 10 milliGRAM(s) Oral daily  famotidine    Tablet 20 milliGRAM(s) Oral daily  ferrous    sulfate Liquid 300 milliGRAM(s) Enteral Tube daily  furosemide    Tablet 20 milliGRAM(s) Oral daily  lactobacillus acidophilus 1 Tablet(s) Oral every 12 hours  latanoprost 0.005% Ophthalmic Solution 1 Drop(s) Left EYE at bedtime  linaclotide 145 MICROGram(s) Oral before breakfast  melatonin 3 milliGRAM(s) Oral at bedtime  potassium chloride   Solution 20 milliEquivalent(s) Oral daily  senna 2 Tablet(s) Oral at bedtime  sodium chloride 0.45%. 1000 milliLiter(s) (50 mL/Hr) IV Continuous <Continuous>  vancomycin  IVPB      vancomycin  IVPB 1000 milliGRAM(s) IV Intermittent every 24 hours    MEDICATIONS  (PRN):  acetaminophen   Tablet .. 650 milliGRAM(s) Oral every 6 hours PRN Temp greater or equal to 38C (100.4F), Mild Pain (1 - 3), Moderate Pain (4 - 6)  bisacodyl Suppository 10 milliGRAM(s) Rectal daily PRN Constipation  oxyCODONE    5 mG/acetaminophen 325 mG 2 Tablet(s) Oral every 4 hours PRN Severe Pain (7 - 10)  polyethylene glycol 3350 17 Gram(s) Oral daily PRN Constipation    Vital Signs Last 24 Hrs  T(F): 98 (18 Sep 2018 05:08), Max: 98 (18 Sep 2018 05:08)  HR: 58 (18 Sep 2018 05:08) (58 - 62)  BP: 142/74 (18 Sep 2018 05:08) (142/74 - 154/68)  RR: 14 (18 Sep 2018 05:08) (14 - 16)  SpO2: 93% (18 Sep 2018 05:08) (93% - 100%)  I&O's Summary    PHYSICAL EXAM:  General: NAD, A/O x 3  ENT: MMM  Neck: Supple, No JVD  Lungs: Clear to auscultation bilaterally  Cardio: RRR, S1/S2, No murmurs  Abdomen: Soft, Nontender, Nondistended; Bowel sounds present  Extremities: No calf tenderness, No pitting edema    LABS:                        8.4    7.4   )-----------( 172      ( 16 Sep 2018 05:49 )             26.2       09-16    136  |  100  |  56  ----------------------------<  127  4.3   |  30  |  1.50    Ca    9.4      16 Sep 2018 05:49     eGFR if Non African American: 42 mL/min/1.73M2 (09-16-18 @ 05:49)  eGFR if : 48 mL/min/1.73M2 (09-16-18 @ 05:49)    CAPILLARY BLOOD GLUCOSE    07-10 [id number] 4.6    RADIOLOGY & ADDITIONAL TESTS:    Care Discussed with Consultants/Other Providers:

## 2018-09-18 NOTE — PROGRESS NOTE ADULT - SUBJECTIVE AND OBJECTIVE BOX
CC: f/u for parotitis    Patient reports: improvement in parotitis    REVIEW OF SYSTEMS:  All other review of systems negative (Comprehensive ROS)    Antimicrobials Day #  :day 5  minocycline 100 milliGRAM(s) Oral two times a day  vancomycin  IVPB      vancomycin  IVPB 1000 milliGRAM(s) IV Intermittent every 24 hours    Other Medications Reviewed    T(F): 97.9 (09-18-18 @ 08:11), Max: 98 (09-18-18 @ 05:08)  HR: 60 (09-18-18 @ 08:11)  BP: 140/70 (09-18-18 @ 08:11)  RR: 14 (09-18-18 @ 08:11)  SpO2: 98% (09-18-18 @ 09:23)  Wt(kg): --    PHYSICAL EXAM:  General: alert, no acute distress. left parotid swelling decreased,minimal induration  Eyes:  anicteric, no conjunctival injection, no discharge  Oropharynx: no lesions or injection 	  Neck: supple, without adenopathy  Lungs: clear to auscultation  Heart: regular rate and rhythm; no murmur, rubs or gallops  Abdomen: soft, nondistended, nontender, without mass or organomegaly  Skin: no lesions  Extremities: no clubbing, cyanosis, or edema  Neurologic: alert, oriented, moves all extremities    LAB RESULTS:                        9.1    6.4   )-----------( 194      ( 18 Sep 2018 10:26 )             30.1               MICROBIOLOGY:  RECENT CULTURES:      RADIOLOGY REVIEWED:

## 2018-09-18 NOTE — SWALLOW VFSS/MBS ASSESSMENT ADULT - ORAL PHASE
Uncontrolled bolus / spillover in hypopharynx/Incomplete tongue to palate contact/Laryngeal penetration before swallow - silent Delayed oral transit time/Incomplete tongue to palate contact

## 2018-09-18 NOTE — SWALLOW VFSS/MBS ASSESSMENT ADULT - DIAGNOSTIC IMPRESSIONS
Patient seen awake, alert and orientated to situation, in radiology sitting upright in bed, viewed in lateral plane. Noted edentulous nature upon visualization. Trials of thin liquids, puree, and mechanical soft were mixed with barium.      Patient presented with mild oropharyngeal dysphagia.  Oral stage marked by reduced oral prep of mechanical soft solids, attempted but voluntarily removed due to difficulty with mastication.  Oral stage functional for puree.  Oral phase deficits include reduced lingual control, bolus rocking with premature spillage on thins.  Spillage and delayed pharyngeal trigger, initiated at the level of the pyriforms, results in inconsistent penetration over the laryngeal surface of the epiglottis with retrieval.  No aspiration across consistencies visualized.  Additional pharyngeal phase deficits include moderately decreased base of tongue retraction, moderately reduced hyolaryngeal elevation and excursion, mild-moderately reduced pharyngeal contraction, and min backflow of bolus all contributing to mild-moderate amount of residue in the valleculae and pyriforms post swallow.  Inconsistent, independent second swallow response significantly reduces amount of residue.  Residue is completely cleared with liquid wash.       Recommend:   1.	Puree/thin liquid diet consistency  2.	Aspiration precautions  3.	Upright position during meals, small bites and sips; alternate solids and liquids  4.	Reconsult SLP PRN, and if patient has dentures available

## 2018-09-18 NOTE — SWALLOW VFSS/MBS ASSESSMENT ADULT - NS SWALLOW VFSS REC ASPIR MON
change of breathing pattern/position upright (90Y)/gurgly voice/throat clearing/upper respiratory infection/pneumonia

## 2018-09-18 NOTE — PROGRESS NOTE ADULT - ASSESSMENT
86 yo male with left sided parotitis responding to present measures.  History of PPM and Peg.History of CKD  This is typically a Staph aureus infection.  Continue warm compresses, lemon swabs, and now minocycline susbsituted for vanco  Keep hydrated  Blood cultures are negative  Continue mcn, day 5/14

## 2018-09-19 ENCOUNTER — TRANSCRIPTION ENCOUNTER (OUTPATIENT)
Age: 83
End: 2018-09-19

## 2018-09-19 LAB
ANION GAP SERPL CALC-SCNC: 9 MMOL/L — SIGNIFICANT CHANGE UP (ref 5–17)
BUN SERPL-MCNC: 39 MG/DL — HIGH (ref 7–23)
CALCIUM SERPL-MCNC: 9.5 MG/DL — SIGNIFICANT CHANGE UP (ref 8.4–10.5)
CHLORIDE SERPL-SCNC: 101 MMOL/L — SIGNIFICANT CHANGE UP (ref 96–108)
CO2 SERPL-SCNC: 25 MMOL/L — SIGNIFICANT CHANGE UP (ref 22–31)
CREAT SERPL-MCNC: 1.31 MG/DL — HIGH (ref 0.5–1.3)
GLUCOSE SERPL-MCNC: 82 MG/DL — SIGNIFICANT CHANGE UP (ref 70–99)
HCT VFR BLD CALC: 28.8 % — LOW (ref 39–50)
HGB BLD-MCNC: 8.9 G/DL — LOW (ref 13–17)
MCHC RBC-ENTMCNC: 27.2 PG — SIGNIFICANT CHANGE UP (ref 27–34)
MCHC RBC-ENTMCNC: 30.9 GM/DL — LOW (ref 32–36)
MCV RBC AUTO: 88 FL — SIGNIFICANT CHANGE UP (ref 80–100)
PLATELET # BLD AUTO: 199 K/UL — SIGNIFICANT CHANGE UP (ref 150–400)
POTASSIUM SERPL-MCNC: 4.6 MMOL/L — SIGNIFICANT CHANGE UP (ref 3.5–5.3)
POTASSIUM SERPL-SCNC: 4.6 MMOL/L — SIGNIFICANT CHANGE UP (ref 3.5–5.3)
RBC # BLD: 3.27 M/UL — LOW (ref 4.2–5.8)
RBC # FLD: 15.1 % — HIGH (ref 10.3–14.5)
SODIUM SERPL-SCNC: 135 MMOL/L — SIGNIFICANT CHANGE UP (ref 135–145)
WBC # BLD: 6.9 K/UL — SIGNIFICANT CHANGE UP (ref 3.8–10.5)
WBC # FLD AUTO: 6.9 K/UL — SIGNIFICANT CHANGE UP (ref 3.8–10.5)

## 2018-09-19 PROCEDURE — 99239 HOSP IP/OBS DSCHRG MGMT >30: CPT

## 2018-09-19 RX ORDER — MINOCYCLINE HYDROCHLORIDE 45 MG/1
1 TABLET, EXTENDED RELEASE ORAL
Qty: 0 | Refills: 0 | COMMUNITY
Start: 2018-09-19

## 2018-09-19 RX ORDER — FAMOTIDINE 10 MG/ML
1 INJECTION INTRAVENOUS
Qty: 0 | Refills: 0 | COMMUNITY
Start: 2018-09-19

## 2018-09-19 RX ORDER — FERROUS SULFATE 325(65) MG
5 TABLET ORAL
Qty: 0 | Refills: 0 | COMMUNITY
Start: 2018-09-19

## 2018-09-19 RX ORDER — ESCITALOPRAM OXALATE 10 MG/1
1 TABLET, FILM COATED ORAL
Qty: 0 | Refills: 0 | COMMUNITY
Start: 2018-09-19

## 2018-09-19 RX ORDER — LACTOBACILLUS ACIDOPHILUS 100MM CELL
1 CAPSULE ORAL
Qty: 0 | Refills: 0 | COMMUNITY
Start: 2018-09-19

## 2018-09-19 RX ADMIN — FAMOTIDINE 20 MILLIGRAM(S): 10 INJECTION INTRAVENOUS at 12:21

## 2018-09-19 RX ADMIN — ESCITALOPRAM OXALATE 10 MILLIGRAM(S): 10 TABLET, FILM COATED ORAL at 12:21

## 2018-09-19 RX ADMIN — SENNA PLUS 2 TABLET(S): 8.6 TABLET ORAL at 21:05

## 2018-09-19 RX ADMIN — OXYCODONE AND ACETAMINOPHEN 2 TABLET(S): 5; 325 TABLET ORAL at 07:18

## 2018-09-19 RX ADMIN — LATANOPROST 1 DROP(S): 0.05 SOLUTION/ DROPS OPHTHALMIC; TOPICAL at 21:05

## 2018-09-19 RX ADMIN — MINOCYCLINE HYDROCHLORIDE 100 MILLIGRAM(S): 45 TABLET, EXTENDED RELEASE ORAL at 05:31

## 2018-09-19 RX ADMIN — ENOXAPARIN SODIUM 40 MILLIGRAM(S): 100 INJECTION SUBCUTANEOUS at 05:31

## 2018-09-19 RX ADMIN — MINOCYCLINE HYDROCHLORIDE 100 MILLIGRAM(S): 45 TABLET, EXTENDED RELEASE ORAL at 17:55

## 2018-09-19 RX ADMIN — LINACLOTIDE 145 MICROGRAM(S): 145 CAPSULE, GELATIN COATED ORAL at 07:40

## 2018-09-19 RX ADMIN — Medication 300 MILLIGRAM(S): at 12:21

## 2018-09-19 RX ADMIN — Medication 3 MILLIGRAM(S): at 21:05

## 2018-09-19 RX ADMIN — Medication 3 MILLILITER(S): at 16:07

## 2018-09-19 RX ADMIN — Medication 20 MILLIGRAM(S): at 05:31

## 2018-09-19 RX ADMIN — Medication 3 MILLILITER(S): at 09:32

## 2018-09-19 RX ADMIN — ATORVASTATIN CALCIUM 10 MILLIGRAM(S): 80 TABLET, FILM COATED ORAL at 21:05

## 2018-09-19 RX ADMIN — Medication 1 TABLET(S): at 05:31

## 2018-09-19 RX ADMIN — Medication 1 TABLET(S): at 17:55

## 2018-09-19 RX ADMIN — Medication 3 MILLILITER(S): at 21:13

## 2018-09-19 RX ADMIN — Medication 20 MILLIEQUIVALENT(S): at 12:21

## 2018-09-19 RX ADMIN — Medication 4 MILLIGRAM(S): at 21:05

## 2018-09-19 RX ADMIN — OXYCODONE AND ACETAMINOPHEN 2 TABLET(S): 5; 325 TABLET ORAL at 06:26

## 2018-09-19 NOTE — DISCHARGE NOTE ADULT - ADDITIONAL INSTRUCTIONS
Follow up with Dr. Beck at [facility].  Follow up with Infectious disease as needed.    CT Neck: < from: CT Neck Soft Tissue No Cont (09.12.18 @ 13:51) >Showed Unchanged heterogeneous low-attenuation 3 cm left-sided thyroid nodule for which neoplasm is not excluded. Recommend further evaluation with ultrasound exam. Please follow up with your Primary care physician for further evaluation need.

## 2018-09-19 NOTE — PROGRESS NOTE ADULT - ASSESSMENT
84 yo male with left sided parotitis responding to present measures.  History of PPM and Peg.History of CKD  This is typically a Staph aureus infection.  Continue warm compresses, lemon swabs, and now minocycline susbsituted for vanco  Keep hydrated.  CT without a stone  Blood cultures are negative  Continue mcn, day 6/14  No ID objection to discharge planning

## 2018-09-19 NOTE — DISCHARGE NOTE ADULT - CARE PROVIDER_API CALL
Silvio Velazco), Infectious Disease; Internal Medicine  [address] [place] [address]  Phone: [phone]  Fax: [phone]

## 2018-09-19 NOTE — DISCHARGE NOTE ADULT - OTHER SIGNIFICANT FINDINGS
< from: CT Neck Soft Tissue No Cont (09.12.18 @ 13:51) >  EXAM:  CT NECK SOFT TISSUE      PROCEDURE DATE:  09/12/2018        INTERPRETATION:  .    CLINICAL INFORMATION: Left-sided facial swelling. Parotitis vs abscess    TECHNIQUE: Axial sections were obtained from the skull base to the   sternum without the administration of IV contrast. Sagittal and Coronal   reformations were obtained from the source data.     COMPARISON: None available.    FINDINGS: Examination is limited secondary to exclusion of IV contrast.    The left parotid gland appears larger compared to the right. There is   adjacent periparotid fat stranding and skin thickening. There is   associated thickening of the left platysma muscle. No obstructing stone   is notable within the course of the left parotid duct. Surrounding   inflammatory changes tract through the left fascial planes of the neck.     The unenhanced appearance to the right parotid gland and bilateral   submandibular glands appear unremarkable.     There is an unchanged heterogeneous low-attenuation left-sided thyroid   nodule which measures up to 3 cm and demonstrates rim calcification and   retrosternal extension.    The unenhanced appearance to the nasopharynx, hypopharynx, larynx, and   trachea appear unremarkable.    A few borderline enlarged left-sided cervical lymph nodes are noted.    There is skin thickening involving the posterior soft tissues of the   neck. Immediately deep to this area of skin thickening is a   low-attenuation collection which measures 1.4 x 2.1 cm. This appears   unchanged when compared to the prior exam.    No acute osseous abnormalities are imaged. Multilevel cervical   spondylosis is notable. There is a cardiac pacemaker overlying the left   chest wall.    The imaged portions of the lungs are clear.    Limited imagesthrough the intracranial structures. No acute   abnormalities.    IMPRESSION:    1. Limited examination secondary to exclusion of IV contrast.  2. Left-sided parotiditis without obstructing stone.  3. Unchanged sebaceous cyst versus seroma within theleft posterior   subcutaneous tissues of the dorsal neck.  4. Unchanged heterogeneous low-attenuation 3 cm left-sided thyroid nodule   for which neoplasm is not excluded. Recommend further evaluation with   ultrasound exam.    < end of copied text >

## 2018-09-19 NOTE — PROGRESS NOTE ADULT - SUBJECTIVE AND OBJECTIVE BOX
Patient is a 85y old  Male who presents with a chief complaint of facial swelling/pain, left (18 Sep 2018 11:13)      Patient seen and examined at bedside.  S: Pain controlled. Tolerating modified diet. No new complaints.   ALLERGIES:  No Known Allergies    MEDICATIONS:  acetaminophen   Tablet .. 650 milliGRAM(s) Oral every 6 hours PRN  ALBUTerol/ipratropium for Nebulization 3 milliLiter(s) Nebulizer every 6 hours  atorvastatin 10 milliGRAM(s) Oral at bedtime  bisacodyl Suppository 10 milliGRAM(s) Rectal daily PRN  doxazosin 4 milliGRAM(s) Oral at bedtime  enoxaparin Injectable 40 milliGRAM(s) SubCutaneous every 24 hours  escitalopram 10 milliGRAM(s) Oral daily  famotidine    Tablet 20 milliGRAM(s) Oral daily  ferrous    sulfate Liquid 300 milliGRAM(s) Enteral Tube daily  furosemide    Tablet 20 milliGRAM(s) Oral daily  lactobacillus acidophilus 1 Tablet(s) Oral every 12 hours  latanoprost 0.005% Ophthalmic Solution 1 Drop(s) Left EYE at bedtime  linaclotide 145 MICROGram(s) Oral before breakfast  melatonin 3 milliGRAM(s) Oral at bedtime  minocycline 100 milliGRAM(s) Oral two times a day  oxyCODONE    5 mG/acetaminophen 325 mG 2 Tablet(s) Oral every 4 hours PRN  polyethylene glycol 3350 17 Gram(s) Oral daily PRN  potassium chloride   Solution 20 milliEquivalent(s) Oral daily  senna 2 Tablet(s) Oral at bedtime  sodium chloride 0.45%. 1000 milliLiter(s) IV Continuous <Continuous>    Vital Signs Last 24 Hrs  T(F): 98.2 (19 Sep 2018 06:12), Max: 98.2 (19 Sep 2018 06:12)  HR: 59 (19 Sep 2018 06:12) (58 - 66)  BP: 136/74 (19 Sep 2018 06:12) (136/74 - 148/90)  RR: 15 (19 Sep 2018 06:12) (14 - 15)  SpO2: 95% (19 Sep 2018 06:12) (95% - 98%)  I&O's Summary    18 Sep 2018 07:01  -  19 Sep 2018 07:00  --------------------------------------------------------  IN: 60 mL / OUT: 0 mL / NET: 60 mL      PHYSICAL EXAM:  General: NAD, A/O x 3  ENT: MMM  Neck:   Lungs: Clear to auscultation bilaterally (Anteriorly)   Cardio: RR, S1/S2, No murmurs  Abdomen: Soft, NT/ND, Normal active Bowel Sounds   Extremities: No cyanosis, No edema    LABS:                        8.9    6.9   )-----------( 199      ( 19 Sep 2018 05:35 )             28.8     09-19    135  |  101  |  39  ----------------------------<  82  4.6   |  25  |  1.31    Ca    9.5      19 Sep 2018 05:35      eGFR if Non African American: 49 mL/min/1.73M2 (09-19-18 @ 05:35)  eGFR if African American: 57 mL/min/1.73M2 (09-19-18 @ 05:35)      07-10 [id number] 4.6      RADIOLOGY & ADDITIONAL TESTS:    < from: Xray Modified Barium Swallow (09.18.18 @ 13:58) >  Thin liquids, nectar, puree, and solid materials were utilized.    With thin liquids there was penetration with retrieval.    With the thicker materials there was slight hang up in the piriforms and   valleculae which over time cleared with secondary swallowing or liquid   wash down.      Care Discussed with Consultants/Other Providers: Dr. Jacobs. Patient is a 85y old  Male who presents with a chief complaint of facial swelling/pain, left (18 Sep 2018 11:13)      Patient seen and examined at bedside.  S: Pain controlled. Tolerating modified diet. No new complaints.   ALLERGIES:  No Known Allergies    MEDICATIONS:  acetaminophen   Tablet .. 650 milliGRAM(s) Oral every 6 hours PRN  ALBUTerol/ipratropium for Nebulization 3 milliLiter(s) Nebulizer every 6 hours  atorvastatin 10 milliGRAM(s) Oral at bedtime  bisacodyl Suppository 10 milliGRAM(s) Rectal daily PRN  doxazosin 4 milliGRAM(s) Oral at bedtime  enoxaparin Injectable 40 milliGRAM(s) SubCutaneous every 24 hours  escitalopram 10 milliGRAM(s) Oral daily  famotidine    Tablet 20 milliGRAM(s) Oral daily  ferrous    sulfate Liquid 300 milliGRAM(s) Enteral Tube daily  furosemide    Tablet 20 milliGRAM(s) Oral daily  lactobacillus acidophilus 1 Tablet(s) Oral every 12 hours  latanoprost 0.005% Ophthalmic Solution 1 Drop(s) Left EYE at bedtime  linaclotide 145 MICROGram(s) Oral before breakfast  melatonin 3 milliGRAM(s) Oral at bedtime  minocycline 100 milliGRAM(s) Oral two times a day  oxyCODONE    5 mG/acetaminophen 325 mG 2 Tablet(s) Oral every 4 hours PRN  polyethylene glycol 3350 17 Gram(s) Oral daily PRN  potassium chloride   Solution 20 milliEquivalent(s) Oral daily  senna 2 Tablet(s) Oral at bedtime  sodium chloride 0.45%. 1000 milliLiter(s) IV Continuous <Continuous>    Vital Signs Last 24 Hrs  T(F): 98.2 (19 Sep 2018 06:12), Max: 98.2 (19 Sep 2018 06:12)  HR: 59 (19 Sep 2018 06:12) (58 - 66)  BP: 136/74 (19 Sep 2018 06:12) (136/74 - 148/90)  RR: 15 (19 Sep 2018 06:12) (14 - 15)  SpO2: 95% (19 Sep 2018 06:12) (95% - 98%)  I&O's Summary    18 Sep 2018 07:01  -  19 Sep 2018 07:00  --------------------------------------------------------  IN: 60 mL / OUT: 0 mL / NET: 60 mL      PHYSICAL EXAM:  General: NAD, A/O x 2  ENT: MMM  Neck: L Neck swelling   Lungs: Clear to auscultation bilaterally (Anteriorly)   Cardio: RR, S1/S2  Abdomen: Soft, NT/ND, Normal active Bowel Sounds   Extremities: GARRETT, No edema    LABS:                        8.9    6.9   )-----------( 199      ( 19 Sep 2018 05:35 )             28.8     09-19    135  |  101  |  39  ----------------------------<  82  4.6   |  25  |  1.31    Ca    9.5      19 Sep 2018 05:35      eGFR if Non African American: 49 mL/min/1.73M2 (09-19-18 @ 05:35)  eGFR if African American: 57 mL/min/1.73M2 (09-19-18 @ 05:35)      07-10 [id number] 4.6      RADIOLOGY & ADDITIONAL TESTS:    < from: Xray Modified Barium Swallow (09.18.18 @ 13:58) >  Thin liquids, nectar, puree, and solid materials were utilized.    With thin liquids there was penetration with retrieval.    With the thicker materials there was slight hang up in the piriforms and   valleculae which over time cleared with secondary swallowing or liquid   wash down.      Care Discussed with Consultants/Other Providers: Dr. Jacobs.

## 2018-09-19 NOTE — DISCHARGE NOTE ADULT - CARE PLAN
Principal Discharge DX:	Parotitis  Goal:	Infection treatment  Assessment and plan of treatment:	Continue Minocycline for total 14 day course  Secondary Diagnosis:	Anemia due to chronic kidney disease  Goal:	Stable H/H  Assessment and plan of treatment:	Stable, Cont Iron Supplement  Secondary Diagnosis:	Dyslipidemia  Goal:	Disease Management  Assessment and plan of treatment:	Cont home Statin  Secondary Diagnosis:	Dysphagia, unspecified type  Goal:	Safe oral intake  Assessment and plan of treatment:	s/p MBS now with modified diet  Secondary Diagnosis:	GERD (gastroesophageal reflux disease)  Goal:	Disease Management  Assessment and plan of treatment:	Cont Famotidine  Secondary Diagnosis:	Hyponatremia  Goal:	Normal Sodium level  Assessment and plan of treatment:	Now resolved  Secondary Diagnosis:	Stage 3 chronic kidney disease  Goal:	Stable renal fnx  Assessment and plan of treatment:	MELISSA on CKD, now improved to baseline

## 2018-09-19 NOTE — CHART NOTE - NSCHARTNOTEFT_GEN_A_CORE
Source: Patient     Diet : Puree/thin liquids.  Previously on PEG feeds    Patient reports fair appetite.  Does not like puree consistency. Tolerating current diet consistency.  Requesting falcon, eggs and Lao toast but NOT pureed.  Diet consistency/safety explained to patient.    PO intake:  25% of breakfast today    Source for PO intake Patient/Staff     Enteral /Parenteral Nutrition: Previously on PEG feeds. Nepro @60cc/hr    Current Weight: 103 kg (9/19) Stable from previous follow up.  Remains >100% IBW    Pertinent Medications: (Active):  Miralax, Pepcid, Lasix, Percocet, Lipitor, Ferrous Sulfate  Dulcolax, Lactobacillus Acidophilus, Potassium Chloride, Senna     Pertinent Labs: 9/18- BUN 39 <H> Creatinine 1.31<H>  Trending down    Skin: Intact    Estimated Needs:   No change since previous assessment    Previous Nutrition Diagnosis: Functional/swallowing difficulty    Nutrition Diagnosis is ongoing    New Nutrition Diagnosis:    Inadequate oral intake related to diet consistency as evidenced by patient observed with 25% p.o. intake/reports dissatisfaction with puree consistency    Interventions: Explained diet consistency rationale/safety to patient, encouraged p.o. intake, offered alternative menu items (patient declined)    Recommend:    Defer diet consistency changes to Speech Pathologist.  Patient followed by Speech Pathology.  Swallow eval/Chart note reviewed.     Monitoring and Evaluation:     [ x] PO intake [x ] Tolerance to diet prescription [x ] weights [x ] follow up per protocol    If p.o. intake does not improve, consider p.o. supplement if patient agreeable.

## 2018-09-19 NOTE — GOALS OF CARE CONVERSATION - PERSONAL ADVANCE DIRECTIVE - CONVERSATION DETAILS
Met with patient at bedside, he is A&Ox3.  Pt. asked about goals of care and Advanced Directives.  He stated that it was an interesting question, and that "I may be 85 but I'm not dead yet".... and that a study several years ago of asking older people if they wanted CPR, most did want CPR. No further discussion warranted at this time.

## 2018-09-19 NOTE — DISCHARGE NOTE ADULT - HOSPITAL COURSE
86yo male w. PMH HTN, HLD, PPM, CVA, Dysphagia, +PEG, sent in from [facility] with c/o left facial pain and swelling. Admitted w. +Left Parotitis, ID Consulted. Transitioned from Vanco to Minocycline. +Thyroid nodule noted on CT. MELISSA on CKD in s/o dehydration- improved to baseline.

## 2018-09-19 NOTE — DISCHARGE NOTE ADULT - PLAN OF CARE
Infection treatment Continue Minocycline for total 14 day course Stable H/H Stable, Cont Iron Supplement Disease Management Cont home Statin Safe oral intake s/p MBS now with modified diet Cont Famotidine Normal Sodium level Now resolved Stable renal fnx MELISSA on CKD, now improved to baseline

## 2018-09-19 NOTE — PROGRESS NOTE ADULT - SUBJECTIVE AND OBJECTIVE BOX
CC: f/u for left parotid swelling    Patient reports: improvement in left facial pain.He wants peg removed    REVIEW OF SYSTEMS:  All other review of systems negative (Comprehensive ROS)    Antimicrobials Day #  :6/14  minocycline 100 milliGRAM(s) Oral two times a day    Other Medications Reviewed    T(F): 98.2 (09-19-18 @ 06:12), Max: 98.2 (09-19-18 @ 06:12)  HR: 59 (09-19-18 @ 09:33)  BP: 136/74 (09-19-18 @ 06:12)  RR: 15 (09-19-18 @ 06:12)  SpO2: 96% (09-19-18 @ 09:33)  Wt(kg): --    PHYSICAL EXAM:  General: alert, no acute distress  Eyes:  anicteric, no conjunctival injection, no discharge  Oropharynx: no lesions or injection 	  Neck: supple, without adenopathy  Lungs: clear to auscultation  Heart: regular rate and rhythm; no murmur, rubs or gallops  Abdomen: soft, nondistended, nontender, without mass or organomegaly  Skin: no lesions  Extremities: no clubbing, cyanosis, or edema  Neurologic: alert, oriented, moves all extremities  left parotid induration decreased  LAB RESULTS:                        8.9    6.9   )-----------( 199      ( 19 Sep 2018 05:35 )             28.8     09-19    135  |  101  |  39<H>  ----------------------------<  82  4.6   |  25  |  1.31<H>    Ca    9.5      19 Sep 2018 05:35          MICROBIOLOGY:  RECENT CULTURES:      RADIOLOGY REVIEWED:

## 2018-09-19 NOTE — DISCHARGE NOTE ADULT - SECONDARY DIAGNOSIS.
Anemia due to chronic kidney disease Dyslipidemia Dysphagia, unspecified type GERD (gastroesophageal reflux disease) Hyponatremia Stage 3 chronic kidney disease

## 2018-09-20 PROCEDURE — 99233 SBSQ HOSP IP/OBS HIGH 50: CPT

## 2018-09-20 RX ADMIN — Medication 300 MILLIGRAM(S): at 11:55

## 2018-09-20 RX ADMIN — Medication 3 MILLIGRAM(S): at 21:47

## 2018-09-20 RX ADMIN — LINACLOTIDE 145 MICROGRAM(S): 145 CAPSULE, GELATIN COATED ORAL at 06:08

## 2018-09-20 RX ADMIN — ATORVASTATIN CALCIUM 10 MILLIGRAM(S): 80 TABLET, FILM COATED ORAL at 21:47

## 2018-09-20 RX ADMIN — Medication 4 MILLIGRAM(S): at 21:47

## 2018-09-20 RX ADMIN — Medication 3 MILLILITER(S): at 04:50

## 2018-09-20 RX ADMIN — Medication 20 MILLIGRAM(S): at 06:08

## 2018-09-20 RX ADMIN — ESCITALOPRAM OXALATE 10 MILLIGRAM(S): 10 TABLET, FILM COATED ORAL at 11:55

## 2018-09-20 RX ADMIN — Medication 1 TABLET(S): at 18:10

## 2018-09-20 RX ADMIN — LATANOPROST 1 DROP(S): 0.05 SOLUTION/ DROPS OPHTHALMIC; TOPICAL at 21:47

## 2018-09-20 RX ADMIN — MINOCYCLINE HYDROCHLORIDE 100 MILLIGRAM(S): 45 TABLET, EXTENDED RELEASE ORAL at 18:10

## 2018-09-20 RX ADMIN — SENNA PLUS 2 TABLET(S): 8.6 TABLET ORAL at 21:47

## 2018-09-20 RX ADMIN — FAMOTIDINE 20 MILLIGRAM(S): 10 INJECTION INTRAVENOUS at 11:55

## 2018-09-20 RX ADMIN — MINOCYCLINE HYDROCHLORIDE 100 MILLIGRAM(S): 45 TABLET, EXTENDED RELEASE ORAL at 06:08

## 2018-09-20 RX ADMIN — Medication 3 MILLILITER(S): at 21:26

## 2018-09-20 RX ADMIN — Medication 3 MILLILITER(S): at 16:17

## 2018-09-20 RX ADMIN — Medication 20 MILLIEQUIVALENT(S): at 11:55

## 2018-09-20 RX ADMIN — Medication 3 MILLILITER(S): at 10:06

## 2018-09-20 RX ADMIN — Medication 1 TABLET(S): at 06:08

## 2018-09-20 RX ADMIN — ENOXAPARIN SODIUM 40 MILLIGRAM(S): 100 INJECTION SUBCUTANEOUS at 06:09

## 2018-09-20 NOTE — PROGRESS NOTE ADULT - SUBJECTIVE AND OBJECTIVE BOX
CC: f/u for parotitis    Patient reports he needs his nails clipped and his face is doing fine    REVIEW OF SYSTEMS:  All other review of systems negative (Comprehensive ROS)    Antimicrobials Day #  :7/14  minocycline 100 milliGRAM(s) Oral two times a day    Other Medications Reviewed    T(F): 98 (09-20-18 @ 15:56), Max: 98.7 (09-19-18 @ 20:59)  HR: 60 (09-20-18 @ 15:56)  BP: 147/77 (09-20-18 @ 15:56)  RR: 14 (09-20-18 @ 15:56)  SpO2: 98% (09-20-18 @ 16:17)  Wt(kg): --    PHYSICAL EXAM:  General: alert, no acute distress  Eyes:  anicteric, no conjunctival injection, no discharge  Oropharynx: no lesions or injection . parotid on the left is much smaller and redness near gone	  Neck: supple, without adenopathy  Lungs: clear to auscultation  Heart: regular rate and rhythm; no murmur, rubs or gallops  Abdomen: soft, nondistended, nontender, without mass or organomegaly  Skin: no lesions  Extremities: no clubbing, cyanosis, or edema  Neurologic: alert, , moves all extremities    LAB RESULTS:                        8.9    6.9   )-----------( 199      ( 19 Sep 2018 05:35 )             28.8     09-19    135  |  101  |  39<H>  ----------------------------<  82  4.6   |  25  |  1.31<H>    Ca    9.5      19 Sep 2018 05:35          MICROBIOLOGY:  RECENT CULTURES:    Culture - Blood (09.12.18 @ 12:00)    Specimen Source: .Blood Blood-Peripheral    Culture Results:   No growth at 5 days.      RADIOLOGY REVIEWED:  < from: CT Neck Soft Tissue No Cont (09.12.18 @ 13:51) >  IMPRESSION:    1. Limited examination secondary to exclusion of IV contrast.  2. Left-sided parotiditis without obstructing stone.  3. Unchanged sebaceous cyst versus seroma within theleft posterior   subcutaneous tissues of the dorsal neck.  4. Unchanged heterogeneous low-attenuation 3 cm left-sided thyroid nodule   for which neoplasm is not excluded. Recommend further evaluation with   ultrasound exam.      < end of copied text >              Assessment:  Patient with dysphagia, admitted with left parotitis much improved on antibiotics  Plan:complete another week of minocycline  hydrate, keep mouth moist

## 2018-09-20 NOTE — PROGRESS NOTE ADULT - SUBJECTIVE AND OBJECTIVE BOX
CC: Patient is a 85y old  Male who presents with a chief complaint of facial swelling/pain, left (19 Sep 2018 11:39)      S: No f/c/n/v/pain, ready to leave    Patient seen and examined at bedside.    ALLERGIES:  No Known Allergies      MEDICATIONS:  acetaminophen   Tablet .. 650 milliGRAM(s) Oral every 6 hours PRN  ALBUTerol/ipratropium for Nebulization 3 milliLiter(s) Nebulizer every 6 hours  atorvastatin 10 milliGRAM(s) Oral at bedtime  bisacodyl Suppository 10 milliGRAM(s) Rectal daily PRN  doxazosin 4 milliGRAM(s) Oral at bedtime  enoxaparin Injectable 40 milliGRAM(s) SubCutaneous every 24 hours  escitalopram 10 milliGRAM(s) Oral daily  famotidine    Tablet 20 milliGRAM(s) Oral daily  ferrous    sulfate Liquid 300 milliGRAM(s) Enteral Tube daily  furosemide    Tablet 20 milliGRAM(s) Oral daily  lactobacillus acidophilus 1 Tablet(s) Oral every 12 hours  latanoprost 0.005% Ophthalmic Solution 1 Drop(s) Left EYE at bedtime  linaclotide 145 MICROGram(s) Oral before breakfast  melatonin 3 milliGRAM(s) Oral at bedtime  minocycline 100 milliGRAM(s) Oral two times a day  polyethylene glycol 3350 17 Gram(s) Oral daily PRN  potassium chloride   Solution 20 milliEquivalent(s) Oral daily  senna 2 Tablet(s) Oral at bedtime  sodium chloride 0.45%. 1000 milliLiter(s) IV Continuous <Continuous>        Vital Signs Last 24 Hrs  T(F): 98 (20 Sep 2018 05:17), Max: 98.7 (19 Sep 2018 20:59)  HR: 60 (20 Sep 2018 05:17) (60 - 78)  BP: 136/67 (20 Sep 2018 05:17) (136/67 - 159/68)  RR: 14 (20 Sep 2018 05:17) (14 - 14)  SpO2: 98% (20 Sep 2018 10:06) (93% - 98%)  I&O's Summary    19 Sep 2018 07:01  -  20 Sep 2018 07:00  --------------------------------------------------------  IN: 360 mL / OUT: 0 mL / NET: 360 mL    20 Sep 2018 07:01  -  20 Sep 2018 11:06  --------------------------------------------------------  IN: 360 mL / OUT: 0 mL / NET: 360 mL        PHYSICAL EXAM:  General: NAD  ENT: MMM  Neck: Supple, No JVD  Lungs: Clear to auscultation bilaterally  Cardio: RRR, S1/S2, No murmurs  Abdomen: Soft, Nontender, Nondistended; Bowel sounds present  Extremities: No cyanosis, No edema  Neuro: no new deficits  Skin: no rashes  Psych: AAO    LABS:                        8.9    6.9   )-----------( 199      ( 19 Sep 2018 05:35 )             28.8     09-19    135  |  101  |  39  ----------------------------<  82  4.6   |  25  |  1.31    Ca    9.5      19 Sep 2018 05:35      eGFR if Non African American: 49 mL/min/1.73M2 (09-19-18 @ 05:35)  eGFR if African American: 57 mL/min/1.73M2 (09-19-18 @ 05:35)                    CAPILLARY BLOOD GLUCOSE        07-10 [id number] 4.6          RADIOLOGY & ADDITIONAL TESTS:    Care Discussed with Consultants/Other Providers:

## 2018-09-20 NOTE — PROGRESS NOTE ADULT - ASSESSMENT
84yo male w. PMH HTN, HLD, PPM, CVA, Dysphagia, +PEG, sent in from [facility] with c/o left facial pain and swelling admitted with left parotitis, on Minocycline now. Thyroid nodule noted on CT. MELISSA on CKD in s/o dehydration- improving.

## 2018-09-21 VITALS
DIASTOLIC BLOOD PRESSURE: 81 MMHG | RESPIRATION RATE: 14 BRPM | SYSTOLIC BLOOD PRESSURE: 157 MMHG | TEMPERATURE: 98 F | OXYGEN SATURATION: 97 % | HEART RATE: 55 BPM

## 2018-09-21 PROCEDURE — 99239 HOSP IP/OBS DSCHRG MGMT >30: CPT

## 2018-09-21 RX ADMIN — Medication 20 MILLIEQUIVALENT(S): at 12:36

## 2018-09-21 RX ADMIN — Medication 1 TABLET(S): at 18:05

## 2018-09-21 RX ADMIN — LINACLOTIDE 145 MICROGRAM(S): 145 CAPSULE, GELATIN COATED ORAL at 05:18

## 2018-09-21 RX ADMIN — ENOXAPARIN SODIUM 40 MILLIGRAM(S): 100 INJECTION SUBCUTANEOUS at 05:18

## 2018-09-21 RX ADMIN — MINOCYCLINE HYDROCHLORIDE 100 MILLIGRAM(S): 45 TABLET, EXTENDED RELEASE ORAL at 05:18

## 2018-09-21 RX ADMIN — Medication 300 MILLIGRAM(S): at 12:35

## 2018-09-21 RX ADMIN — ESCITALOPRAM OXALATE 10 MILLIGRAM(S): 10 TABLET, FILM COATED ORAL at 12:35

## 2018-09-21 RX ADMIN — Medication 3 MILLILITER(S): at 09:22

## 2018-09-21 RX ADMIN — Medication 1 TABLET(S): at 05:18

## 2018-09-21 RX ADMIN — MINOCYCLINE HYDROCHLORIDE 100 MILLIGRAM(S): 45 TABLET, EXTENDED RELEASE ORAL at 18:05

## 2018-09-21 RX ADMIN — Medication 20 MILLIGRAM(S): at 05:18

## 2018-09-21 RX ADMIN — FAMOTIDINE 20 MILLIGRAM(S): 10 INJECTION INTRAVENOUS at 12:35

## 2018-09-21 NOTE — PROGRESS NOTE ADULT - SUBJECTIVE AND OBJECTIVE BOX
Patient is a 85y old  Male who presents with a chief complaint of facial swelling/pain, left    Patient seen and examined at bedside. Sitting up in bed, reports feeling well, denies any pain or discomfort. Verbalized concerns on disposition with discharge plans.    ALLERGIES:  No Known Allergies    MEDICATIONS  (STANDING):  ALBUTerol/ipratropium for Nebulization 3 milliLiter(s) Nebulizer every 6 hours  atorvastatin 10 milliGRAM(s) Oral at bedtime  doxazosin 4 milliGRAM(s) Oral at bedtime  enoxaparin Injectable 40 milliGRAM(s) SubCutaneous every 24 hours  escitalopram 10 milliGRAM(s) Oral daily  famotidine    Tablet 20 milliGRAM(s) Oral daily  ferrous    sulfate Liquid 300 milliGRAM(s) Enteral Tube daily  furosemide    Tablet 20 milliGRAM(s) Oral daily  lactobacillus acidophilus 1 Tablet(s) Oral every 12 hours  latanoprost 0.005% Ophthalmic Solution 1 Drop(s) Left EYE at bedtime  linaclotide 145 MICROGram(s) Oral before breakfast  melatonin 3 milliGRAM(s) Oral at bedtime  minocycline 100 milliGRAM(s) Oral two times a day  potassium chloride   Solution 20 milliEquivalent(s) Oral daily  senna 2 Tablet(s) Oral at bedtime  sodium chloride 0.45%. 1000 milliLiter(s) (50 mL/Hr) IV Continuous <Continuous>    MEDICATIONS  (PRN):  acetaminophen   Tablet .. 650 milliGRAM(s) Oral every 6 hours PRN Temp greater or equal to 38C (100.4F), Mild Pain (1 - 3), Moderate Pain (4 - 6)  bisacodyl Suppository 10 milliGRAM(s) Rectal daily PRN Constipation  polyethylene glycol 3350 17 Gram(s) Oral daily PRN Constipation    Vital Signs Last 24 Hrs  T(F): 97.8 (21 Sep 2018 05:12), Max: 98.9 (20 Sep 2018 21:53)  HR: 61 (20 Sep 2018 21:53) (60 - 61)  BP: 144/69 (21 Sep 2018 05:12) (144/69 - 149/69)  RR: 14 (21 Sep 2018 05:12) (14 - 14)  SpO2: 96% (21 Sep 2018 05:12) (95% - 98%)  I&O's Summary    20 Sep 2018 07:01  -  21 Sep 2018 07:00  --------------------------------------------------------  IN: 1080 mL / OUT: 0 mL / NET: 1080 mL    PHYSICAL EXAM:  General: NAD, A/O x 3  Neck: Supple, No JVD  Lungs: Clear to auscultation bilaterally  Cardio: RRR, S1/S2, No murmurs  Abdomen: Soft, Nontender, Nondistended; Bowel sounds present  Extremities: No calf tenderness, No pitting edema    LABS:                        8.9    6.9   )-----------( 199      ( 19 Sep 2018 05:35 )             28.8       09-19    135  |  101  |  39  ----------------------------<  82  4.6   |  25  |  1.31    Ca    9.5      19 Sep 2018 05:35       eGFR if Non African American: 49 mL/min/1.73M2 (09-19-18 @ 05:35)  eGFR if African American: 57 mL/min/1.73M2 (09-19-18 @ 05:35)         07-10 [id number] 4.6      RADIOLOGY & ADDITIONAL TESTS:  < from: Xray Modified Barium Swallow (09.18.18 @ 13:58) >  With the thicker materials there was slight hang up in the piriforms and   valleculae which over time cleared with secondary swallowing or liquid   wash down.        Care Discussed with Consultants/Other Providers: Dr. MAXIMILIANO Patterson Patient is a 85y old  Male who presents with a chief complaint of facial swelling/pain, left    Patient seen and examined at bedside. Sitting up in bed, reports feeling well, denies any pain or discomfort. Verbalized concerns on disposition with discharge plans.    ALLERGIES:  No Known Allergies    MEDICATIONS  (STANDING):  ALBUTerol/ipratropium for Nebulization 3 milliLiter(s) Nebulizer every 6 hours  atorvastatin 10 milliGRAM(s) Oral at bedtime  doxazosin 4 milliGRAM(s) Oral at bedtime  enoxaparin Injectable 40 milliGRAM(s) SubCutaneous every 24 hours  escitalopram 10 milliGRAM(s) Oral daily  famotidine    Tablet 20 milliGRAM(s) Oral daily  ferrous    sulfate Liquid 300 milliGRAM(s) Enteral Tube daily  furosemide    Tablet 20 milliGRAM(s) Oral daily  lactobacillus acidophilus 1 Tablet(s) Oral every 12 hours  latanoprost 0.005% Ophthalmic Solution 1 Drop(s) Left EYE at bedtime  linaclotide 145 MICROGram(s) Oral before breakfast  melatonin 3 milliGRAM(s) Oral at bedtime  minocycline 100 milliGRAM(s) Oral two times a day  potassium chloride   Solution 20 milliEquivalent(s) Oral daily  senna 2 Tablet(s) Oral at bedtime  sodium chloride 0.45%. 1000 milliLiter(s) (50 mL/Hr) IV Continuous <Continuous>    MEDICATIONS  (PRN):  acetaminophen   Tablet .. 650 milliGRAM(s) Oral every 6 hours PRN Temp greater or equal to 38C (100.4F), Mild Pain (1 - 3), Moderate Pain (4 - 6)  bisacodyl Suppository 10 milliGRAM(s) Rectal daily PRN Constipation  polyethylene glycol 3350 17 Gram(s) Oral daily PRN Constipation    Vital Signs Last 24 Hrs  T(F): 97.8 (21 Sep 2018 05:12), Max: 98.9 (20 Sep 2018 21:53)  HR: 61 (20 Sep 2018 21:53) (60 - 61)  BP: 144/69 (21 Sep 2018 05:12) (144/69 - 149/69)  RR: 14 (21 Sep 2018 05:12) (14 - 14)  SpO2: 96% (21 Sep 2018 05:12) (95% - 98%)  I&O's Summary    20 Sep 2018 07:01  -  21 Sep 2018 07:00  --------------------------------------------------------  IN: 1080 mL / OUT: 0 mL / NET: 1080 mL    PHYSICAL EXAM:  General: NAD, A/O x 3  Neck: Supple, No JVD  Lungs: Clear to auscultation bilaterally  Cardio: RRR, S1/S2, No murmurs  Abdomen: Soft, Nontender, Nondistended; Bowel sounds present  Extremities: No calf tenderness, No pitting edema.   B/L lower extremities with atrophic vascular changes      LABS:                        8.9    6.9   )-----------( 199      ( 19 Sep 2018 05:35 )             28.8       09-19    135  |  101  |  39  ----------------------------<  82  4.6   |  25  |  1.31    Ca    9.5      19 Sep 2018 05:35       eGFR if Non African American: 49 mL/min/1.73M2 (09-19-18 @ 05:35)  eGFR if African American: 57 mL/min/1.73M2 (09-19-18 @ 05:35)         07-10 [id number] 4.6      RADIOLOGY & ADDITIONAL TESTS:  < from: Xray Modified Barium Swallow (09.18.18 @ 13:58) >  With the thicker materials there was slight hang up in the piriforms and   valleculae which over time cleared with secondary swallowing or liquid   wash down.        Care Discussed with Consultants/Other Providers: Dr. MAXIMILIANO Patterson Patient is a 85y old  Male who presents with a chief complaint of facial swelling/pain, left    Patient seen and examined at bedside. Sitting up in bed, reports feeling well, denies any pain or discomfort. Verbalized concerns on disposition with discharge plans.    ALLERGIES:  No Known Allergies    MEDICATIONS  (STANDING):  ALBUTerol/ipratropium for Nebulization 3 milliLiter(s) Nebulizer every 6 hours  atorvastatin 10 milliGRAM(s) Oral at bedtime  doxazosin 4 milliGRAM(s) Oral at bedtime  enoxaparin Injectable 40 milliGRAM(s) SubCutaneous every 24 hours  escitalopram 10 milliGRAM(s) Oral daily  famotidine    Tablet 20 milliGRAM(s) Oral daily  ferrous    sulfate Liquid 300 milliGRAM(s) Enteral Tube daily  furosemide    Tablet 20 milliGRAM(s) Oral daily  lactobacillus acidophilus 1 Tablet(s) Oral every 12 hours  latanoprost 0.005% Ophthalmic Solution 1 Drop(s) Left EYE at bedtime  linaclotide 145 MICROGram(s) Oral before breakfast  melatonin 3 milliGRAM(s) Oral at bedtime  minocycline 100 milliGRAM(s) Oral two times a day  potassium chloride   Solution 20 milliEquivalent(s) Oral daily  senna 2 Tablet(s) Oral at bedtime  sodium chloride 0.45%. 1000 milliLiter(s) (50 mL/Hr) IV Continuous <Continuous>    MEDICATIONS  (PRN):  acetaminophen   Tablet .. 650 milliGRAM(s) Oral every 6 hours PRN Temp greater or equal to 38C (100.4F), Mild Pain (1 - 3), Moderate Pain (4 - 6)  bisacodyl Suppository 10 milliGRAM(s) Rectal daily PRN Constipation  polyethylene glycol 3350 17 Gram(s) Oral daily PRN Constipation    Vital Signs Last 24 Hrs  T(F): 97.8 (21 Sep 2018 05:12), Max: 98.9 (20 Sep 2018 21:53)  HR: 61 (20 Sep 2018 21:53) (60 - 61)  BP: 144/69 (21 Sep 2018 05:12) (144/69 - 149/69)  RR: 14 (21 Sep 2018 05:12) (14 - 14)  SpO2: 96% (21 Sep 2018 05:12) (95% - 98%)  I&O's Summary    20 Sep 2018 07:01  -  21 Sep 2018 07:00  --------------------------------------------------------  IN: 1080 mL / OUT: 0 mL / NET: 1080 mL    PHYSICAL EXAM:  General: NAD, A/O x 3  Neck: Supple, No JVD  Lungs: Clear to auscultation bilaterally  Cardio: RRR, S1/S2, No murmurs  Abdomen: Soft, Nontender, Nondistended; Bowel sounds present  Extremities: No calf tenderness, No pitting edema.   B/L lower extremities with atrophic vascular changes  Neuro: no new deficits      LABS:                        8.9    6.9   )-----------( 199      ( 19 Sep 2018 05:35 )             28.8       09-19    135  |  101  |  39  ----------------------------<  82  4.6   |  25  |  1.31    Ca    9.5      19 Sep 2018 05:35       eGFR if Non African American: 49 mL/min/1.73M2 (09-19-18 @ 05:35)  eGFR if African American: 57 mL/min/1.73M2 (09-19-18 @ 05:35)         07-10 [id number] 4.6      RADIOLOGY & ADDITIONAL TESTS:  < from: Xray Modified Barium Swallow (09.18.18 @ 13:58) >  With the thicker materials there was slight hang up in the piriforms and   valleculae which over time cleared with secondary swallowing or liquid   wash down.        Care Discussed with Consultants/Other Providers: Dr. MAXIMILIANO Patterson

## 2018-09-21 NOTE — PROGRESS NOTE ADULT - PROBLEM SELECTOR PLAN 1
Appreciate ID recs   Now no Minocycline, Cont for 14 days couse. Today day #6.   Continue warm compresses, Lemon swabs.   concern for MRSA.
cont IV antibiotics
cont IV antibiotics - possible change to PO antibiotics today  will discuss with ID
Continue Minocycline, day 8/14  Continue warm compresses, lemon swabs  plan D/C today, pending placement
Now no Minocycline, total 14 day course. Today day #7.   Continue warm compresses, lemon swabs. Pt can be discharged today, awaiting placement.
cont IV antibiotics  improving slowly
cont IV antibiotics  improving slowly  ID consult appreciated, continue vancomycin
cont IV antibiotics  improving slowly  ID consult appreciated, continue vancomycin
cont IV antibiotics - possible change to PO antibiotics today  concern for MRSA.

## 2018-09-21 NOTE — PROGRESS NOTE ADULT - ASSESSMENT
84 yo male with left parotitis responding to Tx- no residual inflammatory changes  History of PPM, PEG, CKD  Continue warm compresses, lemon swabs, and minocycline (completed prior vanco)  CT without a stone  Blood cultures are negative  Minocycline day 8/14  No ID objection to discharge planning

## 2018-09-21 NOTE — PROGRESS NOTE ADULT - PROBLEM SELECTOR PLAN 3
Cont Famotidine
cont PPI
cont PPI
Cont Famotidine
Continue Famotidine
cont PPI

## 2018-09-21 NOTE — PROGRESS NOTE ADULT - PROBLEM SELECTOR PROBLEM 4
Stage 3 chronic kidney disease

## 2018-09-21 NOTE — PROGRESS NOTE ADULT - PROBLEM SELECTOR PLAN 8
s/p MBS now on Dysphagia 1 Pureed
MBS today
barium swallow today to evaluate for PO feeding
s/p MBS now on Dysphagia 1 Pureed
s/p MBS now on Dysphagia 1 Pureed

## 2018-09-21 NOTE — PROGRESS NOTE ADULT - PROBLEM SELECTOR PROBLEM 8
PEG (percutaneous endoscopic gastrostomy) status

## 2018-09-21 NOTE — PROGRESS NOTE ADULT - PROBLEM SELECTOR PLAN 7
s/p MBS recommended for Dysphagia 1 Pureed- tolerating
for Barium swallow today
for MBS
get speech consult for possible advancement of diet
s/p MBS recommended for Dysphagia 1 Pureed- tolerating
s/p MBS recommended for Dysphagia 1 Pureed- tolerating
speech consult appreciated, continue NPO for now with PEG feeds
speech consult appreciated, continue NPO for now with PEG feeds

## 2018-09-21 NOTE — PROGRESS NOTE ADULT - ASSESSMENT
86yo male w. PMH HTN, HLD, PPM, CVA, Dysphagia, +PEG, sent in from [facility] with c/o left facial pain and swelling admitted with left parotitis, being treated with po Minocycline via peg. Incidental Thyroid nodule noted on CT. MELISSA on CKD in s/o dehydration- improving. 86yo male w. PMH HTN, HLD, PPM, CVA, Dysphagia, +PEG, sent in from [facility] with c/o left facial pain and swelling admitted with left parotitis, being treated with po Minocycline. Incidental Thyroid nodule noted on CT. MELISSA on CKD in s/o dehydration- improving.

## 2018-09-21 NOTE — PROGRESS NOTE ADULT - SUBJECTIVE AND OBJECTIVE BOX
CC: f/u for left parotitis     Patient reports feeling well, pleased by improved L facial swelling    REVIEW OF SYSTEMS:  All other review of systems negative (Comprehensive ROS)    Antimicrobials Day # 8/14  minocycline 100 milliGRAM(s) Oral two times a day    Other Medications Reviewed    Vital Signs Last 24 Hrs  T(F): 97.8 (21 Sep 2018 05:12), Max: 98.9 (20 Sep 2018 21:53)  HR: 61 (20 Sep 2018 21:53) (60 - 61)  BP: 144/69 (21 Sep 2018 05:12) (144/69 - 149/69)  BP(mean): --  RR: 14 (21 Sep 2018 05:12) (14 - 14)  SpO2: 98% (21 Sep 2018 09:23) (95% - 98%)    PHYSICAL EXAM:  General: alert, no acute distress  Eyes:  anicteric, no conjunctival injection, no discharge  Oropharynx: no lesions or injection   L parotid: no edema or tenderness	  Neck: supple, without adenopathy  Lungs: clear to auscultation  Heart: regular rate and rhythm; no murmur, rubs or gallops  Abdomen: soft, nondistended, nontender, without mass or organomegaly  Skin: no lesions  Extremities: no edema  Neurologic: alert, oriented, moves all extremities    LAB RESULTS:                        8.9    6.9   )-----------( 199      ( 19 Sep 2018 05:35 )             28.8     09-19    135  |  101  |  39<H>  ----------------------------<  82  4.6   |  25  |  1.31<H>    Ca    9.5      19 Sep 2018 05:35    MICROBIOLOGY:  RECENT CULTURES:  Culture - Blood (09.12.18 @ 12:00)    Specimen Source: .Blood Blood-Peripheral    Culture Results:   No growth at 5 days.    RADIOLOGY REVIEWED

## 2018-09-21 NOTE — PROGRESS NOTE ADULT - PROVIDER SPECIALTY LIST ADULT
Hospitalist
Infectious Disease
Hospitalist

## 2018-09-21 NOTE — PROGRESS NOTE ADULT - PROBLEM SELECTOR PLAN 4
MELISSA on CKD 3, improved   Baseline 1.5-1.9
MELISSA on CKD 3, improved   baseline 1.5-1.9
monitor   baseline 1.5-1.9
MELISSA on CKD 3, improved   Baseline 1.5-1.9
monitor   baseline 1.5-1.9

## 2018-09-21 NOTE — PROGRESS NOTE ADULT - PROBLEM SELECTOR PROBLEM 5
Anemia due to chronic kidney disease

## 2018-09-21 NOTE — PROGRESS NOTE ADULT - PROBLEM SELECTOR PLAN 2
Cont home Statin
barium swallow today?
cont home meds
Cont home Statin
Continue Atorvastatin
cont home meds

## 2018-09-21 NOTE — PROGRESS NOTE ADULT - ATTENDING COMMENTS
I have personally seen and examined patient on the above date.  I discussed the case with Natalia Massey NP and I agree with findings and plan as detailed per note above, which I have amended where appropriate.    Discharge to [facility]  continue antibiotics for 9 more days - minocycline.
I have personally seen and examined patient on the above date.  I discussed the case with NP and I agree with findings and plan as detailed per note above, which I have amended where appropriate.      Pt doing well, no complaints, clinically is improved, can be discharged on Minocycline, awaiting placement.

## 2018-09-21 NOTE — PROGRESS NOTE ADULT - PROBLEM SELECTOR PROBLEM 2
Dyslipidemia

## 2018-10-12 NOTE — H&P ADULT - HISTORY OF PRESENT ILLNESS
no 84 y/o m with h/o PPM, CVA with resulting [facility]/o left facial pain and swelling x 3 days. Denies fever, chills, nausea, vomiting, chest pain, shortness of breath. No known prior episodes.  Rectal temp upon arrival 99.1. 86 y/o m with h/o HTN, HLD, PPM, CVA, dysphagia, +PEG, sent in from [facility] home with c/o left facial pain and swelling x 3 days, worsened in the past 24 hours. Denies fever, chills, nausea, vomiting, chest pain, shortness of breath.  Temp 99.1. 86 y/o m with h/o HTN, HLD, PPM, CVA, dysphagia, +PEG, sent in from [facility] home with c/o left facial pain and swelling x 3 days, worsened in the past 24 hours. Denies fever, chills, nausea, vomiting, cough, chest pain, shortness of breath, abdominal pain.  Temp 99.1.

## 2018-10-24 PROCEDURE — 96376 TX/PRO/DX INJ SAME DRUG ADON: CPT

## 2018-10-24 PROCEDURE — 99284 EMERGENCY DEPT VISIT MOD MDM: CPT | Mod: 25

## 2018-10-24 PROCEDURE — 70490 CT SOFT TISSUE NECK W/O DYE: CPT

## 2018-10-24 PROCEDURE — 87040 BLOOD CULTURE FOR BACTERIA: CPT

## 2018-10-24 PROCEDURE — 92610 EVALUATE SWALLOWING FUNCTION: CPT

## 2018-10-24 PROCEDURE — 83605 ASSAY OF LACTIC ACID: CPT

## 2018-10-24 PROCEDURE — 96365 THER/PROPH/DIAG IV INF INIT: CPT

## 2018-10-24 PROCEDURE — 92611 MOTION FLUOROSCOPY/SWALLOW: CPT

## 2018-10-24 PROCEDURE — 82962 GLUCOSE BLOOD TEST: CPT

## 2018-10-24 PROCEDURE — 74230 X-RAY XM SWLNG FUNCJ C+: CPT

## 2018-10-24 PROCEDURE — 80053 COMPREHEN METABOLIC PANEL: CPT

## 2018-10-24 PROCEDURE — G0378: CPT

## 2018-10-24 PROCEDURE — 93005 ELECTROCARDIOGRAM TRACING: CPT

## 2018-10-24 PROCEDURE — 80202 ASSAY OF VANCOMYCIN: CPT

## 2018-10-24 PROCEDURE — 94640 AIRWAY INHALATION TREATMENT: CPT

## 2018-10-24 PROCEDURE — 85730 THROMBOPLASTIN TIME PARTIAL: CPT

## 2018-10-24 PROCEDURE — 96375 TX/PRO/DX INJ NEW DRUG ADDON: CPT

## 2018-10-24 PROCEDURE — 97161 PT EVAL LOW COMPLEX 20 MIN: CPT

## 2018-10-24 PROCEDURE — 80048 BASIC METABOLIC PNL TOTAL CA: CPT

## 2018-10-24 PROCEDURE — 71045 X-RAY EXAM CHEST 1 VIEW: CPT

## 2018-10-24 PROCEDURE — 92526 ORAL FUNCTION THERAPY: CPT

## 2018-10-24 PROCEDURE — 85610 PROTHROMBIN TIME: CPT

## 2018-10-24 PROCEDURE — 85027 COMPLETE CBC AUTOMATED: CPT

## 2018-10-24 PROCEDURE — 90686 IIV4 VACC NO PRSV 0.5 ML IM: CPT

## 2018-12-13 ENCOUNTER — INPATIENT (INPATIENT)
Facility: HOSPITAL | Age: 83
LOS: 7 days | Discharge: NURSING, REHAB OR LONG-TERM CARE | DRG: 177 | End: 2018-12-21
Attending: INTERNAL MEDICINE | Admitting: INTERNAL MEDICINE
Payer: MEDICARE

## 2018-12-13 VITALS
HEIGHT: 71 IN | SYSTOLIC BLOOD PRESSURE: 125 MMHG | WEIGHT: 203.05 LBS | OXYGEN SATURATION: 94 % | DIASTOLIC BLOOD PRESSURE: 99 MMHG | TEMPERATURE: 95 F | HEART RATE: 65 BPM | RESPIRATION RATE: 21 BRPM

## 2018-12-13 DIAGNOSIS — J18.9 PNEUMONIA, UNSPECIFIED ORGANISM: ICD-10-CM

## 2018-12-13 DIAGNOSIS — Z95.0 PRESENCE OF CARDIAC PACEMAKER: Chronic | ICD-10-CM

## 2018-12-13 DIAGNOSIS — N17.9 ACUTE KIDNEY FAILURE, UNSPECIFIED: ICD-10-CM

## 2018-12-13 DIAGNOSIS — R13.10 DYSPHAGIA, UNSPECIFIED: ICD-10-CM

## 2018-12-13 DIAGNOSIS — A41.9 SEPSIS, UNSPECIFIED ORGANISM: ICD-10-CM

## 2018-12-13 DIAGNOSIS — Z93.1 GASTROSTOMY STATUS: Chronic | ICD-10-CM

## 2018-12-13 LAB
ALBUMIN SERPL ELPH-MCNC: 2.6 G/DL — LOW (ref 3.3–5)
ALP SERPL-CCNC: 94 U/L — SIGNIFICANT CHANGE UP (ref 40–120)
ALT FLD-CCNC: 13 U/L DA — SIGNIFICANT CHANGE UP (ref 10–45)
ANION GAP SERPL CALC-SCNC: 7 MMOL/L — SIGNIFICANT CHANGE UP (ref 5–17)
APPEARANCE UR: SIGNIFICANT CHANGE UP
APTT BLD: 45 SEC — HIGH (ref 27.5–36.3)
AST SERPL-CCNC: 15 U/L — SIGNIFICANT CHANGE UP (ref 10–40)
BACTERIA # UR AUTO: ABNORMAL /HPF
BASOPHILS # BLD AUTO: 0 K/UL — SIGNIFICANT CHANGE UP (ref 0–0.2)
BASOPHILS NFR BLD AUTO: 0.3 % — SIGNIFICANT CHANGE UP (ref 0–2)
BILIRUB SERPL-MCNC: 0.1 MG/DL — LOW (ref 0.2–1.2)
BILIRUB UR-MCNC: NEGATIVE — SIGNIFICANT CHANGE UP
BUN SERPL-MCNC: 33 MG/DL — HIGH (ref 7–23)
CALCIUM SERPL-MCNC: 9.2 MG/DL — SIGNIFICANT CHANGE UP (ref 8.4–10.5)
CHLORIDE SERPL-SCNC: 112 MMOL/L — HIGH (ref 96–108)
CO2 SERPL-SCNC: 24 MMOL/L — SIGNIFICANT CHANGE UP (ref 22–31)
COLOR SPEC: YELLOW — SIGNIFICANT CHANGE UP
COMMENT - URINE: SIGNIFICANT CHANGE UP
CREAT SERPL-MCNC: 1.61 MG/DL — HIGH (ref 0.5–1.3)
DIFF PNL FLD: ABNORMAL
EOSINOPHIL # BLD AUTO: 0 K/UL — SIGNIFICANT CHANGE UP (ref 0–0.5)
EOSINOPHIL NFR BLD AUTO: 0 % — SIGNIFICANT CHANGE UP (ref 0–6)
EPI CELLS # UR: SIGNIFICANT CHANGE UP
FLUAV SPEC QL CULT: NEGATIVE — SIGNIFICANT CHANGE UP
FLUBV AG SPEC QL IA: NEGATIVE — SIGNIFICANT CHANGE UP
GLUCOSE SERPL-MCNC: 91 MG/DL — SIGNIFICANT CHANGE UP (ref 70–99)
GLUCOSE UR QL: NEGATIVE — SIGNIFICANT CHANGE UP
HCT VFR BLD CALC: 29.1 % — LOW (ref 39–50)
HGB BLD-MCNC: 9.5 G/DL — LOW (ref 13–17)
INR BLD: 1.37 RATIO — HIGH (ref 0.88–1.16)
KETONES UR-MCNC: NEGATIVE — SIGNIFICANT CHANGE UP
LACTATE SERPL-SCNC: 1 MMOL/L — SIGNIFICANT CHANGE UP (ref 0.7–2)
LEUKOCYTE ESTERASE UR-ACNC: ABNORMAL
LYMPHOCYTES # BLD AUTO: 1 K/UL — SIGNIFICANT CHANGE UP (ref 1–3.3)
LYMPHOCYTES # BLD AUTO: 10.2 % — LOW (ref 13–44)
MCHC RBC-ENTMCNC: 28.6 PG — SIGNIFICANT CHANGE UP (ref 27–34)
MCHC RBC-ENTMCNC: 32.7 GM/DL — SIGNIFICANT CHANGE UP (ref 32–36)
MCV RBC AUTO: 87.5 FL — SIGNIFICANT CHANGE UP (ref 80–100)
MONOCYTES # BLD AUTO: 0.6 K/UL — SIGNIFICANT CHANGE UP (ref 0–0.9)
MONOCYTES NFR BLD AUTO: 5.9 % — SIGNIFICANT CHANGE UP (ref 2–14)
NEUTROPHILS # BLD AUTO: 8 K/UL — HIGH (ref 1.8–7.4)
NEUTROPHILS NFR BLD AUTO: 83.5 % — HIGH (ref 43–77)
NITRITE UR-MCNC: NEGATIVE — SIGNIFICANT CHANGE UP
OVALOCYTES BLD QL SMEAR: SLIGHT — SIGNIFICANT CHANGE UP
PH UR: 5 — SIGNIFICANT CHANGE UP (ref 5–8)
PLAT MORPH BLD: NORMAL — SIGNIFICANT CHANGE UP
PLATELET # BLD AUTO: 83 K/UL — LOW (ref 150–400)
POTASSIUM SERPL-MCNC: 5.1 MMOL/L — SIGNIFICANT CHANGE UP (ref 3.5–5.3)
POTASSIUM SERPL-SCNC: 5.1 MMOL/L — SIGNIFICANT CHANGE UP (ref 3.5–5.3)
PROT SERPL-MCNC: 7 G/DL — SIGNIFICANT CHANGE UP (ref 6–8.3)
PROT UR-MCNC: 30 MG/DL
PROTHROM AB SERPL-ACNC: 15.5 SEC — HIGH (ref 10–12.9)
RBC # BLD: 3.32 M/UL — LOW (ref 4.2–5.8)
RBC # FLD: 19.1 % — HIGH (ref 10.3–14.5)
RBC BLD AUTO: SIGNIFICANT CHANGE UP
RBC CASTS # UR COMP ASSIST: ABNORMAL /HPF (ref 0–4)
SODIUM SERPL-SCNC: 143 MMOL/L — SIGNIFICANT CHANGE UP (ref 135–145)
SP GR SPEC: 1.02 — SIGNIFICANT CHANGE UP (ref 1.01–1.02)
SPHEROCYTES BLD QL SMEAR: SLIGHT — SIGNIFICANT CHANGE UP
UROBILINOGEN FLD QL: NEGATIVE — SIGNIFICANT CHANGE UP
WBC # BLD: 9.6 K/UL — SIGNIFICANT CHANGE UP (ref 3.8–10.5)
WBC # FLD AUTO: 9.6 K/UL — SIGNIFICANT CHANGE UP (ref 3.8–10.5)
WBC UR QL: ABNORMAL /HPF (ref 0–5)

## 2018-12-13 PROCEDURE — 99291 CRITICAL CARE FIRST HOUR: CPT

## 2018-12-13 PROCEDURE — 99223 1ST HOSP IP/OBS HIGH 75: CPT

## 2018-12-13 PROCEDURE — 71045 X-RAY EXAM CHEST 1 VIEW: CPT | Mod: 26

## 2018-12-13 PROCEDURE — 93010 ELECTROCARDIOGRAM REPORT: CPT

## 2018-12-13 RX ORDER — SODIUM CHLORIDE 9 MG/ML
1000 INJECTION, SOLUTION INTRAVENOUS
Qty: 0 | Refills: 0 | Status: DISCONTINUED | OUTPATIENT
Start: 2018-12-13 | End: 2018-12-13

## 2018-12-13 RX ORDER — DORZOLAMIDE HYDROCHLORIDE TIMOLOL MALEATE 20; 5 MG/ML; MG/ML
1 SOLUTION/ DROPS OPHTHALMIC
Qty: 0 | Refills: 0 | Status: DISCONTINUED | OUTPATIENT
Start: 2018-12-13 | End: 2018-12-13

## 2018-12-13 RX ORDER — DORZOLAMIDE HYDROCHLORIDE 20 MG/ML
1 SOLUTION/ DROPS OPHTHALMIC
Qty: 0 | Refills: 0 | Status: DISCONTINUED | OUTPATIENT
Start: 2018-12-13 | End: 2018-12-13

## 2018-12-13 RX ORDER — OXYCODONE AND ACETAMINOPHEN 5; 325 MG/1; MG/1
2 TABLET ORAL EVERY 6 HOURS
Qty: 0 | Refills: 0 | Status: DISCONTINUED | OUTPATIENT
Start: 2018-12-13 | End: 2018-12-13

## 2018-12-13 RX ORDER — HEPARIN SODIUM 5000 [USP'U]/ML
5000 INJECTION INTRAVENOUS; SUBCUTANEOUS EVERY 8 HOURS
Qty: 0 | Refills: 0 | Status: DISCONTINUED | OUTPATIENT
Start: 2018-12-13 | End: 2018-12-15

## 2018-12-13 RX ORDER — DORZOLAMIDE HYDROCHLORIDE 20 MG/ML
1 SOLUTION/ DROPS OPHTHALMIC
Qty: 0 | Refills: 0 | Status: DISCONTINUED | OUTPATIENT
Start: 2018-12-13 | End: 2018-12-21

## 2018-12-13 RX ORDER — POLYETHYLENE GLYCOL 3350 17 G/17G
17 POWDER, FOR SOLUTION ORAL DAILY
Qty: 0 | Refills: 0 | Status: DISCONTINUED | OUTPATIENT
Start: 2018-12-13 | End: 2018-12-21

## 2018-12-13 RX ORDER — CHOLECALCIFEROL (VITAMIN D3) 125 MCG
1000 CAPSULE ORAL DAILY
Qty: 0 | Refills: 0 | Status: DISCONTINUED | OUTPATIENT
Start: 2018-12-13 | End: 2018-12-21

## 2018-12-13 RX ORDER — LATANOPROST 0.05 MG/ML
1 SOLUTION/ DROPS OPHTHALMIC; TOPICAL AT BEDTIME
Qty: 0 | Refills: 0 | Status: DISCONTINUED | OUTPATIENT
Start: 2018-12-13 | End: 2018-12-21

## 2018-12-13 RX ORDER — ESCITALOPRAM OXALATE 10 MG/1
10 TABLET, FILM COATED ORAL DAILY
Qty: 0 | Refills: 0 | Status: DISCONTINUED | OUTPATIENT
Start: 2018-12-13 | End: 2018-12-21

## 2018-12-13 RX ORDER — ROBINUL 0.2 MG/ML
1 INJECTION INTRAMUSCULAR; INTRAVENOUS THREE TIMES A DAY
Qty: 0 | Refills: 0 | Status: DISCONTINUED | OUTPATIENT
Start: 2018-12-13 | End: 2018-12-21

## 2018-12-13 RX ORDER — TIMOLOL 0.5 %
1 DROPS OPHTHALMIC (EYE)
Qty: 0 | Refills: 0 | Status: DISCONTINUED | OUTPATIENT
Start: 2018-12-13 | End: 2018-12-21

## 2018-12-13 RX ORDER — ACETAMINOPHEN 500 MG
650 TABLET ORAL EVERY 6 HOURS
Qty: 0 | Refills: 0 | Status: DISCONTINUED | OUTPATIENT
Start: 2018-12-13 | End: 2018-12-21

## 2018-12-13 RX ORDER — SODIUM CHLORIDE 9 MG/ML
2000 INJECTION INTRAMUSCULAR; INTRAVENOUS; SUBCUTANEOUS ONCE
Qty: 0 | Refills: 0 | Status: COMPLETED | OUTPATIENT
Start: 2018-12-13 | End: 2018-12-13

## 2018-12-13 RX ORDER — BUDESONIDE, MICRONIZED 100 %
0.5 POWDER (GRAM) MISCELLANEOUS EVERY 12 HOURS
Qty: 0 | Refills: 0 | Status: DISCONTINUED | OUTPATIENT
Start: 2018-12-13 | End: 2018-12-21

## 2018-12-13 RX ORDER — PIPERACILLIN AND TAZOBACTAM 4; .5 G/20ML; G/20ML
3.38 INJECTION, POWDER, LYOPHILIZED, FOR SOLUTION INTRAVENOUS EVERY 8 HOURS
Qty: 0 | Refills: 0 | Status: COMPLETED | OUTPATIENT
Start: 2018-12-13 | End: 2018-12-17

## 2018-12-13 RX ORDER — PIPERACILLIN AND TAZOBACTAM 4; .5 G/20ML; G/20ML
3.38 INJECTION, POWDER, LYOPHILIZED, FOR SOLUTION INTRAVENOUS ONCE
Qty: 0 | Refills: 0 | Status: COMPLETED | OUTPATIENT
Start: 2018-12-13 | End: 2018-12-13

## 2018-12-13 RX ORDER — IPRATROPIUM/ALBUTEROL SULFATE 18-103MCG
3 AEROSOL WITH ADAPTER (GRAM) INHALATION EVERY 6 HOURS
Qty: 0 | Refills: 0 | Status: DISCONTINUED | OUTPATIENT
Start: 2018-12-13 | End: 2018-12-21

## 2018-12-13 RX ORDER — DOXAZOSIN MESYLATE 4 MG
4 TABLET ORAL AT BEDTIME
Qty: 0 | Refills: 0 | Status: DISCONTINUED | OUTPATIENT
Start: 2018-12-13 | End: 2018-12-21

## 2018-12-13 RX ORDER — LANOLIN ALCOHOL/MO/W.PET/CERES
3 CREAM (GRAM) TOPICAL AT BEDTIME
Qty: 0 | Refills: 0 | Status: DISCONTINUED | OUTPATIENT
Start: 2018-12-13 | End: 2018-12-21

## 2018-12-13 RX ORDER — LINACLOTIDE 145 UG/1
145 CAPSULE, GELATIN COATED ORAL
Qty: 0 | Refills: 0 | Status: DISCONTINUED | OUTPATIENT
Start: 2018-12-13 | End: 2018-12-21

## 2018-12-13 RX ORDER — FUROSEMIDE 40 MG
1 TABLET ORAL
Qty: 0 | Refills: 0 | COMMUNITY

## 2018-12-13 RX ORDER — ATROPINE SULFATE 1 %
1 DROPS OPHTHALMIC (EYE) THREE TIMES A DAY
Qty: 0 | Refills: 0 | Status: DISCONTINUED | OUTPATIENT
Start: 2018-12-13 | End: 2018-12-21

## 2018-12-13 RX ORDER — VANCOMYCIN HCL 1 G
1000 VIAL (EA) INTRAVENOUS ONCE
Qty: 0 | Refills: 0 | Status: COMPLETED | OUTPATIENT
Start: 2018-12-13 | End: 2018-12-13

## 2018-12-13 RX ORDER — SODIUM CHLORIDE 9 MG/ML
1000 INJECTION, SOLUTION INTRAVENOUS
Qty: 0 | Refills: 0 | Status: COMPLETED | OUTPATIENT
Start: 2018-12-13 | End: 2018-12-14

## 2018-12-13 RX ADMIN — Medication 250 MILLIGRAM(S): at 03:30

## 2018-12-13 RX ADMIN — PIPERACILLIN AND TAZOBACTAM 25 GRAM(S): 4; .5 INJECTION, POWDER, LYOPHILIZED, FOR SOLUTION INTRAVENOUS at 18:36

## 2018-12-13 RX ADMIN — Medication 3 MILLILITER(S): at 21:14

## 2018-12-13 RX ADMIN — Medication 1000 MILLIGRAM(S): at 05:00

## 2018-12-13 RX ADMIN — ROBINUL 1 MILLIGRAM(S): 0.2 INJECTION INTRAMUSCULAR; INTRAVENOUS at 18:35

## 2018-12-13 RX ADMIN — Medication 0.5 MILLIGRAM(S): at 21:14

## 2018-12-13 RX ADMIN — ROBINUL 1 MILLIGRAM(S): 0.2 INJECTION INTRAMUSCULAR; INTRAVENOUS at 06:51

## 2018-12-13 RX ADMIN — ESCITALOPRAM OXALATE 10 MILLIGRAM(S): 10 TABLET, FILM COATED ORAL at 18:35

## 2018-12-13 RX ADMIN — Medication 1 DROP(S): at 06:48

## 2018-12-13 RX ADMIN — SODIUM CHLORIDE 2000 MILLILITER(S): 9 INJECTION INTRAMUSCULAR; INTRAVENOUS; SUBCUTANEOUS at 03:20

## 2018-12-13 RX ADMIN — PIPERACILLIN AND TAZOBACTAM 3.38 GRAM(S): 4; .5 INJECTION, POWDER, LYOPHILIZED, FOR SOLUTION INTRAVENOUS at 03:30

## 2018-12-13 RX ADMIN — SODIUM CHLORIDE 2000 MILLILITER(S): 9 INJECTION INTRAMUSCULAR; INTRAVENOUS; SUBCUTANEOUS at 04:20

## 2018-12-13 RX ADMIN — Medication 1 DROP(S): at 18:34

## 2018-12-13 RX ADMIN — HEPARIN SODIUM 5000 UNIT(S): 5000 INJECTION INTRAVENOUS; SUBCUTANEOUS at 06:51

## 2018-12-13 RX ADMIN — PIPERACILLIN AND TAZOBACTAM 200 GRAM(S): 4; .5 INJECTION, POWDER, LYOPHILIZED, FOR SOLUTION INTRAVENOUS at 03:21

## 2018-12-13 RX ADMIN — Medication 3 MILLILITER(S): at 10:00

## 2018-12-13 RX ADMIN — Medication 0.5 MILLIGRAM(S): at 09:59

## 2018-12-13 RX ADMIN — Medication 30 MILLIGRAM(S): at 06:51

## 2018-12-13 RX ADMIN — Medication 1 DROP(S): at 18:33

## 2018-12-13 RX ADMIN — DORZOLAMIDE HYDROCHLORIDE 1 DROP(S): 20 SOLUTION/ DROPS OPHTHALMIC at 18:32

## 2018-12-13 RX ADMIN — SODIUM CHLORIDE 60 MILLILITER(S): 9 INJECTION, SOLUTION INTRAVENOUS at 09:23

## 2018-12-13 RX ADMIN — Medication 3 MILLILITER(S): at 15:59

## 2018-12-13 RX ADMIN — LINACLOTIDE 145 MICROGRAM(S): 145 CAPSULE, GELATIN COATED ORAL at 06:51

## 2018-12-13 NOTE — H&P ADULT - HISTORY OF PRESENT ILLNESS
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 He denied fevers, chills, SOB or CP. He is concerned about his weight loss because he has been having more difficulty with eating and has been choking more frequently. He denied any NVD. He was treated with nebs,  Levaquin and prednisone with no improvement and sent to ED for SOB which he currently denies.   He has hx of aspiration pneumonia.

## 2018-12-13 NOTE — ED ADULT NURSE REASSESSMENT NOTE - NS ED NURSE REASSESS COMMENT FT1
brought in by ambulance c/o shortness of breath , aox2, bloods drawn , meds given as ordered., suctioned to thick secretions.

## 2018-12-13 NOTE — ED ADULT NURSE NOTE - NSIMPLEMENTINTERV_GEN_ALL_ED
Implemented All Fall Risk Interventions:  [place] to call system. Call bell, personal items and telephone within reach. Instruct patient to call for assistance. Room bathroom lighting operational. Non-slip footwear when patient is off stretcher. Physically safe environment: no spills, clutter or unnecessary equipment. Stretcher in lowest position, wheels locked, appropriate side rails in place. Provide visual cue, wrist band, yellow gown, etc. Monitor gait and stability. Monitor for mental status changes and reorient to person, place, and time. Review medications for side effects contributing to fall risk. Reinforce activity limits and safety measures with patient and family.

## 2018-12-13 NOTE — H&P ADULT - NSHPLABSRESULTS_GEN_ALL_CORE
9.5    9.6   )-----------( 83       ( 13 Dec 2018 03:00 )             29.1       12-13    143  |  112<H>  |  33<H>  ----------------------------<  91  5.1   |  24  |  1.61<H>    Ca    9.2      13 Dec 2018 03:00    TPro  7.0  /  Alb  2.6<L>  /  TBili  0.1<L>  /  DBili  x   /  AST  15  /  ALT  13  /  AlkPhos  94  12-13    Lactate, Blood: 1.0 mmol/L (12-13 @ 03:00)       LIVER FUNCTIONS - ( 13 Dec 2018 03:00 )  Alb: 2.6 g/dL / Pro: 7.0 g/dL / ALK PHOS: 94 U/L / ALT: 13 U/L DA / AST: 15 U/L / GGT: x               PT/INR - ( 13 Dec 2018 03:00 )   PT: 15.5 sec;   INR: 1.37 ratio         PTT - ( 13 Dec 2018 03:00 )  PTT:45.0 sec        EKG:  rhythm at 72bpm      CXR: wet read ? RUL increased markings

## 2018-12-13 NOTE — ED PROVIDER NOTE - MEDICAL DECISION MAKING DETAILS
pt presenting with SOB and cough that is productive.  SIRS met on arrival with resp rate and hypotehermia.  Will give ABx and IVF (based on IBW and no 30ml/kg).  Cultures sent as well.  CxR with likely infiltrate.  RVP sent to ensure not viral.  Will require admisison

## 2018-12-13 NOTE — H&P ADULT - NSHPPHYSICALEXAM_GEN_ALL_CORE
Vital Signs Last 24 Hrs  T(C): 35 (13 Dec 2018 03:11), Max: 35 (13 Dec 2018 03:11)  T(F): 95 (13 Dec 2018 03:11), Max: 95 (13 Dec 2018 03:11)  HR: 60 (13 Dec 2018 04:00) (60 - 65)  BP: 101/58 (13 Dec 2018 04:00) (101/58 - 125/99)  BP(mean): --  RR: 18 (13 Dec 2018 04:00) (18 - 21)  SpO2: 97% (13 Dec 2018 04:00) (94% - 97%)  Daily Height in cm: 180.34 (13 Dec 2018 03:11)    Daily   CAPILLARY BLOOD GLUCOSE        I&O's Summary      GENERAL: NAD  HEAD:  Normocephalic  EYES: EOMI, PERRLA, conjunctiva and sclera clear  ENMT: No tonsillar erythema, exudates, or enlargement; DRY mucous membranes, No lesions  NECK: Supple, No JVD, no bruit, normal thyroid  NERVOUS SYSTEM:  Alert & Oriented X3, moves upper extremities easily. BL LE weakness only able to dorsiflexion at the ankles   CHEST/LUNG: diffuse rhonchi throughout bl lungs, phlegmatous cough, trace exp wheeze   HEART: Regular rate and rhythm; No murmurs, rubs, or gallops  ABDOMEN: Soft, Nontender, Nondistended; Bowel sounds present  EXTREMITIES:  2+ Peripheral Pulses, No clubbing, cyanosis, or 1+ edema  LYMPH: No lymphadenopathy noted  SKIN: No rashes or lesions

## 2018-12-13 NOTE — H&P ADULT - PROBLEM SELECTOR PLAN 1
?aspiration pna vs viral   cont IV Zosyn for now  await RVP.  min wheeze. cont prednisone 30mg qd for now and cont duonebs.   cont atropine SL and glycopyrrolate, Chest PT for excessive secretions ?aspiration pna vs viral   cont IV Zosyn for now  await RVP.  min wheeze. cont prednisone 30mg qd for now and cont duonebs.   cont atropine SL and glycopyrrolate, Chest PT for excessive secretions  pulm consult

## 2018-12-13 NOTE — ED ADULT NURSE REASSESSMENT NOTE - NS ED NURSE REASSESS COMMENT FT1
bladder distended, bladder scan done  urine > 400, straight cath done , 450 cc of yellow jeff urine , ua , urine culture sent.

## 2018-12-13 NOTE — SWALLOW BEDSIDE ASSESSMENT ADULT - COMMENTS
history of aspiration pneumonia
Pt's wife present during evaluation and reports increased rate of intake when self feeding and reports decreased swallowing function and speech intelligibility over the past 3 weeks.

## 2018-12-13 NOTE — H&P ADULT - ASSESSMENT
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

## 2018-12-13 NOTE — SWALLOW BEDSIDE ASSESSMENT ADULT - ASR SWALLOW ASPIRATION MONITOR
pneumonia/change of breathing pattern/position upright (89Y)/gurgly voice/throat clearing/oral hygiene/cough/fever

## 2018-12-13 NOTE — SWALLOW BEDSIDE ASSESSMENT ADULT - SWALLOW EVAL: DIAGNOSIS
Pt presents with mod oropharyngeal dysphagia marked by delayed oral manipulation and transit, mod'ly delayed swallow, significantly reduced hyolaryngeal excursion and multiple swallows for all consistencies as well as significant delayed cough with thin liquids.

## 2018-12-13 NOTE — ED PROVIDER NOTE - OBJECTIVE STATEMENT
84 yo from NH with SOB and productive "loud" cough.  Has been present for a few days.  No fevers reported.  Pt unable to provide significant history due to mental status.

## 2018-12-13 NOTE — CONSULT NOTE ADULT - SUBJECTIVE AND OBJECTIVE BOX
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 He denied fevers, chills, SOB or CP. He is concerned about his weight loss because he has been having more difficulty with eating and has been choking more frequently. He denied any NVD. He was treated with nebs,  Levaquin and prednisone with no improvement and sent to ED for SOB which he currently denies.   He has hx of aspiration pneumonia. (13 Dec 2018 04:44)  Pt states that he has regular diet at home    PAST MEDICAL & SURGICAL HISTORY:  Psoriasis  GERD (gastroesophageal reflux disease)  Dyslipidemia  Cardiac pacemaker  S/P percutaneous endoscopic gastrostomy (PEG) tube placement      FAMILY HISTORY:  No pertinent family history in first degree relatives            Allergies    No Known Allergies    Intolerances        HOME  MEDICATIONS:Home Medications:  acetaminophen 325 mg oral tablet: 2 tab(s) orally every 6 hours, As needed, Mild- Moderate Pain (1 - 6) (13 Dec 2018 03:50)  atropine 1% ophthalmic solution: drop(s) sublingually 3 times a day (13 Dec 2018 03:50)  bisacodyl 10 mg rectal suppository: 1 suppository(ies) rectal once a day, As needed, Constipation (13 Dec 2018 03:50)  budesonide 0.5 mg/2 mL inhalation suspension: 2 milliliter(s) inhaled 2 times a day (13 Dec 2018 06:00)  cholecalciferol oral tablet: 1000 unit(s) orally once a day (13 Dec 2018 03:50)  cyanocobalamin 1000 mcg oral tablet: 1 tab(s) orally once a day (13 Dec 2018 03:50)  dorzolamide-timolol 2.23%-0.68% ophthalmic solution: 1 drop(s) to each affected eye 2 times a day (13 Dec 2018 06:00)  doxazosin 4 mg oral tablet: 1 tab(s) orally once a day (at bedtime) (13 Dec 2018 03:50)  escitalopram 10 mg oral tablet: 1 tab(s) orally once a day (13 Dec 2018 03:50)  glycopyrrolate 1 mg oral tablet: 1 tab(s) orally 3 times a day (13 Dec 2018 03:50)  ipratropium-albuterol 0.5 mg-2.5 mg/3 mLinhalation solution: 3 milliliter(s) inhaled every 6 hours (13 Dec 2018 03:50)  K-Dur 10 oral tablet, extended release: 1 tab(s) orally 2 times a day (13 Dec 2018 03:50)  latanoprost 0.005% ophthalmic solution: 1 drop(s) to each affected eye once a day (at bedtime) (13 Dec 2018 03:50)  Levaquin 250 mg oral tablet: 1 tab(s) orally every 24 hours start 12/12 x 7 days (13 Dec 2018 06:00)  linaclotide 145 mcg oral capsule: 1 cap(s) orally once a day (before a meal) (13 Dec 2018 03:50)  melatonin 5 mg oral tablet: 1 tab(s) orally once a day (at bedtime) (13 Dec 2018 03:50)  Percocet 5/325 oral tablet: 2 tab(s) orally every 4 hours, As needed, Severe Pain (7 - 10) (13 Dec 2018 03:50)  polyethylene glycol 3350 oral powder for reconstitution: 17 gram(s) orally once a day, As needed, Constipation (13 Dec 2018 03:50)  predniSONE: 30  orally once a day x 5days started  (13 Dec 2018 06:00)      REVIEW OF SYSTEMS:  Constitutional: No fevers or chills. No weight loss. No fatigue or generalised malaise.  Eyes: No itching or discharge from the eyes  ENT: . Has difficulty swallowing.   CV:cough  Resp:  dyspnea at rest.  cough. sputum production. No chest pain with respiration.  GI: No nausea. No vomiting. No diarrhea.  MSK: No joint pain or pain in any extremities  Integumentary: No skin lesions. No pedal edema.  Neurological: No gross motor weakness. No sensory changes.    [ ] All other systems negative  [ ] Unable to assess ROS because ________    OBJECTIVE:  ICU Vital Signs Last 24 Hrs  T(C): 36.1 (13 Dec 2018 06:56), Max: 36.1 (13 Dec 2018 06:56)  T(F): 97 (13 Dec 2018 06:56), Max: 97 (13 Dec 2018 06:56)  HR: 59 (13 Dec 2018 15:50) (59 - 88)  BP: 143/75 (13 Dec 2018 15:50) (98/77 - 143/75)  BP(mean): --  ABP: --  ABP(mean): --  RR: 15 (13 Dec 2018 15:50) (15 - 21)  SpO2: 100% (13 Dec 2018 15:50) (94% - 100%)        CAPILLARY BLOOD GLUCOSE          PHYSICAL EXAM:  General: Awake, alert, oriented X 3.   HEENT: Atraumatic, normocephalic.                 No nasal congestion.                No tonsillar or pharyngeal exudates.  Lymph Nodes: No palpable lymphadenopathy  Neck: No JVD. No carotid bruit.   Respiratory: scattered coarse bs bilat  Cardiovascular: S1 S2 normal. No murmurs, rubs or gallops.   Abdomen: Soft, non-tender, non-distended. No organomegaly.  BS +  Extremities: Warm to touch. Peripheral pulse palpable. No pedal edema.   Skin: No rashes or skin lesions  Neurological: Motor and sensory examination equal and normal in all four extremities.  Psychiatry: Appropriate mood and affect.    HOSPITAL MEDICATIONS:  MEDICATIONS  (STANDING):  ALBUTerol/ipratropium for Nebulization 3 milliLiter(s) Nebulizer every 6 hours  atropine 1% Ophthalmic Solution for SubLingual Use 1 Drop(s) SubLingual three times a day  buDESOnide   0.5 milliGRAM(s) Respule 0.5 milliGRAM(s) Inhalation every 12 hours  cholecalciferol 1000 Unit(s) Oral daily  dextrose 5% + sodium chloride 0.45%. 1000 milliLiter(s) (60 mL/Hr) IV Continuous <Continuous>  dorzolamide 2% Ophthalmic Solution 1 Drop(s) Both EYES <User Schedule>  doxazosin 4 milliGRAM(s) Oral at bedtime  escitalopram 10 milliGRAM(s) Oral daily  glycopyrrolate 1 milliGRAM(s) Oral three times a day  heparin  Injectable 5000 Unit(s) SubCutaneous every 8 hours  latanoprost 0.005% Ophthalmic Solution 1 Drop(s) Both EYES at bedtime  linaclotide 145 MICROGram(s) Oral before breakfast  piperacillin/tazobactam IVPB. 3.375 Gram(s) IV Intermittent every 8 hours  predniSONE   Tablet 30 milliGRAM(s) Oral daily  timolol 0.5% Solution 1 Drop(s) Both EYES two times a day    MEDICATIONS  (PRN):  acetaminophen   Tablet .. 650 milliGRAM(s) Oral every 6 hours PRN Temp greater or equal to 38C (100.4F), Mild Pain (1 - 3)  bisacodyl Suppository 10 milliGRAM(s) Rectal daily PRN Constipation  melatonin 3 milliGRAM(s) Oral at bedtime PRN Sleep  oxyCODONE    5 mG/acetaminophen 325 mG 2 Tablet(s) Oral every 6 hours PRN Severe Pain (7 - 10)  polyethylene glycol 3350 17 Gram(s) Oral daily PRN Constipation      LABS:                        9.5    9.6   )-----------( 83       ( 13 Dec 2018 03:00 )             29.1         143  |  112<H>  |  33<H>  ----------------------------<  91  5.1   |  24  |  1.61<H>    Ca    9.2      13 Dec 2018 03:00    TPro  7.0  /  Alb  2.6<L>  /  TBili  0.1<L>  /  DBili  x   /  AST  15  /  ALT  13  /  AlkPhos  94      PT/INR - ( 13 Dec 2018 03:00 )   PT: 15.5 sec;   INR: 1.37 ratio         PTT - ( 13 Dec 2018 03:00 )  PTT:45.0 sec  Urinalysis Basic - ( 13 Dec 2018 06:25 )    Color: Yellow / Appearance: slightly cloudy / S.020 / pH: x  Gluc: x / Ketone: Negative  / Bili: Negative / Urobili: Negative   Blood: x / Protein: 30 mg/dL / Nitrite: Negative   Leuk Esterase: Moderate / RBC: 5-10 /HPF / WBC 11-25 /HPF   Sq Epi: x / Non Sq Epi: Neg.-Few / Bacteria: Few /HPF            MICROBIOLOGY:     RADIOLOGY:  [ ] Reviewed and interpreted by me     EKG:

## 2018-12-13 NOTE — H&P ADULT - NSHPREVIEWOFSYSTEMS_GEN_ALL_CORE
CONSTITUTIONAL: No fever, weight loss, or fatigue  EYES: No eye pain, visual disturbances, or discharge  ENMT:  No difficulty hearing, tinnitus, vertigo; No sinus or throat pain  NECK: No pain or stiffness  RESPIRATORY: ++ cough, no wheezing,  no chills or hemoptysis; No shortness of breath  CARDIOVASCULAR: No chest pain, no  palpitations, dizziness, or leg swelling  GASTROINTESTINAL: No abdominal or epigastric pain. No nausea, vomiting, or hematemesis; No diarrhea or constipation. No melena or hematochezia. +DYSPHAGIA as per HPI with choking episodes  GENITOURINARY: No dysuria, frequency, hematuria, or incontinence  NEUROLOGICAL: No headaches, memory loss, loss of strength, numbness, or tremors  SKIN: No itching, burning, rashes, or lesions   LYMPH NODES: No enlarged glands  ENDOCRINE: No heat or cold intolerance; No hair loss  MUSCULOSKELETAL: No joint pain or swelling; No muscle, back, or extremity pain  PSYCHIATRIC: No depression, anxiety, mood swings, or difficulty sleeping  HEME/LYMPH: No easy bruising, or bleeding gums  ALLERY AND IMMUNOLOGIC: No hives or eczema    IMPROVE VTE Individual Risk Assessment          RISK                                                          Points  [  ] Previous VTE                                                3  [  ] Thrombophilia                                             2  [ 2 ] Lower limb paralysis                                   2        (unable to hold up >15 seconds)    [  ] Current Cancer                                             2         (within 6 months)  [  ] Immobilization > 24 hrs                              1  [  ] ICU/CCU stay > 24 hours                             1  [ 1 ] Age > 60                                                         1    IMPROVE VTE Score:         [         ]    Total Risk Factor Score:    0 - 1:   Consider IPC  >2 - 3:  Thromboprophylaxis required (enoxaparin or SQ heparin)        >4:   High Risk: Thromboprophylaxis required (enoxaparin or SQ heparin), optional add IPC  **If CONTRAINDICATION to enoxaparin or SQ heparin, USE IPCs**

## 2018-12-13 NOTE — SWALLOW BEDSIDE ASSESSMENT ADULT - PHARYNGEAL PHASE
Delayed pharyngeal swallow/Decreased laryngeal elevation Delayed pharyngeal swallow/Decreased laryngeal elevation/Delayed cough post oral intake/Multiple swallows Decreased laryngeal elevation/Delayed pharyngeal swallow/Multiple swallows Multiple swallows/Delayed pharyngeal swallow/Decreased laryngeal elevation Delayed pharyngeal swallow/Multiple swallows/Decreased laryngeal elevation

## 2018-12-13 NOTE — ED ADULT NURSE REASSESSMENT NOTE - COMFORT CARE
side rails up/wait time explained/repositioned
CTM/side rails up/wait time explained/warm blanket provided/repositioned

## 2018-12-13 NOTE — ED PROVIDER NOTE - PHYSICAL EXAMINATION
Gen: Well appearing in mild resp distress  Head: NC/AT  Neck: trachea midline  Resp:  mild distress - ronchi throughout with good air movement  CV: RRR  Abd: soft NT ND  Ext: no deformities  Neuro:  Alert appears non focal  Skin:  Warm and dry as visualized

## 2018-12-13 NOTE — SWALLOW BEDSIDE ASSESSMENT ADULT - SWALLOW EVAL: RECOMMENDED FEEDING/EATING TECHNIQUES
small sips/bites/oral hygiene/check mouth frequently for oral residue/pocketing/crush medication (when feasible)/maintain upright posture during/after eating for 30 mins/alternate food with liquid/hard swallow w/ each bite or sip/no straws/position upright (90 degrees)/allow for swallow between intakes

## 2018-12-14 LAB
ALBUMIN SERPL ELPH-MCNC: 2.2 G/DL — LOW (ref 3.3–5)
ALP SERPL-CCNC: 68 U/L — SIGNIFICANT CHANGE UP (ref 40–120)
ALT FLD-CCNC: 11 U/L DA — SIGNIFICANT CHANGE UP (ref 10–45)
ANION GAP SERPL CALC-SCNC: 8 MMOL/L — SIGNIFICANT CHANGE UP (ref 5–17)
AST SERPL-CCNC: 12 U/L — SIGNIFICANT CHANGE UP (ref 10–40)
BILIRUB SERPL-MCNC: 0.1 MG/DL — LOW (ref 0.2–1.2)
BLOOD GAS COMMENTS ARTERIAL: SIGNIFICANT CHANGE UP
BUN SERPL-MCNC: 32 MG/DL — HIGH (ref 7–23)
CALCIUM SERPL-MCNC: 8.4 MG/DL — SIGNIFICANT CHANGE UP (ref 8.4–10.5)
CHLORIDE SERPL-SCNC: 112 MMOL/L — HIGH (ref 96–108)
CO2 BLDA-SCNC: 18 MMOL/L — LOW (ref 22–30)
CO2 SERPL-SCNC: 21 MMOL/L — LOW (ref 22–31)
CREAT SERPL-MCNC: 1.49 MG/DL — HIGH (ref 0.5–1.3)
CULTURE RESULTS: NO GROWTH — SIGNIFICANT CHANGE UP
GAS PNL BLDA: SIGNIFICANT CHANGE UP
GLUCOSE SERPL-MCNC: 89 MG/DL — SIGNIFICANT CHANGE UP (ref 70–99)
HCT VFR BLD CALC: 26.8 % — LOW (ref 39–50)
HGB BLD-MCNC: 8.5 G/DL — LOW (ref 13–17)
HOROWITZ INDEX BLDA+IHG-RTO: SIGNIFICANT CHANGE UP
MAGNESIUM SERPL-MCNC: 1.6 MG/DL — SIGNIFICANT CHANGE UP (ref 1.6–2.6)
MCHC RBC-ENTMCNC: 28.3 PG — SIGNIFICANT CHANGE UP (ref 27–34)
MCHC RBC-ENTMCNC: 31.8 GM/DL — LOW (ref 32–36)
MCV RBC AUTO: 88.9 FL — SIGNIFICANT CHANGE UP (ref 80–100)
PCO2 BLDA: 36 MMHG — SIGNIFICANT CHANGE UP (ref 32–46)
PH BLDA: 7.3 — LOW (ref 7.35–7.45)
PHOSPHATE SERPL-MCNC: 2.5 MG/DL — SIGNIFICANT CHANGE UP (ref 2.5–4.5)
PLATELET # BLD AUTO: 75 K/UL — LOW (ref 150–400)
PO2 BLDA: 98 MMHG — SIGNIFICANT CHANGE UP (ref 74–108)
POTASSIUM SERPL-MCNC: 4.5 MMOL/L — SIGNIFICANT CHANGE UP (ref 3.5–5.3)
POTASSIUM SERPL-SCNC: 4.5 MMOL/L — SIGNIFICANT CHANGE UP (ref 3.5–5.3)
PROCALCITONIN SERPL-MCNC: 0.08 NG/ML — SIGNIFICANT CHANGE UP
PROT SERPL-MCNC: 6.1 G/DL — SIGNIFICANT CHANGE UP (ref 6–8.3)
RBC # BLD: 3.01 M/UL — LOW (ref 4.2–5.8)
RBC # FLD: 18.9 % — HIGH (ref 10.3–14.5)
SAO2 % BLDA: 97 % — HIGH (ref 92–96)
SODIUM SERPL-SCNC: 141 MMOL/L — SIGNIFICANT CHANGE UP (ref 135–145)
SPECIMEN SOURCE: SIGNIFICANT CHANGE UP
TSH SERPL-MCNC: 2.02 UIU/ML — SIGNIFICANT CHANGE UP (ref 0.27–4.2)
WBC # BLD: 8.1 K/UL — SIGNIFICANT CHANGE UP (ref 3.8–10.5)
WBC # FLD AUTO: 8.1 K/UL — SIGNIFICANT CHANGE UP (ref 3.8–10.5)

## 2018-12-14 PROCEDURE — 99233 SBSQ HOSP IP/OBS HIGH 50: CPT

## 2018-12-14 RX ORDER — SODIUM CHLORIDE 9 MG/ML
500 INJECTION INTRAMUSCULAR; INTRAVENOUS; SUBCUTANEOUS ONCE
Qty: 0 | Refills: 0 | Status: DISCONTINUED | OUTPATIENT
Start: 2018-12-14 | End: 2018-12-14

## 2018-12-14 RX ORDER — SODIUM CHLORIDE 9 MG/ML
1000 INJECTION, SOLUTION INTRAVENOUS
Qty: 0 | Refills: 0 | Status: DISCONTINUED | OUTPATIENT
Start: 2018-12-14 | End: 2018-12-17

## 2018-12-14 RX ORDER — SODIUM CHLORIDE 9 MG/ML
500 INJECTION INTRAMUSCULAR; INTRAVENOUS; SUBCUTANEOUS ONCE
Qty: 0 | Refills: 0 | Status: COMPLETED | OUTPATIENT
Start: 2018-12-14 | End: 2018-12-14

## 2018-12-14 RX ORDER — MAGNESIUM SULFATE 500 MG/ML
1 VIAL (ML) INJECTION ONCE
Qty: 0 | Refills: 0 | Status: COMPLETED | OUTPATIENT
Start: 2018-12-14 | End: 2018-12-14

## 2018-12-14 RX ADMIN — ROBINUL 1 MILLIGRAM(S): 0.2 INJECTION INTRAMUSCULAR; INTRAVENOUS at 22:54

## 2018-12-14 RX ADMIN — PIPERACILLIN AND TAZOBACTAM 25 GRAM(S): 4; .5 INJECTION, POWDER, LYOPHILIZED, FOR SOLUTION INTRAVENOUS at 00:13

## 2018-12-14 RX ADMIN — Medication 1 DROP(S): at 06:03

## 2018-12-14 RX ADMIN — PIPERACILLIN AND TAZOBACTAM 25 GRAM(S): 4; .5 INJECTION, POWDER, LYOPHILIZED, FOR SOLUTION INTRAVENOUS at 00:22

## 2018-12-14 RX ADMIN — LATANOPROST 1 DROP(S): 0.05 SOLUTION/ DROPS OPHTHALMIC; TOPICAL at 00:15

## 2018-12-14 RX ADMIN — HEPARIN SODIUM 5000 UNIT(S): 5000 INJECTION INTRAVENOUS; SUBCUTANEOUS at 00:19

## 2018-12-14 RX ADMIN — ROBINUL 1 MILLIGRAM(S): 0.2 INJECTION INTRAMUSCULAR; INTRAVENOUS at 15:10

## 2018-12-14 RX ADMIN — SODIUM CHLORIDE 80 MILLILITER(S): 9 INJECTION, SOLUTION INTRAVENOUS at 22:56

## 2018-12-14 RX ADMIN — Medication 1 DROP(S): at 17:57

## 2018-12-14 RX ADMIN — Medication 3 MILLILITER(S): at 16:00

## 2018-12-14 RX ADMIN — Medication 0.5 MILLIGRAM(S): at 09:11

## 2018-12-14 RX ADMIN — DORZOLAMIDE HYDROCHLORIDE 1 DROP(S): 20 SOLUTION/ DROPS OPHTHALMIC at 06:03

## 2018-12-14 RX ADMIN — SODIUM CHLORIDE 3000 MILLILITER(S): 9 INJECTION INTRAMUSCULAR; INTRAVENOUS; SUBCUTANEOUS at 10:06

## 2018-12-14 RX ADMIN — PIPERACILLIN AND TAZOBACTAM 25 GRAM(S): 4; .5 INJECTION, POWDER, LYOPHILIZED, FOR SOLUTION INTRAVENOUS at 15:11

## 2018-12-14 RX ADMIN — Medication 1 DROP(S): at 06:02

## 2018-12-14 RX ADMIN — LINACLOTIDE 145 MICROGRAM(S): 145 CAPSULE, GELATIN COATED ORAL at 06:04

## 2018-12-14 RX ADMIN — HEPARIN SODIUM 5000 UNIT(S): 5000 INJECTION INTRAVENOUS; SUBCUTANEOUS at 22:54

## 2018-12-14 RX ADMIN — DORZOLAMIDE HYDROCHLORIDE 1 DROP(S): 20 SOLUTION/ DROPS OPHTHALMIC at 17:56

## 2018-12-14 RX ADMIN — ROBINUL 1 MILLIGRAM(S): 0.2 INJECTION INTRAMUSCULAR; INTRAVENOUS at 00:14

## 2018-12-14 RX ADMIN — PIPERACILLIN AND TAZOBACTAM 25 GRAM(S): 4; .5 INJECTION, POWDER, LYOPHILIZED, FOR SOLUTION INTRAVENOUS at 06:04

## 2018-12-14 RX ADMIN — LATANOPROST 1 DROP(S): 0.05 SOLUTION/ DROPS OPHTHALMIC; TOPICAL at 22:55

## 2018-12-14 RX ADMIN — Medication 4 MILLIGRAM(S): at 22:54

## 2018-12-14 RX ADMIN — Medication 4 MILLIGRAM(S): at 00:14

## 2018-12-14 RX ADMIN — Medication 3 MILLILITER(S): at 21:24

## 2018-12-14 RX ADMIN — PIPERACILLIN AND TAZOBACTAM 25 GRAM(S): 4; .5 INJECTION, POWDER, LYOPHILIZED, FOR SOLUTION INTRAVENOUS at 22:55

## 2018-12-14 RX ADMIN — Medication 100 GRAM(S): at 10:05

## 2018-12-14 RX ADMIN — SODIUM CHLORIDE 75 MILLILITER(S): 9 INJECTION, SOLUTION INTRAVENOUS at 00:44

## 2018-12-14 RX ADMIN — Medication 1 DROP(S): at 00:12

## 2018-12-14 RX ADMIN — HEPARIN SODIUM 5000 UNIT(S): 5000 INJECTION INTRAVENOUS; SUBCUTANEOUS at 15:10

## 2018-12-14 RX ADMIN — Medication 30 MILLIGRAM(S): at 06:02

## 2018-12-14 RX ADMIN — HEPARIN SODIUM 5000 UNIT(S): 5000 INJECTION INTRAVENOUS; SUBCUTANEOUS at 06:02

## 2018-12-14 RX ADMIN — Medication 1 DROP(S): at 15:10

## 2018-12-14 RX ADMIN — ROBINUL 1 MILLIGRAM(S): 0.2 INJECTION INTRAMUSCULAR; INTRAVENOUS at 06:02

## 2018-12-14 RX ADMIN — Medication 3 MILLILITER(S): at 03:30

## 2018-12-14 RX ADMIN — Medication 0.5 MILLIGRAM(S): at 21:24

## 2018-12-14 RX ADMIN — Medication 3 MILLILITER(S): at 09:13

## 2018-12-14 RX ADMIN — Medication 1 DROP(S): at 22:53

## 2018-12-14 NOTE — DIETITIAN INITIAL EVALUATION ADULT. - PROBLEM SELECTOR PLAN 1
?aspiration pna vs viral   cont IV Zosyn for now  await RVP.  min wheeze. cont prednisone 30mg qd for now and cont duonebs.   cont atropine SL and glycopyrrolate, Chest PT for excessive secretions  pulm consult

## 2018-12-14 NOTE — PROGRESS NOTE ADULT - ASSESSMENT
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  agree with npo status  #Aspiration PNA  -Patient does not meet sepsis criteria  -Zosyn  -High risk for aspiration - patient gets pureed diet but states he coughs with his diet. Would keep patient NPO pending speech eval and use PEG for medication administration. Would ask nutrition to assist with PEG feeds administration in the meanwhile  -Aspiration precautions  -ICU eval for guarded respiratory status    #Hypothermia  -Check cortisol level  -Check TSH    #Hypotension  -IVF bolus    #MELISSA (baseline Cr 1.3)  -Improved with IVF    #Chronic diastolic CHF  -Monitor volume status cautiously while getting IVF    #thombocytopenia  -New, likely reactive from underlying infectious process  -If persists, may need heme eval to assess peripheral smear  -CBC daily    DVT ppx HSQ (benefits>risks even with PLT count in 80s - monitor closely)    ICU eval in progress

## 2018-12-14 NOTE — PROGRESS NOTE ADULT - ASSESSMENT
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 Would keep patient NPO pending speech eval and use PEG for medication administration. Would ask nutrition to assist with PEG feeds administration in the meanwhile  -Aspiration precautions  -ICU eval for guarded respiratory status    #Hypothermia  -Check cortisol level  -Check TSH    #Hypotension  -IVF bolus    #MELISSA (baseline Cr 1.3)  -Improved with IVF    #Chronic diastolic CHF  -Monitor volume status cautiously while getting IVF    #thombocytopenia  -New, likely reactive from underlying infectious process  -If persists, may need heme eval to assess peripheral smear  -CBC daily    DVT ppx HSQ (benefits>risks even with PLT count in 80s - monitor closely)    ICU eval in progress

## 2018-12-14 NOTE — DIETITIAN INITIAL EVALUATION ADULT. - NS AS NUTRI INTERV ENTERAL NUTRITION3
Recommend Jevity 1.5 at goal rate of 55 cc/hr to provide 1980 kcals and 84 g protein as medically feasible/appropriate/Rate

## 2018-12-14 NOTE — CONSULT NOTE ADULT - SUBJECTIVE AND OBJECTIVE BOX
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 He denied fevers, chills, SOB or CP. He is concerned about his weight loss because he has been having more difficulty with eating and has been choking more frequently. He denied any NVD. He was treated with nebs,  Levaquin and prednisone with no improvement and sent to ED for SOB which he currently denies.   He has hx of aspiration pneumonia. (13 Dec 2018 04:44)      BRIEF HOSPITAL COURSE: Patient seen at bedside. At time of evaluation patient is awake and alert. Though oriented to self only. Denies any shortness of breath or chest pain. Though has thick secretions. During examination patient stating to be left alone and wants me to "Get lost".     PAST MEDICAL & SURGICAL HISTORY:  Psoriasis  GERD (gastroesophageal reflux disease)  Dyslipidemia  Cardiac pacemaker  S/P percutaneous endoscopic gastrostomy (PEG) tube placement    Allergies  No Known Allergies    Intolerances    FAMILY HISTORY:  No pertinent family history in first degree relatives    Social history:      Smoking: Unknown     Drinking: Unknown     Drug use: Unknown    Review of Systems: All other ROS negative except for what is state above    Medications:  acetaminophen   Tablet .. 650 milliGRAM(s) Oral every 6 hours PRN Temp greater or equal to 38C (100.4F), Mild Pain (1 - 3)  ALBUTerol/ipratropium for Nebulization 3 milliLiter(s) Nebulizer every 6 hours  atropine 1% Ophthalmic Solution for SubLingual Use 1 Drop(s) SubLingual three times a day  bisacodyl Suppository 10 milliGRAM(s) Rectal daily PRN Constipation  buDESOnide   0.5 milliGRAM(s) Respule 0.5 milliGRAM(s) Inhalation every 12 hours  cholecalciferol 1000 Unit(s) Oral daily  dextrose 5% + sodium chloride 0.9%. 1000 milliLiter(s) (80 mL/Hr) IV Continuous <Continuous>  dorzolamide 2% Ophthalmic Solution 1 Drop(s) Both EYES <User Schedule>  doxazosin 4 milliGRAM(s) Oral at bedtime  escitalopram 10 milliGRAM(s) Oral daily  glycopyrrolate 1 milliGRAM(s) Oral three times a day  heparin  Injectable 5000 Unit(s) SubCutaneous every 8 hours  latanoprost 0.005% Ophthalmic Solution 1 Drop(s) Both EYES at bedtime  linaclotide 145 MICROGram(s) Oral before breakfast  melatonin 3 milliGRAM(s) Oral at bedtime PRN Sleep  oxyCODONE    5 mG/acetaminophen 325 mG 2 Tablet(s) Oral every 6 hours PRN Severe Pain (7 - 10)  piperacillin/tazobactam IVPB. 3.375 Gram(s) IV Intermittent every 8 hours  polyethylene glycol 3350 17 Gram(s) Oral daily PRN Constipation  predniSONE   Tablet 30 milliGRAM(s) Oral daily  timolol 0.5% Solution 1 Drop(s) Both EYES two times a day    MEDICATIONS  (STANDING):  ALBUTerol/ipratropium for Nebulization 3 milliLiter(s) Nebulizer every 6 hours  atropine 1% Ophthalmic Solution for SubLingual Use 1 Drop(s) SubLingual three times a day  buDESOnide   0.5 milliGRAM(s) Respule 0.5 milliGRAM(s) Inhalation every 12 hours  cholecalciferol 1000 Unit(s) Oral daily  dextrose 5% + sodium chloride 0.9%. 1000 milliLiter(s) (80 mL/Hr) IV Continuous <Continuous>  dorzolamide 2% Ophthalmic Solution 1 Drop(s) Both EYES <User Schedule>  doxazosin 4 milliGRAM(s) Oral at bedtime  escitalopram 10 milliGRAM(s) Oral daily  glycopyrrolate 1 milliGRAM(s) Oral three times a day  heparin  Injectable 5000 Unit(s) SubCutaneous every 8 hours  latanoprost 0.005% Ophthalmic Solution 1 Drop(s) Both EYES at bedtime  linaclotide 145 MICROGram(s) Oral before breakfast  piperacillin/tazobactam IVPB. 3.375 Gram(s) IV Intermittent every 8 hours  predniSONE   Tablet 30 milliGRAM(s) Oral daily  timolol 0.5% Solution 1 Drop(s) Both EYES two times a day    MEDICATIONS  (PRN):  acetaminophen   Tablet .. 650 milliGRAM(s) Oral every 6 hours PRN Temp greater or equal to 38C (100.4F), Mild Pain (1 - 3)  bisacodyl Suppository 10 milliGRAM(s) Rectal daily PRN Constipation  melatonin 3 milliGRAM(s) Oral at bedtime PRN Sleep  oxyCODONE    5 mG/acetaminophen 325 mG 2 Tablet(s) Oral every 6 hours PRN Severe Pain (7 - 10)  polyethylene glycol 3350 17 Gram(s) Oral daily PRN Constipation    Home Medications:  Last Order Reconciliation Date: 18 @ 06:00 (Admission Reconciliation)  acetaminophen 325 mg oral tablet: 2 tab(s) orally every 6 hours, As needed, Mild- Moderate Pain (1 - 6) (18 @ 03:50)  acetaminophen 325 mg oral tablet: 2 tab(s) orally every 6 hours, As needed, Mild Pain (1 - 3) (18 @ 12:55)  acetaminophen 325 mg oral tablet: 2 tab(s) orally every 6 hours, As needed, Moderate Pain (4 - 6) (18 @ 09:13)  Allegra:  orally  (16 @ 12:35)  Allegra:  orally once a day (17 @ 12:54)  amLODIPine 5 mg oral tablet: 1 tab(s) orally once a day (18 @ 12:55)  atropine 1% ophthalmic solution: drop(s) sublingually 3 times a day (18 @ 03:50)  Augmentin 875 mg-125 mg oral tablet: 1 tab(s) orally every 12 hours (18 @ 13:16)  Augmentin 875 mg-125 mg oral tablet: 1 tab(s) orally every 12 hours    Until 18 (18 @ 13:26)  Augmentin 875 mg-125 mg oral tablet: 1 tab(s) orally every 12 hours    Until 18 (18 @ 13:13)  bisacodyl 10 mg rectal suppository: 1 suppository(ies) rectal once a day, As needed, Constipation (18 @ 03:50)  bisacodyl 5 mg oral delayed release tablet: 1 tab(s) orally every 12 hours, As needed, Constipation (18 @ 13:04)  budesonide 0.5 mg/2 mL inhalation suspension: 2 milliliter(s) inhaled 2 times a day (18 @ 06:00)  chlorhexidine 0.12% mucous membrane liquid: 15 milliliter(s) mucous membrane 2 times a day (18 @ 13:16)  cholecalciferol oral tablet: 1000 unit(s) orally once a day (18 @ 03:50)  clindamycin 300 mg oral capsule: 1 cap(s) orally every 6 hours (18 @ 18:26)  cyanocobalamin 1000 mcg oral tablet: 1 tab(s) orally once a day (18 @ 03:50)  dicloxacillin 500 mg oral capsule: 1 cap(s) orally every 6 hours  (18 @ 18:26)  docusate sodium 100 mg oral capsule: 1 cap(s) orally , As Needed (18 @ 13:04)  docusate sodium 100 mg oral capsule: 1 cap(s) orally once a day, As needed, Constipation (18 @ 10:31)  dorzolamide-timolol 2.23%-0.68% ophthalmic solution: 1 drop(s) to each affected eye 2 times a day (18 @ 06:00)  doxazosin 4 mg oral tablet: 1 tab(s) orally once a day (at bedtime) (18 @ 03:50)  doxazosin 4 mg oral tablet: 1 tab(s) orally once a day (at bedtime) (18 @ 12:55)  escitalopram 10 mg oral tablet: 1 tab(s) orally once a day (18 @ 03:50)  famotidine 20 mg oral tablet: 1 tab(s) orally once a day (18 @ 03:50)  famotidine 20 mg oral tablet: 1 tab(s) orally once a day (18 @ 20:35)  ferrous sulfate 300 mg/5 mL (60 mg elemental iron) oral liquid: 5 milliliter(s) orally once a day (18 @ 03:50)  fluconazole 100 mg oral tablet: 1 tab(s) orally once a day (18 @ 13:13)  fluconazole 100 mg oral tablet: 1 tab(s) orally once a day  END DATE: AUG 2, 2018 (18 @ 20:20)  glycopyrrolate 1 mg oral tablet: 1 tab(s) orally 3 times a day (18 @ 03:50)  hydrocortisone 2.5% topical ointment: 1 application topically once a day, As needed, Rash (18 @ 20:23)  hydrocortisone 2.5% topical ointment: Apply topically to affected area , As Needed (18 @ 13:04)  ipratropium-albuterol 0.5 mg-2.5 mg/3 mLinhalation solution: 3 milliliter(s) inhaled every 6 hours (18 @ 03:50)  K-Dur 10 oral tablet, extended release: 1 tab(s) orally 2 times a day (18 @ 03:50)  lactobacillus acidophilus oral capsule: 1 tab(s) orally every 12 weeks while on antibiotics.  (18 @ 03:50)  Lasix 20 mg oral tablet: 1 tab(s) orally once a day (18 @ 03:50)  latanoprost 0.005% ophthalmic solution: 1 drop(s) to each affected eye once a day (at bedtime) (18 @ 03:50)  Levaquin 250 mg oral tablet: 1 tab(s) orally every 24 hours start 12/12 x 7 days (18 @ 06:00)  linaclotide 145 mcg oral capsule: 1 cap(s) orally once a day (before a meal) (18 @ 03:50)  Lumigan 0.01% ophthalmic solution: 1 drop(s) in the left eye once a day (in the evening) (18 @ 13:04)  melatonin 3 mg oral tablet: 1 tab(s) orally once a day (at bedtime) (18 @ 15:28)  melatonin 5 mg oral tablet: 1 tab(s) orally once a day (at bedtime) (18 @ 03:50)  Minocin 100 mg oral capsule: 1 cap(s) orally every 12 hours. 18- is day #6 of 14 day course. Needs to continue until 18 (18 @ 03:50)  Myrbetriq 50 mg oral tablet, extended release: 1 tab(s) orally once a day (18 @ 10:23)  nystatin 100,000 units/g topical powder: 1 application topically 3 times a day (18 @ 13:04)  nystatin 100,000 units/g topical powder: 1 application topically 3 times a day (18 @ 13:16)  Percocet 5/325 oral tablet: 2 tab(s) orally every 4 hours, As needed, Severe Pain (7 - 10) (18 @ 03:50)  polyethylene glycol 3350 oral powder for reconstitution: 17 gram(s) orally once a day, As needed, Constipation (18 @ 03:50)  predniSONE: 30  orally once a day x 5days started  (18 @ 06:00)  scopolamine: 1 milligram(s) transdermal 3 times a week (18 @ 13:16)  senna oral tablet: 2 tab(s) orally once a day (at bedtime) (18 @ 03:50)  senna oral tablet: 2 tab(s) orally once a day (at bedtime) (18 @ 13:04)  simvastatin 20 mg oral tablet: 1 tab(s) orally once a day (at bedtime) (18 @ 03:50)  simvastatin 20 mg oral tablet:  orally  (18 @ 12:55)  Vitamin B12: 1 tab(s) orally once a day (18 @ 13:04)  Vitamin D3 2000 intl units oral tablet: 1 tab(s) orally once a day (18 @ 13:26)  Zantac 150 oral tablet: 1 tab(s) orally , As Needed (18 @ 13:04)    LABS:                      8.5    8.1   )-----------( 75       ( 14 Dec 2018 05:45 )             26.8     12-14  141  |  112<H>  |  32<H>  ----------------------------<  89  4.5   |  21<L>  |  1.49<H>    Ca    8.4      14 Dec 2018 05:45  Phos  2.5     12-14  Mg     1.6         TPro  6.1  /  Alb  2.2<L>  /  TBili  0.1<L>  /  DBili  x   /  AST  12  /  ALT  11  /  AlkPhos  68      PT/INR - ( 13 Dec 2018 03:00 )   PT: 15.5 sec;   INR: 1.37 ratio      PTT - ( 13 Dec 2018 03:00 )  PTT:45.0 sec  Urinalysis Basic - ( 13 Dec 2018 06:25 )    Color: Yellow / Appearance: slightly cloudy / S.020 / pH: x  Gluc: x / Ketone: Negative  / Bili: Negative / Urobili: Negative   Blood: x / Protein: 30 mg/dL / Nitrite: Negative   Leuk Esterase: Moderate / RBC: 5-10 /HPF / WBC 11-25 /HPF   Sq Epi: x / Non Sq Epi: Neg.-Few / Bacteria: Few /HPF    Procalcitonin, Serum: 0.08 ng/mL (18 @ 05:45)    CULTURES: (if applicable)    Culture - Urine (collected 18 @ 06:25)  Source: .Urine Clean Catch (Midstream)  Final Report (18 @ 10:48):    No growth    Culture - Blood (collected 18 @ 03:00)  Source: .Blood Blood-Peripheral  Preliminary Report (18 @ 10:01):    No growth to date.    Culture - Blood (collected 18 @ 03:00)  Source: .Blood Blood-Peripheral  Preliminary Report (18 @ 10:01):    No growth to date.    Rapid RVP Result: NotDetec (18 @ 03:00)    ABG - ( 14 Dec 2018 06:59 )  pH, Arterial: 7.30  pH, Blood: x     /  pCO2: 36    /  pO2: 98    / HCO3: x     / Base Excess: x     /  SaO2: 97        VITALS:  T(C): 36.9 (18 @ 07:06), Max: 36.9 (18 @ 06:37)  T(F): 98.5 (18 @ 07:06), Max: 98.5 (18 @ 06:37)  HR: 60 (18 @ 07:06) (59 - 88)  BP: 113/73 (18 @ 07:06) (95/52 - 143/75)  BP(mean): --  ABP: --  ABP(mean): --  RR: 15 (18 @ 07:06) (14 - 16)  SpO2: 98% (18 @ 09:11) (96% - 100%)  CVP(mm Hg): --  CVP(cm H2O): --    Ins and Outs     18 @ 07:01  -  18 @ 11:41  --------------------------------------------------------  IN: 600 mL / OUT: 0 mL / NET: 600 mL    Height (cm): 180.34 (18 @ 03:18)  Weight (kg): 92.1 (18 @ 06:37)  BMI (kg/m2): 28.3 (18 @ 06:37)    Physical Examination:  GENERAL:               Awake and oriented to self  HEENT:                   No scleral icterus   PULM:                     Coarse breath sounds   CVS:                        S1, S2,  No Murmur  ABD:                        Soft, nondistended, nontender, normoactive bowel sounds, + PEG  EXT:                        No edema, nontender, No Cyanosis or Clubbing   Vascular:                 Warm Extremities, Normal Capillary refill, Normal Distal Pulses  SKIN:                      Warm and well perfused, no rashes noted  NEURO:                  Alert, oriented, moves left arm,   PSYC:                      Mildly agitated

## 2018-12-14 NOTE — PROGRESS NOTE ADULT - SUBJECTIVE AND OBJECTIVE BOX
Patient is a 85y old  Male who presents with a chief complaint of cough (13 Dec 2018 16:49)      Patient seen and examined at bedside. Per overnight RN, patient less responsive, more tachypneic, hypotensive, getting a liter of fluid.     ALLERGIES:  No Known Allergies    MEDICATIONS  (STANDING):  ALBUTerol/ipratropium for Nebulization 3 milliLiter(s) Nebulizer every 6 hours  atropine 1% Ophthalmic Solution for SubLingual Use 1 Drop(s) SubLingual three times a day  buDESOnide   0.5 milliGRAM(s) Respule 0.5 milliGRAM(s) Inhalation every 12 hours  cholecalciferol 1000 Unit(s) Oral daily  dextrose 5% + sodium chloride 0.9%. 1000 milliLiter(s) (80 mL/Hr) IV Continuous <Continuous>  dorzolamide 2% Ophthalmic Solution 1 Drop(s) Both EYES <User Schedule>  doxazosin 4 milliGRAM(s) Oral at bedtime  escitalopram 10 milliGRAM(s) Oral daily  glycopyrrolate 1 milliGRAM(s) Oral three times a day  heparin  Injectable 5000 Unit(s) SubCutaneous every 8 hours  latanoprost 0.005% Ophthalmic Solution 1 Drop(s) Both EYES at bedtime  linaclotide 145 MICROGram(s) Oral before breakfast  piperacillin/tazobactam IVPB. 3.375 Gram(s) IV Intermittent every 8 hours  predniSONE   Tablet 30 milliGRAM(s) Oral daily  sodium chloride 0.9% Bolus 500 milliLiter(s) IV Bolus once  timolol 0.5% Solution 1 Drop(s) Both EYES two times a day    MEDICATIONS  (PRN):  acetaminophen   Tablet .. 650 milliGRAM(s) Oral every 6 hours PRN Temp greater or equal to 38C (100.4F), Mild Pain (1 - 3)  bisacodyl Suppository 10 milliGRAM(s) Rectal daily PRN Constipation  melatonin 3 milliGRAM(s) Oral at bedtime PRN Sleep  oxyCODONE    5 mG/acetaminophen 325 mG 2 Tablet(s) Oral every 6 hours PRN Severe Pain (7 - 10)  polyethylene glycol 3350 17 Gram(s) Oral daily PRN Constipation    Vital Signs Last 24 Hrs  T(F): 98.5 (14 Dec 2018 07:06), Max: 98.5 (14 Dec 2018 06:37)  HR: 60 (14 Dec 2018 07:06) (59 - 88)  BP: 113/73 (14 Dec 2018 07:06) (95/52 - 143/75)  RR: 15 (14 Dec 2018 07:06) (14 - 19)  SpO2: 98% (14 Dec 2018 07:06) (96% - 100%)  I&O's Summary    PHYSICAL EXAM:  General: A/O x 3, tachypneic (RR - 24 by my count)  ENT: Dry oral cavity  Lungs: Coarse bilateral breath sounds  Cardio: RRR, S1/S2  Abdomen: Soft, Nontender, Nondistended; Bowel sounds present; PEG+  Extremities: No calf tenderness    LABS:                        8.5    8.1   )-----------( 75       ( 14 Dec 2018 05:45 )             26.8         141  |  112  |  32  ----------------------------<  89  4.5   |  21  |  1.49    Ca    8.4      14 Dec 2018 05:45  Phos  2.5       Mg     1.6         TPro  6.1  /  Alb  2.2  /  TBili  0.1  /  DBili  x   /  AST  12  /  ALT  11  /  AlkPhos  68      eGFR if Non African American: 42 mL/min/1.73M2 (18 @ 05:45)  eGFR if : 49 mL/min/1.73M2 (18 @ 05:45)    PT/INR - ( 13 Dec 2018 03:00 )   PT: 15.5 sec;   INR: 1.37 ratio         PTT - ( 13 Dec 2018 03:00 )  PTT:45.0 sec  Lactate, Blood: 1.0 mmol/L ( @ 03:00)              ABG - ( 14 Dec 2018 06:59 )  pH, Arterial: 7.30  pH, Blood: x     /  pCO2: 36    /  pO2: 98    / HCO3: x     / Base Excess: x     /  SaO2: 97                CAPILLARY BLOOD GLUCOSE      POCT Blood Glucose.: 85 mg/dL (14 Dec 2018 06:48)      Urinalysis Basic - ( 13 Dec 2018 06:25 )    Color: Yellow / Appearance: slightly cloudy / S.020 / pH: x  Gluc: x / Ketone: Negative  / Bili: Negative / Urobili: Negative   Blood: x / Protein: 30 mg/dL / Nitrite: Negative   Leuk Esterase: Moderate / RBC: 5-10 /HPF / WBC 11-25 /HPF   Sq Epi: x / Non Sq Epi: Neg.-Few / Bacteria: Few /HPF        RADIOLOGY & ADDITIONAL TESTS:  < from: Xray Chest 1 View AP/PA (18 @ 03:14) >  IMPRESSION: Clear lungs at this time.    < end of copied text >    < from: TTE Echo Complete w/Doppler (18 @ 10:34) >   1. Left ventricular ejection fraction, by visual estimation, is 60%.   2. Normal global left ventricular systolic function.   3. Spectral Doppler shows restrictive pattern of left ventricular   myocardial filling (Grade III diastolicdysfunction).   4. There is moderate concentric left ventricular hypertrophy.   5. Moderately enlarged right ventricle.   6. Mild mitral annular calcification.   7. Thickening and calcification of the anterior and posterior mitral   valve leaflets.  8. Moderate-severe tricuspid regurgitation.   9. Mild aortic regurgitation.  10. Moderate aortic valve stenosis.  11. Dilatation of the aortic root.  12. Estimated pulmonary artery systolic pressure is 56.5 mmHg assuming a   right atrial pressure of 10 mmHg, which is consistent with moderate   pulmonary hypertension.  13. LA volume Index is 59.0 ml/m² ml/m2.  14. The aortic valve mean gradient is 8.4 mmHg consistent with normally   opening aortic valve.    < end of copied text >        Care Discussed with Consultants/Other Providers: Dr. Blunt, Dr. Lezama

## 2018-12-14 NOTE — PROGRESS NOTE ADULT - SUBJECTIVE AND OBJECTIVE BOX
Follow-up Pulm Progress Note    Enrique Simmons MD  [phone]    No new respiratory events overnight.  Denies SOB/CP.     Medications:  Vital Signs Last 24 Hrs  T(C): 36.3 (14 Dec 2018 16:13), Max: 36.9 (14 Dec 2018 06:37)  T(F): 97.4 (14 Dec 2018 16:13), Max: 98.5 (14 Dec 2018 06:37)  HR: 60 (14 Dec 2018 16:13) (60 - 60)  BP: 113/57 (14 Dec 2018 16:13) (95/52 - 114/63)  BP(mean): --  RR: 14 (14 Dec 2018 16:13) (14 - 15)  SpO2: 98% (14 Dec 2018 16:13) (96% - 98%)    ABG - ( 14 Dec 2018 06:59 )  pH, Arterial: 7.30  pH, Blood: x     /  pCO2: 36    /  pO2: 98    / HCO3: x     / Base Excess: x     /  SaO2: 97                        LABS:                        8.5    8.1   )-----------( 75       ( 14 Dec 2018 05:45 )             26.8     12-14    141  |  112<H>  |  32<H>  ----------------------------<  89  4.5   |  21<L>  |  1.49<H>    Ca    8.4      14 Dec 2018 05:45  Phos  2.5     12-14  Mg     1.6     12-14    TPro  6.1  /  Alb  2.2<L>  /  TBili  0.1<L>  /  DBili  x   /  AST  12  /  ALT  11  /  AlkPhos  68  12-14      PT/INR - ( 13 Dec 2018 03:00 )   PT: 15.5 sec;   INR: 1.37 ratio         PTT - ( 13 Dec 2018 03:00 )  PTT:45.0 sec  Procalcitonin, Serum: 0.08 ng/mL (12-14-18 @ 05:45)        CULTURES:  Culture Results:   No growth (12-13 @ 06:25)  Culture Results:   No growth to date. (12-13 @ 03:00)  Culture Results:   No growth to date. (12-13 @ 03:00)    Most recent blood culture -- 12-13 @ 06:25   -- -- .Urine Clean Catch (Midstream) 12-13 @ 06:25  Most recent blood culture -- 12-13 @ 03:00   -- -- .Blood Blood-Peripheral 12-13 @ 03:00      Physical Examination:  PULM: Scattered coarse bs  CVS: Regular rate and rhythm, no murmurs, rubs, or gallops  ABD: Soft, non-tender  EXT:  No clubbing, cyanosis, or edema    RADIOLOGY REVIEWED  CXR:< from: Xray Chest 1 View AP/PA (12.13.18 @ 03:14) >  EXAM:  XR CHEST AP OR PA 1V      PROCEDURE DATE:  12/13/2018        INTERPRETATION:  AP erect chest on December 13, 2018 at 2:50 AM. Patient   has sepsis.    Patient's chin obscures the apices.    Heart is magnified by technique.    Left-sided pacemaker again noted.    Lung fields and pleural surfaces are unremarkable at this time. On   September 12 there was a vague density in right lower lung field which is   no longer evident.    IMPRESSION: Clear lungs at this time.    < end of copied text >      CT chest:    TTE:

## 2018-12-14 NOTE — PROVIDER CONTACT NOTE (OTHER) - ACTION/TREATMENT ORDERED:
Dr. Lezama came up to floor to assess pt,  give pt 500ml of NS , order ABGs, and  Bood sugar and After the bolus to leave fluid  @80cc

## 2018-12-14 NOTE — DIETITIAN INITIAL EVALUATION ADULT. - OTHER INFO
Awaiting possible transfer to ICU.  Unable to speak with patient secondary to confusion.  Skin intact.  Weight loss PTA noted but amount unknown. Awaiting possible transfer to ICU.  Unable to speak with patient secondary to confusion.  Skin intact.  Weight loss PTA noted but amount unknown.  Swallow eval reviewed.  Speech Pathologist recommended puree with nectar liquids.  Now NPO due to concern for aspiration pneumonia. Coughing on purees noted PTA. Has PEG.

## 2018-12-14 NOTE — CONSULT NOTE ADULT - SUBJECTIVE AND OBJECTIVE BOX
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  The patient was started on Zosyn. The patient is confused so HPI is limited, we were consulted by the medical team for the management of antibiotics for aspiration pneumonia.     REVIEW OF SYSTEMS:  All other review of systems negative (Comprehensive ROS)    PAST MEDICAL & SURGICAL HISTORY:  Psoriasis  GERD (gastroesophageal reflux disease)  Dyslipidemia  Cardiac pacemaker  S/P percutaneous endoscopic gastrostomy (PEG) tube placement      Allergies    No Known Allergies    Intolerances            FAMILY HISTORY:  No pertinent family history in first degree relatives      SOCIAL HISTORY:  Smoking: remote hx    ETOH: No    Drug Use: No    T(F): 98.5 (18 @ 07:06), Max: 98.5 (18 @ 06:37)  HR: 60 (18 @ 07:06)  BP: 108/53 (18 @ 11:40)  RR: 14 (18 @ 11:40)  SpO2: 96% (18 @ 11:40)  Wt(kg): --    PHYSICAL EXAM:  General: alert, no acute distress  Eyes:  anicteric, no conjunctival injection, no discharge  Oropharynx: no lesions or injection 	  Neck: supple, without adenopathy  Lungs: coarse  Heart: regular rate and rhythm; no murmur, rubs or gallops  Abdomen: soft, nondistended, nontender, without mass or organomegaly  Skin: no lesions  Extremities: trace edema of LE   Neurologic: confused, agitated  moves all extremities    LAB RESULTS:                        8.5    8.1   )-----------( 75       ( 14 Dec 2018 05:45 )             26.8     12-14    141  |  112<H>  |  32<H>  ----------------------------<  89  4.5   |  21<L>  |  1.49<H>    Ca    8.4      14 Dec 2018 05:45  Phos  2.5     12-14  Mg     1.6     12-14    TPro  6.1  /  Alb  2.2<L>  /  TBili  0.1<L>  /  DBili  x   /  AST  12  /  ALT  11  /  AlkPhos  68  12-14    LIVER FUNCTIONS - ( 14 Dec 2018 05:45 )  Alb: 2.2 g/dL / Pro: 6.1 g/dL / ALK PHOS: 68 U/L / ALT: 11 U/L DA / AST: 12 U/L / GGT: x           Urinalysis Basic - ( 13 Dec 2018 06:25 )    Color: Yellow / Appearance: slightly cloudy / S.020 / pH: x  Gluc: x / Ketone: Negative  / Bili: Negative / Urobili: Negative   Blood: x / Protein: 30 mg/dL / Nitrite: Negative   Leuk Esterase: Moderate / RBC: 5-10 /HPF / WBC 11-25 /HPF   Sq Epi: x / Non Sq Epi: Neg.-Few / Bacteria: Few /HPF        MICROBIOLOGY:  RECENT CULTURES:   @ 06:25 .Urine Clean Catch (Midstream)     No growth       @ 03:00 .Blood Blood-Peripheral     No growth to date.            RADIOLOGY REVIEWED:      Antimicrobials Day #    piperacillin/tazobactam IVPB. 3.375 Gram(s) IV Intermittent every 8 hours    Other Medications:  acetaminophen   Tablet .. 650 milliGRAM(s) Oral every 6 hours PRN  ALBUTerol/ipratropium for Nebulization 3 milliLiter(s) Nebulizer every 6 hours  atropine 1% Ophthalmic Solution for SubLingual Use 1 Drop(s) SubLingual three times a day  bisacodyl Suppository 10 milliGRAM(s) Rectal daily PRN  buDESOnide   0.5 milliGRAM(s) Respule 0.5 milliGRAM(s) Inhalation every 12 hours  cholecalciferol 1000 Unit(s) Oral daily  dextrose 5% + sodium chloride 0.9%. 1000 milliLiter(s) IV Continuous <Continuous>  dorzolamide 2% Ophthalmic Solution 1 Drop(s) Both EYES <User Schedule>  doxazosin 4 milliGRAM(s) Oral at bedtime  escitalopram 10 milliGRAM(s) Oral daily  glycopyrrolate 1 milliGRAM(s) Oral three times a day  heparin  Injectable 5000 Unit(s) SubCutaneous every 8 hours  latanoprost 0.005% Ophthalmic Solution 1 Drop(s) Both EYES at bedtime  linaclotide 145 MICROGram(s) Oral before breakfast  melatonin 3 milliGRAM(s) Oral at bedtime PRN  oxyCODONE    5 mG/acetaminophen 325 mG 2 Tablet(s) Oral every 6 hours PRN  polyethylene glycol 3350 17 Gram(s) Oral daily PRN  predniSONE   Tablet 30 milliGRAM(s) Oral daily  timolol 0.5% Solution 1 Drop(s) Both EYES two times a day

## 2018-12-15 LAB
ANION GAP SERPL CALC-SCNC: 8 MMOL/L — SIGNIFICANT CHANGE UP (ref 5–17)
BASOPHILS # BLD AUTO: 0 K/UL — SIGNIFICANT CHANGE UP (ref 0–0.2)
BASOPHILS NFR BLD AUTO: 0.5 % — SIGNIFICANT CHANGE UP (ref 0–2)
BUN SERPL-MCNC: 29 MG/DL — HIGH (ref 7–23)
CALCIUM SERPL-MCNC: 8.5 MG/DL — SIGNIFICANT CHANGE UP (ref 8.4–10.5)
CHLORIDE SERPL-SCNC: 114 MMOL/L — HIGH (ref 96–108)
CO2 SERPL-SCNC: 21 MMOL/L — LOW (ref 22–31)
CREAT SERPL-MCNC: 1.54 MG/DL — HIGH (ref 0.5–1.3)
EOSINOPHIL # BLD AUTO: 0 K/UL — SIGNIFICANT CHANGE UP (ref 0–0.5)
EOSINOPHIL NFR BLD AUTO: 0.4 % — SIGNIFICANT CHANGE UP (ref 0–6)
GLUCOSE SERPL-MCNC: 88 MG/DL — SIGNIFICANT CHANGE UP (ref 70–99)
HCT VFR BLD CALC: 27.1 % — LOW (ref 39–50)
HGB BLD-MCNC: 8.6 G/DL — LOW (ref 13–17)
LYMPHOCYTES # BLD AUTO: 1.5 K/UL — SIGNIFICANT CHANGE UP (ref 1–3.3)
LYMPHOCYTES # BLD AUTO: 23.4 % — SIGNIFICANT CHANGE UP (ref 13–44)
MAGNESIUM SERPL-MCNC: 1.9 MG/DL — SIGNIFICANT CHANGE UP (ref 1.6–2.6)
MCHC RBC-ENTMCNC: 28.4 PG — SIGNIFICANT CHANGE UP (ref 27–34)
MCHC RBC-ENTMCNC: 31.9 GM/DL — LOW (ref 32–36)
MCV RBC AUTO: 89 FL — SIGNIFICANT CHANGE UP (ref 80–100)
MONOCYTES # BLD AUTO: 0.6 K/UL — SIGNIFICANT CHANGE UP (ref 0–0.9)
MONOCYTES NFR BLD AUTO: 9.3 % — HIGH (ref 1–9)
NEUTROPHILS # BLD AUTO: 4.3 K/UL — SIGNIFICANT CHANGE UP (ref 1.8–7.4)
NEUTROPHILS NFR BLD AUTO: 66.4 % — SIGNIFICANT CHANGE UP (ref 43–77)
PHOSPHATE SERPL-MCNC: 2.2 MG/DL — LOW (ref 2.5–4.5)
PLAT MORPH BLD: SIGNIFICANT CHANGE UP
PLATELET # BLD AUTO: 68 K/UL — LOW (ref 150–400)
POTASSIUM SERPL-MCNC: 4 MMOL/L — SIGNIFICANT CHANGE UP (ref 3.5–5.3)
POTASSIUM SERPL-SCNC: 4 MMOL/L — SIGNIFICANT CHANGE UP (ref 3.5–5.3)
RBC # BLD: 3.04 M/UL — LOW (ref 4.2–5.8)
RBC # FLD: 19 % — HIGH (ref 10.3–14.5)
RBC BLD AUTO: SIGNIFICANT CHANGE UP
SODIUM SERPL-SCNC: 143 MMOL/L — SIGNIFICANT CHANGE UP (ref 135–145)
WBC # BLD: 6.4 K/UL — SIGNIFICANT CHANGE UP (ref 3.8–10.5)
WBC # FLD AUTO: 6.4 K/UL — SIGNIFICANT CHANGE UP (ref 3.8–10.5)

## 2018-12-15 PROCEDURE — 99233 SBSQ HOSP IP/OBS HIGH 50: CPT

## 2018-12-15 RX ORDER — POTASSIUM PHOSPHATE, MONOBASIC POTASSIUM PHOSPHATE, DIBASIC 236; 224 MG/ML; MG/ML
15 INJECTION, SOLUTION INTRAVENOUS ONCE
Qty: 0 | Refills: 0 | Status: COMPLETED | OUTPATIENT
Start: 2018-12-15 | End: 2018-12-15

## 2018-12-15 RX ADMIN — HEPARIN SODIUM 5000 UNIT(S): 5000 INJECTION INTRAVENOUS; SUBCUTANEOUS at 06:14

## 2018-12-15 RX ADMIN — ROBINUL 1 MILLIGRAM(S): 0.2 INJECTION INTRAMUSCULAR; INTRAVENOUS at 13:37

## 2018-12-15 RX ADMIN — PIPERACILLIN AND TAZOBACTAM 25 GRAM(S): 4; .5 INJECTION, POWDER, LYOPHILIZED, FOR SOLUTION INTRAVENOUS at 13:38

## 2018-12-15 RX ADMIN — Medication 1 DROP(S): at 06:12

## 2018-12-15 RX ADMIN — PIPERACILLIN AND TAZOBACTAM 25 GRAM(S): 4; .5 INJECTION, POWDER, LYOPHILIZED, FOR SOLUTION INTRAVENOUS at 22:35

## 2018-12-15 RX ADMIN — Medication 4 MILLIGRAM(S): at 22:39

## 2018-12-15 RX ADMIN — Medication 30 MILLIGRAM(S): at 06:15

## 2018-12-15 RX ADMIN — Medication 1 DROP(S): at 13:37

## 2018-12-15 RX ADMIN — DORZOLAMIDE HYDROCHLORIDE 1 DROP(S): 20 SOLUTION/ DROPS OPHTHALMIC at 17:39

## 2018-12-15 RX ADMIN — Medication 1000 UNIT(S): at 13:37

## 2018-12-15 RX ADMIN — DORZOLAMIDE HYDROCHLORIDE 1 DROP(S): 20 SOLUTION/ DROPS OPHTHALMIC at 06:13

## 2018-12-15 RX ADMIN — ROBINUL 1 MILLIGRAM(S): 0.2 INJECTION INTRAMUSCULAR; INTRAVENOUS at 06:14

## 2018-12-15 RX ADMIN — ESCITALOPRAM OXALATE 10 MILLIGRAM(S): 10 TABLET, FILM COATED ORAL at 13:37

## 2018-12-15 RX ADMIN — LATANOPROST 1 DROP(S): 0.05 SOLUTION/ DROPS OPHTHALMIC; TOPICAL at 22:34

## 2018-12-15 RX ADMIN — Medication 1 DROP(S): at 17:39

## 2018-12-15 RX ADMIN — POTASSIUM PHOSPHATE, MONOBASIC POTASSIUM PHOSPHATE, DIBASIC 62.5 MILLIMOLE(S): 236; 224 INJECTION, SOLUTION INTRAVENOUS at 17:38

## 2018-12-15 RX ADMIN — Medication 0.5 MILLIGRAM(S): at 20:06

## 2018-12-15 RX ADMIN — ROBINUL 1 MILLIGRAM(S): 0.2 INJECTION INTRAMUSCULAR; INTRAVENOUS at 22:39

## 2018-12-15 RX ADMIN — LINACLOTIDE 145 MICROGRAM(S): 145 CAPSULE, GELATIN COATED ORAL at 06:17

## 2018-12-15 RX ADMIN — Medication 3 MILLILITER(S): at 21:26

## 2018-12-15 RX ADMIN — Medication 1 DROP(S): at 06:16

## 2018-12-15 RX ADMIN — Medication 3 MILLILITER(S): at 03:35

## 2018-12-15 RX ADMIN — PIPERACILLIN AND TAZOBACTAM 25 GRAM(S): 4; .5 INJECTION, POWDER, LYOPHILIZED, FOR SOLUTION INTRAVENOUS at 06:15

## 2018-12-15 RX ADMIN — Medication 1 DROP(S): at 22:32

## 2018-12-15 RX ADMIN — SODIUM CHLORIDE 80 MILLILITER(S): 9 INJECTION, SOLUTION INTRAVENOUS at 22:34

## 2018-12-15 RX ADMIN — Medication 0.5 MILLIGRAM(S): at 09:20

## 2018-12-15 RX ADMIN — HEPARIN SODIUM 5000 UNIT(S): 5000 INJECTION INTRAVENOUS; SUBCUTANEOUS at 13:37

## 2018-12-15 RX ADMIN — Medication 3 MILLILITER(S): at 15:31

## 2018-12-15 RX ADMIN — Medication 3 MILLILITER(S): at 09:20

## 2018-12-15 NOTE — PROGRESS NOTE ADULT - ASSESSMENT
Goals of Care Conversation    Goals of Care Conversation:   · Participants	:Pts wife     Reviewed possibility of cpr and intubation.  Currently wife feels that pt would have wanted everything done.    MOLST: wife not interested at this time

## 2018-12-15 NOTE — PROGRESS NOTE ADULT - SUBJECTIVE AND OBJECTIVE BOX
Patient is a 85y old  Male who presents with a chief complaint of cough (14 Dec 2018 16:52)      Patient seen and examined at bedside, no events overnight, in no acute distress.     ALLERGIES:  No Known Allergies    MEDICATIONS:  acetaminophen   Tablet .. 650 milliGRAM(s) Oral every 6 hours PRN  ALBUTerol/ipratropium for Nebulization 3 milliLiter(s) Nebulizer every 6 hours  atropine 1% Ophthalmic Solution for SubLingual Use 1 Drop(s) SubLingual three times a day  bisacodyl Suppository 10 milliGRAM(s) Rectal daily PRN  buDESOnide   0.5 milliGRAM(s) Respule 0.5 milliGRAM(s) Inhalation every 12 hours  cholecalciferol 1000 Unit(s) Oral daily  dextrose 5% + sodium chloride 0.9%. 1000 milliLiter(s) IV Continuous <Continuous>  dorzolamide 2% Ophthalmic Solution 1 Drop(s) Both EYES <User Schedule>  doxazosin 4 milliGRAM(s) Oral at bedtime  escitalopram 10 milliGRAM(s) Oral daily  glycopyrrolate 1 milliGRAM(s) Oral three times a day  heparin  Injectable 5000 Unit(s) SubCutaneous every 8 hours  latanoprost 0.005% Ophthalmic Solution 1 Drop(s) Both EYES at bedtime  linaclotide 145 MICROGram(s) Oral before breakfast  melatonin 3 milliGRAM(s) Oral at bedtime PRN  oxyCODONE    5 mG/acetaminophen 325 mG 2 Tablet(s) Oral every 6 hours PRN  piperacillin/tazobactam IVPB. 3.375 Gram(s) IV Intermittent every 8 hours  polyethylene glycol 3350 17 Gram(s) Oral daily PRN  predniSONE   Tablet 30 milliGRAM(s) Oral daily  timolol 0.5% Solution 1 Drop(s) Both EYES two times a day    Vital Signs Last 24 Hrs  T(C): 36.2 (15 Dec 2018 05:45), Max: 36.4 (14 Dec 2018 21:45)  T(F): 97.2 (15 Dec 2018 05:45), Max: 97.5 (14 Dec 2018 21:45)  HR: 61 (15 Dec 2018 09:20) (60 - 65)  BP: 129/79 (15 Dec 2018 05:45) (113/57 - 144/82)  BP(mean): --  RR: 14 (15 Dec 2018 05:45) (14 - 15)  SpO2: 100% (15 Dec 2018 09:20) (97% - 100%)  I&O's Summary    14 Dec 2018 07:01  -  15 Dec 2018 07:00  --------------------------------------------------------  IN: 2820 mL / OUT: 0 mL / NET: 2820 mL          PAST MEDICAL & SURGICAL HISTORY:  Psoriasis  GERD (gastroesophageal reflux disease)  Dyslipidemia  Cardiac pacemaker  S/P percutaneous endoscopic gastrostomy (PEG) tube placement      Home Medications:  acetaminophen 325 mg oral tablet: 2 tab(s) orally every 6 hours, As needed, Mild- Moderate Pain (1 - 6) (13 Dec 2018 03:50)  atropine 1% ophthalmic solution: drop(s) sublingually 3 times a day (13 Dec 2018 03:50)  bisacodyl 10 mg rectal suppository: 1 suppository(ies) rectal once a day, As needed, Constipation (13 Dec 2018 03:50)  budesonide 0.5 mg/2 mL inhalation suspension: 2 milliliter(s) inhaled 2 times a day (13 Dec 2018 06:00)  cholecalciferol oral tablet: 1000 unit(s) orally once a day (13 Dec 2018 03:50)  cyanocobalamin 1000 mcg oral tablet: 1 tab(s) orally once a day (13 Dec 2018 03:50)  dorzolamide-timolol 2.23%-0.68% ophthalmic solution: 1 drop(s) to each affected eye 2 times a day (13 Dec 2018 06:00)  doxazosin 4 mg oral tablet: 1 tab(s) orally once a day (at bedtime) (13 Dec 2018 03:50)  escitalopram 10 mg oral tablet: 1 tab(s) orally once a day (13 Dec 2018 03:50)  glycopyrrolate 1 mg oral tablet: 1 tab(s) orally 3 times a day (13 Dec 2018 03:50)  ipratropium-albuterol 0.5 mg-2.5 mg/3 mLinhalation solution: 3 milliliter(s) inhaled every 6 hours (13 Dec 2018 03:50)  K-Dur 10 oral tablet, extended release: 1 tab(s) orally 2 times a day (13 Dec 2018 03:50)  latanoprost 0.005% ophthalmic solution: 1 drop(s) to each affected eye once a day (at bedtime) (13 Dec 2018 03:50)  Levaquin 250 mg oral tablet: 1 tab(s) orally every 24 hours start 12/12 x 7 days (13 Dec 2018 06:00)  linaclotide 145 mcg oral capsule: 1 cap(s) orally once a day (before a meal) (13 Dec 2018 03:50)  melatonin 5 mg oral tablet: 1 tab(s) orally once a day (at bedtime) (13 Dec 2018 03:50)  Percocet 5/325 oral tablet: 2 tab(s) orally every 4 hours, As needed, Severe Pain (7 - 10) (13 Dec 2018 03:50)  polyethylene glycol 3350 oral powder for reconstitution: 17 gram(s) orally once a day, As needed, Constipation (13 Dec 2018 03:50)  predniSONE: 30  orally once a day x 5days started  (13 Dec 2018 06:00)      PHYSICAL EXAM:  General: NAD, A/O x1, confused  ENT: MMM  Neck: Supple, No JVD  Lungs: B/S scattered coarse  Cardio: RRR, S1/S2, No murmurs  Abdomen: +PEG, Soft, Nontender, Nondistended; Bowel sounds present  Neuro: Confused, mild agitation   Extremities: No clubbing, cyanosis, B/L trace edema    LABS:                        8.6    6.4   )-----------( 68       ( 15 Dec 2018 07:15 )             27.1     12-15    143  |  114  |  29  ----------------------------<  88  4.0   |  21  |  1.54    Ca    8.5      15 Dec 2018 07:15  Phos  2.2     12-15  Mg     1.9     12-15    TPro  6.1  /  Alb  2.2  /  TBili  0.1  /  DBili  x   /  AST  12  /  ALT  11  /  AlkPhos  68  12-14    PT/INR - ( 13 Dec 2018 03:00 )   PT: 15.5 sec;   INR: 1.37 ratio         PTT - ( 13 Dec 2018 03:00 )  PTT:45.0 sec  Lactate, Blood: 1.0 mmol/L ( @ 03:00)      TSH 2.02   TSH with FT4 reflex --  Total T3 --      ABG - ( 14 Dec 2018 06:59 )  pH, Arterial: 7.30  pH, Blood: x     /  pCO2: 36    /  pO2: 98    / HCO3: x     / Base Excess: x     /  SaO2: 97                CAPILLARY BLOOD GLUCOSE          Urinalysis Basic - ( 13 Dec 2018 06:25 )    Color: Yellow / Appearance: slightly cloudy / S.020 / pH: x  Gluc: x / Ketone: Negative  / Bili: Negative / Urobili: Negative   Blood: x / Protein: 30 mg/dL / Nitrite: Negative   Leuk Esterase: Moderate / RBC: 5-10 /HPF / WBC 11-25 /HPF   Sq Epi: x / Non Sq Epi: Neg.-Few / Bacteria: Few /HPF        Culture - Urine (collected 13 Dec 2018 06:25)  Source: .Urine Clean Catch (Midstream)  Final Report (14 Dec 2018 10:48):    No growth    Culture - Blood (collected 13 Dec 2018 03:00)  Source: .Blood Blood-Peripheral  Preliminary Report (14 Dec 2018 10:01):    No growth to date.    Culture - Blood (collected 13 Dec 2018 03:00)  Source: .Blood Blood-Peripheral  Preliminary Report (14 Dec 2018 10:01):    No growth to date.        RADIOLOGY & ADDITIONAL TESTS: < from: Xray Chest 1 View AP/PA (18 @ 03:14) >    IMPRESSION: Clear lungs at this time.    < end of copied text >      Care Discussed with Consultants/Other Providers: Hospitalist

## 2018-12-15 NOTE — PROGRESS NOTE ADULT - ASSESSMENT
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 Aspiration PNA  - Patient does not meet sepsis criteria  - Pt. failed speech and swallow, will keep NPO  - ID consult appreciated: Agree with Zosyn for aspiration pneumonia anticipate 5-7 day course   continue supportive care     2. Hypothermia - improved, monitor temp. TSH - wnl     3. Hypotension - resolved, cont. IVF as pt is NPO     4. MELISSA (baseline Cr 1.3)  Creatinine 1.54 on 12/15/18  Cont. IVF, avoid nephrotoxic drugs     5. Chronic diastolic CHF  Monitor fludi status o avoid overload while on IVF    6. Thombocytopenia - ternding down Plt 68 today 12/15/18  New, likely reactive from underlying infectious process  Consider heme eval to assess peripheral smear  CBC daily

## 2018-12-16 LAB
ANION GAP SERPL CALC-SCNC: 10 MMOL/L — SIGNIFICANT CHANGE UP (ref 5–17)
BUN SERPL-MCNC: 25 MG/DL — HIGH (ref 7–23)
CALCIUM SERPL-MCNC: 8.3 MG/DL — LOW (ref 8.4–10.5)
CHLORIDE SERPL-SCNC: 117 MMOL/L — HIGH (ref 96–108)
CO2 SERPL-SCNC: 18 MMOL/L — LOW (ref 22–31)
CREAT SERPL-MCNC: 1.53 MG/DL — HIGH (ref 0.5–1.3)
GLUCOSE SERPL-MCNC: 95 MG/DL — SIGNIFICANT CHANGE UP (ref 70–99)
HCT VFR BLD CALC: 25.5 % — LOW (ref 39–50)
HGB BLD-MCNC: 8.3 G/DL — LOW (ref 13–17)
MCHC RBC-ENTMCNC: 28.4 PG — SIGNIFICANT CHANGE UP (ref 27–34)
MCHC RBC-ENTMCNC: 32.6 GM/DL — SIGNIFICANT CHANGE UP (ref 32–36)
MCV RBC AUTO: 87.1 FL — SIGNIFICANT CHANGE UP (ref 80–100)
PLATELET # BLD AUTO: 86 K/UL — LOW (ref 150–400)
POTASSIUM SERPL-MCNC: 3.9 MMOL/L — SIGNIFICANT CHANGE UP (ref 3.5–5.3)
POTASSIUM SERPL-SCNC: 3.9 MMOL/L — SIGNIFICANT CHANGE UP (ref 3.5–5.3)
RBC # BLD: 2.93 M/UL — LOW (ref 4.2–5.8)
RBC # FLD: 18.7 % — HIGH (ref 10.3–14.5)
SODIUM SERPL-SCNC: 145 MMOL/L — SIGNIFICANT CHANGE UP (ref 135–145)
WBC # BLD: 6 K/UL — SIGNIFICANT CHANGE UP (ref 3.8–10.5)
WBC # FLD AUTO: 6 K/UL — SIGNIFICANT CHANGE UP (ref 3.8–10.5)

## 2018-12-16 PROCEDURE — 99233 SBSQ HOSP IP/OBS HIGH 50: CPT

## 2018-12-16 RX ADMIN — PIPERACILLIN AND TAZOBACTAM 25 GRAM(S): 4; .5 INJECTION, POWDER, LYOPHILIZED, FOR SOLUTION INTRAVENOUS at 14:46

## 2018-12-16 RX ADMIN — Medication 4 MILLIGRAM(S): at 22:09

## 2018-12-16 RX ADMIN — ESCITALOPRAM OXALATE 10 MILLIGRAM(S): 10 TABLET, FILM COATED ORAL at 11:33

## 2018-12-16 RX ADMIN — LINACLOTIDE 145 MICROGRAM(S): 145 CAPSULE, GELATIN COATED ORAL at 05:55

## 2018-12-16 RX ADMIN — Medication 3 MILLILITER(S): at 09:28

## 2018-12-16 RX ADMIN — Medication 0.5 MILLIGRAM(S): at 09:28

## 2018-12-16 RX ADMIN — Medication 1 DROP(S): at 22:09

## 2018-12-16 RX ADMIN — Medication 3 MILLILITER(S): at 03:15

## 2018-12-16 RX ADMIN — Medication 1 DROP(S): at 17:45

## 2018-12-16 RX ADMIN — Medication 1 DROP(S): at 05:51

## 2018-12-16 RX ADMIN — ROBINUL 1 MILLIGRAM(S): 0.2 INJECTION INTRAMUSCULAR; INTRAVENOUS at 14:47

## 2018-12-16 RX ADMIN — ROBINUL 1 MILLIGRAM(S): 0.2 INJECTION INTRAMUSCULAR; INTRAVENOUS at 05:52

## 2018-12-16 RX ADMIN — Medication 30 MILLIGRAM(S): at 05:53

## 2018-12-16 RX ADMIN — SODIUM CHLORIDE 80 MILLILITER(S): 9 INJECTION, SOLUTION INTRAVENOUS at 11:33

## 2018-12-16 RX ADMIN — PIPERACILLIN AND TAZOBACTAM 25 GRAM(S): 4; .5 INJECTION, POWDER, LYOPHILIZED, FOR SOLUTION INTRAVENOUS at 05:50

## 2018-12-16 RX ADMIN — DORZOLAMIDE HYDROCHLORIDE 1 DROP(S): 20 SOLUTION/ DROPS OPHTHALMIC at 17:44

## 2018-12-16 RX ADMIN — Medication 3 MILLILITER(S): at 15:24

## 2018-12-16 RX ADMIN — SODIUM CHLORIDE 80 MILLILITER(S): 9 INJECTION, SOLUTION INTRAVENOUS at 22:10

## 2018-12-16 RX ADMIN — DORZOLAMIDE HYDROCHLORIDE 1 DROP(S): 20 SOLUTION/ DROPS OPHTHALMIC at 05:51

## 2018-12-16 RX ADMIN — PIPERACILLIN AND TAZOBACTAM 25 GRAM(S): 4; .5 INJECTION, POWDER, LYOPHILIZED, FOR SOLUTION INTRAVENOUS at 22:10

## 2018-12-16 RX ADMIN — Medication 0.5 MILLIGRAM(S): at 21:39

## 2018-12-16 RX ADMIN — Medication 3 MILLILITER(S): at 21:38

## 2018-12-16 RX ADMIN — ROBINUL 1 MILLIGRAM(S): 0.2 INJECTION INTRAMUSCULAR; INTRAVENOUS at 22:09

## 2018-12-16 RX ADMIN — LATANOPROST 1 DROP(S): 0.05 SOLUTION/ DROPS OPHTHALMIC; TOPICAL at 22:09

## 2018-12-16 RX ADMIN — Medication 1 DROP(S): at 14:47

## 2018-12-16 RX ADMIN — Medication 1000 UNIT(S): at 11:33

## 2018-12-16 NOTE — PROGRESS NOTE ADULT - SUBJECTIVE AND OBJECTIVE BOX
Patient is a 85y old  Male who presents with a chief complaint of cough, admitted for asp PNA      Patient seen and examined at bedside, no events overnight, in no acute distress.     Vital Signs Last 24 Hrs  T(C): 37.1 (16 Dec 2018 06:09), Max: 37.1 (16 Dec 2018 06:09)  T(F): 98.8 (16 Dec 2018 06:09), Max: 98.8 (16 Dec 2018 06:09)  HR: 60 (16 Dec 2018 09:29) (60 - 66)  BP: 115/59 (16 Dec 2018 06:09) (115/59 - 156/61)  BP(mean): --  RR: 14 (16 Dec 2018 06:09) (14 - 14)  SpO2: 95% (16 Dec 2018 09:29) (95% - 100%)      PHYSICAL EXAM:  General: NAD, A/O x1-2, confused  ENT: MMM  Neck: Supple, No JVD  Lungs: B/S scattered coarse  Cardio: RRR, S1/S2, No murmurs  Abdomen: +PEG, Soft, Nontender, Nondistended; Bowel sounds present  Neuro: Confused, mild agitation   Extremities: No clubbing, cyanosis, B/L trace edema                          8.3    6.0   )-----------( 86       ( 16 Dec 2018 06:14 )             25.5     16 Dec 2018 06:14    145    |  117    |  25     ----------------------------<  95     3.9     |  18     |  1.53     Ca    8.3        16 Dec 2018 06:14  Phos  2.2       15 Dec 2018 07:15  Mg     1.9       15 Dec 2018 07:15          CAPILLARY BLOOD GLUCOSE    MEDICATIONS  (STANDING):  ALBUTerol/ipratropium for Nebulization 3 milliLiter(s) Nebulizer every 6 hours  atropine 1% Ophthalmic Solution for SubLingual Use 1 Drop(s) SubLingual three times a day  buDESOnide   0.5 milliGRAM(s) Respule 0.5 milliGRAM(s) Inhalation every 12 hours  cholecalciferol 1000 Unit(s) Oral daily  dextrose 5% + sodium chloride 0.9%. 1000 milliLiter(s) (80 mL/Hr) IV Continuous <Continuous>  dorzolamide 2% Ophthalmic Solution 1 Drop(s) Both EYES <User Schedule>  doxazosin 4 milliGRAM(s) Oral at bedtime  escitalopram 10 milliGRAM(s) Oral daily  glycopyrrolate 1 milliGRAM(s) Oral three times a day  latanoprost 0.005% Ophthalmic Solution 1 Drop(s) Both EYES at bedtime  linaclotide 145 MICROGram(s) Oral before breakfast  piperacillin/tazobactam IVPB. 3.375 Gram(s) IV Intermittent every 8 hours  predniSONE   Tablet 30 milliGRAM(s) Oral daily  timolol 0.5% Solution 1 Drop(s) Both EYES two times a day    MEDICATIONS  (PRN):  acetaminophen   Tablet .. 650 milliGRAM(s) Oral every 6 hours PRN Temp greater or equal to 38C (100.4F), Mild Pain (1 - 3)  bisacodyl Suppository 10 milliGRAM(s) Rectal daily PRN Constipation  melatonin 3 milliGRAM(s) Oral at bedtime PRN Sleep  oxyCODONE    5 mG/acetaminophen 325 mG 2 Tablet(s) Oral every 6 hours PRN Severe Pain (7 - 10)  polyethylene glycol 3350 17 Gram(s) Oral daily PRN Constipation

## 2018-12-16 NOTE — PROGRESS NOTE ADULT - ASSESSMENT
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 Aspiration PNA    - Pt. failed speech and swallow, will keep NPO  - ID consult appreciated: Zosyn day #4    2. Hypothermia -resolved    3. Hypotension - resolved    4. MELISSA (baseline Cr 1.3)  Creatinine 1.54 on 12/15/18  Cont. IVF, avoid nephrotoxic drugs     5. Chronic diastolic CHF  Monitor fludi status o avoid overload while on IVF    6. Thombocytopenia -improved slightly. Present on admission. Consider Hem consult. 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 Aspiration PNA    - Pt. failed speech and swallow, will keep NPO  - ID consult appreciated: Zosyn day #4    2. Hypothermia -resolved    3. Hypotension - resolved    4. MELISSA (baseline Cr 1.3)  Creatinine 1.54 on 12/15/18  Cont. IVF, avoid nephrotoxic drugs   hypocalcemic; but 9.7 corrected for hypoalbuminemia    5. Chronic diastolic CHF  Monitor fludi status o avoid overload while on IVF    6. Thombocytopenia -improved slightly. Present on admission. Consider Hem consult.

## 2018-12-16 NOTE — PHARMACOTHERAPY INTERVENTION NOTE - COMMENTS
Patient's platelets have dropped to 68.  In view of low platelets I notified Dr. Lezama to see if she would hold the heparin or switch to an alternate agent.  She said she would prefer to wait and see if platelets drop any further and in the mean time she will pass this information on to the day shift hospitalist for f/u, therefore the outcome of this intervention is pending.

## 2018-12-17 LAB
ANION GAP SERPL CALC-SCNC: 6 MMOL/L — SIGNIFICANT CHANGE UP (ref 5–17)
BUN SERPL-MCNC: 21 MG/DL — SIGNIFICANT CHANGE UP (ref 7–23)
CALCIUM SERPL-MCNC: 8.4 MG/DL — SIGNIFICANT CHANGE UP (ref 8.4–10.5)
CHLORIDE SERPL-SCNC: 116 MMOL/L — HIGH (ref 96–108)
CO2 SERPL-SCNC: 23 MMOL/L — SIGNIFICANT CHANGE UP (ref 22–31)
CREAT SERPL-MCNC: 1.51 MG/DL — HIGH (ref 0.5–1.3)
GLUCOSE SERPL-MCNC: 86 MG/DL — SIGNIFICANT CHANGE UP (ref 70–99)
HCT VFR BLD CALC: 28.1 % — LOW (ref 39–50)
HGB BLD-MCNC: 8.9 G/DL — LOW (ref 13–17)
MCHC RBC-ENTMCNC: 28.4 PG — SIGNIFICANT CHANGE UP (ref 27–34)
MCHC RBC-ENTMCNC: 31.8 GM/DL — LOW (ref 32–36)
MCV RBC AUTO: 89.2 FL — SIGNIFICANT CHANGE UP (ref 80–100)
PLATELET # BLD AUTO: 82 K/UL — LOW (ref 150–400)
POTASSIUM SERPL-MCNC: 3.6 MMOL/L — SIGNIFICANT CHANGE UP (ref 3.5–5.3)
POTASSIUM SERPL-SCNC: 3.6 MMOL/L — SIGNIFICANT CHANGE UP (ref 3.5–5.3)
RBC # BLD: 3.15 M/UL — LOW (ref 4.2–5.8)
RBC # FLD: 19 % — HIGH (ref 10.3–14.5)
SODIUM SERPL-SCNC: 145 MMOL/L — SIGNIFICANT CHANGE UP (ref 135–145)
WBC # BLD: 6.3 K/UL — SIGNIFICANT CHANGE UP (ref 3.8–10.5)
WBC # FLD AUTO: 6.3 K/UL — SIGNIFICANT CHANGE UP (ref 3.8–10.5)

## 2018-12-17 PROCEDURE — 99233 SBSQ HOSP IP/OBS HIGH 50: CPT

## 2018-12-17 RX ADMIN — Medication 1000 UNIT(S): at 12:35

## 2018-12-17 RX ADMIN — Medication 1 DROP(S): at 21:59

## 2018-12-17 RX ADMIN — PIPERACILLIN AND TAZOBACTAM 25 GRAM(S): 4; .5 INJECTION, POWDER, LYOPHILIZED, FOR SOLUTION INTRAVENOUS at 21:59

## 2018-12-17 RX ADMIN — LINACLOTIDE 145 MICROGRAM(S): 145 CAPSULE, GELATIN COATED ORAL at 06:47

## 2018-12-17 RX ADMIN — Medication 3 MILLILITER(S): at 21:16

## 2018-12-17 RX ADMIN — ESCITALOPRAM OXALATE 10 MILLIGRAM(S): 10 TABLET, FILM COATED ORAL at 12:35

## 2018-12-17 RX ADMIN — Medication 3 MILLILITER(S): at 09:07

## 2018-12-17 RX ADMIN — ROBINUL 1 MILLIGRAM(S): 0.2 INJECTION INTRAMUSCULAR; INTRAVENOUS at 21:59

## 2018-12-17 RX ADMIN — ROBINUL 1 MILLIGRAM(S): 0.2 INJECTION INTRAMUSCULAR; INTRAVENOUS at 05:30

## 2018-12-17 RX ADMIN — PIPERACILLIN AND TAZOBACTAM 25 GRAM(S): 4; .5 INJECTION, POWDER, LYOPHILIZED, FOR SOLUTION INTRAVENOUS at 05:28

## 2018-12-17 RX ADMIN — Medication 4 MILLIGRAM(S): at 21:59

## 2018-12-17 RX ADMIN — Medication 3 MILLILITER(S): at 15:32

## 2018-12-17 RX ADMIN — Medication 1 DROP(S): at 05:29

## 2018-12-17 RX ADMIN — SODIUM CHLORIDE 80 MILLILITER(S): 9 INJECTION, SOLUTION INTRAVENOUS at 12:35

## 2018-12-17 RX ADMIN — Medication 3 MILLILITER(S): at 04:21

## 2018-12-17 RX ADMIN — DORZOLAMIDE HYDROCHLORIDE 1 DROP(S): 20 SOLUTION/ DROPS OPHTHALMIC at 17:57

## 2018-12-17 RX ADMIN — Medication 0.5 MILLIGRAM(S): at 09:06

## 2018-12-17 RX ADMIN — PIPERACILLIN AND TAZOBACTAM 25 GRAM(S): 4; .5 INJECTION, POWDER, LYOPHILIZED, FOR SOLUTION INTRAVENOUS at 14:00

## 2018-12-17 RX ADMIN — Medication 1 DROP(S): at 17:57

## 2018-12-17 RX ADMIN — LATANOPROST 1 DROP(S): 0.05 SOLUTION/ DROPS OPHTHALMIC; TOPICAL at 21:58

## 2018-12-17 RX ADMIN — Medication 0.5 MILLIGRAM(S): at 21:16

## 2018-12-17 RX ADMIN — DORZOLAMIDE HYDROCHLORIDE 1 DROP(S): 20 SOLUTION/ DROPS OPHTHALMIC at 05:29

## 2018-12-17 RX ADMIN — Medication 30 MILLIGRAM(S): at 05:30

## 2018-12-17 NOTE — PROGRESS NOTE ADULT - SUBJECTIVE AND OBJECTIVE BOX
Follow-up Pulm Progress Note    Enrique Simmons MD  [phone]    No new respiratory events overnight.  Denies SOB/CP. Less cough and dyspnea.    He was started on PEG feeding today. molst was reviewed on Saturday.  Medications:  Vital Signs Last 24 Hrs  T(C): 36.3 (16 Dec 2018 22:01), Max: 36.3 (16 Dec 2018 15:40)  T(F): 97.4 (16 Dec 2018 22:01), Max: 97.4 (16 Dec 2018 22:01)  HR: 60 (17 Dec 2018 09:09) (59 - 62)  BP: 133/80 (17 Dec 2018 05:26) (131/98 - 141/94)  BP(mean): --  RR: 15 (17 Dec 2018 05:26) (14 - 15)  SpO2: 100% (17 Dec 2018 09:09) (94% - 100%)          12-16 @ 07:01  -  12-17 @ 07:00  --------------------------------------------------------  IN: 1240 mL / OUT: 1 mL / NET: 1239 mL        LABS:                        8.9    6.3   )-----------( 82       ( 17 Dec 2018 07:20 )             28.1     12-17    145  |  116<H>  |  21  ----------------------------<  86  3.6   |  23  |  1.51<H>    Ca    8.4      17 Dec 2018 07:20                CULTURES:  Culture Results:   No growth (12-13 @ 06:25)  Culture Results:   No growth to date. (12-13 @ 03:00)  Culture Results:   No growth to date. (12-13 @ 03:00)    Most recent blood culture -- 12-13 @ 06:25   -- -- .Urine Clean Catch (Midstream) 12-13 @ 06:25  Most recent blood culture -- 12-13 @ 03:00   -- -- .Blood Blood-Peripheral 12-13 @ 03:00      Physical Examination:  PULM: Clear to auscultation bilaterally, no significant sputum production  CVS: Regular rate and rhythm, no murmurs, rubs, or gallops  ABD: Soft, non-tender  EXT:  No clubbing, cyanosis, or edema    RADIOLOGY REVIEWED  CXR:    CT chest:    TTE:

## 2018-12-17 NOTE — PROGRESS NOTE ADULT - SUBJECTIVE AND OBJECTIVE BOX
CC: f/u for possible aspiration pneumonia     Patient reports feeling OK he is alert and awake in bed he is currently afebrile     REVIEW OF SYSTEMS:  All other review of systems negative (Comprehensive ROS)      T(F): 97.9 (12-17-18 @ 15:13), Max: 97.9 (12-17-18 @ 15:13)  HR: 61 (12-17-18 @ 15:39)  BP: 144/78 (12-17-18 @ 15:13)  RR: 15 (12-17-18 @ 15:13)  SpO2: 100% (12-17-18 @ 15:39)  Wt(kg): --    PHYSICAL EXAM:  General: no acute distress  Eyes:  anicteric, no conjunctival injection, no discharge  Oropharynx: no lesions or injection 	  Neck: supple, without adenopathy  Lungs: clear to auscultation anterior  Heart: regular rate and rhythm; no murmur, rubs or gallops  Abdomen: soft, nondistended, nontender, without mass or organomegaly  Skin: no lesions  Extremities: no clubbing, cyanosis, or edema  Neurologic: alert, awake in bed     LAB RESULTS:                        8.9    6.3   )-----------( 82       ( 17 Dec 2018 07:20 )             28.1     12-17    145  |  116<H>  |  21  ----------------------------<  86  3.6   |  23  |  1.51<H>    Ca    8.4      17 Dec 2018 07:20          MICROBIOLOGY:  RECENT CULTURES:  12-13 @ 06:25 .Urine Clean Catch (Midstream)     No growth      12-13 @ 03:00 .Blood Blood-Peripheral     No growth to date.          RADIOLOGY REVIEWED:        Antimicrobials Day #    piperacillin/tazobactam IVPB. 3.375 Gram(s) IV Intermittent every 8 hours    Other Medications Reviewed

## 2018-12-17 NOTE — PROGRESS NOTE ADULT - ASSESSMENT
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 Aspiration PNA  ID consult appreciated: 5-7 days; Zosyn day #5    2. Dysphagia - failed speech & swallow   NPO with PEG feedings     3. MELISSA (baseline Cr 1.3)  Creatinine 1.51 on 12/17/18  Cont. IVF, avoid nephrotoxic drugs     5. Chronic diastolic CHF  Monitor fluid status and avoid overload while on IVF    6. Thombocytopenia -improved slightly. Present on admission. Consider Hem consult. 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 Aspiration PNA  ID consult appreciated: 5-7 days; Zosyn day #5    2. Dysphagia - failed speech & swallow   NPO with PEG feedings     3. MELISSA (baseline Cr 1.3)  Creatinine 1.61 on this admission  Cont. IVF, avoid nephrotoxic drugs     5. Chronic diastolic CHF  Monitor fluid status and avoid overload while on IVF    6. Thrombocytopenia -stable, likely reactive, present on admission. Outpatient heme eval if does not resolve

## 2018-12-17 NOTE — PROGRESS NOTE ADULT - ASSESSMENT
Follow-up Pulm Progress Note    Enrique Simmons MD  [phone]    No new respiratory events overnight.  Denies SOB/CP.     Medications:  Vital Signs Last 24 Hrs  T(C): 36.3 (16 Dec 2018 22:01), Max: 36.3 (16 Dec 2018 15:40)  T(F): 97.4 (16 Dec 2018 22:01), Max: 97.4 (16 Dec 2018 22:01)  HR: 60 (17 Dec 2018 09:09) (59 - 62)  BP: 133/80 (17 Dec 2018 05:26) (131/98 - 141/94)  BP(mean): --  RR: 15 (17 Dec 2018 05:26) (14 - 15)  SpO2: 100% (17 Dec 2018 09:09) (94% - 100%)          12-16 @ 07:01  -  12-17 @ 07:00  --------------------------------------------------------  IN: 1240 mL / OUT: 1 mL / NET: 1239 mL        LABS:                        8.9    6.3   )-----------( 82       ( 17 Dec 2018 07:20 )             28.1     12-17    145  |  116<H>  |  21  ----------------------------<  86  3.6   |  23  |  1.51<H>    Ca    8.4      17 Dec 2018 07:20                CULTURES:  Culture Results:   No growth (12-13 @ 06:25)  Culture Results:   No growth to date. (12-13 @ 03:00)  Culture Results:   No growth to date. (12-13 @ 03:00)    Most recent blood culture -- 12-13 @ 06:25   -- -- .Urine Clean Catch (Midstream) 12-13 @ 06:25  Most recent blood culture -- 12-13 @ 03:00   -- -- .Blood Blood-Peripheral 12-13 @ 03:00      Physical Examination:  PULM: breath sounds improved  CVS: Regular rate and rhythm, no murmurs, rubs, or gallops  ABD: Soft, non-tender  EXT:  No clubbing, cyanosis, or edema    RADIOLOGY REVIEWED  CXR:< from: Xray Chest 1 View AP/PA (12.13.18 @ 03:14) >  XAM:  XR CHEST AP OR PA 1V      PROCEDURE DATE:  12/13/2018        INTERPRETATION:  AP erect chest on December 13, 2018 at 2:50 AM. Patient   has sepsis.    Patient's chin obscures the apices.    Heart is magnified by technique.    Left-sided pacemaker again noted.    Lung fields and pleural surfaces are unremarkable at this time. On   September 12 there was a vague density in right lower lung field which is   no longer evident.    IMPRESSION: Clear lungs at this time.    < end of copied text >      CT chest:    TTE:  pulmonary:  aspiration pneumonia clinically improved. Goals of care and advanced directives reviewed with family on Saturday

## 2018-12-17 NOTE — PROGRESS NOTE ADULT - ASSESSMENT
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  reviewed his flow sheets he is afebrile  reviewed his labs his cbc wnl   reviewed micro his blood cx and urine cx are no growth to date     Plan   1.	DAY 5 of  Zosyn for aspiration, last day today to complete course   2.	continue supportive care as per medicine.

## 2018-12-18 LAB
ANION GAP SERPL CALC-SCNC: 8 MMOL/L — SIGNIFICANT CHANGE UP (ref 5–17)
BUN SERPL-MCNC: 21 MG/DL — SIGNIFICANT CHANGE UP (ref 7–23)
CALCIUM SERPL-MCNC: 8.5 MG/DL — SIGNIFICANT CHANGE UP (ref 8.4–10.5)
CHLORIDE SERPL-SCNC: 118 MMOL/L — HIGH (ref 96–108)
CO2 SERPL-SCNC: 23 MMOL/L — SIGNIFICANT CHANGE UP (ref 22–31)
CREAT SERPL-MCNC: 1.41 MG/DL — HIGH (ref 0.5–1.3)
CULTURE RESULTS: SIGNIFICANT CHANGE UP
CULTURE RESULTS: SIGNIFICANT CHANGE UP
GLUCOSE SERPL-MCNC: 89 MG/DL — SIGNIFICANT CHANGE UP (ref 70–99)
HCT VFR BLD CALC: 27 % — LOW (ref 39–50)
HGB BLD-MCNC: 8.9 G/DL — LOW (ref 13–17)
MCHC RBC-ENTMCNC: 28.8 PG — SIGNIFICANT CHANGE UP (ref 27–34)
MCHC RBC-ENTMCNC: 32.8 GM/DL — SIGNIFICANT CHANGE UP (ref 32–36)
MCV RBC AUTO: 87.9 FL — SIGNIFICANT CHANGE UP (ref 80–100)
PLATELET # BLD AUTO: 93 K/UL — LOW (ref 150–400)
POTASSIUM SERPL-MCNC: 3.8 MMOL/L — SIGNIFICANT CHANGE UP (ref 3.5–5.3)
POTASSIUM SERPL-SCNC: 3.8 MMOL/L — SIGNIFICANT CHANGE UP (ref 3.5–5.3)
RBC # BLD: 3.07 M/UL — LOW (ref 4.2–5.8)
RBC # FLD: 19.1 % — HIGH (ref 10.3–14.5)
SODIUM SERPL-SCNC: 149 MMOL/L — HIGH (ref 135–145)
SPECIMEN SOURCE: SIGNIFICANT CHANGE UP
SPECIMEN SOURCE: SIGNIFICANT CHANGE UP
WBC # BLD: 7.7 K/UL — SIGNIFICANT CHANGE UP (ref 3.8–10.5)
WBC # FLD AUTO: 7.7 K/UL — SIGNIFICANT CHANGE UP (ref 3.8–10.5)

## 2018-12-18 PROCEDURE — 99233 SBSQ HOSP IP/OBS HIGH 50: CPT | Mod: GC

## 2018-12-18 PROCEDURE — 99233 SBSQ HOSP IP/OBS HIGH 50: CPT

## 2018-12-18 RX ORDER — SODIUM CHLORIDE 9 MG/ML
1000 INJECTION INTRAMUSCULAR; INTRAVENOUS; SUBCUTANEOUS
Qty: 0 | Refills: 0 | Status: COMPLETED | OUTPATIENT
Start: 2018-12-18 | End: 2018-12-18

## 2018-12-18 RX ORDER — SODIUM CHLORIDE 9 MG/ML
1000 INJECTION, SOLUTION INTRAVENOUS
Qty: 0 | Refills: 0 | Status: DISCONTINUED | OUTPATIENT
Start: 2018-12-18 | End: 2018-12-19

## 2018-12-18 RX ADMIN — ESCITALOPRAM OXALATE 10 MILLIGRAM(S): 10 TABLET, FILM COATED ORAL at 11:16

## 2018-12-18 RX ADMIN — Medication 30 MILLIGRAM(S): at 06:13

## 2018-12-18 RX ADMIN — Medication 1 DROP(S): at 13:13

## 2018-12-18 RX ADMIN — DORZOLAMIDE HYDROCHLORIDE 1 DROP(S): 20 SOLUTION/ DROPS OPHTHALMIC at 06:13

## 2018-12-18 RX ADMIN — Medication 1 DROP(S): at 06:13

## 2018-12-18 RX ADMIN — Medication 3 MILLILITER(S): at 17:22

## 2018-12-18 RX ADMIN — Medication 0.5 MILLIGRAM(S): at 21:12

## 2018-12-18 RX ADMIN — SODIUM CHLORIDE 75 MILLILITER(S): 9 INJECTION INTRAMUSCULAR; INTRAVENOUS; SUBCUTANEOUS at 13:14

## 2018-12-18 RX ADMIN — Medication 3 MILLIGRAM(S): at 02:30

## 2018-12-18 RX ADMIN — Medication 1 DROP(S): at 22:09

## 2018-12-18 RX ADMIN — Medication 4 MILLIGRAM(S): at 22:08

## 2018-12-18 RX ADMIN — SODIUM CHLORIDE 75 MILLILITER(S): 9 INJECTION, SOLUTION INTRAVENOUS at 16:00

## 2018-12-18 RX ADMIN — Medication 0.5 MILLIGRAM(S): at 09:32

## 2018-12-18 RX ADMIN — LATANOPROST 1 DROP(S): 0.05 SOLUTION/ DROPS OPHTHALMIC; TOPICAL at 22:08

## 2018-12-18 RX ADMIN — ROBINUL 1 MILLIGRAM(S): 0.2 INJECTION INTRAMUSCULAR; INTRAVENOUS at 22:08

## 2018-12-18 RX ADMIN — LINACLOTIDE 145 MICROGRAM(S): 145 CAPSULE, GELATIN COATED ORAL at 06:13

## 2018-12-18 RX ADMIN — Medication 1000 UNIT(S): at 11:16

## 2018-12-18 RX ADMIN — Medication 3 MILLILITER(S): at 09:33

## 2018-12-18 RX ADMIN — Medication 3 MILLILITER(S): at 03:22

## 2018-12-18 RX ADMIN — Medication 1 DROP(S): at 17:32

## 2018-12-18 RX ADMIN — ROBINUL 1 MILLIGRAM(S): 0.2 INJECTION INTRAMUSCULAR; INTRAVENOUS at 06:13

## 2018-12-18 RX ADMIN — ROBINUL 1 MILLIGRAM(S): 0.2 INJECTION INTRAMUSCULAR; INTRAVENOUS at 13:13

## 2018-12-18 RX ADMIN — Medication 3 MILLILITER(S): at 21:12

## 2018-12-18 RX ADMIN — DORZOLAMIDE HYDROCHLORIDE 1 DROP(S): 20 SOLUTION/ DROPS OPHTHALMIC at 17:33

## 2018-12-18 NOTE — PROGRESS NOTE ADULT - SUBJECTIVE AND OBJECTIVE BOX
CC: f/u for possible aspiration pneumonia complete empiric course of antibiotics     Patient is in bed awake, he is confused     REVIEW OF SYSTEMS:  All other review of systems negative (Comprehensive ROS)      Vital Signs Last 24 Hrs  T(C): 36.1 (18 Dec 2018 15:18), Max: 36.7 (18 Dec 2018 05:45)  T(F): 97 (18 Dec 2018 15:18), Max: 98 (18 Dec 2018 05:45)  HR: 60 (18 Dec 2018 15:18) (59 - 64)  BP: 122/94 (18 Dec 2018 15:18) (122/94 - 156/88)  BP(mean): --  RR: 14 (18 Dec 2018 15:18) (14 - 14)  SpO2: 98% (18 Dec 2018 15:18) (98% - 100%)    PHYSICAL EXAM:  General: no acute distress  Eyes:  anicteric, no conjunctival injection, no discharge  Oropharynx: no lesions or injection 	  Neck: supple, without adenopathy  Lungs: clear to auscultation anterior  Heart: regular rate and rhythm; no murmur, rubs or gallops  Abdomen: soft, nondistended, nontender, without mass or organomegaly  Skin: no lesions  Extremities: no clubbing, cyanosis, or edema  Neurologic: confused     LAB RESULTS:                        8.9    7.7   )-----------( 93       ( 18 Dec 2018 06:36 )             27.0                           8.9    6.3   )-----------( 82       ( 17 Dec 2018 07:20 )             28.1     12-17    145  |  116<H>  |  21  ----------------------------<  86  3.6   |  23  |  1.51<H>    Ca    8.4      17 Dec 2018 07:20          MICROBIOLOGY:  RECENT CULTURES:  12-13 @ 06:25 .Urine Clean Catch (Midstream)     No growth      12-13 @ 03:00 .Blood Blood-Peripheral     No growth to date.          RADIOLOGY REVIEWED:        Antimicrobials Day #    piperacillin/tazobactam IVPB. 3.375 Gram(s) IV Intermittent every 8 hours    Other Medications Reviewed

## 2018-12-18 NOTE — PHYSICAL THERAPY INITIAL EVALUATION ADULT - ADDITIONAL COMMENTS
Pt admitted from [facility].  Pt is poor historian and unable to provide prior level of function.

## 2018-12-18 NOTE — PROGRESS NOTE ADULT - SUBJECTIVE AND OBJECTIVE BOX
Follow-up Pulm Progress Note    Enrique Simmons MD  [phone]    No new respiratory events overnight.  Denies SOB/CP.  more confused today    Medications:  Vital Signs Last 24 Hrs  T(C): 36.7 (18 Dec 2018 05:45), Max: 36.7 (18 Dec 2018 05:45)  T(F): 98 (18 Dec 2018 05:45), Max: 98 (18 Dec 2018 05:45)  HR: 59 (18 Dec 2018 05:45) (59 - 64)  BP: 134/73 (18 Dec 2018 05:45) (134/73 - 156/88)  BP(mean): --  RR: 14 (18 Dec 2018 05:45) (14 - 15)  SpO2: 99% (18 Dec 2018 05:45) (99% - 100%)          12-17 @ 07:01  -  12-18 @ 07:00  --------------------------------------------------------  IN: 1095 mL / OUT: 0 mL / NET: 1095 mL        LABS:                        8.9    7.7   )-----------( 93       ( 18 Dec 2018 06:36 )             27.0     12-18    149<H>  |  118<H>  |  21  ----------------------------<  89  3.8   |  23  |  1.41<H>    Ca    8.5      18 Dec 2018 06:36                CULTURES:  Culture Results:   No growth (12-13 @ 06:25)  Culture Results:   No growth at 5 days. (12-13 @ 03:00)  Culture Results:   No growth at 5 days. (12-13 @ 03:00)        Physical Examination:  PULM: bs improved  CVS: Regular rate and rhythm, no murmurs, rubs, or gallops  ABD: Soft, non-tender  EXT:  No clubbing, cyanosis, or edema

## 2018-12-18 NOTE — CDI QUERY NOTE - NSCDIOTHERTXTBX_GEN_ALL_CORE_HH
Nursing flowsheet = bedfast.     Nursing assessment -   Ambulation	4 = completely dependent   Transferring	4 = completely dependent   Toileting	4 = completely dependent   Bathing	4 = completely dependent   Dressing	4 = completely dependent   Eating	4 = completely dependent       Please provide your diagnosis for above data if clinically significant.

## 2018-12-18 NOTE — PROGRESS NOTE ADULT - ASSESSMENT
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 Aspiration PNA - completed course of ABX - IV Zosyn on 12/17/18  Monitor off Abx, ID following    2. Confusion 2/2 suspect electrolyte imbalance - hypernatremia  Correct electrolyte imbalance, re-assess mental status p IVF    3. Hypernatremia 2/2 suspect dehydration   IV fluids, monitor BMP     4. Dysphagia - failed speech & swallow   NPO with PEG feedings     5. MELISSA on CKD-3 (baseline Cr 1.3)  Creatinine 1.41 today - improved   Cont. IVF, avoid nephrotoxic drugs     6. CKD-3 (baseline creatinine = 1.3)  Cont IVF, avoid nephrotoxic drugs    7. Dysphagia - PEG feedings for now  Will be re-assessed by speech&swallow to decide if he can tolerate PO feedings     8. Chronic diastolic CHF  Monitor fluid status and avoid overload while on IVF    9. 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 Aspiration PNA - completed course of ABX - IV Zosyn on 12/17/18  Monitor off Abx, ID following    2. Confusion 2/2 suspect electrolyte imbalance - hypernatremia  Correct electrolyte imbalance, re-assess mental status p IVF    3. Hypernatremia 2/2 suspect dehydration   IV fluids, monitor BMP     4. Dysphagia   NPO with PEG feedings     5. MELISSA on CKD-3 (baseline Cr 1.3)  Creatinine 1.41 today - improved   Cont. IVF, avoid nephrotoxic drugs     6. CKD-3 (baseline creatinine = 1.3)  Cont IVF, avoid nephrotoxic drugs    7. Chronic diastolic CHF  Monitor fluid status and avoid overload while on IVF    8. 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 Aspiration PNA - completed course of ABX - IV Zosyn completed on 12/17/18  Monitor off Abx, ID following    2. Confusion 2/2 suspect electrolyte imbalance - hypernatremia  Correct electrolyte imbalance, re-assess mental status p IVF    3. Hypernatremia 2/2 suspect dehydration   IV fluids, monitor BMP     4. Dysphagia   NPO with PEG feedings     5. MELISSA on CKD-3 (baseline Cr 1.3)  Creatinine 1.41 today - improved   Cont. IVF, avoid nephrotoxic drugs     6. CKD-3 (baseline creatinine = 1.3)  Cont IVF, avoid nephrotoxic drugs    7. Chronic diastolic CHF  Monitor fluid status and avoid overload while on IVF    8. Thrombocytopenia - improved, Plts increased from 68 to 92  Hold prophylaxis with heparin/lovenox, PAS  Monitor Plts and consider heme/onc consult if down trending

## 2018-12-18 NOTE — PROGRESS NOTE ADULT - ASSESSMENT
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 Aspiration PNA - completed course of ABX - IV Zosyn on 12/17/18  Monitor off Abx, ID following    2. Confusion 2/2 suspect electrolyte imbalance - hypernatremia  Correct electrolyte imbalance, re-assess mental status p IVF    3. Hypernatremia 2/2 suspect dehydration   IV fluids, monitor BMP     4. Dysphagia   NPO with PEG feedings     5. MELISSA on CKD-3 (baseline Cr 1.3)  Creatinine 1.41 today - improved   Cont. IVF, avoid nephrotoxic drugs     6. CKD-3 (baseline creatinine = 1.3)  Cont IVF, avoid nephrotoxic drugs    7. Chronic diastolic CHF  Monitor fluid status and avoid overload while on IVF    8. Thrombocytopenia - improved, Plts increased from 68 to 92  Hold prophylaxis with heparin/lovenox, PAS  Monitor Plts and consider heme/onc consult if down trending

## 2018-12-18 NOTE — PHYSICAL THERAPY INITIAL EVALUATION ADULT - PRECAUTIONS/LIMITATIONS, REHAB EVAL
Preventive Health Recommendations  Male Ages 50 - 64    Yearly exam:             See your health care provider every year in order to  o   Review health changes.   o   Discuss preventive care.    o   Review your medicines if your doctor has prescribed any.     Have a cholesterol test every 5 years, or more frequently if you are at risk for high cholesterol/heart disease.     Have a diabetes test (fasting glucose) every three years. If you are at risk for diabetes, you should have this test more often.     Have a colonoscopy at age 50, or have a yearly FIT test (stool test). These exams will check for colon cancer.      Talk with your health care provider about whether or not a prostate cancer screening test (PSA) is right for you.    You should be tested each year for STDs (sexually transmitted diseases), if you re at risk.     Shots: Get a flu shot each year. Get a tetanus shot every 10 years.     Nutrition:    Eat at least 5 servings of fruits and vegetables daily.     Eat whole-grain bread, whole-wheat pasta and brown rice instead of white grains and rice.     Get adequate Calcium and Vitamin D.     Lifestyle    Exercise for at least 150 minutes a week (30 minutes a day, 5 days a week). This will help you control your weight and prevent disease.     Limit alcohol to one drink per day.     No smoking.     Wear sunscreen to prevent skin cancer.     See your dentist every six months for an exam and cleaning.     See your eye doctor every 1 to 2 years.     aspiration precautions/swallowing precautions

## 2018-12-18 NOTE — PROGRESS NOTE ADULT - ASSESSMENT
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  reviewed his flow sheets he is afebrile  reviewed his labs his cbc wnl   reviewed micro his blood cx and urine cx are no growth to date   he has completed a full course of empiric antibiotics for possible aspiration pneumonia     Plan   He has completed a full course of antibiotics, no signs of infection   reviewed and discussed treatment plan and completion of antibiotics with Dr Otto  Re consult us as needed

## 2018-12-18 NOTE — CDI QUERY NOTE - [id number]_GEN_ALL_CORE_FT
12/18/18 note - Confusion 2/2 suspect electrolyte imbalance - hypernatremia      Please provide your diagnosis for above data if clinically significant

## 2018-12-18 NOTE — PROGRESS NOTE ADULT - SUBJECTIVE AND OBJECTIVE BOX
Patient is a 85y old  Male who presents with a chief complaint of cough (17 Dec 2018 16:55)      Patient seen and examined at bedside, no events overnight, confused.     ALLERGIES:  No Known Allergies    MEDICATIONS:  acetaminophen   Tablet .. 650 milliGRAM(s) Oral every 6 hours PRN  ALBUTerol/ipratropium for Nebulization 3 milliLiter(s) Nebulizer every 6 hours  atropine 1% Ophthalmic Solution for SubLingual Use 1 Drop(s) SubLingual three times a day  bisacodyl Suppository 10 milliGRAM(s) Rectal daily PRN  buDESOnide   0.5 milliGRAM(s) Respule 0.5 milliGRAM(s) Inhalation every 12 hours  cholecalciferol 1000 Unit(s) Oral daily  dorzolamide 2% Ophthalmic Solution 1 Drop(s) Both EYES <User Schedule>  doxazosin 4 milliGRAM(s) Oral at bedtime  escitalopram 10 milliGRAM(s) Oral daily  glycopyrrolate 1 milliGRAM(s) Oral three times a day  latanoprost 0.005% Ophthalmic Solution 1 Drop(s) Both EYES at bedtime  linaclotide 145 MICROGram(s) Oral before breakfast  melatonin 3 milliGRAM(s) Oral at bedtime PRN  oxyCODONE    5 mG/acetaminophen 325 mG 2 Tablet(s) Oral every 6 hours PRN  polyethylene glycol 3350 17 Gram(s) Oral daily PRN  predniSONE   Tablet 30 milliGRAM(s) Oral daily  sodium chloride 0.9%. 1000 milliLiter(s) IV Continuous <Continuous>  timolol 0.5% Solution 1 Drop(s) Both EYES two times a day    Vital Signs Last 24 Hrs  T(C): 36.7 (18 Dec 2018 05:45), Max: 36.7 (18 Dec 2018 05:45)  T(F): 98 (18 Dec 2018 05:45), Max: 98 (18 Dec 2018 05:45)  HR: 59 (18 Dec 2018 05:45) (59 - 64)  BP: 134/73 (18 Dec 2018 05:45) (134/73 - 156/88)  BP(mean): --  RR: 14 (18 Dec 2018 05:45) (14 - 15)  SpO2: 99% (18 Dec 2018 05:45) (99% - 100%)  I&O's Summary    17 Dec 2018 07:01  -  18 Dec 2018 07:00  --------------------------------------------------------  IN: 1095 mL / OUT: 0 mL / NET: 1095 mL    18 Dec 2018 07:01  -  18 Dec 2018 11:59  --------------------------------------------------------  IN: 85 mL / OUT: 0 mL / NET: 85 mL          PAST MEDICAL & SURGICAL HISTORY:  Psoriasis  GERD (gastroesophageal reflux disease)  Dyslipidemia  Cardiac pacemaker  S/P percutaneous endoscopic gastrostomy (PEG) tube placement      Home Medications:  acetaminophen 325 mg oral tablet: 2 tab(s) orally every 6 hours, As needed, Mild- Moderate Pain (1 - 6) (13 Dec 2018 03:50)  atropine 1% ophthalmic solution: drop(s) sublingually 3 times a day (13 Dec 2018 03:50)  bisacodyl 10 mg rectal suppository: 1 suppository(ies) rectal once a day, As needed, Constipation (13 Dec 2018 03:50)  budesonide 0.5 mg/2 mL inhalation suspension: 2 milliliter(s) inhaled 2 times a day (13 Dec 2018 06:00)  cholecalciferol oral tablet: 1000 unit(s) orally once a day (13 Dec 2018 03:50)  cyanocobalamin 1000 mcg oral tablet: 1 tab(s) orally once a day (13 Dec 2018 03:50)  dorzolamide-timolol 2.23%-0.68% ophthalmic solution: 1 drop(s) to each affected eye 2 times a day (13 Dec 2018 06:00)  doxazosin 4 mg oral tablet: 1 tab(s) orally once a day (at bedtime) (13 Dec 2018 03:50)  escitalopram 10 mg oral tablet: 1 tab(s) orally once a day (13 Dec 2018 03:50)  glycopyrrolate 1 mg oral tablet: 1 tab(s) orally 3 times a day (13 Dec 2018 03:50)  ipratropium-albuterol 0.5 mg-2.5 mg/3 mLinhalation solution: 3 milliliter(s) inhaled every 6 hours (13 Dec 2018 03:50)  K-Dur 10 oral tablet, extended release: 1 tab(s) orally 2 times a day (13 Dec 2018 03:50)  latanoprost 0.005% ophthalmic solution: 1 drop(s) to each affected eye once a day (at bedtime) (13 Dec 2018 03:50)  Levaquin 250 mg oral tablet: 1 tab(s) orally every 24 hours start 12/12 x 7 days (13 Dec 2018 06:00)  linaclotide 145 mcg oral capsule: 1 cap(s) orally once a day (before a meal) (13 Dec 2018 03:50)  melatonin 5 mg oral tablet: 1 tab(s) orally once a day (at bedtime) (13 Dec 2018 03:50)  Percocet 5/325 oral tablet: 2 tab(s) orally every 4 hours, As needed, Severe Pain (7 - 10) (13 Dec 2018 03:50)  polyethylene glycol 3350 oral powder for reconstitution: 17 gram(s) orally once a day, As needed, Constipation (13 Dec 2018 03:50)  predniSONE: 30  orally once a day x 5days started 12/12 (13 Dec 2018 06:00)      PHYSICAL EXAM:  General: NAD, A/O x2, confused   ENT: MMM  Neck: Supple, No JVD  Lungs: Clear to percussion bilaterally  Cardio: RRR, S1/S2, No murmurs  Abdomen: Soft, Nontender, Nondistended; Bowel sounds present  Neuro: confused   Extremities: B/L LE venous stasis;   Skin: B/L LE venous stasis     Functional status: dependent     LABS:                        8.9    7.7   )-----------( 93       ( 18 Dec 2018 06:36 )             27.0     12-18    149  |  118  |  21  ----------------------------<  89  3.8   |  23  |  1.41    Ca    8.5      18 Dec 2018 06:36      RADIOLOGY & ADDITIONAL TESTS: < from: Xray Chest 1 View AP/PA (12.13.18 @ 03:14) >  IMPRESSION: Clear lungs at this time    < end of copied text >      Care Discussed with Consultants/Other Providers: Hospitalist, ID

## 2018-12-19 DIAGNOSIS — J18.9 PNEUMONIA, UNSPECIFIED ORGANISM: ICD-10-CM

## 2018-12-19 DIAGNOSIS — J15.9 UNSPECIFIED BACTERIAL PNEUMONIA: ICD-10-CM

## 2018-12-19 LAB
ANION GAP SERPL CALC-SCNC: 7 MMOL/L — SIGNIFICANT CHANGE UP (ref 5–17)
ANION GAP SERPL CALC-SCNC: 9 MMOL/L — SIGNIFICANT CHANGE UP (ref 5–17)
BUN SERPL-MCNC: 24 MG/DL — HIGH (ref 7–23)
BUN SERPL-MCNC: 24 MG/DL — HIGH (ref 7–23)
CALCIUM SERPL-MCNC: 8.2 MG/DL — LOW (ref 8.4–10.5)
CALCIUM SERPL-MCNC: 8.4 MG/DL — SIGNIFICANT CHANGE UP (ref 8.4–10.5)
CHLORIDE SERPL-SCNC: 114 MMOL/L — HIGH (ref 96–108)
CHLORIDE SERPL-SCNC: 115 MMOL/L — HIGH (ref 96–108)
CO2 SERPL-SCNC: 22 MMOL/L — SIGNIFICANT CHANGE UP (ref 22–31)
CO2 SERPL-SCNC: 23 MMOL/L — SIGNIFICANT CHANGE UP (ref 22–31)
CREAT SERPL-MCNC: 1.28 MG/DL — SIGNIFICANT CHANGE UP (ref 0.5–1.3)
CREAT SERPL-MCNC: 1.34 MG/DL — HIGH (ref 0.5–1.3)
GLUCOSE SERPL-MCNC: 101 MG/DL — HIGH (ref 70–99)
GLUCOSE SERPL-MCNC: 119 MG/DL — HIGH (ref 70–99)
HCT VFR BLD CALC: 26.5 % — LOW (ref 39–50)
HGB BLD-MCNC: 8.4 G/DL — LOW (ref 13–17)
MCHC RBC-ENTMCNC: 27.8 PG — SIGNIFICANT CHANGE UP (ref 27–34)
MCHC RBC-ENTMCNC: 31.7 GM/DL — LOW (ref 32–36)
MCV RBC AUTO: 87.5 FL — SIGNIFICANT CHANGE UP (ref 80–100)
PLATELET # BLD AUTO: 89 K/UL — LOW (ref 150–400)
POTASSIUM SERPL-MCNC: 3.7 MMOL/L — SIGNIFICANT CHANGE UP (ref 3.5–5.3)
POTASSIUM SERPL-MCNC: 3.8 MMOL/L — SIGNIFICANT CHANGE UP (ref 3.5–5.3)
POTASSIUM SERPL-SCNC: 3.7 MMOL/L — SIGNIFICANT CHANGE UP (ref 3.5–5.3)
POTASSIUM SERPL-SCNC: 3.8 MMOL/L — SIGNIFICANT CHANGE UP (ref 3.5–5.3)
RBC # BLD: 3.03 M/UL — LOW (ref 4.2–5.8)
RBC # FLD: 19.1 % — HIGH (ref 10.3–14.5)
SODIUM SERPL-SCNC: 143 MMOL/L — SIGNIFICANT CHANGE UP (ref 135–145)
SODIUM SERPL-SCNC: 147 MMOL/L — HIGH (ref 135–145)
WBC # BLD: 7.2 K/UL — SIGNIFICANT CHANGE UP (ref 3.8–10.5)
WBC # FLD AUTO: 7.2 K/UL — SIGNIFICANT CHANGE UP (ref 3.8–10.5)

## 2018-12-19 PROCEDURE — 99233 SBSQ HOSP IP/OBS HIGH 50: CPT

## 2018-12-19 PROCEDURE — 99233 SBSQ HOSP IP/OBS HIGH 50: CPT | Mod: GC

## 2018-12-19 RX ADMIN — LINACLOTIDE 145 MICROGRAM(S): 145 CAPSULE, GELATIN COATED ORAL at 07:41

## 2018-12-19 RX ADMIN — Medication 1 DROP(S): at 21:51

## 2018-12-19 RX ADMIN — ROBINUL 1 MILLIGRAM(S): 0.2 INJECTION INTRAMUSCULAR; INTRAVENOUS at 05:14

## 2018-12-19 RX ADMIN — ROBINUL 1 MILLIGRAM(S): 0.2 INJECTION INTRAMUSCULAR; INTRAVENOUS at 21:51

## 2018-12-19 RX ADMIN — Medication 1 DROP(S): at 17:29

## 2018-12-19 RX ADMIN — Medication 1000 UNIT(S): at 11:13

## 2018-12-19 RX ADMIN — Medication 0.5 MILLIGRAM(S): at 21:39

## 2018-12-19 RX ADMIN — LATANOPROST 1 DROP(S): 0.05 SOLUTION/ DROPS OPHTHALMIC; TOPICAL at 21:51

## 2018-12-19 RX ADMIN — Medication 3 MILLILITER(S): at 15:16

## 2018-12-19 RX ADMIN — Medication 1 DROP(S): at 05:13

## 2018-12-19 RX ADMIN — ROBINUL 1 MILLIGRAM(S): 0.2 INJECTION INTRAMUSCULAR; INTRAVENOUS at 13:56

## 2018-12-19 RX ADMIN — Medication 3 MILLILITER(S): at 03:17

## 2018-12-19 RX ADMIN — Medication 1 DROP(S): at 13:57

## 2018-12-19 RX ADMIN — DORZOLAMIDE HYDROCHLORIDE 1 DROP(S): 20 SOLUTION/ DROPS OPHTHALMIC at 05:13

## 2018-12-19 RX ADMIN — Medication 0.5 MILLIGRAM(S): at 09:11

## 2018-12-19 RX ADMIN — Medication 3 MILLILITER(S): at 09:11

## 2018-12-19 RX ADMIN — DORZOLAMIDE HYDROCHLORIDE 1 DROP(S): 20 SOLUTION/ DROPS OPHTHALMIC at 17:29

## 2018-12-19 RX ADMIN — SODIUM CHLORIDE 75 MILLILITER(S): 9 INJECTION, SOLUTION INTRAVENOUS at 05:21

## 2018-12-19 RX ADMIN — Medication 4 MILLIGRAM(S): at 21:51

## 2018-12-19 RX ADMIN — Medication 3 MILLILITER(S): at 21:39

## 2018-12-19 RX ADMIN — ESCITALOPRAM OXALATE 10 MILLIGRAM(S): 10 TABLET, FILM COATED ORAL at 11:13

## 2018-12-19 NOTE — PROGRESS NOTE ADULT - ASSESSMENT
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 Aspiration PNA - completed course of ABX - IV Zosyn completed on 12/17/18  Monitor off Abx, ID following    2. Confusion 2/2 suspect electrolyte imbalance - hypernatremia  Less confused today  Slight decrease in Na 147 from 149 today p IVF  Will increase IVF @ 110 ml/hr and re-check Na at 2PM  BMP in AM     3. Hypernatremia 2/2 suspect dehydration    Slight decrease in Na 147 from 149 today p IVF  Will increase IVF @ 110 ml/hr and re-check Na at 2PM  BMP in AM     4. Dysphagia   Speech and Swallow re-assessment - MBS today or tomorrow  Continue NPO with PEG feedings for now     5. MELISSA on CKD-3 (baseline Cr 1.3)  Resolved, creatinine at baseline - 1.34 today     6. CKD-3 (baseline creatinine = 1.3)  At base line  today Cr=1.34   Cont. IVF, avoid nephrotoxic drug    7. Thrombocytopenia - stable  Hold prophylaxis with heparin/lovenox, PAS  Monitor Plts and consider heme/onc consult if down trending

## 2018-12-19 NOTE — PROGRESS NOTE ADULT - ASSESSMENT
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  clinically improved  1. Aspiration PNA - completed course of ABX - IV Zosyn completed on 12/17/18  Monitor off Abx, ID following    2. Confusion 2/2 suspect electrolyte imbalance - hypernatremia  Less confused today  Slight decrease in Na 147 from 149 today p IVF  Will increase IVF @ 110 ml/hr and re-check Na at 2PM  BMP in AM     3. Hypernatremia 2/2 suspect dehydration    Slight decrease in Na 147 from 149 today p IVF  Will increase IVF @ 110 ml/hr and re-check Na at 2PM  BMP in AM     4. Dysphagia   Speech and Swallow re-assessment - MBS today or tomorrow  Continue NPO with PEG feedings for now     5. MELISSA on CKD-3 (baseline Cr 1.3)  Resolved, creatinine at baseline - 1.34 today     6. CKD-3 (baseline creatinine = 1.3)  At base line  today Cr=1.34   Cont. IVF, avoid nephrotoxic drug    7. Thrombocytopenia - stable  Hold prophylaxis with heparin/lovenox, PAS  Monitor Plts and consider heme/onc consult if down trending

## 2018-12-19 NOTE — PROGRESS NOTE ADULT - SUBJECTIVE AND OBJECTIVE BOX
Follow-up Pulm Progress Note    Enrique Simmons MD  [phone]    No new respiratory events overnight.  Denies SOB/CP. Clinically improved.    Medications:  Vital Signs Last 24 Hrs  T(C): 36.4 (19 Dec 2018 16:31), Max: 36.5 (18 Dec 2018 20:09)  T(F): 97.5 (19 Dec 2018 16:31), Max: 97.7 (18 Dec 2018 20:09)  HR: 60 (19 Dec 2018 16:31) (59 - 76)  BP: 120/59 (19 Dec 2018 16:31) (120/59 - 151/78)  BP(mean): --  RR: 14 (19 Dec 2018 16:31) (14 - 16)  SpO2: 100% (19 Dec 2018 16:31) (96% - 100%)          12-18 @ 07:01  -  12-19 @ 07:00  --------------------------------------------------------  IN: 1215 mL / OUT: 0 mL / NET: 1215 mL        LABS:                        8.4    7.2   )-----------( 89       ( 19 Dec 2018 07:24 )             26.5     12-19    143  |  114<H>  |  24<H>  ----------------------------<  101<H>  3.7   |  22  |  1.28    Ca    8.2<L>      19 Dec 2018 14:07                CULTURES:  Culture Results:   No growth (12-13 @ 06:25)  Culture Results:   No growth at 5 days. (12-13 @ 03:00)  Culture Results:   No growth at 5 days. (12-13 @ 03:00)        Physical Examination:  PULM: Clear to auscultation bilaterally, no significant sputum production  CVS: Regular rate and rhythm, no murmurs, rubs, or gallops  ABD: Soft, non-tender  EXT:  No clubbing, cyanosis, or edema

## 2018-12-19 NOTE — PROGRESS NOTE ADULT - SUBJECTIVE AND OBJECTIVE BOX
Patient is a 85y old  Male who presents with a chief complaint of cough (18 Dec 2018 17:11)      Patient seen and examined at bedside, no events overnight, more alert today.   ALLERGIES:  No Known Allergies    MEDICATIONS:  acetaminophen   Tablet .. 650 milliGRAM(s) Oral every 6 hours PRN  ALBUTerol/ipratropium for Nebulization 3 milliLiter(s) Nebulizer every 6 hours  atropine 1% Ophthalmic Solution for SubLingual Use 1 Drop(s) SubLingual three times a day  bisacodyl Suppository 10 milliGRAM(s) Rectal daily PRN  buDESOnide   0.5 milliGRAM(s) Respule 0.5 milliGRAM(s) Inhalation every 12 hours  cholecalciferol 1000 Unit(s) Oral daily  dextrose 5%. 1000 milliLiter(s) IV Continuous <Continuous>  dorzolamide 2% Ophthalmic Solution 1 Drop(s) Both EYES <User Schedule>  doxazosin 4 milliGRAM(s) Oral at bedtime  escitalopram 10 milliGRAM(s) Oral daily  glycopyrrolate 1 milliGRAM(s) Oral three times a day  latanoprost 0.005% Ophthalmic Solution 1 Drop(s) Both EYES at bedtime  linaclotide 145 MICROGram(s) Oral before breakfast  melatonin 3 milliGRAM(s) Oral at bedtime PRN  oxyCODONE    5 mG/acetaminophen 325 mG 2 Tablet(s) Oral every 6 hours PRN  polyethylene glycol 3350 17 Gram(s) Oral daily PRN  timolol 0.5% Solution 1 Drop(s) Both EYES two times a day    Vital Signs Last 24 Hrs  T(C): 36.4 (19 Dec 2018 05:04), Max: 36.5 (18 Dec 2018 20:09)  T(F): 97.5 (19 Dec 2018 05:04), Max: 97.7 (18 Dec 2018 20:09)  HR: 60 (19 Dec 2018 09:12) (59 - 76)  BP: 144/75 (19 Dec 2018 05:04) (122/94 - 151/78)  BP(mean): --  RR: 16 (19 Dec 2018 05:04) (14 - 16)  SpO2: 98% (19 Dec 2018 09:12) (96% - 100%)  I&O's Summary    18 Dec 2018 07:01  -  19 Dec 2018 07:00  --------------------------------------------------------  IN: 1215 mL / OUT: 0 mL / NET: 1215 mL          PAST MEDICAL & SURGICAL HISTORY:  Psoriasis  GERD (gastroesophageal reflux disease)  Dyslipidemia  Cardiac pacemaker  S/P percutaneous endoscopic gastrostomy (PEG) tube placement      Home Medications:  acetaminophen 325 mg oral tablet: 2 tab(s) orally every 6 hours, As needed, Mild- Moderate Pain (1 - 6) (13 Dec 2018 03:50)  atropine 1% ophthalmic solution: drop(s) sublingually 3 times a day (13 Dec 2018 03:50)  bisacodyl 10 mg rectal suppository: 1 suppository(ies) rectal once a day, As needed, Constipation (13 Dec 2018 03:50)  budesonide 0.5 mg/2 mL inhalation suspension: 2 milliliter(s) inhaled 2 times a day (13 Dec 2018 06:00)  cholecalciferol oral tablet: 1000 unit(s) orally once a day (13 Dec 2018 03:50)  cyanocobalamin 1000 mcg oral tablet: 1 tab(s) orally once a day (13 Dec 2018 03:50)  dorzolamide-timolol 2.23%-0.68% ophthalmic solution: 1 drop(s) to each affected eye 2 times a day (13 Dec 2018 06:00)  doxazosin 4 mg oral tablet: 1 tab(s) orally once a day (at bedtime) (13 Dec 2018 03:50)  escitalopram 10 mg oral tablet: 1 tab(s) orally once a day (13 Dec 2018 03:50)  glycopyrrolate 1 mg oral tablet: 1 tab(s) orally 3 times a day (13 Dec 2018 03:50)  ipratropium-albuterol 0.5 mg-2.5 mg/3 mLinhalation solution: 3 milliliter(s) inhaled every 6 hours (13 Dec 2018 03:50)  K-Dur 10 oral tablet, extended release: 1 tab(s) orally 2 times a day (13 Dec 2018 03:50)  latanoprost 0.005% ophthalmic solution: 1 drop(s) to each affected eye once a day (at bedtime) (13 Dec 2018 03:50)  Levaquin 250 mg oral tablet: 1 tab(s) orally every 24 hours start 12/12 x 7 days (13 Dec 2018 06:00)  linaclotide 145 mcg oral capsule: 1 cap(s) orally once a day (before a meal) (13 Dec 2018 03:50)  melatonin 5 mg oral tablet: 1 tab(s) orally once a day (at bedtime) (13 Dec 2018 03:50)  Percocet 5/325 oral tablet: 2 tab(s) orally every 4 hours, As needed, Severe Pain (7 - 10) (13 Dec 2018 03:50)  polyethylene glycol 3350 oral powder for reconstitution: 17 gram(s) orally once a day, As needed, Constipation (13 Dec 2018 03:50)  predniSONE: 30  orally once a day x 5days started 12/12 (13 Dec 2018 06:00)      PHYSICAL EXAM:  General: NAD, A/O x3  ENT: MMM  Neck: Supple, No JVD  Lungs: Clear to percussion bilaterally  Cardio: RRR, S1/S2, No murmurs  Abdomen: + PEG,Soft, Nontender, Nondistended; Bowel sounds present  Neuro: more alert   Extremities: B/L LE venous stasis   Skin: B/L LE venous stasis     LABS:                        8.4    7.2   )-----------( 89       ( 19 Dec 2018 07:24 )             26.5     12-19    147  |  115  |  24  ----------------------------<  119  3.8   |  23  |  1.34    Ca    8.4      19 Dec 2018 07:24    RADIOLOGY & ADDITIONAL TESTS: < from: Xray Chest 1 View AP/PA (12.13.18 @ 03:14) >  IMPRESSION: Clear lungs at this time.    < end of copied text >      Care Discussed with Consultants/Other Providers: Hospitalist

## 2018-12-20 DIAGNOSIS — D47.3 ESSENTIAL (HEMORRHAGIC) THROMBOCYTHEMIA: ICD-10-CM

## 2018-12-20 DIAGNOSIS — J69.0 PNEUMONITIS DUE TO INHALATION OF FOOD AND VOMIT: ICD-10-CM

## 2018-12-20 DIAGNOSIS — N18.9 CHRONIC KIDNEY DISEASE, UNSPECIFIED: ICD-10-CM

## 2018-12-20 DIAGNOSIS — T68.XXXD HYPOTHERMIA, SUBSEQUENT ENCOUNTER: ICD-10-CM

## 2018-12-20 LAB
ACTH SER-ACNC: 38 PG/ML — SIGNIFICANT CHANGE UP (ref 5–46)
ANION GAP SERPL CALC-SCNC: 6 MMOL/L — SIGNIFICANT CHANGE UP (ref 5–17)
BUN SERPL-MCNC: 25 MG/DL — HIGH (ref 7–23)
CALCIUM SERPL-MCNC: 8.3 MG/DL — LOW (ref 8.4–10.5)
CHLORIDE SERPL-SCNC: 113 MMOL/L — HIGH (ref 96–108)
CO2 SERPL-SCNC: 25 MMOL/L — SIGNIFICANT CHANGE UP (ref 22–31)
CORTIS AM PEAK SERPL-MCNC: 11.3 UG/DL — SIGNIFICANT CHANGE UP (ref 6–18.4)
CREAT SERPL-MCNC: 1.22 MG/DL — SIGNIFICANT CHANGE UP (ref 0.5–1.3)
GLUCOSE SERPL-MCNC: 101 MG/DL — HIGH (ref 70–99)
HCT VFR BLD CALC: 26.3 % — LOW (ref 39–50)
HGB BLD-MCNC: 8.3 G/DL — LOW (ref 13–17)
MCHC RBC-ENTMCNC: 28.1 PG — SIGNIFICANT CHANGE UP (ref 27–34)
MCHC RBC-ENTMCNC: 31.8 GM/DL — LOW (ref 32–36)
MCV RBC AUTO: 88.5 FL — SIGNIFICANT CHANGE UP (ref 80–100)
PLATELET # BLD AUTO: 109 K/UL — LOW (ref 150–400)
POTASSIUM SERPL-MCNC: 4 MMOL/L — SIGNIFICANT CHANGE UP (ref 3.5–5.3)
POTASSIUM SERPL-SCNC: 4 MMOL/L — SIGNIFICANT CHANGE UP (ref 3.5–5.3)
RBC # BLD: 2.97 M/UL — LOW (ref 4.2–5.8)
RBC # FLD: 19.8 % — HIGH (ref 10.3–14.5)
SODIUM SERPL-SCNC: 144 MMOL/L — SIGNIFICANT CHANGE UP (ref 135–145)
WBC # BLD: 8.8 K/UL — SIGNIFICANT CHANGE UP (ref 3.8–10.5)
WBC # FLD AUTO: 8.8 K/UL — SIGNIFICANT CHANGE UP (ref 3.8–10.5)

## 2018-12-20 PROCEDURE — 99233 SBSQ HOSP IP/OBS HIGH 50: CPT

## 2018-12-20 PROCEDURE — 74230 X-RAY XM SWLNG FUNCJ C+: CPT | Mod: 26

## 2018-12-20 RX ORDER — HYDROCORTISONE 20 MG
50 TABLET ORAL EVERY 8 HOURS
Qty: 0 | Refills: 0 | Status: DISCONTINUED | OUTPATIENT
Start: 2018-12-20 | End: 2018-12-21

## 2018-12-20 RX ORDER — HYDROCORTISONE 20 MG
50 TABLET ORAL EVERY 8 HOURS
Qty: 0 | Refills: 0 | Status: DISCONTINUED | OUTPATIENT
Start: 2018-12-20 | End: 2018-12-20

## 2018-12-20 RX ADMIN — Medication 1 DROP(S): at 05:56

## 2018-12-20 RX ADMIN — Medication 4 MILLIGRAM(S): at 22:32

## 2018-12-20 RX ADMIN — Medication 0.5 MILLIGRAM(S): at 21:24

## 2018-12-20 RX ADMIN — Medication 1 DROP(S): at 17:51

## 2018-12-20 RX ADMIN — LINACLOTIDE 145 MICROGRAM(S): 145 CAPSULE, GELATIN COATED ORAL at 05:54

## 2018-12-20 RX ADMIN — Medication 3 MILLILITER(S): at 04:28

## 2018-12-20 RX ADMIN — Medication 3 MILLILITER(S): at 21:24

## 2018-12-20 RX ADMIN — ROBINUL 1 MILLIGRAM(S): 0.2 INJECTION INTRAMUSCULAR; INTRAVENOUS at 13:16

## 2018-12-20 RX ADMIN — ESCITALOPRAM OXALATE 10 MILLIGRAM(S): 10 TABLET, FILM COATED ORAL at 13:16

## 2018-12-20 RX ADMIN — ROBINUL 1 MILLIGRAM(S): 0.2 INJECTION INTRAMUSCULAR; INTRAVENOUS at 22:32

## 2018-12-20 RX ADMIN — LATANOPROST 1 DROP(S): 0.05 SOLUTION/ DROPS OPHTHALMIC; TOPICAL at 22:33

## 2018-12-20 RX ADMIN — DORZOLAMIDE HYDROCHLORIDE 1 DROP(S): 20 SOLUTION/ DROPS OPHTHALMIC at 17:51

## 2018-12-20 RX ADMIN — DORZOLAMIDE HYDROCHLORIDE 1 DROP(S): 20 SOLUTION/ DROPS OPHTHALMIC at 05:55

## 2018-12-20 RX ADMIN — Medication 1 DROP(S): at 13:16

## 2018-12-20 RX ADMIN — Medication 1 DROP(S): at 22:32

## 2018-12-20 RX ADMIN — ROBINUL 1 MILLIGRAM(S): 0.2 INJECTION INTRAMUSCULAR; INTRAVENOUS at 05:56

## 2018-12-20 RX ADMIN — Medication 3 MILLILITER(S): at 15:22

## 2018-12-20 RX ADMIN — Medication 50 MILLIGRAM(S): at 22:32

## 2018-12-20 RX ADMIN — Medication 1000 UNIT(S): at 13:16

## 2018-12-20 NOTE — PROGRESS NOTE ADULT - PROBLEM SELECTOR PLAN 5
Plt 109  Hold prophylaxis with heparin/lovenox, PAS  Monitor Plts and consider heme/onc consult if down trending

## 2018-12-20 NOTE — PROGRESS NOTE ADULT - PROBLEM SELECTOR PLAN 2
Consulted with endocrine-Dr. Anguiano who thinks possible adrenal insufficiency.  Check ACTH and CORtisol. start on IV hydrocortisone 50 mg q 8 hours temporarily, till results are back.

## 2018-12-20 NOTE — SWALLOW VFSS/MBS ASSESSMENT ADULT - SLP PERTINENT HISTORY OF CURRENT PROBLEM
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

## 2018-12-20 NOTE — PROGRESS NOTE ADULT - ASSESSMENT
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 Consulted with pulmonary, ID. Treated with IV zosyn, completed 12/17/18. Overnight pt hypothermic requiring Bear-hugger blanket. This am temp 98.3 rectal.   Plan for today: consulted with endocrine-Dr. Anguiano who thinks possible adrenal insufficiency  Stat ACTH, CORtisol, start on IV hydrocortisone 50 mg q 8 hours temporarily, till results are back. Peg feedings change from continuous to bolus and from Glucerna to 2cal Hn bolus feedings 4 times per day.  Palliative/pulm:  call placed to family to review advanced directives

## 2018-12-20 NOTE — PROGRESS NOTE ADULT - SUBJECTIVE AND OBJECTIVE BOX
Follow-up Pulm Progress Note    Enrique Simmons MD  [phone]    No new respiratory events overnight.  Denies SOB/CP. Pt failed swallow evaluation.    Medications:  Vital Signs Last 24 Hrs  T(C): 35.9 (20 Dec 2018 07:37), Max: 36.4 (19 Dec 2018 16:31)  T(F): 96.7 (20 Dec 2018 07:37), Max: 97.5 (19 Dec 2018 16:31)  HR: 68 (20 Dec 2018 15:23) (60 - 68)  BP: 128/69 (20 Dec 2018 07:37) (120/59 - 149/83)  BP(mean): --  RR: 14 (20 Dec 2018 07:37) (14 - 14)  SpO2: 95% (20 Dec 2018 15:23) (95% - 100%)          12-19 @ 07:01  -  12-20 @ 07:00  --------------------------------------------------------  IN: 2095 mL / OUT: 0 mL / NET: 2095 mL        LABS:                        8.3    8.8   )-----------( 109      ( 20 Dec 2018 06:33 )             26.3     12-20    144  |  113<H>  |  25<H>  ----------------------------<  101<H>  4.0   |  25  |  1.22    Ca    8.3<L>      20 Dec 2018 06:33                CULTURES:        Physical Examination:  PULM: scattered coarse bs  CVS: Regular rate and rhythm, no murmurs, rubs, or gallops  ABD: Soft, non-tender  EXT:  No clubbing, cyanosis, or edema

## 2018-12-20 NOTE — PROGRESS NOTE ADULT - SUBJECTIVE AND OBJECTIVE BOX
Patient is a 85y old  Male who presents with a chief complaint of cough       Patient seen and examined at bedside. Lying in bed, reports not feeling well. Denies chest pain or SOB. Pt has wet gurgling cough and requires oral suctioning for white secretions    ALLERGIES:  No Known Allergies    MEDICATIONS  (STANDING):  ALBUTerol/ipratropium for Nebulization 3 milliLiter(s) Nebulizer every 6 hours  atropine 1% Ophthalmic Solution for SubLingual Use 1 Drop(s) SubLingual three times a day  buDESOnide   0.5 milliGRAM(s) Respule 0.5 milliGRAM(s) Inhalation every 12 hours  cholecalciferol 1000 Unit(s) Oral daily  dorzolamide 2% Ophthalmic Solution 1 Drop(s) Both EYES <User Schedule>  doxazosin 4 milliGRAM(s) Oral at bedtime  escitalopram 10 milliGRAM(s) Oral daily  glycopyrrolate 1 milliGRAM(s) Oral three times a day  latanoprost 0.005% Ophthalmic Solution 1 Drop(s) Both EYES at bedtime  linaclotide 145 MICROGram(s) Oral before breakfast  timolol 0.5% Solution 1 Drop(s) Both EYES two times a day    MEDICATIONS  (PRN):  acetaminophen   Tablet .. 650 milliGRAM(s) Oral every 6 hours PRN Temp greater or equal to 38C (100.4F), Mild Pain (1 - 3)  bisacodyl Suppository 10 milliGRAM(s) Rectal daily PRN Constipation  melatonin 3 milliGRAM(s) Oral at bedtime PRN Sleep  oxyCODONE    5 mG/acetaminophen 325 mG 2 Tablet(s) Oral every 6 hours PRN Severe Pain (7 - 10)  polyethylene glycol 3350 17 Gram(s) Oral daily PRN Constipation    Vital Signs Last 24 Hrs  T(F): 96.7 (20 Dec 2018 07:37), Max: 97.5 (19 Dec 2018 16:31)  HR: 60 (20 Dec 2018 07:37) (60 - 66)  BP: 128/69 (20 Dec 2018 07:37) (120/59 - 149/83)  RR: 14 (20 Dec 2018 07:37) (14 - 14)  SpO2: 99% (20 Dec 2018 07:37) (96% - 100%)  I&O's Summary    19 Dec 2018 07:01  -  20 Dec 2018 07:00  --------------------------------------------------------  IN: 2095 mL / OUT: 0 mL / NET: 2095 mL        PHYSICAL EXAM:  General: NAD, A/O x11  Neck: Supple, No JVD  Lungs: B/L rhonchi  Cardio: RRR, S1/S2, No murmurs  Abdomen: Soft, Nontender, Nondistended; Bowel sounds present, obese  Extremities: No calf tenderness, No pitting edema, B/L chronic vascular changes    LABS:                        8.3    8.8   )-----------( 109      ( 20 Dec 2018 06:33 )             26.3       12-20    144  |  113  |  25  ----------------------------<  101  4.0   |  25  |  1.22    Ca    8.3      20 Dec 2018 06:33       eGFR if Non African American: 53 mL/min/1.73M2 (12-20-18 @ 06:33)  eGFR if : 62 mL/min/1.73M2 (12-20-18 @ 06:33)     RADIOLOGY & ADDITIONAL TESTS:    Care Discussed with Consultants: Dr. hernandez/Other Providers: Dr. Pink

## 2018-12-20 NOTE — PROGRESS NOTE ADULT - PROBLEM SELECTOR PLAN 3
Speech and Swallow  assessment completed  Pt had MBS today and failed  Continue NPO with PEG feedings   Start 2cal HN bolus 250ml 4xtimes a day ( 8am, 12noon, 4pm, 8pm as per dr. Orozco). Speech and Swallow  assessment completed  Pt had MBS today and failed  Continue NPO with PEG feedings   Start 2cal HN bolus 250ml 4xtimes a day ( 8am, 12noon, 4pm, 8pm as per Dr. Anguiano).

## 2018-12-20 NOTE — SWALLOW VFSS/MBS ASSESSMENT ADULT - COMMENTS
Pt presents with a severe oropharyngeal dysphagia. Noted reduced bolus formation and propulsion. Reduced base of tongue retraction and laryngeal elevation resulted in severe amounts of pharyngeal residue that Pt was unable to clear. Poor airway protection and pharyngeal residue resulted in deep penetration of all consistencies with suspected aspiration, however, unable to view vocal cords 2/2 Pt positioning and body habitus.  Pt inconsistently coughing in response to penetration. pt unable to clear residue or penetrated material despite cues. Pt presents with a severe oropharyngeal dysphagia. Noted reduced bolus formation and propulsion. Reduced base of tongue retraction and laryngeal elevation resulted in severe amounts of pharyngeal residue that Pt was unable to clear. Poor airway protection and pharyngeal residue resulted in deep penetration of all consistencies with suspected aspiration, however, unable to view vocal cords 2/2 Pt positioning and body habitus. Pt inconsistently coughing in response to penetration. pt unable to clear residue or penetrated material despite cues.  Recommend continue NPO with PEG tube feeds.  Pt can be clinically monitored for possible PO diet if/as Pt improves

## 2018-12-20 NOTE — CONSULT NOTE ADULT - SUBJECTIVE AND OBJECTIVE BOX
consult   dictated.  87 yrs. old male admitted with aspiration pneumonia, hypothermia. H/o dysphagia on PEG feeding.

## 2018-12-20 NOTE — PROGRESS NOTE ADULT - PROBLEM SELECTOR PLAN 3
Speech and Swallow  assessment completed  Pt had MBS today and failed  Continue NPO with PEG feedings   Start 2cal HN bolus 250ml 4xtimes a day ( 8am, 12noon, 4pm, 8pm as per Dr. Anguiano).

## 2018-12-20 NOTE — PROGRESS NOTE ADULT - ASSESSMENT
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 Consulted with pulmonary, ID. Treated with IV zosyn, completed 12/17/18. Overnight pt hypothermic requiring Bear-hugger blanket. This am temp 98.3 rectal.   Plan for today: consulted with endocrine-Dr. Anguiano who thinks possible adrenal insufficiency  Stat ACTH, CORtisol, start on IV hydrocortisone 50 mg q 8 hours temporarily, till results are back. Peg feedings change from continuous to bolus and from Glucerna to 2cal Hn bolus feedings 4 times per day.

## 2018-12-20 NOTE — SWALLOW VFSS/MBS ASSESSMENT ADULT - SLP GENERAL OBSERVATIONS
Pt asleep upon arrival in bed.  Noted wet/gurgly respirations- RN awake. Pt is being suctioned PRN. Pt asleep upon arrival in bed.  Noted wet/gurgly respirations- RN awake. Pt is being suctioned PRN. Pt completed MBS with use of C-ARM while sitting upright in bed.

## 2018-12-21 ENCOUNTER — TRANSCRIPTION ENCOUNTER (OUTPATIENT)
Age: 83
End: 2018-12-21

## 2018-12-21 VITALS
RESPIRATION RATE: 15 BRPM | DIASTOLIC BLOOD PRESSURE: 76 MMHG | HEART RATE: 89 BPM | SYSTOLIC BLOOD PRESSURE: 133 MMHG | OXYGEN SATURATION: 100 % | TEMPERATURE: 98 F

## 2018-12-21 DIAGNOSIS — I87.2 VENOUS INSUFFICIENCY (CHRONIC) (PERIPHERAL): ICD-10-CM

## 2018-12-21 LAB
ANION GAP SERPL CALC-SCNC: 5 MMOL/L — SIGNIFICANT CHANGE UP (ref 5–17)
BUN SERPL-MCNC: 27 MG/DL — HIGH (ref 7–23)
CALCIUM SERPL-MCNC: 8.6 MG/DL — SIGNIFICANT CHANGE UP (ref 8.4–10.5)
CHLORIDE SERPL-SCNC: 111 MMOL/L — HIGH (ref 96–108)
CO2 SERPL-SCNC: 28 MMOL/L — SIGNIFICANT CHANGE UP (ref 22–31)
CREAT SERPL-MCNC: 1.2 MG/DL — SIGNIFICANT CHANGE UP (ref 0.5–1.3)
GLUCOSE SERPL-MCNC: 105 MG/DL — HIGH (ref 70–99)
HCT VFR BLD CALC: 26.5 % — LOW (ref 39–50)
HGB BLD-MCNC: 8.6 G/DL — LOW (ref 13–17)
MCHC RBC-ENTMCNC: 28.7 PG — SIGNIFICANT CHANGE UP (ref 27–34)
MCHC RBC-ENTMCNC: 32.6 GM/DL — SIGNIFICANT CHANGE UP (ref 32–36)
MCV RBC AUTO: 88.1 FL — SIGNIFICANT CHANGE UP (ref 80–100)
PLATELET # BLD AUTO: 117 K/UL — LOW (ref 150–400)
POTASSIUM SERPL-MCNC: 4.6 MMOL/L — SIGNIFICANT CHANGE UP (ref 3.5–5.3)
POTASSIUM SERPL-SCNC: 4.6 MMOL/L — SIGNIFICANT CHANGE UP (ref 3.5–5.3)
RBC # BLD: 3.01 M/UL — LOW (ref 4.2–5.8)
RBC # FLD: 19.8 % — HIGH (ref 10.3–14.5)
SODIUM SERPL-SCNC: 144 MMOL/L — SIGNIFICANT CHANGE UP (ref 135–145)
WBC # BLD: 9.7 K/UL — SIGNIFICANT CHANGE UP (ref 3.8–10.5)
WBC # FLD AUTO: 9.7 K/UL — SIGNIFICANT CHANGE UP (ref 3.8–10.5)

## 2018-12-21 PROCEDURE — 94760 N-INVAS EAR/PLS OXIMETRY 1: CPT

## 2018-12-21 PROCEDURE — 87581 M.PNEUMON DNA AMP PROBE: CPT

## 2018-12-21 PROCEDURE — 82803 BLOOD GASES ANY COMBINATION: CPT

## 2018-12-21 PROCEDURE — 31720 CLEARANCE OF AIRWAYS: CPT

## 2018-12-21 PROCEDURE — 92526 ORAL FUNCTION THERAPY: CPT

## 2018-12-21 PROCEDURE — 82024 ASSAY OF ACTH: CPT

## 2018-12-21 PROCEDURE — 80048 BASIC METABOLIC PNL TOTAL CA: CPT

## 2018-12-21 PROCEDURE — 99291 CRITICAL CARE FIRST HOUR: CPT | Mod: 25

## 2018-12-21 PROCEDURE — 84100 ASSAY OF PHOSPHORUS: CPT

## 2018-12-21 PROCEDURE — 99233 SBSQ HOSP IP/OBS HIGH 50: CPT

## 2018-12-21 PROCEDURE — 85730 THROMBOPLASTIN TIME PARTIAL: CPT

## 2018-12-21 PROCEDURE — 81001 URINALYSIS AUTO W/SCOPE: CPT

## 2018-12-21 PROCEDURE — 87086 URINE CULTURE/COLONY COUNT: CPT

## 2018-12-21 PROCEDURE — 85610 PROTHROMBIN TIME: CPT

## 2018-12-21 PROCEDURE — 84145 PROCALCITONIN (PCT): CPT

## 2018-12-21 PROCEDURE — 93005 ELECTROCARDIOGRAM TRACING: CPT

## 2018-12-21 PROCEDURE — 96375 TX/PRO/DX INJ NEW DRUG ADDON: CPT

## 2018-12-21 PROCEDURE — 71045 X-RAY EXAM CHEST 1 VIEW: CPT

## 2018-12-21 PROCEDURE — 97110 THERAPEUTIC EXERCISES: CPT

## 2018-12-21 PROCEDURE — 82533 TOTAL CORTISOL: CPT

## 2018-12-21 PROCEDURE — 97530 THERAPEUTIC ACTIVITIES: CPT

## 2018-12-21 PROCEDURE — 83735 ASSAY OF MAGNESIUM: CPT

## 2018-12-21 PROCEDURE — 74230 X-RAY XM SWLNG FUNCJ C+: CPT

## 2018-12-21 PROCEDURE — 96361 HYDRATE IV INFUSION ADD-ON: CPT

## 2018-12-21 PROCEDURE — 94640 AIRWAY INHALATION TREATMENT: CPT

## 2018-12-21 PROCEDURE — 36600 WITHDRAWAL OF ARTERIAL BLOOD: CPT

## 2018-12-21 PROCEDURE — 84443 ASSAY THYROID STIM HORMONE: CPT

## 2018-12-21 PROCEDURE — 87400 INFLUENZA A/B EACH AG IA: CPT

## 2018-12-21 PROCEDURE — 85027 COMPLETE CBC AUTOMATED: CPT

## 2018-12-21 PROCEDURE — 87040 BLOOD CULTURE FOR BACTERIA: CPT

## 2018-12-21 PROCEDURE — 94668 MNPJ CHEST WALL SBSQ: CPT

## 2018-12-21 PROCEDURE — 82962 GLUCOSE BLOOD TEST: CPT

## 2018-12-21 PROCEDURE — 94667 MNPJ CHEST WALL 1ST: CPT

## 2018-12-21 PROCEDURE — 96374 THER/PROPH/DIAG INJ IV PUSH: CPT

## 2018-12-21 PROCEDURE — 97161 PT EVAL LOW COMPLEX 20 MIN: CPT

## 2018-12-21 PROCEDURE — 87633 RESP VIRUS 12-25 TARGETS: CPT

## 2018-12-21 PROCEDURE — 80053 COMPREHEN METABOLIC PANEL: CPT

## 2018-12-21 PROCEDURE — 83605 ASSAY OF LACTIC ACID: CPT

## 2018-12-21 PROCEDURE — 87486 CHLMYD PNEUM DNA AMP PROBE: CPT

## 2018-12-21 RX ORDER — ROBINUL 0.2 MG/ML
1 INJECTION INTRAMUSCULAR; INTRAVENOUS
Qty: 0 | Refills: 0 | COMMUNITY
Start: 2018-12-21

## 2018-12-21 RX ORDER — POTASSIUM CHLORIDE 20 MEQ
1 PACKET (EA) ORAL
Qty: 0 | Refills: 0 | COMMUNITY

## 2018-12-21 RX ADMIN — ESCITALOPRAM OXALATE 10 MILLIGRAM(S): 10 TABLET, FILM COATED ORAL at 11:14

## 2018-12-21 RX ADMIN — Medication 5 MILLIGRAM(S): at 14:18

## 2018-12-21 RX ADMIN — Medication 1 DROP(S): at 14:18

## 2018-12-21 RX ADMIN — Medication 3 MILLILITER(S): at 03:54

## 2018-12-21 RX ADMIN — ROBINUL 1 MILLIGRAM(S): 0.2 INJECTION INTRAMUSCULAR; INTRAVENOUS at 06:21

## 2018-12-21 RX ADMIN — Medication 3 MILLILITER(S): at 15:11

## 2018-12-21 RX ADMIN — Medication 3 MILLILITER(S): at 09:26

## 2018-12-21 RX ADMIN — ROBINUL 1 MILLIGRAM(S): 0.2 INJECTION INTRAMUSCULAR; INTRAVENOUS at 14:18

## 2018-12-21 RX ADMIN — Medication 50 MILLIGRAM(S): at 06:20

## 2018-12-21 RX ADMIN — DORZOLAMIDE HYDROCHLORIDE 1 DROP(S): 20 SOLUTION/ DROPS OPHTHALMIC at 06:20

## 2018-12-21 RX ADMIN — Medication 1000 UNIT(S): at 11:14

## 2018-12-21 RX ADMIN — Medication 1 DROP(S): at 06:22

## 2018-12-21 RX ADMIN — LINACLOTIDE 145 MICROGRAM(S): 145 CAPSULE, GELATIN COATED ORAL at 06:21

## 2018-12-21 RX ADMIN — Medication 0.5 MILLIGRAM(S): at 09:27

## 2018-12-21 RX ADMIN — Medication 1 DROP(S): at 06:20

## 2018-12-21 NOTE — PROGRESS NOTE ADULT - PROBLEM SELECTOR PLAN 2
Consulted with endocrine-Dr. Anguiano who thinks possible adrenal insufficiency.  Check ACTH and CORtisol. start on IV hydrocortisone 50 mg q 8 hours temporarily, till results are back. Consulted with Endocrine-Dr. Anguiano who thinks possible adrenal insufficiency.  Check ACTH and Cortisol levels-start on IV hydrocortisone 50 mg q 8 hours

## 2018-12-21 NOTE — DISCHARGE NOTE ADULT - INSTRUCTIONS
peg tube feeds: peg tube feeds:  2cal HN, 250 ml bolus feeds every 6 hours peg tube feeds:  2cal HN, 250 ml bolus feeds every 6 hours  Nothing by mouth

## 2018-12-21 NOTE — PROGRESS NOTE ADULT - ATTENDING COMMENTS
I have personally seen and examined patient on the above date.  I discussed the case with CLIVE Hoover and I agree with findings and plan as detailed per note above, which I have amended where appropriate.      Metabolic encephalophy secondary to hypernatremia and steroid use -  much improved now that the sodium is WNL and off the prednisone. D/C D5W. BMP in AM  Aspiration PNA, now off antibiotics, with high risk of recurrent aspiration events  Dysphagia - speech therapy follow-up, still requires NPO status. PEG feeds in interim. Formal MBS tomorrow afternoon.  Case d/w daughter, Amelie, [phone]. Will keep patient in-house at this time for the MBS, monitor mental state overnight, and hope for D/C to [facility] tomorrow afternoon after the MBS if remains hemodynamically stable.
I have personally seen and examined patient on the above date.  I discussed the case with Elle Norton NP and I agree with findings and plan as detailed per note above, which I have amended where appropriate.      Aspiration pneumonia   slow improvement - cont zosyn per ID    Thrombocytopenia - likely reactive secondary to acute infectious process.  may need heme eval if continues to trend down.
I have personally seen and examined patient on the above date.  I discussed the case with CLIVE Hoover and I agree with findings and plan as detailed per note above, which I have amended where appropriate.      Aspiration PNA, on day 5 /7 of antibiotics  Stop Steroids at this time - no indication for steroids currently  Dysphagia - speech therapy follow-up, PEG feeds in interim  Case d/w wife, Angie, at length. Hope for d/c to [facility] in AM if remains stable overnight. PT juan.
I have personally seen and examined patient on the above date.  I discussed the case with CLIVE Hoover and I agree with findings and plan as detailed per note above, which I have amended where appropriate.      Metabolic encephalophy secondary to hypernatremia - start D5W  Aspiration PNA, now off antibiotics  Stop Steroids at this time - no indication for steroids currently, may be contributing to altered mental state  Dysphagia - speech therapy follow-up, PEG feeds in interim  Case d/w daughter, Amelie, [phone]. Will keep patient in-house at this time due to worsening mental state, to normalize the sodium, repeat speech eval in AM. Hope for d/c to [facility] in AM if mental status improves.
I have personally seen and examined patient on the above date.  I discussed the case with CLIVE Sutton and I agree with findings and plan as detailed per note above, which I have amended where appropriate. pt admitted with aspiration pneumonia, hypothermia, treated with antibiotics, continues to have hypothermia overnight, check ACTH, serum cortisol, start on hydrocortisone, d/w Dr. Salinas.   currently NPO, swallow evaluation noted, continue PEG feeds.
I have personally seen and examined patient on the above date.  I discussed the case with VIRA Villagomez and I agree with findings and plan as detailed per note above, which I have amended where appropriate.  pt is s/p treatment for aspiration pneumonia, MBS reviewed, PEG feeds only, pt remains high risk of aspiration. cortisol and ACTH normal, d/c iv hydrocortisone, changed to prednisone 5mg. d/c to [facility] long term today, wife agreeable. informed Dr. Reyes

## 2018-12-21 NOTE — PROGRESS NOTE ADULT - ASSESSMENT
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  Consulted with pulmonary and ID.  Pt. with episodes of hypothermic requiring Bear-hugger blanket. He may have adrenal insufficiency;  IV hydrocortisone started.  Pt. failed MBS, recommendation still for NPO and only peg feedings.

## 2018-12-21 NOTE — CHART NOTE - NSCHARTNOTEFT_GEN_A_CORE
Nutrition Follow Up Note  Hospital Course (Per Electronic Medical Record):   Source: Medical Record [X] Patient [X] Family [ ]         Diet: NPO  Recently Failed MBS     Enteral/Parenteral Nutrition: Recommend Start Jevity 1.5Cal @45ml/hr Increase Q2hr to Goal Rate of 85ml/hr x16hr  (Provides 2,040kcal & ~86grams of Protein)  Will Monitor Tolerance    Current Weight: 214.3lb on 12/15  Obtain New Weight to Confirm    Pertinent Medications: MEDICATIONS  (STANDING):  ALBUTerol/ipratropium for Nebulization 3 milliLiter(s) Nebulizer every 6 hours  atropine 1% Ophthalmic Solution for SubLingual Use 1 Drop(s) SubLingual three times a day  buDESOnide   0.5 milliGRAM(s) Respule 0.5 milliGRAM(s) Inhalation every 12 hours  cholecalciferol 1000 Unit(s) Oral daily  dextrose 5% + sodium chloride 0.9%. 1000 milliLiter(s) (80 mL/Hr) IV Continuous <Continuous>  dorzolamide 2% Ophthalmic Solution 1 Drop(s) Both EYES <User Schedule>  doxazosin 4 milliGRAM(s) Oral at bedtime  escitalopram 10 milliGRAM(s) Oral daily  glycopyrrolate 1 milliGRAM(s) Oral three times a day  latanoprost 0.005% Ophthalmic Solution 1 Drop(s) Both EYES at bedtime  linaclotide 145 MICROGram(s) Oral before breakfast  piperacillin/tazobactam IVPB. 3.375 Gram(s) IV Intermittent every 8 hours  predniSONE   Tablet 30 milliGRAM(s) Oral daily  timolol 0.5% Solution 1 Drop(s) Both EYES two times a day    MEDICATIONS  (PRN):  acetaminophen   Tablet .. 650 milliGRAM(s) Oral every 6 hours PRN Temp greater or equal to 38C (100.4F), Mild Pain (1 - 3)  bisacodyl Suppository 10 milliGRAM(s) Rectal daily PRN Constipation  melatonin 3 milliGRAM(s) Oral at bedtime PRN Sleep  oxyCODONE    5 mG/acetaminophen 325 mG 2 Tablet(s) Oral every 6 hours PRN Severe Pain (7 - 10)  polyethylene glycol 3350 17 Gram(s) Oral daily PRN Constipation      Pertinent Labs:  12-16 Na145 mmol/L Glu 95 mg/dL K+ 3.9 mmol/L Cr  1.53 mg/dL<H> BUN 25 mg/dL<H> 12-15 Phos 2.2 mg/dL<L> 12-14 Alb 2.2 g/dL<L>    Skin: No Pressure Ulcers     Edema: None Noted     Last BM: on 12/15    Estimated Needs:   [X] No Change since Previous Assessment    Previous Nutrition Diagnosis:   Inadequate oral intake  Unintended weight loss    Nutrition Diagnosis is [X] Ongoing    Changed to NPO - Start Tubefeeding      New Nutrition Diagnosis: [X] Not Applicable  [X] Chewing/Swallowing Difficulties Related to Advanced Age/Dementia as Evidence by NPO Status & Failed MBS     Interventions:   1. Recommend Start Jevity 1.5Cal @45ml/hr Increase Q2hr to Goal Rate of 85ml/hr x16hr  2. Continue Nutrition Plan of Care     Monitoring & Evaluation:   [X] Weights   [X] Follow Up (Per Protocol)  [X] Tolerance to Tubefeeding Prescription   [X] Other: Labs    RD Remains Available.  Kendrick George RD
Pt. with severe sepsis 2/2 aspiration PNA, now with low rectal temp = 94.6. Pt. alert, in bed, says "he feels a little bit chilly".  Plan: Jerome Gil, repeat rectal temp in 6 hrs,
SOURCE: other [ x] Chart/RN      DIET: NPO with PEG feeds.        PATIENT REPORT  [x ] other: no GI distress    PO INTAKE:   [X]  other : N/A    ENTERAL/PARENTERAL NUTRITION: 2 chava  cc/hr bolus at 8 am, 12 pm, 4 pm and 8 pm.  Tolerating as per RN      CURRENT WEIGHT: 240.3 lbs  Previous weight 234.3 lbs      PERTINENT MEDICATIONS:  Solucortef  Miralax  Dulcolax  Lexapro  Vitamin D3    PERTINENT LABS ( 21 Dec 2018 )  BUN 27 <H>  Glucose 105 <H>    SKIN: redness/blanchable    ESTIMATED NEEDS:   [X] no change since previous assessment      PREVIOUS NUTRITION DIAGNOSIS: addressed.  (Swallowing difficulty)    NUTRITION DIAGNOSIS is   [X] ongoing    NEW NUTRITION DIAGNOSIS: N/A    Continue current PEG feeds as tolerated.  Provides 1920 kcals and 80 g protein    MONITORING AND EVALUATION:        [X] Tolerance to diet prescription [X] weights [X] follow up per protocol    s/p MBS.  See Speech Pathologist note

## 2018-12-21 NOTE — PROGRESS NOTE ADULT - ASSESSMENT
normal ACTH & cortisol.   hypothermia improving .   Suggest : D?C iv sTEROIDS.   TRIAL OF PREDNISONE 5 MG am FOR 5 DAYS, & REASSES

## 2018-12-21 NOTE — PROGRESS NOTE ADULT - REASON FOR ADMISSION
cough

## 2018-12-21 NOTE — DISCHARGE NOTE ADULT - COMMUNITY RESOURCES
Pt will return to [facility] this afternoon. His MD at facility is Dr Lo Thompson. Contact info provided to Dahlia CONSTANTINO Pt will return to [facility] this afternoon. His MD at facility is Dr Lo Thompson, [phone]. Contact info provided to Dahlia CONSTANTINO

## 2018-12-21 NOTE — PROGRESS NOTE ADULT - PROBLEM SELECTOR PLAN 3
Speech and Swallow  assessment completed  Pt had MBS today and failed  Continue NPO with PEG feedings   Start 2cal HN bolus 250ml 4xtimes a day ( 8am, 12noon, 4pm, 8pm as per Dr. Anguiano). Speech and Swallow  assessment completed.  Pt had MBS and failed.  Continue NPO with PEG feedings

## 2018-12-21 NOTE — DISCHARGE NOTE ADULT - HOSPITAL COURSE
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  Consulted with pulmonary and ID.  Pt. with episodes of hypothermic requiring Bear-hugger blanket. He may have adrenal insufficiency;  IV hydrocortisone started.  Pt. failed MBS, recommendation still for NPO and only peg feedings.    < from: Xray Modified Barium Swallow (12.20.18 @ 10:22) >  NTERPRETATION:  Clinical indication: NPO with PEG tube    Fluoroscopy is provided to the speech pathologist at the time of   scheduled modified barium swallow evaluation.    Thin nectar and pureed consistencies are presented to the patient. Deep   laryngeal penetration is identified on all presented consistencies;   tracheal aspiration cannot be fluoroscopically assessed as this region is   not able to be visualized in light of patient positioning and body   habitus. Reference should be made to the report issued by the speech   pathologist.    Impression: Fluoroscopy is performed at the time of modified barium   swallow evaluation; reference should be made to the report issued by the   speech pathologist. See above discussion.      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  Consulted with pulmonary,ID and Endocrine.   Pt. with episodes of hypothermic requiring Bear-hugger blanket. Cortisol and ACTH serum levels were normal; to r/o adrenal insufficiency;  IV hydrocortisone started in Hospital.  Pt. will be started on oral steroids for a short course on D/C.   Pt. failed MBS, recommendation still for NPO and only peg feedings.    < from: Xray Modified Barium Swallow (12.20.18 @ 10:22) >  NTERPRETATION:  Clinical indication: NPO with PEG tube    Fluoroscopy is provided to the speech pathologist at the time of   scheduled modified barium swallow evaluation.    Thin nectar and pureed consistencies are presented to the patient. Deep   laryngeal penetration is identified on all presented consistencies;   tracheal aspiration cannot be fluoroscopically assessed as this region is   not able to be visualized in light of patient positioning and body   habitus. Reference should be made to the report issued by the speech   pathologist.    Impression: Fluoroscopy is performed at the time of modified barium   swallow evaluation; reference should be made to the report issued by the   speech pathologist. See above discussion.      < end of copied text >

## 2018-12-21 NOTE — DISCHARGE NOTE ADULT - MEDICATION SUMMARY - MEDICATIONS TO CHANGE
I will SWITCH the dose or number of times a day I take the medications listed below when I get home from the hospital:    predniSONE  -- 30  by mouth once a day x 5days started 12/12

## 2018-12-21 NOTE — DISCHARGE NOTE ADULT - MEDICATION SUMMARY - MEDICATIONS TO STOP TAKING
I will STOP taking the medications listed below when I get home from the hospital:    K-Dur 10 oral tablet, extended release  -- 1 tab(s) by mouth 2 times a day    Levaquin 250 mg oral tablet  -- 1 tab(s) by mouth every 24 hours start 12/12 x 7 days

## 2018-12-21 NOTE — PROGRESS NOTE ADULT - PROBLEM SELECTOR PROBLEM 2
Hypothermia, subsequent encounter

## 2018-12-21 NOTE — DISCHARGE NOTE ADULT - CARE PLAN
Principal Discharge DX:	Aspiration pneumonia of right middle lobe, unspecified aspiration pneumonia type  Goal:	to remain infection free  Assessment and plan of treatment:	nothing by mouth to avoid aspiration, peg tube feeding only  Secondary Diagnosis:	Dysphagia, unspecified type  Secondary Diagnosis:	Venous stasis dermatitis of both lower extremities  Secondary Diagnosis:	MELISSA (acute kidney injury)  Secondary Diagnosis:	Chronic kidney disease, unspecified CKD stage

## 2018-12-21 NOTE — PROGRESS NOTE ADULT - SUBJECTIVE AND OBJECTIVE BOX
HPI: no clinical changes. Temp. better. on peg feeding    MEDICATIONS  (STANDING):  ALBUTerol/ipratropium for Nebulization 3 milliLiter(s) Nebulizer every 6 hours  atropine 1% Ophthalmic Solution for SubLingual Use 1 Drop(s) SubLingual three times a day  buDESOnide   0.5 milliGRAM(s) Respule 0.5 milliGRAM(s) Inhalation every 12 hours  cholecalciferol 1000 Unit(s) Oral daily  dorzolamide 2% Ophthalmic Solution 1 Drop(s) Both EYES <User Schedule>  doxazosin 4 milliGRAM(s) Oral at bedtime  escitalopram 10 milliGRAM(s) Oral daily  glycopyrrolate 1 milliGRAM(s) Oral three times a day  latanoprost 0.005% Ophthalmic Solution 1 Drop(s) Both EYES at bedtime  linaclotide 145 MICROGram(s) Oral before breakfast  predniSONE   Tablet 5 milliGRAM(s) Oral daily  timolol 0.5% Solution 1 Drop(s) Both EYES two times a day    MEDICATIONS  (PRN):  acetaminophen   Tablet .. 650 milliGRAM(s) Oral every 6 hours PRN Temp greater or equal to 38C (100.4F), Mild Pain (1 - 3)  bisacodyl Suppository 10 milliGRAM(s) Rectal daily PRN Constipation  melatonin 3 milliGRAM(s) Oral at bedtime PRN Sleep  polyethylene glycol 3350 17 Gram(s) Oral daily PRN Constipation      Allergies    No Known Allergies    Intolerances        PHYSICAL EXAM:  VITALS: T(C): 36.4 (12-21-18 @ 04:35)  T(F): 97.6 (12-21-18 @ 04:35), Max: 99.3 (12-20-18 @ 16:37)  HR: 61 (12-21-18 @ 09:28) (60 - 68)  BP: 153/88 (12-21-18 @ 04:35) (113/60 - 153/88)  RR:  (14 - 17)  SpO2:  (95% - 99%)  Wt(kg): --  GENERAL: NAD,   EYES: No proptosis,  HEENT:  Atraumatic, Normocephalic,   THYROID: Normal size, no palpable nodules  RESPIRATORY: basal rales auscultation bilaterally  CARDIOVASCULAR: Regular rhythm; No murmurs; no peripheral edema  GI: Soft, nontender, non distended, PEG in place  SKIN: Dry, intact, No rashes or lesions  MUSCULOSKELETAL: reduced strength    PSYCH: Alert and oriented x 3, normal affect, normal mood                                8.6    9.7   )-----------( 117      ( 21 Dec 2018 07:34 )             26.5       12-21    144  |  111<H>  |  27<H>  ----------------------------<  105<H>  4.6   |  28  |  1.20    EGFR if : 64  EGFR if non : 55<L>    Ca    8.6      12-21        Thyroid Function Tests:  12-14 @ 05:45 TSH 2.02 FreeT4 -- T3 -- Anti TPO -- Anti Thyroglobulin Ab -- TSI --          Hormone Tests:  Cortisol AM, Serum: 11.3 ug/dL (12-20-18 @ 11:22)  Adrenocorticotropic Hormone, Serum: 38 pg/mL (12-20-18 @ 11:22)  cortisol 13

## 2018-12-21 NOTE — DISCHARGE NOTE ADULT - MEDICATION SUMMARY - MEDICATIONS TO TAKE
I will START or STAY ON the medications listed below when I get home from the hospital:    budesonide 0.5 mg/2 mL inhalation suspension  -- 2 milliliter(s) inhaled 2 times a day  -- Indication: For Aspiration pneumonia    predniSONE 5 mg oral tablet  -- 1 tab(s) by mouth once a day  -- Indication: For Aspiration pneumonia    acetaminophen 325 mg oral tablet  -- 2 tab(s) by mouth every 6 hours, As needed, Mild- Moderate Pain (1 - 6)  -- Indication: For pain    Percocet 5/325 oral tablet  -- 2 tab(s) by mouth every 4 hours, As needed, Severe Pain (7 - 10)  -- Indication: For pain    doxazosin 4 mg oral tablet  -- 1 tab(s) by mouth once a day (at bedtime)  -- Indication: For BPH    escitalopram 10 mg oral tablet  -- 1 tab(s) by mouth once a day  -- Indication: For Depression/anxiety    ipratropium-albuterol 0.5 mg-2.5 mg/3 mLinhalation solution  -- 3 milliliter(s) inhaled every 6 hours  -- Indication: For Aspiration pneumonia    linaclotide 145 mcg oral capsule  -- 1 cap(s) by mouth once a day (before a meal)  -- Indication: For Constipation    glycopyrrolate 1 mg oral tablet  -- 1 tab(s) by mouth 3 times a day  -- Indication: For Constipation    bisacodyl 10 mg rectal suppository  -- 1 suppository(ies) rectally once a day, As needed, Constipation  -- Indication: For Constipation    polyethylene glycol 3350 oral powder for reconstitution  -- 17 gram(s) by mouth once a day, As needed, Constipation  -- Indication: For Constipation    melatonin 5 mg oral tablet  -- 1 tab(s) by mouth once a day (at bedtime)  -- Indication: For Sleep    atropine 1% ophthalmic solution  -- drop(s) sublingually 3 times a day  -- Indication: For Secretions    latanoprost 0.005% ophthalmic solution  -- 1 drop(s) to each affected eye once a day (at bedtime)  -- Indication: For Glaucoma    dorzolamide-timolol 2.23%-0.68% ophthalmic solution  -- 1 drop(s) to each affected eye 2 times a day  -- Indication: For Glaucoma    cyanocobalamin 1000 mcg oral tablet  -- 1 tab(s) by mouth once a day  -- Indication: For Supplement    cholecalciferol oral tablet  -- 1000 unit(s) by mouth once a day  -- Indication: For Supplement

## 2018-12-21 NOTE — DISCHARGE NOTE ADULT - PATIENT PORTAL LINK FT
You can access the [employer] Patient Portal, offered by [facility], by registering with the following website: [email or web address]

## 2018-12-21 NOTE — DISCHARGE NOTE ADULT - SECONDARY DIAGNOSIS.
Dysphagia, unspecified type Venous stasis dermatitis of both lower extremities MELISSA (acute kidney injury) Chronic kidney disease, unspecified CKD stage

## 2018-12-21 NOTE — PROGRESS NOTE ADULT - PROBLEM SELECTOR PLAN 5
Plt 109  Hold prophylaxis with heparin/lovenox, PAS  Monitor Plts and consider heme/onc consult if down trending At base line  Avoid nephrotoxic drugs

## 2018-12-21 NOTE — PROGRESS NOTE ADULT - PROVIDER SPECIALTY LIST ADULT
Endocrinology
Hospitalist
Infectious Disease
Infectious Disease
Pulmonology
Hospitalist
Hospitalist

## 2018-12-21 NOTE — PROGRESS NOTE ADULT - SUBJECTIVE AND OBJECTIVE BOX
Patient is a 85y old  Male who presents with a chief complaint of cough (21 Dec 2018 10:11)      Patient seen and examined at bedside.    ALLERGIES:  No Known Allergies    MEDICATIONS  (STANDING):  ALBUTerol/ipratropium for Nebulization 3 milliLiter(s) Nebulizer every 6 hours  atropine 1% Ophthalmic Solution for SubLingual Use 1 Drop(s) SubLingual three times a day  buDESOnide   0.5 milliGRAM(s) Respule 0.5 milliGRAM(s) Inhalation every 12 hours  cholecalciferol 1000 Unit(s) Oral daily  dorzolamide 2% Ophthalmic Solution 1 Drop(s) Both EYES <User Schedule>  doxazosin 4 milliGRAM(s) Oral at bedtime  escitalopram 10 milliGRAM(s) Oral daily  glycopyrrolate 1 milliGRAM(s) Oral three times a day  latanoprost 0.005% Ophthalmic Solution 1 Drop(s) Both EYES at bedtime  linaclotide 145 MICROGram(s) Oral before breakfast  predniSONE   Tablet 5 milliGRAM(s) Oral <User Schedule>  timolol 0.5% Solution 1 Drop(s) Both EYES two times a day    MEDICATIONS  (PRN):  acetaminophen   Tablet .. 650 milliGRAM(s) Oral every 6 hours PRN Temp greater or equal to 38C (100.4F), Mild Pain (1 - 3)  bisacodyl Suppository 10 milliGRAM(s) Rectal daily PRN Constipation  melatonin 3 milliGRAM(s) Oral at bedtime PRN Sleep  polyethylene glycol 3350 17 Gram(s) Oral daily PRN Constipation    Vital Signs Last 24 Hrs  T(F): 97.6 (21 Dec 2018 04:35), Max: 99.3 (20 Dec 2018 16:37)  HR: 61 (21 Dec 2018 09:28) (60 - 68)  BP: 153/88 (21 Dec 2018 04:35) (113/60 - 153/88)  RR: 14 (21 Dec 2018 04:35) (14 - 17)  SpO2: 98% (21 Dec 2018 09:28) (95% - 99%)  I&O's Summary    20 Dec 2018 07:01  -  21 Dec 2018 07:00  --------------------------------------------------------  IN: 1250 mL / OUT: 0 mL / NET: 1250 mL      PHYSICAL EXAM:  General: NAD, A/O x 3  ENT: MMM  Neck: Supple, No JVD  Lungs: Clear to auscultation bilaterally  Cardio: RRR, S1/S2, No murmurs  Abdomen: Soft, Nontender, Nondistended; Bowel sounds present  Extremities: No calf tenderness, No pitting edema    LABS:                        8.6    9.7   )-----------( 117      ( 21 Dec 2018 07:34 )             26.5     12-21    144  |  111  |  27  ----------------------------<  105  4.6   |  28  |  1.20    Ca    8.6      21 Dec 2018 07:34      eGFR if Non African American: 55 mL/min/1.73M2 (12-21-18 @ 07:34)  eGFR if : 64 mL/min/1.73M2 (12-21-18 @ 07:34)                    CAPILLARY BLOOD GLUCOSE                RADIOLOGY & ADDITIONAL TESTS:    Care Discussed with Consultants/Other Providers: Patient is a 85y old  Male who presents with a chief complaint of cough (21 Dec 2018 10:11)  Patient seen and examined at bedside.  Pt. with cough.    ALLERGIES:  No Known Allergies    MEDICATIONS  (STANDING):  ALBUTerol/ipratropium for Nebulization 3 milliLiter(s) Nebulizer every 6 hours  atropine 1% Ophthalmic Solution for SubLingual Use 1 Drop(s) SubLingual three times a day  buDESOnide   0.5 milliGRAM(s) Respule 0.5 milliGRAM(s) Inhalation every 12 hours  cholecalciferol 1000 Unit(s) Oral daily  dorzolamide 2% Ophthalmic Solution 1 Drop(s) Both EYES <User Schedule>  doxazosin 4 milliGRAM(s) Oral at bedtime  escitalopram 10 milliGRAM(s) Oral daily  glycopyrrolate 1 milliGRAM(s) Oral three times a day  latanoprost 0.005% Ophthalmic Solution 1 Drop(s) Both EYES at bedtime  linaclotide 145 MICROGram(s) Oral before breakfast  predniSONE   Tablet 5 milliGRAM(s) Oral <User Schedule>  timolol 0.5% Solution 1 Drop(s) Both EYES two times a day    MEDICATIONS  (PRN):  acetaminophen   Tablet .. 650 milliGRAM(s) Oral every 6 hours PRN Temp greater or equal to 38C (100.4F), Mild Pain (1 - 3)  bisacodyl Suppository 10 milliGRAM(s) Rectal daily PRN Constipation  melatonin 3 milliGRAM(s) Oral at bedtime PRN Sleep  polyethylene glycol 3350 17 Gram(s) Oral daily PRN Constipation    Vital Signs Last 24 Hrs  T(F): 97.6 (21 Dec 2018 04:35), Max: 99.3 (20 Dec 2018 16:37)  HR: 61 (21 Dec 2018 09:28) (60 - 68)  BP: 153/88 (21 Dec 2018 04:35) (113/60 - 153/88)  RR: 14 (21 Dec 2018 04:35) (14 - 17)  SpO2: 98% (21 Dec 2018 09:28) (95% - 99%)  I&O's Summary    20 Dec 2018 07:01  -  21 Dec 2018 07:00  --------------------------------------------------------  IN: 1250 mL / OUT: 0 mL / NET: 1250 mL      PHYSICAL EXAM:  General: NAD, alert and awake.  Mentation improved today.  ENT: MMM  Neck: Supple, No JVD  Lungs: +upper airway secretions with wet cough, coarse breath sounds  Cardio: RRR, S1/S2, No murmurs  Abdomen: Soft, Nontender, Nondistended; +peg tube in place  Extremities: No calf tenderness, +edema at lower exts, venous stasis  Neuro:  no focal deficits    LABS:                        8.6    9.7   )-----------( 117      ( 21 Dec 2018 07:34 )             26.5     12-21    144  |  111  |  27  ----------------------------<  105  4.6   |  28  |  1.20    Ca    8.6      21 Dec 2018 07:34      eGFR if Non African American: 55 mL/min/1.73M2 (12-21-18 @ 07:34)  eGFR if : 64 mL/min/1.73M2 (12-21-18 @ 07:34)      CAPILLARY BLOOD GLUCOSE      RADIOLOGY & ADDITIONAL TESTS:  < from: Xray Modified Barium Swallow (12.20.18 @ 10:22) >    INTERPRETATION:  Clinical indication: NPO with PEG tube    Fluoroscopy is provided to the speech pathologist at the time of   scheduled modified barium swallow evaluation.    Thin nectar and pureed consistencies are presented to the patient. Deep   laryngeal penetration is identified on all presented consistencies;   tracheal aspiration cannot be fluoroscopically assessed as this region is   not able to be visualized in light of patient positioning and body   habitus. Reference should be made to the report issued by the speech   pathologist.    Impression: Fluoroscopy is performed at the time of modified barium   swallow evaluation; reference should be made to the report issued by the   speech pathologist. See above discussion.      < end of copied text >      Care Discussed with Consultants/Other Providers:

## 2018-12-23 LAB
CORTICOSTEROID BINDING GLOBULIN RESULT: 1.8 MG/DL — SIGNIFICANT CHANGE UP
CORTIS F/TOTAL MFR SERPL: <3.1 % — SIGNIFICANT CHANGE UP
CORTIS SERPL-MCNC: <1 UG/DL — LOW
CORTISOL, FREE RESULT: <0.03 UG/DL — LOW

## 2018-12-26 ENCOUNTER — INPATIENT (INPATIENT)
Facility: HOSPITAL | Age: 83
LOS: 7 days | Discharge: NURSING, REHAB OR LONG-TERM CARE | DRG: 871 | End: 2019-01-03
Attending: INTERNAL MEDICINE | Admitting: INTERNAL MEDICINE
Payer: MEDICARE

## 2018-12-26 VITALS
HEART RATE: 94 BPM | RESPIRATION RATE: 20 BRPM | SYSTOLIC BLOOD PRESSURE: 217 MMHG | DIASTOLIC BLOOD PRESSURE: 114 MMHG | HEIGHT: 73 IN | TEMPERATURE: 101 F | OXYGEN SATURATION: 93 % | WEIGHT: 190.04 LBS

## 2018-12-26 DIAGNOSIS — R06.03 ACUTE RESPIRATORY DISTRESS: ICD-10-CM

## 2018-12-26 DIAGNOSIS — Z93.1 GASTROSTOMY STATUS: Chronic | ICD-10-CM

## 2018-12-26 DIAGNOSIS — Z95.0 PRESENCE OF CARDIAC PACEMAKER: Chronic | ICD-10-CM

## 2018-12-26 LAB
ALBUMIN SERPL ELPH-MCNC: 2.4 G/DL — LOW (ref 3.3–5)
ALP SERPL-CCNC: 64 U/L — SIGNIFICANT CHANGE UP (ref 40–120)
ALT FLD-CCNC: 13 U/L DA — SIGNIFICANT CHANGE UP (ref 10–45)
ANION GAP SERPL CALC-SCNC: 6 MMOL/L — SIGNIFICANT CHANGE UP (ref 5–17)
APTT BLD: 36.3 SEC — SIGNIFICANT CHANGE UP (ref 27.5–36.3)
AST SERPL-CCNC: 13 U/L — SIGNIFICANT CHANGE UP (ref 10–40)
BASOPHILS # BLD AUTO: 0.1 K/UL — SIGNIFICANT CHANGE UP (ref 0–0.2)
BASOPHILS NFR BLD AUTO: 1.4 % — SIGNIFICANT CHANGE UP (ref 0–2)
BILIRUB SERPL-MCNC: 0.3 MG/DL — SIGNIFICANT CHANGE UP (ref 0.2–1.2)
BUN SERPL-MCNC: 32 MG/DL — HIGH (ref 7–23)
CALCIUM SERPL-MCNC: 8.7 MG/DL — SIGNIFICANT CHANGE UP (ref 8.4–10.5)
CHLORIDE SERPL-SCNC: 105 MMOL/L — SIGNIFICANT CHANGE UP (ref 96–108)
CO2 BLDA-SCNC: 29 MMOL/L — SIGNIFICANT CHANGE UP (ref 22–30)
CO2 BLDA-SCNC: 33 MMOL/L — HIGH (ref 22–30)
CO2 SERPL-SCNC: 31 MMOL/L — SIGNIFICANT CHANGE UP (ref 22–31)
CREAT SERPL-MCNC: 1.47 MG/DL — HIGH (ref 0.5–1.3)
EOSINOPHIL # BLD AUTO: 0 K/UL — SIGNIFICANT CHANGE UP (ref 0–0.5)
EOSINOPHIL NFR BLD AUTO: 0.1 % — SIGNIFICANT CHANGE UP (ref 0–6)
FLU A RESULT: SIGNIFICANT CHANGE UP
FLU A RESULT: SIGNIFICANT CHANGE UP
FLUAV AG NPH QL: SIGNIFICANT CHANGE UP
FLUBV AG NPH QL: SIGNIFICANT CHANGE UP
GAS PNL BLDA: SIGNIFICANT CHANGE UP
GAS PNL BLDA: SIGNIFICANT CHANGE UP
GLUCOSE SERPL-MCNC: 133 MG/DL — HIGH (ref 70–99)
HCT VFR BLD CALC: 28.8 % — LOW (ref 39–50)
HGB BLD-MCNC: 9.1 G/DL — LOW (ref 13–17)
HOROWITZ INDEX BLDA+IHG-RTO: SIGNIFICANT CHANGE UP
HOROWITZ INDEX BLDA+IHG-RTO: SIGNIFICANT CHANGE UP
INR BLD: 1.42 RATIO — HIGH (ref 0.88–1.16)
LACTATE SERPL-SCNC: 1.2 MMOL/L — SIGNIFICANT CHANGE UP (ref 0.7–2)
LYMPHOCYTES # BLD AUTO: 1.5 K/UL — SIGNIFICANT CHANGE UP (ref 1–3.3)
LYMPHOCYTES # BLD AUTO: 20.6 % — SIGNIFICANT CHANGE UP (ref 13–44)
MCHC RBC-ENTMCNC: 29 PG — SIGNIFICANT CHANGE UP (ref 27–34)
MCHC RBC-ENTMCNC: 31.7 GM/DL — LOW (ref 32–36)
MCV RBC AUTO: 91.5 FL — SIGNIFICANT CHANGE UP (ref 80–100)
MONOCYTES # BLD AUTO: 0.9 K/UL — SIGNIFICANT CHANGE UP (ref 0–0.9)
MONOCYTES NFR BLD AUTO: 12.5 % — SIGNIFICANT CHANGE UP (ref 2–14)
NEUTROPHILS # BLD AUTO: 4.6 K/UL — SIGNIFICANT CHANGE UP (ref 1.8–7.4)
NEUTROPHILS NFR BLD AUTO: 65.5 % — SIGNIFICANT CHANGE UP (ref 43–77)
NT-PROBNP SERPL-SCNC: HIGH PG/ML (ref 0–300)
PCO2 BLDA: 42 MMHG — SIGNIFICANT CHANGE UP (ref 32–46)
PCO2 BLDA: 98 MMHG — CRITICAL HIGH (ref 32–46)
PH BLDA: 7.11 — CRITICAL LOW (ref 7.35–7.45)
PH BLDA: 7.43 — SIGNIFICANT CHANGE UP (ref 7.35–7.45)
PLATELET # BLD AUTO: 177 K/UL — SIGNIFICANT CHANGE UP (ref 150–400)
PO2 BLDA: 127 MMHG — HIGH (ref 74–108)
PO2 BLDA: 237 MMHG — HIGH (ref 74–108)
POTASSIUM SERPL-MCNC: 4.8 MMOL/L — SIGNIFICANT CHANGE UP (ref 3.5–5.3)
POTASSIUM SERPL-SCNC: 4.8 MMOL/L — SIGNIFICANT CHANGE UP (ref 3.5–5.3)
PROT SERPL-MCNC: 7 G/DL — SIGNIFICANT CHANGE UP (ref 6–8.3)
PROTHROM AB SERPL-ACNC: 16.1 SEC — HIGH (ref 10–12.9)
RBC # BLD: 3.15 M/UL — LOW (ref 4.2–5.8)
RBC # FLD: 18.3 % — HIGH (ref 10.3–14.5)
RSV RESULT: SIGNIFICANT CHANGE UP
RSV RNA RESP QL NAA+PROBE: SIGNIFICANT CHANGE UP
SAO2 % BLDA: 99 % — HIGH (ref 92–96)
SAO2 % BLDA: 99 % — HIGH (ref 92–96)
SODIUM SERPL-SCNC: 142 MMOL/L — SIGNIFICANT CHANGE UP (ref 135–145)
TROPONIN I SERPL-MCNC: 0.24 NG/ML — HIGH (ref 0.02–0.06)
WBC # BLD: 7.1 K/UL — SIGNIFICANT CHANGE UP (ref 3.8–10.5)
WBC # FLD AUTO: 7.1 K/UL — SIGNIFICANT CHANGE UP (ref 3.8–10.5)

## 2018-12-26 PROCEDURE — 71045 X-RAY EXAM CHEST 1 VIEW: CPT | Mod: 26

## 2018-12-26 PROCEDURE — 31500 INSERT EMERGENCY AIRWAY: CPT

## 2018-12-26 PROCEDURE — 71045 X-RAY EXAM CHEST 1 VIEW: CPT | Mod: 26,77

## 2018-12-26 PROCEDURE — 99292 CRITICAL CARE ADDL 30 MIN: CPT | Mod: 25

## 2018-12-26 PROCEDURE — 93010 ELECTROCARDIOGRAM REPORT: CPT

## 2018-12-26 PROCEDURE — 99291 CRITICAL CARE FIRST HOUR: CPT | Mod: 25

## 2018-12-26 RX ORDER — CEFEPIME 1 G/1
2000 INJECTION, POWDER, FOR SOLUTION INTRAMUSCULAR; INTRAVENOUS ONCE
Qty: 0 | Refills: 0 | Status: COMPLETED | OUTPATIENT
Start: 2018-12-26 | End: 2018-12-26

## 2018-12-26 RX ORDER — MIDAZOLAM HYDROCHLORIDE 1 MG/ML
2 INJECTION, SOLUTION INTRAMUSCULAR; INTRAVENOUS ONCE
Qty: 0 | Refills: 0 | Status: DISCONTINUED | OUTPATIENT
Start: 2018-12-26 | End: 2018-12-26

## 2018-12-26 RX ORDER — PANTOPRAZOLE SODIUM 20 MG/1
40 TABLET, DELAYED RELEASE ORAL DAILY
Qty: 0 | Refills: 0 | Status: DISCONTINUED | OUTPATIENT
Start: 2018-12-26 | End: 2018-12-31

## 2018-12-26 RX ORDER — ACETAMINOPHEN 500 MG
650 TABLET ORAL ONCE
Qty: 0 | Refills: 0 | Status: COMPLETED | OUTPATIENT
Start: 2018-12-26 | End: 2018-12-26

## 2018-12-26 RX ORDER — SUCCINYLCHOLINE CHLORIDE 100 MG/5ML
100 SYRINGE (ML) INTRAVENOUS ONCE
Qty: 0 | Refills: 0 | Status: COMPLETED | OUTPATIENT
Start: 2018-12-26 | End: 2018-12-26

## 2018-12-26 RX ORDER — ZINC OXIDE 200 MG/G
0 OINTMENT TOPICAL
Qty: 0 | Refills: 0 | COMMUNITY

## 2018-12-26 RX ORDER — IPRATROPIUM/ALBUTEROL SULFATE 18-103MCG
3 AEROSOL WITH ADAPTER (GRAM) INHALATION EVERY 6 HOURS
Qty: 0 | Refills: 0 | Status: DISCONTINUED | OUTPATIENT
Start: 2018-12-26 | End: 2018-12-27

## 2018-12-26 RX ORDER — CHLORHEXIDINE GLUCONATE 213 G/1000ML
15 SOLUTION TOPICAL
Qty: 0 | Refills: 0 | Status: DISCONTINUED | OUTPATIENT
Start: 2018-12-26 | End: 2019-01-03

## 2018-12-26 RX ORDER — NOREPINEPHRINE BITARTRATE/D5W 8 MG/250ML
0.05 PLASTIC BAG, INJECTION (ML) INTRAVENOUS
Qty: 16 | Refills: 0 | Status: DISCONTINUED | OUTPATIENT
Start: 2018-12-26 | End: 2018-12-28

## 2018-12-26 RX ORDER — CHLORHEXIDINE GLUCONATE 213 G/1000ML
1 SOLUTION TOPICAL
Qty: 0 | Refills: 0 | Status: DISCONTINUED | OUTPATIENT
Start: 2018-12-26 | End: 2018-12-28

## 2018-12-26 RX ORDER — CIPROFLOXACIN LACTATE 400MG/40ML
400 VIAL (ML) INTRAVENOUS ONCE
Qty: 0 | Refills: 0 | Status: COMPLETED | OUTPATIENT
Start: 2018-12-26 | End: 2018-12-26

## 2018-12-26 RX ORDER — PROPOFOL 10 MG/ML
5 INJECTION, EMULSION INTRAVENOUS
Qty: 1000 | Refills: 0 | Status: DISCONTINUED | OUTPATIENT
Start: 2018-12-26 | End: 2018-12-28

## 2018-12-26 RX ORDER — NYSTATIN CREAM 100000 [USP'U]/G
1 CREAM TOPICAL
Qty: 0 | Refills: 0 | Status: DISCONTINUED | OUTPATIENT
Start: 2018-12-26 | End: 2019-01-03

## 2018-12-26 RX ORDER — VANCOMYCIN HCL 1 G
1000 VIAL (EA) INTRAVENOUS ONCE
Qty: 0 | Refills: 0 | Status: COMPLETED | OUTPATIENT
Start: 2018-12-26 | End: 2018-12-26

## 2018-12-26 RX ORDER — ETOMIDATE 2 MG/ML
20 INJECTION INTRAVENOUS ONCE
Qty: 0 | Refills: 0 | Status: COMPLETED | OUTPATIENT
Start: 2018-12-26 | End: 2018-12-26

## 2018-12-26 RX ORDER — DORZOLAMIDE HYDROCHLORIDE TIMOLOL MALEATE 20; 5 MG/ML; MG/ML
1 SOLUTION/ DROPS OPHTHALMIC
Qty: 0 | Refills: 0 | COMMUNITY

## 2018-12-26 RX ORDER — HEPARIN SODIUM 5000 [USP'U]/ML
5000 INJECTION INTRAVENOUS; SUBCUTANEOUS EVERY 12 HOURS
Qty: 0 | Refills: 0 | Status: DISCONTINUED | OUTPATIENT
Start: 2018-12-26 | End: 2019-01-03

## 2018-12-26 RX ADMIN — Medication 250 MILLIGRAM(S): at 21:44

## 2018-12-26 RX ADMIN — Medication 650 MILLIGRAM(S): at 21:30

## 2018-12-26 RX ADMIN — Medication 200 MILLIGRAM(S): at 19:53

## 2018-12-26 RX ADMIN — CEFEPIME 100 MILLIGRAM(S): 1 INJECTION, POWDER, FOR SOLUTION INTRAMUSCULAR; INTRAVENOUS at 22:06

## 2018-12-26 RX ADMIN — Medication 650 MILLIGRAM(S): at 20:30

## 2018-12-26 RX ADMIN — MIDAZOLAM HYDROCHLORIDE 2 MILLIGRAM(S): 1 INJECTION, SOLUTION INTRAMUSCULAR; INTRAVENOUS at 19:45

## 2018-12-26 RX ADMIN — Medication 4.04 MICROGRAM(S)/KG/MIN: at 20:47

## 2018-12-26 RX ADMIN — PROPOFOL 2.59 MICROGRAM(S)/KG/MIN: 10 INJECTION, EMULSION INTRAVENOUS at 20:11

## 2018-12-26 RX ADMIN — ETOMIDATE 20 MILLIGRAM(S): 2 INJECTION INTRAVENOUS at 18:04

## 2018-12-26 RX ADMIN — Medication 100 MILLIGRAM(S): at 18:06

## 2018-12-26 NOTE — ED ADULT TRIAGE NOTE - CHIEF COMPLAINT QUOTE
EMS states pt found in respiratory distress from [place].  Pt found on 2 liters with O2 sat 91%.  Pt placed on cpap mask and responded well to bag ventilations.  O2 sat 93%.

## 2018-12-26 NOTE — ED PROVIDER NOTE - PROGRESS NOTE DETAILS
Pt not doing well on bipap. Now will not open eyes to verbal stim. PH 7.1 CO2 90. Decision to intubate. D/W Wife who wants everything done, including intubation, understanding the risks. MOLST at bedside as well. Lasix held as BP is labile. After intubation, usage of meds with versed. By now, Nitro metabolized. Will not give until vitals improve.

## 2018-12-26 NOTE — ED PROVIDER NOTE - ATTENDING CONTRIBUTION TO CARE
Veronica with [facility]. I completed this chart. I performed a face to face bedside interview with patient regarding history of present illness, review of symptoms and past medical history. I completed an independent physical exam.  I have discussed the patient's plan of care with Physician Assistant (PA).

## 2018-12-26 NOTE — ED PROVIDER NOTE - CRITICAL CARE PROVIDED
additional history taking/consult w/ pt's family directly relating to pts condition/direct patient care (not related to procedure)/documentation/interpretation of diagnostic studies/conducted a detailed discussion of DNR status

## 2018-12-26 NOTE — H&P ADULT - HISTORY OF PRESENT ILLNESS
86 yo male pmhx 86 yo male pmhx HTN, HLD, Grad 3 Diastolic Heart Failure s/p ppm, CKD, dysphagia s/p PEG tube placement, aspiration pneumonia biba in respiratory distress from [facility]. 84 yo male pmhx HTN, HLD, Grad 3 Diastolic Heart Failure s/p ppm, CKD, dysphagia s/p PEG tube placement, aspiration pneumonia biba in respiratory distress from [facility].  Upon arrival to Ed patient in severe respiratory distress and subsequently intubated.  During intubation patient noted to have copious amounts of secretions in airway that appeared to be consistent with tube feeds.  Patient given etomidate and succinylcholine for intubation; patient subsequently hypotensive with sbp in the 60s and started on peripheral levophed.  OG tube placed and drained ~1liter of stomach content.  ICU called at this time. 84 yo male pmhx HTN, HLD, Grad 3 Diastolic Heart Failure s/p ppm, CKD, dysphagia s/p PEG tube placement, aspiration pneumonia biba in respiratory distress from [facility].  Upon arrival to Ed patient in severe respiratory distress and subsequently intubated.  During intubation patient noted to have copious amounts of secretions in airway that appeared to be consistent with tube feeds.  Patient given etomidate and succinylcholine for intubation; patient subsequently hypotensive with sbp in the 60s and started on peripheral levophed.  OG tube placed and drained ~ 1/2 liter of stomach content.  ICU called at this time.

## 2018-12-26 NOTE — ED ADULT NURSE REASSESSMENT NOTE - NS ED NURSE REASSESS COMMENT FT1
Pt was placed on bipap upon arrival to ED however not able to tolerate due to being tachypneic, so the pt was intubated by VIRA begum and Dr. Terrazas at this time.,

## 2018-12-26 NOTE — ED PROCEDURE NOTE - CPROC ED INFUS LINE DETAIL1
The location was identified, and the area was draped and prepped./unable to pass the needle appropriately due to patient positioning and inability to hold in lateral position.

## 2018-12-26 NOTE — H&P ADULT - NSHPPHYSICALEXAM_GEN_ALL_CORE
GENERAL:  HEENT:  CV:  RESP:  ABD:  :  SONY:  EXT:  SKIN:  NEURO: GENERAL:  HEENT:  CV:  RESP:  ABD:  :  MSK:  EXT:  SKIN:  NEURO:    POCUS EXAM:  Pulm: Demi bilaterally.  Trace effusion of right base.  Consolidation at bilateral bases, possibly atelectasis.    Card: Difficult fields of view.  LV appears with concentric hypertrophy with good squeeze, possible aterolateral wall motion abnormality.    IVC: 2.86cm and plump, likely secondary to chronic moderate pulmonary hypertension. GENERAL: Elderly male, lying in bed.   HEENT: NC/AT, Right eye blind, opaque pupil.  Left eye 3mm reactive to light.  ET tube in place, OG tube in place.   CV: RRR, +s1, +s2, no murmurs, rubs or gallops appreciated.   RESP: Symmetrical thorax expansion upon respiration. Coarse bilatearally.    ABD: soft, nontender, nondistended, normoactive bowel sounds, no masses appreciated.  Peg tube appreciated epigastric area.   : WNL, rashes over bilateral groin.    MSK: Stage one over sacrum.    EXT: Non-pitting edema, nontender, DP pulses symmetrical.   SKIN: Rash under left breast and in bilateral groin.  Warm, well perfused.    NEURO: Sedated with propofol.  Moves all 4 extremities.      POCUS EXAM:  Pulm: Demi bilaterally.  Trace effusion of right base.  Consolidation at bilateral bases, possibly atelectasis.    Card: Difficult fields of view.  LV appears with concentric hypertrophy with good squeeze, possible aterolateral wall motion abnormality.    IVC: 2.86cm and plump, likely secondary to chronic moderate pulmonary hypertension.

## 2018-12-26 NOTE — ED ADULT NURSE NOTE - OBJECTIVE STATEMENT
pt  to  trauma  room 1  from  ems pt required intubation and vasopressors. md attempted central unsucessfully  vasopressors and light started also tylenol given. pt  transported  to icu  with  rn  x2  and  pa  and resp therapist  present pt  to  trauma  room 1  from  ems pt required intubation and vasopressors. md attempted emergency central line and unsucessfully  palced. vasopressors and light started also tylenol given. pt  transported  to icu  with  rn  x2  and  pa  and resp therapist  present

## 2018-12-26 NOTE — ED PROVIDER NOTE - OBJECTIVE STATEMENT
BIBEMS for SOB. Acc to wife, he was so short of breath, that he could speak without stopping to pant. Rcent admission for aspiration pneumonia.

## 2018-12-26 NOTE — PROCEDURE NOTE - NSPROCDETAILS_GEN_ALL_CORE
sterile technique, catheter placed/guidewire recovered/lumen(s) aspirated and flushed/sterile dressing applied/ultrasound guidance

## 2018-12-26 NOTE — ED ADULT NURSE NOTE - NSIMPLEMENTINTERV_GEN_ALL_ED
Implemented All Fall with Harm Risk Interventions:  Santa to call system. Call bell, personal items and telephone within reach. Instruct patient to call for assistance. Room bathroom lighting operational. Non-slip footwear when patient is off stretcher. Physically safe environment: no spills, clutter or unnecessary equipment. Stretcher in lowest position, wheels locked, appropriate side rails in place. Provide visual cue, wrist band, yellow gown, etc. Monitor gait and stability. Monitor for mental status changes and reorient to person, place, and time. Review medications for side effects contributing to fall risk. Reinforce activity limits and safety measures with patient and family. Provide visual clues: red socks.

## 2018-12-26 NOTE — ED ADULT NURSE NOTE - PMH
Anemia    Cerebral infarction    Depression    Dyslipidemia    Dysphagia    GERD (gastroesophageal reflux disease)    IBS (irritable bowel syndrome)    Muscle weakness    Psoriasis    Shortness of breath

## 2018-12-26 NOTE — ED ADULT NURSE REASSESSMENT NOTE - NS ED NURSE REASSESS COMMENT FT1
Dr. Terrazas notified about patient being part of the [facility] program for follow up care at home.

## 2018-12-26 NOTE — CHART NOTE - NSCHARTNOTEFT_GEN_A_CORE
I have evaluated this patient and have determined that restraints are warranted to optimize medical care. Patient was assessed for current physical and psychological risk factors as well as special needs. There are no medical conditions or limitations that would place this patient at risk while in restraints.  Type of restraint:bilat is wrist. The behavioral criteria for discontinuation of restraint are not pulling at ET tube or other lines in place. The eICU Attending Dr. Alan notified.

## 2018-12-26 NOTE — H&P ADULT - PSH
Detail Level: Generalized Detail Level: Detailed Cardiac pacemaker    S/P percutaneous endoscopic gastrostomy (PEG) tube placement Detail Level: Zone

## 2018-12-26 NOTE — ED PROVIDER NOTE - CARE PLAN
Principal Discharge DX:	Respiratory distress  Secondary Diagnosis:	Aspiration pneumonia of both lower lobes due to regurgitated food

## 2018-12-26 NOTE — PROCEDURE NOTE - ADDITIONAL PROCEDURE DETAILS
CVC placed in the setting of shock state requiring vasopressor therapy.      Not included in critical care time. Patient placed in trendelenberg.  Right IJ examined first, some abnormal anatomy appreciated and inability by ED staff to cannulate RIJ.  Left IJ examined by ultrasound, compressible throughout neck.     CVC placed in the setting of shock state requiring vasopressor therapy.      Not included in critical care time.

## 2018-12-26 NOTE — ED PROCEDURE NOTE - NS_EDPROVIDERDISPOUSERTYPE_ED_A_ED
Attending Attestation (For Attendings USE Only)...
Attending Attestation (For Attendings USE Only)...
I have personally evaluated and examined the patient. The Attending was available to me as a supervising provider if needed.

## 2018-12-26 NOTE — H&P ADULT - ASSESSMENT
84 yo male pmhx HTN, HLD, Grad 3 Diastolic Heart Failure s/p ppm, ?CKD, dysphagia s/p PEG tube placement, aspiration pneumonia admitted with hypercapnic respiratory failure likely secondary to aspiration pna and MELISSA.  Management discussed with eICU attending.     NEURO: Sedated with propofol.    CV: Shock state requiring vasopressor therapy.  Titrate Levophed for MAP 65.  Wean as tolerated by patient.  Hold antihypertensives in setting of shock state.    RESP: Acute hypercapnic respiratory failure.  AC/VC 6cc/kg tidal volume lung protective strategy.  Weaned FiO2 from 60 to 40%, titrate FiO2 for sPO2 >92%.  Wean as tolerated by patient.  Keep HOB >30 degrees.  Peridex for VAP ppx.  Pantoprazole for stress ulcer ppx.    RENAL: MELISSA on ?CKD, avoid nephrotoxic medications, renally dose medications.  Nix in place.  Trend urine output, BUN/Cr and electrolytes.  Replace electrolytes as needed.  U/A pending.   GI: NPO.  OG tube to intermittent suction.  Peg tube in place.    ENDO: POCT q6 hours while NPO.    ID: Received vancomycin, cefepime, cipro.  Cultures pending. RVP negative.  Continue zosyn and vancomycin for coverage.   HEME: Heparin for chemical VTE ppx.  SCD ordered.   DISPO: Full code.  Will have further GOC with patient's family. 86 yo male pmhx HTN, HLD, Grad 3 Diastolic Heart Failure s/p ppm, ?CKD, dysphagia s/p PEG tube placement, aspiration pneumonia admitted with hypercapnic respiratory failure likely secondary to aspiration pna and MELISSA.  Management discussed with eICU attending.     NEURO: Sedated with propofol.    CV: Shock state requiring vasopressor therapy.  Titrate Levophed for MAP 65.  Wean as tolerated by patient.  Hold antihypertensives in setting of shock state.    RESP: Acute hypercapnic respiratory failure.  AC/VC 6cc/kg tidal volume lung protective strategy.  Weaned FiO2 from 60 to 40%, titrate FiO2 for sPO2 >92%.  Wean as tolerated by patient.  Keep HOB >30 degrees.  Peridex for VAP ppx.  Pantoprazole for stress ulcer ppx.    RENAL: MELISSA on ?CKD, avoid nephrotoxic medications, renally dose medications.  Nix in place.  Trend urine output, BUN/Cr and electrolytes.  Replace electrolytes as needed.  U/A pending.   GI: NPO.  OG tube to intermittent suction.  Peg tube in place.    ENDO: POCT q6 hours while NPO.    ID: Received vancomycin, cefepime, cipro.  Cultures pending. RVP negative.  Continue zosyn and vancomycin for coverage.   HEME: Heparin for chemical VTE ppx.  SCD ordered.   DISPO: Full code.  Will have further GOC with patient's family.      Critical Care time: 65 mins assessing presenting problems of acute illness that poses high probability of life threatening deterioration or end organ damage/dysfunction.  Medical decision making including Initiating plan of care, reviewing data, reviewing radiology, discussing with multidisciplinary team, non inclusive of procedures, discussing goals of care with patient/family 84 yo male pmhx HTN, HLD, Grad 3 Diastolic Heart Failure s/p ppm, ?CKD, dysphagia s/p PEG tube placement, aspiration pneumonia admitted with hypercapnic respiratory failure likely secondary to aspiration pna and MELISSA.  Management discussed with eICU attending.     NEURO: Sedated with propofol.    CV: Shock state requiring vasopressor therapy.  Titrate Levophed for MAP 65.  Wean as tolerated by patient.  Hold antihypertensives in setting of shock state.  Anterolateral t wave inversion and slight elevated in cardiac enzymes likely demand ischemia due to acute distress/tachycardia upon arrival.  Trend cardiac enzymes  RESP: Acute hypercapnic respiratory failure.  AC/VC 6cc/kg tidal volume lung protective strategy.  Weaned FiO2 from 60 to 40%, titrate FiO2 for sPO2 >92%.  Wean as tolerated by patient.  Keep HOB >30 degrees.  Peridex for VAP ppx.  Pantoprazole for stress ulcer ppx.    RENAL: MELISSA on ?CKD, avoid nephrotoxic medications, renally dose medications.  Nix in place.  Trend urine output, BUN/Cr and electrolytes.  Replace electrolytes as needed.  U/A pending.   GI: NPO.  OG tube to intermittent suction.  Peg tube in place.    ENDO: POCT q6 hours while NPO.    ID: Received vancomycin, cefepime, cipro.  Cultures pending. RVP negative.  Continue zosyn and vancomycin for coverage.   HEME: Heparin for chemical VTE ppx.  SCD ordered.   DISPO: Full code.  Will have further GOC with patient's family.      Critical Care time: 65 mins assessing presenting problems of acute illness that poses high probability of life threatening deterioration or end organ damage/dysfunction.  Medical decision making including Initiating plan of care, reviewing data, reviewing radiology, discussing with multidisciplinary team, non inclusive of procedures, discussing goals of care with patient/family

## 2018-12-26 NOTE — H&P ADULT - NSHPSOCIALHISTORY_GEN_ALL_CORE
Lives in [place] [facility] in [facility].  Former smoker.  No illicit drug or etoh use.

## 2018-12-27 DIAGNOSIS — J96.02 ACUTE RESPIRATORY FAILURE WITH HYPERCAPNIA: ICD-10-CM

## 2018-12-27 DIAGNOSIS — R41.82 ALTERED MENTAL STATUS, UNSPECIFIED: ICD-10-CM

## 2018-12-27 DIAGNOSIS — A41.9 SEPSIS, UNSPECIFIED ORGANISM: ICD-10-CM

## 2018-12-27 DIAGNOSIS — J69.0 PNEUMONITIS DUE TO INHALATION OF FOOD AND VOMIT: ICD-10-CM

## 2018-12-27 DIAGNOSIS — N17.0 ACUTE KIDNEY FAILURE WITH TUBULAR NECROSIS: ICD-10-CM

## 2018-12-27 LAB
ALBUMIN SERPL ELPH-MCNC: 2 G/DL — LOW (ref 3.3–5)
ALP SERPL-CCNC: 61 U/L — SIGNIFICANT CHANGE UP (ref 40–120)
ALT FLD-CCNC: 12 U/L DA — SIGNIFICANT CHANGE UP (ref 10–45)
ANION GAP SERPL CALC-SCNC: 6 MMOL/L — SIGNIFICANT CHANGE UP (ref 5–17)
APPEARANCE UR: CLEAR — SIGNIFICANT CHANGE UP
AST SERPL-CCNC: 14 U/L — SIGNIFICANT CHANGE UP (ref 10–40)
BACTERIA # UR AUTO: ABNORMAL /HPF
BASOPHILS # BLD AUTO: 0.1 K/UL — SIGNIFICANT CHANGE UP (ref 0–0.2)
BASOPHILS NFR BLD AUTO: 1.3 % — SIGNIFICANT CHANGE UP (ref 0–2)
BILIRUB SERPL-MCNC: 0.4 MG/DL — SIGNIFICANT CHANGE UP (ref 0.2–1.2)
BILIRUB UR-MCNC: ABNORMAL
BUN SERPL-MCNC: 34 MG/DL — HIGH (ref 7–23)
CALCIUM SERPL-MCNC: 8.3 MG/DL — LOW (ref 8.4–10.5)
CHLORIDE SERPL-SCNC: 104 MMOL/L — SIGNIFICANT CHANGE UP (ref 96–108)
CK MB CFR SERPL CALC: <0.5 NG/ML — LOW (ref 0.5–10)
CK SERPL-CCNC: 27 U/L — LOW (ref 30–200)
CO2 SERPL-SCNC: 32 MMOL/L — HIGH (ref 22–31)
COLOR SPEC: YELLOW — SIGNIFICANT CHANGE UP
CREAT SERPL-MCNC: 1.46 MG/DL — HIGH (ref 0.5–1.3)
DIFF PNL FLD: ABNORMAL
EOSINOPHIL # BLD AUTO: 0 K/UL — SIGNIFICANT CHANGE UP (ref 0–0.5)
EOSINOPHIL NFR BLD AUTO: 0.2 % — SIGNIFICANT CHANGE UP (ref 0–6)
EPI CELLS # UR: SIGNIFICANT CHANGE UP
GLUCOSE BLDC GLUCOMTR-MCNC: 125 MG/DL — HIGH (ref 70–99)
GLUCOSE SERPL-MCNC: 109 MG/DL — HIGH (ref 70–99)
GLUCOSE UR QL: NEGATIVE — SIGNIFICANT CHANGE UP
GRAM STN FLD: SIGNIFICANT CHANGE UP
HCT VFR BLD CALC: 25.1 % — LOW (ref 39–50)
HGB BLD-MCNC: 8 G/DL — LOW (ref 13–17)
KETONES UR-MCNC: NEGATIVE — SIGNIFICANT CHANGE UP
LEUKOCYTE ESTERASE UR-ACNC: ABNORMAL
LYMPHOCYTES # BLD AUTO: 1.5 K/UL — SIGNIFICANT CHANGE UP (ref 1–3.3)
LYMPHOCYTES # BLD AUTO: 14.9 % — SIGNIFICANT CHANGE UP (ref 13–44)
MAGNESIUM SERPL-MCNC: 2 MG/DL — SIGNIFICANT CHANGE UP (ref 1.6–2.6)
MCHC RBC-ENTMCNC: 29 PG — SIGNIFICANT CHANGE UP (ref 27–34)
MCHC RBC-ENTMCNC: 32 GM/DL — SIGNIFICANT CHANGE UP (ref 32–36)
MCV RBC AUTO: 90.6 FL — SIGNIFICANT CHANGE UP (ref 80–100)
MONOCYTES # BLD AUTO: 1 K/UL — HIGH (ref 0–0.9)
MONOCYTES NFR BLD AUTO: 9.8 % — HIGH (ref 1–9)
NEUTROPHILS # BLD AUTO: 7.3 K/UL — SIGNIFICANT CHANGE UP (ref 1.8–7.4)
NEUTROPHILS NFR BLD AUTO: 73.8 % — SIGNIFICANT CHANGE UP (ref 43–77)
NITRITE UR-MCNC: NEGATIVE — SIGNIFICANT CHANGE UP
PH UR: 8 — SIGNIFICANT CHANGE UP (ref 5–8)
PHOSPHATE SERPL-MCNC: 2 MG/DL — LOW (ref 2.5–4.5)
PLATELET # BLD AUTO: 184 K/UL — SIGNIFICANT CHANGE UP (ref 150–400)
POTASSIUM SERPL-MCNC: 4 MMOL/L — SIGNIFICANT CHANGE UP (ref 3.5–5.3)
POTASSIUM SERPL-SCNC: 4 MMOL/L — SIGNIFICANT CHANGE UP (ref 3.5–5.3)
PROT SERPL-MCNC: 6 G/DL — SIGNIFICANT CHANGE UP (ref 6–8.3)
PROT UR-MCNC: 30 MG/DL
RBC # BLD: 2.77 M/UL — LOW (ref 4.2–5.8)
RBC # FLD: 18 % — HIGH (ref 10.3–14.5)
RBC CASTS # UR COMP ASSIST: ABNORMAL /HPF (ref 0–4)
SODIUM SERPL-SCNC: 142 MMOL/L — SIGNIFICANT CHANGE UP (ref 135–145)
SP GR SPEC: 1.01 — SIGNIFICANT CHANGE UP (ref 1.01–1.02)
SPECIMEN SOURCE: SIGNIFICANT CHANGE UP
TROPONIN I SERPL-MCNC: 0.62 NG/ML — HIGH (ref 0.02–0.06)
UROBILINOGEN FLD QL: NEGATIVE — SIGNIFICANT CHANGE UP
WBC # BLD: 9.9 K/UL — SIGNIFICANT CHANGE UP (ref 3.8–10.5)
WBC # FLD AUTO: 9.9 K/UL — SIGNIFICANT CHANGE UP (ref 3.8–10.5)
WBC UR QL: ABNORMAL /HPF (ref 0–5)

## 2018-12-27 PROCEDURE — 70450 CT HEAD/BRAIN W/O DYE: CPT | Mod: 26

## 2018-12-27 PROCEDURE — 99223 1ST HOSP IP/OBS HIGH 75: CPT

## 2018-12-27 RX ORDER — ALBUTEROL 90 UG/1
1 AEROSOL, METERED ORAL EVERY 6 HOURS
Qty: 0 | Refills: 0 | Status: DISCONTINUED | OUTPATIENT
Start: 2018-12-27 | End: 2018-12-29

## 2018-12-27 RX ORDER — PIPERACILLIN AND TAZOBACTAM 4; .5 G/20ML; G/20ML
3.38 INJECTION, POWDER, LYOPHILIZED, FOR SOLUTION INTRAVENOUS EVERY 8 HOURS
Qty: 0 | Refills: 0 | Status: DISCONTINUED | OUTPATIENT
Start: 2018-12-27 | End: 2019-01-03

## 2018-12-27 RX ORDER — ACETAMINOPHEN 500 MG
650 TABLET ORAL EVERY 6 HOURS
Qty: 0 | Refills: 0 | Status: DISCONTINUED | OUTPATIENT
Start: 2018-12-27 | End: 2019-01-03

## 2018-12-27 RX ORDER — IPRATROPIUM BROMIDE 0.2 MG/ML
1 SOLUTION, NON-ORAL INHALATION EVERY 6 HOURS
Qty: 0 | Refills: 0 | Status: DISCONTINUED | OUTPATIENT
Start: 2018-12-27 | End: 2018-12-29

## 2018-12-27 RX ORDER — ASCORBIC ACID 60 MG
1500 TABLET,CHEWABLE ORAL EVERY 6 HOURS
Qty: 0 | Refills: 0 | Status: DISCONTINUED | OUTPATIENT
Start: 2018-12-27 | End: 2018-12-29

## 2018-12-27 RX ORDER — VANCOMYCIN HCL 1 G
1250 VIAL (EA) INTRAVENOUS EVERY 24 HOURS
Qty: 0 | Refills: 0 | Status: DISCONTINUED | OUTPATIENT
Start: 2018-12-27 | End: 2018-12-29

## 2018-12-27 RX ORDER — THIAMINE MONONITRATE (VIT B1) 100 MG
200 TABLET ORAL
Qty: 0 | Refills: 0 | Status: DISCONTINUED | OUTPATIENT
Start: 2018-12-27 | End: 2018-12-29

## 2018-12-27 RX ORDER — HYDROCORTISONE 20 MG
50 TABLET ORAL EVERY 6 HOURS
Qty: 0 | Refills: 0 | Status: DISCONTINUED | OUTPATIENT
Start: 2018-12-27 | End: 2018-12-29

## 2018-12-27 RX ADMIN — PIPERACILLIN AND TAZOBACTAM 25 GRAM(S): 4; .5 INJECTION, POWDER, LYOPHILIZED, FOR SOLUTION INTRAVENOUS at 14:26

## 2018-12-27 RX ADMIN — CHLORHEXIDINE GLUCONATE 15 MILLILITER(S): 213 SOLUTION TOPICAL at 17:03

## 2018-12-27 RX ADMIN — Medication 1 PUFF(S): at 22:34

## 2018-12-27 RX ADMIN — PIPERACILLIN AND TAZOBACTAM 25 GRAM(S): 4; .5 INJECTION, POWDER, LYOPHILIZED, FOR SOLUTION INTRAVENOUS at 22:08

## 2018-12-27 RX ADMIN — Medication 650 MILLIGRAM(S): at 17:50

## 2018-12-27 RX ADMIN — CHLORHEXIDINE GLUCONATE 15 MILLILITER(S): 213 SOLUTION TOPICAL at 05:10

## 2018-12-27 RX ADMIN — CHLORHEXIDINE GLUCONATE 1 APPLICATION(S): 213 SOLUTION TOPICAL at 05:11

## 2018-12-27 RX ADMIN — Medication 650 MILLIGRAM(S): at 11:28

## 2018-12-27 RX ADMIN — NYSTATIN CREAM 1 APPLICATION(S): 100000 CREAM TOPICAL at 05:11

## 2018-12-27 RX ADMIN — NYSTATIN CREAM 1 APPLICATION(S): 100000 CREAM TOPICAL at 17:03

## 2018-12-27 RX ADMIN — CHLORHEXIDINE GLUCONATE 1 APPLICATION(S): 213 SOLUTION TOPICAL at 17:03

## 2018-12-27 RX ADMIN — Medication 166.67 MILLIGRAM(S): at 20:26

## 2018-12-27 RX ADMIN — Medication 102 MILLIGRAM(S): at 20:42

## 2018-12-27 RX ADMIN — Medication 50 MILLIGRAM(S): at 20:42

## 2018-12-27 RX ADMIN — ALBUTEROL 1 PUFF(S): 90 AEROSOL, METERED ORAL at 16:27

## 2018-12-27 RX ADMIN — PIPERACILLIN AND TAZOBACTAM 25 GRAM(S): 4; .5 INJECTION, POWDER, LYOPHILIZED, FOR SOLUTION INTRAVENOUS at 05:12

## 2018-12-27 RX ADMIN — ALBUTEROL 1 PUFF(S): 90 AEROSOL, METERED ORAL at 22:34

## 2018-12-27 RX ADMIN — HEPARIN SODIUM 5000 UNIT(S): 5000 INJECTION INTRAVENOUS; SUBCUTANEOUS at 17:03

## 2018-12-27 RX ADMIN — PANTOPRAZOLE SODIUM 40 MILLIGRAM(S): 20 TABLET, DELAYED RELEASE ORAL at 11:21

## 2018-12-27 RX ADMIN — Medication 650 MILLIGRAM(S): at 10:28

## 2018-12-27 RX ADMIN — Medication 50 MILLIGRAM(S): at 23:49

## 2018-12-27 RX ADMIN — HEPARIN SODIUM 5000 UNIT(S): 5000 INJECTION INTRAVENOUS; SUBCUTANEOUS at 05:11

## 2018-12-27 RX ADMIN — Medication 1 PUFF(S): at 16:27

## 2018-12-27 RX ADMIN — Medication 650 MILLIGRAM(S): at 16:50

## 2018-12-27 NOTE — DIETITIAN INITIAL EVALUATION ADULT. - OTHER INFO
Pt is an 86 yo male pmhx HTN, HLD, Grad 3 Diastolic Heart Failure s/p ppm, CKD, dysphagia s/p PEG tube placement, aspiration pneumonia biba in respiratory distress from [facility]. Pt currently intubated/sedated. Pt transferred to ICU from [facility] due to secretions in airway consistent with tube feeding. Pt currently with weight of (12/26) 212lbs which indicates significant decrease which may be due to fluid shifts/true weight loss with inadequate feeds. Tube feeds currently held. Pt with PEG tube + OGT to LWS.

## 2018-12-27 NOTE — DIETITIAN INITIAL EVALUATION ADULT. - ORAL INTAKE PTA
n/a/Pt transferred from [facility] where he was previously receiving enteral nutrition via PEG

## 2018-12-27 NOTE — GOALS OF CARE CONVERSATION - PERSONAL ADVANCE DIRECTIVE - CONVERSATION DETAILS
Met with wife, Angie Reviewed Hospice POC/Benefits, left [facility] contact information with wife., consents not signed.
Discussed goals of care with patient's Wife Angie and with patient's daughter Amelie.  They endorsed that the patient has been in and out of hospitals/rehabs for ~8 months and Amelie endorses that over the last several weeks she has appreciated a progressive decline.  Patient has been hospitalized in the past multiple times with

## 2018-12-27 NOTE — PROGRESS NOTE ADULT - ASSESSMENT
84 yo male pmhx HTN, HLD, Grad 3 Diastolic Heart Failure s/p ppm, CKD, dysphagia s/p PEG placement admitted to the ICU with septic shock secondary to aspiration pneumonia. Patient with multiple admissions recently. Has been a high risk of aspiration despite being with a PEG. This morning he remains in shock requiring vasopressor. He was intubated for hypercapnic respiratory failure.     Assessment:  1. Acute hypercapnic respiratory failure  2. Encephalopathy - possibly related to sepsis, has unequal pupils ?no prior documentation as such  3. Shock - likely septic   4. Aspiration Pneumonia - suspect infection with gram negative organisms  5. Diastolic heart dysfunction  6. Chronic kidney disease  7. Dysphagia s/p PEG    - Cont. vasopressor support  - D/c propofol to assess neurologic status  - Obtain CT scan of the head given unequal pupils, r/o ICH  - Ventilatory support  - Broad spectrum antibiotics  - F/u culture results  - Hold tube feeds for now as patient was vomiting on admission, abdomen remains benign   - Prognosis is guarded, given patient with multiple hospital admissions in the past year. Has been declining clinically.  - Obtain Palliative care consult  - GI, DVT prophylaxis   - 45 min of critical care time spent on this patient's care

## 2018-12-27 NOTE — PROGRESS NOTE ADULT - SUBJECTIVE AND OBJECTIVE BOX
Follow-up Critical Care Progress Note  Chief Complaint : Acute respiratory distress    Patient admitted overnight for septic shock secondary to aspiration pneumonia. At the time of intubation reported with tube feeds in the trachea. Also upon suctioning patient had large amounts of feeds in the stomach. This morning patient is still requiring vasopressor support, is ventilated and sedated.     Allergies :No Known Allergies    PAST MEDICAL & SURGICAL HISTORY:  Shortness of breath  Muscle weakness  Depression  IBS (irritable bowel syndrome)  Dysphagia  Cerebral infarction  Anemia  Psoriasis  GERD (gastroesophageal reflux disease)  Dyslipidemia  Cardiac pacemaker  S/P percutaneous endoscopic gastrostomy (PEG) tube placement    Medications:  MEDICATIONS  (STANDING):  ALBUTerol    90 MICROgram(s) HFA Inhaler 1 Puff(s) Inhalation every 6 hours  chlorhexidine 0.12% Liquid 15 milliLiter(s) Oral Mucosa two times a day  chlorhexidine 4% Liquid 1 Application(s) Topical two times a day  heparin  Injectable 5000 Unit(s) SubCutaneous every 12 hours  ipratropium 17 MICROgram(s) HFA Inhaler 1 Puff(s) Inhalation every 6 hours  norepinephrine Infusion 0.05 MICROgram(s)/kG/Min (4.041 mL/Hr) IV Continuous <Continuous>  nystatin Powder 1 Application(s) Topical two times a day  pantoprazole  Injectable 40 milliGRAM(s) IV Push daily  piperacillin/tazobactam IVPB. 3.375 Gram(s) IV Intermittent every 8 hours  propofol Infusion 5 MICROgram(s)/kG/Min (2.586 mL/Hr) IV Continuous <Continuous>  vancomycin  IVPB 1250 milliGRAM(s) IV Intermittent every 24 hours    MEDICATIONS  (PRN):  acetaminophen    Suspension .. 650 milliGRAM(s) Enteral Tube every 6 hours PRN Temp greater or equal to 38C (100.4F)    LABS:                      8.0    9.9   )-----------( 184      ( 27 Dec 2018 05:45 )             25.1     12-  142  |  104  |  34<H>  ----------------------------<  109<H>  4.0   |  32<H>  |  1.46<H>    Ca    8.3<L>      27 Dec 2018 05:45  Phos  2.0       Mg     2.0         TPro  6.0  /  Alb  2.0<L>  /  TBili  0.4  /  DBili  x   /  AST  14  /  ALT  12  /  AlkPhos  61  12-27    CARDIAC MARKERS ( 27 Dec 2018 04:30 )  .625 ng/mL / x     / 27 U/L / x     / <0.5 ng/mL  CARDIAC MARKERS ( 26 Dec 2018 17:58 )  .239 ng/mL / x     / x     / x     / x        PT/INR - ( 26 Dec 2018 17:58 )   PT: 16.1 sec;   INR: 1.42 ratio       PTT - ( 26 Dec 2018 17:58 )  PTT:36.3 sec  Urinalysis Basic - ( 27 Dec 2018 00:05 )    Color: Yellow / Appearance: Clear / S.015 / pH: x  Gluc: x / Ketone: Negative  / Bili: Small / Urobili: Negative   Blood: x / Protein: 30 mg/dL / Nitrite: Negative   Leuk Esterase: Moderate / RBC: 11-25 /HPF / WBC 26-50 /HPF   Sq Epi: x / Non Sq Epi: Neg.-Few / Bacteria: Moderate /HPF    Serum Pro-Brain Natriuretic Peptide: 83196 pg/mL (18 @ 17:58)    CULTURES: (if applicable)    ABG - ( 26 Dec 2018 20:18 )  pH, Arterial: 7.43  pH, Blood: x     /  pCO2: 42    /  pO2: 127   / HCO3: x     / Base Excess: x     /  SaO2: 99        VITALS:  T(C): 38.6 (18 @ 08:00), Max: 38.6 (18 @ 17:37)  T(F): 101.4 (18 @ 08:00), Max: 101.5 (18 @ 17:37)  HR: 60 (18 @ 10:00) (60 - 96)  BP: 96/54 (18 @ 10:00) (71/40 - 217/114)  BP(mean): 67 (18 @ 10:00) (61 - 81)  ABP: --  ABP(mean): --  RR: 17 (18 @ 10:00) (16 - 22)  SpO2: 98% (18 @ 10:00) (93% - 100%)  CVP(mm Hg): --  CVP(cm H2O): --    Ins and Outs   18 @ 07:01  -  18 @ 07:00  --------------------------------------------------------  IN: 538.1 mL / OUT: 1140 mL / NET: -601.9 mL    18 @ 07:01  -  18 @ 10:42  --------------------------------------------------------  IN: 145.8 mL / OUT: 150 mL / NET: -4.2 mL    Height (cm): 185.42 (18 @ 17:37)  Weight (kg): 96.3 (18 @ 21:00)  BMI (kg/m2): 28 (18 @ 21:00)    Device: 840, Mode: AC/ CMV (Assist Control/ Continuous Mandatory Ventilation), RR (machine): 16, RR (patient): 20, TV (machine): 450, TV (patient): 467, FiO2: 40, PEEP: 5, ITime: 1, MAP: 10, PIP: 21    Physical Examination:  GENERAL:                Sedated at this time, appears comfortable  HEENT:                    Right pupil mid dilated, left pupil constricted   PULM:                      coarse breath sounds diffusely  CVS:                         RRR, S1, S2,  No Murmur  ABD:                        Soft, nondistended, nontender, + PEG, site appears clean  EXT:                         Chronic venous stasis changes on the lower extremities    Vascular:                Warm Extremities, Normal Distal Pulses  SKIN:                       Warm and well perfused, + groin rash  NEURO:                  Sedated

## 2018-12-27 NOTE — DIETITIAN INITIAL EVALUATION ADULT. - NS AS NUTRI INTERV ENTERAL NUTRITION
Recommend initiate enteral nutrition with Jevity 1.5 @ 30cc/hr via PEG and increase by 10cc/hr x 18hrs q6 hrs until goal of 80cc/hr reached when medically feasible. Will provide 2,160kcals, 91g protein

## 2018-12-27 NOTE — PROGRESS NOTE ADULT - ASSESSMENT
84 yo male PMHx HTN, HLD, grade III diastolic heart failure (EF 60%) s/p ppm, CKD, dysphagia s/p PEG tube placement, recently discharged 12/21 for aspiration pneumonia, admitted with acute hypercapnic respiratory failure, altered mental status, septic shock with ATN, secondary to aspiration pneumonia    - Continue full vent support, FiO2 weaned to 30%, on low Tv lung protective ventilation strategy. Will change Propofol to Precedex and SBT in AM, plan d/w RN and RT.   - If patient fails SBT, will start tube feeds  - Continue Levophed, BP improved with stress steroids, wean vasopressors as tolerated to maintain MAP >65. Marik protocol added  - Continue Zosyn and Vanco empirically, Sputum cx +mod gram pos cocci in pairs/chains/clusters, blood cultures pending, UA+ UCx sent and is pending  - Trend fever curve, Tmax 101.4'F today  - Continue to monitor renal function and electrolytes. Replace electrolytes as needed. Continue indwelling hansen to monitor strict I&Os, daily weights. Renally dose meds as needed pending clinical course. Avoid nephrotoxic agents

## 2018-12-27 NOTE — CONSULT NOTE ADULT - SUBJECTIVE AND OBJECTIVE BOX
HPI:  84 yo male pmhx HTN, HLD, Grad 3 Diastolic Heart Failure s/p ppm, CKD, dysphagia s/p PEG tube placement, aspiration pneumonia biba in respiratory distress from [facility].  Upon arrival to [facility] patient in severe respiratory distress and subsequently intubated.  During intubation patient noted to have copious amounts of secretions in airway that appeared to be consistent with tube feeds.  Patient given etomidate and succinylcholine for intubation; patient subsequently hypotensive with sbp in the 60s and started on peripheral levophed.  OG tube placed and drained ~ 1/2 liter of stomach content.      PAST MEDICAL & SURGICAL HISTORY:  Shortness of breath  Muscle weakness  Depression  IBS (irritable bowel syndrome)  Dysphagia  Cerebral infarction  Anemia  Psoriasis  GERD (gastroesophageal reflux disease)  Dyslipidemia  Cardiac pacemaker  S/P percutaneous endoscopic gastrostomy (PEG) tube placement      SOCIAL HISTORY:    Admitted from:   assisted living   Substance abuse history:     n         Tobacco hx:        n          Alcohol hx:         n     Home Opioid hx:n      Surrogate/HCP/Guardian:  Angie Juraez          Phone#:  [phone]  FAMILY HISTORY:  No pertinent family history in first degree relatives    Baseline ADLs (prior to admission): needed assistance with all ADL    Allergies    No Known Allergies    Intolerances      Present Symptoms:    Unable to obtain due to poor mentation    MEDICATIONS  (STANDING):  ALBUTerol    90 MICROgram(s) HFA Inhaler 1 Puff(s) Inhalation every 6 hours  chlorhexidine 0.12% Liquid 15 milliLiter(s) Oral Mucosa two times a day  chlorhexidine 4% Liquid 1 Application(s) Topical two times a day  heparin  Injectable 5000 Unit(s) SubCutaneous every 12 hours  ipratropium 17 MICROgram(s) HFA Inhaler 1 Puff(s) Inhalation every 6 hours  norepinephrine Infusion 0.05 MICROgram(s)/kG/Min (4.041 mL/Hr) IV Continuous <Continuous>  nystatin Powder 1 Application(s) Topical two times a day  pantoprazole  Injectable 40 milliGRAM(s) IV Push daily  piperacillin/tazobactam IVPB. 3.375 Gram(s) IV Intermittent every 8 hours  propofol Infusion 5 MICROgram(s)/kG/Min (2.586 mL/Hr) IV Continuous <Continuous>  vancomycin  IVPB 1250 milliGRAM(s) IV Intermittent every 24 hours    MEDICATIONS  (PRN):  acetaminophen    Suspension .. 650 milliGRAM(s) Enteral Tube every 6 hours PRN Temp greater or equal to 38C (100.4F)      PHYSICAL EXAM:    Vital Signs Last 24 Hrs  T(C): 38.6 (27 Dec 2018 08:00), Max: 38.6 (26 Dec 2018 17:37)  T(F): 101.4 (27 Dec 2018 08:00), Max: 101.5 (26 Dec 2018 17:37)  HR: 60 (27 Dec 2018 10:00) (60 - 96)  BP: 96/54 (27 Dec 2018 10:00) (71/40 - 217/114)  BP(mean): 67 (27 Dec 2018 10:00) (61 - 81)  RR: 17 (27 Dec 2018 10:00) (16 - 22)  SpO2: 98% (27 Dec 2018 10:00) (93% - 100%)    General:   unarousable       HEENT:  ET tube  pupils unequal  Lungs: comfortable tachypnea/labored breathing  excessive secretions  CV: normalGI: normal     incontinent    Musculoskeletal:   bedbound  Skin: normal   Neuro: other:  Oral intake ability: unable  Diet: [NPO]    LABS:                        8.0    9.9   )-----------( 184      ( 27 Dec 2018 05:45 )             25.1     12    142  |  104  |  34<H>  ----------------------------<  109<H>  4.0   |  32<H>  |  1.46<H>    Ca    8.3<L>      27 Dec 2018 05:45  Phos  2.0       Mg     2.0         TPro  6.0  /  Alb  2.0<L>  /  TBili  0.4  /  DBili  x   /  AST  14  /  ALT  12  /  AlkPhos  61  1227    Urinalysis Basic - ( 27 Dec 2018 00:05 )    Color: Yellow / Appearance: Clear / S.015 / pH: x  Gluc: x / Ketone: Negative  / Bili: Small / Urobili: Negative   Blood: x / Protein: 30 mg/dL / Nitrite: Negative   Leuk Esterase: Moderate / RBC: 11-25 /HPF / WBC 26-50 /HPF   Sq Epi: x / Non Sq Epi: Neg.-Few / Bacteria: Moderate /HPF        RADIOLOGY & ADDITIONAL STUDIES:  < from: Xray Chest 1 View-PORTABLE IMMEDIATE (18 @ 22:05) >  PROCEDURE DATE:  2018        INTERPRETATION:  AP chest on 2018 at 8:20 PM. Patient is   intubated.    Heart is magnified by technique. Patient's chin obscures the apices.    Nasogastric tube courses into the abdomen. Left-sided pacer device again   noted.    Endotracheal tube position has improved and is well above the jacey.    Mild mid and lower lung field infiltrates are again noted but have   increased from study at 7:20 PM.    AP chest on 2018 at 9:39 PM. Patient had central line   placement.    Patient's chin again obscures the apices.    Heart, endotracheal tube, and nasogastric tube remain. Left-sided   pacemaker remains.    Central infiltrates again noted but show improvement from 8:20 PM.    A left jugular line has been inserted. Tip is in the superior vena caval   area.    IMPRESSION: Final film shows left subclavian line in good position.    Final film shows improvedcentral infiltrates.                  REDDY DONNELLY M.D., ATTENDING RADIOLOGIST  This document has been electronically signed. Dec 27 2018  8:37AM    < end of copied text >    ADVANCE DIRECTIVES: SAGAR on chart  Advanced Care Planning discussion total time spent:  1 hour

## 2018-12-27 NOTE — CHART NOTE - NSCHARTNOTEFT_GEN_A_CORE
Upon Nutritional Assessment by the Registered Dietitian your patient was determined to meet criteria / has evidence of the following diagnosis/diagnoses:          [ ]  Mild Protein Calorie Malnutrition        [ ]  Moderate Protein Calorie Malnutrition        [x] Severe Protein Calorie Malnutrition        [ ] Unspecified Protein Calorie Malnutrition        [ ] Underweight / BMI <19        [ ] Morbid Obesity / BMI > 40      Findings as based on:  [x] Comprehensive nutrition assessment   [x] Nutrition Focused Physical Exam  [x] Other: 11.6% weight loss x 5 days with inadequate enteral nutrition infusion       Nutrition Plan/Recommendations: Initiate enteral nutrition with Jevity 1.5 @ 30cc/hr x 18hrs via PEG and increase by 10cc/hr q6 hrs until goal of 80cc/hr x 18hrs reached when medically feasible.         PROVIDER Section:     By signing this assessment you are acknowledging and agree with the diagnosis/diagnoses assigned by the Registered Dietitian    Comments:

## 2018-12-27 NOTE — PROGRESS NOTE ADULT - SUBJECTIVE AND OBJECTIVE BOX
Patient is a 85y old  Male who presents with a chief complaint of Respiratory Failure (27 Dec 2018 11:11)      BRIEF HOSPITAL COURSE: 84 yo male PMHx HTN, HLD, grade III diastolic heart failure s/p ppm, CKD, dysphagia s/p PEG tube placement, recently discharged  for aspiration pneumonia BIBA in respiratory distress from [facility].  Upon arrival to ED, patient in severe respiratory distress, became obtunded and failed trial of BiPAP, and was subsequently intubated. During intubation patient noted to have copious amounts of secretions in airway that appeared to be consistent with tube feeds. Cristina-intubation patient developed hypotension with SBP 60's, a L IJ TLC was placed and patient was started on Levophed. Found to have acute respiratory acidosis pH 7.11/98/237/33, and MELISSA on CKD.    Events last 24 hours: Remains intubated on full vent support, sedated on Propofol. In shock on vasopressors, unable to wean off. Noted to have been started on IV steroids last admission empirically for possible adrenal insufficiency, resulted to have normal cortisol and ACTH, and was continued on Prednisone 5mg daily. Stress steroids added, and BP improved    PAST MEDICAL & SURGICAL HISTORY:  Shortness of breath  Muscle weakness  Depression  IBS (irritable bowel syndrome)  Dysphagia  Cerebral infarction  Anemia  Psoriasis  GERD (gastroesophageal reflux disease)  Dyslipidemia  Cardiac pacemaker  S/P percutaneous endoscopic gastrostomy (PEG) tube placement      SOCIAL HISTORY: unable to obtain due to intubation and sedation      Review of Systems:  CONSTITUTIONAL: No fever, chills, or fatigue  EYES: No eye pain, visual disturbances, or discharge  ENMT:  No difficulty hearing, tinnitus, vertigo; No sinus or throat pain  NECK: No pain or stiffness  RESPIRATORY: No cough, wheezing, chills or hemoptysis; No shortness of breath  CARDIOVASCULAR: No chest pain, palpitations, dizziness, or leg swelling  GASTROINTESTINAL: No abdominal or epigastric pain. No nausea, vomiting, or hematemesis; No diarrhea or constipation. No melena or hematochezia.  GENITOURINARY: No dysuria, frequency, hematuria, or incontinence  NEUROLOGICAL: No headaches, memory loss, loss of strength, numbness, or tremors  SKIN: No itching, burning, rashes, or lesions   MUSCULOSKELETAL: No joint pain or swelling; No muscle, back, or extremity pain  PSYCHIATRIC: No depression, anxiety, mood swings, or difficulty sleeping    [  ] Due to altered mental status/intubation, subjective information was not able to be obtained from the patient. History was obtained, to the extent possible, from review of the chart and collateral sources of information.      Medications:  piperacillin/tazobactam IVPB. 3.375 Gram(s) IV Intermittent every 8 hours  vancomycin  IVPB 1250 milliGRAM(s) IV Intermittent every 24 hours    norepinephrine Infusion 0.05 MICROgram(s)/kG/Min IV Continuous <Continuous>    ALBUTerol    90 MICROgram(s) HFA Inhaler 1 Puff(s) Inhalation every 6 hours  ipratropium 17 MICROgram(s) HFA Inhaler 1 Puff(s) Inhalation every 6 hours    acetaminophen    Suspension .. 650 milliGRAM(s) Enteral Tube every 6 hours PRN  propofol Infusion 5 MICROgram(s)/kG/Min IV Continuous <Continuous>      heparin  Injectable 5000 Unit(s) SubCutaneous every 12 hours    pantoprazole  Injectable 40 milliGRAM(s) IV Push daily      hydrocortisone sodium succinate Injectable 50 milliGRAM(s) IV Push every 6 hours    ascorbic acid IVPB 1500 milliGRAM(s) IV Intermittent every 6 hours  thiamine IVPB 200 milliGRAM(s) IV Intermittent <User Schedule>      chlorhexidine 0.12% Liquid 15 milliLiter(s) Oral Mucosa two times a day  chlorhexidine 4% Liquid 1 Application(s) Topical two times a day  nystatin Powder 1 Application(s) Topical two times a day        Mode: AC/ CMV (Assist Control/ Continuous Mandatory Ventilation)  RR (machine): 16  TV (machine): 450  FiO2: 40  PEEP: 5  MAP: 12  PIP: 26      ICU Vital Signs Last 24 Hrs  T(C): 37.2 (27 Dec 2018 22:00), Max: 38.6 (27 Dec 2018 08:00)  T(F): 99 (27 Dec 2018 22:00), Max: 101.4 (27 Dec 2018 08:00)  HR: 60 (27 Dec 2018 22:00) (59 - 66)  BP: 120/61 (27 Dec 2018 22:00) (86/49 - 121/57)  BP(mean): 79 (27 Dec 2018 22:00) (61 - 82)  ABP: --  ABP(mean): --  RR: 17 (27 Dec 2018 22:00) (16 - 24)  SpO2: 100% (27 Dec 2018 22:00) (95% - 100%)      ABG - ( 26 Dec 2018 20:18 )  pH, Arterial: 7.43  pH, Blood: x     /  pCO2: 42    /  pO2: 127   / HCO3: x     / Base Excess: x     /  SaO2: 99                  I&O's Detail    26 Dec 2018 07:01  -  27 Dec 2018 07:00  --------------------------------------------------------  IN:    norepinephrine Infusion: 55.4 mL    propofol Infusion: 137.5 mL    propofol Infusion: 20.2 mL    Solution: 250 mL    Solution: 50 mL    Solution: 25 mL  Total IN: 538.1 mL    OUT:    Indwelling Catheter - Urethral: 840 mL    Nasoenteral Tube: 300 mL  Total OUT: 1140 mL    Total NET: -601.9 mL      27 Dec 2018 07:  -  27 Dec 2018 23:09  --------------------------------------------------------  IN:    norepinephrine Infusion: 58.3 mL    propofol Infusion: 262.4 mL    Solution: 175 mL    Solution: 250 mL  Total IN: 745.7 mL    OUT:    Indwelling Catheter - Urethral: 625 mL    Nasoenteral Tube: 75 mL  Total OUT: 700 mL    Total NET: 45.7 mL            LABS:                        8.0    9.9   )-----------( 184      ( 27 Dec 2018 05:45 )             25.1         142  |  104  |  34<H>  ----------------------------<  109<H>  4.0   |  32<H>  |  1.46<H>    Ca    8.3<L>      27 Dec 2018 05:45  Phos  2.0       Mg     2.0         TPro  6.0  /  Alb  2.0<L>  /  TBili  0.4  /  DBili  x   /  AST  14  /  ALT  12  /  AlkPhos  61        CARDIAC MARKERS ( 27 Dec 2018 04:30 )  .625 ng/mL / x     / 27 U/L / x     / <0.5 ng/mL  CARDIAC MARKERS ( 26 Dec 2018 17:58 )  .239 ng/mL / x     / x     / x     / x          CAPILLARY BLOOD GLUCOSE        PT/INR - ( 26 Dec 2018 17:58 )   PT: 16.1 sec;   INR: 1.42 ratio         PTT - ( 26 Dec 2018 17:58 )  PTT:36.3 sec  Urinalysis Basic - ( 27 Dec 2018 00:05 )    Color: Yellow / Appearance: Clear / S.015 / pH: x  Gluc: x / Ketone: Negative  / Bili: Small / Urobili: Negative   Blood: x / Protein: 30 mg/dL / Nitrite: Negative   Leuk Esterase: Moderate / RBC: 11-25 /HPF / WBC 26-50 /HPF   Sq Epi: x / Non Sq Epi: Neg.-Few / Bacteria: Moderate /HPF      CULTURES:      Physical Examination:    General: No acute distress.      HEENT: Pupils equal, reactive to light.  Symmetric.    PULM: Clear to auscultation bilaterally, no significant sputum production    CVS: Regular rate and rhythm, no murmurs, rubs, or gallops    ABD: Soft, nondistended, nontender, normoactive bowel sounds, no masses    EXT: No edema, nontender    SKIN: Warm and well perfused, no rashes noted.    NEURO: Alert, oriented, interactive, nonfocal    RADIOLOGY: ***    INVASIVE LINES:  INDWELLING SOTO:  VTE PROPHYLAXIS:  CAM ICU:  CODE STATUS:    CRITICAL CARE TIME SPENT: *** minutes assessing presenting problems of acute illness, which pose high probability of life threatening deterioration or end organ damage/dysfunction, as well as medical decision making including initiating plan of care, reviewing data, reviewing radiologic exams, discussing with multidisciplinary team, non-inclusive of procedures performed, discussing goals of care with patient/family Patient is a 85y old  Male who presents with a chief complaint of Respiratory Failure (27 Dec 2018 11:11)      BRIEF HOSPITAL COURSE: 84 yo male PMHx HTN, HLD, grade III diastolic heart failure s/p ppm, CKD, dysphagia s/p PEG tube placement, recently discharged  for aspiration pneumonia BIBA in respiratory distress from [facility].  Upon arrival to ED, patient in severe respiratory distress, became obtunded and failed trial of BiPAP, and was subsequently intubated. During intubation patient noted to have copious amounts of secretions in airway that appeared to be consistent with tube feeds. Cristina-intubation patient developed hypotension with SBP 60's, a L IJ TLC was placed and patient was started on Levophed. Found to have acute respiratory acidosis pH 7.11/98/237/33, and MELISSA on CKD.    Events last 24 hours: Remains intubated on full vent support, sedated on Propofol, FiO2 weaned to 30%. In shock on vasopressors, unable to wean off. Noted to have been started on IV steroids last admission empirically for possible adrenal insufficiency, resulted to have normal cortisol and ACTH, and was continued on Prednisone 5mg daily. Stress steroids added, and BP improved.     PAST MEDICAL & SURGICAL HISTORY:  Shortness of breath  Muscle weakness  Depression  IBS (irritable bowel syndrome)  Dysphagia  Cerebral infarction  Anemia  Psoriasis  GERD (gastroesophageal reflux disease)  Dyslipidemia  Cardiac pacemaker  S/P percutaneous endoscopic gastrostomy (PEG) tube placement      SOCIAL HISTORY: unable to obtain due to intubation and sedation      Review of Systems: Due to altered mental status/intubation, subjective information was not able to be obtained from the patient. History was obtained, to the extent possible, from review of the chart and collateral sources of information.      Medications:  piperacillin/tazobactam IVPB. 3.375 Gram(s) IV Intermittent every 8 hours  vancomycin  IVPB 1250 milliGRAM(s) IV Intermittent every 24 hours  norepinephrine Infusion 0.05 MICROgram(s)/kG/Min IV Continuous <Continuous>  ALBUTerol    90 MICROgram(s) HFA Inhaler 1 Puff(s) Inhalation every 6 hours  ipratropium 17 MICROgram(s) HFA Inhaler 1 Puff(s) Inhalation every 6 hours  acetaminophen    Suspension .. 650 milliGRAM(s) Enteral Tube every 6 hours PRN  propofol Infusion 5 MICROgram(s)/kG/Min IV Continuous <Continuous>  heparin  Injectable 5000 Unit(s) SubCutaneous every 12 hours  pantoprazole  Injectable 40 milliGRAM(s) IV Push daily  hydrocortisone sodium succinate Injectable 50 milliGRAM(s) IV Push every 6 hours  ascorbic acid IVPB 1500 milliGRAM(s) IV Intermittent every 6 hours  thiamine IVPB 200 milliGRAM(s) IV Intermittent <User Schedule>  chlorhexidine 0.12% Liquid 15 milliLiter(s) Oral Mucosa two times a day  chlorhexidine 4% Liquid 1 Application(s) Topical two times a day  nystatin Powder 1 Application(s) Topical two times a day      Mode: AC/ CMV (Assist Control/ Continuous Mandatory Ventilation)  RR (machine): 16  TV (machine): 450  FiO2: 40  PEEP: 5  MAP: 12  PIP: 26      ICU Vital Signs Last 24 Hrs  T(C): 37.2 (27 Dec 2018 22:00), Max: 38.6 (27 Dec 2018 08:00)  T(F): 99 (27 Dec 2018 22:00), Max: 101.4 (27 Dec 2018 08:00)  HR: 60 (27 Dec 2018 22:00) (59 - 66)  BP: 120/61 (27 Dec 2018 22:00) (86/49 - 121/57)  BP(mean): 79 (27 Dec 2018 22:00) (61 - 82)  ABP: --  ABP(mean): --  RR: 17 (27 Dec 2018 22:00) (16 - 24)  SpO2: 100% (27 Dec 2018 22:00) (95% - 100%)      ABG - ( 26 Dec 2018 20:18 )  pH, Arterial: 7.43  pH, Blood: x     /  pCO2: 42    /  pO2: 127   / HCO3: x     / Base Excess: x     /  SaO2: 99          I&O's Detail    26 Dec 2018 07:01  -  27 Dec 2018 07:00  --------------------------------------------------------  IN:    norepinephrine Infusion: 55.4 mL    propofol Infusion: 137.5 mL    propofol Infusion: 20.2 mL    Solution: 250 mL    Solution: 50 mL    Solution: 25 mL  Total IN: 538.1 mL    OUT:    Indwelling Catheter - Urethral: 840 mL    Nasoenteral Tube: 300 mL  Total OUT: 1140 mL    Total NET: -601.9 mL      27 Dec 2018 07:01  -  27 Dec 2018 23:09  --------------------------------------------------------  IN:    norepinephrine Infusion: 58.3 mL    propofol Infusion: 262.4 mL    Solution: 175 mL    Solution: 250 mL  Total IN: 745.7 mL    OUT:    Indwelling Catheter - Urethral: 625 mL    Nasoenteral Tube: 75 mL  Total OUT: 700 mL    Total NET: 45.7 mL      LABS:                        8.0    9.9   )-----------( 184      ( 27 Dec 2018 05:45 )             25.1         142  |  104  |  34<H>  ----------------------------<  109<H>  4.0   |  32<H>  |  1.46<H>    Ca    8.3<L>      27 Dec 2018 05:45  Phos  2.0       Mg     2.0         TPro  6.0  /  Alb  2.0<L>  /  TBili  0.4  /  DBili  x   /  AST  14  /  ALT  12  /  AlkPhos  61        CARDIAC MARKERS ( 27 Dec 2018 04:30 )  .625 ng/mL / x     / 27 U/L / x     / <0.5 ng/mL  CARDIAC MARKERS ( 26 Dec 2018 17:58 )  .239 ng/mL / x     / x     / x     / x          CAPILLARY BLOOD GLUCOSE      PT/INR - ( 26 Dec 2018 17:58 )   PT: 16.1 sec;   INR: 1.42 ratio         PTT - ( 26 Dec 2018 17:58 )  PTT:36.3 sec  Urinalysis Basic - ( 27 Dec 2018 00:05 )    Color: Yellow / Appearance: Clear / S.015 / pH: x  Gluc: x / Ketone: Negative  / Bili: Small / Urobili: Negative   Blood: x / Protein: 30 mg/dL / Nitrite: Negative   Leuk Esterase: Moderate / RBC: 11-25 /HPF / WBC 26-50 /HPF   Sq Epi: x / Non Sq Epi: Neg.-Few / Bacteria: Moderate /HPF      CULTURES:      Physical Examination:    General: No acute distress.      HEENT: Pupils equal, reactive to light.  Symmetric.    PULM: Clear to auscultation bilaterally, no significant sputum production    CVS: Regular rate and rhythm, no murmurs, rubs, or gallops    ABD: Soft, nondistended, nontender, hypoactive bowel sounds, no masses. +PEG    EXT: No edema, nontender    SKIN: Warm and well perfused, no rashes noted.    NEURO: RASS -3      RADIOLOGY: < from: CT Head No Cont (18 @ 12:15) >    EXAM:  CT BRAIN      PROCEDURE DATE:  2018      INTERPRETATION:  HISTORY: Altered mental status    Evaluation demonstrates no evidence of mass-effect or midline shift. No   intraparenchymal mass lesions or hemorrhage is identified. There is  generalized age typical  atrophy and chronic white matter degeneration.    There is no evidence of hydrocephalus. No extra-axial collections are   noted.    The bone windows demonstrate no gross osseous abnormalities.        Impression:  1. Unremarkable noncontrast CT scan of the brain.        HAZEL RODRIGUEZ M.D., ATTENDING RADIOLOGIST  This document has been electronically signed. Dec 27 2018 12:22PM        < from: Xray Chest 1 View-PORTABLE IMMEDIATE (18 @ 22:05) >    EXAM:  XR CHEST PORTABLE URGENT 1V    EXAM:  XR CHEST PORTABLE IMMED 1V      PROCEDURE DATE:  2018      INTERPRETATION:  AP chest on 2018 at 8:20 PM. Patient is   intubated.    Heart is magnified by technique. Patient's chin obscures the apices.    Nasogastric tube courses into the abdomen. Left-sided pacer device again   noted.    Endotracheal tube position has improved and is well above the jacey.    Mild mid and lower lung field infiltrates are again noted but have   increased from study at 7:20 PM.    AP chest on 2018 at 9:39 PM. Patient had central line   placement.    Patient's chin again obscures the apices.    Heart, endotracheal tube, and nasogastric tube remain. Left-sided   pacemaker remains.    Central infiltrates again noted but show improvement from 8:20 PM.    A left jugular line has been inserted. Tip is in the superior vena caval   area.    IMPRESSION: Final film shows left subclavian line in good position.    Final film shows improvedcentral infiltrates.    REDDY DONNELLY M.D., ATTENDING RADIOLOGIST  This document has been electronically signed. Dec 27 2018  8:37AM         INVASIVE LINES: L IJ TLC  INDWELLING SOTO: Y  VTE PROPHYLAXIS: Heparin SC   CODE STATUS: DNR    CRITICAL CARE TIME SPENT: 40 minutes assessing presenting problems of acute illness, which pose high probability of life threatening deterioration or end organ damage/dysfunction, as well as medical decision making including initiating plan of care, reviewing data, reviewing radiologic exams, discussing with multidisciplinary team, non-inclusive of procedures performed. Patient is a 85y old  Male who presents with a chief complaint of Respiratory Failure (27 Dec 2018 11:11)      BRIEF HOSPITAL COURSE: 84 yo male PMHx HTN, HLD, grade III diastolic heart failure s/p ppm, CKD, dysphagia s/p PEG tube placement, recently discharged  for aspiration pneumonia BIBA in respiratory distress from [facility].  Upon arrival to ED, patient in severe respiratory distress, became obtunded and failed trial of BiPAP, and was subsequently intubated. During intubation patient noted to have copious amounts of secretions in airway that appeared to be consistent with tube feeds. Cristina-intubation patient developed hypotension with SBP 60's, a L IJ TLC was placed and patient was started on Levophed. Found to have acute respiratory acidosis pH 7.11/98/237/33, and MELISSA on CKD.    Events last 24 hours: Remains intubated on full vent support, sedated on Propofol, FiO2 weaned to 30%. In shock on vasopressors, unable to wean off. Noted to have been started on IV steroids last admission empirically for possible adrenal insufficiency, resulted to have normal cortisol and ACTH, and was continued on Prednisone 5mg daily. Stress steroids added, and BP improved.     PAST MEDICAL & SURGICAL HISTORY:  Shortness of breath  Muscle weakness  Depression  IBS (irritable bowel syndrome)  Dysphagia  Cerebral infarction  Anemia  Psoriasis  GERD (gastroesophageal reflux disease)  Dyslipidemia  Cardiac pacemaker  S/P percutaneous endoscopic gastrostomy (PEG) tube placement      SOCIAL HISTORY: unable to obtain due to intubation and sedation      Review of Systems: Due to altered mental status/intubation, subjective information was not able to be obtained from the patient. History was obtained, to the extent possible, from review of the chart and collateral sources of information.      Medications:  piperacillin/tazobactam IVPB. 3.375 Gram(s) IV Intermittent every 8 hours  vancomycin  IVPB 1250 milliGRAM(s) IV Intermittent every 24 hours  norepinephrine Infusion 0.05 MICROgram(s)/kG/Min IV Continuous <Continuous>  ALBUTerol    90 MICROgram(s) HFA Inhaler 1 Puff(s) Inhalation every 6 hours  ipratropium 17 MICROgram(s) HFA Inhaler 1 Puff(s) Inhalation every 6 hours  acetaminophen    Suspension .. 650 milliGRAM(s) Enteral Tube every 6 hours PRN  propofol Infusion 5 MICROgram(s)/kG/Min IV Continuous <Continuous>  heparin  Injectable 5000 Unit(s) SubCutaneous every 12 hours  pantoprazole  Injectable 40 milliGRAM(s) IV Push daily  hydrocortisone sodium succinate Injectable 50 milliGRAM(s) IV Push every 6 hours  ascorbic acid IVPB 1500 milliGRAM(s) IV Intermittent every 6 hours  thiamine IVPB 200 milliGRAM(s) IV Intermittent <User Schedule>  chlorhexidine 0.12% Liquid 15 milliLiter(s) Oral Mucosa two times a day  chlorhexidine 4% Liquid 1 Application(s) Topical two times a day  nystatin Powder 1 Application(s) Topical two times a day      Mode: AC/ CMV (Assist Control/ Continuous Mandatory Ventilation)  RR (machine): 16  TV (machine): 450  FiO2: 30  PEEP: 5  MAP: 12  PIP: 26      ICU Vital Signs Last 24 Hrs  T(C): 37.2 (27 Dec 2018 22:00), Max: 38.6 (27 Dec 2018 08:00)  T(F): 99 (27 Dec 2018 22:00), Max: 101.4 (27 Dec 2018 08:00)  HR: 60 (27 Dec 2018 22:00) (59 - 66)  BP: 120/61 (27 Dec 2018 22:00) (86/49 - 121/57)  BP(mean): 79 (27 Dec 2018 22:00) (61 - 82)  ABP: --  ABP(mean): --  RR: 17 (27 Dec 2018 22:00) (16 - 24)  SpO2: 100% (27 Dec 2018 22:00) (95% - 100%)      ABG - ( 26 Dec 2018 20:18 )  pH, Arterial: 7.43  pH, Blood: x     /  pCO2: 42    /  pO2: 127   / HCO3: x     / Base Excess: x     /  SaO2: 99          I&O's Detail    26 Dec 2018 07:01  -  27 Dec 2018 07:00  --------------------------------------------------------  IN:    norepinephrine Infusion: 55.4 mL    propofol Infusion: 137.5 mL    propofol Infusion: 20.2 mL    Solution: 250 mL    Solution: 50 mL    Solution: 25 mL  Total IN: 538.1 mL    OUT:    Indwelling Catheter - Urethral: 840 mL    Nasoenteral Tube: 300 mL  Total OUT: 1140 mL    Total NET: -601.9 mL      27 Dec 2018 07:01  -  27 Dec 2018 23:09  --------------------------------------------------------  IN:    norepinephrine Infusion: 58.3 mL    propofol Infusion: 262.4 mL    Solution: 175 mL    Solution: 250 mL  Total IN: 745.7 mL    OUT:    Indwelling Catheter - Urethral: 625 mL    Nasoenteral Tube: 75 mL  Total OUT: 700 mL    Total NET: 45.7 mL      LABS:                        8.0    9.9   )-----------( 184      ( 27 Dec 2018 05:45 )             25.1         142  |  104  |  34<H>  ----------------------------<  109<H>  4.0   |  32<H>  |  1.46<H>    Ca    8.3<L>      27 Dec 2018 05:45  Phos  2.0       Mg     2.0         TPro  6.0  /  Alb  2.0<L>  /  TBili  0.4  /  DBili  x   /  AST  14  /  ALT  12  /  AlkPhos  61        CARDIAC MARKERS ( 27 Dec 2018 04:30 )  .625 ng/mL / x     / 27 U/L / x     / <0.5 ng/mL  CARDIAC MARKERS ( 26 Dec 2018 17:58 )  .239 ng/mL / x     / x     / x     / x          CAPILLARY BLOOD GLUCOSE      PT/INR - ( 26 Dec 2018 17:58 )   PT: 16.1 sec;   INR: 1.42 ratio         PTT - ( 26 Dec 2018 17:58 )  PTT:36.3 sec  Urinalysis Basic - ( 27 Dec 2018 00:05 )    Color: Yellow / Appearance: Clear / S.015 / pH: x  Gluc: x / Ketone: Negative  / Bili: Small / Urobili: Negative   Blood: x / Protein: 30 mg/dL / Nitrite: Negative   Leuk Esterase: Moderate / RBC: 11-25 /HPF / WBC 26-50 /HPF   Sq Epi: x / Non Sq Epi: Neg.-Few / Bacteria: Moderate /HPF      CULTURES:      Physical Examination:    General: No acute distress.      HEENT: L pupil 3mm sluggish and reactive, R pupil 5mm fixed opaque    PULM: Clear to auscultation bilaterally, no significant sputum production    CVS: Regular rate and rhythm, no murmurs, rubs, or gallops    ABD: Soft, nondistended, nontender, hypoactive bowel sounds, no masses. +PEG    EXT: No edema, nontender    SKIN: Warm and well perfused, no rashes noted.    NEURO: RASS -3      CRITICAL CARE POCUS: LV function mildly reduced, trace pericardial effusion, virtual IVC       RADIOLOGY: < from: CT Head No Cont (18 @ 12:15) >    EXAM:  CT BRAIN      PROCEDURE DATE:  2018      INTERPRETATION:  HISTORY: Altered mental status    Evaluation demonstrates no evidence of mass-effect or midline shift. No   intraparenchymal mass lesions or hemorrhage is identified. There is  generalized age typical  atrophy and chronic white matter degeneration.    There is no evidence of hydrocephalus. No extra-axial collections are   noted.    The bone windows demonstrate no gross osseous abnormalities.        Impression:  1. Unremarkable noncontrast CT scan of the brain.        HAZEL RODRIGUEZ M.D., ATTENDING RADIOLOGIST  This document has been electronically signed. Dec 27 2018 12:22PM        < from: Xray Chest 1 View-PORTABLE IMMEDIATE (18 @ 22:05) >    EXAM:  XR CHEST PORTABLE URGENT 1V    EXAM:  XR CHEST PORTABLE IMMED 1V      PROCEDURE DATE:  2018      INTERPRETATION:  AP chest on 2018 at 8:20 PM. Patient is   intubated.    Heart is magnified by technique. Patient's chin obscures the apices.    Nasogastric tube courses into the abdomen. Left-sided pacer device again   noted.    Endotracheal tube position has improved and is well above the jacey.    Mild mid and lower lung field infiltrates are again noted but have   increased from study at 7:20 PM.    AP chest on 2018 at 9:39 PM. Patient had central line   placement.    Patient's chin again obscures the apices.    Heart, endotracheal tube, and nasogastric tube remain. Left-sided   pacemaker remains.    Central infiltrates again noted but show improvement from 8:20 PM.    A left jugular line has been inserted. Tip is in the superior vena caval   area.    IMPRESSION: Final film shows left subclavian line in good position.    Final film shows improvedcentral infiltrates.    REDDY DONNELLY M.D., ATTENDING RADIOLOGIST  This document has been electronically signed. Dec 27 2018  8:37AM         INVASIVE LINES: L IJ TLC  INDWELLING SOTO: Y  VTE PROPHYLAXIS: Heparin SC   CODE STATUS: DNR    CRITICAL CARE TIME SPENT: 40 minutes assessing presenting problems of acute illness, which pose high probability of life threatening deterioration or end organ damage/dysfunction, as well as medical decision making including initiating plan of care, reviewing data, reviewing radiologic exams, discussing with multidisciplinary team, non-inclusive of procedures performed.

## 2018-12-27 NOTE — DIETITIAN INITIAL EVALUATION ADULT. - ENERGY NEEDS
Ht: 73 inches Wt: (12/26) 212 pounds BMI: 28.0 kg/m2 IBW: 184 (+/-10%)  Edema: none noted per medical chart Skin: none noted per medical chart

## 2018-12-27 NOTE — CONSULT NOTE ADULT - ASSESSMENT
86 yo male pmhx HTN, HLD, Grad 3 Diastolic Heart Failure s/p ppm, CKD, dysphagia s/p PEG placement admitted to the ICU with septic shock secondary to aspiration pneumonia. Patient with multiple admissions recently. Has been a high risk of aspiration despite being with a PEG. This morning he remains in shock requiring vasopressor. He was intubated for hypercapnic respiratory failure.   Palliative:  Reviewed with wife.  Overall poor prognosis.  If pt able to be weaned.  He will be hospice appropriate.  Pt wife consented to discussion with hospice staff.  Assessment:  1. Acute hypercapnic respiratory failure  2. Encephalopathy - possibly related to sepsis, has unequal pupils ?no prior documentation as such  3. Shock - likely septic   4. Aspiration Pneumonia - suspect infection with gram negative organisms  5. Diastolic heart dysfunction  6. Chronic kidney disease  7. Dysphagia s/p PEG    - Cont. vasopressor support  - D/c propofol to assess neurologic status  - Obtain CT scan of the head given unequal pupils, r/o ICH  - Ventilatory support  - Broad spectrum antibiotics  - F/u culture results  - Hold tube feeds for now as patient was vomiting on admission, abdomen remains benign   - Prognosis is guarded, given patient with multiple hospital admissions in the past year. Has been declining clinically.  - Obtain Palliative care consult  - GI, DVT prophylaxis   - 45 min of critical care time spent on this patient's care

## 2018-12-28 LAB
ANION GAP SERPL CALC-SCNC: 9 MMOL/L — SIGNIFICANT CHANGE UP (ref 5–17)
BASOPHILS # BLD AUTO: 0 K/UL — SIGNIFICANT CHANGE UP (ref 0–0.2)
BASOPHILS NFR BLD AUTO: 0.4 % — SIGNIFICANT CHANGE UP (ref 0–2)
BUN SERPL-MCNC: 34 MG/DL — HIGH (ref 7–23)
CALCIUM SERPL-MCNC: 8.3 MG/DL — LOW (ref 8.4–10.5)
CHLORIDE SERPL-SCNC: 103 MMOL/L — SIGNIFICANT CHANGE UP (ref 96–108)
CO2 BLDA-SCNC: 27 MMOL/L — SIGNIFICANT CHANGE UP (ref 22–30)
CO2 SERPL-SCNC: 29 MMOL/L — SIGNIFICANT CHANGE UP (ref 22–31)
CREAT SERPL-MCNC: 1.55 MG/DL — HIGH (ref 0.5–1.3)
CULTURE RESULTS: NO GROWTH — SIGNIFICANT CHANGE UP
EOSINOPHIL # BLD AUTO: 0 K/UL — SIGNIFICANT CHANGE UP (ref 0–0.5)
EOSINOPHIL NFR BLD AUTO: 0.1 % — SIGNIFICANT CHANGE UP (ref 0–6)
GAS PNL BLDA: SIGNIFICANT CHANGE UP
GLUCOSE BLDC GLUCOMTR-MCNC: 132 MG/DL — HIGH (ref 70–99)
GLUCOSE BLDC GLUCOMTR-MCNC: 147 MG/DL — HIGH (ref 70–99)
GLUCOSE BLDC GLUCOMTR-MCNC: 157 MG/DL — HIGH (ref 70–99)
GLUCOSE BLDC GLUCOMTR-MCNC: 161 MG/DL — HIGH (ref 70–99)
GLUCOSE SERPL-MCNC: 135 MG/DL — HIGH (ref 70–99)
HCT VFR BLD CALC: 26.9 % — LOW (ref 39–50)
HGB BLD-MCNC: 8.7 G/DL — LOW (ref 13–17)
HOROWITZ INDEX BLDA+IHG-RTO: SIGNIFICANT CHANGE UP
LYMPHOCYTES # BLD AUTO: 1.3 K/UL — SIGNIFICANT CHANGE UP (ref 1–3.3)
LYMPHOCYTES # BLD AUTO: 9.8 % — LOW (ref 13–44)
MAGNESIUM SERPL-MCNC: 2 MG/DL — SIGNIFICANT CHANGE UP (ref 1.6–2.6)
MCHC RBC-ENTMCNC: 28.9 PG — SIGNIFICANT CHANGE UP (ref 27–34)
MCHC RBC-ENTMCNC: 32.1 GM/DL — SIGNIFICANT CHANGE UP (ref 32–36)
MCV RBC AUTO: 90 FL — SIGNIFICANT CHANGE UP (ref 80–100)
MONOCYTES # BLD AUTO: 0.3 K/UL — SIGNIFICANT CHANGE UP (ref 0–0.9)
MONOCYTES NFR BLD AUTO: 2.2 % — SIGNIFICANT CHANGE UP (ref 1–9)
NEUTROPHILS # BLD AUTO: 11.8 K/UL — HIGH (ref 1.8–7.4)
NEUTROPHILS NFR BLD AUTO: 87.6 % — HIGH (ref 43–77)
PCO2 BLDA: 37 MMHG — SIGNIFICANT CHANGE UP (ref 32–46)
PH BLDA: 7.46 — HIGH (ref 7.35–7.45)
PHOSPHATE SERPL-MCNC: 3.4 MG/DL — SIGNIFICANT CHANGE UP (ref 2.5–4.5)
PLATELET # BLD AUTO: 189 K/UL — SIGNIFICANT CHANGE UP (ref 150–400)
PO2 BLDA: 83 MMHG — SIGNIFICANT CHANGE UP (ref 74–108)
POTASSIUM SERPL-MCNC: 3.7 MMOL/L — SIGNIFICANT CHANGE UP (ref 3.5–5.3)
POTASSIUM SERPL-SCNC: 3.7 MMOL/L — SIGNIFICANT CHANGE UP (ref 3.5–5.3)
RBC # BLD: 2.99 M/UL — LOW (ref 4.2–5.8)
RBC # FLD: 17.3 % — HIGH (ref 10.3–14.5)
SAO2 % BLDA: 97 % — HIGH (ref 92–96)
SODIUM SERPL-SCNC: 141 MMOL/L — SIGNIFICANT CHANGE UP (ref 135–145)
SPECIMEN SOURCE: SIGNIFICANT CHANGE UP
TROPONIN I SERPL-MCNC: 0.55 NG/ML — HIGH (ref 0.02–0.06)
WBC # BLD: 13.4 K/UL — HIGH (ref 3.8–10.5)
WBC # FLD AUTO: 13.4 K/UL — HIGH (ref 3.8–10.5)

## 2018-12-28 PROCEDURE — 99233 SBSQ HOSP IP/OBS HIGH 50: CPT

## 2018-12-28 PROCEDURE — 93306 TTE W/DOPPLER COMPLETE: CPT | Mod: 26

## 2018-12-28 RX ORDER — CHLORHEXIDINE GLUCONATE 213 G/1000ML
1 SOLUTION TOPICAL
Qty: 0 | Refills: 0 | Status: DISCONTINUED | OUTPATIENT
Start: 2018-12-28 | End: 2018-12-29

## 2018-12-28 RX ORDER — POTASSIUM CHLORIDE 20 MEQ
40 PACKET (EA) ORAL ONCE
Qty: 0 | Refills: 0 | Status: COMPLETED | OUTPATIENT
Start: 2018-12-28 | End: 2018-12-28

## 2018-12-28 RX ORDER — ROBINUL 0.2 MG/ML
2 INJECTION INTRAMUSCULAR; INTRAVENOUS EVERY 12 HOURS
Qty: 0 | Refills: 0 | Status: DISCONTINUED | OUTPATIENT
Start: 2018-12-28 | End: 2019-01-03

## 2018-12-28 RX ORDER — POTASSIUM CHLORIDE 20 MEQ
10 PACKET (EA) ORAL
Qty: 0 | Refills: 0 | Status: COMPLETED | OUTPATIENT
Start: 2018-12-28 | End: 2018-12-28

## 2018-12-28 RX ORDER — DEXMEDETOMIDINE HYDROCHLORIDE IN 0.9% SODIUM CHLORIDE 4 UG/ML
0.5 INJECTION INTRAVENOUS
Qty: 200 | Refills: 0 | Status: DISCONTINUED | OUTPATIENT
Start: 2018-12-28 | End: 2018-12-29

## 2018-12-28 RX ADMIN — Medication 1 PUFF(S): at 15:52

## 2018-12-28 RX ADMIN — HEPARIN SODIUM 5000 UNIT(S): 5000 INJECTION INTRAVENOUS; SUBCUTANEOUS at 17:28

## 2018-12-28 RX ADMIN — Medication 50 MILLIGRAM(S): at 23:29

## 2018-12-28 RX ADMIN — Medication 103 MILLIGRAM(S): at 23:30

## 2018-12-28 RX ADMIN — Medication 100 MILLIEQUIVALENT(S): at 08:50

## 2018-12-28 RX ADMIN — HEPARIN SODIUM 5000 UNIT(S): 5000 INJECTION INTRAVENOUS; SUBCUTANEOUS at 05:24

## 2018-12-28 RX ADMIN — CHLORHEXIDINE GLUCONATE 1 APPLICATION(S): 213 SOLUTION TOPICAL at 05:21

## 2018-12-28 RX ADMIN — ALBUTEROL 1 PUFF(S): 90 AEROSOL, METERED ORAL at 15:52

## 2018-12-28 RX ADMIN — CHLORHEXIDINE GLUCONATE 15 MILLILITER(S): 213 SOLUTION TOPICAL at 05:24

## 2018-12-28 RX ADMIN — Medication 1 PUFF(S): at 04:08

## 2018-12-28 RX ADMIN — Medication 102 MILLIGRAM(S): at 21:38

## 2018-12-28 RX ADMIN — Medication 50 MILLIGRAM(S): at 11:10

## 2018-12-28 RX ADMIN — PIPERACILLIN AND TAZOBACTAM 25 GRAM(S): 4; .5 INJECTION, POWDER, LYOPHILIZED, FOR SOLUTION INTRAVENOUS at 21:38

## 2018-12-28 RX ADMIN — DEXMEDETOMIDINE HYDROCHLORIDE IN 0.9% SODIUM CHLORIDE 12.04 MICROGRAM(S)/KG/HR: 4 INJECTION INTRAVENOUS at 16:36

## 2018-12-28 RX ADMIN — Medication 102 MILLIGRAM(S): at 07:52

## 2018-12-28 RX ADMIN — PIPERACILLIN AND TAZOBACTAM 25 GRAM(S): 4; .5 INJECTION, POWDER, LYOPHILIZED, FOR SOLUTION INTRAVENOUS at 05:24

## 2018-12-28 RX ADMIN — Medication 103 MILLIGRAM(S): at 05:27

## 2018-12-28 RX ADMIN — ALBUTEROL 1 PUFF(S): 90 AEROSOL, METERED ORAL at 04:08

## 2018-12-28 RX ADMIN — Medication 100 MILLIEQUIVALENT(S): at 07:53

## 2018-12-28 RX ADMIN — PIPERACILLIN AND TAZOBACTAM 25 GRAM(S): 4; .5 INJECTION, POWDER, LYOPHILIZED, FOR SOLUTION INTRAVENOUS at 13:33

## 2018-12-28 RX ADMIN — Medication 1 PUFF(S): at 21:48

## 2018-12-28 RX ADMIN — NYSTATIN CREAM 1 APPLICATION(S): 100000 CREAM TOPICAL at 05:25

## 2018-12-28 RX ADMIN — DEXMEDETOMIDINE HYDROCHLORIDE IN 0.9% SODIUM CHLORIDE 12.04 MICROGRAM(S)/KG/HR: 4 INJECTION INTRAVENOUS at 04:31

## 2018-12-28 RX ADMIN — ALBUTEROL 1 PUFF(S): 90 AEROSOL, METERED ORAL at 21:48

## 2018-12-28 RX ADMIN — ALBUTEROL 1 PUFF(S): 90 AEROSOL, METERED ORAL at 10:06

## 2018-12-28 RX ADMIN — CHLORHEXIDINE GLUCONATE 15 MILLILITER(S): 213 SOLUTION TOPICAL at 17:27

## 2018-12-28 RX ADMIN — DEXMEDETOMIDINE HYDROCHLORIDE IN 0.9% SODIUM CHLORIDE 12.04 MICROGRAM(S)/KG/HR: 4 INJECTION INTRAVENOUS at 11:10

## 2018-12-28 RX ADMIN — ROBINUL 2 MILLIGRAM(S): 0.2 INJECTION INTRAMUSCULAR; INTRAVENOUS at 17:28

## 2018-12-28 RX ADMIN — Medication 103 MILLIGRAM(S): at 00:14

## 2018-12-28 RX ADMIN — Medication 103 MILLIGRAM(S): at 11:10

## 2018-12-28 RX ADMIN — NYSTATIN CREAM 1 APPLICATION(S): 100000 CREAM TOPICAL at 17:30

## 2018-12-28 RX ADMIN — CHLORHEXIDINE GLUCONATE 1 APPLICATION(S): 213 SOLUTION TOPICAL at 17:29

## 2018-12-28 RX ADMIN — Medication 50 MILLIGRAM(S): at 17:28

## 2018-12-28 RX ADMIN — Medication 50 MILLIGRAM(S): at 05:23

## 2018-12-28 RX ADMIN — PANTOPRAZOLE SODIUM 40 MILLIGRAM(S): 20 TABLET, DELAYED RELEASE ORAL at 11:10

## 2018-12-28 RX ADMIN — Medication 40 MILLIEQUIVALENT(S): at 07:53

## 2018-12-28 RX ADMIN — Medication 103 MILLIGRAM(S): at 17:29

## 2018-12-28 RX ADMIN — Medication 1 PUFF(S): at 10:05

## 2018-12-28 RX ADMIN — DEXMEDETOMIDINE HYDROCHLORIDE IN 0.9% SODIUM CHLORIDE 12.04 MICROGRAM(S)/KG/HR: 4 INJECTION INTRAVENOUS at 21:39

## 2018-12-28 RX ADMIN — Medication 166.67 MILLIGRAM(S): at 21:42

## 2018-12-28 NOTE — CHART NOTE - NSCHARTNOTEFT_GEN_A_CORE
Called to evaluate patient for vascular access.  With aseptic technique 20g 6cm extended dwell peripheral catheter placed in right forearm with sonographic guidance on first attempt.  Dark blood return noted from port, flushed with no resistance.  Biopatch and sterile dressing applied, patient tolerated procedure well.

## 2018-12-28 NOTE — PROGRESS NOTE ADULT - SUBJECTIVE AND OBJECTIVE BOX
Follow-up Critical Care Progress Note  Chief Complaint : Acute respiratory distress    patient placed on cpap this am, PS 5, peep 5 appears to be tolerating.   pt arousable and tracks with eyes  follows simple commands    case d/w Dr. Simmons pt is DNR/I,     Allergies :No Known Allergies      PAST MEDICAL & SURGICAL HISTORY:  Shortness of breath  Muscle weakness  Depression  IBS (irritable bowel syndrome)  Dysphagia  Cerebral infarction  Anemia  Psoriasis  GERD (gastroesophageal reflux disease)  Dyslipidemia  Cardiac pacemaker  S/P percutaneous endoscopic gastrostomy (PEG) tube placement      Medications:  MEDICATIONS  (STANDING):  ALBUTerol    90 MICROgram(s) HFA Inhaler 1 Puff(s) Inhalation every 6 hours  ascorbic acid IVPB 1500 milliGRAM(s) IV Intermittent every 6 hours  chlorhexidine 0.12% Liquid 15 milliLiter(s) Oral Mucosa two times a day  chlorhexidine 4% Liquid 1 Application(s) Topical <User Schedule>  dexmedetomidine Infusion 0.5 MICROgram(s)/kG/Hr (12.037 mL/Hr) IV Continuous <Continuous>  heparin  Injectable 5000 Unit(s) SubCutaneous every 12 hours  hydrocortisone sodium succinate Injectable 50 milliGRAM(s) IV Push every 6 hours  ipratropium 17 MICROgram(s) HFA Inhaler 1 Puff(s) Inhalation every 6 hours  nystatin Powder 1 Application(s) Topical two times a day  pantoprazole  Injectable 40 milliGRAM(s) IV Push daily  piperacillin/tazobactam IVPB. 3.375 Gram(s) IV Intermittent every 8 hours  thiamine IVPB 200 milliGRAM(s) IV Intermittent <User Schedule>  vancomycin  IVPB 1250 milliGRAM(s) IV Intermittent every 24 hours    MEDICATIONS  (PRN):  acetaminophen    Suspension .. 650 milliGRAM(s) Enteral Tube every 6 hours PRN Temp greater or equal to 38C (100.4F)      LABS:                        8.7    13.4  )-----------( 189      ( 28 Dec 2018 05:30 )             26.9     12-    141  |  103  |  34<H>  ----------------------------<  135<H>  3.7   |  29  |  1.55<H>    Ca    8.3<L>      28 Dec 2018 05:30  Phos  3.4     12-  Mg     2.0         TPro  6.0  /  Alb  2.0<L>  /  TBili  0.4  /  DBili  x   /  AST  14  /  ALT  12  /  AlkPhos  61        CARDIAC MARKERS ( 28 Dec 2018 05:30 )  .548 ng/mL / x     / x     / x     / x      CARDIAC MARKERS ( 27 Dec 2018 04:30 )  .625 ng/mL / x     / 27 U/L / x     / <0.5 ng/mL  CARDIAC MARKERS ( 26 Dec 2018 17:58 )  .239 ng/mL / x     / x     / x     / x            PT/INR - ( 26 Dec 2018 17:58 )   PT: 16.1 sec;   INR: 1.42 ratio         PTT - ( 26 Dec 2018 17:58 )  PTT:36.3 sec  Urinalysis Basic - ( 27 Dec 2018 00:05 )    Color: Yellow / Appearance: Clear / S.015 / pH: x  Gluc: x / Ketone: Negative  / Bili: Small / Urobili: Negative   Blood: x / Protein: 30 mg/dL / Nitrite: Negative   Leuk Esterase: Moderate / RBC: 11-25 /HPF / WBC 26-50 /HPF   Sq Epi: x / Non Sq Epi: Neg.-Few / Bacteria: Moderate /HPF        Serum Pro-Brain Natriuretic Peptide: 14021 pg/mL (18 @ 17:58)        CULTURES: (if applicable)    Culture - Sputum (collected 18 @ 04:25)  Source: .Sputum Sputum trap  Gram Stain (18 @ 12:47):    Moderate polymorphonuclear leukocytes per low power field    Rare Squamous epithelial cells per low power field    Moderate Gram positive cocci in pairs, chains and clusters per oil power    field    Few Yeast like cells per oil power field    Rare Gram Negative Rods per oil power field    Rare Gram Negative Diplococci per oil power field  Preliminary Report (18 @ 08:55):    Moderate Staphylococcus aureus    Normal Respiratory Renetta present    Culture - Blood (collected 18 @ 17:58)  Source: .Blood Blood-Peripheral  Preliminary Report (18 @ 02:01):    No growth to date.    Culture - Blood (collected 18 @ 17:58)  Source: .Blood Blood-Peripheral  Preliminary Report (18 @ 02:01):    No growth to date.          ABG - ( 26 Dec 2018 20:18 )  pH, Arterial: 7.43  pH, Blood: x     /  pCO2: 42    /  pO2: 127   / HCO3: x     / Base Excess: x     /  SaO2: 99                CAPILLARY BLOOD GLUCOSE      POCT Blood Glucose.: 157 mg/dL (28 Dec 2018 06:22)      RADIOLOGY  CXR:  CXR  b/l infiltrates  no recent cxr.    CT:  < from: CT Head No Cont (18 @ 12:15) >  Impression:  1. Unremarkable noncontrast CT scan of the brain.        < end of copied text >    ECHO:      VITALS:  T(C): 37.2 (18 @ 05:00), Max: 38.4 (18 @ 10:00)  T(F): 98.9 (18 @ 05:00), Max: 101.2 (18 @ 10:00)  HR: 60 (18 @ 06:07) (59 - 66)  BP: 117/59 (18 @ 06:00) (91/52 - 137/64)  BP(mean): 76 (18 @ 06:00) (65 - 85)  ABP: --  ABP(mean): --  RR: 16 (18 @ 06:00) (16 - 24)  SpO2: 96% (18 @ 06:07) (95% - 100%)  CVP(mm Hg): --  CVP(cm H2O): --    Ins and Outs     18 @ 07:01  -  18 @ 07:00  --------------------------------------------------------  IN: 837.1 mL / OUT: 1040 mL / NET: -202.9 mL        Height (cm): 185.42 (18 @ 17:37)  Weight (kg): 96.3 (18 @ 21:00)  BMI (kg/m2): 28 (18 @ 21:00)    Device: 840, Mode: CPAP with PS, RR (patient): 12, TV (patient): 519, FiO2: 30, PEEP: 5, PS: 5, PIP: 15

## 2018-12-28 NOTE — CHART NOTE - NSCHARTNOTEFT_GEN_A_CORE
Time of evaluation: 2100    Called to evaluate patient for restraints    Behavior: pulling at lines/tubes    Other interventions attempted: de-escalation, orientation check, environment modification, medication assessment    REVIEW OF SYSTEMS:  CONSTITUTIONAL: [  ] fever   [  ] fatigue  EYES: [  ] visual disturbances  ENMT:  [  ]difficulty hearing  [  ] throat pain  RESPIRATORY:  [  ]cough  [   ] wheezing  [   ] hemoptysis [  ] shortness of breath  CARDIOVASCULAR: [  ]chest pain  [  ] palpitations   GASTROINTESTINAL: [  ]  abdominal pain [  ] nausea [  ] vomiting  [  ] diarrhea  [  ] constipation  GENITOURINARY: [  ] dysuria [  ] frequency  [  ] hematuria [  ] incontinence  NEUROLOGICAL: [  ] headaches [  ] new numbness  SKIN: [  ]itching [  ] new rash   MUSCULOSKELETAL: [  ] back pain  [  ] extremity pain  PSYCHIATRIC: [  ] depression  [  ] anxiety  [  ] mood swings [  ] difficulty sleeping  ALLERGY AND IMMUNOLOGIC: [  ] hives    [ X ] Unable to perform ROS due to: intubated  [  ] ROS reviewed and all negative    PAST MEDICAL & SURGICAL HISTORY:  Shortness of breath  Muscle weakness  Depression  IBS (irritable bowel syndrome)  Dysphagia  Cerebral infarction  Anemia  Psoriasis  GERD (gastroesophageal reflux disease)  Dyslipidemia  Cardiac pacemaker  S/P percutaneous endoscopic gastrostomy (PEG) tube placement    MEDICATIONS  (STANDING):  ALBUTerol    90 MICROgram(s) HFA Inhaler 1 Puff(s) Inhalation every 6 hours  ascorbic acid IVPB 1500 milliGRAM(s) IV Intermittent every 6 hours  chlorhexidine 0.12% Liquid 15 milliLiter(s) Oral Mucosa two times a day  chlorhexidine 4% Liquid 1 Application(s) Topical two times a day  heparin  Injectable 5000 Unit(s) SubCutaneous every 12 hours  hydrocortisone sodium succinate Injectable 50 milliGRAM(s) IV Push every 6 hours  ipratropium 17 MICROgram(s) HFA Inhaler 1 Puff(s) Inhalation every 6 hours  norepinephrine Infusion 0.05 MICROgram(s)/kG/Min (4.041 mL/Hr) IV Continuous <Continuous>  nystatin Powder 1 Application(s) Topical two times a day  pantoprazole  Injectable 40 milliGRAM(s) IV Push daily  piperacillin/tazobactam IVPB. 3.375 Gram(s) IV Intermittent every 8 hours  propofol Infusion 5 MICROgram(s)/kG/Min (2.586 mL/Hr) IV Continuous <Continuous>  thiamine IVPB 200 milliGRAM(s) IV Intermittent <User Schedule>  vancomycin  IVPB 1250 milliGRAM(s) IV Intermittent every 24 hours    MEDICATIONS  (PRN):  acetaminophen    Suspension .. 650 milliGRAM(s) Enteral Tube every 6 hours PRN Temp greater or equal to 38C (100.4F)                          8.0    9.9   )-----------( 184      ( 27 Dec 2018 05:45 )             25.1     12-27    142  |  104  |  34<H>  ----------------------------<  109<H>  4.0   |  32<H>  |  1.46<H>    Ca    8.3<L>      27 Dec 2018 05:45  Phos  2.0     12-27  Mg     2.0     12-27    TPro  6.0  /  Alb  2.0<L>  /  TBili  0.4  /  DBili  x   /  AST  14  /  ALT  12  /  AlkPhos  61  12-27      Vital Signs Last 24 Hrs  T(C): 37.2 (27 Dec 2018 22:00), Max: 38.6 (27 Dec 2018 08:00)  T(F): 99 (27 Dec 2018 22:00), Max: 101.4 (27 Dec 2018 08:00)  HR: 61 (27 Dec 2018 23:00) (59 - 66)  BP: 137/64 (27 Dec 2018 23:00) (86/49 - 137/64)  BP(mean): 83 (27 Dec 2018 23:00) (61 - 83)  RR: 17 (27 Dec 2018 23:00) (16 - 24)  SpO2: 98% (27 Dec 2018 23:00) (95% - 100%)    Physical Examination:  General: No acute distress.    HEENT: L pupil 3mm sluggish and reactive, R pupil 5mm fixed opaque  PULM: Intubated. Clear to auscultation bilaterally, no significant sputum production  CVS: Regular rate and rhythm, no murmurs, rubs, or gallops  ABD: Soft, nondistended, nontender, hypoactive bowel sounds, +PEG  EXT: No edema, nontender  SKIN: Warm and well perfused.  NEURO: RASS -3    [ X ] I have evaluated this patient and have determined that restraints are warranted to optimize medical care. Patient was assessed for current physical and psychological risk factors as well as special needs. There are no medical conditions or limitations that would place this patient at risk while in restraints.    Type of restraint: Bilateral soft wrist restraints    Behavioral criteria for discontinuation of restraint: [ X ] See order. MD to be notified.

## 2018-12-28 NOTE — PROGRESS NOTE ADULT - SUBJECTIVE AND OBJECTIVE BOX
Follow-up Pall Progress Note    Enrique Simmons MD  [phone]    No new respiratory events overnight.  Denies SOB/CP. Remains intubated    Medications:  Vital Signs Last 24 Hrs  T(C): 37 (28 Dec 2018 12:00), Max: 38 (27 Dec 2018 16:00)  T(F): 98.6 (28 Dec 2018 12:00), Max: 100.4 (27 Dec 2018 16:00)  HR: 60 (28 Dec 2018 14:00) (60 - 66)  BP: 133/69 (28 Dec 2018 14:00) (91/56 - 137/64)  BP(mean): 88 (28 Dec 2018 14:00) (66 - 88)  RR: 16 (28 Dec 2018 14:00) (15 - 24)  SpO2: 97% (28 Dec 2018 14:00) (95% - 100%)    ABG - ( 28 Dec 2018 10:20 )  pH, Arterial: 7.46  pH, Blood: x     /  pCO2: 37    /  pO2: 83    / HCO3: x     / Base Excess: x     /  SaO2: 97                    12-27 @ 07:01  -  12-28 @ 07:00  --------------------------------------------------------  IN: 994.4 mL / OUT: 1075 mL / NET: -80.6 mL        LABS:                        8.7    13.4  )-----------( 189      ( 28 Dec 2018 05:30 )             26.9     12-28    141  |  103  |  34<H>  ----------------------------<  135<H>  3.7   |  29  |  1.55<H>    Ca    8.3<L>      28 Dec 2018 05:30  Phos  3.4     12-28  Mg     2.0     12-28    TPro  6.0  /  Alb  2.0<L>  /  TBili  0.4  /  DBili  x   /  AST  14  /  ALT  12  /  AlkPhos  61  12-27      PT/INR - ( 26 Dec 2018 17:58 )   PT: 16.1 sec;   INR: 1.42 ratio         PTT - ( 26 Dec 2018 17:58 )  PTT:36.3 sec    Serum Pro-Brain Natriuretic Peptide: 10315 pg/mL (12-26-18 @ 17:58)      CULTURES:  Culture Results:   Moderate Staphylococcus aureus  Normal Respiratory Renetta present (12-27 @ 04:25)  Culture Results:   No growth to date. (12-26 @ 17:58)  Culture Results:   No growth to date. (12-26 @ 17:58)    Most recent blood culture -- 12-27 @ 04:25   -- -- .Sputum Sputum trap 12-27 @ 04:25  Most recent blood culture -- 12-26 @ 17:58   -- -- .Blood Blood-Peripheral 12-26 @ 17:58      Physical Examination:  PULM:distant bs.  on vent  CVS: Regular rate and rhythm, no murmurs, rubs, or gallops  ABD: Soft, non-tender  EXT:  No clubbing, cyanosis, or edema

## 2018-12-28 NOTE — PROGRESS NOTE ADULT - SUBJECTIVE AND OBJECTIVE BOX
Patient is a 85y old  Male who presents with a chief complaint of Respiratory Failure (28 Dec 2018 15:11)      BRIEF HOSPITAL COURSE: 86 yo male PMHx HTN, HLD, grade III diastolic heart failure s/p ppm, CKD, dysphagia s/p PEG tube placement, recently discharged  for aspiration pneumonia BIBA in respiratory distress from [facility].  Upon arrival to ED, patient in severe respiratory distress, became obtunded and failed trial of BiPAP, and was subsequently intubated. During intubation patient noted to have copious amounts of secretions in airway that appeared to be consistent with tube feeds. Cristina-intubation patient developed hypotension with SBP 60's, a L IJ TLC was placed and patient was started on Levophed. Found to have acute respiratory acidosis pH 7.11/98/237/33, and MELISSA on CKD.      Events last 24 hours: Remains in vent-dependent respiratory failure, tolerated PS 5/5 and then failed SBT for periods of apnea, secretions improving. Weaned off vasopressors in AM remains hemodynamically stable      PAST MEDICAL & SURGICAL HISTORY:  Shortness of breath  Muscle weakness  Depression  IBS (irritable bowel syndrome)  Dysphagia  Cerebral infarction  Anemia  Psoriasis  GERD (gastroesophageal reflux disease)  Dyslipidemia  Cardiac pacemaker  S/P percutaneous endoscopic gastrostomy (PEG) tube placement      SOCIAL HISTORY: unable to obtain due to intubation      Review of Systems: Due to altered mental status/intubation, subjective information was not able to be obtained from the patient. History was obtained, to the extent possible, from review of the chart and collateral sources of information.      Medications:  piperacillin/tazobactam IVPB. 3.375 Gram(s) IV Intermittent every 8 hours  vancomycin  IVPB 1250 milliGRAM(s) IV Intermittent every 24 hours  ALBUTerol    90 MICROgram(s) HFA Inhaler 1 Puff(s) Inhalation every 6 hours  ipratropium 17 MICROgram(s) HFA Inhaler 1 Puff(s) Inhalation every 6 hours  acetaminophen    Suspension .. 650 milliGRAM(s) Enteral Tube every 6 hours PRN  dexmedetomidine Infusion 0.5 MICROgram(s)/kG/Hr IV Continuous <Continuous>  heparin  Injectable 5000 Unit(s) SubCutaneous every 12 hours  glycopyrrolate 2 milliGRAM(s) Oral every 12 hours  pantoprazole  Injectable 40 milliGRAM(s) IV Push daily  hydrocortisone sodium succinate Injectable 50 milliGRAM(s) IV Push every 6 hours  ascorbic acid IVPB 1500 milliGRAM(s) IV Intermittent every 6 hours  thiamine IVPB 200 milliGRAM(s) IV Intermittent <User Schedule>  chlorhexidine 0.12% Liquid 15 milliLiter(s) Oral Mucosa two times a day  chlorhexidine 4% Liquid 1 Application(s) Topical <User Schedule>  nystatin Powder 1 Application(s) Topical two times a day        Mode: AC/ CMV (Assist Control/ Continuous Mandatory Ventilation)  RR (machine): 16  TV (machine): 450  FiO2: 30  PEEP: 5  ITime: 1  MAP: 9  PIP: 20      ICU Vital Signs Last 24 Hrs  T(C): 37.3 (28 Dec 2018 19:00), Max: 37.3 (28 Dec 2018 19:00)  T(F): 99.1 (28 Dec 2018 19:00), Max: 99.1 (28 Dec 2018 19:00)  HR: 60 (28 Dec 2018 21:59) (60 - 64)  BP: 136/71 (28 Dec 2018 19:00) (101/54 - 136/71)  BP(mean): 91 (28 Dec 2018 19:00) (69 - 91)  ABP: --  ABP(mean): --  RR: 20 (28 Dec 2018 19:00) (15 - 23)  SpO2: 99% (28 Dec 2018 21:59) (96% - 99%)      ABG - ( 28 Dec 2018 10:20 )  pH, Arterial: 7.46  pH, Blood: x     /  pCO2: 37    /  pO2: 83    / HCO3: x     / Base Excess: x     /  SaO2: 97          I&O's Detail    27 Dec 2018 07:01  -  28 Dec 2018 07:00  --------------------------------------------------------  IN:    dexmedetomidine Infusion: 8.2 mL    norepinephrine Infusion: 68.2 mL    propofol Infusion: 343 mL    Solution: 225 mL    Solution: 250 mL    Solution: 100 mL  Total IN: 994.4 mL    OUT:    Indwelling Catheter - Urethral: 1000 mL    Nasoenteral Tube: 75 mL  Total OUT: 1075 mL    Total NET: -80.6 mL      28 Dec 2018 07:01  -  28 Dec 2018 23:42  --------------------------------------------------------  IN:    dexmedetomidine Infusion: 80.3 mL    Solution: 100 mL    Solution: 150 mL    Solution: 100 mL    Solution: 200 mL    TwoCal HN: 120 mL  Total IN: 750.3 mL    OUT:    Indwelling Catheter - Urethral: 385 mL    Nasoenteral Tube: 45 mL  Total OUT: 430 mL    Total NET: 320.3 mL        LABS:                        8.7    13.4  )-----------( 189      ( 28 Dec 2018 05:30 )             26.9         141  |  103  |  34<H>  ----------------------------<  135<H>  3.7   |  29  |  1.55<H>    Ca    8.3<L>      28 Dec 2018 05:30  Phos  3.4       Mg     2.0         TPro  6.0  /  Alb  2.0<L>  /  TBili  0.4  /  DBili  x   /  AST  14  /  ALT  12  /  AlkPhos  61        CARDIAC MARKERS ( 28 Dec 2018 05:30 )  .548 ng/mL / x     / x     / x     / x      CARDIAC MARKERS ( 27 Dec 2018 04:30 )  .625 ng/mL / x     / 27 U/L / x     / <0.5 ng/mL      CAPILLARY BLOOD GLUCOSE      POCT Blood Glucose.: 147 mg/dL (28 Dec 2018 23:34)      Urinalysis Basic - ( 27 Dec 2018 00:05 )    Color: Yellow / Appearance: Clear / S.015 / pH: x  Gluc: x / Ketone: Negative  / Bili: Small / Urobili: Negative   Blood: x / Protein: 30 mg/dL / Nitrite: Negative   Leuk Esterase: Moderate / RBC: 11-25 /HPF / WBC 26-50 /HPF   Sq Epi: x / Non Sq Epi: Neg.-Few / Bacteria: Moderate /HPF      CULTURES:  Culture Results:   No growth ( @ 19:45)  Culture Results:   Moderate Staphylococcus aureus  Normal Respiratory Renetta present ( @ 04:25)  Culture Results:   No growth to date. ( @ 17:58)  Culture Results:   No growth to date. ( @ 17:58)      Physical Examination:    General: No acute distress.      HEENT: L pupil 3mm sluggish and reactive, R pupil 5mm fixed opaque    PULM: Clear to auscultation bilaterally, no significant sputum production    CVS: Regular rate and rhythm, no murmurs, rubs, or gallops    ABD: Soft, nondistended, nontender, hypoactive bowel sounds, no masses. +PEG    EXT: No edema, nontender    SKIN: Warm and well perfused, no rashes noted.    NEURO: RASS -4      RADIOLOGY: old images reviewed       INVASIVE LINES: L IJ TLC, RUE extended dwell PIV  INDWELLING SOTO:   VTE PROPHYLAXIS: Heparin SC  CODE STATUS: DNR    CRITICAL CARE TIME SPENT: 30  minutes assessing presenting problems of acute illness, which pose high probability of life threatening deterioration or end organ damage/dysfunction, as well as medical decision making including initiating plan of care, reviewing data, reviewing radiologic exams, discussing with multidisciplinary team, non-inclusive of procedures performed.

## 2018-12-28 NOTE — PROGRESS NOTE ADULT - ASSESSMENT
84 yo male PMHx HTN, HLD, grade III diastolic heart failure (EF 60%) s/p ppm, CKD, dysphagia s/p PEG tube placement, recently discharged 12/21 for aspiration pneumonia, admitted with acute hypercapnic respiratory failure, altered mental status, septic shock with ATN, secondary to aspiration pneumonia, complicated by acute systolic heart failure    - Vent dependent respiratory failure relative to aspiration pneumonia, OHS, severe pHTN, failed SBT after several hours due to apnea. Continue full vent support, on low Tv lung protective ventilation strategy. Robinul added for secretions. On Precedex for sedation to assist vent synchrony. Will lighten sedation for SBT in AM. Plan to further solidify goals of care (plan for reintubation?) if patient fails medically optimized extubation  - Tolerating trickle feeds, advance diet in AM if no plan for extubation  - BP stabilized with addition of stress steroids, off vasopressors. Continue to montior  - Continue Zosyn and Vanco empirically, ordered for vanco trough tomorrow pre-3rd dose given renal adjustment, d/w RN. Sputum cx +mod gram pos cocci in pairs/chains/clusters, blood and urine cultures are negative thus far.   - Trend fever curve, afebrile today  - Renal function continues to worsen, likely cardiogenic component due to acute systolic heart failure (EF formerly 60%, now 40%). Continue to monitor renal function and electrolytes. Replace electrolytes as needed. Continue indwelling hansen to monitor strict I&Os, daily weights. Renally dose meds as needed pending clinical course. Avoid nephrotoxic agents 86 yo male PMHx HTN, HLD, grade III diastolic heart failure (EF 60%) s/p ppm, CKD, dysphagia s/p PEG tube placement, recently discharged 12/21 for aspiration pneumonia, admitted with acute hypercapnic respiratory failure, altered mental status, septic shock with ATN, secondary to aspiration pneumonia, complicated by acute systolic heart failure    - Vent dependent respiratory failure relative to aspiration pneumonia, OHS, severe pHTN, failed SBT after several hours due to apnea. Continue full vent support, on low Tv lung protective ventilation strategy. Robinul added for secretions. On Precedex for sedation to assist vent synchrony. Will lighten sedation for SBT in AM. Plan to further solidify goals of care (plan for reintubation?) if patient fails medically optimized extubation  - Tolerating trickle feeds, advance diet in AM if no plan for extubation  - BP stabilized with addition of stress steroids, off vasopressors. Continue to montior  - Continue Zosyn and Vanco empirically, ordered for vanco trough tomorrow pre-3rd dose given renal adjustment, d/w RN. Sputum cx +mod gram pos cocci in pairs/chains/clusters, blood and urine cultures are negative thus far.   - Trend fever curve, afebrile today  - Renal function continues to worsen, likely cardiogenic component due to acute systolic heart failure (EF formerly 60%, now 40%). Continue to monitor renal function and electrolytes. Replace electrolytes as needed. Continue indwelling hansen to monitor strict I&Os, daily weights. Renally dose meds as needed pending clinical course. Avoid nephrotoxic agents  - Has extended dwell PIV placed today, will d/c TLC

## 2018-12-28 NOTE — PROGRESS NOTE ADULT - ASSESSMENT
Overall poor prognosis reviewed with wife.  She is aware that if extubated he may deteriorate and need eiyher reintubation or meds for comfort care.  She will review plans with family tomorrow before next attempt at extubation.  Currently DNR

## 2018-12-29 DIAGNOSIS — I50.21 ACUTE SYSTOLIC (CONGESTIVE) HEART FAILURE: ICD-10-CM

## 2018-12-29 LAB
-  AMPICILLIN/SULBACTAM: SIGNIFICANT CHANGE UP
-  CEFAZOLIN: SIGNIFICANT CHANGE UP
-  CLINDAMYCIN: SIGNIFICANT CHANGE UP
-  ERYTHROMYCIN: SIGNIFICANT CHANGE UP
-  GENTAMICIN: SIGNIFICANT CHANGE UP
-  OXACILLIN: SIGNIFICANT CHANGE UP
-  PENICILLIN: SIGNIFICANT CHANGE UP
-  RIFAMPIN: SIGNIFICANT CHANGE UP
-  TETRACYCLINE: SIGNIFICANT CHANGE UP
-  TRIMETHOPRIM/SULFAMETHOXAZOLE: SIGNIFICANT CHANGE UP
-  VANCOMYCIN: SIGNIFICANT CHANGE UP
ANION GAP SERPL CALC-SCNC: 9 MMOL/L — SIGNIFICANT CHANGE UP (ref 5–17)
BLOOD GAS COMMENTS ARTERIAL: SIGNIFICANT CHANGE UP
BLOOD GAS COMMENTS ARTERIAL: SIGNIFICANT CHANGE UP
BUN SERPL-MCNC: 32 MG/DL — HIGH (ref 7–23)
CALCIUM SERPL-MCNC: 8.2 MG/DL — LOW (ref 8.4–10.5)
CHLORIDE SERPL-SCNC: 105 MMOL/L — SIGNIFICANT CHANGE UP (ref 96–108)
CO2 BLDA-SCNC: 28 MMOL/L — SIGNIFICANT CHANGE UP (ref 22–30)
CO2 BLDA-SCNC: 29 MMOL/L — SIGNIFICANT CHANGE UP (ref 22–30)
CO2 SERPL-SCNC: 29 MMOL/L — SIGNIFICANT CHANGE UP (ref 22–31)
CREAT SERPL-MCNC: 1.42 MG/DL — HIGH (ref 0.5–1.3)
CULTURE RESULTS: SIGNIFICANT CHANGE UP
GAS PNL BLDA: SIGNIFICANT CHANGE UP
GAS PNL BLDA: SIGNIFICANT CHANGE UP
GLUCOSE BLDC GLUCOMTR-MCNC: 159 MG/DL — HIGH (ref 70–99)
GLUCOSE BLDC GLUCOMTR-MCNC: 178 MG/DL — HIGH (ref 70–99)
GLUCOSE BLDC GLUCOMTR-MCNC: 183 MG/DL — HIGH (ref 70–99)
GLUCOSE BLDC GLUCOMTR-MCNC: 196 MG/DL — HIGH (ref 70–99)
GLUCOSE SERPL-MCNC: 175 MG/DL — HIGH (ref 70–99)
HCT VFR BLD CALC: 25.6 % — LOW (ref 39–50)
HGB BLD-MCNC: 8.4 G/DL — LOW (ref 13–17)
HOROWITZ INDEX BLDA+IHG-RTO: SIGNIFICANT CHANGE UP
HOROWITZ INDEX BLDA+IHG-RTO: SIGNIFICANT CHANGE UP
MAGNESIUM SERPL-MCNC: 2.2 MG/DL — SIGNIFICANT CHANGE UP (ref 1.6–2.6)
MCHC RBC-ENTMCNC: 29.4 PG — SIGNIFICANT CHANGE UP (ref 27–34)
MCHC RBC-ENTMCNC: 32.8 GM/DL — SIGNIFICANT CHANGE UP (ref 32–36)
MCV RBC AUTO: 89.7 FL — SIGNIFICANT CHANGE UP (ref 80–100)
METHOD TYPE: SIGNIFICANT CHANGE UP
ORGANISM # SPEC MICROSCOPIC CNT: SIGNIFICANT CHANGE UP
ORGANISM # SPEC MICROSCOPIC CNT: SIGNIFICANT CHANGE UP
PCO2 BLDA: 35 MMHG — SIGNIFICANT CHANGE UP (ref 32–46)
PCO2 BLDA: 39 MMHG — SIGNIFICANT CHANGE UP (ref 32–46)
PH BLDA: 7.48 — HIGH (ref 7.35–7.45)
PH BLDA: 7.49 — HIGH (ref 7.35–7.45)
PHOSPHATE SERPL-MCNC: 2.5 MG/DL — SIGNIFICANT CHANGE UP (ref 2.5–4.5)
PLATELET # BLD AUTO: 194 K/UL — SIGNIFICANT CHANGE UP (ref 150–400)
PO2 BLDA: 75 MMHG — SIGNIFICANT CHANGE UP (ref 74–108)
PO2 BLDA: 90 MMHG — SIGNIFICANT CHANGE UP (ref 74–108)
POTASSIUM SERPL-MCNC: 3.3 MMOL/L — LOW (ref 3.5–5.3)
POTASSIUM SERPL-SCNC: 3.3 MMOL/L — LOW (ref 3.5–5.3)
RBC # BLD: 2.86 M/UL — LOW (ref 4.2–5.8)
RBC # FLD: 17.6 % — HIGH (ref 10.3–14.5)
SAO2 % BLDA: 96 % — SIGNIFICANT CHANGE UP (ref 92–96)
SAO2 % BLDA: 98 % — HIGH (ref 92–96)
SODIUM SERPL-SCNC: 143 MMOL/L — SIGNIFICANT CHANGE UP (ref 135–145)
SPECIMEN SOURCE: SIGNIFICANT CHANGE UP
WBC # BLD: 11.2 K/UL — HIGH (ref 3.8–10.5)
WBC # FLD AUTO: 11.2 K/UL — HIGH (ref 3.8–10.5)

## 2018-12-29 PROCEDURE — 99233 SBSQ HOSP IP/OBS HIGH 50: CPT

## 2018-12-29 PROCEDURE — 71045 X-RAY EXAM CHEST 1 VIEW: CPT | Mod: 26

## 2018-12-29 RX ORDER — POLYETHYLENE GLYCOL 3350 17 G/17G
17 POWDER, FOR SOLUTION ORAL DAILY
Qty: 0 | Refills: 0 | Status: DISCONTINUED | OUTPATIENT
Start: 2018-12-29 | End: 2019-01-03

## 2018-12-29 RX ORDER — TIMOLOL 0.5 %
1 DROPS OPHTHALMIC (EYE)
Qty: 0 | Refills: 0 | Status: DISCONTINUED | OUTPATIENT
Start: 2018-12-29 | End: 2019-01-03

## 2018-12-29 RX ORDER — IPRATROPIUM/ALBUTEROL SULFATE 18-103MCG
3 AEROSOL WITH ADAPTER (GRAM) INHALATION EVERY 6 HOURS
Qty: 0 | Refills: 0 | Status: DISCONTINUED | OUTPATIENT
Start: 2018-12-29 | End: 2019-01-03

## 2018-12-29 RX ORDER — POTASSIUM CHLORIDE 20 MEQ
40 PACKET (EA) ORAL ONCE
Qty: 0 | Refills: 0 | Status: COMPLETED | OUTPATIENT
Start: 2018-12-29 | End: 2018-12-29

## 2018-12-29 RX ORDER — FUROSEMIDE 40 MG
40 TABLET ORAL ONCE
Qty: 0 | Refills: 0 | Status: COMPLETED | OUTPATIENT
Start: 2018-12-29 | End: 2018-12-29

## 2018-12-29 RX ORDER — ASPIRIN/CALCIUM CARB/MAGNESIUM 324 MG
81 TABLET ORAL DAILY
Qty: 0 | Refills: 0 | Status: DISCONTINUED | OUTPATIENT
Start: 2018-12-29 | End: 2019-01-03

## 2018-12-29 RX ORDER — DORZOLAMIDE HYDROCHLORIDE TIMOLOL MALEATE 20; 5 MG/ML; MG/ML
1 SOLUTION/ DROPS OPHTHALMIC EVERY 12 HOURS
Qty: 0 | Refills: 0 | Status: DISCONTINUED | OUTPATIENT
Start: 2018-12-29 | End: 2018-12-29

## 2018-12-29 RX ORDER — POTASSIUM CHLORIDE 20 MEQ
10 PACKET (EA) ORAL
Qty: 0 | Refills: 0 | Status: COMPLETED | OUTPATIENT
Start: 2018-12-29 | End: 2018-12-29

## 2018-12-29 RX ORDER — DORZOLAMIDE HYDROCHLORIDE 20 MG/ML
1 SOLUTION/ DROPS OPHTHALMIC
Qty: 0 | Refills: 0 | Status: DISCONTINUED | OUTPATIENT
Start: 2018-12-29 | End: 2019-01-03

## 2018-12-29 RX ORDER — LATANOPROST 0.05 MG/ML
1 SOLUTION/ DROPS OPHTHALMIC; TOPICAL AT BEDTIME
Qty: 0 | Refills: 0 | Status: DISCONTINUED | OUTPATIENT
Start: 2018-12-29 | End: 2019-01-03

## 2018-12-29 RX ORDER — ATROPINE SULFATE 1 %
2 DROPS OPHTHALMIC (EYE) EVERY 8 HOURS
Qty: 0 | Refills: 0 | Status: DISCONTINUED | OUTPATIENT
Start: 2018-12-29 | End: 2018-12-31

## 2018-12-29 RX ADMIN — Medication 102 MILLIGRAM(S): at 21:28

## 2018-12-29 RX ADMIN — NYSTATIN CREAM 1 APPLICATION(S): 100000 CREAM TOPICAL at 17:11

## 2018-12-29 RX ADMIN — DORZOLAMIDE HYDROCHLORIDE 1 DROP(S): 20 SOLUTION/ DROPS OPHTHALMIC at 21:15

## 2018-12-29 RX ADMIN — Medication 81 MILLIGRAM(S): at 11:40

## 2018-12-29 RX ADMIN — Medication 50 MILLIGRAM(S): at 11:05

## 2018-12-29 RX ADMIN — Medication 50 MILLIGRAM(S): at 17:09

## 2018-12-29 RX ADMIN — ROBINUL 2 MILLIGRAM(S): 0.2 INJECTION INTRAMUSCULAR; INTRAVENOUS at 05:31

## 2018-12-29 RX ADMIN — Medication 1 PUFF(S): at 09:28

## 2018-12-29 RX ADMIN — Medication 103 MILLIGRAM(S): at 17:09

## 2018-12-29 RX ADMIN — LATANOPROST 1 DROP(S): 0.05 SOLUTION/ DROPS OPHTHALMIC; TOPICAL at 21:15

## 2018-12-29 RX ADMIN — ALBUTEROL 1 PUFF(S): 90 AEROSOL, METERED ORAL at 03:25

## 2018-12-29 RX ADMIN — CHLORHEXIDINE GLUCONATE 15 MILLILITER(S): 213 SOLUTION TOPICAL at 05:32

## 2018-12-29 RX ADMIN — Medication 2 DROP(S): at 14:56

## 2018-12-29 RX ADMIN — Medication 3 MILLILITER(S): at 15:50

## 2018-12-29 RX ADMIN — Medication 650 MILLIGRAM(S): at 09:18

## 2018-12-29 RX ADMIN — Medication 40 MILLIGRAM(S): at 07:07

## 2018-12-29 RX ADMIN — Medication 3 MILLILITER(S): at 21:11

## 2018-12-29 RX ADMIN — HEPARIN SODIUM 5000 UNIT(S): 5000 INJECTION INTRAVENOUS; SUBCUTANEOUS at 05:30

## 2018-12-29 RX ADMIN — Medication 103 MILLIGRAM(S): at 11:40

## 2018-12-29 RX ADMIN — Medication 100 MILLIEQUIVALENT(S): at 09:16

## 2018-12-29 RX ADMIN — Medication 1 DROP(S): at 21:12

## 2018-12-29 RX ADMIN — Medication 102 MILLIGRAM(S): at 08:23

## 2018-12-29 RX ADMIN — PANTOPRAZOLE SODIUM 40 MILLIGRAM(S): 20 TABLET, DELAYED RELEASE ORAL at 11:06

## 2018-12-29 RX ADMIN — CHLORHEXIDINE GLUCONATE 1 APPLICATION(S): 213 SOLUTION TOPICAL at 05:35

## 2018-12-29 RX ADMIN — Medication 50 MILLIGRAM(S): at 05:29

## 2018-12-29 RX ADMIN — PIPERACILLIN AND TAZOBACTAM 25 GRAM(S): 4; .5 INJECTION, POWDER, LYOPHILIZED, FOR SOLUTION INTRAVENOUS at 21:15

## 2018-12-29 RX ADMIN — ROBINUL 2 MILLIGRAM(S): 0.2 INJECTION INTRAMUSCULAR; INTRAVENOUS at 17:11

## 2018-12-29 RX ADMIN — Medication 40 MILLIGRAM(S): at 09:48

## 2018-12-29 RX ADMIN — Medication 100 MILLIEQUIVALENT(S): at 07:08

## 2018-12-29 RX ADMIN — Medication 650 MILLIGRAM(S): at 08:18

## 2018-12-29 RX ADMIN — Medication 2 DROP(S): at 21:14

## 2018-12-29 RX ADMIN — HEPARIN SODIUM 5000 UNIT(S): 5000 INJECTION INTRAVENOUS; SUBCUTANEOUS at 17:10

## 2018-12-29 RX ADMIN — Medication 103 MILLIGRAM(S): at 05:32

## 2018-12-29 RX ADMIN — Medication 100 MILLIEQUIVALENT(S): at 08:21

## 2018-12-29 RX ADMIN — PIPERACILLIN AND TAZOBACTAM 25 GRAM(S): 4; .5 INJECTION, POWDER, LYOPHILIZED, FOR SOLUTION INTRAVENOUS at 14:55

## 2018-12-29 RX ADMIN — Medication 40 MILLIEQUIVALENT(S): at 07:08

## 2018-12-29 RX ADMIN — NYSTATIN CREAM 1 APPLICATION(S): 100000 CREAM TOPICAL at 05:35

## 2018-12-29 RX ADMIN — Medication 1 PUFF(S): at 03:25

## 2018-12-29 RX ADMIN — ALBUTEROL 1 PUFF(S): 90 AEROSOL, METERED ORAL at 09:27

## 2018-12-29 RX ADMIN — PIPERACILLIN AND TAZOBACTAM 25 GRAM(S): 4; .5 INJECTION, POWDER, LYOPHILIZED, FOR SOLUTION INTRAVENOUS at 05:32

## 2018-12-29 NOTE — PROGRESS NOTE ADULT - SUBJECTIVE AND OBJECTIVE BOX
Follow-up Pall Progress Note    Enrique Simmons MD  [phone]    No new respiratory events overnight.  Denies SOB/CP. Pt extubated this am.     Medications:  Vital Signs Last 24 Hrs  T(C): 37.7 (29 Dec 2018 11:00), Max: 38 (29 Dec 2018 07:00)  T(F): 99.8 (29 Dec 2018 11:00), Max: 100.4 (29 Dec 2018 07:00)  HR: 60 (29 Dec 2018 11:00) (60 - 62)  BP: 137/69 (29 Dec 2018 11:00) (113/61 - 149/73)  BP(mean): 90 (29 Dec 2018 11:00) (76 - 94)  RR: 26 (29 Dec 2018 11:00) (16 - 34)  SpO2: 99% (29 Dec 2018 11:00) (97% - 99%)    ABG - ( 29 Dec 2018 09:00 )  pH, Arterial: 7.49  pH, Blood: x     /  pCO2: 35    /  pO2: 90    / HCO3: x     / Base Excess: x     /  SaO2: 98                    12-28 @ 07:01  -  12-29 @ 07:00  --------------------------------------------------------  IN: 1781.2 mL / OUT: 950 mL / NET: 831.2 mL        LABS:                        8.4    11.2  )-----------( 194      ( 29 Dec 2018 05:45 )             25.6     12-29    143  |  105  |  32<H>  ----------------------------<  175<H>  3.3<L>   |  29  |  1.42<H>    Ca    8.2<L>      29 Dec 2018 05:45  Phos  2.5     12-29  Mg     2.2     12-29            Serum Pro-Brain Natriuretic Peptide: 40121 pg/mL (12-26-18 @ 17:58)      CULTURES:  Culture Results:   No growth (12-27 @ 19:45)  Culture Results:   Moderate Staphylococcus aureus  Normal Respiratory Renetta present (12-27 @ 04:25)  Culture Results:   No growth to date. (12-26 @ 17:58)  Culture Results:   No growth to date. (12-26 @ 17:58)    Most recent blood culture -- 12-27 @ 19:45   -- -- .Urine Clean Catch (Midstream) 12-27 @ 19:45  Most recent blood culture -- 12-27 @ 04:25   Staphylococcus aureus Staphylococcus aureus .Sputum Sputum trap 12-27 @ 04:25  Most recent blood culture -- 12-26 @ 17:58   -- -- .Blood Blood-Peripheral 12-26 @ 17:58      Physical Examination:  PULM: scattered coarse bs  CVS: Regular rate and rhythm, no murmurs, rubs, or gallops  ABD: Soft, non-tender

## 2018-12-29 NOTE — CHART NOTE - NSCHARTNOTEFT_GEN_A_CORE
L IJ TLC site undressed under clean conditions, sutures cut and removed x2.  Patient placed in Trendelenburg position. Line removed on exhalation with no resistance, manual pressure held until hemostasis achieved.  Site soft with no oozing noted, pressure bandage applied.  Patient tolerated procedure well.

## 2018-12-29 NOTE — PROGRESS NOTE ADULT - SUBJECTIVE AND OBJECTIVE BOX
Patient is a 85y old  Male who presents with a chief complaint of Respiratory Failure (29 Dec 2018 11:56)    PAST MEDICAL & SURGICAL HISTORY:  Shortness of breath  Muscle weakness  Depression  IBS (irritable bowel syndrome)  Dysphagia  Cerebral infarction  Anemia  Psoriasis  GERD (gastroesophageal reflux disease)  Dyslipidemia  Cardiac pacemaker  S/P percutaneous endoscopic gastrostomy (PEG) tube placement    LICHA GUERRA   85y    Male    BRIEF HOSPITAL COURSE:    86 yo male PMHx HTN, HLD, grade III diastolic heart failure s/p ppm, CKD, dysphagia s/p PEG tube placement, recently discharged 12/21 for aspiration pneumonia BIBA in respiratory distress from [facility].  Upon arrival to ED, patient in severe respiratory distress, became obtunded and failed trial of BiPAP, and was subsequently intubated. During intubation patient noted to have copious amounts of secretions in airway that appeared to be consistent with tube feeds. Cristina-intubation patient developed hypotension with SBP 60's, a L IJ TLC was placed and patient was started on Levophed. Found to have acute respiratory acidosis pH 7.11/98/237/33, and MELISSA on CKD.    RX for  aspiration PNA extubated today.        Review of Systems:  phlegmy cough             All other ROS are negative.    Allergies    No Known Allergies    Intolerances          ICU Vital Signs Last 24 Hrs  T(C): 37.3 (29 Dec 2018 17:00), Max: 38 (29 Dec 2018 07:00)  T(F): 99.2 (29 Dec 2018 17:00), Max: 100.4 (29 Dec 2018 07:00)  HR: 60 (29 Dec 2018 21:12) (60 - 62)  BP: 141/71 (29 Dec 2018 18:00) (116/58 - 149/73)  BP(mean): 90 (29 Dec 2018 18:00) (77 - 96)  ABP: --  ABP(mean): --  RR: 15 (29 Dec 2018 18:00) (15 - 34)  SpO2: 100% (29 Dec 2018 21:12) (95% - 100%)    Physical Examination:    GENERAL:                alert, No Oriented, No acute distress.    HEENT:                    No JVD  PULM:                     Scattered rhonchit  CVS:                         S1, S2,  no Murmur  ABD:                        Soft, nondistended, nontender, + PEG, site  clean + BS  EXT:                         chronic venous staisis, mild edma           ABG - ( 29 Dec 2018 13:22 )  pH, Arterial: 7.48  pH, Blood: x     /  pCO2: 39    /  pO2: 75    / HCO3: x     / Base Excess: x     /  SaO2: 96        Mode: PS (Pressure Support)/ Spontaneous  FiO2: 30  PEEP: 5  PS: 5  MAP: 7.6  PIP: 11    Mode: PS (Pressure Support)/ Spontaneous, FiO2: 30, PEEP: 5, PS: 5, MAP: 7.6, PIP: 11  LABS:                        8.4    11.2  )-----------( 194      ( 29 Dec 2018 05:45 )             25.6     12-29    143  |  105  |  32<H>  ----------------------------<  175<H>  3.3<L>   |  29  |  1.42<H>    Ca    8.2<L>      29 Dec 2018 05:45  Phos  2.5     12-29  Mg     2.2     12-29        CARDIAC MARKERS ( 28 Dec 2018 05:30 )  .548 ng/mL / x     / x     / x     / x          CAPILLARY BLOOD GLUCOSE      POCT Blood Glucose.: 178 mg/dL (29 Dec 2018 17:49)  POCT Blood Glucose.: 196 mg/dL (29 Dec 2018 11:09)  POCT Blood Glucose.: 183 mg/dL (29 Dec 2018 05:44)  POCT Blood Glucose.: 147 mg/dL (28 Dec 2018 23:34)        CULTURES:  Culture Results:   No growth (12-27 @ 19:45)  Culture Results:   Moderate Staphylococcus aureus  Normal Respiratory Renetta present (12-27 @ 04:25)  Culture Results:   No growth to date. (12-26 @ 17:58)  Culture Results:   No growth to date. (12-26 @ 17:58)      Medications:  MEDICATIONS  (STANDING):  ALBUTerol/ipratropium for Nebulization 3 milliLiter(s) Nebulizer every 6 hours  ascorbic acid IVPB 1500 milliGRAM(s) IV Intermittent every 6 hours  aspirin  chewable 81 milliGRAM(s) Oral daily  atropine 1% Ophthalmic Solution for SubLingual Use 2 Drop(s) SubLingual every 8 hours  chlorhexidine 0.12% Liquid 15 milliLiter(s) Oral Mucosa two times a day  chlorhexidine 4% Liquid 1 Application(s) Topical <User Schedule>  dorzolamide 2% Ophthalmic Solution 1 Drop(s) Both EYES <User Schedule>  glycopyrrolate 2 milliGRAM(s) Oral every 12 hours  heparin  Injectable 5000 Unit(s) SubCutaneous every 12 hours  hydrocortisone sodium succinate Injectable 50 milliGRAM(s) IV Push every 6 hours  latanoprost 0.005% Ophthalmic Solution 1 Drop(s) Both EYES at bedtime  nystatin Powder 1 Application(s) Topical two times a day  pantoprazole  Injectable 40 milliGRAM(s) IV Push daily  piperacillin/tazobactam IVPB. 3.375 Gram(s) IV Intermittent every 8 hours  thiamine IVPB 200 milliGRAM(s) IV Intermittent <User Schedule>  timolol 0.5% Solution 1 Drop(s) Both EYES <User Schedule>    MEDICATIONS  (PRN):  acetaminophen    Suspension .. 650 milliGRAM(s) Enteral Tube every 6 hours PRN Temp greater or equal to 38C (100.4F)  bisacodyl Suppository 10 milliGRAM(s) Rectal daily PRN Constipation  polyethylene glycol 3350 17 Gram(s) Oral daily PRN Constipation        12-28 @ 07:01 - 12-29 @ 07:00  --------------------------------------------------------  IN: 1781.2 mL / OUT: 950 mL / NET: 831.2 mL    12-29 @ 07:01  -  12-29 @ 21:49  --------------------------------------------------------  IN: 625 mL / OUT: 1350 mL / NET: -725 mL        RADIOLOGY/IMAGING/ECHO    < from: Xray Chest 1 View- PORTABLE-Routine (12.29.18 @ 08:36) >  PROCEDURE DATE:  12/29/2018        INTERPRETATION:  AP chest on December 29, 2018 at 8:15 AM. Patient has   abnormal chest sounds.    Patient's chin obscures the apices. The heart is magnified by technique.   Left-sided pacemaker again noted. Endotracheal tube and nasogastric tube   remain.    Mild left base pleural pulmonary reaction again noted.    Mild density off the right lower hilum seen on December 26 shows some   improvement. There is persistent linear atelectasis lateral to the right   hilum and a vague density in the left midlung field.    Not much change is seen from December 26.    IMPRESSION: As above.      Assessment/Plan:    86 yo male PMHx HTN, HLD, grade III HFpEF  s/p ppm, CKD, dysphagia s/p PEG tube placement, recently discharged 12/21 for aspiration pneumonia, admitted with acute hypercapnic respiratory failure, altered mental status, septic shock with ATN, secondary to aspiration pneumonia, complicated by acute systolic heart failure.     HD stable off pressor   MELISSA improving       Extubated today phlegmy cough DNR, not DNI  CXR is somewhat improved.  Kenneth added .      7 days abx  Marik protocol to complete.    DVT prophylaxis

## 2018-12-29 NOTE — PROGRESS NOTE ADULT - ASSESSMENT
Palliative:  s/p extubation.  Daughter at bedside.  Long term prognosis remains guarded.  Further palliative recommendations pending clinical progress.    Assessment  86 yo male with h/o Severe dysphagia s/p PEG with multiple hospitalizations for aspiration PNA, HTN, HLD, Grad 3 Diastolic Heart Failure s/p ppm, CKD, admitted to the ICU with septic shock secondary to aspiration pneumonia. Patient with multiple admissions recently. Has been a high risk of aspiration despite being with a PEG. This morning he remains in shock requiring vasopressor. He was intubated for hypercapnic respiratory failure.     1. Acute hypercapnic respiratory failure  2. Encephalopathy - possibly related to sepsis, has unequal pupils ?no prior documentation as such  3. Shock - likely septic off pressors after steroids  4. Aspiration Pneumonia - suspect infection with gram negative organisms  5. Diastolic heart dysfunction  6. Chronic kidney disease  7. Dysphagia s/p PEG    Plan  - wean from mechanical ventilation,  re intubation ok as er family  - continue steroids today, will decrease in am.  -  Precedex for sedation  - Broad spectrum antibiotics to continue 5-7 day course   - hold tube feeding as pt being extubated, will start this afternoon, Keep HOB 35-45 degrees   - Prognosis is guarded, given patient with multiple hospital admissions in the past year. Has been declining clinically.  - case d/w Family   - GI, DVT prophylaxis     Family Updated: 	Wife called today, msg left at [phone]	                                 Date:       Sedation & Analgesia:	Precedex  Diet/Nutrition:		restart tube feeding this am  GI PPx:			PPI    DVT Ppx:		Hep sq      Activity:		            Bedrest   , baseline pt bed bound  Head of Bed:               35-45 deg  Glycemic Control:        n/a    Lines: PIV  SOTO: 		        [x ] YES [ ] NO  		                                       DATE INSERTED:		            REMOVE:  [x ] YES [ ] NO      Restraints were deemed necessary to prevent removal of life-sustaining devices [  ] YES   [ x   ]  NO    Disposition: ICU monitoring    Goals of Care: DNR, Re intubation ok

## 2018-12-29 NOTE — PROGRESS NOTE ADULT - SUBJECTIVE AND OBJECTIVE BOX
Follow-up Critical Care Progress Note  Chief Complaint : Acute respiratory distress    patient placed on cpap trial this am  now on ps 5, peep 5  o2 requirements stable  pt alert, responsive, following commands  no et or oral secretions noted.     case d/w DTR this am, state Reintubation OK .  Rescind DNI.      Allergies :No Known Allergies      PAST MEDICAL & SURGICAL HISTORY:  Shortness of breath  Muscle weakness  Depression  IBS (irritable bowel syndrome)  Dysphagia  Cerebral infarction  Anemia  Psoriasis  GERD (gastroesophageal reflux disease)  Dyslipidemia  Cardiac pacemaker  S/P percutaneous endoscopic gastrostomy (PEG) tube placement      Medications:  MEDICATIONS  (STANDING):  ALBUTerol    90 MICROgram(s) HFA Inhaler 1 Puff(s) Inhalation every 6 hours  ascorbic acid IVPB 1500 milliGRAM(s) IV Intermittent every 6 hours  aspirin  chewable 81 milliGRAM(s) Oral daily  chlorhexidine 0.12% Liquid 15 milliLiter(s) Oral Mucosa two times a day  chlorhexidine 4% Liquid 1 Application(s) Topical <User Schedule>  dexmedetomidine Infusion 0.5 MICROgram(s)/kG/Hr (12.037 mL/Hr) IV Continuous <Continuous>  dorzolamide 2%/timolol 0.5% Ophthalmic Solution 1 Drop(s) Both EYES every 12 hours  glycopyrrolate 2 milliGRAM(s) Oral every 12 hours  heparin  Injectable 5000 Unit(s) SubCutaneous every 12 hours  hydrocortisone sodium succinate Injectable 50 milliGRAM(s) IV Push every 6 hours  ipratropium 17 MICROgram(s) HFA Inhaler 1 Puff(s) Inhalation every 6 hours  latanoprost 0.005% Ophthalmic Solution 1 Drop(s) Both EYES at bedtime  nystatin Powder 1 Application(s) Topical two times a day  pantoprazole  Injectable 40 milliGRAM(s) IV Push daily  piperacillin/tazobactam IVPB. 3.375 Gram(s) IV Intermittent every 8 hours  potassium chloride  10 mEq/100 mL IVPB 10 milliEquivalent(s) IV Intermittent every 1 hour  thiamine IVPB 200 milliGRAM(s) IV Intermittent <User Schedule>  vancomycin  IVPB 1250 milliGRAM(s) IV Intermittent every 24 hours    MEDICATIONS  (PRN):  acetaminophen    Suspension .. 650 milliGRAM(s) Enteral Tube every 6 hours PRN Temp greater or equal to 38C (100.4F)  bisacodyl Suppository 10 milliGRAM(s) Rectal daily PRN Constipation  polyethylene glycol 3350 17 Gram(s) Oral daily PRN Constipation      LABS:                        8.4    11.2  )-----------( 194      ( 29 Dec 2018 05:45 )             25.6     12-29    143  |  105  |  32<H>  ----------------------------<  175<H>  3.3<L>   |  29  |  1.42<H>    Ca    8.2<L>      29 Dec 2018 05:45  Phos  2.5     12-29  Mg     2.2     12-29        CARDIAC MARKERS ( 28 Dec 2018 05:30 )  .548 ng/mL / x     / x     / x     / x                  Serum Pro-Brain Natriuretic Peptide: 45022 pg/mL (12-26-18 @ 17:58)        CULTURES: (if applicable)    Culture - Urine (collected 12-27-18 @ 19:45)  Source: .Urine Clean Catch (Midstream)  Final Report (12-28-18 @ 22:45):    No growth    Culture - Sputum (collected 12-27-18 @ 04:25)  Source: .Sputum Sputum trap  Gram Stain (12-27-18 @ 12:47):    Moderate polymorphonuclear leukocytes per low power field    Rare Squamous epithelial cells per low power field    Moderate Gram positive cocci in pairs, chains and clusters per oil power    field    Few Yeast like cells per oil power field    Rare Gram Negative Rods per oil power field    Rare Gram Negative Diplococci per oil power field  Preliminary Report (12-28-18 @ 08:55):    Moderate Staphylococcus aureus    Normal Respiratory Renetta present    Culture - Blood (collected 12-26-18 @ 17:58)  Source: .Blood Blood-Peripheral  Preliminary Report (12-28-18 @ 02:01):    No growth to date.    Culture - Blood (collected 12-26-18 @ 17:58)  Source: .Blood Blood-Peripheral  Preliminary Report (12-28-18 @ 02:01):    No growth to date.          ABG - ( 28 Dec 2018 10:20 )  pH, Arterial: 7.46  pH, Blood: x     /  pCO2: 37    /  pO2: 83    / HCO3: x     / Base Excess: x     /  SaO2: 97                CAPILLARY BLOOD GLUCOSE      POCT Blood Glucose.: 183 mg/dL (29 Dec 2018 05:44)      RADIOLOGY  CXR:  improving right sided infiltrate    VITALS:  T(C): 38 (12-29-18 @ 07:00), Max: 38 (12-29-18 @ 07:00)  T(F): 100.4 (12-29-18 @ 07:00), Max: 100.4 (12-29-18 @ 07:00)  HR: 62 (12-29-18 @ 08:00) (60 - 64)  BP: 139/70 (12-29-18 @ 08:00) (113/61 - 149/73)  BP(mean): 91 (12-29-18 @ 08:00) (76 - 94)  ABP: --  ABP(mean): --  RR: 33 (12-29-18 @ 08:00) (16 - 33)  SpO2: 98% (12-29-18 @ 08:00) (96% - 99%)  CVP(mm Hg): --  CVP(cm H2O): --    Ins and Outs     12-28-18 @ 07:01  -  12-29-18 @ 07:00  --------------------------------------------------------  IN: 1781.2 mL / OUT: 950 mL / NET: 831.2 mL    12-29-18 @ 07:01  -  12-29-18 @ 08:39  --------------------------------------------------------  IN: 225 mL / OUT: 0 mL / NET: 225 mL        Height (cm): 185.42 (12-26-18 @ 17:37)  Weight (kg): 96.3 (12-26-18 @ 21:00)  BMI (kg/m2): 28 (12-26-18 @ 21:00)    Device: 840, Mode: PS (Pressure Support)/ Spontaneous, RR (patient): 24, FiO2: 30, PEEP: 5, PS: 10, MAP: 9, PIP: 16

## 2018-12-29 NOTE — PROGRESS NOTE ADULT - ASSESSMENT
Physical Examination:  GENERAL:                alert, No Oriented, No acute distress.    HEENT:                    Orally intubated, no secretions  No JVD, dry MM, cachectic  PULM:                     Bilateral air entry, course breath sounds bilaterally, no significant sputum production, No Rales, trace Rhonchi, No Wheezing  CVS:                         S1, S2,  no Murmur  ABD:                        Soft, nondistended, nontender, + PEG, site appears clean + BS  EXT:                         chronic venous staisis, with mild edema, nontender, No Cyanosis or Clubbing   Vascular:                Warm Extremities, Normal Capillary refill, Normal Distal Pulses  NEURO:                  alert, not oriented,  follows commands  PSYC:                      Calm, some Insight.    Assessment  84 yo male with h/o Severe dysphagia s/p PEG with multiple hospitalizations for aspiration PNA, HTN, HLD, Grad 3 Diastolic Heart Failure s/p ppm, CKD, admitted to the ICU with septic shock secondary to aspiration pneumonia. Patient with multiple admissions recently. Has been a high risk of aspiration despite being with a PEG. This morning he remains in shock requiring vasopressor. He was intubated for hypercapnic respiratory failure.     1. Acute hypercapnic respiratory failure  2. Encephalopathy - possibly related to sepsis, has unequal pupils ?no prior documentation as such  3. Shock - likely septic off pressors after steroids  4. Aspiration Pneumonia - suspect infection with gram negative organisms  5. Diastolic heart dysfunction  6. Chronic kidney disease  7. Dysphagia s/p PEG    Plan  - wean from mechanical ventilation,  re intubation ok as er family  - continue steroids today, will decrease in am.  -  Precedex for sedation  - Broad spectrum antibiotics to continue 5-7 day course   - hold tube feeding as pt being extubated, will start this afternoon, Keep HOB 35-45 degrees   - Prognosis is guarded, given patient with multiple hospital admissions in the past year. Has been declining clinically.  - case d/w Family   - GI, DVT prophylaxis     Family Updated: 	Wife called today, msg left at [phone]	                                 Date:       Sedation & Analgesia:	Precedex  Diet/Nutrition:		restart tube feeding this am  GI PPx:			PPI    DVT Ppx:		Hep sq      Activity:		            Bedrest   , baseline pt bed bound  Head of Bed:               35-45 deg  Glycemic Control:        n/a    Lines: PIV  SOTO: 		        [x ] YES [ ] NO  		                                       DATE INSERTED:		            REMOVE:  [x ] YES [ ] NO      Restraints were deemed necessary to prevent removal of life-sustaining devices [  ] YES   [ x   ]  NO    Disposition: ICU monitoring    Goals of Care: DNR, Re intubation ok

## 2018-12-30 LAB
ANION GAP SERPL CALC-SCNC: 8 MMOL/L — SIGNIFICANT CHANGE UP (ref 5–17)
BUN SERPL-MCNC: 33 MG/DL — HIGH (ref 7–23)
CALCIUM SERPL-MCNC: 8.7 MG/DL — SIGNIFICANT CHANGE UP (ref 8.4–10.5)
CHLORIDE SERPL-SCNC: 106 MMOL/L — SIGNIFICANT CHANGE UP (ref 96–108)
CO2 SERPL-SCNC: 32 MMOL/L — HIGH (ref 22–31)
CREAT SERPL-MCNC: 1.56 MG/DL — HIGH (ref 0.5–1.3)
GLUCOSE SERPL-MCNC: 91 MG/DL — SIGNIFICANT CHANGE UP (ref 70–99)
HCT VFR BLD CALC: 26 % — LOW (ref 39–50)
HGB BLD-MCNC: 8.2 G/DL — LOW (ref 13–17)
MAGNESIUM SERPL-MCNC: 2.2 MG/DL — SIGNIFICANT CHANGE UP (ref 1.6–2.6)
MCHC RBC-ENTMCNC: 28.5 PG — SIGNIFICANT CHANGE UP (ref 27–34)
MCHC RBC-ENTMCNC: 31.5 GM/DL — LOW (ref 32–36)
MCV RBC AUTO: 90.5 FL — SIGNIFICANT CHANGE UP (ref 80–100)
PLATELET # BLD AUTO: 220 K/UL — SIGNIFICANT CHANGE UP (ref 150–400)
POTASSIUM SERPL-MCNC: 2.8 MMOL/L — CRITICAL LOW (ref 3.5–5.3)
POTASSIUM SERPL-SCNC: 2.8 MMOL/L — CRITICAL LOW (ref 3.5–5.3)
RBC # BLD: 2.87 M/UL — LOW (ref 4.2–5.8)
RBC # FLD: 17.2 % — HIGH (ref 10.3–14.5)
SODIUM SERPL-SCNC: 146 MMOL/L — HIGH (ref 135–145)
VANCOMYCIN TROUGH SERPL-MCNC: 18.2 UG/ML — SIGNIFICANT CHANGE UP (ref 10–20)
WBC # BLD: 8.6 K/UL — SIGNIFICANT CHANGE UP (ref 3.8–10.5)
WBC # FLD AUTO: 8.6 K/UL — SIGNIFICANT CHANGE UP (ref 3.8–10.5)

## 2018-12-30 RX ORDER — LANOLIN ALCOHOL/MO/W.PET/CERES
3 CREAM (GRAM) TOPICAL AT BEDTIME
Qty: 0 | Refills: 0 | Status: DISCONTINUED | OUTPATIENT
Start: 2018-12-30 | End: 2019-01-03

## 2018-12-30 RX ORDER — POTASSIUM CHLORIDE 20 MEQ
40 PACKET (EA) ORAL ONCE
Qty: 0 | Refills: 0 | Status: COMPLETED | OUTPATIENT
Start: 2018-12-30 | End: 2018-12-30

## 2018-12-30 RX ORDER — POTASSIUM CHLORIDE 20 MEQ
10 PACKET (EA) ORAL ONCE
Qty: 0 | Refills: 0 | Status: COMPLETED | OUTPATIENT
Start: 2018-12-30 | End: 2018-12-30

## 2018-12-30 RX ADMIN — NYSTATIN CREAM 1 APPLICATION(S): 100000 CREAM TOPICAL at 05:21

## 2018-12-30 RX ADMIN — LATANOPROST 1 DROP(S): 0.05 SOLUTION/ DROPS OPHTHALMIC; TOPICAL at 21:11

## 2018-12-30 RX ADMIN — Medication 81 MILLIGRAM(S): at 11:50

## 2018-12-30 RX ADMIN — HEPARIN SODIUM 5000 UNIT(S): 5000 INJECTION INTRAVENOUS; SUBCUTANEOUS at 17:31

## 2018-12-30 RX ADMIN — Medication 2 DROP(S): at 15:26

## 2018-12-30 RX ADMIN — Medication 3 MILLIGRAM(S): at 23:25

## 2018-12-30 RX ADMIN — ROBINUL 2 MILLIGRAM(S): 0.2 INJECTION INTRAMUSCULAR; INTRAVENOUS at 05:18

## 2018-12-30 RX ADMIN — Medication 2 DROP(S): at 05:16

## 2018-12-30 RX ADMIN — ROBINUL 2 MILLIGRAM(S): 0.2 INJECTION INTRAMUSCULAR; INTRAVENOUS at 17:31

## 2018-12-30 RX ADMIN — PIPERACILLIN AND TAZOBACTAM 25 GRAM(S): 4; .5 INJECTION, POWDER, LYOPHILIZED, FOR SOLUTION INTRAVENOUS at 21:10

## 2018-12-30 RX ADMIN — PANTOPRAZOLE SODIUM 40 MILLIGRAM(S): 20 TABLET, DELAYED RELEASE ORAL at 11:50

## 2018-12-30 RX ADMIN — Medication 3 MILLILITER(S): at 15:33

## 2018-12-30 RX ADMIN — Medication 3 MILLILITER(S): at 09:11

## 2018-12-30 RX ADMIN — Medication 3 MILLILITER(S): at 22:14

## 2018-12-30 RX ADMIN — Medication 1 DROP(S): at 07:57

## 2018-12-30 RX ADMIN — PIPERACILLIN AND TAZOBACTAM 25 GRAM(S): 4; .5 INJECTION, POWDER, LYOPHILIZED, FOR SOLUTION INTRAVENOUS at 05:17

## 2018-12-30 RX ADMIN — Medication 100 MILLIEQUIVALENT(S): at 16:27

## 2018-12-30 RX ADMIN — Medication 40 MILLIEQUIVALENT(S): at 11:50

## 2018-12-30 RX ADMIN — PIPERACILLIN AND TAZOBACTAM 25 GRAM(S): 4; .5 INJECTION, POWDER, LYOPHILIZED, FOR SOLUTION INTRAVENOUS at 15:26

## 2018-12-30 RX ADMIN — Medication 40 MILLIGRAM(S): at 11:50

## 2018-12-30 RX ADMIN — NYSTATIN CREAM 1 APPLICATION(S): 100000 CREAM TOPICAL at 17:31

## 2018-12-30 RX ADMIN — CHLORHEXIDINE GLUCONATE 15 MILLILITER(S): 213 SOLUTION TOPICAL at 05:16

## 2018-12-30 RX ADMIN — Medication 3 MILLILITER(S): at 04:39

## 2018-12-30 RX ADMIN — HEPARIN SODIUM 5000 UNIT(S): 5000 INJECTION INTRAVENOUS; SUBCUTANEOUS at 05:24

## 2018-12-30 RX ADMIN — Medication 1 DROP(S): at 21:06

## 2018-12-30 RX ADMIN — DORZOLAMIDE HYDROCHLORIDE 1 DROP(S): 20 SOLUTION/ DROPS OPHTHALMIC at 19:25

## 2018-12-30 RX ADMIN — Medication 2 DROP(S): at 21:10

## 2018-12-30 RX ADMIN — CHLORHEXIDINE GLUCONATE 15 MILLILITER(S): 213 SOLUTION TOPICAL at 17:31

## 2018-12-30 RX ADMIN — DORZOLAMIDE HYDROCHLORIDE 1 DROP(S): 20 SOLUTION/ DROPS OPHTHALMIC at 07:57

## 2018-12-30 NOTE — PROGRESS NOTE ADULT - SUBJECTIVE AND OBJECTIVE BOX
Follow-up Critical Care Progress Note  Chief Complaint : Acute respiratory distress    patient extubated yesterday  more alert today  verbal  + secretions, being managed  started on tube feeding this am.       Allergies :No Known Allergies      PAST MEDICAL & SURGICAL HISTORY:  Shortness of breath  Muscle weakness  Depression  IBS (irritable bowel syndrome)  Dysphagia  Cerebral infarction  Anemia  Psoriasis  GERD (gastroesophageal reflux disease)  Dyslipidemia  Cardiac pacemaker  S/P percutaneous endoscopic gastrostomy (PEG) tube placement      Medications:  MEDICATIONS  (STANDING):  ALBUTerol/ipratropium for Nebulization 3 milliLiter(s) Nebulizer every 6 hours  aspirin  chewable 81 milliGRAM(s) Oral daily  atropine 1% Ophthalmic Solution for SubLingual Use 2 Drop(s) SubLingual every 8 hours  chlorhexidine 0.12% Liquid 15 milliLiter(s) Oral Mucosa two times a day  dorzolamide 2% Ophthalmic Solution 1 Drop(s) Both EYES <User Schedule>  glycopyrrolate 2 milliGRAM(s) Oral every 12 hours  heparin  Injectable 5000 Unit(s) SubCutaneous every 12 hours  latanoprost 0.005% Ophthalmic Solution 1 Drop(s) Both EYES at bedtime  nystatin Powder 1 Application(s) Topical two times a day  pantoprazole  Injectable 40 milliGRAM(s) IV Push daily  piperacillin/tazobactam IVPB. 3.375 Gram(s) IV Intermittent every 8 hours  timolol 0.5% Solution 1 Drop(s) Both EYES <User Schedule>    MEDICATIONS  (PRN):  acetaminophen    Suspension .. 650 milliGRAM(s) Enteral Tube every 6 hours PRN Temp greater or equal to 38C (100.4F)  bisacodyl Suppository 10 milliGRAM(s) Rectal daily PRN Constipation  polyethylene glycol 3350 17 Gram(s) Oral daily PRN Constipation      LABS:                        8.2    8.6   )-----------( 220      ( 30 Dec 2018 05:30 )             26.0     12-29    143  |  105  |  32<H>  ----------------------------<  175<H>  3.3<L>   |  29  |  1.42<H>    Ca    8.2<L>      29 Dec 2018 05:45  Phos  2.5     12-29  Mg     2.2     12-30            CULTURES: (if applicable)    Culture - Urine (collected 12-27-18 @ 19:45)  Source: .Urine Clean Catch (Midstream)  Final Report (12-28-18 @ 22:45):    No growth    Culture - Sputum (collected 12-27-18 @ 04:25)  Source: .Sputum Sputum trap  Gram Stain (12-27-18 @ 12:47):    Moderate polymorphonuclear leukocytes per low power field    Rare Squamous epithelial cells per low power field    Moderate Gram positive cocci in pairs, chains and clusters per oil power    field    Few Yeast like cells per oil power field    Rare Gram Negative Rods per oil power field    Rare Gram Negative Diplococci per oil power field  Final Report (12-29-18 @ 11:40):    Moderate Staphylococcus aureus    Normal Respiratory Renetta present  Organism: Staphylococcus aureus (12-29-18 @ 11:40)  Organism: Staphylococcus aureus (12-29-18 @ 11:40)      -  Ampicillin/Sulbactam: S <=8/4      -  Cefazolin: S <=4      -  Clindamycin: S 0.5      -  Erythromycin: R >4      -  Gentamicin: S <=1 Should not be used as monotherapy      -  Oxacillin: S 1      -  Penicillin: R >8      -  RIF- Rifampin: S <=1 Should not be used as monotherapy      -  Tetra/Doxy: S <=1      -  Trimethoprim/Sulfamethoxazole: S <=0.5/9.5      -  Vancomycin: S 2      Method Type: KALYAN    Culture - Blood (collected 12-26-18 @ 17:58)  Source: .Blood Blood-Peripheral  Preliminary Report (12-28-18 @ 02:01):    No growth to date.    Culture - Blood (collected 12-26-18 @ 17:58)  Source: .Blood Blood-Peripheral  Preliminary Report (12-28-18 @ 02:01):    No growth to date.          ABG - ( 29 Dec 2018 13:22 )  pH, Arterial: 7.48  pH, Blood: x     /  pCO2: 39    /  pO2: 75    / HCO3: x     / Base Excess: x     /  SaO2: 96                CAPILLARY BLOOD GLUCOSE      POCT Blood Glucose.: 159 mg/dL (29 Dec 2018 21:49)      RADIOLOGY  CXR:  no am cxr         VITALS:  T(C): 36.6 (12-30-18 @ 04:00), Max: 37.7 (12-29-18 @ 11:00)  T(F): 97.9 (12-30-18 @ 04:00), Max: 99.8 (12-29-18 @ 11:00)  HR: 60 (12-30-18 @ 10:00) (60 - 60)  BP: 148/73 (12-30-18 @ 10:00) (127/67 - 163/83)  BP(mean): 96 (12-30-18 @ 10:00) (85 - 107)  ABP: --  ABP(mean): --  RR: 15 (12-30-18 @ 10:00) (7 - 26)  SpO2: 96% (12-30-18 @ 10:00) (95% - 100%)  CVP(mm Hg): --  CVP(cm H2O): --    Ins and Outs     12-29-18 @ 07:01  -  12-30-18 @ 07:00  --------------------------------------------------------  IN: 925 mL / OUT: 2050 mL / NET: -1125 mL    12-30-18 @ 07:01  -  12-30-18 @ 10:58  --------------------------------------------------------  IN: 195 mL / OUT: 130 mL / NET: 65 mL

## 2018-12-30 NOTE — PROGRESS NOTE ADULT - ASSESSMENT
Physical Examination:  GENERAL:                alert, No Oriented, No acute distress.    HEENT:                    mild  secretions  No JVD, dry MM,   PULM:                     Bilateral air entry, course breath sounds bilaterally, no significant sputum production, No Rales, trace Rhonchi, No Wheezing  CVS:                         S1, S2,  no Murmur  ABD:                        Soft, nondistended, nontender, + PEG, site appears clean + BS  EXT:                         chronic venous staisis, with mild edema, nontender, No Cyanosis or Clubbing   Vascular:                Warm Extremities, Normal Capillary refill, Normal Distal Pulses  NEURO:                  alert, oriented x1-2,  follows commands  PSYC:                      Calm, some Insight.    Assessment  84 yo male with h/o Severe dysphagia s/p PEG with multiple hospitalizations for aspiration PNA, HTN, HLD, Grad 3 Diastolic Heart Failure s/p ppm, CKD, admitted to the ICU with septic shock secondary to aspiration pneumonia. Patient with multiple admissions recently. Has been a high risk of aspiration despite being with a PEG. This morning he remains in shock requiring vasopressor. He was intubated for hypercapnic respiratory failure.     1. Acute hypercapnic respiratory failure Resolved Extubated 12/29  2. Shock - likely septic off pressors after steroids  3. Aspiration Pneumonia - suspect infection with gram negative organisms  4. chronic Diastolic heart dysfunction  5. Chronic kidney disease  6. Dysphagia s/p PEG    Plan  - repeat cxr in am.   - oral steroid taper  - Broad spectrum antibiotics to continue 5-7 day course   - start tube feeding today, advance slowly   - Prognosis is guarded, given patient with multiple hospital admissions in the past year. Has been declining clinically.  - case d/w Family   - GI, DVT prophylaxis     Family Updated: melia/bassem                                 Date: 12/29      Sedation & Analgesia:	on hold  Diet/Nutrition:		restart tube feeding this am  GI PPx:			PPI    DVT Ppx:		Hep sq      Activity:		            Bedrest   , baseline pt bed bound  Head of Bed:               35-45 deg  Glycemic Control:        n/a    Lines: PIV  SOTO: 		        [x ] YES [ ] NO  		                                       DATE INSERTED:		            REMOVE:  [x ] YES [ ] NO      Restraints were deemed necessary to prevent removal of life-sustaining devices [  ] YES   [ x   ]  NO    Disposition: ICU monitoring    Goals of Care: DNR, Re intubation ok

## 2018-12-30 NOTE — CHART NOTE - NSCHARTNOTEFT_GEN_A_CORE
Nutrition Follow Up:  Source: Patient [ ]    Family [ ]     other [x]: EMR, RN    Diet : NPO + TwoCal HN enteral feedings via PEG    HPI: Pt is an 86 yo male pmhx HTN, HLD, Grad 3 Diastolic Heart Failure s/p ppm, CKD, dysphagia s/p PEG tube placement, aspiration pneumonia biba in respiratory distress from [facility]. Pt transferred to ICU from [facility] due to secretions in airway consistent with tube feeding. Pt extubated on 12/29.     Pt was started on TwoCal HN @20cc/hr on 12/28. Feedings held on 12/29 for extubation and bowel rest to lower risk of reintubation per RN. Pt now resumed with TwoCal tube feedings via PEG with no evidence of intolerance.     Enteral /Parenteral Nutrition: TwoCal HN @20cc/hr x 24 hours via PEG (960 kcals, 40g protein)      Current Weight:  (12/26) 212lbs   (12/30) 220.6lbs   % Weight Change- weight change possibly due to fluid shifts     Pertinent Medications: MEDICATIONS  (STANDING):  ALBUTerol/ipratropium for Nebulization 3 milliLiter(s) Nebulizer every 6 hours  aspirin  chewable 81 milliGRAM(s) Oral daily  atropine 1% Ophthalmic Solution for SubLingual Use 2 Drop(s) SubLingual every 8 hours  chlorhexidine 0.12% Liquid 15 milliLiter(s) Oral Mucosa two times a day  dorzolamide 2% Ophthalmic Solution 1 Drop(s) Both EYES <User Schedule>  glycopyrrolate 2 milliGRAM(s) Oral every 12 hours  heparin  Injectable 5000 Unit(s) SubCutaneous every 12 hours  latanoprost 0.005% Ophthalmic Solution 1 Drop(s) Both EYES at bedtime  nystatin Powder 1 Application(s) Topical two times a day  pantoprazole  Injectable 40 milliGRAM(s) IV Push daily  piperacillin/tazobactam IVPB. 3.375 Gram(s) IV Intermittent every 8 hours  timolol 0.5% Solution 1 Drop(s) Both EYES <User Schedule>    MEDICATIONS  (PRN):  acetaminophen    Suspension .. 650 milliGRAM(s) Enteral Tube every 6 hours PRN Temp greater or equal to 38C (100.4F)  bisacodyl Suppository 10 milliGRAM(s) Rectal daily PRN Constipation  polyethylene glycol 3350 17 Gram(s) Oral daily PRN Constipation    Pertinent Labs:  12-29 Na143 mmol/L Glu 175 mg/dL<H> K+ 3.3 mmol/L<L> Cr  1.42 mg/dL<H> BUN 32 mg/dL<H> 12-29 Phos 2.5 mg/dL 12-27 Alb 2.0 g/dL<L>      Skin: no pressure ulcers noted  Edema: +2 generalized edema per flow sheets  Last BM: 12/29    Estimated Needs:   [x] no change since previous assessment  [ ] recalculated:       Previous Nutrition Diagnosis: Malnutrition (severe) - resume tube feeding     Nutrition Diagnosis is [x] ongoing  [ ] resolved [ ] not applicable          New Nutrition Diagnosis: Inadequate enteral nutrition infusion related to goal rate not reached as evidenced by current tube feeding provides <50% estimated energy/protein needs.    Recommend  1. Change tube feeding order to TwoCal HN @ 30cc/hr x 24 hours via PEG and increase by 10cc q4hrs until goal rate of 50cc/hr x 24hrs reached. (will provide 2,400kcals, 90g protein)   2. Monitor tolerance and residuals    Monitoring and Evaluation:     [ ] PO intake [x] Tolerance to diet prescription [x] weights [x] follow up per protocol    RD to remain available  Julieta Ni RDN

## 2018-12-31 LAB
ANION GAP SERPL CALC-SCNC: 8 MMOL/L — SIGNIFICANT CHANGE UP (ref 5–17)
BUN SERPL-MCNC: 30 MG/DL — HIGH (ref 7–23)
CALCIUM SERPL-MCNC: 8.5 MG/DL — SIGNIFICANT CHANGE UP (ref 8.4–10.5)
CHLORIDE SERPL-SCNC: 107 MMOL/L — SIGNIFICANT CHANGE UP (ref 96–108)
CO2 SERPL-SCNC: 30 MMOL/L — SIGNIFICANT CHANGE UP (ref 22–31)
CREAT SERPL-MCNC: 1.38 MG/DL — HIGH (ref 0.5–1.3)
GLUCOSE SERPL-MCNC: 105 MG/DL — HIGH (ref 70–99)
HCT VFR BLD CALC: 25.1 % — LOW (ref 39–50)
HGB BLD-MCNC: 8 G/DL — LOW (ref 13–17)
MAGNESIUM SERPL-MCNC: 2.2 MG/DL — SIGNIFICANT CHANGE UP (ref 1.6–2.6)
MCHC RBC-ENTMCNC: 28.7 PG — SIGNIFICANT CHANGE UP (ref 27–34)
MCHC RBC-ENTMCNC: 31.8 GM/DL — LOW (ref 32–36)
MCV RBC AUTO: 90.4 FL — SIGNIFICANT CHANGE UP (ref 80–100)
PHOSPHATE SERPL-MCNC: 1.6 MG/DL — LOW (ref 2.5–4.5)
PLATELET # BLD AUTO: 225 K/UL — SIGNIFICANT CHANGE UP (ref 150–400)
POTASSIUM SERPL-MCNC: 3.2 MMOL/L — LOW (ref 3.5–5.3)
POTASSIUM SERPL-SCNC: 3.2 MMOL/L — LOW (ref 3.5–5.3)
RBC # BLD: 2.78 M/UL — LOW (ref 4.2–5.8)
RBC # FLD: 17.4 % — HIGH (ref 10.3–14.5)
SODIUM SERPL-SCNC: 145 MMOL/L — SIGNIFICANT CHANGE UP (ref 135–145)
WBC # BLD: 8.5 K/UL — SIGNIFICANT CHANGE UP (ref 3.8–10.5)
WBC # FLD AUTO: 8.5 K/UL — SIGNIFICANT CHANGE UP (ref 3.8–10.5)

## 2018-12-31 PROCEDURE — 71045 X-RAY EXAM CHEST 1 VIEW: CPT | Mod: 26

## 2018-12-31 PROCEDURE — 99232 SBSQ HOSP IP/OBS MODERATE 35: CPT

## 2018-12-31 RX ORDER — PANTOPRAZOLE SODIUM 20 MG/1
40 TABLET, DELAYED RELEASE ORAL DAILY
Qty: 0 | Refills: 0 | Status: DISCONTINUED | OUTPATIENT
Start: 2018-12-31 | End: 2019-01-03

## 2018-12-31 RX ORDER — FUROSEMIDE 40 MG
40 TABLET ORAL DAILY
Qty: 0 | Refills: 0 | Status: COMPLETED | OUTPATIENT
Start: 2018-12-31 | End: 2019-01-03

## 2018-12-31 RX ORDER — POTASSIUM CHLORIDE 20 MEQ
40 PACKET (EA) ORAL EVERY 4 HOURS
Qty: 0 | Refills: 0 | Status: COMPLETED | OUTPATIENT
Start: 2018-12-31 | End: 2018-12-31

## 2018-12-31 RX ORDER — ATROPINE SULFATE 1 %
2 DROPS OPHTHALMIC (EYE) EVERY 12 HOURS
Qty: 0 | Refills: 0 | Status: DISCONTINUED | OUTPATIENT
Start: 2018-12-31 | End: 2019-01-03

## 2018-12-31 RX ORDER — POTASSIUM CHLORIDE 20 MEQ
40 PACKET (EA) ORAL EVERY 4 HOURS
Qty: 0 | Refills: 0 | Status: DISCONTINUED | OUTPATIENT
Start: 2018-12-31 | End: 2018-12-31

## 2018-12-31 RX ADMIN — HEPARIN SODIUM 5000 UNIT(S): 5000 INJECTION INTRAVENOUS; SUBCUTANEOUS at 17:24

## 2018-12-31 RX ADMIN — NYSTATIN CREAM 1 APPLICATION(S): 100000 CREAM TOPICAL at 06:06

## 2018-12-31 RX ADMIN — HEPARIN SODIUM 5000 UNIT(S): 5000 INJECTION INTRAVENOUS; SUBCUTANEOUS at 06:04

## 2018-12-31 RX ADMIN — NYSTATIN CREAM 1 APPLICATION(S): 100000 CREAM TOPICAL at 17:24

## 2018-12-31 RX ADMIN — Medication 40 MILLIGRAM(S): at 07:11

## 2018-12-31 RX ADMIN — ROBINUL 2 MILLIGRAM(S): 0.2 INJECTION INTRAMUSCULAR; INTRAVENOUS at 05:55

## 2018-12-31 RX ADMIN — Medication 3 MILLILITER(S): at 04:45

## 2018-12-31 RX ADMIN — Medication 3 MILLILITER(S): at 16:32

## 2018-12-31 RX ADMIN — Medication 2 DROP(S): at 17:23

## 2018-12-31 RX ADMIN — Medication 40 MILLIEQUIVALENT(S): at 08:45

## 2018-12-31 RX ADMIN — Medication 3 MILLILITER(S): at 09:17

## 2018-12-31 RX ADMIN — Medication 1 DROP(S): at 20:27

## 2018-12-31 RX ADMIN — Medication 81 MILLIGRAM(S): at 11:01

## 2018-12-31 RX ADMIN — Medication 3 MILLILITER(S): at 21:48

## 2018-12-31 RX ADMIN — Medication 2 DROP(S): at 06:03

## 2018-12-31 RX ADMIN — DORZOLAMIDE HYDROCHLORIDE 1 DROP(S): 20 SOLUTION/ DROPS OPHTHALMIC at 07:54

## 2018-12-31 RX ADMIN — PIPERACILLIN AND TAZOBACTAM 25 GRAM(S): 4; .5 INJECTION, POWDER, LYOPHILIZED, FOR SOLUTION INTRAVENOUS at 21:54

## 2018-12-31 RX ADMIN — CHLORHEXIDINE GLUCONATE 15 MILLILITER(S): 213 SOLUTION TOPICAL at 17:23

## 2018-12-31 RX ADMIN — DORZOLAMIDE HYDROCHLORIDE 1 DROP(S): 20 SOLUTION/ DROPS OPHTHALMIC at 20:27

## 2018-12-31 RX ADMIN — Medication 1 DROP(S): at 07:55

## 2018-12-31 RX ADMIN — ROBINUL 2 MILLIGRAM(S): 0.2 INJECTION INTRAMUSCULAR; INTRAVENOUS at 17:23

## 2018-12-31 RX ADMIN — PIPERACILLIN AND TAZOBACTAM 25 GRAM(S): 4; .5 INJECTION, POWDER, LYOPHILIZED, FOR SOLUTION INTRAVENOUS at 06:04

## 2018-12-31 RX ADMIN — PIPERACILLIN AND TAZOBACTAM 25 GRAM(S): 4; .5 INJECTION, POWDER, LYOPHILIZED, FOR SOLUTION INTRAVENOUS at 14:10

## 2018-12-31 RX ADMIN — Medication 3 MILLIGRAM(S): at 21:55

## 2018-12-31 RX ADMIN — Medication 40 MILLIGRAM(S): at 11:00

## 2018-12-31 RX ADMIN — LATANOPROST 1 DROP(S): 0.05 SOLUTION/ DROPS OPHTHALMIC; TOPICAL at 21:55

## 2018-12-31 RX ADMIN — PANTOPRAZOLE SODIUM 40 MILLIGRAM(S): 20 TABLET, DELAYED RELEASE ORAL at 11:01

## 2018-12-31 RX ADMIN — CHLORHEXIDINE GLUCONATE 15 MILLILITER(S): 213 SOLUTION TOPICAL at 06:03

## 2018-12-31 RX ADMIN — Medication 40 MILLIEQUIVALENT(S): at 12:46

## 2018-12-31 NOTE — PROGRESS NOTE ADULT - ASSESSMENT
palliative:  patient is more awake today. He is at high risk for reintubation second habitus and extreme weakness. Family is aware. Continue current therapy.

## 2018-12-31 NOTE — PROGRESS NOTE ADULT - SUBJECTIVE AND OBJECTIVE BOX
Follow-up Critical Care Progress Note  Chief Complaint : Acute respiratory distress      pt more alert, verbal  no oral secretions  + confusion  no cp, sob, palp      Allergies :No Known Allergies      PAST MEDICAL & SURGICAL HISTORY:  Shortness of breath  Muscle weakness  Depression  IBS (irritable bowel syndrome)  Dysphagia  Cerebral infarction  Anemia  Psoriasis  GERD (gastroesophageal reflux disease)  Dyslipidemia  Cardiac pacemaker  S/P percutaneous endoscopic gastrostomy (PEG) tube placement      Medications:  MEDICATIONS  (STANDING):  ALBUTerol/ipratropium for Nebulization 3 milliLiter(s) Nebulizer every 6 hours  aspirin  chewable 81 milliGRAM(s) Oral daily  atropine 1% Ophthalmic Solution for SubLingual Use 2 Drop(s) SubLingual every 8 hours  chlorhexidine 0.12% Liquid 15 milliLiter(s) Oral Mucosa two times a day  dorzolamide 2% Ophthalmic Solution 1 Drop(s) Both EYES <User Schedule>  glycopyrrolate 2 milliGRAM(s) Oral every 12 hours  heparin  Injectable 5000 Unit(s) SubCutaneous every 12 hours  latanoprost 0.005% Ophthalmic Solution 1 Drop(s) Both EYES at bedtime  nystatin Powder 1 Application(s) Topical two times a day  pantoprazole   Suspension 40 milliGRAM(s) Oral daily  piperacillin/tazobactam IVPB. 3.375 Gram(s) IV Intermittent every 8 hours  potassium chloride   Solution 40 milliEquivalent(s) Oral every 4 hours  predniSONE   Tablet 40 milliGRAM(s) Oral daily  predniSONE   Tablet   Oral   timolol 0.5% Solution 1 Drop(s) Both EYES <User Schedule>    MEDICATIONS  (PRN):  acetaminophen    Suspension .. 650 milliGRAM(s) Enteral Tube every 6 hours PRN Temp greater or equal to 38C (100.4F)  bisacodyl Suppository 10 milliGRAM(s) Rectal daily PRN Constipation  melatonin 3 milliGRAM(s) Oral at bedtime PRN Sleep  polyethylene glycol 3350 17 Gram(s) Oral daily PRN Constipation      LABS:                        8.0    8.5   )-----------( 225      ( 31 Dec 2018 06:15 )             25.1     12-31    145  |  107  |  30<H>  ----------------------------<  105<H>  3.2<L>   |  30  |  1.38<H>    Ca    8.5      31 Dec 2018 06:15  Phos  1.6     12-31  Mg     2.2     12-31                          CULTURES: (if applicable)    Culture - Urine (collected 12-27-18 @ 19:45)  Source: .Urine Clean Catch (Midstream)  Final Report (12-28-18 @ 22:45):    No growth    Culture - Sputum (collected 12-27-18 @ 04:25)  Source: .Sputum Sputum trap  Gram Stain (12-27-18 @ 12:47):    Moderate polymorphonuclear leukocytes per low power field    Rare Squamous epithelial cells per low power field    Moderate Gram positive cocci in pairs, chains and clusters per oil power    field    Few Yeast like cells per oil power field    Rare Gram Negative Rods per oil power field    Rare Gram Negative Diplococci per oil power field  Final Report (12-29-18 @ 11:40):    Moderate Staphylococcus aureus    Normal Respiratory Renetta present  Organism: Staphylococcus aureus (12-29-18 @ 11:40)  Organism: Staphylococcus aureus (12-29-18 @ 11:40)      -  Ampicillin/Sulbactam: S <=8/4      -  Cefazolin: S <=4      -  Clindamycin: S 0.5      -  Erythromycin: R >4      -  Gentamicin: S <=1 Should not be used as monotherapy      -  Oxacillin: S 1      -  Penicillin: R >8      -  RIF- Rifampin: S <=1 Should not be used as monotherapy      -  Tetra/Doxy: S <=1      -  Trimethoprim/Sulfamethoxazole: S <=0.5/9.5      -  Vancomycin: S 2      Method Type: KALYAN    Culture - Blood (collected 12-26-18 @ 17:58)  Source: .Blood Blood-Peripheral  Preliminary Report (12-28-18 @ 02:01):    No growth to date.    Culture - Blood (collected 12-26-18 @ 17:58)  Source: .Blood Blood-Peripheral  Preliminary Report (12-28-18 @ 02:01):    No growth to date.          ABG - ( 29 Dec 2018 13:22 )  pH, Arterial: 7.48  pH, Blood: x     /  pCO2: 39    /  pO2: 75    / HCO3: x     / Base Excess: x     /  SaO2: 96                CAPILLARY BLOOD GLUCOSE      POCT Blood Glucose.: 159 mg/dL (29 Dec 2018 21:49)      RADIOLOGY  CXR:  < from: Xray Chest 1 View- PORTABLE-Routine (12.31.18 @ 08:52) >  IMPRESSION: Tubes removed as above. Increasing central infiltrates   suggesting CHF as a component of patient's disease.    < end of copied text >        VITALS:  T(C): 36.5 (12-31-18 @ 07:00), Max: 37.1 (12-30-18 @ 17:00)  T(F): 97.7 (12-31-18 @ 07:00), Max: 98.8 (12-30-18 @ 17:00)  HR: 60 (12-31-18 @ 10:00) (60 - 60)  BP: 164/77 (12-31-18 @ 10:00) (118/77 - 167/83)  BP(mean): 104 (12-31-18 @ 10:00) (84 - 115)  ABP: --  ABP(mean): --  RR: 15 (12-31-18 @ 10:00) (6 - 23)  SpO2: 100% (12-31-18 @ 10:00) (81% - 100%)  CVP(mm Hg): --  CVP(cm H2O): --    Ins and Outs     12-30-18 @ 07:01  -  12-31-18 @ 07:00  --------------------------------------------------------  IN: 475 mL / OUT: 1285 mL / NET: -810 mL    12-31-18 @ 07:01  -  12-31-18 @ 10:35  --------------------------------------------------------  IN: 110 mL / OUT: 85 mL / NET: 25 mL

## 2018-12-31 NOTE — PROGRESS NOTE ADULT - SUBJECTIVE AND OBJECTIVE BOX
Follow-up Pall Progress Note    Enrique Simmons MD  [phone]    No new respiratory events overnight.  Denies SOB/CP.   The patient more awake. He still however remains confused. He is aware that he was intubated and extubated. He did request reintubation currently. Family is aware.    Medications:  Vital Signs Last 24 Hrs  T(C): 36.7 (31 Dec 2018 11:00), Max: 37.1 (30 Dec 2018 17:00)  T(F): 98 (31 Dec 2018 11:00), Max: 98.8 (30 Dec 2018 17:00)  HR: 60 (31 Dec 2018 11:00) (60 - 60)  BP: 163/80 (31 Dec 2018 11:00) (118/77 - 167/83)  BP(mean): 105 (31 Dec 2018 11:00) (88 - 115)  RR: 20 (31 Dec 2018 11:00) (6 - 23)  SpO2: 100% (31 Dec 2018 11:00) (81% - 100%)    ABG - ( 29 Dec 2018 13:22 )  pH, Arterial: 7.48  pH, Blood: x     /  pCO2: 39    /  pO2: 75    / HCO3: x     / Base Excess: x     /  SaO2: 96                    12-30 @ 07:01  -  12-31 @ 07:00  --------------------------------------------------------  IN: 475 mL / OUT: 1285 mL / NET: -810 mL        LABS:                        8.0    8.5   )-----------( 225      ( 31 Dec 2018 06:15 )             25.1     12-31    145  |  107  |  30<H>  ----------------------------<  105<H>  3.2<L>   |  30  |  1.38<H>    Ca    8.5      31 Dec 2018 06:15  Phos  1.6     12-31  Mg     2.2     12-31                CULTURES:  Culture Results:   No growth (12-27 @ 19:45)  Culture Results:   Moderate Staphylococcus aureus  Normal Respiratory Renetta present (12-27 @ 04:25)  Culture Results:   No growth to date. (12-26 @ 17:58)  Culture Results:   No growth to date. (12-26 @ 17:58)    Most recent blood culture -- 12-27 @ 19:45   -- -- .Urine Clean Catch (Midstream) 12-27 @ 19:45  Most recent blood culture -- 12-27 @ 04:25   Staphylococcus aureus Staphylococcus aureus .Sputum Sputum trap 12-27 @ 04:25  Most recent blood culture -- 12-26 @ 17:58   -- -- .Blood Blood-Peripheral 12-26 @ 17:58      Physical Examination:  PULM: Clear to auscultation bilaterally, no significant sputum production  CVS: Regular rate and rhythm, no murmurs, rubs, or gallops  ABD: Soft, non-tender  EXT:  No clubbing, cyanosis, or edema    RADIOLOGY REVIEWED  CXR:    CT chest:    TTE:

## 2018-12-31 NOTE — PROGRESS NOTE ADULT - ASSESSMENT
Physical Examination:  GENERAL:                alert, No Oriented, No acute distress.    HEENT:                    mild  secretions  No JVD, dry MM,   PULM:                     Bilateral air entry, course breath sounds bilaterally, no significant sputum production, mild Rales, trace Rhonchi, No Wheezing  CVS:                         S1, S2,  no Murmur  ABD:                        Soft, nondistended, nontender, + PEG,  + BS  EXT:                         chronic venous staisis, with mild edema, nontender, No Cyanosis or Clubbing   Vascular:                Warm Extremities, Normal Capillary refill, Normal Distal Pulses  NEURO:                  alert, oriented x1-2,  follows commands  PSYC:                      Calm, some Insight.    Assessment  86 yo male with h/o Severe dysphagia s/p PEG with multiple hospitalizations for aspiration PNA, HTN, HLD, Grad 3 Diastolic Heart Failure s/p ppm, CKD, admitted to the ICU with septic shock secondary to aspiration pneumonia. Patient with multiple admissions recently. Has been a high risk of aspiration despite being with a PEG. This morning he remains in shock requiring vasopressor. He was intubated for hypercapnic respiratory failure.     1. Acute hypercapnic respiratory failure Resolved Extubated 12/29  2. Shock - likely septic off pressors after steroids  3. Aspiration Pneumonia - suspect infection with gram negative organisms  4. chronic Diastolic heart dysfunction  5. Chronic kidney disease  6. Dysphagia s/p PEG    Plan  - repeat cxr in am.   - oral steroid taper  - Broad spectrum antibiotics to continue 5-7 day course   - start tube bolus feeding  - Prognosis is guarded, given patient with multiple hospital admissions in the past year. Has been declining clinically.  - GI, DVT prophylaxis     Family Updated: melia/dtr       & wife                          Date: 12/30      Sedation & Analgesia:	on hold  Diet/Nutrition:		bolus tube feeding  GI PPx:			PPI    DVT Ppx:		Hep sq      Activity:		            Bedrest   , baseline pt bed bound  Head of Bed:               35-45 deg  Glycemic Control:        n/a    Lines: PIV  SOTO: 		        [x ] YES [ ] NO  		                                       DATE INSERTED:		            REMOVE:  [x ] YES [ ] NO      Restraints were deemed necessary to prevent removal of life-sustaining devices [  ] YES   [ x   ]  NO    Disposition: ICU monitoring    Goals of Care: Now Full code  case d/w Dr. Simmons

## 2019-01-01 LAB
ANION GAP SERPL CALC-SCNC: 6 MMOL/L — SIGNIFICANT CHANGE UP (ref 5–17)
BUN SERPL-MCNC: 31 MG/DL — HIGH (ref 7–23)
CALCIUM SERPL-MCNC: 8.8 MG/DL — SIGNIFICANT CHANGE UP (ref 8.4–10.5)
CHLORIDE SERPL-SCNC: 109 MMOL/L — HIGH (ref 96–108)
CO2 SERPL-SCNC: 34 MMOL/L — HIGH (ref 22–31)
CREAT SERPL-MCNC: 1.31 MG/DL — HIGH (ref 0.5–1.3)
CULTURE RESULTS: SIGNIFICANT CHANGE UP
CULTURE RESULTS: SIGNIFICANT CHANGE UP
GLUCOSE SERPL-MCNC: 93 MG/DL — SIGNIFICANT CHANGE UP (ref 70–99)
HCT VFR BLD CALC: 25.6 % — LOW (ref 39–50)
HGB BLD-MCNC: 8 G/DL — LOW (ref 13–17)
MAGNESIUM SERPL-MCNC: 2.2 MG/DL — SIGNIFICANT CHANGE UP (ref 1.6–2.6)
MCHC RBC-ENTMCNC: 28.2 PG — SIGNIFICANT CHANGE UP (ref 27–34)
MCHC RBC-ENTMCNC: 31 GM/DL — LOW (ref 32–36)
MCV RBC AUTO: 90.9 FL — SIGNIFICANT CHANGE UP (ref 80–100)
PHOSPHATE SERPL-MCNC: 2.2 MG/DL — LOW (ref 2.5–4.5)
PLATELET # BLD AUTO: 222 K/UL — SIGNIFICANT CHANGE UP (ref 150–400)
POTASSIUM SERPL-MCNC: 3.7 MMOL/L — SIGNIFICANT CHANGE UP (ref 3.5–5.3)
POTASSIUM SERPL-SCNC: 3.7 MMOL/L — SIGNIFICANT CHANGE UP (ref 3.5–5.3)
RBC # BLD: 2.82 M/UL — LOW (ref 4.2–5.8)
RBC # FLD: 17.3 % — HIGH (ref 10.3–14.5)
SODIUM SERPL-SCNC: 149 MMOL/L — HIGH (ref 135–145)
SPECIMEN SOURCE: SIGNIFICANT CHANGE UP
SPECIMEN SOURCE: SIGNIFICANT CHANGE UP
WBC # BLD: 6.6 K/UL — SIGNIFICANT CHANGE UP (ref 3.8–10.5)
WBC # FLD AUTO: 6.6 K/UL — SIGNIFICANT CHANGE UP (ref 3.8–10.5)

## 2019-01-01 PROCEDURE — 99233 SBSQ HOSP IP/OBS HIGH 50: CPT

## 2019-01-01 PROCEDURE — 71045 X-RAY EXAM CHEST 1 VIEW: CPT | Mod: 26

## 2019-01-01 RX ADMIN — PIPERACILLIN AND TAZOBACTAM 25 GRAM(S): 4; .5 INJECTION, POWDER, LYOPHILIZED, FOR SOLUTION INTRAVENOUS at 13:43

## 2019-01-01 RX ADMIN — Medication 2 DROP(S): at 05:45

## 2019-01-01 RX ADMIN — ROBINUL 2 MILLIGRAM(S): 0.2 INJECTION INTRAMUSCULAR; INTRAVENOUS at 05:45

## 2019-01-01 RX ADMIN — NYSTATIN CREAM 1 APPLICATION(S): 100000 CREAM TOPICAL at 05:44

## 2019-01-01 RX ADMIN — Medication 81 MILLIGRAM(S): at 11:22

## 2019-01-01 RX ADMIN — Medication 1 DROP(S): at 09:24

## 2019-01-01 RX ADMIN — PIPERACILLIN AND TAZOBACTAM 25 GRAM(S): 4; .5 INJECTION, POWDER, LYOPHILIZED, FOR SOLUTION INTRAVENOUS at 05:44

## 2019-01-01 RX ADMIN — NYSTATIN CREAM 1 APPLICATION(S): 100000 CREAM TOPICAL at 17:29

## 2019-01-01 RX ADMIN — Medication 3 MILLILITER(S): at 21:26

## 2019-01-01 RX ADMIN — PANTOPRAZOLE SODIUM 40 MILLIGRAM(S): 20 TABLET, DELAYED RELEASE ORAL at 11:22

## 2019-01-01 RX ADMIN — Medication 3 MILLILITER(S): at 09:17

## 2019-01-01 RX ADMIN — CHLORHEXIDINE GLUCONATE 15 MILLILITER(S): 213 SOLUTION TOPICAL at 05:44

## 2019-01-01 RX ADMIN — HEPARIN SODIUM 5000 UNIT(S): 5000 INJECTION INTRAVENOUS; SUBCUTANEOUS at 05:48

## 2019-01-01 RX ADMIN — DORZOLAMIDE HYDROCHLORIDE 1 DROP(S): 20 SOLUTION/ DROPS OPHTHALMIC at 21:29

## 2019-01-01 RX ADMIN — Medication 3 MILLILITER(S): at 15:31

## 2019-01-01 RX ADMIN — Medication 1 DROP(S): at 21:29

## 2019-01-01 RX ADMIN — LATANOPROST 1 DROP(S): 0.05 SOLUTION/ DROPS OPHTHALMIC; TOPICAL at 22:35

## 2019-01-01 RX ADMIN — ROBINUL 2 MILLIGRAM(S): 0.2 INJECTION INTRAMUSCULAR; INTRAVENOUS at 17:30

## 2019-01-01 RX ADMIN — CHLORHEXIDINE GLUCONATE 15 MILLILITER(S): 213 SOLUTION TOPICAL at 17:29

## 2019-01-01 RX ADMIN — DORZOLAMIDE HYDROCHLORIDE 1 DROP(S): 20 SOLUTION/ DROPS OPHTHALMIC at 09:25

## 2019-01-01 RX ADMIN — Medication 2 DROP(S): at 17:28

## 2019-01-01 RX ADMIN — Medication 40 MILLIGRAM(S): at 05:45

## 2019-01-01 RX ADMIN — HEPARIN SODIUM 5000 UNIT(S): 5000 INJECTION INTRAVENOUS; SUBCUTANEOUS at 17:29

## 2019-01-01 RX ADMIN — Medication 3 MILLILITER(S): at 03:46

## 2019-01-01 RX ADMIN — PIPERACILLIN AND TAZOBACTAM 25 GRAM(S): 4; .5 INJECTION, POWDER, LYOPHILIZED, FOR SOLUTION INTRAVENOUS at 21:29

## 2019-01-01 NOTE — PROGRESS NOTE ADULT - ASSESSMENT
Physical Examination:  GENERAL:                alert, No Oriented, No acute distress.    HEENT:                    mild  secretions  No JVD, dry MM,   PULM:                      Bilateral air entry, course breath sounds bilaterally, no significant sputum production, mild Rales, trace Rhonchi, No Wheezing  CVS:                         S1, S2,  no Murmur  ABD:                        Soft, nondistended, nontender, + PEG,  + BS  EXT:                         chronic venous staisis, with mild edema, nontender, No Cyanosis or Clubbing   Vascular:                 Warm Extremities, Normal Capillary refill, Normal Distal Pulses  NEURO:                  alert, oriented x1-2,  follows commands  PSYC:                      Calm, some Insight.    Assessment  84 yo male with h/o Severe dysphagia s/p PEG with multiple hospitalizations for aspiration PNA, HTN, HLD, Grad 3 Diastolic Heart Failure s/p ppm, CKD, admitted to the ICU with septic shock secondary to aspiration pneumonia. Patient with multiple admissions recently. Has been a high risk of aspiration despite being with a PEG. This morning he remains in shock requiring vasopressor. He was intubated for hypercapnic respiratory failure.     1. Acute hypercapnic respiratory failure Resolved Extubated 12/29  2. Shock - likely septic off pressors after steroids  3. Aspiration Pneumonia - suspect infection with gram negative organisms  4. Systolic and Diastolic heart dysfunction  5. Chronic kidney disease  6. Dysphagia s/p PEG    Plan  - steroid taper  - Broad spectrum antibiotics to continue 5-7 day course   - Cont tube bolus feeding  - Gentle diuresis to achieve more euvolemic state  - Prognosis is guarded, given patient with multiple hospital admissions in the past year. Has been declining clinically, patient wants to be eat by mouth. But he remains high risk for aspiration. He is a full code status. Given clinical status, will continue feeding via PEG and nothing by mouth.   - GI, DVT prophylaxis   - Can be transferred out of ICU today

## 2019-01-01 NOTE — PROGRESS NOTE ADULT - SUBJECTIVE AND OBJECTIVE BOX
Follow-up Pall Progress Note    Enrique Simmons MD  [phone]    No new respiratory events overnight.  Denies SOB/CP. Pt a and o x 3 today.  Discussion with wife and patient regarding cpr and intubation.  He clearly states no cpr or intubation.  OK for trial period of intubation    Medications:  Vital Signs Last 24 Hrs  T(C): 36.4 (01 Jan 2019 08:00), Max: 37.1 (31 Dec 2018 15:00)  T(F): 97.5 (01 Jan 2019 08:00), Max: 98.8 (31 Dec 2018 15:00)  HR: 60 (01 Jan 2019 11:00) (60 - 66)  BP: 157/90 (01 Jan 2019 11:00) (120/68 - 167/79)  BP(mean): 110 (01 Jan 2019 11:00) (81 - 110)  RR: 9 (01 Jan 2019 11:00) (8 - 20)  SpO2: 99% (01 Jan 2019 11:00) (90% - 100%)          12-31 @ 07:01  -  01-01 @ 07:00  --------------------------------------------------------  IN: 950 mL / OUT: 85 mL / NET: 865 mL        LABS:                        8.0    6.6   )-----------( 222      ( 01 Jan 2019 05:00 )             25.6     01-01    149<H>  |  109<H>  |  31<H>  ----------------------------<  93  3.7   |  34<H>  |  1.31<H>    Ca    8.8      01 Jan 2019 05:00  Phos  2.2     01-01  Mg     2.2     01-01                CULTURES:  Culture Results:   No growth (12-27 @ 19:45)  Culture Results:   Moderate Staphylococcus aureus  Normal Respiratory Renetta present (12-27 @ 04:25)  Culture Results:   No growth at 5 days. (12-26 @ 17:58)  Culture Results:   No growth at 5 days. (12-26 @ 17:58)    Most recent blood culture -- 12-27 @ 19:45   -- -- .Urine Clean Catch (Midstream) 12-27 @ 19:45      Physical Examination:  PULM: Clear to auscultation bilaterally, no significant sputum production  CVS: Regular rate and rhythm, no murmurs, rubs, or gallops  ABD: Soft, non-tender  EXT:  No clubbing, cyanosis, or edema    RADIOLOGY REVIEWED  CXR:    CT chest:    TTE:

## 2019-01-01 NOTE — PROGRESS NOTE ADULT - SUBJECTIVE AND OBJECTIVE BOX
Follow-up Critical Care Progress Note  Chief Complaint : Acute respiratory distress    Awake and alert to self. Denies any pain. Wants to eat by mouth.     Allergies :No Known Allergies    PAST MEDICAL & SURGICAL HISTORY:  Shortness of breath  Muscle weakness  Depression  IBS (irritable bowel syndrome)  Dysphagia  Cerebral infarction  Anemia  Psoriasis  GERD (gastroesophageal reflux disease)  Dyslipidemia  Cardiac pacemaker  S/P percutaneous endoscopic gastrostomy (PEG) tube placement    Medications:  MEDICATIONS  (STANDING):  ALBUTerol/ipratropium for Nebulization 3 milliLiter(s) Nebulizer every 6 hours  aspirin  chewable 81 milliGRAM(s) Oral daily  atropine 1% Ophthalmic Solution for SubLingual Use 2 Drop(s) SubLingual every 12 hours  chlorhexidine 0.12% Liquid 15 milliLiter(s) Oral Mucosa two times a day  dorzolamide 2% Ophthalmic Solution 1 Drop(s) Both EYES <User Schedule>  furosemide   Injectable 40 milliGRAM(s) IV Push daily  glycopyrrolate 2 milliGRAM(s) Oral every 12 hours  heparin  Injectable 5000 Unit(s) SubCutaneous every 12 hours  latanoprost 0.005% Ophthalmic Solution 1 Drop(s) Both EYES at bedtime  nystatin Powder 1 Application(s) Topical two times a day  pantoprazole   Suspension 40 milliGRAM(s) Oral daily  piperacillin/tazobactam IVPB. 3.375 Gram(s) IV Intermittent every 8 hours  predniSONE   Tablet 40 milliGRAM(s) Oral daily  predniSONE   Tablet   Oral   timolol 0.5% Solution 1 Drop(s) Both EYES <User Schedule>    MEDICATIONS  (PRN):  acetaminophen    Suspension .. 650 milliGRAM(s) Enteral Tube every 6 hours PRN Temp greater or equal to 38C (100.4F)  bisacodyl Suppository 10 milliGRAM(s) Rectal daily PRN Constipation  melatonin 3 milliGRAM(s) Oral at bedtime PRN Sleep  polyethylene glycol 3350 17 Gram(s) Oral daily PRN Constipation    LABS:                      8.0    6.6   )-----------( 222      ( 01 Jan 2019 05:00 )             25.6     01-01  149<H>  |  109<H>  |  31<H>  ----------------------------<  93  3.7   |  34<H>  |  1.31<H>    Ca    8.8      01 Jan 2019 05:00  Phos  2.2     01-01  Mg     2.2     01-01    CULTURES: (if applicable)    Culture - Urine (collected 12-27-18 @ 19:45)  Source: .Urine Clean Catch (Midstream)  Final Report (12-28-18 @ 22:45):    No growth    Culture - Sputum (collected 12-27-18 @ 04:25)  Source: .Sputum Sputum trap  Gram Stain (12-27-18 @ 12:47):    Moderate polymorphonuclear leukocytes per low power field    Rare Squamous epithelial cells per low power field    Moderate Gram positive cocci in pairs, chains and clusters per oil power    field    Few Yeast like cells per oil power field    Rare Gram Negative Rods per oil power field    Rare Gram Negative Diplococci per oil power field  Final Report (12-29-18 @ 11:40):    Moderate Staphylococcus aureus    Normal Respiratory Renetta present  Organism: Staphylococcus aureus (12-29-18 @ 11:40)  Organism: Staphylococcus aureus (12-29-18 @ 11:40)      -  Ampicillin/Sulbactam: S <=8/4      -  Cefazolin: S <=4      -  Clindamycin: S 0.5      -  Erythromycin: R >4      -  Gentamicin: S <=1 Should not be used as monotherapy      -  Oxacillin: S 1      -  Penicillin: R >8      -  RIF- Rifampin: S <=1 Should not be used as monotherapy      -  Tetra/Doxy: S <=1      -  Trimethoprim/Sulfamethoxazole: S <=0.5/9.5      -  Vancomycin: S 2      Method Type: KALYAN    Culture - Blood (collected 12-26-18 @ 17:58)  Source: .Blood Blood-Peripheral  Final Report (01-01-19 @ 02:02):    No growth at 5 days.    Culture - Blood (collected 12-26-18 @ 17:58)  Source: .Blood Blood-Peripheral  Final Report (01-01-19 @ 02:02):    No growth at 5 days.    VITALS:  T(C): 36.4 (01-01-19 @ 08:00), Max: 37.1 (12-31-18 @ 15:00)  T(F): 97.5 (01-01-19 @ 08:00), Max: 98.8 (12-31-18 @ 15:00)  HR: 60 (01-01-19 @ 11:00) (60 - 66)  BP: 157/90 (01-01-19 @ 11:00) (120/68 - 169/89)  BP(mean): 110 (01-01-19 @ 11:00) (81 - 113)  ABP: --  ABP(mean): --  RR: 9 (01-01-19 @ 11:00) (8 - 23)  SpO2: 99% (01-01-19 @ 11:00) (90% - 100%)  CVP(mm Hg): --  CVP(cm H2O): --    Ins and Outs     12-31-18 @ 07:01  -  01-01-19 @ 07:00  --------------------------------------------------------  IN: 925 mL / OUT: 85 mL / NET: 840 mL

## 2019-01-02 DIAGNOSIS — N18.9 CHRONIC KIDNEY DISEASE, UNSPECIFIED: ICD-10-CM

## 2019-01-02 DIAGNOSIS — E87.8 OTHER DISORDERS OF ELECTROLYTE AND FLUID BALANCE, NOT ELSEWHERE CLASSIFIED: ICD-10-CM

## 2019-01-02 DIAGNOSIS — R13.10 DYSPHAGIA, UNSPECIFIED: ICD-10-CM

## 2019-01-02 LAB
ANION GAP SERPL CALC-SCNC: 4 MMOL/L — LOW (ref 5–17)
BUN SERPL-MCNC: 30 MG/DL — HIGH (ref 7–23)
CALCIUM SERPL-MCNC: 8.6 MG/DL — SIGNIFICANT CHANGE UP (ref 8.4–10.5)
CHLORIDE SERPL-SCNC: 106 MMOL/L — SIGNIFICANT CHANGE UP (ref 96–108)
CO2 SERPL-SCNC: 35 MMOL/L — HIGH (ref 22–31)
CREAT SERPL-MCNC: 1.25 MG/DL — SIGNIFICANT CHANGE UP (ref 0.5–1.3)
GLUCOSE SERPL-MCNC: 102 MG/DL — HIGH (ref 70–99)
HCT VFR BLD CALC: 27.4 % — LOW (ref 39–50)
HGB BLD-MCNC: 8.8 G/DL — LOW (ref 13–17)
MAGNESIUM SERPL-MCNC: 2.1 MG/DL — SIGNIFICANT CHANGE UP (ref 1.6–2.6)
MCHC RBC-ENTMCNC: 29.3 PG — SIGNIFICANT CHANGE UP (ref 27–34)
MCHC RBC-ENTMCNC: 32.2 GM/DL — SIGNIFICANT CHANGE UP (ref 32–36)
MCV RBC AUTO: 91.1 FL — SIGNIFICANT CHANGE UP (ref 80–100)
PHOSPHATE SERPL-MCNC: 1.5 MG/DL — LOW (ref 2.5–4.5)
PLATELET # BLD AUTO: 261 K/UL — SIGNIFICANT CHANGE UP (ref 150–400)
POTASSIUM SERPL-MCNC: 3.3 MMOL/L — LOW (ref 3.5–5.3)
POTASSIUM SERPL-SCNC: 3.3 MMOL/L — LOW (ref 3.5–5.3)
RBC # BLD: 3.01 M/UL — LOW (ref 4.2–5.8)
RBC # FLD: 17.7 % — HIGH (ref 10.3–14.5)
SODIUM SERPL-SCNC: 145 MMOL/L — SIGNIFICANT CHANGE UP (ref 135–145)
WBC # BLD: 10.6 K/UL — HIGH (ref 3.8–10.5)
WBC # FLD AUTO: 10.6 K/UL — HIGH (ref 3.8–10.5)

## 2019-01-02 PROCEDURE — 71045 X-RAY EXAM CHEST 1 VIEW: CPT | Mod: 26

## 2019-01-02 PROCEDURE — 99233 SBSQ HOSP IP/OBS HIGH 50: CPT

## 2019-01-02 RX ORDER — POTASSIUM CHLORIDE 20 MEQ
40 PACKET (EA) ORAL ONCE
Qty: 0 | Refills: 0 | Status: COMPLETED | OUTPATIENT
Start: 2019-01-02 | End: 2019-01-02

## 2019-01-02 RX ORDER — POTASSIUM PHOSPHATE, MONOBASIC POTASSIUM PHOSPHATE, DIBASIC 236; 224 MG/ML; MG/ML
15 INJECTION, SOLUTION INTRAVENOUS ONCE
Qty: 0 | Refills: 0 | Status: COMPLETED | OUTPATIENT
Start: 2019-01-02 | End: 2019-01-02

## 2019-01-02 RX ADMIN — HEPARIN SODIUM 5000 UNIT(S): 5000 INJECTION INTRAVENOUS; SUBCUTANEOUS at 05:44

## 2019-01-02 RX ADMIN — Medication 40 MILLIEQUIVALENT(S): at 12:00

## 2019-01-02 RX ADMIN — PIPERACILLIN AND TAZOBACTAM 25 GRAM(S): 4; .5 INJECTION, POWDER, LYOPHILIZED, FOR SOLUTION INTRAVENOUS at 23:19

## 2019-01-02 RX ADMIN — ROBINUL 2 MILLIGRAM(S): 0.2 INJECTION INTRAMUSCULAR; INTRAVENOUS at 05:44

## 2019-01-02 RX ADMIN — Medication 2 DROP(S): at 17:09

## 2019-01-02 RX ADMIN — Medication 2 DROP(S): at 05:44

## 2019-01-02 RX ADMIN — Medication 1 DROP(S): at 23:19

## 2019-01-02 RX ADMIN — NYSTATIN CREAM 1 APPLICATION(S): 100000 CREAM TOPICAL at 05:45

## 2019-01-02 RX ADMIN — HEPARIN SODIUM 5000 UNIT(S): 5000 INJECTION INTRAVENOUS; SUBCUTANEOUS at 17:10

## 2019-01-02 RX ADMIN — DORZOLAMIDE HYDROCHLORIDE 1 DROP(S): 20 SOLUTION/ DROPS OPHTHALMIC at 09:51

## 2019-01-02 RX ADMIN — LATANOPROST 1 DROP(S): 0.05 SOLUTION/ DROPS OPHTHALMIC; TOPICAL at 23:18

## 2019-01-02 RX ADMIN — Medication 1 DROP(S): at 09:51

## 2019-01-02 RX ADMIN — POTASSIUM PHOSPHATE, MONOBASIC POTASSIUM PHOSPHATE, DIBASIC 62.5 MILLIMOLE(S): 236; 224 INJECTION, SOLUTION INTRAVENOUS at 12:01

## 2019-01-02 RX ADMIN — Medication 3 MILLILITER(S): at 04:50

## 2019-01-02 RX ADMIN — Medication 3 MILLILITER(S): at 09:06

## 2019-01-02 RX ADMIN — Medication 3 MILLILITER(S): at 15:24

## 2019-01-02 RX ADMIN — NYSTATIN CREAM 1 APPLICATION(S): 100000 CREAM TOPICAL at 17:10

## 2019-01-02 RX ADMIN — Medication 81 MILLIGRAM(S): at 12:00

## 2019-01-02 RX ADMIN — CHLORHEXIDINE GLUCONATE 15 MILLILITER(S): 213 SOLUTION TOPICAL at 17:10

## 2019-01-02 RX ADMIN — DORZOLAMIDE HYDROCHLORIDE 1 DROP(S): 20 SOLUTION/ DROPS OPHTHALMIC at 23:18

## 2019-01-02 RX ADMIN — Medication 40 MILLIGRAM(S): at 05:44

## 2019-01-02 RX ADMIN — PIPERACILLIN AND TAZOBACTAM 25 GRAM(S): 4; .5 INJECTION, POWDER, LYOPHILIZED, FOR SOLUTION INTRAVENOUS at 17:09

## 2019-01-02 RX ADMIN — PIPERACILLIN AND TAZOBACTAM 25 GRAM(S): 4; .5 INJECTION, POWDER, LYOPHILIZED, FOR SOLUTION INTRAVENOUS at 05:43

## 2019-01-02 RX ADMIN — CHLORHEXIDINE GLUCONATE 15 MILLILITER(S): 213 SOLUTION TOPICAL at 05:44

## 2019-01-02 RX ADMIN — PANTOPRAZOLE SODIUM 40 MILLIGRAM(S): 20 TABLET, DELAYED RELEASE ORAL at 12:00

## 2019-01-02 RX ADMIN — ROBINUL 2 MILLIGRAM(S): 0.2 INJECTION INTRAMUSCULAR; INTRAVENOUS at 17:10

## 2019-01-02 NOTE — PROGRESS NOTE ADULT - ASSESSMENT
85 year-old male with PMH: Severe dysphagia s/p PEG with multiple hospitalizations for aspiration PNA, HTN, HLD, Grad 3 Diastolic Heart Failure s/p ppm, CKD. Recently admitted to the ICU with septic shock secondary to aspiration pneumonia and intubated for hypercapnic respiratory failure. Pt is now improved and has been downgraded to 2 south.  Plan for today is to continue antibiotics, steroid taper, and diuresing. Supplement electrolytes K+, Phos. PT evaluation

## 2019-01-02 NOTE — CHART NOTE - NSCHARTNOTEFT_GEN_A_CORE
NUTRITION FOLLOW UP    SOURCE: Patient [)   Family [ ]     other [ X]    DIET: PEG FEEDINGS  TWO CELESTINA  CC Q 4 HRS. providing pt. w/ 1920 caloories/ 80 gms. protein    PATIENT REPORT[ ] nausea  [ ] vomiting [ ] diarrhea [ ] constipation  [ ]chewing problems [ ] swallowing issues  [ ] other: no GI distress    PO INTAKE:  < 50% [ ]   50-75%  [ ]   %  [X]  other : PEG FEEDINGS    SOURCE: for PO intake [] Patient [ ] family [ x] chart [ ] staff [ ] other    ENTERAL/PARENTERAL NUTRITION: n/a    CURRENT WEIGHT: Weight (kg): 98.7 1/1/19    PERTINENT LABS:  Date: 02 Jan 2019 06:22  Hemoglobin 8.8    Hematocrit 27.4     Date: 01-02  Sodium 145  Potassium 3.3<L>  Glucose Serum 102<H>  BUN 30<H>      Creatinine    ACCUCHECK    Generalized edema noted  SKIN: intact    ESTIMATED NEEDS:   [X] no change since previous assessment  [ ] recalculated:     PREVIOUS NUTRITION DIAGNOSIS: addressed    NUTRITION DIAGNOSIS is   [X] ongoing    NEW NUTRITION DIAGNOSIS: [X] not applicable    MONITORING AND EVALUATION:   Current diet order is appropriate and is well tolerated, but will monitor for any changes that may be needed     [X] Tolerance to diet prescription [X] weights [X] follow up per protocol    NUTRITION RECOMMENDATIONS: RN has requested changing PEG feedings to Q 6 hrs. Suggest TWO CELESTINA HN  240 CC Q 6 HRS.    RD remains available LEYDA VITAL

## 2019-01-02 NOTE — PROGRESS NOTE ADULT - PROBLEM SELECTOR PLAN 1
Required ICU and intubation- now resolved Required ICU and intubation- now resolved, stable on medical floor

## 2019-01-02 NOTE — PROGRESS NOTE ADULT - ATTENDING COMMENTS
I have personally seen and examined patient on the above date.  I discussed the case with NP and I agree with findings and plan as detailed per note above, which I have amended where appropriate.      Pt improved, weak appearing, needs further diuresis, needs PT eval.

## 2019-01-02 NOTE — PROGRESS NOTE ADULT - SUBJECTIVE AND OBJECTIVE BOX
Patient is a 85y old  Male who presents with a chief complaint of Respiratory Failure      Patient seen and examined at bedside. Lying in bed awake and alert. Reports feeling well, requesting to walk to the bathroom. Denies any pain or difficulties breathing.    ALLERGIES:  No Known Allergies    MEDICATIONS  (STANDING):  ALBUTerol/ipratropium for Nebulization 3 milliLiter(s) Nebulizer every 6 hours  aspirin  chewable 81 milliGRAM(s) Oral daily  atropine 1% Ophthalmic Solution for SubLingual Use 2 Drop(s) SubLingual every 12 hours  chlorhexidine 0.12% Liquid 15 milliLiter(s) Oral Mucosa two times a day  dorzolamide 2% Ophthalmic Solution 1 Drop(s) Both EYES <User Schedule>  furosemide   Injectable 40 milliGRAM(s) IV Push daily  glycopyrrolate 2 milliGRAM(s) Oral every 12 hours  heparin  Injectable 5000 Unit(s) SubCutaneous every 12 hours  latanoprost 0.005% Ophthalmic Solution 1 Drop(s) Both EYES at bedtime  nystatin Powder 1 Application(s) Topical two times a day  pantoprazole   Suspension 40 milliGRAM(s) Oral daily  piperacillin/tazobactam IVPB. 3.375 Gram(s) IV Intermittent every 8 hours  potassium chloride   Solution 40 milliEquivalent(s) Oral once  potassium phosphate IVPB 15 milliMole(s) IV Intermittent once  predniSONE   Tablet   Oral   timolol 0.5% Solution 1 Drop(s) Both EYES <User Schedule>    MEDICATIONS  (PRN):  acetaminophen    Suspension .. 650 milliGRAM(s) Enteral Tube every 6 hours PRN Temp greater or equal to 38C (100.4F)  bisacodyl Suppository 10 milliGRAM(s) Rectal daily PRN Constipation  melatonin 3 milliGRAM(s) Oral at bedtime PRN Sleep  polyethylene glycol 3350 17 Gram(s) Oral daily PRN Constipation    Vital Signs Last 24 Hrs  T(F): 98.2 (02 Jan 2019 05:42), Max: 98.2 (01 Jan 2019 15:29)  HR: 60 (02 Jan 2019 05:42) (60 - 62)  BP: 162/78 (02 Jan 2019 05:42) (151/73 - 162/78)  RR: 16 (02 Jan 2019 05:42) (9 - 16)  SpO2: 93% (02 Jan 2019 09:08) (93% - 99%)  I&O's Summary    01 Jan 2019 07:01  -  02 Jan 2019 07:00  --------------------------------------------------------  IN: 765 mL / OUT: 0 mL / NET: 765 mL        PHYSICAL EXAM:  General: NAD, A/O x 3  Neck: Supple, No JVD  Lungs: Clear to auscultation bilaterally  Cardio: RRR, S1/S2, No murmurs  Abdomen: Soft, Nontender, Nondistended; Bowel sounds present. PEG tube intact LUQ  Extremities: No calf tenderness, B/L LE chronic vascular discolorations and with trace edema    LABS:                        8.8    10.6  )-----------( 261      ( 02 Jan 2019 06:22 )             27.4       01-02    145  |  106  |  30  ----------------------------<  102  3.3   |  35  |  1.25    Ca    8.6      02 Jan 2019 06:22  Phos  1.5     01-02  Mg     2.1     01-02       eGFR if Non African American: 52 mL/min/1.73M2 (01-02-19 @ 06:22)  eGFR if African American: 60 mL/min/1.73M2 (01-02-19 @ 06:22)         RADIOLOGY & ADDITIONAL TESTS:    Care Discussed with Consultants/Other Providers: Dr. MAXIMILIANO Patterson Patient is a 85y old  Male who presents with a chief complaint of Respiratory Failure      Patient seen and examined at bedside. Lying in bed awake and alert. Reports feeling well, requesting to walk to the bathroom. Denies any pain or difficulties breathing.    ALLERGIES:  No Known Allergies    MEDICATIONS  (STANDING):  ALBUTerol/ipratropium for Nebulization 3 milliLiter(s) Nebulizer every 6 hours  aspirin  chewable 81 milliGRAM(s) Oral daily  atropine 1% Ophthalmic Solution for SubLingual Use 2 Drop(s) SubLingual every 12 hours  chlorhexidine 0.12% Liquid 15 milliLiter(s) Oral Mucosa two times a day  dorzolamide 2% Ophthalmic Solution 1 Drop(s) Both EYES <User Schedule>  furosemide   Injectable 40 milliGRAM(s) IV Push daily  glycopyrrolate 2 milliGRAM(s) Oral every 12 hours  heparin  Injectable 5000 Unit(s) SubCutaneous every 12 hours  latanoprost 0.005% Ophthalmic Solution 1 Drop(s) Both EYES at bedtime  nystatin Powder 1 Application(s) Topical two times a day  pantoprazole   Suspension 40 milliGRAM(s) Oral daily  piperacillin/tazobactam IVPB. 3.375 Gram(s) IV Intermittent every 8 hours  potassium chloride   Solution 40 milliEquivalent(s) Oral once  potassium phosphate IVPB 15 milliMole(s) IV Intermittent once  predniSONE   Tablet   Oral   timolol 0.5% Solution 1 Drop(s) Both EYES <User Schedule>    MEDICATIONS  (PRN):  acetaminophen    Suspension .. 650 milliGRAM(s) Enteral Tube every 6 hours PRN Temp greater or equal to 38C (100.4F)  bisacodyl Suppository 10 milliGRAM(s) Rectal daily PRN Constipation  melatonin 3 milliGRAM(s) Oral at bedtime PRN Sleep  polyethylene glycol 3350 17 Gram(s) Oral daily PRN Constipation    Vital Signs Last 24 Hrs  T(F): 98.2 (02 Jan 2019 05:42), Max: 98.2 (01 Jan 2019 15:29)  HR: 60 (02 Jan 2019 05:42) (60 - 62)  BP: 162/78 (02 Jan 2019 05:42) (151/73 - 162/78)  RR: 16 (02 Jan 2019 05:42) (9 - 16)  SpO2: 93% (02 Jan 2019 09:08) (93% - 99%)  I&O's Summary    01 Jan 2019 07:01  -  02 Jan 2019 07:00  --------------------------------------------------------  IN: 765 mL / OUT: 0 mL / NET: 765 mL        PHYSICAL EXAM:  General: NAD, A/O x 3  Neck: Supple, No JVD  Lungs: Clear to auscultation bilaterally  Cardio: RRR, S1/S2, No murmurs  Abdomen: Soft, Nontender, Nondistended; Bowel sounds present. PEG tube intact LUQ  Extremities: No calf tenderness, B/L LE chronic vascular discolorations and with trace edema    LABS:                        8.8    10.6  )-----------( 261      ( 02 Jan 2019 06:22 )             27.4       01-02    145  |  106  |  30  ----------------------------<  102  3.3   |  35  |  1.25    Ca    8.6      02 Jan 2019 06:22  Phos  1.5     01-02  Mg     2.1     01-02       eGFR if Non African American: 52 mL/min/1.73M2 (01-02-19 @ 06:22)  eGFR if African American: 60 mL/min/1.73M2 (01-02-19 @ 06:22)         RADIOLOGY & ADDITIONAL TESTS:  < from: Xray Chest 1 View- PORTABLE-Routine (01.02.19 @ 09:23) >  Improved    < end of copied text >      Care Discussed with Consultants/Other Providers: Dr. MAXIMILIANO Patterson Patient is a 85y old  Male who presents with a chief complaint of Respiratory Failure      Patient seen and examined at bedside. Lying in bed awake and alert. Reports feeling well, requesting to walk to the bathroom. Denies any pain or difficulties breathing.    ALLERGIES:  No Known Allergies    MEDICATIONS  (STANDING):  ALBUTerol/ipratropium for Nebulization 3 milliLiter(s) Nebulizer every 6 hours  aspirin  chewable 81 milliGRAM(s) Oral daily  atropine 1% Ophthalmic Solution for SubLingual Use 2 Drop(s) SubLingual every 12 hours  chlorhexidine 0.12% Liquid 15 milliLiter(s) Oral Mucosa two times a day  dorzolamide 2% Ophthalmic Solution 1 Drop(s) Both EYES <User Schedule>  furosemide   Injectable 40 milliGRAM(s) IV Push daily  glycopyrrolate 2 milliGRAM(s) Oral every 12 hours  heparin  Injectable 5000 Unit(s) SubCutaneous every 12 hours  latanoprost 0.005% Ophthalmic Solution 1 Drop(s) Both EYES at bedtime  nystatin Powder 1 Application(s) Topical two times a day  pantoprazole   Suspension 40 milliGRAM(s) Oral daily  piperacillin/tazobactam IVPB. 3.375 Gram(s) IV Intermittent every 8 hours  potassium chloride   Solution 40 milliEquivalent(s) Oral once  potassium phosphate IVPB 15 milliMole(s) IV Intermittent once  predniSONE   Tablet   Oral   timolol 0.5% Solution 1 Drop(s) Both EYES <User Schedule>    MEDICATIONS  (PRN):  acetaminophen    Suspension .. 650 milliGRAM(s) Enteral Tube every 6 hours PRN Temp greater or equal to 38C (100.4F)  bisacodyl Suppository 10 milliGRAM(s) Rectal daily PRN Constipation  melatonin 3 milliGRAM(s) Oral at bedtime PRN Sleep  polyethylene glycol 3350 17 Gram(s) Oral daily PRN Constipation    Vital Signs Last 24 Hrs  T(F): 98.2 (02 Jan 2019 05:42), Max: 98.2 (01 Jan 2019 15:29)  HR: 60 (02 Jan 2019 05:42) (60 - 62)  BP: 162/78 (02 Jan 2019 05:42) (151/73 - 162/78)  RR: 16 (02 Jan 2019 05:42) (9 - 16)  SpO2: 93% (02 Jan 2019 09:08) (93% - 99%)  I&O's Summary    01 Jan 2019 07:01  -  02 Jan 2019 07:00  --------------------------------------------------------  IN: 765 mL / OUT: 0 mL / NET: 765 mL        PHYSICAL EXAM:  General: NAD, A/O x 3  Neck: Supple, No JVD  Lungs: Clear to auscultation bilaterally  Cardio: RRR, S1/S2, No murmurs  Abdomen: Soft, Nontender, Nondistended; Bowel sounds present. PEG tube intact LUQ  Extremities: No calf tenderness, B/L LE chronic vascular discolorations and with trace edema  Neuro: no new deficits    LABS:                        8.8    10.6  )-----------( 261      ( 02 Jan 2019 06:22 )             27.4       01-02    145  |  106  |  30  ----------------------------<  102  3.3   |  35  |  1.25    Ca    8.6      02 Jan 2019 06:22  Phos  1.5     01-02  Mg     2.1     01-02       eGFR if Non African American: 52 mL/min/1.73M2 (01-02-19 @ 06:22)  eGFR if African American: 60 mL/min/1.73M2 (01-02-19 @ 06:22)         RADIOLOGY & ADDITIONAL TESTS:  < from: Xray Chest 1 View- PORTABLE-Routine (01.02.19 @ 09:23) >  Improved    < end of copied text >      Care Discussed with Consultants/Other Providers: Dr. MAXIMILIANO Patterson

## 2019-01-02 NOTE — PROGRESS NOTE ADULT - ASSESSMENT
Physical Examination:  GENERAL:                alert, No Oriented, No acute distress.    HEENT:                    mild  secretions  No JVD, dry MM,   PULM:                      Bilateral air entry, course breath sounds bilaterally, no significant sputum production, mild Rales, trace Rhonchi, No Wheezing  CVS:                         S1, S2,  no Murmur  ABD:                        Soft, nondistended, nontender, + PEG,  + BS  EXT:                         chronic venous staisis, with mild edema, nontender, No Cyanosis or Clubbing   Vascular:                 Warm Extremities, Normal Capillary refill, Normal Distal Pulses  NEURO:                  alert, oriented x1-2,  follows commands  PSYC:                      Calm, some Insight.    Assessment  86 yo male with h/o Severe dysphagia s/p PEG with multiple hospitalizations for aspiration PNA, HTN, HLD, Grad 3 Diastolic Heart Failure s/p ppm, CKD, admitted to the ICU with septic shock secondary to aspiration pneumonia. Patient with multiple admissions recently. Has been a high risk of aspiration despite being with a PEG. This morning he remains in shock requiring vasopressor. He was intubated for hypercapnic respiratory failure.     1. Acute hypercapnic respiratory failure Resolved Extubated 12/29  2. Shock - likely septic off pressors after steroids  3. Aspiration Pneumonia - suspect infection with gram negative organisms  4. Systolic and Diastolic heart dysfunction  5. Chronic kidney disease  6. Dysphagia s/p PEG    Plan  - steroid taper  - Broad spectrum antibiotics to continue 5-7 day course   - Cont tube bolus feeding  - Gentle diuresis to achieve more euvolemic state  - Prognosis is guarded, given patient with multiple hospital admissions in the past year. Has been declining clinically, patient wants to be eat by mouth. But he remains high risk for aspiration.  Given clinical status, will continue feeding via PEG and nothing by mouth.   - Palliative care follow up noted, Code Status DNR/DNI  - GI, DVT prophylaxis

## 2019-01-02 NOTE — PROGRESS NOTE ADULT - SUBJECTIVE AND OBJECTIVE BOX
Follow-up Critical Care Progress Note  Chief Complaint : Acute respiratory distress    Patient is awake and alert. Oriented to self. States he feels better. Still with cough and productive of sputum.     Allergies :No Known Allergies    PAST MEDICAL & SURGICAL HISTORY:  Shortness of breath  Muscle weakness  Depression  IBS (irritable bowel syndrome)  Dysphagia  Cerebral infarction  Anemia  Psoriasis  GERD (gastroesophageal reflux disease)  Dyslipidemia  Cardiac pacemaker  S/P percutaneous endoscopic gastrostomy (PEG) tube placement    Medications:  MEDICATIONS  (STANDING):  ALBUTerol/ipratropium for Nebulization 3 milliLiter(s) Nebulizer every 6 hours  aspirin  chewable 81 milliGRAM(s) Oral daily  atropine 1% Ophthalmic Solution for SubLingual Use 2 Drop(s) SubLingual every 12 hours  chlorhexidine 0.12% Liquid 15 milliLiter(s) Oral Mucosa two times a day  dorzolamide 2% Ophthalmic Solution 1 Drop(s) Both EYES <User Schedule>  furosemide   Injectable 40 milliGRAM(s) IV Push daily  glycopyrrolate 2 milliGRAM(s) Oral every 12 hours  heparin  Injectable 5000 Unit(s) SubCutaneous every 12 hours  latanoprost 0.005% Ophthalmic Solution 1 Drop(s) Both EYES at bedtime  nystatin Powder 1 Application(s) Topical two times a day  pantoprazole   Suspension 40 milliGRAM(s) Oral daily  piperacillin/tazobactam IVPB. 3.375 Gram(s) IV Intermittent every 8 hours  potassium chloride   Solution 40 milliEquivalent(s) Oral once  potassium phosphate IVPB 15 milliMole(s) IV Intermittent once  predniSONE   Tablet   Oral   timolol 0.5% Solution 1 Drop(s) Both EYES <User Schedule>    MEDICATIONS  (PRN):  acetaminophen    Suspension .. 650 milliGRAM(s) Enteral Tube every 6 hours PRN Temp greater or equal to 38C (100.4F)  bisacodyl Suppository 10 milliGRAM(s) Rectal daily PRN Constipation  melatonin 3 milliGRAM(s) Oral at bedtime PRN Sleep  polyethylene glycol 3350 17 Gram(s) Oral daily PRN Constipation    LABS:                      8.8    10.6  )-----------( 261      ( 02 Jan 2019 06:22 )             27.4     01-02  145  |  106  |  30<H>  ----------------------------<  102<H>  3.3<L>   |  35<H>  |  1.25    Ca    8.6      02 Jan 2019 06:22  Phos  1.5     01-02  Mg     2.1     01-02    CULTURES: (if applicable)    Culture - Urine (collected 12-27-18 @ 19:45)  Source: .Urine Clean Catch (Midstream)  Final Report (12-28-18 @ 22:45):    No growth    Culture - Sputum (collected 12-27-18 @ 04:25)  Source: .Sputum Sputum trap  Gram Stain (12-27-18 @ 12:47):    Moderate polymorphonuclear leukocytes per low power field    Rare Squamous epithelial cells per low power field    Moderate Gram positive cocci in pairs, chains and clusters per oil power    field    Few Yeast like cells per oil power field    Rare Gram Negative Rods per oil power field    Rare Gram Negative Diplococci per oil power field  Final Report (12-29-18 @ 11:40):    Moderate Staphylococcus aureus    Normal Respiratory Renetta present  Organism: Staphylococcus aureus (12-29-18 @ 11:40)  Organism: Staphylococcus aureus (12-29-18 @ 11:40)      -  Ampicillin/Sulbactam: S <=8/4      -  Cefazolin: S <=4      -  Clindamycin: S 0.5      -  Erythromycin: R >4      -  Gentamicin: S <=1 Should not be used as monotherapy      -  Oxacillin: S 1      -  Penicillin: R >8      -  RIF- Rifampin: S <=1 Should not be used as monotherapy      -  Tetra/Doxy: S <=1      -  Trimethoprim/Sulfamethoxazole: S <=0.5/9.5      -  Vancomycin: S 2      Method Type: KALYAN    Culture - Blood (collected 12-26-18 @ 17:58)  Source: .Blood Blood-Peripheral  Final Report (01-01-19 @ 02:02):    No growth at 5 days.    Culture - Blood (collected 12-26-18 @ 17:58)  Source: .Blood Blood-Peripheral  Final Report (01-01-19 @ 02:02):    No growth at 5 days.    VITALS:  T(C): 36.8 (01-02-19 @ 05:42), Max: 36.8 (01-01-19 @ 15:29)  T(F): 98.2 (01-02-19 @ 05:42), Max: 98.2 (01-01-19 @ 15:29)  HR: 60 (01-02-19 @ 05:42) (60 - 62)  BP: 162/78 (01-02-19 @ 05:42) (151/73 - 162/78)  BP(mean): --  ABP: --  ABP(mean): --  RR: 16 (01-02-19 @ 05:42) (15 - 16)  SpO2: 93% (01-02-19 @ 09:08) (93% - 98%)  CVP(mm Hg): --  CVP(cm H2O): --    Ins and Outs   01-01-19 @ 07:01  -  01-02-19 @ 07:00  --------------------------------------------------------  IN: 765 mL / OUT: 0 mL / NET: 765 mL

## 2019-01-02 NOTE — PROGRESS NOTE ADULT - SUBJECTIVE AND OBJECTIVE BOX
Follow-up Pulm Progress Note    Enrique Simmons MD  [phone]    No new respiratory events overnight.  Denies SOB/CP. Much stronger today.    Medications:  Vital Signs Last 24 Hrs  T(C): 36.8 (02 Jan 2019 05:42), Max: 36.8 (01 Jan 2019 15:29)  T(F): 98.2 (02 Jan 2019 05:42), Max: 98.2 (01 Jan 2019 15:29)  HR: 60 (02 Jan 2019 05:42) (60 - 62)  BP: 162/78 (02 Jan 2019 05:42) (151/73 - 162/78)  BP(mean): --  RR: 16 (02 Jan 2019 05:42) (15 - 16)  SpO2: 93% (02 Jan 2019 09:08) (93% - 98%)          01-01 @ 07:01  -  01-02 @ 07:00  --------------------------------------------------------  IN: 765 mL / OUT: 0 mL / NET: 765 mL        LABS:                        8.8    10.6  )-----------( 261      ( 02 Jan 2019 06:22 )             27.4     01-02    145  |  106  |  30<H>  ----------------------------<  102<H>  3.3<L>   |  35<H>  |  1.25    Ca    8.6      02 Jan 2019 06:22  Phos  1.5     01-02  Mg     2.1     01-02                CULTURES:  Culture Results:   No growth (12-27 @ 19:45)  Culture Results:   Moderate Staphylococcus aureus  Normal Respiratory Renetta present (12-27 @ 04:25)  Culture Results:   No growth at 5 days. (12-26 @ 17:58)  Culture Results:   No growth at 5 days. (12-26 @ 17:58)        Physical Examination:  PULM: Clear to auscultation bilaterally, no significant sputum production  CVS: Regular rate and rhythm, no murmurs, rubs, or gallops  ABD: Soft, non-tender  EXT:  No clubbing, cyanosis, or edema    RADIOLOGY REVIEWED  CXR:    CT chest:    TTE:

## 2019-01-02 NOTE — PROGRESS NOTE ADULT - ASSESSMENT
Patient looks stronger today. We'll have speech reevaluate for possibility of resuming some type of oral diet. No other changes recommend

## 2019-01-03 ENCOUNTER — TRANSCRIPTION ENCOUNTER (OUTPATIENT)
Age: 84
End: 2019-01-03

## 2019-01-03 VITALS
HEART RATE: 60 BPM | SYSTOLIC BLOOD PRESSURE: 140 MMHG | DIASTOLIC BLOOD PRESSURE: 93 MMHG | RESPIRATION RATE: 14 BRPM | TEMPERATURE: 98 F | OXYGEN SATURATION: 97 %

## 2019-01-03 LAB
ANION GAP SERPL CALC-SCNC: 7 MMOL/L — SIGNIFICANT CHANGE UP (ref 5–17)
BUN SERPL-MCNC: 32 MG/DL — HIGH (ref 7–23)
CALCIUM SERPL-MCNC: 8.6 MG/DL — SIGNIFICANT CHANGE UP (ref 8.4–10.5)
CHLORIDE SERPL-SCNC: 105 MMOL/L — SIGNIFICANT CHANGE UP (ref 96–108)
CO2 SERPL-SCNC: 31 MMOL/L — SIGNIFICANT CHANGE UP (ref 22–31)
CREAT SERPL-MCNC: 1.32 MG/DL — HIGH (ref 0.5–1.3)
GLUCOSE SERPL-MCNC: 141 MG/DL — HIGH (ref 70–99)
HCT VFR BLD CALC: 27.8 % — LOW (ref 39–50)
HGB BLD-MCNC: 8.9 G/DL — LOW (ref 13–17)
MAGNESIUM SERPL-MCNC: 2 MG/DL — SIGNIFICANT CHANGE UP (ref 1.6–2.6)
MCHC RBC-ENTMCNC: 29.1 PG — SIGNIFICANT CHANGE UP (ref 27–34)
MCHC RBC-ENTMCNC: 32 GM/DL — SIGNIFICANT CHANGE UP (ref 32–36)
MCV RBC AUTO: 91.1 FL — SIGNIFICANT CHANGE UP (ref 80–100)
PLATELET # BLD AUTO: 293 K/UL — SIGNIFICANT CHANGE UP (ref 150–400)
POTASSIUM SERPL-MCNC: 4 MMOL/L — SIGNIFICANT CHANGE UP (ref 3.5–5.3)
POTASSIUM SERPL-SCNC: 4 MMOL/L — SIGNIFICANT CHANGE UP (ref 3.5–5.3)
RBC # BLD: 3.05 M/UL — LOW (ref 4.2–5.8)
RBC # FLD: 17.4 % — HIGH (ref 10.3–14.5)
SODIUM SERPL-SCNC: 143 MMOL/L — SIGNIFICANT CHANGE UP (ref 135–145)
WBC # BLD: 11.1 K/UL — HIGH (ref 3.8–10.5)
WBC # FLD AUTO: 11.1 K/UL — HIGH (ref 3.8–10.5)

## 2019-01-03 PROCEDURE — 80202 ASSAY OF VANCOMYCIN: CPT

## 2019-01-03 PROCEDURE — 85730 THROMBOPLASTIN TIME PARTIAL: CPT

## 2019-01-03 PROCEDURE — 82803 BLOOD GASES ANY COMBINATION: CPT

## 2019-01-03 PROCEDURE — 83735 ASSAY OF MAGNESIUM: CPT

## 2019-01-03 PROCEDURE — 81001 URINALYSIS AUTO W/SCOPE: CPT

## 2019-01-03 PROCEDURE — 87040 BLOOD CULTURE FOR BACTERIA: CPT

## 2019-01-03 PROCEDURE — 36600 WITHDRAWAL OF ARTERIAL BLOOD: CPT

## 2019-01-03 PROCEDURE — 70450 CT HEAD/BRAIN W/O DYE: CPT

## 2019-01-03 PROCEDURE — 94003 VENT MGMT INPAT SUBQ DAY: CPT

## 2019-01-03 PROCEDURE — 87070 CULTURE OTHR SPECIMN AEROBIC: CPT

## 2019-01-03 PROCEDURE — 94640 AIRWAY INHALATION TREATMENT: CPT

## 2019-01-03 PROCEDURE — 94002 VENT MGMT INPAT INIT DAY: CPT

## 2019-01-03 PROCEDURE — 82553 CREATINE MB FRACTION: CPT

## 2019-01-03 PROCEDURE — 80053 COMPREHEN METABOLIC PANEL: CPT

## 2019-01-03 PROCEDURE — 84100 ASSAY OF PHOSPHORUS: CPT

## 2019-01-03 PROCEDURE — 97161 PT EVAL LOW COMPLEX 20 MIN: CPT

## 2019-01-03 PROCEDURE — 87631 RESP VIRUS 3-5 TARGETS: CPT

## 2019-01-03 PROCEDURE — 83605 ASSAY OF LACTIC ACID: CPT

## 2019-01-03 PROCEDURE — 87186 SC STD MICRODIL/AGAR DIL: CPT

## 2019-01-03 PROCEDURE — 92610 EVALUATE SWALLOWING FUNCTION: CPT

## 2019-01-03 PROCEDURE — C1751: CPT

## 2019-01-03 PROCEDURE — 80048 BASIC METABOLIC PNL TOTAL CA: CPT

## 2019-01-03 PROCEDURE — 31500 INSERT EMERGENCY AIRWAY: CPT

## 2019-01-03 PROCEDURE — 43753 TX GASTRO INTUB W/ASP: CPT

## 2019-01-03 PROCEDURE — 84484 ASSAY OF TROPONIN QUANT: CPT

## 2019-01-03 PROCEDURE — 83880 ASSAY OF NATRIURETIC PEPTIDE: CPT

## 2019-01-03 PROCEDURE — 82550 ASSAY OF CK (CPK): CPT

## 2019-01-03 PROCEDURE — 99291 CRITICAL CARE FIRST HOUR: CPT | Mod: 25

## 2019-01-03 PROCEDURE — 93306 TTE W/DOPPLER COMPLETE: CPT

## 2019-01-03 PROCEDURE — 87086 URINE CULTURE/COLONY COUNT: CPT

## 2019-01-03 PROCEDURE — 36556 INSERT NON-TUNNEL CV CATH: CPT

## 2019-01-03 PROCEDURE — 99239 HOSP IP/OBS DSCHRG MGMT >30: CPT

## 2019-01-03 PROCEDURE — 85027 COMPLETE CBC AUTOMATED: CPT

## 2019-01-03 PROCEDURE — 93005 ELECTROCARDIOGRAM TRACING: CPT

## 2019-01-03 PROCEDURE — 94660 CPAP INITIATION&MGMT: CPT

## 2019-01-03 PROCEDURE — 96374 THER/PROPH/DIAG INJ IV PUSH: CPT | Mod: XU

## 2019-01-03 PROCEDURE — 85610 PROTHROMBIN TIME: CPT

## 2019-01-03 PROCEDURE — 82962 GLUCOSE BLOOD TEST: CPT

## 2019-01-03 PROCEDURE — 94760 N-INVAS EAR/PLS OXIMETRY 1: CPT

## 2019-01-03 PROCEDURE — 99292 CRITICAL CARE ADDL 30 MIN: CPT

## 2019-01-03 PROCEDURE — 71045 X-RAY EXAM CHEST 1 VIEW: CPT

## 2019-01-03 RX ORDER — ESCITALOPRAM OXALATE 10 MG/1
1 TABLET, FILM COATED ORAL
Qty: 0 | Refills: 0 | COMMUNITY

## 2019-01-03 RX ADMIN — Medication 3 MILLILITER(S): at 15:29

## 2019-01-03 RX ADMIN — Medication 40 MILLIGRAM(S): at 05:18

## 2019-01-03 RX ADMIN — Medication 81 MILLIGRAM(S): at 13:04

## 2019-01-03 RX ADMIN — NYSTATIN CREAM 1 APPLICATION(S): 100000 CREAM TOPICAL at 05:17

## 2019-01-03 RX ADMIN — Medication 2 DROP(S): at 05:18

## 2019-01-03 RX ADMIN — Medication 1 DROP(S): at 07:54

## 2019-01-03 RX ADMIN — HEPARIN SODIUM 5000 UNIT(S): 5000 INJECTION INTRAVENOUS; SUBCUTANEOUS at 05:18

## 2019-01-03 RX ADMIN — Medication 3 MILLILITER(S): at 09:00

## 2019-01-03 RX ADMIN — DORZOLAMIDE HYDROCHLORIDE 1 DROP(S): 20 SOLUTION/ DROPS OPHTHALMIC at 07:54

## 2019-01-03 RX ADMIN — CHLORHEXIDINE GLUCONATE 15 MILLILITER(S): 213 SOLUTION TOPICAL at 05:19

## 2019-01-03 RX ADMIN — Medication 3 MILLILITER(S): at 04:51

## 2019-01-03 RX ADMIN — ROBINUL 2 MILLIGRAM(S): 0.2 INJECTION INTRAMUSCULAR; INTRAVENOUS at 05:18

## 2019-01-03 RX ADMIN — PANTOPRAZOLE SODIUM 40 MILLIGRAM(S): 20 TABLET, DELAYED RELEASE ORAL at 13:04

## 2019-01-03 RX ADMIN — Medication 30 MILLIGRAM(S): at 05:17

## 2019-01-03 RX ADMIN — PIPERACILLIN AND TAZOBACTAM 25 GRAM(S): 4; .5 INJECTION, POWDER, LYOPHILIZED, FOR SOLUTION INTRAVENOUS at 05:19

## 2019-01-03 NOTE — PROGRESS NOTE ADULT - ASSESSMENT
Physical Examination:  GENERAL:                alert, No Oriented, No acute distress.    HEENT:                    mild  secretions  No JVD, dry MM,   PULM:                      Bilateral air entry, course breath sounds bilaterally, no significant sputum production, mild Rales, trace Rhonchi, No Wheezing  CVS:                         S1, S2,  no Murmur  ABD:                        Soft, nondistended, nontender, + PEG,  + BS  EXT:                         chronic venous staisis, with mild edema, nontender, No Cyanosis or Clubbing   NEURO:                  alert, oriented x1-2,  follows commands  PSYC:                      Calm, some Insight.    Assessment  86 yo male with h/o Severe dysphagia s/p PEG with multiple hospitalizations for aspiration PNA, HTN, HLD, Grad 3 Diastolic Heart Failure s/p ppm, CKD, admitted to the ICU with septic shock secondary to aspiration pneumonia. Patient with multiple admissions recently. Has been a high risk of aspiration despite being with a PEG. This morning he remains in shock requiring vasopressor. He was intubated for hypercapnic respiratory failure.     1. Acute hypercapnic respiratory failure Resolved Extubated 12/29  2. Shock - likely septic off pressors after steroids  3. Aspiration Pneumonia - suspect infection with gram negative organisms  4. Systolic and Diastolic heart dysfunction  5. Chronic kidney disease  6. Dysphagia s/p PEG    Plan  - steroid taper as otlerated  - finishing course of abx  - Cont tube bolus feeding ; please keep HOB >35 for 2 hours after bolus, avoid laying flat after bouls  - Gentle diuresis to achieve more euvolemic state  - Prognosis is guarded, given patient with multiple hospital admissions in the past year. Has been declining clinically, patient wants to be eat by mouth. But he remains high risk for aspiration.    Given clinical status, will continue feeding via PEG and nothing by mouth.     - GI, DVT prophylaxis   -Case d/w Dr. Talbert today, no active ccm issues. re consult as needed

## 2019-01-03 NOTE — DISCHARGE NOTE ADULT - CARE PROVIDER_API CALL
Reyes, John A (MD), Internal Medicine  [address] [facility] [address]  [address]  [place] [address]  Phone: [phone]  Fax: [phone]

## 2019-01-03 NOTE — SWALLOW BEDSIDE ASSESSMENT ADULT - PHARYNGEAL PHASE
Cough post oral intake/Delayed pharyngeal swallow/Throat clear post oral intake/Decreased laryngeal elevation/Wet vocal quality post oral intake

## 2019-01-03 NOTE — PROGRESS NOTE ADULT - SUBJECTIVE AND OBJECTIVE BOX
LICHA GUERRA  85y  Male    Patient is a 85y old  Male who presents with a chief complaint of Respiratory Failure (02 Jan 2019 14:57)    Pt seen and examined, feels well, no complaints     PAST MEDICAL & SURGICAL HISTORY:  Shortness of breath  Muscle weakness  Depression  IBS (irritable bowel syndrome)  Dysphagia  Cerebral infarction  Anemia  Psoriasis  GERD (gastroesophageal reflux disease)  Dyslipidemia  Cardiac pacemaker  S/P percutaneous endoscopic gastrostomy (PEG) tube placement        MedsMEDICATIONS  (STANDING):  ALBUTerol/ipratropium for Nebulization 3 milliLiter(s) Nebulizer every 6 hours  aspirin  chewable 81 milliGRAM(s) Oral daily  atropine 1% Ophthalmic Solution for SubLingual Use 2 Drop(s) SubLingual every 12 hours  chlorhexidine 0.12% Liquid 15 milliLiter(s) Oral Mucosa two times a day  dorzolamide 2% Ophthalmic Solution 1 Drop(s) Both EYES <User Schedule>  glycopyrrolate 2 milliGRAM(s) Oral every 12 hours  heparin  Injectable 5000 Unit(s) SubCutaneous every 12 hours  latanoprost 0.005% Ophthalmic Solution 1 Drop(s) Both EYES at bedtime  nystatin Powder 1 Application(s) Topical two times a day  pantoprazole   Suspension 40 milliGRAM(s) Oral daily  predniSONE   Tablet 30 milliGRAM(s) Oral daily  predniSONE   Tablet   Oral   timolol 0.5% Solution 1 Drop(s) Both EYES <User Schedule>    MEDICATIONS  (PRN):  acetaminophen    Suspension .. 650 milliGRAM(s) Enteral Tube every 6 hours PRN Temp greater or equal to 38C (100.4F)  bisacodyl Suppository 10 milliGRAM(s) Rectal daily PRN Constipation  melatonin 3 milliGRAM(s) Oral at bedtime PRN Sleep  polyethylene glycol 3350 17 Gram(s) Oral daily PRN Constipation      Vital Signs Last 24 Hrs  T(C): 36.8 (03 Jan 2019 05:15), Max: 36.8 (02 Jan 2019 16:17)  T(F): 98.2 (03 Jan 2019 05:15), Max: 98.3 (02 Jan 2019 16:17)  HR: 60 (03 Jan 2019 05:15) (59 - 60)  BP: 154/89 (03 Jan 2019 05:15) (152/83 - 160/76)  BP(mean): --  RR: 15 (03 Jan 2019 05:15) (14 - 15)  SpO2: 97% (03 Jan 2019 05:15) (94% - 98%)    PHYSICAL EXAM:  GENERAL: NAD  NERVOUS SYSTEM:  Alert & Oriented X3  CHEST/LUNG: decreased lung sounds b/l  HEART: S1S2, RRR   ABDOMEN: Soft, obese, non-tender, non-distended, + bowel sounds  EXTREMITIES:  2+ Peripheral Pulses, No clubbing, cyanosis, or edema      LABS:                          8.9    11.1  )-----------( 293      ( 03 Jan 2019 08:15 )             27.8       01-03    143  |  105  |  32<H>  ----------------------------<  141<H>  4.0   |  31  |  1.32<H>    Ca    8.6      03 Jan 2019 08:15  Phos  1.5     01-02  Mg     2.0     01-03                                      RADIOLOGY & ADDITIONAL TESTS:    Imaging Personally Reviewed:  [ ] YES  [ ] NO      HEALTH ISSUES - PROBLEM Dx:  Electrolyte imbalance: Electrolyte imbalance  Dysphagia, unspecified type: Dysphagia, unspecified type  Chronic kidney disease, unspecified CKD stage: Chronic kidney disease, unspecified CKD stage  Acute systolic heart failure: Acute systolic heart failure  ATN (acute tubular necrosis): ATN (acute tubular necrosis)  Altered mental status: Altered mental status  Septic shock: Septic shock  Aspiration pneumonia of both lower lobes due to regurgitated food: Aspiration pneumonia of both lower lobes due to regurgitated food  Acute respiratory failure with hypercapnia: Acute respiratory failure with hypercapnia          Care Discussed with Consultants/Other Providers [ x] YES  [ ] NO

## 2019-01-03 NOTE — PROGRESS NOTE ADULT - SUBJECTIVE AND OBJECTIVE BOX
Follow-up Critical Care Progress Note  Chief Complaint : Acute respiratory distress      pt out of bed, in chair comfortable, no cp, palp, n/v      Allergies :No Known Allergies      PAST MEDICAL & SURGICAL HISTORY:  Shortness of breath  Muscle weakness  Depression  IBS (irritable bowel syndrome)  Dysphagia  Cerebral infarction  Anemia  Psoriasis  GERD (gastroesophageal reflux disease)  Dyslipidemia  Cardiac pacemaker  S/P percutaneous endoscopic gastrostomy (PEG) tube placement      Medications:  MEDICATIONS  (STANDING):  ALBUTerol/ipratropium for Nebulization 3 milliLiter(s) Nebulizer every 6 hours  aspirin  chewable 81 milliGRAM(s) Oral daily  atropine 1% Ophthalmic Solution for SubLingual Use 2 Drop(s) SubLingual every 12 hours  chlorhexidine 0.12% Liquid 15 milliLiter(s) Oral Mucosa two times a day  dorzolamide 2% Ophthalmic Solution 1 Drop(s) Both EYES <User Schedule>  glycopyrrolate 2 milliGRAM(s) Oral every 12 hours  heparin  Injectable 5000 Unit(s) SubCutaneous every 12 hours  latanoprost 0.005% Ophthalmic Solution 1 Drop(s) Both EYES at bedtime  nystatin Powder 1 Application(s) Topical two times a day  pantoprazole   Suspension 40 milliGRAM(s) Oral daily  predniSONE   Tablet 30 milliGRAM(s) Oral daily  predniSONE   Tablet   Oral   timolol 0.5% Solution 1 Drop(s) Both EYES <User Schedule>    MEDICATIONS  (PRN):  acetaminophen    Suspension .. 650 milliGRAM(s) Enteral Tube every 6 hours PRN Temp greater or equal to 38C (100.4F)  bisacodyl Suppository 10 milliGRAM(s) Rectal daily PRN Constipation  melatonin 3 milliGRAM(s) Oral at bedtime PRN Sleep  polyethylene glycol 3350 17 Gram(s) Oral daily PRN Constipation      LABS:                        8.9    11.1  )-----------( 293      ( 03 Jan 2019 08:15 )             27.8     01-03    143  |  105  |  32<H>  ----------------------------<  141<H>  4.0   |  31  |  1.32<H>    Ca    8.6      03 Jan 2019 08:15  Phos  1.5     01-02  Mg     2.0     01-03            CULTURES: (if applicable)    Culture - Urine (collected 12-27-18 @ 19:45)  Source: .Urine Clean Catch (Midstream)  Final Report (12-28-18 @ 22:45):    No growth    Culture - Sputum (collected 12-27-18 @ 04:25)  Source: .Sputum Sputum trap  Gram Stain (12-27-18 @ 12:47):    Moderate polymorphonuclear leukocytes per low power field    Rare Squamous epithelial cells per low power field    Moderate Gram positive cocci in pairs, chains and clusters per oil power    field    Few Yeast like cells per oil power field    Rare Gram Negative Rods per oil power field    Rare Gram Negative Diplococci per oil power field  Final Report (12-29-18 @ 11:40):    Moderate Staphylococcus aureus    Normal Respiratory Renetta present  Organism: Staphylococcus aureus (12-29-18 @ 11:40)  Organism: Staphylococcus aureus (12-29-18 @ 11:40)      -  Ampicillin/Sulbactam: S <=8/4      -  Cefazolin: S <=4      -  Clindamycin: S 0.5      -  Erythromycin: R >4      -  Gentamicin: S <=1 Should not be used as monotherapy      -  Oxacillin: S 1      -  Penicillin: R >8      -  RIF- Rifampin: S <=1 Should not be used as monotherapy      -  Tetra/Doxy: S <=1      -  Trimethoprim/Sulfamethoxazole: S <=0.5/9.5      -  Vancomycin: S 2      Method Type: KALYAN    Culture - Blood (collected 12-26-18 @ 17:58)  Source: .Blood Blood-Peripheral  Final Report (01-01-19 @ 02:02):    No growth at 5 days.    Culture - Blood (collected 12-26-18 @ 17:58)  Source: .Blood Blood-Peripheral  Final Report (01-01-19 @ 02:02):    No growth at 5 days.            CAPILLARY BLOOD GLUCOSE          RADIOLOGY  CXR:  < from: Xray Chest 1 View- PORTABLE-Routine (01.02.19 @ 09:23) >    There is a marginally improved inspiratory result with partial clearing   of predominantly perihilar infiltrates. A pacemaker is noted. There is no   layering effusion. There is mild cardiomegaly.    IMPRESSION:    Improved      < end of copied text >    VITALS:  T(C): 36.8 (01-03-19 @ 05:15), Max: 36.8 (01-02-19 @ 16:17)  T(F): 98.2 (01-03-19 @ 05:15), Max: 98.3 (01-02-19 @ 16:17)  HR: 60 (01-03-19 @ 05:15) (59 - 60)  BP: 154/89 (01-03-19 @ 05:15) (152/83 - 160/76)  BP(mean): --  ABP: --  ABP(mean): --  RR: 15 (01-03-19 @ 05:15) (14 - 15)  SpO2: 97% (01-03-19 @ 05:15) (94% - 98%)  CVP(mm Hg): --  CVP(cm H2O): --    Ins and Outs     01-02-19 @ 07:01  -  01-03-19 @ 07:00  --------------------------------------------------------  IN: 0 mL / OUT: 352 mL / NET: -352 mL

## 2019-01-03 NOTE — PROGRESS NOTE ADULT - PROBLEM SELECTOR PLAN 2
as above
last day zosyn today   Continue steroid taper
as above
Continue IV zosyn  for 5-7 day course  Continue steroid taper

## 2019-01-03 NOTE — DISCHARGE NOTE ADULT - PATIENT PORTAL LINK FT
You can access the [facility] Patient Portal, offered by [facility], by registering with the following website: [email or web address]

## 2019-01-03 NOTE — SWALLOW BEDSIDE ASSESSMENT ADULT - SWALLOW EVAL: DIAGNOSIS
Pt with known severe oropharyngeal dysphagia as per most recent MBS. Pt continues to show clinical signs of dysphagia due to overt coughing, throat clearing, and wet vocal quality following trace amounts of ice chip. Pt continues to be NOT SAFE for PO intake.

## 2019-01-03 NOTE — PROGRESS NOTE ADULT - PROBLEM SELECTOR PROBLEM 4
Altered mental status
R/O Combined systolic and diastolic heart failure, unspecified HF chronicity
Altered mental status
R/O Combined systolic and diastolic heart failure, unspecified HF chronicity

## 2019-01-03 NOTE — PROGRESS NOTE ADULT - PROBLEM SELECTOR PLAN 3
Required ICU, pressors, and intubation- now resolved
as above
as above
Required ICU, pressors, and intubation- now resolved

## 2019-01-03 NOTE — PROGRESS NOTE ADULT - PROBLEM SELECTOR PROBLEM 2
Aspiration pneumonia of both lower lobes due to regurgitated food

## 2019-01-03 NOTE — DISCHARGE NOTE ADULT - HOSPITAL COURSE
85 year-old male with PMH: Severe dysphagia s/p PEG with multiple hospitalizations for aspiration PNA, HTN, HLD, Grad 3 Diastolic Heart Failure s/p ppm, CKD. Recently admitted to the ICU with septic shock secondary to aspiration pneumonia and intubated for hypercapnic respiratory failure. Pt is now improved and has been downgraded to 2 south.

## 2019-01-03 NOTE — PROGRESS NOTE ADULT - REASON FOR ADMISSION
Respiratory Failure

## 2019-01-03 NOTE — PROGRESS NOTE ADULT - PROBLEM SELECTOR PLAN 7
K+3.3 supplement with KCL 40 meq  Phos 1.5 supplement with KPhos 15 mmol  F/U BMP in am
replete    stable to [facility]

## 2019-01-03 NOTE — DISCHARGE NOTE ADULT - MEDICATION SUMMARY - MEDICATIONS TO TAKE
I will START or STAY ON the medications listed below when I get home from the hospital:    budesonide 0.5 mg/2 mL inhalation suspension  -- 2 milliliter(s) inhaled 2 times a day  -- Indication: For Acute respiratory failure with hypercapnia    predniSONE 5 mg oral tablet  -- 1 tab(s) by mouth once a day  -- Indication: For Acute respiratory failure with hypercapnia    acetaminophen 325 mg oral tablet  -- 2 tab(s) by mouth every 6 hours, As needed, Mild- Moderate Pain (1 - 6)  -- Indication: For pain    Percocet 5/325 oral tablet  -- 2 tab(s) by mouth every 4 hours, As needed, Severe Pain (7 - 10)  -- Indication: For pain    doxazosin 4 mg oral tablet  -- 1 tab(s) by mouth once a day (at bedtime)  -- Indication: For bph    escitalopram 10 mg oral tablet  -- 1 tab(s) by mouth once a day  -- Indication: For Depression    ipratropium-albuterol 0.5 mg-2.5 mg/3 mLinhalation solution  -- 3 milliliter(s) inhaled every 6 hours  -- Indication: For Acute respiratory failure with hypercapnia    zinc oxide topical ointment  -- Apply on skin to affected area 2 times a day to coccyx and sacrum  -- Indication: For topical agent    linaclotide 145 mcg oral capsule  -- 1 cap(s) by mouth once a day (before a meal)  -- Indication: For GI agent     glycopyrrolate 1 mg oral tablet  -- 1 tab(s) by mouth 3 times a day  -- Indication: For GI agent    bisacodyl 10 mg rectal suppository  -- 1 suppository(ies) rectally once a day, As needed, Constipation  -- Indication: For Constipation     polyethylene glycol 3350 oral powder for reconstitution  -- 17 gram(s) by mouth once a day, As needed, Constipation  -- Indication: For Constipation    melatonin 5 mg oral tablet  -- 1 tab(s) by mouth once a day (at bedtime)  -- Indication: For Sleep    atropine 1% ophthalmic solution  -- drop(s) sublingually 3 times a day  -- Indication: For topical agent    latanoprost 0.005% ophthalmic solution  -- 1 drop(s) to each affected eye once a day (at bedtime)  -- Indication: For opthalmic     dorzolamide-timolol 2.23%-0.68% ophthalmic solution  -- 1 drop(s) to each affected eye 2 times a day  -- Indication: For opthalmic     cyanocobalamin 1000 mcg oral tablet  -- 1 tab(s) by mouth once a day  -- Indication: For vitamin I will START or STAY ON the medications listed below when I get home from the hospital:    budesonide 0.5 mg/2 mL inhalation suspension  -- 2 milliliter(s) inhaled 2 times a day  -- Indication: For Acute respiratory failure with hypercapnia    predniSONE 10 mg oral tablet  -- 3 tab(s) oral - by mouth once a day x 3 days  2 tab(s) oral - by mouth once a day x 3 days  1 tab(s) oral - by mouth once a day x 3 days MDD:30mg  -- It is very important that you take or use this exactly as directed.  Do not skip doses or discontinue unless directed by your doctor.  Obtain medical advice before taking any non-prescription drugs as some may affect the action of this medication.  Take with food or milk.    -- Indication: For Acute respiratory distress    acetaminophen 325 mg oral tablet  -- 2 tab(s) by mouth every 6 hours, As needed, Mild- Moderate Pain (1 - 6)  -- Indication: For pain    Percocet 5/325 oral tablet  -- 2 tab(s) by mouth every 4 hours, As needed, Severe Pain (7 - 10)  -- Indication: For pain    doxazosin 4 mg oral tablet  -- 1 tab(s) by mouth once a day (at bedtime)  -- Indication: For bph    escitalopram 10 mg oral tablet  -- 1 tab(s) by mouth once a day  -- Indication: For Depression    ipratropium-albuterol 0.5 mg-2.5 mg/3 mLinhalation solution  -- 3 milliliter(s) inhaled every 6 hours  -- Indication: For Acute respiratory failure with hypercapnia    zinc oxide topical ointment  -- Apply on skin to affected area 2 times a day to coccyx and sacrum  -- Indication: For topical agent    linaclotide 145 mcg oral capsule  -- 1 cap(s) by mouth once a day (before a meal)  -- Indication: For GI agent     glycopyrrolate 1 mg oral tablet  -- 1 tab(s) by mouth 3 times a day  -- Indication: For GI agent    bisacodyl 10 mg rectal suppository  -- 1 suppository(ies) rectally once a day, As needed, Constipation  -- Indication: For Constipation     polyethylene glycol 3350 oral powder for reconstitution  -- 17 gram(s) by mouth once a day, As needed, Constipation  -- Indication: For Constipation    melatonin 5 mg oral tablet  -- 1 tab(s) by mouth once a day (at bedtime)  -- Indication: For Sleep    atropine 1% ophthalmic solution  -- drop(s) sublingually 3 times a day  -- Indication: For topical agent    latanoprost 0.005% ophthalmic solution  -- 1 drop(s) to each affected eye once a day (at bedtime)  -- Indication: For opthalmic     dorzolamide-timolol 2.23%-0.68% ophthalmic solution  -- 1 drop(s) to each affected eye 2 times a day  -- Indication: For opthalmic     cyanocobalamin 1000 mcg oral tablet  -- 1 tab(s) by mouth once a day  -- Indication: For vitamin

## 2019-01-03 NOTE — DISCHARGE NOTE ADULT - ADDITIONAL INSTRUCTIONS
Patient should be sitting up for 2 hours after bolus feeds to prevent aspiration, ***THIS IS VERY IMPORTANT  follow up with your PCP

## 2019-01-03 NOTE — SWALLOW BEDSIDE ASSESSMENT ADULT - SLP PERTINENT HISTORY OF CURRENT PROBLEM
Pt known to this department. Pt seen for MBS on 20-Dec-2018 where pt was administered trials of puree, nectar and thins via teaspoon: Pt presents with a severe oropharyngeal dysphagia. Noted reduced bolus formation and propulsion. Reduced base of tongue retraction and laryngeal elevation resulted in severe amounts of pharyngeal residue that Pt was unable to clear. Poor airway protection and pharyngeal residue resulted in deep penetration of all consistencies with suspected aspiration, however, unable to view vocal cords 2/2 Pt positioning and body habitus. Pt inconsistently coughing in response to penetration. pt unable to clear residue or penetrated material despite cues.  Recommend continue NPO with PEG tube feeds."

## 2019-01-03 NOTE — PROGRESS NOTE ADULT - PROBLEM SELECTOR PROBLEM 5
ATN (acute tubular necrosis)
Chronic kidney disease, unspecified CKD stage
ATN (acute tubular necrosis)
Chronic kidney disease, unspecified CKD stage

## 2019-01-03 NOTE — PROGRESS NOTE ADULT - ASSESSMENT
85 year-old male with PMH: Severe dysphagia s/p PEG with multiple hospitalizations for aspiration PNA, HTN, HLD, Grad 3 Diastolic Heart Failure s/p ppm, CKD. Recently admitted to the ICU with septic shock secondary to aspiration pneumonia and intubated for hypercapnic respiratory failure. Pt is now improved and has been downgraded to 2 south.  Plan for today is to continue antibiotics, steroid taper, and diuresing. Supplement electrolytes K+, Phos. yes

## 2019-01-03 NOTE — PROGRESS NOTE ADULT - PROVIDER SPECIALTY LIST ADULT
Critical Care
Hospitalist
Hospitalist
Palliative Care
Critical Care

## 2019-01-03 NOTE — DISCHARGE NOTE ADULT - SECONDARY DIAGNOSIS.
Aspiration pneumonia of both lower lobes due to regurgitated food Combined systolic and diastolic heart failure, unspecified HF chronicity Chronic kidney disease, unspecified CKD stage

## 2019-02-05 NOTE — AIRWAY REMOVAL NOTE  ADULT & PEDS - BREATH SOUNDS ALL FIELDS
Problem: Adult Inpatient Plan of Care  Goal: Patient-Specific Goal (Individualization)  Recommendations: 1) Continue consistent carbohydrate diet (4-5 carb choices) 2) Add Beneprotein, tid   Intervention: Modified carbohydrate diet.  Nutrition education.   Goals: Pt will consume >=85% EEN during admit  Nutrition Goal Status: new  Communication of RD Recs: discussed on rounds(POC)         equal bilaterally/rhonchi

## 2019-02-11 NOTE — PHYSICAL THERAPY INITIAL EVALUATION ADULT - FUNCTIONAL LIMITATIONS, PT EVAL
Anesthetic History No history of anesthetic complications Review of Systems / Medical History Patient summary reviewed and pertinent labs reviewed Pulmonary Within defined limits Neuro/Psych Within defined limits Cardiovascular Hypertension (PIH) Exercise tolerance: >4 METS 
  
GI/Hepatic/Renal 
Within defined limits Endo/Other Within defined limits Other Findings Physical Exam 
 
Airway Mallampati: II 
TM Distance: 4 - 6 cm Neck ROM: normal range of motion Mouth opening: Normal 
 
 Cardiovascular Rhythm: regular Rate: normal 
 
 
 
 Dental 
No notable dental hx Pulmonary Breath sounds clear to auscultation Abdominal 
GI exam deferred Other Findings Anesthetic Plan ASA: 2 Anesthesia type: epidural 
 
 
 
 
 
Anesthetic plan and risks discussed with: Patient
self-care

## 2019-02-22 NOTE — ED PROVIDER NOTE - NSTIMEPROVIDERCAREINITIATE_GEN_ER

## 2019-04-19 NOTE — CONSULT NOTE ADULT - ASSESSMENT
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 ICU called for hypotension. Patient received IV fluids and seems to have responded to fluid resuscitation. Lactate level is normal. ABG is unremarkable. At time of evaluation /99, P60. Saturating 94% on 4L NC.     Assessment:  1. Aspiration pneumonia   2. Hypotension   3. MELISSA on CKD   4. Diastolic heart disease   5. dysphagia     - Cont. Antibiotics to cover for gram negative and anerobic organisms   - Oxygen support to maintain saturation  - Cont. IV fluid resuscitation, though be careful as patient with underlying heart dysfunction  - Monitor respiratory status  - F/u culture results  - Cont. Inhaler support   - DVT prophylaxis  - Given patient responded to fluid resuscitation and awake following commands. Currently not in distress.   - Recommend to continue current treatment, no need for ICU admission at this time  - Establish goals of care as patient with advanced aged and multiple comorbidities
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  reviewed white cell count is normal range and he is currently afebrile     Plan   1.	Agree with Zosyn for aspiration pneumonia anticipate 5-7 day course   2.	continue supportive care as per medicine.
Recurrent aspiration pneumonia;  It is unclear if pt can maintain nutrtional status without using PEG.. Will review po intake on modified diet.  Continue with abx therapy.  repeat cxr
possible adrenal insuffiency.  suggest Stat ACTH, CORtisol.   start on IV hydrocortisone 50 mg q 8 hours temporarily,till results are back.  2cal Hn bolus feedings 4 times per day.
no

## 2019-06-01 ENCOUNTER — INPATIENT (INPATIENT)
Facility: HOSPITAL | Age: 84
LOS: 12 days | Discharge: NURSING, REHAB OR LONG-TERM CARE | DRG: 870 | End: 2019-06-14
Attending: INTERNAL MEDICINE | Admitting: INTERNAL MEDICINE
Payer: MEDICARE

## 2019-06-01 VITALS
TEMPERATURE: 96 F | HEART RATE: 67 BPM | SYSTOLIC BLOOD PRESSURE: 96 MMHG | OXYGEN SATURATION: 95 % | RESPIRATION RATE: 21 BRPM | HEIGHT: 69 IN | DIASTOLIC BLOOD PRESSURE: 40 MMHG | WEIGHT: 187.39 LBS

## 2019-06-01 DIAGNOSIS — Z93.1 GASTROSTOMY STATUS: Chronic | ICD-10-CM

## 2019-06-01 DIAGNOSIS — A41.9 SEPSIS, UNSPECIFIED ORGANISM: ICD-10-CM

## 2019-06-01 DIAGNOSIS — Z95.0 PRESENCE OF CARDIAC PACEMAKER: Chronic | ICD-10-CM

## 2019-06-01 LAB
ALBUMIN SERPL ELPH-MCNC: 2.1 G/DL — LOW (ref 3.3–5)
ALP SERPL-CCNC: 120 U/L — SIGNIFICANT CHANGE UP (ref 40–120)
ALT FLD-CCNC: 19 U/L DA — SIGNIFICANT CHANGE UP (ref 10–45)
ANION GAP SERPL CALC-SCNC: 12 MMOL/L — SIGNIFICANT CHANGE UP (ref 5–17)
APPEARANCE UR: CLEAR — SIGNIFICANT CHANGE UP
APTT BLD: 44 SEC — HIGH (ref 27.5–36.3)
AST SERPL-CCNC: 25 U/L — SIGNIFICANT CHANGE UP (ref 10–40)
BACTERIA # UR AUTO: ABNORMAL /HPF
BASOPHILS # BLD AUTO: 0.06 K/UL — SIGNIFICANT CHANGE UP (ref 0–0.2)
BASOPHILS NFR BLD AUTO: 0.4 % — SIGNIFICANT CHANGE UP (ref 0–2)
BILIRUB SERPL-MCNC: 0.2 MG/DL — SIGNIFICANT CHANGE UP (ref 0.2–1.2)
BILIRUB UR-MCNC: NEGATIVE — SIGNIFICANT CHANGE UP
BLOOD GAS COMMENTS ARTERIAL: SIGNIFICANT CHANGE UP
BUN SERPL-MCNC: 53 MG/DL — HIGH (ref 7–23)
CALCIUM SERPL-MCNC: 10.4 MG/DL — SIGNIFICANT CHANGE UP (ref 8.4–10.5)
CHLORIDE SERPL-SCNC: 98 MMOL/L — SIGNIFICANT CHANGE UP (ref 96–108)
CO2 BLDA-SCNC: 23 MMOL/L — SIGNIFICANT CHANGE UP (ref 22–30)
CO2 BLDA-SCNC: 23 MMOL/L — SIGNIFICANT CHANGE UP (ref 22–30)
CO2 BLDA-SCNC: 24 MMOL/L — SIGNIFICANT CHANGE UP (ref 22–30)
CO2 SERPL-SCNC: 25 MMOL/L — SIGNIFICANT CHANGE UP (ref 22–31)
COLOR SPEC: YELLOW — SIGNIFICANT CHANGE UP
CREAT SERPL-MCNC: 2.06 MG/DL — HIGH (ref 0.5–1.3)
DIFF PNL FLD: ABNORMAL
EOSINOPHIL # BLD AUTO: 0.02 K/UL — SIGNIFICANT CHANGE UP (ref 0–0.5)
EOSINOPHIL NFR BLD AUTO: 0.1 % — SIGNIFICANT CHANGE UP (ref 0–6)
EPI CELLS # UR: SIGNIFICANT CHANGE UP
GAS PNL BLDA: SIGNIFICANT CHANGE UP
GLUCOSE SERPL-MCNC: 130 MG/DL — HIGH (ref 70–99)
GLUCOSE UR QL: NEGATIVE — SIGNIFICANT CHANGE UP
HCT VFR BLD CALC: 33.6 % — LOW (ref 39–50)
HGB BLD-MCNC: 10.5 G/DL — LOW (ref 13–17)
HOROWITZ INDEX BLDA+IHG-RTO: SIGNIFICANT CHANGE UP
IMM GRANULOCYTES NFR BLD AUTO: 0.4 % — SIGNIFICANT CHANGE UP (ref 0–1.5)
INR BLD: 1.46 RATIO — HIGH (ref 0.88–1.16)
KETONES UR-MCNC: NEGATIVE — SIGNIFICANT CHANGE UP
LACTATE SERPL-SCNC: 1.2 MMOL/L — SIGNIFICANT CHANGE UP (ref 0.7–2)
LACTATE SERPL-SCNC: 3.4 MMOL/L — HIGH (ref 0.7–2)
LEUKOCYTE ESTERASE UR-ACNC: ABNORMAL
LYMPHOCYTES # BLD AUTO: 1.85 K/UL — SIGNIFICANT CHANGE UP (ref 1–3.3)
LYMPHOCYTES # BLD AUTO: 13.6 % — SIGNIFICANT CHANGE UP (ref 13–44)
MCHC RBC-ENTMCNC: 28.3 PG — SIGNIFICANT CHANGE UP (ref 27–34)
MCHC RBC-ENTMCNC: 31.3 GM/DL — LOW (ref 32–36)
MCV RBC AUTO: 90.6 FL — SIGNIFICANT CHANGE UP (ref 80–100)
MONOCYTES # BLD AUTO: 0.55 K/UL — SIGNIFICANT CHANGE UP (ref 0–0.9)
MONOCYTES NFR BLD AUTO: 4 % — SIGNIFICANT CHANGE UP (ref 2–14)
NEUTROPHILS # BLD AUTO: 11.1 K/UL — HIGH (ref 1.8–7.4)
NEUTROPHILS NFR BLD AUTO: 81.5 % — HIGH (ref 43–77)
NITRITE UR-MCNC: NEGATIVE — SIGNIFICANT CHANGE UP
NRBC # BLD: 0 /100 WBCS — SIGNIFICANT CHANGE UP (ref 0–0)
NT-PROBNP SERPL-SCNC: HIGH PG/ML (ref 0–300)
PCO2 BLDA: 36 MMHG — SIGNIFICANT CHANGE UP (ref 32–46)
PCO2 BLDA: 36 MMHG — SIGNIFICANT CHANGE UP (ref 32–46)
PCO2 BLDA: 47 MMHG — HIGH (ref 32–46)
PH BLDA: 7.31 — LOW (ref 7.35–7.45)
PH BLDA: 7.4 — SIGNIFICANT CHANGE UP (ref 7.35–7.45)
PH BLDA: 7.41 — SIGNIFICANT CHANGE UP (ref 7.35–7.45)
PH UR: 6 — SIGNIFICANT CHANGE UP (ref 5–8)
PLATELET # BLD AUTO: 149 K/UL — LOW (ref 150–400)
PO2 BLDA: 65 MMHG — LOW (ref 74–108)
PO2 BLDA: 84 MMHG — SIGNIFICANT CHANGE UP (ref 74–108)
PO2 BLDA: 91 MMHG — SIGNIFICANT CHANGE UP (ref 74–108)
POTASSIUM SERPL-MCNC: 4.6 MMOL/L — SIGNIFICANT CHANGE UP (ref 3.5–5.3)
POTASSIUM SERPL-SCNC: 4.6 MMOL/L — SIGNIFICANT CHANGE UP (ref 3.5–5.3)
PROCALCITONIN SERPL-MCNC: 0.85 NG/ML — HIGH
PROT SERPL-MCNC: 7.9 G/DL — SIGNIFICANT CHANGE UP (ref 6–8.3)
PROT UR-MCNC: 100
PROTHROM AB SERPL-ACNC: 16.6 SEC — HIGH (ref 10–12.9)
RBC # BLD: 3.71 M/UL — LOW (ref 4.2–5.8)
RBC # FLD: 16.2 % — HIGH (ref 10.3–14.5)
RBC CASTS # UR COMP ASSIST: ABNORMAL /HPF (ref 0–4)
SAO2 % BLDA: 93 % — SIGNIFICANT CHANGE UP (ref 92–96)
SAO2 % BLDA: 95 % — SIGNIFICANT CHANGE UP (ref 92–96)
SAO2 % BLDA: 97 % — HIGH (ref 92–96)
SODIUM SERPL-SCNC: 135 MMOL/L — SIGNIFICANT CHANGE UP (ref 135–145)
SP GR SPEC: 1.01 — SIGNIFICANT CHANGE UP (ref 1.01–1.02)
TROPONIN I SERPL-MCNC: 0.02 NG/ML — SIGNIFICANT CHANGE UP (ref 0.02–0.06)
UROBILINOGEN FLD QL: NEGATIVE — SIGNIFICANT CHANGE UP
WBC # BLD: 13.64 K/UL — HIGH (ref 3.8–10.5)
WBC # FLD AUTO: 13.64 K/UL — HIGH (ref 3.8–10.5)
WBC UR QL: ABNORMAL /HPF (ref 0–5)

## 2019-06-01 PROCEDURE — 93010 ELECTROCARDIOGRAM REPORT: CPT

## 2019-06-01 PROCEDURE — 71045 X-RAY EXAM CHEST 1 VIEW: CPT | Mod: 26,76

## 2019-06-01 PROCEDURE — 31500 INSERT EMERGENCY AIRWAY: CPT

## 2019-06-01 PROCEDURE — 99291 CRITICAL CARE FIRST HOUR: CPT | Mod: 25

## 2019-06-01 RX ORDER — PANTOPRAZOLE SODIUM 20 MG/1
40 TABLET, DELAYED RELEASE ORAL
Refills: 0 | Status: DISCONTINUED | OUTPATIENT
Start: 2019-06-01 | End: 2019-06-01

## 2019-06-01 RX ORDER — SODIUM CHLORIDE 9 MG/ML
2200 INJECTION INTRAMUSCULAR; INTRAVENOUS; SUBCUTANEOUS ONCE
Refills: 0 | Status: COMPLETED | OUTPATIENT
Start: 2019-06-01 | End: 2019-06-01

## 2019-06-01 RX ORDER — CEFEPIME 1 G/1
2000 INJECTION, POWDER, FOR SOLUTION INTRAMUSCULAR; INTRAVENOUS ONCE
Refills: 0 | Status: COMPLETED | OUTPATIENT
Start: 2019-06-01 | End: 2019-06-01

## 2019-06-01 RX ORDER — VECURONIUM BROMIDE 20 MG/1
10 INJECTION, POWDER, FOR SOLUTION INTRAVENOUS ONCE
Refills: 0 | Status: COMPLETED | OUTPATIENT
Start: 2019-06-01 | End: 2019-06-01

## 2019-06-01 RX ORDER — DOPAMINE HYDROCHLORIDE 40 MG/ML
4 INJECTION, SOLUTION, CONCENTRATE INTRAVENOUS
Qty: 400 | Refills: 0 | Status: DISCONTINUED | OUTPATIENT
Start: 2019-06-01 | End: 2019-06-01

## 2019-06-01 RX ORDER — ALBUTEROL 90 UG/1
1 AEROSOL, METERED ORAL EVERY 4 HOURS
Refills: 0 | Status: COMPLETED | OUTPATIENT
Start: 2019-06-01 | End: 2020-04-29

## 2019-06-01 RX ORDER — PANTOPRAZOLE SODIUM 20 MG/1
40 TABLET, DELAYED RELEASE ORAL DAILY
Refills: 0 | Status: DISCONTINUED | OUTPATIENT
Start: 2019-06-01 | End: 2019-06-14

## 2019-06-01 RX ORDER — DORZOLAMIDE HYDROCHLORIDE TIMOLOL MALEATE 20; 5 MG/ML; MG/ML
1 SOLUTION/ DROPS OPHTHALMIC
Refills: 0 | Status: DISCONTINUED | OUTPATIENT
Start: 2019-06-01 | End: 2019-06-14

## 2019-06-01 RX ORDER — TIOTROPIUM BROMIDE 18 UG/1
1 CAPSULE ORAL; RESPIRATORY (INHALATION) DAILY
Refills: 0 | Status: DISCONTINUED | OUTPATIENT
Start: 2019-06-01 | End: 2019-06-02

## 2019-06-01 RX ORDER — NOREPINEPHRINE BITARTRATE/D5W 8 MG/250ML
0.05 PLASTIC BAG, INJECTION (ML) INTRAVENOUS
Qty: 32 | Refills: 0 | Status: DISCONTINUED | OUTPATIENT
Start: 2019-06-01 | End: 2019-06-01

## 2019-06-01 RX ORDER — NOREPINEPHRINE BITARTRATE/D5W 8 MG/250ML
0.05 PLASTIC BAG, INJECTION (ML) INTRAVENOUS
Qty: 16 | Refills: 0 | Status: DISCONTINUED | OUTPATIENT
Start: 2019-06-01 | End: 2019-06-06

## 2019-06-01 RX ORDER — IPRATROPIUM/ALBUTEROL SULFATE 18-103MCG
3 AEROSOL WITH ADAPTER (GRAM) INHALATION EVERY 6 HOURS
Refills: 0 | Status: DISCONTINUED | OUTPATIENT
Start: 2019-06-01 | End: 2019-06-01

## 2019-06-01 RX ORDER — CHLORHEXIDINE GLUCONATE 213 G/1000ML
15 SOLUTION TOPICAL
Refills: 0 | Status: DISCONTINUED | OUTPATIENT
Start: 2019-06-01 | End: 2019-06-07

## 2019-06-01 RX ORDER — CHLORHEXIDINE GLUCONATE 213 G/1000ML
1 SOLUTION TOPICAL
Refills: 0 | Status: DISCONTINUED | OUTPATIENT
Start: 2019-06-01 | End: 2019-06-01

## 2019-06-01 RX ORDER — VANCOMYCIN HCL 1 G
1000 VIAL (EA) INTRAVENOUS ONCE
Refills: 0 | Status: COMPLETED | OUTPATIENT
Start: 2019-06-01 | End: 2019-06-01

## 2019-06-01 RX ORDER — VANCOMYCIN HCL 1 G
1000 VIAL (EA) INTRAVENOUS EVERY 12 HOURS
Refills: 0 | Status: DISCONTINUED | OUTPATIENT
Start: 2019-06-02 | End: 2019-06-03

## 2019-06-01 RX ORDER — CHLORHEXIDINE GLUCONATE 213 G/1000ML
1 SOLUTION TOPICAL
Refills: 0 | Status: DISCONTINUED | OUTPATIENT
Start: 2019-06-01 | End: 2019-06-06

## 2019-06-01 RX ORDER — FENTANYL CITRATE 50 UG/ML
0.5 INJECTION INTRAVENOUS
Qty: 2500 | Refills: 0 | Status: DISCONTINUED | OUTPATIENT
Start: 2019-06-01 | End: 2019-06-04

## 2019-06-01 RX ORDER — FENTANYL CITRATE 50 UG/ML
0.5 INJECTION INTRAVENOUS
Qty: 5000 | Refills: 0 | Status: DISCONTINUED | OUTPATIENT
Start: 2019-06-01 | End: 2019-06-01

## 2019-06-01 RX ORDER — ALBUMIN HUMAN 25 %
100 VIAL (ML) INTRAVENOUS EVERY 6 HOURS
Refills: 0 | Status: DISCONTINUED | OUTPATIENT
Start: 2019-06-01 | End: 2019-06-01

## 2019-06-01 RX ORDER — CEFEPIME 1 G/1
1000 INJECTION, POWDER, FOR SOLUTION INTRAMUSCULAR; INTRAVENOUS EVERY 24 HOURS
Refills: 0 | Status: DISCONTINUED | OUTPATIENT
Start: 2019-06-02 | End: 2019-06-04

## 2019-06-01 RX ORDER — HEPARIN SODIUM 5000 [USP'U]/ML
5000 INJECTION INTRAVENOUS; SUBCUTANEOUS EVERY 8 HOURS
Refills: 0 | Status: DISCONTINUED | OUTPATIENT
Start: 2019-06-01 | End: 2019-06-14

## 2019-06-01 RX ORDER — LATANOPROST 0.05 MG/ML
1 SOLUTION/ DROPS OPHTHALMIC; TOPICAL AT BEDTIME
Refills: 0 | Status: DISCONTINUED | OUTPATIENT
Start: 2019-06-01 | End: 2019-06-14

## 2019-06-01 RX ORDER — ETOMIDATE 2 MG/ML
20 INJECTION INTRAVENOUS ONCE
Refills: 0 | Status: COMPLETED | OUTPATIENT
Start: 2019-06-01 | End: 2019-06-01

## 2019-06-01 RX ORDER — BUDESONIDE, MICRONIZED 100 %
0.5 POWDER (GRAM) MISCELLANEOUS EVERY 12 HOURS
Refills: 0 | Status: DISCONTINUED | OUTPATIENT
Start: 2019-06-01 | End: 2019-06-02

## 2019-06-01 RX ORDER — PHENYLEPHRINE HYDROCHLORIDE 10 MG/ML
0.5 INJECTION INTRAVENOUS
Qty: 160 | Refills: 0 | Status: DISCONTINUED | OUTPATIENT
Start: 2019-06-01 | End: 2019-06-01

## 2019-06-01 RX ORDER — SODIUM CHLORIDE 9 MG/ML
10 INJECTION INTRAMUSCULAR; INTRAVENOUS; SUBCUTANEOUS
Refills: 0 | Status: DISCONTINUED | OUTPATIENT
Start: 2019-06-01 | End: 2019-06-07

## 2019-06-01 RX ADMIN — LATANOPROST 1 DROP(S): 0.05 SOLUTION/ DROPS OPHTHALMIC; TOPICAL at 21:35

## 2019-06-01 RX ADMIN — PANTOPRAZOLE SODIUM 40 MILLIGRAM(S): 20 TABLET, DELAYED RELEASE ORAL at 16:44

## 2019-06-01 RX ADMIN — CHLORHEXIDINE GLUCONATE 1 APPLICATION(S): 213 SOLUTION TOPICAL at 17:08

## 2019-06-01 RX ADMIN — FENTANYL CITRATE 4.25 MICROGRAM(S)/KG/HR: 50 INJECTION INTRAVENOUS at 17:08

## 2019-06-01 RX ADMIN — DORZOLAMIDE HYDROCHLORIDE TIMOLOL MALEATE 1 DROP(S): 20; 5 SOLUTION/ DROPS OPHTHALMIC at 17:08

## 2019-06-01 RX ADMIN — SODIUM CHLORIDE 2200 MILLILITER(S): 9 INJECTION INTRAMUSCULAR; INTRAVENOUS; SUBCUTANEOUS at 11:35

## 2019-06-01 RX ADMIN — HEPARIN SODIUM 5000 UNIT(S): 5000 INJECTION INTRAVENOUS; SUBCUTANEOUS at 16:44

## 2019-06-01 RX ADMIN — Medication 250 MILLIGRAM(S): at 11:35

## 2019-06-01 RX ADMIN — DOPAMINE HYDROCHLORIDE 12.75 MICROGRAM(S)/KG/MIN: 40 INJECTION, SOLUTION, CONCENTRATE INTRAVENOUS at 12:27

## 2019-06-01 RX ADMIN — VECURONIUM BROMIDE 10 MILLIGRAM(S): 20 INJECTION, POWDER, FOR SOLUTION INTRAVENOUS at 12:36

## 2019-06-01 RX ADMIN — Medication 1000 MILLIGRAM(S): at 13:39

## 2019-06-01 RX ADMIN — ETOMIDATE 20 MILLIGRAM(S): 2 INJECTION INTRAVENOUS at 12:33

## 2019-06-01 RX ADMIN — CEFEPIME 100 MILLIGRAM(S): 1 INJECTION, POWDER, FOR SOLUTION INTRAMUSCULAR; INTRAVENOUS at 12:46

## 2019-06-01 RX ADMIN — Medication 3.98 MICROGRAM(S)/KG/MIN: at 16:52

## 2019-06-01 RX ADMIN — SODIUM CHLORIDE 2200 MILLILITER(S): 9 INJECTION INTRAMUSCULAR; INTRAVENOUS; SUBCUTANEOUS at 12:36

## 2019-06-01 RX ADMIN — HEPARIN SODIUM 5000 UNIT(S): 5000 INJECTION INTRAVENOUS; SUBCUTANEOUS at 23:02

## 2019-06-01 NOTE — H&P ADULT - NSHPPHYSICALEXAM_GEN_ALL_CORE
Physical Examination:  GENERAL:               lethargic appearing.  HEENT:                    Pupils equal, reactive to light.  Symmetric. No JVD, Moist MM  PULM:                     diminished at bilateral bases, intubated with thick yellow-tan secretion by suction  CVS:                         S1, S2,  No Murmur  ABD:                        Soft, nondistended, nontender, normoactive bowel sounds,   EXT:                         No edema, nontender, No Cyanosis or Clubbing   Vascular:                Warm Extremities, Normal Capillary refill, Normal Distal Pulses  SKIN:                       Warm and well perfused, no rashes noted.   NEURO:                  arousable with painful/noxious stimuli  PSYC:                      unable to assess

## 2019-06-01 NOTE — ED ADULT NURSE NOTE - CHIEF COMPLAINT QUOTE
Pt sent from [facility] to r/o aspiration and shortness of breath per EMS Pt sent from [facility] to r/o aspiration and shortness of breath per EMS

## 2019-06-01 NOTE — PROGRESS NOTE ADULT - ASSESSMENT
ASSESSMENT:     85M w/ PMH listed below, long Hx of aspirations, multiple admissions and intubations for past aspiration events. Now arrives S/P possible cardiac arrest at his facility (unclear if lost pulse) but when he "woke up" he was coughing/hacking, mucous plugging, likely aspiration event.   -on Er arrival was persistently hypoxic and required intubation. Also in shock state requiring pressors.     Events last 24 hours:   -post intubation CXR (roughly at 1230 thisa fternoon showed complet left lung whit out, repeat obtained now, shows left lung is now open. Likely was mucous plug --> atelectasis, which (+) pressure from ventialtor helped recitfy       PLAN:     #NEURO  -on fentanyl drip for comfort while intubated, maitnain RASS 0, daily sedation vacation    #CV  -remains in shock state on levophed, doapmine weaned off  -continue to titrate levophed for goal MAP 65-70  -BNP was >11K, will avid fluids, but if needed will give albumin to help voluem and edmea, as paient has low serum albumin concentration    #RESP  -Patient currently on Full vent support  -titrate settings to maintain SaO2 >90%, or pH >7.25  -consider low tidal volume ventilation strategy w/ goal Tv 4-6 cc/kg of ideal body weight  -plateu pressure goal <30  -Peridex oral care  -aggressive pulmonary toilet  -daily sedation vacation with spontaneous breathing trial if clinical condition warrants, discuss with respiratory therapy   -nebs     #RENAL  -currently in acuet renal failure with Scr of 2.06  -will continue vasopressers and trend UOP for goal >0.5 cc/kg/hr, albumin as needed to help  -renal dose medications and avoic nephrotoxins    #ID  -ID service is following  -continue current regimen of cefepime and vanco. Follow cultures and narrow anx as able    #Prophylaxis  DVT --> heparin  Stress ulcer --> protonix  VAP --> peridex

## 2019-06-01 NOTE — ED PROVIDER NOTE - OBJECTIVE STATEMENT
86 y/o M with PMH of CKD, CVA, CHF, CAD with cardiac PPM, dysphagia s/p PEG tube, multiple hospitalization for aspiration pneumonia, GERD, pulmonary HTN as was BIB EMS for SOB and possible aspiration. Per EMS on arrival patient Sp02 was 70's, and BP was 90's/60sand staff was performing CPR, however patient appears as though he was "choking", when EMS suctioned him they got out his tube feedings and his Spo2 improved. EMS also gave him 40IVP of Lasix. On arrival to the ED, Spo2 was 80's, patient was suctioned and placed on non rebreather.

## 2019-06-01 NOTE — H&P ADULT - HISTORY OF PRESENT ILLNESS
86 yo M with PMHx of Glaucoma, benign prostate neoplasm, HTN, HLD, Grad 3 Diastolic Heart Failure s/p ppm, CKD, dysphagia s/p PEG tube placement, CVA presents via EMS from [facility] for eval of SOB. Patient was reportedly eating dysphagia diet meal when he began to experience SOB with increase secretions and altered. Collateral obtained from facility suggests there was a brief period where patient received compressions. Upon arrival to the ED patient was found to be hypotensive 60/30s. Further decompensation involving patient respiratory status occurred due to secretions and mucous plugging leading to hypoxia prompting emergent intubation.    Initial labs significant for WBC 13.64, Cr 2.04, Lactate 3.4, Procalcitonin .85, ProBNP 61688, CXR reveal bibasilar atelectatic changes with questionable effusion vs infiltrate about left base.

## 2019-06-01 NOTE — ED PROVIDER NOTE - ATTENDING CONTRIBUTION TO CARE
cc unresponsive cpr performed in nursing home suctioned lot of thick mucus came pt has a peg tube possible aspiration from oral ingestion as reported by ems   pe lethargic hypotensive spo3 90 with oxygen   chest bilateral rales,   abdomen soft NT + BS  pt eval by icu sepsis work done  intubated in ed dopamine started to support pressure   pt admitted to ICU  Dr. Ramires:  I have reviewed and discussed with the PA/ resident the case specifics, including the history, physical assessment, evaluation, conclusion, laboratory results, and medical plan. I agree with the contents, and conclusions. I have personally examined, and interviewed the patient.

## 2019-06-01 NOTE — ED PROVIDER NOTE - CLINICAL SUMMARY MEDICAL DECISION MAKING FREE TEXT BOX
84 y/o M with PMH of CKD, CVA, CHF, CAD with cardiac PPM, dysphagia s/p PEG tube, multiple hospitalization for aspiration pneumonia, GERD, pulmonary HTN as was BIB EMS for SOB and possible aspiration. Per EMS on arrival patient Sp02 was 70's, and BP was 90's/60sand staff was performing CPR, however patient appears as though he was "choking", when EMS suctioned him they got out his tube feedings and his Spo2 improved. EMS also gave him 40IVP of Lasix. On arrival to the ED, Spo2 was 80's, patient was suctioned and placed on non rebreather. ED sepsis w/u initiated, IVF bolus and abx started. labs/UA, ABG, CXR pending.  Patient remained hypotensive 70's/ 30's with IVF bolus, dopamine drip started. ICU called, Dr. Blunt evaluated patient, he is accepted to ICU

## 2019-06-01 NOTE — PROGRESS NOTE ADULT - SUBJECTIVE AND OBJECTIVE BOX
Patient is a 85y old  Male who presents with a chief complaint of Hypoxic respiratory failure, Aspiration pneumonia, sepsis (2019 13:20)      BRIEF HOSPITAL COURSE:     85M w/ PMH listed below, long Hx of aspirations, multiple admissions and intubations for past aspiration events. Now arrives S/P possible cardiac arrest at his facility (unclear if lost pulse) but when he "woke up" he was coughing/hacking, mucous plugging, likely aspiration event.   -on Er arrival was persistently hypoxic and required intubation. Also in shock state requiring pressors.     Events last 24 hours:   -post intubation CXR (roughly at 1230 thisa fternoon showed complet left lung whit out, repeat obtained now, shows left lung is now open. Likely was mucous plug --> atelectasis, which (+) pressure from ventialtor helped recitfy     PAST MEDICAL & SURGICAL HISTORY:  Shortness of breath  Muscle weakness  Depression  IBS (irritable bowel syndrome)  Dysphagia  Cerebral infarction  Anemia  Psoriasis  GERD (gastroesophageal reflux disease)  Dyslipidemia  Cardiac pacemaker  S/P percutaneous endoscopic gastrostomy (PEG) tube placement      Review of Systems:  unable to obtain as patient intubated, sedated, on full vent support      Medications:  cefepime   IVPB 1000 milliGRAM(s) IV Intermittent every 24 hours  vancomycin  IVPB 1000 milliGRAM(s) IV Intermittent every 12 hours    norepinephrine Infusion 0.05 MICROgram(s)/kG/Min IV Continuous <Continuous>    ALBUTerol    90 MICROgram(s) HFA Inhaler 1 Puff(s) Inhalation every 4 hours  buDESOnide   0.5 milliGRAM(s) Respule 0.5 milliGRAM(s) Inhalation every 12 hours  tiotropium 18 MICROgram(s) Capsule 1 Capsule(s) Inhalation daily    fentaNYL   Infusion. 0.5 MICROgram(s)/kG/Hr IV Continuous <Continuous>      heparin  Injectable 5000 Unit(s) SubCutaneous every 8 hours    pantoprazole   Suspension 40 milliGRAM(s) Oral daily        sodium chloride 0.9% lock flush 10 milliLiter(s) IV Push every 1 hour PRN      chlorhexidine 0.12% Liquid 15 milliLiter(s) Oral Mucosa two times a day  chlorhexidine 4% Liquid 1 Application(s) Topical <User Schedule>  dorzolamide 2%/timolol 0.5% Ophthalmic Solution 1 Drop(s) Both EYES two times a day  latanoprost 0.005% Ophthalmic Solution 1 Drop(s) Both EYES at bedtime        Mode: AC/ CMV (Assist Control/ Continuous Mandatory Ventilation)  RR (machine): 18  TV (machine): 450  FiO2: 100  PEEP: 5  P-High:   P-Low:   PIP: 24      ICU Vital Signs Last 24 Hrs  T(C): 36.6 (2019 17:00), Max: 36.6 (2019 17:00)  T(F): 97.8 (2019 17:00), Max: 97.8 (2019 17:00)  HR: 60 (2019 20:00) (59 - 99)  BP: 126/68 (2019 20:00) (65/48 - 165/64)  BP(mean): 85 (2019 20:00) (55 - 100)  RR: 24 (2019 20:00) (15 - 28)  SpO2: 95% (2019 20:00) (85% - 100%)      ABG - ( 2019 13:40 )  pH, Arterial: 7.40  pH, Blood: x     /  pCO2: 36    /  pO2: 65    / HCO3: x     / Base Excess: x     /  SaO2: 93                  LABS:                        10.5   13.64 )-----------( 149      ( 2019 11:10 )             33.6     06-01    135  |  98  |  53<H>  ----------------------------<  130<H>  4.6   |  25  |  2.06<H>    Ca    10.4      2019 11:10    TPro  7.9  /  Alb  2.1<L>  /  TBili  0.2  /  DBili  x   /  AST  25  /  ALT  19  /  AlkPhos  120  06-01      CARDIAC MARKERS ( 2019 11:10 )  .019 ng/mL / x     / x     / x     / x          CAPILLARY BLOOD GLUCOSE        PT/INR - ( 2019 11:10 )   PT: 16.6 sec;   INR: 1.46 ratio         PTT - ( 2019 11:10 )  PTT:44.0 sec  Urinalysis Basic - ( 2019 11:16 )    Color: Yellow / Appearance: Clear / S.015 / pH: x  Gluc: x / Ketone: Negative  / Bili: Negative / Urobili: Negative   Blood: x / Protein: 100 / Nitrite: Negative   Leuk Esterase: Moderate / RBC: 11-25 /HPF / WBC 26-50 /HPF   Sq Epi: x / Non Sq Epi: Neg.-Few / Bacteria: Many /HPF      CULTURES:      Physical Examination:    General: Currently intubated, sedated/calml    HEENT: Pupils equal, reactive to light.  Symmetric.    PULM: diminished at bases bilaterally, rhonci noted bilaterally green/yellow  sputum production    CVS: Regular rate and rhythm, no murmurs, rubs, or gallops    ABD: Soft, nondistended, nontender, normoactive bowel sounds, no masses    EXT: No edema, nontender    SKIN: Warm and well perfused, no rashes noted.    RADIOLOGY:     cxr changes as noted in HPI    CRITICAL CARE TIME SPENT:  35 minutes of critical care time spent providing medical care for patient's acute illness/conditions that impairs at least one vital organ system and/or poses a high risk of imminent or life threatening deterioration in the patient's condition. It includes time spent evaluating and treating the patient's acute illness as well as time spent reviewing labs, radiology, discussing goals of care with patient and/or patient's family, and discussing the case with a multidisciplinary team, including the eICU, in an effort to prevent further life threatening deterioration or end organ damage. This time is independent of any procedures performed.

## 2019-06-01 NOTE — ED ADULT NURSE NOTE - NSIMPLEMENTINTERV_GEN_ALL_ED
Implemented All Fall with Harm Risk Interventions:  [place] to call system. Call bell, personal items and telephone within reach. Instruct patient to call for assistance. Room bathroom lighting operational. Non-slip footwear when patient is off stretcher. Physically safe environment: no spills, clutter or unnecessary equipment. Stretcher in lowest position, wheels locked, appropriate side rails in place. Provide visual cue, wrist band, yellow gown, etc. Monitor gait and stability. Monitor for mental status changes and reorient to person, place, and time. Review medications for side effects contributing to fall risk. Reinforce activity limits and safety measures with patient and family. Provide visual clues: red socks.

## 2019-06-01 NOTE — ED ADULT NURSE NOTE - OBJECTIVE STATEMENT
85 yr old male sent from [facility] for SOB and possible aspiration. Pt is responsive to verbal stimuli. 85 yr old male sent from [facility] for SOB and possible aspiration. Pt is responsive to verbal stimuli and opens eyes. Pt not talking just grunting. Pt with +PEG tube in place. Rales noted to lungs with auscultation.  Resp slightly labored.  Abd NT. +redness noted to sacral area. 20 G IV placed by EMS. +blood return, flushing well. 2nd IV placed to Rt FA. Bloods obtained and sent.  Pt placed on monitor. Will continue to monitor

## 2019-06-01 NOTE — ED PROVIDER NOTE - CARE PLAN
Principal Discharge DX:	Septic shock  Secondary Diagnosis:	Respiratory distress  Secondary Diagnosis:	Chronic kidney disease, unspecified CKD stage  Secondary Diagnosis:	Urinary tract infection with hematuria, site unspecified  Secondary Diagnosis:	Congestive heart failure, unspecified HF chronicity, unspecified heart failure type

## 2019-06-01 NOTE — H&P ADULT - ASSESSMENT
ASSESSMENT and PLAN:  1) Acute hypoxic respiratory failure - Likely 2/2 aspiration as symptoms start during/post feed in this patient with known dysphagia. No intubated. Post intubation CXR and ABG pending. Lung protective ventilation strategy. Titrate/wean vent as tolerated. Fentanyl for sedation to promote vent synchrony. Sputum culture pending.   2) Severe sepsis - Patient now on vasopressors. 30cc/kg IVF given. Blood culture drawn with results pending. Lactate from 3.4 to 1.2 post-IVF. Cefepime and Vancomycin given. Trend labs, ID consult.   3) CKD - Monitor I&Os. Avoid nephrotoxins. May be a result of hypoperfusion. Trend BMP   4) CHF -  BNP 42604. Avoid diuretics with hypotension. Obtain TTE. Cards consult.  5) ?UTI - Soto insert. Urine culture.       Family Updated:  Angie (Wife) Amelie (Daughter)		                                 Date: 6/1/19      Sedation & Analgesia:	Fentanyl  Diet/Nutrition:	NPO	  GI PPx:	Protonix		    DVT Ppx: Heparin SQ		  	RISK                                                          Points  	[ ] Previous VTE                                           	3  	[ ] Thrombophilia                                        	2  	[ ] Lower limb paralysis                              	2   	[ ] Current Cancer                                       	2   	[X ] Immobilization > 24 hrs                        	1  	[ ] ICU/CCU stay > 24 hours                       	1  	[X ] Age > 60                                                   	1  		Total:[ 2]      Activity:	Bedrest	                 Head of Bed:   35-45              Glycemic Control:    N/A        Lines:  PIV  CENTRAL LINE: 	[ ] YES [ x] NO	                    LOCATION:   	                       DATE INSERTED:   	                    REMOVE:  [ ] YES [ ] NO    A-LINE:  	                [ ] YES [x ] NO                      LOCATION:   	                       DATE INSERTED: 		            REMOVE:  [ ] YES [ ] NO    SOTO: 		        [ x] YES [ ] NO  		                                       DATE INSERTED:  6/1/19		            REMOVE:  [ ] YES [ x] NO      Restraints were deemed necessary to prevent removal of life-sustaining devices [  ] YES   [  x  ]  NO    Disposition: ICU     Goals of Care: DNR with trial intubation

## 2019-06-01 NOTE — ED PROVIDER NOTE - CRITICAL CARE PROVIDED
documentation/consult w/ pt's family directly relating to pts condition/interpretation of diagnostic studies/additional history taking/consultation with other physicians/conducted a detailed discussion of DNR status/direct patient care (not related to procedure)

## 2019-06-01 NOTE — ED ADULT NURSE REASSESSMENT NOTE - NS ED NURSE REASSESS COMMENT FT1
Pt O2 sats decreased with NRB. Pt suctioned, with no increase in O2 sat. Pt intubated with 7 ET tube at lipline 22.  Meds for intubation giving as ordered by VIRA Valle. ICU MD and ED attending at bedside at time of intubation.  Placement confirmed with auscultation of lungs and CO2 detector.  Will continue to monitor pt.

## 2019-06-01 NOTE — H&P ADULT - ATTENDING COMMENTS
Patient seen and examined with PA, Addendum to above.    86 yo M with PMHx of Glaucoma, benign prostate neoplasm, HTN, HLD, Grad 3 Diastolic Heart Failure s/p ppm, CKD, CVA with dysphagia s/p PEG tube placement presented with respiratory distress. Reported ?cardiac arrest at the nursing home as per wife, but no meds given and patient improved with suctioning. Upon evaluation in the ED patient with hypoxia and hemodynamic instability. Intubated in the ED and on vasopressor support. Chest xray showing dense left sided consolidation.     Assessment:  1. Acute hypoxic respiratory failure  2. Septic shock   3. Left sided pneumonia   4. Acute on chronic renal dysfunction   5. Lactic acidosis   6. Heart failure with preserved EF   7. Dysphagia     Plan:  Admit patient to ICU  Cont ventilatory support  IV fluid resuscitation in the ED at 30 cc/kg but remains hypotensive so started on vasopressors   Lactate level down trending  Cultures sent, f/u sputum and blood cultures  Nix for close urinary output measurement   Vasopressor support to maintain MAP > 65  Will place central line  Fentanyl for sedation  Feedings via PEG tube   Hold home BP medications given shock  Cont. Home glaucoma medications  GI/DVT prophylaxis Patient seen and examined with PA, Addendum to above.    86 yo M with PMHx of Glaucoma, benign prostate neoplasm, HTN, HLD, Grad 3 Diastolic Heart Failure s/p ppm, CKD, CVA with dysphagia s/p PEG tube placement presented with respiratory distress. Reported ?cardiac arrest at the nursing home as per wife, but no meds given and patient improved with suctioning. Upon evaluation in the ED patient with hypoxia and hemodynamic instability. Intubated in the ED and on vasopressor support. Chest xray showing dense left sided consolidation.     Assessment:  1. Acute hypoxic respiratory failure  2. Septic shock   3. Left sided pneumonia   4. Acute on chronic renal dysfunction   5. Lactic acidosis   6. Heart failure with preserved EF   7. Dysphagia     Plan:  Admit patient to ICU  Cont ventilatory support  IV fluid resuscitation in the ED at 30 cc/kg but remains hypotensive so started on vasopressors   Lactate level down trending  Cultures sent, f/u sputum and blood cultures  Broad spectrum antibiotics   ID consult  Nix for close urinary output measurement   Vasopressor support to maintain MAP > 65  Will place central line  Fentanyl for sedation  Feedings via PEG tube   Hold home BP medications given shock  Cont. Home glaucoma medications  GI/DVT prophylaxis

## 2019-06-01 NOTE — ED PROVIDER NOTE - SECONDARY DIAGNOSIS.
Respiratory distress Chronic kidney disease, unspecified CKD stage Urinary tract infection with hematuria, site unspecified Congestive heart failure, unspecified HF chronicity, unspecified heart failure type

## 2019-06-01 NOTE — H&P ADULT - NSICDXPASTMEDICALHX_GEN_ALL_CORE_FT
PAST MEDICAL HISTORY:  Anemia     Cerebral infarction     Depression     Dyslipidemia     Dysphagia     GERD (gastroesophageal reflux disease)     IBS (irritable bowel syndrome)     Muscle weakness     Psoriasis     Shortness of breath

## 2019-06-01 NOTE — ED PROVIDER NOTE - PROGRESS NOTE DETAILS
VIRA Maher: patient's O2 sat dropped in the ED, decision was made to intubate patient. He was intubated by Dr. Ramires

## 2019-06-01 NOTE — H&P ADULT - NSICDXPASTSURGICALHX_GEN_ALL_CORE_FT
PAST SURGICAL HISTORY:  Cardiac pacemaker     S/P percutaneous endoscopic gastrostomy (PEG) tube placement

## 2019-06-02 PROBLEM — F32.9 MAJOR DEPRESSIVE DISORDER, SINGLE EPISODE, UNSPECIFIED: Chronic | Status: ACTIVE | Noted: 2018-12-26

## 2019-06-02 PROBLEM — K58.9 IRRITABLE BOWEL SYNDROME WITHOUT DIARRHEA: Chronic | Status: ACTIVE | Noted: 2018-12-26

## 2019-06-02 PROBLEM — D64.9 ANEMIA, UNSPECIFIED: Chronic | Status: ACTIVE | Noted: 2018-12-26

## 2019-06-02 PROBLEM — R06.02 SHORTNESS OF BREATH: Chronic | Status: ACTIVE | Noted: 2018-12-26

## 2019-06-02 LAB
ANION GAP SERPL CALC-SCNC: 11 MMOL/L — SIGNIFICANT CHANGE UP (ref 5–17)
BUN SERPL-MCNC: 45 MG/DL — HIGH (ref 7–23)
CALCIUM SERPL-MCNC: 9.6 MG/DL — SIGNIFICANT CHANGE UP (ref 8.4–10.5)
CHLORIDE SERPL-SCNC: 104 MMOL/L — SIGNIFICANT CHANGE UP (ref 96–108)
CO2 SERPL-SCNC: 23 MMOL/L — SIGNIFICANT CHANGE UP (ref 22–31)
CREAT SERPL-MCNC: 1.74 MG/DL — HIGH (ref 0.5–1.3)
GLUCOSE SERPL-MCNC: 103 MG/DL — HIGH (ref 70–99)
GRAM STN FLD: SIGNIFICANT CHANGE UP
HCT VFR BLD CALC: 28.5 % — LOW (ref 39–50)
HGB BLD-MCNC: 9 G/DL — LOW (ref 13–17)
MAGNESIUM SERPL-MCNC: 1.8 MG/DL — SIGNIFICANT CHANGE UP (ref 1.6–2.6)
MCHC RBC-ENTMCNC: 27.8 PG — SIGNIFICANT CHANGE UP (ref 27–34)
MCHC RBC-ENTMCNC: 31.6 GM/DL — LOW (ref 32–36)
MCV RBC AUTO: 88 FL — SIGNIFICANT CHANGE UP (ref 80–100)
METHOD TYPE: SIGNIFICANT CHANGE UP
MSSA DNA SPEC QL NAA+PROBE: SIGNIFICANT CHANGE UP
NRBC # BLD: 0 /100 WBCS — SIGNIFICANT CHANGE UP (ref 0–0)
PHOSPHATE SERPL-MCNC: 3.8 MG/DL — SIGNIFICANT CHANGE UP (ref 2.5–4.5)
PLATELET # BLD AUTO: 170 K/UL — SIGNIFICANT CHANGE UP (ref 150–400)
POTASSIUM SERPL-MCNC: 4.5 MMOL/L — SIGNIFICANT CHANGE UP (ref 3.5–5.3)
POTASSIUM SERPL-SCNC: 4.5 MMOL/L — SIGNIFICANT CHANGE UP (ref 3.5–5.3)
RAPID RVP RESULT: SIGNIFICANT CHANGE UP
RBC # BLD: 3.24 M/UL — LOW (ref 4.2–5.8)
RBC # FLD: 16.3 % — HIGH (ref 10.3–14.5)
SODIUM SERPL-SCNC: 138 MMOL/L — SIGNIFICANT CHANGE UP (ref 135–145)
SPECIMEN SOURCE: SIGNIFICANT CHANGE UP
SPECIMEN SOURCE: SIGNIFICANT CHANGE UP
WBC # BLD: 18.5 K/UL — HIGH (ref 3.8–10.5)
WBC # FLD AUTO: 18.5 K/UL — HIGH (ref 3.8–10.5)

## 2019-06-02 PROCEDURE — 71045 X-RAY EXAM CHEST 1 VIEW: CPT | Mod: 26

## 2019-06-02 PROCEDURE — 93306 TTE W/DOPPLER COMPLETE: CPT | Mod: 26

## 2019-06-02 RX ORDER — ALBUTEROL 90 UG/1
1 AEROSOL, METERED ORAL EVERY 4 HOURS
Refills: 0 | Status: DISCONTINUED | OUTPATIENT
Start: 2019-06-02 | End: 2019-06-03

## 2019-06-02 RX ADMIN — ALBUTEROL 1 PUFF(S): 90 AEROSOL, METERED ORAL at 17:12

## 2019-06-02 RX ADMIN — CEFEPIME 100 MILLIGRAM(S): 1 INJECTION, POWDER, FOR SOLUTION INTRAMUSCULAR; INTRAVENOUS at 00:53

## 2019-06-02 RX ADMIN — FENTANYL CITRATE 4.25 MICROGRAM(S)/KG/HR: 50 INJECTION INTRAVENOUS at 17:05

## 2019-06-02 RX ADMIN — ALBUTEROL 1 PUFF(S): 90 AEROSOL, METERED ORAL at 21:18

## 2019-06-02 RX ADMIN — Medication 250 MILLIGRAM(S): at 05:43

## 2019-06-02 RX ADMIN — CHLORHEXIDINE GLUCONATE 15 MILLILITER(S): 213 SOLUTION TOPICAL at 05:42

## 2019-06-02 RX ADMIN — CHLORHEXIDINE GLUCONATE 15 MILLILITER(S): 213 SOLUTION TOPICAL at 17:05

## 2019-06-02 RX ADMIN — Medication 3.98 MICROGRAM(S)/KG/MIN: at 14:18

## 2019-06-02 RX ADMIN — HEPARIN SODIUM 5000 UNIT(S): 5000 INJECTION INTRAVENOUS; SUBCUTANEOUS at 05:42

## 2019-06-02 RX ADMIN — CHLORHEXIDINE GLUCONATE 1 APPLICATION(S): 213 SOLUTION TOPICAL at 05:42

## 2019-06-02 RX ADMIN — LATANOPROST 1 DROP(S): 0.05 SOLUTION/ DROPS OPHTHALMIC; TOPICAL at 21:58

## 2019-06-02 RX ADMIN — DORZOLAMIDE HYDROCHLORIDE TIMOLOL MALEATE 1 DROP(S): 20; 5 SOLUTION/ DROPS OPHTHALMIC at 05:42

## 2019-06-02 RX ADMIN — DORZOLAMIDE HYDROCHLORIDE TIMOLOL MALEATE 1 DROP(S): 20; 5 SOLUTION/ DROPS OPHTHALMIC at 17:06

## 2019-06-02 RX ADMIN — HEPARIN SODIUM 5000 UNIT(S): 5000 INJECTION INTRAVENOUS; SUBCUTANEOUS at 14:17

## 2019-06-02 RX ADMIN — HEPARIN SODIUM 5000 UNIT(S): 5000 INJECTION INTRAVENOUS; SUBCUTANEOUS at 21:56

## 2019-06-02 RX ADMIN — Medication 250 MILLIGRAM(S): at 17:05

## 2019-06-02 RX ADMIN — ALBUTEROL 1 PUFF(S): 90 AEROSOL, METERED ORAL at 13:09

## 2019-06-02 RX ADMIN — PANTOPRAZOLE SODIUM 40 MILLIGRAM(S): 20 TABLET, DELAYED RELEASE ORAL at 14:18

## 2019-06-02 RX ADMIN — ALBUTEROL 1 PUFF(S): 90 AEROSOL, METERED ORAL at 09:24

## 2019-06-02 NOTE — CONSULT NOTE ADULT - ASSESSMENT
86 yo male admitted with hypoxic respiratory failure, ? aspiration, and now known to have staph aureus growing in the blood.  He has a history of CKD,CVA,CAD,HTN,DM, GERD,PPM and peg.  He needs treatment for both a pneumonia and staph aureus bacteremia.This could be bacteremic pneumonia.  No obvious cutaneous portal for bacteremia.  Suggest:  1. vanco and cefepime  2.Check sputum culture  3. repeat blood cultures on 6/3  4.Will adjust coverage when sensitivies are available  5.Baseline echocardiogram

## 2019-06-02 NOTE — DIETITIAN INITIAL EVALUATION ADULT. - NS AS NUTRI INTERV COLLABORAT
If pt to be extubated, recommend swallow evaluation to assess for po diet feasibility/appropriate consistency

## 2019-06-02 NOTE — CONSULT NOTE ADULT - SUBJECTIVE AND OBJECTIVE BOX
HPI:   Patient is a 85y male with a past history of CKD,CHF,CAD,CVA,recurrent aspirations, peg,and PPM who was admitted  with hypoxic respiratory failure and hypotension.He required intubation and levo.He was started on cefepime and vanco, his blood culture is growing staph aureus.He is sedated on vent and not able to give a history.    REVIEW OF SYSTEMS:  All other review of systems negative (Comprehensive ROS): limited, sedated on vent    PAST MEDICAL & SURGICAL HISTORY:  Shortness of breath  Muscle weakness  Depression  IBS (irritable bowel syndrome)  Dysphagia  Cerebral infarction  Anemia  Psoriasis  GERD (gastroesophageal reflux disease)  Dyslipidemia  Cardiac pacemaker  S/P percutaneous endoscopic gastrostomy (PEG) tube placement      Allergies    No Known Allergies    Intolerances        Antimicrobials Day #  :day 2  cefepime   IVPB 1000 milliGRAM(s) IV Intermittent every 24 hours  vancomycin  IVPB 1000 milliGRAM(s) IV Intermittent every 12 hours    Other Medications:  ALBUTerol    90 MICROgram(s) HFA Inhaler 1 Puff(s) Inhalation every 4 hours  chlorhexidine 0.12% Liquid 15 milliLiter(s) Oral Mucosa two times a day  chlorhexidine 4% Liquid 1 Application(s) Topical <User Schedule>  dorzolamide 2%/timolol 0.5% Ophthalmic Solution 1 Drop(s) Both EYES two times a day  fentaNYL   Infusion. 0.5 MICROgram(s)/kG/Hr IV Continuous <Continuous>  heparin  Injectable 5000 Unit(s) SubCutaneous every 8 hours  latanoprost 0.005% Ophthalmic Solution 1 Drop(s) Both EYES at bedtime  norepinephrine Infusion 0.05 MICROgram(s)/kG/Min IV Continuous <Continuous>  pantoprazole   Suspension 40 milliGRAM(s) Oral daily  sodium chloride 0.9% lock flush 10 milliLiter(s) IV Push every 1 hour PRN      FAMILY HISTORY:  No pertinent family history in first degree relatives      SOCIAL HISTORY:  Smoking:  ?   ETOH:  ?   Drug Use:?         T(F): 98.8 (19 @ 05:00), Max: 98.8 (19 @ 21:00)  HR: 62 (19 @ 10:00)  BP: 94/57 (19 @ 10:00)  RR: 17 (19 @ 10:00)  SpO2: 99% (19 @ 10:00)  Wt(kg): --    PHYSICAL EXAM:  General:sedated , no acute distress  Eyes:  anicteric, no conjunctival injection, no discharge  Oropharynx: ETT 	  Neck: supple, without adenopathy  Lungs: coarse BS  Heart: regular rate and rhythm; distant tones  Abdomen: soft, nondistended, nontender, without mass or organomegaly, peg  Skin: venous stasis changes  Extremities: no clubbing, cyanosis, - edema  Neurologic: sedated and poorly responsive  Left groin TLC  : hansen catheter  LAB RESULTS:                        9.0    18.50 )-----------( 170      ( 2019 05:30 )             28.5     06-02    138  |  104  |  45<H>  ----------------------------<  103<H>  4.5   |  23  |  1.74<H>    Ca    9.6      2019 05:30  Phos  3.8     06-  Mg     1.8     06-    TPro  7.9  /  Alb  2.1<L>  /  TBili  0.2  /  DBili  x   /  AST  25  /  ALT  19  /  AlkPhos  120  06-01    LIVER FUNCTIONS - ( 2019 11:10 )  Alb: 2.1 g/dL / Pro: 7.9 g/dL / ALK PHOS: 120 U/L / ALT: 19 U/L DA / AST: 25 U/L / GGT: x           Urinalysis Basic - ( 2019 11:16 )    Color: Yellow / Appearance: Clear / S.015 / pH: x  Gluc: x / Ketone: Negative  / Bili: Negative / Urobili: Negative   Blood: x / Protein: 100 / Nitrite: Negative   Leuk Esterase: Moderate / RBC: 11-25 /HPF / WBC 26-50 /HPF   Sq Epi: x / Non Sq Epi: Neg.-Few / Bacteria: Many /HPF        MICROBIOLOGY:  RECENT CULTURES:   @ 11:10 .Blood Blood-Peripheral Blood Culture PCR    Growth in aerobic bottle: Gram Positive Cocci in Clusters  staph aureus    e.    Growth in aerobic bottle: Gram Positive Cocci in Clusters          RADIOLOGY REVIEWED:  < from: Xray Chest 1 View- PORTABLE-Urgent (19 @ 09:41) >  EXAM:  XR CHEST PORTABLE URGENT 1V      PROCEDURE DATE:  2019        INTERPRETATION:  AP chest on 2019 at 9:16 AM. Patient requires   intubation.    Heart is magnified by technique. Patient's chin obscures the apices.    Endotracheal tube remains and is barely visible on prior study .    Left-sided pacemaker again noted.    There is a significant left sided infiltrate centrally the appears   increased from .    Stable slight infiltrate or minor fissure noted.    IMPRESSION: Increasing left lung infiltrate.    < end of copied text >

## 2019-06-02 NOTE — PROGRESS NOTE ADULT - ASSESSMENT
ASSESSMENT:    85M w/ PMH listed below, long Hx of aspirations, multiple admissions and intubations for past aspiration events. Now arrives S/P possible cardiac arrest at his facility (unclear if lost pulse) but when he "woke up" he was coughing/hacking, mucous plugging, likely aspiration event.   -on Er arrival was persistently hypoxic and required intubation. Also in shock state requiring pressors.     Events last 24 hours:   -no acute changes today  -remains on full vent support as well as vasopressor support    PLAN:   #NEURO  -on fentanyl drip for comfort while intubated, maitnain RASS 0, daily sedation vacation    #CV  -remains in shock state on levophed, doapmine weaned off  -continue to titrate levophed for goal MAP 65-70      #RESP  -Patient currently on Full vent support  -titrate settings to maintain SaO2 >90%, or pH >7.25  -consider low tidal volume ventilation strategy w/ goal Tv 4-6 cc/kg of ideal body weight  -plateu pressure goal <30  -Peridex oral care  -aggressive pulmonary toilet  -daily sedation vacation with spontaneous breathing trial if clinical condition warrants, discuss with respiratory therapy   -nebs     #RENAL  -currently in acute renal failure with Scr of 1.74, improved from yesterday  -will continue vasopressers and trend UOP for goal >0.5 cc/kg/hr, albumin as needed to help  -renal dose medications and avoic nephrotoxins    #ID  -ID service is following  -continue current regimen of cefepime and vanco. Follow cultures and narrow anx as able. Follow vanco trough    #Prophylaxis  DVT --> heparin  Stress ulcer --> protonix  VAP --> peridex

## 2019-06-02 NOTE — PROGRESS NOTE ADULT - ASSESSMENT
Physical Exam	  GENERAL:               Intubated and sedated, not in distress  HEENT:                    Pupils equal, reactive to light.  Symmetric. No JVD, Moist MM  PULM:                     diminished at bilateral bases, intubated with thick yellow secretions by suction of ET tube  CVS:                         S1, S2,  No Murmur  ABD:                        Soft, nondistended, nontender, normoactive bowel sounds, + PEG  EXT:                         No edema, nontender, No Cyanosis or Clubbing   Vascular:                Warm Extremities, Normal Capillary refill, Normal Distal Pulses. Left groin femoral line  SKIN:                       Warm and well perfused, no rashes noted.   NEURO:                  sedated, but arousable to tactile stimulation  PSYC:                      unable to assess    Assessment:    86 yo M with PMHx of Glaucoma, benign prostate neoplasm, HTN, HLD, Grad 3 Diastolic Heart Failure s/p ppm, CKD, CVA with dysphagia s/p PEG tube placement presented with respiratory distress. Reported ?cardiac arrest at the nursing home as per wife, but no meds given and patient improved with suctioning. Has septic shock secondary to left sided pneumonia. Also with Staph Aureus bacteremia.      1. Acute hypoxic respiratory failure  2. Septic shock   3. Left sided pneumonia   4. Staph Aureus bacteremia   5. Acute on chronic renal dysfunction   6. Lactic acidosis   7. Heart failure with preserved EF   8. Dysphagia     Plan:  Cont ventilatory support, would not wean given vasopressors and thick secretions  Cont. Vasopressor support to maintain MAP >65  Lactate level normalized post resuscitation  Cultures sent, f/u sputum and blood cultures - sputum culture pending   Broad spectrum antibiotics   Obtain Echo to evaluated for vegetations on Pacemaker or valves  Will need repeat blood cultures after 48 hours of appropriate therapy  ID consult  Nix for close urinary output measurement   Creatinine down trending   Left groin central line placed for vasopressor support  Fentanyl for sedation  Feedings via PEG tube   Hold home BP medications given shock  Cont. Home glaucoma medications  GI/DVT prophylaxis .35 min of critical care time spent on this patient's care

## 2019-06-02 NOTE — PROCEDURE NOTE - ADDITIONAL PROCEDURE DETAILS
-ultrasound used to visualise femoral vein. Guidewire visualized in femoral vein before line floated. COnfirmed venous placement, will send VBG    Time spent on procedure independent of Critical Care time spent at the bedside  Indication for procedure: Shock  Dx Code: I95
Time spent on procedure independent of Critical Care time spent at the bedside  Indication for procedure: Respiratory Failure, aspiration PNA  Dx Code: J69.0

## 2019-06-02 NOTE — CHART NOTE - NSCHARTNOTEFT_GEN_A_CORE
Upon Nutritional Assessment by the Registered Dietitian your patient was determined to meet criteria / has evidence of the following diagnosis/diagnoses:          [ ]  Mild Protein Calorie Malnutrition        [ ]  Moderate Protein Calorie Malnutrition        [X] Severe Protein Calorie Malnutrition        [ ] Unspecified Protein Calorie Malnutrition        [ ] Underweight / BMI <19        [ ] Morbid Obesity / BMI > 40      Findings as based on:  [X] Comprehensive nutrition assessment   [X] Nutrition Focused Physical Exam: severe muscle wasting (temporal), severe subcutaneous fat loss (orbital, buccal)  [ ] Other:       Nutrition Plan/Recommendations:  Recommend initiate tube feeding: Jevity 1.5 @ 35cc/hr x 18hrs via PEG and increase by 10cc q 4hrs until goal of 75cc/hr x 18 hrs reached. Consider 250cc free H2O bolus q 6 hrs to meet hydration needs.         PROVIDER Section:     By signing this assessment you are acknowledging and agree with the diagnosis/diagnoses assigned by the Registered Dietitian    Comments:

## 2019-06-02 NOTE — DIETITIAN INITIAL EVALUATION ADULT. - OTHER INFO
Pt is an 86 yo M with PMHx of Glaucoma, benign prostate neoplasm, HTN, HLD, Grad 3 Diastolic Heart Failure s/p ppm, CKD, dysphagia s/p PEG tube placement, CVA presents via EMS from [facility] c Acute hypoxic respiratory failure - Likely 2/2 aspiration as symptoms start during/post feed in this patient with known dysphagia. Now intubated. Pt currently NPO. PEG site intact. Suggest initiating tube feeding today.

## 2019-06-02 NOTE — DIETITIAN INITIAL EVALUATION ADULT. - ORAL INTAKE PTA
Pt came from [facility] where he was receiving po diet + enteral feeds via PEG. PO diet: ground c thin liquids + Ensure Clear TID. Enteral nutrition: Osmolite 1.5 @ 240ml 4x/day (1,420kcals, 60g protein) + proform BID via PEG (200kcals, 30g protein)

## 2019-06-02 NOTE — PROGRESS NOTE ADULT - SUBJECTIVE AND OBJECTIVE BOX
Patient is a 85y old  Male who presents with a chief complaint of Hypoxic respiratory failure, Aspiration pneumonia, sepsis (2019 12:01)      BRIEF HOSPITAL COURSE:     85M w/ PMH listed below, long Hx of aspirations, multiple admissions and intubations for past aspiration events. Now arrives S/P possible cardiac arrest at his facility (unclear if lost pulse) but when he "woke up" he was coughing/hacking, mucous plugging, likely aspiration event.   -on Er arrival was persistently hypoxic and required intubation. Also in shock state requiring pressors.     Events last 24 hours:   -no acute changes today  -remains on full vent support as well as vasopressor support     PAST MEDICAL & SURGICAL HISTORY:  Shortness of breath  Muscle weakness  Depression  IBS (irritable bowel syndrome)  Dysphagia  Cerebral infarction  Anemia  Psoriasis  GERD (gastroesophageal reflux disease)  Dyslipidemia  Cardiac pacemaker  S/P percutaneous endoscopic gastrostomy (PEG) tube placement      Review of Systems:  unable to obtain as patient intubated and sedated      Medications:  cefepime   IVPB 1000 milliGRAM(s) IV Intermittent every 24 hours  vancomycin  IVPB 1000 milliGRAM(s) IV Intermittent every 12 hours    norepinephrine Infusion 0.05 MICROgram(s)/kG/Min IV Continuous <Continuous>    ALBUTerol    90 MICROgram(s) HFA Inhaler 1 Puff(s) Inhalation every 4 hours    fentaNYL   Infusion. 0.5 MICROgram(s)/kG/Hr IV Continuous <Continuous>      heparin  Injectable 5000 Unit(s) SubCutaneous every 8 hours    pantoprazole   Suspension 40 milliGRAM(s) Oral daily        sodium chloride 0.9% lock flush 10 milliLiter(s) IV Push every 1 hour PRN      chlorhexidine 0.12% Liquid 15 milliLiter(s) Oral Mucosa two times a day  chlorhexidine 4% Liquid 1 Application(s) Topical <User Schedule>  dorzolamide 2%/timolol 0.5% Ophthalmic Solution 1 Drop(s) Both EYES two times a day  latanoprost 0.005% Ophthalmic Solution 1 Drop(s) Both EYES at bedtime        Mode: AC/ CMV (Assist Control/ Continuous Mandatory Ventilation)  RR (machine): 18  TV (machine): 450  FiO2: 50  PEEP: 5  ITime: 1  MAP: 10  PIP: 23      ICU Vital Signs Last 24 Hrs  T(C): 36.4 (2019 20:00), Max: 37.7 (2019 13:00)  T(F): 97.6 (2019 20:00), Max: 99.8 (2019 13:00)  HR: 60 (2019 21:20) (60 - 73)  BP: 95/55 (2019 20:00) (93/59 - 129/71)  BP(mean): 68 (2019 20:00) (65 - 87)  RR: 16 (2019 20:00) (16 - 28)  SpO2: 98% (2019 21:20) (94% - 99%)      ABG - ( 2019 21:28 )  pH, Arterial: 7.41  pH, Blood: x     /  pCO2: 36    /  pO2: 91    / HCO3: x     / Base Excess: x     /  SaO2: 97            LABS:                        9.0    18.50 )-----------( 170      ( 2019 05:30 )             28.5     06-02    138  |  104  |  45<H>  ----------------------------<  103<H>  4.5   |  23  |  1.74<H>    Ca    9.6      2019 05:30  Phos  3.8     06-02  Mg     1.8     06-02    TPro  7.9  /  Alb  2.1<L>  /  TBili  0.2  /  DBili  x   /  AST  25  /  ALT  19  /  AlkPhos  120  06-01      CARDIAC MARKERS ( 2019 11:10 )  .019 ng/mL / x     / x     / x     / x          CAPILLARY BLOOD GLUCOSE        PT/INR - ( 2019 11:10 )   PT: 16.6 sec;   INR: 1.46 ratio         PTT - ( 2019 11:10 )  PTT:44.0 sec  Urinalysis Basic - ( 2019 11:16 )    Color: Yellow / Appearance: Clear / S.015 / pH: x  Gluc: x / Ketone: Negative  / Bili: Negative / Urobili: Negative   Blood: x / Protein: 100 / Nitrite: Negative   Leuk Esterase: Moderate / RBC: 11-25 /HPF / WBC 26-50 /HPF   Sq Epi: x / Non Sq Epi: Neg.-Few / Bacteria: Many /HPF      Blood culture  Growth in anaerobic bottle: Gram Positive Cocci in Clusters  Growth in aerobic bottle: Gram Positive Cocci in Clusters (19 @ 11:05)      Physical Examination:    General: Currently intubated, sedated/calml    HEENT: Pupils equal, reactive to light.  Symmetric.    PULM: diminished at bases bilaterally, rhonci noted bilaterally green/yellow  sputum production    CVS: Regular rate and rhythm, no murmurs, rubs, or gallops    ABD: Soft, nondistended, nontender, normoactive bowel sounds, no masses. PEG in place    EXT: No edema, nontender    SKIN: Warm and well perfused, no rashes noted.

## 2019-06-02 NOTE — DIETITIAN INITIAL EVALUATION ADULT. - NS AS NUTRI INTERV ENTERAL NUTRITION
Recommend initiate enteral nutrition as soon as medically feasible: Jevity 1.5 @ 35cc x 18 hrs via PEG and increase by 10cc q 4hrs until goal of 75cc/hr x 18hrs reached. (goal rate to provide 2,025kcals, 85g protein, 1,026ml H2O). Consider 250cc free H2O bolus q 6 hrs to meet hydration needs.

## 2019-06-02 NOTE — PROGRESS NOTE ADULT - SUBJECTIVE AND OBJECTIVE BOX
Follow-up Critical Care Progress Note  Chief Complaint : Sepsis    Intubated, sedated, on vasopressors. Thick secretions via ET tube.      Allergies :No Known Allergies    PAST MEDICAL & SURGICAL HISTORY:  Shortness of breath  Muscle weakness  Depression  IBS (irritable bowel syndrome)  Dysphagia  Cerebral infarction  Anemia  Psoriasis  GERD (gastroesophageal reflux disease)  Dyslipidemia  Cardiac pacemaker  S/P percutaneous endoscopic gastrostomy (PEG) tube placement      Medications:  MEDICATIONS  (STANDING):  ALBUTerol    90 MICROgram(s) HFA Inhaler 1 Puff(s) Inhalation every 4 hours  cefepime   IVPB 1000 milliGRAM(s) IV Intermittent every 24 hours  chlorhexidine 0.12% Liquid 15 milliLiter(s) Oral Mucosa two times a day  chlorhexidine 4% Liquid 1 Application(s) Topical <User Schedule>  dorzolamide 2%/timolol 0.5% Ophthalmic Solution 1 Drop(s) Both EYES two times a day  fentaNYL   Infusion. 0.5 MICROgram(s)/kG/Hr (4.25 mL/Hr) IV Continuous <Continuous>  heparin  Injectable 5000 Unit(s) SubCutaneous every 8 hours  latanoprost 0.005% Ophthalmic Solution 1 Drop(s) Both EYES at bedtime  norepinephrine Infusion 0.05 MICROgram(s)/kG/Min (3.984 mL/Hr) IV Continuous <Continuous>  pantoprazole   Suspension 40 milliGRAM(s) Oral daily  vancomycin  IVPB 1000 milliGRAM(s) IV Intermittent every 12 hours    MEDICATIONS  (PRN):  sodium chloride 0.9% lock flush 10 milliLiter(s) IV Push every 1 hour PRN Pre/post blood products, medications, blood draw, and to maintain line patency      LABS:                        9.0    18.50 )-----------( 170      ( 2019 05:30 )             28.5     06-02    138  |  104  |  45<H>  ----------------------------<  103<H>  4.5   |  23  |  1.74<H>    Ca    9.6      2019 05:30  Phos  3.8     06-02  Mg     1.8     06-02    TPro  7.9  /  Alb  2.1<L>  /  TBili  0.2  /  DBili  x   /  AST  25  /  ALT  19  /  AlkPhos  120  06-01      CARDIAC MARKERS ( 2019 11:10 )  .019 ng/mL / x     / x     / x     / x            PT/INR - ( 2019 11:10 )   PT: 16.6 sec;   INR: 1.46 ratio         PTT - ( 2019 11:10 )  PTT:44.0 sec  Urinalysis Basic - ( 2019 11:16 )    Color: Yellow / Appearance: Clear / S.015 / pH: x  Gluc: x / Ketone: Negative  / Bili: Negative / Urobili: Negative   Blood: x / Protein: 100 / Nitrite: Negative   Leuk Esterase: Moderate / RBC: 11-25 /HPF / WBC 26-50 /HPF   Sq Epi: x / Non Sq Epi: Neg.-Few / Bacteria: Many /HPF      Procalcitonin, Serum: 0.85 ng/mL (19 @ 11:10)    Serum Pro-Brain Natriuretic Peptide: 20064 pg/mL (19 @ 11:10)        CULTURES: (if applicable)    Culture - Blood (collected 19 @ 11:10)  Source: .Blood Blood-Peripheral  Gram Stain (19 @ 06:30):    Growth in aerobic bottle: Gram Positive Cocci in Clusters  Preliminary Report (19 @ 06:30):    Growth in aerobic bottle: Gram Positive Cocci in Clusters    "Due to technical problems, Proteus sp. will Not be reported as part of    the BCID panel until further notice"    ***Blood Panel PCR results on this specimen are available    approximately 3 hours after the Gram stain result.***    Gram stain, PCR, and/or culture results may not always    correspond due to difference in methodologies.    ************************************************************    This PCR assay was performed using [facility].    The following targets are tested for: Enterococcus,    vancomycin resistant enterococci, Listeria monocytogenes,    coagulase negative staphylococci, S. aureus,    methicillin resistant S. aureus, Streptococcus agalactiae    (Group B), S. pneumoniae, S.pyogenes (Group A),    Acinetobacter baumannii, Enterobacter cloacae, E. coli,    Klebsiella oxytoca, K. pneumoniae, Proteus sp.,    Serratia marcescens, Haemophilus influenzae,    Neisseria meningitidis, Pseudomonas aeruginosa, Candida    albicans, C. glabrata, C krusei, C parapsilosis,    C. tropicalis and the KPC resistance gene.  Organism: Blood Culture PCR (19 @ 07:43)  Organism: Blood Culture PCR (19 07:43)      -  Staphylococcus aureus: Detec Any isolate of Staphylococcus aureus from a blood culture is NOT considered a contaminant.      Method Type: PCR    [facility] - ( 2019 21:28 )  pH, Arterial: 7.41  pH, Blood: x     /  pCO2: 36    /  pO2: 91    / HCO3: x     / Base Excess: x     /  SaO2: 97        VITALS:  T(C): 37.1 (19 @ 05:00), Max: 37.1 (19 @ 21:00)  T(F): 98.8 (19 05:00), Max: 98.8 (19 21:00)  HR: 66 (19 08:30) (59 - 99)  BP: 112/62 (19 @ 08:30) (65/48 - 165/64)  BP(mean): 77 (19 @ 08:30) (55 - 100)  ABP: --  ABP(mean): --  RR: 25 (19 @ 08:30) (15 - 28)  SpO2: 97% (19 08:30) (85% - 100%)  CVP(mm Hg): --  CVP(cm H2O): --    Ins and Outs     19 @ 07:01  -  19 @ 07:00  --------------------------------------------------------  IN: 425.8 mL / OUT: 1185 mL / NET: -759.2 mL        Height (cm): 175.26 (19 @ 10:46)  Weight (kg): 85 (19 10:46)  BMI (kg/m2): 27.7 (19 @ 10:46)    Device: 840, Mode: AC/ CMV (Assist Control/ Continuous Mandatory Ventilation), RR (machine): 18, RR (patient): 22, TV (machine): 450, TV (patient): 475, FiO2: 50, PEEP: 5, MAP: 11, P-High: , P-Low: , PIP: 23    I&O's Detail    2019 07:01  -  2019 07:00  --------------------------------------------------------  IN:    DOPamine Infusion: 47.7 mL    fentaNYL Infusion.: 127.5 mL    norepinephrine Infusion: 200.6 mL    Solution: 50 mL  Total IN: 425.8 mL    OUT:    Indwelling Catheter - Urethral: 1185 mL  Total OUT: 1185 mL    Total NET: -759.2 mL

## 2019-06-02 NOTE — DIETITIAN INITIAL EVALUATION ADULT. - ENERGY NEEDS
Height: 5'9", Weight: (6/2) 186lbs  Skin: no pressure ulcers, Edema: none  IBW range: 144-176lbs  Last BM: 5/31

## 2019-06-03 DIAGNOSIS — J96.01 ACUTE RESPIRATORY FAILURE WITH HYPOXIA: ICD-10-CM

## 2019-06-03 DIAGNOSIS — N17.9 ACUTE KIDNEY FAILURE, UNSPECIFIED: ICD-10-CM

## 2019-06-03 DIAGNOSIS — A41.9 SEPSIS, UNSPECIFIED ORGANISM: ICD-10-CM

## 2019-06-03 DIAGNOSIS — J69.0 PNEUMONITIS DUE TO INHALATION OF FOOD AND VOMIT: ICD-10-CM

## 2019-06-03 DIAGNOSIS — Z29.9 ENCOUNTER FOR PROPHYLACTIC MEASURES, UNSPECIFIED: ICD-10-CM

## 2019-06-03 LAB
ANION GAP SERPL CALC-SCNC: 7 MMOL/L — SIGNIFICANT CHANGE UP (ref 5–17)
BUN SERPL-MCNC: 42 MG/DL — HIGH (ref 7–23)
CALCIUM SERPL-MCNC: 9.4 MG/DL — SIGNIFICANT CHANGE UP (ref 8.4–10.5)
CHLORIDE SERPL-SCNC: 105 MMOL/L — SIGNIFICANT CHANGE UP (ref 96–108)
CO2 SERPL-SCNC: 26 MMOL/L — SIGNIFICANT CHANGE UP (ref 22–31)
CREAT SERPL-MCNC: 1.74 MG/DL — HIGH (ref 0.5–1.3)
CULTURE RESULTS: SIGNIFICANT CHANGE UP
GLUCOSE SERPL-MCNC: 131 MG/DL — HIGH (ref 70–99)
GRAM STN FLD: SIGNIFICANT CHANGE UP
HCT VFR BLD CALC: 27.3 % — LOW (ref 39–50)
HGB BLD-MCNC: 8.6 G/DL — LOW (ref 13–17)
MAGNESIUM SERPL-MCNC: 1.7 MG/DL — SIGNIFICANT CHANGE UP (ref 1.6–2.6)
MCHC RBC-ENTMCNC: 28.5 PG — SIGNIFICANT CHANGE UP (ref 27–34)
MCHC RBC-ENTMCNC: 31.5 GM/DL — LOW (ref 32–36)
MCV RBC AUTO: 90.4 FL — SIGNIFICANT CHANGE UP (ref 80–100)
NRBC # BLD: 0 /100 WBCS — SIGNIFICANT CHANGE UP (ref 0–0)
PHOSPHATE SERPL-MCNC: 3.6 MG/DL — SIGNIFICANT CHANGE UP (ref 2.5–4.5)
PLATELET # BLD AUTO: 172 K/UL — SIGNIFICANT CHANGE UP (ref 150–400)
POTASSIUM SERPL-MCNC: 4.4 MMOL/L — SIGNIFICANT CHANGE UP (ref 3.5–5.3)
POTASSIUM SERPL-SCNC: 4.4 MMOL/L — SIGNIFICANT CHANGE UP (ref 3.5–5.3)
RBC # BLD: 3.02 M/UL — LOW (ref 4.2–5.8)
RBC # FLD: 16.7 % — HIGH (ref 10.3–14.5)
SODIUM SERPL-SCNC: 138 MMOL/L — SIGNIFICANT CHANGE UP (ref 135–145)
SPECIMEN SOURCE: SIGNIFICANT CHANGE UP
SPECIMEN SOURCE: SIGNIFICANT CHANGE UP
WBC # BLD: 16.19 K/UL — HIGH (ref 3.8–10.5)
WBC # FLD AUTO: 16.19 K/UL — HIGH (ref 3.8–10.5)

## 2019-06-03 PROCEDURE — 71045 X-RAY EXAM CHEST 1 VIEW: CPT | Mod: 26

## 2019-06-03 PROCEDURE — 99223 1ST HOSP IP/OBS HIGH 75: CPT

## 2019-06-03 RX ORDER — ACETYLCYSTEINE 200 MG/ML
2 VIAL (ML) MISCELLANEOUS EVERY 6 HOURS
Refills: 0 | Status: COMPLETED | OUTPATIENT
Start: 2019-06-03 | End: 2019-06-04

## 2019-06-03 RX ORDER — IPRATROPIUM/ALBUTEROL SULFATE 18-103MCG
3 AEROSOL WITH ADAPTER (GRAM) INHALATION EVERY 6 HOURS
Refills: 0 | Status: COMPLETED | OUTPATIENT
Start: 2019-06-03 | End: 2019-06-04

## 2019-06-03 RX ORDER — MIDODRINE HYDROCHLORIDE 2.5 MG/1
5 TABLET ORAL
Refills: 0 | Status: DISCONTINUED | OUTPATIENT
Start: 2019-06-03 | End: 2019-06-06

## 2019-06-03 RX ORDER — ALBUMIN HUMAN 25 %
100 VIAL (ML) INTRAVENOUS ONCE
Refills: 0 | Status: COMPLETED | OUTPATIENT
Start: 2019-06-03 | End: 2019-06-03

## 2019-06-03 RX ORDER — SODIUM CHLORIDE 9 MG/ML
10 INJECTION INTRAMUSCULAR; INTRAVENOUS; SUBCUTANEOUS
Refills: 0 | Status: DISCONTINUED | OUTPATIENT
Start: 2019-06-03 | End: 2019-06-06

## 2019-06-03 RX ORDER — ALBUMIN HUMAN 25 %
50 VIAL (ML) INTRAVENOUS EVERY 6 HOURS
Refills: 0 | Status: COMPLETED | OUTPATIENT
Start: 2019-06-03 | End: 2019-06-04

## 2019-06-03 RX ORDER — VANCOMYCIN HCL 1 G
500 VIAL (EA) INTRAVENOUS EVERY 12 HOURS
Refills: 0 | Status: DISCONTINUED | OUTPATIENT
Start: 2019-06-03 | End: 2019-06-04

## 2019-06-03 RX ADMIN — FENTANYL CITRATE 4.25 MICROGRAM(S)/KG/HR: 50 INJECTION INTRAVENOUS at 17:40

## 2019-06-03 RX ADMIN — Medication 250 MILLIGRAM(S): at 05:36

## 2019-06-03 RX ADMIN — DORZOLAMIDE HYDROCHLORIDE TIMOLOL MALEATE 1 DROP(S): 20; 5 SOLUTION/ DROPS OPHTHALMIC at 17:56

## 2019-06-03 RX ADMIN — ALBUTEROL 1 PUFF(S): 90 AEROSOL, METERED ORAL at 00:10

## 2019-06-03 RX ADMIN — CHLORHEXIDINE GLUCONATE 1 APPLICATION(S): 213 SOLUTION TOPICAL at 05:38

## 2019-06-03 RX ADMIN — LATANOPROST 1 DROP(S): 0.05 SOLUTION/ DROPS OPHTHALMIC; TOPICAL at 21:14

## 2019-06-03 RX ADMIN — HEPARIN SODIUM 5000 UNIT(S): 5000 INJECTION INTRAVENOUS; SUBCUTANEOUS at 21:12

## 2019-06-03 RX ADMIN — MIDODRINE HYDROCHLORIDE 5 MILLIGRAM(S): 2.5 TABLET ORAL at 17:39

## 2019-06-03 RX ADMIN — Medication 2 MILLILITER(S): at 15:07

## 2019-06-03 RX ADMIN — DORZOLAMIDE HYDROCHLORIDE TIMOLOL MALEATE 1 DROP(S): 20; 5 SOLUTION/ DROPS OPHTHALMIC at 05:37

## 2019-06-03 RX ADMIN — CHLORHEXIDINE GLUCONATE 15 MILLILITER(S): 213 SOLUTION TOPICAL at 17:40

## 2019-06-03 RX ADMIN — Medication 50 MILLILITER(S): at 12:11

## 2019-06-03 RX ADMIN — HEPARIN SODIUM 5000 UNIT(S): 5000 INJECTION INTRAVENOUS; SUBCUTANEOUS at 05:36

## 2019-06-03 RX ADMIN — Medication 3 MILLILITER(S): at 15:07

## 2019-06-03 RX ADMIN — ALBUTEROL 1 PUFF(S): 90 AEROSOL, METERED ORAL at 05:04

## 2019-06-03 RX ADMIN — CEFEPIME 100 MILLIGRAM(S): 1 INJECTION, POWDER, FOR SOLUTION INTRAMUSCULAR; INTRAVENOUS at 01:00

## 2019-06-03 RX ADMIN — Medication 2 MILLILITER(S): at 21:17

## 2019-06-03 RX ADMIN — ALBUTEROL 1 PUFF(S): 90 AEROSOL, METERED ORAL at 08:00

## 2019-06-03 RX ADMIN — PANTOPRAZOLE SODIUM 40 MILLIGRAM(S): 20 TABLET, DELAYED RELEASE ORAL at 12:12

## 2019-06-03 RX ADMIN — MIDODRINE HYDROCHLORIDE 5 MILLIGRAM(S): 2.5 TABLET ORAL at 12:12

## 2019-06-03 RX ADMIN — Medication 3 MILLILITER(S): at 21:06

## 2019-06-03 RX ADMIN — CHLORHEXIDINE GLUCONATE 15 MILLILITER(S): 213 SOLUTION TOPICAL at 05:37

## 2019-06-03 RX ADMIN — Medication 50 MILLILITER(S): at 17:50

## 2019-06-03 RX ADMIN — HEPARIN SODIUM 5000 UNIT(S): 5000 INJECTION INTRAVENOUS; SUBCUTANEOUS at 14:56

## 2019-06-03 NOTE — PROGRESS NOTE ADULT - ASSESSMENT
Physical Exam	  GENERAL:               Intubated and sedated, not in distress  HEENT:                   Orally intubated increased ET tube secretions purulent,   PULM:                     diminished at bilateral bases, intubated with thick yellow secretions by suction of ET tube course left breath sounds  CVS:                         S1, S2,  No Murmur  ABD:                        Soft, nondistended, nontender, normoactive bowel sounds, + PEG  EXT:                         No edema, nontender, No Cyanosis or Clubbing   Vascular:                Warm Extremities, Normal Capillary refill, Normal Distal Pulses. Left groin femoral line  NEURO:                  sedated, but arousable to tactile stimulation  PSYC:                      unable to assess    Assessment:  1. Septic shock and Acute hypoxic respiratory failure due to Aspiration PNA - Left with Staph Aureus Bacteremia. and + UTI   2. Acute on chronic renal dysfunction Baseline Cr 1.3  3. Lactic acidosis Resolved  4. Possible  PEA arrest in [facility] prior to arrival - "brief period where patient received compressions" as per H&P  4. Chronic Diastolic CHF  8. Dysphagia  s/p PEG s/p CVA     Plan:  Cont ventilatory support, would not wean given vasopressors and thick secretions continue today  will need new CVP line  repeat Blood cultures.   Albumin/Middorine myranda levo to off ; Cont. Vasopressor support to maintain MAP >65  Mucomyst/Duoneb/chest pt  Cultures sent, f/u sputum and blood cultures - sputum culture pending   Broad spectrum antibiotics  as per ID  Creatinine down trending     Fentanyl for sedation  Feedings via PEG tube   Hold home BP medications given shock  Cont. Home glaucoma medications    GI/DVT prophylaxis .  45 min of critical care time spent on this patient's care   palliative care eval   d/w Wife - DNR confirmed, does not want him to suffer, will be ok with PICC line    PMD:	Dr. reyes			                   Notified(Date):  Family Updated: 	[facility] , wife Angie [phone]	              Date: 6/3/19      Sedation & Analgesia:	as above  Diet/Nutrition:		tube feeding  GI PPx:		Protonix	    DVT Ppx:	Heparin    Activity:		               Bedrest  Head of Bed:               35-45  Glycemic Control:       n/a      Lines: PIV  CENTRAL LINE: 	[x ] YES [ ] NO	                    LOCATION:   	                       DATE INSERTED:   	                    REMOVE:  [ ] YES [ ] NO  Will need new Access, vs d/c if able to wean off pressors  A-LINE:  	                [ ] YES [ ] NO                      LOCATION:   	                       DATE INSERTED: 		            REMOVE:  [ ] YES [ ] NO    SOTO: 		        [x ] YES [ ] NO  		                                       DATE INSERTED:		            REMOVE:  [ ] YES [x ] NO      Restraints were deemed necessary to prevent removal of life-sustaining devices [ x ] YES   [    ]  NO    Disposition: ICU     Goals of Care: full

## 2019-06-03 NOTE — PROCEDURE NOTE - NSINFORMCONSENT_GEN_A_CORE
Daughter, Amelie Davis/Benefits, risks, and possible complications of procedure explained to patient/caregiver who verbalized understanding and gave verbal consent.
Benefits, risks, and possible complications of procedure explained to patient/caregiver who verbalized understanding and gave written consent.
This was an emergent procedure.
See in chart/Benefits, risks, and possible complications of procedure explained to patient/caregiver who verbalized understanding and gave written consent.

## 2019-06-03 NOTE — PROCEDURE NOTE - NSSITEPREP_SKIN_A_CORE
Adherence to aseptic technique: hand hygiene prior to donning barriers (gown, gloves), don cap and mask, sterile drape over patient/chlorhexidine
Adherence to aseptic technique: hand hygiene prior to donning barriers (gown, gloves), don cap and mask, sterile drape over patient/alcohol
chlorhexidine/Adherence to aseptic technique: hand hygiene prior to donning barriers (gown, gloves), don cap and mask, sterile drape over patient
chlorhexidine/Adherence to aseptic technique: hand hygiene prior to donning barriers (gown, gloves), don cap and mask, sterile drape over patient

## 2019-06-03 NOTE — PROGRESS NOTE ADULT - SUBJECTIVE AND OBJECTIVE BOX
CC: f/u for    Patient reports    REVIEW OF SYSTEMS: no fevers, no chills, no nausea, no vomiting, no abd pain, no resp symptoms  All other review of systems negative (Comprehensive ROS)    Antimicrobials Day #    cefepime   IVPB 1000 milliGRAM(s) IV Intermittent every 24 hours  vancomycin  IVPB 500 milliGRAM(s) IV Intermittent every 12 hours    Other Medications Reviewed    T(F): 98.1 (06-03-19 @ 08:00), Max: 99.8 (06-02-19 @ 13:00)  HR: 60 (06-03-19 @ 10:14)  BP: 93/55 (06-03-19 @ 10:00)  RR: 17 (06-03-19 @ 10:00)  SpO2: 97% (06-03-19 @ 10:14)    PHYSICAL EXAM:  General: alert, no acute distress  Eyes:  anicteric, no conjunctival injection, no discharge  Oropharynx: no lesions or injection 	  Neck: supple, without adenopathy  Lungs: clear to auscultation  Heart: regular rate and rhythm; no murmur, rubs or gallops  Abdomen: soft, nondistended, nontender, without mass or organomegaly  Skin: no lesions  Extremities: no clubbing, cyanosis, or edema  Neurologic: alert, oriented, moves all extremities    LAB RESULTS:                        8.6    16.19 )-----------( 172      ( 03 Jun 2019 05:00 )             27.3     06-03    138  |  105  |  42<H>  ----------------------------<  131<H>  4.4   |  26  |  1.74<H>    Ca    9.4      03 Jun 2019 05:00  Phos  3.6     06-03  Mg     1.7     06-03            MICROBIOLOGY REVIEWED:    RADIOLOGY REVIEWED: CC: f/u for staph aureus bacteremia and pneumonia    Patient remains in ICU, critically ill on pressors    REVIEW OF SYSTEMS: no fevers, no chills, no nausea, no vomiting, no abd pain, no resp symptoms  All other review of systems negative (Comprehensive ROS)    Antimicrobials Day #    cefepime   IVPB 1000 milliGRAM(s) IV Intermittent every 24 hours  vancomycin  IVPB 500 milliGRAM(s) IV Intermittent every 12 hours    Other Medications Reviewed    T(F): 98.1 (06-03-19 @ 08:00), Max: 99.8 (06-02-19 @ 13:00)  HR: 60 (06-03-19 @ 10:14)  BP: 93/55 (06-03-19 @ 10:00)  RR: 17 (06-03-19 @ 10:00)  SpO2: 97% (06-03-19 @ 10:14)    PHYSICAL EXAM:  General: intubated and sedated   Eyes:  anicteric, no conjunctival injection, no discharge  Oropharynx: no lesions or injection 	  Neck: supple, without adenopathy  Lungs: diminished at bases  Heart: regular rate and rhythm; no murmur, rubs or gallops  Abdomen: soft, nondistended, nontender, without mass or organomegaly  Skin: no lesions  Extremities: no clubbing, cyanosis, or edema  Neurologic: sedated    LAB RESULTS:                        8.6    16.19 )-----------( 172      ( 03 Jun 2019 05:00 )             27.3     06-03    138  |  105  |  42<H>  ----------------------------<  131<H>  4.4   |  26  |  1.74<H>    Ca    9.4      03 Jun 2019 05:00  Phos  3.6     06-03  Mg     1.7     06-03            MICROBIOLOGY REVIEWED:    RADIOLOGY REVIEWED:

## 2019-06-03 NOTE — PROGRESS NOTE ADULT - SUBJECTIVE AND OBJECTIVE BOX
85y  Male  No Known Allergies    CC: Patient is a 85y old  Male who presents with a chief complaint of Hypoxic respiratory failure, Aspiration pneumonia, sepsis (03 Jun 2019 14:53)    HPI:  84 yo M with PMHx of Glaucoma, benign prostate neoplasm, HTN, HLD, Grad 3 Diastolic Heart Failure s/p ppm, CKD, dysphagia s/p PEG tube placement, CVA presents via EMS from [facility] for eval of SOB. Patient was reportedly eating dysphagia diet meal when he began to experience SOB with increase secretions and altered. Collateral obtained from facility suggests there was a brief period where patient received compressions. Upon arrival to the ED patient was found to be hypotensive 60/30s. Further decompensation involving patient respiratory status occurred due to secretions and mucous plugging leading to hypoxia prompting emergent intubation.      PAST MEDICAL & SURGICAL HISTORY:  Shortness of breath  Muscle weakness  Depression  IBS (irritable bowel syndrome)  Dysphagia  Cerebral infarction  Anemia  Psoriasis  GERD (gastroesophageal reflux disease)  Dyslipidemia  Cardiac pacemaker  S/P percutaneous endoscopic gastrostomy (PEG) tube placement    FAMILY HISTORY:  No pertinent family history in first degree relatives      Vitals   Vital Signs Last 24 Hrs  T(C): 37.4 (03 Jun 2019 18:00), Max: 37.4 (03 Jun 2019 18:00)  T(F): 99.4 (03 Jun 2019 18:00), Max: 99.4 (03 Jun 2019 18:00)  HR: 59 (03 Jun 2019 21:21) (59 - 64)  BP: 105/55 (03 Jun 2019 18:00) (93/55 - 115/64)  BP(mean): 71 (03 Jun 2019 18:00) (66 - 80)  RR: 19 (03 Jun 2019 18:00) (16 - 19)  SpO2: 96% (03 Jun 2019 21:21) (94% - 100%)    I&O's Summary    02 Jun 2019 07:01  -  03 Jun 2019 07:00  --------------------------------------------------------  IN: 1729.4 mL / OUT: 1275 mL / NET: 454.4 mL    03 Jun 2019 07:01  -  03 Jun 2019 21:55  --------------------------------------------------------  IN: 861.1 mL / OUT: 480 mL / NET: 381.1 mL        LABS  06-03    138  |  105  |  42<H>  ----------------------------<  131<H>  4.4   |  26  |  1.74<H>    Ca    9.4      03 Jun 2019 05:00  Phos  3.6     06-03  Mg     1.7     06-03                            8.6    16.19 )-----------( 172      ( 03 Jun 2019 05:00 )             27.3             CAPILLARY BLOOD GLUCOSE            VENT SETTINGS   Mode: AC/ CMV (Assist Control/ Continuous Mandatory Ventilation)  RR (machine): 18  TV (machine): 450  FiO2: 40  PEEP: 5  ITime: 1  MAP: 12  PIP: 28      Meds  MEDICATIONS  (STANDING):  acetylcysteine 20%  Inhalation 2 milliLiter(s) Inhalation every 6 hours  albumin human 25% IVPB 50 milliLiter(s) IV Intermittent every 6 hours  ALBUTerol/ipratropium for Nebulization 3 milliLiter(s) Nebulizer every 6 hours  cefepime   IVPB 1000 milliGRAM(s) IV Intermittent every 24 hours  chlorhexidine 0.12% Liquid 15 milliLiter(s) Oral Mucosa two times a day  chlorhexidine 4% Liquid 1 Application(s) Topical <User Schedule>  dorzolamide 2%/timolol 0.5% Ophthalmic Solution 1 Drop(s) Both EYES two times a day  fentaNYL   Infusion. 0.5 MICROgram(s)/kG/Hr (4.25 mL/Hr) IV Continuous <Continuous>  heparin  Injectable 5000 Unit(s) SubCutaneous every 8 hours  latanoprost 0.005% Ophthalmic Solution 1 Drop(s) Both EYES at bedtime  midodrine 5 milliGRAM(s) Oral <User Schedule>  norepinephrine Infusion 0.05 MICROgram(s)/kG/Min (3.984 mL/Hr) IV Continuous <Continuous>  pantoprazole   Suspension 40 milliGRAM(s) Oral daily  vancomycin  IVPB 500 milliGRAM(s) IV Intermittent every 12 hours      REVIEW OF SYSTEMS:    CONSTITUTIONAL: No fever, weight loss, or fatigue  EYES: No eye pain, visual disturbances, or discharge  ENMT:  No difficulty hearing, tinnitus, vertigo; No sinus or throat pain  NECK: No pain or stiffness  BREASTS: No pain, masses, or nipple discharge  RESPIRATORY: No cough, wheezing, chills or hemoptysis; No shortness of breath  CARDIOVASCULAR: No chest pain, palpitations, dizziness, or leg swelling  GASTROINTESTINAL: No abdominal or epigastric pain. No nausea, vomiting, or hematemesis; No diarrhea or constipation. No melena or hematochezia.  GENITOURINARY: No dysuria, frequency, hematuria, or incontinence  NEUROLOGICAL: No headaches, memory loss, loss of strength, numbness, or tremors  SKIN: No itching, burning, rashes, or lesions   LYMPH NODES: No enlarged glands  ENDOCRINE: No heat or cold intolerance; No hair loss  MUSCULOSKELETAL: No joint pain or swelling; No muscle, back, or extremity pain  PSYCHIATRIC: No depression, anxiety, mood swings, or difficulty sleeping  HEME/LYMPH: No easy bruising, or bleeding gums  ALLERY AND IMMUNOLOGIC: No hives or eczema      Physicial Exam:     Constitutional: NAD, well-groomed, well-developed  HEENT: PERRLA, EOMI, no drainage or redness  Neck: No bruits; no thyromegaly or nodules,  No JVD  Back: Normal spine flexure, No CVA tenderness, No deformity or limitation of movement  Respiratory: Breath Sounds equal & clear to percussion & auscultation, no accessory muscle use  Cardiovascular: Regular rate & rhythm, normal S1, S2; no murmurs, gallops or rubs; no S3, S4  Gastrointestinal: Soft, non-tender, non distended no hepatosplenomegaly, normal bowel sounds  Extremities: No peripheral edema, No cyanosis, clubbing   Vascular: Equal and normal pulses: 2+ peripheral pulses throughout  Neurological: GCS:    A&O x 3; no sensory, motor  deficits, normal reflexes  Psychiatric: Normal mood, normal affect  Musculoskeletal: No joint pain, swelling or deformity; no limitation of movement  Skin: No rashes 85y  Male  No Known Allergies    CC: Patient is a 85y old  Male who presents with a chief complaint of Hypoxic respiratory failure, Aspiration pneumonia, sepsis (03 Jun 2019 14:53)    HPI:  86 yo M with PMHx of Glaucoma, benign prostate neoplasm, HTN, HLD, Grad 3 Diastolic Heart Failure s/p ppm, CKD, dysphagia s/p PEG tube placement, CVA presented from [facility] for eval of SOB. Patient was reportedly eating dysphagia diet meal when he began to experience SOB with increase secretions and altered. He has long histroy of aspiration with multiple admissions in the past  for aspiration events. He aarivd S/P possible cardiac arrest at the facility with brife compressions etiology was mucous plugging and  likely aspiration event. On arrival to the ER hewas hypotensive and decompensated due to secretions and mucous plugging which lead to emergent intubation.  He remains vented failing daily weaning trials, on pressors to support systolic blood pressure although dose has been trending down and the goal is to wean off pressors by morning and have successful cpap trial for extubation with a total goal of getting him mobilized over the next few days       PAST MEDICAL & SURGICAL HISTORY:  Shortness of breath  Muscle weakness  Depression  IBS (irritable bowel syndrome)  Dysphagia  Cerebral infarction  Anemia  Psoriasis  GERD (gastroesophageal reflux disease)  Dyslipidemia  Cardiac pacemaker  S/P percutaneous endoscopic gastrostomy (PEG) tube placement    FAMILY HISTORY:  No pertinent family history in first degree relatives    Vitals   Vital Signs Last 24 Hrs  T(C): 37.4 (03 Jun 2019 18:00), Max: 37.4 (03 Jun 2019 18:00)  T(F): 99.4 (03 Jun 2019 18:00), Max: 99.4 (03 Jun 2019 18:00)  HR: 59 (03 Jun 2019 21:21) (59 - 64)  BP: 105/55 (03 Jun 2019 18:00) (93/55 - 115/64)  BP(mean): 71 (03 Jun 2019 18:00) (66 - 80)  RR: 19 (03 Jun 2019 18:00) (16 - 19)  SpO2: 96% (03 Jun 2019 21:21) (94% - 100%)    I&O's Summary  02 Jun 2019 07:01  -  03 Jun 2019 07:00  --------------------------------------------------------  IN: 1729.4 mL / OUT: 1275 mL / NET: 454.4 mL    03 Jun 2019 07:01  -  03 Jun 2019 21:55  --------------------------------------------------------  IN: 861.1 mL / OUT: 480 mL / NET: 381.1 mL    LABS  06-03    138  |  105  |  42<H>  ----------------------------<  131<H>  4.4   |  26  |  1.74<H>  Ca    9.4      03 Jun 2019 05:00  Phos  3.6     06-03  Mg     1.7     06-03                       8.6    16.19 )-----------( 172      ( 03 Jun 2019 05:00 )             27.3     VENT SETTINGS   Mode: AC/ CMV (Assist Control/ Continuous Mandatory Ventilation)  RR (machine): 18  TV (machine): 450  FiO2: 40  PEEP: 5  ITime: 1  MAP: 12  PIP: 28      MEDICATIONS  (STANDING):  acetylcysteine 20%  Inhalation 2 milliLiter(s) Inhalation every 6 hours  albumin human 25% IVPB 50 milliLiter(s) IV Intermittent every 6 hours  ALBUTerol/ipratropium for Nebulization 3 milliLiter(s) Nebulizer every 6 hours  cefepime   IVPB 1000 milliGRAM(s) IV Intermittent every 24 hours  chlorhexidine 0.12% Liquid 15 milliLiter(s) Oral Mucosa two times a day  chlorhexidine 4% Liquid 1 Application(s) Topical <User Schedule>  dorzolamide 2%/timolol 0.5% Ophthalmic Solution 1 Drop(s) Both EYES two times a day  fentaNYL   Infusion. 0.5 MICROgram(s)/kG/Hr (4.25 mL/Hr) IV Continuous <Continuous>  heparin  Injectable 5000 Unit(s) SubCutaneous every 8 hours  latanoprost 0.005% Ophthalmic Solution 1 Drop(s) Both EYES at bedtime  midodrine 5 milliGRAM(s) Oral <User Schedule>  norepinephrine Infusion 0.05 MICROgram(s)/kG/Min (3.984 mL/Hr) IV Continuous <Continuous>  pantoprazole   Suspension 40 milliGRAM(s) Oral daily  vancomycin  IVPB 500 milliGRAM(s) IV Intermittent every 12 hours      REVIEW OF SYSTEMS:    Unable to obtain due to mechnical ventilation, sedation    Physicial Exam:     Constitutional: NAD, well-groomed, well-developed  HEENT: PERRLA, EOMI, no drainage or redness  Neck: No bruits; no thyromegaly or nodules,  No JVD  Back: Normal spine flexure, No CVA tenderness, No deformity or limitation of movement  Respiratory: Breath Sounds equal & clear to percussion & auscultation, no accessory muscle use  Cardiovascular: Regular rate & rhythm, normal S1, S2; no murmurs, gallops or rubs; no S3, S4  Gastrointestinal: Soft, non-tender, non distended no hepatosplenomegaly, normal bowel sounds  Extremities: No peripheral edema, No cyanosis, clubbing   Vascular: Equal and normal pulses: 2+ peripheral pulses throughout  Neurological: GCS:    A&O x 3; no sensory, motor  deficits, normal reflexes  Psychiatric: Normal mood, normal affect  Musculoskeletal: No joint pain, swelling or deformity; no limitation of movement  Skin: No rashes

## 2019-06-03 NOTE — PROGRESS NOTE ADULT - ASSESSMENT
86 yo m A/W hypoxic respiratory failure, possible aspiration, and now with staph bacteremia  Hx of CKD, CVA, CAD, HTN, DM, GERD, PPM and peg.  No obvious cutaneous portal for bacteremia, but in presence of PPM, need to r/o valvular or lead associated endocarditis    PLAN:   cont vanco and cefepime  f/u sputum and repeat blood cultures on 6/3  consider RANDALL to r/o PPM lead endocarditis if candidate  will adjust above antimicrobial regimen once sensitivities available  d/w Dr Milner

## 2019-06-03 NOTE — PROCEDURE NOTE - NSINDICATIONS_GEN_A_CORE
antibiotic therapy/venous access
arterial puncture to obtain ABG's
venous access/volume resuscitation/critical illness
hypertonic/irritant infusion/critical illness/emergency venous access

## 2019-06-03 NOTE — CONSULT NOTE ADULT - ASSESSMENT
A/P : 84 yo m from [facility],  currently intubated W hypoxic respiratory failure, possible aspiration, and now with staph bacteremia. Acute on chronic renal dysfunction, chronic Diastolic CHF , Dysphagia  s/p PEG placed s/p CVA.     palliative: for goc, Pt. currently in icu, intubated, sedated. Appears  comfortable, in no distress. Family not at bedside at this time. Pt has advanced directives/ MOLST  in place , Pt DNR/w/ trial intubation.  At this time, will cont to follow w/ icu team, continued goc  pending pts clinical course. A/P : 84 yo m from [facility],  currently intubated W hypoxic respiratory failure, possible aspiration, and now with staph bacteremia. Acute on chronic renal dysfunction, chronic Diastolic CHF , Dysphagia  s/p PEG placed s/p CVA.     palliative: for goc, Pt. currently in icu, intubated, sedated. Appears  comfortable, in no distress. Family not at bedside at this time. Pt has advanced directives/ MOLST  in place , Pt DNR/w/ trial intubation.  At this time, will cont to follow w/ icu team, continued goc  pending pts clinical course.  Family aware of poor prognosis

## 2019-06-03 NOTE — PROGRESS NOTE ADULT - PROBLEM SELECTOR PLAN 1
-Patient currently on Full vent support  -titrate settings to maintain SaO2 >90%, or pH >7.25  -consider low titdal volume ventilation strategy w/ goal Tv 4-6 cc/kg of ideal body weight  -plateu pressure goal <30  -Peridex oral care and VAP prophylaxis with HOB 30 degrees   -aggressive chest PT and suctioning   -morning sedation vacation with spontaneous breathing trial if clinical condition warrants, discuss with respiratory therapy with goal for extubation in am

## 2019-06-03 NOTE — CONSULT NOTE ADULT - SUBJECTIVE AND OBJECTIVE BOX
HPI:  86 yo M with PMHx of Glaucoma, benign prostate neoplasm, HTN, HLD, Grad 3 Diastolic Heart Failure s/p ppm, CKD, dysphagia s/p PEG tube placement, CVA presents via EMS from [facility] for eval of SOB. Patient was reportedly eating dysphagia diet meal when he began to experience SOB with increase secretions and altered. Collateral obtained from facility suggests there was a brief period where patient received compressions. Upon arrival to the ED patient was found to be hypotensive 60/30s. Further decompensation involving patient respiratory status occurred due to secretions and mucous plugging leading to hypoxia prompting emergent intubation.    Initial labs significant for WBC 13.64, Cr 2.04, Lactate 3.4, Procalcitonin .85, ProBNP 52591, CXR reveal bibasilar atelectatic changes with questionable effusion vs infiltrate about left base.       PAST MEDICAL & SURGICAL HISTORY:  Shortness of breath  Muscle weakness  Depression  IBS (irritable bowel syndrome)  Dysphagia  Cerebral infarction  Anemia  Psoriasis  GERD (gastroesophageal reflux disease)  Dyslipidemia  Cardiac pacemaker  S/P percutaneous endoscopic gastrostomy (PEG) tube placement      SOCIAL HISTORY:    Admitted from:  [facility]   Substance abuse history:              Tobacco hx:                  Alcohol hx:              Home Opioid hx:  Mosque:                                    Preferred Language:    wife/HCP:  Angie Juarez            Phone#:  [phone]- [phone]    FAMILY HISTORY:  No pertinent family history in first degree relatives    Baseline ADLs (prior to admission):    Allergies    No Known Allergies    Intolerances      Present Symptoms:   Dyspnea: intubated  Nausea/Vomiting: no  Anxiety:no  Depressed   Fatigue:yes  Loss of appetite: yes  Pain:      no s/s                          location:          Review of Systems: Unable to obtain due to poor mentation]    MEDICATIONS  (STANDING):  acetylcysteine 20%  Inhalation 2 milliLiter(s) Inhalation every 6 hours  albumin human 25% IVPB 50 milliLiter(s) IV Intermittent every 6 hours  ALBUTerol/ipratropium for Nebulization 3 milliLiter(s) Nebulizer every 6 hours  cefepime   IVPB 1000 milliGRAM(s) IV Intermittent every 24 hours  chlorhexidine 0.12% Liquid 15 milliLiter(s) Oral Mucosa two times a day  chlorhexidine 4% Liquid 1 Application(s) Topical <User Schedule>  dorzolamide 2%/timolol 0.5% Ophthalmic Solution 1 Drop(s) Both EYES two times a day  fentaNYL   Infusion. 0.5 MICROgram(s)/kG/Hr (4.25 mL/Hr) IV Continuous <Continuous>  heparin  Injectable 5000 Unit(s) SubCutaneous every 8 hours  latanoprost 0.005% Ophthalmic Solution 1 Drop(s) Both EYES at bedtime  midodrine 5 milliGRAM(s) Oral <User Schedule>  norepinephrine Infusion 0.05 MICROgram(s)/kG/Min (3.984 mL/Hr) IV Continuous <Continuous>  pantoprazole   Suspension 40 milliGRAM(s) Oral daily  vancomycin  IVPB 500 milliGRAM(s) IV Intermittent every 12 hours    MEDICATIONS  (PRN):  sodium chloride 0.9% lock flush 10 milliLiter(s) IV Push every 1 hour PRN Pre/post blood products, medications, blood draw, and to maintain line patency  sodium chloride 0.9% lock flush 10 milliLiter(s) IV Push every 1 hour PRN Pre/post blood products, medications, blood draw, and to maintain line patency      PHYSICAL EXAM:    Vital Signs Last 24 Hrs  T(C): 36.9 (03 Jun 2019 11:00), Max: 37.3 (02 Jun 2019 18:00)  T(F): 98.4 (03 Jun 2019 11:00), Max: 99.2 (02 Jun 2019 18:00)  HR: 60 (03 Jun 2019 13:09) (59 - 66)  BP: 101/56 (03 Jun 2019 12:00) (93/55 - 115/64)  BP(mean): 71 (03 Jun 2019 12:00) (67 - 80)  RR: 18 (03 Jun 2019 12:00) (16 - 18)  SpO2: 99% (03 Jun 2019 13:09) (94% - 99%)    General:  nonverbal, intubated   Palliative Performance Status Version2:   10  %  HEENT: n/c, a/t dry mouth  ET tube/secretions   Lungs: coarse , w/ scattered rhonchi   CV: s1s2  GI: soft, n/t + bs +peg    :   hansen  Musculoskeletal:  weak/flaccid ,  edema   Skin: warm dry   Neuro: intubated , sedated ,non verbal   Oral intake npo  Diet: [NPO]    LABS:                        8.6    16.19 )-----------( 172      ( 03 Jun 2019 05:00 )             27.3     06-03    138  |  105  |  42<H>  ----------------------------<  131<H>  4.4   |  26  |  1.74<H>    Ca    9.4      03 Jun 2019 05:00  Phos  3.6     06-03  Mg     1.7     06-03          RADIOLOGY & ADDITIONAL STUDIES:< from: Xray Chest 1 View- PORTABLE-Urgent (06.03.19 @ 13:59) >    EXAM:  XR CHEST PORTABLE URGENT 1V      PROCEDURE DATE:  06/03/2019        INTERPRETATION:  INDICATION: Assessment following PICC line placement    PRIORS: 6/2/2019    VIEWS: Portable AP radiography of the chest performed.    FINDINGS: Evaluation limited secondary to shallow inspiration and patient   rotation. The position of the indwelling ETT is unchanged. A right sided   PICC line has been introduced, the distal tip of which overlies the   expected region of the SVC. Heart size cannot be quantitated. Note is   made of an indwelling pacemaker. Left-sided airspace opacities are   without significant change. Increased markings are redemonstrated within   the visualized portion of the right lung base. Left pleural effusion   cannot be excluded. There is no evidence for right pleural effusion. No   pneumothorax is demonstrated. No gross mediastinal shift is noted.    IMPRESSION: The distal aspect of the advanced right sided PICC line   overlies the expected region of the SVC. No change inleft sided airspace   opacities.          ADVANCE DIRECTIVES: [facility] DNR/ Trial intubation   Advanced Care Planning discussion total time spent:

## 2019-06-03 NOTE — PROGRESS NOTE ADULT - ASSESSMENT
65 year old male with acute hypoxic respiratory failure secondary to aspiration pna MELISSA    Critical Care time: 35 mins assessing presenting problems of acute illness that poses high probability of life threatening deterioration or end organ damage/dysfunction.  Medical decision making inculding Initiating plan of care, reviewing data, reviewing radiology,direct patient bedside evaluation and interpretation of vital signs, any necessary ventilator management , discusion with multidisciplinary team, discussing goals of care with patient/family, all non inclusive of procedures

## 2019-06-03 NOTE — PROGRESS NOTE ADULT - SUBJECTIVE AND OBJECTIVE BOX
Follow-up Critical Care Progress Note  Chief Complaint : Sepsis        No new events overnight.  Denies SOB/CP.       Allergies :No Known Allergies      PAST MEDICAL & SURGICAL HISTORY:  Shortness of breath  Muscle weakness  Depression  IBS (irritable bowel syndrome)  Dysphagia  Cerebral infarction  Anemia  Psoriasis  GERD (gastroesophageal reflux disease)  Dyslipidemia  Cardiac pacemaker  S/P percutaneous endoscopic gastrostomy (PEG) tube placement      Medications:  MEDICATIONS  (STANDING):  ALBUTerol    90 MICROgram(s) HFA Inhaler 1 Puff(s) Inhalation every 4 hours  cefepime   IVPB 1000 milliGRAM(s) IV Intermittent every 24 hours  chlorhexidine 0.12% Liquid 15 milliLiter(s) Oral Mucosa two times a day  chlorhexidine 4% Liquid 1 Application(s) Topical <User Schedule>  dorzolamide 2%/timolol 0.5% Ophthalmic Solution 1 Drop(s) Both EYES two times a day  fentaNYL   Infusion. 0.5 MICROgram(s)/kG/Hr (4.25 mL/Hr) IV Continuous <Continuous>  heparin  Injectable 5000 Unit(s) SubCutaneous every 8 hours  latanoprost 0.005% Ophthalmic Solution 1 Drop(s) Both EYES at bedtime  norepinephrine Infusion 0.05 MICROgram(s)/kG/Min (3.984 mL/Hr) IV Continuous <Continuous>  pantoprazole   Suspension 40 milliGRAM(s) Oral daily  vancomycin  IVPB 1000 milliGRAM(s) IV Intermittent every 12 hours    MEDICATIONS  (PRN):  sodium chloride 0.9% lock flush 10 milliLiter(s) IV Push every 1 hour PRN Pre/post blood products, medications, blood draw, and to maintain line patency      LABS:                        8.6    16.19 )-----------( 172      ( 2019 05:00 )             27.3     06-03    138  |  105  |  42<H>  ----------------------------<  131<H>  4.4   |  26  |  1.74<H>    Ca    9.4      2019 05:00  Phos  3.6     06-03  Mg     1.7     06-    TPro  7.9  /  Alb  2.1<L>  /  TBili  0.2  /  DBili  x   /  AST  25  /  ALT  19  /  AlkPhos  120  06-      CARDIAC MARKERS ( 2019 11:10 )  .019 ng/mL / x     / x     / x     / x            PT/INR - ( 2019 11:10 )   PT: 16.6 sec;   INR: 1.46 ratio         PTT - ( 2019 11:10 )  PTT:44.0 sec  Urinalysis Basic - ( 2019 11:16 )    Color: Yellow / Appearance: Clear / S.015 / pH: x  Gluc: x / Ketone: Negative  / Bili: Negative / Urobili: Negative   Blood: x / Protein: 100 / Nitrite: Negative   Leuk Esterase: Moderate / RBC: 11-25 /HPF / WBC 26-50 /HPF   Sq Epi: x / Non Sq Epi: Neg.-Few / Bacteria: Many /HPF      Procalcitonin, Serum: 0.85 ng/mL (19 @ 11:10)    Serum Pro-Brain Natriuretic Peptide: 21009 pg/mL (19 @ 11:10)      WBC Trend  19 @ 05:00   -  16.19<H>  19 @ 05:30   -  18.50<H>  19 @ 11:10   -  13.64<H>      Trend Bun/Cr  19 @ 05:00  BUN/CR -  42<H> / 1.74<H>  19 @ 05:30  BUN/CR -  45<H> / 1.74<H>  19 @ 11:10  BUN/CR -  53<H> / 2.06<H>    Lactic Acid Trend  19 @ 12:43   -   1.2  19 @ 11:03   -   3.4<H>      CULTURES: (if applicable)    Culture - Sputum (collected 19 @ 17:20)  Source: .Sputum Sputum  Gram Stain (19 @ 06:26):    Moderate polymorphonuclear leukocytes per low power field    Few Squamous epithelial cells per low power field    Moderate Gram Variable Coccobacilli  seen per oil power field    Culture - Urine (collected 19 @ 11:16)  Source: .Urine Clean Catch (Midstream)  Preliminary Report (19 @ 15:43):    >100,000 CFU/ml Yeast like cells    Culture - Blood (collected 19 @ 11:10)  Source: .Blood Blood-Peripheral  Gram Stain (19 @ 15:04):    Growth in aerobic bottle: Gram Positive Cocci in Clusters    Growth in anaerobic bottle: Gram Positive Cocci in Clusters  Preliminary Report (19 @ 09:18):    Growth in aerobic bottle: Staphylococcus aureus Susceptibility to follow.    Growth in anaerobic bottle: Gram Positive Cocci in Clusters    "Due to technical problems, Proteus sp. will Not be reported as part of    the BCID panel until further notice"    ***Blood Panel PCR results on this specimen are available    approximately 3 hours after the Gram stain result.***    Gram stain, PCR, and/or culture results may not always    correspond due to difference in methodologies.    ************************************************************    This PCR assay was performed using [employer].    The following targets are tested for: Enterococcus,    vancomycin resistant enterococci, Listeria monocytogenes,    coagulase negative staphylococci, S. aureus,    methicillin resistant S. aureus, Streptococcus agalactiae    (Group B), S. pneumoniae, S. pyogenes (Group A),    Acinetobacter baumannii, Enterobacter cloacae, E. coli,    Klebsiella oxytoca, K. pneumoniae, Proteus sp.,    Serratia marcescens, Haemophilus influenzae,    Neisseria meningitidis, Pseudomonas aeruginosa, Candida    albicans, C. glabrata, C krusei, C parapsilosis,    C. tropicalis and the KPC resistance gene.  Organism: Blood Culture PCR (19 @ 07:43)  Organism: Blood Culture PCR (19 @ 07:43)      -  Staphylococcus aureus: Detec Any isolate of Staphylococcus aureus from a blood culture is NOT considered a contaminant.      Method Type: PCR    Culture - Blood (collected 19 @ 11:05)  Source: .Blood Blood-Peripheral  Gram Stain (19 @ 12:46):    Growth in anaerobic bottle: Gram Positive Cocci in Clusters    Growth in aerobic bottle: Gram Positive Cocci in Clusters  Preliminary Report (19 @ 09:22):    Growth in aerobic and anaerobic bottles: Staphylococcus aureus    See previous culture [id number]      Rapid RVP Result: NotDetec (19 @ 16:48)      ABG - ( 2019 21:28 )  pH, Arterial: 7.41  pH, Blood: x     /  pCO2: 36    /  pO2: 91    / HCO3: x     / Base Excess: x     /  SaO2: 97                CAPILLARY BLOOD GLUCOSE          RADIOLOGY  CXR:  < from: Xray Chest 1 View- PORTABLE-Urgent (19 @ 09:41) >    INTERPRETATION:  AP chest on 2019 at 9:16 AM. Patient requires   intubation.    Heart is magnified by technique. Patient's chin obscures the apices.    Endotracheal tube remains and is barely visible on prior study .    Left-sided pacemaker again noted.    There is a significant left sided infiltrate centrally the appears   increased from .    Stable slight infiltrate or minor fissure noted.    IMPRESSION: Increasing left lung infiltrate.    < end of copied text >      ECHO:  < from: TTE Echo Complete w/Doppler (19 @ 10:32) >  Summary:   1. Left ventricular ejection fraction, by visual estimation, is 55%.   2. Normal global left ventricular systolic function.   3. Spectral Doppler shows restrictive pattern of left ventricular myocardial filling (Grade III diastolic dysfunction).   4. There is mild concentric left ventricular hypertrophy.   5. Mild mitral annular calcification.  6. Mild thickening and calcification of the anterior and posterior mitral valve leaflets.   7. Mild to moderate aortic regurgitation.   8. Estimated pulmonary artery systolic pressure is 41.1 mmHg assuming a right atrial pressure of 10 mmHg, which is consistent with mild pulmonary hypertension.   9. Pulmonary hypertension is present.  10. LA volume Index is 45.8 ml/m² ml/m2.  11. Peak transaortic gradient equals 34.7 mmHg, mean transaortic gradient equals 19.6 mmHg, the calculated aortic valve area equals 1.08 cm² by the continuity equation consistent with moderate aortic stenosis.    < end of copied text >      VITALS:  T(C): 37 (19 @ 06:00), Max: 37.7 (19 @ 13:00)  T(F): 98.6 (19 @ 06:00), Max: 99.8 (19 @ 13:00)  HR: 60 (19 @ 08:07) (60 - 69)  BP: 97/65 (19 @ 08:00) (93/59 - 115/64)  BP(mean): 75 (19 @ 08:00) (65 - 80)  ABP: --  ABP(mean): --  RR: 17 (19 @ 08:00) (16 - 18)  SpO2: 98% (19 @ 08:07) (94% - 99%)  CVP(mm Hg): --  CVP(cm H2O): --    Ins and Outs     19 @ 07:01  -  19 @ 07:00  --------------------------------------------------------  IN: 1729.4 mL / OUT: 1275 mL / NET: 454.4 mL        Height (cm): 175.26 (19 @ 10:46)  Weight (kg): 85 (19 @ 10:46)  BMI (kg/m2): 27.7 (19 @ 10:46)    Device: 840, Mode: AC/ CMV (Assist Control/ Continuous Mandatory Ventilation), RR (machine): 18, RR (patient): 20, TV (machine): 450, TV (patient): 450, FiO2: 40, PEEP: 5, ITime: 1, MAP: 11, PIP: 20    I&O's Detail    2019 07:01  -  2019 07:00  --------------------------------------------------------  IN:    fentaNYL Infusion.: 295.6 mL    norepinephrine Infusion: 203.8 mL    ns in tub fed  qhirtc02: 930 mL    Solution: 250 mL    Solution: 50 mL  Total IN: 1729.4 mL    OUT:    Indwelling Catheter - Urethral: 1275 mL  Total OUT: 1275 mL    Total NET: 454.4 mL

## 2019-06-04 LAB
-  AMPICILLIN/SULBACTAM: SIGNIFICANT CHANGE UP
-  AMPICILLIN/SULBACTAM: SIGNIFICANT CHANGE UP
-  CEFAZOLIN: SIGNIFICANT CHANGE UP
-  CEFAZOLIN: SIGNIFICANT CHANGE UP
-  CLINDAMYCIN: SIGNIFICANT CHANGE UP
-  CLINDAMYCIN: SIGNIFICANT CHANGE UP
-  ERYTHROMYCIN: SIGNIFICANT CHANGE UP
-  ERYTHROMYCIN: SIGNIFICANT CHANGE UP
-  GENTAMICIN: SIGNIFICANT CHANGE UP
-  GENTAMICIN: SIGNIFICANT CHANGE UP
-  OXACILLIN: SIGNIFICANT CHANGE UP
-  OXACILLIN: SIGNIFICANT CHANGE UP
-  PENICILLIN: SIGNIFICANT CHANGE UP
-  PENICILLIN: SIGNIFICANT CHANGE UP
-  RIFAMPIN: SIGNIFICANT CHANGE UP
-  RIFAMPIN: SIGNIFICANT CHANGE UP
-  TETRACYCLINE: SIGNIFICANT CHANGE UP
-  TETRACYCLINE: SIGNIFICANT CHANGE UP
-  TRIMETHOPRIM/SULFAMETHOXAZOLE: SIGNIFICANT CHANGE UP
-  TRIMETHOPRIM/SULFAMETHOXAZOLE: SIGNIFICANT CHANGE UP
-  VANCOMYCIN: SIGNIFICANT CHANGE UP
-  VANCOMYCIN: SIGNIFICANT CHANGE UP
ALBUMIN SERPL ELPH-MCNC: 2.1 G/DL — LOW (ref 3.3–5)
ALP SERPL-CCNC: 89 U/L — SIGNIFICANT CHANGE UP (ref 40–120)
ALT FLD-CCNC: 21 U/L DA — SIGNIFICANT CHANGE UP (ref 10–45)
ANION GAP SERPL CALC-SCNC: 8 MMOL/L — SIGNIFICANT CHANGE UP (ref 5–17)
AST SERPL-CCNC: 27 U/L — SIGNIFICANT CHANGE UP (ref 10–40)
BILIRUB SERPL-MCNC: 0.3 MG/DL — SIGNIFICANT CHANGE UP (ref 0.2–1.2)
BLD GP AB SCN SERPL QL: SIGNIFICANT CHANGE UP
BUN SERPL-MCNC: 42 MG/DL — HIGH (ref 7–23)
CALCIUM SERPL-MCNC: 9.7 MG/DL — SIGNIFICANT CHANGE UP (ref 8.4–10.5)
CHLORIDE SERPL-SCNC: 104 MMOL/L — SIGNIFICANT CHANGE UP (ref 96–108)
CO2 SERPL-SCNC: 25 MMOL/L — SIGNIFICANT CHANGE UP (ref 22–31)
CREAT SERPL-MCNC: 1.64 MG/DL — HIGH (ref 0.5–1.3)
CULTURE RESULTS: SIGNIFICANT CHANGE UP
GLUCOSE SERPL-MCNC: 104 MG/DL — HIGH (ref 70–99)
HCT VFR BLD CALC: 22.4 % — LOW (ref 39–50)
HCT VFR BLD CALC: 25.4 % — LOW (ref 39–50)
HGB BLD-MCNC: 6.9 G/DL — CRITICAL LOW (ref 13–17)
HGB BLD-MCNC: 8 G/DL — LOW (ref 13–17)
INR BLD: 1.36 RATIO — HIGH (ref 0.88–1.16)
MAGNESIUM SERPL-MCNC: 1.8 MG/DL — SIGNIFICANT CHANGE UP (ref 1.6–2.6)
MCHC RBC-ENTMCNC: 27.7 PG — SIGNIFICANT CHANGE UP (ref 27–34)
MCHC RBC-ENTMCNC: 28.9 PG — SIGNIFICANT CHANGE UP (ref 27–34)
MCHC RBC-ENTMCNC: 30.8 GM/DL — LOW (ref 32–36)
MCHC RBC-ENTMCNC: 31.5 GM/DL — LOW (ref 32–36)
MCV RBC AUTO: 90 FL — SIGNIFICANT CHANGE UP (ref 80–100)
MCV RBC AUTO: 91.7 FL — SIGNIFICANT CHANGE UP (ref 80–100)
METHOD TYPE: SIGNIFICANT CHANGE UP
METHOD TYPE: SIGNIFICANT CHANGE UP
NRBC # BLD: 0 /100 WBCS — SIGNIFICANT CHANGE UP (ref 0–0)
NRBC # BLD: 0 /100 WBCS — SIGNIFICANT CHANGE UP (ref 0–0)
ORGANISM # SPEC MICROSCOPIC CNT: SIGNIFICANT CHANGE UP
PHOSPHATE SERPL-MCNC: 3.6 MG/DL — SIGNIFICANT CHANGE UP (ref 2.5–4.5)
PLATELET # BLD AUTO: 118 K/UL — LOW (ref 150–400)
PLATELET # BLD AUTO: 130 K/UL — LOW (ref 150–400)
POTASSIUM SERPL-MCNC: 4.4 MMOL/L — SIGNIFICANT CHANGE UP (ref 3.5–5.3)
POTASSIUM SERPL-SCNC: 4.4 MMOL/L — SIGNIFICANT CHANGE UP (ref 3.5–5.3)
PROT SERPL-MCNC: 6.4 G/DL — SIGNIFICANT CHANGE UP (ref 6–8.3)
PROTHROM AB SERPL-ACNC: 15.4 SEC — HIGH (ref 10–12.9)
RBC # BLD: 2.49 M/UL — LOW (ref 4.2–5.8)
RBC # BLD: 2.77 M/UL — LOW (ref 4.2–5.8)
RBC # FLD: 16.2 % — HIGH (ref 10.3–14.5)
RBC # FLD: 16.3 % — HIGH (ref 10.3–14.5)
SODIUM SERPL-SCNC: 137 MMOL/L — SIGNIFICANT CHANGE UP (ref 135–145)
SPECIMEN SOURCE: SIGNIFICANT CHANGE UP
WBC # BLD: 11.06 K/UL — HIGH (ref 3.8–10.5)
WBC # BLD: 12.53 K/UL — HIGH (ref 3.8–10.5)
WBC # FLD AUTO: 11.06 K/UL — HIGH (ref 3.8–10.5)
WBC # FLD AUTO: 12.53 K/UL — HIGH (ref 3.8–10.5)

## 2019-06-04 PROCEDURE — 99222 1ST HOSP IP/OBS MODERATE 55: CPT

## 2019-06-04 PROCEDURE — 99497 ADVNCD CARE PLAN 30 MIN: CPT

## 2019-06-04 PROCEDURE — 99233 SBSQ HOSP IP/OBS HIGH 50: CPT

## 2019-06-04 RX ORDER — IPRATROPIUM/ALBUTEROL SULFATE 18-103MCG
3 AEROSOL WITH ADAPTER (GRAM) INHALATION EVERY 4 HOURS
Refills: 0 | Status: COMPLETED | OUTPATIENT
Start: 2019-06-04 | End: 2019-06-05

## 2019-06-04 RX ORDER — DEXMEDETOMIDINE HYDROCHLORIDE IN 0.9% SODIUM CHLORIDE 4 UG/ML
0.2 INJECTION INTRAVENOUS
Qty: 200 | Refills: 0 | Status: DISCONTINUED | OUTPATIENT
Start: 2019-06-04 | End: 2019-06-06

## 2019-06-04 RX ORDER — CEFAZOLIN SODIUM 1 G
2000 VIAL (EA) INJECTION EVERY 12 HOURS
Refills: 0 | Status: DISCONTINUED | OUTPATIENT
Start: 2019-06-04 | End: 2019-06-14

## 2019-06-04 RX ORDER — ACETYLCYSTEINE 200 MG/ML
2 VIAL (ML) MISCELLANEOUS EVERY 4 HOURS
Refills: 0 | Status: COMPLETED | OUTPATIENT
Start: 2019-06-04 | End: 2019-06-05

## 2019-06-04 RX ADMIN — Medication 50 MILLILITER(S): at 13:00

## 2019-06-04 RX ADMIN — Medication 50 MILLILITER(S): at 01:35

## 2019-06-04 RX ADMIN — LATANOPROST 1 DROP(S): 0.05 SOLUTION/ DROPS OPHTHALMIC; TOPICAL at 22:05

## 2019-06-04 RX ADMIN — Medication 2 MILLILITER(S): at 21:20

## 2019-06-04 RX ADMIN — Medication 3 MILLILITER(S): at 13:12

## 2019-06-04 RX ADMIN — HEPARIN SODIUM 5000 UNIT(S): 5000 INJECTION INTRAVENOUS; SUBCUTANEOUS at 12:10

## 2019-06-04 RX ADMIN — MIDODRINE HYDROCHLORIDE 5 MILLIGRAM(S): 2.5 TABLET ORAL at 12:09

## 2019-06-04 RX ADMIN — Medication 2 MILLILITER(S): at 08:51

## 2019-06-04 RX ADMIN — CHLORHEXIDINE GLUCONATE 1 APPLICATION(S): 213 SOLUTION TOPICAL at 05:17

## 2019-06-04 RX ADMIN — HEPARIN SODIUM 5000 UNIT(S): 5000 INJECTION INTRAVENOUS; SUBCUTANEOUS at 05:17

## 2019-06-04 RX ADMIN — Medication 2 MILLILITER(S): at 17:28

## 2019-06-04 RX ADMIN — DORZOLAMIDE HYDROCHLORIDE TIMOLOL MALEATE 1 DROP(S): 20; 5 SOLUTION/ DROPS OPHTHALMIC at 18:31

## 2019-06-04 RX ADMIN — CHLORHEXIDINE GLUCONATE 15 MILLILITER(S): 213 SOLUTION TOPICAL at 18:31

## 2019-06-04 RX ADMIN — Medication 3 MILLILITER(S): at 04:17

## 2019-06-04 RX ADMIN — Medication 2 MILLILITER(S): at 04:18

## 2019-06-04 RX ADMIN — Medication 100 MILLIGRAM(S): at 05:18

## 2019-06-04 RX ADMIN — PANTOPRAZOLE SODIUM 40 MILLIGRAM(S): 20 TABLET, DELAYED RELEASE ORAL at 12:10

## 2019-06-04 RX ADMIN — Medication 3 MILLILITER(S): at 08:49

## 2019-06-04 RX ADMIN — Medication 3 MILLILITER(S): at 17:29

## 2019-06-04 RX ADMIN — CEFEPIME 100 MILLIGRAM(S): 1 INJECTION, POWDER, FOR SOLUTION INTRAMUSCULAR; INTRAVENOUS at 01:36

## 2019-06-04 RX ADMIN — HEPARIN SODIUM 5000 UNIT(S): 5000 INJECTION INTRAVENOUS; SUBCUTANEOUS at 22:08

## 2019-06-04 RX ADMIN — Medication 3.98 MICROGRAM(S)/KG/MIN: at 01:53

## 2019-06-04 RX ADMIN — Medication 2 MILLILITER(S): at 13:00

## 2019-06-04 RX ADMIN — CHLORHEXIDINE GLUCONATE 15 MILLILITER(S): 213 SOLUTION TOPICAL at 05:17

## 2019-06-04 RX ADMIN — Medication 3 MILLILITER(S): at 21:23

## 2019-06-04 RX ADMIN — MIDODRINE HYDROCHLORIDE 5 MILLIGRAM(S): 2.5 TABLET ORAL at 05:17

## 2019-06-04 RX ADMIN — Medication 50 MILLILITER(S): at 05:16

## 2019-06-04 RX ADMIN — Medication 100 MILLIGRAM(S): at 19:10

## 2019-06-04 RX ADMIN — MIDODRINE HYDROCHLORIDE 5 MILLIGRAM(S): 2.5 TABLET ORAL at 18:31

## 2019-06-04 RX ADMIN — DORZOLAMIDE HYDROCHLORIDE TIMOLOL MALEATE 1 DROP(S): 20; 5 SOLUTION/ DROPS OPHTHALMIC at 05:17

## 2019-06-04 NOTE — PROGRESS NOTE ADULT - ASSESSMENT
Physical Exam	  GENERAL:               Intubated and sedated, not in distress  HEENT:                   Orally intubated increased ET tube secretions purulent,   PULM:                     diminished at bilateral bases, intubated with thick yellow secretions by suction of ET tube course left breath sounds  CVS:                         S1, S2,  No Murmur  ABD:                        Soft, nondistended, nontender, normoactive bowel sounds, + PEG  EXT:                         No edema, nontender, No Cyanosis or Clubbing   Vascular:                Warm Extremities, Normal Capillary refill, Normal Distal Pulses. Left groin femoral line  NEURO:                  sedated, but arousable to tactile stimulation  PSYC:                      unable to assess    Assessment:  1. Septic shock and Acute hypoxic respiratory failure due to Aspiration PNA - Left with Staph Aureus Bacteremia. and + UTI   2. Acute on chronic renal dysfunction Baseline Cr 1.3  3. Lactic acidosis Resolved  4. Possible  PEA arrest in [place] prior to arrival - "brief period where patient received compressions" as per H&P  4. Chronic Diastolic CHF  8. Dysphagia  s/p PEG s/p CVA     Plan:  Cont ventilatory support, would not extubate as has increased secretions  CPAP ps 5 done, -600 RR 14-20, appears to tolerate     will wean as tolerated  PICC line that placed 6/4 will need to be replaced once bacteremia clears.   abx as per ID, may need RANDALL  continue midodrine   s/p albumin  will continue Mucomyst/Duoneb/chest pt for another day  Cultures sent, f/u sputum and blood cultures - sputum culture pending   Broad spectrum antibiotics  as per ID  Creatinine down trending   will add precedex if needed  Fentanyl for sedation  Feedings via PEG tube   Hold home BP medications given shock  Cont. Home glaucoma medications  noted anemia, suspect from fluid shifts from albumin, doubt acute bleed, will monitor, transfuse if needed.     GI/DVT prophylaxis .  45 min of critical care time spent on this patient's care   palliative care eval   d/w Wife - DNR confirmed, does not want him to suffer, will be ok with PICC line    PMD:	Dr. reyes			                   Notified(Date):  Family Updated: 	Dtr Amelie cruz , wife Angie [phone]	              Date: 6/3/19      Sedation & Analgesia:	as above  Diet/Nutrition:		tube feeding  GI PPx:		Protonix	    DVT Ppx:	Heparin    Activity:		               Bedrest  Head of Bed:               35-45  Glycemic Control:       n/a      Lines: PIV  CENTRAL LINE: 	[x ] YES [ ] NO	                    LOCATION:   	                       DATE INSERTED:   	                    REMOVE:  [ ] YES [ ] NO  Will need new Access, vs d/c if able to wean off pressors  A-LINE:  	                [ ] YES [ ] NO                      LOCATION:   	                       DATE INSERTED: 		            REMOVE:  [ ] YES [ ] NO    SOTO: 		        [x ] YES [ ] NO  		                                       DATE INSERTED:		            REMOVE:  [ ] YES [x ] NO      Restraints were deemed necessary to prevent removal of life-sustaining devices [ x ] YES   [    ]  NO    Disposition: ICU     Goals of Care: full Physical Exam	  GENERAL:               Intubated and sedated, not in distress  HEENT:                   Orally intubated increased ET tube secretions purulent,   PULM:                     diminished at bilateral bases, intubated with thick yellow secretions by suction of ET tube course left breath sounds  CVS:                         S1, S2,  No Murmur  ABD:                        Soft, nondistended, nontender, normoactive bowel sounds, + PEG  EXT:                         No edema, nontender, No Cyanosis or Clubbing   Vascular:                Warm Extremities, Normal Capillary refill, Normal Distal Pulses. Left groin femoral line  NEURO:                  sedated, but arousable to tactile stimulation  PSYC:                      unable to assess    Assessment:  1. Septic shock and Acute hypoxic respiratory failure due to Aspiration PNA - Left with Staph Aureus Bacteremia. and + UTI   2. Acute on chronic renal dysfunction Baseline Cr 1.3  3. Lactic acidosis Resolved  4. Possible  PEA arrest in [place] prior to arrival - "brief period where patient received compressions" as per H&P  4. Chronic Diastolic CHF  8. Dysphagia  s/p PEG s/p CVA     Plan:  Cont ventilatory support, would not extubate as has increased secretions  CPAP ps 5 done, -600 RR 14-20, appears to tolerate     will wean as tolerated  PICC line that placed 6/4 will need to be replaced once bacteremia clears.   abx as per ID, may need RANDALL  continue midodrine   s/p albumin  will continue Mucomyst/Duoneb/chest pt for another day  Cultures sent, f/u sputum and blood cultures - sputum culture pending   Broad spectrum antibiotics  as per ID  Creatinine down trending   will add precedex if needed  Fentanyl for sedation  Feedings via PEG tube   Hold home BP medications given shock  Cont. Home glaucoma medications  noted anemia, suspect from fluid shifts from albumin, doubt acute bleed, will monitor, transfuse if needed.     GI/DVT prophylaxis .  45 min of critical care time spent on this patient's care   palliative care eval   d/w Wife - DNR confirmed, does not want him to suffer, will be ok with PICC line    PMD:	Dr. reyes			                   Notified(Date):  Family Updated: 	Dtr Amelie cruz , wife Angie [phone]	              Date: 6/4/19      Sedation & Analgesia:	as above  Diet/Nutrition:		tube feeding  GI PPx:		Protonix	    DVT Ppx:	Heparin    Activity:		               Bedrest  Head of Bed:               35-45  Glycemic Control:       n/a      Lines: PIV  CENTRAL LINE: 	[x ] YES [ ] NO	                    LOCATION:   	                       DATE INSERTED:   	                    REMOVE:  [ ] YES [ ] NO  Will need new Access, vs d/c if able to wean off pressors  A-LINE:  	                [ ] YES [ ] NO                      LOCATION:   	                       DATE INSERTED: 		            REMOVE:  [ ] YES [ ] NO    SOTO: 		        [x ] YES [ ] NO  		                                       DATE INSERTED:		            REMOVE:  [ ] YES [x ] NO      Restraints were deemed necessary to prevent removal of life-sustaining devices [ x ] YES   [    ]  NO    Disposition: ICU     Goals of Care: full Physical Exam	  GENERAL:               Intubated and sedated, not in distress  HEENT:                   Orally intubated increased ET tube secretions purulent,   PULM:                     diminished at bilateral bases, intubated with thick yellow secretions by suction of ET tube course left breath sounds  CVS:                         S1, S2,  No Murmur  ABD:                        Soft, nondistended, nontender, normoactive bowel sounds, + PEG  EXT:                         No edema, nontender, No Cyanosis or Clubbing   Vascular:                Warm Extremities, Normal Capillary refill, Normal Distal Pulses.  NEURO:                  sedated, but arousable to tactile stimulation  PSYC:                      unable to assess    Assessment:  1. Septic shock and Acute hypoxic respiratory failure due to Aspiration PNA - Left with Staph Aureus Bacteremia. and + UTI   2. Acute on chronic renal dysfunction Baseline Cr 1.3  3. Lactic acidosis Resolved  4. Possible  PEA arrest in [place] prior to arrival - "brief period where patient received compressions" as per H&P  4. Chronic Diastolic CHF  8. Dysphagia  s/p PEG s/p CVA     Plan:  Cont ventilatory support, would not extubate as has increased secretions  CPAP ps 5 done, -600 RR 14-20, appears to tolerate     will wean as tolerated  PICC line that placed 6/3 will need to be replaced once bacteremia clears.   abx as per ID, may need RANDALL  continue midodrine   s/p albumin  will continue Mucomyst/Duoneb/chest pt for another day  Cultures sent, f/u sputum and blood cultures - sputum culture pending   Broad spectrum antibiotics  as per ID  Creatinine down trending   will add precedex if needed  Fentanyl for sedation  Feedings via PEG tube   Hold home BP medications given shock  Cont. Home glaucoma medications  noted anemia, suspect from fluid shifts from albumin, doubt acute bleed, will monitor, transfuse if needed.     GI/DVT prophylaxis .  45 min of critical care time spent on this patient's care   palliative care eval   d/w Wife - DNR confirmed, does not want him to suffer, will be ok with PICC line    PMD:	Dr. reyes			                   Notified(Date):  Family Updated: 	Dtr Amelie cruz , wife Angie [phone]	              Date: 6/4/19      Sedation & Analgesia:	as above  Diet/Nutrition:		tube feeding  GI PPx:		Protonix	    DVT Ppx:	Heparin    Activity:		               Bedrest  Head of Bed:               35-45  Glycemic Control:       n/a      Lines: PIV  CENTRAL LINE: 	[x ] YES [ ] NO	                    LOCATION:   	                       DATE INSERTED:   	                    REMOVE:  [ ] YES [ ] NO  Will need new Access, vs d/c if able to wean off pressors  A-LINE:  	                [ ] YES [ ] NO                      LOCATION:   	                       DATE INSERTED: 		            REMOVE:  [ ] YES [ ] NO    SOTO: 		        [x ] YES [ ] NO  		                                       DATE INSERTED:		            REMOVE:  [ ] YES [x ] NO      Restraints were deemed necessary to prevent removal of life-sustaining devices [ x ] YES   [    ]  NO    Disposition: ICU     Goals of Care: full

## 2019-06-04 NOTE — GOALS OF CARE CONVERSATION - PERSONAL ADVANCE DIRECTIVE - CONVERSATION DETAILS
spoke with patients wife, she wants patient to continue being full code
Met with pts Wife Angie who is also pts HCP.  Pt is currently intubated, but has DNR status. Spoke with wife at length about pts medical history and how much he has been through over the past few years. He has had recurrent bouts of aspiration, and has been intubated in the past.  He is also now a long term resident at [facility].   Wife reports how much change she has seen in him over the past year, including his weight loss, despite having a PEG tube in place. Wife realizes that he may not tolerate being off of the ventilator, and may require intubation in the future.  When asked about if she would want him to be intubated again, wife reported she does not think so. She remains hopeful for the trial off the ventilator tomorrow.
Spoke with patient's wife post intubation. Wife expressed that after this she does not want "any heroic measures". She is ok with trial of intubation, vasopressor use and antibiotic use. He already has a PEG placed and she is ok with tube feedings for now. If has a cardiac arrest she wants to "let him go". She wants to decrease his suffering and keep comfortable.

## 2019-06-04 NOTE — PROGRESS NOTE ADULT - SUBJECTIVE AND OBJECTIVE BOX
Patient is a 85y old  Male who presents with a chief complaint of Hypoxic respiratory failure, Aspiration pneumonia, sepsis (04 Jun 2019 16:27)      BRIEF HOSPITAL COURSE: 86 y/o male with pmhx of glaucoma, benign prostate neoplasm, HTN, HLD, HFpEF (grade III) s/p PPM, CKD, dysphagia s/p PEG, CVA who presented on 6/1 from [facility] with shortness of breath after patient aspirated while eating, emergently intubated in ED. Hospital course complicated by septic shock and MELISSA.    Events last 24 hours: did well on CPAP this morning though was not extubated due to increased secretions.    PAST MEDICAL & SURGICAL HISTORY:  Shortness of breath  Muscle weakness  Depression  IBS (irritable bowel syndrome)  Dysphagia  Cerebral infarction  Anemia  Psoriasis  GERD (gastroesophageal reflux disease)  Dyslipidemia  Cardiac pacemaker  S/P percutaneous endoscopic gastrostomy (PEG) tube placement      Review of Systems:  unable to obtain, sedated and intubated.      Medications:  ceFAZolin   IVPB 2000 milliGRAM(s) IV Intermittent every 12 hours    midodrine 5 milliGRAM(s) Oral <User Schedule>  norepinephrine Infusion 0.05 MICROgram(s)/kG/Min IV Continuous <Continuous>    acetylcysteine 20%  Inhalation 2 milliLiter(s) Inhalation every 4 hours  ALBUTerol/ipratropium for Nebulization 3 milliLiter(s) Nebulizer every 4 hours    fentaNYL   Infusion. 0.5 MICROgram(s)/kG/Hr IV Continuous <Continuous>      heparin  Injectable 5000 Unit(s) SubCutaneous every 8 hours    pantoprazole   Suspension 40 milliGRAM(s) Oral daily        albumin human 25% IVPB 50 milliLiter(s) IV Intermittent every 6 hours  sodium chloride 0.9% lock flush 10 milliLiter(s) IV Push every 1 hour PRN  sodium chloride 0.9% lock flush 10 milliLiter(s) IV Push every 1 hour PRN      chlorhexidine 0.12% Liquid 15 milliLiter(s) Oral Mucosa two times a day  chlorhexidine 4% Liquid 1 Application(s) Topical <User Schedule>  dorzolamide 2%/timolol 0.5% Ophthalmic Solution 1 Drop(s) Both EYES two times a day  latanoprost 0.005% Ophthalmic Solution 1 Drop(s) Both EYES at bedtime        Mode: AC/ CMV (Assist Control/ Continuous Mandatory Ventilation)  RR (machine): 18  TV (machine): 450  FiO2: 40  PEEP: 5  MAP: 11  PIP: 22      ICU Vital Signs Last 24 Hrs  T(C): 36.7 (04 Jun 2019 21:00), Max: 36.8 (04 Jun 2019 00:00)  T(F): 98 (04 Jun 2019 21:00), Max: 98.2 (04 Jun 2019 00:00)  HR: 60 (04 Jun 2019 21:31) (60 - 67)  BP: 112/54 (04 Jun 2019 21:00) (63/55 - 136/117)  BP(mean): 67 (04 Jun 2019 21:00) (60 - 125)  ABP: --  ABP(mean): --  RR: 18 (04 Jun 2019 21:00) (15 - 19)  SpO2: 96% (04 Jun 2019 21:31) (92% - 100%)          I&O's Detail    03 Jun 2019 07:01  -  04 Jun 2019 07:00  --------------------------------------------------------  IN:    Albumin 25%: 300 mL    fentaNYL Infusion.: 176.5 mL    norepinephrine Infusion: 92.5 mL    ns in tub fed  [id number]: 845 mL    Solution: 50 mL    Solution: 100 mL  Total IN: 1564 mL    OUT:    Indwelling Catheter - Urethral: 900 mL  Total OUT: 900 mL    Total NET: 664 mL      04 Jun 2019 07:01  -  04 Jun 2019 21:45  --------------------------------------------------------  IN:    fentaNYL Infusion.: 12.9 mL    ns in tub fed  [id number]: 195 mL    Solution: 50 mL  Total IN: 257.9 mL    OUT:    Indwelling Catheter - Urethral: 435 mL  Total OUT: 435 mL    Total NET: -177.1 mL            LABS:                        8.0    12.53 )-----------( 130      ( 04 Jun 2019 10:30 )             25.4     06-04    137  |  104  |  42<H>  ----------------------------<  104<H>  4.4   |  25  |  1.64<H>    Ca    9.7      04 Jun 2019 05:00  Phos  3.6     06-04  Mg     1.8     06-04    TPro  6.4  /  Alb  2.1<L>  /  TBili  0.3  /  DBili  x   /  AST  27  /  ALT  21  /  AlkPhos  89  06-04          CAPILLARY BLOOD GLUCOSE        PT/INR - ( 04 Jun 2019 05:00 )   PT: 15.4 sec;   INR: 1.36 ratio             CULTURES:  Culture Results:   No growth to date. (06-03 @ 12:30)  Culture Results:   No growth to date. (06-03 @ 12:30)  Culture Results:   Moderate Staphylococcus aureus  Normal Respiratory Renetta present (06-02 @ 17:20)  Rapid RVP Result: NotDetec (06-01 @ 16:48)  Culture Results:   >100,000 CFU/ml Presumptive Candida albicans (06-01 @ 11:16)  Culture Results:   Growth in aerobic and anaerobic bottles: Staphylococcus aureus  "Due to technical problems, Proteus sp. will Not be reported as part of  the BCID panel until further notice"  ***Blood Panel PCR results on this specimen are available  approximately 3 hours after the Gram stain result.***  Gram stain, PCR, and/or culture results may not always  correspond due to difference in methodologies.  ************************************************************  This PCR assay was performed using [employer].  The following targets are tested for: Enterococcus,  vancomycin resistant enterococci, Listeria monocytogenes,  coagulase negative staphylococci, S. aureus,  methicillin resistant S. aureus, Streptococcus agalactiae  (Group B), S. pneumoniae, S. pyogenes (Group A),  Acinetobacter baumannii, Enterobacter cloacae, E. coli,  Klebsiella oxytoca, K. pneumoniae, Proteus sp.,  Serratia marcescens, Haemophilus influenzae,  Neisseria meningitidis, Pseudomonas aeruginosa, Candida  albicans, C. glabrata, C krusei, C parapsilosis,  C. tropicalis and the KPC resistance gene. (06-01 @ 11:10)  Culture Results:   Growth in aerobic and anaerobic bottles: Staphylococcus aureus  See previous culture [id number] (06-01 @ 11:05)      Physical Examination:    General: No acute distress.      HEENT: Pupils equal, reactive to light.  Symmetric.    PULM: Clear to auscultation bilaterally, no significant sputum production    NECK: Supple, no lymphadenopathy, trachea midline    CVS: Regular rate and rhythm, no murmurs, rubs, or gallops    ABD: Soft, nondistended, nontender, normoactive bowel sounds, no masses    EXT: No edema, nontender    SKIN: Warm and well perfused, no rashes noted.    NEURO: Alert, oriented, interactive, nonfocal    DEVICES: PICC    RADIOLOGY: reviewed.      CRITICAL CARE TIME SPENT: 35 minutes of critical care time spent providing medical care for patient's acute illness/conditions that impairs at least one vital organ system and/or poses a high risk of imminent or life threatening deterioration in the patient's condition. It includes time spent evaluating and treating the patient's acute illness as well as time spent reviewing labs, radiology, discussing goals of care with patient and/or patient's family, and discussing the case with a multidisciplinary team in an effort to prevent further life threatening deterioration or end organ damage. This time is independent of any procedures performed.

## 2019-06-04 NOTE — GOALS OF CARE CONVERSATION - PERSONAL ADVANCE DIRECTIVE - NS PRO AD PATIENT TYPE
Medical Orders for Life-Sustaining Treatment (MOLST)

## 2019-06-04 NOTE — CONSULT NOTE ADULT - REASON FOR ADMISSION
Hypoxic respiratory failure, Aspiration pneumonia, sepsis

## 2019-06-04 NOTE — PROGRESS NOTE ADULT - ASSESSMENT
A/P : 86 yo m from [facility],  currently intubated W hypoxic respiratory failure, possible aspiration, and now with staph bacteremia. Acute on chronic renal dysfunction, chronic Diastolic CHF , Dysphagia  s/p PEG placed s/p CVA.     palliative: today, remains intubated, sedated. Appears comfortable, in no distress. Failed c-pap trial this AM, due to increased secretions.  Pt has advanced directives/ MOLST  in place , Pt DNR/w/ trial intubation. Met with daughter at bedside this morning, case reviewed w/ daughter and med team. Prognosis guarded/poor, Pt possibly for trial extubation tomorrow . Daughter understands , had no questions for us.  Will cont to follow w/ icu team, continued goc w/ pts wife, pending pts clinical course. A/P : 84 yo m from [facility],  currently intubated W hypoxic respiratory failure, possible aspiration, and now with staph bacteremia. Acute on chronic renal dysfunction, chronic Diastolic CHF , Dysphagia  s/p PEG placed s/p CVA.     palliative: today, remains intubated, sedated. Appears comfortable, in no distress. Failed c-pap trial this AM, due to increased secretions.  Pt has advanced directives/ MOLST  in place , Pt DNR/w/ trial intubation. Met with daughter at bedside this morning, case reviewed w/ daughter and med team. Prognosis guarded/poor, Pt possibly for trial extubation tomorrow . Daughter understands , had no questions for us.  Will cont to follow w/ icu team, continued goc w/ pts wife, pending pts clinical course.  Overall prognosis is poor.

## 2019-06-04 NOTE — PROGRESS NOTE ADULT - SUBJECTIVE AND OBJECTIVE BOX
Follow-up Critical Care Progress Note  Chief Complaint : Sepsis      pt intubated, sedated  taken off pressors this am  initially done cpap trial by day team and did not tolerate  continues to have increased secretions, bedside suctioning done by me,   and cpap redone, and pt appeared to tolerate.  pt arousable, unable to provide history or ROS.       Allergies :No Known Allergies      PAST MEDICAL & SURGICAL HISTORY:  Shortness of breath  Muscle weakness  Depression  IBS (irritable bowel syndrome)  Dysphagia  Cerebral infarction  Anemia  Psoriasis  GERD (gastroesophageal reflux disease)  Dyslipidemia  Cardiac pacemaker  S/P percutaneous endoscopic gastrostomy (PEG) tube placement      Medications:  MEDICATIONS  (STANDING):  albumin human 25% IVPB 50 milliLiter(s) IV Intermittent every 6 hours  cefepime   IVPB 1000 milliGRAM(s) IV Intermittent every 24 hours  chlorhexidine 0.12% Liquid 15 milliLiter(s) Oral Mucosa two times a day  chlorhexidine 4% Liquid 1 Application(s) Topical <User Schedule>  dorzolamide 2%/timolol 0.5% Ophthalmic Solution 1 Drop(s) Both EYES two times a day  fentaNYL   Infusion. 0.5 MICROgram(s)/kG/Hr (4.25 mL/Hr) IV Continuous <Continuous>  heparin  Injectable 5000 Unit(s) SubCutaneous every 8 hours  latanoprost 0.005% Ophthalmic Solution 1 Drop(s) Both EYES at bedtime  midodrine 5 milliGRAM(s) Oral <User Schedule>  norepinephrine Infusion 0.05 MICROgram(s)/kG/Min (3.984 mL/Hr) IV Continuous <Continuous>  pantoprazole   Suspension 40 milliGRAM(s) Oral daily  vancomycin  IVPB 500 milliGRAM(s) IV Intermittent every 12 hours    MEDICATIONS  (PRN):  sodium chloride 0.9% lock flush 10 milliLiter(s) IV Push every 1 hour PRN Pre/post blood products, medications, blood draw, and to maintain line patency  sodium chloride 0.9% lock flush 10 milliLiter(s) IV Push every 1 hour PRN Pre/post blood products, medications, blood draw, and to maintain line patency      LABS:                        6.9    11.06 )-----------( 118      ( 04 Jun 2019 05:00 )             22.4     06-04    137  |  104  |  42<H>  ----------------------------<  104<H>  4.4   |  25  |  1.64<H>    Ca    9.7      04 Jun 2019 05:00  Phos  3.6     06-04  Mg     1.8     06-04    TPro  6.4  /  Alb  2.1<L>  /  TBili  0.3  /  DBili  x   /  AST  27  /  ALT  21  /  AlkPhos  89  06-04            PT/INR - ( 04 Jun 2019 05:00 )   PT: 15.4 sec;   INR: 1.36 ratio             Procalcitonin, Serum: 0.85 ng/mL (06-01-19 @ 11:10)    Serum Pro-Brain Natriuretic Peptide: 61889 pg/mL (06-01-19 @ 11:10)        CULTURES: (if applicable)    Culture - Sputum (collected 06-02-19 @ 17:20)  Source: .Sputum Sputum  Gram Stain (06-03-19 @ 06:26):    Moderate polymorphonuclear leukocytes per low power field    Few Squamous epithelial cells per low power field    Moderate Gram Variable Coccobacilli  seen per oil power field  Preliminary Report (06-03-19 @ 18:01):    Moderate Staphylococcus aureus    Normal Respiratory Renetta present    Culture - Urine (collected 06-01-19 @ 11:16)  Source: .Urine Clean Catch (Midstream)  Final Report (06-03-19 @ 14:55):    >100,000 CFU/ml Presumptive Candida albicans    Culture - Blood (collected 06-01-19 @ 11:10)  Source: .Blood Blood-Peripheral  Gram Stain (06-02-19 @ 15:04):    Growth in aerobic bottle: Gram Positive Cocci in Clusters    Growth in anaerobic bottle: Gram Positive Cocci in Clusters  Final Report (06-04-19 @ 08:28):    Growth in aerobic and anaerobic bottles: Staphylococcus aureus    "Due to technical problems, Proteus sp. will Not be reported as part of    the BCID panel until further notice"    ***Blood Panel PCR results on this specimen are available    approximately 3 hours after the Gram stain result.***    Gram stain, PCR, and/or culture results may not always    correspond due to difference in methodologies.    ************************************************************    This PCR assay was performed using [employer].    The following targets are tested for: Enterococcus,    vancomycin resistant enterococci, Listeria monocytogenes,    coagulase negative staphylococci, S. aureus,    methicillin resistant S. aureus, Streptococcus agalactiae    (Group B), S. pneumoniae, S. pyogenes (Group A),    Acinetobacter baumannii, Enterobacter cloacae, E. coli,    Klebsiella oxytoca, K. pneumoniae, Proteus sp.,    Serratia marcescens, Haemophilus influenzae,    Neisseria meningitidis, Pseudomonas aeruginosa, Candida    albicans, C. glabrata, C krusei, C parapsilosis,    C. tropicalis and the KPC resistance gene.  Organism: Blood Culture PCR  Staphylococcus aureus (06-04-19 @ 08:28)  Organism: Staphylococcus aureus (06-04-19 @ 08:28)      -  Ampicillin/Sulbactam: S <=8/4      -  Cefazolin: S <=4      -  Clindamycin: S 0.5      -  Erythromycin: R >4      -  Gentamicin: S <=1 Should not be used as monotherapy      -  Oxacillin: S 1      -  Penicillin: R >8      -  RIF- Rifampin: S <=1 Should not be used as monotherapy      -  Tetra/Doxy: S <=1      -  Trimethoprim/Sulfamethoxazole: S <=0.5/9.5      -  Vancomycin: S 2      Method Type: KALYAN  Organism: Blood Culture PCR (06-04-19 @ 08:28)      -  Staphylococcus aureus: Detec Any isolate of Staphylococcus aureus from a blood culture is NOT considered a contaminant.      Method Type: PCR    Culture - Blood (collected 06-01-19 @ 11:05)  Source: .Blood Blood-Peripheral  Gram Stain (06-02-19 @ 12:46):    Growth in anaerobic bottle: Gram Positive Cocci in Clusters    Growth in aerobic bottle: Gram Positive Cocci in Clusters  Final Report (06-04-19 @ 08:28):    Growth in aerobic and anaerobic bottles: Staphylococcus aureus    See previous culture [id number]      Rapid RVP Result: NotDetec (06-01-19 @ 16:48)        CAPILLARY BLOOD GLUCOSE          RADIOLOGY  CXR:    < from: Xray Chest 1 View- PORTABLE-Urgent (06.03.19 @ 13:59) >  IMPRESSION: The distal aspect of the advanced right sided PICC line   overlies the expected region of the SVC. No change inleft sided airspace   opacities.      < end of copied text >        VITALS:  T(C): 36.7 (06-04-19 @ 05:00), Max: 37.4 (06-03-19 @ 18:00)  T(F): 98 (06-04-19 @ 05:00), Max: 99.4 (06-03-19 @ 18:00)  HR: 60 (06-04-19 @ 06:00) (59 - 67)  BP: 113/59 (06-04-19 @ 06:00) (81/34 - 136/117)  BP(mean): 75 (06-04-19 @ 06:00) (43 - 125)  ABP: --  ABP(mean): --  RR: 18 (06-04-19 @ 06:00) (16 - 19)  SpO2: 99% (06-04-19 @ 08:49) (92% - 100%)  CVP(mm Hg): --  CVP(cm H2O): --    Ins and Outs     06-03-19 @ 07:01  -  06-04-19 @ 07:00  --------------------------------------------------------  IN: 1564 mL / OUT: 900 mL / NET: 664 mL        Height (cm): 175.26 (06-01-19 @ 10:46)  Weight (kg): 85 (06-01-19 @ 10:46)  BMI (kg/m2): 27.7 (06-01-19 @ 10:46)    Device: 840, Mode: AC/ CMV (Assist Control/ Continuous Mandatory Ventilation), RR (machine): 18, RR (patient): 18, TV (machine): 450, TV (patient): 460, FiO2: 40, PEEP: 5, MAP: 10, PIP: 22    I&O's Detail    03 Jun 2019 07:01  -  04 Jun 2019 07:00  --------------------------------------------------------  IN:    Albumin 25%: 300 mL    fentaNYL Infusion.: 176.5 mL    norepinephrine Infusion: 92.5 mL    ns in tub fed  [id number]: 845 mL    Solution: 50 mL    Solution: 100 mL  Total IN: 1564 mL    OUT:    Indwelling Catheter - Urethral: 900 mL  Total OUT: 900 mL    Total NET: 664 mL

## 2019-06-04 NOTE — CONSULT NOTE ADULT - ATTENDING COMMENTS
elderly gentleman with severe dysphagia s/p peg with mult hospitalization for aspiration now with mssa  patient has previosu hisoty of pna hbp hld grade 3 diastolic hear failure s/p ppm now with respiratory failure pneumonia and intubated as previously. a elieser is requested. based upon wishes of hcp would consider for procedure under advice.
I have personally seen and examined patient on the above date.  I discussed the case with Grace Brown NP and I agree with findings and plan as detailed per note above, which I have amended where appropriate.

## 2019-06-04 NOTE — PROGRESS NOTE ADULT - ASSESSMENT
86 yo M, here with hypoxic respiratory failure, possible aspiration, and MSSA bacteremia  Hx of CKD, CVA, CAD, HTN, DM, GERD, PPM and G tube.  With S aureus isolated from sputum, could implicate primary pneumonia; no clues to alternative focus  No obvious cutaneous portal for bacteremia  Afebrile, tolerating CPAP    PLAN:   Switch to Cefazolin directed at MSSA   Repeat blood cultures sent 6/3  Info gained from RANDALL d/w wife at bedside, she is currently declining consent  Likely will need minimum 2 wks IV, followed by po regimen  D/w Dr Irby

## 2019-06-04 NOTE — PROGRESS NOTE ADULT - SUBJECTIVE AND OBJECTIVE BOX
f/u no events overnight, still intubated/sedated, off pressors , possible trial extubation tomorrow.     Present Symptoms:   Dyspnea: no  Nausea/Vomiting: no  Anxiety:  no  Depressed Mood:   Fatigue: yes  Loss of appetite: yes  Pain:    no s/s               location:   Review of Systems:  Unable to obtain due to poor mentation/intubated    MEDICATIONS  (STANDING):  acetylcysteine 20%  Inhalation 2 milliLiter(s) Inhalation every 4 hours  albumin human 25% IVPB 50 milliLiter(s) IV Intermittent every 6 hours  ALBUTerol/ipratropium for Nebulization 3 milliLiter(s) Nebulizer every 4 hours  cefepime   IVPB 1000 milliGRAM(s) IV Intermittent every 24 hours  chlorhexidine 0.12% Liquid 15 milliLiter(s) Oral Mucosa two times a day  chlorhexidine 4% Liquid 1 Application(s) Topical <User Schedule>  dorzolamide 2%/timolol 0.5% Ophthalmic Solution 1 Drop(s) Both EYES two times a day  fentaNYL   Infusion. 0.5 MICROgram(s)/kG/Hr (4.25 mL/Hr) IV Continuous <Continuous>  heparin  Injectable 5000 Unit(s) SubCutaneous every 8 hours  latanoprost 0.005% Ophthalmic Solution 1 Drop(s) Both EYES at bedtime  midodrine 5 milliGRAM(s) Oral <User Schedule>  norepinephrine Infusion 0.05 MICROgram(s)/kG/Min (3.984 mL/Hr) IV Continuous <Continuous>  pantoprazole   Suspension 40 milliGRAM(s) Oral daily  vancomycin  IVPB 500 milliGRAM(s) IV Intermittent every 12 hours    MEDICATIONS  (PRN):  sodium chloride 0.9% lock flush 10 milliLiter(s) IV Push every 1 hour PRN Pre/post blood products, medications, blood draw, and to maintain line patency  sodium chloride 0.9% lock flush 10 milliLiter(s) IV Push every 1 hour PRN Pre/post blood products, medications, blood draw, and to maintain line patency      PHYSICAL EXAM:  Vital Signs Last 24 Hrs  T(C): 36.7 (04 Jun 2019 05:00), Max: 37.4 (03 Jun 2019 18:00)  T(F): 98 (04 Jun 2019 05:00), Max: 99.4 (03 Jun 2019 18:00)  HR: 60 (04 Jun 2019 06:00) (59 - 67)  BP: 113/59 (04 Jun 2019 06:00) (81/34 - 136/117)  BP(mean): 75 (04 Jun 2019 06:00) (43 - 125)  RR: 18 (04 Jun 2019 06:00) (16 - 19)  SpO2: 99% (04 Jun 2019 08:49) (92% - 100%)  General: intubated/sedated     HEENT: n/c, a/t intubated    Lungs: coarse bs , w/ few rhonchi  CV: s1s2    GI: soft, +bs    : hansen    Musculoskeletal: flaccid   Skin: warm dry     Neuro: sedated/ int.   Oral intake ability: npo  Diet: peg     LABS:                          8.0    12.53 )-----------( 130      ( 04 Jun 2019 10:30 )             25.4     06-04    137  |  104  |  42<H>  ----------------------------<  104<H>  4.4   |  25  |  1.64<H>    Ca    9.7      04 Jun 2019 05:00  Phos  3.6     06-04  Mg     1.8     06-04    TPro  6.4  /  Alb  2.1<L>  /  TBili  0.3  /  DBili  x   /  AST  27  /  ALT  21  /  AlkPhos  89  06-04        RADIOLOGY & ADDITIONAL STUDIES:    ADVANCE DIRECTIVES: HCP   MOLST  DNR/ Trial intubation   Advanced Care Planning discussion total time spent:

## 2019-06-04 NOTE — PROGRESS NOTE ADULT - SUBJECTIVE AND OBJECTIVE BOX
CC: f/u for Staph aureus bacteremia and pneumonia    Patient remains on Vent, tolerated CPAP earlier, but large ET secretions, now back on AC; off pressors    REVIEW OF SYSTEMS:   not provided on Vent    Antimicrobials Day # 4  cefepime   IVPB 1000 milliGRAM(s) IV Intermittent every 24 hours  vancomycin  IVPB 500 milliGRAM(s) IV Intermittent every 12 hours    Other Medications Reviewed    Vital Signs Last 24 Hrs  T(F): 98 (04 Jun 2019 05:00), Max: 99.4 (03 Jun 2019 18:00)  HR: 60 (04 Jun 2019 06:00) (59 - 67)  BP: 113/59 (04 Jun 2019 06:00) (81/34 - 136/117)  BP(mean): 75 (04 Jun 2019 06:00) (43 - 125)  RR: 18 (04 Jun 2019 06:00) (16 - 19)  SpO2: 99% (04 Jun 2019 13:00) (92% - 100%)    PHYSICAL EXAM:  General: intubated and sedated   Eyes: anicteric, no conjunctival injection, no discharge  Oropharynx: ETT	  Neck: without adenopathy  Lungs: diminished at bases  Heart: regular rate and rhythm; no murmur, rubs or gallops  Abdomen: soft, nondistended, nontender, G tube site clean  Skin: no rash  Extremities: no edema.  R PICC   Neurologic: sedated    LAB RESULTS:                         8.0    12.53 )-----------( 130      ( 04 Jun 2019 10:30 )             25.4   06-04    137  |  104  |  42<H>  ----------------------------<  104<H>  4.4   |  25  |  1.64<H>    Ca    9.7      04 Jun 2019 05:00  Phos  3.6     06-04  Mg     1.8     06-04    TPro  6.4  /  Alb  2.1<L>  /  TBili  0.3  /  DBili  x   /  AST  27  /  ALT  21  /  AlkPhos  89  06-04    MICROBIOLOGY REVIEWED:  Culture - Sputum . (06.02.19 @ 17:20)    Gram Stain:   Moderate polymorphonuclear leukocytes per low power field  Few Squamous epithelial cells per low power field  Moderate Gram Variable Coccobacilli  seen per oil power field    Specimen Source: .Sputum Sputum    Culture Results:   Moderate Staphylococcus aureus  Normal Respiratory Renetta present    Culture - Blood (06.01.19 @ 11:10)    -  Staphylococcus aureus    -  Tetra/Doxy: S <=1    -  RIF- Rifampin: S <=1 Should not be used as monotherapy    -  Penicillin: R >8    -  Trimethoprim/Sulfamethoxazole: S <=0.5/9.5    -  Vancomycin: S 2    -  Oxacillin: S 1    -  Gentamicin: S <=1 Should not be used as monotherapy    -  Erythromycin: R >4    -  Clindamycin: S 0.5    -  Cefazolin: S <=4    -  Ampicillin/Sulbactam: S <=8/4    Specimen Source: .Blood Blood-Peripheral        Culture Results:   Growth in aerobic and anaerobic bottles: Staphylococcus aureus    Culture - Blood (06.01.19 @ 11:05)    Growth in aerobic and anaerobic bottles: Staphylococcus aureus  See previous culture [id number]    RADIOLOGY REVIEWED:  Xray Chest 1 View- PORTABLE-Urgent (06.03.19 @ 13:59) >  No change inleft sided airspace   opacities.

## 2019-06-04 NOTE — PROGRESS NOTE ADULT - PROBLEM SELECTOR PLAN 1
-intubated on AC. lung protective ventilatory strategy at 4-6 cc/kg IBW. actively titrating fio2 and PEEP to maintain spo2 between 90-95. check abg as needed, titrate RR and TV to maintain ph > 7.25. keep plateau pressures < 30. keep HOB > 30 degrees. Chlorhexidine for VAP prophylaxis.    -RASS -1 on fentanyl. will switch to precedex tonight and SBT in am. tolerated CPAP well today therefore secretions are main issues but seem to have improved.

## 2019-06-04 NOTE — PROVIDER CONTACT NOTE (CRITICAL VALUE NOTIFICATION) - TEST AND RESULT REPORTED:
positive blood c/s growth in anerobic bottle for gram positive cocci in clusters, 1 bottle.
Hgb 6.9
blood culture done on 6/1/19  grwth in aerobic bottle for gram positive cocci and cluster. in aeropbic bottle

## 2019-06-04 NOTE — CONSULT NOTE ADULT - SUBJECTIVE AND OBJECTIVE BOX
Chief Complaint:     84 yo M with PMHx of Glaucoma, benign prostate neoplasm, HTN, HLD, Grad 3 Diastolic Heart Failure s/p ppm, CKD, dysphagia s/p PEG tube placement, CVA presents via EMS from [facility] for eval of SOB. Patient was reportedly eating dysphagia diet meal when he began to experience SOB with increase secretions and altered. Collateral obtained from facility suggests there was a brief period where patient received compressions. Upon arrival to the ED patient was found to be hypotensive 60/30s. Further decompensation involving patient respiratory status occurred due to secretions and mucous plugging leading to hypoxia prompting emergent intubation.Initial labs significant for WBC 13.64, Cr 2.04, Lactate 3.4, Procalcitonin .85, ProBNP 53970, CXR reveal bibasilar atelectatic changes with questionable effusion vs infiltrate about left base. a cardio evaluation is requested with respect to a RANDALL    HPI:    PMH:   Shortness of breath  Muscle weakness  Depression  IBS (irritable bowel syndrome)  Dysphagia  Cerebral infarction  Anemia  Psoriasis  Prostate cancer  GERD (gastroesophageal reflux disease)  Dyslipidemia    PSH:   Cardiac pacemaker  S/P percutaneous endoscopic gastrostomy (PEG) tube placement  No significant past surgical history    Family History:  FAMILY HISTORY:  No pertinent family history in first degree relatives      Social History:  Smoking:  Alcohol:  Drugs:    Allergies:  No Known Allergies      Medications:  acetylcysteine 20%  Inhalation 2 milliLiter(s) Inhalation every 4 hours  albumin human 25% IVPB 50 milliLiter(s) IV Intermittent every 6 hours  ALBUTerol/ipratropium for Nebulization 3 milliLiter(s) Nebulizer every 4 hours  cefepime   IVPB 1000 milliGRAM(s) IV Intermittent every 24 hours  chlorhexidine 0.12% Liquid 15 milliLiter(s) Oral Mucosa two times a day  chlorhexidine 4% Liquid 1 Application(s) Topical <User Schedule>  dorzolamide 2%/timolol 0.5% Ophthalmic Solution 1 Drop(s) Both EYES two times a day  fentaNYL   Infusion. 0.5 MICROgram(s)/kG/Hr IV Continuous <Continuous>  heparin  Injectable 5000 Unit(s) SubCutaneous every 8 hours  latanoprost 0.005% Ophthalmic Solution 1 Drop(s) Both EYES at bedtime  midodrine 5 milliGRAM(s) Oral <User Schedule>  norepinephrine Infusion 0.05 MICROgram(s)/kG/Min IV Continuous <Continuous>  pantoprazole   Suspension 40 milliGRAM(s) Oral daily  sodium chloride 0.9% lock flush 10 milliLiter(s) IV Push every 1 hour PRN  sodium chloride 0.9% lock flush 10 milliLiter(s) IV Push every 1 hour PRN  vancomycin  IVPB 500 milliGRAM(s) IV Intermittent every 12 hours      REVIEW OF SYSTEMS:  CONSTITUTIONAL: No fever, weight loss, or fatigue  EYES: No eye pain, visual disturbances, or discharge  ENMT:  No difficulty hearing, tinnitus, vertigo; No sinus or throat pain  NECK: No pain or stiffness  BREASTS: No pain, masses, or nipple discharge  RESPIRATORY: No cough, wheezing, chills or hemoptysis; No shortness of breath  CARDIOVASCULAR: No chest pain, palpitations, dizziness, or leg swelling  GASTROINTESTINAL: No abdominal or epigastric pain. No nausea, vomiting, or hematemesis; No diarrhea or constipation. No melena or hematochezia.  GENITOURINARY: No dysuria, frequency, hematuria, or incontinence  NEUROLOGICAL: No headaches, memory loss, loss of strength, numbness, or tremors  SKIN: No itching, burning, rashes, or lesions   LYMPH NODES: No enlarged glands  ENDOCRINE: No heat or cold intolerance; No hair loss  MUSCULOSKELETAL: No joint pain or swelling; No muscle, back, or extremity pain  PSYCHIATRIC: No depression, anxiety, mood swings, or difficulty sleeping  HEME/LYMPH: No easy bruising, or bleeding gums  ALLERY AND IMMUNOLOGIC: No hives or eczema    Physical Exam:  T(C): 36.7 (06-04-19 @ 05:00), Max: 37.4 (06-03-19 @ 18:00)  HR: 60 (06-04-19 @ 06:00) (59 - 67)  BP: 113/59 (06-04-19 @ 06:00) (81/34 - 136/117)  RR: 18 (06-04-19 @ 06:00) (16 - 19)  SpO2: 99% (06-04-19 @ 13:00) (92% - 100%)  Wt(kg): --    GENERAL: intubated chronically ill appaering  HEAD:  Atraumatic, Normocephalic  EYES: EOMI, conjunctiva and sclera clear  ENT: Moist mucous membranes,  NECK: Supple, No JVD, no bruits  CHEST/LUNG: Clear to percussion bilaterally; No rales, rhonchi, wheezing, or rubs  HEART: Regular rate and rhythm; No murmurs, rubs, or gallops PMI non displaced.  ABDOMEN: Soft, Nontender, Nondistended; Bowel sounds present  EXTREMITIES:  2+ Peripheral Pulses, No clubbing, cyanosis, or edema  SKIN: No rashes or lesions  NERVOUS SYSTEM:  Cranial Nerves II-XII intact     Cardiovascular Diagnostic Testing:  ECG:  < from: 12 Lead ECG (06.01.19 @ 13:02) >  Ventricular Rate 93 BPM    Atrial Rate 87 BPM    QRS Duration 154 ms    Q-T Interval 422 ms    QTC Calculation(Bezet) 524 ms    R Axis -40 degrees    T Axis 53 degrees    Diagnosis Line Atrial fibrillation with premature ventricular or aberrantly conducted complexes  Left axis deviation  Right bundle branch block  Moderate voltage criteria for LVH, may be normal variant  Abnormal ECG  When compared with ECG of 26-DEC-2018 17:44,  QRS duration has increased  T wave inversion no longer evident inLateral leads  Confirmed by DONNELL RODRIGUEZ, CHRIS MACDONALD ([id number]) on 6/2/2019 8:40:08 AM    < end of copied text >      ECHO:    < from: TTE Echo Complete w/Doppler (06.02.19 @ 10:32) >  Summary:   1. Left ventricular ejection fraction, by visual estimation, is 55%.   2. Normal global left ventricular systolic function.   3. Spectral Doppler shows restrictive pattern of left ventricular   myocardial filling (Grade III diastolic dysfunction).   4. There is mild concentric left ventricular hypertrophy.   5. Mild mitral annular calcification.  6. Mild thickening and calcification of the anterior and posterior   mitral valve leaflets.   7. Mild to moderate aortic regurgitation.   8. Estimated pulmonary artery systolic pressure is 41.1 mmHg assuming a   right atrial pressure of 10 mmHg, which is consistent with mild pulmonary   hypertension.   9. Pulmonary hypertension is present.  10. LA volume Index is 45.8 ml/m² ml/m2.  11. Peak transaortic gradient equals 34.7 mmHg, mean transaortic gradient   equals 19.6 mmHg, the calculated aortic valve area equals 1.08 cm² by the   continuity equation consistent with moderate aortic stenosis.    [id number] Chris Jacobo MD,FACC , Electronically signed on 6/2/2019 at   1:30:00 PM       *** Final ***        CHRIS JACOBO M.D., ATTENDING CARDIOLOGIST  This document has been electronically signed. Jun 2 2019 10:32AM    < end of copied text >      Labs:                        8.0    12.53 )-----------( 130      ( 04 Jun 2019 10:30 )             25.4     06-04    137  |  104  |  42<H>  ----------------------------<  104<H>  4.4   |  25  |  1.64<H>    Ca    9.7      04 Jun 2019 05:00  Phos  3.6     06-04  Mg     1.8     06-04    TPro  6.4  /  Alb  2.1<L>  /  TBili  0.3  /  DBili  x   /  AST  27  /  ALT  21  /  AlkPhos  89  06-04    PT/INR - ( 04 Jun 2019 05:00 )   PT: 15.4 sec;   INR: 1.36 ratio          Serum Pro-Brain Natriuretic Peptide: 64387 pg/mL (06-01 @ 11:10)            Imaging:

## 2019-06-04 NOTE — GOALS OF CARE CONVERSATION - PERSONAL ADVANCE DIRECTIVE - NS PRO AD PATIENT TYPE ON CHART
Medical Orders for Life-Sustaining Treatment (MOLST)
Do Not Resuscitate (DNR)/Medical Orders for Life-Sustaining Treatment (MOLST)
Medical Orders for Life-Sustaining Treatment (MOLST)

## 2019-06-04 NOTE — PROGRESS NOTE ADULT - ASSESSMENT
86 y/o male with pmhx of glaucoma, benign prostate neoplasm, HTN, HLD, HFpEF (grade III) s/p PPM, CKD, dysphagia s/p PEG, CVA now with acute hypoxic respiratory failure and septic shock.

## 2019-06-05 LAB
ALBUMIN SERPL ELPH-MCNC: 2 G/DL — LOW (ref 3.3–5)
ALP SERPL-CCNC: 94 U/L — SIGNIFICANT CHANGE UP (ref 40–120)
ALT FLD-CCNC: 20 U/L DA — SIGNIFICANT CHANGE UP (ref 10–45)
ANION GAP SERPL CALC-SCNC: 7 MMOL/L — SIGNIFICANT CHANGE UP (ref 5–17)
AST SERPL-CCNC: 26 U/L — SIGNIFICANT CHANGE UP (ref 10–40)
BASOPHILS # BLD AUTO: 0.03 K/UL — SIGNIFICANT CHANGE UP (ref 0–0.2)
BASOPHILS NFR BLD AUTO: 0.3 % — SIGNIFICANT CHANGE UP (ref 0–2)
BILIRUB SERPL-MCNC: 0.3 MG/DL — SIGNIFICANT CHANGE UP (ref 0.2–1.2)
BUN SERPL-MCNC: 39 MG/DL — HIGH (ref 7–23)
CALCIUM SERPL-MCNC: 9.7 MG/DL — SIGNIFICANT CHANGE UP (ref 8.4–10.5)
CHLORIDE SERPL-SCNC: 105 MMOL/L — SIGNIFICANT CHANGE UP (ref 96–108)
CO2 SERPL-SCNC: 26 MMOL/L — SIGNIFICANT CHANGE UP (ref 22–31)
CREAT SERPL-MCNC: 1.62 MG/DL — HIGH (ref 0.5–1.3)
EOSINOPHIL # BLD AUTO: 0.25 K/UL — SIGNIFICANT CHANGE UP (ref 0–0.5)
EOSINOPHIL NFR BLD AUTO: 2.4 % — SIGNIFICANT CHANGE UP (ref 0–6)
GLUCOSE SERPL-MCNC: 105 MG/DL — HIGH (ref 70–99)
HCT VFR BLD CALC: 24 % — LOW (ref 39–50)
HGB BLD-MCNC: 7.4 G/DL — LOW (ref 13–17)
IMM GRANULOCYTES NFR BLD AUTO: 0.9 % — SIGNIFICANT CHANGE UP (ref 0–1.5)
LYMPHOCYTES # BLD AUTO: 1.78 K/UL — SIGNIFICANT CHANGE UP (ref 1–3.3)
LYMPHOCYTES # BLD AUTO: 17.1 % — SIGNIFICANT CHANGE UP (ref 13–44)
MAGNESIUM SERPL-MCNC: 1.8 MG/DL — SIGNIFICANT CHANGE UP (ref 1.6–2.6)
MCHC RBC-ENTMCNC: 28.1 PG — SIGNIFICANT CHANGE UP (ref 27–34)
MCHC RBC-ENTMCNC: 30.8 GM/DL — LOW (ref 32–36)
MCV RBC AUTO: 91.3 FL — SIGNIFICANT CHANGE UP (ref 80–100)
MONOCYTES # BLD AUTO: 1.16 K/UL — HIGH (ref 0–0.9)
MONOCYTES NFR BLD AUTO: 11.1 % — SIGNIFICANT CHANGE UP (ref 2–14)
NEUTROPHILS # BLD AUTO: 7.12 K/UL — SIGNIFICANT CHANGE UP (ref 1.8–7.4)
NEUTROPHILS NFR BLD AUTO: 68.2 % — SIGNIFICANT CHANGE UP (ref 43–77)
NRBC # BLD: 0 /100 WBCS — SIGNIFICANT CHANGE UP (ref 0–0)
PHOSPHATE SERPL-MCNC: 3.4 MG/DL — SIGNIFICANT CHANGE UP (ref 2.5–4.5)
PLATELET # BLD AUTO: 115 K/UL — LOW (ref 150–400)
POTASSIUM SERPL-MCNC: 4.7 MMOL/L — SIGNIFICANT CHANGE UP (ref 3.5–5.3)
POTASSIUM SERPL-SCNC: 4.7 MMOL/L — SIGNIFICANT CHANGE UP (ref 3.5–5.3)
PROT SERPL-MCNC: 6.6 G/DL — SIGNIFICANT CHANGE UP (ref 6–8.3)
RBC # BLD: 2.63 M/UL — LOW (ref 4.2–5.8)
RBC # FLD: 16.1 % — HIGH (ref 10.3–14.5)
SODIUM SERPL-SCNC: 138 MMOL/L — SIGNIFICANT CHANGE UP (ref 135–145)
WBC # BLD: 10.43 K/UL — SIGNIFICANT CHANGE UP (ref 3.8–10.5)
WBC # FLD AUTO: 10.43 K/UL — SIGNIFICANT CHANGE UP (ref 3.8–10.5)

## 2019-06-05 PROCEDURE — 71045 X-RAY EXAM CHEST 1 VIEW: CPT | Mod: 26

## 2019-06-05 PROCEDURE — 99233 SBSQ HOSP IP/OBS HIGH 50: CPT

## 2019-06-05 RX ORDER — MAGNESIUM SULFATE 500 MG/ML
1 VIAL (ML) INJECTION ONCE
Refills: 0 | Status: COMPLETED | OUTPATIENT
Start: 2019-06-05 | End: 2019-06-05

## 2019-06-05 RX ORDER — PROPOFOL 10 MG/ML
20 INJECTION, EMULSION INTRAVENOUS
Qty: 1000 | Refills: 0 | Status: DISCONTINUED | OUTPATIENT
Start: 2019-06-05 | End: 2019-06-06

## 2019-06-05 RX ORDER — PROPOFOL 10 MG/ML
30 INJECTION, EMULSION INTRAVENOUS ONCE
Refills: 0 | Status: COMPLETED | OUTPATIENT
Start: 2019-06-05 | End: 2019-06-05

## 2019-06-05 RX ADMIN — CHLORHEXIDINE GLUCONATE 1 APPLICATION(S): 213 SOLUTION TOPICAL at 05:26

## 2019-06-05 RX ADMIN — Medication 100 MILLIGRAM(S): at 05:27

## 2019-06-05 RX ADMIN — DORZOLAMIDE HYDROCHLORIDE TIMOLOL MALEATE 1 DROP(S): 20; 5 SOLUTION/ DROPS OPHTHALMIC at 05:27

## 2019-06-05 RX ADMIN — HEPARIN SODIUM 5000 UNIT(S): 5000 INJECTION INTRAVENOUS; SUBCUTANEOUS at 22:03

## 2019-06-05 RX ADMIN — DEXMEDETOMIDINE HYDROCHLORIDE IN 0.9% SODIUM CHLORIDE 4.25 MICROGRAM(S)/KG/HR: 4 INJECTION INTRAVENOUS at 20:17

## 2019-06-05 RX ADMIN — DEXMEDETOMIDINE HYDROCHLORIDE IN 0.9% SODIUM CHLORIDE 4.25 MICROGRAM(S)/KG/HR: 4 INJECTION INTRAVENOUS at 00:58

## 2019-06-05 RX ADMIN — Medication 100 GRAM(S): at 06:58

## 2019-06-05 RX ADMIN — DEXMEDETOMIDINE HYDROCHLORIDE IN 0.9% SODIUM CHLORIDE 4.25 MICROGRAM(S)/KG/HR: 4 INJECTION INTRAVENOUS at 04:47

## 2019-06-05 RX ADMIN — LATANOPROST 1 DROP(S): 0.05 SOLUTION/ DROPS OPHTHALMIC; TOPICAL at 22:02

## 2019-06-05 RX ADMIN — Medication 100 MILLIGRAM(S): at 18:54

## 2019-06-05 RX ADMIN — PROPOFOL 30 MILLIGRAM(S): 10 INJECTION, EMULSION INTRAVENOUS at 10:30

## 2019-06-05 RX ADMIN — MIDODRINE HYDROCHLORIDE 5 MILLIGRAM(S): 2.5 TABLET ORAL at 12:47

## 2019-06-05 RX ADMIN — DORZOLAMIDE HYDROCHLORIDE TIMOLOL MALEATE 1 DROP(S): 20; 5 SOLUTION/ DROPS OPHTHALMIC at 18:54

## 2019-06-05 RX ADMIN — MIDODRINE HYDROCHLORIDE 5 MILLIGRAM(S): 2.5 TABLET ORAL at 05:27

## 2019-06-05 RX ADMIN — Medication 3 MILLILITER(S): at 09:19

## 2019-06-05 RX ADMIN — HEPARIN SODIUM 5000 UNIT(S): 5000 INJECTION INTRAVENOUS; SUBCUTANEOUS at 05:27

## 2019-06-05 RX ADMIN — HEPARIN SODIUM 5000 UNIT(S): 5000 INJECTION INTRAVENOUS; SUBCUTANEOUS at 13:51

## 2019-06-05 RX ADMIN — PANTOPRAZOLE SODIUM 40 MILLIGRAM(S): 20 TABLET, DELAYED RELEASE ORAL at 12:48

## 2019-06-05 RX ADMIN — Medication 2 MILLILITER(S): at 04:22

## 2019-06-05 RX ADMIN — Medication 3 MILLILITER(S): at 00:21

## 2019-06-05 RX ADMIN — CHLORHEXIDINE GLUCONATE 15 MILLILITER(S): 213 SOLUTION TOPICAL at 18:54

## 2019-06-05 RX ADMIN — Medication 2 MILLILITER(S): at 09:17

## 2019-06-05 RX ADMIN — Medication 2 MILLILITER(S): at 00:22

## 2019-06-05 RX ADMIN — CHLORHEXIDINE GLUCONATE 15 MILLILITER(S): 213 SOLUTION TOPICAL at 05:27

## 2019-06-05 RX ADMIN — Medication 3 MILLILITER(S): at 04:22

## 2019-06-05 NOTE — CHART NOTE - NSCHARTNOTEFT_GEN_A_CORE
Transesophageal echocardiogram attempted after obtaining informed consent from patient's daughter.  Several attempts made to pass RANDALL probe into esophagus without success.  The procedure was then aborted due to risks of continuing outweighing the potential benefits.   The patient tolerated the above attempts with no signs of bleeding or hemodynamic or respiratory compromise.   Discussed with ICU team and his daughter Amelie

## 2019-06-05 NOTE — PROGRESS NOTE ADULT - ASSESSMENT
Physical Exam	  GENERAL:               Intubated and sedated, not in distress  HEENT:                   Orally intubated increased ET tube secretions purulent,   PULM:                     diminished at bilateral bases, intubated with thick yellow secretions by suction of ET tube course left breath sounds  CVS:                         S1, S2,  No Murmur  ABD:                        Soft, nondistended, nontender, normoactive bowel sounds, + PEG  EXT:                         + edema, nontender, No Cyanosis or Clubbing   Vascular:                Warm Extremities, Normal Capillary refill, Normal Distal Pulses.  NEURO:                   arousable to tactile stimulation  PSYC:                      unable to assess    Assessment:  1. Septic shock and Acute hypoxic respiratory failure due to Aspiration PNA - Left with Staph Aureus Bacteremia.   2. Acute on chronic renal dysfunction Baseline Cr 1.3  3. Lactic acidosis Resolved  4. Possible  PEA arrest in GG prior to arrival - "brief period where patient received compressions" as per H&P  4. Chronic Diastolic CHF  8. Dysphagia  s/p PEG s/p CVA     Plan:  Patient is tolerating CPAP, clinically improved suspect will plan to extubate after RANDALL based on clinical status.  tolerating cpap, weaning possible after RANDALL     wife to decide on re intubation desires.   RANDALL  today  PICC line that placed 6/3 will need to be replaced once bacteremia clears.   continue midodrine   will continue Mucomyst/Duoneb/chest pt for another day   Broad spectrum antibiotics  as per ID  Creatinine down trending   Feedings via PEG tube   Hold home BP medications given shock  Cont. Home glaucoma medications  noted anemia, suspect from fluid shifts from albumin, doubt acute bleed, will monitor, transfuse if needed.  blood consent  if need transfusion.     GI/DVT prophylaxis .  45 min of critical care time spent on this patient's care     palliative care F/u  PMD:	Dr. Reyes			                   Notified(Date):  Family Updated: 	Dtr Amelie -  6/5 updated,                                       wife Angie [phone]	              Date: 6/4/19        Sedation & Analgesia:	as above  Diet/Nutrition:		tube feeding  GI PPx:		Protonix	    DVT Ppx:	Heparin    Activity:		               Bedrest  Head of Bed:               35-45  Glycemic Control:       n/a      Lines: PIV  CENTRAL LINE: 	[x ] YES [ ] NO	                    LOCATION:   	                       DATE INSERTED:   	                    REMOVE:  [ ] YES [ ] NO  Will need new Access, vs d/c if able to wean off pressors  A-LINE:  	                [ ] YES [ ] NO                      LOCATION:   	                       DATE INSERTED: 		            REMOVE:  [ ] YES [ ] NO    SOTO: 		        [x ] YES [ ] NO  		                                       DATE INSERTED:		            REMOVE:  [ ] YES [x ] NO      Restraints were deemed necessary to prevent removal of life-sustaining devices [ x ] YES   [    ]  NO    Disposition: ICU     Goals of Care: full

## 2019-06-05 NOTE — PROGRESS NOTE ADULT - SUBJECTIVE AND OBJECTIVE BOX
CC: f/u for mssa bacteremia and pneumonia    Patient reports nothing remains on vent, propofol , pressors    REVIEW OF SYSTEMS:  All other review of systems cannot get (Comprehensive ROS)    Antimicrobials Day #  :5  ceFAZolin   IVPB 2000 milliGRAM(s) IV Intermittent every 12 hours    Other Medications Reviewed    T(F): 98.8 (06-05-19 @ 18:00), Max: 98.8 (06-05-19 @ 18:00)  HR: 65 (06-05-19 @ 19:00)  BP: 144/87 (06-05-19 @ 19:00)  RR: 20 (06-05-19 @ 19:00)  SpO2: 98% (06-05-19 @ 19:00)  Wt(kg): --    PHYSICAL EXAM:  General: calm on vent no acute distress  Eyes:  anicteric, no conjunctival injection, no discharge  Oropharynx: no lesions or injection 	ett in place  Neck: supple, without adenopathy  Lungs: course to auscultation  Heart: regular rate and rhythm; no murmur, rubs or gallops  Abdomen: soft, distended, nontender, without mass or organomegaly  Skin: no lesions  Extremities: no clubbing, cyanosis, . some  edema  Neurologic: sedated    LAB RESULTS:                        7.4    10.43 )-----------( 115      ( 05 Jun 2019 05:05 )             24.0     06-05    138  |  105  |  39<H>  ----------------------------<  105<H>  4.7   |  26  |  1.62<H>    Ca    9.7      05 Jun 2019 05:05  Phos  3.4     06-05  Mg     1.8     06-05    TPro  6.6  /  Alb  2.0<L>  /  TBili  0.3  /  DBili  x   /  AST  26  /  ALT  20  /  AlkPhos  94  06-05    LIVER FUNCTIONS - ( 05 Jun 2019 05:05 )  Alb: 2.0 g/dL / Pro: 6.6 g/dL / ALK PHOS: 94 U/L / ALT: 20 U/L DA / AST: 26 U/L / GGT: x             MICROBIOLOGY:  RECENT CULTURES:  06-03 @ 12:30 .Blood Blood     No growth to date.      06-02 @ 17:20 .Sputum Sputum Staphylococcus aureus    Moderate Staphylococcus aureus  Normal Respiratory Renetta present    Moderate polymorphonuclear leukocytes per low power field  Few Squamous epithelial cells per low power field  Moderate Gram Variable Coccobacilli  seen per oil power field    06-01 @ 11:16 .Urine Clean Catch (Midstream)     >100,000 CFU/ml Presumptive Candida albicans      06-01 @ 11:10 .Blood Blood-Peripheral Blood Culture PCR  Staphylococcus aureus    Growth in aerobic and anaerobic bottles: Staphylococcus aureus  "Due to technical problems, Proteus sp. will Not be reported as part of  the BCID panel until further notice"  ***Blood Panel PCR results on this specimen are available  approximately 3 hours after the Gram stain result.***  Gram stain, PCR, and/or culture results may not always  correspond due to difference in methodologies.  ************************************************************  This PCR assay was performed using [employer].  The following targets are tested for: Enterococcus,  vancomycin resistant enterococci, Listeria monocytogenes,  coagulase negative staphylococci, S. aureus,  methicillin resistant S. aureus, Streptococcus agalactiae  (Group B), S. pneumoniae, S. pyogenes (Group A),  Acinetobacter baumannii, Enterobacter cloacae, E. coli,  Klebsiella oxytoca, K. pneumoniae, Proteus sp.,  Serratia marcescens, Haemophilus influenzae,  Neisseria meningitidis, Pseudomonas aeruginosa, Candida  albicans, C. glabrata, C krusei, C parapsilosis,  C. tropicalis and the KPC resistance gene.    Growth in aerobic bottle: Gram Positive Cocci in Clusters  Growth in anaerobic bottle: Gram Positive Cocci in Clusters    06-01 @ 11:05 .Blood Blood-Peripheral     Growth in aerobic and anaerobic bottles: Staphylococcus aureus  See previous culture [id number]    Growth in anaerobic bottle: Gram Positive Cocci in Clusters  Growth in aerobic bottle: Gram Positive Cocci in Clusters        RADIOLOGY REVIEWED:  < from: Xray Chest 1 View- PORTABLE-Routine (06.05.19 @ 06:41) >  EXAM:  XR CHEST PORTABLE ROUTINE 1V      PROCEDURE DATE:  06/05/2019        INTERPRETATION:  AP erect chest on June 5, 2019 at 6:19 AM. Patient is   being followed for positive lung findings.    On present examination patient's chin obscures the upper lung fields.   Endotracheal tube can be identified behind the chin in grossly normal   position. Left-sided pacemaker and right PICC line remain.    Heart is magnified by technique.    There is an increasing diffuse left-sided infiltrate compared to Amairani 3.    Slight atelectases on the right are stable.    IMPRESSION: Increasing left lung infiltrate.      < end of copied text >      Assessment:  Elderly man with hypoxic respiratory failure, severe sepsis with shock on vent and pressors from bacteremic staph aureus pneumonia. elieser unsuccessful but would not be a likely good ohs candidate anyway  Plan:  continue cefazolin, since cannot definitely prove or disprove endocarditis , would opt for 4 weeks iv   supportive care per micu, wean attempt in progress

## 2019-06-05 NOTE — PROGRESS NOTE ADULT - ASSESSMENT
65 year old male with acute hypoxic respiratory failure secondary to aspiration pna MELISSA    Critical Care time: 33 mins assessing presenting problems of acute illness that poses high probability of life threatening deterioration or end organ damage/dysfunction.  Medical decision making inculding Initiating plan of care, reviewing data, reviewing radiology,direct patient bedside evaluation and interpretation of vital signs, any necessary ventilator management , discusion with multidisciplinary team, discussing goals of care with patient/family, all non inclusive of procedures

## 2019-06-05 NOTE — PROGRESS NOTE ADULT - SUBJECTIVE AND OBJECTIVE BOX
Follow-up Critical Care Progress Note  Chief Complaint : Sepsis    pt tolerating cpap today  tube feeding held since 2 am  et tube secretions continue but improved with current rx    D/w Cardio plan for RANDALL today when on vent plan to be done ~ 10 am       Allergies :No Known Allergies      PAST MEDICAL & SURGICAL HISTORY:  Shortness of breath  Muscle weakness  Depression  IBS (irritable bowel syndrome)  Dysphagia  Cerebral infarction  Anemia  Psoriasis  GERD (gastroesophageal reflux disease)  Dyslipidemia  Cardiac pacemaker  S/P percutaneous endoscopic gastrostomy (PEG) tube placement      Medications:  MEDICATIONS  (STANDING):  acetylcysteine 20%  Inhalation 2 milliLiter(s) Inhalation every 4 hours  albumin human 25% IVPB 50 milliLiter(s) IV Intermittent every 6 hours  ALBUTerol/ipratropium for Nebulization 3 milliLiter(s) Nebulizer every 4 hours  ceFAZolin   IVPB 2000 milliGRAM(s) IV Intermittent every 12 hours  chlorhexidine 0.12% Liquid 15 milliLiter(s) Oral Mucosa two times a day  chlorhexidine 4% Liquid 1 Application(s) Topical <User Schedule>  dexmedetomidine Infusion 0.2 MICROgram(s)/kG/Hr (4.25 mL/Hr) IV Continuous <Continuous>  dorzolamide 2%/timolol 0.5% Ophthalmic Solution 1 Drop(s) Both EYES two times a day  heparin  Injectable 5000 Unit(s) SubCutaneous every 8 hours  latanoprost 0.005% Ophthalmic Solution 1 Drop(s) Both EYES at bedtime  midodrine 5 milliGRAM(s) Oral <User Schedule>  norepinephrine Infusion 0.05 MICROgram(s)/kG/Min (3.984 mL/Hr) IV Continuous <Continuous>  pantoprazole   Suspension 40 milliGRAM(s) Oral daily    MEDICATIONS  (PRN):  sodium chloride 0.9% lock flush 10 milliLiter(s) IV Push every 1 hour PRN Pre/post blood products, medications, blood draw, and to maintain line patency  sodium chloride 0.9% lock flush 10 milliLiter(s) IV Push every 1 hour PRN Pre/post blood products, medications, blood draw, and to maintain line patency      LABS:                        7.4    10.43 )-----------( 115      ( 05 Jun 2019 05:05 )             24.0     06-05    138  |  105  |  39<H>  ----------------------------<  105<H>  4.7   |  26  |  1.62<H>    Ca    9.7      05 Jun 2019 05:05  Phos  3.4     06-05  Mg     1.8     06-05    TPro  6.6  /  Alb  2.0<L>  /  TBili  0.3  /  DBili  x   /  AST  26  /  ALT  20  /  AlkPhos  94  06-05    H/H Trend  06-05-19 @ 05:05   -  7.4<L> / 24.0<L>  06-04-19 @ 10:30   -  8.0<L> / 25.4<L>  06-04-19 @ 05:00   -  6.9<LL> / 22.4<L>  06-03-19 @ 05:00   -  8.6<L> / 27.3<L>          PT/INR - ( 04 Jun 2019 05:00 )   PT: 15.4 sec;   INR: 1.36 ratio      CULTURES: (if applicable)    Culture - Blood (collected 06-03-19 @ 12:30)  Source: .Blood Blood  Preliminary Report (06-04-19 @ 20:01):    No growth to date.    Culture - Blood (collected 06-03-19 @ 12:30)  Source: .Blood Blood  Preliminary Report (06-04-19 @ 20:01):    No growth to date.    Culture - Sputum (collected 06-02-19 @ 17:20)  Source: .Sputum Sputum  Gram Stain (06-03-19 @ 06:26):    Moderate polymorphonuclear leukocytes per low power field    Few Squamous epithelial cells per low power field    Moderate Gram Variable Coccobacilli  seen per oil power field  Final Report (06-04-19 @ 16:40):    Moderate Staphylococcus aureus    Normal Respiratory Renetta present  Organism: Staphylococcus aureus (06-04-19 @ 16:40)  Organism: Staphylococcus aureus (06-04-19 @ 16:40)      -  Ampicillin/Sulbactam: S <=8/4      -  Cefazolin: S <=4      -  Clindamycin: S <=0.25      -  Erythromycin: R >4      -  Gentamicin: S 2 Should not be used as monotherapy      -  Oxacillin: S 1      -  Penicillin: R >8      -  RIF- Rifampin: S <=1 Should not be used as monotherapy      -  Tetra/Doxy: S <=1      -  Trimethoprim/Sulfamethoxazole: S <=0.5/9.5      -  Vancomycin: S 2      Method Type: KALYAN    Culture - Urine (collected 06-01-19 @ 11:16)  Source: .Urine Clean Catch (Midstream)  Final Report (06-03-19 @ 14:55):    >100,000 CFU/ml Presumptive Candida albicans    Culture - Blood (collected 06-01-19 @ 11:10)  Source: .Blood Blood-Peripheral  Gram Stain (06-02-19 @ 15:04):    Growth in aerobic bottle: Gram Positive Cocci in Clusters    Growth in anaerobic bottle: Gram Positive Cocci in Clusters  Final Report (06-04-19 @ 08:28):    Growth in aerobic and anaerobic bottles: Staphylococcus aureus    "Due to technical problems, Proteus sp. will Not be reported as part of    the BCID panel until further notice"    ***Blood Panel PCR results on this specimen are available    approximately 3 hours after the Gram stain result.***    Gram stain, PCR, and/or culture results may not always    correspond due to difference in methodologies.    ************************************************************    This PCR assay was performed using [employer].    The following targets are tested for: Enterococcus,    vancomycin resistant enterococci, Listeria monocytogenes,    coagulase negative staphylococci, S. aureus,    methicillin resistant S. aureus, Streptococcus agalactiae    (Group B), S. pneumoniae, S. pyogenes (Group A),    Acinetobacter baumannii, Enterobacter cloacae, E. coli,    Klebsiella oxytoca, K. pneumoniae, Proteus sp.,    Serratia marcescens, Haemophilus influenzae,    Neisseria meningitidis, Pseudomonas aeruginosa, Candida    albicans, C. glabrata, C krusei, C parapsilosis,    C. tropicalis and the KPC resistance gene.  Organism: Blood Culture PCR  Staphylococcus aureus (06-04-19 @ 08:28)  Organism: Staphylococcus aureus (06-04-19 @ 08:28)      -  Ampicillin/Sulbactam: S <=8/4      -  Cefazolin: S <=4      -  Clindamycin: S 0.5      -  Erythromycin: R >4      -  Gentamicin: S <=1 Should not be used as monotherapy      -  Oxacillin: S 1      -  Penicillin: R >8      -  RIF- Rifampin: S <=1 Should not be used as monotherapy      -  Tetra/Doxy: S <=1      -  Trimethoprim/Sulfamethoxazole: S <=0.5/9.5      -  Vancomycin: S 2      Method Type: KALYAN  Organism: Blood Culture PCR (06-04-19 @ 08:28)      -  Staphylococcus aureus: Detec Any isolate of Staphylococcus aureus from a blood culture is NOT considered a contaminant.      Method Type: PCR    Culture - Blood (collected 06-01-19 @ 11:05)  Source: .Blood Blood-Peripheral  Gram Stain (06-02-19 @ 12:46):    Growth in anaerobic bottle: Gram Positive Cocci in Clusters    Growth in aerobic bottle: Gram Positive Cocci in Clusters  Final Report (06-04-19 @ 08:28):    Growth in aerobic and anaerobic bottles: Staphylococcus aureus    See previous culture [id number]      Rapid RVP Result: NotDetec (06-01-19 @ 16:48)        CAPILLARY BLOOD GLUCOSE          RADIOLOGY  CXR:  Rotated, left sided infiltrate continues        VITALS:  T(C): 36.9 (06-05-19 @ 05:00), Max: 36.9 (06-05-19 @ 05:00)  T(F): 98.4 (06-05-19 @ 05:00), Max: 98.4 (06-05-19 @ 05:00)  HR: 60 (06-05-19 @ 07:00) (60 - 66)  BP: 128/68 (06-05-19 @ 07:00) (63/55 - 134/55)  BP(mean): 83 (06-05-19 @ 07:00) (60 - 89)  ABP: --  ABP(mean): --  RR: 12 (06-05-19 @ 07:00) (12 - 19)  SpO2: 99% (06-05-19 @ 07:00) (92% - 100%)  CVP(mm Hg): --  CVP(cm H2O): --    Ins and Outs     06-04-19 @ 07:01  -  06-05-19 @ 07:00  --------------------------------------------------------  IN: 717.1 mL / OUT: 730 mL / NET: -12.9 mL            Device: 840, Mode: CPAP with PS, RR (patient): 14, FiO2: 40, PEEP: 5, PS: 5, PIP: 11    I&O's Detail    04 Jun 2019 07:01  -  05 Jun 2019 07:00  --------------------------------------------------------  IN:    dexmedetomidine Infusion: 36.3 mL    fentaNYL Infusion.: 25.8 mL    ns in tub fed  laartf35: 455 mL    Solution: 100 mL    Solution: 100 mL  Total IN: 717.1 mL    OUT:    Indwelling Catheter - Urethral: 730 mL  Total OUT: 730 mL    Total NET: -12.9 mL

## 2019-06-05 NOTE — PROGRESS NOTE ADULT - SUBJECTIVE AND OBJECTIVE BOX
85y  Male  No Known Allergies    CC: Patient is a 85y old  Male who presents with a chief complaint of Hypoxic respiratory failure, Aspiration pneumonia, sepsis (03 Jun 2019 14:53)    HPI:  84 yo M with PMHx of Glaucoma, benign prostate neoplasm, HTN, HLD, Grad 3 Diastolic Heart Failure s/p ppm, CKD, dysphagia s/p PEG tube placement, CVA presented from [facility] for eval of SOB. Patient was reportedly eating dysphagia diet meal when he began to experience SOB with increase secretions and altered. He has long histroy of aspiration with multiple admissions in the past  for aspiration events. He aarivd S/P possible cardiac arrest at the facility with brife compressions etiology was mucous plugging and  likely aspiration event. On arrival to the ER hewas hypotensive and decompensated due to secretions and mucous plugging which lead to emergent intubation.    He remains vented failing daily weaning trials, yesterday he was not extubated due to heavy secreations which he has today as well but today he was medicated and sedated for RANDALL and was more lethargic. He remains on pressors to support systolic blood pressure although dose has been trending down and the goal is to wean off pressors by adding midodrine. Morning cpap trial for extubation     PAST MEDICAL & SURGICAL HISTORY:  Shortness of breath  Muscle weakness  Depression  IBS (irritable bowel syndrome)  Dysphagia  Cerebral infarction  Anemia  Psoriasis  GERD (gastroesophageal reflux disease)  Dyslipidemia  Cardiac pacemaker  S/P percutaneous endoscopic gastrostomy (PEG) tube placement    FAMILY HISTORY:  No pertinent family history in first degree relatives      Vital Signs Last 24 Hrs  T(C): 37.1 (05 Jun 2019 18:00), Max: 37.1 (05 Jun 2019 18:00)  T(F): 98.8 (05 Jun 2019 18:00), Max: 98.8 (05 Jun 2019 18:00)  HR: 65 (05 Jun 2019 19:00) (60 - 68)  BP: 144/87 (05 Jun 2019 19:00) (87/52 - 144/87)  BP(mean): 102 (05 Jun 2019 19:00) (63 - 102)  RR: 20 (05 Jun 2019 19:00) (12 - 21)  SpO2: 98% (05 Jun 2019 19:00) (94% - 100%)    I&O's Summary  04 Jun 2019 07:01  -  05 Jun 2019 07:00  --------------------------------------------------------  IN: 717.1 mL / OUT: 730 mL / NET: -12.9 mL    05 Jun 2019 07:01  -  05 Jun 2019 21:11  --------------------------------------------------------  IN: 160.6 mL / OUT: 0 mL / NET: 160.6 mL        LABS  06-05  138  |  105  |  39<H>  ----------------------------<  105<H>  4.7   |  26  |  1.62<H>    Ca    9.7      05 Jun 2019 05:05  Phos  3.4     06-05  Mg     1.8     06-05    TPro  6.6  /  Alb  2.0<L>  /  TBili  0.3  /  DBili  x   /  AST  26  /  ALT  20  /  AlkPhos  94  06-05                      7.4    10.43 )-----------( 115      ( 05 Jun 2019 05:05 )             24.0   PT/INR - ( 04 Jun 2019 05:00 )   PT: 15.4 sec;   INR: 1.36 ratio     LIVER FUNCTIONS - ( 05 Jun 2019 05:05 )  Alb: 2.0 g/dL / Pro: 6.6 g/dL / ALK PHOS: 94 U/L / ALT: 20 U/L DA / AST: 26 U/L / GGT: x             VENT SETTINGS   Mode: AC/ CMV (Assist Control/ Continuous Mandatory Ventilation)  RR (machine): 18  TV (machine): 450  FiO2: 40  PEEP: 5  MAP: 9.4  PIP: 20      Meds  MEDICATIONS  (STANDING):  ceFAZolin   IVPB 2000 milliGRAM(s) IV Intermittent every 12 hours  chlorhexidine 0.12% Liquid 15 milliLiter(s) Oral Mucosa two times a day  chlorhexidine 4% Liquid 1 Application(s) Topical <User Schedule>  dexmedetomidine Infusion 0.2 MICROgram(s)/kG/Hr (4.25 mL/Hr) IV Continuous <Continuous>  dorzolamide 2%/timolol 0.5% Ophthalmic Solution 1 Drop(s) Both EYES two times a day  heparin  Injectable 5000 Unit(s) SubCutaneous every 8 hours  latanoprost 0.005% Ophthalmic Solution 1 Drop(s) Both EYES at bedtime  midodrine 5 milliGRAM(s) Oral <User Schedule>  norepinephrine Infusion 0.05 MICROgram(s)/kG/Min (3.984 mL/Hr) IV Continuous <Continuous>  pantoprazole   Suspension 40 milliGRAM(s) Oral daily  propofol Infusion 20 MICROgram(s)/kG/Min (10.2 mL/Hr) IV Continuous <Continuous>      REVIEW OF SYSTEMS:    CONSTITUTIONAL: No fever, weight loss, or fatigue  EYES: No eye pain, visual disturbances, or discharge  ENMT:  No difficulty hearing, tinnitus, vertigo; No sinus or throat pain  NECK: No pain or stiffness  BREASTS: No pain, masses, or nipple discharge  RESPIRATORY: No cough, wheezing, chills or hemoptysis; No shortness of breath  CARDIOVASCULAR: No chest pain, palpitations, dizziness, or leg swelling  GASTROINTESTINAL: No abdominal or epigastric pain. No nausea, vomiting, or hematemesis; No diarrhea or constipation. No melena or hematochezia.  GENITOURINARY: No dysuria, frequency, hematuria, or incontinence  NEUROLOGICAL: No headaches, memory loss, loss of strength, numbness, or tremors  SKIN: No itching, burning, rashes, or lesions   LYMPH NODES: No enlarged glands  ENDOCRINE: No heat or cold intolerance; No hair loss  MUSCULOSKELETAL: No joint pain or swelling; No muscle, back, or extremity pain  PSYCHIATRIC: No depression, anxiety, mood swings, or difficulty sleeping  HEME/LYMPH: No easy bruising, or bleeding gums  ALLERY AND IMMUNOLOGIC: No hives or eczema      Physicial Exam:     Constitutional: NAD, well-groomed, well-developed  HEENT: PERRLA, EOMI, no drainage or redness  Neck: No bruits; no thyromegaly or nodules,  No JVD  Back: Normal spine flexure, No CVA tenderness, No deformity or limitation of movement  Respiratory: Breath Sounds equal & clear to percussion & auscultation, no accessory muscle use  Cardiovascular: Regular rate & rhythm, normal S1, S2; no murmurs, gallops or rubs; no S3, S4  Gastrointestinal: Soft, non-tender, non distended no hepatosplenomegaly, normal bowel sounds  Extremities: No peripheral edema, No cyanosis, clubbing   Vascular: Equal and normal pulses: 2+ peripheral pulses throughout  Neurological: GCS:    A&O x 3; no sensory, motor  deficits, normal reflexes  Psychiatric: Normal mood, normal affect  Musculoskeletal: No joint pain, swelling or deformity; no limitation of movement  Skin: No rashes 85y  Male  No Known Allergies    CC: Patient is a 85y old  Male who presents with a chief complaint of Hypoxic respiratory failure, Aspiration pneumonia, sepsis (03 Jun 2019 14:53)    HPI:  86 yo M with PMHx of Glaucoma, benign prostate neoplasm, HTN, HLD, Grad 3 Diastolic Heart Failure s/p ppm, CKD, dysphagia s/p PEG tube placement, CVA presented from [facility] for eval of SOB. Patient was reportedly eating dysphagia diet meal when he began to experience SOB with increase secretions and altered. He has long histroy of aspiration with multiple admissions in the past  for aspiration events. He aarivd S/P possible cardiac arrest at the facility with brife compressions etiology was mucous plugging and  likely aspiration event. On arrival to the ER hewas hypotensive and decompensated due to secretions and mucous plugging which lead to emergent intubation.    He remains vented failing daily weaning trials, yesterday he was not extubated due to heavy secreations which he has today as well but today he was medicated and sedated for RANDALL and was more lethargic. He remains on pressors to support systolic blood pressure although dose has been trending down and the goal is to wean off pressors by adding midodrine. Morning cpap trial for extubation     PAST MEDICAL & SURGICAL HISTORY:  Shortness of breath  Muscle weakness  Depression  IBS (irritable bowel syndrome)  Dysphagia  Cerebral infarction  Anemia  Psoriasis  GERD (gastroesophageal reflux disease)  Dyslipidemia  Cardiac pacemaker  S/P percutaneous endoscopic gastrostomy (PEG) tube placement    FAMILY HISTORY:  No pertinent family history in first degree relatives      Vital Signs Last 24 Hrs  T(C): 37.1 (05 Jun 2019 18:00), Max: 37.1 (05 Jun 2019 18:00)  T(F): 98.8 (05 Jun 2019 18:00), Max: 98.8 (05 Jun 2019 18:00)  HR: 65 (05 Jun 2019 19:00) (60 - 68)  BP: 144/87 (05 Jun 2019 19:00) (87/52 - 144/87)  BP(mean): 102 (05 Jun 2019 19:00) (63 - 102)  RR: 20 (05 Jun 2019 19:00) (12 - 21)  SpO2: 98% (05 Jun 2019 19:00) (94% - 100%)    I&O's Summary  04 Jun 2019 07:01  -  05 Jun 2019 07:00  --------------------------------------------------------  IN: 717.1 mL / OUT: 730 mL / NET: -12.9 mL    05 Jun 2019 07:01  -  05 Jun 2019 21:11  --------------------------------------------------------  IN: 160.6 mL / OUT: 0 mL / NET: 160.6 mL        LABS  06-05  138  |  105  |  39<H>  ----------------------------<  105<H>  4.7   |  26  |  1.62<H>    Ca    9.7      05 Jun 2019 05:05  Phos  3.4     06-05  Mg     1.8     06-05    TPro  6.6  /  Alb  2.0<L>  /  TBili  0.3  /  DBili  x   /  AST  26  /  ALT  20  /  AlkPhos  94  06-05                      7.4    10.43 )-----------( 115      ( 05 Jun 2019 05:05 )             24.0   PT/INR - ( 04 Jun 2019 05:00 )   PT: 15.4 sec;   INR: 1.36 ratio     LIVER FUNCTIONS - ( 05 Jun 2019 05:05 )  Alb: 2.0 g/dL / Pro: 6.6 g/dL / ALK PHOS: 94 U/L / ALT: 20 U/L DA / AST: 26 U/L / GGT: x             VENT SETTINGS   Mode: AC/ CMV (Assist Control/ Continuous Mandatory Ventilation)  RR (machine): 18  TV (machine): 450  FiO2: 40  PEEP: 5  MAP: 9.4  PIP: 20      Meds  MEDICATIONS  (STANDING):  ceFAZolin   IVPB 2000 milliGRAM(s) IV Intermittent every 12 hours  chlorhexidine 0.12% Liquid 15 milliLiter(s) Oral Mucosa two times a day  chlorhexidine 4% Liquid 1 Application(s) Topical <User Schedule>  dexmedetomidine Infusion 0.2 MICROgram(s)/kG/Hr (4.25 mL/Hr) IV Continuous <Continuous>  dorzolamide 2%/timolol 0.5% Ophthalmic Solution 1 Drop(s) Both EYES two times a day  heparin  Injectable 5000 Unit(s) SubCutaneous every 8 hours  latanoprost 0.005% Ophthalmic Solution 1 Drop(s) Both EYES at bedtime  midodrine 5 milliGRAM(s) Oral <User Schedule>  norepinephrine Infusion 0.05 MICROgram(s)/kG/Min (3.984 mL/Hr) IV Continuous <Continuous>  pantoprazole   Suspension 40 milliGRAM(s) Oral daily  propofol Infusion 20 MICROgram(s)/kG/Min (10.2 mL/Hr) IV Continuous <Continuous>      REVIEW OF SYSTEMS:    Unable to obtain due to mechnical ventilation and sedation     Physicial Exam:     Constitutional: NAD, well-groomed, well-developed  HEENT: PERRLA, EOMI, no drainage or redness  Neck: No bruits; no thyromegaly or nodules,  No JVD  Back: Normal spine flexure, No CVA tenderness, No deformity or limitation of movement  Respiratory: Breath Sounds equal & clear to percussion & auscultation, no accessory muscle use  Cardiovascular: Regular rate & rhythm, normal S1, S2; no murmurs, gallops or rubs; no S3, S4  Gastrointestinal: Soft, non-tender, non distended no hepatosplenomegaly, normal bowel sounds  Extremities: No peripheral edema, No cyanosis, clubbing   Vascular: Equal and normal pulses: 2+ peripheral pulses throughout  Neurological: GCS:    A&O x 3; no sensory, motor  deficits, normal reflexes  Psychiatric: Normal mood, normal affect  Musculoskeletal: No joint pain, swelling or deformity; no limitation of movement  Skin: No rashes

## 2019-06-05 NOTE — CHART NOTE - NSCHARTNOTEFT_GEN_A_CORE
SOURCE: other [x ] RN, EMR      DIET: NPO      PATIENT REPORT  [ x] other: no GI distress noted    PO INTAKE:    [X]  other : N/A      ENTERAL/PARENTERAL NUTRITION: Jevity 1.5 at 75 cc/hr x 18 hrs (2025 kcals/85 g protein)  TF held since 2 am for (unsuccessful attempt) RANDALL      CURRENT WEIGHT: 187.3 lbs.  Previously 186 lbs. Remains >100% IBW range    PERTINENT MEDICATIONS:   Ancef, Vancomycin, Fentanyl, Magnesium Sulfate, Levophed, Protonix, Precedex, Dopamine    PERTINENT LABS:  6/5: BUN 39 <H> Creatinine 1.62 <H> (MELISSA) Glucose 105 <H>       SKIN: No pressure injuries noted. Edema noted.    ESTIMATED NEEDS:   [X] no change since previous assessment    PREVIOUS NUTRITION DIAGNOSIS: addressed. (severe malnutrition)    NUTRITION DIAGNOSIS is   [X] ongoing      NEW NUTRITION DIAGNOSIS: [X] not applicable      MONITORING AND EVALUATION: Poor overall prognosis noted.       [X] Tolerance to TF [X] weights [X] skin integrity [X] follow up per protocol    Will follow up as per department standards or sooner prn.   Recommend resume TF as medically feasible/appropriate.

## 2019-06-05 NOTE — PROGRESS NOTE ADULT - PROBLEM SELECTOR PLAN 1
-Patient currently on Full vent support  -titrate settings to maintain SaO2 >90%, or pH >7.25  -continue low titdal volume ventilation strategy w/ goal Tv 4-6 cc/kg of ideal body weight  -plateu pressure goal <30  -Peridex oral care and VAP prophylaxis with HOB 30 degrees   -aggressive chest PT and suctioning   - morning sedation vacation with spontaneous breathing trial if clinical condition warrants, discuss with respiratory therapy goal is again trial for extubation in morning   - will start up Mucinex and nebulizer treatments again ( stopped today ) to help with secretions and better the chance foe successful extubation

## 2019-06-06 LAB
ANION GAP SERPL CALC-SCNC: 7 MMOL/L — SIGNIFICANT CHANGE UP (ref 5–17)
BUN SERPL-MCNC: 37 MG/DL — HIGH (ref 7–23)
CALCIUM SERPL-MCNC: 10 MG/DL — SIGNIFICANT CHANGE UP (ref 8.4–10.5)
CHLORIDE SERPL-SCNC: 107 MMOL/L — SIGNIFICANT CHANGE UP (ref 96–108)
CO2 BLDA-SCNC: 25 MMOL/L — SIGNIFICANT CHANGE UP (ref 22–30)
CO2 BLDA-SCNC: 26 MMOL/L — SIGNIFICANT CHANGE UP (ref 22–30)
CO2 SERPL-SCNC: 27 MMOL/L — SIGNIFICANT CHANGE UP (ref 22–31)
CREAT SERPL-MCNC: 1.45 MG/DL — HIGH (ref 0.5–1.3)
GAS PNL BLDA: SIGNIFICANT CHANGE UP
GLUCOSE SERPL-MCNC: 98 MG/DL — SIGNIFICANT CHANGE UP (ref 70–99)
HCT VFR BLD CALC: 23.3 % — LOW (ref 39–50)
HGB BLD-MCNC: 7.2 G/DL — LOW (ref 13–17)
HOROWITZ INDEX BLDA+IHG-RTO: SIGNIFICANT CHANGE UP
HOROWITZ INDEX BLDA+IHG-RTO: SIGNIFICANT CHANGE UP
MCHC RBC-ENTMCNC: 28 PG — SIGNIFICANT CHANGE UP (ref 27–34)
MCHC RBC-ENTMCNC: 30.9 GM/DL — LOW (ref 32–36)
MCV RBC AUTO: 90.7 FL — SIGNIFICANT CHANGE UP (ref 80–100)
NRBC # BLD: 0 /100 WBCS — SIGNIFICANT CHANGE UP (ref 0–0)
PCO2 BLDA: 41 MMHG — SIGNIFICANT CHANGE UP (ref 32–46)
PCO2 BLDA: 44 MMHG — SIGNIFICANT CHANGE UP (ref 32–46)
PH BLDA: 7.37 — SIGNIFICANT CHANGE UP (ref 7.35–7.45)
PH BLDA: 7.39 — SIGNIFICANT CHANGE UP (ref 7.35–7.45)
PLATELET # BLD AUTO: 157 K/UL — SIGNIFICANT CHANGE UP (ref 150–400)
PO2 BLDA: 106 MMHG — SIGNIFICANT CHANGE UP (ref 74–108)
PO2 BLDA: 112 MMHG — HIGH (ref 74–108)
POTASSIUM SERPL-MCNC: 4.8 MMOL/L — SIGNIFICANT CHANGE UP (ref 3.5–5.3)
POTASSIUM SERPL-SCNC: 4.8 MMOL/L — SIGNIFICANT CHANGE UP (ref 3.5–5.3)
RBC # BLD: 2.57 M/UL — LOW (ref 4.2–5.8)
RBC # FLD: 15.8 % — HIGH (ref 10.3–14.5)
SAO2 % BLDA: 98 % — HIGH (ref 92–96)
SAO2 % BLDA: 99 % — HIGH (ref 92–96)
SODIUM SERPL-SCNC: 141 MMOL/L — SIGNIFICANT CHANGE UP (ref 135–145)
WBC # BLD: 8.73 K/UL — SIGNIFICANT CHANGE UP (ref 3.8–10.5)
WBC # FLD AUTO: 8.73 K/UL — SIGNIFICANT CHANGE UP (ref 3.8–10.5)

## 2019-06-06 PROCEDURE — 99233 SBSQ HOSP IP/OBS HIGH 50: CPT

## 2019-06-06 RX ORDER — ACETYLCYSTEINE 200 MG/ML
2 VIAL (ML) MISCELLANEOUS EVERY 6 HOURS
Refills: 0 | Status: COMPLETED | OUTPATIENT
Start: 2019-06-06 | End: 2019-06-07

## 2019-06-06 RX ORDER — IPRATROPIUM/ALBUTEROL SULFATE 18-103MCG
3 AEROSOL WITH ADAPTER (GRAM) INHALATION EVERY 6 HOURS
Refills: 0 | Status: COMPLETED | OUTPATIENT
Start: 2019-06-06 | End: 2019-06-07

## 2019-06-06 RX ORDER — MIDODRINE HYDROCHLORIDE 2.5 MG/1
2.5 TABLET ORAL
Refills: 0 | Status: DISCONTINUED | OUTPATIENT
Start: 2019-06-06 | End: 2019-06-07

## 2019-06-06 RX ADMIN — DEXMEDETOMIDINE HYDROCHLORIDE IN 0.9% SODIUM CHLORIDE 4.25 MICROGRAM(S)/KG/HR: 4 INJECTION INTRAVENOUS at 04:09

## 2019-06-06 RX ADMIN — HEPARIN SODIUM 5000 UNIT(S): 5000 INJECTION INTRAVENOUS; SUBCUTANEOUS at 15:53

## 2019-06-06 RX ADMIN — Medication 2 MILLILITER(S): at 15:32

## 2019-06-06 RX ADMIN — DORZOLAMIDE HYDROCHLORIDE TIMOLOL MALEATE 1 DROP(S): 20; 5 SOLUTION/ DROPS OPHTHALMIC at 05:17

## 2019-06-06 RX ADMIN — Medication 100 MILLIGRAM(S): at 06:41

## 2019-06-06 RX ADMIN — Medication 3 MILLILITER(S): at 15:24

## 2019-06-06 RX ADMIN — HEPARIN SODIUM 5000 UNIT(S): 5000 INJECTION INTRAVENOUS; SUBCUTANEOUS at 22:15

## 2019-06-06 RX ADMIN — DORZOLAMIDE HYDROCHLORIDE TIMOLOL MALEATE 1 DROP(S): 20; 5 SOLUTION/ DROPS OPHTHALMIC at 17:47

## 2019-06-06 RX ADMIN — MIDODRINE HYDROCHLORIDE 2.5 MILLIGRAM(S): 2.5 TABLET ORAL at 11:02

## 2019-06-06 RX ADMIN — MIDODRINE HYDROCHLORIDE 5 MILLIGRAM(S): 2.5 TABLET ORAL at 05:18

## 2019-06-06 RX ADMIN — Medication 3 MILLILITER(S): at 22:04

## 2019-06-06 RX ADMIN — CHLORHEXIDINE GLUCONATE 15 MILLILITER(S): 213 SOLUTION TOPICAL at 17:47

## 2019-06-06 RX ADMIN — PANTOPRAZOLE SODIUM 40 MILLIGRAM(S): 20 TABLET, DELAYED RELEASE ORAL at 11:02

## 2019-06-06 RX ADMIN — Medication 100 MILLIGRAM(S): at 17:46

## 2019-06-06 RX ADMIN — LATANOPROST 1 DROP(S): 0.05 SOLUTION/ DROPS OPHTHALMIC; TOPICAL at 22:15

## 2019-06-06 RX ADMIN — Medication 3 MILLILITER(S): at 09:48

## 2019-06-06 RX ADMIN — CHLORHEXIDINE GLUCONATE 15 MILLILITER(S): 213 SOLUTION TOPICAL at 05:18

## 2019-06-06 RX ADMIN — MIDODRINE HYDROCHLORIDE 2.5 MILLIGRAM(S): 2.5 TABLET ORAL at 17:46

## 2019-06-06 RX ADMIN — Medication 2 MILLILITER(S): at 22:03

## 2019-06-06 RX ADMIN — CHLORHEXIDINE GLUCONATE 1 APPLICATION(S): 213 SOLUTION TOPICAL at 05:18

## 2019-06-06 RX ADMIN — HEPARIN SODIUM 5000 UNIT(S): 5000 INJECTION INTRAVENOUS; SUBCUTANEOUS at 05:18

## 2019-06-06 RX ADMIN — Medication 2 MILLILITER(S): at 09:45

## 2019-06-06 NOTE — PROGRESS NOTE ADULT - SUBJECTIVE AND OBJECTIVE BOX
Follow-up Critical Care Progress Note  Chief Complaint : Sepsis    patient doing well on cpap 5 ps 5, RR< 20, TV >500  Precedex off, pt alert,   secretions much improved, not thick, remains thin       Allergies :No Known Allergies      PAST MEDICAL & SURGICAL HISTORY:  Shortness of breath  Muscle weakness  Depression  IBS (irritable bowel syndrome)  Dysphagia  Cerebral infarction  Anemia  Psoriasis  GERD (gastroesophageal reflux disease)  Dyslipidemia  Cardiac pacemaker  S/P percutaneous endoscopic gastrostomy (PEG) tube placement      Medications:  MEDICATIONS  (STANDING):  acetylcysteine 20%  Inhalation 2 milliLiter(s) Inhalation every 6 hours  ALBUTerol/ipratropium for Nebulization. 3 milliLiter(s) Nebulizer every 6 hours  ceFAZolin   IVPB 2000 milliGRAM(s) IV Intermittent every 12 hours  chlorhexidine 0.12% Liquid 15 milliLiter(s) Oral Mucosa two times a day  chlorhexidine 4% Liquid 1 Application(s) Topical <User Schedule>  dexmedetomidine Infusion 0.2 MICROgram(s)/kG/Hr (4.25 mL/Hr) IV Continuous <Continuous>  dorzolamide 2%/timolol 0.5% Ophthalmic Solution 1 Drop(s) Both EYES two times a day  heparin  Injectable 5000 Unit(s) SubCutaneous every 8 hours  latanoprost 0.005% Ophthalmic Solution 1 Drop(s) Both EYES at bedtime  midodrine 5 milliGRAM(s) Oral <User Schedule>  norepinephrine Infusion 0.05 MICROgram(s)/kG/Min (3.984 mL/Hr) IV Continuous <Continuous>  pantoprazole   Suspension 40 milliGRAM(s) Oral daily  propofol Infusion 20 MICROgram(s)/kG/Min (10.2 mL/Hr) IV Continuous <Continuous>    MEDICATIONS  (PRN):  sodium chloride 0.9% lock flush 10 milliLiter(s) IV Push every 1 hour PRN Pre/post blood products, medications, blood draw, and to maintain line patency  sodium chloride 0.9% lock flush 10 milliLiter(s) IV Push every 1 hour PRN Pre/post blood products, medications, blood draw, and to maintain line patency      LABS:                        7.2    8.73  )-----------( 157      ( 06 Jun 2019 05:50 )             23.3     06-06    141  |  107  |  37<H>  ----------------------------<  98  4.8   |  27  |  1.45<H>    Ca    10.0      06 Jun 2019 05:50  Phos  3.4     06-05  Mg     1.8     06-05    TPro  6.6  /  Alb  2.0<L>  /  TBili  0.3  /  DBili  x   /  AST  26  /  ALT  20  /  AlkPhos  94  06-05        CULTURES: (if applicable)    Culture - Blood (collected 06-03-19 @ 12:30)  Source: .Blood Blood  Preliminary Report (06-04-19 @ 20:01):    No growth to date.    Culture - Blood (collected 06-03-19 @ 12:30)  Source: .Blood Blood  Preliminary Report (06-04-19 @ 20:01):    No growth to date.    Culture - Sputum (collected 06-02-19 @ 17:20)  Source: .Sputum Sputum  Gram Stain (06-03-19 @ 06:26):    Moderate polymorphonuclear leukocytes per low power field    Few Squamous epithelial cells per low power field    Moderate Gram Variable Coccobacilli  seen per oil power field  Final Report (06-04-19 @ 16:40):    Moderate Staphylococcus aureus    Normal Respiratory Renetta present  Organism: Staphylococcus aureus (06-04-19 @ 16:40)  Organism: Staphylococcus aureus (06-04-19 @ 16:40)      -  Ampicillin/Sulbactam: S <=8/4      -  Cefazolin: S <=4      -  Clindamycin: S <=0.25      -  Erythromycin: R >4      -  Gentamicin: S 2 Should not be used as monotherapy      -  Oxacillin: S 1      -  Penicillin: R >8      -  RIF- Rifampin: S <=1 Should not be used as monotherapy      -  Tetra/Doxy: S <=1      -  Trimethoprim/Sulfamethoxazole: S <=0.5/9.5      -  Vancomycin: S 2      Method Type: KALYAN    Culture - Urine (collected 06-01-19 @ 11:16)  Source: .Urine Clean Catch (Midstream)  Final Report (06-03-19 @ 14:55):    >100,000 CFU/ml Presumptive Candida albicans    Culture - Blood (collected 06-01-19 @ 11:10)  Source: .Blood Blood-Peripheral  Gram Stain (06-02-19 @ 15:04):    Growth in aerobic bottle: Gram Positive Cocci in Clusters    Growth in anaerobic bottle: Gram Positive Cocci in Clusters  Final Report (06-04-19 @ 08:28):    Growth in aerobic and anaerobic bottles: Staphylococcus aureus    "Due to technical problems, Proteus sp. will Not be reported as part of    the BCID panel until further notice"    ***Blood Panel PCR results on this specimen are available    approximately 3 hours after the Gram stain result.***    Gram stain, PCR, and/or culture results may not always    correspond due to difference in methodologies.    ************************************************************    This PCR assay was performed using [employer].    The following targets are tested for: Enterococcus,    vancomycin resistant enterococci, Listeria monocytogenes,    coagulase negative staphylococci, S. aureus,    methicillin resistant S. aureus, Streptococcus agalactiae    (Group B), S. pneumoniae, S. pyogenes (Group A),    Acinetobacter baumannii, Enterobacter cloacae, E. coli,    Klebsiella oxytoca, K. pneumoniae, Proteus sp.,    Serratia marcescens, Haemophilus influenzae,    Neisseria meningitidis, Pseudomonas aeruginosa, Candida    albicans, C. glabrata, C krusei, C parapsilosis,    C. tropicalis and the KPC resistance gene.  Organism: Blood Culture PCR  Staphylococcus aureus (06-04-19 @ 08:28)  Organism: Staphylococcus aureus (06-04-19 @ 08:28)      -  Ampicillin/Sulbactam: S <=8/4      -  Cefazolin: S <=4      -  Clindamycin: S 0.5      -  Erythromycin: R >4      -  Gentamicin: S <=1 Should not be used as monotherapy      -  Oxacillin: S 1      -  Penicillin: R >8      -  RIF- Rifampin: S <=1 Should not be used as monotherapy      -  Tetra/Doxy: S <=1      -  Trimethoprim/Sulfamethoxazole: S <=0.5/9.5      -  Vancomycin: S 2      Method Type: KALYAN  Organism: Blood Culture PCR (06-04-19 @ 08:28)      -  Staphylococcus aureus: Detec Any isolate of Staphylococcus aureus from a blood culture is NOT considered a contaminant.      Method Type: PCR    Culture - Blood (collected 06-01-19 @ 11:05)  Source: .Blood Blood-Peripheral  Gram Stain (06-02-19 @ 12:46):    Growth in anaerobic bottle: Gram Positive Cocci in Clusters    Growth in aerobic bottle: Gram Positive Cocci in Clusters  Final Report (06-04-19 @ 08:28):    Growth in aerobic and anaerobic bottles: Staphylococcus aureus    See previous culture [id number]      Rapid RVP Result: NotDetec (06-01-19 @ 16:48)      ABG - ( 06 Jun 2019 08:20 )  pH, Arterial: 7.39  pH, Blood: x     /  pCO2: 41    /  pO2: 112   / HCO3: x     / Base Excess: x     /  SaO2: 99                CAPILLARY BLOOD GLUCOSE          RADIOLOGY  CXR:      CT:    ECHO:  RANDALL unable to be done on 6/5/16  due to crowded airway with ET Tube.    VITALS:  T(C): 37 (06-06-19 @ 06:00), Max: 37.1 (06-05-19 @ 18:00)  T(F): 98.6 (06-06-19 @ 06:00), Max: 98.8 (06-05-19 @ 18:00)  HR: 60 (06-06-19 @ 07:00) (60 - 68)  BP: 142/64 (06-06-19 @ 07:00) (95/50 - 144/87)  BP(mean): 86 (06-06-19 @ 07:00) (64 - 102)  ABP: --  ABP(mean): --  RR: 22 (06-06-19 @ 07:00) (15 - 22)  SpO2: 98% (06-06-19 @ 07:00) (94% - 99%)  CVP(mm Hg): --  CVP(cm H2O): --    Ins and Outs     06-05-19 @ 07:01  -  06-06-19 @ 07:00  --------------------------------------------------------  IN: 417.2 mL / OUT: 400 mL / NET: 17.1 mL            Device: 840, Mode: CPAP with PS, TV (machine): 390, FiO2: 40, PEEP: 5, PS: 5, PIP: 23    I&O's Detail    05 Jun 2019 07:01  -  06 Jun 2019 07:00  --------------------------------------------------------  IN:    dexmedetomidine Infusion: 67.2 mL    ns in tub fed  [id number]: 300 mL    Solution: 50 mL  Total IN: 417.2 mL    OUT:    Indwelling Catheter - Urethral: 400 mL  Total OUT: 400 mL    Total NET: 17.1 mL

## 2019-06-06 NOTE — CHART NOTE - NSCHARTNOTEFT_GEN_A_CORE
Right upper extremity PICC site undressed under clean conditions, catheter with no resistance, tip intact at 43cm.  Hemostasis achieved with manual pressure.  Site soft with no oozing noted, pressure bandage applied.  Patient tolerated procedure well.

## 2019-06-06 NOTE — PROGRESS NOTE ADULT - ASSESSMENT
A/P 86 yo m from [facility],   hypoxic respiratory failure, aspiration, and now with staph bacteremia. Acute on chronic renal dysfunction, chronic Diastolic CHF , Dysphagia  s/p PEG placed s/p CVA.  pall: pt extubated this morning,  pt fatigued but looks comfortable, called and spoke to pts wife, updated her on pts current condition.  She is unable to visit today. Discussed if pt should need to be intubated again, if she would want that, wife stated yes, intubation ok for now. But she will continue to discuss this with family.  Pt remains  DNR , w/ intubation if needed. A/P 84 yo m from [facility],   hypoxic respiratory failure, aspiration, and now with staph bacteremia. Acute on chronic renal dysfunction, chronic Diastolic CHF , Dysphagia  s/p PEG placed s/p CVA.  pall: pt extubated this morning,  pt fatigued but looks comfortable, called and spoke to pts wife, updated her on pts current condition.  She is unable to visit today. Discussed if pt should need to be intubated again, if she would want that, wife stated yes, intubation ok for now. But she will continue to discuss this with family.  Pt remains  DNR , w/ intubation if needed.    Pt tolerating extubation

## 2019-06-06 NOTE — PROGRESS NOTE ADULT - SUBJECTIVE AND OBJECTIVE BOX
CC: f/u for MSSA bacteremia and pneumonia    REVIEW OF SYSTEMS:  Limited - on high flow O2 support after extubation today. keeps on repeating "dont go away". Gurgling on his secretions. Denies pain. reports no other complaints    Antimicrobials Day #  : 6  ceFAZolin   IVPB 2000 milliGRAM(s) IV Intermittent every 12 hours    Other Medications Reviewed    T(F): 98.3 (06-06-19 @ 08:00), Max: 98.8 (06-05-19 @ 18:00)  HR: 61 (06-06-19 @ 10:00)  BP: 150/78 (06-06-19 @ 10:00)  RR: 20 (06-06-19 @ 10:00)  SpO2: 97% (06-06-19 @ 10:00)  Wt(kg): --    PHYSICAL EXAM:  General: awake & no acute distress  Eyes:  anicteric, no conjunctival injection, no discharge  Oropharynx: high flow O2 support 	  Neck: supple  Lungs: coarse breath sounds, gurgling on oral secretions  Heart: regular rate and rhythm; no murmur, rubs or gallops  Abdomen: soft, nondistended, nontender, without mass or organomegaly  Skin: no lesions  Extremities: no clubbing, cyanosis, or edema  Neurologic: alert, oriented, moves all extremities  : hansen with some sediment in tubing     LAB RESULTS:                        7.2    8.73  )-----------( 157      ( 06 Jun 2019 05:50 )             23.3     06-06    141  |  107  |  37<H>  ----------------------------<  98  4.8   |  27  |  1.45<H>    Ca    10.0      06 Jun 2019 05:50  Phos  3.4     06-05  Mg     1.8     06-05    TPro  6.6  /  Alb  2.0<L>  /  TBili  0.3  /  DBili  x   /  AST  26  /  ALT  20  /  AlkPhos  94  06-05    LIVER FUNCTIONS - ( 05 Jun 2019 05:05 )  Alb: 2.0 g/dL / Pro: 6.6 g/dL / ALK PHOS: 94 U/L / ALT: 20 U/L DA / AST: 26 U/L / GGT: x             MICROBIOLOGY:  RECENT CULTURES:  06-03 @ 12:30 .Blood Blood     No growth to date.      06-02 @ 17:20 .Sputum Sputum Staphylococcus aureus    Moderate Staphylococcus aureus  Normal Respiratory Renetta present    Moderate polymorphonuclear leukocytes per low power field  Few Squamous epithelial cells per low power field  Moderate Gram Variable Coccobacilli  seen per oil power field      RADIOLOGY REVIEWED:

## 2019-06-06 NOTE — PROGRESS NOTE ADULT - SUBJECTIVE AND OBJECTIVE BOX
f/u palliative, pt extubated this morning , now on hi-cris oxygen. Alert, but tired, whispers few words, appears comfortable.      Present Symptoms:   Dyspnea: mild  Nausea/Vomiting: no  Anxiety:  no  Depressed Mood:   Fatigue: yes  Loss of appetite: yes  Pain:    no s/s               location:   Review of Systems:  Unable to obtain due to poor mentation/fatigued    MEDICATIONS  (STANDING):  acetylcysteine 20%  Inhalation 2 milliLiter(s) Inhalation every 6 hours  ALBUTerol/ipratropium for Nebulization. 3 milliLiter(s) Nebulizer every 6 hours  ceFAZolin   IVPB 2000 milliGRAM(s) IV Intermittent every 12 hours  chlorhexidine 0.12% Liquid 15 milliLiter(s) Oral Mucosa two times a day  dorzolamide 2%/timolol 0.5% Ophthalmic Solution 1 Drop(s) Both EYES two times a day  heparin  Injectable 5000 Unit(s) SubCutaneous every 8 hours  latanoprost 0.005% Ophthalmic Solution 1 Drop(s) Both EYES at bedtime  midodrine 2.5 milliGRAM(s) Oral <User Schedule>  pantoprazole   Suspension 40 milliGRAM(s) Oral daily    MEDICATIONS  (PRN):  sodium chloride 0.9% lock flush 10 milliLiter(s) IV Push every 1 hour PRN Pre/post blood products, medications, blood draw, and to maintain line patency      PHYSICAL EXAM:  Vital Signs Last 24 Hrs  T(C): 36.8 (06 Jun 2019 08:00), Max: 37.1 (05 Jun 2019 18:00)  T(F): 98.3 (06 Jun 2019 08:00), Max: 98.8 (05 Jun 2019 18:00)  HR: 61 (06 Jun 2019 10:00) (60 - 68)  BP: 150/78 (06 Jun 2019 10:00) (95/50 - 150/78)  BP(mean): 96 (06 Jun 2019 10:00) (64 - 102)  RR: 20 (06 Jun 2019 10:00) (15 - 24)  SpO2: 97% (06 Jun 2019 10:00) (94% - 99%)  General: alert ,fatigued       HEENT: n/c, a/t, oral mucosa w/ secretions     Lungs: coarse bs, w/few rhonchi upper lobes    CV: s1s2    GI: soft, n/t + bs + peg     : hansen    Musculoskeletal: weak x 4 , edematous    Skin: fragile, mult. senile purpura    Neuro: alert, but fatigued whispers few words only    Oral intake ability:  npo  Diet: peg     LABS:                          7.2    8.73  )-----------( 157      ( 06 Jun 2019 05:50 )             23.3     06-06    141  |  107  |  37<H>  ----------------------------<  98  4.8   |  27  |  1.45<H>    Ca    10.0      06 Jun 2019 05:50  Phos  3.4     06-05  Mg     1.8     06-05    TPro  6.6  /  Alb  2.0<L>  /  TBili  0.3  /  DBili  x   /  AST  26  /  ALT  20  /  AlkPhos  94  06-05        RADIOLOGY & ADDITIONAL STUDIES:< from: Xray Chest 1 View- PORTABLE-Routine (06.05.19 @ 06:41) >  EXAM:  XR CHEST PORTABLE ROUTINE 1V      PROCEDURE DATE:  06/05/2019        INTERPRETATION:  AP erect chest on June 5, 2019 at 6:19 AM. Patient is   being followed for positive lung findings.    On present examination patient's chin obscures the upper lung fields.   Endotracheal tube can be identified behind the chin in grossly normal   position. Left-sided pacemaker and right PICC line remain.    Heart is magnified by technique.    There is an increasing diffuse left-sided infiltrate compared to Amairani 3.    Slight atelectases on the right are stable.    IMPRESSION: Increasing left lung infiltrate.          ADVANCE DIRECTIVES: [facility]  DNR/ Trial intubation ok  Advanced Care Planning discussion total time spent:

## 2019-06-06 NOTE — PROGRESS NOTE ADULT - ASSESSMENT
86 yo M, here with hypoxic respiratory failure, possible aspiration, and MSSA bacteremia  Hx of CKD, CVA, CAD, HTN, DM, GERD, PPM and G tube.  With MSSA isolated from sputum, could implicate primary pneumonia; no clues to alternative focus  No obvious cutaneous portal for bacteremia  Successfully extubated & pressors weaned off  Repeat blood cultures from 6/03/19 so far without growth     PLAN:   Continue Cefazolin directed at MSSA - duration as outlined earlier

## 2019-06-06 NOTE — PROGRESS NOTE ADULT - ASSESSMENT
Physical Exam	  GENERAL:               Intubated and alert, not in distress  HEENT:                   Orally intubated thin ET tube secretions non purulent,   PULM:                     diminished at bilateral bases, intubated with thin white secretions by suction of ET tube, improving course left breath sounds  CVS:                         S1, S2,  No Murmur  ABD:                        Soft, nondistended, nontender, normoactive bowel sounds, + PEG  EXT:                         trace  edema, nontender, No Cyanosis or Clubbing   Vascular:                Warm Extremities, Normal Capillary refill, Normal Distal Pulses.  NEURO:                   arousable to tactile stimulation  PSYC:                      unable to assess    Assessment:  1. Septic shock and Acute hypoxic respiratory failure due to Aspiration PNA - Left with Staph Aureus Bacteremia.         2. Acute on chronic renal dysfunction Baseline Cr 1.3  3. Lactic acidosis Resolved  4. Possible  PEA arrest in GG prior to arrival - "brief period where patient received compressions" as per H&P  4. Chronic Diastolic CHF  8. Dysphagia  s/p PEG s/p CVA     Plan:  Extubate to high flow  post extubation abg  Consider RANDALL next week, based on ID/Cqardio input  Plan to remove PICC line that placed 6/3 will need to be replaced once bacteremia clears.      PIV placemnet  taper midodrine to off  Abx as per ID  Creatinine down trending   Feedings via PEG tube   Cont. Home glaucoma medications  noted anemia, suspect from fluid shifts from albumin, doubt acute bleed, will monitor, transfuse if needed.  blood consent  if need transfusion.     GI/DVT prophylaxis .  50 min of critical care time spent on this patient's care     palliative care F/u  PMD:	Dr. Reyes			                   Notified(Date):  Family Updated: 	Dtr Amelie -  6/5 updated,                                       wife Angie [phone]	              Date: 6/6/19  D/w wife, re intubation ok in short period after extubation till come re evaluate clinical status.         Sedation & Analgesia:	as above  Diet/Nutrition:		tube feeding  GI PPx:		Protonix	    DVT Ppx:	Heparin    Activity:		               Bedrest  Head of Bed:               35-45  Glycemic Control:       n/a      Lines: PIV  CENTRAL LINE: 	[x ] YES [ ] NO	                    LOCATION:   	                       DATE INSERTED:   	                    REMOVE:  [ ] YES [ ] NO  Will need new Access, vs d/c if able to wean off pressors  A-LINE:  	                [ ] YES [ ] NO                      LOCATION:   	                       DATE INSERTED: 		            REMOVE:  [ ] YES [ ] NO    SOTO: 		        [x ] YES [ ] NO  		                                       DATE INSERTED:		            REMOVE:  [ ] YES [x ] NO      Restraints were deemed necessary to prevent removal of life-sustaining devices [ x ] YES   [    ]  NO    Disposition: ICU     Goals of Care: DNR - intubation ok

## 2019-06-07 LAB
ANION GAP SERPL CALC-SCNC: 7 MMOL/L — SIGNIFICANT CHANGE UP (ref 5–17)
BUN SERPL-MCNC: 32 MG/DL — HIGH (ref 7–23)
CALCIUM SERPL-MCNC: 10 MG/DL — SIGNIFICANT CHANGE UP (ref 8.4–10.5)
CHLORIDE SERPL-SCNC: 107 MMOL/L — SIGNIFICANT CHANGE UP (ref 96–108)
CO2 SERPL-SCNC: 27 MMOL/L — SIGNIFICANT CHANGE UP (ref 22–31)
CREAT SERPL-MCNC: 1.42 MG/DL — HIGH (ref 0.5–1.3)
GLUCOSE SERPL-MCNC: 91 MG/DL — SIGNIFICANT CHANGE UP (ref 70–99)
HCT VFR BLD CALC: 24.3 % — LOW (ref 39–50)
HGB BLD-MCNC: 7.5 G/DL — LOW (ref 13–17)
MAGNESIUM SERPL-MCNC: 1.9 MG/DL — SIGNIFICANT CHANGE UP (ref 1.6–2.6)
MCHC RBC-ENTMCNC: 27.9 PG — SIGNIFICANT CHANGE UP (ref 27–34)
MCHC RBC-ENTMCNC: 30.9 GM/DL — LOW (ref 32–36)
MCV RBC AUTO: 90.3 FL — SIGNIFICANT CHANGE UP (ref 80–100)
NRBC # BLD: 0 /100 WBCS — SIGNIFICANT CHANGE UP (ref 0–0)
PHOSPHATE SERPL-MCNC: 3.5 MG/DL — SIGNIFICANT CHANGE UP (ref 2.5–4.5)
PLATELET # BLD AUTO: 203 K/UL — SIGNIFICANT CHANGE UP (ref 150–400)
POTASSIUM SERPL-MCNC: 4.6 MMOL/L — SIGNIFICANT CHANGE UP (ref 3.5–5.3)
POTASSIUM SERPL-SCNC: 4.6 MMOL/L — SIGNIFICANT CHANGE UP (ref 3.5–5.3)
RBC # BLD: 2.69 M/UL — LOW (ref 4.2–5.8)
RBC # FLD: 15.9 % — HIGH (ref 10.3–14.5)
SODIUM SERPL-SCNC: 141 MMOL/L — SIGNIFICANT CHANGE UP (ref 135–145)
WBC # BLD: 8.38 K/UL — SIGNIFICANT CHANGE UP (ref 3.8–10.5)
WBC # FLD AUTO: 8.38 K/UL — SIGNIFICANT CHANGE UP (ref 3.8–10.5)

## 2019-06-07 PROCEDURE — 99233 SBSQ HOSP IP/OBS HIGH 50: CPT

## 2019-06-07 PROCEDURE — 71045 X-RAY EXAM CHEST 1 VIEW: CPT | Mod: 26

## 2019-06-07 RX ORDER — POLYETHYLENE GLYCOL 3350 17 G/17G
17 POWDER, FOR SOLUTION ORAL DAILY
Refills: 0 | Status: DISCONTINUED | OUTPATIENT
Start: 2019-06-07 | End: 2019-06-14

## 2019-06-07 RX ORDER — ACETYLCYSTEINE 200 MG/ML
2 VIAL (ML) MISCELLANEOUS EVERY 6 HOURS
Refills: 0 | Status: COMPLETED | OUTPATIENT
Start: 2019-06-07 | End: 2019-06-08

## 2019-06-07 RX ORDER — FUROSEMIDE 40 MG
40 TABLET ORAL ONCE
Refills: 0 | Status: COMPLETED | OUTPATIENT
Start: 2019-06-07 | End: 2019-06-07

## 2019-06-07 RX ORDER — IPRATROPIUM/ALBUTEROL SULFATE 18-103MCG
3 AEROSOL WITH ADAPTER (GRAM) INHALATION EVERY 6 HOURS
Refills: 0 | Status: DISCONTINUED | OUTPATIENT
Start: 2019-06-07 | End: 2019-06-14

## 2019-06-07 RX ADMIN — HEPARIN SODIUM 5000 UNIT(S): 5000 INJECTION INTRAVENOUS; SUBCUTANEOUS at 21:26

## 2019-06-07 RX ADMIN — Medication 3 MILLILITER(S): at 22:22

## 2019-06-07 RX ADMIN — HEPARIN SODIUM 5000 UNIT(S): 5000 INJECTION INTRAVENOUS; SUBCUTANEOUS at 15:49

## 2019-06-07 RX ADMIN — LATANOPROST 1 DROP(S): 0.05 SOLUTION/ DROPS OPHTHALMIC; TOPICAL at 21:26

## 2019-06-07 RX ADMIN — Medication 100 MILLIGRAM(S): at 17:01

## 2019-06-07 RX ADMIN — HEPARIN SODIUM 5000 UNIT(S): 5000 INJECTION INTRAVENOUS; SUBCUTANEOUS at 06:07

## 2019-06-07 RX ADMIN — Medication 10 MILLIGRAM(S): at 09:43

## 2019-06-07 RX ADMIN — DORZOLAMIDE HYDROCHLORIDE TIMOLOL MALEATE 1 DROP(S): 20; 5 SOLUTION/ DROPS OPHTHALMIC at 17:02

## 2019-06-07 RX ADMIN — POLYETHYLENE GLYCOL 3350 17 GRAM(S): 17 POWDER, FOR SOLUTION ORAL at 09:43

## 2019-06-07 RX ADMIN — Medication 100 MILLIGRAM(S): at 06:40

## 2019-06-07 RX ADMIN — DORZOLAMIDE HYDROCHLORIDE TIMOLOL MALEATE 1 DROP(S): 20; 5 SOLUTION/ DROPS OPHTHALMIC at 06:07

## 2019-06-07 RX ADMIN — Medication 40 MILLIGRAM(S): at 09:43

## 2019-06-07 RX ADMIN — Medication 2 MILLILITER(S): at 03:43

## 2019-06-07 RX ADMIN — Medication 3 MILLILITER(S): at 03:43

## 2019-06-07 RX ADMIN — PANTOPRAZOLE SODIUM 40 MILLIGRAM(S): 20 TABLET, DELAYED RELEASE ORAL at 15:49

## 2019-06-07 NOTE — PROGRESS NOTE ADULT - SUBJECTIVE AND OBJECTIVE BOX
f/u palliative ; no events overnight, pt extubated yesterday , on high flow.     Present Symptoms:   Dyspnea: mild  Nausea/Vomiting: no  Anxiety:  no  Depressed Mood:   Fatigue: yes  Loss of appetite: yes  Pain:      no s/s             location:   Review of Systems:  Unable to obtain due to poor mentation]    MEDICATIONS  (STANDING):  acetylcysteine 20%  Inhalation 2 milliLiter(s) Inhalation every 6 hours  ALBUTerol/ipratropium for Nebulization 3 milliLiter(s) Nebulizer every 6 hours  bisacodyl Suppository 10 milliGRAM(s) Rectal daily  ceFAZolin   IVPB 2000 milliGRAM(s) IV Intermittent every 12 hours  dorzolamide 2%/timolol 0.5% Ophthalmic Solution 1 Drop(s) Both EYES two times a day  heparin  Injectable 5000 Unit(s) SubCutaneous every 8 hours  latanoprost 0.005% Ophthalmic Solution 1 Drop(s) Both EYES at bedtime  pantoprazole   Suspension 40 milliGRAM(s) Oral daily  polyethylene glycol 3350 17 Gram(s) Oral daily    MEDICATIONS  (PRN):      PHYSICAL EXAM:  Vital Signs Last 24 Hrs  T(C): 36.7 (07 Jun 2019 07:00), Max: 37.1 (06 Jun 2019 22:00)  T(F): 98.1 (07 Jun 2019 07:00), Max: 98.7 (06 Jun 2019 22:00)  HR: 60 (07 Jun 2019 12:00) (60 - 64)  BP: 129/63 (07 Jun 2019 11:00) (105/77 - 165/65)  BP(mean): 84 (07 Jun 2019 11:00) (72 - 115)  RR: 13 (07 Jun 2019 12:00) (10 - 21)  SpO2: 95% (07 Jun 2019 12:00) (95% - 100%)  General: alert , speaks few words        HEENT: n/c, a/t       Lungs: coarse bs w/ rhonchi upper lobes   CV: s1s2    GI: soft, n/t + peg    : hansen    Musculoskeletal: weak x 4  , edema    Skin: warm dry fragile     Neuro: alert, speech garbled    Oral intake ability:  npo  Diet: peg feeds     LABS:                          7.5    8.38  )-----------( 203      ( 07 Jun 2019 04:40 )             24.3     06-07    141  |  107  |  32<H>  ----------------------------<  91  4.6   |  27  |  1.42<H>    Ca    10.0      07 Jun 2019 04:40  Phos  3.5     06-07  Mg     1.9     06-07          RADIOLOGY & ADDITIONAL STUDIES:< from: Xray Chest 1 View- PORTABLE-Routine (06.07.19 @ 06:20) >  EXAM:  XR CHEST PORTABLE ROUTINE 1V      PROCEDURE DATE:  06/07/2019        INTERPRETATION:  Single view chest    Comparison 2 days prior    History sepsis, pneumonia    Examination is somewhat limited by patient position. A pacemaker is   noted. Moderately extensive pulmonary opacity is noted in the left lung,   marginally worse with mild changes on the right, stable. There is no   layering effusion or cavitation or pneumothorax.          ADVANCE DIRECTIVES: [facility]  DNR/ Trial intubation   Advanced Care Planning discussion total time spent:

## 2019-06-07 NOTE — PROGRESS NOTE ADULT - ASSESSMENT
A/P 84 yo m from [facility],  W hypoxic respiratory failure, aspiration, and now with staph bacteremia. Acute on chronic renal dysfunction, chronic Diastolic CHF ,   Dysphagia  s/p PEG placed s/p CVA.     pall: pt extubated yesterday, remains on high flow o2. Looks comfortable on exam , alert but speech garbled.  Tolerating extubation .   constipation : miralax added ,  monitor for results     Goc:  Pt remains  DNR , w/ intubation if needed. A/P 84 yo m from [facility],  W hypoxic respiratory failure, aspiration, and now with staph bacteremia. Acute on chronic renal dysfunction, chronic Diastolic CHF ,   Dysphagia  s/p PEG placed s/p CVA.     pall: pt extubated yesterday, remains on high flow o2. Looks comfortable on exam , alert but speech garbled.  Tolerating extubation .   constipation : miralax added ,  monitor for results     Goc:  Pt remains  DNR , w/ intubation if needed.  Wife aware of over all poor prognosis

## 2019-06-07 NOTE — PROGRESS NOTE ADULT - SUBJECTIVE AND OBJECTIVE BOX
CC: f/u for MSSA bacteremia and pneumonia    Pt resting comfortably, remains on high flow, denies pain, confused    REVIEW OF SYSTEMS:  Limited - confusion    Antimicrobials Day # 7  ceFAZolin   IVPB 2000 milliGRAM(s) IV Intermittent every 12 hours    Other Medications Reviewed    Vital Signs Last 24 Hrs  T(F): 98.1 (07 Jun 2019 07:00), Max: 98.7 (06 Jun 2019 22:00)  HR: 60 (07 Jun 2019 12:00) (60 - 64)  BP: 128/68 (07 Jun 2019 12:00) (105/77 - 165/65)  BP(mean): 87 (07 Jun 2019 12:00) (72 - 115)  RR: 13 (07 Jun 2019 12:00) (10 - 21)  SpO2: 98% (07 Jun 2019 13:26) (95% - 100%)    PHYSICAL EXAM:  General: no acute distress  Eyes:  anicteric, no conjunctival injection, no discharge  Oropharynx: high flow O2 support 	  Neck: supple  Lungs: coarse breath sounds  Heart: regular rate and rhythm; no murmur, rubs or gallops  Abdomen: soft, nondistended, nontender, G tube site clean  Nix  Skin: no lesions  Extremities: no edema.  R PICC site clean  Neurologic: alert, moves all extremities    LAB RESULTS:                        7.5    8.38  )-----------( 203      ( 07 Jun 2019 04:40 )             24.3   06-07    141  |  107  |  32<H>  ----------------------------<  91  4.6   |  27  |  1.42<H>    Ca    10.0      07 Jun 2019 04:40  Phos  3.5     06-07  Mg     1.9     06-07            MICROBIOLOGY:  RECENT CULTURES:  06-03 @ 12:30 .Blood Blood     No growth to date.    06-02 @ 17:20 .Sputum Sputum Staphylococcus aureus    Moderate Staphylococcus aureus  Normal Respiratory Renetta present    RADIOLOGY REVIEWED:  Xray Chest 1 View- PORTABLE-Routine (06.07.19 @ 06:20) >  Moderately extensive pulmonary opacity is noted in the left lung,   marginally worse with mild changes on the right, stable. There is no   layering effusion or cavitation or pneumothorax.

## 2019-06-07 NOTE — PROGRESS NOTE ADULT - ASSESSMENT
84 yo M, here with hypoxic respiratory failure, possible aspiration, and MSSA bacteremia  Hx of CKD, CVA, CAD, HTN, DM, GERD, PPM and G tube.  With MSSA isolated from sputum, erinn primary pneumonia (L opacity); no clues to alternative focus  No obvious cutaneous portal for bacteremia  Successfully extubated & pressors weaned off  Repeat blood cultures 6/03/19 so far without growth   Afebrile, normal WBC    PLAN:   Continue Cefazolin directed at MSSA - favor 2 wk total IV, followed by 2 wks po  Follow temps and CBC/diff   Weaning O2 as tolerated

## 2019-06-07 NOTE — CHART NOTE - NSCHARTNOTEFT_GEN_A_CORE
Nutrition Follow Up Note  Hospital Course (Per Electronic Medical Record):   Source: Medical Record [X] Patient [X] Family [X] Nursing Staff [X]     Diet: Jevity 1.5 @ 75cc/hr x 18hrs    Patient extubated yesterday , receiving PEG feeds of Jevity 1.5 @ 75cc/hr x 18hrs , tolerating current regimen. Patient noted with hypoactive bowel sounds no BM noted since admission, Spoke with PA regarding same. No bowel meds noted. Patient noted received po diet plus PEG feeds PTA at nursing facility, may consider SLP eval to assess adding po diet to current nutrition regimen. Current PEG feeds is meeting patient's assessed nutrition needs.    Current Weight: (6/7) 188.7/85.6kg, stable weight since admission.  No edema noted.    Pertinent Medications: MEDICATIONS  (STANDING):  ceFAZolin   IVPB 2000 milliGRAM(s) IV Intermittent every 12 hours  dorzolamide 2%/timolol 0.5% Ophthalmic Solution 1 Drop(s) Both EYES two times a day  heparin  Injectable 5000 Unit(s) SubCutaneous every 8 hours  latanoprost 0.005% Ophthalmic Solution 1 Drop(s) Both EYES at bedtime  pantoprazole   Suspension 40 milliGRAM(s) Oral daily    MEDICATIONS  (PRN):      Pertinent Labs:  06-07 Na141 mmol/L Glu 91 mg/dL K+ 4.6 mmol/L Cr  1.42 mg/dL<H> BUN 32 mg/dL<H> 06-07 Phos 3.5 mg/dL 06-05 Alb 2.0 g/dL<L>        Skin: Intact    Edema: none    Last BM: PTA     Estimated Needs:   [X] No Change since Previous Assessment  [X] Recalculated:     Previous Nutrition Diagnosis: Severe Malnutrition    Nutrition Diagnosis is [X] Ongoing         New Nutrition Diagnosis: [X] Not Applicable    Interventions:   1. Recommend SLP eval   2. Continue current PEG feed regimen.    Monitoring & Evaluation: will monitor:  [X] Weights   [X] Follow Up (Per Protocol)  [X] Tolerance to PEG feeding      RD to follow as per Nutrition protocol  Renetta Silva RDN

## 2019-06-07 NOTE — PROGRESS NOTE ADULT - SUBJECTIVE AND OBJECTIVE BOX
Follow-up Critical Care Progress Note  Chief Complaint : Sepsis      pt extubated yesterday  continues to have increased oral secretions  no cp, sob, palp    no BM + constipation      Allergies :No Known Allergies      PAST MEDICAL & SURGICAL HISTORY:  Shortness of breath  Muscle weakness  Depression  IBS (irritable bowel syndrome)  Dysphagia  Cerebral infarction  Anemia  Psoriasis  GERD (gastroesophageal reflux disease)  Dyslipidemia  Cardiac pacemaker  S/P percutaneous endoscopic gastrostomy (PEG) tube placement      Medications:  MEDICATIONS  (STANDING):  bisacodyl Suppository 10 milliGRAM(s) Rectal daily  ceFAZolin   IVPB 2000 milliGRAM(s) IV Intermittent every 12 hours  dorzolamide 2%/timolol 0.5% Ophthalmic Solution 1 Drop(s) Both EYES two times a day  heparin  Injectable 5000 Unit(s) SubCutaneous every 8 hours  latanoprost 0.005% Ophthalmic Solution 1 Drop(s) Both EYES at bedtime  pantoprazole   Suspension 40 milliGRAM(s) Oral daily  polyethylene glycol 3350 17 Gram(s) Oral daily    MEDICATIONS  (PRN):      LABS:                        7.5    8.38  )-----------( 203      ( 07 Jun 2019 04:40 )             24.3     06-07    141  |  107  |  32<H>  ----------------------------<  91  4.6   |  27  |  1.42<H>    Ca    10.0      07 Jun 2019 04:40  Phos  3.5     06-07  Mg     1.9     06-07                          CULTURES: (if applicable)    Culture - Blood (collected 06-03-19 @ 12:30)  Source: .Blood Blood  Preliminary Report (06-04-19 @ 20:01):    No growth to date.    Culture - Blood (collected 06-03-19 @ 12:30)  Source: .Blood Blood  Preliminary Report (06-04-19 @ 20:01):    No growth to date.    Culture - Sputum (collected 06-02-19 @ 17:20)  Source: .Sputum Sputum  Gram Stain (06-03-19 @ 06:26):    Moderate polymorphonuclear leukocytes per low power field    Few Squamous epithelial cells per low power field    Moderate Gram Variable Coccobacilli  seen per oil power field  Final Report (06-04-19 @ 16:40):    Moderate Staphylococcus aureus    Normal Respiratory Renetta present  Organism: Staphylococcus aureus (06-04-19 @ 16:40)  Organism: Staphylococcus aureus (06-04-19 @ 16:40)      -  Ampicillin/Sulbactam: S <=8/4      -  Cefazolin: S <=4      -  Clindamycin: S <=0.25      -  Erythromycin: R >4      -  Gentamicin: S 2 Should not be used as monotherapy      -  Oxacillin: S 1      -  Penicillin: R >8      -  RIF- Rifampin: S <=1 Should not be used as monotherapy      -  Tetra/Doxy: S <=1      -  Trimethoprim/Sulfamethoxazole: S <=0.5/9.5      -  Vancomycin: S 2      Method Type: KALYAN    Culture - Urine (collected 06-01-19 @ 11:16)  Source: .Urine Clean Catch (Midstream)  Final Report (06-03-19 @ 14:55):    >100,000 CFU/ml Presumptive Candida albicans    Culture - Blood (collected 06-01-19 @ 11:10)  Source: .Blood Blood-Peripheral  Gram Stain (06-02-19 @ 15:04):    Growth in aerobic bottle: Gram Positive Cocci in Clusters    Growth in anaerobic bottle: Gram Positive Cocci in Clusters  Final Report (06-04-19 @ 08:28):    Growth in aerobic and anaerobic bottles: Staphylococcus aureus    "Due to technical problems, Proteus sp. will Not be reported as part of    the BCID panel until further notice"    ***Blood Panel PCR results on this specimen are available    approximately 3 hours after the Gram stain result.***    Gram stain, PCR, and/or culture results may not always    correspond due to difference in methodologies.    ************************************************************    This PCR assay was performed using [employer].    The following targets are tested for: Enterococcus,    vancomycin resistant enterococci, Listeria monocytogenes,    coagulase negative staphylococci, S. aureus,    methicillin resistant S. aureus, Streptococcus agalactiae    (Group B), S. pneumoniae, S. pyogenes (Group A),    Acinetobacter baumannii, Enterobacter cloacae, E. coli,    Klebsiella oxytoca, K. pneumoniae, Proteus sp.,    Serratia marcescens, Haemophilus influenzae,    Neisseria meningitidis, Pseudomonas aeruginosa, Candida    albicans, C. glabrata, C krusei, C parapsilosis,    C. tropicalis and the KPC resistance gene.  Organism: Blood Culture PCR  Staphylococcus aureus (06-04-19 @ 08:28)  Organism: Staphylococcus aureus (06-04-19 @ 08:28)      -  Ampicillin/Sulbactam: S <=8/4      -  Cefazolin: S <=4      -  Clindamycin: S 0.5      -  Erythromycin: R >4      -  Gentamicin: S <=1 Should not be used as monotherapy      -  Oxacillin: S 1      -  Penicillin: R >8      -  RIF- Rifampin: S <=1 Should not be used as monotherapy      -  Tetra/Doxy: S <=1      -  Trimethoprim/Sulfamethoxazole: S <=0.5/9.5      -  Vancomycin: S 2      Method Type: KALYAN  Organism: Blood Culture PCR (06-04-19 @ 08:28)      -  Staphylococcus aureus: Detec Any isolate of Staphylococcus aureus from a blood culture is NOT considered a contaminant.      Method Type: PCR    Culture - Blood (collected 06-01-19 @ 11:05)  Source: .Blood Blood-Peripheral  Gram Stain (06-02-19 @ 12:46):    Growth in anaerobic bottle: Gram Positive Cocci in Clusters    Growth in aerobic bottle: Gram Positive Cocci in Clusters  Final Report (06-04-19 @ 08:28):    Growth in aerobic and anaerobic bottles: Staphylococcus aureus    See previous culture [id number]      Rapid RVP Result: NotDetec (06-01-19 @ 16:48)      ABG - ( 06 Jun 2019 13:35 )  pH, Arterial: 7.37  pH, Blood: x     /  pCO2: 44    /  pO2: 106   / HCO3: x     / Base Excess: x     /  SaO2: 98                CAPILLARY BLOOD GLUCOSE          RADIOLOGY  CXR:    < from: Xray Chest 1 View- PORTABLE-Routine (06.07.19 @ 06:20) >    Examination is somewhat limited by patient position. A pacemaker is   noted. Moderately extensive pulmonary opacity is noted in the left lung,   marginally worse with mild changes on the right, stable. There is no   layering effusion or cavitation or pneumothorax.      < end of copied text >        VITALS:  T(C): 36.7 (06-07-19 @ 07:00), Max: 37.1 (06-06-19 @ 22:00)  T(F): 98.1 (06-07-19 @ 07:00), Max: 98.7 (06-06-19 @ 22:00)  HR: 60 (06-07-19 @ 09:00) (60 - 64)  BP: 114/59 (06-07-19 @ 09:00) (108/92 - 165/65)  BP(mean): 76 (06-07-19 @ 09:00) (72 - 115)  ABP: --  ABP(mean): --  RR: 18 (06-07-19 @ 09:00) (10 - 21)  SpO2: 97% (06-07-19 @ 09:00) (96% - 100%)  CVP(mm Hg): --  CVP(cm H2O): --    Ins and Outs     06-06-19 @ 07:01  -  06-07-19 @ 07:00  --------------------------------------------------------  IN: 200 mL / OUT: 1220 mL / NET: -1020 mL    06-07-19 @ 07:01  -  06-07-19 @ 10:09  --------------------------------------------------------  IN: 150 mL / OUT: 75 mL / NET: 75 mL                I&O's Detail    06 Jun 2019 07:01  -  07 Jun 2019 07:00  --------------------------------------------------------  IN:    ns in tub fed  [id number]: 150 mL    Solution: 50 mL  Total IN: 200 mL    OUT:    Indwelling Catheter - Urethral: 1220 mL  Total OUT: 1220 mL    Total NET: -1020 mL      07 Jun 2019 07:01  -  07 Jun 2019 10:09  --------------------------------------------------------  IN:    ns in tub fed  [id number]: 150 mL  Total IN: 150 mL    OUT:    Indwelling Catheter - Urethral: 75 mL  Total OUT: 75 mL    Total NET: 75 mL

## 2019-06-07 NOTE — PROGRESS NOTE ADULT - ASSESSMENT
Physical Exam	  GENERAL:               alert, not in distress  HEENT:                  moist MM, no jvd,   PULM:                     diminished at bilateral bases, intubated with thin white secretions by suction of ET tube, improving course left breath sounds  CVS:                         S1, S2,  No Murmur  ABD:                        Soft, nondistended, nontender, normoactive bowel sounds, + PEG  EXT:                         +edema, nontender, No Cyanosis or Clubbing   NEURO:                  alert responsive follows commands  PSYC:                      calm no insight    Assessment:  1. Septic shock (resolved) and Acute hypoxic respiratory failure (resolved- extubated 6/7) due to Aspiration PNA - Left with Staph Aureus Bacteremia.   2. Acute on chronic renal dysfunction Baseline Cr 1.3  3. Lactic acidosis Resolved  4. Possible  PEA arrest in GG prior to arrival - "brief period where patient received compressions" as per H&P  4. Chronic Diastolic CHF  8. Dysphagia  s/p PEG s/p CVA     Plan:  Continue High flow  Chest PT/Pulm toilet/Mucomyst/Duonebs  post extubation abg  Consider RANDALL next week, based on ID/Cqardio input  taper midodrine to off  will give lasix x 1  Abx as per ID  Feedings via PEG tube   Cont. Home glaucoma medications  noted anemia, suspect from fluid shifts from albumin, doubt acute bleed, will monitor, transfuse if needed.  blood consent  if need transfusion.     GI/DVT prophylaxis .  50 min of critical care time spent on this patient's care     palliative care F/u  PMD:	Dr. Reyes			                   Notified(Date):  Family Updated: 	Dtr Amelie -  6/7 updated bedside                                      wife Angie [phone]	              Date: 6/7/19  D/w wife, re intubation ok in short period after extubation till come re evaluate clinical status.         Sedation & Analgesia:	as above  Diet/Nutrition:		tube feeding  GI PPx:		Protonix	    DVT Ppx:	Heparin    Activity:		               Bedrest  Head of Bed:               35-45  Glycemic Control:       n/a      Lines: PIV  CENTRAL LINE: 	[ ] YES [x ] NO	                    LOCATION:   	                       DATE INSERTED:   	                    REMOVE:  [ ] YES [ ]   A-LINE:  	                [ ] YES [ ] NO                      LOCATION:   	                       DATE INSERTED: 		            REMOVE:  [ ] YES [ ] NO    SOTO: 		        [x ] YES [ ] NO  		                                       DATE INSERTED:		            REMOVE:  [x ] YES [ ] NO      Restraints were deemed necessary to prevent removal of life-sustaining devices [ x ] YES   [    ]  NO    Disposition: ICU, for advanced cardiac care.     Goals of Care: DNR - intubation ok

## 2019-06-08 LAB
ANION GAP SERPL CALC-SCNC: 7 MMOL/L — SIGNIFICANT CHANGE UP (ref 5–17)
BUN SERPL-MCNC: 32 MG/DL — HIGH (ref 7–23)
CALCIUM SERPL-MCNC: 10 MG/DL — SIGNIFICANT CHANGE UP (ref 8.4–10.5)
CHLORIDE SERPL-SCNC: 107 MMOL/L — SIGNIFICANT CHANGE UP (ref 96–108)
CO2 SERPL-SCNC: 31 MMOL/L — SIGNIFICANT CHANGE UP (ref 22–31)
CREAT SERPL-MCNC: 1.41 MG/DL — HIGH (ref 0.5–1.3)
CULTURE RESULTS: SIGNIFICANT CHANGE UP
CULTURE RESULTS: SIGNIFICANT CHANGE UP
GLUCOSE SERPL-MCNC: 101 MG/DL — HIGH (ref 70–99)
HCT VFR BLD CALC: 26 % — LOW (ref 39–50)
HGB BLD-MCNC: 8 G/DL — LOW (ref 13–17)
MAGNESIUM SERPL-MCNC: 1.9 MG/DL — SIGNIFICANT CHANGE UP (ref 1.6–2.6)
MCHC RBC-ENTMCNC: 28.3 PG — SIGNIFICANT CHANGE UP (ref 27–34)
MCHC RBC-ENTMCNC: 30.8 GM/DL — LOW (ref 32–36)
MCV RBC AUTO: 91.9 FL — SIGNIFICANT CHANGE UP (ref 80–100)
NRBC # BLD: 0 /100 WBCS — SIGNIFICANT CHANGE UP (ref 0–0)
PHOSPHATE SERPL-MCNC: 3 MG/DL — SIGNIFICANT CHANGE UP (ref 2.5–4.5)
PLATELET # BLD AUTO: 274 K/UL — SIGNIFICANT CHANGE UP (ref 150–400)
POTASSIUM SERPL-MCNC: 4.1 MMOL/L — SIGNIFICANT CHANGE UP (ref 3.5–5.3)
POTASSIUM SERPL-SCNC: 4.1 MMOL/L — SIGNIFICANT CHANGE UP (ref 3.5–5.3)
RBC # BLD: 2.83 M/UL — LOW (ref 4.2–5.8)
RBC # FLD: 15.7 % — HIGH (ref 10.3–14.5)
SODIUM SERPL-SCNC: 145 MMOL/L — SIGNIFICANT CHANGE UP (ref 135–145)
SPECIMEN SOURCE: SIGNIFICANT CHANGE UP
SPECIMEN SOURCE: SIGNIFICANT CHANGE UP
WBC # BLD: 9.61 K/UL — SIGNIFICANT CHANGE UP (ref 3.8–10.5)
WBC # FLD AUTO: 9.61 K/UL — SIGNIFICANT CHANGE UP (ref 3.8–10.5)

## 2019-06-08 PROCEDURE — 71045 X-RAY EXAM CHEST 1 VIEW: CPT | Mod: 26

## 2019-06-08 RX ORDER — FUROSEMIDE 40 MG
40 TABLET ORAL ONCE
Refills: 0 | Status: COMPLETED | OUTPATIENT
Start: 2019-06-08 | End: 2019-06-08

## 2019-06-08 RX ADMIN — HEPARIN SODIUM 5000 UNIT(S): 5000 INJECTION INTRAVENOUS; SUBCUTANEOUS at 21:49

## 2019-06-08 RX ADMIN — Medication 3 MILLILITER(S): at 15:31

## 2019-06-08 RX ADMIN — LATANOPROST 1 DROP(S): 0.05 SOLUTION/ DROPS OPHTHALMIC; TOPICAL at 21:49

## 2019-06-08 RX ADMIN — Medication 3 MILLILITER(S): at 22:34

## 2019-06-08 RX ADMIN — HEPARIN SODIUM 5000 UNIT(S): 5000 INJECTION INTRAVENOUS; SUBCUTANEOUS at 05:09

## 2019-06-08 RX ADMIN — Medication 100 MILLIGRAM(S): at 17:25

## 2019-06-08 RX ADMIN — Medication 40 MILLIGRAM(S): at 08:36

## 2019-06-08 RX ADMIN — Medication 3 MILLILITER(S): at 09:00

## 2019-06-08 RX ADMIN — HEPARIN SODIUM 5000 UNIT(S): 5000 INJECTION INTRAVENOUS; SUBCUTANEOUS at 13:58

## 2019-06-08 RX ADMIN — Medication 3 MILLILITER(S): at 04:51

## 2019-06-08 RX ADMIN — POLYETHYLENE GLYCOL 3350 17 GRAM(S): 17 POWDER, FOR SOLUTION ORAL at 11:22

## 2019-06-08 RX ADMIN — Medication 100 MILLIGRAM(S): at 05:09

## 2019-06-08 RX ADMIN — DORZOLAMIDE HYDROCHLORIDE TIMOLOL MALEATE 1 DROP(S): 20; 5 SOLUTION/ DROPS OPHTHALMIC at 05:09

## 2019-06-08 RX ADMIN — PANTOPRAZOLE SODIUM 40 MILLIGRAM(S): 20 TABLET, DELAYED RELEASE ORAL at 11:22

## 2019-06-08 RX ADMIN — DORZOLAMIDE HYDROCHLORIDE TIMOLOL MALEATE 1 DROP(S): 20; 5 SOLUTION/ DROPS OPHTHALMIC at 17:28

## 2019-06-08 NOTE — PROGRESS NOTE ADULT - ASSESSMENT
Physical Exam	  GENERAL:               alert, not in distress, verbal, confused  HEENT:                  moist MM, no jvd,   PULM:                     diminished at bilateral bases, intubated with thin white secretions by suction of ET tube, improving course left breath sounds  CVS:                         S1, S2,  No Murmur  ABD:                        Soft, nondistended, nontender, normoactive bowel sounds, + PEG  EXT:                         mild edema, nontender, No Cyanosis or Clubbing   NEURO:                  alert responsive follows commands, confused   PSYC:                      calm no insight    Assessment:  1. Septic shock (resolved) and Acute hypoxic respiratory failure (resolved- extubated 6/7) due to Aspiration PNA - Left with Staph Aureus Bacteremia.   2. Acute on chronic renal dysfunction Baseline Cr 1.3  3. Lactic acidosis Resolved  4. Possible  PEA arrest in GG prior to arrival - "brief period where patient received compressions" as per H&P  4. Chronic Diastolic CHF  8. Dysphagia  s/p PEG s/p CVA     Plan:  Continue High flow  Chest PT/Pulm toilet/Duonebs  post extubation abg  Consider RANDALL next week, based on ID/Cqardio input  will give lasix x 1 again   Abx as per ID  repeat cultures in am.  Feedings via PEG tube   Cont. Home glaucoma medications  noted anemia, suspect from fluid shifts from albumin, doubt acute bleed, will monitor, transfuse if needed.  blood consent  if need transfusion.     GI/DVT prophylaxis .    palliative care F/u  PMD:	Dr. Reyes			                   Notified(Date):  Family Updated: 	Dtr Amelie -  6/7 updated bedside                                      wife Angie [phone]	              Date: 6/8/19        Sedation & Analgesia:	as above  Diet/Nutrition:		tube feeding  GI PPx:		Protonix	    DVT Ppx:	Heparin    Activity:		               Bedrest  Head of Bed:               35-45  Glycemic Control:       n/a      Lines: PIV      Restraints were deemed necessary to prevent removal of life-sustaining devices [  ] YES   [ x   ]  NO    Disposition: ICU, for advanced cardiac care.     Goals of Care: DNR - intubation ok  reconfirmed 6/8/19

## 2019-06-08 NOTE — PROGRESS NOTE ADULT - SUBJECTIVE AND OBJECTIVE BOX
CC: f/u for MSSA bacteremia and pneumonia    Pt resting comfortably, confused as baseline, now on NC    REVIEW OF SYSTEMS:  Limited - confusion    Antimicrobials Day # 8  ceFAZolin   IVPB 2000 milliGRAM(s) IV Intermittent every 12 hours    Other Medications Reviewed    Vital Signs Last 24 Hrs  T(F): 97.5 (08 Jun 2019 15:00), Max: 98.2 (08 Jun 2019 00:00)  HR: 60 (08 Jun 2019 18:00) (60 - 62)  BP: 124/62 (08 Jun 2019 18:00) (101/63 - 157/70)  BP(mean): 81 (08 Jun 2019 18:00) (67 - 109)  RR: 19 (08 Jun 2019 18:00) (11 - 22)  SpO2: 94% (08 Jun 2019 18:00) (72% - 100%)    PHYSICAL EXAM:  General: no acute distress  Eyes:  anicteric, no conjunctival injection, no discharge  Oropharynx: high flow O2 support 	  Neck: supple  Lungs: coarse breath sounds  Heart: regular rate and rhythm; no murmur, rubs or gallops  Abdomen: soft, nondistended, nontender, G tube site clean  Nix  Skin: no lesions  Extremities: no edema.  R PICC site clean  Neurologic: alert, moves all extremities    LAB RESULTS:                        7.5    8.38  )-----------( 203      ( 07 Jun 2019 04:40 )             24.3   06-07    141  |  107  |  32<H>  ----------------------------<  91  4.6   |  27  |  1.42<H>    Ca    10.0      07 Jun 2019 04:40  Phos  3.5     06-07  Mg     1.9     06-07            MICROBIOLOGY:  RECENT CULTURES:  06-03 @ 12:30 .Blood Blood     No growth to date.    06-02 @ 17:20 .Sputum Sputum Staphylococcus aureus    Moderate Staphylococcus aureus  Normal Respiratory Renetta present    RADIOLOGY REVIEWED:  Xray Chest 1 View- PORTABLE-Routine (06.07.19 @ 06:20) >  Moderately extensive pulmonary opacity is noted in the left lung,   marginally worse with mild changes on the right, stable. There is no   layering effusion or cavitation or pneumothorax.

## 2019-06-08 NOTE — PROGRESS NOTE ADULT - SUBJECTIVE AND OBJECTIVE BOX
Follow-up Critical Care Progress Note  Chief Complaint : Sepsis      pt alert, verbal, slowly improving  secretions continue require frequent suctioning          Allergies :No Known Allergies      PAST MEDICAL & SURGICAL HISTORY:  Shortness of breath  Muscle weakness  Depression  IBS (irritable bowel syndrome)  Dysphagia  Cerebral infarction  Anemia  Psoriasis  GERD (gastroesophageal reflux disease)  Dyslipidemia  Cardiac pacemaker  S/P percutaneous endoscopic gastrostomy (PEG) tube placement      Medications:  MEDICATIONS  (STANDING):  ALBUTerol/ipratropium for Nebulization 3 milliLiter(s) Nebulizer every 6 hours  bisacodyl Suppository 10 milliGRAM(s) Rectal daily  ceFAZolin   IVPB 2000 milliGRAM(s) IV Intermittent every 12 hours  dorzolamide 2%/timolol 0.5% Ophthalmic Solution 1 Drop(s) Both EYES two times a day  heparin  Injectable 5000 Unit(s) SubCutaneous every 8 hours  latanoprost 0.005% Ophthalmic Solution 1 Drop(s) Both EYES at bedtime  pantoprazole   Suspension 40 milliGRAM(s) Oral daily  polyethylene glycol 3350 17 Gram(s) Oral daily    MEDICATIONS  (PRN):      LABS:                        8.0    9.61  )-----------( 274      ( 08 Jun 2019 05:00 )             26.0     06-08    145  |  107  |  32<H>  ----------------------------<  101<H>  4.1   |  31  |  1.41<H>    Ca    10.0      08 Jun 2019 05:00  Phos  3.0     06-08  Mg     1.9     06-08                          CULTURES: (if applicable)    Culture - Blood (collected 06-03-19 @ 12:30)  Source: .Blood Blood  Preliminary Report (06-04-19 @ 20:01):    No growth to date.    Culture - Blood (collected 06-03-19 @ 12:30)  Source: .Blood Blood  Preliminary Report (06-04-19 @ 20:01):    No growth to date.    Culture - Sputum (collected 06-02-19 @ 17:20)  Source: .Sputum Sputum  Gram Stain (06-03-19 @ 06:26):    Moderate polymorphonuclear leukocytes per low power field    Few Squamous epithelial cells per low power field    Moderate Gram Variable Coccobacilli  seen per oil power field  Final Report (06-04-19 @ 16:40):    Moderate Staphylococcus aureus    Normal Respiratory Renetta present  Organism: Staphylococcus aureus (06-04-19 @ 16:40)  Organism: Staphylococcus aureus (06-04-19 @ 16:40)      -  Ampicillin/Sulbactam: S <=8/4      -  Cefazolin: S <=4      -  Clindamycin: S <=0.25      -  Erythromycin: R >4      -  Gentamicin: S 2 Should not be used as monotherapy      -  Oxacillin: S 1      -  Penicillin: R >8      -  RIF- Rifampin: S <=1 Should not be used as monotherapy      -  Tetra/Doxy: S <=1      -  Trimethoprim/Sulfamethoxazole: S <=0.5/9.5      -  Vancomycin: S 2      Method Type: KALYAN    Culture - Urine (collected 06-01-19 @ 11:16)  Source: .Urine Clean Catch (Midstream)  Final Report (06-03-19 @ 14:55):    >100,000 CFU/ml Presumptive Candida albicans    Culture - Blood (collected 06-01-19 @ 11:10)  Source: .Blood Blood-Peripheral  Gram Stain (06-02-19 @ 15:04):    Growth in aerobic bottle: Gram Positive Cocci in Clusters    Growth in anaerobic bottle: Gram Positive Cocci in Clusters  Final Report (06-04-19 @ 08:28):    Growth in aerobic and anaerobic bottles: Staphylococcus aureus    "Due to technical problems, Proteus sp. will Not be reported as part of    the BCID panel until further notice"    ***Blood Panel PCR results on this specimen are available    approximately 3 hours after the Gram stain result.***    Gram stain, PCR, and/or culture results may not always    correspond due to difference in methodologies.    ************************************************************    This PCR assay was performed using [employer].    The following targets are tested for: Enterococcus,    vancomycin resistant enterococci, Listeria monocytogenes,    coagulase negative staphylococci, S. aureus,    methicillin resistant S. aureus, Streptococcus agalactiae    (Group B), S. pneumoniae, S. pyogenes (Group A),    Acinetobacter baumannii, Enterobacter cloacae, E. coli,    Klebsiella oxytoca, K. pneumoniae, Proteus sp.,    Serratia marcescens, Haemophilus influenzae,    Neisseria meningitidis, Pseudomonas aeruginosa, Candida    albicans, C. glabrata, C krusei, C parapsilosis,    C. tropicalis and the KPC resistance gene.  Organism: Blood Culture PCR  Staphylococcus aureus (06-04-19 @ 08:28)  Organism: Staphylococcus aureus (06-04-19 @ 08:28)      -  Ampicillin/Sulbactam: S <=8/4      -  Cefazolin: S <=4      -  Clindamycin: S 0.5      -  Erythromycin: R >4      -  Gentamicin: S <=1 Should not be used as monotherapy      -  Oxacillin: S 1      -  Penicillin: R >8      -  RIF- Rifampin: S <=1 Should not be used as monotherapy      -  Tetra/Doxy: S <=1      -  Trimethoprim/Sulfamethoxazole: S <=0.5/9.5      -  Vancomycin: S 2      Method Type: KALYAN  Organism: Blood Culture PCR (06-04-19 @ 08:28)      -  Staphylococcus aureus: Detec Any isolate of Staphylococcus aureus from a blood culture is NOT considered a contaminant.      Method Type: PCR    Culture - Blood (collected 06-01-19 @ 11:05)  Source: .Blood Blood-Peripheral  Gram Stain (06-02-19 @ 12:46):    Growth in anaerobic bottle: Gram Positive Cocci in Clusters    Growth in aerobic bottle: Gram Positive Cocci in Clusters  Final Report (06-04-19 @ 08:28):    Growth in aerobic and anaerobic bottles: Staphylococcus aureus    See previous culture [id number]      Rapid RVP Result: NotDetec (06-01-19 @ 16:48)      ABG - ( 06 Jun 2019 13:35 )  pH, Arterial: 7.37  pH, Blood: x     /  pCO2: 44    /  pO2: 106   / HCO3: x     / Base Excess: x     /  SaO2: 98                CAPILLARY BLOOD GLUCOSE          RADIOLOGY  CXR:  unchanged      VITALS:  T(C): 36.6 (06-08-19 @ 08:00), Max: 36.9 (06-07-19 @ 11:00)  T(F): 97.8 (06-08-19 @ 08:00), Max: 98.5 (06-07-19 @ 11:00)  HR: 60 (06-08-19 @ 09:00) (60 - 62)  BP: 143/71 (06-08-19 @ 09:00) (94/56 - 157/70)  BP(mean): 93 (06-08-19 @ 09:00) (67 - 108)  ABP: --  ABP(mean): --  RR: 13 (06-08-19 @ 09:00) (11 - 26)  SpO2: 100% (06-08-19 @ 09:00) (90% - 100%)  CVP(mm Hg): --  CVP(cm H2O): --    Ins and Outs     06-07-19 @ 07:01  -  06-08-19 @ 07:00  --------------------------------------------------------  IN: 1250 mL / OUT: 1450 mL / NET: -200 mL    06-08-19 @ 07:01  -  06-08-19 @ 09:28  --------------------------------------------------------  IN: 150 mL / OUT: 0 mL / NET: 150 mL                I&O's Detail    07 Jun 2019 07:01  -  08 Jun 2019 07:00  --------------------------------------------------------  IN:    ns in tub fed  [id number]: 1200 mL    Solution: 50 mL  Total IN: 1250 mL    OUT:    Incontinent per Condom Catheter: 70 mL    Indwelling Catheter - Urethral: 1380 mL  Total OUT: 1450 mL    Total NET: -200 mL      08 Jun 2019 07:01  -  08 Jun 2019 09:28  --------------------------------------------------------  IN:    ns in tub fed  [id number]: 150 mL  Total IN: 150 mL    OUT:  Total OUT: 0 mL    Total NET: 150 mL

## 2019-06-09 LAB
ANION GAP SERPL CALC-SCNC: 6 MMOL/L — SIGNIFICANT CHANGE UP (ref 5–17)
BUN SERPL-MCNC: 35 MG/DL — HIGH (ref 7–23)
CALCIUM SERPL-MCNC: 10.1 MG/DL — SIGNIFICANT CHANGE UP (ref 8.4–10.5)
CHLORIDE SERPL-SCNC: 107 MMOL/L — SIGNIFICANT CHANGE UP (ref 96–108)
CO2 SERPL-SCNC: 32 MMOL/L — HIGH (ref 22–31)
CREAT SERPL-MCNC: 1.5 MG/DL — HIGH (ref 0.5–1.3)
GLUCOSE SERPL-MCNC: 84 MG/DL — SIGNIFICANT CHANGE UP (ref 70–99)
HCT VFR BLD CALC: 25.5 % — LOW (ref 39–50)
HGB BLD-MCNC: 8 G/DL — LOW (ref 13–17)
MAGNESIUM SERPL-MCNC: 1.9 MG/DL — SIGNIFICANT CHANGE UP (ref 1.6–2.6)
MCHC RBC-ENTMCNC: 28.6 PG — SIGNIFICANT CHANGE UP (ref 27–34)
MCHC RBC-ENTMCNC: 31.4 GM/DL — LOW (ref 32–36)
MCV RBC AUTO: 91.1 FL — SIGNIFICANT CHANGE UP (ref 80–100)
NRBC # BLD: 0 /100 WBCS — SIGNIFICANT CHANGE UP (ref 0–0)
PHOSPHATE SERPL-MCNC: 3.4 MG/DL — SIGNIFICANT CHANGE UP (ref 2.5–4.5)
PLATELET # BLD AUTO: 337 K/UL — SIGNIFICANT CHANGE UP (ref 150–400)
POTASSIUM SERPL-MCNC: 4.1 MMOL/L — SIGNIFICANT CHANGE UP (ref 3.5–5.3)
POTASSIUM SERPL-SCNC: 4.1 MMOL/L — SIGNIFICANT CHANGE UP (ref 3.5–5.3)
RBC # BLD: 2.8 M/UL — LOW (ref 4.2–5.8)
RBC # FLD: 16 % — HIGH (ref 10.3–14.5)
SODIUM SERPL-SCNC: 145 MMOL/L — SIGNIFICANT CHANGE UP (ref 135–145)
WBC # BLD: 8.79 K/UL — SIGNIFICANT CHANGE UP (ref 3.8–10.5)
WBC # FLD AUTO: 8.79 K/UL — SIGNIFICANT CHANGE UP (ref 3.8–10.5)

## 2019-06-09 RX ORDER — ACETYLCYSTEINE 200 MG/ML
4 VIAL (ML) MISCELLANEOUS ONCE
Refills: 0 | Status: COMPLETED | OUTPATIENT
Start: 2019-06-09 | End: 2019-06-09

## 2019-06-09 RX ORDER — ACETYLCYSTEINE 200 MG/ML
4 VIAL (ML) MISCELLANEOUS ONCE
Refills: 0 | Status: COMPLETED | OUTPATIENT
Start: 2019-06-10 | End: 2019-06-10

## 2019-06-09 RX ORDER — ALBUTEROL 90 UG/1
2.5 AEROSOL, METERED ORAL ONCE
Refills: 0 | Status: COMPLETED | OUTPATIENT
Start: 2019-06-09 | End: 2019-06-09

## 2019-06-09 RX ORDER — ALBUTEROL 90 UG/1
2.5 AEROSOL, METERED ORAL ONCE
Refills: 0 | Status: COMPLETED | OUTPATIENT
Start: 2019-06-10 | End: 2019-06-10

## 2019-06-09 RX ADMIN — Medication 3 MILLILITER(S): at 03:57

## 2019-06-09 RX ADMIN — POLYETHYLENE GLYCOL 3350 17 GRAM(S): 17 POWDER, FOR SOLUTION ORAL at 11:15

## 2019-06-09 RX ADMIN — Medication 10 MILLIGRAM(S): at 11:15

## 2019-06-09 RX ADMIN — DORZOLAMIDE HYDROCHLORIDE TIMOLOL MALEATE 1 DROP(S): 20; 5 SOLUTION/ DROPS OPHTHALMIC at 18:25

## 2019-06-09 RX ADMIN — Medication 100 MILLIGRAM(S): at 05:04

## 2019-06-09 RX ADMIN — PANTOPRAZOLE SODIUM 40 MILLIGRAM(S): 20 TABLET, DELAYED RELEASE ORAL at 11:16

## 2019-06-09 RX ADMIN — ALBUTEROL 2.5 MILLIGRAM(S): 90 AEROSOL, METERED ORAL at 22:19

## 2019-06-09 RX ADMIN — HEPARIN SODIUM 5000 UNIT(S): 5000 INJECTION INTRAVENOUS; SUBCUTANEOUS at 05:04

## 2019-06-09 RX ADMIN — HEPARIN SODIUM 5000 UNIT(S): 5000 INJECTION INTRAVENOUS; SUBCUTANEOUS at 22:49

## 2019-06-09 RX ADMIN — LATANOPROST 1 DROP(S): 0.05 SOLUTION/ DROPS OPHTHALMIC; TOPICAL at 22:50

## 2019-06-09 RX ADMIN — HEPARIN SODIUM 5000 UNIT(S): 5000 INJECTION INTRAVENOUS; SUBCUTANEOUS at 15:51

## 2019-06-09 RX ADMIN — Medication 100 MILLIGRAM(S): at 18:25

## 2019-06-09 RX ADMIN — Medication 3 MILLILITER(S): at 15:20

## 2019-06-09 RX ADMIN — Medication 4 MILLILITER(S): at 22:21

## 2019-06-09 RX ADMIN — Medication 3 MILLILITER(S): at 09:11

## 2019-06-09 RX ADMIN — DORZOLAMIDE HYDROCHLORIDE TIMOLOL MALEATE 1 DROP(S): 20; 5 SOLUTION/ DROPS OPHTHALMIC at 05:03

## 2019-06-09 NOTE — PROGRESS NOTE ADULT - SUBJECTIVE AND OBJECTIVE BOX
Pt is an 85yr old man with PMhx including HTN, HLD, CAD, pacemaker, CVA with dysphagia s/p PEG, CKD, DM and GERD who presented to the hospital on 6/1 with SOB and was admitted with acute hypoxic respiratory failure likely secondary to aspiration pneumonitis, extubated 6/6.      24hr events: At goal tube feedings, remains on Cefazolin per ID.    ICU Vital Signs Last 24 Hrs  T(C): 36.6 (09 Jun 2019 16:00), Max: 36.6 (09 Jun 2019 09:00)  T(F): 97.8 (09 Jun 2019 16:00), Max: 97.8 (09 Jun 2019 09:00)  HR: 60 (09 Jun 2019 20:00) (60 - 63)  BP: 115/57 (09 Jun 2019 20:00) (89/47 - 133/111)  BP(mean): 76 (09 Jun 2019 20:00) (59 - 120)  ABP: --  ABP(mean): --  RR: 19 (09 Jun 2019 20:00) (12 - 28)  SpO2: 99% (09 Jun 2019 20:00) (90% - 100%)    CULTURES:    I&O's Summary    08 Jun 2019 07:01  -  09 Jun 2019 07:00  --------------------------------------------------------  IN: 950 mL / OUT: 0 mL / NET: 950 mL    09 Jun 2019 07:01  -  09 Jun 2019 21:10  --------------------------------------------------------  IN: 825 mL / OUT: 0 mL / NET: 825 mL      MEDICATIONS  (STANDING):  acetylcysteine 20%  Inhalation 4 milliLiter(s) Inhalation once  ALBUTerol    0.083%. 2.5 milliGRAM(s) Nebulizer once  ALBUTerol/ipratropium for Nebulization 3 milliLiter(s) Nebulizer every 6 hours  bisacodyl Suppository 10 milliGRAM(s) Rectal daily  ceFAZolin   IVPB 2000 milliGRAM(s) IV Intermittent every 12 hours  dorzolamide 2%/timolol 0.5% Ophthalmic Solution 1 Drop(s) Both EYES two times a day  heparin  Injectable 5000 Unit(s) SubCutaneous every 8 hours  latanoprost 0.005% Ophthalmic Solution 1 Drop(s) Both EYES at bedtime  pantoprazole   Suspension 40 milliGRAM(s) Oral daily  polyethylene glycol 3350 17 Gram(s) Oral daily    MEDICATIONS  (PRN):    No Known Allergies    FAMILY HISTORY:  No pertinent family history in first degree relatives    PAST MEDICAL & SURGICAL HISTORY:  Shortness of breath  Muscle weakness  Depression  IBS (irritable bowel syndrome)  Dysphagia  Cerebral infarction  Anemia  Psoriasis  GERD (gastroesophageal reflux disease)  Dyslipidemia  Cardiac pacemaker  S/P percutaneous endoscopic gastrostomy (PEG) tube placement    Social History: Presented from SNF, needs assistance with ADLs, limited hx due to chronic confusion (noted in other reviewed notes)    RADIOLOGY/IMAGING/ECHO  < from: Xray Chest 1 View- PORTABLE-Routine (06.08.19 @ 06:41) >  PROCEDURE DATE:  06/08/2019        INTERPRETATION:  INDICATION: Sepsis    PRIORS: 6/7/2019    VIEWS: Portable AP radiography of the chest performed.    FINDINGS: Since prior evaluation, no significant interval change. The   patient's face/chin obscures the superior mediastinal region as well as   left upper lung field. Bilateral lung parenchymal airspace opacities are   unchanged, left greater than right. Small left pleural effusion cannot be   excluded.There is no evidence for right pleural effusion. There is no   evidence for pneumothorax. No mediastinal shift is noted.    IMPRESSION: No significant interval change.    < end of copied text >    Physical Examination:  ROS: Limited exam due to clinical condition, without endorsed complaints.    General: Resting in bed in no apparent distress  Neuro: Awake, alert, confused.   HEENT: Atraumatic, Secretions noted  PULM: Diminished, no adventitious breath sounds appreciated, copious secretions noted.  CVS. paced 60, SBP 110s  ABD: Refused visual assessment, +bs, +peg with tube feeds at 75ml/hr, no tenderness exhibited with palpation   EXT: moves extremities x4  SKIN: hyperpigmented BLE

## 2019-06-09 NOTE — PROGRESS NOTE ADULT - SUBJECTIVE AND OBJECTIVE BOX
CC: f/u for MSSA bacteremia and pneumonia    Pt resting comfortably, confused, remains on NC    REVIEW OF SYSTEMS:  Limited - confusion    Antimicrobials Day # 9  ceFAZolin   IVPB 2000 milliGRAM(s) IV Intermittent every 12 hours    Other Medications Reviewed    Vital Signs Last 24 Hrs  T(F): 97.8 (09 Jun 2019 16:00), Max: 97.8 (09 Jun 2019 09:00)  HR: 60 (09 Jun 2019 18:00) (60 - 63)  BP: 125/56 (09 Jun 2019 18:00) (89/47 - 133/111)  BP(mean): 76 (09 Jun 2019 18:00) (59 - 120)  RR: 21 (09 Jun 2019 18:00) (12 - 28)  SpO2: 100% (09 Jun 2019 18:00) (90% - 100%)    PHYSICAL EXAM:  General: no acute distress  Eyes:  anicteric, no conjunctival injection, no discharge  Oropharynx: high flow O2 support 	  Neck: supple  Lungs: coarse breath sounds  Heart: regular rate and rhythm; no murmur, rubs or gallops  Abdomen: soft, nondistended, nontender, G tube site clean  Nix  Skin: no lesions  Extremities: no edema.  R PICC site clean  Neurologic: alert, moves all extremities    LAB RESULTS:                        8.0    8.79  )-----------( 337      ( 09 Jun 2019 05:20 )             25.5   06-09    145  |  107  |  35<H>  ----------------------------<  84  4.1   |  32<H>  |  1.50<H>    Ca    10.1      09 Jun 2019 05:20  Phos  3.4     06-09  Mg     1.9     06-09    MICROBIOLOGY:  RECENT CULTURES:  06-03 @ 12:30 .Blood Blood     No growth to date.    06-02 @ 17:20 .Sputum Sputum Staphylococcus aureus    Moderate Staphylococcus aureus  Normal Respiratory Renetta present    RADIOLOGY REVIEWED:  Xray Chest 1 View- PORTABLE-Routine (06.07.19 @ 06:20) >  Moderately extensive pulmonary opacity is noted in the left lung,   marginally worse with mild changes on the right, stable. There is no   layering effusion or cavitation or pneumothorax.

## 2019-06-09 NOTE — PROGRESS NOTE ADULT - ASSESSMENT
86 yo M, here with hypoxic respiratory failure, possible aspiration, and MSSA bacteremia (1 of 4)  Hx of CKD, CVA, CAD, HTN, DM, GERD, PPM and G tube.  With MSSA isolated from sputum, likely primary pneumonia (L opacity); no clues to alternative focus  No obvious cutaneous portal for bacteremia  Successfully extubated & pressors weaned off  Repeat blood cultures 6/03/19 negative  Afebrile, normal WBC, off high flow    PLAN:   Continue Cefazolin directed at MSSA - favor 2 wk total IV, followed by 2 wks po  Follow temps and CBC/diff   D/w PA

## 2019-06-09 NOTE — PROGRESS NOTE ADULT - ASSESSMENT
Physical Exam	  GENERAL:               alert, not in distress, verbal, confused  HEENT:                    moist MM, no jvd,   PULM:                     diminished at bilateral bases, improving course left breath sounds, rattling cough  CVS:                         S1, S2,  No Murmur  ABD:                        Soft, nondistended, nontender, normoactive bowel sounds, + PEG  EXT:                         mild edema, nontender, No Cyanosis or Clubbing   NEURO:                  alert responsive follows commands, confused   PSYC:                      calm no insight    Assessment:  1. Septic shock (resolved) and Acute hypoxic respiratory failure (resolved- extubated 6/7) due to Aspiration PNA - Left with Staph Aureus Bacteremia.   2. Acute on chronic renal dysfunction Baseline Cr 1.3  3. Lactic acidosis Resolved  4. Possible  PEA arrest in GG prior to arrival - "brief period where patient received compressions" as per H&P  4. Chronic Diastolic CHF  8. Dysphagia  s/p PEG s/p CVA     Plan:  Oxygen via NC  Chest PT/Pulm toilet/Duonebs  Consider RANDALL next week, based on ID/Cqardio input  Abx as per ID  Feedings via PEG tube   Cont. Home glaucoma medications  noted anemia, suspect from fluid shifts from albumin, doubt acute bleed, will monitor, transfuse if needed.    GI/DVT prophylaxis .    palliative care F/u  PMD:	Dr. Reyes			                   Notified(Date):  Family Updated: 	Dtr Amelie -  6/7 updated bedside                                      wife Angie [phone]	              Date: 6/8/19    Sedation & Analgesia:	as above  Diet/Nutrition:		tube feeding  GI PPx:		Protonix	    DVT Ppx:	Heparin    Activity:		               Bedrest  Head of Bed:               35-45  Glycemic Control:       n/a      Lines: PIV      Restraints were deemed necessary to prevent removal of life-sustaining devices [  ] YES   [ x   ]  NO    Disposition: Transfer to [facility] unit today  Goals of Care: DNR - intubation ok

## 2019-06-09 NOTE — PROGRESS NOTE ADULT - SUBJECTIVE AND OBJECTIVE BOX
Follow-up Critical Care Progress Note  Chief Complaint : Sepsis    Awake and alert. Denies any pain. Wants to eat orally.     Allergies :No Known Allergies      PAST MEDICAL & SURGICAL HISTORY:  Shortness of breath  Muscle weakness  Depression  IBS (irritable bowel syndrome)  Dysphagia  Cerebral infarction  Anemia  Psoriasis  GERD (gastroesophageal reflux disease)  Dyslipidemia  Cardiac pacemaker  S/P percutaneous endoscopic gastrostomy (PEG) tube placement      Medications:  MEDICATIONS  (STANDING):  ALBUTerol/ipratropium for Nebulization 3 milliLiter(s) Nebulizer every 6 hours  bisacodyl Suppository 10 milliGRAM(s) Rectal daily  ceFAZolin   IVPB 2000 milliGRAM(s) IV Intermittent every 12 hours  dorzolamide 2%/timolol 0.5% Ophthalmic Solution 1 Drop(s) Both EYES two times a day  heparin  Injectable 5000 Unit(s) SubCutaneous every 8 hours  latanoprost 0.005% Ophthalmic Solution 1 Drop(s) Both EYES at bedtime  pantoprazole   Suspension 40 milliGRAM(s) Oral daily  polyethylene glycol 3350 17 Gram(s) Oral daily    MEDICATIONS  (PRN):      LABS:                        8.0    8.79  )-----------( 337      ( 09 Jun 2019 05:20 )             25.5     06-09    145  |  107  |  35<H>  ----------------------------<  84  4.1   |  32<H>  |  1.50<H>    Ca    10.1      09 Jun 2019 05:20  Phos  3.4     06-09  Mg     1.9     06-09    CULTURES: (if applicable)    Culture - Blood (collected 06-03-19 @ 12:30)  Source: .Blood Blood  Final Report (06-08-19 @ 20:01):    No growth at 5 days.    Culture - Blood (collected 06-03-19 @ 12:30)  Source: .Blood Blood  Final Report (06-08-19 @ 20:01):    No growth at 5 days.    Culture - Sputum (collected 06-02-19 @ 17:20)  Source: .Sputum Sputum  Gram Stain (06-03-19 @ 06:26):    Moderate polymorphonuclear leukocytes per low power field    Few Squamous epithelial cells per low power field    Moderate Gram Variable Coccobacilli  seen per oil power field  Final Report (06-04-19 @ 16:40):    Moderate Staphylococcus aureus    Normal Respiratory Renetta present  Organism: Staphylococcus aureus (06-04-19 @ 16:40)  Organism: Staphylococcus aureus (06-04-19 @ 16:40)      -  Ampicillin/Sulbactam: S <=8/4      -  Cefazolin: S <=4      -  Clindamycin: S <=0.25      -  Erythromycin: R >4      -  Gentamicin: S 2 Should not be used as monotherapy      -  Oxacillin: S 1      -  Penicillin: R >8      -  RIF- Rifampin: S <=1 Should not be used as monotherapy      -  Tetra/Doxy: S <=1      -  Trimethoprim/Sulfamethoxazole: S <=0.5/9.5      -  Vancomycin: S 2      Method Type: KALYAN    Culture - Urine (collected 06-01-19 @ 11:16)  Source: .Urine Clean Catch (Midstream)  Final Report (06-03-19 @ 14:55):    >100,000 CFU/ml Presumptive Candida albicans    Culture - Blood (collected 06-01-19 @ 11:10)  Source: .Blood Blood-Peripheral  Gram Stain (06-02-19 @ 15:04):    Growth in aerobic bottle: Gram Positive Cocci in Clusters    Growth in anaerobic bottle: Gram Positive Cocci in Clusters  Final Report (06-04-19 @ 08:28):    Growth in aerobic and anaerobic bottles: Staphylococcus aureus    "Due to technical problems, Proteus sp. will Not be reported as part of    the BCID panel until further notice"    ***Blood Panel PCR results on this specimen are available    approximately 3 hours after the Gram stain result.***    Gram stain, PCR, and/or culture results may not always    correspond due to difference in methodologies.    ************************************************************    This PCR assay was performed using [employer].    The following targets are tested for: Enterococcus,    vancomycin resistant enterococci, Listeria monocytogenes,    coagulase negative staphylococci, S. aureus,    methicillin resistant S. aureus, Streptococcus agalactiae    (Group B), S. pneumoniae, S. pyogenes (Group A),    Acinetobacter baumannii, Enterobacter cloacae, E. coli,    Klebsiella oxytoca, K. pneumoniae, Proteus sp.,    Serratia marcescens, Haemophilus influenzae,    Neisseria meningitidis, Pseudomonas aeruginosa, Candida    albicans, C. glabrata, C krusei, C parapsilosis,    C. tropicalis and the KPC resistance gene.  Organism: Blood Culture PCR  Staphylococcus aureus (06-04-19 @ 08:28)  Organism: Staphylococcus aureus (06-04-19 @ 08:28)      -  Ampicillin/Sulbactam: S <=8/4      -  Cefazolin: S <=4      -  Clindamycin: S 0.5      -  Erythromycin: R >4      -  Gentamicin: S <=1 Should not be used as monotherapy      -  Oxacillin: S 1      -  Penicillin: R >8      -  RIF- Rifampin: S <=1 Should not be used as monotherapy      -  Tetra/Doxy: S <=1      -  Trimethoprim/Sulfamethoxazole: S <=0.5/9.5      -  Vancomycin: S 2      Method Type: KALYAN  Organism: Blood Culture PCR (06-04-19 @ 08:28)      -  Staphylococcus aureus: Detec Any isolate of Staphylococcus aureus from a blood culture is NOT considered a contaminant.      Method Type: PCR    Culture - Blood (collected 06-01-19 @ 11:05)  Source: .Blood Blood-Peripheral  Gram Stain (06-02-19 @ 12:46):    Growth in anaerobic bottle: Gram Positive Cocci in Clusters    Growth in aerobic bottle: Gram Positive Cocci in Clusters  Final Report (06-04-19 @ 08:28):    Growth in aerobic and anaerobic bottles: Staphylococcus aureus    See previous culture [id number]    Rapid RVP Result: NotDetec (06-01-19 @ 16:48)    VITALS:  T(C): 36.4 (06-09-19 @ 05:00), Max: 36.6 (06-08-19 @ 12:00)  T(F): 97.5 (06-09-19 @ 05:00), Max: 97.9 (06-08-19 @ 12:00)  HR: 60 (06-09-19 @ 08:00) (60 - 63)  BP: 112/50 (06-09-19 @ 08:00) (89/50 - 139/70)  BP(mean): 70 (06-09-19 @ 08:00) (63 - 120)  ABP: --  ABP(mean): --  RR: 16 (06-09-19 @ 08:00) (12 - 28)  SpO2: 98% (06-09-19 @ 09:12) (72% - 100%)  CVP(mm Hg): --  CVP(cm H2O): --    Ins and Outs     06-08-19 @ 07:01  -  06-09-19 @ 07:00  --------------------------------------------------------  IN: 950 mL / OUT: 0 mL / NET: 950 mL    I&O's Detail    08 Jun 2019 07:01  -  09 Jun 2019 07:00  --------------------------------------------------------  IN:    ns in tub fed  [id number]: 900 mL    Solution: 50 mL  Total IN: 950 mL    OUT:  Total OUT: 0 mL    Total NET: 950 mL

## 2019-06-09 NOTE — PROGRESS NOTE ADULT - ASSESSMENT
Pt is a 85yr old man with PMhx as above now being managed for aspiration pneumonitis with underlying dysphagia/difficulty with secretions.    --Aggressive pulm toileting, will order mucomyst/albuterol for now and in a.m with frequent suctioning/chest pt

## 2019-06-10 LAB
ANION GAP SERPL CALC-SCNC: 6 MMOL/L — SIGNIFICANT CHANGE UP (ref 5–17)
BUN SERPL-MCNC: 38 MG/DL — HIGH (ref 7–23)
CALCIUM SERPL-MCNC: 9.8 MG/DL — SIGNIFICANT CHANGE UP (ref 8.4–10.5)
CHLORIDE SERPL-SCNC: 107 MMOL/L — SIGNIFICANT CHANGE UP (ref 96–108)
CO2 SERPL-SCNC: 33 MMOL/L — HIGH (ref 22–31)
CREAT SERPL-MCNC: 1.41 MG/DL — HIGH (ref 0.5–1.3)
GLUCOSE SERPL-MCNC: 95 MG/DL — SIGNIFICANT CHANGE UP (ref 70–99)
HCT VFR BLD CALC: 25.9 % — LOW (ref 39–50)
HGB BLD-MCNC: 7.8 G/DL — LOW (ref 13–17)
MAGNESIUM SERPL-MCNC: 2 MG/DL — SIGNIFICANT CHANGE UP (ref 1.6–2.6)
MCHC RBC-ENTMCNC: 28.5 PG — SIGNIFICANT CHANGE UP (ref 27–34)
MCHC RBC-ENTMCNC: 30.1 GM/DL — LOW (ref 32–36)
MCV RBC AUTO: 94.5 FL — SIGNIFICANT CHANGE UP (ref 80–100)
NRBC # BLD: 0 /100 WBCS — SIGNIFICANT CHANGE UP (ref 0–0)
PHOSPHATE SERPL-MCNC: 3 MG/DL — SIGNIFICANT CHANGE UP (ref 2.5–4.5)
PLATELET # BLD AUTO: 364 K/UL — SIGNIFICANT CHANGE UP (ref 150–400)
POTASSIUM SERPL-MCNC: 4.2 MMOL/L — SIGNIFICANT CHANGE UP (ref 3.5–5.3)
POTASSIUM SERPL-SCNC: 4.2 MMOL/L — SIGNIFICANT CHANGE UP (ref 3.5–5.3)
RBC # BLD: 2.74 M/UL — LOW (ref 4.2–5.8)
RBC # FLD: 16.3 % — HIGH (ref 10.3–14.5)
SODIUM SERPL-SCNC: 146 MMOL/L — HIGH (ref 135–145)
WBC # BLD: 9.46 K/UL — SIGNIFICANT CHANGE UP (ref 3.8–10.5)
WBC # FLD AUTO: 9.46 K/UL — SIGNIFICANT CHANGE UP (ref 3.8–10.5)

## 2019-06-10 PROCEDURE — 99233 SBSQ HOSP IP/OBS HIGH 50: CPT

## 2019-06-10 RX ORDER — ZINC SULFATE TAB 220 MG (50 MG ZINC EQUIVALENT) 220 (50 ZN) MG
220 TAB ORAL DAILY
Refills: 0 | Status: DISCONTINUED | OUTPATIENT
Start: 2019-06-10 | End: 2019-06-14

## 2019-06-10 RX ORDER — ASCORBIC ACID 60 MG
500 TABLET,CHEWABLE ORAL DAILY
Refills: 0 | Status: DISCONTINUED | OUTPATIENT
Start: 2019-06-10 | End: 2019-06-14

## 2019-06-10 RX ADMIN — Medication 3 MILLILITER(S): at 04:36

## 2019-06-10 RX ADMIN — Medication 3 MILLILITER(S): at 10:11

## 2019-06-10 RX ADMIN — Medication 3 MILLILITER(S): at 15:42

## 2019-06-10 RX ADMIN — POLYETHYLENE GLYCOL 3350 17 GRAM(S): 17 POWDER, FOR SOLUTION ORAL at 11:31

## 2019-06-10 RX ADMIN — DORZOLAMIDE HYDROCHLORIDE TIMOLOL MALEATE 1 DROP(S): 20; 5 SOLUTION/ DROPS OPHTHALMIC at 17:30

## 2019-06-10 RX ADMIN — Medication 100 MILLIGRAM(S): at 05:20

## 2019-06-10 RX ADMIN — HEPARIN SODIUM 5000 UNIT(S): 5000 INJECTION INTRAVENOUS; SUBCUTANEOUS at 13:02

## 2019-06-10 RX ADMIN — PANTOPRAZOLE SODIUM 40 MILLIGRAM(S): 20 TABLET, DELAYED RELEASE ORAL at 11:36

## 2019-06-10 RX ADMIN — Medication 3 MILLILITER(S): at 22:56

## 2019-06-10 RX ADMIN — DORZOLAMIDE HYDROCHLORIDE TIMOLOL MALEATE 1 DROP(S): 20; 5 SOLUTION/ DROPS OPHTHALMIC at 05:20

## 2019-06-10 RX ADMIN — Medication 100 MILLIGRAM(S): at 17:31

## 2019-06-10 RX ADMIN — HEPARIN SODIUM 5000 UNIT(S): 5000 INJECTION INTRAVENOUS; SUBCUTANEOUS at 05:20

## 2019-06-10 NOTE — PROGRESS NOTE ADULT - ASSESSMENT
A/P : 86 yo m from [facility],   hypoxic respiratory failure, aspiration, and now with staph bacteremia. Acute on chronic renal dysfunction, chronic Diastolic CHF , Dysphagia  s/p PEG placed s/p CVA.  pall: looks comfortable today, awake, alert, speaks/ whispers,  on o2 w/ N/C, no sob, no cough at present .  No s/s pain or discomfort.   Still NPO , ordered for speech/swallow eval today .   Will cont to follow clinical status A/P : 86 yo m from [facility],   hypoxic respiratory failure, aspiration, and now with staph bacteremia. Acute on chronic renal dysfunction, chronic Diastolic CHF , Dysphagia  s/p PEG placed s/p CVA.  pall: looks comfortable today, awake, alert, speaks/ whispers,  on o2 w/ N/C, no sob, no cough at present .  No s/s pain or discomfort.   Still NPO , ordered for speech/swallow eval today .   Will cont to follow clinical status .

## 2019-06-10 NOTE — PROGRESS NOTE ADULT - ASSESSMENT
Physical Exam	  GENERAL:               alert, not in distress, verbal, confused  HEENT:                    moist MM, no jvd,   PULM:                     diminished at bilateral bases, improving course left breath sounds, rattling cough  CVS:                         S1, S2,  No Murmur  ABD:                        Soft, nondistended, nontender, normoactive bowel sounds, + PEG  EXT:                         mild edema, nontender, No Cyanosis or Clubbing   NEURO:                  alert responsive follows commands, confused   PSYC:                      calm no insight    Assessment:  1. Septic shock (resolved) and Acute hypoxic respiratory failure (resolved- extubated 6/7) due to Aspiration PNA - Left with Staph Aureus Bacteremia.   2. Acute on chronic renal dysfunction Baseline Cr 1.3  3. Lactic acidosis Resolved  4. Possible  PEA arrest in GG prior to arrival - "brief period where patient received compressions" as per H&P  4. Chronic Diastolic CHF  8. Dysphagia  s/p PEG s/p CVA     Plan:  Oxygen via NC / RA  Chest PT/Pulm toilet/Duonebs  Abx as per ID  Feedings via PEG tube   Family wants S&S eval for oral diet  Cont. Home glaucoma medications  noted anemia, suspect from fluid shifts from albumin, doubt acute bleed, will monitor, transfuse if needed.    GI/DVT prophylaxis .    palliative care F/u  PMD:	Dr. Reyes			                   Notified(Date):  Family Updated: 	Dtr Amelie -  6/8 updated bedside                                      wife Angie [phone]	              Date: 6/10/19    Sedation & Analgesia:	as above  Diet/Nutrition:		tube feeding  GI PPx:		Protonix	    DVT Ppx:	Heparin    Activity:		               Bedrest  Head of Bed:               35-45  Glycemic Control:       n/a      Lines: PIV      Restraints were deemed necessary to prevent removal of life-sustaining devices [  ] YES   [ x   ]  NO    Disposition: Continue care on AI  Goals of Care: DNR - intubation ok

## 2019-06-10 NOTE — PROGRESS NOTE ADULT - SUBJECTIVE AND OBJECTIVE BOX
CC: f/u for mssa pneumonia and bacteremia    Patient reports  nothing intelligible  REVIEW OF SYSTEMS:  All other review of systems negative (Comprehensive ROS)    Antimicrobials Day #  :10/14  ceFAZolin   IVPB 2000 milliGRAM(s) IV Intermittent every 12 hours    Other Medications Reviewed    T(F): 98.1 (06-10-19 @ 15:44), Max: 98.1 (06-10-19 @ 05:00)  HR: 64 (06-10-19 @ 15:44)  BP: 129/65 (06-10-19 @ 15:44)  RR: 14 (06-10-19 @ 15:44)  SpO2: 95% (06-10-19 @ 15:44)  Wt(kg): --    PHYSICAL EXAM:  General: lethargic,  no acute distress  Eyes:  anicteric, no conjunctival injection, no discharge  Oropharynx: no lesions or injection 	  Neck: supple, without adenopathy  Lungs: course to auscultation  Heart: regular rate and rhythm; no murmur, rubs or gallops  Abdomen: soft, nondistended, nontender, without mass or organomegaly  Skin: no lesions  Extremities: no clubbing, cyanosis. some edema  Neurologic: sleepy    LAB RESULTS:                        7.8    9.46  )-----------( 364      ( 10 Sj 2019 04:52 )             25.9     06-10    146<H>  |  107  |  38<H>  ----------------------------<  95  4.2   |  33<H>  |  1.41<H>    Ca    9.8      10 Sj 2019 04:52  Phos  3.0     06-10  Mg     2.0     06-10          MICROBIOLOGY:  RECENT CULTURES:  06-09 @ 05:20 .Blood Blood     No growth to date.          RADIOLOGY REVIEWED:    < from: Xray Chest 1 View- PORTABLE-Routine (06.08.19 @ 06:41) >    EXAM:  XR CHEST PORTABLE ROUTINE 1V      PROCEDURE DATE:  06/08/2019        INTERPRETATION:  INDICATION: Sepsis    PRIORS: 6/7/2019    VIEWS: Portable AP radiography of the chest performed.    FINDINGS: Since prior evaluation, no significant interval change. The   patient's face/chin obscures the superior mediastinal region as well as   left upper lung field. Bilateral lung parenchymal airspace opacities are   unchanged, left greater than right. Small left pleural effusion cannot be   excluded.There is no evidence for right pleural effusion. There is no   evidence for pneumothorax. No mediastinal shift is noted.    IMPRESSION: No significant interval change.     end of copied text >    Assessment:  Patient with mssa bacteremia from pneumonia, ongoing chronic respiratory insufficiency but infection controlled.   Plan:  4 more days of iv cefazolin then 2 weeks of po cefadroxil  pulmonary toilet

## 2019-06-10 NOTE — PROGRESS NOTE ADULT - SUBJECTIVE AND OBJECTIVE BOX
f/u palliative no events overnight, pt in bed, awake, alert, speaking few words, low voice. Using o2 via N/C, no sob    Present Symptoms:   Dyspnea: no  Nausea/Vomiting: no  Anxiety:  no  Depressed Mood: no  Fatigue: mild  Loss of appetite: yes  Pain:    no               location:   Review of Systems:  Unable to obtain due to poor mentation]    MEDICATIONS  (STANDING):  ALBUTerol/ipratropium for Nebulization 3 milliLiter(s) Nebulizer every 6 hours  bisacodyl Suppository 10 milliGRAM(s) Rectal daily  ceFAZolin   IVPB 2000 milliGRAM(s) IV Intermittent every 12 hours  dorzolamide 2%/timolol 0.5% Ophthalmic Solution 1 Drop(s) Both EYES two times a day  heparin  Injectable 5000 Unit(s) SubCutaneous every 8 hours  latanoprost 0.005% Ophthalmic Solution 1 Drop(s) Both EYES at bedtime  pantoprazole   Suspension 40 milliGRAM(s) Oral daily  polyethylene glycol 3350 17 Gram(s) Oral daily    MEDICATIONS  (PRN):      PHYSICAL EXAM:  Vital Signs Last 24 Hrs  T(C): 36.4 (10 Sj 2019 08:48), Max: 36.7 (10 Sj 2019 05:00)  T(F): 97.6 (10 Sj 2019 08:48), Max: 98.1 (10 Sj 2019 05:00)  HR: 69 (10 Sj 2019 10:14) (60 - 69)  BP: 130/61 (10 Sj 2019 08:48) (89/47 - 144/72)  BP(mean): 94 (10 Sj 2019 06:00) (59 - 94)  RR: 12 (10 Sj 2019 08:48) (11 - 23)  SpO2: 98% (10 Sj 2019 10:14) (92% - 100%)  General: alert  oriented x _1-2      HEENT: n/c, a/t     Lungs: clear , w/few  scattered rhonchi    CV: s1s2    GI: soft, n/t + bs + peg     : nml    Musculoskeletal:  weak x 4    Skin: warm dry     Neuro: alert, speaks few words     Oral intake ability:  npo  Diet: peg feeds     LABS:                          7.8    9.46  )-----------( 364      ( 10 Sj 2019 04:52 )             25.9     06-10    146<H>  |  107  |  38<H>  ----------------------------<  95  4.2   |  33<H>  |  1.41<H>    Ca    9.8      10 Sj 2019 04:52  Phos  3.0     06-10  Mg     2.0     06-10          RADIOLOGY & ADDITIONAL STUDIES:     ADVANCE DIRECTIVES: [facility] DNR  trial intubation   Advanced Care Planning discussion total time spent:

## 2019-06-10 NOTE — PROGRESS NOTE ADULT - SUBJECTIVE AND OBJECTIVE BOX
Follow-up Critical Care Progress Note  Chief Complaint : Sepsis    secretions improving  less sob  comfortable on RA  no fevers, calm,        Allergies :No Known Allergies      PAST MEDICAL & SURGICAL HISTORY:  Shortness of breath  Muscle weakness  Depression  IBS (irritable bowel syndrome)  Dysphagia  Cerebral infarction  Anemia  Psoriasis  GERD (gastroesophageal reflux disease)  Dyslipidemia  Cardiac pacemaker  S/P percutaneous endoscopic gastrostomy (PEG) tube placement      Medications:  MEDICATIONS  (STANDING):  ALBUTerol/ipratropium for Nebulization 3 milliLiter(s) Nebulizer every 6 hours  bisacodyl Suppository 10 milliGRAM(s) Rectal daily  ceFAZolin   IVPB 2000 milliGRAM(s) IV Intermittent every 12 hours  dorzolamide 2%/timolol 0.5% Ophthalmic Solution 1 Drop(s) Both EYES two times a day  heparin  Injectable 5000 Unit(s) SubCutaneous every 8 hours  latanoprost 0.005% Ophthalmic Solution 1 Drop(s) Both EYES at bedtime  pantoprazole   Suspension 40 milliGRAM(s) Oral daily  polyethylene glycol 3350 17 Gram(s) Oral daily    MEDICATIONS  (PRN):      LABS:                        7.8    9.46  )-----------( 364      ( 10 Sj 2019 04:52 )             25.9     06-10    146<H>  |  107  |  38<H>  ----------------------------<  95  4.2   |  33<H>  |  1.41<H>    Ca    9.8      10 Sj 2019 04:52  Phos  3.0     06-10  Mg     2.0     06-10                          CULTURES: (if applicable)    Culture - Blood (collected 06-09-19 @ 05:20)  Source: .Blood Blood  Preliminary Report (06-10-19 @ 10:00):    No growth to date.    Culture - Blood (collected 06-09-19 @ 05:20)  Source: .Blood Blood  Preliminary Report (06-10-19 @ 10:00):    No growth to date.    Culture - Blood (collected 06-03-19 @ 12:30)  Source: .Blood Blood  Final Report (06-08-19 @ 20:01):    No growth at 5 days.    Culture - Blood (collected 06-03-19 @ 12:30)  Source: .Blood Blood  Final Report (06-08-19 @ 20:01):    No growth at 5 days.    Culture - Sputum (collected 06-02-19 @ 17:20)  Source: .Sputum Sputum  Gram Stain (06-03-19 @ 06:26):    Moderate polymorphonuclear leukocytes per low power field    Few Squamous epithelial cells per low power field    Moderate Gram Variable Coccobacilli  seen per oil power field  Final Report (06-04-19 @ 16:40):    Moderate Staphylococcus aureus    Normal Respiratory Renetta present  Organism: Staphylococcus aureus (06-04-19 @ 16:40)  Organism: Staphylococcus aureus (06-04-19 @ 16:40)      -  Ampicillin/Sulbactam: S <=8/4      -  Cefazolin: S <=4      -  Clindamycin: S <=0.25      -  Erythromycin: R >4      -  Gentamicin: S 2 Should not be used as monotherapy      -  Oxacillin: S 1      -  Penicillin: R >8      -  RIF- Rifampin: S <=1 Should not be used as monotherapy      -  Tetra/Doxy: S <=1      -  Trimethoprim/Sulfamethoxazole: S <=0.5/9.5      -  Vancomycin: S 2      Method Type: KALYAN    Culture - Urine (collected 06-01-19 @ 11:16)  Source: .Urine Clean Catch (Midstream)  Final Report (06-03-19 @ 14:55):    >100,000 CFU/ml Presumptive Candida albicans    Culture - Blood (collected 06-01-19 @ 11:10)  Source: .Blood Blood-Peripheral  Gram Stain (06-02-19 @ 15:04):    Growth in aerobic bottle: Gram Positive Cocci in Clusters    Growth in anaerobic bottle: Gram Positive Cocci in Clusters  Final Report (06-04-19 @ 08:28):    Growth in aerobic and anaerobic bottles: Staphylococcus aureus    "Due to technical problems, Proteus sp. will Not be reported as part of    the BCID panel until further notice"    ***Blood Panel PCR results on this specimen are available    approximately 3 hours after the Gram stain result.***    Gram stain, PCR, and/or culture results may not always    correspond due to difference in methodologies.    ************************************************************    This PCR assay was performed using [employer].    The following targets are tested for: Enterococcus,    vancomycin resistant enterococci, Listeria monocytogenes,    coagulase negative staphylococci, S. aureus,    methicillin resistant S. aureus, Streptococcus agalactiae    (Group B), S. pneumoniae, S. pyogenes (Group A),    Acinetobacter baumannii, Enterobacter cloacae, E. coli,    Klebsiella oxytoca, K. pneumoniae, Proteus sp.,    Serratia marcescens, Haemophilus influenzae,    Neisseria meningitidis, Pseudomonas aeruginosa, Candida    albicans, C. glabrata, C krusei, C parapsilosis,    C. tropicalis and the KPC resistance gene.  Organism: Blood Culture PCR  Staphylococcus aureus (06-04-19 @ 08:28)  Organism: Staphylococcus aureus (06-04-19 @ 08:28)      -  Ampicillin/Sulbactam: S <=8/4      -  Cefazolin: S <=4      -  Clindamycin: S 0.5      -  Erythromycin: R >4      -  Gentamicin: S <=1 Should not be used as monotherapy      -  Oxacillin: S 1      -  Penicillin: R >8      -  RIF- Rifampin: S <=1 Should not be used as monotherapy      -  Tetra/Doxy: S <=1      -  Trimethoprim/Sulfamethoxazole: S <=0.5/9.5      -  Vancomycin: S 2      Method Type: KALYAN  Organism: Blood Culture PCR (06-04-19 @ 08:28)      -  Staphylococcus aureus: Detec Any isolate of Staphylococcus aureus from a blood culture is NOT considered a contaminant.      Method Type: PCR    Culture - Blood (collected 06-01-19 @ 11:05)  Source: .Blood Blood-Peripheral  Gram Stain (06-02-19 @ 12:46):    Growth in anaerobic bottle: Gram Positive Cocci in Clusters    Growth in aerobic bottle: Gram Positive Cocci in Clusters  Final Report (06-04-19 @ 08:28):    Growth in aerobic and anaerobic bottles: Staphylococcus aureus    See previous culture [id number]      Rapid RVP Result: NotDetec (06-01-19 @ 16:48)      VITALS:  T(C): 36.4 (06-10-19 @ 08:48), Max: 36.7 (06-10-19 @ 05:00)  T(F): 97.6 (06-10-19 @ 08:48), Max: 98.1 (06-10-19 @ 05:00)  HR: 60 (06-10-19 @ 08:48) (60 - 62)  BP: 130/61 (06-10-19 @ 08:48) (89/47 - 144/72)  BP(mean): 94 (06-10-19 @ 06:00) (59 - 94)  ABP: --  ABP(mean): --  RR: 12 (06-10-19 @ 08:48) (11 - 23)  SpO2: 96% (06-10-19 @ 08:48) (92% - 100%)  CVP(mm Hg): --  CVP(cm H2O): --    Ins and Outs     06-09-19 @ 07:01  -  06-10-19 @ 07:00  --------------------------------------------------------  IN: 1250 mL / OUT: 0 mL / NET: 1250 mL                I&O's Detail    09 Jun 2019 07:01  -  10 Sj 2019 07:00  --------------------------------------------------------  IN:    ns in tub fed  [id number]: 1200 mL    Solution: 50 mL  Total IN: 1250 mL    OUT:  Total OUT: 0 mL    Total NET: 1250 mL

## 2019-06-10 NOTE — CHART NOTE - NSCHARTNOTEFT_GEN_A_CORE
Nutrition Follow Up Note  Hospital Course (Per Electronic Medical Record):   Source: Medical Record [X]  Nursing Staff [X]     Diet:  Jevity 1.5 @ 75cc/hr x 18hrs.    Patient noted tolerating PEG feeds @75cc/hr, Patient noted requesting to eat po diet , patient was noted receiving a po diet with PEG feeds PTA , suggest SLP eval regarding ? po diet . (+) BM noted (6/9) as per flow sheet.     Current Weight: (6/10) 180.9/82.1kg, , patient is noted with weight change since admission , noted with (+2) edema on (6/4) as per flow sheet.     Pertinent Medications: MEDICATIONS  (STANDING):  ALBUTerol/ipratropium for Nebulization 3 milliLiter(s) Nebulizer every 6 hours  bisacodyl Suppository 10 milliGRAM(s) Rectal daily  ceFAZolin   IVPB 2000 milliGRAM(s) IV Intermittent every 12 hours  dorzolamide 2%/timolol 0.5% Ophthalmic Solution 1 Drop(s) Both EYES two times a day  heparin  Injectable 5000 Unit(s) SubCutaneous every 8 hours  latanoprost 0.005% Ophthalmic Solution 1 Drop(s) Both EYES at bedtime  pantoprazole   Suspension 40 milliGRAM(s) Oral daily  polyethylene glycol 3350 17 Gram(s) Oral daily    MEDICATIONS  (PRN):      Pertinent Labs:  06-10 Na146 mmol/L<H> Glu 95 mg/dL K+ 4.2 mmol/L Cr  1.41 mg/dL<H> BUN 38 mg/dL<H> 06-10 Phos 3.0 mg/dL 06-05 Alb 2.0 g/dL<L>        Skin: (R) buttock , suspect DTI as per nursing flow sheet. may suggest MVI, VitC , Zinc added    Edema: none noted at present    Estimated Needs:   [X] No Change since Previous Assessment  [X] Recalculated:     Previous Nutrition Diagnosis: Severe Malnutrition    Nutrition Diagnosis is [X] Ongoing         New Nutrition Diagnosis:   [X] Increased Nutrient Needs related to increase demand in nutrients as evidence by noted DTI on (R) buttocks.      Interventions:   1. Recommend MVI, Vit C, Zinc added.  2. SLP eval to assess for po diet    Monitoring & Evaluation: will monitor:  [X] Weights   [X] Follow Up (Per Protocol)  [X] Tolerance to  PEG feeds    RD to follow as per Nutrition protocol  Renetta Silva, RDN

## 2019-06-11 DIAGNOSIS — R13.10 DYSPHAGIA, UNSPECIFIED: ICD-10-CM

## 2019-06-11 DIAGNOSIS — D64.9 ANEMIA, UNSPECIFIED: ICD-10-CM

## 2019-06-11 DIAGNOSIS — K21.9 GASTRO-ESOPHAGEAL REFLUX DISEASE WITHOUT ESOPHAGITIS: ICD-10-CM

## 2019-06-11 DIAGNOSIS — I63.9 CEREBRAL INFARCTION, UNSPECIFIED: ICD-10-CM

## 2019-06-11 PROCEDURE — 99233 SBSQ HOSP IP/OBS HIGH 50: CPT

## 2019-06-11 RX ORDER — MULTIVIT-MIN/FERROUS GLUCONATE 9 MG/15 ML
5 LIQUID (ML) ORAL DAILY
Refills: 0 | Status: DISCONTINUED | OUTPATIENT
Start: 2019-06-11 | End: 2019-06-11

## 2019-06-11 RX ADMIN — ZINC SULFATE TAB 220 MG (50 MG ZINC EQUIVALENT) 220 MILLIGRAM(S): 220 (50 ZN) TAB at 10:57

## 2019-06-11 RX ADMIN — Medication 3 MILLILITER(S): at 15:39

## 2019-06-11 RX ADMIN — Medication 100 MILLIGRAM(S): at 17:46

## 2019-06-11 RX ADMIN — Medication 5 MILLILITER(S): at 10:58

## 2019-06-11 RX ADMIN — LATANOPROST 1 DROP(S): 0.05 SOLUTION/ DROPS OPHTHALMIC; TOPICAL at 22:37

## 2019-06-11 RX ADMIN — DORZOLAMIDE HYDROCHLORIDE TIMOLOL MALEATE 1 DROP(S): 20; 5 SOLUTION/ DROPS OPHTHALMIC at 17:46

## 2019-06-11 RX ADMIN — Medication 100 MILLIGRAM(S): at 05:42

## 2019-06-11 RX ADMIN — HEPARIN SODIUM 5000 UNIT(S): 5000 INJECTION INTRAVENOUS; SUBCUTANEOUS at 05:24

## 2019-06-11 RX ADMIN — HEPARIN SODIUM 5000 UNIT(S): 5000 INJECTION INTRAVENOUS; SUBCUTANEOUS at 13:52

## 2019-06-11 RX ADMIN — PANTOPRAZOLE SODIUM 40 MILLIGRAM(S): 20 TABLET, DELAYED RELEASE ORAL at 10:57

## 2019-06-11 RX ADMIN — HEPARIN SODIUM 5000 UNIT(S): 5000 INJECTION INTRAVENOUS; SUBCUTANEOUS at 22:38

## 2019-06-11 RX ADMIN — POLYETHYLENE GLYCOL 3350 17 GRAM(S): 17 POWDER, FOR SOLUTION ORAL at 10:57

## 2019-06-11 RX ADMIN — Medication 3 MILLILITER(S): at 09:37

## 2019-06-11 RX ADMIN — Medication 3 MILLILITER(S): at 04:42

## 2019-06-11 RX ADMIN — LATANOPROST 1 DROP(S): 0.05 SOLUTION/ DROPS OPHTHALMIC; TOPICAL at 00:08

## 2019-06-11 RX ADMIN — HEPARIN SODIUM 5000 UNIT(S): 5000 INJECTION INTRAVENOUS; SUBCUTANEOUS at 00:08

## 2019-06-11 RX ADMIN — DORZOLAMIDE HYDROCHLORIDE TIMOLOL MALEATE 1 DROP(S): 20; 5 SOLUTION/ DROPS OPHTHALMIC at 05:24

## 2019-06-11 RX ADMIN — Medication 3 MILLILITER(S): at 21:13

## 2019-06-11 RX ADMIN — Medication 500 MILLIGRAM(S): at 10:57

## 2019-06-11 NOTE — SWALLOW BEDSIDE ASSESSMENT ADULT - PHARYNGEAL PHASE
Cough post oral intake/Delayed pharyngeal swallow/Decreased laryngeal elevation Decreased laryngeal elevation/Delayed pharyngeal swallow Delayed pharyngeal swallow/Decreased laryngeal elevation

## 2019-06-11 NOTE — PROGRESS NOTE ADULT - SUBJECTIVE AND OBJECTIVE BOX
CC: f/u for mssa bacteremia and pneumonia    Patient reports that he is dying    REVIEW OF SYSTEMS:  All other review of systems negative (Comprehensive ROS)    Antimicrobials Day #  :12/14  ceFAZolin   IVPB 2000 milliGRAM(s) IV Intermittent every 12 hours    Other Medications Reviewed    T(F): 97.4 (06-11-19 @ 09:08), Max: 98.1 (06-10-19 @ 15:44)  HR: 62 (06-11-19 @ 09:37)  BP: 129/64 (06-11-19 @ 09:08)  RR: 15 (06-11-19 @ 09:08)  SpO2: 96% (06-11-19 @ 09:08)  Wt(kg): --    PHYSICAL EXAM:  General: awake  Eyes:  anicteric, no conjunctival injection, no discharge  Oropharynx: no lesions or injection 	  Neck: supple, without adenopathy  Lungs: rales to auscultation  Heart: regular rate and rhythm; no murmur, rubs or gallops  Abdomen: soft, nondistended, nontender, without mass or organomegaly  Skin: no lesions  Extremities: no clubbing, cyanosis, or edema  Neurologic: awake, moans    LAB RESULTS:                        7.8    9.46  )-----------( 364      ( 10 Sj 2019 04:52 )             25.9     06-10    146<H>  |  107  |  38<H>  ----------------------------<  95  4.2   |  33<H>  |  1.41<H>    Ca    9.8      10 Sj 2019 04:52  Phos  3.0     06-10  Mg     2.0     06-10          MICROBIOLOGY:  RECENT CULTURES:  06-09 @ 05:20 .Blood Blood     No growth to date.          RADIOLOGY REVIEWED:    < from: Xray Chest 1 View- PORTABLE-Routine (06.08.19 @ 06:41) >    EXAM:  XR CHEST PORTABLE ROUTINE 1V      PROCEDURE DATE:  06/08/2019        INTERPRETATION:  INDICATION: Sepsis    PRIORS: 6/7/2019    VIEWS: Portable AP radiography of the chest performed.    FINDINGS: Since prior evaluation, no significant interval change. The   patient's face/chin obscures the superior mediastinal region as well as   left upper lung field. Bilateral lung parenchymal airspace opacities are   unchanged, left greater than right. Small left pleural effusion cannot be   excluded.There is no evidence for right pleural effusion. There is no   evidence for pneumothorax. No mediastinal shift is noted.    IMPRESSION: No significant interval change.    < end of copied text >      Assessment:  Patient with mssa bacteremia and pneumonia finishing 2 weeks of iv then 2 weeks of po antibiotic  Plan: 2 more days of cefazolin then 2 weeks of cefadroxil

## 2019-06-11 NOTE — PROGRESS NOTE ADULT - PROBLEM SELECTOR PLAN 1
From pulmonary source, now resolved.  To complete course of antibiotics as per ID, continue to monitor fever curve and WBC count.

## 2019-06-11 NOTE — PROGRESS NOTE ADULT - ASSESSMENT
A//P : 84 yo m from [facility],  W hypoxic respiratory failure, aspiration, and now with staph bacteremia. Acute on chronic renal dysfunction, chronic Diastolic CHF , Dysphagia  s/p PEG placed s/p CVA.  pall: looks comfortable today, awake, alert, speaks/ whispers,  on o2 w/ N/C, no sob, no cough at present .  No s/s pain or discomfort.    Speech eval today recommends remain NPO, pt may go for MBS.  Wife aware , pt does have PEG in place .  Will cont to follow clinical course, supportive care . A//P : 86 yo m from [facility],  W hypoxic respiratory failure, aspiration, and now with staph bacteremia. Acute on chronic renal dysfunction, chronic Diastolic CHF , Dysphagia  s/p PEG placed s/p CVA.  pall: looks comfortable today, awake, alert, speaks/ whispers,  on o2 w/ N/C, no sob, no cough at present .  No s/s pain or discomfort.    Speech eval today recommends remain NPO, pt may go for MBS.  Wife aware , pt does have PEG in place .  Will cont to follow clinical course, supportive care .

## 2019-06-11 NOTE — PROGRESS NOTE ADULT - ASSESSMENT
85 year old male with PMH CVA, dysphagia s/p PEG, GERD, MELISSA, anemia, IBS, depression admitted with pulmonary sepsis from likely aspiration event.

## 2019-06-11 NOTE — PROGRESS NOTE ADULT - SUBJECTIVE AND OBJECTIVE BOX
f/u palliative , no events overnight, looks comfortable, no sob, no s/s pain /discomfort.       Present Symptoms:   Dyspnea: no  Nausea/Vomiting: no  Anxiety:  no  Depressed Mood: no  Fatigue: mild  Loss of appetite: yes  Pain:         no s/s          location:   Review of Systems:  Unable to obtain due to poor mentation]    MEDICATIONS  (STANDING):  ALBUTerol/ipratropium for Nebulization 3 milliLiter(s) Nebulizer every 6 hours  ascorbic acid 500 milliGRAM(s) Oral daily  bisacodyl Suppository 10 milliGRAM(s) Rectal daily  ceFAZolin   IVPB 2000 milliGRAM(s) IV Intermittent every 12 hours  dorzolamide 2%/timolol 0.5% Ophthalmic Solution 1 Drop(s) Both EYES two times a day  heparin  Injectable 5000 Unit(s) SubCutaneous every 8 hours  latanoprost 0.005% Ophthalmic Solution 1 Drop(s) Both EYES at bedtime  multivitamin  Drops 5 milliLiter(s) Oral daily  pantoprazole   Suspension 40 milliGRAM(s) Oral daily  polyethylene glycol 3350 17 Gram(s) Oral daily  zinc sulfate 220 milliGRAM(s) Oral daily    MEDICATIONS  (PRN):      PHYSICAL EXAM:  Vital Signs Last 24 Hrs  T(C): 36.3 (11 Jun 2019 09:08), Max: 36.7 (10 Sj 2019 15:44)  T(F): 97.4 (11 Jun 2019 09:08), Max: 98.1 (10 Sj 2019 15:44)  HR: 62 (11 Jun 2019 09:37) (59 - 66)  BP: 129/64 (11 Jun 2019 09:08) (129/64 - 131/62)  BP(mean): --  RR: 15 (11 Jun 2019 09:08) (14 - 15)  SpO2: 96% (11 Jun 2019 09:08) (94% - 100%)  General: alert to self, speaks  few words       HEENT: n/c, a/t     Lungs: clear dim to bases    CV: s1s2    GI: soft, n/t + bs + peg    : nml     Musculoskeletal: weakness x4    Skin: warm dry fragile     Neuro: alert, to self ,  speaks  few words     Oral intake ability:  npo  Diet: peg feeds     LABS:                          7.8    9.46  )-----------( 364      ( 10 Sj 2019 04:52 )             25.9     06-10    146<H>  |  107  |  38<H>  ----------------------------<  95  4.2   |  33<H>  |  1.41<H>    Ca    9.8      10 Sj 2019 04:52  Phos  3.0     06-10  Mg     2.0     06-10          RADIOLOGY & ADDITIONAL STUDIES:    ADVANCE DIRECTIVES:    MOLST   DNR/ Trial intubation   Advanced Care Planning discussion total time spent:

## 2019-06-11 NOTE — SWALLOW BEDSIDE ASSESSMENT ADULT - SLP PERTINENT HISTORY OF CURRENT PROBLEM
86 yo M with PMHx of Glaucoma, benign prostate neoplasm, HTN, HLD, Grad 3 Diastolic Heart Failure s/p ppm, CKD, dysphagia s/p PEG tube placement, CVA presents via EMS from [facility] for eval of SOB. Patient was reportedly eating dysphagia diet meal when he began to experience SOB with increase secretions and altered. Collateral obtained from facility suggests there was a brief period where patient received compressions. Upon arrival to the ED patient was found to be hypotensive 60/30s. Further decompensation involving patient respiratory status occurred due to secretions and mucous plugging leading to hypoxia prompting emergent intubation.

## 2019-06-11 NOTE — SWALLOW BEDSIDE ASSESSMENT ADULT - DIET PRIOR TO ADMI
oral feeding + supplemental tube feeds oral feeding + supplemental tube feeds, spoke to patient's SLP at SNF

## 2019-06-11 NOTE — PROGRESS NOTE ADULT - SUBJECTIVE AND OBJECTIVE BOX
Patient with no complaints this AM.  Tolerating TF via PEG.  BM documented today, voiding and incontinent of urine.    Vital Signs Last 24 Hrs  T(F): 98 (11 Jun 2019 05:02), Max: 98.1 (10 Sj 2019 15:44)  HR: 59 (11 Jun 2019 05:02) (59 - 69)  BP: 131/62 (11 Jun 2019 05:02) (129/65 - 140/66)  RR: 14 (11 Jun 2019 05:02) (14 - 15)  SpO2: 100% 3L NC (11 Jun 2019 05:02) (94% - 100%)    No new labs or radiological studies    Jevity 1.5 via PEG at 75cc/hr    MEDICATIONS  ALBUTerol/ipratropium for Nebulization 3 milliLiter(s) Nebulizer every 6 hours  bisacodyl Suppository 10 milliGRAM(s) Rectal daily  ceFAZolin   IVPB 2000 milliGRAM(s) IV Intermittent every 12 hours  dorzolamide 2%/timolol 0.5% Ophthalmic Solution 1 Drop(s) Both EYES two times a day  heparin  Injectable 5000 Unit(s) SubCutaneous every 8 hours  latanoprost 0.005% Ophthalmic Solution 1 Drop(s) Both EYES at bedtime  pantoprazole   Suspension 40 milliGRAM(s) Oral daily  polyethylene glycol 3350 17 Gram(s) Oral daily    Physical Exam  Gen:  Elderly male resting in bed, NAD  ENT:  NC/AT.  MMM, JVD noted  Thorax:  Symmetric, no retractions  Lung:  Diminished at bases bilaterally with scattered rhonchi  CV:  S1, S2. RRR  Abd:  Soft, NT/ND.  BS normoactive, no masses to palp.  PEG with dressing C/D/I  Extrem:  No C/C/E, DP/radial pulses +2  Neuro:  No gross motor/sensory deficits

## 2019-06-11 NOTE — SWALLOW BEDSIDE ASSESSMENT ADULT - COMMENTS
WBC - 9.46  CXR - A pacemaker is  noted. Moderately extensive pulmonary opacity is noted in the left lung,   marginally worse with mild changes on the right, stable    Patient with long hx pharyngeal dysphagia. Several courses of dysphagia tx at SNF. SLP @ SNF reports consistent non compliance with diet recommendations.    Most recent MBSS 12/2018 -  Pt was administered trials of puree, nectar and thins via teaspoon: Pt presents with a severe oropharyngeal dysphagia. Noted reduced bolus formation and propulsion. Reduced base of tongue retraction and laryngeal elevation resulted in severe amounts of pharyngeal residue that Pt was unable to clear. Poor airway protection and pharyngeal residue resulted in deep penetration of all consistencies with suspected aspiration, however, unable to view vocal cords 2/2 Pt positioning and body habitus. Pt inconsistently coughing in response to penetration. pt unable to clear residue or penetrated material despite cues.  Recommend continue NPO with PEG tube feeds." 1x delayed cough, unable to determine whether or not related to PO entrance into airway soft solids not administered due to severity of impairment of oral prep given puree, no dentures present. SLP @ SNF says patient's dentures are at the facility but have been ill fitting for a while.

## 2019-06-11 NOTE — PROGRESS NOTE ADULT - PROBLEM SELECTOR PLAN 7
On PPI and heparin SQ  OOB as tolerated  Continue PEG feeds, await S&S eval  DNR in the case of cardiac arrest, may intubate for respiratory failure  Dispo pending after course of abx complete

## 2019-06-11 NOTE — SWALLOW BEDSIDE ASSESSMENT ADULT - SWALLOW EVAL: DIAGNOSIS
The patient presents with severe oropharyngeal dysphagia marked by impaired/effortul bolus formation given puree solids. Appearance of severely delayed swallow trigger and impaired hyolaryngeal movement given puree, honey thick,  nectar thick and thin liquids. Pt with reflexive coughing post swallow given nectar thick and thin liquids. Intermittent coughing with majority of PO intake. Recommend continue NPO and will schedule MBSS to objectively determine current swallow function as patient has long hx dysphagia and was admitted with resp. failure and PNA.

## 2019-06-12 PROCEDURE — 99232 SBSQ HOSP IP/OBS MODERATE 35: CPT

## 2019-06-12 PROCEDURE — 71045 X-RAY EXAM CHEST 1 VIEW: CPT | Mod: 26

## 2019-06-12 RX ADMIN — DORZOLAMIDE HYDROCHLORIDE TIMOLOL MALEATE 1 DROP(S): 20; 5 SOLUTION/ DROPS OPHTHALMIC at 05:07

## 2019-06-12 RX ADMIN — Medication 100 MILLIGRAM(S): at 05:07

## 2019-06-12 RX ADMIN — HEPARIN SODIUM 5000 UNIT(S): 5000 INJECTION INTRAVENOUS; SUBCUTANEOUS at 14:08

## 2019-06-12 RX ADMIN — Medication 3 MILLILITER(S): at 09:36

## 2019-06-12 RX ADMIN — ZINC SULFATE TAB 220 MG (50 MG ZINC EQUIVALENT) 220 MILLIGRAM(S): 220 (50 ZN) TAB at 11:07

## 2019-06-12 RX ADMIN — Medication 100 MILLIGRAM(S): at 17:11

## 2019-06-12 RX ADMIN — LATANOPROST 1 DROP(S): 0.05 SOLUTION/ DROPS OPHTHALMIC; TOPICAL at 21:19

## 2019-06-12 RX ADMIN — POLYETHYLENE GLYCOL 3350 17 GRAM(S): 17 POWDER, FOR SOLUTION ORAL at 11:07

## 2019-06-12 RX ADMIN — Medication 3 MILLILITER(S): at 15:06

## 2019-06-12 RX ADMIN — Medication 10 MILLIGRAM(S): at 12:06

## 2019-06-12 RX ADMIN — DORZOLAMIDE HYDROCHLORIDE TIMOLOL MALEATE 1 DROP(S): 20; 5 SOLUTION/ DROPS OPHTHALMIC at 17:10

## 2019-06-12 RX ADMIN — Medication 3 MILLILITER(S): at 04:38

## 2019-06-12 RX ADMIN — Medication 3 MILLILITER(S): at 21:28

## 2019-06-12 RX ADMIN — HEPARIN SODIUM 5000 UNIT(S): 5000 INJECTION INTRAVENOUS; SUBCUTANEOUS at 21:19

## 2019-06-12 RX ADMIN — Medication 5 MILLILITER(S): at 11:07

## 2019-06-12 RX ADMIN — Medication 500 MILLIGRAM(S): at 11:07

## 2019-06-12 RX ADMIN — PANTOPRAZOLE SODIUM 40 MILLIGRAM(S): 20 TABLET, DELAYED RELEASE ORAL at 11:07

## 2019-06-12 RX ADMIN — HEPARIN SODIUM 5000 UNIT(S): 5000 INJECTION INTRAVENOUS; SUBCUTANEOUS at 05:06

## 2019-06-12 NOTE — PROGRESS NOTE ADULT - PROBLEM SELECTOR PLAN 1
From pulmonary source, now resolved.  To complete course of antibiotics as per ID, continue to monitor fever curve and WBC count. From pulmonary source with bacteremia, now resolved.  To complete course of antibiotics as per ID, continue to monitor fever curve and WBC count.

## 2019-06-12 NOTE — PROGRESS NOTE ADULT - SUBJECTIVE AND OBJECTIVE BOX
Patient asking to get out of bed this AM, offers no complaints.  Asking when his swallowing test will be done.  BM documented yesterday, voiding and incontinent of urine.  Tolerating TF via PEG.    Vital Signs Last 24 Hrs  T(F): 98 (12 Jun 2019 09:02), Max: 98.4 (11 Jun 2019 22:39)  HR: 62 (12 Jun 2019 09:02) (56 - 65)  BP: 138/66 (12 Jun 2019 09:02) (123/56 - 140/65)  RR: 14 (12 Jun 2019 09:02) (14 - 16)  SpO2: 97% RA (12 Jun 2019 09:02) (94% - 97%)    No new labs or radiological studies     MEDICATIONS  (STANDING):  ALBUTerol/ipratropium for Nebulization 3 milliLiter(s) Nebulizer every 6 hours  ascorbic acid 500 milliGRAM(s) Oral daily  bisacodyl Suppository 10 milliGRAM(s) Rectal daily  ceFAZolin   IVPB 2000 milliGRAM(s) IV Intermittent every 12 hours  dorzolamide 2%/timolol 0.5% Ophthalmic Solution 1 Drop(s) Both EYES two times a day  heparin  Injectable 5000 Unit(s) SubCutaneous every 8 hours  latanoprost 0.005% Ophthalmic Solution 1 Drop(s) Both EYES at bedtime  multivitamin  Drops 5 milliLiter(s) Oral daily  pantoprazole   Suspension 40 milliGRAM(s) Oral daily  polyethylene glycol 3350 17 Gram(s) Oral daily  zinc sulfate 220 milliGRAM(s) Oral daily    Physical Exam  Gen:  Elderly male resting in bed, NAD  ENT:  NC/AT.  MMM, JVD noted  Thorax:  Symmetric, no retractions  Lung:  Diminished at bases bilaterally with scattered rhonchi  CV:  S1, S2. RRR  Abd:  Soft, NT/ND.  BS normoactive, no masses to palp.  PEG with dressing C/D/I  Extrem:  No C/C/E, DP/radial pulses +2  Neuro:  No gross motor/sensory deficits

## 2019-06-12 NOTE — PROGRESS NOTE ADULT - PROBLEM SELECTOR PLAN 7
On PPI and heparin SQ  OOB as tolerated  Continue PEG feeds, await MBS  DNR in the case of cardiac arrest, may intubate for respiratory failure  Dispo pending after course of antibiotics complete

## 2019-06-13 LAB
ANION GAP SERPL CALC-SCNC: 7 MMOL/L — SIGNIFICANT CHANGE UP (ref 5–17)
BUN SERPL-MCNC: 46 MG/DL — HIGH (ref 7–23)
CALCIUM SERPL-MCNC: 10 MG/DL — SIGNIFICANT CHANGE UP (ref 8.4–10.5)
CHLORIDE SERPL-SCNC: 111 MMOL/L — HIGH (ref 96–108)
CO2 SERPL-SCNC: 30 MMOL/L — SIGNIFICANT CHANGE UP (ref 22–31)
CREAT SERPL-MCNC: 1.43 MG/DL — HIGH (ref 0.5–1.3)
GLUCOSE SERPL-MCNC: 92 MG/DL — SIGNIFICANT CHANGE UP (ref 70–99)
HCT VFR BLD CALC: 27.2 % — LOW (ref 39–50)
HGB BLD-MCNC: 8.1 G/DL — LOW (ref 13–17)
MAGNESIUM SERPL-MCNC: 2.2 MG/DL — SIGNIFICANT CHANGE UP (ref 1.6–2.6)
MCHC RBC-ENTMCNC: 28.2 PG — SIGNIFICANT CHANGE UP (ref 27–34)
MCHC RBC-ENTMCNC: 29.8 GM/DL — LOW (ref 32–36)
MCV RBC AUTO: 94.8 FL — SIGNIFICANT CHANGE UP (ref 80–100)
NRBC # BLD: 0 /100 WBCS — SIGNIFICANT CHANGE UP (ref 0–0)
PHOSPHATE SERPL-MCNC: 3.8 MG/DL — SIGNIFICANT CHANGE UP (ref 2.5–4.5)
PLATELET # BLD AUTO: 429 K/UL — HIGH (ref 150–400)
POTASSIUM SERPL-MCNC: 4.5 MMOL/L — SIGNIFICANT CHANGE UP (ref 3.5–5.3)
POTASSIUM SERPL-SCNC: 4.5 MMOL/L — SIGNIFICANT CHANGE UP (ref 3.5–5.3)
RBC # BLD: 2.87 M/UL — LOW (ref 4.2–5.8)
RBC # FLD: 16.4 % — HIGH (ref 10.3–14.5)
SODIUM SERPL-SCNC: 148 MMOL/L — HIGH (ref 135–145)
WBC # BLD: 8.94 K/UL — SIGNIFICANT CHANGE UP (ref 3.8–10.5)
WBC # FLD AUTO: 8.94 K/UL — SIGNIFICANT CHANGE UP (ref 3.8–10.5)

## 2019-06-13 PROCEDURE — 74230 X-RAY XM SWLNG FUNCJ C+: CPT | Mod: 26

## 2019-06-13 PROCEDURE — 99233 SBSQ HOSP IP/OBS HIGH 50: CPT

## 2019-06-13 RX ADMIN — PANTOPRAZOLE SODIUM 40 MILLIGRAM(S): 20 TABLET, DELAYED RELEASE ORAL at 12:25

## 2019-06-13 RX ADMIN — Medication 3 MILLILITER(S): at 21:55

## 2019-06-13 RX ADMIN — Medication 5 MILLILITER(S): at 12:30

## 2019-06-13 RX ADMIN — Medication 500 MILLIGRAM(S): at 12:25

## 2019-06-13 RX ADMIN — Medication 10 MILLIGRAM(S): at 12:25

## 2019-06-13 RX ADMIN — Medication 3 MILLILITER(S): at 16:40

## 2019-06-13 RX ADMIN — Medication 100 MILLIGRAM(S): at 17:48

## 2019-06-13 RX ADMIN — HEPARIN SODIUM 5000 UNIT(S): 5000 INJECTION INTRAVENOUS; SUBCUTANEOUS at 05:28

## 2019-06-13 RX ADMIN — LATANOPROST 1 DROP(S): 0.05 SOLUTION/ DROPS OPHTHALMIC; TOPICAL at 21:28

## 2019-06-13 RX ADMIN — HEPARIN SODIUM 5000 UNIT(S): 5000 INJECTION INTRAVENOUS; SUBCUTANEOUS at 13:45

## 2019-06-13 RX ADMIN — ZINC SULFATE TAB 220 MG (50 MG ZINC EQUIVALENT) 220 MILLIGRAM(S): 220 (50 ZN) TAB at 12:24

## 2019-06-13 RX ADMIN — POLYETHYLENE GLYCOL 3350 17 GRAM(S): 17 POWDER, FOR SOLUTION ORAL at 12:25

## 2019-06-13 RX ADMIN — DORZOLAMIDE HYDROCHLORIDE TIMOLOL MALEATE 1 DROP(S): 20; 5 SOLUTION/ DROPS OPHTHALMIC at 17:48

## 2019-06-13 RX ADMIN — Medication 100 MILLIGRAM(S): at 05:27

## 2019-06-13 RX ADMIN — DORZOLAMIDE HYDROCHLORIDE TIMOLOL MALEATE 1 DROP(S): 20; 5 SOLUTION/ DROPS OPHTHALMIC at 05:28

## 2019-06-13 RX ADMIN — Medication 3 MILLILITER(S): at 10:01

## 2019-06-13 RX ADMIN — HEPARIN SODIUM 5000 UNIT(S): 5000 INJECTION INTRAVENOUS; SUBCUTANEOUS at 21:28

## 2019-06-13 NOTE — PROGRESS NOTE ADULT - SUBJECTIVE AND OBJECTIVE BOX
Patient offers no complaints this AM.  Tolerating TF via PEG, voiding and incontinent of urine.  BM documented yesterday    Vital Signs Last 24 Hrs  T(F): 99 (13 Jun 2019 09:13), Max: 99 (13 Jun 2019 09:13)  HR: 76 (13 Jun 2019 09:13) (59 - 76)  BP: 149/84 (13 Jun 2019 09:13) (113/72 - 149/84)  RR: 15 (13 Jun 2019 09:13) (15 - 15)  SpO2: 95% RA (13 Jun 2019 09:13) (93% - 97%)    Labs                        8.1    8.94  )-----------( 429      ( 13 Jun 2019 05:40 )             27.2     148<H>  |  111<H>  |  46<H>  ----------------------------<  92  4.5   |  30  |  1.43<H>    Ca    10.0      13 Jun 2019 05:40  Phos  3.8     06-13  Mg     2.2     06-13    MEDICATIONS   ALBUTerol/ipratropium for Nebulization 3 milliLiter(s) Nebulizer every 6 hours  ascorbic acid 500 milliGRAM(s) Oral daily  bisacodyl Suppository 10 milliGRAM(s) Rectal daily  ceFAZolin   IVPB 2000 milliGRAM(s) IV Intermittent every 12 hours  dorzolamide 2%/timolol 0.5% Ophthalmic Solution 1 Drop(s) Both EYES two times a day  heparin  Injectable 5000 Unit(s) SubCutaneous every 8 hours  latanoprost 0.005% Ophthalmic Solution 1 Drop(s) Both EYES at bedtime  multivitamin  Drops 5 milliLiter(s) Oral daily  pantoprazole   Suspension 40 milliGRAM(s) Oral daily  polyethylene glycol 3350 17 Gram(s) Oral daily  zinc sulfate 220 milliGRAM(s) Oral daily    Physical Exam  Gen:  Elderly male resting in bed, NAD  ENT:  NC/AT.  MMM, JVD noted  Thorax:  Symmetric, no retractions  Lung:  Diminished at bases bilaterally with scattered rhonchi  CV:  S1, S2. RRR  Abd:  Soft, NT/ND.  BS normoactive, no masses to palp.  PEG with dressing C/D/I  Extrem:  No C/C/E, DP/radial pulses +2  Neuro:  No gross motor/sensory deficits

## 2019-06-13 NOTE — SWALLOW VFSS/MBS ASSESSMENT ADULT - SLP GENERAL OBSERVATIONS
Pt noted with grossly functional language at the conversational level with significant hypophonia and is found grossly oriented. Preliminary cup sip of thin liquid resulted is significant wet/gurgly cough.

## 2019-06-13 NOTE — PROGRESS NOTE ADULT - SUBJECTIVE AND OBJECTIVE BOX
CC: f/u for mssa pneumonia and bacteremia    Patient reports nothing intelligible    REVIEW OF SYSTEMS:  All other review of systems cannot get (Comprehensive ROS)    Antimicrobials Day #  :14  ceFAZolin   IVPB 2000 milliGRAM(s) IV Intermittent every 12 hours    Other Medications Reviewed    T(F): 97.5 (06-13-19 @ 16:02), Max: 99 (06-13-19 @ 09:13)  HR: 71 (06-13-19 @ 16:41)  BP: 127/66 (06-13-19 @ 16:02)  RR: 16 (06-13-19 @ 16:02)  SpO2: 97% (06-13-19 @ 16:02)  Wt(kg): --    PHYSICAL EXAM:  General: alert, calm  Eyes:  anicteric, no conjunctival injection, no discharge  Oropharynx: no lesions or injection 	  Neck: supple, without adenopathy  Lungs: rales  to auscultation  Heart: irregular rate and rhythm; no murmur, rubs or gallops  Abdomen: soft, nondistended, nontender, without mass or organomegaly  Skin: no lesions  Extremities: no clubbing, cyanosis. some  edema  Neurologic: awake speaking unintelligibly    LAB RESULTS:                        8.1    8.94  )-----------( 429      ( 13 Jun 2019 05:40 )             27.2     06-13    148<H>  |  111<H>  |  46<H>  ----------------------------<  92  4.5   |  30  |  1.43<H>    Ca    10.0      13 Jun 2019 05:40  Phos  3.8     06-13  Mg     2.2     06-13          MICROBIOLOGY:  RECENT CULTURES:  06-09 @ 05:20 .Blood Blood     No growth to date.          RADIOLOGY REVIEWED:    < from: Xray Modified Barium Swallow (06.13.19 @ 11:07) >    EXAM:  SWALLOWING FUNCTION W VIDEO      PROCEDURE DATE:  06/13/2019        INTERPRETATION:  Swallowing function video. Patient has swallowing   difficulty.    104.1 seconds of fluoroscopy was utilized.    With thin liquids there was evidence of deep penetration with retrieval.   With nectar material there was occasional penetration with retrieval.    With honey material there was no penetration.    IMPRESSION: As above.      < end of copied text >      Assessment:  Elderly man with mssa bacteremia from pneumonia now finishing 2 weeks of cefazolin for another 2 weeks of enteral route keflex  Plan: finish cefazolin today  start enteral keflex 500mg tid tomorrow for 2 more weeks  aspiration precautions

## 2019-06-13 NOTE — SWALLOW VFSS/MBS ASSESSMENT ADULT - DIAGNOSTIC IMPRESSIONS
t presents with mod-severe oropharyngeal dysphagia marked by prolonged and uncoordinated bolus formation and transport for semi-solids and liquids and premature spillage of all consistencies to the valleculae due to reduced oromotor strength/ROM. Pharyngeal phase deficits are marked by a delayed swallow triggered at the valleculae with bolus remaining in valleculae for a prolonged amount of time prior to swallow trigger due to reduced sensation. Significantly reduced hyolaryngeal excursion results t presents with mod-severe oropharyngeal dysphagia marked by prolonged and uncoordinated bolus formation and transport for semi-solids and liquids and premature spillage of all consistencies to the valleculae due to reduced oromotor strength/ROM. Pharyngeal phase deficits are marked by a delayed swallow triggered at the valleculae with bolus remaining in valleculae for a prolonged amount of time prior to swallow trigger due to reduced sensation. Significantly reduced hyolaryngeal excursion results in reduced epiglottic inversion causing aspiration of thins liquids and penetration of nectar thick liquids with residual that is cleared on secondary swallow. No sensation noted with penetration or aspiration. Pt also noted with mod vallecular and mild puriform sinus residue with puree and thickened liquids that is adequately cleared with a spontaneous subsequent swallow. No penetration or aspiration noted with honey think liquids or puree consistencies. Pt presents with mod-severe oropharyngeal dysphagia marked by prolonged and uncoordinated bolus formation and transport for semi-solids and liquids and premature spillage of all consistencies to the valleculae due to reduced oromotor strength/ROM. Pharyngeal phase deficits are marked by a delayed swallow triggered at the valleculae with bolus remaining in valleculae for a prolonged amount of time prior to swallow trigger due to reduced sensation. Significantly reduced hyolaryngeal excursion results in reduced epiglottic inversion causing aspiration of thins liquids and penetration of nectar thick liquids with residual that is cleared on secondary swallow. No sensation noted with penetration or aspiration. Pt also noted with mod vallecular and mild puriform sinus residue with puree and thickened liquids that is adequately cleared with a spontaneous subsequent swallow. No penetration or aspiration noted with honey think liquids or puree consistencies.

## 2019-06-13 NOTE — SWALLOW VFSS/MBS ASSESSMENT ADULT - ROSENBEK'S PENETRATION ASPIRATION SCALE
(1) no aspiration, contrast does not enter airway (3) contrast remains above the vocal cords, visible residue remains (penetration)/cleared following secondary swallow (6) contrast passes glottis, no subglottic residue remains (aspiration)

## 2019-06-13 NOTE — PROGRESS NOTE ADULT - PROBLEM SELECTOR PLAN 1
Sepsis from pulmonary source with MMSA bacteremia, now resolved.  To complete course of antibiotics as per ID, continue to monitor fever curve and WBC count.

## 2019-06-13 NOTE — SWALLOW VFSS/MBS ASSESSMENT ADULT - ORAL PHASE
Delayed oral transit time/Reduced anterior - posterior transport/Uncontrolled bolus / spillover in abilio-pharynx/Incomplete tongue to palate contact Incomplete tongue to palate contact/Delayed oral transit time/Reduced anterior - posterior transport/Uncontrolled bolus / spillover in abilio-pharynx Incomplete tongue to palate contact/Uncontrolled bolus / spillover in hypopharynx/Delayed oral transit time/Reduced anterior - posterior transport Uncontrolled bolus / spillover in abilio-pharynx/Delayed oral transit time/Reduced anterior - posterior transport/Incomplete tongue to palate contact Delayed oral transit time/Incomplete tongue to palate contact/Uncontrolled bolus / spillover in hypopharynx/Reduced anterior - posterior transport Reduced anterior - posterior transport/Incomplete tongue to palate contact/Delayed oral transit time/Uncontrolled bolus / spillover in abilio-pharynx

## 2019-06-13 NOTE — SWALLOW VFSS/MBS ASSESSMENT ADULT - SLP PERTINENT HISTORY OF CURRENT PROBLEM
86 yo M with PMHx of Glaucoma, benign prostate neoplasm, HTN, HLD, Grad 3 Diastolic Heart Failure s/p ppm, CKD, dysphagia s/p PEG tube placement, CVA presents via EMS from [facility] for eval of SOB. Patient was reportedly eating dysphagia diet meal when he began to experience SOB with increase secretions and altered. Collateral obtained from facility suggests there was a brief period where patient received compressions. Upon arrival to the ED patient was found to be hypotensive 60/30s. Further decompensation involving patient respiratory status occurred due to secretions and mucous plugging leading to hypoxia prompting emergent intubation.

## 2019-06-13 NOTE — SWALLOW VFSS/MBS ASSESSMENT ADULT - RECOMMENDED FEEDING/EATING TECHNIQUES
allow for swallow between intakes/crush medication (when feasible)/position upright (90 degrees)/maintain upright posture during/after eating for 30 mins/oral hygiene/small sips/bites

## 2019-06-14 ENCOUNTER — TRANSCRIPTION ENCOUNTER (OUTPATIENT)
Age: 84
End: 2019-06-14

## 2019-06-14 VITALS — OXYGEN SATURATION: 95 %

## 2019-06-14 DIAGNOSIS — K21.9 GASTRO-ESOPHAGEAL REFLUX DISEASE WITHOUT ESOPHAGITIS: ICD-10-CM

## 2019-06-14 DIAGNOSIS — R13.10 DYSPHAGIA, UNSPECIFIED: ICD-10-CM

## 2019-06-14 DIAGNOSIS — D64.9 ANEMIA, UNSPECIFIED: ICD-10-CM

## 2019-06-14 DIAGNOSIS — I63.9 CEREBRAL INFARCTION, UNSPECIFIED: ICD-10-CM

## 2019-06-14 PROCEDURE — 87486 CHLMYD PNEUM DNA AMP PROBE: CPT

## 2019-06-14 PROCEDURE — 86901 BLOOD TYPING SEROLOGIC RH(D): CPT

## 2019-06-14 PROCEDURE — 94003 VENT MGMT INPAT SUBQ DAY: CPT

## 2019-06-14 PROCEDURE — 86900 BLOOD TYPING SEROLOGIC ABO: CPT

## 2019-06-14 PROCEDURE — 94667 MNPJ CHEST WALL 1ST: CPT

## 2019-06-14 PROCEDURE — 84100 ASSAY OF PHOSPHORUS: CPT

## 2019-06-14 PROCEDURE — 87186 SC STD MICRODIL/AGAR DIL: CPT

## 2019-06-14 PROCEDURE — 36600 WITHDRAWAL OF ARTERIAL BLOOD: CPT

## 2019-06-14 PROCEDURE — 80053 COMPREHEN METABOLIC PANEL: CPT

## 2019-06-14 PROCEDURE — 96365 THER/PROPH/DIAG IV INF INIT: CPT | Mod: XU

## 2019-06-14 PROCEDURE — 93005 ELECTROCARDIOGRAM TRACING: CPT

## 2019-06-14 PROCEDURE — 87633 RESP VIRUS 12-25 TARGETS: CPT

## 2019-06-14 PROCEDURE — 92611 MOTION FLUOROSCOPY/SWALLOW: CPT

## 2019-06-14 PROCEDURE — 31500 INSERT EMERGENCY AIRWAY: CPT

## 2019-06-14 PROCEDURE — 36415 COLL VENOUS BLD VENIPUNCTURE: CPT

## 2019-06-14 PROCEDURE — 93306 TTE W/DOPPLER COMPLETE: CPT

## 2019-06-14 PROCEDURE — 99291 CRITICAL CARE FIRST HOUR: CPT | Mod: 25

## 2019-06-14 PROCEDURE — 85730 THROMBOPLASTIN TIME PARTIAL: CPT

## 2019-06-14 PROCEDURE — 83605 ASSAY OF LACTIC ACID: CPT

## 2019-06-14 PROCEDURE — 80048 BASIC METABOLIC PNL TOTAL CA: CPT

## 2019-06-14 PROCEDURE — 83880 ASSAY OF NATRIURETIC PEPTIDE: CPT

## 2019-06-14 PROCEDURE — 83735 ASSAY OF MAGNESIUM: CPT

## 2019-06-14 PROCEDURE — 86850 RBC ANTIBODY SCREEN: CPT

## 2019-06-14 PROCEDURE — 94760 N-INVAS EAR/PLS OXIMETRY 1: CPT

## 2019-06-14 PROCEDURE — 74230 X-RAY XM SWLNG FUNCJ C+: CPT

## 2019-06-14 PROCEDURE — 81001 URINALYSIS AUTO W/SCOPE: CPT

## 2019-06-14 PROCEDURE — 94668 MNPJ CHEST WALL SBSQ: CPT

## 2019-06-14 PROCEDURE — 92610 EVALUATE SWALLOWING FUNCTION: CPT

## 2019-06-14 PROCEDURE — 87150 DNA/RNA AMPLIFIED PROBE: CPT

## 2019-06-14 PROCEDURE — 97162 PT EVAL MOD COMPLEX 30 MIN: CPT

## 2019-06-14 PROCEDURE — 94002 VENT MGMT INPAT INIT DAY: CPT

## 2019-06-14 PROCEDURE — 87798 DETECT AGENT NOS DNA AMP: CPT

## 2019-06-14 PROCEDURE — 84145 PROCALCITONIN (PCT): CPT

## 2019-06-14 PROCEDURE — 84484 ASSAY OF TROPONIN QUANT: CPT

## 2019-06-14 PROCEDURE — 85610 PROTHROMBIN TIME: CPT

## 2019-06-14 PROCEDURE — P9047: CPT

## 2019-06-14 PROCEDURE — 85027 COMPLETE CBC AUTOMATED: CPT

## 2019-06-14 PROCEDURE — 82803 BLOOD GASES ANY COMBINATION: CPT

## 2019-06-14 PROCEDURE — 87070 CULTURE OTHR SPECIMN AEROBIC: CPT

## 2019-06-14 PROCEDURE — 94640 AIRWAY INHALATION TREATMENT: CPT

## 2019-06-14 PROCEDURE — 87040 BLOOD CULTURE FOR BACTERIA: CPT

## 2019-06-14 PROCEDURE — 87086 URINE CULTURE/COLONY COUNT: CPT

## 2019-06-14 PROCEDURE — 87581 M.PNEUMON DNA AMP PROBE: CPT

## 2019-06-14 PROCEDURE — 92526 ORAL FUNCTION THERAPY: CPT

## 2019-06-14 PROCEDURE — 71045 X-RAY EXAM CHEST 1 VIEW: CPT

## 2019-06-14 RX ORDER — CEPHALEXIN 500 MG
500 CAPSULE ORAL THREE TIMES A DAY
Refills: 0 | Status: DISCONTINUED | OUTPATIENT
Start: 2019-06-14 | End: 2019-06-14

## 2019-06-14 RX ORDER — ZINC SULFATE TAB 220 MG (50 MG ZINC EQUIVALENT) 220 (50 ZN) MG
1 TAB ORAL
Qty: 0 | Refills: 0 | DISCHARGE
Start: 2019-06-14

## 2019-06-14 RX ORDER — ASCORBIC ACID 60 MG
1 TABLET,CHEWABLE ORAL
Qty: 0 | Refills: 0 | DISCHARGE
Start: 2019-06-14

## 2019-06-14 RX ORDER — CEPHALEXIN 500 MG
1 CAPSULE ORAL
Qty: 0 | Refills: 0 | DISCHARGE
Start: 2019-06-14

## 2019-06-14 RX ORDER — PANTOPRAZOLE SODIUM 20 MG/1
40 TABLET, DELAYED RELEASE ORAL
Qty: 0 | Refills: 0 | DISCHARGE
Start: 2019-06-14

## 2019-06-14 RX ORDER — HEPARIN SODIUM 5000 [USP'U]/ML
5000 INJECTION INTRAVENOUS; SUBCUTANEOUS
Qty: 0 | Refills: 0 | DISCHARGE
Start: 2019-06-14

## 2019-06-14 RX ADMIN — HEPARIN SODIUM 5000 UNIT(S): 5000 INJECTION INTRAVENOUS; SUBCUTANEOUS at 05:10

## 2019-06-14 RX ADMIN — Medication 100 MILLIGRAM(S): at 05:12

## 2019-06-14 RX ADMIN — DORZOLAMIDE HYDROCHLORIDE TIMOLOL MALEATE 1 DROP(S): 20; 5 SOLUTION/ DROPS OPHTHALMIC at 05:09

## 2019-06-14 RX ADMIN — Medication 3 MILLILITER(S): at 10:42

## 2019-06-14 RX ADMIN — Medication 3 MILLILITER(S): at 03:53

## 2019-06-14 NOTE — PROGRESS NOTE ADULT - ATTENDING COMMENTS
I have personally seen and examined patient on the above date.  I discussed the case with Grace Brown NP and I agree with findings and plan as detailed per note above, which I have amended where appropriate.
Pt seen and examined   comfortable   appreciate S&S oral diet advanced    Assessment:  1. Septic shock (resolved) and Acute hypoxic respiratory failure (resolved- extubated 6/7) due to Aspiration PNA - Left with Staph Aureus Bacteremia.   2. Acute on chronic renal dysfunction Baseline Cr 1.33  3. Possible  PEA arrest in  prior to arrival - "brief period where patient received compressions" as per H&P  4. Chronic Diastolic CHF  5. Dysphagia  s/p PEG s/p CVA     Plan:  Oxygen via NC / RA  Chest PT/Pulm toilet/ DuoNeb  Abx as per ID finish IV course and d/c planning with two weeks of oral antibiotics  oral feeding and Feedings via PEG tube. Free water supplementation    Family Updated: 	Dtr Amelie -  6/14 updated bedside    Disposition: Continue care on AI, discharge today back to facility  Goals of Care: DNR - intubation ok .
Pt seen and examined   comfortable   appreciate S&S oral diet advanced    Assessment:  1. Septic shock (resolved) and Acute hypoxic respiratory failure (resolved- extubated 6/7) due to Aspiration PNA - Left with Staph Aureus Bacteremia.   2. Acute on chronic renal dysfunction Baseline Cr 1.33  3. Possible  PEA arrest in GG prior to arrival - "brief period where patient received compressions" as per H&P  4. Chronic Diastolic CHF  5. Dysphagia  s/p PEG s/p CVA     Plan:  Oxygen via NC / RA  Chest PT/Pulm toilet/ DuoNeb  Abx as per ID finish IV course and d/c planning   oral feeding and Feedings via PEG tube         Family Updated: 	Dtr Amelie -  6/11 updated bedside                                      wife Angie [phone]	              Date: 6/12/19 msg left for call back    Disposition: Continue care on AI, dipo planning in am  Goals of Care: DNR - intubation ok .
Pt seen and examined   comfortable   d/w S&S request MBS for am  finishing IV abx this week .    Assessment:  1. Septic shock (resolved) and Acute hypoxic respiratory failure (resolved- extubated 6/7) due to Aspiration PNA - Left with Staph Aureus Bacteremia.   2. Acute on chronic renal dysfunction Baseline Cr 1.33  3. Possible  PEA arrest in GG prior to arrival - "brief period where patient received compressions" as per H&P  4. Chronic Diastolic CHF  5. Dysphagia  s/p PEG s/p CVA     Plan:  Oxygen via NC / RA  Chest PT/Pulm toilet/ DuoNeb  Abx as per ID  Feedings via PEG tube   MBS In am      Family Updated: 	Dtr Amelie -  6/8 updated bedside                                      wife Angie [phone]	              Date: 6/11/19    Disposition: Continue care on AI  Goals of Care: DNR - intubation ok
Pt seen and examined   comfortable   secretions stable.    Assessment:  1. Septic shock (resolved) and Acute hypoxic respiratory failure (resolved- extubated 6/7) due to Aspiration PNA - Left with Staph Aureus Bacteremia.   2. Acute on chronic renal dysfunction Baseline Cr 1.33  3. Possible  PEA arrest in GG prior to arrival - "brief period where patient received compressions" as per H&P  4. Chronic Diastolic CHF  5. Dysphagia  s/p PEG s/p CVA     Plan:  Oxygen via NC / RA  Chest PT/Pulm toilet/ DuoNeb  Abx as per ID finish IV course and d/c planning   Feedings via PEG tube   awaiting MBS      Family Updated: 	Dtr Amelie -  6/11 updated bedside                                      wife Angie [phone]	              Date: 6/12/19 msg left for call back    Disposition: Continue care on AI  Goals of Care: DNR - intubation ok .

## 2019-06-14 NOTE — PROGRESS NOTE ADULT - ASSESSMENT
84 yo M, here with hypoxic respiratory failure, possible aspiration, and MSSA bacteremia (1 of 4)  Hx of CKD, CVA, CAD, HTN, DM, GERD, PPM and G tube.  With MSSA isolated from sputum, likely primary pneumonia (L opacity)  No obvious cutaneous portal for bacteremia  Successfully extubated & pressors weaned off  Repeat blood cultures 6/03/19 negative  Afebrile, normal WBC    PLAN:   completed 2 weeks of IV Cefazolin now  Follow temps and CBC/diff   start enteral keflex 500mg tid for 2 more weeks  aspiration precautions

## 2019-06-14 NOTE — PROGRESS NOTE ADULT - REASON FOR ADMISSION
Hypoxic respiratory failure, Aspiration pneumonia, sepsis

## 2019-06-14 NOTE — PROGRESS NOTE ADULT - PROVIDER SPECIALTY LIST ADULT
Critical Care
Infectious Disease
MICU
Palliative Care
Critical Care
CCU
Critical Care
Critical Care
Infectious Disease
MICU

## 2019-06-14 NOTE — DISCHARGE NOTE NURSING/CASE MANAGEMENT/SOCIAL WORK - NSDCDPATPORTLINK_GEN_ALL_CORE
You can access the [employer] Patient Portal, offered by [facility], by registering with the following website: [email or web address]

## 2019-06-14 NOTE — PROGRESS NOTE ADULT - PROBLEM SELECTOR PLAN 5
continue  heparin SQ for DVT with SD'S  continue protonix for GI  contiue aspiration precautions by keeping head of bed at 30 degrees  Fentanyl for sedation  Feedings via PEG tube
monitor for signs of anemia
continue  heparin SQ for DVT with SD'S  continue protonix for GI  contiue aspiration precautions by keeping head of bed at 30 degrees  Fentanyl for sedation  Feedings via PEG tube.
Continue to monitor, transfuse PRN

## 2019-06-14 NOTE — DISCHARGE NOTE NURSING/CASE MANAGEMENT/SOCIAL WORK - NSDCPEWEB_GEN_ALL_CORE
[place] website --- [email or web address] for Tobacco [email or web address] website --- [email or web address]

## 2019-06-14 NOTE — DISCHARGE NOTE NURSING/CASE MANAGEMENT/SOCIAL WORK - NSDCPEEMAIL_GEN_ALL_CORE
[facility] for Tobacco Control email [email or web address]

## 2019-06-14 NOTE — DISCHARGE NOTE PROVIDER - INSTRUCTIONS
Jevity 1.5 340cc per PEG tube q6 hours  dysphagia 1 pureed Honey thick consistency  Aspiration precautons

## 2019-06-14 NOTE — PROGRESS NOTE ADULT - PROBLEM SELECTOR PROBLEM 2
Septic shock
MELISSA (acute kidney injury)
Septic shock
Septic shock
MELISSA (acute kidney injury)

## 2019-06-14 NOTE — PROGRESS NOTE ADULT - PROBLEM SELECTOR PROBLEM 4
MELISSA (acute kidney injury)
Dysphagia, unspecified type
MELISSA (acute kidney injury)
Dysphagia

## 2019-06-14 NOTE — PROGRESS NOTE ADULT - PROBLEM SELECTOR PLAN 4
Hold nephrotoxic meds  Continue aggressive hydration  Trend urine output  Follow up BUN/Creatinine  follow up electrolytes
PEG tube feeds  aspiration precautions
Hold nephrotoxic meds  Continue aggressive hydration  Trend urine output  Follow up BUN/Creatinine  follow up electrolytes
Status post PEG.  Evaluated by speech and swallow, for MBS today to determine PO status.  Continue PEG feeds at this time.
Status post PEG.  Evaluated by speech and swallow, for MBS today to determine PO status.  Continue PEG feeds at this time.
Status post PEG.  Speech and swallow re-evaluation this AM as patient reported to have been tolerating PO feeds, continue PEG feeds at this time.

## 2019-06-14 NOTE — PROGRESS NOTE ADULT - PROBLEM SELECTOR PROBLEM 3
Aspiration pneumonia
Cerebral infarction, unspecified mechanism
Aspiration pneumonia
Cerebral infarction
MELISSA (acute kidney injury)

## 2019-06-14 NOTE — DISCHARGE NOTE PROVIDER - NSDCCPCAREPLAN_GEN_ALL_CORE_FT
PRINCIPAL DISCHARGE DIAGNOSIS  Diagnosis: Septic shock  Assessment and Plan of Treatment: Continue course of PO antibiotics for 2 weeks.      SECONDARY DISCHARGE DIAGNOSES  Diagnosis: Congestive heart failure, unspecified HF chronicity, unspecified heart failure type  Assessment and Plan of Treatment: Continue current managemetn    Diagnosis: Urinary tract infection with hematuria, site unspecified  Assessment and Plan of Treatment:     Diagnosis: Chronic kidney disease, unspecified CKD stage  Assessment and Plan of Treatment: Continue to monitor renal status and avoid nephrotoxic medications    Diagnosis: Respiratory distress  Assessment and Plan of Treatment: continue steroid taper  continue MDI  continue inhaled steroids

## 2019-06-14 NOTE — PROGRESS NOTE ADULT - PROBLEM SELECTOR PLAN 3
Initial broad spectrum coverage for MDR organisms including staph aureus and gram negatives.   Follow up sputum culture the narrow spectrum based on culture  sensitivities  Mucomyst, Duoneb and chest pt regularly
monitor neuro status continue prophylaxis
Initial broad spectrum coverage for MDR organisms including staph aureus and gram negatives.   Follow up sputum culture the narrow spectrum based on culture  sensitivities  Mucomyst, Duoneb and chest pt regularly.
-avoid nephrotoxic meds. f/u urine output and trend bun/cr. improving.
With dysphagia, supportive care with PT/OT
With dysphagia, supportive care with PT/OT.
With dysphagia, supportive care with PT/OT.

## 2019-06-14 NOTE — DISCHARGE NOTE PROVIDER - HOSPITAL COURSE
84yo M w/PMH CVA, dysphagia s/p PEG, GERD, MELISSA, Anemia, IBS, depression admitted for aspiration pneumonia/pulmonary sepsis.  Patient completed course of IV anitibiotics for 2 weeks and will continue PO antibiotics for 2 weeks to complete course for MSSA pnuemonia.  Patient to be transferred back to [facility] per patient and family. 84yo M w/PMH CVA, dysphagia s/p PEG, GERD, MELISSA, Anemia, IBS, depression admitted for aspiration pneumonia/pulmonary sepsis. Found to have MSSA bacteremia. Transthoracic echo with out vegetation. Patient completed course of IV anitibiotics for 2 weeks and will continue PO antibiotics for 2 weeks to complete course for MSSA bacteremia.  Patient to be transferred back to [facility] per patient and family.

## 2019-06-14 NOTE — PROGRESS NOTE ADULT - PROBLEM SELECTOR PROBLEM 1
Acute respiratory failure with hypoxia
Acute respiratory failure with hypoxia
Septic shock
Acute respiratory failure with hypoxia
Septic shock

## 2019-06-14 NOTE — DISCHARGE NOTE NURSING/CASE MANAGEMENT/SOCIAL WORK - NSDCPEPTSTRK_GEN_ALL_CORE
Risk factors for stroke/Need for follow up after discharge/Stroke education booklet/Stroke warning signs and symptoms/Signs and symptoms of stroke/Prescribed medications/Stroke support groups for patients, families, and friends/Call 911 for stroke

## 2019-06-14 NOTE — PROGRESS NOTE ADULT - PROBLEM SELECTOR PLAN 2
-30 cc/kg fluid challenge without improvement in blood pressure  -patient currently on Levophed, will titrate for MAP 65-70  -repeat lactate  -Albumin and midodrine tonight myranda levo to off by morning   -blood/urine/sputum cultures, then initiate broad spectrum antibiotics  -follow cultures and narrow antibitoics as able  -goal UOP >0.5 cc/kg/hr  -procalcitonin
monitor renal function
-cultures positive for staph aureus, not MRSA. cardio consulted for possible RANDALL to r/u endocarditis.   levo titrated off, MAP is maintaining above 65. continue cefazolin. ID following.  -trend urine output, maintain > 0.5 cc/kg/hr.
On CKD, stable.  Monitor electrolytes and for urine output
improving today   -patient currently off  Levophed, MAP are remaining obove 65-70 with midodrine   - will lower requency of midodrine dose to Q 8 hrs to wean that support   -continue to follow up blood/urine/sputum cultures, continue  broad spectrum antibiotics  -follow cultures and narrow antibitoics as able  -goal UOP >0.5 cc/kg/hr

## 2019-06-14 NOTE — PROGRESS NOTE ADULT - SUBJECTIVE AND OBJECTIVE BOX
CC: f/u for mssa pneumonia and bacteremia    Patient reports no new c/o  REVIEW OF SYSTEMS:  All other review of systems-limited (Comprehensive ROS)    Antimicrobials Day #  :15  ceFAZolin   IVPB 2000 milliGRAM(s) IV Intermittent every 12 hours    Other Medications Reviewed    Vital Signs Last 24 Hrs  T(C): 36.4 (14 Jun 2019 08:50), Max: 36.7 (13 Jun 2019 20:47)  T(F): 97.5 (14 Jun 2019 08:50), Max: 98 (13 Jun 2019 20:47)  HR: 62 (14 Jun 2019 08:50) (59 - 74)  BP: 140/84 (14 Jun 2019 08:50) (117/67 - 140/84)  BP(mean): --  RR: 15 (14 Jun 2019 08:50) (15 - 16)  SpO2: 96% (14 Jun 2019 08:50) (95% - 98%)    PHYSICAL EXAM:  General: alert, calm  Eyes:  anicteric, no conjunctival injection, no discharge  Oropharynx: no lesions or injection 	  Neck: supple, without adenopathy  Lungs: rales  to auscultation  Heart: irregular rate and rhythm; no murmur, rubs or gallops  Abdomen: soft, nondistended, nontender, without mass or organomegaly  Skin: no lesions  Extremities: no clubbing, cyanosis. some  edema  Neurologic: awake, follows simple commands    LAB RESULTS:                          8.1    8.94  )-----------( 429      ( 13 Jun 2019 05:40 )             27.2   06-13    148<H>  |  111<H>  |  46<H>  ----------------------------<  92  4.5   |  30  |  1.43<H>    Ca    10.0      13 Jun 2019 05:40  Phos  3.8     06-13  Mg     2.2     06-13              MICROBIOLOGY:  RECENT CULTURES:  06-09 @ 05:20 .Blood Blood     No growth to date.          RADIOLOGY REVIEWED:    < from: Xray Modified Barium Swallow (06.13.19 @ 11:07) >    EXAM:  SWALLOWING FUNCTION W VIDEO      PROCEDURE DATE:  06/13/2019        INTERPRETATION:  Swallowing function video. Patient has swallowing   difficulty.    104.1 seconds of fluoroscopy was utilized.    With thin liquids there was evidence of deep penetration with retrieval.   With nectar material there was occasional penetration with retrieval.    With honey material there was no penetration.    IMPRESSION: As above.      < end of copied text >

## 2019-06-14 NOTE — PROGRESS NOTE ADULT - PROBLEM SELECTOR PROBLEM 6
Gastroesophageal reflux disease, esophagitis presence not specified
GERD (gastroesophageal reflux disease)

## 2019-06-14 NOTE — PROGRESS NOTE ADULT - ASSESSMENT
86 yo M admitted for aspiration PNA - IV antibiotic course complete - will continue PO course x2 weeks per ID recommendations.  Patient is alert and interactive - dispo planning. 84 yo M PMH CVA, dysphagia s/p PEG, GERD, MELISSA, Anemia, IBS, depression admitted for aspiration PNA pulmonary sepsis.    Dispo planning discussed with case management - will discuss with wife as well - patient completed course of IV Abx - will continue PO keflex x 2 weeks per ID.

## 2019-07-13 NOTE — DIETITIAN INITIAL EVALUATION ADULT. - PATIENT PROFILE REVIEWED
yes General: Well developed; well nourished; in no acute distress    Eyes: PERRLA, EOM intact; conjunctiva and sclera clear  Head: Normocephalic; atraumatic  ENMT: External ear normal, tympanic membranes intact, nasal mucosa normal, no nasal discharge; airway clear, oropharynx clear  Neck: Supple; non tender; No cervical adenopathy  Respiratory: No chest wall deformity, normal respiratory pattern, clear to auscultation bilaterally  Cardiovascular: Regular rate and rhythm. S1 and S2 Normal; No murmurs, gallops or rubs. No trills.  Abdominal: Soft tender periumbilically and right middle/lower quadrant, non-distended; normal bowel sounds; no hepatosplenomegaly; no masses  Genitourinary: No pain in testes

## 2019-07-28 ENCOUNTER — INPATIENT (INPATIENT)
Facility: HOSPITAL | Age: 84
LOS: 16 days | Discharge: HOME | DRG: 870 | End: 2019-08-14
Attending: INTERNAL MEDICINE | Admitting: INTERNAL MEDICINE
Payer: MEDICARE

## 2019-07-28 VITALS
HEART RATE: 60 BPM | RESPIRATION RATE: 29 BRPM | OXYGEN SATURATION: 97 % | SYSTOLIC BLOOD PRESSURE: 63 MMHG | DIASTOLIC BLOOD PRESSURE: 49 MMHG

## 2019-07-28 DIAGNOSIS — E78.5 HYPERLIPIDEMIA, UNSPECIFIED: ICD-10-CM

## 2019-07-28 DIAGNOSIS — N40.0 BENIGN PROSTATIC HYPERPLASIA WITHOUT LOWER URINARY TRACT SYMPTOMS: ICD-10-CM

## 2019-07-28 DIAGNOSIS — A41.9 SEPSIS, UNSPECIFIED ORGANISM: ICD-10-CM

## 2019-07-28 DIAGNOSIS — Z95.0 PRESENCE OF CARDIAC PACEMAKER: Chronic | ICD-10-CM

## 2019-07-28 DIAGNOSIS — I50.32 CHRONIC DIASTOLIC (CONGESTIVE) HEART FAILURE: ICD-10-CM

## 2019-07-28 DIAGNOSIS — I63.9 CEREBRAL INFARCTION, UNSPECIFIED: ICD-10-CM

## 2019-07-28 DIAGNOSIS — Z29.9 ENCOUNTER FOR PROPHYLACTIC MEASURES, UNSPECIFIED: ICD-10-CM

## 2019-07-28 DIAGNOSIS — Z93.1 GASTROSTOMY STATUS: Chronic | ICD-10-CM

## 2019-07-28 DIAGNOSIS — K58.9 IRRITABLE BOWEL SYNDROME WITHOUT DIARRHEA: ICD-10-CM

## 2019-07-28 DIAGNOSIS — J69.0 PNEUMONITIS DUE TO INHALATION OF FOOD AND VOMIT: ICD-10-CM

## 2019-07-28 DIAGNOSIS — D64.9 ANEMIA, UNSPECIFIED: ICD-10-CM

## 2019-07-28 DIAGNOSIS — N17.9 ACUTE KIDNEY FAILURE, UNSPECIFIED: ICD-10-CM

## 2019-07-28 DIAGNOSIS — D69.6 THROMBOCYTOPENIA, UNSPECIFIED: ICD-10-CM

## 2019-07-28 DIAGNOSIS — J96.01 ACUTE RESPIRATORY FAILURE WITH HYPOXIA: ICD-10-CM

## 2019-07-28 DIAGNOSIS — K21.9 GASTRO-ESOPHAGEAL REFLUX DISEASE WITHOUT ESOPHAGITIS: ICD-10-CM

## 2019-07-28 LAB
ALBUMIN SERPL ELPH-MCNC: 2.1 G/DL — LOW (ref 3.3–5)
ALBUMIN SERPL ELPH-MCNC: 2.3 G/DL — LOW (ref 3.3–5)
ALP SERPL-CCNC: 156 U/L — HIGH (ref 40–120)
ALP SERPL-CCNC: 79 U/L — SIGNIFICANT CHANGE UP (ref 40–120)
ALT FLD-CCNC: 27 U/L DA — SIGNIFICANT CHANGE UP (ref 10–45)
ALT FLD-CCNC: 27 U/L DA — SIGNIFICANT CHANGE UP (ref 10–45)
ANION GAP SERPL CALC-SCNC: 9 MMOL/L — SIGNIFICANT CHANGE UP (ref 5–17)
ANION GAP SERPL CALC-SCNC: 9 MMOL/L — SIGNIFICANT CHANGE UP (ref 5–17)
ANISOCYTOSIS BLD QL: SIGNIFICANT CHANGE UP
APPEARANCE UR: ABNORMAL
APPEARANCE UR: CLEAR — SIGNIFICANT CHANGE UP
APTT BLD: 59.8 SEC — HIGH (ref 27.5–36.3)
AST SERPL-CCNC: 40 U/L — SIGNIFICANT CHANGE UP (ref 10–40)
AST SERPL-CCNC: 49 U/L — HIGH (ref 10–40)
BACTERIA # UR AUTO: ABNORMAL /HPF
BASOPHILS # BLD AUTO: 0 K/UL — SIGNIFICANT CHANGE UP (ref 0–0.2)
BASOPHILS NFR BLD AUTO: 0 % — SIGNIFICANT CHANGE UP (ref 0–2)
BILIRUB SERPL-MCNC: 0.2 MG/DL — SIGNIFICANT CHANGE UP (ref 0.2–1.2)
BILIRUB SERPL-MCNC: 0.6 MG/DL — SIGNIFICANT CHANGE UP (ref 0.2–1.2)
BILIRUB UR-MCNC: NEGATIVE — SIGNIFICANT CHANGE UP
BILIRUB UR-MCNC: NEGATIVE — SIGNIFICANT CHANGE UP
BLD GP AB SCN SERPL QL: SIGNIFICANT CHANGE UP
BUN SERPL-MCNC: 54 MG/DL — HIGH (ref 7–23)
BUN SERPL-MCNC: 59 MG/DL — HIGH (ref 7–23)
CALCIUM SERPL-MCNC: 8.8 MG/DL — SIGNIFICANT CHANGE UP (ref 8.4–10.5)
CALCIUM SERPL-MCNC: 9.7 MG/DL — SIGNIFICANT CHANGE UP (ref 8.4–10.5)
CHLORIDE SERPL-SCNC: 107 MMOL/L — SIGNIFICANT CHANGE UP (ref 96–108)
CHLORIDE SERPL-SCNC: 111 MMOL/L — HIGH (ref 96–108)
CO2 BLDA-SCNC: 23 MMOL/L — SIGNIFICANT CHANGE UP (ref 22–30)
CO2 SERPL-SCNC: 24 MMOL/L — SIGNIFICANT CHANGE UP (ref 22–31)
CO2 SERPL-SCNC: 27 MMOL/L — SIGNIFICANT CHANGE UP (ref 22–31)
COLOR SPEC: ABNORMAL
COLOR SPEC: YELLOW — SIGNIFICANT CHANGE UP
CREAT SERPL-MCNC: 1.67 MG/DL — HIGH (ref 0.5–1.3)
CREAT SERPL-MCNC: 1.77 MG/DL — HIGH (ref 0.5–1.3)
DIFF PNL FLD: ABNORMAL
DIFF PNL FLD: ABNORMAL
ELLIPTOCYTES BLD QL SMEAR: SLIGHT — SIGNIFICANT CHANGE UP
EOSINOPHIL # BLD AUTO: 0 K/UL — SIGNIFICANT CHANGE UP (ref 0–0.5)
EOSINOPHIL NFR BLD AUTO: 0 % — SIGNIFICANT CHANGE UP (ref 0–6)
EPI CELLS # UR: SIGNIFICANT CHANGE UP
GAS PNL BLDA: SIGNIFICANT CHANGE UP
GIANT PLATELETS BLD QL SMEAR: PRESENT — SIGNIFICANT CHANGE UP
GLUCOSE BLDC GLUCOMTR-MCNC: 108 MG/DL — HIGH (ref 70–99)
GLUCOSE BLDC GLUCOMTR-MCNC: 47 MG/DL — LOW (ref 70–99)
GLUCOSE BLDC GLUCOMTR-MCNC: 55 MG/DL — LOW (ref 70–99)
GLUCOSE BLDC GLUCOMTR-MCNC: 69 MG/DL — LOW (ref 70–99)
GLUCOSE BLDC GLUCOMTR-MCNC: 76 MG/DL — SIGNIFICANT CHANGE UP (ref 70–99)
GLUCOSE BLDC GLUCOMTR-MCNC: 78 MG/DL — SIGNIFICANT CHANGE UP (ref 70–99)
GLUCOSE BLDC GLUCOMTR-MCNC: 82 MG/DL — SIGNIFICANT CHANGE UP (ref 70–99)
GLUCOSE BLDC GLUCOMTR-MCNC: 94 MG/DL — SIGNIFICANT CHANGE UP (ref 70–99)
GLUCOSE BLDC GLUCOMTR-MCNC: 98 MG/DL — SIGNIFICANT CHANGE UP (ref 70–99)
GLUCOSE SERPL-MCNC: 102 MG/DL — HIGH (ref 70–99)
GLUCOSE SERPL-MCNC: 107 MG/DL — HIGH (ref 70–99)
GLUCOSE UR QL: NEGATIVE — SIGNIFICANT CHANGE UP
GLUCOSE UR QL: NEGATIVE — SIGNIFICANT CHANGE UP
HCT VFR BLD CALC: 26.8 % — LOW (ref 39–50)
HCT VFR BLD CALC: 28.9 % — LOW (ref 39–50)
HGB BLD-MCNC: 8 G/DL — LOW (ref 13–17)
HGB BLD-MCNC: 8.8 G/DL — LOW (ref 13–17)
HOROWITZ INDEX BLDA+IHG-RTO: SIGNIFICANT CHANGE UP
HYPOCHROMIA BLD QL: SLIGHT — SIGNIFICANT CHANGE UP
INR BLD: 1.22 RATIO — HIGH (ref 0.88–1.16)
KETONES UR-MCNC: ABNORMAL
KETONES UR-MCNC: ABNORMAL
LACTATE SERPL-SCNC: 2.9 MMOL/L — HIGH (ref 0.7–2)
LACTATE SERPL-SCNC: 4.5 MMOL/L — CRITICAL HIGH (ref 0.7–2)
LEUKOCYTE ESTERASE UR-ACNC: ABNORMAL
LEUKOCYTE ESTERASE UR-ACNC: ABNORMAL
LG PLATELETS BLD QL AUTO: SLIGHT — SIGNIFICANT CHANGE UP
LYMPHOCYTES # BLD AUTO: 0.65 K/UL — LOW (ref 1–3.3)
LYMPHOCYTES # BLD AUTO: 15 % — SIGNIFICANT CHANGE UP (ref 13–44)
MACROCYTES BLD QL: SLIGHT — SIGNIFICANT CHANGE UP
MANUAL SMEAR VERIFICATION: SIGNIFICANT CHANGE UP
MCHC RBC-ENTMCNC: 29.9 GM/DL — LOW (ref 32–36)
MCHC RBC-ENTMCNC: 30.4 GM/DL — LOW (ref 32–36)
MCHC RBC-ENTMCNC: 30.4 PG — SIGNIFICANT CHANGE UP (ref 27–34)
MCHC RBC-ENTMCNC: 30.4 PG — SIGNIFICANT CHANGE UP (ref 27–34)
MCV RBC AUTO: 100 FL — SIGNIFICANT CHANGE UP (ref 80–100)
MCV RBC AUTO: 101.9 FL — HIGH (ref 80–100)
MICROCYTES BLD QL: SLIGHT — SIGNIFICANT CHANGE UP
MONOCYTES # BLD AUTO: 0.39 K/UL — SIGNIFICANT CHANGE UP (ref 0–0.9)
MONOCYTES NFR BLD AUTO: 9 % — SIGNIFICANT CHANGE UP (ref 2–14)
MYELOCYTES NFR BLD: 2 % — HIGH (ref 0–0)
NEUTROPHILS # BLD AUTO: 3.23 K/UL — SIGNIFICANT CHANGE UP (ref 1.8–7.4)
NEUTROPHILS NFR BLD AUTO: 70 % — SIGNIFICANT CHANGE UP (ref 43–77)
NEUTS BAND # BLD: 4 % — SIGNIFICANT CHANGE UP (ref 0–8)
NITRITE UR-MCNC: NEGATIVE — SIGNIFICANT CHANGE UP
NITRITE UR-MCNC: POSITIVE
NRBC # BLD: 0 /100 WBCS — SIGNIFICANT CHANGE UP (ref 0–0)
NRBC # BLD: 3 /100 — HIGH (ref 0–0)
OVALOCYTES BLD QL SMEAR: SLIGHT — SIGNIFICANT CHANGE UP
PCO2 BLDA: 52 MMHG — HIGH (ref 32–46)
PH BLDA: 7.24 — LOW (ref 7.35–7.45)
PH UR: 5 — SIGNIFICANT CHANGE UP (ref 5–8)
PH UR: 6 — SIGNIFICANT CHANGE UP (ref 5–8)
PLAT MORPH BLD: NORMAL — SIGNIFICANT CHANGE UP
PLATELET # BLD AUTO: 43 K/UL — LOW (ref 150–400)
PLATELET # BLD AUTO: 95 K/UL — LOW (ref 150–400)
PO2 BLDA: 178 MMHG — HIGH (ref 74–108)
POIKILOCYTOSIS BLD QL AUTO: SIGNIFICANT CHANGE UP
POTASSIUM SERPL-MCNC: 5.6 MMOL/L — HIGH (ref 3.5–5.3)
POTASSIUM SERPL-MCNC: 5.7 MMOL/L — HIGH (ref 3.5–5.3)
POTASSIUM SERPL-SCNC: 5.6 MMOL/L — HIGH (ref 3.5–5.3)
POTASSIUM SERPL-SCNC: 5.7 MMOL/L — HIGH (ref 3.5–5.3)
PROCALCITONIN SERPL-MCNC: 0.76 NG/ML — HIGH
PROT SERPL-MCNC: 6.6 G/DL — SIGNIFICANT CHANGE UP (ref 6–8.3)
PROT SERPL-MCNC: 7.4 G/DL — SIGNIFICANT CHANGE UP (ref 6–8.3)
PROT UR-MCNC: 100
PROT UR-MCNC: 500 MG/DL
PROTHROM AB SERPL-ACNC: 13.7 SEC — HIGH (ref 10–12.9)
RBC # BLD: 2.63 M/UL — LOW (ref 4.2–5.8)
RBC # BLD: 2.89 M/UL — LOW (ref 4.2–5.8)
RBC # FLD: 20.6 % — HIGH (ref 10.3–14.5)
RBC # FLD: 20.8 % — HIGH (ref 10.3–14.5)
RBC BLD AUTO: ABNORMAL
RBC CASTS # UR COMP ASSIST: ABNORMAL /HPF (ref 0–4)
SAO2 % BLDA: 99 % — HIGH (ref 92–96)
SCHISTOCYTES BLD QL AUTO: SLIGHT — SIGNIFICANT CHANGE UP
SMUDGE CELLS # BLD: PRESENT — SIGNIFICANT CHANGE UP
SODIUM SERPL-SCNC: 143 MMOL/L — SIGNIFICANT CHANGE UP (ref 135–145)
SODIUM SERPL-SCNC: 144 MMOL/L — SIGNIFICANT CHANGE UP (ref 135–145)
SP GR SPEC: 1.01 — SIGNIFICANT CHANGE UP (ref 1.01–1.02)
SP GR SPEC: 1.01 — SIGNIFICANT CHANGE UP (ref 1.01–1.02)
STOMATOCYTES BLD QL SMEAR: SLIGHT — SIGNIFICANT CHANGE UP
TROPONIN I SERPL-MCNC: <.017 NG/ML — LOW (ref 0.02–0.06)
UROBILINOGEN FLD QL: NEGATIVE — SIGNIFICANT CHANGE UP
UROBILINOGEN FLD QL: NEGATIVE — SIGNIFICANT CHANGE UP
WBC # BLD: 10.17 K/UL — SIGNIFICANT CHANGE UP (ref 3.8–10.5)
WBC # BLD: 4.36 K/UL — SIGNIFICANT CHANGE UP (ref 3.8–10.5)
WBC # FLD AUTO: 10.17 K/UL — SIGNIFICANT CHANGE UP (ref 3.8–10.5)
WBC # FLD AUTO: 4.36 K/UL — SIGNIFICANT CHANGE UP (ref 3.8–10.5)
WBC UR QL: ABNORMAL /HPF (ref 0–5)

## 2019-07-28 PROCEDURE — 71045 X-RAY EXAM CHEST 1 VIEW: CPT | Mod: 26

## 2019-07-28 PROCEDURE — 93010 ELECTROCARDIOGRAM REPORT: CPT

## 2019-07-28 PROCEDURE — 71045 X-RAY EXAM CHEST 1 VIEW: CPT | Mod: 26,77

## 2019-07-28 PROCEDURE — 99291 CRITICAL CARE FIRST HOUR: CPT

## 2019-07-28 RX ORDER — ASCORBIC ACID 60 MG
500 TABLET,CHEWABLE ORAL DAILY
Refills: 0 | Status: DISCONTINUED | OUTPATIENT
Start: 2019-07-28 | End: 2019-07-29

## 2019-07-28 RX ORDER — ZINC OXIDE 200 MG/G
1 OINTMENT TOPICAL
Refills: 0 | Status: DISCONTINUED | OUTPATIENT
Start: 2019-07-28 | End: 2019-08-14

## 2019-07-28 RX ORDER — SODIUM CHLORIDE 9 MG/ML
2800 INJECTION INTRAMUSCULAR; INTRAVENOUS; SUBCUTANEOUS ONCE
Refills: 0 | Status: COMPLETED | OUTPATIENT
Start: 2019-07-28 | End: 2019-07-28

## 2019-07-28 RX ORDER — SODIUM CHLORIDE 9 MG/ML
1000 INJECTION INTRAMUSCULAR; INTRAVENOUS; SUBCUTANEOUS ONCE
Refills: 0 | Status: COMPLETED | OUTPATIENT
Start: 2019-07-28 | End: 2019-07-28

## 2019-07-28 RX ORDER — MEROPENEM 1 G/30ML
1000 INJECTION INTRAVENOUS EVERY 12 HOURS
Refills: 0 | Status: COMPLETED | OUTPATIENT
Start: 2019-07-28 | End: 2019-08-04

## 2019-07-28 RX ORDER — VANCOMYCIN HCL 1 G
1250 VIAL (EA) INTRAVENOUS ONCE
Refills: 0 | Status: COMPLETED | OUTPATIENT
Start: 2019-07-28 | End: 2019-07-28

## 2019-07-28 RX ORDER — SODIUM BICARBONATE 1 MEQ/ML
50 SYRINGE (ML) INTRAVENOUS ONCE
Refills: 0 | Status: COMPLETED | OUTPATIENT
Start: 2019-07-28 | End: 2019-07-28

## 2019-07-28 RX ORDER — CHLORHEXIDINE GLUCONATE 213 G/1000ML
1 SOLUTION TOPICAL
Refills: 0 | Status: DISCONTINUED | OUTPATIENT
Start: 2019-07-28 | End: 2019-08-04

## 2019-07-28 RX ORDER — CHLORHEXIDINE GLUCONATE 213 G/1000ML
15 SOLUTION TOPICAL EVERY 12 HOURS
Refills: 0 | Status: DISCONTINUED | OUTPATIENT
Start: 2019-07-28 | End: 2019-07-31

## 2019-07-28 RX ORDER — NOREPINEPHRINE BITARTRATE/D5W 8 MG/250ML
0.05 PLASTIC BAG, INJECTION (ML) INTRAVENOUS
Qty: 8 | Refills: 0 | Status: DISCONTINUED | OUTPATIENT
Start: 2019-07-28 | End: 2019-07-28

## 2019-07-28 RX ORDER — PREGABALIN 225 MG/1
1000 CAPSULE ORAL DAILY
Refills: 0 | Status: DISCONTINUED | OUTPATIENT
Start: 2019-07-28 | End: 2019-07-29

## 2019-07-28 RX ORDER — SODIUM CHLORIDE 9 MG/ML
10 INJECTION INTRAMUSCULAR; INTRAVENOUS; SUBCUTANEOUS
Refills: 0 | Status: DISCONTINUED | OUTPATIENT
Start: 2019-07-28 | End: 2019-08-14

## 2019-07-28 RX ORDER — DORZOLAMIDE HYDROCHLORIDE TIMOLOL MALEATE 20; 5 MG/ML; MG/ML
1 SOLUTION/ DROPS OPHTHALMIC EVERY 12 HOURS
Refills: 0 | Status: DISCONTINUED | OUTPATIENT
Start: 2019-07-28 | End: 2019-08-14

## 2019-07-28 RX ORDER — AZTREONAM 2 G
1000 VIAL (EA) INJECTION ONCE
Refills: 0 | Status: COMPLETED | OUTPATIENT
Start: 2019-07-28 | End: 2019-07-28

## 2019-07-28 RX ORDER — DOPAMINE HYDROCHLORIDE 40 MG/ML
5 INJECTION, SOLUTION, CONCENTRATE INTRAVENOUS
Qty: 400 | Refills: 0 | Status: DISCONTINUED | OUTPATIENT
Start: 2019-07-28 | End: 2019-07-28

## 2019-07-28 RX ORDER — NOREPINEPHRINE BITARTRATE/D5W 8 MG/250ML
0.05 PLASTIC BAG, INJECTION (ML) INTRAVENOUS
Qty: 16 | Refills: 0 | Status: DISCONTINUED | OUTPATIENT
Start: 2019-07-28 | End: 2019-08-04

## 2019-07-28 RX ORDER — PHENYLEPHRINE HYDROCHLORIDE 10 MG/ML
0.3 INJECTION INTRAVENOUS
Qty: 40 | Refills: 0 | Status: DISCONTINUED | OUTPATIENT
Start: 2019-07-28 | End: 2019-07-28

## 2019-07-28 RX ORDER — PROPOFOL 10 MG/ML
5 INJECTION, EMULSION INTRAVENOUS
Qty: 1000 | Refills: 0 | Status: DISCONTINUED | OUTPATIENT
Start: 2019-07-28 | End: 2019-08-02

## 2019-07-28 RX ORDER — DEXTROSE 50 % IN WATER 50 %
50 SYRINGE (ML) INTRAVENOUS ONCE
Refills: 0 | Status: COMPLETED | OUTPATIENT
Start: 2019-07-28 | End: 2019-07-28

## 2019-07-28 RX ORDER — SODIUM CHLORIDE 9 MG/ML
1000 INJECTION, SOLUTION INTRAVENOUS
Refills: 0 | Status: DISCONTINUED | OUTPATIENT
Start: 2019-07-28 | End: 2019-08-02

## 2019-07-28 RX ORDER — VANCOMYCIN HCL 1 G
VIAL (EA) INTRAVENOUS
Refills: 0 | Status: DISCONTINUED | OUTPATIENT
Start: 2019-07-28 | End: 2019-07-28

## 2019-07-28 RX ORDER — VANCOMYCIN HCL 1 G
1250 VIAL (EA) INTRAVENOUS EVERY 24 HOURS
Refills: 0 | Status: DISCONTINUED | OUTPATIENT
Start: 2019-07-29 | End: 2019-07-29

## 2019-07-28 RX ORDER — PANTOPRAZOLE SODIUM 20 MG/1
40 TABLET, DELAYED RELEASE ORAL EVERY 12 HOURS
Refills: 0 | Status: DISCONTINUED | OUTPATIENT
Start: 2019-07-28 | End: 2019-07-30

## 2019-07-28 RX ORDER — LATANOPROST 0.05 MG/ML
1 SOLUTION/ DROPS OPHTHALMIC; TOPICAL AT BEDTIME
Refills: 0 | Status: DISCONTINUED | OUTPATIENT
Start: 2019-07-28 | End: 2019-08-14

## 2019-07-28 RX ORDER — ESCITALOPRAM OXALATE 10 MG/1
10 TABLET, FILM COATED ORAL DAILY
Refills: 0 | Status: DISCONTINUED | OUTPATIENT
Start: 2019-07-28 | End: 2019-07-29

## 2019-07-28 RX ORDER — VANCOMYCIN HCL 1 G
VIAL (EA) INTRAVENOUS
Refills: 0 | Status: DISCONTINUED | OUTPATIENT
Start: 2019-07-28 | End: 2019-07-29

## 2019-07-28 RX ORDER — ACETAMINOPHEN 500 MG
650 TABLET ORAL EVERY 6 HOURS
Refills: 0 | Status: DISCONTINUED | OUTPATIENT
Start: 2019-07-28 | End: 2019-08-14

## 2019-07-28 RX ORDER — VANCOMYCIN HCL 1 G
1250 VIAL (EA) INTRAVENOUS ONCE
Refills: 0 | Status: DISCONTINUED | OUTPATIENT
Start: 2019-07-28 | End: 2019-07-28

## 2019-07-28 RX ADMIN — Medication 110 MILLIGRAM(S): at 13:23

## 2019-07-28 RX ADMIN — Medication 50 MILLIGRAM(S): at 12:02

## 2019-07-28 RX ADMIN — SODIUM CHLORIDE 1000 MILLILITER(S): 9 INJECTION INTRAMUSCULAR; INTRAVENOUS; SUBCUTANEOUS at 18:59

## 2019-07-28 RX ADMIN — CHLORHEXIDINE GLUCONATE 1 APPLICATION(S): 213 SOLUTION TOPICAL at 18:03

## 2019-07-28 RX ADMIN — Medication 4.16 MICROGRAM(S)/KG/MIN: at 20:39

## 2019-07-28 RX ADMIN — MEROPENEM 100 MILLIGRAM(S): 1 INJECTION INTRAVENOUS at 18:32

## 2019-07-28 RX ADMIN — Medication 166.67 MILLIGRAM(S): at 18:32

## 2019-07-28 RX ADMIN — SODIUM CHLORIDE 2800 MILLILITER(S): 9 INJECTION INTRAMUSCULAR; INTRAVENOUS; SUBCUTANEOUS at 12:01

## 2019-07-28 RX ADMIN — ZINC OXIDE 1 APPLICATION(S): 200 OINTMENT TOPICAL at 18:01

## 2019-07-28 RX ADMIN — Medication 50 MILLIEQUIVALENT(S): at 16:10

## 2019-07-28 RX ADMIN — PHENYLEPHRINE HYDROCHLORIDE 10.2 MICROGRAM(S)/KG/MIN: 10 INJECTION INTRAVENOUS at 13:59

## 2019-07-28 RX ADMIN — SODIUM CHLORIDE 1000 MILLILITER(S): 9 INJECTION INTRAMUSCULAR; INTRAVENOUS; SUBCUTANEOUS at 15:30

## 2019-07-28 RX ADMIN — PANTOPRAZOLE SODIUM 40 MILLIGRAM(S): 20 TABLET, DELAYED RELEASE ORAL at 18:33

## 2019-07-28 RX ADMIN — SODIUM CHLORIDE 2800 MILLILITER(S): 9 INJECTION INTRAMUSCULAR; INTRAVENOUS; SUBCUTANEOUS at 13:05

## 2019-07-28 RX ADMIN — LATANOPROST 1 DROP(S): 0.05 SOLUTION/ DROPS OPHTHALMIC; TOPICAL at 21:36

## 2019-07-28 RX ADMIN — PROPOFOL 2.72 MICROGRAM(S)/KG/MIN: 10 INJECTION, EMULSION INTRAVENOUS at 21:53

## 2019-07-28 RX ADMIN — SODIUM CHLORIDE 100 MILLILITER(S): 9 INJECTION, SOLUTION INTRAVENOUS at 18:58

## 2019-07-28 RX ADMIN — Medication 1000 MILLIGRAM(S): at 13:00

## 2019-07-28 RX ADMIN — SODIUM CHLORIDE 1000 MILLILITER(S): 9 INJECTION INTRAMUSCULAR; INTRAVENOUS; SUBCUTANEOUS at 21:51

## 2019-07-28 RX ADMIN — CHLORHEXIDINE GLUCONATE 15 MILLILITER(S): 213 SOLUTION TOPICAL at 17:57

## 2019-07-28 RX ADMIN — Medication 50 MILLILITER(S): at 17:10

## 2019-07-28 RX ADMIN — DORZOLAMIDE HYDROCHLORIDE TIMOLOL MALEATE 1 DROP(S): 20; 5 SOLUTION/ DROPS OPHTHALMIC at 17:58

## 2019-07-28 NOTE — H&P ADULT - PROBLEM SELECTOR PLAN 2
On CKD from sepsis.  Continue BP support and hydration, monitor urine output and electrolytes, avoid nephrotoxins, renally dose medications

## 2019-07-28 NOTE — PROCEDURE NOTE - NSPOSTPRCRAD_GEN_A_CORE
no pneumothorax/post-procedure radiography performed/central line located in the/central line located in the superior vena cava

## 2019-07-28 NOTE — PROGRESS NOTE ADULT - SUBJECTIVE AND OBJECTIVE BOX
86y  Male  No Known Allergies    CC: Pt presented to ER with altered mental status     HPI: This is an 86 year old male 86  with extensive medical history ( see below) who presented to ED today via EMS from an local SNF for change in mental status.  He has been noted to have increasing trouble managing secretions then today found minimally responsive. Note his normal mental status is non-verbal and responsive only to tactile stimulus . On arrival to ER he was found to have severe sepsis secondary to aspiration pneumonia to [facility]. He was hypoxic, hypthermic , showed lactic acidosis and had MELISSA on CKD, Thrombocytopenia ( likley form sepsis) and ER staff reported bright red blood per rectum ( unsure if lower GI bleeding or local decub) . He was subsquently  Intubated in the ICU for acute respiratory fialure due to progressive aspiration, approximalty  800 cc of tube feeds suctioned through the PEG.    PAST MEDICAL & SURGICAL HISTORY:  Chronic kidney disease  Chronic diastolic heart failure  BPH (benign prostatic hyperplasia)  Shortness of breath  Depression  IBS (irritable bowel syndrome)  Dysphagia: s/p PEG placement  Cerebral infarction: with resulting weakness and dysphagia  Anemia  Psoriasis  GERD (gastroesophageal reflux disease)  Dyslipidemia  Cardiac pacemaker  S/P percutaneous endoscopic gastrostomy (PEG) tube placement    Vital Signs Last 24 Hrs  T(C): 36.5 (2019 19:00), Max: 36.5 (2019 19:00)  T(F): 97.7 (2019 19:00), Max: 97.7 (2019 19:00)  HR: 75 (2019 19:00) (55 - 75)  BP: 108/52 (2019 19:00) (52/40 - 143/111)  BP(mean): 67 (2019 19:00) (28 - 123)  RR: 18 (2019 19:00) (12 - 32)  SpO2: 97% (2019 19:00) (92% - 100%)  ABG - ( 2019 17:10 )  pH, Arterial: 7.24  pH, Blood: x     /  pCO2: 52    /  pO2: 178   / HCO3: x     / Base Excess: x     /  SaO2: 99          I&O's Summary  2019 07:01  -  2019 21:11  --------------------------------------------------------  IN: 2640.3 mL / OUT: 740 mL / NET: 1900.3 mL      LABS      144  |  111<H>  |  54<H>  ----------------------------<  107<H>  5.6<H>   |  24  |  1.67<H>    Ca    8.8      2019 17:45  TPro  6.6  /  Alb  2.1<L>  /  TBili  0.6  /  DBili  x   /  AST  49<H>  /  ALT  27  /  AlkPhos  156<H>                         8.8    10.17 )-----------( 95       ( 2019 17:45 )             28.9     PT/INR - ( 2019 11:40 )   PT: 13.7 sec;   INR: 1.22 ratio    PTT - ( 2019 11:40 )  PTT:59.8 sec  CARDIAC MARKERS ( 2019 17:45 )  <.017 ng/mL / x     / x     / x     / x      CARDIAC MARKERS ( 2019 11:40 )  .027 ng/mL / x     / 33 U/L / x     / 5.0 ng/mL      LIVER FUNCTIONS - ( 2019 17:45 )  Alb: 2.1 g/dL / Pro: 6.6 g/dL / ALK PHOS: 156 U/L / ALT: 27 U/L DA / AST: 49 U/L / GGT: x           CAPILLARY BLOOD GLUCOSE  POCT Blood Glucose.: 94 mg/dL (2019 20:50)    Urinalysis Basic - ( 2019 15:30 )  Color: Red / Appearance: very cloudy / S.015 / pH: x  Gluc: x / Ketone: Trace  / Bili: Negative / Urobili: Negative   Blood: x / Protein: 500 mg/dL / Nitrite: Positive   Leuk Esterase: Moderate / RBC: 26-50 /HPF / WBC 11-25 /HPF   Sq Epi: x / Non Sq Epi: Neg.-Few / Bacteria: Negative /HPF      VENT SETTINGS   Mode: AC/ CMV (Assist Control/ Continuous Mandatory Ventilation)  RR (machine): 16  TV (machine): 450  FiO2: 60  PEEP: 5  PIP: 20      MEDICATIONS  (STANDING):  ascorbic acid 500 milliGRAM(s) Oral daily  chlorhexidine 0.12% Liquid 15 milliLiter(s) Oral Mucosa every 12 hours  chlorhexidine 4% Liquid 1 Application(s) Topical <User Schedule>  cyanocobalamin 1000 MICROGram(s) Oral daily  dextrose 5% + sodium chloride 0.9%. 1000 milliLiter(s) (100 mL/Hr) IV Continuous <Continuous>  dorzolamide 2%/timolol 0.5% Ophthalmic Solution 1 Drop(s) Both EYES every 12 hours  escitalopram 10 milliGRAM(s) Oral daily  latanoprost 0.005% Ophthalmic Solution 1 Drop(s) Both EYES at bedtime  meropenem  IVPB 1000 milliGRAM(s) IV Intermittent every 12 hours  multivitamin  Drops 5 milliLiter(s) Oral daily  norepinephrine Infusion 0.05 MICROgram(s)/kG/Min (4.163 mL/Hr) IV Continuous <Continuous>  pantoprazole  Injectable 40 milliGRAM(s) IV Push every 12 hours  propofol Infusion 5 MICROgram(s)/kG/Min (2.721 mL/Hr) IV Continuous <Continuous>  vancomycin  IVPB      zinc oxide 20% Ointment 1 Application(s) Topical two times a day      REVIEW OF SYSTEMS:    Unable to obtain due to mechnical ventilation, sedation, poor mental status     Physicial Exam:     HEENT: PERRLA, EOMI, no drainage or redness  Neck:  No JVD  Respiratory: Breath Sounds equal & clear to percussion & auscultation, no accessory muscle use  Cardiovascular: Regular rate & rhythm, normal S1, S2; no murmurs, gallops or rubs; no S3, S4  Gastrointestinal: Soft, non-tender, non distended no hepatosplenomegaly, normal bowel sounds  Extremities: No peripheral edema, No cyanosis, clubbing   Vascular: Equal and normal pulses: 2+ peripheral pulses throughout  Neurological: non-verbal and responsive only to tactile stimuluso sensory, motor  deficits, normal reflexes  Musculoskeletal: No joint pain, swelling or deformity; no limitation of movement  Skin: No rashes

## 2019-07-28 NOTE — H&P ADULT - PROBLEM SELECTOR PLAN 4
with BRBPR and thrombocytopenia.  Thrombocytopenia likely related to acute sepsis. Transfuse 2 units PRBC and 1 unit platelets, monitor HH and for signs of bleeding.  GI consult

## 2019-07-28 NOTE — H&P ADULT - PROBLEM SELECTOR PLAN 1
due to pneumonia from likely aspiration event.  Intubated for hypoxic respiratory failure, wean vent as tolerated.  Start broad spectrum antibiotics, cultures sent and pending.  Trend WBC, temperature and lactic acid.  Continue BP support with pressors

## 2019-07-28 NOTE — ED PROVIDER NOTE - CRITICAL CARE PROVIDED
consultation with other physicians/direct patient care (not related to procedure)/conducted a detailed discussion of DNR status/interpretation of diagnostic studies/documentation/additional history taking

## 2019-07-28 NOTE — PATIENT PROFILE ADULT - VISION (WITH CORRECTIVE LENSES IF THE PATIENT USUALLY WEARS THEM):
Normal vision: sees adequately in most situations; can see medication labels, newsprint unable to asses/Partially impaired: cannot see medication labels or newsprint, but can see obstacles in path, and the surrounding layout; can count fingers at arm's length

## 2019-07-28 NOTE — ED ADULT NURSE NOTE - PMH
Anemia    Cerebral infarction  with resulting weakness and dysphagia  Depression    Dyslipidemia    Dysphagia  s/p PEG placement  GERD (gastroesophageal reflux disease)    IBS (irritable bowel syndrome)    Psoriasis    Shortness of breath

## 2019-07-28 NOTE — ED PROVIDER NOTE - CLINICAL SUMMARY MEDICAL DECISION MAKING FREE TEXT BOX
35 y m unresponsive resp distress , guiac positive , cxr  pneumonia, hb low   rx in ed admitted to icu

## 2019-07-28 NOTE — ED ADULT NURSE REASSESSMENT NOTE - NS ED NURSE REASSESS COMMENT FT1
Pt started on first unit of PRBC's Donor number- [phone] with 2 RN's at bedside. Consent signed and in chart. Family aware of possible signs and symptoms of reaction to blood product. Aware to call for medical staff if such symptoms occur. Will continue to monitor.

## 2019-07-28 NOTE — PROCEDURE NOTE - NSPROCDETAILS_GEN_ALL_CORE
patient pre-oxygenated, tube inserted, placement confirmed/connected to ventilator
lumen(s) aspirated and flushed/sterile dressing applied/sterile technique, catheter placed/ultrasound guidance/ultrasound assessment/guidewire recovered

## 2019-07-28 NOTE — H&P ADULT - PROBLEM SELECTOR PLAN 9
PPI BID with possibility of GIB  SCD's only in the setting of possible bleeding  Bedrest and NPO  DNR although most medical interventions allowable per MOLST

## 2019-07-28 NOTE — H&P ADULT - ASSESSMENT
86 year old male with extensive PMH including IBS, CVA with dysphagia and PEG placement, anemia, psoriasis, GERD, depression, PPM admitted with severe sepsis from likely aspiration pneumonia.

## 2019-07-28 NOTE — PROVIDER CONTACT NOTE (OTHER) - ACTION/TREATMENT ORDERED:
notified PA. suggested lab verification and order IVF maintenance of D5NS and glucose monitoro q 15m.Renetta COVARRUBIAS aware and to continue glucose

## 2019-07-28 NOTE — H&P ADULT - HISTORY OF PRESENT ILLNESS
86 year old male with extensive PMH including IBS, CVA with dysphagia and PEG placement, anemia, psoriasis, GERD, depression, PPM placement treated for MSSA bacteremia at [facility] 6/19 presented to ED today via EMS from [facility] with change in mental status.  Per family, patient was at baseline last Thursday but noted to have increasing trouble managing secretions, today noted to be minimally responsive.  Patient non-verbal and responsive only to tactile stimulus, cannot offer any complaints or further history at this time.    On presentation to ED found to have severe sepsis, suspected source acute RML pneumonia from aspiration.

## 2019-07-28 NOTE — H&P ADULT - ATTENDING COMMENTS
Patient seen and examined with PA, Addendum to above.    87 yo M with PMHx of Glaucoma, benign prostate neoplasm, HTN, HLD, Grad 3 Diastolic Heart Failure s/p ppm, CKD, CVA with dysphagia s/p PEG tube placement presented with altered mentation. Upon evaluation in the ED patient with hypoxia and hemodynamic instability. Noted with MELISSA on CKD, Thrombocytopenia, anemia. Reported bright red blood per rectum. Hypothermic, lactic acidosis.     Assessment:  1. Acute hypoxic respiratory failure  2. Septic shock   3. Right sided pneumonia   4. Acute on chronic renal dysfunction   5. Lactic acidosis   6. Heart failure with preserved EF   7. Dysphagia   8. Hematochezia   9. Thrombocytopenia     Plan:  Admit patient to ICU  Oxygen support to maintain saturation >90%  S/p 3 L IV fluids in the ED, still hypotensive so started on vasopressor support  Trend lactate level  F/u blood cultures  Broad spectrum antibiotics   ID consult  Nix for close urinary output measurement   Vasopressor support to maintain MAP > 65  Getting 2 unit PRBC in the ED  Will transfuse 1 unit platelet   Hold home BP medications given shock  Cont. Home glaucoma medications  GI/DVT prophylaxis  Code status DNR with a trial of intubation    Discussed with Son and Wife at bedside. Explained current clinical condition. They are ok with intubation and mechanical ventilation if needed, but would like to avoid it if possible. Explained the risks vs. benefits of central line placement, given poor IV access and need for vasopressor support. Due to thrombocytopenia, patient has a higher risk for bleeding. The wife stated at this time she wants to hold off on placing a central line. Explained the limited dose of vasopressor support that can be provided through a peripheral IV. There is a possibility that the patient may deteriorate further and die due to hypotension. She expressed understanding. She is ok with intubation, IV antibiotics, IV fluids, Blood transfusions, Peripheral vasopressors. Code status DNR reinforced. Patient seen and examined with PA, Addendum to above.    85 yo M with PMHx of Glaucoma, benign prostate neoplasm, HTN, HLD, Grad 3 Diastolic Heart Failure s/p ppm, CKD, CVA with dysphagia s/p PEG tube placement presented with altered mentation. Upon evaluation in the ED patient with hypoxia and hemodynamic instability. Noted with MELISSA on CKD, Thrombocytopenia, anemia. Reported bright red blood per rectum. Hypothermic, lactic acidosis. Intubated in the ICU as noted with aspiration. ~ 800 cc of tube feeds suctioned through the PEG. Skin showing excoriation in the mid back and in the perianal area, with evidence of superficial bleeding.     Assessment:  1. Acute hypoxic respiratory failure  2. Septic shock   3. Right sided pneumonia   4. Acute on chronic renal dysfunction   5. Lactic acidosis   6. Heart failure with preserved EF   7. Dysphagia   8. Hematochezia   9. Thrombocytopenia     Plan:  Admit patient to ICU  Mechanical ventilation support, lung protective strategy  S/p 3 L IV fluids in the ED, still hypotensive so started on vasopressor support  Trend lactate level  F/u blood cultures  Broad spectrum antibiotics   ID consult  Nxi for close urinary output measurement   Vasopressor support to maintain MAP > 65  Getting 2 unit PRBC in the ED  Will transfuse 1 unit platelet   Hold home BP medications given shock  Cont. Home glaucoma medications  GI/DVT prophylaxis  Code status DNR with a trial of intubation    Discussed with Son and Wife at bedside. Explained current clinical condition. They are ok with intubation and mechanical ventilation if needed, but would like to avoid it if possible. Explained the risks vs. benefits of central line placement, given poor IV access and need for vasopressor support. Due to thrombocytopenia, patient has a higher risk for bleeding. The wife stated at this time she wants to hold off on placing a central line. Explained the limited dose of vasopressor support that can be provided through a peripheral IV. There is a possibility that the patient may deteriorate further and die due to hypotension. She expressed understanding. She is ok with intubation, IV antibiotics, IV fluids, Blood transfusions, Peripheral vasopressors. Code status DNR reinforced. Patient seen and examined with PA, Addendum to above.    85 yo M with PMHx of Glaucoma, benign prostate neoplasm, HTN, HLD, Grad 3 Diastolic Heart Failure s/p ppm, CKD, CVA with dysphagia s/p PEG tube placement presented with altered mentation. Upon evaluation in the ED patient with hypoxia and hemodynamic instability. Noted with MELISSA on CKD, Thrombocytopenia, anemia. Reported bright red blood per rectum. Hypothermic, lactic acidosis. Intubated in the ICU as noted with aspiration. ~ 800 cc of tube feeds suctioned through the PEG. Skin showing excoriation in the mid back and in the perianal area, with evidence of superficial bleeding. Thrombocytopenia likely due to sepsis.     Assessment:  1. Acute hypoxic respiratory failure  2. Septic shock   3. Right sided pneumonia   4. Acute on chronic renal dysfunction   5. Lactic acidosis   6. Heart failure with preserved EF   7. Dysphagia   8. Hematochezia   9. Thrombocytopenia     Plan:  Admit patient to ICU  Mechanical ventilation support, lung protective strategy  S/p 3 L IV fluids in the ED, still hypotensive so started on vasopressor support  Trend lactate level  F/u blood cultures  Broad spectrum antibiotics   ID consult  Nix for close urinary output measurement   Vasopressor support to maintain MAP > 65  Getting 2 unit PRBC in the ED  Will transfuse 1 unit platelet   Hold home BP medications given shock  Cont. Home glaucoma medications  GI/DVT prophylaxis  Code status DNR with a trial of intubation    Discussed with Son and Wife at bedside. Explained current clinical condition. They are ok with intubation and mechanical ventilation if needed, but would like to avoid it if possible. Explained the risks vs. benefits of central line placement, given poor IV access and need for vasopressor support. Due to thrombocytopenia, patient has a higher risk for bleeding. The wife stated at this time she wants to hold off on placing a central line. Explained the limited dose of vasopressor support that can be provided through a peripheral IV. There is a possibility that the patient may deteriorate further and die due to hypotension. She expressed understanding. She is ok with intubation, IV antibiotics, IV fluids, Blood transfusions, Peripheral vasopressors. Code status DNR reinforced.

## 2019-07-28 NOTE — PROCEDURE NOTE - NSPOSTCAREGUIDE_GEN_A_CORE
Instructed patient/caregiver regarding signs and symptoms of infection/Care for catheter as per unit/ICU protocols

## 2019-07-28 NOTE — ED ADULT NURSE NOTE - OBJECTIVE STATEMENT
86 yr old male sent from [facility] for unresponsiveness. Pt is non-verbal with grunting noted. Pt responsive to painful stimuli. Pt is cool to touch and rectal temp is unreadable. Pt placed on warming blanket as per protocol.  18 G IV placed to rt forearm. Blood obtained and sent. + feeding tube.  Pt arrives on NRB, Pulse ox=98%. Resp labored. Abd soft, ND. +BS. +red spotting noted to groin area. +redness noted to sacral area. +BRBPR noted when trying to obtain an rectal temp. Family at bedside. Safety maintained.

## 2019-07-28 NOTE — PROCEDURE NOTE - NSICDXPROCEDURE_GEN_ALL_CORE_FT
PROCEDURES:  Insertion of central venous catheter with ultrasound guidance 28-Jul-2019 16:49:12  Jose Rajput  Tracheal intubation 28-Jul-2019 15:31:10  Jose Rajput
PROCEDURES:  Tracheal intubation 28-Jul-2019 15:31:10  Jose Rajput

## 2019-07-28 NOTE — ED PROVIDER NOTE - OBJECTIVE STATEMENT
low bp in field unresponsive low bp in field unresponsive  86 y f cc unresponsive hypoxic short of breath

## 2019-07-28 NOTE — ED PROVIDER NOTE - PMH
Anemia    BPH (benign prostatic hyperplasia)    Cerebral infarction  with resulting weakness and dysphagia  Chronic diastolic heart failure    Chronic kidney disease    Depression    Dyslipidemia    Dysphagia  s/p PEG placement  GERD (gastroesophageal reflux disease)    IBS (irritable bowel syndrome)    Psoriasis    Shortness of breath

## 2019-07-28 NOTE — H&P ADULT - NSICDXPASTMEDICALHX_GEN_ALL_CORE_FT
PAST MEDICAL HISTORY:  Anemia     Cerebral infarction with resulting weakness and dysphagia    Depression     Dyslipidemia     Dysphagia s/p PEG placement    GERD (gastroesophageal reflux disease)     IBS (irritable bowel syndrome)     Psoriasis     Shortness of breath

## 2019-07-28 NOTE — ED PROVIDER NOTE - CARE PLAN
Principal Discharge DX:	Severe sepsis  Secondary Diagnosis:	Acute pneumonia  Secondary Diagnosis:	Acute lower GI bleeding

## 2019-07-28 NOTE — H&P ADULT - NSHPSOCIALHISTORY_GEN_ALL_CORE
No recent ETOH use  No history of ETOH or substance abuse  Former smoker, no tobacco use for past 50 years  [facility]

## 2019-07-28 NOTE — PROCEDURE NOTE - NSINFORMCONSENT_GEN_A_CORE
From wife/Benefits, risks, and possible complications of procedure explained to patient/caregiver who verbalized understanding and gave verbal consent.
This was an emergent procedure.

## 2019-07-29 LAB
ANION GAP SERPL CALC-SCNC: 10 MMOL/L — SIGNIFICANT CHANGE UP (ref 5–17)
ANION GAP SERPL CALC-SCNC: 8 MMOL/L — SIGNIFICANT CHANGE UP (ref 5–17)
ANION GAP SERPL CALC-SCNC: 8 MMOL/L — SIGNIFICANT CHANGE UP (ref 5–17)
ANION GAP SERPL CALC-SCNC: 9 MMOL/L — SIGNIFICANT CHANGE UP (ref 5–17)
APTT BLD: 47.5 SEC — HIGH (ref 27.5–36.3)
BUN SERPL-MCNC: 50 MG/DL — HIGH (ref 7–23)
BUN SERPL-MCNC: 51 MG/DL — HIGH (ref 7–23)
BUN SERPL-MCNC: 52 MG/DL — HIGH (ref 7–23)
BUN SERPL-MCNC: 53 MG/DL — HIGH (ref 7–23)
CALCIUM SERPL-MCNC: 8 MG/DL — LOW (ref 8.4–10.5)
CALCIUM SERPL-MCNC: 8.3 MG/DL — LOW (ref 8.4–10.5)
CALCIUM SERPL-MCNC: 8.3 MG/DL — LOW (ref 8.4–10.5)
CALCIUM SERPL-MCNC: 8.4 MG/DL — SIGNIFICANT CHANGE UP (ref 8.4–10.5)
CHLORIDE SERPL-SCNC: 112 MMOL/L — HIGH (ref 96–108)
CHLORIDE SERPL-SCNC: 112 MMOL/L — HIGH (ref 96–108)
CHLORIDE SERPL-SCNC: 113 MMOL/L — HIGH (ref 96–108)
CHLORIDE SERPL-SCNC: 114 MMOL/L — HIGH (ref 96–108)
CO2 SERPL-SCNC: 22 MMOL/L — SIGNIFICANT CHANGE UP (ref 22–31)
CO2 SERPL-SCNC: 22 MMOL/L — SIGNIFICANT CHANGE UP (ref 22–31)
CO2 SERPL-SCNC: 24 MMOL/L — SIGNIFICANT CHANGE UP (ref 22–31)
CO2 SERPL-SCNC: 25 MMOL/L — SIGNIFICANT CHANGE UP (ref 22–31)
CREAT SERPL-MCNC: 1.77 MG/DL — HIGH (ref 0.5–1.3)
CREAT SERPL-MCNC: 1.81 MG/DL — HIGH (ref 0.5–1.3)
CREAT SERPL-MCNC: 1.83 MG/DL — HIGH (ref 0.5–1.3)
CREAT SERPL-MCNC: 1.84 MG/DL — HIGH (ref 0.5–1.3)
CULTURE RESULTS: SIGNIFICANT CHANGE UP
CULTURE RESULTS: SIGNIFICANT CHANGE UP
GLUCOSE BLDC GLUCOMTR-MCNC: 149 MG/DL — HIGH (ref 70–99)
GLUCOSE BLDC GLUCOMTR-MCNC: 29 MG/DL — CRITICAL LOW (ref 70–99)
GLUCOSE BLDC GLUCOMTR-MCNC: 32 MG/DL — CRITICAL LOW (ref 70–99)
GLUCOSE BLDC GLUCOMTR-MCNC: 364 MG/DL — HIGH (ref 70–99)
GLUCOSE BLDC GLUCOMTR-MCNC: 79 MG/DL — SIGNIFICANT CHANGE UP (ref 70–99)
GLUCOSE BLDC GLUCOMTR-MCNC: 80 MG/DL — SIGNIFICANT CHANGE UP (ref 70–99)
GLUCOSE BLDC GLUCOMTR-MCNC: 81 MG/DL — SIGNIFICANT CHANGE UP (ref 70–99)
GLUCOSE BLDC GLUCOMTR-MCNC: 88 MG/DL — SIGNIFICANT CHANGE UP (ref 70–99)
GLUCOSE BLDC GLUCOMTR-MCNC: 90 MG/DL — SIGNIFICANT CHANGE UP (ref 70–99)
GLUCOSE BLDC GLUCOMTR-MCNC: 97 MG/DL — SIGNIFICANT CHANGE UP (ref 70–99)
GLUCOSE SERPL-MCNC: 28 MG/DL — CRITICAL LOW (ref 70–99)
GLUCOSE SERPL-MCNC: 87 MG/DL — SIGNIFICANT CHANGE UP (ref 70–99)
GLUCOSE SERPL-MCNC: 88 MG/DL — SIGNIFICANT CHANGE UP (ref 70–99)
GLUCOSE SERPL-MCNC: 97 MG/DL — SIGNIFICANT CHANGE UP (ref 70–99)
GRAM STN FLD: SIGNIFICANT CHANGE UP
HCT VFR BLD CALC: 29 % — LOW (ref 39–50)
HGB BLD-MCNC: 8.8 G/DL — LOW (ref 13–17)
INR BLD: 1.42 RATIO — HIGH (ref 0.88–1.16)
LACTATE SERPL-SCNC: 1.4 MMOL/L — SIGNIFICANT CHANGE UP (ref 0.7–2)
MAGNESIUM SERPL-MCNC: 1.6 MG/DL — SIGNIFICANT CHANGE UP (ref 1.6–2.6)
MAGNESIUM SERPL-MCNC: 1.7 MG/DL — SIGNIFICANT CHANGE UP (ref 1.6–2.6)
MAGNESIUM SERPL-MCNC: 1.8 MG/DL — SIGNIFICANT CHANGE UP (ref 1.6–2.6)
MCHC RBC-ENTMCNC: 30.1 PG — SIGNIFICANT CHANGE UP (ref 27–34)
MCHC RBC-ENTMCNC: 30.3 GM/DL — LOW (ref 32–36)
MCV RBC AUTO: 99.3 FL — SIGNIFICANT CHANGE UP (ref 80–100)
NRBC # BLD: 0 /100 WBCS — SIGNIFICANT CHANGE UP (ref 0–0)
PHOSPHATE SERPL-MCNC: 3.9 MG/DL — SIGNIFICANT CHANGE UP (ref 2.5–4.5)
PHOSPHATE SERPL-MCNC: 3.9 MG/DL — SIGNIFICANT CHANGE UP (ref 2.5–4.5)
PLATELET # BLD AUTO: 97 K/UL — LOW (ref 150–400)
POTASSIUM SERPL-MCNC: 5.3 MMOL/L — SIGNIFICANT CHANGE UP (ref 3.5–5.3)
POTASSIUM SERPL-MCNC: 5.8 MMOL/L — HIGH (ref 3.5–5.3)
POTASSIUM SERPL-MCNC: 6.4 MMOL/L — CRITICAL HIGH (ref 3.5–5.3)
POTASSIUM SERPL-MCNC: 6.5 MMOL/L — CRITICAL HIGH (ref 3.5–5.3)
POTASSIUM SERPL-SCNC: 5.3 MMOL/L — SIGNIFICANT CHANGE UP (ref 3.5–5.3)
POTASSIUM SERPL-SCNC: 5.8 MMOL/L — HIGH (ref 3.5–5.3)
POTASSIUM SERPL-SCNC: 6.4 MMOL/L — CRITICAL HIGH (ref 3.5–5.3)
POTASSIUM SERPL-SCNC: 6.5 MMOL/L — CRITICAL HIGH (ref 3.5–5.3)
PROTHROM AB SERPL-ACNC: 16.1 SEC — HIGH (ref 10–12.9)
RBC # BLD: 2.92 M/UL — LOW (ref 4.2–5.8)
RBC # FLD: 21.2 % — HIGH (ref 10.3–14.5)
SODIUM SERPL-SCNC: 144 MMOL/L — SIGNIFICANT CHANGE UP (ref 135–145)
SODIUM SERPL-SCNC: 144 MMOL/L — SIGNIFICANT CHANGE UP (ref 135–145)
SODIUM SERPL-SCNC: 145 MMOL/L — SIGNIFICANT CHANGE UP (ref 135–145)
SODIUM SERPL-SCNC: 146 MMOL/L — HIGH (ref 135–145)
SPECIMEN SOURCE: SIGNIFICANT CHANGE UP
TROPONIN I SERPL-MCNC: 0.15 NG/ML — HIGH (ref 0.02–0.06)
WBC # BLD: 23.44 K/UL — HIGH (ref 3.8–10.5)
WBC # FLD AUTO: 23.44 K/UL — HIGH (ref 3.8–10.5)

## 2019-07-29 PROCEDURE — 71045 X-RAY EXAM CHEST 1 VIEW: CPT | Mod: 26

## 2019-07-29 PROCEDURE — 99233 SBSQ HOSP IP/OBS HIGH 50: CPT

## 2019-07-29 RX ORDER — DEXTROSE 50 % IN WATER 50 %
50 SYRINGE (ML) INTRAVENOUS ONCE
Refills: 0 | Status: COMPLETED | OUTPATIENT
Start: 2019-07-29 | End: 2019-07-29

## 2019-07-29 RX ORDER — INSULIN HUMAN 100 [IU]/ML
10 INJECTION, SOLUTION SUBCUTANEOUS ONCE
Refills: 0 | Status: COMPLETED | OUTPATIENT
Start: 2019-07-29 | End: 2019-07-29

## 2019-07-29 RX ORDER — CALCIUM GLUCONATE 100 MG/ML
1 VIAL (ML) INTRAVENOUS ONCE
Refills: 0 | Status: COMPLETED | OUTPATIENT
Start: 2019-07-29 | End: 2019-07-29

## 2019-07-29 RX ORDER — FUROSEMIDE 40 MG
40 TABLET ORAL ONCE
Refills: 0 | Status: COMPLETED | OUTPATIENT
Start: 2019-07-29 | End: 2019-07-29

## 2019-07-29 RX ADMIN — CHLORHEXIDINE GLUCONATE 15 MILLILITER(S): 213 SOLUTION TOPICAL at 17:09

## 2019-07-29 RX ADMIN — Medication 50 MILLILITER(S): at 12:30

## 2019-07-29 RX ADMIN — PROPOFOL 2.72 MICROGRAM(S)/KG/MIN: 10 INJECTION, EMULSION INTRAVENOUS at 05:24

## 2019-07-29 RX ADMIN — DORZOLAMIDE HYDROCHLORIDE TIMOLOL MALEATE 1 DROP(S): 20; 5 SOLUTION/ DROPS OPHTHALMIC at 05:05

## 2019-07-29 RX ADMIN — PANTOPRAZOLE SODIUM 40 MILLIGRAM(S): 20 TABLET, DELAYED RELEASE ORAL at 17:10

## 2019-07-29 RX ADMIN — PROPOFOL 2.72 MICROGRAM(S)/KG/MIN: 10 INJECTION, EMULSION INTRAVENOUS at 21:39

## 2019-07-29 RX ADMIN — SODIUM CHLORIDE 100 MILLILITER(S): 9 INJECTION, SOLUTION INTRAVENOUS at 15:39

## 2019-07-29 RX ADMIN — MEROPENEM 100 MILLIGRAM(S): 1 INJECTION INTRAVENOUS at 05:05

## 2019-07-29 RX ADMIN — CHLORHEXIDINE GLUCONATE 15 MILLILITER(S): 213 SOLUTION TOPICAL at 05:04

## 2019-07-29 RX ADMIN — ZINC OXIDE 1 APPLICATION(S): 200 OINTMENT TOPICAL at 17:10

## 2019-07-29 RX ADMIN — Medication 40 MILLIGRAM(S): at 09:51

## 2019-07-29 RX ADMIN — INSULIN HUMAN 10 UNIT(S): 100 INJECTION, SOLUTION SUBCUTANEOUS at 09:53

## 2019-07-29 RX ADMIN — DORZOLAMIDE HYDROCHLORIDE TIMOLOL MALEATE 1 DROP(S): 20; 5 SOLUTION/ DROPS OPHTHALMIC at 17:09

## 2019-07-29 RX ADMIN — MEROPENEM 100 MILLIGRAM(S): 1 INJECTION INTRAVENOUS at 17:09

## 2019-07-29 RX ADMIN — Medication 4.16 MICROGRAM(S)/KG/MIN: at 17:32

## 2019-07-29 RX ADMIN — ZINC OXIDE 1 APPLICATION(S): 200 OINTMENT TOPICAL at 05:06

## 2019-07-29 RX ADMIN — Medication 4.16 MICROGRAM(S)/KG/MIN: at 21:56

## 2019-07-29 RX ADMIN — Medication 100 MILLIGRAM(S): at 17:09

## 2019-07-29 RX ADMIN — Medication 50 MILLILITER(S): at 09:52

## 2019-07-29 RX ADMIN — Medication 4.16 MICROGRAM(S)/KG/MIN: at 09:52

## 2019-07-29 RX ADMIN — Medication 4.16 MICROGRAM(S)/KG/MIN: at 04:53

## 2019-07-29 RX ADMIN — Medication 200 GRAM(S): at 09:51

## 2019-07-29 RX ADMIN — SODIUM CHLORIDE 100 MILLILITER(S): 9 INJECTION, SOLUTION INTRAVENOUS at 17:32

## 2019-07-29 RX ADMIN — CHLORHEXIDINE GLUCONATE 1 APPLICATION(S): 213 SOLUTION TOPICAL at 05:03

## 2019-07-29 RX ADMIN — LATANOPROST 1 DROP(S): 0.05 SOLUTION/ DROPS OPHTHALMIC; TOPICAL at 21:38

## 2019-07-29 RX ADMIN — PANTOPRAZOLE SODIUM 40 MILLIGRAM(S): 20 TABLET, DELAYED RELEASE ORAL at 05:04

## 2019-07-29 RX ADMIN — SODIUM CHLORIDE 100 MILLILITER(S): 9 INJECTION, SOLUTION INTRAVENOUS at 04:52

## 2019-07-29 RX ADMIN — Medication 4.16 MICROGRAM(S)/KG/MIN: at 01:09

## 2019-07-29 NOTE — CONSULT NOTE ADULT - SUBJECTIVE AND OBJECTIVE BOX
HPI:   Patient is a 86y male with ppm, chf, cva, dysphagia, peg, had recent episode of mssa bacteremia from pneumonia who now returns with worsening lethargy , increased thick secretions and work of breathing hence intubated. Patient is sedated on vent hence cannot get any information from him.   He is on pressors, vent, warming blanket for hypothermia  REVIEW OF SYSTEMS:  All other review of systems cannot get (Comprehensive ROS)    PAST MEDICAL & SURGICAL HISTORY:  Chronic kidney disease  Chronic diastolic heart failure  BPH (benign prostatic hyperplasia)  Shortness of breath  Depression  IBS (irritable bowel syndrome)  Dysphagia: s/p PEG placement  Cerebral infarction: with resulting weakness and dysphagia  Anemia  Psoriasis  GERD (gastroesophageal reflux disease)  Dyslipidemia  Cardiac pacemaker  S/P percutaneous endoscopic gastrostomy (PEG) tube placement      Allergies    No Known Allergies    Intolerances        Antimicrobials Day #  :2  meropenem  IVPB 1000 milliGRAM(s) IV Intermittent every 12 hours  vancomycin  IVPB      vancomycin  IVPB 1250 milliGRAM(s) IV Intermittent every 24 hours    Other Medications:  acetaminophen    Suspension .. 650 milliGRAM(s) Enteral Tube every 6 hours PRN  chlorhexidine 0.12% Liquid 15 milliLiter(s) Oral Mucosa every 12 hours  chlorhexidine 4% Liquid 1 Application(s) Topical <User Schedule>  dextrose 5% + sodium chloride 0.9%. 1000 milliLiter(s) IV Continuous <Continuous>  dorzolamide 2%/timolol 0.5% Ophthalmic Solution 1 Drop(s) Both EYES every 12 hours  latanoprost 0.005% Ophthalmic Solution 1 Drop(s) Both EYES at bedtime  norepinephrine Infusion 0.05 MICROgram(s)/kG/Min IV Continuous <Continuous>  pantoprazole  Injectable 40 milliGRAM(s) IV Push every 12 hours  propofol Infusion 5 MICROgram(s)/kG/Min IV Continuous <Continuous>  sodium chloride 0.9% lock flush 10 milliLiter(s) IV Push every 1 hour PRN  zinc oxide 20% Ointment 1 Application(s) Topical two times a day      FAMILY HISTORY:      SOCIAL HISTORY:  Smoking: [ ]Yes [ x]No  ETOH: [ ]Yes [ x]No  Drug Use: [ ]Yes [ x]No   x[ ] Single[ ]    T(F): 97.8 (19 @ 07:00), Max: 97.8 (19 @ 07:00)  HR: 60 (19 @ 12:08)  BP: 105/59 (19 @ 08:00)  RR: 20 (19 @ 08:00)  SpO2: 99% (19 @ 12:08)  Wt(kg): --    PHYSICAL EXAM:  General: sedated no acute distress  Eyes:  anicteric, no conjunctival injection, no discharge  Oropharynx: no lesions or injection 	  Neck: supple, without adenopathy  Lungs: clear to auscultation  Heart: regular rate and rhythm; no murmur, rubs or gallops  Abdomen: soft, nondistended, nontender, without mass or organomegaly  Skin: no lesions  Extremities: no clubbing, cyanosis, or edema  Neurologic: sedated  hansen placed new with purulent urine  scrotum no swelling  back no open ulcers    LAB RESULTS:                        8.8    23.44 )-----------( 97       ( 2019 05:45 )             29.0         146<H>  |  114<H>  |  51<H>  ----------------------------<  28<LL>  5.3   |  24  |  1.77<H>    Ca    8.3<L>      2019 12:25  Phos  3.9       Mg     1.7         TPro  6.6  /  Alb  2.1<L>  /  TBili  0.6  /  DBili  x   /  AST  49<H>  /  ALT  27  /  AlkPhos  156<H>      LIVER FUNCTIONS - ( 2019 17:45 )  Alb: 2.1 g/dL / Pro: 6.6 g/dL / ALK PHOS: 156 U/L / ALT: 27 U/L DA / AST: 49 U/L / GGT: x           Urinalysis Basic - ( 2019 15:30 )    Color: Red / Appearance: very cloudy / S.015 / pH: x  Gluc: x / Ketone: Trace  / Bili: Negative / Urobili: Negative   Blood: x / Protein: 500 mg/dL / Nitrite: Positive   Leuk Esterase: Moderate / RBC: 26-50 /HPF / WBC 11-25 /HPF   Sq Epi: x / Non Sq Epi: Neg.-Few / Bacteria: Negative /HPF        MICROBIOLOGY:  RECENT CULTURES:   @ 15:30 .Sputum Sputum       Rare polymorphonuclear leukocytes per low power field  Rare Squamous epithelial cells per low power field  Numerous Gram Variable Rods seen per oil power field  Few Gram positive cocci in pairs seen per oil power field  Few Yeast like cells seen per oil power field          RADIOLOGY REVIEWED:    < from: Xray Chest 1 View- PORTABLE-Routine (19 @ 09:03) >  EXAM:  XR CHEST PORTABLE ROUTINE 1V      PROCEDURE DATE:  2019        INTERPRETATION:  AP chest on 2019 at 8:20 AM. Patient requires   intubation.    Patient's chin obscures the apices. Heart is likely enlarged.    Left-sided pacemaker endotracheal tube remain. Left jugular line remains.    Dense consolidation in the anterior segment of the right upper lobe again   noted.    Some increased perihilar markings on the left again seen.    Chest is similar to .    IMPRESSION: Unchanged chest as above.      < end of copied text >        Impression:  Elderly man, recent hospital stay for mssa bacteremia from pneumonia, has peg, at rehab , returns with respiratory distress, required intubation, pressors, thrombocytopenia, on warming blanket for hypothermia, cxr with dense infiltrate. Has severe sepsis/septic shock from health care associated pneumonia.     Recommendations:  continue meropenem, stop vanco, start doxycycline  check legionella and rvp just to be complete  follow up blood and sputum cultures  supportive care per ccu  consider palliative approach given ongoing aspiration and no reason it will stop

## 2019-07-29 NOTE — PROGRESS NOTE ADULT - SUBJECTIVE AND OBJECTIVE BOX
Progress: f/u palliative, pt currently sedated and intubated in CCU. Likely aspiration PNA.    Present Symptoms:   Dyspnea: no  Nausea/Vomiting: no  Anxiety:  no  Depressed Mood:   Fatigue: yes  Loss of appetite: yes  Pain:         no s/s           location:   Review of Systems:Unable to obtain due to poor mentation, sedated , intubated     MEDICATIONS  (STANDING):  chlorhexidine 0.12% Liquid 15 milliLiter(s) Oral Mucosa every 12 hours  chlorhexidine 4% Liquid 1 Application(s) Topical <User Schedule>  dextrose 5% + sodium chloride 0.9%. 1000 milliLiter(s) (100 mL/Hr) IV Continuous <Continuous>  dorzolamide 2%/timolol 0.5% Ophthalmic Solution 1 Drop(s) Both EYES every 12 hours  doxycycline hyclate Capsule 100 milliGRAM(s) Oral every 12 hours  latanoprost 0.005% Ophthalmic Solution 1 Drop(s) Both EYES at bedtime  meropenem  IVPB 1000 milliGRAM(s) IV Intermittent every 12 hours  norepinephrine Infusion 0.05 MICROgram(s)/kG/Min (4.163 mL/Hr) IV Continuous <Continuous>  pantoprazole  Injectable 40 milliGRAM(s) IV Push every 12 hours  propofol Infusion 5 MICROgram(s)/kG/Min (2.721 mL/Hr) IV Continuous <Continuous>  zinc oxide 20% Ointment 1 Application(s) Topical two times a day    MEDICATIONS  (PRN):  acetaminophen    Suspension .. 650 milliGRAM(s) Enteral Tube every 6 hours PRN Temp greater or equal to 38C (100.4F), Mild Pain (1 - 3)  sodium chloride 0.9% lock flush 10 milliLiter(s) IV Push every 1 hour PRN Pre/post blood products, medications, blood draw, and to maintain line patency      PHYSICAL EXAM:  Vital Signs Last 24 Hrs  T(C): 36.6 (2019 07:00), Max: 36.6 (2019 07:00)  T(F): 97.8 (2019 07:00), Max: 97.8 (2019 07:00)  HR: 60 (2019 14:01) (60 - 76)  BP: 105/59 (2019 08:00) (60/25 - 142/75)  BP(mean): 73 (2019 08:00) (28 - 95)  RR: 20 (2019 08:00) (12 - 24)  SpO2: 100% (2019 14:01) (95% - 100%)  General: intubated       HEENT: n/c, a/t , intubated     Lungs: coarse    CV: s1s2    GI: soft, n/t +  Peg    : hansen    Musculoskeletal:  weak, flaccid   Skin: warm dry     Neuro:  sedated  currently    Oral intake ability:  npo  Diet: peg     LABS:                          8.8    23.44 )-----------( 97       ( 2019 05:45 )             29.0         146<H>  |  114<H>  |  51<H>  ----------------------------<  28<LL>  5.3   |  24  |  1.77<H>    Ca    8.3<L>      2019 12:25  Phos  3.9       Mg     1.7         TPro  6.6  /  Alb  2.1<L>  /  TBili  0.6  /  DBili  x   /  AST  49<H>  /  ALT  27  /  AlkPhos  156<H>      Urinalysis Basic - ( 2019 15:30 )    Color: Red / Appearance: very cloudy / S.015 / pH: x  Gluc: x / Ketone: Trace  / Bili: Negative / Urobili: Negative   Blood: x / Protein: 500 mg/dL / Nitrite: Positive   Leuk Esterase: Moderate / RBC: 26-50 /HPF / WBC 11-25 /HPF   Sq Epi: x / Non Sq Epi: Neg.-Few / Bacteria: Negative /HPF        RADIOLOGY & ADDITIONAL STUDIES:< from: Xray Chest 1 View- PORTABLE-Routine (19 @ 09:03) >  EXAM:  XR CHEST PORTABLE ROUTINE 1V      PROCEDURE DATE:  2019        INTERPRETATION:  AP chest on 2019 at 8:20 AM. Patient requires   intubation.    Patient's chin obscures the apices. Heart is likely enlarged.    Left-sided pacemaker endotracheal tube remain. Left jugular line remains.    Dense consolidation in the anterior segment of the right upper lobe again   noted.    Some increased perihilar markings on the left again seen.    Chest is similar to .    IMPRESSION: Unchanged chest as above.          ADVANCE DIRECTIVES: MOLST  DNR  w/ trial intubation   Advanced Care Planning discussion total time spent:

## 2019-07-29 NOTE — PROGRESS NOTE ADULT - ASSESSMENT
A/P : Septic Shock with multiorgan failure, due to aspiration PNA on pressors  Acute Respiratory failure intubated 7/28  MELISSA with baseline Cr 1.2-1.4   Hyperkalemia today  CVA with Dysphagia s/p PEG  Underlying BPH, chronic diastolic CHF  palliative : I called and spoke with pts wife Angie . She is realizing her  is declining both mentally and physically  and is considering what steps to take or not take. Pt is  currently a DNR but with  trial of intubation, and has a Peg in place.  He is currently intubated, and wife wants to see how he does over next few days. I discussed with her the option of no further intubation, in future and wife wants to think about this.  GOC on -going. Will f/U w/ wife .  Cont to monitor pts clinical status w/ CCU team . A/P : Septic Shock with multiorgan failure, due to aspiration PNA on pressors  Acute Respiratory failure intubated 7/28  MELISSA with baseline Cr 1.2-1.4   Hyperkalemia today  CVA with Dysphagia s/p PEG  Underlying BPH, chronic diastolic CHF  palliative : I called and spoke with pts wife Angie . She is realizing her  is declining both mentally and physically  and is considering what steps to take or not take. Pt is  currently a DNR but with  trial of intubation, and has a Peg in place.  He is currently intubated, and wife wants to see how he does over next few days. I discussed with her the option of no further intubation, in future and wife wants to think about this.  GOC on -going. Will f/U w/ wife .  Cont to monitor pts clinical status w/ CCU team.

## 2019-07-29 NOTE — CHART NOTE - NSCHARTNOTEFT_GEN_A_CORE
Upon Nutritional Assessment by the Registered Dietitian your patient was determined to meet criteria / has evidence of the following diagnosis/diagnoses:                 [X] Severe Protein Calorie Malnutrition    Findings as based on:  [X] Comprehensive Nutrition Assessment   [X] Nutrition Focused Physical Exam - Temporal, Orbital, Clavicle, Scapular, Bicep, Tricep, Thigh, Calf & Hand Wasting Observed      Nutrition Plan/Recommendations:    1) Initiate PEG feeds as medically apropriate    PROVIDER Section:   By signing this assessment you are acknowledging and agree with the diagnosis/diagnoses assigned by the Registered Dietitian    Renetta Silva RDN

## 2019-07-29 NOTE — PROGRESS NOTE ADULT - SUBJECTIVE AND OBJECTIVE BOX
Patient is a 86y old  Male who presents with a chief complaint of Change in mental status (2019 16:10)      BRIEF HOSPITAL COURSE:   87 yo male pmhx CVA with residual dysphagia and PEG placement, PPM, IBS, anemia, GERD, psoriasis, depression recently treated for MSSA bacteremia (2019) biba form [facility] with complaints of AMS.  Patient admitted for hypoxic respiratory failure 2/2 aspiration pneumonia.  Patient subsequently deteriorated and was intubated for hypoxic respiratory failure and found to have massive aspiration with copious amounts of tube feeds suctioned from ET tube.     Events last 24 hours:   Patient seen and examined, FIO2 being weaned from 70% for goal sPO2 >90%.  Hyperkalemic earlier today, repeat labs ordered.  Patient found to be hyperkalemic on repeat labs, insulin, d50, neb ordered.        PAST MEDICAL & SURGICAL HISTORY:  Chronic kidney disease  Chronic diastolic heart failure  BPH (benign prostatic hyperplasia)  Shortness of breath  Depression  IBS (irritable bowel syndrome)  Dysphagia: s/p PEG placement  Cerebral infarction: with resulting weakness and dysphagia  Anemia  Psoriasis  GERD (gastroesophageal reflux disease)  Dyslipidemia  Cardiac pacemaker  S/P percutaneous endoscopic gastrostomy (PEG) tube placement      Allergies  No Known Allergies      FAMILY HISTORY:  Noncontributory     Social History:   From [facility].       Review of Systems:  Unable to obtain 2/2 mental status.       Vitals During Exam:   HR: 61  BP: 139/71 mmHg   RR: 18  sPO2: 100% on FiO2 60% and Peep 5    Physical Examination:    General: Elderly male, lying in bed, NAD    HEENT: NC/AT, Pupils equal, reactive to light.  Symmetric. ET tube and OG tube in place.    PULM: Symmetrical thorax expansion upon respiration.  Coarse to auscultation bilaterally, no significant sputum production appreciated    CVS: Regular rate and rhythm, no murmurs, rubs, or gallops appreciated    ABD: Soft, nondistended, nontender, normoactive bowel sounds, no masses appreciated, Peg tube.     EXT: +edema, nontender    SKIN: Warm and well perfused, no rashes noted.    NEURO: Sedated, intubated, withdraws to pain over all 4 extremities.       Medications:  doxycycline hyclate Capsule 100 milliGRAM(s) Oral every 12 hours  meropenem  IVPB 1000 milliGRAM(s) IV Intermittent every 12 hours  norepinephrine Infusion 0.05 MICROgram(s)/kG/Min IV Continuous <Continuous>  ALBUTerol   0.5% 2.5 milliGRAM(s) Nebulizer once  acetaminophen    Suspension .. 650 milliGRAM(s) Enteral Tube every 6 hours PRN  propofol Infusion 5 MICROgram(s)/kG/Min IV Continuous <Continuous>  pantoprazole  Injectable 40 milliGRAM(s) IV Push every 12 hours  dextrose 5% + sodium chloride 0.9%. 1000 milliLiter(s) IV Continuous <Continuous>  sodium chloride 0.9% lock flush 10 milliLiter(s) IV Push every 1 hour PRN  chlorhexidine 0.12% Liquid 15 milliLiter(s) Oral Mucosa every 12 hours  chlorhexidine 4% Liquid 1 Application(s) Topical <User Schedule>  dorzolamide 2%/timolol 0.5% Ophthalmic Solution 1 Drop(s) Both EYES every 12 hours  latanoprost 0.005% Ophthalmic Solution 1 Drop(s) Both EYES at bedtime  zinc oxide 20% Ointment 1 Application(s) Topical two times a day      Mode: AC/ CMV (Assist Control/ Continuous Mandatory Ventilation)  RR (machine): 16  TV (machine): 450  FiO2: 50  PEEP: 5  ITime: 1  MAP: 11  PIP: 21      ICU Vital Signs Last 24 Hrs  T(C): 36.6 (2019 22:00), Max: 37 (2019 17:00)  T(F): 97.9 (2019 22:00), Max: 98.6 (2019 17:00)  HR: 60 (2019 02:00) (59 - 65)  BP: 128/64 (2019 02:00) (76/51 - 143/73)  BP(mean): 83 (2019 02:00) (60 - 95)  ABP: --  ABP(mean): --  RR: 18 (2019 02:00) (15 - 21)  SpO2: 97% (2019 02:00) (95% - 100%)    Vital Signs Last 24 Hrs  T(C): 36.6 (2019 22:00), Max: 37 (2019 17:00)  T(F): 97.9 (2019 22:00), Max: 98.6 (2019 17:00)  HR: 60 (2019 02:00) (59 - 65)  BP: 128/64 (2019 02:00) (76/51 - 143/73)  BP(mean): 83 (2019 02:00) (60 - 95)  RR: 18 (2019 02:00) (15 - 21)  SpO2: 97% (2019 02:00) (95% - 100%)    ABG - ( 2019 17:10 )  pH, Arterial: 7.24  pH, Blood: x     /  pCO2: 52    /  pO2: 178   / HCO3: x     / Base Excess: x     /  SaO2: 99          I&O's Detail    2019 07:01  -  2019 07:00  --------------------------------------------------------  IN:    dextrose 5% + sodium chloride 0.9%.: 1300 mL    norepinephrine Infusion: 316.2 mL    norepinephrine Infusion: 898.8 mL    phenylephrine   Infusion: 408 mL    Platelets - Single Donor: 241 mL    propofol Infusion: 218.3 mL    Sodium Chloride 0.9% IV Bolus: 2000 mL    Solution: 350 mL    Solution: 200 mL  Total IN: 5932.3 mL    OUT:    Indwelling Catheter - Urethral: 115 mL    Indwelling Catheter - Urethral: 45 mL    PEG (Percutaneous Endoscopic Gastrostomy) Tube: 650 mL    Voided: 35 mL  Total OUT: 845 mL  Total NET: 5087.3 mL      2019 07:01  -  2019 03:31  --------------------------------------------------------  IN:    dextrose 5% + sodium chloride 0.9%.: 1800 mL    norepinephrine Infusion: 1257.1 mL    propofol Infusion: 192.6 mL    Solution: 100 mL  Total IN: 3349.7 mL    OUT:    Indwelling Catheter - Urethral: 585 mL  Total OUT: 585 mL  Total NET: 2764.7 mL      LABS:                        8.8    23.44 )-----------( 97       ( 2019 05:45 )             29.0     07-    144  |  113<H>  |  50<H>  ----------------------------<  97  5.8<H>   |  22  |  1.84<H>    Ca    8.4      2019 22:00  Phos  3.9       Mg     1.6         TPro  6.6  /  Alb  2.1<L>  /  TBili  0.6  /  DBili  x   /  AST  49<H>  /  ALT  27  /  AlkPhos  156<H>        CARDIAC MARKERS ( 2019 05:45 )  .150 ng/mL / x     / x     / x     / x      CARDIAC MARKERS ( 2019 17:45 )  <.017 ng/mL / x     / x     / x     / x      CARDIAC MARKERS ( 2019 11:40 )  .027 ng/mL / x     / 33 U/L / x     / 5.0 ng/mL      CAPILLARY BLOOD GLUCOSE  POCT Blood Glucose.: 88 mg/dL (2019 23:53)      PT/INR - ( 2019 05:45 )   PT: 16.1 sec;   INR: 1.42 ratio    PTT - ( 2019 05:45 )  PTT:47.5 sec      Urinalysis Basic - ( 2019 15:30 )  Color: Red / Appearance: very cloudy / S.015 / pH: x  Gluc: x / Ketone: Trace  / Bili: Negative / Urobili: Negative   Blood: x / Protein: 500 mg/dL / Nitrite: Positive   Leuk Esterase: Moderate / RBC: 26-50 /HPF / WBC 11-25 /HPF   Sq Epi: x / Non Sq Epi: Neg.-Few / Bacteria: Negative /HPF      CULTURES:  Culture Results:   Few Pseudomonas aeruginosa  Normal Respiratory Renetta present ( @ 15:30)  Culture Results:   >100,000 CFU/ml Presumptive Candida albicans "Susceptibilities not  performed" ( @ 15:30)  Culture Results:   50,000 - 99,000 CFU/mL Presumptive Candida albicans "Susceptibilities not  performed" ( @ 12:24)  Culture Results:   No growth to date. ( @ 11:40)  Culture Results:   No growth to date. ( @ 11:40)      RADIOLOGY:   < from: Xray Chest 1 View- PORTABLE-Routine (19 @ 09:03) >  EXAM:  XR CHEST PORTABLE ROUTINE 1V      PROCEDURE DATE:  2019        INTERPRETATION:  AP chest on 2019 at 8:20 AM. Patient requires   intubation.    Patient's chin obscures the apices. Heart is likely enlarged.    Left-sided pacemaker endotracheal tube remain. Left jugular line remains.    Dense consolidation in the anterior segment of the right upper lobe again   noted.    Some increased perihilar markings on the left again seen.    Chest is similar to .    IMPRESSION: Unchanged chest as above.    REDDY DONNELLY M.D., ATTENDING RADIOLOGIST  This document has been electronically signed. 2019  9:21AM    < end of copied text >    < from: Xray Chest 1 View-PORTABLE IMMEDIATE (19 @ 16:49) >  EXAM:  XR CHEST PORTABLE IMMED 1V      PROCEDURE DATE:  2019        INTERPRETATION:  CLINICAL INDICATION: 86 years  Male with S/p intubation.    COMPARISON: 2019    The patient's chin obscures the lung apices and superior mediastinum    AP view of the chest was obtained. There is an ET tube in situ however   the jacey is poorly visualized and therefore the positioning of the ET   tube cannot be assessed. Recommend repeat. There is a new left central   line with this tip over the superior vena cava. There is no obvious   pneumothorax. The left-sided pacemaker is unchanged.    A dense right midlung infiltrate is unchanged. No new infiltrate is seen.    The heart is enlarged. There is no definite pleural effusion. The   pulmonary vasculature is normal.    Mild thoracic degenerative changes are present.    IMPRESSION:    Limited positioning. The jacey is not well visualized and therefore the   positioning of the ET tube cannot be adequately assessed. Recommend   repeat AP view of the chest.    Left central line in good position. No obvious pneumothorax allowing for   positioning of patient's chin.    Dense right midlung infiltrate unchanged.    Cardiomegaly. Pacemaker.    CHARLES SMITH M.D., ATTENDING RADIOLOGIST  This document has been electronically signed. 2019  4:59PM    < end of copied text >      SUPPLEMENTAL O2: AC/VC  LINES: Peripheral, CVC  IVF: D5NS  SOTO: Y  PPx: Peridex, Pantoprazole, SCD  CONTACT: N

## 2019-07-29 NOTE — PROVIDER CONTACT NOTE (CRITICAL VALUE NOTIFICATION) - ACTION/TREATMENT ORDERED:
MD notified and will f/u with cardiac enzymes. no further interventions at this time and  will reassess cardiac status when improvement of respiratory status.

## 2019-07-29 NOTE — PROGRESS NOTE ADULT - ASSESSMENT
85 yo male pmhx CVA with residual dysphagia and PEG placement, PPM, IBS, anemia, GERD, psoriasis, depression recently treated for MSSA bacteremia (06/2019) admitted with hypoxemic respiratory failure 2/2 aspiration pneumonia, septic shock and thrombocytopenia.    NEURO: Sedated with propofol.   CV: Septic Shock requiring vasopressor therapy.  Actively titrating for MAP 65-70, wean as tolerated by patient.    RESP: Acute hypoxic respiratory failure, 6cc/kg tidal volume lung protective strategy, actively titrating Fio2 and Peep for sPO2 >90%, weaning as tolerated by patient.  Keep HOB >30 degrees. Aggressive chest PT, pulmonary lavaging and suctioning.    RENAL: MELISSA, avoid nephrotoxic medications, renally dose medications, trend urine output, bun/cr and electrolytes.  Hyperkalemic s/p D50 and Insulin.  Repeat labs ordered for AM.   GI: NPO.  PPI therapy.   ENDO: Hypoglycemia on D5NS.   ID: Septic Shock on vasopressor therapy.  Cultures pending.  Meropenem and Doxycycline for coverage.   HEME: Thrombocytopenia s/p 2U PRBC and 1U FFP.   DISPO: DNR.      Critical Care time: 55 mins assessing presenting problems of acute illness that poses high probability of life threatening deterioration or end organ damage/dysfunction.  Medical decision making including Initiating plan of care, reviewing data, reviewing radiology, discussing with multidisciplinary team, non inclusive of procedures, discussing goals of care with patient/family

## 2019-07-29 NOTE — PROGRESS NOTE ADULT - ASSESSMENT
Physical Examination:  GENERAL:               Sedated, intubated, No acute distress.    HEENT:                   No JVD, Dry MM  PULM:                     Bilateral air entry, Clear to auscultation bilaterally, no significant sputum production, No Rales, No Rhonchi, No Wheezing  CVS:                         S1, S2,  +Murmur  ABD:                        Soft, distended, nontender, normoactive bowel sounds, + Peg  EXT:                         +edema, nontender  Vascular:                Warm Extremities, Normal Capillary refill,  SKIN:                       Warm and well perfused, no rashes noted.   NEURO:                  Sedated, not follows commands  PSYC:                      Calm, no Insight.        Assessment  Septic Shock with multiorgan failure, due to aspiration PNA on pressors  Acute Respiratory failure intubated 7/28  Thrombocytopenia due to sepsis improving  Lactic acidosis resolved  MELISSA with baseline Cr 1.2-1.4   Hyperkalemia today  CVA with Dysphagia s/p PEG  Underlying BPH, chronic diastolic CHF      Plan  Mechanical ventilation  taper fio2 as tolerated  pressors to maintain bp  cocktail given for hyperkalemia, repeat labs at 12  IVF  IV abx as per ID  restart DVT ppx in am.   Continue supportive care  palliative care eval      Family Updated: 	Dtmora Kinsey -                        Date  7/29/19 - Is away                                       wife Angie [phone]	             Date: 7/29/19    PMD:		Dr. Reyes		                   Notified(Date): Paged 7/29/19      Sedation & Analgesia:	As above  Diet/Nutrition:		Start peg feeding today vs kyle  GI PPx:			PPI    DVT Ppx:		venodynes start dvt ppx when plts > 100  	RISK                                                          Points  	[ ] Previous VTE                                           	3  	[ ] Thrombophilia                                        	2  	[ ] Lower limb paralysis                              	2   	[ ] Current Cancer                                       	2   	[ ] Immobilization > 24 hrs                        	1  	[x ] ICU/CCU stay > 24 hours                       	1  	[x ] Age > 60                                                   	1  		Total:[2 ]      Activity:		               Bedrest  Head of Bed:               35-45  Glycemic Control:        n/a    Lines:  PIV  CENTRAL LINE: 	[ x] YES [ ] NO	                    LOCATION:   	  J                     DATE INSERTED:   	                    REMOVE:  [ ] YES [ x] NO    A-LINE:  	                [ ] YES [x ] NO                      LOCATION:   	                       DATE INSERTED: 		            REMOVE:  [ ] YES [ ] NO    SOTO: 		        [x] YES [ ] NO  		                                       DATE INSERTED:		            REMOVE:  [ ] YES [ x] NO      Restraints were deemed necessary to prevent removal of life-sustaining devices [ x ] YES   [    ]  NO    Disposition: ICU monitoring     Goals of Care: DNR, Trial intubation

## 2019-07-29 NOTE — DIETITIAN INITIAL EVALUATION ADULT. - OTHER INFO
86 year old male with extensive PMH including IBS, CVA with dysphagia and PEG placement, anemia, psoriasis, GERD, depression, PPM admitted with severe sepsis from likely aspiration pneumonia. Patient intubated, NPO with NGT, patient noted with PEG. Stage 2 noted on coccyx & stage 2 midline lower back. (+2) generalized edema. Patient noted with evidence of severe malnutrition 86 year old male with extensive PMH including IBS, CVA with dysphagia and PEG placement, anemia, psoriasis, GERD, depression, PPM admitted with severe sepsis from likely aspiration pneumonia. Patient intubated, NPO with NGT, patient noted with PEG. Stage 2 noted on coccyx & stage 2 midline lower back. (+2) generalized edema. Patient noted with evidence of severe malnutrition. Patient received 1 unit PRBC's due to ? GIB, noted with pressure injury coccyx.

## 2019-07-29 NOTE — PROGRESS NOTE ADULT - SUBJECTIVE AND OBJECTIVE BOX
Follow-up Critical Care Progress Note  Chief Complaint : Sepsis      patient intubated, sedated  cant provide history or ros  remains on pressors, and high fio2 60%      Allergies :No Known Allergies      PAST MEDICAL & SURGICAL HISTORY:  Chronic kidney disease  Chronic diastolic heart failure  BPH (benign prostatic hyperplasia)  Shortness of breath  Depression  IBS (irritable bowel syndrome)  Dysphagia: s/p PEG placement  Cerebral infarction: with resulting weakness and dysphagia  Anemia  Psoriasis  GERD (gastroesophageal reflux disease)  Dyslipidemia  Cardiac pacemaker  S/P percutaneous endoscopic gastrostomy (PEG) tube placement      Medications:  MEDICATIONS  (STANDING):  chlorhexidine 0.12% Liquid 15 milliLiter(s) Oral Mucosa every 12 hours  chlorhexidine 4% Liquid 1 Application(s) Topical <User Schedule>  dextrose 5% + sodium chloride 0.9%. 1000 milliLiter(s) (100 mL/Hr) IV Continuous <Continuous>  dorzolamide 2%/timolol 0.5% Ophthalmic Solution 1 Drop(s) Both EYES every 12 hours  latanoprost 0.005% Ophthalmic Solution 1 Drop(s) Both EYES at bedtime  meropenem  IVPB 1000 milliGRAM(s) IV Intermittent every 12 hours  norepinephrine Infusion 0.05 MICROgram(s)/kG/Min (4.163 mL/Hr) IV Continuous <Continuous>  pantoprazole  Injectable 40 milliGRAM(s) IV Push every 12 hours  propofol Infusion 5 MICROgram(s)/kG/Min (2.721 mL/Hr) IV Continuous <Continuous>  vancomycin  IVPB      vancomycin  IVPB 1250 milliGRAM(s) IV Intermittent every 24 hours  zinc oxide 20% Ointment 1 Application(s) Topical two times a day    MEDICATIONS  (PRN):  acetaminophen    Suspension .. 650 milliGRAM(s) Enteral Tube every 6 hours PRN Temp greater or equal to 38C (100.4F), Mild Pain (1 - 3)  sodium chloride 0.9% lock flush 10 milliLiter(s) IV Push every 1 hour PRN Pre/post blood products, medications, blood draw, and to maintain line patency      LABS:                        8.8    23.44 )-----------( 97       ( 2019 05:45 )             29.0     07-    144  |  112<H>  |  53<H>  ----------------------------<  87  6.4<HH>   |  22  |  1.83<H>    Ca    8.3<L>      2019 07:05  Phos  3.9       Mg     1.8         TPro  6.6  /  Alb  2.1<L>  /  TBili  0.6  /  DBili  x   /  AST  49<H>  /  ALT  27  /  AlkPhos  156<H>        CARDIAC MARKERS ( 2019 05:45 )  .150 ng/mL / x     / x     / x     / x      CARDIAC MARKERS ( 2019 17:45 )  <.017 ng/mL / x     / x     / x     / x      CARDIAC MARKERS ( 2019 11:40 )  .027 ng/mL / x     / 33 U/L / x     / 5.0 ng/mL        PT/INR - ( 2019 05:45 )   PT: 16.1 sec;   INR: 1.42 ratio         PTT - ( 2019 05:45 )  PTT:47.5 sec  Urinalysis Basic - ( 2019 15:30 )    Color: Red / Appearance: very cloudy / S.015 / pH: x  Gluc: x / Ketone: Trace  / Bili: Negative / Urobili: Negative   Blood: x / Protein: 500 mg/dL / Nitrite: Positive   Leuk Esterase: Moderate / RBC: 26-50 /HPF / WBC 11-25 /HPF   Sq Epi: x / Non Sq Epi: Neg.-Few / Bacteria: Negative /HPF      Procalcitonin, Serum: 0.76 ng/mL (19 @ 17:45)      Trend Bun/Cr  19 @ 07:05  BUN/CR -  53<H> / 1.83<H>  19 @ 05:45  BUN/CR -  52<H> / 1.81<H>  19 @ 17:45  BUN/CR -  54<H> / 1.67<H>  19 @ 11:40  BUN/CR -  59<H> / 1.77<H>    H/H Trend  19 @ 05:45   -  8.8<L> / 29.0<L>  19 @ 17:45   -  8.8<L> / 28.9<L>  19 @ 11:40   -  8.0<L> / 26.8<L>    Lactic Acid Trend  19 @ 05:45   -   1.4  19 @ 17:45   -   2.9<H>  19 @ 11:50   -   4.5<HH>    Platelet Trend  19 @ 05:45   -  97<L>  19 @ 17:45   -  95<L>  19 @ 11:40   -  43<L>    WBC Trend  19 @ 05:45   -  23.44<H>  19 @ 17:45   -  10.17  19 @ 11:40   -  4.36        CULTURES: (if applicable)    Culture - Sputum (collected 19 @ 15:30)    Source: .Sputum Sputum  Gram Stain (19 @ 00:31):    Rare polymorphonuclear leukocytes per low power field    Rare Squamous epithelial cells per low power field    Numerous Gram Variable Rods seen per oil power field    Few Gram positive cocci in pairs seen per oil power field    Few Yeast like cells seen per oil power field          ABG - ( 2019 17:10 )  pH, Arterial: 7.24  pH, Blood: x     /  pCO2: 52    /  pO2: 178   / HCO3: x     / Base Excess: x     /  SaO2: 99                CAPILLARY BLOOD GLUCOSE      POCT Blood Glucose.: 80 mg/dL (2019 08:02)      RADIOLOGY  CXR:  < from: Xray Chest 1 View- PORTABLE-Routine (19 @ 09:03) >    Patient's chin obscures the apices. Heart is likely enlarged.    Left-sided pacemaker endotracheal tube remain. Left jugular line remains.    Dense consolidation in the anterior segment of the right upper lobe again   noted.    Some increased perihilar markings on the left again seen.    Chest is similar to .    IMPRESSION: Unchanged chest as above.        < end of copied text >      VITALS:  T(C): 36.6 (19 @ 07:00), Max: 36.6 (19 @ 07:00)  T(F): 97.8 (19 @ 07:00), Max: 97.8 (19 @ 07:00)  HR: 60 (19 @ 08:35) (55 - 76)  BP: 105/59 (19 @ 08:00) (52/40 - 143/111)  BP(mean): 73 (19 @ 08:00) (28 - 123)  ABP: --  ABP(mean): --  RR: 20 (19 @ 08:00) (12 - 32)  SpO2: 100% (19 @ 08:35) (92% - 100%)  CVP(mm Hg): --  CVP(cm H2O): --    Ins and Outs     19 @ 07:01  -  19 @ 07:00  --------------------------------------------------------  IN: 5752.4 mL / OUT: 825 mL / NET: 4927.4 mL        Height (cm): 175.3 (19 @ 15:00)  Weight (kg): 88.8 (19 @ 15:00)  BMI (kg/m2): 28.9 (19 @ 15:00)    Device: 840, Mode: AC/ CMV (Assist Control/ Continuous Mandatory Ventilation), RR (machine): 16, RR (patient): 17, TV (machine): 460, TV (patient): 461, FiO2: 60, PEEP: 5, ITime: 1, MAP: 9.4, PIP: 17    I&O's Detail    2019 07:01  -  2019 07:00  --------------------------------------------------------  IN:    dextrose 5% + sodium chloride 0.9%.: 1200 mL    norepinephrine Infusion: 898.8 mL    norepinephrine Infusion: 249.6 mL    phenylephrine   Infusion: 408 mL    Platelets - Single Donor: 241 mL    propofol Infusion: 205 mL    Sodium Chloride 0.9% IV Bolus: 2000 mL    Solution: 200 mL    Solution: 350 mL  Total IN: 5752.4 mL    OUT:    Indwelling Catheter - Urethral: 95 mL    Indwelling Catheter - Urethral: 45 mL    PEG (Percutaneous Endoscopic Gastrostomy) Tube: 650 mL    Voided: 35 mL  Total OUT: 825 mL    Total NET: 4927.4 mL

## 2019-07-30 DIAGNOSIS — A41.9 SEPSIS, UNSPECIFIED ORGANISM: ICD-10-CM

## 2019-07-30 DIAGNOSIS — E16.2 HYPOGLYCEMIA, UNSPECIFIED: ICD-10-CM

## 2019-07-30 LAB
-  AMIKACIN: SIGNIFICANT CHANGE UP
-  AZTREONAM: SIGNIFICANT CHANGE UP
-  CEFEPIME: SIGNIFICANT CHANGE UP
-  CEFTAZIDIME: SIGNIFICANT CHANGE UP
-  CIPROFLOXACIN: SIGNIFICANT CHANGE UP
-  GENTAMICIN: SIGNIFICANT CHANGE UP
-  IMIPENEM: SIGNIFICANT CHANGE UP
-  LEVOFLOXACIN: SIGNIFICANT CHANGE UP
-  MEROPENEM: SIGNIFICANT CHANGE UP
-  PIPERACILLIN/TAZOBACTAM: SIGNIFICANT CHANGE UP
-  TOBRAMYCIN: SIGNIFICANT CHANGE UP
ALBUMIN SERPL ELPH-MCNC: 1.6 G/DL — LOW (ref 3.3–5)
ALP SERPL-CCNC: 154 U/L — HIGH (ref 40–120)
ALT FLD-CCNC: 23 U/L DA — SIGNIFICANT CHANGE UP (ref 10–45)
ANION GAP SERPL CALC-SCNC: 5 MMOL/L — SIGNIFICANT CHANGE UP (ref 5–17)
ANION GAP SERPL CALC-SCNC: 6 MMOL/L — SIGNIFICANT CHANGE UP (ref 5–17)
AST SERPL-CCNC: 36 U/L — SIGNIFICANT CHANGE UP (ref 10–40)
BILIRUB DIRECT SERPL-MCNC: 0.1 MG/DL — SIGNIFICANT CHANGE UP (ref 0–0.2)
BILIRUB INDIRECT FLD-MCNC: 0.1 MG/DL — LOW (ref 0.2–1)
BILIRUB SERPL-MCNC: 0.2 MG/DL — SIGNIFICANT CHANGE UP (ref 0.2–1.2)
BUN SERPL-MCNC: 48 MG/DL — HIGH (ref 7–23)
BUN SERPL-MCNC: 49 MG/DL — HIGH (ref 7–23)
CALCIUM SERPL-MCNC: 8.2 MG/DL — LOW (ref 8.4–10.5)
CALCIUM SERPL-MCNC: 8.4 MG/DL — SIGNIFICANT CHANGE UP (ref 8.4–10.5)
CHLORIDE SERPL-SCNC: 116 MMOL/L — HIGH (ref 96–108)
CHLORIDE SERPL-SCNC: 116 MMOL/L — HIGH (ref 96–108)
CO2 SERPL-SCNC: 24 MMOL/L — SIGNIFICANT CHANGE UP (ref 22–31)
CO2 SERPL-SCNC: 24 MMOL/L — SIGNIFICANT CHANGE UP (ref 22–31)
CREAT SERPL-MCNC: 1.74 MG/DL — HIGH (ref 0.5–1.3)
CREAT SERPL-MCNC: 1.82 MG/DL — HIGH (ref 0.5–1.3)
CULTURE RESULTS: SIGNIFICANT CHANGE UP
GLUCOSE BLDC GLUCOMTR-MCNC: 107 MG/DL — HIGH (ref 70–99)
GLUCOSE BLDC GLUCOMTR-MCNC: 117 MG/DL — HIGH (ref 70–99)
GLUCOSE BLDC GLUCOMTR-MCNC: 124 MG/DL — HIGH (ref 70–99)
GLUCOSE BLDC GLUCOMTR-MCNC: 130 MG/DL — HIGH (ref 70–99)
GLUCOSE BLDC GLUCOMTR-MCNC: 134 MG/DL — HIGH (ref 70–99)
GLUCOSE BLDC GLUCOMTR-MCNC: 145 MG/DL — HIGH (ref 70–99)
GLUCOSE BLDC GLUCOMTR-MCNC: 174 MG/DL — HIGH (ref 70–99)
GLUCOSE BLDC GLUCOMTR-MCNC: 268 MG/DL — HIGH (ref 70–99)
GLUCOSE BLDC GLUCOMTR-MCNC: 38 MG/DL — CRITICAL LOW (ref 70–99)
GLUCOSE BLDC GLUCOMTR-MCNC: 45 MG/DL — CRITICAL LOW (ref 70–99)
GLUCOSE BLDC GLUCOMTR-MCNC: 97 MG/DL — SIGNIFICANT CHANGE UP (ref 70–99)
GLUCOSE SERPL-MCNC: 100 MG/DL — HIGH (ref 70–99)
GLUCOSE SERPL-MCNC: 30 MG/DL — CRITICAL LOW (ref 70–99)
HCT VFR BLD CALC: 27.8 % — LOW (ref 39–50)
HGB BLD-MCNC: 8.3 G/DL — LOW (ref 13–17)
LEGIONELLA AG UR QL: NEGATIVE — SIGNIFICANT CHANGE UP
MAGNESIUM SERPL-MCNC: 1.7 MG/DL — SIGNIFICANT CHANGE UP (ref 1.6–2.6)
MCHC RBC-ENTMCNC: 29.9 GM/DL — LOW (ref 32–36)
MCHC RBC-ENTMCNC: 30.1 PG — SIGNIFICANT CHANGE UP (ref 27–34)
MCV RBC AUTO: 100.7 FL — HIGH (ref 80–100)
METHOD TYPE: SIGNIFICANT CHANGE UP
NRBC # BLD: 0 /100 WBCS — SIGNIFICANT CHANGE UP (ref 0–0)
ORGANISM # SPEC MICROSCOPIC CNT: SIGNIFICANT CHANGE UP
ORGANISM # SPEC MICROSCOPIC CNT: SIGNIFICANT CHANGE UP
PHOSPHATE SERPL-MCNC: 3.2 MG/DL — SIGNIFICANT CHANGE UP (ref 2.5–4.5)
PLATELET # BLD AUTO: 81 K/UL — LOW (ref 150–400)
POTASSIUM SERPL-MCNC: 4.8 MMOL/L — SIGNIFICANT CHANGE UP (ref 3.5–5.3)
POTASSIUM SERPL-MCNC: 5 MMOL/L — SIGNIFICANT CHANGE UP (ref 3.5–5.3)
POTASSIUM SERPL-SCNC: 4.8 MMOL/L — SIGNIFICANT CHANGE UP (ref 3.5–5.3)
POTASSIUM SERPL-SCNC: 5 MMOL/L — SIGNIFICANT CHANGE UP (ref 3.5–5.3)
PROT SERPL-MCNC: 5.6 G/DL — LOW (ref 6–8.3)
RBC # BLD: 2.76 M/UL — LOW (ref 4.2–5.8)
RBC # FLD: 21.2 % — HIGH (ref 10.3–14.5)
SODIUM SERPL-SCNC: 145 MMOL/L — SIGNIFICANT CHANGE UP (ref 135–145)
SODIUM SERPL-SCNC: 146 MMOL/L — HIGH (ref 135–145)
SPECIMEN SOURCE: SIGNIFICANT CHANGE UP
WBC # BLD: 14.03 K/UL — HIGH (ref 3.8–10.5)
WBC # FLD AUTO: 14.03 K/UL — HIGH (ref 3.8–10.5)

## 2019-07-30 PROCEDURE — 99233 SBSQ HOSP IP/OBS HIGH 50: CPT

## 2019-07-30 PROCEDURE — 71045 X-RAY EXAM CHEST 1 VIEW: CPT | Mod: 26

## 2019-07-30 RX ORDER — DEXTROSE 10 % IN WATER 10 %
250 INTRAVENOUS SOLUTION INTRAVENOUS
Refills: 0 | Status: DISCONTINUED | OUTPATIENT
Start: 2019-07-30 | End: 2019-08-01

## 2019-07-30 RX ORDER — PANTOPRAZOLE SODIUM 20 MG/1
40 TABLET, DELAYED RELEASE ORAL DAILY
Refills: 0 | Status: DISCONTINUED | OUTPATIENT
Start: 2019-07-31 | End: 2019-08-14

## 2019-07-30 RX ORDER — DEXTROSE 50 % IN WATER 50 %
50 SYRINGE (ML) INTRAVENOUS ONCE
Refills: 0 | Status: COMPLETED | OUTPATIENT
Start: 2019-07-30 | End: 2019-07-30

## 2019-07-30 RX ORDER — FLUCONAZOLE 150 MG/1
100 TABLET ORAL EVERY 24 HOURS
Refills: 0 | Status: DISCONTINUED | OUTPATIENT
Start: 2019-07-31 | End: 2019-07-31

## 2019-07-30 RX ORDER — FLUCONAZOLE 150 MG/1
TABLET ORAL
Refills: 0 | Status: DISCONTINUED | OUTPATIENT
Start: 2019-07-30 | End: 2019-07-31

## 2019-07-30 RX ORDER — ALBUTEROL 90 UG/1
2.5 AEROSOL, METERED ORAL ONCE
Refills: 0 | Status: COMPLETED | OUTPATIENT
Start: 2019-07-30 | End: 2019-07-30

## 2019-07-30 RX ORDER — DEXTROSE 50 % IN WATER 50 %
100 SYRINGE (ML) INTRAVENOUS ONCE
Refills: 0 | Status: COMPLETED | OUTPATIENT
Start: 2019-07-30 | End: 2019-07-30

## 2019-07-30 RX ORDER — INSULIN HUMAN 100 [IU]/ML
10 INJECTION, SOLUTION SUBCUTANEOUS ONCE
Refills: 0 | Status: COMPLETED | OUTPATIENT
Start: 2019-07-30 | End: 2019-07-30

## 2019-07-30 RX ORDER — FLUCONAZOLE 150 MG/1
100 TABLET ORAL ONCE
Refills: 0 | Status: COMPLETED | OUTPATIENT
Start: 2019-07-30 | End: 2019-07-30

## 2019-07-30 RX ADMIN — ZINC OXIDE 1 APPLICATION(S): 200 OINTMENT TOPICAL at 05:18

## 2019-07-30 RX ADMIN — SODIUM CHLORIDE 100 MILLILITER(S): 9 INJECTION, SOLUTION INTRAVENOUS at 01:50

## 2019-07-30 RX ADMIN — FLUCONAZOLE 50 MILLIGRAM(S): 150 TABLET ORAL at 19:55

## 2019-07-30 RX ADMIN — DORZOLAMIDE HYDROCHLORIDE TIMOLOL MALEATE 1 DROP(S): 20; 5 SOLUTION/ DROPS OPHTHALMIC at 17:11

## 2019-07-30 RX ADMIN — PANTOPRAZOLE SODIUM 40 MILLIGRAM(S): 20 TABLET, DELAYED RELEASE ORAL at 05:16

## 2019-07-30 RX ADMIN — Medication 100 MILLILITER(S): at 07:27

## 2019-07-30 RX ADMIN — Medication 4.16 MICROGRAM(S)/KG/MIN: at 01:52

## 2019-07-30 RX ADMIN — LATANOPROST 1 DROP(S): 0.05 SOLUTION/ DROPS OPHTHALMIC; TOPICAL at 21:14

## 2019-07-30 RX ADMIN — Medication 4.16 MICROGRAM(S)/KG/MIN: at 06:50

## 2019-07-30 RX ADMIN — INSULIN HUMAN 10 UNIT(S): 100 INJECTION, SOLUTION SUBCUTANEOUS at 02:37

## 2019-07-30 RX ADMIN — MEROPENEM 100 MILLIGRAM(S): 1 INJECTION INTRAVENOUS at 17:11

## 2019-07-30 RX ADMIN — ZINC OXIDE 1 APPLICATION(S): 200 OINTMENT TOPICAL at 17:14

## 2019-07-30 RX ADMIN — Medication 30 MILLILITER(S): at 08:00

## 2019-07-30 RX ADMIN — CHLORHEXIDINE GLUCONATE 15 MILLILITER(S): 213 SOLUTION TOPICAL at 05:16

## 2019-07-30 RX ADMIN — CHLORHEXIDINE GLUCONATE 15 MILLILITER(S): 213 SOLUTION TOPICAL at 17:11

## 2019-07-30 RX ADMIN — Medication 50 MILLILITER(S): at 02:37

## 2019-07-30 RX ADMIN — Medication 100 MILLIGRAM(S): at 05:16

## 2019-07-30 RX ADMIN — DORZOLAMIDE HYDROCHLORIDE TIMOLOL MALEATE 1 DROP(S): 20; 5 SOLUTION/ DROPS OPHTHALMIC at 05:16

## 2019-07-30 RX ADMIN — CHLORHEXIDINE GLUCONATE 1 APPLICATION(S): 213 SOLUTION TOPICAL at 05:18

## 2019-07-30 RX ADMIN — MEROPENEM 100 MILLIGRAM(S): 1 INJECTION INTRAVENOUS at 05:16

## 2019-07-30 RX ADMIN — PROPOFOL 2.72 MICROGRAM(S)/KG/MIN: 10 INJECTION, EMULSION INTRAVENOUS at 06:24

## 2019-07-30 RX ADMIN — SODIUM CHLORIDE 100 MILLILITER(S): 9 INJECTION, SOLUTION INTRAVENOUS at 23:43

## 2019-07-30 NOTE — PROGRESS NOTE ADULT - ASSESSMENT
A/P: 86 year old male with extensive PMH including IBS, CVA with dysphagia and PEG placement, anemia, psoriasis, GERD, depression, chf , PPM admitted with severe sepsis from likely aspiration pneumonia. Septic Shock with multiorgan failure.   Acute Respiratory failure intubated 7/28  MELISSA with baseline Cr 1.2-1.4   Palliative: Pt remains intubated and sedated in ccu .  I called and spoke to wife Angie today who is unable to visit today, she is suffering from a spinal compression  fx. We spoke briefly today about pts condition today and plan to possibly attempt extubation tomorrow per ccu team.  Wife not making any further decisions regarding GOC at this time. Will cont to follow pt w/ ccu team and f/u w/ wife regarding pts goc.

## 2019-07-30 NOTE — ED ADULT NURSE REASSESSMENT NOTE - NS ED NURSE REASSESS COMMENT FT1
Pt fingerstick reading 38, repeat 45. Adela CONSTANTINO notified. Ordered 2 amps D50. Given & will reassess pt.

## 2019-07-30 NOTE — PROGRESS NOTE ADULT - ASSESSMENT
Elderly man, recent hospital stay for MSSA bacteremic pneumonia, G tube, at rehab, returns with respiratory distress, required intubation, pressors, thrombocytopenia, hypothermia, severe sepsis/shock  CXR with dense infiltrate- healthcare associated pneumonia.  Pseudomonas predominating in Sputum  Afebrile, WBC lower  Still on pressors, no weaning    Plan:  Continue Vant/pressor support; weaning as tolerated  Continue Cathy directed at Pseudomonas  Await sensi data  D/c Doxy- Legionella negative  Follow temps and CBC/diff   Candida in urine likely colonization  D/w ICU team

## 2019-07-30 NOTE — PROVIDER CONTACT NOTE (OTHER) - ACTION/TREATMENT ORDERED:
Dextrose 50% 100 ml ivp, accuchecks q2h Dextrose 50% 100 ml ivp, accuchecks q2h, Dextrose 10% at 30 ml/hr, jevity 1..5 at 30 ml / hr

## 2019-07-30 NOTE — PROVIDER CONTACT NOTE (OTHER) - RECOMMENDATIONS
given D50 25 gm IVP stat and pending second amp of D50 25 gm.
Repeat glucose. suggestion of IVF maintenance.
hypoglycemic protocol, dextrose 50% push

## 2019-07-30 NOTE — PROGRESS NOTE ADULT - ASSESSMENT
86 year old male with sepsis and acute hypoxic hypercapnic respiratory failure secondary to aspiration pneumonitis, MELISSA, lactic acidosis and thrombocytopenia       Critical Care time: 35 mins assessing presenting problems of acute illness that poses high probability of life threatening deterioration or end organ damage/dysfunction.  Medical decision making including Initiating plan of care, reviewing data, reviewing radiology, direct patient bedside evaluation and interpretation of vital signs, any necessary ventilator management , discussion with multidisciplinary team, discussing goals of care with patient/family, all non inclusive of procedures

## 2019-07-30 NOTE — PROGRESS NOTE ADULT - ASSESSMENT
Physical Examination:  GENERAL:               Sedated, intubated, No acute distress.    HEENT:                   No JVD, Dry MM  PULM:                     Bilateral air entry, Clear to auscultation bilaterally, no significant sputum production, No Rales, No Rhonchi, No Wheezing  CVS:                         S1, S2,  +Murmur  ABD:                        Soft, distended, nontender, normoactive bowel sounds, + Peg  EXT:                         +edema, nontender  Vascular:                Warm Extremities, Normal Capillary refill,  SKIN:                       Warm and well perfused, no rashes noted.   NEURO:                  Sedated, not follows commands  PSYC:                      Calm, no Insight.        Assessment  Septic Shock with multiorgan failure, due to aspiration PNA on pressors +/- UTI with candida   Acute Respiratory failure intubated 7/28  Thrombocytopenia due to sepsis improving  Lactic acidosis resolved  MELISSA with baseline Cr 1.2-1.4   Hypoglycemia re current with   Hyperkalemia improved  CVA with Dysphagia s/p PEG  Underlying BPH, chronic diastolic CHF      Plan  Mechanical ventilation  taper fio2 as tolerated  pressors to maintain bp taper to off  sedation vacation to start in AM  D10, start tube feeding, monitor FS q 2,   IVF  IV abx as per ID  restart DVT ppx in am.   Continue supportive care  palliative care eval      Family Updated: 	Dtr Amelie -                        Date  7/29/19 - Is away                                       wife Angie [phone]	             Date: 7/30/19    PMD:		Dr. Reyes		                   Notified(Date): 7/29/19      Sedation & Analgesia:	As above  Diet/Nutrition:		Start peg feeding today vs kyle  GI PPx:			PPI    DVT Ppx:		venodynes start dvt ppx when plts > 100  	RISK                                                          Points  	[ ] Previous VTE                                           	3  	[ ] Thrombophilia                                        	2  	[ ] Lower limb paralysis                              	2   	[ ] Current Cancer                                       	2   	[ ] Immobilization > 24 hrs                        	1  	[x ] ICU/CCU stay > 24 hours                       	1  	[x ] Age > 60                                                   	1  		Total:[2 ]      Activity:		               Bedrest  Head of Bed:               35-45  Glycemic Control:        n/a    Lines:  PIV  CENTRAL LINE: 	[ x] YES [ ] NO	                    LOCATION:   	  [facility]                     DATE INSERTED:   	                    REMOVE:  [ ] YES [ x] NO    A-LINE:  	                [ ] YES [x ] NO                      LOCATION:   	                       DATE INSERTED: 		            REMOVE:  [ ] YES [ ] NO    SOTO: 		        [x] YES [ ] NO  		                                       DATE INSERTED:		            REMOVE:  [ ] YES [ x] NO      Restraints were deemed necessary to prevent removal of life-sustaining devices [ x ] YES   [    ]  NO    Disposition: ICU monitoring     Goals of Care: DNR, Trial intubation, family does not want trach.

## 2019-07-30 NOTE — PROGRESS NOTE ADULT - SUBJECTIVE AND OBJECTIVE BOX
86y  Male  No Known Allergies    CC: Pt presented to ER with altered mental status     HPI: This is an 86 year old male 86  with extensive medical history who presented to ED via EMS from an local SNF for change in mental status.  He had been noted to have increasing trouble managing secretions then found minimally responsive. Note his normal mental status is non-verbal and responsive only to tactile stimulus . On arrival to ER he was found to have severe sepsis secondary to aspiration pneumonia to [facility]. He was hypoxic, hypothermic , showed lactic acidosis and had MELISSA on CKD, Thrombocytopenia ( likely form sepsis) and ER staff reported bright red blood per rectum ( unsure if lower GI bleeding or local decub) . He was subsequently  Intubated in the ICU for acute respiratory failure due to progressive aspiration.    Tonight he remains in Septic Shock due to aspiration PNA still on pressors ( levophed) , likely UTI with candida . He remains intubated with acute respiratory failure and not passing daily SBT, his thrombocytopenia ( which was thought to be from sepsis)  is improving, lactic acidosis has resolved, he has become hypoglycemia re current with D-10 infusion going. He is currently on meropenum and fluconazole      PAST MEDICAL & SURGICAL HISTORY:  Chronic kidney disease  Chronic diastolic heart failure  BPH (benign prostatic hyperplasia)  Shortness of breath  Depression  IBS (irritable bowel syndrome)  Dysphagia: s/p PEG placement  Cerebral infarction: with resulting weakness and dysphagia  Anemia  Psoriasis  GERD (gastroesophageal reflux disease)  Dyslipidemia  Cardiac pacemaker  S/P percutaneous endoscopic gastrostomy (PEG) tube placement    Vital Signs Last 24 Hrs  T(C): 37.1 (30 Jul 2019 18:00), Max: 37.1 (30 Jul 2019 11:00)  T(F): 98.7 (30 Jul 2019 18:00), Max: 98.7 (30 Jul 2019 11:00)  HR: 60 (30 Jul 2019 20:00) (60 - 63)  BP: 130/77 (30 Jul 2019 20:00) (76/51 - 157/79)  BP(mean): 93 (30 Jul 2019 20:00) (60 - 99)  RR: 22 (30 Jul 2019 20:00) (16 - 26)  SpO2: 100% (30 Jul 2019 20:00) (95% - 100%)    I&O's Summary  29 Jul 2019 07:01  -  30 Jul 2019 07:00  --------------------------------------------------------  IN: 4852.4 mL / OUT: 1075 mL / NET: 3777.4 mL    30 Jul 2019 07:01  -  30 Jul 2019 20:44  --------------------------------------------------------  IN: 2491 mL / OUT: 685 mL / NET: 1806 mL      LABS  07-30    145  |  116<H>  |  48<H>  ----------------------------<  100<H>  5.0   |  24  |  1.74<H>    Ca    8.2<L>      30 Jul 2019 16:12  Phos  3.2     07-30  Mg     1.7     07-30    TPro  5.6<L>  /  Alb  1.6<L>  /  TBili  0.2  /  DBili  0.1  /  AST  36  /  ALT  23  /  AlkPhos  154<H>  07-30                          8.3    14.03 )-----------( 81       ( 30 Jul 2019 05:26 )             27.8     PT/INR - ( 29 Jul 2019 05:45 )   PT: 16.1 sec;   INR: 1.42 ratio    PTT - ( 29 Jul 2019 05:45 )  PTT:47.5 sec  CARDIAC MARKERS ( 29 Jul 2019 05:45 )  .150 ng/mL / x     / x     / x     / x          LIVER FUNCTIONS - ( 30 Jul 2019 05:26 )  Alb: 1.6 g/dL / Pro: 5.6 g/dL / ALK PHOS: 154 U/L / ALT: 23 U/L DA / AST: 36 U/L / GGT: x           CAPILLARY BLOOD GLUCOSE  POCT Blood Glucose.: 124 mg/dL (30 Jul 2019 19:20)      VENT SETTINGS   Mode: AC/ CMV (Assist Control/ Continuous Mandatory Ventilation)  RR (machine): 16  TV (machine): 450  FiO2: 50  PEEP: 5  ITime: 1  MAP: 2  PIP: 17    MEDICATIONS  (STANDING):  chlorhexidine 0.12% Liquid 15 milliLiter(s) Oral Mucosa every 12 hours  chlorhexidine 4% Liquid 1 Application(s) Topical <User Schedule>  dextrose 10%. 250 milliLiter(s) (30 mL/Hr) IV Continuous <Continuous>  dextrose 5% + sodium chloride 0.9%. 1000 milliLiter(s) (100 mL/Hr) IV Continuous <Continuous>  dorzolamide 2%/timolol 0.5% Ophthalmic Solution 1 Drop(s) Both EYES every 12 hours  fluconAZOLE IVPB      latanoprost 0.005% Ophthalmic Solution 1 Drop(s) Both EYES at bedtime  meropenem  IVPB 1000 milliGRAM(s) IV Intermittent every 12 hours  norepinephrine Infusion 0.05 MICROgram(s)/kG/Min (4.163 mL/Hr) IV Continuous <Continuous>  pantoprazole   Suspension 40 milliGRAM(s) Enteral Tube daily  propofol Infusion 5 MICROgram(s)/kG/Min (2.721 mL/Hr) IV Continuous <Continuous>  zinc oxide 20% Ointment 1 Application(s) Topical two times a day      REVIEW OF SYSTEMS:    Unable to obtain due to mechnical ventilation, sedation      Physicial Exam:     Constitutional: NAD, well-groomed, well-developed  HEENT: PERRLA, EOMI, no drainage or redness  Neck:  No JVD  Respiratory: Breath Sounds equal & clear to percussion & auscultation, no accessory muscle use  Cardiovascular: Regular rate & rhythm, normal S1, S2; no murmurs, gallops or rubs; no S3, S4  Gastrointestinal: Soft, non-tender, non distended no hepatosplenomegaly, normal bowel sounds  Extremities: No peripheral edema, No cyanosis, clubbing   Vascular: Equal and normal pulses: 2+ peripheral pulses throughout  Neurological:  no sensory, motor  deficits, normal reflexes  Musculoskeletal: No joint pain, swelling or deformity; no limitation of movement  Skin: No rashes

## 2019-07-30 NOTE — PROVIDER CONTACT NOTE (OTHER) - ASSESSMENT
noted nonverbal, intubated and sedation. IVF D5 NS rate at 100 ml/hr.
noted pt intubated. sepsis r/t to aspiration PNA. NPO. peg tube in place hold feedings r/t high residual of feedings. notified Renetta COVARRUBIAS of hypoglycemia. noted second episode of hypoglycemia episode.
pt sedated no distress noted

## 2019-07-30 NOTE — PROVIDER CONTACT NOTE (OTHER) - BACKGROUND
PNA, PPM, HTN
previous hypoglycemic episode
NPO. Enteral feedings held for intolerance and aspiration

## 2019-07-30 NOTE — PROGRESS NOTE ADULT - SUBJECTIVE AND OBJECTIVE BOX
Follow-up Critical Care Progress Note  Chief Complaint : Sepsis      remains on pressors  fio2 tapered to 50%        Allergies :No Known Allergies      PAST MEDICAL & SURGICAL HISTORY:  Chronic kidney disease  Chronic diastolic heart failure  BPH (benign prostatic hyperplasia)  Shortness of breath  Depression  IBS (irritable bowel syndrome)  Dysphagia: s/p PEG placement  Cerebral infarction: with resulting weakness and dysphagia  Anemia  Psoriasis  GERD (gastroesophageal reflux disease)  Dyslipidemia  Cardiac pacemaker  S/P percutaneous endoscopic gastrostomy (PEG) tube placement      Medications:  MEDICATIONS  (STANDING):  chlorhexidine 0.12% Liquid 15 milliLiter(s) Oral Mucosa every 12 hours  chlorhexidine 4% Liquid 1 Application(s) Topical <User Schedule>  dextrose 10%. 250 milliLiter(s) (30 mL/Hr) IV Continuous <Continuous>  dextrose 5% + sodium chloride 0.9%. 1000 milliLiter(s) (100 mL/Hr) IV Continuous <Continuous>  dorzolamide 2%/timolol 0.5% Ophthalmic Solution 1 Drop(s) Both EYES every 12 hours  doxycycline hyclate Capsule 100 milliGRAM(s) Oral every 12 hours  latanoprost 0.005% Ophthalmic Solution 1 Drop(s) Both EYES at bedtime  meropenem  IVPB 1000 milliGRAM(s) IV Intermittent every 12 hours  norepinephrine Infusion 0.05 MICROgram(s)/kG/Min (4.163 mL/Hr) IV Continuous <Continuous>  pantoprazole  Injectable 40 milliGRAM(s) IV Push every 12 hours  propofol Infusion 5 MICROgram(s)/kG/Min (2.721 mL/Hr) IV Continuous <Continuous>  zinc oxide 20% Ointment 1 Application(s) Topical two times a day    MEDICATIONS  (PRN):  acetaminophen    Suspension .. 650 milliGRAM(s) Enteral Tube every 6 hours PRN Temp greater or equal to 38C (100.4F), Mild Pain (1 - 3)  sodium chloride 0.9% lock flush 10 milliLiter(s) IV Push every 1 hour PRN Pre/post blood products, medications, blood draw, and to maintain line patency      LABS:                        8.3    14.03 )-----------( 81       ( 2019 05:26 )             27.8     07-30    146<H>  |  116<H>  |  49<H>  ----------------------------<  30<LL>  4.8   |  24  |  1.82<H>    Ca    8.4      2019 05:26  Phos  3.2       Mg     1.7         TPro  5.6<L>  /  Alb  1.6<L>  /  TBili  0.2  /  DBili  0.1  /  AST  36  /  ALT  23  /  AlkPhos  154<H>        CARDIAC MARKERS ( 2019 05:45 )  .150 ng/mL / x     / x     / x     / x      CARDIAC MARKERS ( 2019 17:45 )  <.017 ng/mL / x     / x     / x     / x      CARDIAC MARKERS ( 2019 11:40 )  .027 ng/mL / x     / 33 U/L / x     / 5.0 ng/mL        PT/INR - ( 2019 05:45 )   PT: 16.1 sec;   INR: 1.42 ratio         PTT - ( 2019 05:45 )  PTT:47.5 sec  Urinalysis Basic - ( 2019 15:30 )    Color: Red / Appearance: very cloudy / S.015 / pH: x  Gluc: x / Ketone: Trace  / Bili: Negative / Urobili: Negative   Blood: x / Protein: 500 mg/dL / Nitrite: Positive   Leuk Esterase: Moderate / RBC: 26-50 /HPF / WBC 11-25 /HPF   Sq Epi: x / Non Sq Epi: Neg.-Few / Bacteria: Negative /HPF      Procalcitonin, Serum: 0.76 ng/mL (19 @ 17:45)          CULTURES: (if applicable)    Culture - Urine (collected 19 @ 15:30)  Source: .Urine Catheterized  Final Report (19 @ 18:56):    >100,000 CFU/ml Presumptive Candida albicans "Susceptibilities not    performed"    Culture - Sputum (collected 19 @ 15:30)  Source: .Sputum Sputum  Gram Stain (19 @ 00:31):    Rare polymorphonuclear leukocytes per low power field    Rare Squamous epithelial cells per low power field    Numerous Gram Variable Rods seen per oil power field    Few Gram positive cocci in pairs seen per oil power field    Few Yeast like cells seen per oil power field  Preliminary Report (19 @ 16:24):    Few Pseudomonas aeruginosa    Normal Respiratory Renetta present    Culture - Urine (collected 19 @ 12:24)  Source: .Urine Clean Catch (Midstream)/ msue&#x27;d twice  Final Report (19 @ 18:56):    50,000 - 99,000 CFU/mL Presumptive Candida albicans "Susceptibilities not    performed"    Culture - Blood (collected 19 @ 11:40)  Source: .Blood Blood-Peripheral  Preliminary Report (19 @ 19:01):    No growth to date.    Culture - Blood (collected 19 @ 11:40)  Source: .Blood Blood-Peripheral  Preliminary Report (19 @ 19:01):    No growth to date.          ABG - ( 2019 17:10 )  pH, Arterial: 7.24  pH, Blood: x     /  pCO2: 52    /  pO2: 178   / HCO3: x     / Base Excess: x     /  SaO2: 99          H/H Trend  19 @ 05:26   -  8.3<L> / 27.8<L>  19 @ 05:45   -  8.8<L> / 29.0<L>  19 @ 17:45   -  8.8<L> / 28.9<L>  19 @ 11:40   -  8.0<L> / 26.8<L>    Trend Bun/Cr  19 @ 05:26  BUN/CR -  49<H> / 1.82<H>  19 @ 22:00  BUN/CR -  50<H> / 1.84<H>  19 @ 12:25  BUN/CR -  51<H> / 1.77<H>  19 @ 07:05  BUN/CR -  53<H> / 1.83<H>  19 @ 05:45  BUN/CR -  52<H> / 1.81<H>  19 @ 17:45  BUN/CR -  54<H> / 1.67<H>        CAPILLARY BLOOD GLUCOSE      POCT Blood Glucose.: 268 mg/dL (2019 07:48)      RADIOLOGY  CXR:  < from: Xray Chest 1 View- PORTABLE-Routine (19 @ 07:07) >    Comparison yesterday    A pacemaker and endotracheal tube remain in place. There is slightly   improved bilateral pulmonary opacity the prior exam without new   consolidation, cavitation or layering effusion      < end of copied text >        VITALS:  T(C): 35.8 (19 @ 07:00), Max: 37 (19 @ 17:00)  T(F): 96.5 (19 @ 07:00), Max: 98.6 (19 @ 17:00)  HR: 60 (19 08:00) (59 - 64)  BP: 157/79 (19 @ 08:00) (76/51 - 157/79)  BP(mean): 99 (19 @ 08:00) (60 - 99)  ABP: --  ABP(mean): --  RR: 19 (19 @ 08:00) (16 - 21)  SpO2: 100% (19 06:00) (95% - 100%)  CVP(mm Hg): --  CVP(cm H2O): --    Ins and Outs     19 @ 07:01  -  19 @ 07:00  --------------------------------------------------------  IN: 4852.4 mL / OUT: 1075 mL / NET: 3777.4 mL    19 @ 07:01  -  19 @ 09:06  --------------------------------------------------------  IN: 190.7 mL / OUT: 65 mL / NET: 125.7 mL        Height (cm): 175.3 (19 @ 15:00)  Weight (kg): 88.8 (19 15:00)  BMI (kg/m2): 28.9 (19 15:00)    Device: 840, Mode: AC/ CMV (Assist Control/ Continuous Mandatory Ventilation), RR (machine): 16, RR (patient): 18, TV (machine): 450, TV (patient): 461, FiO2: 50, PEEP: 5, ITime: 1, MAP: 11, PIP: 20    I&O's Detail    2019 07:01  -  2019 07:00  --------------------------------------------------------  IN:    dextrose 5% + sodium chloride 0.9%.: 2400 mL    norepinephrine Infusion: 2098.4 mL    propofol Infusion: 254 mL    Solution: 100 mL  Total IN: 4852.4 mL    OUT:    Indwelling Catheter - Urethral: 1075 mL  Total OUT: 1075 mL    Total NET: 3777.4 mL      2019 07:01  -  2019 09:06  --------------------------------------------------------  IN:    dextrose 10%.: 30 mL    dextrose 5% + sodium chloride 0.9%.: 100 mL    norepinephrine Infusion: 50 mL    propofol Infusion: 10.7 mL  Total IN: 190.7 mL    OUT:    Indwelling Catheter - Urethral: 65 mL  Total OUT: 65 mL    Total NET: 125.7 mL

## 2019-07-30 NOTE — PROGRESS NOTE ADULT - SUBJECTIVE AND OBJECTIVE BOX
Progress: f/u palliative, pt remains intubated/sedated in ccu     Present Symptoms:   Dyspnea: no  Nausea/Vomiting: no  Anxiety:  no  Depressed Mood:   Fatigue: yes  Loss of appetite: yes  Pain:    no s/s                location:   Review of Systems:  Unable to obtain due to poor mentation     MEDICATIONS  (STANDING):  chlorhexidine 0.12% Liquid 15 milliLiter(s) Oral Mucosa every 12 hours  chlorhexidine 4% Liquid 1 Application(s) Topical <User Schedule>  dextrose 10%. 250 milliLiter(s) (30 mL/Hr) IV Continuous <Continuous>  dextrose 5% + sodium chloride 0.9%. 1000 milliLiter(s) (100 mL/Hr) IV Continuous <Continuous>  dorzolamide 2%/timolol 0.5% Ophthalmic Solution 1 Drop(s) Both EYES every 12 hours  doxycycline hyclate Capsule 100 milliGRAM(s) Oral every 12 hours  latanoprost 0.005% Ophthalmic Solution 1 Drop(s) Both EYES at bedtime  meropenem  IVPB 1000 milliGRAM(s) IV Intermittent every 12 hours  norepinephrine Infusion 0.05 MICROgram(s)/kG/Min (4.163 mL/Hr) IV Continuous <Continuous>  pantoprazole   Suspension 40 milliGRAM(s) Enteral Tube daily  propofol Infusion 5 MICROgram(s)/kG/Min (2.721 mL/Hr) IV Continuous <Continuous>  zinc oxide 20% Ointment 1 Application(s) Topical two times a day    MEDICATIONS  (PRN):  acetaminophen    Suspension .. 650 milliGRAM(s) Enteral Tube every 6 hours PRN Temp greater or equal to 38C (100.4F), Mild Pain (1 - 3)  sodium chloride 0.9% lock flush 10 milliLiter(s) IV Push every 1 hour PRN Pre/post blood products, medications, blood draw, and to maintain line patency      PHYSICAL EXAM:  Vital Signs Last 24 Hrs  T(C): 37.1 (2019 11:00), Max: 37.1 (2019 11:00)  T(F): 98.7 (2019 11:00), Max: 98.7 (2019 11:00)  HR: 60 (2019 13:00) (59 - 64)  BP: 112/62 (2019 13:00) (76/51 - 157/79)  BP(mean): 77 (2019 13:00) (60 - 99)  RR: 19 (2019 13:00) (16 - 22)  SpO2: 100% (2019 13:00) (95% - 100%)  General: intubated/sedated       HEENT: n/c, a/t     Lungs: coarse bs    CV: s1s2    GI: soft, n/t + peg     : hansen    Musculoskeletal: weak x 4    Skin: warm dry     Neuro: sedated/intubated    Oral intake ability:  npo  Diet: NPO, peg     LABS:                          8.3    14.03 )-----------( 81       ( 2019 05:26 )             27.8     07-30    146<H>  |  116<H>  |  49<H>  ----------------------------<  30<LL>  4.8   |  24  |  1.82<H>    Ca    8.4      2019 05:26  Phos  3.2       Mg     1.7     30    TPro  5.6<L>  /  Alb  1.6<L>  /  TBili  0.2  /  DBili  0.1  /  AST  36  /  ALT  23  /  AlkPhos  154<H>  30    Urinalysis Basic - ( 2019 15:30 )    Color: Red / Appearance: very cloudy / S.015 / pH: x  Gluc: x / Ketone: Trace  / Bili: Negative / Urobili: Negative   Blood: x / Protein: 500 mg/dL / Nitrite: Positive   Leuk Esterase: Moderate / RBC: 26-50 /HPF / WBC 11-25 /HPF   Sq Epi: x / Non Sq Epi: Neg.-Few / Bacteria: Negative /HPF        RADIOLOGY & ADDITIONAL STUDIES: < from: Xray Chest 1 View- PORTABLE-Routine (19 @ 07:07) >  EXAM:  XR CHEST PORTABLE ROUTINE 1V      PROCEDURE DATE:  2019        INTERPRETATION:  Single view chest    History sepsis    Comparison yesterday    A pacemaker and endotracheal tube remain in place. There is slightly   improved bilateral pulmonary opacity the prior exam without new   consolidation, cavitation or layering effusion          ADVANCE DIRECTIVES: MOLST  DNR/ trial  intubation    Advanced Care Planning discussion total time spent: Progress: f/u palliative, pt remains intubated/sedated in ccu.    Present Symptoms:   Dyspnea: no  Nausea/Vomiting: no  Anxiety:  no  Depressed Mood:   Fatigue: yes  Loss of appetite: yes  Pain:    no s/s                location:   Review of Systems:  Unable to obtain due to poor mentation     MEDICATIONS  (STANDING):  chlorhexidine 0.12% Liquid 15 milliLiter(s) Oral Mucosa every 12 hours  chlorhexidine 4% Liquid 1 Application(s) Topical <User Schedule>  dextrose 10%. 250 milliLiter(s) (30 mL/Hr) IV Continuous <Continuous>  dextrose 5% + sodium chloride 0.9%. 1000 milliLiter(s) (100 mL/Hr) IV Continuous <Continuous>  dorzolamide 2%/timolol 0.5% Ophthalmic Solution 1 Drop(s) Both EYES every 12 hours  doxycycline hyclate Capsule 100 milliGRAM(s) Oral every 12 hours  latanoprost 0.005% Ophthalmic Solution 1 Drop(s) Both EYES at bedtime  meropenem  IVPB 1000 milliGRAM(s) IV Intermittent every 12 hours  norepinephrine Infusion 0.05 MICROgram(s)/kG/Min (4.163 mL/Hr) IV Continuous <Continuous>  pantoprazole   Suspension 40 milliGRAM(s) Enteral Tube daily  propofol Infusion 5 MICROgram(s)/kG/Min (2.721 mL/Hr) IV Continuous <Continuous>  zinc oxide 20% Ointment 1 Application(s) Topical two times a day    MEDICATIONS  (PRN):  acetaminophen    Suspension .. 650 milliGRAM(s) Enteral Tube every 6 hours PRN Temp greater or equal to 38C (100.4F), Mild Pain (1 - 3)  sodium chloride 0.9% lock flush 10 milliLiter(s) IV Push every 1 hour PRN Pre/post blood products, medications, blood draw, and to maintain line patency      PHYSICAL EXAM:  Vital Signs Last 24 Hrs  T(C): 37.1 (2019 11:00), Max: 37.1 (2019 11:00)  T(F): 98.7 (2019 11:00), Max: 98.7 (2019 11:00)  HR: 60 (2019 13:00) (59 - 64)  BP: 112/62 (2019 13:00) (76/51 - 157/79)  BP(mean): 77 (2019 13:00) (60 - 99)  RR: 19 (2019 13:00) (16 - 22)  SpO2: 100% (2019 13:00) (95% - 100%)  General: intubated/sedated       HEENT: n/c, a/t     Lungs: coarse bs    CV: s1s2    GI: soft, n/t + peg     : hansen    Musculoskeletal: weak x 4    Skin: warm dry     Neuro: sedated/intubated    Oral intake ability:  npo  Diet: NPO, peg     LABS:                          8.3    14.03 )-----------( 81       ( 2019 05:26 )             27.8     07-30    146<H>  |  116<H>  |  49<H>  ----------------------------<  30<LL>  4.8   |  24  |  1.82<H>    Ca    8.4      2019 05:26  Phos  3.2       Mg     1.7     30    TPro  5.6<L>  /  Alb  1.6<L>  /  TBili  0.2  /  DBili  0.1  /  AST  36  /  ALT  23  /  AlkPhos  154<H>  30    Urinalysis Basic - ( 2019 15:30 )    Color: Red / Appearance: very cloudy / S.015 / pH: x  Gluc: x / Ketone: Trace  / Bili: Negative / Urobili: Negative   Blood: x / Protein: 500 mg/dL / Nitrite: Positive   Leuk Esterase: Moderate / RBC: 26-50 /HPF / WBC 11-25 /HPF   Sq Epi: x / Non Sq Epi: Neg.-Few / Bacteria: Negative /HPF        RADIOLOGY & ADDITIONAL STUDIES: < from: Xray Chest 1 View- PORTABLE-Routine (19 @ 07:07) >  EXAM:  XR CHEST PORTABLE ROUTINE 1V      PROCEDURE DATE:  2019        INTERPRETATION:  Single view chest    History sepsis    Comparison yesterday    A pacemaker and endotracheal tube remain in place. There is slightly   improved bilateral pulmonary opacity the prior exam without new   consolidation, cavitation or layering effusion          ADVANCE DIRECTIVES: MOLST  DNR/ trial  intubation    Advanced Care Planning discussion total time spent:

## 2019-07-30 NOTE — PROGRESS NOTE ADULT - SUBJECTIVE AND OBJECTIVE BOX
CC: f/u for pneumonia, resp failure    Patient remains intubated, on Levo, thick ET secretions    REVIEW OF SYSTEMS:  not provided on Vent    Antimicrobials Day # 3   doxycycline hyclate Capsule 100 milliGRAM(s) Oral every 12 hours  meropenem  IVPB 1000 milliGRAM(s) IV Intermittent every 12 hours    Other Medications Reviewed    T(F): 98.7 (07-30-19 @ 11:00), Max: 98.7 (07-30-19 @ 11:00)  HR: 60 (07-30-19 @ 13:10)  BP: 100/57 (07-30-19 @ 13:10)  RR: 19 (07-30-19 @ 13:10)  SpO2: 100% (07-30-19 @ 13:10)  Wt(kg): --    PHYSICAL EXAM:  General: no acute distress, on Vent AC16/50%  Eyes: anicteric, no conjunctival injection, no discharge  Oropharynx: ETT	  Neck: without adenopathy  Lungs: coarse ant BSs  Heart: regular rate and rhythm; no murmur, rubs or gallops  Abdomen: soft, nondistended, nontender, G tube site clean  Nix  Skin: no lesions  Extremities: no edema  Neurologic: sedated    LAB RESULTS:                        8.3    14.03 )-----------( 81       ( 30 Jul 2019 05:26 )             27.8     07-30    146<H>  |  116<H>  |  49<H>  ----------------------------<  30<LL>  4.8   |  24  |  1.82<H>    Ca    8.4      30 Jul 2019 05:26  Phos  3.2     07-30  Mg     1.7     07-30    TPro  5.6<L>  /  Alb  1.6<L>  /  TBili  0.2  /  DBili  0.1  /  AST  36  /  ALT  23  /  AlkPhos  154<H>  07-30    MICROBIOLOGY:  RECENT CULTURES:  07-28 @ 15:30 .Sputum Sputum     Few Pseudomonas aeruginosa  Normal Respiratory Renetta present    07-28 @ 12:24 .Urine Clean Catch (Midstream)/ msue'd twice     50,000 - 99,000 CFU/mL Presumptive Candida albicans "Susceptibilities not  performed"    07-28 @ 11:40 .Blood Blood-Peripheral     No growth to date.    RADIOLOGY REVIEWED

## 2019-07-31 LAB
ALBUMIN SERPL ELPH-MCNC: 1.3 G/DL — LOW (ref 3.3–5)
ALP SERPL-CCNC: 112 U/L — SIGNIFICANT CHANGE UP (ref 40–120)
ALT FLD-CCNC: 18 U/L DA — SIGNIFICANT CHANGE UP (ref 10–45)
ANION GAP SERPL CALC-SCNC: 8 MMOL/L — SIGNIFICANT CHANGE UP (ref 5–17)
AST SERPL-CCNC: 29 U/L — SIGNIFICANT CHANGE UP (ref 10–40)
BILIRUB SERPL-MCNC: 0.3 MG/DL — SIGNIFICANT CHANGE UP (ref 0.2–1.2)
BUN SERPL-MCNC: 42 MG/DL — HIGH (ref 7–23)
CALCIUM SERPL-MCNC: 7.9 MG/DL — LOW (ref 8.4–10.5)
CHLORIDE SERPL-SCNC: 116 MMOL/L — HIGH (ref 96–108)
CO2 SERPL-SCNC: 21 MMOL/L — LOW (ref 22–31)
CREAT SERPL-MCNC: 1.57 MG/DL — HIGH (ref 0.5–1.3)
GLUCOSE BLDC GLUCOMTR-MCNC: 100 MG/DL — HIGH (ref 70–99)
GLUCOSE BLDC GLUCOMTR-MCNC: 107 MG/DL — HIGH (ref 70–99)
GLUCOSE BLDC GLUCOMTR-MCNC: 109 MG/DL — HIGH (ref 70–99)
GLUCOSE BLDC GLUCOMTR-MCNC: 111 MG/DL — HIGH (ref 70–99)
GLUCOSE BLDC GLUCOMTR-MCNC: 114 MG/DL — HIGH (ref 70–99)
GLUCOSE BLDC GLUCOMTR-MCNC: 121 MG/DL — HIGH (ref 70–99)
GLUCOSE BLDC GLUCOMTR-MCNC: 122 MG/DL — HIGH (ref 70–99)
GLUCOSE BLDC GLUCOMTR-MCNC: 136 MG/DL — HIGH (ref 70–99)
GLUCOSE BLDC GLUCOMTR-MCNC: 139 MG/DL — HIGH (ref 70–99)
GLUCOSE BLDC GLUCOMTR-MCNC: 148 MG/DL — HIGH (ref 70–99)
GLUCOSE BLDC GLUCOMTR-MCNC: 159 MG/DL — HIGH (ref 70–99)
GLUCOSE BLDC GLUCOMTR-MCNC: 90 MG/DL — SIGNIFICANT CHANGE UP (ref 70–99)
GLUCOSE SERPL-MCNC: 137 MG/DL — HIGH (ref 70–99)
HBA1C BLD-MCNC: 4.8 % — SIGNIFICANT CHANGE UP (ref 4–5.6)
HCT VFR BLD CALC: 23.6 % — LOW (ref 39–50)
HGB BLD-MCNC: 7.2 G/DL — LOW (ref 13–17)
MAGNESIUM SERPL-MCNC: 1.2 MG/DL — LOW (ref 1.6–2.6)
MCHC RBC-ENTMCNC: 30.3 PG — SIGNIFICANT CHANGE UP (ref 27–34)
MCHC RBC-ENTMCNC: 30.5 GM/DL — LOW (ref 32–36)
MCV RBC AUTO: 99.2 FL — SIGNIFICANT CHANGE UP (ref 80–100)
NRBC # BLD: 0 /100 WBCS — SIGNIFICANT CHANGE UP (ref 0–0)
PHOSPHATE SERPL-MCNC: 3 MG/DL — SIGNIFICANT CHANGE UP (ref 2.5–4.5)
PLATELET # BLD AUTO: 73 K/UL — LOW (ref 150–400)
POTASSIUM SERPL-MCNC: 4.4 MMOL/L — SIGNIFICANT CHANGE UP (ref 3.5–5.3)
POTASSIUM SERPL-SCNC: 4.4 MMOL/L — SIGNIFICANT CHANGE UP (ref 3.5–5.3)
PROT SERPL-MCNC: 5.1 G/DL — LOW (ref 6–8.3)
RBC # BLD: 2.38 M/UL — LOW (ref 4.2–5.8)
RBC # FLD: 20.7 % — HIGH (ref 10.3–14.5)
SODIUM SERPL-SCNC: 145 MMOL/L — SIGNIFICANT CHANGE UP (ref 135–145)
WBC # BLD: 9.08 K/UL — SIGNIFICANT CHANGE UP (ref 3.8–10.5)
WBC # FLD AUTO: 9.08 K/UL — SIGNIFICANT CHANGE UP (ref 3.8–10.5)

## 2019-07-31 PROCEDURE — 99233 SBSQ HOSP IP/OBS HIGH 50: CPT

## 2019-07-31 PROCEDURE — 71045 X-RAY EXAM CHEST 1 VIEW: CPT | Mod: 26

## 2019-07-31 RX ORDER — SENNA PLUS 8.6 MG/1
10 TABLET ORAL AT BEDTIME
Refills: 0 | Status: DISCONTINUED | OUTPATIENT
Start: 2019-07-31 | End: 2019-08-14

## 2019-07-31 RX ORDER — MAGNESIUM OXIDE 400 MG ORAL TABLET 241.3 MG
400 TABLET ORAL EVERY 8 HOURS
Refills: 0 | Status: DISCONTINUED | OUTPATIENT
Start: 2019-07-31 | End: 2019-08-14

## 2019-07-31 RX ORDER — CHLORHEXIDINE GLUCONATE 213 G/1000ML
15 SOLUTION TOPICAL EVERY 12 HOURS
Refills: 0 | Status: DISCONTINUED | OUTPATIENT
Start: 2019-07-31 | End: 2019-07-31

## 2019-07-31 RX ORDER — DOCUSATE SODIUM 100 MG
100 CAPSULE ORAL THREE TIMES A DAY
Refills: 0 | Status: DISCONTINUED | OUTPATIENT
Start: 2019-07-31 | End: 2019-07-31

## 2019-07-31 RX ORDER — MIDODRINE HYDROCHLORIDE 2.5 MG/1
10 TABLET ORAL EVERY 8 HOURS
Refills: 0 | Status: DISCONTINUED | OUTPATIENT
Start: 2019-07-31 | End: 2019-08-09

## 2019-07-31 RX ORDER — POLYETHYLENE GLYCOL 3350 17 G/17G
17 POWDER, FOR SOLUTION ORAL DAILY
Refills: 0 | Status: DISCONTINUED | OUTPATIENT
Start: 2019-07-31 | End: 2019-08-14

## 2019-07-31 RX ORDER — CHLORHEXIDINE GLUCONATE 213 G/1000ML
15 SOLUTION TOPICAL EVERY 12 HOURS
Refills: 0 | Status: DISCONTINUED | OUTPATIENT
Start: 2019-07-31 | End: 2019-08-03

## 2019-07-31 RX ORDER — ASCORBIC ACID 60 MG
500 TABLET,CHEWABLE ORAL
Refills: 0 | Status: CANCELLED | OUTPATIENT
Start: 2019-07-31 | End: 2019-08-14

## 2019-07-31 RX ORDER — MAGNESIUM SULFATE 500 MG/ML
2 VIAL (ML) INJECTION ONCE
Refills: 0 | Status: DISCONTINUED | OUTPATIENT
Start: 2019-07-31 | End: 2019-07-31

## 2019-07-31 RX ORDER — MAGNESIUM SULFATE 500 MG/ML
1 VIAL (ML) INJECTION
Refills: 0 | Status: COMPLETED | OUTPATIENT
Start: 2019-07-31 | End: 2019-07-31

## 2019-07-31 RX ADMIN — Medication 100 GRAM(S): at 08:38

## 2019-07-31 RX ADMIN — MEROPENEM 100 MILLIGRAM(S): 1 INJECTION INTRAVENOUS at 17:13

## 2019-07-31 RX ADMIN — LATANOPROST 1 DROP(S): 0.05 SOLUTION/ DROPS OPHTHALMIC; TOPICAL at 21:47

## 2019-07-31 RX ADMIN — Medication 100 GRAM(S): at 09:49

## 2019-07-31 RX ADMIN — Medication 30 MILLILITER(S): at 10:30

## 2019-07-31 RX ADMIN — SENNA PLUS 10 MILLILITER(S): 8.6 TABLET ORAL at 21:47

## 2019-07-31 RX ADMIN — MIDODRINE HYDROCHLORIDE 10 MILLIGRAM(S): 2.5 TABLET ORAL at 23:41

## 2019-07-31 RX ADMIN — MAGNESIUM OXIDE 400 MG ORAL TABLET 400 MILLIGRAM(S): 241.3 TABLET ORAL at 15:12

## 2019-07-31 RX ADMIN — MAGNESIUM OXIDE 400 MG ORAL TABLET 400 MILLIGRAM(S): 241.3 TABLET ORAL at 08:52

## 2019-07-31 RX ADMIN — CHLORHEXIDINE GLUCONATE 15 MILLILITER(S): 213 SOLUTION TOPICAL at 05:30

## 2019-07-31 RX ADMIN — Medication 30 MILLILITER(S): at 01:24

## 2019-07-31 RX ADMIN — MAGNESIUM OXIDE 400 MG ORAL TABLET 400 MILLIGRAM(S): 241.3 TABLET ORAL at 21:47

## 2019-07-31 RX ADMIN — PROPOFOL 2.72 MICROGRAM(S)/KG/MIN: 10 INJECTION, EMULSION INTRAVENOUS at 05:57

## 2019-07-31 RX ADMIN — ZINC OXIDE 1 APPLICATION(S): 200 OINTMENT TOPICAL at 17:16

## 2019-07-31 RX ADMIN — DORZOLAMIDE HYDROCHLORIDE TIMOLOL MALEATE 1 DROP(S): 20; 5 SOLUTION/ DROPS OPHTHALMIC at 17:13

## 2019-07-31 RX ADMIN — Medication 4.16 MICROGRAM(S)/KG/MIN: at 01:23

## 2019-07-31 RX ADMIN — MEROPENEM 100 MILLIGRAM(S): 1 INJECTION INTRAVENOUS at 05:30

## 2019-07-31 RX ADMIN — CHLORHEXIDINE GLUCONATE 15 MILLILITER(S): 213 SOLUTION TOPICAL at 17:13

## 2019-07-31 RX ADMIN — POLYETHYLENE GLYCOL 3350 17 GRAM(S): 17 POWDER, FOR SOLUTION ORAL at 15:12

## 2019-07-31 RX ADMIN — PANTOPRAZOLE SODIUM 40 MILLIGRAM(S): 20 TABLET, DELAYED RELEASE ORAL at 12:02

## 2019-07-31 RX ADMIN — SODIUM CHLORIDE 100 MILLILITER(S): 9 INJECTION, SOLUTION INTRAVENOUS at 05:53

## 2019-07-31 RX ADMIN — DORZOLAMIDE HYDROCHLORIDE TIMOLOL MALEATE 1 DROP(S): 20; 5 SOLUTION/ DROPS OPHTHALMIC at 05:30

## 2019-07-31 RX ADMIN — CHLORHEXIDINE GLUCONATE 1 APPLICATION(S): 213 SOLUTION TOPICAL at 05:31

## 2019-07-31 RX ADMIN — ZINC OXIDE 1 APPLICATION(S): 200 OINTMENT TOPICAL at 05:32

## 2019-07-31 NOTE — PROGRESS NOTE ADULT - SUBJECTIVE AND OBJECTIVE BOX
Follow-up Critical Care Progress Note  Chief Complaint : Sepsis    pt intubated sedated  remains on pressors to maintaib bp  on very low dose   arousable  moves extremities to stimuli    on CPAP and appears to be tolerating, but due to pressor use not candidate for extubation , secretions improving     Allergies :No Known Allergies      PAST MEDICAL & SURGICAL HISTORY:  Chronic kidney disease  Chronic diastolic heart failure  BPH (benign prostatic hyperplasia)  Shortness of breath  Depression  IBS (irritable bowel syndrome)  Dysphagia: s/p PEG placement  Cerebral infarction: with resulting weakness and dysphagia  Anemia  Psoriasis  GERD (gastroesophageal reflux disease)  Dyslipidemia  Cardiac pacemaker  S/P percutaneous endoscopic gastrostomy (PEG) tube placement      Medications:  MEDICATIONS  (STANDING):  chlorhexidine 0.12% Liquid 15 milliLiter(s) Oral Mucosa every 12 hours  chlorhexidine 4% Liquid 1 Application(s) Topical <User Schedule>  dextrose 10%. 250 milliLiter(s) (30 mL/Hr) IV Continuous <Continuous>  dextrose 5% + sodium chloride 0.9%. 1000 milliLiter(s) (100 mL/Hr) IV Continuous <Continuous>  dorzolamide 2%/timolol 0.5% Ophthalmic Solution 1 Drop(s) Both EYES every 12 hours  latanoprost 0.005% Ophthalmic Solution 1 Drop(s) Both EYES at bedtime  magnesium oxide 400 milliGRAM(s) Oral every 8 hours  magnesium sulfate  IVPB 1 Gram(s) IV Intermittent every 1 hour  meropenem  IVPB 1000 milliGRAM(s) IV Intermittent every 12 hours  norepinephrine Infusion 0.05 MICROgram(s)/kG/Min (4.163 mL/Hr) IV Continuous <Continuous>  pantoprazole   Suspension 40 milliGRAM(s) Enteral Tube daily  propofol Infusion 5 MICROgram(s)/kG/Min (2.721 mL/Hr) IV Continuous <Continuous>  zinc oxide 20% Ointment 1 Application(s) Topical two times a day    MEDICATIONS  (PRN):  acetaminophen    Suspension .. 650 milliGRAM(s) Enteral Tube every 6 hours PRN Temp greater or equal to 38C (100.4F), Mild Pain (1 - 3)  sodium chloride 0.9% lock flush 10 milliLiter(s) IV Push every 1 hour PRN Pre/post blood products, medications, blood draw, and to maintain line patency      LABS:                        7.2    9.08  )-----------( 73       ( 31 Jul 2019 05:30 )             23.6     07-31    145  |  116<H>  |  42<H>  ----------------------------<  137<H>  4.4   |  21<L>  |  1.57<H>    Ca    7.9<L>      31 Jul 2019 05:30  Phos  3.0     07-31  Mg     1.2     07-31    TPro  5.1<L>  /  Alb  1.3<L>  /  TBili  0.3  /  DBili  x   /  AST  29  /  ALT  18  /  AlkPhos  112  07-31      WBC Trend  07-31-19 @ 05:30   -  9.08  07-30-19 @ 05:26   -  14.03<H>  07-29-19 @ 05:45   -  23.44<H>  07-28-19 @ 17:45   -  10.17  07-28-19 @ 11:40   -  4.36    H/H Trend  07-31-19 @ 05:30   -  7.2<L> / 23.6<L>  07-30-19 @ 05:26   -  8.3<L> / 27.8<L>  07-29-19 @ 05:45   -  8.8<L> / 29.0<L>  07-28-19 @ 17:45   -  8.8<L> / 28.9<L>  07-28-19 @ 11:40   -  8.0<L> / 26.8<L>      Platelet Trend  07-31-19 @ 05:30   -  73<L>  07-30-19 @ 05:26   -  81<L>  07-29-19 @ 05:45   -  97<L>  07-28-19 @ 17:45   -  95<L>  07-28-19 @ 11:40   -  43<L>      Trend Bun/Cr  07-31-19 @ 05:30  BUN/CR -  42<H> / 1.57<H>  07-30-19 @ 16:12  BUN/CR -  48<H> / 1.74<H>  07-30-19 @ 05:26  BUN/CR -  49<H> / 1.82<H>  07-29-19 @ 22:00  BUN/CR -  50<H> / 1.84<H>  07-29-19 @ 12:25  BUN/CR -  51<H> / 1.77<H>  07-29-19 @ 07:05  BUN/CR -  53<H> / 1.83<H>        Procalcitonin, Serum: 0.76 ng/mL (07-28-19 @ 17:45)          CULTURES: (if applicable)    Culture - Urine (collected 07-28-19 @ 15:30)  Source: .Urine Catheterized  Final Report (07-29-19 @ 18:56):    >100,000 CFU/ml Presumptive Candida albicans "Susceptibilities not    performed"    Culture - Sputum (collected 07-28-19 @ 15:30)  Source: .Sputum Sputum  Gram Stain (07-29-19 @ 00:31):    Rare polymorphonuclear leukocytes per low power field    Rare Squamous epithelial cells per low power field    Numerous Gram Variable Rods seen per oil power field    Few Gram positive cocci in pairs seen per oil power field    Few Yeast like cells seen per oil power field  Final Report (07-30-19 @ 18:02):    Numerous Mixed gram negative rods including Few Pseudomonas aeruginosa    Normal Respiratory Renetta present  Organism: Pseudomonas aeruginosa (07-30-19 @ 18:02)  Organism: Pseudomonas aeruginosa (07-30-19 @ 18:02)      -  Amikacin: S <=8      -  Aztreonam: S <=4      -  Cefepime: S <=2      -  Ceftazidime: S <=1      -  Ciprofloxacin: S 1      -  Gentamicin: S 2      -  Imipenem: S <=1      -  Levofloxacin: S 2      -  Meropenem: S <=1      -  Piperacillin/Tazobactam: S <=8      -  Tobramycin: S <=2      Method Type: KALYAN    Culture - Urine (collected 07-28-19 @ 12:24)  Source: .Urine Clean Catch (Midstream)/ msue&#x27;d twice  Final Report (07-29-19 @ 18:56):    50,000 - 99,000 CFU/mL Presumptive Candida albicans "Susceptibilities not    performed"    Culture - Blood (collected 07-28-19 @ 11:40)  Source: .Blood Blood-Peripheral  Preliminary Report (07-29-19 @ 19:01):    No growth to date.    Culture - Blood (collected 07-28-19 @ 11:40)  Source: .Blood Blood-Peripheral  Preliminary Report (07-29-19 @ 19:01):    No growth to date.            CAPILLARY BLOOD GLUCOSE      POCT Blood Glucose.: 100 mg/dL (31 Jul 2019 07:21)      RADIOLOGY  CXR:  < from: Xray Chest 1 View- PORTABLE-Routine (07.31.19 @ 07:02) >  INTERPRETATION:  AP erect chest on July 31, 2019 at 6:09 AM. Patient has   abnormal chest sounds requires intubation.    Patient's chin obscures the apices.    Heart enlargement, left-sided pacemaker, and endotracheal tube remain.    Consolidated infiltrate in the anterior segment of the right upper lobe   again noted.    Scattered lung and pleural findings in the right lower lung field are   somewhat increased from July 30.    On July 30 there were scattered infiltration off the left hilum which   presently suggests improvement.    IMPRESSION: Shifting infiltrates.    < end of copied text >    VITALS:  T(C): 36.3 (07-31-19 @ 05:00), Max: 37.1 (07-30-19 @ 11:00)  T(F): 97.4 (07-31-19 @ 05:00), Max: 98.7 (07-30-19 @ 11:00)  HR: 60 (07-31-19 @ 09:00) (60 - 61)  BP: 94/55 (07-31-19 @ 09:00) (72/53 - 142/75)  BP(mean): 68 (07-31-19 @ 09:00) (60 - 96)  ABP: --  ABP(mean): --  RR: 20 (07-31-19 @ 09:00) (10 - 26)  SpO2: 98% (07-31-19 @ 09:00) (95% - 100%)  CVP(mm Hg): --  CVP(cm H2O): --    Ins and Outs     07-30-19 @ 07:01  -  07-31-19 @ 07:00  --------------------------------------------------------  IN: 4275.2 mL / OUT: 1195 mL / NET: 3080.2 mL    07-31-19 @ 07:01  -  07-31-19 @ 09:47  --------------------------------------------------------  IN: 471 mL / OUT: 100 mL / NET: 371 mL        Height (cm): 175.3 (07-28-19 @ 15:00)  Weight (kg): 88.8 (07-28-19 @ 15:00)  BMI (kg/m2): 28.9 (07-28-19 @ 15:00)    Device: 840, Mode: CPAP with PS, RR (patient): 18, TV (patient): 502, FiO2: 40, PEEP: 5, PS: 5, ITime: 1, MAP: 7.8, PIP: 11    I&O's Detail    30 Jul 2019 07:01  -  31 Jul 2019 07:00  --------------------------------------------------------  IN:    dextrose 10%.: 690 mL    dextrose 5% + sodium chloride 0.9%.: 2300 mL    norepinephrine Infusion: 548.4 mL    ns in tub fed  [id number]: 480 mL    propofol Infusion: 106.8 mL    Solution: 50 mL    Solution: 100 mL  Total IN: 4275.2 mL    OUT:    Indwelling Catheter - Urethral: 1195 mL  Total OUT: 1195 mL    Total NET: 3080.2 mL      31 Jul 2019 07:01  -  31 Jul 2019 09:47  --------------------------------------------------------  IN:    dextrose 10%.: 60 mL    dextrose 5% + sodium chloride 0.9%.: 200 mL    norepinephrine Infusion: 8.3 mL    propofol Infusion: 2.7 mL    Solution: 200 mL  Total IN: 471 mL    OUT:    Indwelling Catheter - Urethral: 100 mL  Total OUT: 100 mL    Total NET: 371 mL

## 2019-07-31 NOTE — PROGRESS NOTE ADULT - SUBJECTIVE AND OBJECTIVE BOX
Patient is a 86y old  Male who presents with a chief complaint of Change in mental status (31 Jul 2019 16:22)      BRIEF HOSPITAL COURSE:     86M admitted to ICU with acute respiratory failure requiring intubation and septic shock from underlying aspiration PNA. Further complicated by hyoglycemia requiring D10     Events last 24 hours:   -remains intubated on mechanical vent support  -remains on vasopressors (levophed)  -blood sugars now consistently 100-110, however this is requiring D10 and D5NS running.     PAST MEDICAL & SURGICAL HISTORY:  Chronic kidney disease  Chronic diastolic heart failure  BPH (benign prostatic hyperplasia)  Shortness of breath  Depression  IBS (irritable bowel syndrome)  Dysphagia: s/p PEG placement  Cerebral infarction: with resulting weakness and dysphagia  Anemia  Psoriasis  GERD (gastroesophageal reflux disease)  Dyslipidemia  Cardiac pacemaker  S/P percutaneous endoscopic gastrostomy (PEG) tube placement      Review of Systems:  unable to obtain as patient intubated and sedated.     Medications:  meropenem  IVPB 1000 milliGRAM(s) IV Intermittent every 12 hours    norepinephrine Infusion 0.05 MICROgram(s)/kG/Min IV Continuous <Continuous>      acetaminophen    Suspension .. 650 milliGRAM(s) Enteral Tube every 6 hours PRN  propofol Infusion 5 MICROgram(s)/kG/Min IV Continuous <Continuous>        pantoprazole   Suspension 40 milliGRAM(s) Enteral Tube daily  polyethylene glycol 3350 17 Gram(s) Oral daily  senna Syrup 10 milliLiter(s) Oral at bedtime        dextrose 10%. 250 milliLiter(s) IV Continuous <Continuous>  dextrose 5% + sodium chloride 0.9%. 1000 milliLiter(s) IV Continuous <Continuous>  magnesium oxide 400 milliGRAM(s) Oral every 8 hours  sodium chloride 0.9% lock flush 10 milliLiter(s) IV Push every 1 hour PRN      chlorhexidine 0.12% Liquid 15 milliLiter(s) Oral Mucosa every 12 hours  chlorhexidine 4% Liquid 1 Application(s) Topical <User Schedule>  dorzolamide 2%/timolol 0.5% Ophthalmic Solution 1 Drop(s) Both EYES every 12 hours  latanoprost 0.005% Ophthalmic Solution 1 Drop(s) Both EYES at bedtime  zinc oxide 20% Ointment 1 Application(s) Topical two times a day        Mode: CPAP with PS  FiO2: 40  PEEP: 5  PS: 5  ITime: 1  MAP: 7.8  PIP: 11      ICU Vital Signs Last 24 Hrs  T(C): 34.7 (31 Jul 2019 16:00), Max: 37.1 (31 Jul 2019 08:00)  T(F): 94.5 (31 Jul 2019 16:00), Max: 98.8 (31 Jul 2019 08:00)  HR: 60 (31 Jul 2019 19:00) (60 - 61)  BP: 94/57 (31 Jul 2019 19:00) (72/53 - 135/75)  BP(mean): 70 (31 Jul 2019 19:00) (59 - 94)  RR: 21 (31 Jul 2019 19:00) (10 - 25)  SpO2: 98% (31 Jul 2019 19:00) (95% - 100%)          I&O's Detail    30 Jul 2019 07:01  -  31 Jul 2019 07:00  --------------------------------------------------------  IN:    dextrose 10%.: 690 mL    dextrose 5% + sodium chloride 0.9%.: 2300 mL    norepinephrine Infusion: 548.4 mL    ns in tub fed  [id number]: 510 mL    propofol Infusion: 106.8 mL    Solution: 50 mL    Solution: 100 mL  Total IN: 4305.2 mL    OUT:    Indwelling Catheter - Urethral: 1195 mL  Total OUT: 1195 mL    Total NET: 3110.2 mL      31 Jul 2019 07:01  -  31 Jul 2019 21:30  --------------------------------------------------------  IN:    dextrose 10%.: 330 mL    dextrose 5% + sodium chloride 0.9%.: 650 mL    norepinephrine Infusion: 77.4 mL    ns in tub fed  [id number]: 510 mL    propofol Infusion: 29.7 mL    Solution: 50 mL    Solution: 200 mL  Total IN: 1847.1 mL    OUT:    Indwelling Catheter - Urethral: 500 mL  Total OUT: 500 mL    Total NET: 1347.1 mL            LABS:                        7.2    9.08  )-----------( 73       ( 31 Jul 2019 05:30 )             23.6     07-31    145  |  116<H>  |  42<H>  ----------------------------<  137<H>  4.4   |  21<L>  |  1.57<H>    Ca    7.9<L>      31 Jul 2019 05:30  Phos  3.0     07-31  Mg     1.2     07-31    TPro  5.1<L>  /  Alb  1.3<L>  /  TBili  0.3  /  DBili  x   /  AST  29  /  ALT  18  /  AlkPhos  112  07-31          CAPILLARY BLOOD GLUCOSE      POCT Blood Glucose.: 111 mg/dL (31 Jul 2019 19:57)        CULTURES:  Culture Results:   Numerous Mixed gram negative rods including Few Pseudomonas aeruginosa  Normal Respiratory Renetta present (07-28-19 @ 15:30)  Culture Results:   >100,000 CFU/ml Presumptive Candida albicans "Susceptibilities not  performed" (07-28-19 @ 15:30)  Culture Results:   50,000 - 99,000 CFU/mL Presumptive Candida albicans "Susceptibilities not  performed" (07-28-19 @ 12:24)  Culture Results:   No growth to date. (07-28-19 @ 11:40)  Culture Results:   No growth to date. (07-28-19 @ 11:40)      Physical Examination:    General: Intubated, sedated, currently calm.     HEENT: Pupils equal, reactive to light.  Symmetric.    PULM: diminished at bases bilaterally, no significant sputum production    CVS: Regular rate and rhythm, paced, no murmurs, rubs, or gallops    ABD: Soft, nondistended, nontender, normoactive bowel sounds, no masses    EXT: No edema, nontender    SKIN: Warm and well perfused, no rashes noted.    RADIOLOGY:     < from: Xray Chest 1 View- PORTABLE-Routine (07.31.19 @ 07:02) >  EXAM:  XR CHEST PORTABLE ROUTINE 1V      PROCEDURE DATE:  07/31/2019        INTERPRETATION:  AP erect chest on July 31, 2019 at 6:09 AM. Patient has   abnormal chest sounds requires intubation.    Patient's chin obscures the apices.    Heart enlargement, left-sided pacemaker, and endotracheal tube remain.    Consolidated infiltrate in the anterior segment of the right upper lobe   again noted.    Scattered lung and pleural findings in the right lower lung field are   somewhat increased from July 30.    On July 30 there were scattered infiltration off the left hilum which   presently suggests improvement.    IMPRESSION: Shifting infiltrates.                  REDDY DONNELLY M.D., ATTENDING RADIOLOGIST  This document has been electronically signed. Jul 31 2019  8:30AM    < end of copied text >      CRITICAL CARE TIME SPENT:  35 minutes of critical care time spent providing medical care for patient's acute illness/conditions that impairs at least one vital organ system and/or poses a high risk of imminent or life threatening deterioration in the patient's condition. It includes time spent evaluating and treating the patient's acute illness as well as time spent reviewing labs, radiology, discussing goals of care with patient and/or patient's family, and discussing the case with a multidisciplinary team, including the eICU, in an effort to prevent further life threatening deterioration or end organ damage. This time is independent of any procedures performed.

## 2019-07-31 NOTE — PROGRESS NOTE ADULT - ASSESSMENT
A/P 86 year old male with extensive PMH including IBS, CVA with dysphagia and PEG placement, anemia, psoriasis, GERD, depression, chf , PPM admitted with severe sepsis from likely aspiration pneumonia. Septic Shock with multiorgan failure.   Acute Respiratory failure intubated 7/28  MELISSA with baseline Cr 1.2-1.4   Pt remains intubated and sedated in ccu , per ccu team pt on CPAP and appears to be tolerating, but due to pressor use not candidate for extubation today.  Wife not making any further decisions regarding GOC at this time., wants to see how pt does when weaning off vent.    Will cont to follow pt w/ ccu team and f/u w/ wife regarding continued goc discussions .  Cont supportive care. A/P 86 year old male with extensive PMH including IBS, CVA with dysphagia and PEG placement, anemia, psoriasis, GERD, depression, chf , PPM admitted with severe sepsis from likely aspiration pneumonia. Septic Shock with multiorgan failure.   Acute Respiratory failure intubated 7/28  MELISSA with baseline Cr 1.2-1.4   Pt remains intubated and sedated in ccu , per ccu team pt on CPAP and appears to be tolerating, but due to pressor use not candidate for extubation today.  Wife not making any further decisions regarding GOC at this time., wants to see how pt does when weaning off vent.    Will cont to follow pt w/ ccu team and f/u w/ wife regarding continued goc discussions .  Cont supportive care. Reviewed with Dr Milner

## 2019-07-31 NOTE — PROGRESS NOTE ADULT - SUBJECTIVE AND OBJECTIVE BOX
CC: f/u for pneumonia, resp failure    Patient remains intubated, sedated, still on Levo, tolerating CPAP    REVIEW OF SYSTEMS:  not provided on Vent    Antimicrobials Day # 4   meropenem  IVPB 1000 milliGRAM(s) IV Intermittent every 12 hours    Other Medications Reviewed    Vital Signs Last 24 Hrs  T(F): 97.4 (31 Jul 2019 05:00), Max: 98.7 (30 Jul 2019 18:00)  HR: 60 (31 Jul 2019 15:02) (60 - 61)  BP: 92/63 (31 Jul 2019 15:00) (72/53 - 142/75)  BP(mean): 72 (31 Jul 2019 15:00) (60 - 96)  RR: 17 (31 Jul 2019 15:00) (10 - 25)  SpO2: 99% (31 Jul 2019 15:02) (95% - 100%)    PHYSICAL EXAM:  General: no acute distress, on Vent- CPAP  Eyes: anicteric, no conjunctival injection, no discharge  Oropharynx: ETT	  Neck: without adenopathy  Lungs: coarse ant BSs  Heart: regular rate and rhythm; no murmur, rubs or gallops  Abdomen: soft, nondistended, nontender, G tube site clean  Nix  Skin: no lesions  Extremities: no edema  Neurologic: sedated    LAB RESULTS:                        7.2    9.08  )-----------( 73       ( 31 Jul 2019 05:30 )             23.6   07-31    145  |  116<H>  |  42<H>  ----------------------------<  137<H>  4.4   |  21<L>  |  1.57<H>    Ca    7.9<L>      31 Jul 2019 05:30  Phos  3.0     07-31  Mg     1.2     07-31    TPro  5.1<L>  /  Alb  1.3<L>  /  TBili  0.3  /  DBili  x   /  AST  29  /  ALT  18  /  AlkPhos  112  07-31      MICROBIOLOGY:  RECENT CULTURES:  Culture - Sputum . (07.28.19 @ 15:30)    -  Gentamicin: S 2    -  Imipenem: S <=1    -  Levofloxacin: S 2    -  Meropenem: S <=1    -  Piperacillin/Tazobactam: S <=8    -  Tobramycin: S <=2    -  Amikacin: S <=8    -  Aztreonam: S <=4    -  Cefepime: S <=2    -  Ceftazidime: S <=1    -  Ciprofloxacin: S 1    Specimen Source: .Sputum Sputum    Culture Results:   Numerous Mixed gram negative rods including Few Pseudomonas aeruginosa  Normal Respiratory Renetta present    Organism Identification: Pseudomonas aeruginosa    07-28 @ 12:24 .Urine Clean Catch (Midstream)/ msue'd twice     50,000 - 99,000 CFU/mL Presumptive Candida albicans "Susceptibilities not  performed"    07-28 @ 11:40 .Blood Blood-Peripheral     No growth to date.    RADIOLOGY REVIEWED:  Xray Chest 1 View- PORTABLE-Routine (07.31.19 @ 07:02) >  Consolidated infiltrate in the anterior segment of the right upper lobe   again noted.    Scattered lung and pleural findings in the right lower lung field are   somewhat increased from July 30.    On July 30 there were scattered infiltration off the left hilum which   presently suggests improvement.

## 2019-07-31 NOTE — PROGRESS NOTE ADULT - ASSESSMENT
Elderly man, recent hospital stay for MSSA bacteremic pneumonia, G tube, at rehab, returns with respiratory distress, required intubation, pressors, thrombocytopenia, hypothermia, severe sepsis/shock  CXR with dense infiltrate- healthcare associated pneumonia.  Pseudomonas predominating in Sputum  Afebrile, on CPAP, WBC lower  Still on pressors    Plan:  Continue Vent/pressor support; weaning as tolerated  Continue Cathy directed at Pseudomonas- sensitive  Follow temps and CBC/diff   Prognosis still guarded  D/w ICU team

## 2019-07-31 NOTE — PROGRESS NOTE ADULT - ASSESSMENT
Physical Examination:  GENERAL:               Sedated, intubated, No acute distress.    HEENT:                   No JVD, Dry MM  PULM:                     Bilateral air entry, Clear to auscultation bilaterally, no significant sputum production, No Rales, No Rhonchi, No Wheezing  CVS:                         S1, S2,  +Murmur  ABD:                        Soft, distended, nontender, normoactive bowel sounds, + Peg  EXT:                         +edema, nontender  Vascular:                Warm Extremities, Normal Capillary refill,  SKIN:                       Warm and well perfused, no rashes noted.   NEURO:                  Sedated, not follows commands  PSYC:                      Calm, no Insight.        Assessment  Septic Shock with multiorgan failure, due to aspiration PNA on pressors with  candida urine collonization  Acute Respiratory failure intubated 7/28   Thrombocytopenia due to sepsis  Lactic acidosis resolved  MELISSA with baseline Cr 1.2-1.4   Hypoglycemia re current with   Hyperkalemia improved  CVA with Dysphagia s/p PEG  Underlying BPH, chronic diastolic CHF      Plan  Mechanical ventilation  on CPAP and appears to be tolerating, but due to pressor use not candidate for extubation , secretions improving   taper fio2 as tolerated  pressors to maintain bp taper to off   Sedation for comfort  increase tube feeding, taper D5NS to 50 cc/hr, and taper  d10 to off as glucose is stable, continue to monitor fs  IV abx as per ID  restart chemical DVT ppx once plts > 100  Continue supportive care  palliative care f/u      Family Updated: 	Dtr Amelei -                        Date  7/29/19 - Is away                                       wife Angie [phone]	             Date: 7/31/19    PMD:		Dr. Reyes		                   Notified(Date): 7/29/19      Sedation & Analgesia:	As above  Diet/Nutrition:		Start peg feeding today vs kyle  GI PPx:			PPI    DVT Ppx:		venodynes start dvt ppx when plts > 100    Activity:		               Bedrest  Head of Bed:               35-45  Glycemic Control:        n/a    Lines:  PIV  CENTRAL LINE: 	[ x] YES [ ] NO	                    LOCATION:   	  [facility]                     DATE INSERTED:   	                    REMOVE:  [ ] YES [ x] NO    A-LINE:  	                [ ] YES [x ] NO                      LOCATION:   	                       DATE INSERTED: 		            REMOVE:  [ ] YES [ ] NO    SOTO: 		        [x] YES [ ] NO  		                                       DATE INSERTED:		            REMOVE:  [ ] YES [ x] NO      Restraints were deemed necessary to prevent removal of life-sustaining devices [ x ] YES   [    ]  NO    Disposition: ICU monitoring     Goals of Care: DNR, Trial intubation, family does not want trach.

## 2019-07-31 NOTE — CHART NOTE - NSCHARTNOTEFT_GEN_A_CORE
Nutrition Follow Up Note  Hospital Course (Per Electronic Medical Record):   Source: Medical Record [X] ] Nursing Staff [X]     Diet: Jevity 1.5 @ 50cc/hr advance to 70cc/hr x 18hrs with 30cc Prosource BID    Patient remains intubated, at present receiving Jevity 1.5 @30cc/hr tolerating well, Requested increase to 50cc/hr and advance as tolerated to goal rate of 75cc/hr x18hrs, order obtained. Patient noted with hypoglycemia D10 noted. POCT 100,136,139,148.      Current Weight: (7/31) 212.3/96.3kg, ? admit weight due to 16.6lb weight difference since admission, (+1) generalized edema noted . Patient noted more edematous at time of admission.     Pertinent Medications: MEDICATIONS  (STANDING):  chlorhexidine 0.12% Liquid 15 milliLiter(s) Oral Mucosa every 12 hours  chlorhexidine 4% Liquid 1 Application(s) Topical <User Schedule>  dextrose 10%. 250 milliLiter(s) (30 mL/Hr) IV Continuous <Continuous>  dextrose 5% + sodium chloride 0.9%. 1000 milliLiter(s) (100 mL/Hr) IV Continuous <Continuous>  dorzolamide 2%/timolol 0.5% Ophthalmic Solution 1 Drop(s) Both EYES every 12 hours  fluconAZOLE IVPB      fluconAZOLE IVPB 100 milliGRAM(s) IV Intermittent every 24 hours  latanoprost 0.005% Ophthalmic Solution 1 Drop(s) Both EYES at bedtime  magnesium oxide 400 milliGRAM(s) Oral every 8 hours  magnesium sulfate  IVPB 2 Gram(s) IV Intermittent once  meropenem  IVPB 1000 milliGRAM(s) IV Intermittent every 12 hours  norepinephrine Infusion 0.05 MICROgram(s)/kG/Min (4.163 mL/Hr) IV Continuous <Continuous>  pantoprazole   Suspension 40 milliGRAM(s) Enteral Tube daily  propofol Infusion 5 MICROgram(s)/kG/Min (2.721 mL/Hr) IV Continuous <Continuous>  zinc oxide 20% Ointment 1 Application(s) Topical two times a day    MEDICATIONS  (PRN):  acetaminophen    Suspension .. 650 milliGRAM(s) Enteral Tube every 6 hours PRN Temp greater or equal to 38C (100.4F), Mild Pain (1 - 3)  sodium chloride 0.9% lock flush 10 milliLiter(s) IV Push every 1 hour PRN Pre/post blood products, medications, blood draw, and to maintain line patency      Pertinent Labs:  07-31 Na145 mmol/L Glu 137 mg/dL<H> K+ 4.4 mmol/L Cr  1.57 mg/dL<H> BUN 42 mg/dL<H> 07-31 Phos 3.0 mg/dL 07-31 Alb 1.3 g/dL<L>        Skin: stage 2 noted at coccyx & midline of back.    Edema: (+1) generalized edema.    Last BM: on (7/28)    Estimated Needs:   [X] No Change since Previous Assessment  [X] Recalculated:     Previous Nutrition Diagnosis: Severe Malnutrition    Nutrition Diagnosis is [X] Ongoing       New Nutrition Diagnosis: [X] Not Applicable      Interventions:   1. Recommend continue current tube feeding regimen  2. Request MVI & Vit C added 2/2 noted stage 2    Monitoring & Evaluation: will monitor:  [X] Weights   [X] Tolerance to PEG feeds  [X] Follow Up (Per Protocol)         RD to follow as per Nutrition protocol  Renetta Silva RDN

## 2019-07-31 NOTE — PROGRESS NOTE ADULT - SUBJECTIVE AND OBJECTIVE BOX
Progress: f/u palliative, pt remains in ccu intubated /sedated.      Present Symptoms:   Dyspnea: no  Nausea/Vomiting: no  Anxiety:  no  Depressed Mood:   Fatigue: yes  Loss of appetite: yes  Pain:               no s/s     location:   Review of Systems: Unable to obtain due to poor mentation]    MEDICATIONS  (STANDING):  chlorhexidine 0.12% Liquid 15 milliLiter(s) Oral Mucosa every 12 hours  chlorhexidine 4% Liquid 1 Application(s) Topical <User Schedule>  dextrose 10%. 250 milliLiter(s) (30 mL/Hr) IV Continuous <Continuous>  dextrose 5% + sodium chloride 0.9%. 1000 milliLiter(s) (50 mL/Hr) IV Continuous <Continuous>  dorzolamide 2%/timolol 0.5% Ophthalmic Solution 1 Drop(s) Both EYES every 12 hours  latanoprost 0.005% Ophthalmic Solution 1 Drop(s) Both EYES at bedtime  magnesium oxide 400 milliGRAM(s) Oral every 8 hours  meropenem  IVPB 1000 milliGRAM(s) IV Intermittent every 12 hours  norepinephrine Infusion 0.05 MICROgram(s)/kG/Min (4.163 mL/Hr) IV Continuous <Continuous>  pantoprazole   Suspension 40 milliGRAM(s) Enteral Tube daily  propofol Infusion 5 MICROgram(s)/kG/Min (2.721 mL/Hr) IV Continuous <Continuous>  zinc oxide 20% Ointment 1 Application(s) Topical two times a day    MEDICATIONS  (PRN):  acetaminophen    Suspension .. 650 milliGRAM(s) Enteral Tube every 6 hours PRN Temp greater or equal to 38C (100.4F), Mild Pain (1 - 3)  sodium chloride 0.9% lock flush 10 milliLiter(s) IV Push every 1 hour PRN Pre/post blood products, medications, blood draw, and to maintain line patency      PHYSICAL EXAM:  Vital Signs Last 24 Hrs  T(C): 36.3 (31 Jul 2019 05:00), Max: 37.1 (30 Jul 2019 18:00)  T(F): 97.4 (31 Jul 2019 05:00), Max: 98.7 (30 Jul 2019 18:00)  HR: 60 (31 Jul 2019 11:00) (60 - 61)  BP: 92/58 (31 Jul 2019 11:00) (72/53 - 142/75)  BP(mean): 69 (31 Jul 2019 11:00) (60 - 96)  RR: 15 (31 Jul 2019 11:00) (10 - 26)  SpO2: 98% (31 Jul 2019 11:00) (95% - 100%)  General: sedated ,intubated       HEENT: n/c, a/t     Lungs: coarse bs    CV: normal    GI: s1s2    : soft, + peg    Musculoskeletal: edema , flaccid    Skin: warm, dry, fragile     Neuro: sedated    Oral intake ability:  npo  Diet: NPO/peg      LABS:                          7.2    9.08  )-----------( 73       ( 31 Jul 2019 05:30 )             23.6     07-31    145  |  116<H>  |  42<H>  ----------------------------<  137<H>  4.4   |  21<L>  |  1.57<H>    Ca    7.9<L>      31 Jul 2019 05:30  Phos  3.0     07-31  Mg     1.2     07-31    TPro  5.1<L>  /  Alb  1.3<L>  /  TBili  0.3  /  DBili  x   /  AST  29  /  ALT  18  /  AlkPhos  112  07-31        RADIOLOGY & ADDITIONAL STUDIES:< from: Xray Chest 1 View- PORTABLE-Routine (07.31.19 @ 07:02) >  EXAM:  XR CHEST PORTABLE ROUTINE 1V      PROCEDURE DATE:  07/31/2019        INTERPRETATION:  AP erect chest on July 31, 2019 at 6:09 AM. Patient has   abnormal chest sounds requires intubation.    Patient's chin obscures the apices.    Heart enlargement, left-sided pacemaker, and endotracheal tube remain.    Consolidated infiltrate in the anterior segment of the right upper lobe   again noted.    Scattered lung and pleural findings in the right lower lung field are   somewhat increased from July 30.    On July 30 there were scattered infiltration off the left hilum which   presently suggests improvement.    IMPRESSION: Shifting infiltrates.            ADVANCE DIRECTIVES: MOLST DNR/ trial intubation   Advanced Care Planning discussion total time spent:

## 2019-07-31 NOTE — PROGRESS NOTE ADULT - ASSESSMENT
ASSESSMENT:    86M admitted to ICU with acute respiratory failure requiring intubation and septic shock from underlying aspiration PNA. Further complicated by hyoglycemia requiring D10     Events last 24 hours:   -remains intubated on mechanical vent support  -remains on vasopressors (levophed)  -blood sugars now consistently 100-110, however this is requiring D10 and D5NS running.     patient remains critically ill with MSOF Including respiratory failure on vent, cirvulatory system requiring vasopressors, endocrine systme with prsistent hypoglycemia requiring high concetration dextrose infusion and renal system with elevated Scr and MELISSA.     PLAN:    #Respiratory Failure  -Patient currently on Full vent support  -titrate settings to maintain SaO2 >90%, or pH >7.25  -consider low tidal volume ventilation strategy w/ goal Tv 4-6 cc/kg of ideal body weight  -plateu pressure goal <30  -Peridex oral care  -aggressive pulmonary toilet  -daily sedation vacation with spontaneous breathing trial if clinical condition warrants, discuss with respiratory therapy     #Asp PNA  -currently on meropenem for pseudomonal coverage, as sputum cultures did show some pseudomonas  -continue aggressive pulm. esperanza      #Septic Shock  -patient remains on pressors (levophed) -will continue to titrate for goal MAP 65-70, if remains with levophed requirement by AM consider adding Midodrine via enteral access to help titrate off pressor    #Persistent hypoglycemia  -likely a result of underlying sepsis  -will continue D10 and D5NS. will titrate off D10 as tolerated with goal blood sugars 100-110. Goal to maintain blood sugars with D5NS exclusively.     #MELISSA  -continue IVF  -monitor UOP  -renal dose meds and avoid nephrotoxins

## 2019-08-01 LAB
GLUCOSE BLDC GLUCOMTR-MCNC: 102 MG/DL — HIGH (ref 70–99)
GLUCOSE BLDC GLUCOMTR-MCNC: 106 MG/DL — HIGH (ref 70–99)
GLUCOSE BLDC GLUCOMTR-MCNC: 117 MG/DL — HIGH (ref 70–99)
GLUCOSE BLDC GLUCOMTR-MCNC: 118 MG/DL — HIGH (ref 70–99)
GLUCOSE BLDC GLUCOMTR-MCNC: 121 MG/DL — HIGH (ref 70–99)
GLUCOSE BLDC GLUCOMTR-MCNC: 126 MG/DL — HIGH (ref 70–99)
GLUCOSE BLDC GLUCOMTR-MCNC: 127 MG/DL — HIGH (ref 70–99)
GLUCOSE BLDC GLUCOMTR-MCNC: 133 MG/DL — HIGH (ref 70–99)
GLUCOSE BLDC GLUCOMTR-MCNC: 138 MG/DL — HIGH (ref 70–99)
GLUCOSE BLDC GLUCOMTR-MCNC: 141 MG/DL — HIGH (ref 70–99)

## 2019-08-01 PROCEDURE — 99233 SBSQ HOSP IP/OBS HIGH 50: CPT

## 2019-08-01 RX ADMIN — MAGNESIUM OXIDE 400 MG ORAL TABLET 400 MILLIGRAM(S): 241.3 TABLET ORAL at 21:19

## 2019-08-01 RX ADMIN — POLYETHYLENE GLYCOL 3350 17 GRAM(S): 17 POWDER, FOR SOLUTION ORAL at 11:29

## 2019-08-01 RX ADMIN — DORZOLAMIDE HYDROCHLORIDE TIMOLOL MALEATE 1 DROP(S): 20; 5 SOLUTION/ DROPS OPHTHALMIC at 05:18

## 2019-08-01 RX ADMIN — DORZOLAMIDE HYDROCHLORIDE TIMOLOL MALEATE 1 DROP(S): 20; 5 SOLUTION/ DROPS OPHTHALMIC at 17:06

## 2019-08-01 RX ADMIN — ZINC OXIDE 1 APPLICATION(S): 200 OINTMENT TOPICAL at 17:06

## 2019-08-01 RX ADMIN — MEROPENEM 100 MILLIGRAM(S): 1 INJECTION INTRAVENOUS at 17:06

## 2019-08-01 RX ADMIN — MAGNESIUM OXIDE 400 MG ORAL TABLET 400 MILLIGRAM(S): 241.3 TABLET ORAL at 13:40

## 2019-08-01 RX ADMIN — SODIUM CHLORIDE 70 MILLILITER(S): 9 INJECTION, SOLUTION INTRAVENOUS at 00:04

## 2019-08-01 RX ADMIN — Medication 50 MILLILITER(S): at 06:17

## 2019-08-01 RX ADMIN — PANTOPRAZOLE SODIUM 40 MILLIGRAM(S): 20 TABLET, DELAYED RELEASE ORAL at 11:03

## 2019-08-01 RX ADMIN — MIDODRINE HYDROCHLORIDE 10 MILLIGRAM(S): 2.5 TABLET ORAL at 13:40

## 2019-08-01 RX ADMIN — Medication 4.16 MICROGRAM(S)/KG/MIN: at 05:11

## 2019-08-01 RX ADMIN — SODIUM CHLORIDE 40 MILLILITER(S): 9 INJECTION, SOLUTION INTRAVENOUS at 21:31

## 2019-08-01 RX ADMIN — MIDODRINE HYDROCHLORIDE 10 MILLIGRAM(S): 2.5 TABLET ORAL at 21:19

## 2019-08-01 RX ADMIN — CHLORHEXIDINE GLUCONATE 15 MILLILITER(S): 213 SOLUTION TOPICAL at 17:06

## 2019-08-01 RX ADMIN — CHLORHEXIDINE GLUCONATE 15 MILLILITER(S): 213 SOLUTION TOPICAL at 05:18

## 2019-08-01 RX ADMIN — LATANOPROST 1 DROP(S): 0.05 SOLUTION/ DROPS OPHTHALMIC; TOPICAL at 21:18

## 2019-08-01 RX ADMIN — CHLORHEXIDINE GLUCONATE 1 APPLICATION(S): 213 SOLUTION TOPICAL at 05:21

## 2019-08-01 RX ADMIN — Medication 50 MILLILITER(S): at 00:05

## 2019-08-01 RX ADMIN — ZINC OXIDE 1 APPLICATION(S): 200 OINTMENT TOPICAL at 05:21

## 2019-08-01 RX ADMIN — MIDODRINE HYDROCHLORIDE 10 MILLIGRAM(S): 2.5 TABLET ORAL at 05:19

## 2019-08-01 RX ADMIN — MEROPENEM 100 MILLIGRAM(S): 1 INJECTION INTRAVENOUS at 05:14

## 2019-08-01 RX ADMIN — MAGNESIUM OXIDE 400 MG ORAL TABLET 400 MILLIGRAM(S): 241.3 TABLET ORAL at 05:19

## 2019-08-01 NOTE — PROGRESS NOTE ADULT - SUBJECTIVE AND OBJECTIVE BOX
Patient is a 86y old  Male who presents with a chief complaint of Change in mental status (01 Aug 2019 14:37)      BRIEF HOSPITAL COURSE:     86M admitted to ICU with acute respiratory failure requiring intubation and septic shock from underlying aspiration PNA. Further complicated by hyoglycemia requiring D10     Events last 24 hours:   -off D10, blood sugar maintained with D5  -remains on full vent support  -remains on pressor (levophed)      PAST MEDICAL & SURGICAL HISTORY:  Chronic kidney disease  Chronic diastolic heart failure  BPH (benign prostatic hyperplasia)  Shortness of breath  Depression  IBS (irritable bowel syndrome)  Dysphagia: s/p PEG placement  Cerebral infarction: with resulting weakness and dysphagia  Anemia  Psoriasis  GERD (gastroesophageal reflux disease)  Dyslipidemia  Cardiac pacemaker  S/P percutaneous endoscopic gastrostomy (PEG) tube placement      Review of Systems:  -unable to obtain as patient intubated and sedated    Medications:  meropenem  IVPB 1000 milliGRAM(s) IV Intermittent every 12 hours    midodrine 10 milliGRAM(s) Oral every 8 hours  norepinephrine Infusion 0.05 MICROgram(s)/kG/Min IV Continuous <Continuous>      acetaminophen    Suspension .. 650 milliGRAM(s) Enteral Tube every 6 hours PRN  propofol Infusion 5 MICROgram(s)/kG/Min IV Continuous <Continuous>        pantoprazole   Suspension 40 milliGRAM(s) Enteral Tube daily  polyethylene glycol 3350 17 Gram(s) Oral daily  senna Syrup 10 milliLiter(s) Oral at bedtime        dextrose 5% + sodium chloride 0.9%. 1000 milliLiter(s) IV Continuous <Continuous>  magnesium oxide 400 milliGRAM(s) Oral every 8 hours  sodium chloride 0.9% lock flush 10 milliLiter(s) IV Push every 1 hour PRN      chlorhexidine 0.12% Liquid 15 milliLiter(s) Oral Mucosa every 12 hours  chlorhexidine 4% Liquid 1 Application(s) Topical <User Schedule>  dorzolamide 2%/timolol 0.5% Ophthalmic Solution 1 Drop(s) Both EYES every 12 hours  latanoprost 0.005% Ophthalmic Solution 1 Drop(s) Both EYES at bedtime  zinc oxide 20% Ointment 1 Application(s) Topical two times a day        Mode: CPAP with PS  RR (machine): 20  TV (machine): 430  FiO2: 40  PEEP: 5  PS: 10  ITime: 1  MAP: 9  PIP: 16      ICU Vital Signs Last 24 Hrs  T(C): 36.9 (01 Aug 2019 18:00), Max: 37.8 (01 Aug 2019 00:30)  T(F): 98.5 (01 Aug 2019 18:00), Max: 100 (01 Aug 2019 00:30)  HR: 60 (01 Aug 2019 20:00) (60 - 60)  BP: 117/63 (01 Aug 2019 20:00) (73/47 - 117/63)  BP(mean): 79 (01 Aug 2019 20:00) (56 - 82)  RR: 18 (01 Aug 2019 20:00) (15 - 29)  SpO2: 99% (01 Aug 2019 20:00) (96% - 100%)          I&O's Detail    31 Jul 2019 07:01  -  01 Aug 2019 07:00  --------------------------------------------------------  IN:    dextrose 10%: 840 mL    dextrose 5% + sodium chloride 0.9%.: 1500 mL    Enteral Tube Flush: 100 mL    norepinephrine Infusion: 240.9 mL    ns in tub fed  [id number]: 1000 mL    propofol Infusion: 62.1 mL    Solution: 100 mL    Solution: 200 mL  Total IN: 4043 mL    OUT:    Indwelling Catheter - Urethral: 910 mL  Total OUT: 910 mL    Total NET: 3133 mL      01 Aug 2019 07:01  -  01 Aug 2019 21:10  --------------------------------------------------------  IN:    dextrose 10%: 200 mL    dextrose 5% + sodium chloride 0.9%.: 790 mL    norepinephrine Infusion: 98.9 mL    ns in tub fed  [id number]: 910 mL    propofol Infusion: 35.1 mL    Solution: 50 mL  Total IN: 2084 mL    OUT:    Indwelling Catheter - Urethral: 580 mL  Total OUT: 580 mL    Total NET: 1504 mL            LABS:                        7.2    9.08  )-----------( 73       ( 31 Jul 2019 05:30 )             23.6     07-31    145  |  116<H>  |  42<H>  ----------------------------<  137<H>  4.4   |  21<L>  |  1.57<H>    Ca    7.9<L>      31 Jul 2019 05:30  Phos  3.0     07-31  Mg     1.2     07-31    TPro  5.1<L>  /  Alb  1.3<L>  /  TBili  0.3  /  DBili  x   /  AST  29  /  ALT  18  /  AlkPhos  112  07-31          CAPILLARY BLOOD GLUCOSE      POCT Blood Glucose.: 117 mg/dL (01 Aug 2019 20:11)        CULTURES:  Culture Results:   Numerous Mixed gram negative rods including Few Pseudomonas aeruginosa  Normal Respiratory Renetta present (07-28-19 @ 15:30)  Culture Results:   >100,000 CFU/ml Presumptive Candida albicans "Susceptibilities not  performed" (07-28-19 @ 15:30)  Culture Results:   50,000 - 99,000 CFU/mL Presumptive Candida albicans "Susceptibilities not  performed" (07-28-19 @ 12:24)  Culture Results:   No growth to date. (07-28-19 @ 11:40)  Culture Results:   No growth to date. (07-28-19 @ 11:40)      Physical Examination:    General: Intubated, sedated, currently calm.     HEENT: Pupils equal, reactive to light.  Symmetric.    PULM: diminished at bases bilaterally, no significant sputum production    CVS: Regular rate and rhythm, paced, no murmurs, rubs, or gallops    ABD: Soft, nondistended, nontender, normoactive bowel sounds, no masses    EXT: No edema, nontender    SKIN: Warm and well perfused, no rashes noted.        CRITICAL CARE TIME SPENT:  40 minutes of critical care time spent providing medical care for patient's acute illness/conditions that impairs at least one vital organ system and/or poses a high risk of imminent or life threatening deterioration in the patient's condition. It includes time spent evaluating and treating the patient's acute illness as well as time spent reviewing labs, radiology, discussing goals of care with patient and/or patient's family, and discussing the case with a multidisciplinary team, including the eICU, in an effort to prevent further life threatening deterioration or end organ damage. This time is independent of any procedures performed.

## 2019-08-01 NOTE — PROGRESS NOTE ADULT - SUBJECTIVE AND OBJECTIVE BOX
Follow-up Critical Care Progress Note  Chief Complaint : Sepsis      pt intubated sedated,  remains on low dose pressor  no hypoglycemia episodes noted     today on cpap ps 10, tolerating  no et tube secrtions    Allergies :No Known Allergies      PAST MEDICAL & SURGICAL HISTORY:  Chronic kidney disease  Chronic diastolic heart failure  BPH (benign prostatic hyperplasia)  Shortness of breath  Depression  IBS (irritable bowel syndrome)  Dysphagia: s/p PEG placement  Cerebral infarction: with resulting weakness and dysphagia  Anemia  Psoriasis  GERD (gastroesophageal reflux disease)  Dyslipidemia  Cardiac pacemaker  S/P percutaneous endoscopic gastrostomy (PEG) tube placement      Medications:  MEDICATIONS  (STANDING):  chlorhexidine 0.12% Liquid 15 milliLiter(s) Oral Mucosa every 12 hours  chlorhexidine 4% Liquid 1 Application(s) Topical <User Schedule>  dextrose 10%. 250 milliLiter(s) (50 mL/Hr) IV Continuous <Continuous>  dextrose 5% + sodium chloride 0.9%. 1000 milliLiter(s) (70 mL/Hr) IV Continuous <Continuous>  dorzolamide 2%/timolol 0.5% Ophthalmic Solution 1 Drop(s) Both EYES every 12 hours  latanoprost 0.005% Ophthalmic Solution 1 Drop(s) Both EYES at bedtime  magnesium oxide 400 milliGRAM(s) Oral every 8 hours  meropenem  IVPB 1000 milliGRAM(s) IV Intermittent every 12 hours  midodrine 10 milliGRAM(s) Oral every 8 hours  norepinephrine Infusion 0.05 MICROgram(s)/kG/Min (4.163 mL/Hr) IV Continuous <Continuous>  pantoprazole   Suspension 40 milliGRAM(s) Enteral Tube daily  polyethylene glycol 3350 17 Gram(s) Oral daily  propofol Infusion 5 MICROgram(s)/kG/Min (2.721 mL/Hr) IV Continuous <Continuous>  senna Syrup 10 milliLiter(s) Oral at bedtime  zinc oxide 20% Ointment 1 Application(s) Topical two times a day    MEDICATIONS  (PRN):  acetaminophen    Suspension .. 650 milliGRAM(s) Enteral Tube every 6 hours PRN Temp greater or equal to 38C (100.4F), Mild Pain (1 - 3)  sodium chloride 0.9% lock flush 10 milliLiter(s) IV Push every 1 hour PRN Pre/post blood products, medications, blood draw, and to maintain line patency      LABS:                        7.2    9.08  )-----------( 73       ( 31 Jul 2019 05:30 )             23.6     07-31    145  |  116<H>  |  42<H>  ----------------------------<  137<H>  4.4   |  21<L>  |  1.57<H>    Ca    7.9<L>      31 Jul 2019 05:30  Phos  3.0     07-31  Mg     1.2     07-31    TPro  5.1<L>  /  Alb  1.3<L>  /  TBili  0.3  /  DBili  x   /  AST  29  /  ALT  18  /  AlkPhos  112  07-31                        CULTURES: (if applicable)    Culture - Urine (collected 07-28-19 @ 15:30)  Source: .Urine Catheterized  Final Report (07-29-19 @ 18:56):    >100,000 CFU/ml Presumptive Candida albicans "Susceptibilities not    performed"    Culture - Sputum (collected 07-28-19 @ 15:30)  Source: .Sputum Sputum  Gram Stain (07-29-19 @ 00:31):    Rare polymorphonuclear leukocytes per low power field    Rare Squamous epithelial cells per low power field    Numerous Gram Variable Rods seen per oil power field    Few Gram positive cocci in pairs seen per oil power field    Few Yeast like cells seen per oil power field  Final Report (07-30-19 @ 18:02):    Numerous Mixed gram negative rods including Few Pseudomonas aeruginosa    Normal Respiratory Renetta present  Organism: Pseudomonas aeruginosa (07-30-19 @ 18:02)  Organism: Pseudomonas aeruginosa (07-30-19 @ 18:02)      -  Amikacin: S <=8      -  Aztreonam: S <=4      -  Cefepime: S <=2      -  Ceftazidime: S <=1      -  Ciprofloxacin: S 1      -  Gentamicin: S 2      -  Imipenem: S <=1      -  Levofloxacin: S 2      -  Meropenem: S <=1      -  Piperacillin/Tazobactam: S <=8      -  Tobramycin: S <=2      Method Type: KALYAN    Culture - Urine (collected 07-28-19 @ 12:24)  Source: .Urine Clean Catch (Midstream)/ msue&#x27;d twice  Final Report (07-29-19 @ 18:56):    50,000 - 99,000 CFU/mL Presumptive Candida albicans "Susceptibilities not    performed"    Culture - Blood (collected 07-28-19 @ 11:40)  Source: .Blood Blood-Peripheral  Preliminary Report (07-29-19 @ 19:01):    No growth to date.    Culture - Blood (collected 07-28-19 @ 11:40)  Source: .Blood Blood-Peripheral  Preliminary Report (07-29-19 @ 19:01):    No growth to date.            CAPILLARY BLOOD GLUCOSE      POCT Blood Glucose.: 133 mg/dL (01 Aug 2019 10:03)    WBC Trend  07-31-19 @ 05:30   -  9.08  07-30-19 @ 05:26   -  14.03<H>      Glucose Trend  08-01-19 @ 10:03   -  -- -- 133<H>  08-01-19 @ 08:11   -  -- -- 127<H>  08-01-19 @ 06:13   -  -- -- 127<H>  08-01-19 @ 04:30   -  -- -- 138<H>  08-01-19 @ 02:02   -  -- -- 126<H>  08-01-19 @ 00:12   -  -- -- 106<H>  07-31-19 @ 23:07   -  -- -- 90  07-31-19 @ 21:34   -  -- -- 107<H>  07-31-19 @ 19:57   -  -- -- 111<H>  07-31-19 @ 17:42   -  -- -- 114<H>    Hemoglobin A1C, Whole Blood: 4.8 % (07-30-19 @ 08:26)    Trend Bun/Cr  07-31-19 @ 05:30  BUN/CR -  42<H> / 1.57<H>  07-30-19 @ 16:12  BUN/CR -  48<H> / 1.74<H>  07-30-19 @ 05:26  BUN/CR -  49<H> / 1.82<H>  07-29-19 @ 22:00  BUN/CR -  50<H> / 1.84<H>  07-29-19 @ 12:25  BUN/CR -  51<H> / 1.77<H>    Platelet Trend  07-31-19 @ 05:30   -  73<L>  07-30-19 @ 05:26   -  81<L>          VITALS:  T(C): 36.7 (08-01-19 @ 07:00), Max: 37.8 (08-01-19 @ 00:30)  T(F): 98.1 (08-01-19 @ 07:00), Max: 100 (08-01-19 @ 00:30)  HR: 60 (08-01-19 @ 09:00) (60 - 60)  BP: 106/58 (08-01-19 @ 09:00) (73/47 - 111/58)    BP(mean): 73 (08-01-19 @ 09:00) (56 - 78)  ABP: --  ABP(mean): --  RR: 20 (08-01-19 @ 09:00) (15 - 29)  SpO2: 98% (08-01-19 @ 09:00) (95% - 100%)  CVP(mm Hg): --  CVP(cm H2O): --    Ins and Outs     07-31-19 @ 07:01  -  08-01-19 @ 07:00  --------------------------------------------------------  IN: 4043 mL / OUT: 910 mL / NET: 3133 mL    08-01-19 @ 07:01  -  08-01-19 @ 10:08  --------------------------------------------------------  IN: 406.2 mL / OUT: 60 mL / NET: 346.2 mL            Device: 840, Mode: CPAP with PS, RR (patient): 22, TV (machine): 0.45, TV (patient): 375, FiO2: 40, PEEP: 5, PS: 10, ITime: 1, MAP: 9, PIP: 16    I&O's Detail    31 Jul 2019 07:01  -  01 Aug 2019 07:00  --------------------------------------------------------  IN:    dextrose 10%.: 840 mL    dextrose 5% + sodium chloride 0.9%.: 1500 mL    Enteral Tube Flush: 100 mL    norepinephrine Infusion: 240.9 mL    ns in tub fed  [id number]: 1000 mL    propofol Infusion: 62.1 mL    Solution: 100 mL    Solution: 200 mL  Total IN: 4043 mL    OUT:    Indwelling Catheter - Urethral: 910 mL  Total OUT: 910 mL    Total NET: 3133 mL      01 Aug 2019 07:01  -  01 Aug 2019 10:08  --------------------------------------------------------  IN:    dextrose 10%.: 100 mL    dextrose 5% + sodium chloride 0.9%.: 140 mL    norepinephrine Infusion: 20.8 mL    ns in tub fed  [id number]: 140 mL    propofol Infusion: 5.4 mL  Total IN: 406.2 mL    OUT:    Indwelling Catheter - Urethral: 60 mL  Total OUT: 60 mL    Total NET: 346.2 mL

## 2019-08-01 NOTE — PROGRESS NOTE ADULT - ASSESSMENT
Elderly man, recent hospital stay for MSSA bacteremic pneumonia, G tube, at rehab, returns with respiratory distress, required intubation, pressors, thrombocytopenia, hypothermia, severe sepsis/shock  CXR with dense infiltrate- healthcare associated pneumonia.  Pseudomonas predominating in Sputum  Afebrile, on CPAP, WBC lower  Pressers being weaned  Tolerating C Pap but very debilitated.  Favor 7-10 days of pseudomonal therapy  Plan:  Continue Vent/pressor support; weaning as tolerated  Continue Cathy directed at Pseudomonas- sensitive  Follow temps and CBC/diff   Prognosis still guarded  D/w ICU team Elderly man, recent hospital stay for MSSA bacteremic pneumonia, G tube, at rehab, returns with respiratory distress, required intubation, pressors, thrombocytopenia, hypothermia, severe sepsis/shock  CXR with dense infiltrate- healthcare associated pneumonia.  Pseudomonas predominating in Sputum  Afebrile, on CPAP, WBC lower  Pressers being weaned  Tolerating C Pap but very debilitated.  Favor 7-10 days of pseudomonal therapy.  Yeast in urine is c/w colonization,no treatment is needed.  Plan:  Continue Vent/pressor support; weaning as tolerated  Continue Cathy directed at Pseudomonas- sensitive  Follow temps and CBC/diff   Prognosis still guarded  D/w ICU team

## 2019-08-01 NOTE — PROGRESS NOTE ADULT - SUBJECTIVE AND OBJECTIVE BOX
f/u palliative remains intubated /sedated     Present Symptoms:   Dyspnea: no  Nausea/Vomiting: no  Anxiety:  no  Depressed Mood:   Fatigue: yes  Loss of appetite: yes  Pain:       no s/s            location:   Review of Systems: [ Unable to obtain due to poor mentation]    MEDICATIONS  (STANDING):  chlorhexidine 0.12% Liquid 15 milliLiter(s) Oral Mucosa every 12 hours  chlorhexidine 4% Liquid 1 Application(s) Topical <User Schedule>  dextrose 10%. 250 milliLiter(s) (25 mL/Hr) IV Continuous <Continuous>  dextrose 5% + sodium chloride 0.9%. 1000 milliLiter(s) (70 mL/Hr) IV Continuous <Continuous>  dorzolamide 2%/timolol 0.5% Ophthalmic Solution 1 Drop(s) Both EYES every 12 hours  latanoprost 0.005% Ophthalmic Solution 1 Drop(s) Both EYES at bedtime  magnesium oxide 400 milliGRAM(s) Oral every 8 hours  meropenem  IVPB 1000 milliGRAM(s) IV Intermittent every 12 hours  midodrine 10 milliGRAM(s) Oral every 8 hours  norepinephrine Infusion 0.05 MICROgram(s)/kG/Min (4.163 mL/Hr) IV Continuous <Continuous>  pantoprazole   Suspension 40 milliGRAM(s) Enteral Tube daily  polyethylene glycol 3350 17 Gram(s) Oral daily  propofol Infusion 5 MICROgram(s)/kG/Min (2.721 mL/Hr) IV Continuous <Continuous>  senna Syrup 10 milliLiter(s) Oral at bedtime  zinc oxide 20% Ointment 1 Application(s) Topical two times a day    MEDICATIONS  (PRN):  acetaminophen    Suspension .. 650 milliGRAM(s) Enteral Tube every 6 hours PRN Temp greater or equal to 38C (100.4F), Mild Pain (1 - 3)  sodium chloride 0.9% lock flush 10 milliLiter(s) IV Push every 1 hour PRN Pre/post blood products, medications, blood draw, and to maintain line patency      PHYSICAL EXAM:  Vital Signs Last 24 Hrs  T(C): 37 (01 Aug 2019 14:00), Max: 37.8 (01 Aug 2019 00:30)  T(F): 98.6 (01 Aug 2019 14:00), Max: 100 (01 Aug 2019 00:30)  HR: 60 (01 Aug 2019 14:30) (60 - 60)  BP: 77/58 (01 Aug 2019 14:30) (73/47 - 113/60)  BP(mean): 67 (01 Aug 2019 14:30) (56 - 82)  RR: 23 (01 Aug 2019 14:30) (15 - 29)  SpO2: 97% (01 Aug 2019 14:30) (95% - 99%)  General: intubated       HEENT: n/c ,a/t intubated     Lungs: coarse , dim to bases   CV: s1s2    GI: soft, + peg    : normal/hansen    Musculoskeletal: edema, weakened   Skin: dry fragile     Neuro: sedated   Oral intake ability:  npo  Diet: peg      LABS:                          7.2    9.08  )-----------( 73       ( 31 Jul 2019 05:30 )             23.6     07-31    145  |  116<H>  |  42<H>  ----------------------------<  137<H>  4.4   |  21<L>  |  1.57<H>    Ca    7.9<L>      31 Jul 2019 05:30  Phos  3.0     07-31  Mg     1.2     07-31    TPro  5.1<L>  /  Alb  1.3<L>  /  TBili  0.3  /  DBili  x   /  AST  29  /  ALT  18  /  AlkPhos  112  07-31        RADIOLOGY & ADDITIONAL STUDIES:< from: Xray Chest 1 View- PORTABLE-Routine (07.31.19 @ 07:02) >  EXAM:  XR CHEST PORTABLE ROUTINE 1V      PROCEDURE DATE:  07/31/2019        INTERPRETATION:  AP erect chest on July 31, 2019 at 6:09 AM. Patient has   abnormal chest sounds requires intubation.    Patient's chin obscures the apices.    Heart enlargement, left-sided pacemaker, and endotracheal tube remain.    Consolidated infiltrate in the anterior segment of the right upper lobe   again noted.    Scattered lung and pleural findings in the right lower lung field are   somewhat increased from July 30.    On July 30 there were scattered infiltration off the left hilum which   presently suggests improvement.    IMPRESSION: Shifting infiltrates.          ADVANCE DIRECTIVES: MOLST  DNR/ trial intubation  Advanced Care Planning discussion total time spent:

## 2019-08-01 NOTE — PROGRESS NOTE ADULT - ASSESSMENT
ASSESSMENT:    86M admitted to ICU with acute respiratory failure requiring intubation and septic shock from underlying aspiration PNA. Further complicated by hyoglycemia requiring D10     Events last 24 hours:   -off D10, blood sugar maintained with D5  -remains on full vent support  -remains on pressor (levophed)    patient remains critically ill with MSOF Including respiratory failure on vent, cirvulatory system requiring vasopressors, endocrine systme with prsistent hypoglycemia requiring high concetration dextrose infusion and renal system with elevated Scr and MELISSA.     PLAN:    #Respiratory Failure  -Patient currently on Full vent support  -titrate settings to maintain SaO2 >90%, or pH >7.25  -consider low tidal volume ventilation strategy w/ goal Tv 4-6 cc/kg of ideal body weight  -plateu pressure goal <30  -Peridex oral care  -aggressive pulmonary toilet  -daily sedation vacation with spontaneous breathing trial if clinical condition warrants, discuss with respiratory therapy     #Asp PNA  -currently on meropenem for pseudomonal coverage, as sputum cultures did show some pseudomonas  -continue aggressive pulm. seperanza      #Septic Shock  -patient remains on pressors (levophed) -will continue to titrate for goal MAP 65-70, if remains with levophed requirement by AM consider adding Midodrine via enteral access to help titrate off pressor    #Persistent hypoglycemia  -likely a result of underlying sepsis  -has been titrated off D10, now maintained with D5    #MELISSA  -continue IVF  -monitor UOP  -renal dose meds and avoid nephrotoxins

## 2019-08-01 NOTE — PROGRESS NOTE ADULT - ASSESSMENT
Physical Examination:  GENERAL:               Sedated, intubated, No acute distress.    HEENT:                   No JVD, Dry MM  PULM:                     Bilateral air entry, Clear to auscultation bilaterally, no significant sputum production, No Rales, No Rhonchi, No Wheezing  CVS:                         S1, S2,  +Murmur  ABD:                        Soft, distended, nontender, normoactive bowel sounds, + Peg  EXT:                         +edema, nontender  Vascular:                Warm Extremities, Normal Capillary refill,  SKIN:                       Warm and well perfused, no rashes noted.   NEURO:                  Sedated, not follows commands  PSYC:                      Calm, no Insight.        Assessment  Septic Shock with multiorgan failure, due to aspiration PNA on pressors with  candida urine collonization  Acute Respiratory failure intubated 7/28   Thrombocytopenia due to sepsis  Lactic acidosis resolved  MELISSA with baseline Cr 1.2-1.4   Hypoglycemia re current with   Hyperkalemia improved  CVA with Dysphagia s/p PEG  Underlying BPH, chronic diastolic CHF      Plan  Mechanical ventilation, CPAP as tolerated.   on CPAP and appears to be tolerating, but due to pressor use not candidate for extubation , secretions improving   taper fio2 as tolerated  pressors to maintain bp taper to off , now on midodrine, if remains low will consider steroids.   Sedation for comfort  increase tube feeding, taper D10 to off  on D5 NS for persistent hypoglycemia   IV abx as per ID  restart chemical DVT ppx once plts > 100  Continue supportive care  palliative care f/u      Family Updated: 	Ray Kinsey -                        Date  7/31/19 - Is away                                       wife Angie [phone]	             Date: 8/1/19        Sedation & Analgesia:	As above  Diet/Nutrition:		Start peg feeding today vs kyle  GI PPx:			PPI    DVT Ppx:		venodynes start dvt ppx when plts > 100    Activity:		               Bedrest  Head of Bed:               35-45  Glycemic Control:        n/a    Lines:  PIV  CENTRAL LINE: 	[ x] YES [ ] NO	                    LOCATION:   	  J                     DATE INSERTED:   	                    REMOVE:  [ ] YES [ x] NO    A-LINE:  	                [ ] YES [x ] NO                      LOCATION:   	                       DATE INSERTED: 		            REMOVE:  [ ] YES [ ] NO    SOTO: 		        [x] YES [ ] NO  		                                       DATE INSERTED:		            REMOVE:  [ ] YES [ x] NO      Restraints may be deemed necessary to prevent removal of life-sustaining devices [ x ] YES   [    ]  NO    Disposition: ICU monitoring     Goals of Care: DNR, Trial intubation, family does not want trach.

## 2019-08-01 NOTE — PROGRESS NOTE ADULT - SUBJECTIVE AND OBJECTIVE BOX
CC: f/u for pneumonia    Patient reports: he is sedated on the vent,tolerating C Pap, requires pressers    REVIEW OF SYSTEMS:  All other review of systems negative (Comprehensive ROS): limited by condition    Antimicrobials Day #  :day 5  meropenem  IVPB 1000 milliGRAM(s) IV Intermittent every 12 hours    Other Medications Reviewed  MEDICATIONS  (STANDING):  chlorhexidine 0.12% Liquid 15 milliLiter(s) Oral Mucosa every 12 hours  chlorhexidine 4% Liquid 1 Application(s) Topical <User Schedule>  dextrose 10%. 250 milliLiter(s) (25 mL/Hr) IV Continuous <Continuous>  dextrose 5% + sodium chloride 0.9%. 1000 milliLiter(s) (70 mL/Hr) IV Continuous <Continuous>  dorzolamide 2%/timolol 0.5% Ophthalmic Solution 1 Drop(s) Both EYES every 12 hours  latanoprost 0.005% Ophthalmic Solution 1 Drop(s) Both EYES at bedtime  magnesium oxide 400 milliGRAM(s) Oral every 8 hours  meropenem  IVPB 1000 milliGRAM(s) IV Intermittent every 12 hours  midodrine 10 milliGRAM(s) Oral every 8 hours  norepinephrine Infusion 0.05 MICROgram(s)/kG/Min (4.163 mL/Hr) IV Continuous <Continuous>  pantoprazole   Suspension 40 milliGRAM(s) Enteral Tube daily  polyethylene glycol 3350 17 Gram(s) Oral daily  propofol Infusion 5 MICROgram(s)/kG/Min (2.721 mL/Hr) IV Continuous <Continuous>  senna Syrup 10 milliLiter(s) Oral at bedtime  zinc oxide 20% Ointment 1 Application(s) Topical two times a day    T(F): 98.5 (08-01-19 @ 11:00), Max: 100 (08-01-19 @ 00:30)  HR: 60 (08-01-19 @ 12:30)  BP: 103/61 (08-01-19 @ 12:30)  RR: 18 (08-01-19 @ 12:30)  SpO2: 98% (08-01-19 @ 12:30)  Wt(kg): --    PHYSICAL EXAM:  General: lethargic, no acute distress  Eyes:  anicteric, no conjunctival injection, no discharge  Oropharynx: ETT 	  Neck: supple, without adenopathy  Lungs: few ronchi  Heart: regular rate and rhythm; no murmur, rubs or gallops  Abdomen: soft, nondistended, nontender, peg in place  Skin: no lesions  Extremities: no clubbing, cyanosis, + edema  Neurologic: poorly interactive on vent    LAB RESULTS:                        7.2    9.08  )-----------( 73       ( 31 Jul 2019 05:30 )             23.6     07-31    145  |  116<H>  |  42<H>  ----------------------------<  137<H>  4.4   |  21<L>  |  1.57<H>    Ca    7.9<L>      31 Jul 2019 05:30  Phos  3.0     07-31  Mg     1.2     07-31    TPro  5.1<L>  /  Alb  1.3<L>  /  TBili  0.3  /  DBili  x   /  AST  29  /  ALT  18  /  AlkPhos  112  07-31    LIVER FUNCTIONS - ( 31 Jul 2019 05:30 )  Alb: 1.3 g/dL / Pro: 5.1 g/dL / ALK PHOS: 112 U/L / ALT: 18 U/L DA / AST: 29 U/L / GGT: x             MICROBIOLOGY:  RECENT CULTURES:  07-28 @ 15:30 .Sputum Sputum Pseudomonas aeruginosa    Numerous Mixed gram negative rods including Few Pseudomonas aeruginosa  Normal Respiratory Renetta present    Rare polymorphonuclear leukocytes per low power field  Rare Squamous epithelial cells per low power field  Numerous Gram Variable Rods seen per oil power field  Few Gram positive cocci in pairs seen per oil power field  Few Yeast like cells seen per oil power field    07-28 @ 12:24 .Urine Clean Catch (Midstream)/ msue'd twice     50,000 - 99,000 CFU/mL Presumptive Candida albicans "Susceptibilities not  performed"      07-28 @ 11:40 .Blood Blood-Peripheral     No growth to date.          RADIOLOGY REVIEWED:  < from: Xray Chest 1 View- PORTABLE-Routine (07.31.19 @ 07:02) >  EXAM:  XR CHEST PORTABLE ROUTINE 1V      PROCEDURE DATE:  07/31/2019        INTERPRETATION:  AP erect chest on July 31, 2019 at 6:09 AM. Patient has   abnormal chest sounds requires intubation.    Patient's chin obscures the apices.    Heart enlargement, left-sided pacemaker, and endotracheal tube remain.    Consolidated infiltrate in the anterior segment of the right upper lobe   again noted.    Scattered lung and pleural findings in the right lower lung field are   somewhat increased from July 30.    On July 30 there were scattered infiltration off the left hilum which   presently suggests improvement.    IMPRESSION: Shifting infiltrates.    < end of copied text >

## 2019-08-01 NOTE — PROGRESS NOTE ADULT - ASSESSMENT
A/P 86 year old male with extensive PMH including IBS, CVA with dysphagia and PEG placement, anemia, psoriasis, GERD, depression, chf , PPM admitted with severe sepsis from likely aspiration pneumonia. Septic Shock with multiorgan failure.   Acute Respiratory failure intubated 7/28  MELISSA w/ baseline Cr 1.2-1.4   Pt remains intubated and sedated in ccu , per ccu team pt on CPAP and appears to be tolerating, but due to pressor use not candidate for extubation today.  palliative: placed f/u phone call to wife, wife not able to discuss goc over phone today. She is currently under doctors care for a spinal compression fx and is experiencing pain. Wife reports she will try to visit pt tomorrow and realizes he is not doing well. She was appreciative of call but cannot make decisions at this time.   Reviewed w/ ccu team. Will cont to monitor pts clinical course, cont supportive care, and cont contact  w/ wife.

## 2019-08-02 LAB
ANION GAP SERPL CALC-SCNC: 7 MMOL/L — SIGNIFICANT CHANGE UP (ref 5–17)
BLD GP AB SCN SERPL QL: SIGNIFICANT CHANGE UP
BUN SERPL-MCNC: 39 MG/DL — HIGH (ref 7–23)
CALCIUM SERPL-MCNC: 8.1 MG/DL — LOW (ref 8.4–10.5)
CHLORIDE SERPL-SCNC: 116 MMOL/L — HIGH (ref 96–108)
CO2 SERPL-SCNC: 21 MMOL/L — LOW (ref 22–31)
CREAT SERPL-MCNC: 1.3 MG/DL — SIGNIFICANT CHANGE UP (ref 0.5–1.3)
CULTURE RESULTS: SIGNIFICANT CHANGE UP
CULTURE RESULTS: SIGNIFICANT CHANGE UP
GLUCOSE BLDC GLUCOMTR-MCNC: 100 MG/DL — HIGH (ref 70–99)
GLUCOSE BLDC GLUCOMTR-MCNC: 102 MG/DL — HIGH (ref 70–99)
GLUCOSE BLDC GLUCOMTR-MCNC: 103 MG/DL — HIGH (ref 70–99)
GLUCOSE BLDC GLUCOMTR-MCNC: 105 MG/DL — HIGH (ref 70–99)
GLUCOSE BLDC GLUCOMTR-MCNC: 116 MG/DL — HIGH (ref 70–99)
GLUCOSE BLDC GLUCOMTR-MCNC: 116 MG/DL — HIGH (ref 70–99)
GLUCOSE BLDC GLUCOMTR-MCNC: 131 MG/DL — HIGH (ref 70–99)
GLUCOSE BLDC GLUCOMTR-MCNC: 138 MG/DL — HIGH (ref 70–99)
GLUCOSE BLDC GLUCOMTR-MCNC: 138 MG/DL — HIGH (ref 70–99)
GLUCOSE BLDC GLUCOMTR-MCNC: 82 MG/DL — SIGNIFICANT CHANGE UP (ref 70–99)
GLUCOSE BLDC GLUCOMTR-MCNC: 84 MG/DL — SIGNIFICANT CHANGE UP (ref 70–99)
GLUCOSE BLDC GLUCOMTR-MCNC: 93 MG/DL — SIGNIFICANT CHANGE UP (ref 70–99)
GLUCOSE BLDC GLUCOMTR-MCNC: 96 MG/DL — SIGNIFICANT CHANGE UP (ref 70–99)
GLUCOSE SERPL-MCNC: 95 MG/DL — SIGNIFICANT CHANGE UP (ref 70–99)
HCT VFR BLD CALC: 22 % — LOW (ref 39–50)
HCT VFR BLD CALC: 22.5 % — LOW (ref 39–50)
HCT VFR BLD CALC: 25.2 % — LOW (ref 39–50)
HGB BLD-MCNC: 6.8 G/DL — CRITICAL LOW (ref 13–17)
HGB BLD-MCNC: 6.9 G/DL — CRITICAL LOW (ref 13–17)
HGB BLD-MCNC: 7.8 G/DL — LOW (ref 13–17)
MAGNESIUM SERPL-MCNC: 1.7 MG/DL — SIGNIFICANT CHANGE UP (ref 1.6–2.6)
MCHC RBC-ENTMCNC: 29.5 PG — SIGNIFICANT CHANGE UP (ref 27–34)
MCHC RBC-ENTMCNC: 29.9 PG — SIGNIFICANT CHANGE UP (ref 27–34)
MCHC RBC-ENTMCNC: 30.4 PG — SIGNIFICANT CHANGE UP (ref 27–34)
MCHC RBC-ENTMCNC: 30.7 GM/DL — LOW (ref 32–36)
MCHC RBC-ENTMCNC: 30.9 GM/DL — LOW (ref 32–36)
MCHC RBC-ENTMCNC: 31 GM/DL — LOW (ref 32–36)
MCV RBC AUTO: 95.5 FL — SIGNIFICANT CHANGE UP (ref 80–100)
MCV RBC AUTO: 97.4 FL — SIGNIFICANT CHANGE UP (ref 80–100)
MCV RBC AUTO: 98.2 FL — SIGNIFICANT CHANGE UP (ref 80–100)
NRBC # BLD: 0 /100 WBCS — SIGNIFICANT CHANGE UP (ref 0–0)
NRBC # BLD: 0 /100 WBCS — SIGNIFICANT CHANGE UP (ref 0–0)
PHOSPHATE SERPL-MCNC: 2.6 MG/DL — SIGNIFICANT CHANGE UP (ref 2.5–4.5)
PLATELET # BLD AUTO: 65 K/UL — LOW (ref 150–400)
PLATELET # BLD AUTO: 67 K/UL — LOW (ref 150–400)
PLATELET # BLD AUTO: 76 K/UL — LOW (ref 150–400)
POTASSIUM SERPL-MCNC: 4.9 MMOL/L — SIGNIFICANT CHANGE UP (ref 3.5–5.3)
POTASSIUM SERPL-SCNC: 4.9 MMOL/L — SIGNIFICANT CHANGE UP (ref 3.5–5.3)
RBC # BLD: 2.24 M/UL — LOW (ref 4.2–5.8)
RBC # BLD: 2.31 M/UL — LOW (ref 4.2–5.8)
RBC # BLD: 2.64 M/UL — LOW (ref 4.2–5.8)
RBC # FLD: 20.1 % — HIGH (ref 10.3–14.5)
RBC # FLD: 20.4 % — HIGH (ref 10.3–14.5)
RBC # FLD: 20.4 % — HIGH (ref 10.3–14.5)
SODIUM SERPL-SCNC: 144 MMOL/L — SIGNIFICANT CHANGE UP (ref 135–145)
SPECIMEN SOURCE: SIGNIFICANT CHANGE UP
SPECIMEN SOURCE: SIGNIFICANT CHANGE UP
WBC # BLD: 6.19 K/UL — SIGNIFICANT CHANGE UP (ref 3.8–10.5)
WBC # BLD: 6.21 K/UL — SIGNIFICANT CHANGE UP (ref 3.8–10.5)
WBC # BLD: 7.24 K/UL — SIGNIFICANT CHANGE UP (ref 3.8–10.5)
WBC # FLD AUTO: 6.19 K/UL — SIGNIFICANT CHANGE UP (ref 3.8–10.5)
WBC # FLD AUTO: 6.21 K/UL — SIGNIFICANT CHANGE UP (ref 3.8–10.5)
WBC # FLD AUTO: 7.24 K/UL — SIGNIFICANT CHANGE UP (ref 3.8–10.5)

## 2019-08-02 PROCEDURE — 71045 X-RAY EXAM CHEST 1 VIEW: CPT | Mod: 26

## 2019-08-02 PROCEDURE — 99233 SBSQ HOSP IP/OBS HIGH 50: CPT

## 2019-08-02 RX ORDER — SODIUM CHLORIDE 9 MG/ML
1000 INJECTION, SOLUTION INTRAVENOUS
Refills: 0 | Status: DISCONTINUED | OUTPATIENT
Start: 2019-08-02 | End: 2019-08-03

## 2019-08-02 RX ORDER — DEXMEDETOMIDINE HYDROCHLORIDE IN 0.9% SODIUM CHLORIDE 4 UG/ML
0.2 INJECTION INTRAVENOUS
Qty: 200 | Refills: 0 | Status: DISCONTINUED | OUTPATIENT
Start: 2019-08-02 | End: 2019-08-03

## 2019-08-02 RX ORDER — HYDROCORTISONE 20 MG
50 TABLET ORAL THREE TIMES A DAY
Refills: 0 | Status: COMPLETED | OUTPATIENT
Start: 2019-08-02 | End: 2019-08-04

## 2019-08-02 RX ORDER — FUROSEMIDE 40 MG
40 TABLET ORAL ONCE
Refills: 0 | Status: COMPLETED | OUTPATIENT
Start: 2019-08-02 | End: 2019-08-02

## 2019-08-02 RX ORDER — ESCITALOPRAM OXALATE 10 MG/1
10 TABLET, FILM COATED ORAL DAILY
Refills: 0 | Status: DISCONTINUED | OUTPATIENT
Start: 2019-08-02 | End: 2019-08-14

## 2019-08-02 RX ADMIN — PROPOFOL 2.72 MICROGRAM(S)/KG/MIN: 10 INJECTION, EMULSION INTRAVENOUS at 00:24

## 2019-08-02 RX ADMIN — Medication 4.16 MICROGRAM(S)/KG/MIN: at 15:06

## 2019-08-02 RX ADMIN — SENNA PLUS 10 MILLILITER(S): 8.6 TABLET ORAL at 21:56

## 2019-08-02 RX ADMIN — Medication 40 MILLIGRAM(S): at 21:43

## 2019-08-02 RX ADMIN — MAGNESIUM OXIDE 400 MG ORAL TABLET 400 MILLIGRAM(S): 241.3 TABLET ORAL at 21:56

## 2019-08-02 RX ADMIN — SODIUM CHLORIDE 70 MILLILITER(S): 9 INJECTION, SOLUTION INTRAVENOUS at 06:58

## 2019-08-02 RX ADMIN — CHLORHEXIDINE GLUCONATE 15 MILLILITER(S): 213 SOLUTION TOPICAL at 17:01

## 2019-08-02 RX ADMIN — ZINC OXIDE 1 APPLICATION(S): 200 OINTMENT TOPICAL at 17:06

## 2019-08-02 RX ADMIN — MIDODRINE HYDROCHLORIDE 10 MILLIGRAM(S): 2.5 TABLET ORAL at 21:51

## 2019-08-02 RX ADMIN — MEROPENEM 100 MILLIGRAM(S): 1 INJECTION INTRAVENOUS at 17:01

## 2019-08-02 RX ADMIN — MEROPENEM 100 MILLIGRAM(S): 1 INJECTION INTRAVENOUS at 05:10

## 2019-08-02 RX ADMIN — CHLORHEXIDINE GLUCONATE 15 MILLILITER(S): 213 SOLUTION TOPICAL at 05:11

## 2019-08-02 RX ADMIN — Medication 50 MILLIGRAM(S): at 21:50

## 2019-08-02 RX ADMIN — Medication 50 MILLIGRAM(S): at 11:05

## 2019-08-02 RX ADMIN — PANTOPRAZOLE SODIUM 40 MILLIGRAM(S): 20 TABLET, DELAYED RELEASE ORAL at 11:12

## 2019-08-02 RX ADMIN — DORZOLAMIDE HYDROCHLORIDE TIMOLOL MALEATE 1 DROP(S): 20; 5 SOLUTION/ DROPS OPHTHALMIC at 17:01

## 2019-08-02 RX ADMIN — MIDODRINE HYDROCHLORIDE 10 MILLIGRAM(S): 2.5 TABLET ORAL at 14:22

## 2019-08-02 RX ADMIN — ZINC OXIDE 1 APPLICATION(S): 200 OINTMENT TOPICAL at 05:12

## 2019-08-02 RX ADMIN — DORZOLAMIDE HYDROCHLORIDE TIMOLOL MALEATE 1 DROP(S): 20; 5 SOLUTION/ DROPS OPHTHALMIC at 05:11

## 2019-08-02 RX ADMIN — MAGNESIUM OXIDE 400 MG ORAL TABLET 400 MILLIGRAM(S): 241.3 TABLET ORAL at 14:22

## 2019-08-02 RX ADMIN — MIDODRINE HYDROCHLORIDE 10 MILLIGRAM(S): 2.5 TABLET ORAL at 05:11

## 2019-08-02 RX ADMIN — CHLORHEXIDINE GLUCONATE 1 APPLICATION(S): 213 SOLUTION TOPICAL at 05:12

## 2019-08-02 RX ADMIN — LATANOPROST 1 DROP(S): 0.05 SOLUTION/ DROPS OPHTHALMIC; TOPICAL at 21:50

## 2019-08-02 RX ADMIN — Medication 40 MILLIGRAM(S): at 12:16

## 2019-08-02 RX ADMIN — MAGNESIUM OXIDE 400 MG ORAL TABLET 400 MILLIGRAM(S): 241.3 TABLET ORAL at 05:10

## 2019-08-02 NOTE — PROGRESS NOTE ADULT - ASSESSMENT
Elderly man, recent hospital stay for MSSA bacteremic pneumonia, G tube, at rehab, returns with respiratory distress, required intubation, pressors, thrombocytopenia, hypothermia, severe sepsis/shock  CXR with dense infiltrate- healthcare associated pneumonia.  Pseudomonas predominating in Sputum, sensitive strain  Afebrile, on CPAP, WBC lower  Pressers being weaned but unable to remove  Tolerating C Pap but very debilitated.  Being transfused for anemia  Favor 7-10 days of pseudomonal therapy.  Yeast in urine is c/w colonization,no treatment is needed.  Plan:  Continue Vent/pressor support; weaning as tolerated  Continue Cathy directed at Pseudomonas- sensitive stain, day 6 of 7-10 day course  Follow temps and CBC/diff   Prognosis still guarded  D/w ICU team

## 2019-08-02 NOTE — PROVIDER CONTACT NOTE (OTHER) - SITUATION
Blood sugar of 30
hypoglycemia of 69 mg/dl. Repeated for verification of 82. pt intubated to ventilator. hypothermia and all extremities are cold. Warming blanket in place.
patient has history of hypoglycemia
patient is on d5 ns 70 cc/hr and d10 w 10 cc/hr.
hypoglycemia of 29. repeat of 32.

## 2019-08-02 NOTE — PROGRESS NOTE ADULT - ASSESSMENT
Physical Examination:  GENERAL:               Sedated, intubated, No acute distress.    HEENT:                   No JVD, Dry MM  PULM:                     Bilateral air entry, Clear to auscultation bilaterally, no significant sputum production, No Rales, No Rhonchi, No Wheezing  CVS:                         S1, S2,  +Murmur  ABD:                        Soft, distended, nontender, normoactive bowel sounds, + Peg  EXT:                         +edema, nontender  Vascular:                Warm Extremities, Normal Capillary refill,  SKIN:                       Warm and well perfused, no rashes noted.   NEURO:                  Sedated, not follows commands  PSYC:                      Calm, no Insight.        Assessment  Septic Shock with multiorgan failure, due to aspiration PNA on pressors with  candida urine collonization  Acute Respiratory failure intubated 7/28   Thrombocytopenia due to sepsis  Lactic acidosis resolved  MELISSA with baseline Cr 1.2-1.4   Hypoglycemia re current with   Hyperkalemia improved  CVA with Dysphagia s/p PEG  Underlying BPH, chronic diastolic CHF      Plan  Mechanical ventilation, CPAP as tolerated.     not optimized for extubation   transfuse 1 unit, give lasix after transfusion  taper fio2 as tolerated  start steroids , taper D5   Sedation for comfort will change propfol to precedex     increase tube feeding, taper D10 to off  on D5 NS for persistent hypoglycemia   IV abx as per ID    restart chemical DVT ppx once plts > 100    Continue supportive care  palliative care f/u      Family Updated: 	Dtmora Kinsey -                        Date  7/31/19 - Is away                                       wife Angie [phone]	             Date: 8/2/19        Sedation & Analgesia:	As above  Diet/Nutrition:		Start peg feeding today vs kyle  GI PPx:			PPI    DVT Ppx:		venodynes start dvt ppx when plts > 100    Activity:		               Bedrest  Head of Bed:               35-45  Glycemic Control:        n/a    Lines:  PIV  CENTRAL LINE: 	[ x] YES [ ] NO	                    LOCATION:   	  [facility]                     DATE INSERTED:   	                    REMOVE:  [ ] YES [ x] NO    A-LINE:  	                [ ] YES [x ] NO                      LOCATION:   	                       DATE INSERTED: 		            REMOVE:  [ ] YES [ ] NO    SOTO: 		        [x] YES [ ] NO  		                                       DATE INSERTED:		            REMOVE:  [ ] YES [ x] NO      Restraints may be deemed necessary to prevent removal of life-sustaining devices [ x ] YES   [    ]  NO    Disposition: ICU monitoring     Goals of Care: DNR, Trial intubation, family does not want trach.

## 2019-08-02 NOTE — PROVIDER CONTACT NOTE (CRITICAL VALUE NOTIFICATION) - SITUATION
sepsis r/t asp. PNA. potassium 6.4
lactat2.9
Pt presented to ED with Aspiration PNA
sepsis r/t to aspiration PNA. Intubated, on sedation, MELISSA/acute respiratory distres

## 2019-08-02 NOTE — PROGRESS NOTE ADULT - ASSESSMENT
85 y/o M w/ PMH including IBS, CVA with dysphagia and PEG placement, anemia, psoriasis, GERD, depression, PPM placement treated for MSSA bacteremia at [facility] 6/19 presented to ED today via EMS from [facility] with change in mental status. Admitted to ICU with acute respiratory failure requiring intubation and septic shock from underlying aspiration PNA. Further complicated by hyoglycemia requiring D10, MELISSA, and now volume overload.     Problem List:   1) Septic shock  2) PNA  3) MELISSA  4) volume overload/anasarca  5) hypoglycemia   6) hypoxic and hypercapnic respiratory failure     NEURO: Off sedation, mental status poor, although unclear of baseline mental status. SNF records indicate he has dementia. 87 y/o M w/ PMH including IBS, CVA with dysphagia and PEG placement, anemia, psoriasis, GERD, depression, PPM placement treated for MSSA bacteremia at [facility] 6/19 presented to ED today via EMS from [facility] with change in mental status. Admitted to ICU with acute respiratory failure requiring intubation and septic shock from underlying aspiration PNA. Further complicated by hyoglycemia requiring D10, MELISSA, and now volume overload.     Problem List:   1) Septic shock  2) PNA  3) MELISSA  4) volume overload/anasarca  5) hypoglycemia   6) hypoxic and hypercapnic respiratory failure   7) Anemia     NEURO: Off sedation, mental status poor, although unclear of baseline mental status. [facility] records indicate he has dementia. Appears he was on lexapro at [facility]. Will restart here at 10mg daily,.    CV: Remains in shock, appears to be septic shock from PNA. Will titrate levophed to maintain MAP > 65. Midodrine added for BP support. Appears volume overloaded, evident by anasarca and diffuse b-lines on lung ultrasound. Will dose lasix 40mg IVP x1. Patient with grade 3 diastolic dysfunction.     PULM: remain on the ventilator due to poor mental status. Tolerating PSV 5/5. Will order frequent suctioning. chest PT. Diuresis and attempt SBT again in AM. ETCO2 23.     GI: Tube feeds at goal rate, patient appears to be tolerating them     RENAL: MELISSA on CKD, appears to be improving, however now volume overloaded. Will diuresis with lasix. Need to check BMP in AM, as well as mag and phos. Goal is to keep patient net negative daily.     ID: Pseudomonas PNA, sensitive to meropenem. Will continue with kamran as per ID for 7-10 days (day 6)    HEME: Hold chemical DVT-P due to GI bleed risk. Anemia this AM hemoglobin 6.8, s/p 1 unit PRBCs with improvement in hemoglobin to 7.8. Will check again in AM, no signs of bleeding on exam or during bowel movements     ENDO: hypoglycemia most likely due to septic process, remains on dextrose 5%, glucose has been consistently over 100, will decrease infusion to 15ml/hr and continue glucose q2. Will attempt to wean off infusion and eventually make glucose checks q4    DISPO: remain in ICU, critically ill with multiple system organ failure 85 y/o M w/ PMH including IBS, CVA with dysphagia and PEG placement, anemia, psoriasis, GERD, depression, PPM placement treated for MSSA bacteremia at [facility] 6/19 presented to ED today via EMS from [facility] with change in mental status. Admitted to ICU with acute respiratory failure requiring intubation and septic shock from underlying aspiration PNA. Further complicated by hyoglycemia requiring D10, MELISSA, and now volume overload.     Problem List:   1) Septic shock  2) PNA  3) MELISSA  4) volume overload/anasarca  5) hypoglycemia   6) hypoxic and hypercapnic respiratory failure   7) Anemia     NEURO: Off sedation, mental status poor, although unclear of baseline mental status. SNF records indicate he has dementia. Appears he was on lexapro at [facility]. Will restart here at 10mg daily,.    CV: Remains in shock, appears to be septic shock from PNA. Will titrate levophed to maintain MAP > 65. Midodrine added for BP support. Appears volume overloaded, evident by anasarca and diffuse b-lines on lung ultrasound. Will dose lasix 40mg IVP x1. Patient with grade 3 diastolic dysfunction.     PULM: remain on the ventilator due to poor mental status. Tolerating PSV 5/5. Will order frequent suctioning. chest PT. Diuresis and attempt SBT again in AM. ETCO2 23. I reviewed the patient's SNF med record and found he is on atropine and glycopyrrolate for secretions at [facility]. Would Avoid these medications as it tends to dry up secretions and cause mucus plugging.    GI: Tube feeds at goal rate, patient appears to be tolerating them     RENAL: MELISSA on CKD, appears to be improving, however now volume overloaded. Will diuresis with lasix. Need to check BMP in AM, as well as mag and phos. Goal is to keep patient net negative daily.     ID: Pseudomonas PNA, sensitive to meropenem. Will continue with kamran as per ID for 7-10 days (day 6)    HEME: Hold chemical DVT-P due to GI bleed risk. Anemia this AM hemoglobin 6.8, s/p 1 unit PRBCs with improvement in hemoglobin to 7.8. Will check again in AM, no signs of bleeding on exam or during bowel movements     ENDO: hypoglycemia most likely due to septic process, remains on dextrose 5%, glucose has been consistently over 100, will decrease infusion to 15ml/hr and continue glucose q2. Will attempt to wean off infusion and eventually make glucose checks q4    DISPO: remain in ICU, critically ill with multiple system organ failure

## 2019-08-02 NOTE — PROGRESS NOTE ADULT - SUBJECTIVE AND OBJECTIVE BOX
Follow-up Pulmonary Progress Note  Chief Complaint : Sepsis      pt intubated  cpap trial done, appears to be tolerating  however pt remains lethargic, and not protecting airway on cpap trial  also when pressors taken off this am, pt became hypotensive  labs today shows low h/h and about to receive one unit of blood.      Allergies :No Known Allergies      PAST MEDICAL & SURGICAL HISTORY:  Chronic kidney disease  Chronic diastolic heart failure  BPH (benign prostatic hyperplasia)  Shortness of breath  Depression  IBS (irritable bowel syndrome)  Dysphagia: s/p PEG placement  Cerebral infarction: with resulting weakness and dysphagia  Anemia  Psoriasis  GERD (gastroesophageal reflux disease)  Dyslipidemia  Cardiac pacemaker  S/P percutaneous endoscopic gastrostomy (PEG) tube placement      Medications:  MEDICATIONS  (STANDING):  chlorhexidine 0.12% Liquid 15 milliLiter(s) Oral Mucosa every 12 hours  chlorhexidine 4% Liquid 1 Application(s) Topical <User Schedule>  dextrose 5% + sodium chloride 0.9%. 1000 milliLiter(s) (70 mL/Hr) IV Continuous <Continuous>  dorzolamide 2%/timolol 0.5% Ophthalmic Solution 1 Drop(s) Both EYES every 12 hours  latanoprost 0.005% Ophthalmic Solution 1 Drop(s) Both EYES at bedtime  magnesium oxide 400 milliGRAM(s) Oral every 8 hours  meropenem  IVPB 1000 milliGRAM(s) IV Intermittent every 12 hours  midodrine 10 milliGRAM(s) Oral every 8 hours  norepinephrine Infusion 0.05 MICROgram(s)/kG/Min (4.163 mL/Hr) IV Continuous <Continuous>  pantoprazole   Suspension 40 milliGRAM(s) Enteral Tube daily  polyethylene glycol 3350 17 Gram(s) Oral daily  propofol Infusion 5 MICROgram(s)/kG/Min (2.721 mL/Hr) IV Continuous <Continuous>  senna Syrup 10 milliLiter(s) Oral at bedtime  zinc oxide 20% Ointment 1 Application(s) Topical two times a day    MEDICATIONS  (PRN):  acetaminophen    Suspension .. 650 milliGRAM(s) Enteral Tube every 6 hours PRN Temp greater or equal to 38C (100.4F), Mild Pain (1 - 3)  sodium chloride 0.9% lock flush 10 milliLiter(s) IV Push every 1 hour PRN Pre/post blood products, medications, blood draw, and to maintain line patency      LABS:                        6.8    6.19  )-----------( 65       ( 02 Aug 2019 07:00 )             22.0     08-02    144  |  116<H>  |  39<H>  ----------------------------<  95  4.9   |  21<L>  |  1.30    Ca    8.1<L>      02 Aug 2019 05:30  Phos  2.6     08-02  Mg     1.7     08-02      Glucose Trend  08-02-19 @ 08:28   -  -- -- 93  08-02-19 @ 06:21   -  -- -- 84  08-02-19 @ 06:18   -  -- -- 82  08-02-19 @ 05:30   -  -- 95 --  08-02-19 @ 04:09   -  -- -- 100<H>  08-02-19 @ 02:01   -  -- -- 102<H>  08-02-19 @ 00:16   -  -- -- 116<H>  08-01-19 @ 22:17   -  -- -- 102<H>  08-01-19 @ 20:11   -  -- -- 117<H>  08-01-19 @ 17:54   -  -- -- 121<H>    Hemoglobin A1C, Whole Blood: 4.8 % (07-30-19 @ 08:26)    Platelet Trend  08-02-19 @ 07:00   -  65<L>  08-02-19 @ 05:30   -  67<L>  07-31-19 @ 05:30   -  73<L>      CULTURES: (if applicable)    Culture - Urine (collected 07-28-19 @ 15:30)  Source: .Urine Catheterized  Final Report (07-29-19 @ 18:56):    >100,000 CFU/ml Presumptive Candida albicans "Susceptibilities not    performed"    Culture - Sputum (collected 07-28-19 @ 15:30)  Source: .Sputum Sputum  Gram Stain (07-29-19 @ 00:31):    Rare polymorphonuclear leukocytes per low power field    Rare Squamous epithelial cells per low power field    Numerous Gram Variable Rods seen per oil power field    Few Gram positive cocci in pairs seen per oil power field    Few Yeast like cells seen per oil power field  Final Report (07-30-19 @ 18:02):    Numerous Mixed gram negative rods including Few Pseudomonas aeruginosa    Normal Respiratory Renetta present  Organism: Pseudomonas aeruginosa (07-30-19 @ 18:02)  Organism: Pseudomonas aeruginosa (07-30-19 @ 18:02)      -  Amikacin: S <=8      -  Aztreonam: S <=4      -  Cefepime: S <=2      -  Ceftazidime: S <=1      -  Ciprofloxacin: S 1      -  Gentamicin: S 2      -  Imipenem: S <=1      -  Levofloxacin: S 2      -  Meropenem: S <=1      -  Piperacillin/Tazobactam: S <=8      -  Tobramycin: S <=2      Method Type: KALYAN    Culture - Urine (collected 07-28-19 @ 12:24)  Source: .Urine Clean Catch (Midstream)/ msue&#x27;d twice  Final Report (07-29-19 @ 18:56):    50,000 - 99,000 CFU/mL Presumptive Candida albicans "Susceptibilities not    performed"    Culture - Blood (collected 07-28-19 @ 11:40)  Source: .Blood Blood-Peripheral  Preliminary Report (07-29-19 @ 19:01):    No growth to date.    Culture - Blood (collected 07-28-19 @ 11:40)  Source: .Blood Blood-Peripheral  Preliminary Report (07-29-19 @ 19:01):    No growth to date.            CAPILLARY BLOOD GLUCOSE      POCT Blood Glucose.: 93 mg/dL (02 Aug 2019 08:28)      RADIOLOGY  CXR:  < from: Xray Chest 1 View- PORTABLE-Urgent (08.02.19 @ 08:28) >    Comparison 07/31/19    The endotracheal tube and left-sided central line and pacemaker remain in   place. There is moderate cardiomegaly. There is worsening bilateral   pulmonary opacity, worse on the right. There is no large layering   effusion or pneumothorax.        < end of copied text >      VITALS:  T(C): 34.9 (08-02-19 @ 09:00), Max: 37 (08-01-19 @ 14:00)  T(F): 94.8 (08-02-19 @ 09:00), Max: 98.6 (08-01-19 @ 14:00)  HR: 60 (08-02-19 @ 09:00) (60 - 60)  BP: 87/53 (08-02-19 @ 09:00) (76/59 - 117/63)  BP(mean): 64 (08-02-19 @ 09:00) (64 - 81)  ABP: --  ABP(mean): --  RR: 26 (08-02-19 @ 09:00) (17 - 28)  SpO2: 96% (08-02-19 @ 09:00) (95% - 100%)  CVP(mm Hg): 14 (08-02-19 @ 09:00) (5 - 14)  CVP(cm H2O): --    Ins and Outs     08-01-19 @ 07:01  -  08-02-19 @ 07:00  --------------------------------------------------------  IN: 3138.6 mL / OUT: 965 mL / NET: 2173.6 mL    08-02-19 @ 07:01  -  08-02-19 @ 10:05  --------------------------------------------------------  IN: 285.4 mL / OUT: 40 mL / NET: 245.4 mL            Device: 840, Mode: CPAP with PS, RR (patient): 24, FiO2: 40, PEEP: 5, PS: 5    I&O's Detail    01 Aug 2019 07:01  -  02 Aug 2019 07:00  --------------------------------------------------------  IN:    dextrose 10%: 200 mL    dextrose 5% + sodium chloride 0.9%.: 1230 mL    Enteral Tube Flush: 150 mL    norepinephrine Infusion: 133.8 mL    ns in tub fed  [id number]: 1260 mL    propofol Infusion: 64.8 mL    Solution: 100 mL  Total IN: 3138.6 mL    OUT:    Indwelling Catheter - Urethral: 965 mL  Total OUT: 965 mL    Total NET: 2173.6 mL      02 Aug 2019 07:01  -  02 Aug 2019 10:05  --------------------------------------------------------  IN:    dextrose 5% + sodium chloride 0.9%.: 140 mL    ns in tub fed  [id number]: 140 mL    propofol Infusion: 5.4 mL  Total IN: 285.4 mL    OUT:    Indwelling Catheter - Urethral: 40 mL  Total OUT: 40 mL    Total NET: 245.4 mL

## 2019-08-02 NOTE — PROVIDER CONTACT NOTE (CRITICAL VALUE NOTIFICATION) - ASSESSMENT
asymptomatic.
Pt weaned off Levophed prior to reception, current SBP in low 90's
Pt intubated / sedation/ pacemaker and vpaced

## 2019-08-02 NOTE — PROGRESS NOTE ADULT - SUBJECTIVE AND OBJECTIVE BOX
CC: f/u for pneumonia    Patient reports: he is sedated on vent.s/P 1 unit PRBC, on warming blanket, still on pressers    REVIEW OF SYSTEMS:  All other review of systems negative (Comprehensive ROS): limited by underlying status    Antimicrobials Day #  :day 6  meropenem  IVPB 1000 milliGRAM(s) IV Intermittent every 12 hours    Other Medications Reviewed    T(F): 98 (08-02-19 @ 15:01), Max: 98.5 (08-01-19 @ 18:00)  HR: 60 (08-02-19 @ 16:00)  BP: 109/60 (08-02-19 @ 15:34)  RR: 22 (08-02-19 @ 16:00)  SpO2: 100% (08-02-19 @ 16:00)  Wt(kg): --    PHYSICAL EXAM:  General: sedated no acute distress  Eyes:  anicteric, no conjunctival injection, no discharge  Oropharynx: ETT 	  Neck: supple, without adenopathy  Lungs: coarse BS  Heart: regular rate and rhythm; no murmur, rubs or gallops  Abdomen: soft, nondistended, nontender, peg in place  Skin: no lesions  Extremities: no clubbing, cyanosis,++ edema  Neurologic: sedated on vent    LAB RESULTS:                        6.8    6.19  )-----------( 65       ( 02 Aug 2019 07:00 )             22.0     08-02    144  |  116<H>  |  39<H>  ----------------------------<  95  4.9   |  21<L>  |  1.30    Ca    8.1<L>      02 Aug 2019 05:30  Phos  2.6     08-02  Mg     1.7     08-02          MICROBIOLOGY:  RECENT CULTURES:      RADIOLOGY REVIEWED:    < from: Xray Chest 1 View- PORTABLE-Urgent (08.02.19 @ 08:28) >  EXAM:  XR CHEST PORTABLE URGENT 1V      PROCEDURE DATE:  08/02/2019        INTERPRETATION:  Single view chest    History sepsis    Comparison 07/31/19    The endotracheal tube and left-sided central line and pacemaker remain in   place. There is moderate cardiomegaly. There is worsening bilateral   pulmonary opacity, worse on the right. There is no large layering   effusion or pneumothorax.    < end of copied text >

## 2019-08-02 NOTE — PROGRESS NOTE ADULT - SUBJECTIVE AND OBJECTIVE BOX
Patient is a 86y old  Male who presents with a chief complaint of Change in mental status (02 Aug 2019 16:39)      BRIEF HOSPITAL COURSE: 86M admitted to ICU with acute respiratory failure requiring intubation and septic shock from underlying aspiration PNA. Further complicated by hyoglycemia requiring D10     Events last 24 hours: Levophed weaned off, however then needed to be started again. Tolerating PSV 5/5. Mental status poor, however unclear of baseline. Appears volume overloaded and anasarcic. Given lasix 40mg IVP earlier today. Making good urine. Glucose has been consistently >100, but still requiring dextrose infusion.      PAST MEDICAL & SURGICAL HISTORY:  Chronic kidney disease  Chronic diastolic heart failure  BPH (benign prostatic hyperplasia)  Shortness of breath  Depression  IBS (irritable bowel syndrome)  Dysphagia: s/p PEG placement  Cerebral infarction: with resulting weakness and dysphagia  Anemia  Psoriasis  GERD (gastroesophageal reflux disease)  Dyslipidemia  Cardiac pacemaker  S/P percutaneous endoscopic gastrostomy (PEG) tube placement      Review of Systems:  [id number] to obtain due to patient's clinical condition       Medications:  meropenem  IVPB 1000 milliGRAM(s) IV Intermittent every 12 hours  midodrine 10 milliGRAM(s) Oral every 8 hours  norepinephrine Infusion 0.05 MICROgram(s)/kG/Min IV Continuous <Continuous>  acetaminophen    Suspension .. 650 milliGRAM(s) Enteral Tube every 6 hours PRN  dexmedetomidine Infusion 0.2 MICROgram(s)/kG/Hr IV Continuous <Continuous>  pantoprazole   Suspension 40 milliGRAM(s) Enteral Tube daily  polyethylene glycol 3350 17 Gram(s) Oral daily  senna Syrup 10 milliLiter(s) Oral at bedtime  hydrocortisone sodium succinate Injectable 50 milliGRAM(s) IV Push three times a day  dextrose 5% + sodium chloride 0.9%. 1000 milliLiter(s) IV Continuous <Continuous>  magnesium oxide 400 milliGRAM(s) Oral every 8 hours  sodium chloride 0.9% lock flush 10 milliLiter(s) IV Push every 1 hour PRN  chlorhexidine 0.12% Liquid 15 milliLiter(s) Oral Mucosa every 12 hours  chlorhexidine 4% Liquid 1 Application(s) Topical <User Schedule>  dorzolamide 2%/timolol 0.5% Ophthalmic Solution 1 Drop(s) Both EYES every 12 hours  latanoprost 0.005% Ophthalmic Solution 1 Drop(s) Both EYES at bedtime  zinc oxide 20% Ointment 1 Application(s) Topical two times a day        Mode: CPAP with PS  FiO2: 40  PEEP: 5  PS: 5      ICU Vital Signs  T(C): 36.9  T(F): 98.5  HR: 60   BP: 98/55   BP(mean): 68   RR: 19  SpO2: 98%           I&O's Detail    01 Aug 2019 07:01  -  02 Aug 2019 07:00  --------------------------------------------------------  IN:    dextrose 10%: 200 mL    dextrose 5% + sodium chloride 0.9%.: 1230 mL    Enteral Tube Flush: 150 mL    norepinephrine Infusion: 133.8 mL    ns in tub fed  seuvva43: 1260 mL    propofol Infusion: 64.8 mL    Solution: 100 mL  Total IN: 3138.6 mL    OUT:    Indwelling Catheter - Urethral: 965 mL  Total OUT: 965 mL    Total NET: 2173.6 mL      02 Aug 2019 07:01  -  02 Aug 2019 21:46  --------------------------------------------------------  IN:    dextrose 5% + sodium chloride 0.9%.: 690 mL    ns in tub fed  bnexvq12: 770 mL    propofol Infusion: 8.1 mL    Solution: 300 mL    Solution: 50 mL    Solution: 16.8 mL  Total IN: 1834.9 mL    OUT:    Indwelling Catheter - Urethral: 835 mL  Total OUT: 835 mL    Total NET: 999.9 mL            LABS:                        7.8    7.24  )-----------( 76       ( 02 Aug 2019 17:00 )             25.2     08-02    144  |  116<H>  |  39<H>  ----------------------------<  95  4.9   |  21<L>  |  1.30    Ca    8.1<L>      02 Aug 2019 05:30  Phos  2.6     08-02  Mg     1.7     08-02            CAPILLARY BLOOD GLUCOSE      POCT Blood Glucose.: 138 mg/dL (02 Aug 2019 19:42)        CULTURES:  Culture Results:   Numerous Mixed gram negative rods including Few Pseudomonas aeruginosa  Normal Respiratory Renetta present (07-28-19 @ 15:30)  Culture Results:   >100,000 CFU/ml Presumptive Candida albicans "Susceptibilities not  performed" (07-28-19 @ 15:30)  Culture Results:   50,000 - 99,000 CFU/mL Presumptive Candida albicans "Susceptibilities not  performed" (07-28-19 @ 12:24)  Culture Results:   No growth at 5 days. (07-28-19 @ 11:40)  Culture Results:   No growth at 5 days. (07-28-19 @ 11:40)      Physical Examination:    General: intubated, able to be aroused, elderly     HEENT: Pupils equal, reactive to light.  Symmetric. small in diameter     PULM: Rhonchi b/l, good air movement. Large amount of white mucus suctioned from endotracheal tube     CVS: Regular rate and rhythm, no murmurs, rubs, or gallops    ABD: Soft, nondistended, nontender, normoactive bowel sounds, no masses    EXT: mild LE edema b/l     SKIN: Warm and well perfused, no rashes noted.    NEURO: Will open his eyes to verbal and painful stimuli, not following commands well.       RADIOLOGY: EXAM:  XR CHEST PORTABLE URGENT 1V      PROCEDURE DATE:  08/02/2019        INTERPRETATION:  Single view chest    History sepsis    Comparison 07/31/19    The endotracheal tube and left-sided central line and pacemaker remain in   place. There is moderate cardiomegaly. There is worsening bilateral   pulmonary opacity, worse on the right. There is no large layering   effusion or pneumothorax.      HAZEL RODRIGUEZ M.D., ATTENDING RADIOLOGIST  This document has been electronically signed. Aug  2 2019  8:30AM        CRITICAL CARE TIME SPENT: 40 minutes assessing presenting problems of acute illness, which pose high probability of life threatening deterioration or end organ damage/dysfunction, as well as medical decision making including initiating plan of care, reviewing data, reviewing radiologic exams, discussing with multidisciplinary team,  discussing goals of care with patient/family, and writing this note.  Non-inclusive of procedures performed,

## 2019-08-02 NOTE — CHART NOTE - NSCHARTNOTEFT_GEN_A_CORE
Nutrition Follow Up Note  Hospital Course (Per Electronic Medical Record):   Source: Medical Record [X]  Nursing Staff [X]     Diet: Jevity 1.5@70 cc/hr x 18hrs     Patient on CPAP , PEG feeds infusing @ 70cc/hr , tolerating well. Patient noted with H/H 6.8/22.0, noted to receive 1 unit PRBC's.    Current Weight: (8/2) 220.2/99.9kg, , weight fluctuating on Lasix.    Pertinent Medications: MEDICATIONS  (STANDING):  chlorhexidine 0.12% Liquid 15 milliLiter(s) Oral Mucosa every 12 hours  chlorhexidine 4% Liquid 1 Application(s) Topical <User Schedule>  dextrose 5% + sodium chloride 0.9%. 1000 milliLiter(s) (70 mL/Hr) IV Continuous <Continuous>  dorzolamide 2%/timolol 0.5% Ophthalmic Solution 1 Drop(s) Both EYES every 12 hours  latanoprost 0.005% Ophthalmic Solution 1 Drop(s) Both EYES at bedtime  magnesium oxide 400 milliGRAM(s) Oral every 8 hours  meropenem  IVPB 1000 milliGRAM(s) IV Intermittent every 12 hours  midodrine 10 milliGRAM(s) Oral every 8 hours  norepinephrine Infusion 0.05 MICROgram(s)/kG/Min (4.163 mL/Hr) IV Continuous <Continuous>  pantoprazole   Suspension 40 milliGRAM(s) Enteral Tube daily  polyethylene glycol 3350 17 Gram(s) Oral daily  propofol Infusion 5 MICROgram(s)/kG/Min (2.721 mL/Hr) IV Continuous <Continuous>  senna Syrup 10 milliLiter(s) Oral at bedtime  zinc oxide 20% Ointment 1 Application(s) Topical two times a day    MEDICATIONS  (PRN):  acetaminophen    Suspension .. 650 milliGRAM(s) Enteral Tube every 6 hours PRN Temp greater or equal to 38C (100.4F), Mild Pain (1 - 3)  sodium chloride 0.9% lock flush 10 milliLiter(s) IV Push every 1 hour PRN Pre/post blood products, medications, blood draw, and to maintain line patency      Pertinent Labs:  08-02 Na144 mmol/L Glu 95 mg/dL K+ 4.9 mmol/L Cr  1.30 mg/dL BUN 39 mg/dL<H> 08-02 Phos 2.6 mg/dL 07-31 Alb 1.3 g/dL<L> 07-30 [id number] 4.8 %        Skin: Stage 3 noted intact & dry, healed as per RN.    Edema: (+2) generalized edema, (+3) scrotal edema noted, Lasix to be increased as per MD today.    Last BM: on (8/2)    Estimated Needs:   [X] No Change since Previous Assessment  [X] Recalculated:     Previous Nutrition Diagnosis: Severe Malnutrition    Nutrition Diagnosis is [X] Ongoing         New Nutrition Diagnosis: [X] Not Applicable    Interventions:   1. Recommend continue current nutrition regimen      Monitoring & Evaluation: will monitor:  [X] Weights   [X]  Tolerance to PEG feeds  [X] Follow Up (Per Protocol)        RD to follow as per Nutrition protocol  Renetta Silva RDN

## 2019-08-02 NOTE — PROVIDER CONTACT NOTE (OTHER) - ACTION/TREATMENT ORDERED:
d5 ns increased to 70 cc/hr and jevity feeding started at 70 cc/hr/ WILL MONITOR BLOOD SUGAR EVERY 2HRLY

## 2019-08-02 NOTE — PROVIDER CONTACT NOTE (OTHER) - REASON
blood sugar done by accucheck and it was 82 and repeated and it was 84
blood sugar is 90.
hypoglycemia

## 2019-08-02 NOTE — PROVIDER CONTACT NOTE (CRITICAL VALUE NOTIFICATION) - BACKGROUND
NPO, intubated to ventilator. sedated. septic shock/MELISSA. Hold meds.
H/O Anemia
DNR. MELISSA/diastolic heart failure/cerebral infarct/anemia/thrombocytopenia/HLD/hypoxic

## 2019-08-02 NOTE — PROGRESS NOTE ADULT - SUBJECTIVE AND OBJECTIVE BOX
Progress: f/u palliative pt intubated, sedated  , but opens eyes w/ stimulation      Present Symptoms:   Dyspnea: no  Nausea/Vomiting: no  Anxiety:    Depressed Mood:   Fatigue: yes  Loss of appetite: yes  Pain:   no s/s                 location:   Review of Systems: Unable to obtain due to poor mentation]    MEDICATIONS  (STANDING):  chlorhexidine 0.12% Liquid 15 milliLiter(s) Oral Mucosa every 12 hours  chlorhexidine 4% Liquid 1 Application(s) Topical <User Schedule>  dexmedetomidine Infusion 0.2 MICROgram(s)/kG/Hr (4.44 mL/Hr) IV Continuous <Continuous>  dextrose 5% + sodium chloride 0.9%. 1000 milliLiter(s) (70 mL/Hr) IV Continuous <Continuous>  dorzolamide 2%/timolol 0.5% Ophthalmic Solution 1 Drop(s) Both EYES every 12 hours  furosemide   Injectable 40 milliGRAM(s) IV Push once  hydrocortisone sodium succinate Injectable 50 milliGRAM(s) IV Push three times a day  latanoprost 0.005% Ophthalmic Solution 1 Drop(s) Both EYES at bedtime  magnesium oxide 400 milliGRAM(s) Oral every 8 hours  meropenem  IVPB 1000 milliGRAM(s) IV Intermittent every 12 hours  midodrine 10 milliGRAM(s) Oral every 8 hours  norepinephrine Infusion 0.05 MICROgram(s)/kG/Min (4.163 mL/Hr) IV Continuous <Continuous>  pantoprazole   Suspension 40 milliGRAM(s) Enteral Tube daily  polyethylene glycol 3350 17 Gram(s) Oral daily  senna Syrup 10 milliLiter(s) Oral at bedtime  zinc oxide 20% Ointment 1 Application(s) Topical two times a day    MEDICATIONS  (PRN):  acetaminophen    Suspension .. 650 milliGRAM(s) Enteral Tube every 6 hours PRN Temp greater or equal to 38C (100.4F), Mild Pain (1 - 3)  sodium chloride 0.9% lock flush 10 milliLiter(s) IV Push every 1 hour PRN Pre/post blood products, medications, blood draw, and to maintain line patency      PHYSICAL EXAM:  Vital Signs Last 24 Hrs  T(C): 34.9 (02 Aug 2019 09:00), Max: 37 (01 Aug 2019 14:00)  T(F): 94.8 (02 Aug 2019 09:00), Max: 98.6 (01 Aug 2019 14:00)  HR: 60 (02 Aug 2019 10:07) (60 - 60)  BP: 73/45 (02 Aug 2019 10:05) (73/45 - 117/63)  BP(mean): 55 (02 Aug 2019 10:05) (55 - 81)  RR: 20 (02 Aug 2019 10:05) (17 - 28)  SpO2: 96% (02 Aug 2019 10:07) (95% - 100%)  General: intubated,  opens eyes to stimuli     HEENT: n/c, a/t , intubated     Lungs: coarse bs    CV: s1s2     GI: soft, + peg     : hansen    Musculoskeletal:  edematous, weak    Skin: dry, fragile     Neuro: sedated/intubated    Oral intake ability:  npo  Diet: peg feeds      LABS:                          6.8    6.19  )-----------( 65       ( 02 Aug 2019 07:00 )             22.0     08-02    144  |  116<H>  |  39<H>  ----------------------------<  95  4.9   |  21<L>  |  1.30    Ca    8.1<L>      02 Aug 2019 05:30  Phos  2.6     08-02  Mg     1.7     08-02          RADIOLOGY & ADDITIONAL STUDIES:    ADVANCE DIRECTIVES:  Advanced Care Planning discussion total time spent: Progress: f/u palliative pt intubated, sedated  , but opens eyes w/ stimulation.      Present Symptoms:   Dyspnea: no  Nausea/Vomiting: no  Anxiety:    Depressed Mood:   Fatigue: yes  Loss of appetite: yes  Pain:   no s/s                 location:   Review of Systems: Unable to obtain due to poor mentation]    MEDICATIONS  (STANDING):  chlorhexidine 0.12% Liquid 15 milliLiter(s) Oral Mucosa every 12 hours  chlorhexidine 4% Liquid 1 Application(s) Topical <User Schedule>  dexmedetomidine Infusion 0.2 MICROgram(s)/kG/Hr (4.44 mL/Hr) IV Continuous <Continuous>  dextrose 5% + sodium chloride 0.9%. 1000 milliLiter(s) (70 mL/Hr) IV Continuous <Continuous>  dorzolamide 2%/timolol 0.5% Ophthalmic Solution 1 Drop(s) Both EYES every 12 hours  furosemide   Injectable 40 milliGRAM(s) IV Push once  hydrocortisone sodium succinate Injectable 50 milliGRAM(s) IV Push three times a day  latanoprost 0.005% Ophthalmic Solution 1 Drop(s) Both EYES at bedtime  magnesium oxide 400 milliGRAM(s) Oral every 8 hours  meropenem  IVPB 1000 milliGRAM(s) IV Intermittent every 12 hours  midodrine 10 milliGRAM(s) Oral every 8 hours  norepinephrine Infusion 0.05 MICROgram(s)/kG/Min (4.163 mL/Hr) IV Continuous <Continuous>  pantoprazole   Suspension 40 milliGRAM(s) Enteral Tube daily  polyethylene glycol 3350 17 Gram(s) Oral daily  senna Syrup 10 milliLiter(s) Oral at bedtime  zinc oxide 20% Ointment 1 Application(s) Topical two times a day    MEDICATIONS  (PRN):  acetaminophen    Suspension .. 650 milliGRAM(s) Enteral Tube every 6 hours PRN Temp greater or equal to 38C (100.4F), Mild Pain (1 - 3)  sodium chloride 0.9% lock flush 10 milliLiter(s) IV Push every 1 hour PRN Pre/post blood products, medications, blood draw, and to maintain line patency      PHYSICAL EXAM:  Vital Signs Last 24 Hrs  T(C): 34.9 (02 Aug 2019 09:00), Max: 37 (01 Aug 2019 14:00)  T(F): 94.8 (02 Aug 2019 09:00), Max: 98.6 (01 Aug 2019 14:00)  HR: 60 (02 Aug 2019 10:07) (60 - 60)  BP: 73/45 (02 Aug 2019 10:05) (73/45 - 117/63)  BP(mean): 55 (02 Aug 2019 10:05) (55 - 81)  RR: 20 (02 Aug 2019 10:05) (17 - 28)  SpO2: 96% (02 Aug 2019 10:07) (95% - 100%)  General: intubated,  opens eyes to stimuli     HEENT: n/c, a/t , intubated     Lungs: coarse bs    CV: s1s2     GI: soft, + peg     : hansen    Musculoskeletal:  edematous, weak    Skin: dry, fragile     Neuro: sedated/intubated    Oral intake ability:  npo  Diet: peg feeds      LABS:                          6.8    6.19  )-----------( 65       ( 02 Aug 2019 07:00 )             22.0     08-02    144  |  116<H>  |  39<H>  ----------------------------<  95  4.9   |  21<L>  |  1.30    Ca    8.1<L>      02 Aug 2019 05:30  Phos  2.6     08-02  Mg     1.7     08-02          RADIOLOGY & ADDITIONAL STUDIES:  < from: Xray Chest 1 View- PORTABLE-Urgent (08.02.19 @ 08:28) >    EXAM:  XR CHEST PORTABLE URGENT 1V      PROCEDURE DATE:  08/02/2019        INTERPRETATION:  Single view chest    History sepsis    Comparison 07/31/19    The endotracheal tube and left-sided central line and pacemaker remain in   place. There is moderate cardiomegaly. There is worsening bilateral   pulmonary opacity, worse on the right. There is no large layering   effusion or pneumothorax.          ADVANCE DIRECTIVES: MOLST  DNR/  trial intubation   Advanced Care Planning discussion total time spent:

## 2019-08-02 NOTE — PROGRESS NOTE ADULT - ASSESSMENT
A/P 86 year old male with extensive PMH including IBS, CVA with dysphagia and PEG placement, anemia, psoriasis, GERD, depression, chf , PPM admitted with severe sepsis from likely aspiration pneumonia. Septic Shock with multiorgan failure.   Acute Respiratory failure intubated 7/28 . Pt remains intubated and sedated in ccu , per ccu team pt on CPAP,  appears to be tolerating, but is not candidate for extubation today.   Palliative; Pt hypotensive, lethargic , but will open eyes to stimuli. Remains on sedation ( precedex) for comfort.  Pt anemic, to get transfused today.  Wife not at bedside today, but was updated on pts condition. Wife continues to have medical issues of her own to manage.  Will cont to follow pts clinical course , maintain contact and f/u w/ wife regarding goc and ongoing treatments. Wife aware pts condition is guarded.  Reviewed  w/ ccu team. A/P 86 year old male with extensive PMH including IBS, CVA with dysphagia and PEG placement, anemia, psoriasis, GERD, depression, chf , PPM admitted with severe sepsis from likely aspiration pneumonia. Septic Shock with multiorgan failure.   Acute Respiratory failure intubated 7/28 . Pt remains intubated and sedated in ccu , per ccu team pt on CPAP,  appears to be tolerating, but is not candidate for extubation today.   Palliative; Pt hypotensive, lethargic , but will open eyes to stimuli. Remains on sedation ( precedex) for comfort.  Pt anemic, to get transfused today.  Wife not at bedside today, but was updated on pts condition. Wife continues to have medical issues of her own to manage.  Will cont to follow pts clinical course , maintain contact and f/u w/ wife regarding goc and ongoing treatments. Wife aware pts condition is guarded.  Reviewed  w/ Dr Milner.

## 2019-08-03 LAB
ANION GAP SERPL CALC-SCNC: 4 MMOL/L — LOW (ref 5–17)
BLOOD GAS COMMENTS ARTERIAL: SIGNIFICANT CHANGE UP
BUN SERPL-MCNC: 41 MG/DL — HIGH (ref 7–23)
CALCIUM SERPL-MCNC: 7.9 MG/DL — LOW (ref 8.4–10.5)
CHLORIDE SERPL-SCNC: 117 MMOL/L — HIGH (ref 96–108)
CO2 BLDA-SCNC: 22 MMOL/L — SIGNIFICANT CHANGE UP (ref 22–30)
CO2 BLDA-SCNC: 22 MMOL/L — SIGNIFICANT CHANGE UP (ref 22–30)
CO2 SERPL-SCNC: 26 MMOL/L — SIGNIFICANT CHANGE UP (ref 22–31)
CREAT SERPL-MCNC: 1.55 MG/DL — HIGH (ref 0.5–1.3)
GAS PNL BLDA: SIGNIFICANT CHANGE UP
GAS PNL BLDA: SIGNIFICANT CHANGE UP
GLUCOSE BLDC GLUCOMTR-MCNC: 116 MG/DL — HIGH (ref 70–99)
GLUCOSE BLDC GLUCOMTR-MCNC: 117 MG/DL — HIGH (ref 70–99)
GLUCOSE BLDC GLUCOMTR-MCNC: 117 MG/DL — HIGH (ref 70–99)
GLUCOSE BLDC GLUCOMTR-MCNC: 123 MG/DL — HIGH (ref 70–99)
GLUCOSE BLDC GLUCOMTR-MCNC: 125 MG/DL — HIGH (ref 70–99)
GLUCOSE BLDC GLUCOMTR-MCNC: 129 MG/DL — HIGH (ref 70–99)
GLUCOSE BLDC GLUCOMTR-MCNC: 146 MG/DL — HIGH (ref 70–99)
GLUCOSE BLDC GLUCOMTR-MCNC: 97 MG/DL — SIGNIFICANT CHANGE UP (ref 70–99)
GLUCOSE SERPL-MCNC: 107 MG/DL — HIGH (ref 70–99)
HCT VFR BLD CALC: 24.3 % — LOW (ref 39–50)
HGB BLD-MCNC: 7.4 G/DL — LOW (ref 13–17)
HOROWITZ INDEX BLDA+IHG-RTO: SIGNIFICANT CHANGE UP
HOROWITZ INDEX BLDA+IHG-RTO: SIGNIFICANT CHANGE UP
MAGNESIUM SERPL-MCNC: 1.8 MG/DL — SIGNIFICANT CHANGE UP (ref 1.6–2.6)
MCHC RBC-ENTMCNC: 29.5 PG — SIGNIFICANT CHANGE UP (ref 27–34)
MCHC RBC-ENTMCNC: 30.5 GM/DL — LOW (ref 32–36)
MCV RBC AUTO: 96.8 FL — SIGNIFICANT CHANGE UP (ref 80–100)
NRBC # BLD: 0 /100 WBCS — SIGNIFICANT CHANGE UP (ref 0–0)
PCO2 BLDA: 33 MMHG — SIGNIFICANT CHANGE UP (ref 32–46)
PCO2 BLDA: 35 MMHG — SIGNIFICANT CHANGE UP (ref 32–46)
PH BLDA: 7.38 — SIGNIFICANT CHANGE UP (ref 7.35–7.45)
PH BLDA: 7.43 — SIGNIFICANT CHANGE UP (ref 7.35–7.45)
PHOSPHATE SERPL-MCNC: 2.9 MG/DL — SIGNIFICANT CHANGE UP (ref 2.5–4.5)
PLATELET # BLD AUTO: 77 K/UL — LOW (ref 150–400)
PO2 BLDA: 59 MMHG — LOW (ref 74–108)
PO2 BLDA: 87 MMHG — SIGNIFICANT CHANGE UP (ref 74–108)
POTASSIUM SERPL-MCNC: 5.4 MMOL/L — HIGH (ref 3.5–5.3)
POTASSIUM SERPL-SCNC: 5.4 MMOL/L — HIGH (ref 3.5–5.3)
RBC # BLD: 2.51 M/UL — LOW (ref 4.2–5.8)
RBC # FLD: 20.4 % — HIGH (ref 10.3–14.5)
SAO2 % BLDA: 93 % — SIGNIFICANT CHANGE UP (ref 92–96)
SAO2 % BLDA: 97 % — HIGH (ref 92–96)
SODIUM SERPL-SCNC: 147 MMOL/L — HIGH (ref 135–145)
WBC # BLD: 7.52 K/UL — SIGNIFICANT CHANGE UP (ref 3.8–10.5)
WBC # FLD AUTO: 7.52 K/UL — SIGNIFICANT CHANGE UP (ref 3.8–10.5)

## 2019-08-03 PROCEDURE — 71045 X-RAY EXAM CHEST 1 VIEW: CPT | Mod: 26

## 2019-08-03 RX ORDER — FUROSEMIDE 40 MG
40 TABLET ORAL ONCE
Refills: 0 | Status: COMPLETED | OUTPATIENT
Start: 2019-08-03 | End: 2019-08-03

## 2019-08-03 RX ORDER — IPRATROPIUM/ALBUTEROL SULFATE 18-103MCG
3 AEROSOL WITH ADAPTER (GRAM) INHALATION EVERY 6 HOURS
Refills: 0 | Status: DISCONTINUED | OUTPATIENT
Start: 2019-08-03 | End: 2019-08-14

## 2019-08-03 RX ORDER — NYSTATIN CREAM 100000 [USP'U]/G
1 CREAM TOPICAL THREE TIMES A DAY
Refills: 0 | Status: DISCONTINUED | OUTPATIENT
Start: 2019-08-03 | End: 2019-08-14

## 2019-08-03 RX ORDER — ACETYLCYSTEINE 200 MG/ML
3 VIAL (ML) MISCELLANEOUS EVERY 6 HOURS
Refills: 0 | Status: COMPLETED | OUTPATIENT
Start: 2019-08-03 | End: 2019-08-05

## 2019-08-03 RX ORDER — ALBUMIN HUMAN 25 %
50 VIAL (ML) INTRAVENOUS EVERY 6 HOURS
Refills: 0 | Status: COMPLETED | OUTPATIENT
Start: 2019-08-03 | End: 2019-08-04

## 2019-08-03 RX ADMIN — ESCITALOPRAM OXALATE 10 MILLIGRAM(S): 10 TABLET, FILM COATED ORAL at 13:29

## 2019-08-03 RX ADMIN — DORZOLAMIDE HYDROCHLORIDE TIMOLOL MALEATE 1 DROP(S): 20; 5 SOLUTION/ DROPS OPHTHALMIC at 17:49

## 2019-08-03 RX ADMIN — MAGNESIUM OXIDE 400 MG ORAL TABLET 400 MILLIGRAM(S): 241.3 TABLET ORAL at 21:12

## 2019-08-03 RX ADMIN — Medication 50 MILLIGRAM(S): at 21:12

## 2019-08-03 RX ADMIN — Medication 3 MILLILITER(S): at 22:19

## 2019-08-03 RX ADMIN — NYSTATIN CREAM 1 APPLICATION(S): 100000 CREAM TOPICAL at 21:10

## 2019-08-03 RX ADMIN — ZINC OXIDE 1 APPLICATION(S): 200 OINTMENT TOPICAL at 17:50

## 2019-08-03 RX ADMIN — MEROPENEM 100 MILLIGRAM(S): 1 INJECTION INTRAVENOUS at 17:49

## 2019-08-03 RX ADMIN — MAGNESIUM OXIDE 400 MG ORAL TABLET 400 MILLIGRAM(S): 241.3 TABLET ORAL at 13:29

## 2019-08-03 RX ADMIN — DORZOLAMIDE HYDROCHLORIDE TIMOLOL MALEATE 1 DROP(S): 20; 5 SOLUTION/ DROPS OPHTHALMIC at 05:37

## 2019-08-03 RX ADMIN — Medication 50 MILLILITER(S): at 10:10

## 2019-08-03 RX ADMIN — ZINC OXIDE 1 APPLICATION(S): 200 OINTMENT TOPICAL at 09:12

## 2019-08-03 RX ADMIN — CHLORHEXIDINE GLUCONATE 1 APPLICATION(S): 213 SOLUTION TOPICAL at 05:35

## 2019-08-03 RX ADMIN — Medication 50 MILLIGRAM(S): at 05:37

## 2019-08-03 RX ADMIN — Medication 50 MILLIGRAM(S): at 13:28

## 2019-08-03 RX ADMIN — MIDODRINE HYDROCHLORIDE 10 MILLIGRAM(S): 2.5 TABLET ORAL at 13:29

## 2019-08-03 RX ADMIN — MIDODRINE HYDROCHLORIDE 10 MILLIGRAM(S): 2.5 TABLET ORAL at 05:37

## 2019-08-03 RX ADMIN — Medication 50 MILLILITER(S): at 16:02

## 2019-08-03 RX ADMIN — Medication 40 MILLIGRAM(S): at 09:12

## 2019-08-03 RX ADMIN — LATANOPROST 1 DROP(S): 0.05 SOLUTION/ DROPS OPHTHALMIC; TOPICAL at 21:14

## 2019-08-03 RX ADMIN — MAGNESIUM OXIDE 400 MG ORAL TABLET 400 MILLIGRAM(S): 241.3 TABLET ORAL at 05:40

## 2019-08-03 RX ADMIN — MIDODRINE HYDROCHLORIDE 10 MILLIGRAM(S): 2.5 TABLET ORAL at 21:12

## 2019-08-03 RX ADMIN — CHLORHEXIDINE GLUCONATE 15 MILLILITER(S): 213 SOLUTION TOPICAL at 05:35

## 2019-08-03 RX ADMIN — MEROPENEM 100 MILLIGRAM(S): 1 INJECTION INTRAVENOUS at 05:37

## 2019-08-03 RX ADMIN — Medication 50 MILLILITER(S): at 21:10

## 2019-08-03 RX ADMIN — Medication 3 MILLILITER(S): at 22:13

## 2019-08-03 RX ADMIN — NYSTATIN CREAM 1 APPLICATION(S): 100000 CREAM TOPICAL at 13:29

## 2019-08-03 RX ADMIN — PANTOPRAZOLE SODIUM 40 MILLIGRAM(S): 20 TABLET, DELAYED RELEASE ORAL at 12:39

## 2019-08-03 NOTE — PROGRESS NOTE ADULT - ASSESSMENT
Physical Examination:  GENERAL:               Arousable, intubated, No acute distress.    HEENT:                   No JVD, Dry MM  PULM:                     Bilateral air entry, Clear to auscultation bilaterally, no significant sputum production, No Rales, No Rhonchi, No Wheezing  CVS:                         S1, S2,  +Murmur  ABD:                        Soft, distended, nontender, normoactive bowel sounds, + Peg  EXT:                         +edema, nontender  Vascular:                Warm Extremities, Normal Capillary refill,  SKIN:                       Warm and well perfused, no rashes noted.   NEURO:                  Sedated, not follows commands  PSYC:                      Calm, no Insight.        Assessment  Septic Shock with multiorgan failure, due to aspiration PNA on pressors with  candida urine collonization  Acute Respiratory failure intubated 7/28   Thrombocytopenia due to sepsis  Lactic acidosis resolved  MELISSA with baseline Cr 1.2-1.4   Hypoglycemia re current with   Hyperkalemia improved  CVA with Dysphagia s/p PEG  Underlying BPH, chronic diastolic CHF      Plan  Mechanical ventilation, CPAP  tolerating,  RR < 20 TV > 400 Peep 5, fio2 35%  plan to extubate to high flow o2   d/w Wife will re intubate if distress  give albumin and lasix,  Continue steroids today, monitor glucose   taper fio2 as tolerated  IV abx as per ID    restart chemical DVT ppx once plts > 100    Continue supportive care  palliative care f/u      Family Updated: 	Dtmora Kinsey -                        Date  7/31/19 - Is away                                       wife Angie [phone]	             Date: 8/3/19        Sedation & Analgesia:	As above  Diet/Nutrition:		Start peg feeding today vs kyle  GI PPx:			PPI    DVT Ppx:		venodynes start dvt ppx when plts > 100    Activity:		               Bedrest  Head of Bed:               35-45  Glycemic Control:        n/a    Lines:  PIV  CENTRAL LINE: 	[ x] YES [ ] NO	                    LOCATION:   	  [facility]                     DATE INSERTED:   	                    REMOVE:  [ ] YES [ x] NO    A-LINE:  	                [ ] YES [x ] NO                      LOCATION:   	                       DATE INSERTED: 		            REMOVE:  [ ] YES [ ] NO    SOTO: 		        [x] YES [ ] NO  		                                       DATE INSERTED:		            REMOVE:  [ ] YES [ x] NO      Restraints may be deemed necessary to prevent removal of life-sustaining devices [ x ] YES   [    ]  NO    Disposition: ICU monitoring     Goals of Care: DNR, Trial intubation, family does not want trach.

## 2019-08-03 NOTE — PROGRESS NOTE ADULT - SUBJECTIVE AND OBJECTIVE BOX
Patient is a 86y old  Male who presents with a chief complaint of Change in mental status (03 Aug 2019 11:41)      BRIEF HOSPITAL COURSE: 86M admitted to ICU with acute respiratory failure requiring intubation and septic shock from underlying aspiration PNA. Further complicated by hyoglycemia requiring D10.      Events last 24 hours: Extubated today to HFNC. Appears to be doing well on 50% 40L of flow. Mental is poor, however this appears to be patient's baseline. He is unable to follow commands to cough. I suctioned his oropharynx at the bedside and did manual chest PT, encourage him to cough, however he was unable to. He has gurgling respirations.     SOCIAL Hx: No recent ETOH use  No history of ETOH or substance abuse  Former smoker, no tobacco use for past 50 years    Family Hx: No pertinent family history in first degree relatives.     PAST MEDICAL & SURGICAL HISTORY:  Chronic kidney disease  Chronic diastolic heart failure  BPH (benign prostatic hyperplasia)  Shortness of breath  Depression  IBS (irritable bowel syndrome)  Dysphagia: s/p PEG placement  Cerebral infarction: with resulting weakness and dysphagia  Anemia  Psoriasis  GERD (gastroesophageal reflux disease)  Dyslipidemia  Cardiac pacemaker  S/P percutaneous endoscopic gastrostomy (PEG) tube placement      Review of Systems:  unable to obtain due to patient's baseline mental status       Medications:  meropenem  IVPB 1000 milliGRAM(s) IV Intermittent every 12 hours  midodrine 10 milliGRAM(s) Oral every 8 hours  norepinephrine Infusion 0.05 MICROgram(s)/kG/Min IV Continuous <Continuous>  acetylcysteine 20%  Inhalation 3 milliLiter(s) Inhalation every 6 hours  ALBUTerol/ipratropium for Nebulization 3 milliLiter(s) Nebulizer every 6 hours  acetaminophen    Suspension .. 650 milliGRAM(s) Enteral Tube every 6 hours PRN  escitalopram 10 milliGRAM(s) Oral daily  pantoprazole   Suspension 40 milliGRAM(s) Enteral Tube daily  polyethylene glycol 3350 17 Gram(s) Oral daily  senna Syrup 10 milliLiter(s) Oral at bedtime  hydrocortisone sodium succinate Injectable 50 milliGRAM(s) IV Push three times a day  albumin human 25% IVPB 50 milliLiter(s) IV Intermittent every 6 hours  magnesium oxide 400 milliGRAM(s) Oral every 8 hours  sodium chloride 0.9% lock flush 10 milliLiter(s) IV Push every 1 hour PRN  chlorhexidine 4% Liquid 1 Application(s) Topical <User Schedule>  dorzolamide 2%/timolol 0.5% Ophthalmic Solution 1 Drop(s) Both EYES every 12 hours  latanoprost 0.005% Ophthalmic Solution 1 Drop(s) Both EYES at bedtime  nystatin Powder 1 Application(s) Topical three times a day  zinc oxide 20% Ointment 1 Application(s) Topical two times a day        Mode: CPAP with PS  FiO2: 30  PEEP: 5  PS: 5      ICU Vital Signs   T(C): 36.9   T(F): 98.5  HR: 60   BP: 104/62   BP(mean): 76  RR: 20   SpO2: 98%       ABG - ( 03 Aug 2019 13:30 )  pH, Arterial: 7.43  pH, Blood: x     /  pCO2: 33    /  pO2: 59    / HCO3: x     / Base Excess: x     /  SaO2: 93            I&O's Detail    02 Aug 2019 07:01  -  03 Aug 2019 07:00  --------------------------------------------------------  IN:    dextrose 5% + sodium chloride 0.9%: 690 mL    norepinephrine Infusion: 3 mL    ns in tub fed  [id number]: 840 mL    propofol Infusion: 8.1 mL    Solution: 300 mL    Solution: 50 mL    Solution: 16.8 mL  Total IN: 1907.9 mL    OUT:    Indwelling Catheter - Urethral: 1430 mL  Total OUT: 1430 mL    Total NET: 477.9 mL      03 Aug 2019 07:01  -  03 Aug 2019 21:35  --------------------------------------------------------  IN:    Enteral Tube Flush: 60 mL    Free Water: 200 mL    norepinephrine Infusion: 24 mL    ns in tub fed  [id number]: 560 mL    Solution: 100 mL    Solution: 50 mL  Total IN: 994 mL    OUT:    Indwelling Catheter - Urethral: 1800 mL  Total OUT: 1800 mL    Total NET: -806 mL        LABS:                        7.4    7.52  )-----------( 77       ( 03 Aug 2019 06:00 )             24.3     08-03    147<H>  |  117<H>  |  41<H>  ----------------------------<  107<H>  5.4<H>   |  26  |  1.55<H>    Ca    7.9<L>      03 Aug 2019 06:00  Phos  2.9     08-03  Mg     1.8     08-03        CAPILLARY BLOOD GLUCOSE      POCT Blood Glucose.: 129 mg/dL (03 Aug 2019 17:10)        CULTURES:  Culture Results:   Numerous Mixed gram negative rods including Few Pseudomonas aeruginosa  Normal Respiratory Renetta present (07-28-19 @ 15:30)  Culture Results:   >100,000 CFU/ml Presumptive Candida albicans "Susceptibilities not  performed" (07-28-19 @ 15:30)  Culture Results:   50,000 - 99,000 CFU/mL Presumptive Candida albicans "Susceptibilities not  performed" (07-28-19 @ 12:24)  Culture Results:   No growth at 5 days. (07-28-19 @ 11:40)  Culture Results:   No growth at 5 days. (07-28-19 @ 11:40)      Physical Examination:    General: Poor mental status (baseline), obese, elderly     HEENT: Pupils equal, reactive to light.  Symmetric.    PULM: Rhonchi b/l, gurgling respirations, unable to express good cough     CVS: Regular rate and rhythm, no murmurs, rubs, or gallops    ABD: Soft, nondistended, nontender, normoactive bowel sounds, no masses    EXT: mild LE edema     SKIN: Warm and well perfused, no rashes noted.    NEURO: poor mental status, will open eyes to verbal stimuli, no focal deficits      RADIOLOGY: EXAM:  XR CHEST PORTABLE ROUTINE 1V      PROCEDURE DATE:  08/03/2019        INTERPRETATION:  Single view chest    History shortness of breath, intubation    Comparison yesterday    Multiple support devices remain in place. There is a reduced inspiratory   result with minimally clearing bilateral pulmonary opacity since the   prior exam. There is no layering effusion or pneumothorax.        HAZEL RODRIGUEZ M.D., ATTENDING RADIOLOGIST  This document has been electronically signed. Aug 3 2019  9:45AM        TIME SPENT: 40 min   Evaluating/treating patient, reviewing data/labs/imaging, discussing case with multidisciplinary team, discussing plan/goals of care with patient/family. Non-inclusive of procedure time.

## 2019-08-03 NOTE — PROGRESS NOTE ADULT - SUBJECTIVE AND OBJECTIVE BOX
Follow-up Critical Care Progress Note  Chief Complaint : Sepsis      Glucose stable off D5          Allergies :No Known Allergies      PAST MEDICAL & SURGICAL HISTORY:  Chronic kidney disease  Chronic diastolic heart failure  BPH (benign prostatic hyperplasia)  Shortness of breath  Depression  IBS (irritable bowel syndrome)  Dysphagia: s/p PEG placement  Cerebral infarction: with resulting weakness and dysphagia  Anemia  Psoriasis  GERD (gastroesophageal reflux disease)  Dyslipidemia  Cardiac pacemaker  S/P percutaneous endoscopic gastrostomy (PEG) tube placement      Medications:  MEDICATIONS  (STANDING):  chlorhexidine 0.12% Liquid 15 milliLiter(s) Oral Mucosa every 12 hours  chlorhexidine 4% Liquid 1 Application(s) Topical <User Schedule>  dexmedetomidine Infusion 0.2 MICROgram(s)/kG/Hr (4.44 mL/Hr) IV Continuous <Continuous>  dorzolamide 2%/timolol 0.5% Ophthalmic Solution 1 Drop(s) Both EYES every 12 hours  escitalopram 10 milliGRAM(s) Oral daily  furosemide   Injectable 40 milliGRAM(s) IV Push once  hydrocortisone sodium succinate Injectable 50 milliGRAM(s) IV Push three times a day  latanoprost 0.005% Ophthalmic Solution 1 Drop(s) Both EYES at bedtime  magnesium oxide 400 milliGRAM(s) Oral every 8 hours  meropenem  IVPB 1000 milliGRAM(s) IV Intermittent every 12 hours  midodrine 10 milliGRAM(s) Oral every 8 hours  norepinephrine Infusion 0.05 MICROgram(s)/kG/Min (4.163 mL/Hr) IV Continuous <Continuous>  pantoprazole   Suspension 40 milliGRAM(s) Enteral Tube daily  polyethylene glycol 3350 17 Gram(s) Oral daily  senna Syrup 10 milliLiter(s) Oral at bedtime  zinc oxide 20% Ointment 1 Application(s) Topical two times a day    MEDICATIONS  (PRN):  acetaminophen    Suspension .. 650 milliGRAM(s) Enteral Tube every 6 hours PRN Temp greater or equal to 38C (100.4F), Mild Pain (1 - 3)  sodium chloride 0.9% lock flush 10 milliLiter(s) IV Push every 1 hour PRN Pre/post blood products, medications, blood draw, and to maintain line patency      LABS:                        7.4    7.52  )-----------( 77       ( 03 Aug 2019 06:00 )             24.3     08-03    147<H>  |  117<H>  |  41<H>  ----------------------------<  107<H>  5.4<H>   |  26  |  1.55<H>    Ca    7.9<L>      03 Aug 2019 06:00  Phos  2.9     08-03  Mg     1.8     08-03          CULTURES: (if applicable)    Culture - Urine (collected 07-28-19 @ 15:30)  Source: .Urine Catheterized  Final Report (07-29-19 @ 18:56):    >100,000 CFU/ml Presumptive Candida albicans "Susceptibilities not    performed"    Culture - Sputum (collected 07-28-19 @ 15:30)  Source: .Sputum Sputum  Gram Stain (07-29-19 @ 00:31):    Rare polymorphonuclear leukocytes per low power field    Rare Squamous epithelial cells per low power field    Numerous Gram Variable Rods seen per oil power field    Few Gram positive cocci in pairs seen per oil power field    Few Yeast like cells seen per oil power field  Final Report (07-30-19 @ 18:02):    Numerous Mixed gram negative rods including Few Pseudomonas aeruginosa    Normal Respiratory Renetta present  Organism: Pseudomonas aeruginosa (07-30-19 @ 18:02)  Organism: Pseudomonas aeruginosa (07-30-19 @ 18:02)      -  Amikacin: S <=8      -  Aztreonam: S <=4      -  Cefepime: S <=2      -  Ceftazidime: S <=1      -  Ciprofloxacin: S 1      -  Gentamicin: S 2      -  Imipenem: S <=1      -  Levofloxacin: S 2      -  Meropenem: S <=1      -  Piperacillin/Tazobactam: S <=8      -  Tobramycin: S <=2      Method Type: KALYAN    Culture - Urine (collected 07-28-19 @ 12:24)  Source: .Urine Clean Catch (Midstream)/ [id number]&#[id number] twice  Final Report (07-29-19 @ 18:56):    50,000 - 99,000 CFU/mL Presumptive Candida albicans "Susceptibilities not    performed"    Culture - Blood (collected 07-28-19 @ 11:40)  Source: .Blood Blood-Peripheral  Final Report (08-02-19 @ 19:01):    No growth at 5 days.    Culture - Blood (collected 07-28-19 @ 11:40)  Source: .Blood Blood-Peripheral  Final Report (08-02-19 @ 19:01):    No growth at 5 days.          ABG - ( 03 Aug 2019 06:25 )  pH, Arterial: 7.38  pH, Blood: x     /  pCO2: 35    /  pO2: 87    / HCO3: x     / Base Excess: x     /  SaO2: 97          Glucose Trend  08-03-19 @ 08:46   -  -- -- 146<H>  08-03-19 @ 06:11   -  -- -- 117<H>  08-03-19 @ 06:00   -  -- 107<H> --  08-03-19 @ 03:46   -  -- -- 123<H>  08-03-19 @ 01:04   -  -- -- 117<H>  08-02-19 @ 21:55   -  -- -- 138<H>  08-02-19 @ 19:42   -  -- -- 138<H>  08-02-19 @ 18:39   -  -- -- 105<H>  08-02-19 @ 17:16   -  -- -- 116<H>  08-02-19 @ 15:12   -  -- -- 131<H>    Hemoglobin A1C, Whole Blood: 4.8 % (07-30-19 @ 08:26)    Trend Bun/Cr  08-03-19 @ 06:00  BUN/CR -  41<H> / 1.55<H>  08-02-19 @ 05:30  BUN/CR -  39<H> / 1.30    WBC Trend  08-03-19 @ 06:00   -  7.52  08-02-19 @ 17:00   -  7.24  08-02-19 @ 07:00   -  6.19  08-02-19 @ 05:30   -  6.21    Platelet Trend  08-03-19 @ 06:00   -  77<L>  08-02-19 @ 17:00   -  76<L>  08-02-19 @ 07:00   -  65<L>  08-02-19 @ 05:30   -  67<L>        CAPILLARY BLOOD GLUCOSE      POCT Blood Glucose.: 146 mg/dL (03 Aug 2019 08:46)      RADIOLOGY  CXR:    much improved. hazyness      VITALS:  T(C): 36.9 (08-03-19 @ 08:00), Max: 37.1 (08-02-19 @ 21:00)  T(F): 98.5 (08-03-19 @ 08:00), Max: 98.7 (08-02-19 @ 21:00)  HR: 60 (08-03-19 @ 08:00) (60 - 60)  BP: 114/73 (08-03-19 @ 08:00) (73/45 - 188/80)  BP(mean): 87 (08-03-19 @ 08:00) (55 - 91)  ABP: --  ABP(mean): --  RR: 29 (08-03-19 @ 08:00) (11 - 31)  SpO2: 96% (08-03-19 @ 08:25) (91% - 100%)  CVP(mm Hg): 7 (08-03-19 @ 08:00) (6 - 144)  CVP(cm H2O): --    Ins and Outs     08-02-19 @ 07:01  -  08-03-19 @ 07:00  --------------------------------------------------------  IN: 1834.9 mL / OUT: 1430 mL / NET: 404.9 mL            Device: 840, Mode: CPAP with PS, RR (patient): 28, TV (patient): 400, FiO2: 30, PEEP: 5, PS: 5    I&O's Detail    02 Aug 2019 07:01  -  03 Aug 2019 07:00  --------------------------------------------------------  IN:    dextrose 5% + sodium chloride 0.9%: 690 mL    ns in tub fed  [id number]: 770 mL    propofol Infusion: 8.1 mL    Solution: 300 mL    Solution: 50 mL    Solution: 16.8 mL  Total IN: 1834.9 mL    OUT:    Indwelling Catheter - Urethral: 1430 mL  Total OUT: 1430 mL    Total NET: 404.9 mL

## 2019-08-03 NOTE — PROGRESS NOTE ADULT - SUBJECTIVE AND OBJECTIVE BOX
CC: f/u for pneumonia and respiratory failure    Patient reports he is non verbal, was just extubated, is on high flow nasal oxygen    REVIEW OF SYSTEMS:  All other review of systems negative (Comprehensive ROS): limited by condition    Antimicrobials Day #  :day 7  meropenem  IVPB 1000 milliGRAM(s) IV Intermittent every 12 hours    Other Medications Reviewed    T(F): 98.5 (08-03-19 @ 08:00), Max: 98.7 (08-02-19 @ 21:00)  HR: 60 (08-03-19 @ 11:00)  BP: 125/68 (08-03-19 @ 11:00)  RR: 27 (08-03-19 @ 11:00)  SpO2: 95% (08-03-19 @ 11:00)  Wt(kg): --    PHYSICAL EXAM:  General:lethargic,, mild-moderate respiratory distress  Eyes:  anicteric, no conjunctival injection, no discharge  Oropharynx: no lesions or injection 	  Neck: supple, without adenopathy  Lungs: junky BS  Heart: regular rate and rhythm; no murmur, rubs or gallops  Abdomen: soft, nondistended, nontender, g tube   Skin: no lesions  Extremities: no clubbing, cyanosis, + edema, venous stasis changes  Neurologic: lethargic, poorly interactive    LAB RESULTS:                        7.4    7.52  )-----------( 77       ( 03 Aug 2019 06:00 )             24.3     08-03    147<H>  |  117<H>  |  41<H>  ----------------------------<  107<H>  5.4<H>   |  26  |  1.55<H>    Ca    7.9<L>      03 Aug 2019 06:00  Phos  2.9     08-03  Mg     1.8     08-03          MICROBIOLOGY:  RECENT CULTURES:    < from: Xray Chest 1 View- PORTABLE-Routine (08.03.19 @ 06:42) >  EXAM:  XR CHEST PORTABLE ROUTINE 1V      PROCEDURE DATE:  08/03/2019        INTERPRETATION:  Single view chest    History shortness of breath, intubation    Comparison yesterday    Multiple support devices remain in place. There is a reduced inspiratory   result with minimally clearing bilateral pulmonary opacity since the   prior exam. There is no layering effusion or pneumothorax.    < end of copied text >    RADIOLOGY REVIEWED:

## 2019-08-03 NOTE — PROGRESS NOTE ADULT - ASSESSMENT
86M admitted to ICU with acute respiratory failure requiring intubation and septic shock from underlying aspiration PNA. Further complicated by hyoglycemia requiring D10. Now off pressors, extubated, however still remains hypoxic requiring HFNC to maintain SaO2. His mentation is poor and is unable to cough. I believe this is the patient's baseline. His prognosis is poor and will most likely have recurring aspiration events.     Problem List:   1) Severe Sepsis   2) PNA  3) MELISSA  4) volume overload/anasarca  5) hypoglycemia- resolved   6) hypoxic and hypercapnic respiratory failure   7) Anemia     NEURO: Off sedation, mental status poor, although I believe this is his baseline mental status. SNF records indicate he has dementia. Lexapro 10mg daily,.    CV: Off pressors, on midodrine 10mg q8, BP has been within an appropriate range. Day team dosed lasix again this AM. Will hold off night time dose as Cr bumped and he is heading towards euvolemic state. Patient with grade 3 diastolic dysfunction.     PULM: Extubated to HFNC today. Still requiring 50% FiO2 and 40L of flow. Will actively titrate FiO2 to patient's needs to maintain SaO2 between 980 and 96%. Ordered pulm hygiene including Chest PT, bronchodilators and mucomyst. He is unable to cough and at a high risk for recurrent aspiration events. I reviewed the patient's SNF med record and found he is on atropine and glycopyrrolate for secretions at SNF. Would Avoid these medications as it tends to dry up secretions and cause mucus plugging.    GI: Tube feeds at goal rate, patient appears to be tolerating them     RENAL: MELSISA on CKD, Cr worsening with lasix diuresis, will hold off on additional Pm dose as patient appears to be heading towards euvolemic state. Trend BMP, mag, phos. Making great urine.     ID: Pseudomonas PNA, sensitive to meropenem. Will continue with kamran as per ID for 7-10 days (day 7)    HEME: Hold chemical DVT-P due to GI bleed risk. Anemia this AM hemoglobin 7.4.  Will check again in AM, no signs of bleeding on exam or during bowel movements     ENDO: hypoglycemia most likely due to septic process, on steroids, off dextrose infusion. glucose has remained > 100, will change accuchecks to q6    DISPO: remain in ICU, still with hypoxic respiratory failure and high risk for aspiration event and clinical deterioration. DNR

## 2019-08-04 LAB
ANION GAP SERPL CALC-SCNC: 6 MMOL/L — SIGNIFICANT CHANGE UP (ref 5–17)
BUN SERPL-MCNC: 46 MG/DL — HIGH (ref 7–23)
CALCIUM SERPL-MCNC: 8.3 MG/DL — LOW (ref 8.4–10.5)
CHLORIDE SERPL-SCNC: 114 MMOL/L — HIGH (ref 96–108)
CO2 SERPL-SCNC: 26 MMOL/L — SIGNIFICANT CHANGE UP (ref 22–31)
CREAT SERPL-MCNC: 1.51 MG/DL — HIGH (ref 0.5–1.3)
GLUCOSE BLDC GLUCOMTR-MCNC: 101 MG/DL — HIGH (ref 70–99)
GLUCOSE BLDC GLUCOMTR-MCNC: 155 MG/DL — HIGH (ref 70–99)
GLUCOSE BLDC GLUCOMTR-MCNC: 159 MG/DL — HIGH (ref 70–99)
GLUCOSE SERPL-MCNC: 97 MG/DL — SIGNIFICANT CHANGE UP (ref 70–99)
HCT VFR BLD CALC: 24 % — LOW (ref 39–50)
HGB BLD-MCNC: 7.4 G/DL — LOW (ref 13–17)
MAGNESIUM SERPL-MCNC: 1.9 MG/DL — SIGNIFICANT CHANGE UP (ref 1.6–2.6)
MCHC RBC-ENTMCNC: 30.5 PG — SIGNIFICANT CHANGE UP (ref 27–34)
MCHC RBC-ENTMCNC: 30.8 GM/DL — LOW (ref 32–36)
MCV RBC AUTO: 98.8 FL — SIGNIFICANT CHANGE UP (ref 80–100)
NRBC # BLD: 0 /100 WBCS — SIGNIFICANT CHANGE UP (ref 0–0)
PHOSPHATE SERPL-MCNC: 3.9 MG/DL — SIGNIFICANT CHANGE UP (ref 2.5–4.5)
PLATELET # BLD AUTO: 78 K/UL — LOW (ref 150–400)
POTASSIUM SERPL-MCNC: 5.2 MMOL/L — SIGNIFICANT CHANGE UP (ref 3.5–5.3)
POTASSIUM SERPL-SCNC: 5.2 MMOL/L — SIGNIFICANT CHANGE UP (ref 3.5–5.3)
RBC # BLD: 2.43 M/UL — LOW (ref 4.2–5.8)
RBC # FLD: 19.8 % — HIGH (ref 10.3–14.5)
SODIUM SERPL-SCNC: 146 MMOL/L — HIGH (ref 135–145)
WBC # BLD: 7.45 K/UL — SIGNIFICANT CHANGE UP (ref 3.8–10.5)
WBC # FLD AUTO: 7.45 K/UL — SIGNIFICANT CHANGE UP (ref 3.8–10.5)

## 2019-08-04 PROCEDURE — 71045 X-RAY EXAM CHEST 1 VIEW: CPT | Mod: 26

## 2019-08-04 RX ORDER — FUROSEMIDE 40 MG
40 TABLET ORAL ONCE
Refills: 0 | Status: COMPLETED | OUTPATIENT
Start: 2019-08-04 | End: 2019-08-04

## 2019-08-04 RX ADMIN — LATANOPROST 1 DROP(S): 0.05 SOLUTION/ DROPS OPHTHALMIC; TOPICAL at 21:31

## 2019-08-04 RX ADMIN — Medication 50 MILLIGRAM(S): at 05:11

## 2019-08-04 RX ADMIN — Medication 3 MILLILITER(S): at 09:47

## 2019-08-04 RX ADMIN — MIDODRINE HYDROCHLORIDE 10 MILLIGRAM(S): 2.5 TABLET ORAL at 21:31

## 2019-08-04 RX ADMIN — Medication 3 MILLILITER(S): at 21:48

## 2019-08-04 RX ADMIN — MIDODRINE HYDROCHLORIDE 10 MILLIGRAM(S): 2.5 TABLET ORAL at 05:12

## 2019-08-04 RX ADMIN — Medication 50 MILLIGRAM(S): at 21:30

## 2019-08-04 RX ADMIN — ZINC OXIDE 1 APPLICATION(S): 200 OINTMENT TOPICAL at 05:50

## 2019-08-04 RX ADMIN — Medication 50 MILLIGRAM(S): at 14:36

## 2019-08-04 RX ADMIN — NYSTATIN CREAM 1 APPLICATION(S): 100000 CREAM TOPICAL at 05:49

## 2019-08-04 RX ADMIN — NYSTATIN CREAM 1 APPLICATION(S): 100000 CREAM TOPICAL at 21:39

## 2019-08-04 RX ADMIN — MAGNESIUM OXIDE 400 MG ORAL TABLET 400 MILLIGRAM(S): 241.3 TABLET ORAL at 21:31

## 2019-08-04 RX ADMIN — MAGNESIUM OXIDE 400 MG ORAL TABLET 400 MILLIGRAM(S): 241.3 TABLET ORAL at 14:36

## 2019-08-04 RX ADMIN — MEROPENEM 100 MILLIGRAM(S): 1 INJECTION INTRAVENOUS at 05:11

## 2019-08-04 RX ADMIN — MAGNESIUM OXIDE 400 MG ORAL TABLET 400 MILLIGRAM(S): 241.3 TABLET ORAL at 05:12

## 2019-08-04 RX ADMIN — MIDODRINE HYDROCHLORIDE 10 MILLIGRAM(S): 2.5 TABLET ORAL at 14:36

## 2019-08-04 RX ADMIN — Medication 50 MILLILITER(S): at 05:13

## 2019-08-04 RX ADMIN — CHLORHEXIDINE GLUCONATE 1 APPLICATION(S): 213 SOLUTION TOPICAL at 05:51

## 2019-08-04 RX ADMIN — NYSTATIN CREAM 1 APPLICATION(S): 100000 CREAM TOPICAL at 14:37

## 2019-08-04 RX ADMIN — Medication 3 MILLILITER(S): at 03:10

## 2019-08-04 RX ADMIN — SENNA PLUS 10 MILLILITER(S): 8.6 TABLET ORAL at 21:39

## 2019-08-04 RX ADMIN — ESCITALOPRAM OXALATE 10 MILLIGRAM(S): 10 TABLET, FILM COATED ORAL at 12:14

## 2019-08-04 RX ADMIN — Medication 3 MILLILITER(S): at 17:15

## 2019-08-04 RX ADMIN — Medication 3 MILLILITER(S): at 09:49

## 2019-08-04 RX ADMIN — PANTOPRAZOLE SODIUM 40 MILLIGRAM(S): 20 TABLET, DELAYED RELEASE ORAL at 12:14

## 2019-08-04 RX ADMIN — Medication 3 MILLILITER(S): at 16:55

## 2019-08-04 RX ADMIN — Medication 40 MILLIGRAM(S): at 10:23

## 2019-08-04 RX ADMIN — DORZOLAMIDE HYDROCHLORIDE TIMOLOL MALEATE 1 DROP(S): 20; 5 SOLUTION/ DROPS OPHTHALMIC at 05:46

## 2019-08-04 RX ADMIN — ZINC OXIDE 1 APPLICATION(S): 200 OINTMENT TOPICAL at 18:12

## 2019-08-04 RX ADMIN — DORZOLAMIDE HYDROCHLORIDE TIMOLOL MALEATE 1 DROP(S): 20; 5 SOLUTION/ DROPS OPHTHALMIC at 18:11

## 2019-08-04 NOTE — PROGRESS NOTE ADULT - SUBJECTIVE AND OBJECTIVE BOX
CC: f/u for pneumonia    Patient reports: he was extubated 8/3, he requires frequent suctioning, difficulty clearing airway.He is poorly interactive, requires a small amt of pressers    REVIEW OF SYSTEMS:  All other review of systems negative (Comprehensive ROS)    Antimicrobials Day #  :off    Other Medications Reviewed    T(F): 97.8 (08-04-19 @ 08:00), Max: 98.5 (08-03-19 @ 17:00)  HR: 112 (08-04-19 @ 12:55)  BP: 131/75 (08-04-19 @ 12:00)  RR: 22 (08-04-19 @ 12:55)  SpO2: 96% (08-04-19 @ 12:55)  Wt(kg): --    PHYSICAL EXAM:  General: lethargic, no acute distress, mild respiratory distress  Eyes:  anicteric, no conjunctival injection, no discharge  Oropharynx: no lesions or injection 	  Neck: supple, without adenopathy  Lungs: junky BS  Heart: regular rate and rhythm; no murmur, rubs or gallops  Abdomen: soft, nondistended, nontender, peg in place  Skin: no lesions  Extremities: no clubbing, cyanosis,+ edema  Neurologic: lethargic and poorly interactive    LAB RESULTS:                        7.4    7.45  )-----------( 78       ( 04 Aug 2019 05:45 )             24.0     08-04    146<H>  |  114<H>  |  46<H>  ----------------------------<  97  5.2   |  26  |  1.51<H>    Ca    8.3<L>      04 Aug 2019 05:45  Phos  3.9     08-04  Mg     1.9     08-04          MICROBIOLOGY:  RECENT CULTURES:      RADIOLOGY REVIEWED:  < from: Xray Chest 1 View- PORTABLE-Routine (08.04.19 @ 06:46) >  EXAM:  XR CHEST PORTABLE ROUTINE 1V      PROCEDURE DATE:  08/04/2019        INTERPRETATION:  CLINICAL INFORMATION: Extubation.    A single portable view of the chest was obtained.     Comparison: 8/3/2019.     The mediastinal cardiac silhouette is unremarkable. Pacemaker.    Small bilateral effusions. Patchy opacities in the right lung, unchanged.    No acute osseous finding.     IMPRESSION:    As above.    < end of copied text >

## 2019-08-04 NOTE — PROGRESS NOTE ADULT - SUBJECTIVE AND OBJECTIVE BOX
Follow-up Critical Care Progress Note  Chief Complaint : Sepsis      Patient extubated yesterday  on high flow  has increased oral secretions  comfortable  more alert, but minimally verbal at this time.     Allergies :No Known Allergies      PAST MEDICAL & SURGICAL HISTORY:  Chronic kidney disease  Chronic diastolic heart failure  BPH (benign prostatic hyperplasia)  Shortness of breath  Depression  IBS (irritable bowel syndrome)  Dysphagia: s/p PEG placement  Cerebral infarction: with resulting weakness and dysphagia  Anemia  Psoriasis  GERD (gastroesophageal reflux disease)  Dyslipidemia  Cardiac pacemaker  S/P percutaneous endoscopic gastrostomy (PEG) tube placement      Medications:  MEDICATIONS  (STANDING):  acetylcysteine 20%  Inhalation 3 milliLiter(s) Inhalation every 6 hours  ALBUTerol/ipratropium for Nebulization 3 milliLiter(s) Nebulizer every 6 hours  dorzolamide 2%/timolol 0.5% Ophthalmic Solution 1 Drop(s) Both EYES every 12 hours  escitalopram 10 milliGRAM(s) Oral daily  hydrocortisone sodium succinate Injectable 50 milliGRAM(s) IV Push three times a day  latanoprost 0.005% Ophthalmic Solution 1 Drop(s) Both EYES at bedtime  magnesium oxide 400 milliGRAM(s) Oral every 8 hours  midodrine 10 milliGRAM(s) Oral every 8 hours  norepinephrine Infusion 0.05 MICROgram(s)/kG/Min (4.163 mL/Hr) IV Continuous <Continuous>  nystatin Powder 1 Application(s) Topical three times a day  pantoprazole   Suspension 40 milliGRAM(s) Enteral Tube daily  polyethylene glycol 3350 17 Gram(s) Oral daily  senna Syrup 10 milliLiter(s) Oral at bedtime  zinc oxide 20% Ointment 1 Application(s) Topical two times a day    MEDICATIONS  (PRN):  acetaminophen    Suspension .. 650 milliGRAM(s) Enteral Tube every 6 hours PRN Temp greater or equal to 38C (100.4F), Mild Pain (1 - 3)  sodium chloride 0.9% lock flush 10 milliLiter(s) IV Push every 1 hour PRN Pre/post blood products, medications, blood draw, and to maintain line patency      LABS:                        7.4    7.45  )-----------( 78       ( 04 Aug 2019 05:45 )             24.0     08-04    146<H>  |  114<H>  |  46<H>  ----------------------------<  97  5.2   |  26  |  1.51<H>    Ca    8.3<L>      04 Aug 2019 05:45  Phos  3.9     08-04  Mg     1.9     08-04      Platelet Trend  08-04-19 @ 05:45   -  78<L>  08-03-19 @ 06:00   -  77<L>  08-02-19 @ 17:00   -  76<L>  08-02-19 @ 07:00   -  65<L>  08-02-19 @ 05:30   -  67<L>    H/H Trend  08-04-19 @ 05:45   -  7.4<L> / 24.0<L>  08-03-19 @ 06:00   -  7.4<L> / 24.3<L>  08-02-19 @ 17:00   -  7.8<L> / 25.2<L>  08-02-19 @ 07:00   -  6.8<LL> / 22.0<L>  08-02-19 @ 05:30   -  6.9<LL> / 22.5<L>    WBC Trend  08-04-19 @ 05:45   -  7.45  08-03-19 @ 06:00   -  7.52  08-02-19 @ 17:00   -  7.24  08-02-19 @ 07:00   -  6.19  08-02-19 @ 05:30   -  6.21        Glucose Trend  08-04-19 @ 06:05   -  -- -- 101<H>  08-04-19 @ 05:45   -  -- 97 --  08-03-19 @ 23:20   -  -- -- 125<H>  08-03-19 @ 17:10   -  -- -- 129<H>  08-03-19 @ 13:33   -  -- -- 97  08-03-19 @ 11:30   -  -- -- 116<H>  08-03-19 @ 08:46   -  -- -- 146<H>  08-03-19 @ 06:11   -  -- -- 117<H>  08-03-19 @ 06:00   -  -- 107<H> --  08-03-19 @ 03:46   -  -- -- 123<H>    Hemoglobin A1C, Whole Blood: 4.8 % (07-30-19 @ 08:26)                    CULTURES: (if applicable)    Culture - Urine (collected 07-28-19 @ 15:30)  Source: .Urine Catheterized  Final Report (07-29-19 @ 18:56):    >100,000 CFU/ml Presumptive Candida albicans "Susceptibilities not    performed"    Culture - Sputum (collected 07-28-19 @ 15:30)  Source: .Sputum Sputum  Gram Stain (07-29-19 @ 00:31):    Rare polymorphonuclear leukocytes per low power field    Rare Squamous epithelial cells per low power field    Numerous Gram Variable Rods seen per oil power field    Few Gram positive cocci in pairs seen per oil power field    Few Yeast like cells seen per oil power field  Final Report (07-30-19 @ 18:02):    Numerous Mixed gram negative rods including Few Pseudomonas aeruginosa    Normal Respiratory Renetta present  Organism: Pseudomonas aeruginosa (07-30-19 @ 18:02)  Organism: Pseudomonas aeruginosa (07-30-19 @ 18:02)      -  Amikacin: S <=8      -  Aztreonam: S <=4      -  Cefepime: S <=2      -  Ceftazidime: S <=1      -  Ciprofloxacin: S 1      -  Gentamicin: S 2      -  Imipenem: S <=1      -  Levofloxacin: S 2      -  Meropenem: S <=1      -  Piperacillin/Tazobactam: S <=8      -  Tobramycin: S <=2      Method Type: KALYAN    Culture - Urine (collected 07-28-19 @ 12:24)  Source: .Urine Clean Catch (Midstream)/ msue&#x27;d twice  Final Report (07-29-19 @ 18:56):    50,000 - 99,000 CFU/mL Presumptive Candida albicans "Susceptibilities not    performed"    Culture - Blood (collected 07-28-19 @ 11:40)  Source: .Blood Blood-Peripheral  Final Report (08-02-19 @ 19:01):    No growth at 5 days.    Culture - Blood (collected 07-28-19 @ 11:40)  Source: .Blood Blood-Peripheral  Final Report (08-02-19 @ 19:01):    No growth at 5 days.          ABG - ( 03 Aug 2019 13:30 )  pH, Arterial: 7.43  pH, Blood: x     /  pCO2: 33    /  pO2: 59    / HCO3: x     / Base Excess: x     /  SaO2: 93                CAPILLARY BLOOD GLUCOSE      POCT Blood Glucose.: 101 mg/dL (04 Aug 2019 06:05)      RADIOLOGY  CXR:  < from: Xray Chest 1 View- PORTABLE-Routine (08.04.19 @ 06:46) >  INTERPRETATION:  CLINICAL INFORMATION: Extubation.    A single portable view of the chest was obtained.     Comparison: 8/3/2019.     The mediastinal cardiac silhouette is unremarkable. Pacemaker.    Small bilateral effusions. Patchy opacities in the right lung, unchanged.    No acute osseous finding.     IMPRESSION:    As above.      < end of copied text >      VITALS:  T(C): 36.6 (08-04-19 @ 08:00), Max: 36.9 (08-03-19 @ 17:00)  T(F): 97.8 (08-04-19 @ 08:00), Max: 98.5 (08-03-19 @ 17:00)  HR: 99 (08-04-19 @ 08:00) (60 - 99)  BP: 133/71 (08-04-19 @ 08:00) (81/48 - 141/91)  BP(mean): 90 (08-04-19 @ 08:00) (59 - 107)  ABP: --  ABP(mean): --  RR: 23 (08-03-19 @ 22:00) (18 - 27)  SpO2: 95% (08-04-19 @ 08:00) (91% - 100%)  CVP(mm Hg): --  CVP(cm H2O): --    Ins and Outs     08-03-19 @ 07:01  -  08-04-19 @ 07:00  --------------------------------------------------------  IN: 1764 mL / OUT: 2300 mL / NET: -536 mL    08-04-19 @ 07:01  -  08-04-19 @ 09:05  --------------------------------------------------------  IN: 140 mL / OUT: 65 mL / NET: 75 mL                I&O's Detail    03 Aug 2019 07:01  -  04 Aug 2019 07:00  --------------------------------------------------------  IN:    Enteral Tube Flush: 260 mL    Free Water: 200 mL    norepinephrine Infusion: 24 mL    ns in tub fed  [id number]: 980 mL    Solution: 100 mL    Solution: 200 mL  Total IN: 1764 mL    OUT:    Indwelling Catheter - Urethral: 2300 mL  Total OUT: 2300 mL    Total NET: -536 mL      04 Aug 2019 07:01  -  04 Aug 2019 09:05  --------------------------------------------------------  IN:    ns in tub fed  [id number]: 140 mL  Total IN: 140 mL    OUT:    Indwelling Catheter - Urethral: 65 mL  Total OUT: 65 mL    Total NET: 75 mL

## 2019-08-04 NOTE — PROGRESS NOTE ADULT - ASSESSMENT
Physical Examination:  GENERAL:               Arousable, intubated, No acute distress.    HEENT:                   No JVD, Dry MM  PULM:                     Bilateral air entry, Clear to auscultation bilaterally, no significant sputum production, No Rales, No Rhonchi, No Wheezing  CVS:                         S1, S2,  +Murmur  ABD:                        Soft, distended, nontender, normoactive bowel sounds, + Peg  EXT:                         +edema, nontender  Vascular:                Warm Extremities, Normal Capillary refill,  SKIN:                       Warm and well perfused, no rashes noted.   NEURO:                  Sedated, not follows commands  PSYC:                      Calm, no Insight.        Assessment  Septic Shock with multiorgan failure, due to aspiration PNA on pressors with  candida urine collonization  Acute Respiratory failure intubated 7/28   Thrombocytopenia due to sepsis  Lactic acidosis resolved  MELISSA with baseline Cr 1.2-1.4   Hypoglycemia uimproving  Hyperkalemia improved  CVA with Dysphagia s/p PEG  Underlying BPH, chronic diastolic CHF      Plan  continue high flow - taper fio2 as needed  chest PT, Pulm toilet,   agree with Mucomyst and albuterol  IV abx as per ID      give albumin and lasix,  Continue steroids today, monitor glucose   taper fio2 as tolerated    restart chemical DVT ppx once plts > 100, as remaining low gene eval  will likely change hydrocortisone to prednisone in am.     Continue supportive care  palliative care f/u      Family Updated: 	Dtr Amelie -                        Date  7/31/19 - Is away                                       wife Angie [phone]	             Date: 8/4/19  -     Sedation & Analgesia:	As above  Diet/Nutrition:		Start peg feeding today vs kyle  GI PPx:			PPI    DVT Ppx:		venodynes start dvt ppx when plts > 100    Activity:		               Bedrest  Head of Bed:               35-45  Glycemic Control:        n/a    Lines:  PIV  CENTRAL LINE: 	[ x] YES [ ] NO	                    LOCATION:   	  [facility]                     DATE INSERTED:   	                    REMOVE:  [ ] YES [ x] NO  // d/c in am if off pressors  A-LINE:  	                [ ] YES [x ] NO                      LOCATION:   	                       DATE INSERTED: 		            REMOVE:  [ ] YES [ ] NO    SOTO: 		        [x] YES [ ] NO  		                                       DATE INSERTED:		            REMOVE:  [ ] YES [ x] NO  //voiding trial this pm    Restraints may be deemed necessary to prevent removal of life-sustaining devices [ x ] YES   [    ]  NO    Disposition: ICU monitoring today, possible transfer to  in am.     Goals of Care: DNR, Trial intubation, family does not want trach.

## 2019-08-04 NOTE — PROGRESS NOTE ADULT - ASSESSMENT
Elderly man, recent hospital stay for MSSA bacteremic pneumonia, G tube, at rehab, returns with respiratory distress, required intubation, pressors, thrombocytopenia, hypothermia, severe sepsis/shock  CXR with dense infiltrate- healthcare associated pneumonia.  Pseudomonas predominating in Sputum, sensitive strain  Afebrile, on CPAP, WBC lower  Pressers being weaned but unable to remove  Tolerating C Pap but very debilitated.  Being transfused for anemia  Favor 7-10 days of pseudomonal therapy.  Yeast in urine is c/w colonization,no treatment is needed.  S/P extubation earlier  Meropenem stopped 8/3-1 week  Plan:  Continue ICU support  per Critical care  Suction as needed  Follow temps and CBC/diff   Prognosis still guarded, high risk for reintubation

## 2019-08-05 LAB
ANION GAP SERPL CALC-SCNC: 7 MMOL/L — SIGNIFICANT CHANGE UP (ref 5–17)
BUN SERPL-MCNC: 50 MG/DL — HIGH (ref 7–23)
CALCIUM SERPL-MCNC: 8.6 MG/DL — SIGNIFICANT CHANGE UP (ref 8.4–10.5)
CHLORIDE SERPL-SCNC: 114 MMOL/L — HIGH (ref 96–108)
CO2 SERPL-SCNC: 26 MMOL/L — SIGNIFICANT CHANGE UP (ref 22–31)
CREAT SERPL-MCNC: 1.4 MG/DL — HIGH (ref 0.5–1.3)
GLUCOSE BLDC GLUCOMTR-MCNC: 138 MG/DL — HIGH (ref 70–99)
GLUCOSE BLDC GLUCOMTR-MCNC: 186 MG/DL — HIGH (ref 70–99)
GLUCOSE SERPL-MCNC: 105 MG/DL — HIGH (ref 70–99)
HCT VFR BLD CALC: 25.1 % — LOW (ref 39–50)
HGB BLD-MCNC: 7.7 G/DL — LOW (ref 13–17)
MCHC RBC-ENTMCNC: 29.5 PG — SIGNIFICANT CHANGE UP (ref 27–34)
MCHC RBC-ENTMCNC: 30.7 GM/DL — LOW (ref 32–36)
MCV RBC AUTO: 96.2 FL — SIGNIFICANT CHANGE UP (ref 80–100)
NRBC # BLD: 0 /100 WBCS — SIGNIFICANT CHANGE UP (ref 0–0)
PLATELET # BLD AUTO: 126 K/UL — LOW (ref 150–400)
POTASSIUM SERPL-MCNC: 4.7 MMOL/L — SIGNIFICANT CHANGE UP (ref 3.5–5.3)
POTASSIUM SERPL-SCNC: 4.7 MMOL/L — SIGNIFICANT CHANGE UP (ref 3.5–5.3)
RBC # BLD: 2.61 M/UL — LOW (ref 4.2–5.8)
RBC # FLD: 19.2 % — HIGH (ref 10.3–14.5)
SODIUM SERPL-SCNC: 147 MMOL/L — HIGH (ref 135–145)
WBC # BLD: 7.3 K/UL — SIGNIFICANT CHANGE UP (ref 3.8–10.5)
WBC # FLD AUTO: 7.3 K/UL — SIGNIFICANT CHANGE UP (ref 3.8–10.5)

## 2019-08-05 PROCEDURE — 99223 1ST HOSP IP/OBS HIGH 75: CPT

## 2019-08-05 PROCEDURE — 99233 SBSQ HOSP IP/OBS HIGH 50: CPT

## 2019-08-05 PROCEDURE — 71045 X-RAY EXAM CHEST 1 VIEW: CPT | Mod: 26

## 2019-08-05 RX ADMIN — MAGNESIUM OXIDE 400 MG ORAL TABLET 400 MILLIGRAM(S): 241.3 TABLET ORAL at 21:17

## 2019-08-05 RX ADMIN — PANTOPRAZOLE SODIUM 40 MILLIGRAM(S): 20 TABLET, DELAYED RELEASE ORAL at 12:51

## 2019-08-05 RX ADMIN — MIDODRINE HYDROCHLORIDE 10 MILLIGRAM(S): 2.5 TABLET ORAL at 21:14

## 2019-08-05 RX ADMIN — MIDODRINE HYDROCHLORIDE 10 MILLIGRAM(S): 2.5 TABLET ORAL at 05:52

## 2019-08-05 RX ADMIN — DORZOLAMIDE HYDROCHLORIDE TIMOLOL MALEATE 1 DROP(S): 20; 5 SOLUTION/ DROPS OPHTHALMIC at 05:53

## 2019-08-05 RX ADMIN — Medication 3 MILLILITER(S): at 16:00

## 2019-08-05 RX ADMIN — DORZOLAMIDE HYDROCHLORIDE TIMOLOL MALEATE 1 DROP(S): 20; 5 SOLUTION/ DROPS OPHTHALMIC at 18:12

## 2019-08-05 RX ADMIN — MAGNESIUM OXIDE 400 MG ORAL TABLET 400 MILLIGRAM(S): 241.3 TABLET ORAL at 13:14

## 2019-08-05 RX ADMIN — NYSTATIN CREAM 1 APPLICATION(S): 100000 CREAM TOPICAL at 13:17

## 2019-08-05 RX ADMIN — ZINC OXIDE 1 APPLICATION(S): 200 OINTMENT TOPICAL at 21:11

## 2019-08-05 RX ADMIN — LATANOPROST 1 DROP(S): 0.05 SOLUTION/ DROPS OPHTHALMIC; TOPICAL at 21:10

## 2019-08-05 RX ADMIN — Medication 3 MILLILITER(S): at 03:49

## 2019-08-05 RX ADMIN — ESCITALOPRAM OXALATE 10 MILLIGRAM(S): 10 TABLET, FILM COATED ORAL at 12:51

## 2019-08-05 RX ADMIN — ZINC OXIDE 1 APPLICATION(S): 200 OINTMENT TOPICAL at 05:54

## 2019-08-05 RX ADMIN — Medication 3 MILLILITER(S): at 09:09

## 2019-08-05 RX ADMIN — MAGNESIUM OXIDE 400 MG ORAL TABLET 400 MILLIGRAM(S): 241.3 TABLET ORAL at 05:53

## 2019-08-05 RX ADMIN — Medication 3 MILLILITER(S): at 09:10

## 2019-08-05 RX ADMIN — Medication 40 MILLIGRAM(S): at 05:52

## 2019-08-05 RX ADMIN — Medication 3 MILLILITER(S): at 03:51

## 2019-08-05 RX ADMIN — MIDODRINE HYDROCHLORIDE 10 MILLIGRAM(S): 2.5 TABLET ORAL at 14:36

## 2019-08-05 RX ADMIN — Medication 3 MILLILITER(S): at 22:40

## 2019-08-05 RX ADMIN — Medication 3 MILLILITER(S): at 16:01

## 2019-08-05 RX ADMIN — NYSTATIN CREAM 1 APPLICATION(S): 100000 CREAM TOPICAL at 05:53

## 2019-08-05 RX ADMIN — SENNA PLUS 10 MILLILITER(S): 8.6 TABLET ORAL at 21:15

## 2019-08-05 RX ADMIN — NYSTATIN CREAM 1 APPLICATION(S): 100000 CREAM TOPICAL at 21:18

## 2019-08-05 NOTE — CHART NOTE - NSCHARTNOTEFT_GEN_A_CORE
Nutrition Follow Up Note  Hospital Course (Per Electronic Medical Record):   Source: Medical Record [X]  Nursing Staff [X]     Diet:  Jevity 1.5 @ 70cc/hr x 18hrs with Prosource 30cc BID.     Patient extubated yesterday , tolerating current PEG feeds as per RN. Current regimen is providing (2010 chava/ 110 g pro). Labs noted , patient noted transfused (8/2).     Current Weight: (8/5)  229.9/104.5kg(Weight obtained by RD) , weight change noted (+) Edema. Patient noted to receive albumin.    Pertinent Medications: MEDICATIONS  (STANDING):  acetylcysteine 20%  Inhalation 3 milliLiter(s) Inhalation every 6 hours  ALBUTerol/ipratropium for Nebulization 3 milliLiter(s) Nebulizer every 6 hours  dorzolamide 2%/timolol 0.5% Ophthalmic Solution 1 Drop(s) Both EYES every 12 hours  escitalopram 10 milliGRAM(s) Oral daily  latanoprost 0.005% Ophthalmic Solution 1 Drop(s) Both EYES at bedtime  magnesium oxide 400 milliGRAM(s) Oral every 8 hours  midodrine 10 milliGRAM(s) Oral every 8 hours  nystatin Powder 1 Application(s) Topical three times a day  pantoprazole   Suspension 40 milliGRAM(s) Enteral Tube daily  polyethylene glycol 3350 17 Gram(s) Oral daily  predniSONE   Tablet 40 milliGRAM(s) Oral daily  senna Syrup 10 milliLiter(s) Oral at bedtime  zinc oxide 20% Ointment 1 Application(s) Topical two times a day    MEDICATIONS  (PRN):  acetaminophen    Suspension .. 650 milliGRAM(s) Enteral Tube every 6 hours PRN Temp greater or equal to 38C (100.4F), Mild Pain (1 - 3)  sodium chloride 0.9% lock flush 10 milliLiter(s) IV Push every 1 hour PRN Pre/post blood products, medications, blood draw, and to maintain line patency      Pertinent Labs:  08-05 Na147 mmol/L<H> Glu 105 mg/dL<H> K+ 4.7 mmol/L Cr  1.40 mg/dL<H> BUN 50 mg/dL<H> 08-04 Phos 3.9 mg/dL 07-31 Alb 1.3 g/dL<L> 07-30 [id number] 4.8 %        Skin: Intact    Edema: (+3) hands , (+2) generalized edema    Last BM: last noted (8/3)    Estimated Needs:   [X] No Change since Previous Assessment  [X] Recalculated:     Previous Nutrition Diagnosis: Severe Malnutrition    Nutrition Diagnosis is [X] Ongoing       New Nutrition Diagnosis: [X] Not Applicable         Interventions:   1. Recommend continue current PEG regimen.     Monitoring & Evaluation: will monitor:  [X] Weights   [X] tolerance to PEG feeds  [X] Follow Up (Per Protocol)        RD to follow as per Nutrition protocol  Renetta Silva RDN

## 2019-08-05 NOTE — CHART NOTE - NSCHARTNOTEFT_GEN_A_CORE
Left IJ TLC removed without complication. Line extraction timed with maximal expiration. Hemostasis obtained and sterile dressing applied.

## 2019-08-05 NOTE — PROGRESS NOTE ADULT - SUBJECTIVE AND OBJECTIVE BOX
Progress: f/u palliative, pt extubated on 8/3, out of ccu, more awake, alert, attempting to speak.     Present Symptoms:   Dyspnea: mild  Nausea/Vomiting: no  Anxiety:  no  Depressed Mood:   Fatigue: yes  Loss of appetite: yes  Pain:      no s/s             location:   Review of Systems: [ Unable to obtain due to poor mentation]    MEDICATIONS  (STANDING):  acetylcysteine 20%  Inhalation 3 milliLiter(s) Inhalation every 6 hours  ALBUTerol/ipratropium for Nebulization 3 milliLiter(s) Nebulizer every 6 hours  dorzolamide 2%/timolol 0.5% Ophthalmic Solution 1 Drop(s) Both EYES every 12 hours  escitalopram 10 milliGRAM(s) Oral daily  latanoprost 0.005% Ophthalmic Solution 1 Drop(s) Both EYES at bedtime  magnesium oxide 400 milliGRAM(s) Oral every 8 hours  midodrine 10 milliGRAM(s) Oral every 8 hours  nystatin Powder 1 Application(s) Topical three times a day  pantoprazole   Suspension 40 milliGRAM(s) Enteral Tube daily  polyethylene glycol 3350 17 Gram(s) Oral daily  predniSONE   Tablet 30 milliGRAM(s) Oral daily  senna Syrup 10 milliLiter(s) Oral at bedtime  zinc oxide 20% Ointment 1 Application(s) Topical two times a day    MEDICATIONS  (PRN):  acetaminophen    Suspension .. 650 milliGRAM(s) Enteral Tube every 6 hours PRN Temp greater or equal to 38C (100.4F), Mild Pain (1 - 3)  sodium chloride 0.9% lock flush 10 milliLiter(s) IV Push every 1 hour PRN Pre/post blood products, medications, blood draw, and to maintain line patency      PHYSICAL EXAM:  Vital Signs Last 24 Hrs  T(C): 36.3 (05 Aug 2019 11:06), Max: 36.8 (05 Aug 2019 07:00)  T(F): 97.4 (05 Aug 2019 11:06), Max: 98.3 (05 Aug 2019 07:00)  HR: 60 (05 Aug 2019 11:06) (60 - 115)  BP: 155/86 (05 Aug 2019 11:06) (117/67 - 159/84)  BP(mean): 99 (05 Aug 2019 08:00) (83 - 110)  RR: 17 (05 Aug 2019 11:06) (15 - 22)  SpO2: 96% (05 Aug 2019 11:06) (92% - 100%)  General: alert  on high  flow  o2      HEENT: n/c, a/t     Lungs: coarse, few rhonchi ,dim to bases    CV: s1s2    GI: soft, n/t + bs + peg     : normal    Musculoskeletal: edematous, weak    Skin: warm dry , fragile     Neuro: alert, attempting to speak few words    Oral intake ability:  npo  Diet: tube feeds      LABS:                          7.7    7.30  )-----------( 126      ( 05 Aug 2019 05:45 )             25.1     08-05    147<H>  |  114<H>  |  50<H>  ----------------------------<  105<H>  4.7   |  26  |  1.40<H>    Ca    8.6      05 Aug 2019 05:45  Phos  3.9     08-04  Mg     1.9     08-04          RADIOLOGY & ADDITIONAL STUDIES:< from: Xray Chest 1 View- PORTABLE-Routine (08.05.19 @ 06:24) >  EXAM:  XR CHEST PORTABLE ROUTINE 1V      PROCEDURE DATE:  08/05/2019        INTERPRETATION:  Single view chest    History sepsis    Comparison yesterday    A pacemaker and central line remain in place. There are slightly   worsening pulmonary opacities in the right lung relative to the left   compared to the prior exam. There is moderate stable cardiomegaly. There   is no layering effusion or pneumothorax.        ADVANCE DIRECTIVES: DNR/ trial intubation   Advanced Care Planning discussion total time spent:

## 2019-08-05 NOTE — PROGRESS NOTE ADULT - ASSESSMENT
Physical Examination:  GENERAL:               Arousable, intubated, No acute distress.    HEENT:                   No JVD, Dry MM  PULM:                     Bilateral air entry, Clear to auscultation bilaterally, no significant sputum production, No Rales, No Rhonchi, No Wheezing  CVS:                         S1, S2,  +Murmur  ABD:                        Soft, distended, nontender, normoactive bowel sounds, + Peg  EXT:                         +edema, nontender  Vascular:                Warm Extremities, Normal Capillary refill,  SKIN:                       Warm and well perfused, no rashes noted.   NEURO:                  Sedated, not follows commands  PSYC:                      Calm, no Insight.        Assessment  Septic Shock with multiorgan failure, due to aspiration PNA on pressors with  candida urine collonization  Acute Respiratory failure intubated 7/28   Thrombocytopenia due to sepsis  Lactic acidosis resolved  MELISSA with baseline Cr 1.2-1.4   Hypoglycemia uimproving  Hyperkalemia improved  CVA with Dysphagia s/p PEG  Underlying BPH, chronic diastolic CHF      Plan  continue high flow - taper fio2 as needed  chest PT, Pulm toilet,   continue  Mucomyst and albuterol  IV abx as per ID    taper steroids today, monitor glucose q4  taper fio2 as tolerated    restart chemical DVT ppx today is first day > 100 will give one more day if remain high, start Hep SQ    Continue supportive care  palliative care f/u      Family Updated: 	Dtmora Kinsey -                        Date  7/31/19 - Is away                                       wife Angie [phone]	             Date: 8/5/19  - msg left for call back    Sedation & Analgesia:	As above  Diet/Nutrition:		Start peg feeding today vs kyle  GI PPx:			PPI    DVT Ppx:		venodynes start dvt ppx when plts > 100    Activity:		               Bedrest  Head of Bed:               35-45  Glycemic Control:        n/a    Lines:  PIV  CENTRAL LINE: 	[ x] YES [ ] NO	                    LOCATION:   	  [facility]                     DATE INSERTED:   	                    REMOVE:  [ ] YES [ x] NO  //DC today    Restraints may be deemed necessary to prevent removal of life-sustaining devices [ x ] YES   [    ]  NO    Disposition:transfer to  today    Goals of Care: DNR, Trial intubation, family does not want trach.

## 2019-08-05 NOTE — PROGRESS NOTE ADULT - ASSESSMENT
A/P 86 year old male with extensive PMH including IBS, CVA with dysphagia and PEG placement, anemia, psoriasis, GERD, depression, chf , PPM admitted with severe sepsis from likely aspiration pneumonia. Septic Shock with multiorgan failure.   Acute Respiratory failure intubated 7/28 , extubated 8/3.   Palliative : pt now extubated and on high flow o2 out of ccu, more awake, alert, whispering words. On peg feeds. Appears tired but comfortable today .   Daughter at bedside today, had no  questions for palliative team today. Pt  improved, but condition remains guarded, and pt high risk for re- intubation.   Wife not visiting today . Will cont to follow, monitor clinical status. Cont supportive care pt and wife.

## 2019-08-05 NOTE — PROGRESS NOTE ADULT - ASSESSMENT
Elderly man, recent hospital stay for MSSA bacteremic pneumonia, G tube, at rehab, returns with respiratory distress, required intubation, pressors, thrombocytopenia, hypothermia, severe sepsis/shock  CXR with dense infiltrate- healthcare associated pneumonia.  Pseudomonas predominating in Sputum, sensitive strain  Afebrile, on CPAP, WBC lower  Pressers weaned  Tolerating extubation but very debilitated.  Completed 1 week of pseudomonal therapy  Yeast in urine is c/w colonization,no treatment is needed.  Meropenem stopped 8/3-1 week  At high risk for recuurent respiratory failure  CXR findings not unexpected, component of radiographic lag  Plan:  Continue medical support  Suction as needed  Follow temps and CBC/diff   Prognosis still guarded, high risk for reintubation  No indications for antibiotics

## 2019-08-05 NOTE — CONSULT NOTE ADULT - SUBJECTIVE AND OBJECTIVE BOX
86 year old male with extensive PMH including IBS, CVA with dysphagia and PEG placement, anemia, psoriasis, GERD, depression, PPM placement treated for MSSA bacteremia at [facility] 6/19 presented to ED today via EMS from [facility] with change in mental status. Patient remains non verbal and unarousable.      MEDICATIONS  (STANDING):  ALBUTerol/ipratropium for Nebulization 3 milliLiter(s) Nebulizer every 6 hours  dorzolamide 2%/timolol 0.5% Ophthalmic Solution 1 Drop(s) Both EYES every 12 hours  escitalopram 10 milliGRAM(s) Oral daily  latanoprost 0.005% Ophthalmic Solution 1 Drop(s) Both EYES at bedtime  magnesium oxide 400 milliGRAM(s) Oral every 8 hours  midodrine 10 milliGRAM(s) Oral every 8 hours  nystatin Powder 1 Application(s) Topical three times a day  pantoprazole   Suspension 40 milliGRAM(s) Enteral Tube daily  polyethylene glycol 3350 17 Gram(s) Oral daily  predniSONE   Tablet 30 milliGRAM(s) Oral daily  senna Syrup 10 milliLiter(s) Oral at bedtime  zinc oxide 20% Ointment 1 Application(s) Topical two times a day    MEDICATIONS  (PRN):  acetaminophen    Suspension .. 650 milliGRAM(s) Enteral Tube every 6 hours PRN Temp greater or equal to 38C (100.4F), Mild Pain (1 - 3)  sodium chloride 0.9% lock flush 10 milliLiter(s) IV Push every 1 hour PRN Pre/post blood products, medications, blood draw, and to maintain line patency    PAST MEDICAL & SURGICAL HISTORY:  Chronic kidney disease  Chronic diastolic heart failure  BPH (benign prostatic hyperplasia)  Shortness of breath  Depression  IBS (irritable bowel syndrome)  Dysphagia: s/p PEG placement  Cerebral infarction: with resulting weakness and dysphagia  Anemia  Psoriasis  GERD (gastroesophageal reflux disease)  Dyslipidemia  Cardiac pacemaker  S/P percutaneous endoscopic gastrostomy (PEG) tube placement  Vital Signs Last 24 Hrs  T(C): 36.9 (05 Aug 2019 15:18), Max: 36.9 (05 Aug 2019 15:18)  T(F): 98.4 (05 Aug 2019 15:18), Max: 98.4 (05 Aug 2019 15:18)  HR: 61 (05 Aug 2019 16:31) (60 - 69)  BP: 146/74 (05 Aug 2019 15:18) (130/71 - 159/84)  BP(mean): 99 (05 Aug 2019 08:00) (88 - 110)  RR: 17 (05 Aug 2019 15:18) (15 - 22)  SpO2: 96% (05 Aug 2019 16:31) (94% - 100%)

## 2019-08-05 NOTE — CONSULT NOTE ADULT - ASSESSMENT
85 yo M with PMHx of Glaucoma, benign prostate neoplasm, HTN, HLD, Grad 3 Diastolic Heart Failure s/p ppm, CKD, CVA with dysphagia s/p PEG tube placement presented with altered mentation.                           7.7    7.30  )-----------( 126      ( 05 Aug 2019 05:45 )             25.1       Thrombocytopenia initially normal in June, trended down to 65 and then improved to 126,000 now.  Anemia Hg 6-7.7 lately, counts slightly better now than earlier in the hospital stay.    Anemia due to chronic kidney injury and chronic disease, transfuse for Hg < 7g/dl.  Platelets low during course of most of the hospital stay, now improving proably due to sepsis, consumptive coagulopathy, which seems to be resolving as of late.     Recommend checking fibrinogen and PT/PTT for consumptive coagulopathy, check LDH haptoglobin for hemolysis. Check b12 and folic acid, and retic count.

## 2019-08-05 NOTE — PROGRESS NOTE ADULT - SUBJECTIVE AND OBJECTIVE BOX
Follow-up Critical Care Progress Note  Chief Complaint : Sepsis      pt comfortable  less sob  secretions manageable  on high flow comfortable    bedside suctioning done, mild thin watery secretions was suctioned.     despite worsening radiological findings, Clinically pt has improved.     Allergies :No Known Allergies      PAST MEDICAL & SURGICAL HISTORY:  Chronic kidney disease  Chronic diastolic heart failure  BPH (benign prostatic hyperplasia)  Shortness of breath  Depression  IBS (irritable bowel syndrome)  Dysphagia: s/p PEG placement  Cerebral infarction: with resulting weakness and dysphagia  Anemia  Psoriasis  GERD (gastroesophageal reflux disease)  Dyslipidemia  Cardiac pacemaker  S/P percutaneous endoscopic gastrostomy (PEG) tube placement      Medications:  MEDICATIONS  (STANDING):  acetylcysteine 20%  Inhalation 3 milliLiter(s) Inhalation every 6 hours  ALBUTerol/ipratropium for Nebulization 3 milliLiter(s) Nebulizer every 6 hours  dorzolamide 2%/timolol 0.5% Ophthalmic Solution 1 Drop(s) Both EYES every 12 hours  escitalopram 10 milliGRAM(s) Oral daily  latanoprost 0.005% Ophthalmic Solution 1 Drop(s) Both EYES at bedtime  magnesium oxide 400 milliGRAM(s) Oral every 8 hours  midodrine 10 milliGRAM(s) Oral every 8 hours  nystatin Powder 1 Application(s) Topical three times a day  pantoprazole   Suspension 40 milliGRAM(s) Enteral Tube daily  polyethylene glycol 3350 17 Gram(s) Oral daily  predniSONE   Tablet 40 milliGRAM(s) Oral daily  senna Syrup 10 milliLiter(s) Oral at bedtime  zinc oxide 20% Ointment 1 Application(s) Topical two times a day    MEDICATIONS  (PRN):  acetaminophen    Suspension .. 650 milliGRAM(s) Enteral Tube every 6 hours PRN Temp greater or equal to 38C (100.4F), Mild Pain (1 - 3)  sodium chloride 0.9% lock flush 10 milliLiter(s) IV Push every 1 hour PRN Pre/post blood products, medications, blood draw, and to maintain line patency      LABS:                        7.7    7.30  )-----------( 126      ( 05 Aug 2019 05:45 )             25.1     08-05    147<H>  |  114<H>  |  50<H>  ----------------------------<  105<H>  4.7   |  26  |  1.40<H>    Ca    8.6      05 Aug 2019 05:45  Phos  3.9     08-04  Mg     1.9     08-04        Glucose Trend  08-05-19 @ 05:45   -  -- 105<H> --  08-04-19 @ 18:15   -  -- -- 159<H>  08-04-19 @ 12:05   -  -- -- 155<H>  08-04-19 @ 06:05   -  -- -- 101<H>  08-04-19 @ 05:45   -  -- 97 --  08-03-19 @ 23:20   -  -- -- 125<H>  08-03-19 @ 17:10   -  -- -- 129<H>  08-03-19 @ 13:33   -  -- -- 97  08-03-19 @ 11:30   -  -- -- 116<H>  08-03-19 @ 08:46   -  -- -- 146<H>    Hemoglobin A1C, Whole Blood: 4.8 % (07-30-19 @ 08:26)    Platelet Trend  08-05-19 @ 05:45   -  126<L>  08-04-19 @ 05:45   -  78<L>  08-03-19 @ 06:00   -  77<L>  08-02-19 @ 17:00   -  76<L>                    CULTURES: (if applicable)    Culture - Urine (collected 07-28-19 @ 15:30)  Source: .Urine Catheterized  Final Report (07-29-19 @ 18:56):    >100,000 CFU/ml Presumptive Candida albicans "Susceptibilities not    performed"    Culture - Sputum (collected 07-28-19 @ 15:30)  Source: .Sputum Sputum  Gram Stain (07-29-19 @ 00:31):    Rare polymorphonuclear leukocytes per low power field    Rare Squamous epithelial cells per low power field    Numerous Gram Variable Rods seen per oil power field    Few Gram positive cocci in pairs seen per oil power field    Few Yeast like cells seen per oil power field  Final Report (07-30-19 @ 18:02):    Numerous Mixed gram negative rods including Few Pseudomonas aeruginosa    Normal Respiratory Renetta present  Organism: Pseudomonas aeruginosa (07-30-19 @ 18:02)  Organism: Pseudomonas aeruginosa (07-30-19 @ 18:02)      -  Amikacin: S <=8      -  Aztreonam: S <=4      -  Cefepime: S <=2      -  Ceftazidime: S <=1      -  Ciprofloxacin: S 1      -  Gentamicin: S 2      -  Imipenem: S <=1      -  Levofloxacin: S 2      -  Meropenem: S <=1      -  Piperacillin/Tazobactam: S <=8      -  Tobramycin: S <=2      Method Type: KALYAN    Culture - Urine (collected 07-28-19 @ 12:24)  Source: .Urine Clean Catch (Midstream)/ msue&#[id number] twice  Final Report (07-29-19 @ 18:56):    50,000 - 99,000 CFU/mL Presumptive Candida albicans "Susceptibilities not    performed"    Culture - Blood (collected 07-28-19 @ 11:40)  Source: .Blood Blood-Peripheral  Final Report (08-02-19 @ 19:01):    No growth at 5 days.    Culture - Blood (collected 07-28-19 @ 11:40)  Source: .Blood Blood-Peripheral  Final Report (08-02-19 @ 19:01):    No growth at 5 days.          ABG - ( 03 Aug 2019 13:30 )  pH, Arterial: 7.43  pH, Blood: x     /  pCO2: 33    /  pO2: 59    / HCO3: x     / Base Excess: x     /  SaO2: 93                CAPILLARY BLOOD GLUCOSE      POCT Blood Glucose.: 159 mg/dL (04 Aug 2019 18:15)      RADIOLOGY  CXR:  < from: Xray Chest 1 View- PORTABLE-Routine (08.05.19 @ 06:24) >    A pacemaker and central line remain in place. There are slightly   worsening pulmonary opacities in the right lung relative to the left   compared to the prior exam. There is moderate stable cardiomegaly. There   is no layering effusion or pneumothorax.    < end of copied text >        VITALS:  T(C): 36.8 (08-05-19 @ 07:00), Max: 36.8 (08-05-19 @ 07:00)  T(F): 98.3 (08-05-19 @ 07:00), Max: 98.3 (08-05-19 @ 07:00)  HR: 60 (08-05-19 @ 08:33) (60 - 120)  BP: 144/73 (08-05-19 @ 07:00) (117/67 - 159/84)  BP(mean): 90 (08-05-19 @ 07:00) (83 - 110)  ABP: --  ABP(mean): --  RR: 15 (08-05-19 @ 07:00) (15 - 22)  SpO2: 100% (08-05-19 @ 08:33) (92% - 100%)  CVP(mm Hg): --  CVP(cm H2O): --    Ins and Outs     08-04-19 @ 07:01  -  08-05-19 @ 07:00  --------------------------------------------------------  IN: 1930 mL / OUT: 1081 mL / NET: 849 mL                I&O's Detail    04 Aug 2019 07:01  -  05 Aug 2019 07:00  --------------------------------------------------------  IN:    Free Water: 600 mL    ns in tub fed  [id number]: 1330 mL  Total IN: 1930 mL    OUT:    Incontinent per Diaper: 1 mL    Indwelling Catheter - Urethral: 1080 mL  Total OUT: 1081 mL    Total NET: 849 mL

## 2019-08-05 NOTE — PROGRESS NOTE ADULT - SUBJECTIVE AND OBJECTIVE BOX
CC: f/u for pneumonia and respiratory failure    Patient reports: he is a little more alert, not verbal, moved to floor bed on high flow nasal oxygen.He is tolerating enteral feeds    REVIEW OF SYSTEMS:  All other review of systems negative (Comprehensive ROS)    Antimicrobials Day #  :s/p meropenem    Other Medications Reviewed    T(F): 98.3 (08-05-19 @ 07:00), Max: 98.3 (08-05-19 @ 07:00)  HR: 60 (08-05-19 @ 09:00)  BP: 145/76 (08-05-19 @ 09:00)  RR: 16 (08-05-19 @ 09:00)  SpO2: 97% (08-05-19 @ 09:00)  Wt(kg): --    PHYSICAL EXAM:  General: lethargic no acute distress  Eyes:  anicteric, no conjunctival injection, no discharge  Oropharynx: no lesions or injection 	  Neck: supple, without adenopathy  Lungs: coarse BS  Heart: regular rate and rhythm; no murmur, rubs or gallops  Abdomen: soft, nondistended, nontender, peg  Skin: no lesions  Extremities: no clubbing, cyanosis, + edema  Neurologic: lethargic and poorly interactive    LAB RESULTS:                        7.7    7.30  )-----------( 126      ( 05 Aug 2019 05:45 )             25.1     08-05    147<H>  |  114<H>  |  50<H>  ----------------------------<  105<H>  4.7   |  26  |  1.40<H>    Ca    8.6      05 Aug 2019 05:45  Phos  3.9     08-04  Mg     1.9     08-04          MICROBIOLOGY:  RECENT CULTURES:      RADIOLOGY REVIEWED:    < from: Xray Chest 1 View- PORTABLE-Routine (08.05.19 @ 06:24) >  EXAM:  XR CHEST PORTABLE ROUTINE 1V      PROCEDURE DATE:  08/05/2019        INTERPRETATION:  Single view chest    History sepsis    Comparison yesterday    A pacemaker and central line remain in place. There are slightly   worsening pulmonary opacities in the right lung relative to the left   compared to the prior exam. There is moderate stable cardiomegaly. There   is no layering effusion or pneumothorax.    < end of copied text >

## 2019-08-06 LAB
ANION GAP SERPL CALC-SCNC: 5 MMOL/L — SIGNIFICANT CHANGE UP (ref 5–17)
BUN SERPL-MCNC: 53 MG/DL — HIGH (ref 7–23)
CALCIUM SERPL-MCNC: 8.6 MG/DL — SIGNIFICANT CHANGE UP (ref 8.4–10.5)
CHLORIDE SERPL-SCNC: 114 MMOL/L — HIGH (ref 96–108)
CO2 SERPL-SCNC: 29 MMOL/L — SIGNIFICANT CHANGE UP (ref 22–31)
CREAT SERPL-MCNC: 1.25 MG/DL — SIGNIFICANT CHANGE UP (ref 0.5–1.3)
FERRITIN SERPL-MCNC: 742 NG/ML — HIGH (ref 30–400)
FIBRINOGEN PPP-MCNC: 677 MG/DL — HIGH (ref 350–510)
FOLATE SERPL-MCNC: 6.4 NG/ML — SIGNIFICANT CHANGE UP
GLUCOSE BLDC GLUCOMTR-MCNC: 168 MG/DL — HIGH (ref 70–99)
GLUCOSE SERPL-MCNC: 74 MG/DL — SIGNIFICANT CHANGE UP (ref 70–99)
HAPTOGLOB SERPL-MCNC: 230 MG/DL — HIGH (ref 34–200)
HCT VFR BLD CALC: 25.7 % — LOW (ref 39–50)
HGB BLD-MCNC: 7.9 G/DL — LOW (ref 13–17)
LDH SERPL L TO P-CCNC: 201 U/L — SIGNIFICANT CHANGE UP (ref 50–242)
MCHC RBC-ENTMCNC: 30.3 PG — SIGNIFICANT CHANGE UP (ref 27–34)
MCHC RBC-ENTMCNC: 30.7 GM/DL — LOW (ref 32–36)
MCV RBC AUTO: 98.5 FL — SIGNIFICANT CHANGE UP (ref 80–100)
NRBC # BLD: 0 /100 WBCS — SIGNIFICANT CHANGE UP (ref 0–0)
PLATELET # BLD AUTO: 203 K/UL — SIGNIFICANT CHANGE UP (ref 150–400)
POTASSIUM SERPL-MCNC: 4.3 MMOL/L — SIGNIFICANT CHANGE UP (ref 3.5–5.3)
POTASSIUM SERPL-SCNC: 4.3 MMOL/L — SIGNIFICANT CHANGE UP (ref 3.5–5.3)
RBC # BLD: 2.61 M/UL — LOW (ref 4.2–5.8)
RBC # BLD: 2.61 M/UL — LOW (ref 4.2–5.8)
RBC # FLD: 18.9 % — HIGH (ref 10.3–14.5)
RETICS #: 55.6 K/UL — SIGNIFICANT CHANGE UP (ref 25–125)
RETICS/RBC NFR: 2.1 % — SIGNIFICANT CHANGE UP (ref 0.5–2.5)
SODIUM SERPL-SCNC: 148 MMOL/L — HIGH (ref 135–145)
VIT B12 SERPL-MCNC: 1641 PG/ML — HIGH (ref 232–1245)
WBC # BLD: 7.69 K/UL — SIGNIFICANT CHANGE UP (ref 3.8–10.5)
WBC # FLD AUTO: 7.69 K/UL — SIGNIFICANT CHANGE UP (ref 3.8–10.5)

## 2019-08-06 PROCEDURE — 71045 X-RAY EXAM CHEST 1 VIEW: CPT | Mod: 26

## 2019-08-06 PROCEDURE — 99232 SBSQ HOSP IP/OBS MODERATE 35: CPT

## 2019-08-06 PROCEDURE — 99233 SBSQ HOSP IP/OBS HIGH 50: CPT

## 2019-08-06 RX ORDER — HEPARIN SODIUM 5000 [USP'U]/ML
5000 INJECTION INTRAVENOUS; SUBCUTANEOUS EVERY 12 HOURS
Refills: 0 | Status: DISCONTINUED | OUTPATIENT
Start: 2019-08-06 | End: 2019-08-07

## 2019-08-06 RX ORDER — ATROPINE SULFATE 1 %
1 DROPS OPHTHALMIC (EYE) EVERY 12 HOURS
Refills: 0 | Status: DISCONTINUED | OUTPATIENT
Start: 2019-08-06 | End: 2019-08-08

## 2019-08-06 RX ADMIN — LATANOPROST 1 DROP(S): 0.05 SOLUTION/ DROPS OPHTHALMIC; TOPICAL at 22:13

## 2019-08-06 RX ADMIN — SENNA PLUS 10 MILLILITER(S): 8.6 TABLET ORAL at 22:13

## 2019-08-06 RX ADMIN — HEPARIN SODIUM 5000 UNIT(S): 5000 INJECTION INTRAVENOUS; SUBCUTANEOUS at 17:37

## 2019-08-06 RX ADMIN — ZINC OXIDE 1 APPLICATION(S): 200 OINTMENT TOPICAL at 17:38

## 2019-08-06 RX ADMIN — PANTOPRAZOLE SODIUM 40 MILLIGRAM(S): 20 TABLET, DELAYED RELEASE ORAL at 11:50

## 2019-08-06 RX ADMIN — Medication 3 MILLILITER(S): at 21:52

## 2019-08-06 RX ADMIN — Medication 3 MILLILITER(S): at 09:38

## 2019-08-06 RX ADMIN — MIDODRINE HYDROCHLORIDE 10 MILLIGRAM(S): 2.5 TABLET ORAL at 06:02

## 2019-08-06 RX ADMIN — Medication 1 DROP(S): at 17:37

## 2019-08-06 RX ADMIN — Medication 30 MILLIGRAM(S): at 06:03

## 2019-08-06 RX ADMIN — DORZOLAMIDE HYDROCHLORIDE TIMOLOL MALEATE 1 DROP(S): 20; 5 SOLUTION/ DROPS OPHTHALMIC at 17:37

## 2019-08-06 RX ADMIN — NYSTATIN CREAM 1 APPLICATION(S): 100000 CREAM TOPICAL at 06:01

## 2019-08-06 RX ADMIN — Medication 3 MILLILITER(S): at 04:55

## 2019-08-06 RX ADMIN — MIDODRINE HYDROCHLORIDE 10 MILLIGRAM(S): 2.5 TABLET ORAL at 14:29

## 2019-08-06 RX ADMIN — NYSTATIN CREAM 1 APPLICATION(S): 100000 CREAM TOPICAL at 22:00

## 2019-08-06 RX ADMIN — POLYETHYLENE GLYCOL 3350 17 GRAM(S): 17 POWDER, FOR SOLUTION ORAL at 11:50

## 2019-08-06 RX ADMIN — DORZOLAMIDE HYDROCHLORIDE TIMOLOL MALEATE 1 DROP(S): 20; 5 SOLUTION/ DROPS OPHTHALMIC at 05:58

## 2019-08-06 RX ADMIN — NYSTATIN CREAM 1 APPLICATION(S): 100000 CREAM TOPICAL at 14:29

## 2019-08-06 RX ADMIN — MAGNESIUM OXIDE 400 MG ORAL TABLET 400 MILLIGRAM(S): 241.3 TABLET ORAL at 22:13

## 2019-08-06 RX ADMIN — MAGNESIUM OXIDE 400 MG ORAL TABLET 400 MILLIGRAM(S): 241.3 TABLET ORAL at 14:29

## 2019-08-06 RX ADMIN — MAGNESIUM OXIDE 400 MG ORAL TABLET 400 MILLIGRAM(S): 241.3 TABLET ORAL at 05:58

## 2019-08-06 RX ADMIN — ZINC OXIDE 1 APPLICATION(S): 200 OINTMENT TOPICAL at 06:01

## 2019-08-06 RX ADMIN — ESCITALOPRAM OXALATE 10 MILLIGRAM(S): 10 TABLET, FILM COATED ORAL at 11:50

## 2019-08-06 RX ADMIN — MIDODRINE HYDROCHLORIDE 10 MILLIGRAM(S): 2.5 TABLET ORAL at 22:13

## 2019-08-06 RX ADMIN — Medication 3 MILLILITER(S): at 15:40

## 2019-08-06 NOTE — PROGRESS NOTE ADULT - SUBJECTIVE AND OBJECTIVE BOX
Progress: f/u palliative , pt more awake, alert, this AM, asking for cold water, on high flow o2.     Present Symptoms:   Dyspnea: yes  Nausea/Vomiting: no  Anxiety:  no  Depressed Mood: no  Fatigue: yes  Loss of appetite: yes  Pain:        no s/s           location:   Review of Systems:  Unable to obtain due to poor mentation]    MEDICATIONS  (STANDING):  ALBUTerol/ipratropium for Nebulization 3 milliLiter(s) Nebulizer every 6 hours  dorzolamide 2%/timolol 0.5% Ophthalmic Solution 1 Drop(s) Both EYES every 12 hours  escitalopram 10 milliGRAM(s) Oral daily  latanoprost 0.005% Ophthalmic Solution 1 Drop(s) Both EYES at bedtime  magnesium oxide 400 milliGRAM(s) Oral every 8 hours  midodrine 10 milliGRAM(s) Oral every 8 hours  nystatin Powder 1 Application(s) Topical three times a day  pantoprazole   Suspension 40 milliGRAM(s) Enteral Tube daily  polyethylene glycol 3350 17 Gram(s) Oral daily  predniSONE   Tablet 30 milliGRAM(s) Oral daily  senna Syrup 10 milliLiter(s) Oral at bedtime  zinc oxide 20% Ointment 1 Application(s) Topical two times a day    MEDICATIONS  (PRN):  acetaminophen    Suspension .. 650 milliGRAM(s) Enteral Tube every 6 hours PRN Temp greater or equal to 38C (100.4F), Mild Pain (1 - 3)  sodium chloride 0.9% lock flush 10 milliLiter(s) IV Push every 1 hour PRN Pre/post blood products, medications, blood draw, and to maintain line patency      PHYSICAL EXAM:  Vital Signs Last 24 Hrs  T(C): 36.1 (06 Aug 2019 05:30), Max: 37 (05 Aug 2019 20:40)  T(F): 97 (06 Aug 2019 05:30), Max: 98.6 (05 Aug 2019 20:40)  HR: 60 (06 Aug 2019 05:30) (59 - 69)  BP: 133/77 (06 Aug 2019 05:30) (133/77 - 155/86)  BP(mean): --  RR: 24 (06 Aug 2019 05:30) (17 - 24)  SpO2: 97% (06 Aug 2019 09:38) (93% - 100%)  General: alert , oriented x 1-2 , whispers few words      HEENT: n/c, a/t  oral mucosa dry     Lungs: coarse w/ scattered rhonchi , dim to bases   CV: s1s2    GI: lrg., soft, n/t + peg , + bs     : tyrone     Musculoskeletal: weakened x 4 , edematous    Skin: warm dry, fragile        Neuro: alert, whispers words    Oral intake ability:  npo  Diet: peg feeds      LABS:                          7.9    7.69  )-----------( 203      ( 06 Aug 2019 05:35 )             25.7     08-06    148<H>  |  114<H>  |  53<H>  ----------------------------<  74  4.3   |  29  |  1.25    Ca    8.6      06 Aug 2019 05:35          RADIOLOGY & ADDITIONAL STUDIES:< from: Xray Chest 1 View- PORTABLE-Routine (08.05.19 @ 06:24) >  EXAM:  XR CHEST PORTABLE ROUTINE 1V      PROCEDURE DATE:  08/05/2019        INTERPRETATION:  Single view chest    History sepsis    Comparison yesterday    A pacemaker and central line remain in place. There are slightly   worsening pulmonary opacities in the right lung relative to the left   compared to the prior exam. There is moderate stable cardiomegaly. There   is no layering effusion or pneumothorax.        ADVANCE DIRECTIVES: SAGAR    DNR/ W trial intubation   Advanced Care Planning discussion total time spent:

## 2019-08-06 NOTE — PROGRESS NOTE ADULT - SUBJECTIVE AND OBJECTIVE BOX
86 year old male with extensive PMH including IBS, CVA with dysphagia and PEG placement, anemia, psoriasis, GERD, depression, PPM placement treated for MSSA bacteremia at [facility] 6/19 presented to ED today via EMS from [facility] with change in mental status.  Patient still very weak but communicating today. He can state weakly that he feels better now.      MEDICATIONS  (STANDING):  ALBUTerol/ipratropium for Nebulization 3 milliLiter(s) Nebulizer every 6 hours  atropine 1% Ophthalmic Solution for SubLingual Use 1 Drop(s) SubLingual every 12 hours  dorzolamide 2%/timolol 0.5% Ophthalmic Solution 1 Drop(s) Both EYES every 12 hours  escitalopram 10 milliGRAM(s) Oral daily  heparin  Injectable 5000 Unit(s) SubCutaneous every 12 hours  latanoprost 0.005% Ophthalmic Solution 1 Drop(s) Both EYES at bedtime  magnesium oxide 400 milliGRAM(s) Oral every 8 hours  midodrine 10 milliGRAM(s) Oral every 8 hours  nystatin Powder 1 Application(s) Topical three times a day  pantoprazole   Suspension 40 milliGRAM(s) Enteral Tube daily  polyethylene glycol 3350 17 Gram(s) Oral daily  predniSONE   Tablet 30 milliGRAM(s) Oral daily  senna Syrup 10 milliLiter(s) Oral at bedtime  zinc oxide 20% Ointment 1 Application(s) Topical two times a day    MEDICATIONS  (PRN):  acetaminophen    Suspension .. 650 milliGRAM(s) Enteral Tube every 6 hours PRN Temp greater or equal to 38C (100.4F), Mild Pain (1 - 3)  sodium chloride 0.9% lock flush 10 milliLiter(s) IV Push every 1 hour PRN Pre/post blood products, medications, blood draw, and to maintain line patency  Vital Signs Last 24 Hrs  T(C): 36.4 (06 Aug 2019 18:56), Max: 37 (05 Aug 2019 20:40)  T(F): 97.6 (06 Aug 2019 18:56), Max: 98.6 (05 Aug 2019 20:40)  HR: 60 (06 Aug 2019 18:56) (59 - 63)  BP: 139/75 (06 Aug 2019 18:56) (124/65 - 139/75)  BP(mean): --  RR: 16 (06 Aug 2019 18:56) (16 - 24)  SpO2: 98% (06 Aug 2019 18:56) (93% - 100%)

## 2019-08-06 NOTE — PROGRESS NOTE ADULT - SUBJECTIVE AND OBJECTIVE BOX
CC: Patient is a 86y old  Male who presents with a chief complaint of Change in mental status (05 Aug 2019 20:17)      S: Overall status largely unchanged. No acute events overnight per night staff. Tolerated PEG feeds. Last BM yesterday.    Patient seen and examined at bedside.    ALLERGIES:  No Known Allergies      MEDICATIONS:  acetaminophen    Suspension .. 650 milliGRAM(s) Enteral Tube every 6 hours PRN  ALBUTerol/ipratropium for Nebulization 3 milliLiter(s) Nebulizer every 6 hours  dorzolamide 2%/timolol 0.5% Ophthalmic Solution 1 Drop(s) Both EYES every 12 hours  escitalopram 10 milliGRAM(s) Oral daily  latanoprost 0.005% Ophthalmic Solution 1 Drop(s) Both EYES at bedtime  magnesium oxide 400 milliGRAM(s) Oral every 8 hours  midodrine 10 milliGRAM(s) Oral every 8 hours  nystatin Powder 1 Application(s) Topical three times a day  pantoprazole   Suspension 40 milliGRAM(s) Enteral Tube daily  polyethylene glycol 3350 17 Gram(s) Oral daily  predniSONE   Tablet 30 milliGRAM(s) Oral daily  senna Syrup 10 milliLiter(s) Oral at bedtime  sodium chloride 0.9% lock flush 10 milliLiter(s) IV Push every 1 hour PRN  zinc oxide 20% Ointment 1 Application(s) Topical two times a day        Vital Signs Last 24 Hrs  T(F): 97 (06 Aug 2019 05:30), Max: 98.6 (05 Aug 2019 20:40)  HR: 60 (06 Aug 2019 05:30) (59 - 69)  BP: 133/77 (06 Aug 2019 05:30) (133/77 - 155/86)  RR: 24 (06 Aug 2019 05:30) (16 - 24)  SpO2: 93% (06 Aug 2019 05:30) (93% - 100%)  I&O's Summary    05 Aug 2019 07:01  -  06 Aug 2019 07:00  --------------------------------------------------------  IN: 380 mL / OUT: 3 mL / NET: 377 mL        PHYSICAL EXAM:  General: NAD, bed bound, kyphotic  ENT: MMM  Neck: Supple, No JVD  Lungs: Diminished breath sounds R>L.   Cardio: RRR, S1/S2, No murmurs  Abdomen: Soft, Nontender, Nondistended; Bowel sounds present, PEG tube in place  Extremities: No cyanosis, No edema  Neuro: no new deficits  Skin: no rashes  Psych: nonverbal, easily arousable    LABS:                        7.9    7.69  )-----------( 203      ( 06 Aug 2019 05:35 )             25.7     08-06    148  |  114  |  53  ----------------------------<  74  4.3   |  29  |  1.25    Ca    8.6      06 Aug 2019 05:35  Phos  3.9     08-04  Mg     1.9     08-04      eGFR if Non African American: 52 mL/min/1.73M2 (08-06-19 @ 05:35)  eGFR if African American: 60 mL/min/1.73M2 (08-06-19 @ 05:35)                  ABG - ( 03 Aug 2019 13:30 )  pH, Arterial: 7.43  pH, Blood: x     /  pCO2: 33    /  pO2: 59    / HCO3: x     / Base Excess: x     /  SaO2: 93                      POCT Blood Glucose.: 186 mg/dL (05 Aug 2019 16:34)  POCT Blood Glucose.: 138 mg/dL (05 Aug 2019 12:53)    07-30 [id number] 4.8          RADIOLOGY & ADDITIONAL TESTS:    Care Discussed with Consultants/Other Providers: CC: Patient is a 86y old  Male who presents with a chief complaint of Change in mental status (05 Aug 2019 20:17)      S: Overall status largely unchanged. No acute events overnight per night staff. Tolerated PEG feeds. Last BM yesterday.    Patient seen and examined at bedside.    ALLERGIES:  No Known Allergies      MEDICATIONS:  acetaminophen    Suspension .. 650 milliGRAM(s) Enteral Tube every 6 hours PRN  ALBUTerol/ipratropium for Nebulization 3 milliLiter(s) Nebulizer every 6 hours  dorzolamide 2%/timolol 0.5% Ophthalmic Solution 1 Drop(s) Both EYES every 12 hours  escitalopram 10 milliGRAM(s) Oral daily  latanoprost 0.005% Ophthalmic Solution 1 Drop(s) Both EYES at bedtime  magnesium oxide 400 milliGRAM(s) Oral every 8 hours  midodrine 10 milliGRAM(s) Oral every 8 hours  nystatin Powder 1 Application(s) Topical three times a day  pantoprazole   Suspension 40 milliGRAM(s) Enteral Tube daily  polyethylene glycol 3350 17 Gram(s) Oral daily  predniSONE   Tablet 30 milliGRAM(s) Oral daily  senna Syrup 10 milliLiter(s) Oral at bedtime  sodium chloride 0.9% lock flush 10 milliLiter(s) IV Push every 1 hour PRN  zinc oxide 20% Ointment 1 Application(s) Topical two times a day        Vital Signs Last 24 Hrs  T(F): 97 (06 Aug 2019 05:30), Max: 98.6 (05 Aug 2019 20:40)  HR: 60 (06 Aug 2019 05:30) (59 - 69)  BP: 133/77 (06 Aug 2019 05:30) (133/77 - 155/86)  RR: 24 (06 Aug 2019 05:30) (16 - 24)  SpO2: 93% (06 Aug 2019 05:30) (93% - 100%)  I&O's Summary    05 Aug 2019 07:01  -  06 Aug 2019 07:00  --------------------------------------------------------  IN: 380 mL / OUT: 3 mL / NET: 377 mL        PHYSICAL EXAM:  General: NAD, bed bound, kyphotic  ENT: MMM upper airway secretions,   Neck: Supple, No JVD  Lungs: Diminished breath sounds R>L. transmitted upper airway sounds  Cardio: RRR, S1/S2, No murmurs  Abdomen: Soft, Nontender, Nondistended; Bowel sounds present, PEG tube in place  Extremities: No cyanosis, No edema  Neuro: no new deficits  Skin: no rashes  Psych: nonverbal, easily arousable    LABS:                        7.9    7.69  )-----------( 203      ( 06 Aug 2019 05:35 )             25.7     08-06    148  |  114  |  53  ----------------------------<  74  4.3   |  29  |  1.25    Ca    8.6      06 Aug 2019 05:35  Phos  3.9     08-04  Mg     1.9     08-04      eGFR if Non African American: 52 mL/min/1.73M2 (08-06-19 @ 05:35)  eGFR if African American: 60 mL/min/1.73M2 (08-06-19 @ 05:35)                  ABG - ( 03 Aug 2019 13:30 )  pH, Arterial: 7.43  pH, Blood: x     /  pCO2: 33    /  pO2: 59    / HCO3: x     / Base Excess: x     /  SaO2: 93                      POCT Blood Glucose.: 186 mg/dL (05 Aug 2019 16:34)  POCT Blood Glucose.: 138 mg/dL (05 Aug 2019 12:53)    07-30 [id number] 4.8          RADIOLOGY & ADDITIONAL TESTS:    Care Discussed with Consultants/Other Providers:

## 2019-08-06 NOTE — PROGRESS NOTE ADULT - ASSESSMENT
Elderly man, recent hospital stay for MSSA bacteremic pneumonia, G tube, at rehab, returns with respiratory distress, required intubation, pressors, thrombocytopenia, hypothermia, severe sepsis/shock  CXR with dense infiltrate- healthcare associated pneumonia.  Pseudomonas predominating in Sputum, sensitive strain  Afebrile, WBC lower  Pressers weaned, extubated, but remains very debilitated.  Completed 1 week of anti-pseudomonal therapy- Meropenem stopped 8/3  At high risk for recurrent respiratory failure, but afebrile, more alert    Plan:  Continue nasal high flow as outlined  Pulm Toilet  Follow temps and CBC/diff   No indication for antibiotics  D/w Dr Milner

## 2019-08-06 NOTE — PROGRESS NOTE ADULT - ASSESSMENT
A/P 86 year old male with extensive PMH including IBS, CVA with dysphagia and PEG placement, anemia, psoriasis, GERD, depression, chf , PPM admitted with severe sepsis from likely aspiration pneumonia. Septic Shock with multiorgan failure.   Acute Respiratory failure intubated 7/28 , extubated 8/3 -   F/b pulm   palliative: pt remains on high flow o2, more awake, alert, whispering words. Asking for cold water,  but pt npo , alina  peg feeds.    Pt sl  improved, but condition remains guarded, and pt high risk for re- intubation, and family aware of this.     Will cont to follow pts clinical status w/ med team. Cont supportive care family , wife.  Family hopeful and waiting to see how pt does over next few days. A/P 86 year old male with extensive PMH including IBS, CVA with dysphagia and PEG placement, anemia, psoriasis, GERD, depression, chf , PPM admitted with severe sepsis from likely aspiration pneumonia. Septic Shock with multiorgan failure.   Acute Respiratory failure intubated 7/28 , extubated 8/3 -   F/b pulm   palliative: pt remains on high flow o2, more awake, alert, whispering words. Asking for cold water,  but pt npo , alina  peg feeds.    Pt sl  improved, but condition remains guarded, and pt high risk for re- intubation, and family aware of this.     Will cont to follow pts clinical status w/ med team. Cont supportive care family , wife.  Family hopeful and waiting to see how pt does over next few days.   Pt requests intubation if neccessary

## 2019-08-06 NOTE — PROGRESS NOTE ADULT - SUBJECTIVE AND OBJECTIVE BOX
CC: f/u for pneumonia and respiratory failure    Patient remains more alert, now verbal, on nasal high flow     REVIEW OF SYSTEMS:  limited    Antimicrobials off meropenem  Medications Reviewed    Vital Signs Last 24 Hrs  T(F): 98 (06 Aug 2019 09:40), Max: 98.6 (05 Aug 2019 20:40)  HR: 60 (06 Aug 2019 09:40) (59 - 63)  BP: 124/65 (06 Aug 2019 09:40) (124/65 - 135/70)  BP(mean): --  RR: 20 (06 Aug 2019 09:40) (18 - 24)  SpO2: 98% (06 Aug 2019 15:41) (93% - 100%)    PHYSICAL EXAM:  General: no acute distress  Eyes: anicteric, no conjunctival injection, no discharge  Oropharynx: no lesions or injection 	  Neck: supple, without adenopathy  Lungs: coarse BSs  Heart: regular rate and rhythm; no murmur, rubs or gallops  Abdomen: soft, nondistended, nontender, peg  Skin: no lesions  Extremities: + edema  Neurologic: arouses easily,, more interactive    LAB RESULTS:                        7.9    7.69  )-----------( 203      ( 06 Aug 2019 05:35 )             25.7   08-06    148<H>  |  114<H>  |  53<H>  ----------------------------<  74  4.3   |  29  |  1.25    Ca    8.6      06 Aug 2019 05:35    MICROBIOLOGY:  RECENT CULTURES:    RADIOLOGY REVIEWED:  Xray Chest 1 View- PORTABLE-Routine (08.06.19 @ 09:09) >  Bilateral infiltrates and effusions unchanged. Cardiomegaly. Pacemaker.    Xray Chest 1 View- PORTABLE-Routine (08.05.19 @ 06:24)   A pacemaker and central line remain in place. There are slightly   worsening pulmonary opacities in the right lung relative to the left   compared to the prior exam. There is moderate stable cardiomegaly. There   is no layering effusion or pneumothorax.

## 2019-08-06 NOTE — PROGRESS NOTE ADULT - ASSESSMENT
87 yo M with PMHx of Glaucoma, benign prostate neoplasm, HTN, HLD, Grad 3 Diastolic Heart Failure s/p ppm, CKD, CVA with dysphagia s/p PEG tube placement presented with altered mentation.                           7.9    7.69  )-----------( 203      ( 06 Aug 2019 05:35 )             25.7   08-06    148<H>  |  114<H>  |  53<H>  ----------------------------<  74  4.3   |  29  |  1.25    Ca    8.6      06 Aug 2019 05:35    Patient's Hg and platelets coming up as his mental status improves, suspect myelosuppression from sepsis, fibrinogen and PT was ok doubt fulminant DIC.  Counts should improve as patient recovers.    Can still transfuse if Hg <7g/dl but I suspect it will continue to rise.      Hematology signing off.

## 2019-08-06 NOTE — PROGRESS NOTE ADULT - ASSESSMENT
ASSESSMENT and PLAN:  1) Septic shock with multiorgan failure 2/2 aspiration PNA - extubated on HFNC. Abx regimen completed. Continue chest PT/pulm toilet/nebs/steriods. Remains high risk for recurrent respiratory failure.   2) Anemia - Trend CBC currently near baseline  3) Hypoglycemia - Fingersticks Q6H.   4) MELISSA - Now back to baseline, monitor I&Os. Avoid nephrotoxins  5) Thrombocytopenia - PLT now 203,000 - start DVT ppx today.

## 2019-08-07 LAB
ANION GAP SERPL CALC-SCNC: 4 MMOL/L — LOW (ref 5–17)
BUN SERPL-MCNC: 53 MG/DL — HIGH (ref 7–23)
CALCIUM SERPL-MCNC: 8.6 MG/DL — SIGNIFICANT CHANGE UP (ref 8.4–10.5)
CHLORIDE SERPL-SCNC: 113 MMOL/L — HIGH (ref 96–108)
CO2 SERPL-SCNC: 30 MMOL/L — SIGNIFICANT CHANGE UP (ref 22–31)
CREAT SERPL-MCNC: 1.13 MG/DL — SIGNIFICANT CHANGE UP (ref 0.5–1.3)
GLUCOSE BLDC GLUCOMTR-MCNC: 80 MG/DL — SIGNIFICANT CHANGE UP (ref 70–99)
GLUCOSE SERPL-MCNC: 75 MG/DL — SIGNIFICANT CHANGE UP (ref 70–99)
HCT VFR BLD CALC: 27.3 % — LOW (ref 39–50)
HGB BLD-MCNC: 8.3 G/DL — LOW (ref 13–17)
MAGNESIUM SERPL-MCNC: 2.3 MG/DL — SIGNIFICANT CHANGE UP (ref 1.6–2.6)
MCHC RBC-ENTMCNC: 30.3 PG — SIGNIFICANT CHANGE UP (ref 27–34)
MCHC RBC-ENTMCNC: 30.4 GM/DL — LOW (ref 32–36)
MCV RBC AUTO: 99.6 FL — SIGNIFICANT CHANGE UP (ref 80–100)
NRBC # BLD: 0 /100 WBCS — SIGNIFICANT CHANGE UP (ref 0–0)
PHOSPHATE SERPL-MCNC: 3.4 MG/DL — SIGNIFICANT CHANGE UP (ref 2.5–4.5)
PLATELET # BLD AUTO: 320 K/UL — SIGNIFICANT CHANGE UP (ref 150–400)
POTASSIUM SERPL-MCNC: 4.9 MMOL/L — SIGNIFICANT CHANGE UP (ref 3.5–5.3)
POTASSIUM SERPL-SCNC: 4.9 MMOL/L — SIGNIFICANT CHANGE UP (ref 3.5–5.3)
RBC # BLD: 2.74 M/UL — LOW (ref 4.2–5.8)
RBC # FLD: 18.7 % — HIGH (ref 10.3–14.5)
SODIUM SERPL-SCNC: 147 MMOL/L — HIGH (ref 135–145)
WBC # BLD: 14.35 K/UL — HIGH (ref 3.8–10.5)
WBC # FLD AUTO: 14.35 K/UL — HIGH (ref 3.8–10.5)

## 2019-08-07 PROCEDURE — 99233 SBSQ HOSP IP/OBS HIGH 50: CPT

## 2019-08-07 PROCEDURE — 71045 X-RAY EXAM CHEST 1 VIEW: CPT | Mod: 26

## 2019-08-07 RX ADMIN — MAGNESIUM OXIDE 400 MG ORAL TABLET 400 MILLIGRAM(S): 241.3 TABLET ORAL at 22:32

## 2019-08-07 RX ADMIN — DORZOLAMIDE HYDROCHLORIDE TIMOLOL MALEATE 1 DROP(S): 20; 5 SOLUTION/ DROPS OPHTHALMIC at 18:11

## 2019-08-07 RX ADMIN — Medication 3 MILLILITER(S): at 22:02

## 2019-08-07 RX ADMIN — ZINC OXIDE 1 APPLICATION(S): 200 OINTMENT TOPICAL at 18:34

## 2019-08-07 RX ADMIN — PANTOPRAZOLE SODIUM 40 MILLIGRAM(S): 20 TABLET, DELAYED RELEASE ORAL at 13:04

## 2019-08-07 RX ADMIN — MIDODRINE HYDROCHLORIDE 10 MILLIGRAM(S): 2.5 TABLET ORAL at 22:32

## 2019-08-07 RX ADMIN — HEPARIN SODIUM 5000 UNIT(S): 5000 INJECTION INTRAVENOUS; SUBCUTANEOUS at 05:27

## 2019-08-07 RX ADMIN — Medication 3 MILLILITER(S): at 03:25

## 2019-08-07 RX ADMIN — NYSTATIN CREAM 1 APPLICATION(S): 100000 CREAM TOPICAL at 06:00

## 2019-08-07 RX ADMIN — ZINC OXIDE 1 APPLICATION(S): 200 OINTMENT TOPICAL at 06:00

## 2019-08-07 RX ADMIN — NYSTATIN CREAM 1 APPLICATION(S): 100000 CREAM TOPICAL at 13:05

## 2019-08-07 RX ADMIN — Medication 1 DROP(S): at 05:27

## 2019-08-07 RX ADMIN — POLYETHYLENE GLYCOL 3350 17 GRAM(S): 17 POWDER, FOR SOLUTION ORAL at 13:04

## 2019-08-07 RX ADMIN — MIDODRINE HYDROCHLORIDE 10 MILLIGRAM(S): 2.5 TABLET ORAL at 05:26

## 2019-08-07 RX ADMIN — MIDODRINE HYDROCHLORIDE 10 MILLIGRAM(S): 2.5 TABLET ORAL at 13:04

## 2019-08-07 RX ADMIN — LATANOPROST 1 DROP(S): 0.05 SOLUTION/ DROPS OPHTHALMIC; TOPICAL at 22:31

## 2019-08-07 RX ADMIN — SENNA PLUS 10 MILLILITER(S): 8.6 TABLET ORAL at 22:32

## 2019-08-07 RX ADMIN — HEPARIN SODIUM 5000 UNIT(S): 5000 INJECTION INTRAVENOUS; SUBCUTANEOUS at 18:11

## 2019-08-07 RX ADMIN — Medication 1 DROP(S): at 18:20

## 2019-08-07 RX ADMIN — Medication 3 MILLILITER(S): at 15:50

## 2019-08-07 RX ADMIN — MAGNESIUM OXIDE 400 MG ORAL TABLET 400 MILLIGRAM(S): 241.3 TABLET ORAL at 05:26

## 2019-08-07 RX ADMIN — MAGNESIUM OXIDE 400 MG ORAL TABLET 400 MILLIGRAM(S): 241.3 TABLET ORAL at 13:04

## 2019-08-07 RX ADMIN — DORZOLAMIDE HYDROCHLORIDE TIMOLOL MALEATE 1 DROP(S): 20; 5 SOLUTION/ DROPS OPHTHALMIC at 05:27

## 2019-08-07 RX ADMIN — Medication 3 MILLILITER(S): at 10:29

## 2019-08-07 RX ADMIN — ESCITALOPRAM OXALATE 10 MILLIGRAM(S): 10 TABLET, FILM COATED ORAL at 13:04

## 2019-08-07 RX ADMIN — NYSTATIN CREAM 1 APPLICATION(S): 100000 CREAM TOPICAL at 22:32

## 2019-08-07 RX ADMIN — Medication 30 MILLIGRAM(S): at 05:26

## 2019-08-07 NOTE — PROGRESS NOTE ADULT - ASSESSMENT
A/P : 86 year old male with extensive PMH including IBS, CVA with dysphagia and PEG placement, anemia, psoriasis, GERD, depression, chf , PPM admitted with severe sepsis from likely aspiration pneumonia. Septic Shock with multiorgan failure.   Acute Respiratory failure intubated 7/28 , extubated 8/3 -   F/b pulm   palliative: pt now on o2 via n/c, pt awake, alert, whispering words, but appears very weak and fatigued.   Pt condition remains guarded, and pt high risk for re- intubation, family aware of this.  Remains npo, alina peg tube feeds.     Family waiting to see how pt does over next few days, at this time, goc remain DNR w/ trial intubation.   Will cont to follow pts clinical status w/ med team.  Cont supportive care family. A/P : 86 year old male with extensive PMH including IBS, CVA with dysphagia and PEG placement, anemia, psoriasis, GERD, depression, chf , PPM admitted with severe sepsis from likely aspiration pneumonia. Septic Shock with multiorgan failure.   Acute Respiratory failure intubated 7/28 , extubated 8/3 -   F/b pulm   palliative: pt now on o2 via n/c, pt awake, alert, whispering words, but appears very weak and fatigued.   Pt condition remains guarded, and pt high risk for re- intubation, family aware of this.  Remains npo, alina peg tube feeds.     Family waiting to see how pt does over next few days, at this time, goc remain DNR w/ trial intubation.   Will cont to follow pts clinical status w/ med team.  Cont supportive care family.     Will review with patient's wife tomorrow. She is apparently considering DO NOT INTUBATE

## 2019-08-07 NOTE — PROGRESS NOTE ADULT - ASSESSMENT
ASSESSMENT and PLAN:  1) Septic shock with multiorgan failure 2/2 aspiration PNA - HFNC de-escalated to NC. Abx regimen completed. Continue chest PT/pulm toilet/nebs/steriods. Remains high risk for recurrent respiratory failure.   2) Anemia - Trend CBC currently near baseline  3) Hypoglycemia - Fingersticks Q6H.   4) MELISSA - Now back to baseline, monitor I&Os. Avoid nephrotoxins  5) Thrombocytopenia - PLT now 203,000 - start DVT ppx today. ASSESSMENT and PLAN:  1) Septic shock with multiorgan failure 2/2 aspiration PNA - HFNC de-escalated to NC. Abx regimen completed. Continue chest PT/pulm toilet/nebs/steriods. Remains high risk for recurrent respiratory failure.   2) Anemia - Trend CBC currently near baseline  3) Hypoglycemia - Fingersticks Q6H.   4) MELISSA - Now back to baseline, monitor I&Os. Avoid nephrotoxins  5) Thrombocytopenia - PLT now 203,000 - start DVT ppx today.       Patient with mild-moderate sized left abd wall ecchymosis. Abd soft and non-distended. Like 2/2 to SQ heparin. Will d/c Heparin. Monitor CBC and PT/INR

## 2019-08-07 NOTE — PROGRESS NOTE ADULT - SUBJECTIVE AND OBJECTIVE BOX
Progress: f/u palliative, pt now on o2 N/C, in bed, weakened, fatigued, w/ mild sob.        Present Symptoms:   Dyspnea: mild  Nausea/Vomiting: no  Anxiety:  no  Depressed Mood:   Fatigue: yes  Loss of appetite: yes  Pain:          no s/s         location:   Review of Systems:  Unable to obtain due to poor mentation]    MEDICATIONS  (STANDING):  ALBUTerol/ipratropium for Nebulization 3 milliLiter(s) Nebulizer every 6 hours  atropine 1% Ophthalmic Solution for SubLingual Use 1 Drop(s) SubLingual every 12 hours  dorzolamide 2%/timolol 0.5% Ophthalmic Solution 1 Drop(s) Both EYES every 12 hours  escitalopram 10 milliGRAM(s) Oral daily  heparin  Injectable 5000 Unit(s) SubCutaneous every 12 hours  latanoprost 0.005% Ophthalmic Solution 1 Drop(s) Both EYES at bedtime  magnesium oxide 400 milliGRAM(s) Oral every 8 hours  midodrine 10 milliGRAM(s) Oral every 8 hours  nystatin Powder 1 Application(s) Topical three times a day  pantoprazole   Suspension 40 milliGRAM(s) Enteral Tube daily  polyethylene glycol 3350 17 Gram(s) Oral daily  predniSONE   Tablet 30 milliGRAM(s) Oral daily  senna Syrup 10 milliLiter(s) Oral at bedtime  zinc oxide 20% Ointment 1 Application(s) Topical two times a day    MEDICATIONS  (PRN):  acetaminophen    Suspension .. 650 milliGRAM(s) Enteral Tube every 6 hours PRN Temp greater or equal to 38C (100.4F), Mild Pain (1 - 3)  sodium chloride 0.9% lock flush 10 milliLiter(s) IV Push every 1 hour PRN Pre/post blood products, medications, blood draw, and to maintain line patency      PHYSICAL EXAM:  Vital Signs Last 24 Hrs  T(C): 36.2 (07 Aug 2019 05:22), Max: 36.5 (06 Aug 2019 21:59)  T(F): 97.2 (07 Aug 2019 05:22), Max: 97.7 (06 Aug 2019 21:59)  HR: 60 (07 Aug 2019 10:34) (60 - 60)  BP: 142/89 (07 Aug 2019 05:22) (139/74 - 142/89)  BP(mean): --  RR: 20 (07 Aug 2019 06:19) (15 - 20)  SpO2: 95% (07 Aug 2019 10:34) (95% - 100%)  General: alert  oriented , speaks/whispers few words      HEENT: n/c, a/t     Lungs: rhonchi upper lobes , dim to bases    CV: s1s2    GI: soft, n/t + bs + peg     : hansen    Musculoskeletal: edema, weakened    Skin: warm dry, fragile     Neuro: lethargic ,    Oral intake ability:  npo  Diet: Peg tube feeds     LABS:                          8.3    14.35 )-----------( 320      ( 07 Aug 2019 07:05 )             27.3     08-07    147<H>  |  113<H>  |  53<H>  ----------------------------<  75  4.9   |  30  |  1.13    Ca    8.6      07 Aug 2019 07:05  Phos  3.4     08-07  Mg     2.3     08-07          RADIOLOGY & ADDITIONAL STUDIES: < from: Xray Chest 1 View- PORTABLE-Routine (08.07.19 @ 10:12) >  EXAM:  XR CHEST PORTABLE ROUTINE 1V      PROCEDURE DATE:  08/07/2019        INTERPRETATION:  INDICATION: Shortness of breath, cough    PRIORS: 8/6/2019    VIEWS: Portable AP radiography of the chest performed.    FINDINGS: Heart size cannot be quantitated on this AP radiograph. The   patient's chin/face obscures the superior mediastinal region. Note is   made of an indwelling pacemaker. There is no significant interval change   in bilateral lung parenchymal airspace opacities with bilateral pleural   effusions. There is no evidence for pneumothorax. No mediastinal shift is   noted. Degenerative change of the thoracic spine noted.    IMPRESSION: No change in bilateral lung parenchymal airspace opacities   with bilateral pleural effusions.          [facility]  DNR/ trial intubation   Advanced Care Planning discussion total time spent:

## 2019-08-07 NOTE — PROGRESS NOTE ADULT - ASSESSMENT
Elderly man, recent hospital stay for MSSA bacteremic pneumonia, G tube, at rehab, returns with respiratory distress, required intubation, pressors, thrombocytopenia, hypothermia, severe sepsis/shock  CXR with dense infiltrate- healthcare associated pneumonia.  Pseudomonas predominating in Sputum, sensitive strain  Afebrile, WBC lower  Pressers weaned, extubated, but remains very debilitated.  Completed 1 week of anti-pseudomonal therapy- Meropenem stopped 8/3  At high risk for recurrent respiratory failure, but afebrile, more alert  Thrombocytopenia has resolved  He is off high flow nasal oxygen  Plan:  Continue supportive care  Pulm Toilet  Follow temps and CBC/diff   No indication for antibiotics  [place] guarded

## 2019-08-07 NOTE — PROGRESS NOTE ADULT - SUBJECTIVE AND OBJECTIVE BOX
CC: Patient is a 86y old  Male who presents with a chief complaint of Change in mental status (06 Aug 2019 18:56)      S: Patient reports mild SOB and cough. Desire water however he remains NPO due to aspiration risk. Patient with no events overnight    Patient seen and examined at bedside.    ALLERGIES:  No Known Allergies      MEDICATIONS:  acetaminophen    Suspension .. 650 milliGRAM(s) Enteral Tube every 6 hours PRN  ALBUTerol/ipratropium for Nebulization 3 milliLiter(s) Nebulizer every 6 hours  atropine 1% Ophthalmic Solution for SubLingual Use 1 Drop(s) SubLingual every 12 hours  dorzolamide 2%/timolol 0.5% Ophthalmic Solution 1 Drop(s) Both EYES every 12 hours  escitalopram 10 milliGRAM(s) Oral daily  heparin  Injectable 5000 Unit(s) SubCutaneous every 12 hours  latanoprost 0.005% Ophthalmic Solution 1 Drop(s) Both EYES at bedtime  magnesium oxide 400 milliGRAM(s) Oral every 8 hours  midodrine 10 milliGRAM(s) Oral every 8 hours  nystatin Powder 1 Application(s) Topical three times a day  pantoprazole   Suspension 40 milliGRAM(s) Enteral Tube daily  polyethylene glycol 3350 17 Gram(s) Oral daily  predniSONE   Tablet 30 milliGRAM(s) Oral daily  senna Syrup 10 milliLiter(s) Oral at bedtime  sodium chloride 0.9% lock flush 10 milliLiter(s) IV Push every 1 hour PRN  zinc oxide 20% Ointment 1 Application(s) Topical two times a day        Vital Signs Last 24 Hrs  T(F): 97.2 (07 Aug 2019 05:22), Max: 97.7 (06 Aug 2019 21:59)  HR: 60 (07 Aug 2019 10:34) (60 - 60)  BP: 142/89 (07 Aug 2019 05:22) (139/74 - 142/89)  RR: 20 (07 Aug 2019 06:19) (15 - 20)  SpO2: 95% (07 Aug 2019 10:34) (95% - 100%)  I&O's Summary    06 Aug 2019 07:01  -  07 Aug 2019 07:00  --------------------------------------------------------  IN: 1170 mL / OUT: 0 mL / NET: 1170 mL        PHYSICAL EXAM:  General: NAD, bed bound, kyphotic  ENT: MMM upper airway secretions,   Neck: Supple, No JVD  Lungs: Diminished breath sounds R>L. transmitted upper airway sounds  Cardio: RRR, S1/S2, No murmurs  Abdomen: Soft, Nontender, Nondistended; Bowel sounds present, PEG tube in place  Extremities: No cyanosis, No edema  Neuro: no new deficits  Skin: no rashes  Psych: nonverbal, easily arousable    LABS:                        8.3    14.35 )-----------( 320      ( 07 Aug 2019 07:05 )             27.3     08-07    147  |  113  |  53  ----------------------------<  75  4.9   |  30  |  1.13    Ca    8.6      07 Aug 2019 07:05  Phos  3.4     08-07  Mg     2.3     08-07      eGFR if Non African American: 58 mL/min/1.73M2 (08-07-19 @ 07:05)  eGFR if African American: 68 mL/min/1.73M2 (08-07-19 @ 07:05)                          POCT Blood Glucose.: 80 mg/dL (07 Aug 2019 06:44)  POCT Blood Glucose.: 168 mg/dL (06 Aug 2019 14:29)    07-30 [id number] 4.8          RADIOLOGY & ADDITIONAL TESTS:    Care Discussed with Consultants/Other Providers:

## 2019-08-07 NOTE — PROGRESS NOTE ADULT - SUBJECTIVE AND OBJECTIVE BOX
CC: f/u for respiratory failure and pneumonia    Patient reports: his speech is garbled, congested breath sounds    REVIEW OF SYSTEMS:  All other review of systems negative (Comprehensive ROS): bedridden, short of breath,? confused    Antimicrobials Day #  :off    Other Medications Reviewed    T(F): 97.2 (08-07-19 @ 05:22), Max: 97.7 (08-06-19 @ 21:59)  HR: 60 (08-07-19 @ 10:34)  BP: 142/89 (08-07-19 @ 05:22)  RR: 20 (08-07-19 @ 06:19)  SpO2: 95% (08-07-19 @ 10:34)  Wt(kg): --    PHYSICAL EXAM:  General: alert, mild respiratory distress  Eyes:  anicteric, no conjunctival injection, no discharge  Oropharynx: no lesions or injection 	  Neck: supple, without adenopathy  Lungs:  junky BS  Heart: regular rate and rhythm; no murmur, rubs or gallops  Abdomen: soft, nondistended, nontender, peg in place  Skin: no lesions  Extremities: no clubbing, cyanosis, ++ edema  Neurologic:awake, functional quadriplegia  LAB RESULTS:                        8.3    14.35 )-----------( 320      ( 07 Aug 2019 07:05 )             27.3     08-07    147<H>  |  113<H>  |  53<H>  ----------------------------<  75  4.9   |  30  |  1.13    Ca    8.6      07 Aug 2019 07:05  Phos  3.4     08-07  Mg     2.3     08-07          MICROBIOLOGY:  RECENT CULTURES:      RADIOLOGY REVIEWED:  < from: Xray Chest 1 View- PORTABLE-Routine (08.07.19 @ 10:12) >  EXAM:  XR CHEST PORTABLE ROUTINE 1V      PROCEDURE DATE:  08/07/2019        INTERPRETATION:  INDICATION: Shortness of breath, cough    PRIORS: 8/6/2019    VIEWS: Portable AP radiography of the chest performed.    FINDINGS: Heart size cannot be quantitated on this AP radiograph. The   patient's chin/face obscures the superior mediastinal region. Note is   made of an indwelling pacemaker. There is no significant interval change   in bilateral lung parenchymal airspace opacities with bilateral pleural   effusions. There is no evidence for pneumothorax. No mediastinal shift is   noted. Degenerative change of the thoracic spine noted.    IMPRESSION: No change in bilateral lung parenchymal airspace opacities   with bilateral pleural effusions.    < end of copied text >

## 2019-08-08 LAB — GLUCOSE BLDC GLUCOMTR-MCNC: 139 MG/DL — HIGH (ref 70–99)

## 2019-08-08 PROCEDURE — 99233 SBSQ HOSP IP/OBS HIGH 50: CPT

## 2019-08-08 RX ORDER — ATROPINE SULFATE 1 %
1 DROPS OPHTHALMIC (EYE) EVERY 4 HOURS
Refills: 0 | Status: DISCONTINUED | OUTPATIENT
Start: 2019-08-08 | End: 2019-08-09

## 2019-08-08 RX ADMIN — Medication 1 DROP(S): at 11:34

## 2019-08-08 RX ADMIN — NYSTATIN CREAM 1 APPLICATION(S): 100000 CREAM TOPICAL at 13:00

## 2019-08-08 RX ADMIN — ESCITALOPRAM OXALATE 10 MILLIGRAM(S): 10 TABLET, FILM COATED ORAL at 11:34

## 2019-08-08 RX ADMIN — Medication 3 MILLILITER(S): at 15:49

## 2019-08-08 RX ADMIN — DORZOLAMIDE HYDROCHLORIDE TIMOLOL MALEATE 1 DROP(S): 20; 5 SOLUTION/ DROPS OPHTHALMIC at 05:10

## 2019-08-08 RX ADMIN — Medication 3 MILLILITER(S): at 21:48

## 2019-08-08 RX ADMIN — MAGNESIUM OXIDE 400 MG ORAL TABLET 400 MILLIGRAM(S): 241.3 TABLET ORAL at 13:00

## 2019-08-08 RX ADMIN — Medication 30 MILLIGRAM(S): at 05:09

## 2019-08-08 RX ADMIN — SENNA PLUS 10 MILLILITER(S): 8.6 TABLET ORAL at 21:29

## 2019-08-08 RX ADMIN — MIDODRINE HYDROCHLORIDE 10 MILLIGRAM(S): 2.5 TABLET ORAL at 05:10

## 2019-08-08 RX ADMIN — DORZOLAMIDE HYDROCHLORIDE TIMOLOL MALEATE 1 DROP(S): 20; 5 SOLUTION/ DROPS OPHTHALMIC at 17:01

## 2019-08-08 RX ADMIN — NYSTATIN CREAM 1 APPLICATION(S): 100000 CREAM TOPICAL at 21:29

## 2019-08-08 RX ADMIN — NYSTATIN CREAM 1 APPLICATION(S): 100000 CREAM TOPICAL at 05:10

## 2019-08-08 RX ADMIN — LATANOPROST 1 DROP(S): 0.05 SOLUTION/ DROPS OPHTHALMIC; TOPICAL at 21:29

## 2019-08-08 RX ADMIN — MIDODRINE HYDROCHLORIDE 10 MILLIGRAM(S): 2.5 TABLET ORAL at 13:00

## 2019-08-08 RX ADMIN — ZINC OXIDE 1 APPLICATION(S): 200 OINTMENT TOPICAL at 17:00

## 2019-08-08 RX ADMIN — MAGNESIUM OXIDE 400 MG ORAL TABLET 400 MILLIGRAM(S): 241.3 TABLET ORAL at 05:09

## 2019-08-08 RX ADMIN — Medication 3 MILLILITER(S): at 04:09

## 2019-08-08 RX ADMIN — ZINC OXIDE 1 APPLICATION(S): 200 OINTMENT TOPICAL at 07:12

## 2019-08-08 RX ADMIN — POLYETHYLENE GLYCOL 3350 17 GRAM(S): 17 POWDER, FOR SOLUTION ORAL at 11:34

## 2019-08-08 RX ADMIN — Medication 3 MILLILITER(S): at 09:06

## 2019-08-08 RX ADMIN — Medication 1 DROP(S): at 07:13

## 2019-08-08 RX ADMIN — MIDODRINE HYDROCHLORIDE 10 MILLIGRAM(S): 2.5 TABLET ORAL at 21:29

## 2019-08-08 RX ADMIN — PANTOPRAZOLE SODIUM 40 MILLIGRAM(S): 20 TABLET, DELAYED RELEASE ORAL at 11:34

## 2019-08-08 RX ADMIN — MAGNESIUM OXIDE 400 MG ORAL TABLET 400 MILLIGRAM(S): 241.3 TABLET ORAL at 21:29

## 2019-08-08 NOTE — CHART NOTE - NSCHARTNOTEFT_GEN_A_CORE
Nutrition Follow Up Note  Hospital Course (Per Electronic Medical Record):   Source: Medical Record [X]  Nursing Staff [X]     Diet: Jevity 1.5@70cc/hr x 18hrs with 30cc Prosource BID    Patient noted tolerating current PEG feeding regimen, spoke with NP regarding SLP juan 2/2 to patient PTA at nursing facility was receiving pureed diet with honey thick liquids.     Current Weight: (8/8) 227.5/103.2kg, weight fluctuating noted with edema .    Pertinent Medications: MEDICATIONS  (STANDING):  ALBUTerol/ipratropium for Nebulization 3 milliLiter(s) Nebulizer every 6 hours  atropine 1% Ophthalmic Solution for SubLingual Use 1 Drop(s) SubLingual every 12 hours  dorzolamide 2%/timolol 0.5% Ophthalmic Solution 1 Drop(s) Both EYES every 12 hours  escitalopram 10 milliGRAM(s) Oral daily  latanoprost 0.005% Ophthalmic Solution 1 Drop(s) Both EYES at bedtime  magnesium oxide 400 milliGRAM(s) Oral every 8 hours  midodrine 10 milliGRAM(s) Oral every 8 hours  nystatin Powder 1 Application(s) Topical three times a day  pantoprazole   Suspension 40 milliGRAM(s) Enteral Tube daily  polyethylene glycol 3350 17 Gram(s) Oral daily  predniSONE   Tablet 30 milliGRAM(s) Oral daily  senna Syrup 10 milliLiter(s) Oral at bedtime  zinc oxide 20% Ointment 1 Application(s) Topical two times a day    MEDICATIONS  (PRN):  acetaminophen    Suspension .. 650 milliGRAM(s) Enteral Tube every 6 hours PRN Temp greater or equal to 38C (100.4F), Mild Pain (1 - 3)  sodium chloride 0.9% lock flush 10 milliLiter(s) IV Push every 1 hour PRN Pre/post blood products, medications, blood draw, and to maintain line patency      Pertinent Labs:  08-07 Na147 mmol/L<H> Glu 75 mg/dL K+ 4.9 mmol/L Cr  1.13 mg/dL BUN 53 mg/dL<H> 08-07 Phos 3.4 mg/dL 07-30 [id number] 4.8 %        Skin: intact    Edema: (+3) dependent edema     Last BM: on (8/8)    Estimated Needs:   [X] No Change since Previous Assessment  [X] Recalculated:     Previous Nutrition Diagnosis: Severe Malnutrition    Nutrition Diagnosis is [X] Ongoing         New Nutrition Diagnosis: [X] Not Applicable      Interventions:   1. Recommend SLP eval to assess ? Po diet after discharge      Monitoring & Evaluation: will monitor:  [X] Weights   [X] Tolerance to PEG feeds  [X] Follow Up (Per Protocol)        RD to follow as per Nutrition protocol  Renetta Silva RDN Nutrition Follow Up Note  Hospital Course (Per Electronic Medical Record):   Source: Medical Record [X]  Nursing Staff [X]     Diet: Jevity 1.5@70cc/hr x 18hrs with 30cc Prosource BID    Patient noted tolerating current PEG feeding regimen, spoke with NP regarding SLP eval 2/2 to patient PTA at nursing facility was receiving pureed diet with honey thick liquids. Patient is noted since admission with large amounts of oral secretions.     Current Weight: (8/8) 227.5/103.2kg, weight fluctuating noted with edema .    Pertinent Medications: MEDICATIONS  (STANDING):  ALBUTerol/ipratropium for Nebulization 3 milliLiter(s) Nebulizer every 6 hours  atropine 1% Ophthalmic Solution for SubLingual Use 1 Drop(s) SubLingual every 12 hours  dorzolamide 2%/timolol 0.5% Ophthalmic Solution 1 Drop(s) Both EYES every 12 hours  escitalopram 10 milliGRAM(s) Oral daily  latanoprost 0.005% Ophthalmic Solution 1 Drop(s) Both EYES at bedtime  magnesium oxide 400 milliGRAM(s) Oral every 8 hours  midodrine 10 milliGRAM(s) Oral every 8 hours  nystatin Powder 1 Application(s) Topical three times a day  pantoprazole   Suspension 40 milliGRAM(s) Enteral Tube daily  polyethylene glycol 3350 17 Gram(s) Oral daily  predniSONE   Tablet 30 milliGRAM(s) Oral daily  senna Syrup 10 milliLiter(s) Oral at bedtime  zinc oxide 20% Ointment 1 Application(s) Topical two times a day    MEDICATIONS  (PRN):  acetaminophen    Suspension .. 650 milliGRAM(s) Enteral Tube every 6 hours PRN Temp greater or equal to 38C (100.4F), Mild Pain (1 - 3)  sodium chloride 0.9% lock flush 10 milliLiter(s) IV Push every 1 hour PRN Pre/post blood products, medications, blood draw, and to maintain line patency      Pertinent Labs:  08-07 Na147 mmol/L<H> Glu 75 mg/dL K+ 4.9 mmol/L Cr  1.13 mg/dL BUN 53 mg/dL<H> 08-07 Phos 3.4 mg/dL 07-30 [id number] 4.8 %        Skin: intact    Edema: (+3) dependent edema     Last BM: on (8/8)    Estimated Needs:   [X] No Change since Previous Assessment  [X] Recalculated:     Previous Nutrition Diagnosis: Severe Malnutrition    Nutrition Diagnosis is [X] Ongoing         New Nutrition Diagnosis: [X] Not Applicable      Interventions:   1. Recommend SLP eval to assess ? Po diet after discharge      Monitoring & Evaluation: will monitor:  [X] Weights   [X] Tolerance to PEG feeds  [X] Follow Up (Per Protocol)        RD to follow as per Nutrition protocol  Renetta Silva RDN

## 2019-08-08 NOTE — PROGRESS NOTE ADULT - SUBJECTIVE AND OBJECTIVE BOX
Progress: f/u palliative no events overnight, pt alert, on o2 N/C, has increased oral secretions     Present Symptoms:   Dyspnea: mild  Nausea/Vomiting: no  Anxiety:  no  Depressed Mood:   Fatigue: yes  Loss of appetite: yes  Pain:      no s/s             location:   Review of Systems:  Unable to obtain due to poor mentation]    MEDICATIONS  (STANDING):  ALBUTerol/ipratropium for Nebulization 3 milliLiter(s) Nebulizer every 6 hours  dorzolamide 2%/timolol 0.5% Ophthalmic Solution 1 Drop(s) Both EYES every 12 hours  escitalopram 10 milliGRAM(s) Oral daily  latanoprost 0.005% Ophthalmic Solution 1 Drop(s) Both EYES at bedtime  magnesium oxide 400 milliGRAM(s) Oral every 8 hours  midodrine 10 milliGRAM(s) Oral every 8 hours  nystatin Powder 1 Application(s) Topical three times a day  pantoprazole   Suspension 40 milliGRAM(s) Enteral Tube daily  polyethylene glycol 3350 17 Gram(s) Oral daily  predniSONE   Tablet 30 milliGRAM(s) Oral daily  senna Syrup 10 milliLiter(s) Oral at bedtime  zinc oxide 20% Ointment 1 Application(s) Topical two times a day    MEDICATIONS  (PRN):  acetaminophen    Suspension .. 650 milliGRAM(s) Enteral Tube every 6 hours PRN Temp greater or equal to 38C (100.4F), Mild Pain (1 - 3)  atropine 1% Ophthalmic Solution for SubLingual Use 1 Drop(s) SubLingual every 4 hours PRN secreations  sodium chloride 0.9% lock flush 10 milliLiter(s) IV Push every 1 hour PRN Pre/post blood products, medications, blood draw, and to maintain line patency      PHYSICAL EXAM:  Vital Signs Last 24 Hrs  T(C): 36.3 (08 Aug 2019 05:04), Max: 36.4 (07 Aug 2019 22:43)  T(F): 97.3 (08 Aug 2019 05:04), Max: 97.5 (07 Aug 2019 22:43)  HR: 63 (08 Aug 2019 09:08) (59 - 67)  BP: 146/87 (08 Aug 2019 05:04) (112/63 - 146/87)  BP(mean): --  RR: 18 (08 Aug 2019 05:04) (18 - 19)  SpO2: 96% (08 Aug 2019 09:08) (96% - 100%)  General: alert  oriented x ___1-2_      HEENT: n/c, a/t     Lungs: rhonchi upper lobes, coarse bs  CV: normal s1s2    GI: lrg., soft, n/t + bs + peg    : nml    Musculoskeletal: edematous, weak x 4    Skin: warm dry, fragile     Neuro: alert, sl lethargic   Oral intake ability:  npo  Diet: peg feeds      LABS:                          8.3    14.35 )-----------( 320      ( 07 Aug 2019 07:05 )             27.3     08-07    147<H>  |  113<H>  |  53<H>  ----------------------------<  75  4.9   |  30  |  1.13    Ca    8.6      07 Aug 2019 07:05  Phos  3.4     08-07  Mg     2.3     08-07          RADIOLOGY & ADDITIONAL STUDIES:    ADVANCE DIRECTIVES: MOLST  DNR/DNI   w/ limited medical interventions   Advanced Care Planning discussion total time spent:

## 2019-08-08 NOTE — PROGRESS NOTE ADULT - SUBJECTIVE AND OBJECTIVE BOX
CC: Patient is a 86y old  Male who presents with a chief complaint of Change in mental status (06 Aug 2019 18:56)      S: Patient has no complaints at this time.     Patient seen and examined at bedside.    ALLERGIES:  No Known Allergies      MEDICATIONS:  acetaminophen    Suspension .. 650 milliGRAM(s) Enteral Tube every 6 hours PRN  ALBUTerol/ipratropium for Nebulization 3 milliLiter(s) Nebulizer every 6 hours  atropine 1% Ophthalmic Solution for SubLingual Use 1 Drop(s) SubLingual every 12 hours  dorzolamide 2%/timolol 0.5% Ophthalmic Solution 1 Drop(s) Both EYES every 12 hours  escitalopram 10 milliGRAM(s) Oral daily  heparin  Injectable 5000 Unit(s) SubCutaneous every 12 hours  latanoprost 0.005% Ophthalmic Solution 1 Drop(s) Both EYES at bedtime  magnesium oxide 400 milliGRAM(s) Oral every 8 hours  midodrine 10 milliGRAM(s) Oral every 8 hours  nystatin Powder 1 Application(s) Topical three times a day  pantoprazole   Suspension 40 milliGRAM(s) Enteral Tube daily  polyethylene glycol 3350 17 Gram(s) Oral daily  predniSONE   Tablet 30 milliGRAM(s) Oral daily  senna Syrup 10 milliLiter(s) Oral at bedtime  sodium chloride 0.9% lock flush 10 milliLiter(s) IV Push every 1 hour PRN  zinc oxide 20% Ointment 1 Application(s) Topical two times a day        Vital Signs Last 24 Hrs  T(F): 97.2 (07 Aug 2019 05:22), Max: 97.7 (06 Aug 2019 21:59)  HR: 60 (07 Aug 2019 10:34) (60 - 60)  BP: 142/89 (07 Aug 2019 05:22) (139/74 - 142/89)  RR: 20 (07 Aug 2019 06:19) (15 - 20)  SpO2: 95% (07 Aug 2019 10:34) (95% - 100%)  I&O's Summary    06 Aug 2019 07:01  -  07 Aug 2019 07:00  --------------------------------------------------------  IN: 1170 mL / OUT: 0 mL / NET: 1170 mL        PHYSICAL EXAM:  General: NAD, bed bound, kyphotic  ENT upper airway secretions,   Neck: Supple, No JVD  Lungs: Left coarse Right dimished  Cardio: RRR, S1/S2, No murmurs  Abdomen: Soft, Nontender, Nondistended; Bowel sounds present, PEG tube in place  Extremities: No cyanosis, 2+ edema b/l upper and lower exteremities  Neuro: no new deficits  Skin: no rashes  Psych: answers simple yes, no questions, easily arousable    LABS:                        8.3    14.35 )-----------( 320      ( 07 Aug 2019 07:05 )             27.3     08-07    147  |  113  |  53  ----------------------------<  75  4.9   |  30  |  1.13    Ca    8.6      07 Aug 2019 07:05  Phos  3.4     08-07  Mg     2.3     08-07      eGFR if Non African American: 58 mL/min/1.73M2 (08-07-19 @ 07:05)  eGFR if African American: 68 mL/min/1.73M2 (08-07-19 @ 07:05)    POCT Blood Glucose.: 80 mg/dL (07 Aug 2019 06:44)  POCT Blood Glucose.: 168 mg/dL (06 Aug 2019 14:29)    07-30 [id number] 4.8          RADIOLOGY & ADDITIONAL TESTS:

## 2019-08-08 NOTE — PROGRESS NOTE ADULT - ASSESSMENT
ASSESSMENT and PLAN:  1) Septic shock with multiorgan failure 2/2 aspiration PNA - HFNC de-escalated to NC. Now attemptong to titrate NC Abx regimen completed. Continue chest PT/pulm toilet/nebs/steriods. Remains high risk for recurrent respiratory failure.   2) Anemia - Trend CBC currently near baseline  3) Hypoglycemia - Fingersticks Q6H.   4) MELISSA - Now back to baseline, monitor I&Os. Avoid nephrotoxins  5) Thrombocytopenia - PLT now 203,000 - DVT ppx started.

## 2019-08-08 NOTE — PROGRESS NOTE ADULT - ASSESSMENT
A/P :  86 year old male with extensive PMH including IBS, CVA with dysphagia and PEG placement, anemia, psoriasis, GERD, depression, chf , PPM admitted with severe sepsis from likely aspiration pneumonia. Septic Shock with multiorgan failure.   Acute Respiratory failure intubated 7/28 , extubated 8/3 -   F/b pulm   palliative: pt now on o2 via n/c, pt awake, alert, whispering words, but appears very weak and fatigued. Having increased oral secretions on atropine.    Pt condition remains guarded, and pt high risk for re- intubation, family aware of this.  Remains npo, alina peg tube feeds.    I called and spoke to pts wife this morning, updated her on pts condition  today and began to re address that pt remains at high risk for re-intubation, wife aware and stated that " we no longer want to re-intubate him"  . Daughter in visiting this afternoon, and she is aware that wife would like pt to be a DNR/ DNI .   We reviewed and updated the MOLST form to reflect DNI status. I reviewed this w/ med team and pts RN.   Will cont to monitor pts clinical status w/ med team.

## 2019-08-09 LAB — GLUCOSE BLDC GLUCOMTR-MCNC: 84 MG/DL — SIGNIFICANT CHANGE UP (ref 70–99)

## 2019-08-09 PROCEDURE — 99233 SBSQ HOSP IP/OBS HIGH 50: CPT

## 2019-08-09 RX ORDER — ATROPINE SULFATE 1 %
1 DROPS OPHTHALMIC (EYE) EVERY 6 HOURS
Refills: 0 | Status: DISCONTINUED | OUTPATIENT
Start: 2019-08-09 | End: 2019-08-12

## 2019-08-09 RX ORDER — HEPARIN SODIUM 5000 [USP'U]/ML
5000 INJECTION INTRAVENOUS; SUBCUTANEOUS EVERY 12 HOURS
Refills: 0 | Status: DISCONTINUED | OUTPATIENT
Start: 2019-08-09 | End: 2019-08-14

## 2019-08-09 RX ORDER — MIDODRINE HYDROCHLORIDE 2.5 MG/1
5 TABLET ORAL
Refills: 0 | Status: DISCONTINUED | OUTPATIENT
Start: 2019-08-09 | End: 2019-08-10

## 2019-08-09 RX ORDER — SCOPALAMINE 1 MG/3D
1 PATCH, EXTENDED RELEASE TRANSDERMAL
Refills: 0 | Status: DISCONTINUED | OUTPATIENT
Start: 2019-08-09 | End: 2019-08-14

## 2019-08-09 RX ADMIN — PANTOPRAZOLE SODIUM 40 MILLIGRAM(S): 20 TABLET, DELAYED RELEASE ORAL at 12:49

## 2019-08-09 RX ADMIN — HEPARIN SODIUM 5000 UNIT(S): 5000 INJECTION INTRAVENOUS; SUBCUTANEOUS at 18:11

## 2019-08-09 RX ADMIN — Medication 3 MILLILITER(S): at 04:00

## 2019-08-09 RX ADMIN — MIDODRINE HYDROCHLORIDE 5 MILLIGRAM(S): 2.5 TABLET ORAL at 12:48

## 2019-08-09 RX ADMIN — ZINC OXIDE 1 APPLICATION(S): 200 OINTMENT TOPICAL at 18:12

## 2019-08-09 RX ADMIN — Medication 3 MILLILITER(S): at 16:10

## 2019-08-09 RX ADMIN — ZINC OXIDE 1 APPLICATION(S): 200 OINTMENT TOPICAL at 06:05

## 2019-08-09 RX ADMIN — MAGNESIUM OXIDE 400 MG ORAL TABLET 400 MILLIGRAM(S): 241.3 TABLET ORAL at 21:51

## 2019-08-09 RX ADMIN — MIDODRINE HYDROCHLORIDE 10 MILLIGRAM(S): 2.5 TABLET ORAL at 06:04

## 2019-08-09 RX ADMIN — NYSTATIN CREAM 1 APPLICATION(S): 100000 CREAM TOPICAL at 06:05

## 2019-08-09 RX ADMIN — Medication 3 MILLILITER(S): at 09:17

## 2019-08-09 RX ADMIN — LATANOPROST 1 DROP(S): 0.05 SOLUTION/ DROPS OPHTHALMIC; TOPICAL at 21:51

## 2019-08-09 RX ADMIN — ESCITALOPRAM OXALATE 10 MILLIGRAM(S): 10 TABLET, FILM COATED ORAL at 12:49

## 2019-08-09 RX ADMIN — MIDODRINE HYDROCHLORIDE 5 MILLIGRAM(S): 2.5 TABLET ORAL at 18:11

## 2019-08-09 RX ADMIN — MAGNESIUM OXIDE 400 MG ORAL TABLET 400 MILLIGRAM(S): 241.3 TABLET ORAL at 06:04

## 2019-08-09 RX ADMIN — NYSTATIN CREAM 1 APPLICATION(S): 100000 CREAM TOPICAL at 13:13

## 2019-08-09 RX ADMIN — DORZOLAMIDE HYDROCHLORIDE TIMOLOL MALEATE 1 DROP(S): 20; 5 SOLUTION/ DROPS OPHTHALMIC at 06:05

## 2019-08-09 RX ADMIN — POLYETHYLENE GLYCOL 3350 17 GRAM(S): 17 POWDER, FOR SOLUTION ORAL at 12:48

## 2019-08-09 RX ADMIN — DORZOLAMIDE HYDROCHLORIDE TIMOLOL MALEATE 1 DROP(S): 20; 5 SOLUTION/ DROPS OPHTHALMIC at 18:13

## 2019-08-09 RX ADMIN — Medication 1 DROP(S): at 18:11

## 2019-08-09 RX ADMIN — Medication 30 MILLIGRAM(S): at 06:05

## 2019-08-09 RX ADMIN — MAGNESIUM OXIDE 400 MG ORAL TABLET 400 MILLIGRAM(S): 241.3 TABLET ORAL at 13:13

## 2019-08-09 RX ADMIN — Medication 3 MILLILITER(S): at 22:03

## 2019-08-09 NOTE — PROGRESS NOTE ADULT - ASSESSMENT
A/P 86 year old male with extensive PMH including IBS, CVA with dysphagia and PEG placement, anemia, psoriasis, GERD, depression, chf , PPM admitted with severe sepsis from likely aspiration pneumonia. Septic Shock with multiorgan failure.   Acute Respiratory failure intubated 7/28 , extubated 8/3 -   F/b pulm   palliative: pt now on o2 via n/c, pt awake, alert, whispering words, but appears weak and fatigued. Continues to have thickened oral secretions on atropine, requiring PRN oral suctioning.     Spoke to pts daughter today and we discussed pts condition and his wishes to go back to [facility] where has been a resident for almost 2 years. Daughter agrees that he does like his room there and might be more comfortable.  Discussed how pt has had multiple hospital admissions for the same reason, and he is at high risk for the same. Discussed the option of getting home hospice services at [facility] so pt will have some extra help/services, and hopefully avoid further hospitalizations, Daughter agreeable . I called and spoke to [facility] rep Fuller who confirmed that is an option for hospice at [facility]. I also called and spoke to pts wife Angie who also agreed to hospice consult for home services.      Reviewed with med/ccu team, hospice consult order placed and  CM aware. A/P 86 year old male with extensive PMH including IBS, CVA with dysphagia and PEG placement, anemia, psoriasis, GERD, depression, chf , PPM admitted with severe sepsis from likely aspiration pneumonia. Septic Shock with multiorgan failure.   Acute Respiratory failure intubated 7/28 , extubated 8/3 -   F/b pulm   palliative: pt now on o2 via n/c, pt awake, alert, whispering words, but appears weak and fatigued. Continues to have thickened oral secretions on atropine, requiring PRN oral suctioning.     Spoke to pts daughter today and we discussed pts condition and his wishes to go back to [facility] where has been a resident for almost 2 years. Daughter agrees that he does like his room there and might be more comfortable.  Discussed how pt has had multiple hospital admissions for the same reason, and he is at high risk for the same. Discussed the option of getting home hospice services at [facility] so pt will have some extra help/services, and hopefully avoid further hospitalizations, Daughter agreeable . I called and spoke to [facility] rep [facility] who confirmed that is an option for hospice at [facility]. I also called and spoke to pts wife Angie who also agreed to hospice consult for home services.      Reviewed with med/ccu team, hospice consult order placed and  CM aware.    Reviewed with rep from [facility].

## 2019-08-09 NOTE — SWALLOW BEDSIDE ASSESSMENT ADULT - SWALLOW EVAL: DIAGNOSIS
Severe oropharyngeal dysphagia; Upon initial contact with patient, observed with severe wet vocal quality. Family present and stated that they gave a small amount of vanilla shake via teaspoon and straw prior to SLP's arrival. Prior to PO trials, patient instructed to cough, clear throat, re-swallow x5 with minimal change in vocal quality. Patient given thin liquids via cup sip in order to facilitate removal of possible stasis in pharyngeal cavity however, with minimal success.  As a result, no further trials attempted as patient is at high risk for aspiration secondary to symptoms seen. Currently patient should continue NPO status with primary mode of nutrition/hydration. Patient is requesting to go back to SNF. Once medically optimized, SLP at SNF should initiate evaluation to assess candidacy for pleasure feeds. Severe oropharyngeal dysphagia; Upon initial contact with patient, observed with severe wet vocal quality. Family present and stated that they gave a small amount of vanilla shake via teaspoon and straw prior to SLP's arrival. Family educated on current NPO status. Prior to PO trials, patient instructed to cough, clear throat, re-swallow x5 with minimal change in vocal quality. Patient given thin liquids via cup sip in order to facilitate removal of possible stasis in pharyngeal cavity however, with minimal success.  As a result, no further trials attempted as patient is at high risk for aspiration secondary to symptoms seen. Currently patient should continue NPO status with primary mode of nutrition/hydration. Will f/u at a later time to assess candidacy for pleasure feeds. Patient is requesting to go back to SNF. Permitting on d/c planning, if  patient is not seen for f/u at this facility, SLP at SNF should initiate evaluation to assess candidacy for pleasure feeds.

## 2019-08-09 NOTE — SWALLOW BEDSIDE ASSESSMENT ADULT - ASR SWALLOW ASPIRATION MONITOR
change of breathing pattern/cough/oral hygiene/position upright (90Y)/throat clearing/fever/pneumonia/gurgly voice/upper respiratory infection

## 2019-08-09 NOTE — PROGRESS NOTE ADULT - ASSESSMENT
Elderly man, recent hospital stay for MSSA bacteremic pneumonia, G tube, at rehab, returns with respiratory distress, required intubation, pressors, thrombocytopenia, hypothermia, severe sepsis/shock  CXR with dense infiltrate- healthcare associated pneumonia.  Pseudomonas predominating in Sputum, sensitive strain  Afebrile, WBC lower  Completed 1 week of anti-pseudomonal therapy- Meropenem stopped 8/3  At high risk for recurrent respiratory failure, but afebrile, more alert  Thrombocytopenia resolved  Remains off high flow    Plan:  Continue supportive care  Pulm Toilet  Follow temps and CBC/diff   No indication for antibiotics

## 2019-08-09 NOTE — PROGRESS NOTE ADULT - SUBJECTIVE AND OBJECTIVE BOX
CC: Patient is a 86y old  Male who presents with a chief complaint of Change in mental status (06 Aug 2019 18:56)      S: Patient requesting to return to [facility]     Patient seen and examined at bedside.    ALLERGIES:  No Known Allergies      MEDICATIONS:  acetaminophen    Suspension .. 650 milliGRAM(s) Enteral Tube every 6 hours PRN  ALBUTerol/ipratropium for Nebulization 3 milliLiter(s) Nebulizer every 6 hours  atropine 1% Ophthalmic Solution for SubLingual Use 1 Drop(s) SubLingual every 12 hours  dorzolamide 2%/timolol 0.5% Ophthalmic Solution 1 Drop(s) Both EYES every 12 hours  escitalopram 10 milliGRAM(s) Oral daily  heparin  Injectable 5000 Unit(s) SubCutaneous every 12 hours  latanoprost 0.005% Ophthalmic Solution 1 Drop(s) Both EYES at bedtime  magnesium oxide 400 milliGRAM(s) Oral every 8 hours  midodrine 10 milliGRAM(s) Oral every 8 hours  nystatin Powder 1 Application(s) Topical three times a day  pantoprazole   Suspension 40 milliGRAM(s) Enteral Tube daily  polyethylene glycol 3350 17 Gram(s) Oral daily  predniSONE   Tablet 30 milliGRAM(s) Oral daily  senna Syrup 10 milliLiter(s) Oral at bedtime  sodium chloride 0.9% lock flush 10 milliLiter(s) IV Push every 1 hour PRN  zinc oxide 20% Ointment 1 Application(s) Topical two times a day        Vital Signs Last 24 Hrs  T(F): 97.2 (07 Aug 2019 05:22), Max: 97.7 (06 Aug 2019 21:59)  HR: 60 (07 Aug 2019 10:34) (60 - 60)  BP: 142/89 (07 Aug 2019 05:22) (139/74 - 142/89)  RR: 20 (07 Aug 2019 06:19) (15 - 20)  SpO2: 95% (07 Aug 2019 10:34) (95% - 100%)  I&O's Summary    06 Aug 2019 07:01  -  07 Aug 2019 07:00  --------------------------------------------------------  IN: 1170 mL / OUT: 0 mL / NET: 1170 mL        PHYSICAL EXAM:  General: NAD, bed bound, kyphotic  ENT upper airway secretions,   Neck: Supple, No JVD  Lungs: B/L diminished  Cardio: RRR, S1/S2, No murmurs  Abdomen: Soft, Nontender, Nondistended; Bowel sounds present, PEG tube in place  Extremities: No cyanosis, 2+ edema b/l upper and lower exteremities  Neuro: no new deficits  Skin: no rashes  Psych: answers simple yes, no questions, easily arousable    LABS:                        8.3    14.35 )-----------( 320      ( 07 Aug 2019 07:05 )             27.3     08-07    147  |  113  |  53  ----------------------------<  75  4.9   |  30  |  1.13    Ca    8.6      07 Aug 2019 07:05  Phos  3.4     08-07  Mg     2.3     08-07      eGFR if Non African American: 58 mL/min/1.73M2 (08-07-19 @ 07:05)  eGFR if African American: 68 mL/min/1.73M2 (08-07-19 @ 07:05)    POCT Blood Glucose.: 80 mg/dL (07 Aug 2019 06:44)  POCT Blood Glucose.: 168 mg/dL (06 Aug 2019 14:29)    07-30 [id number] 4.8          RADIOLOGY & ADDITIONAL TESTS:

## 2019-08-09 NOTE — SWALLOW BEDSIDE ASSESSMENT ADULT - COMMENTS
WBC: 14.37  CXR:  No change in bilateral lung parenchymal airspace opacities   with bilateral pleural effusions

## 2019-08-09 NOTE — PROGRESS NOTE ADULT - SUBJECTIVE AND OBJECTIVE BOX
Progress: f/u palliative, no new events overnight, pt wishes to get back to [facility] where he is a long term resident. Family aware        Present Symptoms:   Dyspnea: mild  Nausea/Vomiting: no  Anxiety:  no  Depressed Mood: no  Fatigue: yes  Loss of appetite: yes  Pain:     no              location:   Review of Systems:  Unable to obtain due to poor mentation]    MEDICATIONS  (STANDING):  ALBUTerol/ipratropium for Nebulization 3 milliLiter(s) Nebulizer every 6 hours  atropine 1% Ophthalmic Solution for SubLingual Use 1 Drop(s) SubLingual every 6 hours  dorzolamide 2%/timolol 0.5% Ophthalmic Solution 1 Drop(s) Both EYES every 12 hours  escitalopram 10 milliGRAM(s) Oral daily  heparin  Injectable 5000 Unit(s) SubCutaneous every 12 hours  latanoprost 0.005% Ophthalmic Solution 1 Drop(s) Both EYES at bedtime  magnesium oxide 400 milliGRAM(s) Oral every 8 hours  midodrine 5 milliGRAM(s) Oral <User Schedule>  nystatin Powder 1 Application(s) Topical three times a day  pantoprazole   Suspension 40 milliGRAM(s) Enteral Tube daily  polyethylene glycol 3350 17 Gram(s) Oral daily  predniSONE   Tablet 30 milliGRAM(s) Oral daily  senna Syrup 10 milliLiter(s) Oral at bedtime  zinc oxide 20% Ointment 1 Application(s) Topical two times a day    MEDICATIONS  (PRN):  acetaminophen    Suspension .. 650 milliGRAM(s) Enteral Tube every 6 hours PRN Temp greater or equal to 38C (100.4F), Mild Pain (1 - 3)  sodium chloride 0.9% lock flush 10 milliLiter(s) IV Push every 1 hour PRN Pre/post blood products, medications, blood draw, and to maintain line patency      PHYSICAL EXAM:  Vital Signs Last 24 Hrs  T(C): 36.6 (09 Aug 2019 12:30), Max: 36.6 (09 Aug 2019 12:30)  T(F): 97.9 (09 Aug 2019 12:30), Max: 97.9 (09 Aug 2019 12:30)  HR: 63 (09 Aug 2019 12:30) (59 - 68)  BP: 143/97 (09 Aug 2019 12:30) (136/90 - 143/97)  BP(mean): --  RR: 16 (09 Aug 2019 12:30) (16 - 18)  SpO2: 90% (09 Aug 2019 12:30) (90% - 99%)  General: alert  oriented x _2-3___      HEENT: n/c, a/t oral mucosa w/ inc. thickened secretions       Lungs: rhonchi upper lobes, dim to bases    CV: s1s2    GI: abd lrg., soft, n/t + peg ,      : normal    Musculoskeletal: edematous, weakened    Skin: warm, dry, fragile     Neuro: alert, orientedto person place ,attempts  to speak   Oral intake ability:  npo  Diet: peg tube feeds     LABS:                RADIOLOGY & ADDITIONAL STUDIES:    ADVANCE DIRECTIVES: MOLST  DNR/DNI   Advanced Care Planning discussion total time spent:

## 2019-08-09 NOTE — PROGRESS NOTE ADULT - SUBJECTIVE AND OBJECTIVE BOX
CC: f/u for respiratory failure and pneumonia    Patient more alert, speech more clear, weak in general, asking to go home    REVIEW OF SYSTEMS:  All other review of systems negative (Comprehensive ROS), limited    Antimicrobials Day #  :off  Medications Reviewed    Vital Signs Last 24 Hrs  T(F): 97.9 (09 Aug 2019 12:30), Max: 97.9 (09 Aug 2019 12:30)  HR: 63 (09 Aug 2019 12:30) (59 - 68)  BP: 143/97 (09 Aug 2019 12:30) (136/90 - 143/97)  BP(mean): --  RR: 16 (09 Aug 2019 12:30) (16 - 18)  SpO2: 90% (09 Aug 2019 12:30) (90% - 99%)    PHYSICAL EXAM:  General: alert, no distress  Eyes:  anicteric, no conjunctival injection, no discharge  Oropharynx: no lesions or injection 	  Neck: supple, without adenopathy  Lungs:  ant rhonchi  Heart: regular rate and rhythm; no murmur, rubs or gallops  Abdomen: soft, nondistended, nontender, G tube site clean  Skin: no rash  Extremities: ++ edema  Neurologic: awake, talkative, functional quadriplegia    LAB RESULTS:  no new data    MICROBIOLOGY:  RECENT CULTURES:      RADIOLOGY REVIEWED:  Xray Chest 1 View- PORTABLE-Routine (08.07.19 @ 10:12) >  No change in bilateral lung parenchymal airspace opacities with bilateral pleural effusions.

## 2019-08-09 NOTE — SWALLOW BEDSIDE ASSESSMENT ADULT - PHARYNGEAL PHASE
Delayed pharyngeal swallow/Decreased laryngeal elevation/Wet vocal quality post oral intake/Multiple swallows

## 2019-08-09 NOTE — SWALLOW BEDSIDE ASSESSMENT ADULT - DIET PRIOR TO ADMI
NPO with PEG as primary mode of nutrition/hydration, pleasure feeds of puree/honey thick liquids at SNF

## 2019-08-09 NOTE — SWALLOW BEDSIDE ASSESSMENT ADULT - SLP PERTINENT HISTORY OF CURRENT PROBLEM
Septic shock with multiorgan failure 2/2 aspiration PNA - HFNC de-escalated to NC. Per NP, at SNF, patient had puree/honey thick liquids as pleasure feeds. Evaluation requested to assess upon d/c if patient can continue pleasure feeds or continue NPO with PEG as primary mode of nutrition/hydration. Septic shock with multiorgan failure 2/2 aspiration PNA - HFNC de-escalated to NC. Per NP, at SNF, patient had puree/honey thick liquids as pleasure feeds. Evaluation requested to assess upon d/c if patient can continue pleasure feeds or continue NPO with PEG as primary mode of nutrition/hydration. SLP to see as schedule permits

## 2019-08-10 RX ADMIN — MAGNESIUM OXIDE 400 MG ORAL TABLET 400 MILLIGRAM(S): 241.3 TABLET ORAL at 05:19

## 2019-08-10 RX ADMIN — SCOPALAMINE 1 PATCH: 1 PATCH, EXTENDED RELEASE TRANSDERMAL at 12:51

## 2019-08-10 RX ADMIN — Medication 3 MILLILITER(S): at 09:35

## 2019-08-10 RX ADMIN — NYSTATIN CREAM 1 APPLICATION(S): 100000 CREAM TOPICAL at 22:45

## 2019-08-10 RX ADMIN — Medication 3 MILLILITER(S): at 05:25

## 2019-08-10 RX ADMIN — SENNA PLUS 10 MILLILITER(S): 8.6 TABLET ORAL at 22:42

## 2019-08-10 RX ADMIN — MAGNESIUM OXIDE 400 MG ORAL TABLET 400 MILLIGRAM(S): 241.3 TABLET ORAL at 22:41

## 2019-08-10 RX ADMIN — ZINC OXIDE 1 APPLICATION(S): 200 OINTMENT TOPICAL at 17:53

## 2019-08-10 RX ADMIN — NYSTATIN CREAM 1 APPLICATION(S): 100000 CREAM TOPICAL at 17:55

## 2019-08-10 RX ADMIN — LATANOPROST 1 DROP(S): 0.05 SOLUTION/ DROPS OPHTHALMIC; TOPICAL at 22:42

## 2019-08-10 RX ADMIN — Medication 30 MILLIGRAM(S): at 05:19

## 2019-08-10 RX ADMIN — Medication 20 MILLIGRAM(S): at 17:54

## 2019-08-10 RX ADMIN — POLYETHYLENE GLYCOL 3350 17 GRAM(S): 17 POWDER, FOR SOLUTION ORAL at 12:49

## 2019-08-10 RX ADMIN — HEPARIN SODIUM 5000 UNIT(S): 5000 INJECTION INTRAVENOUS; SUBCUTANEOUS at 05:19

## 2019-08-10 RX ADMIN — ESCITALOPRAM OXALATE 10 MILLIGRAM(S): 10 TABLET, FILM COATED ORAL at 12:48

## 2019-08-10 RX ADMIN — HEPARIN SODIUM 5000 UNIT(S): 5000 INJECTION INTRAVENOUS; SUBCUTANEOUS at 17:56

## 2019-08-10 RX ADMIN — Medication 3 MILLILITER(S): at 15:36

## 2019-08-10 RX ADMIN — Medication 1 DROP(S): at 05:19

## 2019-08-10 RX ADMIN — MAGNESIUM OXIDE 400 MG ORAL TABLET 400 MILLIGRAM(S): 241.3 TABLET ORAL at 17:55

## 2019-08-10 RX ADMIN — MIDODRINE HYDROCHLORIDE 5 MILLIGRAM(S): 2.5 TABLET ORAL at 05:19

## 2019-08-10 RX ADMIN — Medication 1 DROP(S): at 17:51

## 2019-08-10 RX ADMIN — Medication 3 MILLILITER(S): at 21:56

## 2019-08-10 RX ADMIN — NYSTATIN CREAM 1 APPLICATION(S): 100000 CREAM TOPICAL at 05:21

## 2019-08-10 RX ADMIN — DORZOLAMIDE HYDROCHLORIDE TIMOLOL MALEATE 1 DROP(S): 20; 5 SOLUTION/ DROPS OPHTHALMIC at 17:51

## 2019-08-10 RX ADMIN — Medication 1 DROP(S): at 12:49

## 2019-08-10 RX ADMIN — DORZOLAMIDE HYDROCHLORIDE TIMOLOL MALEATE 1 DROP(S): 20; 5 SOLUTION/ DROPS OPHTHALMIC at 05:19

## 2019-08-10 RX ADMIN — PANTOPRAZOLE SODIUM 40 MILLIGRAM(S): 20 TABLET, DELAYED RELEASE ORAL at 12:49

## 2019-08-10 NOTE — PROGRESS NOTE ADULT - SUBJECTIVE AND OBJECTIVE BOX
CC: f/u for pneumonia    Patient reports nothing not answering    REVIEW OF SYSTEMS:  All other review of systems cannot getComprehensive ROS)    Antimicrobials Day #  :off    Other Medications Reviewed    T(F): 97.5 (08-10-19 @ 10:49), Max: 98.3 (08-10-19 @ 05:10)  HR: 60 (08-10-19 @ 10:49)  BP: 135/81 (08-10-19 @ 10:49)  RR: 16 (08-10-19 @ 10:49)  SpO2: 99% (08-10-19 @ 10:49)  Wt(kg): --    PHYSICAL EXAM:  General: lethargic no distress  Eyes:  anicteric, no conjunctival injection, no discharge  Oropharynx: no lesions or injection 	  Neck: supple, without adenopathy  Lungs: course , poor effort  to auscultation  Heart: regular rate and rhythm; no murmur, rubs or gallops  Abdomen: soft, nondistended, nontender, without mass or organomegaly  Skin: no lesions  Extremities: no clubbing, cyanosis,. all with  edema  Neurologic: lethargic, not following commands    LAB RESULTS:    Complete Blood Count in AM (08.07.19 @ 07:05)    Nucleated RBC: 0 /100 WBCs    WBC Count: 14.35 K/uL    RBC Count: 2.74 M/uL    Hemoglobin: 8.3 g/dL    Hematocrit: 27.3 %    Mean Cell Volume: 99.6 fl    Mean Cell Hemoglobin: 30.3 pg    Mean Cell Hemoglobin Conc: 30.4 gm/dL    Red Cell Distrib Width: 18.7 %    Platelet Count - Automated: 320 K/uL              MICROBIOLOGY:  RECENT CULTURES:      RADIOLOGY REVIEWED:    < from: Xray Chest 1 View- PORTABLE-Routine (08.06.19 @ 09:09) >    No ET tube is visualized. The patient's chin obscures thesuperior   mediastinum and lung apices.    Bilateral infiltrates and effusions unchanged. Cardiomegaly. Pacemaker.      < end of copied text >            Assessment:  Elderly man, demented admitted with septic shock from pneumonia, required vent, pressors, now off and finished abx. Given poor neurologic status remains at risk for recurrent episodes of same no matter what we do  Plan:  monitor off antibiotics  comfort as goal most rational and realistic

## 2019-08-10 NOTE — PROGRESS NOTE ADULT - ASSESSMENT
86m PMHx: CHF, HLD, GERB, BPH, CVA, CKD, IBS, dysphagia w/peg, PPM Admitted 7/28 from SNF with change in mental status was found to have severe sepsis with source likely RML PNA from aspiration  Now on Advanced Illness awaiting D/C planning    ASSESSMENT and PLAN:  1) Septic shock with multiorgan failure 2/2 aspiration PNA - HFNC de-escalated to NC. Titrating NC now at 3L. Continue chest PT/pulm toilet/nebs/steriods. Remains high risk for recurrent respiratory failure.   2) Anemia - Trend CBC currently near baseline  3) Hypoglycemia - Fingersticks Q6H.   4) MELISSA - Now back to baseline, monitor I&Os. Avoid nephrotoxins  5) Thrombocytopenia - PLT now 203,000 - DVT ppx started.   6). [facility] discussed with [facility] Management as well as Palliative care. Discussion with wife ongoing for possible d/c to [facility] with hospice in place

## 2019-08-10 NOTE — PROGRESS NOTE ADULT - SUBJECTIVE AND OBJECTIVE BOX
CC: Patient is a 86y old  Male who presents with a chief complaint of Change in mental status (06 Aug 2019 18:56)      Patient seen and examined at bedside.    ALLERGIES:  No Known Allergies      MEDICATIONS:  acetaminophen    Suspension .. 650 milliGRAM(s) Enteral Tube every 6 hours PRN  ALBUTerol/ipratropium for Nebulization 3 milliLiter(s) Nebulizer every 6 hours  atropine 1% Ophthalmic Solution for SubLingual Use 1 Drop(s) SubLingual every 12 hours  dorzolamide 2%/timolol 0.5% Ophthalmic Solution 1 Drop(s) Both EYES every 12 hours  escitalopram 10 milliGRAM(s) Oral daily  heparin  Injectable 5000 Unit(s) SubCutaneous every 12 hours  latanoprost 0.005% Ophthalmic Solution 1 Drop(s) Both EYES at bedtime  magnesium oxide 400 milliGRAM(s) Oral every 8 hours  midodrine 10 milliGRAM(s) Oral every 8 hours  nystatin Powder 1 Application(s) Topical three times a day  pantoprazole   Suspension 40 milliGRAM(s) Enteral Tube daily  polyethylene glycol 3350 17 Gram(s) Oral daily  predniSONE   Tablet 30 milliGRAM(s) Oral daily  senna Syrup 10 milliLiter(s) Oral at bedtime  sodium chloride 0.9% lock flush 10 milliLiter(s) IV Push every 1 hour PRN  zinc oxide 20% Ointment 1 Application(s) Topical two times a day        Vital Signs Last 24 Hrs  T(F): 97.2 (07 Aug 2019 05:22), Max: 97.7 (06 Aug 2019 21:59)  HR: 60 (07 Aug 2019 10:34) (60 - 60)  BP: 142/89 (07 Aug 2019 05:22) (139/74 - 142/89)  RR: 20 (07 Aug 2019 06:19) (15 - 20)  SpO2: 95% (07 Aug 2019 10:34) (95% - 100%)  I&O's Summary    06 Aug 2019 07:01  -  07 Aug 2019 07:00  --------------------------------------------------------  IN: 1170 mL / OUT: 0 mL / NET: 1170 mL        PHYSICAL EXAM:  General: NAD, bed bound, kyphotic  ENT upper airway secretions,   Neck: Supple, No JVD  Lungs: B/L diminished  Cardio: RRR, S1/S2, No murmurs  Abdomen: Soft, Nontender, Nondistended; Bowel sounds present, PEG tube in place  Extremities: No cyanosis, 2+ edema b/l upper and lower exteremities  Neuro: no new deficits  Skin: no rashes  Psych: answers simple yes, no questions, easily arousable    LABS:                        8.3    14.35 )-----------( 320      ( 07 Aug 2019 07:05 )             27.3     08-07    147  |  113  |  53  ----------------------------<  75  4.9   |  30  |  1.13    Ca    8.6      07 Aug 2019 07:05  Phos  3.4     08-07  Mg     2.3     08-07      eGFR if Non African American: 58 mL/min/1.73M2 (08-07-19 @ 07:05)  eGFR if African American: 68 mL/min/1.73M2 (08-07-19 @ 07:05)    POCT Blood Glucose.: 80 mg/dL (07 Aug 2019 06:44)  POCT Blood Glucose.: 168 mg/dL (06 Aug 2019 14:29)    07-30 [id number] 4.8          RADIOLOGY & ADDITIONAL TESTS:

## 2019-08-11 RX ORDER — ACETYLCYSTEINE 200 MG/ML
2 VIAL (ML) MISCELLANEOUS EVERY 6 HOURS
Refills: 0 | Status: DISCONTINUED | OUTPATIENT
Start: 2019-08-11 | End: 2019-08-14

## 2019-08-11 RX ORDER — ACETYLCYSTEINE 200 MG/ML
4 VIAL (ML) MISCELLANEOUS THREE TIMES A DAY
Refills: 0 | Status: DISCONTINUED | OUTPATIENT
Start: 2019-08-11 | End: 2019-08-11

## 2019-08-11 RX ADMIN — Medication 1 DROP(S): at 11:25

## 2019-08-11 RX ADMIN — Medication 1 DROP(S): at 00:04

## 2019-08-11 RX ADMIN — Medication 3 MILLILITER(S): at 09:16

## 2019-08-11 RX ADMIN — SCOPALAMINE 1 PATCH: 1 PATCH, EXTENDED RELEASE TRANSDERMAL at 19:00

## 2019-08-11 RX ADMIN — Medication 3 MILLILITER(S): at 15:21

## 2019-08-11 RX ADMIN — ZINC OXIDE 1 APPLICATION(S): 200 OINTMENT TOPICAL at 17:12

## 2019-08-11 RX ADMIN — Medication 20 MILLIGRAM(S): at 06:12

## 2019-08-11 RX ADMIN — HEPARIN SODIUM 5000 UNIT(S): 5000 INJECTION INTRAVENOUS; SUBCUTANEOUS at 06:10

## 2019-08-11 RX ADMIN — Medication 2 MILLILITER(S): at 21:57

## 2019-08-11 RX ADMIN — ZINC OXIDE 1 APPLICATION(S): 200 OINTMENT TOPICAL at 06:13

## 2019-08-11 RX ADMIN — ESCITALOPRAM OXALATE 10 MILLIGRAM(S): 10 TABLET, FILM COATED ORAL at 11:26

## 2019-08-11 RX ADMIN — PANTOPRAZOLE SODIUM 40 MILLIGRAM(S): 20 TABLET, DELAYED RELEASE ORAL at 11:26

## 2019-08-11 RX ADMIN — Medication 3 MILLILITER(S): at 04:49

## 2019-08-11 RX ADMIN — SENNA PLUS 10 MILLILITER(S): 8.6 TABLET ORAL at 23:20

## 2019-08-11 RX ADMIN — NYSTATIN CREAM 1 APPLICATION(S): 100000 CREAM TOPICAL at 06:12

## 2019-08-11 RX ADMIN — MAGNESIUM OXIDE 400 MG ORAL TABLET 400 MILLIGRAM(S): 241.3 TABLET ORAL at 23:20

## 2019-08-11 RX ADMIN — DORZOLAMIDE HYDROCHLORIDE TIMOLOL MALEATE 1 DROP(S): 20; 5 SOLUTION/ DROPS OPHTHALMIC at 17:11

## 2019-08-11 RX ADMIN — Medication 4 MILLILITER(S): at 15:21

## 2019-08-11 RX ADMIN — NYSTATIN CREAM 1 APPLICATION(S): 100000 CREAM TOPICAL at 23:19

## 2019-08-11 RX ADMIN — DORZOLAMIDE HYDROCHLORIDE TIMOLOL MALEATE 1 DROP(S): 20; 5 SOLUTION/ DROPS OPHTHALMIC at 06:10

## 2019-08-11 RX ADMIN — Medication 3 MILLILITER(S): at 21:57

## 2019-08-11 RX ADMIN — NYSTATIN CREAM 1 APPLICATION(S): 100000 CREAM TOPICAL at 17:08

## 2019-08-11 RX ADMIN — MAGNESIUM OXIDE 400 MG ORAL TABLET 400 MILLIGRAM(S): 241.3 TABLET ORAL at 06:10

## 2019-08-11 RX ADMIN — Medication 1 DROP(S): at 06:12

## 2019-08-11 RX ADMIN — Medication 1 DROP(S): at 17:10

## 2019-08-11 RX ADMIN — MAGNESIUM OXIDE 400 MG ORAL TABLET 400 MILLIGRAM(S): 241.3 TABLET ORAL at 17:09

## 2019-08-11 RX ADMIN — POLYETHYLENE GLYCOL 3350 17 GRAM(S): 17 POWDER, FOR SOLUTION ORAL at 11:26

## 2019-08-11 RX ADMIN — LATANOPROST 1 DROP(S): 0.05 SOLUTION/ DROPS OPHTHALMIC; TOPICAL at 23:19

## 2019-08-11 RX ADMIN — Medication 1 DROP(S): at 23:20

## 2019-08-11 RX ADMIN — HEPARIN SODIUM 5000 UNIT(S): 5000 INJECTION INTRAVENOUS; SUBCUTANEOUS at 17:10

## 2019-08-11 NOTE — PROGRESS NOTE ADULT - ASSESSMENT
ASSESSMENT and PLAN:  1) Septic shock with multiorgan failure 2/2 aspiration PNA - continue O2 via NC. Abx regimen completed. Continue chest PT/pulm toilet/nebs/steriods. Add mucomyst. Remains high risk for recurrent respiratory failure.   2) Anemia - Trend CBC currently near baseline  3) Hypoglycemia - Fingersticks Q8H	  4) MELISSA - Now back to baseline, monitor I&Os. Avoid nephrotoxins  5) Thrombocytopenia - PLT now 320,000 - however with abd wall ecchymosis will cont to hold DVT ppx

## 2019-08-11 NOTE — PROGRESS NOTE ADULT - SUBJECTIVE AND OBJECTIVE BOX
CC: Patient is a 86y old  Male who presents with a chief complaint of Change in mental status (10 Aug 2019 14:32)      S: Patient requesting discharge home. Making large amount of secretions however refusing suction. No event overnight. Last BM yesterday. Tolerating tube feeds,     Patient seen and examined at bedside.    ALLERGIES:  No Known Allergies      MEDICATIONS:  acetaminophen    Suspension .. 650 milliGRAM(s) Enteral Tube every 6 hours PRN  ALBUTerol/ipratropium for Nebulization 3 milliLiter(s) Nebulizer every 6 hours  atropine 1% Ophthalmic Solution for SubLingual Use 1 Drop(s) SubLingual every 6 hours  dorzolamide 2%/timolol 0.5% Ophthalmic Solution 1 Drop(s) Both EYES every 12 hours  escitalopram 10 milliGRAM(s) Oral daily  heparin  Injectable 5000 Unit(s) SubCutaneous every 12 hours  latanoprost 0.005% Ophthalmic Solution 1 Drop(s) Both EYES at bedtime  magnesium oxide 400 milliGRAM(s) Oral every 8 hours  nystatin Powder 1 Application(s) Topical three times a day  pantoprazole   Suspension 40 milliGRAM(s) Enteral Tube daily  polyethylene glycol 3350 17 Gram(s) Oral daily  predniSONE   Tablet   Oral   predniSONE   Tablet 20 milliGRAM(s) Oral daily  scopolamine   Patch 1 Patch Transdermal every 72 hours  senna Syrup 10 milliLiter(s) Oral at bedtime  sodium chloride 0.9% lock flush 10 milliLiter(s) IV Push every 1 hour PRN  zinc oxide 20% Ointment 1 Application(s) Topical two times a day        Vital Signs Last 24 Hrs  T(F): 97.8 (11 Aug 2019 08:19), Max: 97.8 (11 Aug 2019 08:19)  HR: 60 (11 Aug 2019 09:20) (58 - 68)  BP: 146/88 (11 Aug 2019 08:19) (141/75 - 153/90)  RR: 17 (11 Aug 2019 08:19) (16 - 17)  SpO2: 99% (11 Aug 2019 09:20) (95% - 100%)  I&O's Summary      PHYSICAL EXAM:  General: NAD, bed bound, kyphotic  ENT: MMM upper airway secretions,   Neck: Supple, No JVD  Lungs: Diminished breath sounds R>L. transmitted upper airway sounds  Cardio: RRR, S1/S2, No murmurs  Abdomen: Soft, Nontender, Nondistended; Bowel sounds present, PEG tube in place. LLQ/flank ecchymosis remains however resolving gradually  Extremities: No cyanosis, No edema  Neuro: no new deficits  Skin: no rashes  Psych: easily arousable      LABS:                                    07-30 [id number] 4.8          RADIOLOGY & ADDITIONAL TESTS:      Care Discussed with Consultants/Other Providers: CC: Patient is a 86y old  Male who presents with a chief complaint of Change in mental status (10 Aug 2019 14:32)      S: Patient requesting discharge home. Making large amount of secretions however refusing suction. No event overnight. Last BM yesterday. Tolerating tube feeds,     Patient seen and examined at bedside.    ALLERGIES:  No Known Allergies      MEDICATIONS:  acetaminophen    Suspension .. 650 milliGRAM(s) Enteral Tube every 6 hours PRN  ALBUTerol/ipratropium for Nebulization 3 milliLiter(s) Nebulizer every 6 hours  atropine 1% Ophthalmic Solution for SubLingual Use 1 Drop(s) SubLingual every 6 hours  dorzolamide 2%/timolol 0.5% Ophthalmic Solution 1 Drop(s) Both EYES every 12 hours  escitalopram 10 milliGRAM(s) Oral daily  heparin  Injectable 5000 Unit(s) SubCutaneous every 12 hours  latanoprost 0.005% Ophthalmic Solution 1 Drop(s) Both EYES at bedtime  magnesium oxide 400 milliGRAM(s) Oral every 8 hours  nystatin Powder 1 Application(s) Topical three times a day  pantoprazole   Suspension 40 milliGRAM(s) Enteral Tube daily  polyethylene glycol 3350 17 Gram(s) Oral daily  predniSONE   Tablet   Oral   predniSONE   Tablet 20 milliGRAM(s) Oral daily  scopolamine   Patch 1 Patch Transdermal every 72 hours  senna Syrup 10 milliLiter(s) Oral at bedtime  sodium chloride 0.9% lock flush 10 milliLiter(s) IV Push every 1 hour PRN  zinc oxide 20% Ointment 1 Application(s) Topical two times a day        Vital Signs Last 24 Hrs  T(F): 97.8 (11 Aug 2019 08:19), Max: 97.8 (11 Aug 2019 08:19)  HR: 60 (11 Aug 2019 09:20) (58 - 68)  BP: 146/88 (11 Aug 2019 08:19) (141/75 - 153/90)  RR: 17 (11 Aug 2019 08:19) (16 - 17)  SpO2: 99% (11 Aug 2019 09:20) (95% - 100%)  I&O's Summary      PHYSICAL EXAM:  General: NAD, bed bound, kyphotic  ENT: MMM upper airway secretions,   Neck: Supple, No JVD  Lungs: Diminished breath sounds R>L. transmitted upper airway sounds  Cardio: RRR, S1/S2, No murmurs  Abdomen: Soft, Nontender, Nondistended; Bowel sounds present, PEG tube in place. LLQ/flank ecchymosis remains however resolving gradually  Extremities: No cyanosis, No edema  Neuro: no new deficits  Skin: no rashes  Psych: easily arousable

## 2019-08-12 PROCEDURE — 71045 X-RAY EXAM CHEST 1 VIEW: CPT | Mod: 26

## 2019-08-12 PROCEDURE — 99233 SBSQ HOSP IP/OBS HIGH 50: CPT

## 2019-08-12 RX ORDER — ATROPINE SULFATE 1 %
1 DROPS OPHTHALMIC (EYE) EVERY 12 HOURS
Refills: 0 | Status: DISCONTINUED | OUTPATIENT
Start: 2019-08-12 | End: 2019-08-14

## 2019-08-12 RX ORDER — SALIVA SUBSTITUTE COMB NO.11 351 MG
5 POWDER IN PACKET (EA) MUCOUS MEMBRANE EVERY 12 HOURS
Refills: 0 | Status: DISCONTINUED | OUTPATIENT
Start: 2019-08-12 | End: 2019-08-14

## 2019-08-12 RX ADMIN — Medication 1 DROP(S): at 06:51

## 2019-08-12 RX ADMIN — MAGNESIUM OXIDE 400 MG ORAL TABLET 400 MILLIGRAM(S): 241.3 TABLET ORAL at 06:53

## 2019-08-12 RX ADMIN — DORZOLAMIDE HYDROCHLORIDE TIMOLOL MALEATE 1 DROP(S): 20; 5 SOLUTION/ DROPS OPHTHALMIC at 17:58

## 2019-08-12 RX ADMIN — Medication 20 MILLIGRAM(S): at 06:53

## 2019-08-12 RX ADMIN — Medication 3 MILLILITER(S): at 15:16

## 2019-08-12 RX ADMIN — ZINC OXIDE 1 APPLICATION(S): 200 OINTMENT TOPICAL at 17:59

## 2019-08-12 RX ADMIN — SENNA PLUS 10 MILLILITER(S): 8.6 TABLET ORAL at 22:27

## 2019-08-12 RX ADMIN — MAGNESIUM OXIDE 400 MG ORAL TABLET 400 MILLIGRAM(S): 241.3 TABLET ORAL at 22:27

## 2019-08-12 RX ADMIN — Medication 1 DROP(S): at 17:55

## 2019-08-12 RX ADMIN — PANTOPRAZOLE SODIUM 40 MILLIGRAM(S): 20 TABLET, DELAYED RELEASE ORAL at 12:46

## 2019-08-12 RX ADMIN — Medication 2 MILLILITER(S): at 22:35

## 2019-08-12 RX ADMIN — MAGNESIUM OXIDE 400 MG ORAL TABLET 400 MILLIGRAM(S): 241.3 TABLET ORAL at 13:50

## 2019-08-12 RX ADMIN — HEPARIN SODIUM 5000 UNIT(S): 5000 INJECTION INTRAVENOUS; SUBCUTANEOUS at 06:53

## 2019-08-12 RX ADMIN — DORZOLAMIDE HYDROCHLORIDE TIMOLOL MALEATE 1 DROP(S): 20; 5 SOLUTION/ DROPS OPHTHALMIC at 06:52

## 2019-08-12 RX ADMIN — NYSTATIN CREAM 1 APPLICATION(S): 100000 CREAM TOPICAL at 22:28

## 2019-08-12 RX ADMIN — Medication 1 DROP(S): at 12:46

## 2019-08-12 RX ADMIN — Medication 5 MILLILITER(S): at 17:57

## 2019-08-12 RX ADMIN — Medication 2 MILLILITER(S): at 15:16

## 2019-08-12 RX ADMIN — Medication 2 MILLILITER(S): at 04:32

## 2019-08-12 RX ADMIN — NYSTATIN CREAM 1 APPLICATION(S): 100000 CREAM TOPICAL at 06:52

## 2019-08-12 RX ADMIN — Medication 2 MILLILITER(S): at 09:01

## 2019-08-12 RX ADMIN — NYSTATIN CREAM 1 APPLICATION(S): 100000 CREAM TOPICAL at 13:50

## 2019-08-12 RX ADMIN — Medication 3 MILLILITER(S): at 09:01

## 2019-08-12 RX ADMIN — ZINC OXIDE 1 APPLICATION(S): 200 OINTMENT TOPICAL at 06:53

## 2019-08-12 RX ADMIN — Medication 3 MILLILITER(S): at 04:31

## 2019-08-12 RX ADMIN — HEPARIN SODIUM 5000 UNIT(S): 5000 INJECTION INTRAVENOUS; SUBCUTANEOUS at 17:55

## 2019-08-12 RX ADMIN — LATANOPROST 1 DROP(S): 0.05 SOLUTION/ DROPS OPHTHALMIC; TOPICAL at 22:27

## 2019-08-12 RX ADMIN — ESCITALOPRAM OXALATE 10 MILLIGRAM(S): 10 TABLET, FILM COATED ORAL at 12:46

## 2019-08-12 NOTE — PROGRESS NOTE ADULT - SUBJECTIVE AND OBJECTIVE BOX
Progress: f/u palliative , no new events overnight, pt cont w/ thickened secretions, gurgle in voice.     Present Symptoms:   Dyspnea: mild  Nausea/Vomiting: no  Anxiety:  no  Depressed Mood: yes  Fatigue: yes  Loss of appetite: yes  Pain:      no             location:   Review of Systems: Unable to obtain due to poor mentation]    MEDICATIONS  (STANDING):  acetylcysteine 20%  Inhalation 2 milliLiter(s) Inhalation every 6 hours  ALBUTerol/ipratropium for Nebulization 3 milliLiter(s) Nebulizer every 6 hours  atropine 1% Ophthalmic Solution for SubLingual Use 1 Drop(s) SubLingual every 6 hours  dorzolamide 2%/timolol 0.5% Ophthalmic Solution 1 Drop(s) Both EYES every 12 hours  escitalopram 10 milliGRAM(s) Oral daily  heparin  Injectable 5000 Unit(s) SubCutaneous every 12 hours  latanoprost 0.005% Ophthalmic Solution 1 Drop(s) Both EYES at bedtime  magnesium oxide 400 milliGRAM(s) Oral every 8 hours  nystatin Powder 1 Application(s) Topical three times a day  pantoprazole   Suspension 40 milliGRAM(s) Enteral Tube daily  polyethylene glycol 3350 17 Gram(s) Oral daily  predniSONE   Tablet   Oral   scopolamine   Patch 1 Patch Transdermal every 72 hours  senna Syrup 10 milliLiter(s) Oral at bedtime  zinc oxide 20% Ointment 1 Application(s) Topical two times a day    MEDICATIONS  (PRN):  acetaminophen    Suspension .. 650 milliGRAM(s) Enteral Tube every 6 hours PRN Temp greater or equal to 38C (100.4F), Mild Pain (1 - 3)  sodium chloride 0.9% lock flush 10 milliLiter(s) IV Push every 1 hour PRN Pre/post blood products, medications, blood draw, and to maintain line patency      PHYSICAL EXAM:  Vital Signs Last 24 Hrs  T(C): 36.7 (12 Aug 2019 10:46), Max: 37.1 (11 Aug 2019 17:10)  T(F): 98 (12 Aug 2019 10:46), Max: 98.8 (11 Aug 2019 17:10)  HR: 67 (12 Aug 2019 10:46) (59 - 70)  BP: 158/89 (12 Aug 2019 10:46) (139/90 - 158/89)  BP(mean): --  RR: 17 (12 Aug 2019 10:46) (17 - 18)  SpO2: 98% (12 Aug 2019 10:46) (93% - 99%)  General: alert  oriented x _2-3___      HEENT: n/c, a/t     Lungs: rhonchi upper lobes, dim to bases    CV: s1s2    GI: lrg., soft, n/t + bs   + peg      : normal    Musculoskeletal: edematous, weakened ,non ambulatory     Skin: warm dry, fragile     Neuro: no deficits   Oral intake ability:  npo  Diet: peg feeds      LABS:                RADIOLOGY & ADDITIONAL STUDIES:    ADVANCE DIRECTIVES: MOLST  DNR/DNI  Advanced Care Planning discussion total time spent:

## 2019-08-12 NOTE — PROGRESS NOTE ADULT - ASSESSMENT
A/P 86 year old male with extensive PMH including IBS, CVA with dysphagia and PEG placement, anemia, psoriasis, GERD, depression, chf , PPM admitted with severe sepsis from likely aspiration pneumonia. Septic Shock with multiorgan failure. Pt w/ multiple hospitalizations w/ intubation for same, and pt remains at high risk for same.  Family aware and [facility] recently adjusted for DNI.   Acute Respiratory failure intubated 7/28 , extubated 8/3 -   F/b pulm   palliative: pt now on o2 via n/c, pt awake, alert, speaks few words, speech gurgly from secretions , appears weak and fatigued.   Continues to have thickened oral secretions on atropine, requiring frequent oral suctioning.  Per pulm added scopolamine  patch and will add mucomyst.    Pt desires to return to [facility] , family aware. As d/w daughter and wife, Pt to be evaluated by [facility] for home hospice services and return to [facility]   Reviewed w/ ccu team. A/P 86 year old male with extensive PMH including IBS, CVA with dysphagia and PEG placement, anemia, psoriasis, GERD, depression, chf , PPM admitted with severe sepsis from likely aspiration pneumonia. Septic Shock with multiorgan failure. Pt w/ multiple hospitalizations w/ intubation for same, and pt remains at high risk for same.  Family aware and [facility] recently adjusted for DNI.   Acute Respiratory failure intubated 7/28 , extubated 8/3 -   F/b pulm   palliative: pt now on o2 via n/c, pt awake, alert, speaks few words, speech gurgly from secretions , appears weak and fatigued.   Continues to have thickened oral secretions on atropine, requiring frequent oral suctioning.  Per pulm added scopolamine  patch and will add mucomyst.    Pt desires to return to [facility] , family aware. As d/w daughter and wife, Pt to be evaluated by [facility] for home hospice services and return to [facility]   Reviewed w/ ccu team.

## 2019-08-12 NOTE — PROGRESS NOTE ADULT - SUBJECTIVE AND OBJECTIVE BOX
CC: Patient is a 86y old  Male who presents with a chief complaint of Change in mental status (11 Aug 2019 10:51)      S:  Patient continues to require frequent suction for large secretions. Patient desires discharge to facility. No event overnight.    Patient seen and examined at bedside.    ALLERGIES:  No Known Allergies      MEDICATIONS:  acetaminophen    Suspension .. 650 milliGRAM(s) Enteral Tube every 6 hours PRN  acetylcysteine 20%  Inhalation 2 milliLiter(s) Inhalation every 6 hours  ALBUTerol/ipratropium for Nebulization 3 milliLiter(s) Nebulizer every 6 hours  atropine 1% Ophthalmic Solution for SubLingual Use 1 Drop(s) SubLingual every 6 hours  dorzolamide 2%/timolol 0.5% Ophthalmic Solution 1 Drop(s) Both EYES every 12 hours  escitalopram 10 milliGRAM(s) Oral daily  heparin  Injectable 5000 Unit(s) SubCutaneous every 12 hours  latanoprost 0.005% Ophthalmic Solution 1 Drop(s) Both EYES at bedtime  magnesium oxide 400 milliGRAM(s) Oral every 8 hours  nystatin Powder 1 Application(s) Topical three times a day  pantoprazole   Suspension 40 milliGRAM(s) Enteral Tube daily  polyethylene glycol 3350 17 Gram(s) Oral daily  predniSONE   Tablet   Oral   scopolamine   Patch 1 Patch Transdermal every 72 hours  senna Syrup 10 milliLiter(s) Oral at bedtime  sodium chloride 0.9% lock flush 10 milliLiter(s) IV Push every 1 hour PRN  zinc oxide 20% Ointment 1 Application(s) Topical two times a day        Vital Signs Last 24 Hrs  T(F): 98.5 (12 Aug 2019 06:00), Max: 98.8 (11 Aug 2019 17:10)  HR: 59 (12 Aug 2019 09:05) (59 - 70)  BP: 139/90 (12 Aug 2019 06:00) (139/90 - 150/875)  RR: 17 (12 Aug 2019 06:00) (17 - 18)  SpO2: 96% (12 Aug 2019 09:05) (93% - 99%)  I&O's Summary    11 Aug 2019 07:01  -  12 Aug 2019 07:00  --------------------------------------------------------  IN: 350 mL / OUT: 0 mL / NET: 350 mL      PHYSICAL EXAM:  General: NAD, bed bound, kyphotic  ENT: MMM upper airway secretions,   Neck: Supple, No JVD  Lungs: Diminished breath sounds R>L. transmitted upper airway sounds  Cardio: RRR, S1/S2, No murmurs  Abdomen: Soft, Nontender, Nondistended; Bowel sounds present, PEG tube in place. LLQ/flank ecchymosis remains however resolving gradually  Extremities: No cyanosis, No edema  Neuro: no new deficits  Skin: no rashes  Psych: easily arousable                        07-30 [id number] 4.8          RADIOLOGY & ADDITIONAL TESTS:    Care Discussed with Consultants/Other Providers:

## 2019-08-12 NOTE — PROGRESS NOTE ADULT - ASSESSMENT
ASSESSMENT and PLAN:  1) Septic shock with multiorgan failure 2/2 aspiration PNA - continue O2 via NC. Abx regimen completed. Continue chest PT/pulm toilet/nebs/steriods. Add mucomyst. Remains high risk for recurrent respiratory failure.   2) Anemia - Trend CBC currently near baseline  3) Hypoglycemia - Fingersticks Q8H	  4) MELISSA - Now back to baseline, monitor I&Os. Avoid nephrotoxins  5) Thrombocytopenia - PLT now 320,000 - however with abd wall ecchymosis will cont to hold DVT ppx  6) Dry mucous membranes - Add biotene and saline mist nasal spray

## 2019-08-13 PROCEDURE — 99233 SBSQ HOSP IP/OBS HIGH 50: CPT

## 2019-08-13 PROCEDURE — 71045 X-RAY EXAM CHEST 1 VIEW: CPT | Mod: 26

## 2019-08-13 RX ADMIN — ZINC OXIDE 1 APPLICATION(S): 200 OINTMENT TOPICAL at 07:17

## 2019-08-13 RX ADMIN — Medication 2 MILLILITER(S): at 04:51

## 2019-08-13 RX ADMIN — Medication 1 DROP(S): at 04:16

## 2019-08-13 RX ADMIN — Medication 3 MILLILITER(S): at 21:27

## 2019-08-13 RX ADMIN — MAGNESIUM OXIDE 400 MG ORAL TABLET 400 MILLIGRAM(S): 241.3 TABLET ORAL at 04:15

## 2019-08-13 RX ADMIN — Medication 10 MILLIGRAM(S): at 04:15

## 2019-08-13 RX ADMIN — SCOPALAMINE 1 PATCH: 1 PATCH, EXTENDED RELEASE TRANSDERMAL at 16:05

## 2019-08-13 RX ADMIN — Medication 3 MILLILITER(S): at 09:16

## 2019-08-13 RX ADMIN — SCOPALAMINE 1 PATCH: 1 PATCH, EXTENDED RELEASE TRANSDERMAL at 19:49

## 2019-08-13 RX ADMIN — MAGNESIUM OXIDE 400 MG ORAL TABLET 400 MILLIGRAM(S): 241.3 TABLET ORAL at 16:03

## 2019-08-13 RX ADMIN — MAGNESIUM OXIDE 400 MG ORAL TABLET 400 MILLIGRAM(S): 241.3 TABLET ORAL at 21:08

## 2019-08-13 RX ADMIN — Medication 2 MILLILITER(S): at 09:17

## 2019-08-13 RX ADMIN — SENNA PLUS 10 MILLILITER(S): 8.6 TABLET ORAL at 21:11

## 2019-08-13 RX ADMIN — SCOPALAMINE 1 PATCH: 1 PATCH, EXTENDED RELEASE TRANSDERMAL at 06:00

## 2019-08-13 RX ADMIN — NYSTATIN CREAM 1 APPLICATION(S): 100000 CREAM TOPICAL at 04:18

## 2019-08-13 RX ADMIN — DORZOLAMIDE HYDROCHLORIDE TIMOLOL MALEATE 1 DROP(S): 20; 5 SOLUTION/ DROPS OPHTHALMIC at 04:15

## 2019-08-13 RX ADMIN — Medication 2 MILLILITER(S): at 15:32

## 2019-08-13 RX ADMIN — Medication 5 MILLILITER(S): at 07:08

## 2019-08-13 RX ADMIN — SCOPALAMINE 1 PATCH: 1 PATCH, EXTENDED RELEASE TRANSDERMAL at 07:18

## 2019-08-13 RX ADMIN — Medication 5 MILLILITER(S): at 17:53

## 2019-08-13 RX ADMIN — LATANOPROST 1 DROP(S): 0.05 SOLUTION/ DROPS OPHTHALMIC; TOPICAL at 21:08

## 2019-08-13 RX ADMIN — NYSTATIN CREAM 1 APPLICATION(S): 100000 CREAM TOPICAL at 16:01

## 2019-08-13 RX ADMIN — Medication 1 DROP(S): at 17:43

## 2019-08-13 RX ADMIN — Medication 3 MILLILITER(S): at 15:30

## 2019-08-13 RX ADMIN — Medication 3 MILLILITER(S): at 04:46

## 2019-08-13 RX ADMIN — NYSTATIN CREAM 1 APPLICATION(S): 100000 CREAM TOPICAL at 21:08

## 2019-08-13 RX ADMIN — DORZOLAMIDE HYDROCHLORIDE TIMOLOL MALEATE 1 DROP(S): 20; 5 SOLUTION/ DROPS OPHTHALMIC at 17:44

## 2019-08-13 RX ADMIN — ESCITALOPRAM OXALATE 10 MILLIGRAM(S): 10 TABLET, FILM COATED ORAL at 12:26

## 2019-08-13 RX ADMIN — Medication 2 MILLILITER(S): at 21:28

## 2019-08-13 RX ADMIN — HEPARIN SODIUM 5000 UNIT(S): 5000 INJECTION INTRAVENOUS; SUBCUTANEOUS at 17:44

## 2019-08-13 RX ADMIN — PANTOPRAZOLE SODIUM 40 MILLIGRAM(S): 20 TABLET, DELAYED RELEASE ORAL at 12:26

## 2019-08-13 RX ADMIN — HEPARIN SODIUM 5000 UNIT(S): 5000 INJECTION INTRAVENOUS; SUBCUTANEOUS at 04:16

## 2019-08-13 RX ADMIN — Medication 3 MILLILITER(S): at 04:51

## 2019-08-13 RX ADMIN — POLYETHYLENE GLYCOL 3350 17 GRAM(S): 17 POWDER, FOR SOLUTION ORAL at 12:26

## 2019-08-13 NOTE — PROGRESS NOTE ADULT - ASSESSMENT
86 year old male with PMH PAST diastolic heart failure s/p PPM, CKD, IBS, CVA with dysphagia s/p PEG, GERD admitted for sepsis likely pulmonary source from chronic aspiration.

## 2019-08-13 NOTE — PROGRESS NOTE ADULT - SUBJECTIVE AND OBJECTIVE BOX
Progress: f/u palliative ;  no new events overnight, pt looked comfortable, had less secretions on rounds this AM. Pt was alert and verbal, but still tires easily.          Present Symptoms:   Dyspnea: yes   Nausea/Vomiting: no  Anxiety:  no  Depressed Mood:   Fatigue: yes  Loss of appetite: yes  Pain:  no                  location:   Review of Systems:   MEDICATIONS  (STANDING):  acetylcysteine 20%  Inhalation 2 milliLiter(s) Inhalation every 6 hours  ALBUTerol/ipratropium for Nebulization 3 milliLiter(s) Nebulizer every 6 hours  atropine 1% Ophthalmic Solution for SubLingual Use 1 Drop(s) SubLingual every 12 hours  Biotene Dry Mouth Oral Rinse 5 milliLiter(s) Swish and Spit every 12 hours  dorzolamide 2%/timolol 0.5% Ophthalmic Solution 1 Drop(s) Both EYES every 12 hours  escitalopram 10 milliGRAM(s) Oral daily  heparin  Injectable 5000 Unit(s) SubCutaneous every 12 hours  latanoprost 0.005% Ophthalmic Solution 1 Drop(s) Both EYES at bedtime  magnesium oxide 400 milliGRAM(s) Oral every 8 hours  nystatin Powder 1 Application(s) Topical three times a day  pantoprazole   Suspension 40 milliGRAM(s) Enteral Tube daily  polyethylene glycol 3350 17 Gram(s) Oral daily  predniSONE   Tablet   Oral   predniSONE   Tablet 10 milliGRAM(s) Oral daily  scopolamine   Patch 1 Patch Transdermal every 72 hours  senna Syrup 10 milliLiter(s) Oral at bedtime  zinc oxide 20% Ointment 1 Application(s) Topical two times a day    MEDICATIONS  (PRN):  acetaminophen    Suspension .. 650 milliGRAM(s) Enteral Tube every 6 hours PRN Temp greater or equal to 38C (100.4F), Mild Pain (1 - 3)  sodium chloride 0.9% lock flush 10 milliLiter(s) IV Push every 1 hour PRN Pre/post blood products, medications, blood draw, and to maintain line patency      PHYSICAL EXAM:  Vital Signs Last 24 Hrs  T(C): 36.3 (13 Aug 2019 10:13), Max: 36.7 (12 Aug 2019 16:02)  T(F): 97.4 (13 Aug 2019 10:13), Max: 98.1 (12 Aug 2019 16:02)  HR: 63 (13 Aug 2019 10:13) (53 - 69)  BP: 153/93 (13 Aug 2019 10:13) (129/52 - 153/93)  BP(mean): --  RR: 16 (13 Aug 2019 10:13) (16 - 17)  SpO2: 94% (13 Aug 2019 10:13) (94% - 100%)  General: alert  oriented x _2-3___      HEENT: n/c, a/t  o2 n/c     Lungs: few scattered rhonchi bilat   CV: s1s2    GI: lrg soft, n/t + peg     : normal    Musculoskeletal: weakened x4 , edematous     Skin: warm dry , fragile     Neuro: alert, but tires easily     Oral intake ability:  npo  Diet: peg feeds      LABS:                RADIOLOGY & ADDITIONAL STUDIES:< from: Xray Chest 1 View- PORTABLE-Routine (08.13.19 @ 09:11) >  EXAM:  XR CHEST PORTABLE ROUTINE 1V      PROCEDURE DATE:  08/13/2019        INTERPRETATION:  AP chest on August 13, 2019 at 8:21 AM. Patient being   followed for positive lung findings.    Patient's chin obscures the upper lung fields.    Heart is magnified by technique. Left-sided pacemaker again noted.    Extensive pleural reaction peripherally in the right chest and   significant infiltrate in the right midlung field again noted.    Amorphous density possibly a mix of lung and pleural disease in the left   lower lateral lung field again seen.    Chest is similar to August 12.    IMPRESSION: Unchanged chest as above.      ADVANCE DIRECTIVES: MOLST DNR/DNI  Advanced Care Planning discussion total time spent:

## 2019-08-13 NOTE — PROGRESS NOTE ADULT - ASSESSMENT
A/P  86 year old male with extensive PMH including IBS, CVA with dysphagia and PEG placement, anemia, psoriasis, GERD, depression, chf , PPM admitted with severe sepsis from likely aspiration pneumonia. Septic Shock with multiorgan failure. Pt w/ multiple hospitalizations w/ intubation for same, and pt remains at high risk for same.  Family aware and [facility] recently adjusted for DNI.   Acute Respiratory failure intubated 7/28 , extubated 8/3 -      palliative: pt now on o2 via n/c, pt awake, alert, speaks few words, + secretions ,  fatigues easily .   Continues to have thickened oral secretions, seems sl better today ,  on atropine, requiring frequent oral suctioning.  F/b pulm added scopolamine  patch and  mucomyst.  Had f/u CXR today, to be reviewed.    Pt desires to return to [facility] , family aware. As d/w daughter and wife, Pt to be evaluated by [facility] for home hospice services and return to [facility].  Hospice eval pending . A/P  86 year old male with extensive PMH including IBS, CVA with dysphagia and PEG placement, anemia, psoriasis, GERD, depression, chf , PPM admitted with severe sepsis from likely aspiration pneumonia. Septic Shock with multiorgan failure. Pt w/ multiple hospitalizations w/ intubation for same, and pt remains at high risk for same.  Family aware and [facility] recently adjusted for DNI.   Acute Respiratory failure intubated 7/28 , extubated 8/3 -      palliative: pt now on o2 via n/c, pt awake, alert, speaks few words, + secretions ,  fatigues easily .   Continues to have thickened oral secretions, seems sl better today ,  on atropine, requiring frequent oral suctioning.  F/b pulm added scopolamine  patch and  mucomyst.  Had f/u CXR today, to be reviewed.    Pt desires to return to [facility] , family aware. As d/w daughter and wife, Pt to be evaluated by [facility] for home hospice services and return to [facility].  Hospice eval pending . Secretions better controlled today.  Pt will return to  with hospice eval pending

## 2019-08-13 NOTE — PROGRESS NOTE ADULT - SUBJECTIVE AND OBJECTIVE BOX
Offers no complaints this AM, NT suctioned for secretions but reportedly improved from yesterday.  Tolerating TF, BM documented yesterday.  Incontinent of urine and voiding.    Vital Signs Last 24 Hrs  T(F): 97.4 (13 Aug 2019 10:13), Max: 98.1 (12 Aug 2019 16:02)  HR: 63 (13 Aug 2019 10:13) (53 - 69)  BP: 153/93 (13 Aug 2019 10:13) (129/52 - 153/93)  RR: 16 (13 Aug 2019 10:13) (16 - 17)  SpO2: 94% 5L NC (13 Aug 2019 10:13) (94% - 100%)    No new laboratory data or imaging    MEDICATIONS  (STANDING):  acetylcysteine 20%  Inhalation 2 milliLiter(s) Inhalation every 6 hours  ALBUTerol/ipratropium for Nebulization 3 milliLiter(s) Nebulizer every 6 hours  atropine 1% Ophthalmic Solution for SubLingual Use 1 Drop(s) SubLingual every 12 hours  Biotene Dry Mouth Oral Rinse 5 milliLiter(s) Swish and Spit every 12 hours  dorzolamide 2%/timolol 0.5% Ophthalmic Solution 1 Drop(s) Both EYES every 12 hours  escitalopram 10 milliGRAM(s) Oral daily  heparin  Injectable 5000 Unit(s) SubCutaneous every 12 hours  latanoprost 0.005% Ophthalmic Solution 1 Drop(s) Both EYES at bedtime  magnesium oxide 400 milliGRAM(s) Oral every 8 hours  nystatin Powder 1 Application(s) Topical three times a day  pantoprazole   Suspension 40 milliGRAM(s) Enteral Tube daily  polyethylene glycol 3350 17 Gram(s) Oral daily  predniSONE   Tablet 10 milliGRAM(s) Oral daily  scopolamine   Patch 1 Patch Transdermal every 72 hours  senna Syrup 10 milliLiter(s) Oral at bedtime  zinc oxide 20% Ointment 1 Application(s) Topical two times a day  acetaminophen    Suspension .. 650 milliGRAM(s) Enteral Tube every 6 hours PRN Temp greater or equal to 38C (100.4F), Mild Pain (1 - 3)    Physical Exam  Gen:  WN/WD Male resting in bed, NAD  ENT:  NC/AT, no JVD noted.  Paratracheal rhonchi noted, clear with suctioning  Thorax:  Symmetric, no retractions  Lung:  CTA b/l  CV:  S1, S2. RRR  Abd:  Soft, NT/ND.  BS normoactive, no masses to palp  Extrem:  No clubbing or cyanosis, generalized +1 edema throughout, extremities warm and pink  Neuro:  A&O x 3, no gross motor deficits

## 2019-08-13 NOTE — CHART NOTE - NSCHARTNOTEFT_GEN_A_CORE
Nutrition Follow Up Note  Hospital Course (Per Electronic Medical Record):   Source: Medical Record [X] Patient [X] Family [X] Nursing Staff [X]     Diet: Jevity 1.5 @ 70cc/hr x 18hrs & 30cc Prosource BID    Patient remains tolerating current PEG feeding regimen, SLP recommends NPO with alternate feeding. Patient noted with large amounts of secretions MD trying to optimize meds with secretions before possible discharge .  Patient remains on steroids POCT noted    Current Weight : (8/13) 224.4/101.8kg, patient noted with slight  fluctuations in weight status since admission.     Pertinent Medications: MEDICATIONS  (STANDING):  acetylcysteine 20%  Inhalation 2 milliLiter(s) Inhalation every 6 hours  ALBUTerol/ipratropium for Nebulization 3 milliLiter(s) Nebulizer every 6 hours  atropine 1% Ophthalmic Solution for SubLingual Use 1 Drop(s) SubLingual every 12 hours  Biotene Dry Mouth Oral Rinse 5 milliLiter(s) Swish and Spit every 12 hours  dorzolamide 2%/timolol 0.5% Ophthalmic Solution 1 Drop(s) Both EYES every 12 hours  escitalopram 10 milliGRAM(s) Oral daily  heparin  Injectable 5000 Unit(s) SubCutaneous every 12 hours  latanoprost 0.005% Ophthalmic Solution 1 Drop(s) Both EYES at bedtime  magnesium oxide 400 milliGRAM(s) Oral every 8 hours  nystatin Powder 1 Application(s) Topical three times a day  pantoprazole   Suspension 40 milliGRAM(s) Enteral Tube daily  polyethylene glycol 3350 17 Gram(s) Oral daily  predniSONE   Tablet   Oral   predniSONE   Tablet 10 milliGRAM(s) Oral daily  scopolamine   Patch 1 Patch Transdermal every 72 hours  senna Syrup 10 milliLiter(s) Oral at bedtime  zinc oxide 20% Ointment 1 Application(s) Topical two times a day    MEDICATIONS  (PRN):  acetaminophen    Suspension .. 650 milliGRAM(s) Enteral Tube every 6 hours PRN Temp greater or equal to 38C (100.4F), Mild Pain (1 - 3)  sodium chloride 0.9% lock flush 10 milliLiter(s) IV Push every 1 hour PRN Pre/post blood products, medications, blood draw, and to maintain line patency      Pertinent Labs:   08-07 Phos 3.4 mg/dL 07-30 [id number] 4.8 %  POCT 84,139,80,168      Skin:  Stage 3 Sacrum noted healed.    Edema: (+1) generalized edema.    Last BM: on (8/12)    Estimated Needs:   [X] No Change since Previous Assessment  [X] Recalculated:     Previous Nutrition Diagnosis: Severe Malnutrition    Nutrition Diagnosis is [X] Ongoing    [X] Resolved -     New Nutrition Diagnosis: [X] Not Applicable    Interventions:   1. Recommend continue current nutrition regimen  .     Monitoring & Evaluation: will monitor:  [X] Weights   [X] Tolerance to PEG feeds  [X] Follow Up (Per Protocol)        RD to follow as per Nutrition protocol  Renetta Silva RDN

## 2019-08-14 ENCOUNTER — TRANSCRIPTION ENCOUNTER (OUTPATIENT)
Age: 84
End: 2019-08-14

## 2019-08-14 VITALS
TEMPERATURE: 97 F | DIASTOLIC BLOOD PRESSURE: 77 MMHG | SYSTOLIC BLOOD PRESSURE: 151 MMHG | RESPIRATION RATE: 18 BRPM | OXYGEN SATURATION: 87 % | HEART RATE: 62 BPM

## 2019-08-14 DIAGNOSIS — N18.9 CHRONIC KIDNEY DISEASE, UNSPECIFIED: ICD-10-CM

## 2019-08-14 RX ORDER — PANTOPRAZOLE SODIUM 20 MG/1
40 TABLET, DELAYED RELEASE ORAL
Qty: 0 | Refills: 0 | DISCHARGE
Start: 2019-08-14

## 2019-08-14 RX ORDER — SALIVA SUBSTITUTE COMB NO.11 351 MG
5 POWDER IN PACKET (EA) MUCOUS MEMBRANE
Qty: 0 | Refills: 0 | DISCHARGE
Start: 2019-08-14

## 2019-08-14 RX ORDER — SCOPALAMINE 1 MG/3D
1 PATCH, EXTENDED RELEASE TRANSDERMAL
Qty: 0 | Refills: 0 | DISCHARGE
Start: 2019-08-14

## 2019-08-14 RX ORDER — POLYETHYLENE GLYCOL 3350 17 G/17G
17 POWDER, FOR SOLUTION ORAL
Qty: 0 | Refills: 0 | DISCHARGE
Start: 2019-08-14

## 2019-08-14 RX ORDER — ACETYLCYSTEINE 200 MG/ML
2 VIAL (ML) MISCELLANEOUS
Qty: 0 | Refills: 0 | DISCHARGE
Start: 2019-08-14

## 2019-08-14 RX ORDER — BUDESONIDE, MICRONIZED 100 %
2 POWDER (GRAM) MISCELLANEOUS
Qty: 0 | Refills: 0 | DISCHARGE

## 2019-08-14 RX ORDER — FUROSEMIDE 40 MG
1 TABLET ORAL
Qty: 0 | Refills: 0 | DISCHARGE
Start: 2019-08-14

## 2019-08-14 RX ORDER — FUROSEMIDE 40 MG
20 TABLET ORAL DAILY
Refills: 0 | Status: DISCONTINUED | OUTPATIENT
Start: 2019-08-14 | End: 2019-08-14

## 2019-08-14 RX ORDER — MAGNESIUM OXIDE 400 MG ORAL TABLET 241.3 MG
1 TABLET ORAL
Qty: 0 | Refills: 0 | DISCHARGE
Start: 2019-08-14

## 2019-08-14 RX ORDER — SENNA PLUS 8.6 MG/1
10 TABLET ORAL
Qty: 0 | Refills: 0 | DISCHARGE
Start: 2019-08-14

## 2019-08-14 RX ORDER — NYSTATIN CREAM 100000 [USP'U]/G
1 CREAM TOPICAL
Qty: 0 | Refills: 0 | DISCHARGE
Start: 2019-08-14

## 2019-08-14 RX ADMIN — Medication 10 MILLIGRAM(S): at 06:20

## 2019-08-14 RX ADMIN — HEPARIN SODIUM 5000 UNIT(S): 5000 INJECTION INTRAVENOUS; SUBCUTANEOUS at 06:21

## 2019-08-14 RX ADMIN — ZINC OXIDE 1 APPLICATION(S): 200 OINTMENT TOPICAL at 06:34

## 2019-08-14 RX ADMIN — ESCITALOPRAM OXALATE 10 MILLIGRAM(S): 10 TABLET, FILM COATED ORAL at 12:15

## 2019-08-14 RX ADMIN — PANTOPRAZOLE SODIUM 40 MILLIGRAM(S): 20 TABLET, DELAYED RELEASE ORAL at 12:15

## 2019-08-14 RX ADMIN — SCOPALAMINE 1 PATCH: 1 PATCH, EXTENDED RELEASE TRANSDERMAL at 07:47

## 2019-08-14 RX ADMIN — DORZOLAMIDE HYDROCHLORIDE TIMOLOL MALEATE 1 DROP(S): 20; 5 SOLUTION/ DROPS OPHTHALMIC at 06:21

## 2019-08-14 RX ADMIN — NYSTATIN CREAM 1 APPLICATION(S): 100000 CREAM TOPICAL at 06:21

## 2019-08-14 RX ADMIN — Medication 3 MILLILITER(S): at 04:25

## 2019-08-14 RX ADMIN — Medication 2 MILLILITER(S): at 04:26

## 2019-08-14 RX ADMIN — POLYETHYLENE GLYCOL 3350 17 GRAM(S): 17 POWDER, FOR SOLUTION ORAL at 12:15

## 2019-08-14 RX ADMIN — Medication 5 MILLILITER(S): at 06:34

## 2019-08-14 RX ADMIN — Medication 3 MILLILITER(S): at 09:18

## 2019-08-14 RX ADMIN — MAGNESIUM OXIDE 400 MG ORAL TABLET 400 MILLIGRAM(S): 241.3 TABLET ORAL at 06:21

## 2019-08-14 RX ADMIN — Medication 2 MILLILITER(S): at 09:17

## 2019-08-14 RX ADMIN — Medication 1 DROP(S): at 06:20

## 2019-08-14 NOTE — DISCHARGE NOTE PROVIDER - NSDCCPCAREPLAN_GEN_ALL_CORE_FT
PRINCIPAL DISCHARGE DIAGNOSIS  Diagnosis: Severe sepsis  Assessment and Plan of Treatment: with septic shock, pulmonary source      SECONDARY DISCHARGE DIAGNOSES  Diagnosis: Acute pneumonia  Assessment and Plan of Treatment:

## 2019-08-14 NOTE — DISCHARGE NOTE PROVIDER - HOSPITAL COURSE
86 year old male with extensive PMH including IBS, CVA with dysphagia and PEG placement, anemia, psoriasis, GERD, depression, PPM placement treated for MSSA bacteremia at [facility] 6/19 presented to ED at [facility] 7/28 via EMS from [facility] with change in mental status.  Per family, patient was at baseline about three days prior but noted to have increasing trouble managing secretions, on day of admission noted to be minimally responsive.  On admission, patient non-verbal and responsive only to tactile stimulus, could not offer any complaints or further history at that time.        On presentation to ED found to have severe sepsis, suspected source acute RML pneumonia from aspiration.  Transferred to ICU for management.        In ICU intubated for increased work of breathing, started on levophed for blood pressure support.  Started on broad spectrum antibiotics.  Subsequently developed sepsis related thrombocytopenia and MELISSA on CDK.  Sputum grew pseudomonas, urine noted to be colonized with candida.        Weaned from pressors and ventilator, extubated.  Completed course of aztreonam, doxycyline, meropenum.  Thrombocytopenia and MELISSA resolved.        Continued to have increased oral secretions, management maximized with medical therapy.  Now full DNR/DNI, for return to [facility] with hospice services.        This is only a summary of events during admission, please refer to full medical record for details of hospital stay.

## 2019-08-14 NOTE — PROGRESS NOTE ADULT - PROBLEM SELECTOR PROBLEM 4
GERD (gastroesophageal reflux disease)
Thrombocytopenia
MELISSA (acute kidney injury)
GERD (gastroesophageal reflux disease)

## 2019-08-14 NOTE — PROGRESS NOTE ADULT - PROBLEM SELECTOR PROBLEM 6
Chronic diastolic heart failure
Chronic diastolic heart failure
Aspiration pneumonia of right middle lobe due to gastric secretions
Prophylactic measure

## 2019-08-14 NOTE — PROGRESS NOTE ADULT - PROBLEM SELECTOR PROBLEM 2
Aspiration pneumonia of right middle lobe due to gastric secretions
Cerebral infarction
Septic shock
Severe sepsis
Cerebral infarction

## 2019-08-14 NOTE — DISCHARGE NOTE NURSING/CASE MANAGEMENT/SOCIAL WORK - NSDCPEPTSTRK_GEN_ALL_CORE
Stroke support groups for patients, families, and friends/Stroke warning signs and symptoms/Signs and symptoms of stroke/Need for follow up after discharge/Prescribed medications/Risk factors for stroke/Stroke education booklet/Call 911 for stroke

## 2019-08-14 NOTE — PROGRESS NOTE ADULT - SUBJECTIVE AND OBJECTIVE BOX
Secretions continue to improve.  Tolerating TF via PEG.  BM documented this AM, voiding and incontinent of urine.    Vital Signs Last 24 Hrs  T(F): 97.5 (14 Aug 2019 06:34), Max: 98.1 (13 Aug 2019 16:27)  HR: 67 (14 Aug 2019 09:23) (62 - 73)  BP: 169/92 (14 Aug 2019 06:34) (153/93 - 169/92)  RR: 18 (14 Aug 2019 06:34) (16 - 18)  SpO2: 97% 5L NC (14 Aug 2019 09:23) (90% - 97%)    No new laboratory data or imaging    MEDICATIONS    acetylcysteine 20%  Inhalation 2 milliLiter(s) Inhalation every 6 hours  ALBUTerol/ipratropium for Nebulization 3 milliLiter(s) Nebulizer every 6 hours  atropine 1% Ophthalmic Solution for SubLingual Use 1 Drop(s) SubLingual every 12 hours  Biotene Dry Mouth Oral Rinse 5 milliLiter(s) Swish and Spit every 12 hours  dorzolamide 2%/timolol 0.5% Ophthalmic Solution 1 Drop(s) Both EYES every 12 hours  escitalopram 10 milliGRAM(s) Oral daily  furosemide    Tablet 20 milliGRAM(s) Oral daily  heparin  Injectable 5000 Unit(s) SubCutaneous every 12 hours  latanoprost 0.005% Ophthalmic Solution 1 Drop(s) Both EYES at bedtime  magnesium oxide 400 milliGRAM(s) Oral every 8 hours  nystatin Powder 1 Application(s) Topical three times a day  pantoprazole   Suspension 40 milliGRAM(s) Enteral Tube daily  polyethylene glycol 3350 17 Gram(s) Oral daily  predniSONE   Tablet 10 milliGRAM(s) Oral daily  scopolamine   Patch 1 Patch Transdermal every 72 hours  senna Syrup 10 milliLiter(s) Oral at bedtime  zinc oxide 20% Ointment 1 Application(s) Topical two times a day  acetaminophen    Suspension .. 650 milliGRAM(s) Enteral Tube every 6 hours PRN Temp greater or equal to 38C (100.4F), Mild Pain (1 - 3)    Physical Exam  Gen:  WN/WD Male resting in bed, NAD  ENT:  NC/AT, no JVD noted  Thorax:  Symmetric, no retractions  Lung:  CTA b/l with transmitted rhonchi from upper airway  CV:  S1, S2. RRR  Abd:  Soft, NT/ND.  BS normoactive, no masses to palp.  PEG site C/D/I  Extrem:  No clubbing or cyanosis, generalized +1 edema throughout, extremities warm and pink  Neuro:  A&O x 3, no gross motor deficits

## 2019-08-14 NOTE — PROGRESS NOTE ADULT - PROBLEM SELECTOR PLAN 7
Protonix/Heparin SQ  DNR/DNI  For likely return to [facility] tomorrow with hospice services
Protonix/Heparin SQ  DNR/DNI  For return today to [facility] tomorrow with hospice services

## 2019-08-14 NOTE — PROGRESS NOTE ADULT - PROBLEM SELECTOR PROBLEM 1
Septic shock
Severe sepsis
Acute respiratory failure with hypoxia and hypercapnia
Acute respiratory failure with hypoxia and hypercapnia
Severe sepsis

## 2019-08-14 NOTE — PROGRESS NOTE ADULT - PROBLEM SELECTOR PLAN 2
resolved for now, high risk of re-occurence
resolved for now, high risk of re-occurence, plan for formal swallow eval yesterday with recommendation for NPO status
resolved for now, high risk of re-occurence, plan for formal swallow eval, was on diet previously
with resulting dysphagia and weakness.  Continue PEG feeds, supportive care
-30 cc/kg fluid challenge without improvement in blood pressure  -patient currently on Levophed, will titrate for MAP 65-70  -repeat lactate  -blood/urine/sputum cultures, then initiate broad spectrum antibiotics  -follow cultures and narrow antibitoics as able  -goal UOP >0.5 cc/kg/hr  - consider procalcitonin
septic shock state   -30 cc/kg fluid challenge without improvement in blood pressure  -patient currently on Levophed, will titrate for MAP 65-70  -repeat lactate  -blood/urine/sputum cultures, then initiate broad spectrum antibiotics  -follow cultures and narrow antibitoics as able  -goal UOP >0.5 cc/kg/hr  -procalcitonin
with resulting dysphagia and weakness.  Continue PEG feeds, supportive care

## 2019-08-14 NOTE — PROGRESS NOTE ADULT - PROBLEM SELECTOR PLAN 6
Aspiration pneumonia of right middle lobe due to gastric secretions.  Plan: continue  broad spectrum coverage for MDR organisms including staph aureus and gram negatives.   antibiotics are meropenum   Follow up sputum culture the narrow spectrum based on culture  sensitivities.
hold lovenox / heparin SQ due to concern for bleeding   DVT  prophylaxis with SD'S  protonix for GI  continue aspiration precautions by keeping head of bed at 30 degrees  continue chlorhexidine while intubated for VAP prophylaxis
Continue current medical management.  No ACE/ARB with recent renal dysfunction
Continue current medical management.  No ACE/ARB with recent renal dysfunction

## 2019-08-14 NOTE — PROGRESS NOTE ADULT - PROBLEM SELECTOR PROBLEM 3
Anemia
IBS (irritable bowel syndrome)
MELISSA (acute kidney injury)
Aspiration pneumonia of right middle lobe due to gastric secretions
IBS (irritable bowel syndrome)

## 2019-08-14 NOTE — PROGRESS NOTE ADULT - ATTENDING COMMENTS
I have personally seen and examined patient on the above date.  I discussed the case with Grace Brown NP and I agree with findings and plan as detailed per note above, which I have amended where appropriate.
patient seen and examined   on n/c o2   nasal airway clogged  pt with increased secretions oral , suctioned to clear orally, but returns shortly after.       Assessment  Septic Shock with multiorgan failure, due to aspiration PNA on pressors with  candida urine colonization  Acute Respiratory failure intubated 7/28  -> 8/3  Thrombocytopenia due to sepsis resolved  Lactic acidosis resolved  MELISSA with baseline Cr 1.2-1.4    Hypoglycemia improving / resolving  Hyperkalemia improved  CVA with Dysphagia s/p PEG  Underlying BPH, chronic diastolic CHF      Plan  suctioning as needed  aerosolize o2,   biotine mouth wash  check cxr   decrease atropine drops   mucomyst and nabs    he needs aggressive suctioning to prevent respiratory failure, untill improved, unsafe for d/c     chest PT, Pulm toilet,   Palliative care f/u       Family Updated: 	Dtr Amelie -                        Date  8/12 msg left                                 wife Angie [phone]	             Date: 8/5/19      Sedation & Analgesia:	As above  Diet/Nutrition:		Start peg feeding today vs kyle  GI PPx:			PPI    DVT Ppx:		hep sq    Activity:		               Bedrest  Head of Bed:               35-45  Glycemic Control:        n/a    Lines:  PIV    Goals of Care: DNR/I, hospice eval Monday,   family unsure of DNH .
patient seen and examined   transferred to   on high flow  noted one low glucose.   plts improving         Assessment  Septic Shock with multiorgan failure, due to aspiration PNA on pressors with  candida urine colonization  Acute Respiratory failure intubated 7/28  -> 8/3  Thrombocytopenia due to sepsis resolved  Lactic acidosis resolved  MELISSA with baseline Cr 1.2-1.4  reoved  Hypoglycemia improving  Hyperkalemia improved  CVA with Dysphagia s/p PEG  Underlying BPH, chronic diastolic CHF      Plan  continue high flow - taper fio2 as needed  chest PT, Pulm toilet,   continue  Mucomyst and albuterol  off IV abx as per ID  start atropine drops  taper steroids as tolerated, monitor glucose q6  noted abnormal cxr, supportive care for now  f/u as needed     Continue supportive care  palliative care f/u      Family Updated: 	Dtr Amelie -                        Date  8/6/19 - , 8/7                                      wife Angie [phone]	             Date: 8/5/19      Sedation & Analgesia:	As above  Diet/Nutrition:		Start peg feeding today vs kyle  GI PPx:			PPI    DVT Ppx:		hep sq    Activity:		               Bedrest  Head of Bed:               35-45  Glycemic Control:        n/a    Lines:  PIV    Goals of Care: DNR, Trial intubation, family does not want trach.
I have personally seen and examined patient on the above date.  I discussed the case with Grace Brown NP and I agree with findings and plan as detailed per note above, which I have amended where appropriate.
patient seen and examined   on n/c o2         Assessment  Septic Shock with multiorgan failure, due to aspiration PNA on pressors with  candida urine colonization  Acute Respiratory failure intubated 7/28  -> 8/3    pt c  Thrombocytopenia due to sepsis resolved  Lactic acidosis resolved  MELISSA with baseline Cr 1.2-1.4  removed  Hypoglycemia improving  Hyperkalemia improved  CVA with Dysphagia s/p PEG  Underlying BPH, chronic diastolic CHF      Plan  patient is gargelling secretions today, d/w dtr who will come over  he needs aggressive suctioning and he doesn't allow.   will add mucomyst / nebs   chest PT, Pulm toilet,   off IV abx as per ID  start atropine drops  taper steroids as tolerated, pt off midodrine  f/u as needed     Continue supportive care  palliative care f/u      Family Updated: 	Dtr Amelie -                        Date  8/11                                 wife Angie [phone]	             Date: 8/5/19      Sedation & Analgesia:	As above  Diet/Nutrition:		Start peg feeding today vs kyle  GI PPx:			PPI    DVT Ppx:		hep sq    Activity:		               Bedrest  Head of Bed:               35-45  Glycemic Control:        n/a    Lines:  PIV    Goals of Care: DNR/I, hospice eval Monday,   family unsure of DNH .
patient seen and examined   transferred to   on high flow  noted one low glucose.   plts improving         Assessment  Septic Shock with multiorgan failure, due to aspiration PNA on pressors with  candida urine colonization  Acute Respiratory failure intubated 7/28  -> 8/3  Thrombocytopenia due to sepsis resolved  Lactic acidosis resolved  MELISSA with baseline Cr 1.2-1.4  reoved  Hypoglycemia improving  Hyperkalemia improved  CVA with Dysphagia s/p PEG  Underlying BPH, chronic diastolic CHF      Plan  continue high flow - taper fio2 as needed  chest PT, Pulm toilet,   continue  Mucomyst and albuterol  off IV abx as per ID  start atropine drops  taper steroids as tolerated, monitor glucose q6    start dvt ppx      Continue supportive care  palliative care f/u      Family Updated: 	Dtr Amelie -                        Date  8/5/19 - , 8/6 called msg left for call back                                      wife Angie [phone]	             Date: 8/5/19      Sedation & Analgesia:	As above  Diet/Nutrition:		Start peg feeding today vs kyle  GI PPx:			PPI    DVT Ppx:		venodynes start dvt ppx when plts > 100    Activity:		               Bedrest  Head of Bed:               35-45  Glycemic Control:        n/a    Lines:  PIV    Goals of Care: DNR, Trial intubation, family does not want trach.
I have personally seen and examined patient on the above date.  I discussed the case with Grace Brown NP and I agree with findings and plan as detailed per note above, which I have amended where appropriate.
patient seen and examined   on n/c o2         Assessment  Septic Shock with multiorgan failure, due to aspiration PNA on pressors with  candida urine colonization  Acute Respiratory failure intubated 7/28  -> 8/3  Thrombocytopenia due to sepsis resolved  Lactic acidosis resolved  MELISSA with baseline Cr 1.2-1.4  reoved  Hypoglycemia improving  Hyperkalemia improved  CVA with Dysphagia s/p PEG  Underlying BPH, chronic diastolic CHF      Plan  continue high flow - taper fio2 as needed  chest PT, Pulm toilet,   off IV abx as per ID  start atropine drops  taper steroids as tolerated, pt off midodrine  f/u as needed     Continue supportive care  palliative care f/u      Family Updated: 	Dtr Amelie -                        Date  8/10                                 wife Angie [phone]	             Date: 8/5/19      Sedation & Analgesia:	As above  Diet/Nutrition:		Start peg feeding today vs kyle  GI PPx:			PPI    DVT Ppx:		hep sq    Activity:		               Bedrest  Head of Bed:               35-45  Glycemic Control:        n/a    Lines:  PIV    Goals of Care: DNR/I, hospice eval Monday,   family unsure of DNH
Wants to go back to [facility]     Assessment  Septic Shock with multiorgan failure, due to aspiration PNA on pressors with  candida urine colonization  Acute Respiratory failure intubated 7/28  -> 8/3  Thrombocytopenia due to sepsis resolved  Lactic acidosis resolved  MELISSA with baseline Cr 1.2-1.4  resolved  Hypoglycemia improving  Hyperkalemia improved  CVA with Dysphagia s/p PEG  Underlying BPH, chronic diastolic CHF    Plan  Cont. NC to maintain saturation >90%  chest PT, Pulm toilet,   continue  Mucomyst and albuterol  atropine drops  taper steroids as tolerated, monitor glucose   noted abnormal cxr, supportive care for now  f/u as needed     Continue supportive care  palliative care f/u    Sedation & Analgesia:	As above  Diet/Nutrition:		Tube feeds  GI PPx:			PPI    DVT Ppx:		hep sq    Activity:		               Bedrest  Head of Bed:               35-45  Glycemic Control:        n/a    Lines:  PIV    Goals of Care: DNR, DNI  Palliative care follow up  D/c planning
patient seen and examined   on nasal canulla  plts improving     Assessment  Septic Shock with multiorgan failure, due to aspiration PNA on pressors with  candida urine colonization  Acute Respiratory failure intubated 7/28  -> 8/3  Thrombocytopenia due to sepsis resolved  Lactic acidosis resolved  MELISSA with baseline Cr 1.2-1.4  resolved  Hypoglycemia improving  Hyperkalemia improved  CVA with Dysphagia s/p PEG  Underlying BPH, chronic diastolic CHF      Plan  Cont. NC to maintain saturation >90%  chest PT, Pulm toilet,   continue  Mucomyst and albuterol  atropine drops  taper steroids as tolerated, monitor glucose q6  noted abnormal cxr, supportive care for now  f/u as needed     Continue supportive care  palliative care f/u    Sedation & Analgesia:	As above  Diet/Nutrition:		Tube feeds  GI PPx:			PPI    DVT Ppx:		hep sq    Activity:		               Bedrest  Head of Bed:               35-45  Glycemic Control:        n/a    Lines:  PIV    Goals of Care: DNR, Trial intubation, family does not want trach.  Palliative care follow up
patient seen and examined   on n/c o2   secretions appear to be manageable today, suctioned by me,   airway suctioned to clear, and clinically improved.     Reached out to Herbie Smith and Dr. Coelho msg left today and yesterday for call back.      Assessment  Septic Shock with multiorgan failure, due to aspiration PNA on pressors with  candida urine colonization Resolved  Acute Respiratory failure intubated 7/28  -> 8/3 Resolved, but remains at high risk for recurrent asp pna/resp failure due to underlying dysphagia  Thrombocytopenia due to sepsis resolved  Lactic acidosis resolved  MELISSA with baseline Cr 1.2-1.4    Hypoglycemia Resolved    CVA with Dysphagia s/p PEG  Underlying BPH, chronic diastolic CHF      Plan  suctioning as needed     Keep HOB > 35 Deg, aerosolize o2, Atropine drops,scopolamine patch, mucomyst, Nebs  biotine mouth wash  Continue lasix dailywill add lasix 20mg daily  secretions improving possible d/c in am  Contacted Dr. Coelho awaiting call back    chest PT, Pulm toilet,   Palliative care f/u       Family Updated: 	Dtr Amelie -                        Date  8/13                                 wife Angie [phone]	             Date: 8/14/19      Sedation & Analgesia:	As above  Diet/Nutrition:		Start peg feeding today vs kyle  GI PPx:			PPI    DVT Ppx:		hep sq    Activity:		               Bedrest  Head of Bed:               35-45  Glycemic Control:        n/a    Lines:  PIV    Goals of Care: DNR/I, hospice eval
patient seen and examined   on n/c o2   secretions improving today  bedside us done showed b/l R> L free flowing pl effusion  airway suctioned to clear, and clinically improved.     Assessment  Septic Shock with multiorgan failure, due to aspiration PNA on pressors with  candida urine colonization  Acute Respiratory failure intubated 7/28  -> 8/3  Thrombocytopenia due to sepsis resolved  Lactic acidosis resolved  MELISSA with baseline Cr 1.2-1.4    Hypoglycemia improving / resolving  Hyperkalemia improved  CVA with Dysphagia s/p PEG  Underlying BPH, chronic diastolic CHF      Plan  suctioning as needed  aerosolize o2,   biotine mouth wash  Continue atropine drops, scopolamine patch and Mucomyst and nebs  will add lasix 20mg daily  secretions improving possible d/c in am  Contacted Dr. Coelho awaiting call back    chest PT, Pulm toilet,   Palliative care f/u       Family Updated: 	Dtr Amelie -                        Date  8/13                                 wife Angie [phone]	             Date: 8/5/19      Sedation & Analgesia:	As above  Diet/Nutrition:		Start peg feeding today vs kyle  GI PPx:			PPI    DVT Ppx:		hep sq    Activity:		               Bedrest  Head of Bed:               35-45  Glycemic Control:        n/a    Lines:  PIV    Goals of Care: DNR/I, hospice eval   family unsure of DNH .

## 2019-08-14 NOTE — PROGRESS NOTE ADULT - ASSESSMENT
86 year old male with PMH PAST diastolic heart failure s/p PPM, CKD, IBS, CVA with dysphagia s/p PEG, GERD admitted for sepsis likely pulmonary source from chronic aspiration, urinary colonization with candida

## 2019-08-14 NOTE — PROGRESS NOTE ADULT - PROBLEM SELECTOR PLAN 4
Continue PPI, supportive care
resolving   will trend platelets with morning CBC
Hold nephrotoxic meds  Continue aggressive hydration  Trend urine output  Follow up BUN/Creatinine  follow up electrolytes
Continue PPI, supportive care

## 2019-08-14 NOTE — DISCHARGE NOTE NURSING/CASE MANAGEMENT/SOCIAL WORK - NSDCDPATPORTLINK_GEN_ALL_CORE
You can access the [facility] Patient Portal, offered by [facility], by registering with the following website: [email or web address]

## 2019-08-14 NOTE — PROGRESS NOTE ADULT - PROBLEM SELECTOR PLAN 5
On CKD, now resolved.  Continue supportive care
On CKD, now resolved.  Continue supportive care
continue D-10 and Accu-Cheks
liekly secondary to sepsis  Transfused 2 units PRBC and 1 unit platelets   monitor cbc and  signs of more bleeding.

## 2019-08-14 NOTE — PROGRESS NOTE ADULT - PROBLEM SELECTOR PLAN 3
Continue supportive care
follow labs
Hold nephrotoxic meds  Continue aggressive hydration  Trend urine output  Follow up BUN/Creatinine  follow up electrolytes
continue  broad spectrum coverage for MDR organisms including staph aureus and gram negatives.   Follow up sputum culture the narrow spectrum based on culture  sensitivities
Continue supportive care

## 2019-09-19 PROCEDURE — 92610 EVALUATE SWALLOWING FUNCTION: CPT

## 2019-09-19 PROCEDURE — 86900 BLOOD TYPING SEROLOGIC ABO: CPT

## 2019-09-19 PROCEDURE — 87186 SC STD MICRODIL/AGAR DIL: CPT

## 2019-09-19 PROCEDURE — 94002 VENT MGMT INPAT INIT DAY: CPT

## 2019-09-19 PROCEDURE — 85045 AUTOMATED RETICULOCYTE COUNT: CPT

## 2019-09-19 PROCEDURE — 94003 VENT MGMT INPAT SUBQ DAY: CPT

## 2019-09-19 PROCEDURE — 87040 BLOOD CULTURE FOR BACTERIA: CPT

## 2019-09-19 PROCEDURE — 82803 BLOOD GASES ANY COMBINATION: CPT

## 2019-09-19 PROCEDURE — 84484 ASSAY OF TROPONIN QUANT: CPT

## 2019-09-19 PROCEDURE — 82550 ASSAY OF CK (CPK): CPT

## 2019-09-19 PROCEDURE — 80076 HEPATIC FUNCTION PANEL: CPT

## 2019-09-19 PROCEDURE — 82607 VITAMIN B-12: CPT

## 2019-09-19 PROCEDURE — 36415 COLL VENOUS BLD VENIPUNCTURE: CPT

## 2019-09-19 PROCEDURE — 82553 CREATINE MB FRACTION: CPT

## 2019-09-19 PROCEDURE — 82746 ASSAY OF FOLIC ACID SERUM: CPT

## 2019-09-19 PROCEDURE — 85027 COMPLETE CBC AUTOMATED: CPT

## 2019-09-19 PROCEDURE — 94640 AIRWAY INHALATION TREATMENT: CPT

## 2019-09-19 PROCEDURE — 93005 ELECTROCARDIOGRAM TRACING: CPT

## 2019-09-19 PROCEDURE — 83036 HEMOGLOBIN GLYCOSYLATED A1C: CPT

## 2019-09-19 PROCEDURE — 85610 PROTHROMBIN TIME: CPT

## 2019-09-19 PROCEDURE — 80053 COMPREHEN METABOLIC PANEL: CPT

## 2019-09-19 PROCEDURE — 86923 COMPATIBILITY TEST ELECTRIC: CPT

## 2019-09-19 PROCEDURE — 87449 NOS EACH ORGANISM AG IA: CPT

## 2019-09-19 PROCEDURE — P9037: CPT

## 2019-09-19 PROCEDURE — 94668 MNPJ CHEST WALL SBSQ: CPT

## 2019-09-19 PROCEDURE — 96375 TX/PRO/DX INJ NEW DRUG ADDON: CPT

## 2019-09-19 PROCEDURE — 80048 BASIC METABOLIC PNL TOTAL CA: CPT

## 2019-09-19 PROCEDURE — 84100 ASSAY OF PHOSPHORUS: CPT

## 2019-09-19 PROCEDURE — 94667 MNPJ CHEST WALL 1ST: CPT

## 2019-09-19 PROCEDURE — 85384 FIBRINOGEN ACTIVITY: CPT

## 2019-09-19 PROCEDURE — 94760 N-INVAS EAR/PLS OXIMETRY 1: CPT

## 2019-09-19 PROCEDURE — 86850 RBC ANTIBODY SCREEN: CPT

## 2019-09-19 PROCEDURE — 99285 EMERGENCY DEPT VISIT HI MDM: CPT | Mod: 25

## 2019-09-19 PROCEDURE — 85730 THROMBOPLASTIN TIME PARTIAL: CPT

## 2019-09-19 PROCEDURE — 96365 THER/PROPH/DIAG IV INF INIT: CPT

## 2019-09-19 PROCEDURE — 86901 BLOOD TYPING SEROLOGIC RH(D): CPT

## 2019-09-19 PROCEDURE — 84145 PROCALCITONIN (PCT): CPT

## 2019-09-19 PROCEDURE — P9047: CPT

## 2019-09-19 PROCEDURE — 87086 URINE CULTURE/COLONY COUNT: CPT

## 2019-09-19 PROCEDURE — 81001 URINALYSIS AUTO W/SCOPE: CPT

## 2019-09-19 PROCEDURE — 71045 X-RAY EXAM CHEST 1 VIEW: CPT

## 2019-09-19 PROCEDURE — 36430 TRANSFUSION BLD/BLD COMPNT: CPT

## 2019-09-19 PROCEDURE — 83010 ASSAY OF HAPTOGLOBIN QUANT: CPT

## 2019-09-19 PROCEDURE — 83735 ASSAY OF MAGNESIUM: CPT

## 2019-09-19 PROCEDURE — 83615 LACTATE (LD) (LDH) ENZYME: CPT

## 2019-09-19 PROCEDURE — 82728 ASSAY OF FERRITIN: CPT

## 2019-09-19 PROCEDURE — 83605 ASSAY OF LACTIC ACID: CPT

## 2019-09-19 PROCEDURE — P9016: CPT

## 2019-09-19 PROCEDURE — 87070 CULTURE OTHR SPECIMN AEROBIC: CPT

## 2019-09-19 PROCEDURE — 36600 WITHDRAWAL OF ARTERIAL BLOOD: CPT

## 2019-09-19 PROCEDURE — 82962 GLUCOSE BLOOD TEST: CPT

## 2019-11-19 NOTE — SWALLOW VFSS/MBS ASSESSMENT ADULT - NS SWALLOW VFSS REC ASPIR MON
Patient is here alone today.    If any information or results need to be relayed from today's visit, the best way to contact the patient is via [phone] (mobile) - Patient gives verbal permission to leave a detailed voicemail at the number provided.       change of breathing pattern/position upright (90Y)/gurgly voice/upper respiratory infection/fever/throat clearing/oral hygiene/cough/pneumonia

## 2020-01-23 NOTE — CHART NOTE - NSCHARTNOTEFT_GEN_A_CORE
A provider-focused exam of reperfusion assessment was completed after fluid bolus.     T(C): 34.2 (06-01-19 @ 14:30), Max: 35.7 (06-01-19 @ 10:46)  HR: 65 (06-01-19 @ 14:30) (59 - 99)  BP: 121/49 (06-01-19 @ 13:50) (74/49 - 165/64)  RR: 18 (06-01-19 @ 14:30) (15 - 22)  SpO2: 100% (06-01-19 @ 14:30) (85% - 100%)    Resp: clear to auscultation, no rales, wheezing, + coarse rhonchi  Cardiac: S1, S2, no murmurs appreciated  Capillary refill time is less than 3 seconds.   Peripheral pulses are + 1 Radial bilaterally and + 1 DP bilaterally  Skin: normal turgor    Lactate downtrending. BP stabilized with Vasopressor support. On antibiotics. Placed on Delia Hugger for hypothermia. Otc Regimen: CeraVe  cream Detail Level: Detailed Otc Regimen: Clean areas with alcohol

## 2020-01-24 NOTE — DIETITIAN INITIAL EVALUATION ADULT. - EST PROTEIN NEEDS3
71.92 Partial Purse String (Intermediate) Text: Given the location of the defect and the characteristics of the surrounding skin an intermediate purse string closure was deemed most appropriate.  Undermining was performed circumfirentially around the surgical defect.  A purse string suture was then placed and tightened. Wound tension only allowed a partial closure of the circular defect.

## 2020-03-30 NOTE — PATIENT PROFILE ADULT - SPECIFY WHICH ONES ARE ON CHART
Medication refill information reviewed.     Last due for both tramadol ER and tramadol:  Fill 2/28/2020, start 3/1/2020    Due date:  3/31/2020    Weaning instructions:  N/A    Prescriptions prepped for review.     Estelita Wetzel RN-BSN  [facility]               Do Not Resuscitate (DNR)

## 2020-04-28 NOTE — PROGRESS NOTE ADULT - ASSESSMENT
ASSESSMENT and PLAN:  1) Septic shock with multiorgan failure 2/2 aspiration PNA - HFNC de-escalated to NC. Titrating NC now at 3L. Continue chest PT/pulm toilet/nebs/steriods. Remains high risk for recurrent respiratory failure.   2) Anemia - Trend CBC currently near baseline  3) Hypoglycemia - Fingersticks Q6H.   4) MELISSA - Now back to baseline, monitor I&Os. Avoid nephrotoxins  5) Thrombocytopenia - PLT now 203,000 - DVT ppx started.   6). Discharge Planning: return to [facility], discussed with Care Management as well as Palliative care. No

## 2020-04-28 NOTE — ED ADULT NURSE NOTE - BREATH SOUNDS LUL
Detail Level: Zone Illumination Time: 00:16:40 Treatment Number: 0 Post-Care Instructions: I reviewed with the patient in detail post-care instructions. Patient is to avoid sunlight for the next 2 days, and wear sun protection. Patients may expect sunburn like redness, discomfort and scabbing. Who Performed The Pdt?: Performed by MD TRIXIE, DESHAWN or ANJALI with Pre-Procedure Debridement of Hyperkeratotic Lesions (75102) Incubation Start Time: 8:30 Ndc# (Optional): [id number] Show Anesthesia In Plan?: Yes Consent: Written consent obtained.  The risks were reviewed with the patient including but not limited to: pigmentary changes, pain, blistering, scabbing, redness, and the remote possibility of scarring. Incubation Time: 02:00:00 Total Number Of Aks Treated (Optional To Report): 15 Comments: Patient was unable to complete the full 16 min and 40 sec of treatment. He completed 3 minutes of the total time. rhonchi Lot # (Optional): [id number] Which Photosensitizer Was Used: Levulan Anesthesia Type: 1% lidocaine with epinephrine Light Source: Rogerio-U Pre-Procedure Text: The treatment areas were cleaned and prepped WITH 70% alcohol by medical assistant. Debridement Text (Will Only Render In Visit Note If You Select Debridement Option Under Who Performed The Pdt Field): Prior to application of the photodynamic medication the hyperkeratotic lesions were curetted to make them more amenable to therapy. Number Of Kerasticks/Tubes Billed For: 1 Medical Necessity: Precancerous Lesions Incubation End Time: 10:30 Expiration Date (Optional): 02/21 Was Levulan/Ameluz Applied On A Previous Day?: No

## 2020-05-28 NOTE — DIETITIAN INITIAL EVALUATION ADULT. - NUTRITIONGOAL OUTCOME1
Pre operative instructions, medication directives and pain scales/post op pain management discussed with patient. All questions were answered. Patient re-assured regarding surgical procedure and day of surgery routine as needed. Patient verbalized understanding of pre-op instructions.   initiate PEG feeds of Jevity 1.5 @20cc/hr advance as indicated to 75cc/hr & Prosource 30cc BID

## 2020-06-05 NOTE — ED PROVIDER NOTE - GASTROINTESTINAL NEGATIVE STATEMENT, MLM
no abdominal pain, no bloating, no constipation, no diarrhea, no nausea and no vomiting. Eye Protection Verbiage: Before proceeding with the stage, a plastic scleral shield was inserted. The globe was anesthetized with a few drops of 1% lidocaine with 1:100,000 epinephrine. Then, an appropriate sized scleral shield was chosen and coated with lacrilube ointment. The shield was gently inserted and left in place for the duration of each stage. After the stage was completed, the shield was gently removed.

## 2020-07-01 NOTE — ED ADULT TRIAGE NOTE - NS ED TRIAGE AVPU SCALE
Painful - The patient does not respond to voice, but does respond to a painful stimulus, such as a squeeze to the hand or sternal rub. A noxious stimulous is needed to elicit a response. Spiral Flap Text: The defect edges were debeveled with a #15 scalpel blade.  Given the location of the defect, shape of the defect and the proximity to free margins a spiral flap was deemed most appropriate.  Using a sterile surgical marker, an appropriate rotation flap was drawn incorporating the defect and placing the expected incisions within the relaxed skin tension lines where possible. The area thus outlined was incised deep to adipose tissue with a #15 scalpel blade.  The skin margins were undermined to an appropriate distance in all directions utilizing iris scissors.

## 2020-10-08 NOTE — ED ADULT TRIAGE NOTE - HEIGHT IN FEET
Sepsis with Acute Hypoxic Respiratory Failure 2/2 PNA Sepsis with Acute Hypoxic Respiratory Failure 2/2 PNA Sepsis with Acute Hypoxic Respiratory Failure 2/2 PNA Sepsis with Acute Hypoxic Respiratory Failure 2/2 PNA Sepsis with Acute Hypoxic Respiratory Failure 2/2 PNA Sepsis with Acute Hypoxic Respiratory Failure 2/2 PNA Sepsis with Acute Hypoxic Respiratory Failure 2/2 PNA Sepsis with Acute Hypoxic Respiratory Failure 2/2 PNA Sepsis with Acute Hypoxic Respiratory Failure 2/2 PNA Sepsis with Acute Hypoxic Respiratory Failure 2/2 PNA Sepsis with Acute Hypoxic Respiratory Failure 2/2 PNA Sepsis with Acute Hypoxic Respiratory Failure 2/2 PNA Sepsis with Acute Hypoxic Respiratory Failure 2/2 PNA Sepsis with Acute Hypoxic Respiratory Failure 2/2 PNA Sepsis with Acute Hypoxic Respiratory Failure 2/2 PNA Sepsis with Acute Hypoxic Respiratory Failure 2/2 PNA 6

## 2020-11-30 NOTE — PATIENT PROFILE ADULT - NSPROALCOHOLUSE2_GEN_A_NUR
Name of Medication(s) Requested:  Atorvastatin    Pharmacy Requested:   [facility]     Medication(s) pended? [x] Yes  [] No    Last Appointment:  6/2/2020    Future appts:  No future appointments. Does patient need call back?   [] Yes  [x] No never

## 2021-02-01 NOTE — PATIENT PROFILE ADULT. - BILL OF RIGHTS/ADMISSION INFORMATION PROVIDED TO:
[FreeTextEntry1] : Chronic Systolic HF\par - Increase Hydral  40mg tid (can do 40/30/40) and increase slowly with target of 50mg TID\par - Continue  5mcg/kg/min till at Hydral target then decrease 1mcg per week\par - Continue other meds\par -Daily weights and Mems readings, titrate diuretic  \par \par CKD stage IV\par - Stable Cr 2.2-2.4. \par - Lab Q2wk with infusion company\par \par Aflutter\par - Continue Xarelto\par \par RTC 3 weeks with NP and 6weeks with Dr. Parrish
Patient

## 2021-05-31 NOTE — PATIENT PROFILE ADULT. - HAS THE PATIENT HAD A SIGNIFICANT CHANGE IN FUNCTIONAL STATUS DUE TO CVA, HEAD TRAUMA, ORTHOPEDIC TRAUMA/SURGERY, OR FALL, WITH THE WEEK PRIOR TO ADMISSION
ICU Progress Note (Vent)   [facility]    Patient - Malena Alexander,  Age - 76 y.o.    - 1946      Room Number -    MRN -  [id number]   Acct # - [de-identified]  Date of Admission -  2021  3:38 PM    Events of Past 24 Hours   Remains sedated on ventilator, sedation vacation showed that patient did not follow commands (at baseline, he has borderline) but also became tachypneic and tachycardic    Vitals    height is 6' 4\" (1.93 m) and weight is 214 lb 8.1 oz (97.3 kg). His axillary temperature is 98.5 °F (36.9 °C). His blood pressure is 89/59 (abnormal) and his pulse is 74. His respiration is 24 and oxygen saturation is 100%. Temperature Range: Temp: 98.5 °F (36.9 °C) Temp  Av.5 °F (36.9 °C)  Min: 98 °F (36.7 °C)  Max: 99.1 °F (37.3 °C)  BP Range:  Systolic (70LZS), WJE:110 , Min:79 , OTN:315     Diastolic (21UKL), ELLA:11, Min:50, Max:89    Pulse Range: Pulse  Av.4  Min: 74  Max: 109  Respiration Range: Resp  Av  Min: 18  Max: 34  Current Pulse Ox[de-identified]  SpO2: 100 %  24HR Pulse Ox Range:  SpO2  Av.3 %  Min: 83 %  Max: 100 %  Oxygen Amount and Delivery: O2 Flow Rate (L/min): 0 L/min      Wt Readings from Last 3 Encounters:   21 214 lb 8.1 oz (97.3 kg)   20 189 lb (85.7 kg)   20 183 lb (83 kg)     I/O       Intake/Output Summary (Last 24 hours) at 2021 1526  Last data filed at 2021 0400  Gross per 24 hour   Intake 2244.19 ml   Output 1715 ml   Net 529.19 ml     I/O last 3 completed shifts:   In: 2244.2 [I.V.:1661.5]  Out: 4305 [Urine:1100; Emesis/NG output:25; Drains:590]     DRAIN/TUBE OUTPUT:     Invasive Lines   ETT Day -   3  Lines -PICC line in place    ICP PRESSURE RANGE:  No data recorded  CVP PRESSURE RANGE:  No data recorded  Mechanical Ventilation Data   SETTINGS (Comprehensive)  Vent Information  $Ventilation: $Subsequent Day  Skin Assessment: Clean, dry, & intact  Equipment ID: pressure is 89/59 (abnormal) and his pulse is 74. His respiration is 24 and oxygen saturation is 100%. Ventilator Settings (Basic)  Vent Mode: PRVC Rate Set: 18 bmp/Vt Ordered: 500 mL/ /FiO2 : 30 %    Constitutional - Sedated  General Appearance  well developed, well nourished  HEENT - Life support devices in place (ET, OG),normocephalic, atraumatic. PERRLA  Lungs - Chest expands equally, increased crackles bilaterally   Cardiovascular - Heart sounds are normal.  normal rate and rhythm regular, no murmur, gallop or rub. Abdomen - soft, nontender, nondistended, no masses or organomegaly  Neurologic - CN II-XII are grossly intact. There are no focal motor deficits  Skin - no bruising or bleeding  Extremities - no cyanosis, clubbing; significant increase in edema    Lab Results   CBC     Lab Results   Component Value Date    WBC 15.4 05/31/2021    RBC 2.90 05/31/2021    HGB 8.3 05/31/2021    HCT 25.6 05/31/2021     05/31/2021    MCV 88.5 05/31/2021    MCH 28.8 05/31/2021    MCHC 32.5 05/31/2021    RDW 15.2 05/31/2021    NRBC 1 05/31/2021    LYMPHOPCT 18 05/31/2021    MONOPCT 7 05/31/2021    BASOPCT 0 05/31/2021    MONOSABS 1.08 05/31/2021    LYMPHSABS 2.78 05/31/2021    EOSABS 0.31 05/31/2021    BASOSABS 0.00 05/31/2021    DIFFTYPE NOT REPORTED 05/31/2021       BMP   Lab Results   Component Value Date     05/31/2021    K 3.5 05/31/2021     05/31/2021    CO2 21 05/31/2021    BUN 24 05/31/2021    CREATININE 0.81 05/31/2021    GLUCOSE 167 05/31/2021    CALCIUM 7.3 05/31/2021       LFTS  Lab Results   Component Value Date    ALKPHOS 45 05/29/2021    ALT 20 05/29/2021    AST 56 05/29/2021    PROT 5.7 05/29/2021    BILITOT 0.34 05/29/2021    LABALBU 3.1 05/29/2021       INR  No results for input(s): PROTIME, INR in the last 72 hours. APTT  No results for input(s): APTT in the last 72 hours.     Lactic Acid  Lab Results   Component Value Date    LACTA 1.6 05/30/2021    LACTA 2.4 05/29/2021    LACTA 0.7 no

## 2021-06-02 NOTE — PROGRESS NOTE ADULT - ASSESSMENT
Physical Examination:  GENERAL:                Arousable, No Oriented, No acute distress.    HEENT:                    Orally intubated, no secretions  No JVD, dry MM, cachectic  PULM:                     Bilateral air entry, course breath sounds bilaterally, no significant sputum production, No Rales, trace Rhonchi, No Wheezing  CVS:                         S1, S2,  no Murmur  ABD:                        Soft, nondistended, nontender, + PEG, site appears clean + BS  EXT:                         chronic venous staisis, with mild edema, nontender, No Cyanosis or Clubbing   Vascular:                Warm Extremities, Normal Capillary refill, Normal Distal Pulses  NEURO:                  arousable , not oriented,  follows commands  PSYC:                      Calm, no Insight.    Assessment  84 yo male with h/o Severe dysphagia s/p PEG with multiple hospitalizations for aspiration PNA, HTN, HLD, Grad 3 Diastolic Heart Failure s/p ppm, CKD, admitted to the ICU with septic shock secondary to aspiration pneumonia. Patient with multiple admissions recently. Has been a high risk of aspiration despite being with a PEG. This morning he remains in shock requiring vasopressor. He was intubated for hypercapnic respiratory failure.     1. Acute hypercapnic respiratory failure  2. Encephalopathy - possibly related to sepsis, has unequal pupils ?no prior documentation as such  3. Shock - likely septic off pressors after steroids  4. Aspiration Pneumonia - suspect infection with gram negative organisms  5. Diastolic heart dysfunction  6. Chronic kidney disease  7. Dysphagia s/p PEG    Plan  - wean from mechanical ventilation, attempt made to talk to family to ensure re intubation status after extubation, as patient high aspiration risk.  -  vasopressor tapered ot off, continue steroids   -  Precedex for sedation  - Obtain CT scan of the head given unequal pupils, r/o ICH  - Ventilatory support  - Broad spectrum antibiotics  - F/u culture results  - will need tube feeding   - Prognosis is guarded, given patient with multiple hospital admissions in the past year. Has been declining clinically.  - case d/w Dr. Simmons  - GI, DVT prophylaxis     Family Updated: 	Wife called today, msg left at [phone]	                                 Date:       Sedation & Analgesia:	preecedex  Diet/Nutrition:		will consider tricle feeding after discussion with family  GI PPx:			PPI    DVT Ppx:		Hep sq  	RISK                                                          Points  	[ ] Previous VTE                                           	3  	[ ] Thrombophilia                                        	2  	[ ] Lower limb paralysis                              	2   	[ ] Current Cancer                                       	2   	[x ] Immobilization > 24 hrs                        	1  	[ x] ICU/CCU stay > 24 hours                       	1  	[ ] Age > 60                                                   	1  		Total:[2 ]      Activity:		            Bedrest     Head of Bed:               35-45 deg  Glycemic Control:        n/a    Lines: PIV  CENTRAL LINE: 	[ x] YES [ ] NO	                    LOCATION:   	                       DATE INSERTED:   	                    REMOVE:  [x ] YES [ ] NO    A-LINE:  	                [ ] YES [ x] NO                      LOCATION:   	                       DATE INSERTED: 		            REMOVE:  [ ] YES [ ] NO    SOTO: 		        [x ] YES [ ] NO  		                                       DATE INSERTED:		            REMOVE:  [ ] YES [ ] NO  possible remove    Restraints were deemed necessary to prevent removal of life-sustaining devices [x  ] YES   [    ]  NO    Disposition: ICU monitoring    Goals of Care: DNR/I any aggravating and/or alleviating factors. At the time of this assessment, the patient was resting comfortably in bed. He currently denies any chest pain, back pain, abdominal pain, shortness of breath, numbness, tingling, N/V/C/D, fever and/or chills. Pt Seen/Examined and Chart Reviewed. Admitting dx: generalized weakness and fatigue in patient with recent hiatal hernia repair (5/25)    SUBJECTIVE:   Patient states feeling weak. Denies any SOB, or dyspnec. Has not required oxygen. Had been taking Lasix 20 mg BID at home. He reports no accumulation of fluid. He had been off of lasix 2 weeks prior to prepare for the Hiatal Hernia surgery. Eating well this AM.  Appetite is good. Tolerating soft foods. No new medical complaints  Bedside rounding with nursing completed.      OBJECTIVE:     Allergies  Reopro [abciximab injection], Chocolate, and Coffee bean extract [coffea arabica]    Medications      Scheduled Meds:   aspirin  81 mg Oral Daily    atorvastatin  80 mg Oral Nightly    cetirizine  10 mg Oral Daily    enalapril  10 mg Oral Daily    ferrous sulfate  325 mg Oral Daily with breakfast    furosemide  20 mg Oral BID    levothyroxine  150 mcg Oral Daily    sodium chloride flush  5-40 mL Intravenous 2 times per day    enoxaparin  40 mg Subcutaneous Daily       Infusions:   sodium chloride      sodium chloride 75 mL/hr at 06/01/21 2335       PRN Meds:  docusate sodium, sodium chloride flush, sodium chloride, promethazine **OR** ondansetron, polyethylene glycol, acetaminophen **OR** acetaminophen, perflutren lipid microspheres    Intake and Output     Intake/Output Summary (Last 24 hours) at 6/2/2021 0815  Last data filed at 6/2/2021 0610  Gross per 24 hour   Intake 833.21 ml   Output 1200 ml   Net -366.79 ml       Vitals    BP (!) 149/57   Pulse 65   Temp 98.5 °F (36.9 °C) (Oral)   Resp 20   Ht 5' 11\" (1.803 m)   Wt 184 lb 14.4 oz (83.9 kg)   SpO2 94%   BMI 25.79 kg/m² Exam:  General appearance:  Pleasant, elderly male in no apparent distress, appears stated age and cooperative. HEENT: Pupils equal, round, and reactive to light. Glasses   Extra ocular muscles intact. Conjunctivae/corneas clear. Neck: Supple, with full range of motion. No jugular venous distention. Trachea midline. Respiratory:  Normal respiratory effort. Clear to auscultation, bilaterally without Rales/Wheezes/Rhonchi. Easy and unlabored. Cardiovascular:  Regular rate and rhythm with normal S1/S2 without murmurs, rubs or gallops. Abdomen: Soft, non-tender, non-distended with normal bowel sounds. Musculoskeletal:  No clubbing, cyanosis. + 1 pitting pedal edema to mid shin bilat. Generalized weakness  Skin: Skin color, texture, turgor normal.  No significant rashes or lesions. Neurologic:  Neurovascularly intact. Cranial nerves: II-XII intact, grossly non-focal.  Psychiatric:  Alert and oriented, thought content appropriate, normal insight  Capillary Refill: Brisk,3 seconds, normal  Peripheral Pulses: +2 palpable, equal bilaterally     Data    Recent Labs     06/01/21  1701 06/02/21  0645   WBC 10.9 10.0   HGB 10.7* 10.5*   HCT 30.9* 30.5*   * 124*      Recent Labs     06/01/21  1701 06/02/21  0645   * 126*   K 4.1 4.1   CL 91* 90*   CO2 28 30   BUN 12 9   CREATININE <0.5* <0.5*     Recent Labs     06/01/21  1701 06/02/21  0645   AST 74* 66*   * 95*   BILIDIR  --  0.3   BILITOT 0.8 0.8   ALKPHOS 190* 184*     No results for input(s): INR in the last 72 hours.   Recent Labs     06/01/21 1701   TROPONINI <0.01       Consults:     IP CONSULT TO HOSPITALIST  IP CONSULT TO CASE MANAGEMENT    ASSESSMENT AND PLAN      Active Hospital Problems    Diagnosis Date Noted    Transaminitis [R74.01] 06/01/2021    CAD (coronary artery disease) [I25.10]     Generalized weakness [R53.1] 09/27/2012     PLAN:     generalized weakness and fatigue in patient with recent hiatal hernia repair (5/25)  -UA negative for infection  -up with assistance  -fall precautions  -pt/ot evaluation  -case management to assist with d/c, possible rehab     Acute transaminitis   -Pt with elevated AST 74 /[id number]  -unclear etiology  -will trend  -hepatitis panel is pending     Essential HTN in setting of known CAD  -continue ASA  -continue enalapril     HLD  -continue atorvastatin     Chronic systolic CHF, stable  -does not appear to be in acute exacerbation at this time  -ProBNP 1251  -strict I&O  -daily weights  -continue lasix  -ECHO in am     Hypothyroidism  -continue synthroid  -TSH in am     Hyponatremia 127 on admission  -appears chronic  -monitor with IVF  -bmp in am     Hypomagnesemia, 1.7 on admission  -replaced in ED  -repeat mag in am     Chronic normocytic anemia, 10.7/30.9 on admission  -no s/s of bleeding at this time  -cbc in am  -continue ferrous sulfate     DVT Prophylaxis: Lovenox   Diet: No diet orders on file    Code Status: Prior     PT/OT Eval Status: ordered     Dispo - 1-2 days pending clinical improvement    Nelda Vernon MD

## 2021-08-05 NOTE — PROGRESS NOTE ADULT - REASON FOR ADMISSION
Erythromycin Pregnancy And Lactation Text: This medication is Pregnancy Category B and is considered safe during pregnancy. It is also excreted in breast milk. Benzoyl Peroxide Counseling: Patient counseled that medicine may cause skin irritation and bleach clothing.  In the event of skin irritation, the patient was advised to reduce the amount of the drug applied or use it less frequently.   The patient verbalized understanding of the proper use and possible adverse effects of benzoyl peroxide.  All of the patient's questions and concerns were addressed. Tazorac Counseling:  Patient advised that medication is irritating and drying.  Patient may need to apply sparingly and wash off after an hour before eventually leaving it on overnight.  The patient verbalized understanding of the proper use and possible adverse effects of tazorac.  All of the patient's questions and concerns were addressed. Doxycycline Counseling:  Patient counseled regarding possible photosensitivity and increased risk for sunburn.  Patient instructed to avoid sunlight, if possible.  When exposed to sunlight, patients should wear protective clothing, sunglasses, and sunscreen.  The patient was instructed to call the office immediately if the following severe adverse effects occur:  hearing changes, easy bruising/bleeding, severe headache, or vision changes.  The patient verbalized understanding of the proper use and possible adverse effects of doxycycline.  All of the patient's questions and concerns were addressed. Dapsone Counseling: I discussed with the patient the risks of dapsone including but not limited to hemolytic anemia, agranulocytosis, rashes, methemoglobinemia, kidney failure, peripheral neuropathy, headaches, GI upset, and liver toxicity.  Patients who start dapsone require monitoring including baseline LFTs and weekly CBCs for the first month, then every month thereafter.  The patient verbalized understanding of the proper use and possible adverse effects of dapsone.  All of the patient's questions and concerns were addressed. facial swelling/pain, left Minocycline Counseling: Patient advised regarding possible photosensitivity and discoloration of the teeth, skin, lips, tongue and gums.  Patient instructed to avoid sunlight, if possible.  When exposed to sunlight, patients should wear protective clothing, sunglasses, and sunscreen.  The patient was instructed to call the office immediately if the following severe adverse effects occur:  hearing changes, easy bruising/bleeding, severe headache, or vision changes.  The patient verbalized understanding of the proper use and possible adverse effects of minocycline.  All of the patient's questions and concerns were addressed. High Dose Vitamin A Counseling: Side effects reviewed, pt to contact office should one occur. Topical Sulfur Applications Counseling: Topical Sulfur Counseling: Patient counseled that this medication may cause skin irritation or allergic reactions.  In the event of skin irritation, the patient was advised to reduce the amount of the drug applied or use it less frequently.   The patient verbalized understanding of the proper use and possible adverse effects of topical sulfur application.  All of the patient's questions and concerns were addressed. Topical Clindamycin Counseling: Patient counseled that this medication may cause skin irritation or allergic reactions.  In the event of skin irritation, the patient was advised to reduce the amount of the drug applied or use it less frequently.   The patient verbalized understanding of the proper use and possible adverse effects of clindamycin.  All of the patient's questions and concerns were addressed. Bactrim Pregnancy And Lactation Text: This medication is Pregnancy Category D and is known to cause fetal risk.  It is also excreted in breast milk. Sarecycline Pregnancy And Lactation Text: This medication is Pregnancy Category D and not consider safe during pregnancy. It is also excreted in breast milk. Tazorac Pregnancy And Lactation Text: This medication is not safe during pregnancy. It is unknown if this medication is excreted in breast milk. Detail Level: Zone Use Enhanced Medication Counseling?: No Topical Retinoid counseling:  Patient advised to apply a pea-sized amount only at bedtime and wait 30 minutes after washing their face before applying.  If too drying, patient may add a non-comedogenic moisturizer. The patient verbalized understanding of the proper use and possible adverse effects of retinoids.  All of the patient's questions and concerns were addressed. Isotretinoin Pregnancy And Lactation Text: This medication is Pregnancy Category X and is considered extremely dangerous during pregnancy. It is unknown if it is excreted in breast milk. Spironolactone Counseling: Patient advised regarding risks of diarrhea, abdominal pain, hyperkalemia, birth defects (for female patients), liver toxicity and renal toxicity. The patient may need blood work to monitor liver and kidney function and potassium levels while on therapy. The patient verbalized understanding of the proper use and possible adverse effects of spironolactone.  All of the patient's questions and concerns were addressed. Birth Control Pills Counseling: Birth Control Pill Counseling: I discussed with the patient the potential side effects of OCPs including but not limited to increased risk of stroke, heart attack, thrombophlebitis, deep venous thrombosis, hepatic adenomas, breast changes, GI upset, headaches, and depression.  The patient verbalized understanding of the proper use and possible adverse effects of OCPs. All of the patient's questions and concerns were addressed. Birth Control Pills Pregnancy And Lactation Text: This medication should be avoided if pregnant and for the first 30 days post-partum. Bactrim Counseling:  I discussed with the patient the risks of sulfa antibiotics including but not limited to GI upset, allergic reaction, drug rash, diarrhea, dizziness, photosensitivity, and yeast infections.  Rarely, more serious reactions can occur including but not limited to aplastic anemia, agranulocytosis, methemoglobinemia, blood dyscrasias, liver or kidney failure, lung infiltrates or desquamative/blistering drug rashes. Doxycycline Pregnancy And Lactation Text: This medication is Pregnancy Category D and not consider safe during pregnancy. It is also excreted in breast milk but is considered safe for shorter treatment courses. Dapsone Pregnancy And Lactation Text: This medication is Pregnancy Category C and is not considered safe during pregnancy or breast feeding. Isotretinoin Counseling: Patient should get monthly blood tests, not donate blood, not drive at night if vision affected, not share medication, and not undergo elective surgery for 6 months after tx completed. Side effects reviewed, pt to contact office should one occur. Topical Clindamycin Pregnancy And Lactation Text: This medication is Pregnancy Category B and is considered safe during pregnancy. It is unknown if it is excreted in breast milk. Azithromycin Pregnancy And Lactation Text: This medication is considered safe during pregnancy and is also secreted in breast milk. High Dose Vitamin A Pregnancy And Lactation Text: High dose vitamin A therapy is contraindicated during pregnancy and breast feeding. Benzoyl Peroxide Pregnancy And Lactation Text: This medication is Pregnancy Category C. It is unknown if benzoyl peroxide is excreted in breast milk. Sarecycline Counseling: Patient advised regarding possible photosensitivity and discoloration of the teeth, skin, lips, tongue and gums.  Patient instructed to avoid sunlight, if possible.  When exposed to sunlight, patients should wear protective clothing, sunglasses, and sunscreen.  The patient was instructed to call the office immediately if the following severe adverse effects occur:  hearing changes, easy bruising/bleeding, severe headache, or vision changes.  The patient verbalized understanding of the proper use and possible adverse effects of sarecycline.  All of the patient's questions and concerns were addressed. Spironolactone Pregnancy And Lactation Text: This medication can cause feminization of the male fetus and should be avoided during pregnancy. The active metabolite is also found in breast milk. Azithromycin Counseling:  I discussed with the patient the risks of azithromycin including but not limited to GI upset, allergic reaction, drug rash, diarrhea, and yeast infections. Topical Retinoid Pregnancy And Lactation Text: This medication is Pregnancy Category C. It is unknown if this medication is excreted in breast milk. Tetracycline Counseling: Patient counseled regarding possible photosensitivity and increased risk for sunburn.  Patient instructed to avoid sunlight, if possible.  When exposed to sunlight, patients should wear protective clothing, sunglasses, and sunscreen.  The patient was instructed to call the office immediately if the following severe adverse effects occur:  hearing changes, easy bruising/bleeding, severe headache, or vision changes.  The patient verbalized understanding of the proper use and possible adverse effects of tetracycline.  All of the patient's questions and concerns were addressed. Patient understands to avoid pregnancy while on therapy due to potential birth defects. Topical Sulfur Applications Pregnancy And Lactation Text: This medication is Pregnancy Category C and has an unknown safety profile during pregnancy. It is unknown if this topical medication is excreted in breast milk. Erythromycin Counseling:  I discussed with the patient the risks of erythromycin including but not limited to GI upset, allergic reaction, drug rash, diarrhea, increase in liver enzymes, and yeast infections.

## 2021-09-08 NOTE — PATIENT PROFILE ADULT. - NS PRO ABUSE SCREEN SUSPICION NEGLECT YN
[facility]  [address] [place] [address] [facility]  [address]  [place]  [address]  Tel: [phone]    September 9, 2021    Hermilo Velasquez  [address] [facility] [id number]        Dear Mr. Velasquez,    The following letter pertains to your most recent diagnostic tests:     These blood test results look stable including the hemoglobin.  We can discuss the results further when I see you later this month.       Sincerely,     Dr. Bishop/JUNIOR           Enclosure: Lab Results  Results for orders placed or performed in visit on 09/08/21   TSH with free T4 reflex     Status: Normal   Result Value Ref Range    TSH 2.46 0.40 - 4.00 mU/L   CBC with platelets     Status: Abnormal   Result Value Ref Range    WBC Count 8.5 4.0 - 11.0 10e3/uL    RBC Count 4.40 4.40 - 5.90 10e6/uL    Hemoglobin 12.6 (L) 13.3 - 17.7 g/dL    Hematocrit 38.4 (L) 40.0 - 53.0 %    MCV 87 78 - 100 fL    MCH 28.6 26.5 - 33.0 pg    MCHC 32.8 31.5 - 36.5 g/dL    RDW 15.0 10.0 - 15.0 %    Platelet Count 189 150 - 450 10e3/uL   Comprehensive metabolic panel (BMP + Alb, Alk Phos, ALT, AST, Total. Bili, TP)     Status: Abnormal   Result Value Ref Range    Sodium 136 133 - 144 mmol/L    Potassium 4.0 3.4 - 5.3 mmol/L    Chloride 107 94 - 109 mmol/L    Carbon Dioxide (CO2) 29 20 - 32 mmol/L    Anion Gap <1 (L) 3 - 14 mmol/L    Urea Nitrogen 25 7 - 30 mg/dL    Creatinine 1.24 0.66 - 1.25 mg/dL    Calcium 8.0 (L) 8.5 - 10.1 mg/dL    Glucose 97 70 - 99 mg/dL    Alkaline Phosphatase 102 40 - 150 U/L    AST 10 0 - 45 U/L    ALT 12 0 - 70 U/L    Protein Total 6.3 (L) 6.8 - 8.8 g/dL    Albumin 3.1 (L) 3.4 - 5.0 g/dL    Bilirubin Total 0.6 0.2 - 1.3 mg/dL    GFR Estimate 52 (L) >60 mL/min/1.73m2       
no

## 2021-09-17 NOTE — ED ADULT NURSE NOTE - PAIN RATING/NUMBER SCALE (0-10): REST
[Dear  ___] : Dear  [unfilled], [Consult Letter:] : I had the pleasure of evaluating your patient, [unfilled]. [Please see my note below.] : Please see my note below. [Consult Closing:] : Thank you very much for allowing me to participate in the care of this patient.  If you have any questions, please do not hesitate to contact me. [Sincerely,] : Sincerely, [FreeTextEntry3] : Tigre Miller MD\par tel: [phone]\par fax: [phone]\par  0

## 2021-12-28 NOTE — DIETITIAN INITIAL EVALUATION ADULT. - PROBLEM SELECTOR PLAN 2
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
concern for intrinsic vs post renal  tx underlying uti  c/w ivf  f/u renal u/s

## 2022-01-13 NOTE — ED ADULT NURSE NOTE - BREATHING, MLM
Addended by: Kate Valenzuela on: 1/13/2022 09:54 AM     Modules accepted: Orders Spontaneous, unlabored and symmetrical

## 2022-01-24 NOTE — ED ADULT TRIAGE NOTE - DIRECT TO ROOM CARE INITIATED:
Forms placed on Dr. Beck Zamora desk for further review and signature.      Office visit notes and imaging results faxed to [address] at [phone]
Needs follow up to fill out the rest of the paperwork.
Pt scheduled for f/u to complete forms in office on 1/27/2022
We received via fax from [address] [place] [address] ( [address]) requesting Office notes and imaging results . Place form in dr. Cecy Morgan
13-Dec-2018 03:00

## 2022-02-11 NOTE — INPATIENT CERTIFICATION FOR MEDICARE PATIENTS - RISKS OF ADVERSE EVENTS
Concern for cardiopulmonary deterioration/Concern for delay in diagnosis and treatment <-- Click to add NO pertinent Past Medical History

## 2022-03-02 NOTE — PATIENT PROFILE ADULT - DO YOU FEEL THREATENED BY OTHERS?
Quality 431: Preventive Care And Screening: Unhealthy Alcohol Use - Screening: Patient not identified as an unhealthy alcohol user when screened for unhealthy alcohol use using a systematic screening method
Detail Level: Simple
Quality 226: Preventive Care And Screening: Tobacco Use: Screening And Cessation Intervention: Patient screened for tobacco use and is an ex/non-smoker
no

## 2022-05-08 NOTE — PROGRESS NOTE ADULT - PROBLEM SELECTOR PLAN 6
resolved  pt is extubated
Baseline Cr 1.3, patient now near his baseline Cr
Baseline Cr 1.3, patient now near his baseline Cr
resolved  pt is extubated
resolved  pt is extubated
MELISSA on CKD nephrology is following  avoid hypotension and nephrotoxins
No

## 2022-08-18 NOTE — CONSULT NOTE ADULT - SUBJECTIVE AND OBJECTIVE BOX
Ongoing SW/CM Assessment/Plan of Care Note     See SW/CM flowsheets for goals and other objective data.    Patient/Family discharge goal (s):  Goal #1: Psychosocial needs assessed        Progress note:   [facility]  Was [facility] offered:YES  Was [facility] chosen by patient:YES    [id number] notified of possible discharge today    [id number] [facility] sent to Dr. Johnson for order for HH resumption            Patient seen and examined.  Full consult dictated and will be available shortly.    Elderly male, known to me, underwent PEG 2016 with subsequent removal now admitted with dysphagia that has become progressively worse over last few weeks.  Unable to tolerate food.      Impression: Dysphagia.  Suspect possible oropharyngeal dysphagia    Plan:  1. Upper Endoscopy today for further evaluation  2. Speech & Swallow evaluation to assess swallow function and r/o oropharyngeal dysphagia

## 2022-10-25 NOTE — PROGRESS NOTE ADULT - SUBJECTIVE AND OBJECTIVE BOX
Patient is seen and examined at the bed side, is afebrile. He is doing better. The blood  cultures are negative and the urine culture is growing Staph aureus.         REVIEW OF SYSTEMS: All other review systems are negative          Vital Signs Last 24 Hrs  T(C): 36.5 (2018 17:20), Max: 36.8 (2018 14:04)  T(F): 97.7 (2018 17:20), Max: 98.2 (2018 14:04)  HR: 60 (2018 17:20) (60 - 62)  BP: 156/93 (2018 17:20) (127/75 - 156/93)  BP(mean): --  RR: 17 (2018 17:20) (17 - 18)  SpO2: 95% (2018 17:20) (94% - 97%)        ALLERGIES: NKDA          PHYSICAL EXAM:  GENERAL: Not in distress  CVS: s1 and s2 present  RESP: Air entry B/L  GI: Abdomen soft and nontender  EXT: No pedal edema  CNS: Awake and alert  SKIN: psoriatic lesions              LABS:                        11.6   6.8   )-----------( 131      ( 2018 05:31 )             35.0                           12.2   6.9   )-----------( 150      ( 2018 17:29 )             36.6               04-16    143  |  109<H>  |  21  ----------------------------<  80  4.8   |  21<L>  |  1.53<H>    Ca    9.2      2018 05:30  Phos  3.2     -15  Mg     2.0     -15    TPro  7.6  /  Alb  3.6  /  TBili  0.3  /  DBili  <0.1  /  AST  21  /  ALT  14  /  AlkPhos  116  04-15    04-14    143  |  108  |  21  ----------------------------<  83  4.7   |  24  |  1.53<H>    Ca    9.7      2018 17:29        Urinalysis Basic - ( 2018 18:02 )    Color: Yellow / Appearance: SL Turbid / S.015 / pH: x  Gluc: x / Ketone: Negative  / Bili: Negative / Urobili: Negative   Blood: x / Protein: 30 mg/dL / Nitrite: Positive   Leuk Esterase: Large / RBC: 10-25 /HPF / WBC >50 /HPF   Sq Epi: x / Non Sq Epi: OCC /HPF / Bacteria: Few /HPF          MEDICATIONS  (STANDING):  amLODIPine   Tablet 5 milliGRAM(s) Oral daily  cefTRIAXone   IVPB 1 Gram(s) IV Intermittent every 24 hours  docusate sodium 100 milliGRAM(s) Oral two times a day  enoxaparin Injectable 40 milliGRAM(s) SubCutaneous daily  hydrALAZINE 25 milliGRAM(s) Oral every 8 hours  hydrocortisone 2.5% Ointment 1 Application(s) Topical daily  latanoprost 0.005% Ophthalmic Solution 1 Drop(s) Left EYE at bedtime  oxybutynin 5 milliGRAM(s) Oral two times a day  sodium chloride 0.9%. 1000 milliLiter(s) (60 mL/Hr) IV Continuous <Continuous>    MEDICATIONS  (PRN):  acetaminophen   Tablet. 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 Correlate clinically with point tenderness.   Otherwise, no acute fracture.      < end of copied text >      < from: Xray Chest 1 View AP/PA (18 @ 17:32) >  No Urgent/Emergent findings.    Follow up official report.      < end of copied text >          MICROBIOLOGY DATA:    Culture - Blood (04.15.18 @ 05:13)    Specimen Source: .Blood Blood-Peripheral    Culture Results:   No growth to date.    Culture - Blood (04.15.18 @ 05:13)    Specimen Source: .Blood Blood-Peripheral    Culture Results:   No growth to date.    Culture - Urine (18 @ 20:29)    Specimen Source: .Urine Catheterized    Culture Results:   >100,000 CFU/ml Coag Negative Staphylococcus  >100,000 CFU/ml Staphylococcus aureus O-T Plasty Text: The defect edges were debeveled with a #15 scalpel blade.  Given the location of the defect, shape of the defect and the proximity to free margins an O-T plasty was deemed most appropriate.  Using a sterile surgical marker, an appropriate O-T plasty was drawn incorporating the defect and placing the expected incisions within the relaxed skin tension lines where possible.    The area thus outlined was incised deep to adipose tissue with a #15 scalpel blade.  The skin margins were undermined to an appropriate distance in all directions utilizing iris scissors.

## 2023-01-09 NOTE — PHYSICAL THERAPY INITIAL EVALUATION ADULT - PHYSICAL ASSIST/NONPHYSICAL ASSIST: SIT/SUPINE, REHAB EVAL
VC for BLE/BUE placement and sequencing/1 person assist/nonverbal cues (demo/gestures)/verbal cues Eye Protection Verbiage: Before proceeding with the stage, a plastic scleral shield was inserted. The globe was anesthetized with a few drops of 1% lidocaine with 1:100,000 epinephrine. Then, an appropriate sized scleral shield was chosen and coated with lacrilube ointment. The shield was gently inserted and left in place for the duration of each stage. After the stage was completed, the shield was gently removed.

## 2023-02-16 NOTE — PROGRESS NOTE ADULT - SUBJECTIVE AND OBJECTIVE BOX
Patient awake and alert sitting up in bed - asking for ice chips - requesting to get out of bed to chair o/w no complaints no events over night.      PHYSICAL EXAM:    Constitutional: A&Ox2 in NAD  Eyes: PERRL  Respiratory: CTA b/l diminished at bases  Cardiovascular: RRR  Gastrointestinal: soft NT/ND - PEG site dry and intact - continuing with bolus feeds/moving bowels  Genitourinary: incontinent to urine  Extremities: no edema, decreased strength  Vascular: 2+DP b/l  Neurological: intact  Skin: dry and intact    MEDICATIONS  (STANDING):  ALBUTerol/ipratropium for Nebulization 3 milliLiter(s) Nebulizer every 6 hours  ascorbic acid 500 milliGRAM(s) Oral daily  bisacodyl Suppository 10 milliGRAM(s) Rectal daily  cephalexin 500 milliGRAM(s) Oral three times a day  dorzolamide 2%/timolol 0.5% Ophthalmic Solution 1 Drop(s) Both EYES two times a day  heparin  Injectable 5000 Unit(s) SubCutaneous every 8 hours  latanoprost 0.005% Ophthalmic Solution 1 Drop(s) Both EYES at bedtime  multivitamin  Drops 5 milliLiter(s) Oral daily  pantoprazole   Suspension 40 milliGRAM(s) Oral daily  polyethylene glycol 3350 17 Gram(s) Oral daily  zinc sulfate 220 milliGRAM(s) Oral daily    No new labs today no

## 2023-03-07 NOTE — ED PROCEDURE NOTE - CPROC ED ANATOMIC LOCATION1
You will be sore for several days. Continue Mobic. Add Tylenol. Ice if it is sore. Keep moving so you don't get stiff.
right/internal jugular vein

## 2023-03-16 NOTE — PATIENT PROFILE ADULT. - LIVING ARRANGEMENTS, TEMPORARY FAMILY, PROFILE
AMA    54-year-old female admitted and managed for DKA with E coli bacteremia and CARLINE.  Initially managed in the ICU and transferred to Hospital Medicine.  Patient's anion gap closed.  Patient received 5 days of IV antibiotics, repeat blood cultures obtained which are no growth thus far.  Upon review patient today, she said she wants to leave.  Patient had significantly uncontrolled glucose, greater than 400 and endocrine was consulted.  She was also receiving antibiotics and following up repeat blood cultures.  Despite discussion with patient's re risk of leaving hospital with morbidity and mortality secondary to uncontrolled diabetes with complications and infection, she said she wants to either way as she only came to hospital because she did not take her home medications for diabetes.  Patient said that she has medications at home for diabetes including insulin.  I went ahead and prescribed her p.o. ciprofloxacin for her to continue given her bacteremia.  Patient is to follow up with her primary care of which said she has an appointment to see tomorrow.  Referral given for endocrinology follow up.    On examination.  Unkempt female in no distress.  Dry skin.  Abdomen is soft and nontender.  Breath sounds vesicular no Creps no rhonchi.  Patient alert and oriented time place and person with no focal deficit.  Ambulation normal.   none required

## 2023-03-17 NOTE — PATIENT PROFILE ADULT. - NS PRO AD PATIENT TYPE
Addended by: LEONARDO DAMIAN on: 3/16/2023 07:03 PM     Modules accepted: Orders     Health Care Proxy (HCP)

## 2023-08-15 NOTE — PROGRESS NOTE ADULT - PROBLEM SELECTOR PROBLEM 7
Prophylactic measure
Prophylactic measure
Pt states he got stung by a bee yesterday at around 1530, c/o bilateral eye swelling. Airway patent. Took benadryl 2 tabs at 0600 this morning.

## 2023-10-18 NOTE — INPATIENT CERTIFICATION FOR MEDICARE PATIENTS - IN ORDER TO MEET MEDICARE REQUIREMENTS.
Pt is back in the hospital. We will call later to r/s  
In order to meet Medicare requirements, the clinical documentation must support the information cited in the admission order.  Please be sure to provide detailed and clear documentation about the following in the admitting note/history and physical:

## 2024-04-17 NOTE — PROGRESS NOTE ADULT - SUBJECTIVE AND OBJECTIVE BOX
Community Based Case Management Services will not be offered to patient due to not meeting program criteria.     Clients needs are primarily SDOH in nature and [facility] she does not meet our CBCM criteria.  Having said that here are some other suggestions from our [facility] SWs:    1) Recommend she reach out to [facility] EA program, as it's a free resource with a plethora of assistance. (Therapy, financial, legal, etc...) (this phone number in [place] is [phone].     2) Not 100% sure, but perhaps she could utilize AFS services, as well. If not, she could always go to Healthcare.gov for insurance navigation, etc... (this can be accessed via the Caregiver Connect under [facility])    3) She could also try Eligibility Management in [place] ([phone]) to see about re-enrolling in Medicaid!                   Please contact me if you have further questions.  Kindly, Mini Velarde RN, [facility]   [address] [facility] [id number] [phone]  or  [phone]     Patient is a 85y old  Male who presents with a chief complaint of cough (17 Dec 2018 10:25)      Patient seen and examined at bedside, o evens overnight, more alert.     ALLERGIES:  No Known Allergies    MEDICATIONS:  acetaminophen   Tablet .. 650 milliGRAM(s) Oral every 6 hours PRN  ALBUTerol/ipratropium for Nebulization 3 milliLiter(s) Nebulizer every 6 hours  atropine 1% Ophthalmic Solution for SubLingual Use 1 Drop(s) SubLingual three times a day  bisacodyl Suppository 10 milliGRAM(s) Rectal daily PRN  buDESOnide   0.5 milliGRAM(s) Respule 0.5 milliGRAM(s) Inhalation every 12 hours  cholecalciferol 1000 Unit(s) Oral daily  dextrose 5% + sodium chloride 0.9%. 1000 milliLiter(s) IV Continuous <Continuous>  dorzolamide 2% Ophthalmic Solution 1 Drop(s) Both EYES <User Schedule>  doxazosin 4 milliGRAM(s) Oral at bedtime  escitalopram 10 milliGRAM(s) Oral daily  glycopyrrolate 1 milliGRAM(s) Oral three times a day  latanoprost 0.005% Ophthalmic Solution 1 Drop(s) Both EYES at bedtime  linaclotide 145 MICROGram(s) Oral before breakfast  melatonin 3 milliGRAM(s) Oral at bedtime PRN  oxyCODONE    5 mG/acetaminophen 325 mG 2 Tablet(s) Oral every 6 hours PRN  piperacillin/tazobactam IVPB. 3.375 Gram(s) IV Intermittent every 8 hours  polyethylene glycol 3350 17 Gram(s) Oral daily PRN  predniSONE   Tablet 30 milliGRAM(s) Oral daily  timolol 0.5% Solution 1 Drop(s) Both EYES two times a day    Vital Signs Last 24 Hrs  T(C): 36.3 (16 Dec 2018 22:01), Max: 36.3 (16 Dec 2018 15:40)  T(F): 97.4 (16 Dec 2018 22:01), Max: 97.4 (16 Dec 2018 22:01)  HR: 60 (17 Dec 2018 09:09) (59 - 62)  BP: 133/80 (17 Dec 2018 05:26) (131/98 - 141/94)  BP(mean): --  RR: 15 (17 Dec 2018 05:26) (14 - 15)  SpO2: 100% (17 Dec 2018 09:09) (94% - 100%)  I&O's Summary    16 Dec 2018 07:01  -  17 Dec 2018 07:00  --------------------------------------------------------  IN: 1240 mL / OUT: 1 mL / NET: 1239 mL          PAST MEDICAL & SURGICAL HISTORY:  Psoriasis  GERD (gastroesophageal reflux disease)  Dyslipidemia  Cardiac pacemaker  S/P percutaneous endoscopic gastrostomy (PEG) tube placement      Home Medications:  acetaminophen 325 mg oral tablet: 2 tab(s) orally every 6 hours, As needed, Mild- Moderate Pain (1 - 6) (13 Dec 2018 03:50)  atropine 1% ophthalmic solution: drop(s) sublingually 3 times a day (13 Dec 2018 03:50)  bisacodyl 10 mg rectal suppository: 1 suppository(ies) rectal once a day, As needed, Constipation (13 Dec 2018 03:50)  budesonide 0.5 mg/2 mL inhalation suspension: 2 milliliter(s) inhaled 2 times a day (13 Dec 2018 06:00)  cholecalciferol oral tablet: 1000 unit(s) orally once a day (13 Dec 2018 03:50)  cyanocobalamin 1000 mcg oral tablet: 1 tab(s) orally once a day (13 Dec 2018 03:50)  dorzolamide-timolol 2.23%-0.68% ophthalmic solution: 1 drop(s) to each affected eye 2 times a day (13 Dec 2018 06:00)  doxazosin 4 mg oral tablet: 1 tab(s) orally once a day (at bedtime) (13 Dec 2018 03:50)  escitalopram 10 mg oral tablet: 1 tab(s) orally once a day (13 Dec 2018 03:50)  glycopyrrolate 1 mg oral tablet: 1 tab(s) orally 3 times a day (13 Dec 2018 03:50)  ipratropium-albuterol 0.5 mg-2.5 mg/3 mLinhalation solution: 3 milliliter(s) inhaled every 6 hours (13 Dec 2018 03:50)  K-Dur 10 oral tablet, extended release: 1 tab(s) orally 2 times a day (13 Dec 2018 03:50)  latanoprost 0.005% ophthalmic solution: 1 drop(s) to each affected eye once a day (at bedtime) (13 Dec 2018 03:50)  Levaquin 250 mg oral tablet: 1 tab(s) orally every 24 hours start 12/12 x 7 days (13 Dec 2018 06:00)  linaclotide 145 mcg oral capsule: 1 cap(s) orally once a day (before a meal) (13 Dec 2018 03:50)  melatonin 5 mg oral tablet: 1 tab(s) orally once a day (at bedtime) (13 Dec 2018 03:50)  Percocet 5/325 oral tablet: 2 tab(s) orally every 4 hours, As needed, Severe Pain (7 - 10) (13 Dec 2018 03:50)  polyethylene glycol 3350 oral powder for reconstitution: 17 gram(s) orally once a day, As needed, Constipation (13 Dec 2018 03:50)  predniSONE: 30  orally once a day x 5days started 12/12 (13 Dec 2018 06:00)      PHYSICAL EXAM:  General: NAD, A/O x3  ENT: MMM  Neck: Supple, No JVD  Lungs: B/L scattered rales   Cardio: RRR, S1/S2, No murmurs  Abdomen: Soft, Nontender, Nondistended; Bowel sounds present  Extremities: B/L LE venous stasis   Skin: B/L LE venous stasis     LABS:                        8.9    6.3   )-----------( 82       ( 17 Dec 2018 07:20 )             28.1     12-17    145  |  116  |  21  ----------------------------<  86  3.6   |  23  |  1.51    Ca    8.4      17 Dec 2018 07:20  Phos  2.2     12-15  Mg     1.9     12-15    CAPILLARY BLOOD GLUCOSE    Culture - Urine (collected 13 Dec 2018 06:25)  Source: .Urine Clean Catch (Midstream)  Final Report (14 Dec 2018 10:48):    No growth    Culture - Blood (collected 13 Dec 2018 03:00)  Source: .Blood Blood-Peripheral  Preliminary Report (14 Dec 2018 10:01):    No growth to date.    Culture - Blood (collected 13 Dec 2018 03:00)  Source: .Blood Blood-Peripheral  Preliminary Report (14 Dec 2018 10:01):    No growth to date.    RADIOLOGY & ADDITIONAL TESTS: < from: Xray Chest 1 View AP/PA (12.13.18 @ 03:14) >  IMPRESSION: Clear lungs at this time.    < end of copied text >      Care Discussed with Consultants/Other Providers: Hospitalist

## 2024-09-26 NOTE — ED ADULT NURSE NOTE - ISOLATION TYPE:
Decrease Bumex to 0.5mg every other day instead of daily    Check your standing weights weekly, if you notice weight gain at least 5 lbs a week, take anBumex 0.5mg daily instead of every other day until you lose that extra weight and notify us    Labs in 1 week, Thursday or Friday    Ok to enjoy a glass of wine every few days    
Droplet precautions...
